# Patient Record
Sex: FEMALE | Race: WHITE | NOT HISPANIC OR LATINO | Employment: OTHER | URBAN - METROPOLITAN AREA
[De-identification: names, ages, dates, MRNs, and addresses within clinical notes are randomized per-mention and may not be internally consistent; named-entity substitution may affect disease eponyms.]

---

## 2017-04-19 ENCOUNTER — OFFICE VISIT (OUTPATIENT)
Dept: PODIATRY | Facility: CLINIC | Age: 77
End: 2017-04-19
Payer: MEDICARE

## 2017-04-19 ENCOUNTER — HOSPITAL ENCOUNTER (OUTPATIENT)
Dept: RADIOLOGY | Facility: HOSPITAL | Age: 77
Discharge: HOME | End: 2017-04-19
Attending: PODIATRIST
Payer: MEDICARE

## 2017-04-19 VITALS
HEIGHT: 62 IN | DIASTOLIC BLOOD PRESSURE: 64 MMHG | SYSTOLIC BLOOD PRESSURE: 139 MMHG | HEART RATE: 66 BPM | BODY MASS INDEX: 32.94 KG/M2 | WEIGHT: 179 LBS

## 2017-04-19 DIAGNOSIS — L97.512 ULCER OF TOE, RIGHT, WITH FAT LAYER EXPOSED: ICD-10-CM

## 2017-04-19 DIAGNOSIS — E11.42 TYPE 2 DIABETES MELLITUS WITH PERIPHERAL NEUROPATHY: Primary | ICD-10-CM

## 2017-04-19 DIAGNOSIS — M20.41 HAMMER TOE OF RIGHT FOOT: ICD-10-CM

## 2017-04-19 PROCEDURE — 99213 OFFICE O/P EST LOW 20 MIN: CPT | Mod: 25,S$GLB,, | Performed by: PODIATRIST

## 2017-04-19 PROCEDURE — 1160F RVW MEDS BY RX/DR IN RCRD: CPT | Mod: S$GLB,,, | Performed by: PODIATRIST

## 2017-04-19 PROCEDURE — 73630 X-RAY EXAM OF FOOT: CPT | Mod: TC,PO,RT

## 2017-04-19 PROCEDURE — 99999 PR PBB SHADOW E&M-EST. PATIENT-LVL III: CPT | Mod: PBBFAC,,, | Performed by: PODIATRIST

## 2017-04-19 PROCEDURE — 1159F MED LIST DOCD IN RCRD: CPT | Mod: S$GLB,,, | Performed by: PODIATRIST

## 2017-04-19 PROCEDURE — 1126F AMNT PAIN NOTED NONE PRSNT: CPT | Mod: S$GLB,,, | Performed by: PODIATRIST

## 2017-04-19 PROCEDURE — 73630 X-RAY EXAM OF FOOT: CPT | Mod: 26,RT,, | Performed by: RADIOLOGY

## 2017-04-19 PROCEDURE — 97597 DBRDMT OPN WND 1ST 20 CM/<: CPT | Mod: S$GLB,,, | Performed by: PODIATRIST

## 2017-04-19 RX ORDER — OMEPRAZOLE 20 MG/1
CAPSULE, DELAYED RELEASE ORAL
Status: ON HOLD | COMMUNITY
Start: 2017-03-06 | End: 2017-05-28

## 2017-04-19 RX ORDER — MELOXICAM 15 MG/1
15 TABLET ORAL DAILY
Refills: 2 | Status: ON HOLD | COMMUNITY
Start: 2017-01-12 | End: 2017-05-28 | Stop reason: HOSPADM

## 2017-04-19 NOTE — PROGRESS NOTES
"Subjective:      Patient ID: Caro Powell is a 76 y.o. female.    Chief Complaint: Toe Pain (Infected right 5th toe)    Caro LUA is a 76 y.o. female who returns to the clinic for routine Diabetic foot exam. Documented high risk due to peripheral neuropathy and history of toe ulceration. Notes new ulcer at right 5th toe that opened last week. Denies drainage, pain.    PCP: TORIE Salcedo Jr, MD    Date Last Seen by PCP:     Current shoe gear:      No results found for: HGBA1C  Vitals:    04/19/17 1004   BP: 139/64   Pulse: 66   Weight: 81.2 kg (179 lb)   Height: 5' 2" (1.575 m)   PainSc: 0-No pain      Past Medical History:   Diagnosis Date    Diabetes mellitus, type 2     Diabetic peripheral neuropathy associated with type 2 diabetes mellitus     Hypertension        History reviewed. No pertinent surgical history.    Family History   Problem Relation Age of Onset    Diabetes Mother        Social History     Social History    Marital status:      Spouse name: N/A    Number of children: N/A    Years of education: N/A     Social History Main Topics    Smoking status: Never Smoker    Smokeless tobacco: None    Alcohol use None    Drug use: None    Sexual activity: Not Asked     Other Topics Concern    None     Social History Narrative       Current Outpatient Prescriptions   Medication Sig Dispense Refill    ACCU-CHEK SAMI CONTROL SOLN Soln       ACCU-CHEK SAMI PLUS METER Misc       ACCU-CHEK SAMI PLUS TEST STRP Strp       ACCU-CHEK SOFT DEV LANCETS Kit       ACCU-CHEK SOFTCLIX LANCETS Misc       ammonium lactate 12 % Crea Apply to feet twice daily. 140 g 5    gabapentin (NEURONTIN) 400 MG capsule       glimepiride (AMARYL) 1 MG tablet       lisinopril (PRINIVIL,ZESTRIL) 5 MG tablet       omeprazole (PRILOSEC) 20 MG capsule       pramipexole (MIRAPEX) 0.125 MG tablet       diazepam (VALIUM) 5 MG tablet Take 1 tablet (5 mg total) by mouth every 6 (six) hours as needed for " Anxiety. 10 tablet 0    hydrocodone-acetaminophen 5-325mg (NORCO) 5-325 mg per tablet Take 1 tablet by mouth every 12 (twelve) hours as needed.  0    meloxicam (MOBIC) 15 MG tablet Take 15 mg by mouth once daily.  2    triamcinolone acetonide 0.1% (KENALOG) 0.1 % cream Apply topically 2 (two) times daily. Apply to feet twice a day. 80 g 2     No current facility-administered medications for this visit.        Review of patient's allergies indicates:   Allergen Reactions    Codeine Nausea Only         Review of Systems   Constitution: Negative for chills, fever, weakness and malaise/fatigue.   Cardiovascular: Negative for chest pain, claudication and leg swelling.   Respiratory: Negative for cough and shortness of breath.    Skin: Positive for dry skin, nail changes and poor wound healing. Negative for color change.   Musculoskeletal: Negative for back pain, joint pain, muscle cramps and muscle weakness.   Gastrointestinal: Negative for nausea and vomiting.   Neurological: Positive for paresthesias. Negative for numbness.   Psychiatric/Behavioral: Negative for altered mental status.           Objective:      Physical Exam   Constitutional: She is oriented to person, place, and time. She appears well-nourished. No distress.   Cardiovascular: Intact distal pulses.    Pulses:       Dorsalis pedis pulses are 2+ on the right side, and 2+ on the left side.        Posterior tibial pulses are 1+ on the right side, and 1+ on the left side.   CFT< 3 secs all toes bilateral foot, skin temp warm bilateral foot, diminished digital hair growth bilateral foot, no lower extremity edema bilateral.       Musculoskeletal:        Feet:    Hallux limitus with semi-reducible hammertoe deformities toes 2-5 bilateral. Elongated second toe bilateral. Rectus alignment of right second toe. No pain with on palpation or ROM right second toe. Adductovarus rotation fifth toe bilateral. Gastrocnemius equinus bilateral. No pain with ROM or MMT  bilateral foot.   Neurological: She is alert and oriented to person, place, and time. She has normal strength. A sensory deficit is present.   Protective threshold and vibratory sensation decreased bilateral foot.   Skin: Skin is warm, dry and intact. No ecchymosis noted. She is not diaphoretic. No cyanosis or erythema. No pallor. Nails show no clubbing.   Ulcer at right 5th IP joint, 4 x 4 x 2 mm.  Fibro granular base. No probe to bone or tendon.  Mild local edema and erythema. No drainage.                           Assessment:       Encounter Diagnoses   Name Primary?    Type 2 diabetes mellitus with peripheral neuropathy Yes    Ulcer of toe, right, with fat layer exposed     Hammer toe of right foot          Plan:       Caro LUA was seen today for toe pain.    Diagnoses and all orders for this visit:    Type 2 diabetes mellitus with peripheral neuropathy    Ulcer of toe, right, with fat layer exposed  -     X-Ray Foot Complete Right; Future  -     Aerobic culture    Hammer toe of right foot      I counseled the patient on her conditions, their implications and medical management.    Shoe inspection. Diabetic Foot Education. Patient reminded of the importance of good nutrition and blood sugar control to help prevent podiatric complications of diabetes. Patient instructed on proper foot hygeine. We discussed wearing proper shoe gear, daily foot inspections, never walking without protective shoe gear, never putting sharp instruments to feet    Ulcer at right 5th toe debrided, cultured, dressed with nathan, foam, cast padding, coban.  Offload with surgical shoe.  Will call in abx based on C&S.    F/u next week    Obtained verbal consent from the patient for excisional wound debridement, right 5th toe. No anesthesia was required, Utilizing a sterile curet, all non-viable soft tissue was excised to level of health subcutaneous tissue. A healthy appearing wound base was achieved with minimal blood loss. Hemostasis  achieved with compression. Wound site was irrigated with normal saline and dressed. Wound care instructions were reviewed with the patient. Patient tolerated the procedure well.

## 2017-04-19 NOTE — MR AVS SNAPSHOT
[facility]   [place]  [place] [address]  Phone: [phone]                  Caro Powell   2017 10:00 AM   Office Visit    Description:  Female : 1940   Provider:  Roman Augustin DPM   Department:  [facility]           Reason for Visit     Toe Pain           Diagnoses this Visit        Comments    Type 2 diabetes mellitus with peripheral neuropathy    -  Primary     Ulcer of toe, right, with fat layer exposed         Hammer toe of right foot                To Do List           Future Appointments        Provider Department Dept Phone    2017 8:00 AM Derek Jose DPM [facility] [phone]      Goals (5 Years of Data)     None      Follow-Up and Disposition     Return in about 1 week (around 2017).      [facility] On Call     [facility] On Call Nurse Care Line -  Assistance  Unless otherwise directed by your provider, please contact [facility], our nurse care line that is available for  assistance.     Registered nurses in the [facility] provide: appointment scheduling, clinical advisement, health education, and other advisory services.  Call: [phone] (toll free)               Medications           Message regarding Medications     Verify the changes and/or additions to your medication regime listed below are the same as discussed with your clinician today.  If any of these changes or additions are incorrect, please notify your healthcare provider.        STOP taking these medications     famotidine (PEPCID) 20 MG tablet Take 1 tablet (20 mg total) by mouth once daily.           Verify that the below list of medications is an accurate representation of the medications you are currently taking.  If none reported, the list may be blank. If incorrect, please contact your healthcare provider. Carry this list with you in case of emergency.           Current Medications     ACCU-CHEK SAMI CONTROL SOLN Soln      "ACCU-CHEK SAMI PLUS METER Misc     ACCU-CHEK SAMI PLUS TEST STRP Strp     ACCU-CHEK SOFT DEV LANCETS Kit     ACCU-CHEK SOFTCLIX LANCETS Misc     ammonium lactate 12 % Crea Apply to feet twice daily.    gabapentin (NEURONTIN) 400 MG capsule     glimepiride (AMARYL) 1 MG tablet     lisinopril (PRINIVIL,ZESTRIL) 5 MG tablet     omeprazole (PRILOSEC) 20 MG capsule     pramipexole (MIRAPEX) 0.125 MG tablet     diazepam (VALIUM) 5 MG tablet Take 1 tablet (5 mg total) by mouth every 6 (six) hours as needed for Anxiety.    hydrocodone-acetaminophen 5-325mg (NORCO) 5-325 mg per tablet Take 1 tablet by mouth every 12 (twelve) hours as needed.    meloxicam (MOBIC) 15 MG tablet Take 15 mg by mouth once daily.    triamcinolone acetonide 0.1% (KENALOG) 0.1 % cream Apply topically 2 (two) times daily. Apply to feet twice a day.           Clinical Reference Information           Your Vitals Were     BP Pulse Height Weight BMI    139/64 66 5' 2" (1.575 m) 81.2 kg (179 lb) 32.74 kg/m2      Blood Pressure          Most Recent Value    BP  139/64      Allergies as of 4/19/2017     Codeine      Immunizations Administered on Date of Encounter - 4/19/2017     None      Orders Placed During Today's Visit      Normal Orders This Visit    Aerobic culture     Future Labs/Procedures Expected by Expires    X-Ray Foot Complete Right  4/19/2017 4/19/2018      Language Assistance Services     ATTENTION: Language assistance services are available, free of charge. Please call [phone].      ATENCIÓN: Si habla susan, tiene a lee disposición servicios gratuitos de asistencia lingüística. Llame al [phone].     CHÚ Ý: N?u b?n nói Ti?ng Vi?t, có các d?ch v? h? tr? ngôn ng? mi?n phí dành cho b?n. G?i s? [phone].         [facility] complies with applicable Federal civil rights laws and does not discriminate on the basis of race, color, national origin, age, disability, or sex.        "

## 2017-04-22 LAB — BACTERIA SPEC AEROBE CULT: NORMAL

## 2017-04-28 ENCOUNTER — OFFICE VISIT (OUTPATIENT)
Dept: PODIATRY | Facility: CLINIC | Age: 77
End: 2017-04-28
Payer: MEDICARE

## 2017-04-28 VITALS
HEART RATE: 65 BPM | WEIGHT: 179 LBS | DIASTOLIC BLOOD PRESSURE: 61 MMHG | SYSTOLIC BLOOD PRESSURE: 138 MMHG | HEIGHT: 62 IN | BODY MASS INDEX: 32.94 KG/M2

## 2017-04-28 DIAGNOSIS — E11.42 TYPE 2 DIABETES MELLITUS WITH PERIPHERAL NEUROPATHY: ICD-10-CM

## 2017-04-28 DIAGNOSIS — M86.9 OSTEOMYELITIS OF TOE OF RIGHT FOOT: ICD-10-CM

## 2017-04-28 DIAGNOSIS — L97.512 ULCER OF TOE, RIGHT, WITH FAT LAYER EXPOSED: Primary | ICD-10-CM

## 2017-04-28 PROCEDURE — 11042 DBRDMT SUBQ TIS 1ST 20SQCM/<: CPT | Mod: S$GLB,,, | Performed by: PODIATRIST

## 2017-04-28 PROCEDURE — 1159F MED LIST DOCD IN RCRD: CPT | Mod: S$GLB,,, | Performed by: PODIATRIST

## 2017-04-28 PROCEDURE — 1126F AMNT PAIN NOTED NONE PRSNT: CPT | Mod: S$GLB,,, | Performed by: PODIATRIST

## 2017-04-28 PROCEDURE — 1160F RVW MEDS BY RX/DR IN RCRD: CPT | Mod: S$GLB,,, | Performed by: PODIATRIST

## 2017-04-28 PROCEDURE — 99213 OFFICE O/P EST LOW 20 MIN: CPT | Mod: 25,S$GLB,, | Performed by: PODIATRIST

## 2017-04-28 PROCEDURE — 99999 PR PBB SHADOW E&M-EST. PATIENT-LVL III: CPT | Mod: PBBFAC,,, | Performed by: PODIATRIST

## 2017-04-28 RX ORDER — MUPIROCIN 20 MG/G
OINTMENT TOPICAL DAILY
Qty: 15 G | Refills: 1 | Status: ON HOLD | OUTPATIENT
Start: 2017-04-28 | End: 2017-05-24 | Stop reason: HOSPADM

## 2017-04-28 NOTE — MR AVS SNAPSHOT
[facility]   [place] [address]  Phone: [phone]                  Caro Powell   2017 8:00 AM   Office Visit    Description:  Female : 1940   Provider:  Derek Jose DPM   Department:  [facility]           Reason for Visit     Follow-up           Diagnoses this Visit        Comments    Ulcer of toe, right, with fat layer exposed    -  Primary     Osteomyelitis of toe of right foot         Type 2 diabetes mellitus with peripheral neuropathy                To Do List           Future Appointments        Provider Department Dept Phone    2017 10:00 AM [facility] [phone]      Goals (5 Years of Data)     None      Follow-Up and Disposition     Return in about 3 weeks (around 2017).       These Medications        Disp Refills Start End    mupirocin (BACTROBAN) 2 % ointment 15 g 1 2017     Apply topically once daily. - Topical (Top)    Pharmacy: [facility]/pharmacy #[id number] - [place] - [address] AT [facility] Ph #: [phone]         [facility] On Call     [facility] On Call Nurse Care Line -  Assistance  Unless otherwise directed by your provider, please contact [facility], our nurse care line that is available for  assistance.     Registered nurses in the [facility] provide: appointment scheduling, clinical advisement, health education, and other advisory services.  Call: [phone] (toll free)               Medications           Message regarding Medications     Verify the changes and/or additions to your medication regime listed below are the same as discussed with your clinician today.  If any of these changes or additions are incorrect, please notify your healthcare provider.        START taking these NEW medications        Refills    mupirocin (BACTROBAN) 2 % ointment 1    Sig: Apply topically once daily.    Class: Normal    Route: Topical  "(Top)           Verify that the below list of medications is an accurate representation of the medications you are currently taking.  If none reported, the list may be blank. If incorrect, please contact your healthcare provider. Carry this list with you in case of emergency.           Current Medications     ACCU-CHEK SAMI CONTROL SOLN Soln     ACCU-CHEK SAMI PLUS METER Misc     ACCU-CHEK SAMI PLUS TEST STRP Strp     ACCU-CHEK SOFT DEV LANCETS Kit     ACCU-CHEK SOFTCLIX LANCETS Misc     ammonium lactate 12 % Crea Apply to feet twice daily.    gabapentin (NEURONTIN) 400 MG capsule     glimepiride (AMARYL) 1 MG tablet     hydrocodone-acetaminophen 5-325mg (NORCO) 5-325 mg per tablet Take 1 tablet by mouth every 12 (twelve) hours as needed.    lisinopril (PRINIVIL,ZESTRIL) 5 MG tablet     meloxicam (MOBIC) 15 MG tablet Take 15 mg by mouth once daily.    omeprazole (PRILOSEC) 20 MG capsule     pramipexole (MIRAPEX) 0.125 MG tablet     diazepam (VALIUM) 5 MG tablet Take 1 tablet (5 mg total) by mouth every 6 (six) hours as needed for Anxiety.    mupirocin (BACTROBAN) 2 % ointment Apply topically once daily.    triamcinolone acetonide 0.1% (KENALOG) 0.1 % cream Apply topically 2 (two) times daily. Apply to feet twice a day.           Clinical Reference Information           Your Vitals Were     BP Pulse Height Weight BMI    138/61 65 5' 2" (1.575 m) 81.2 kg (179 lb) 32.74 kg/m2      Blood Pressure          Most Recent Value    BP  138/61      Allergies as of 4/28/2017     Codeine      Immunizations Administered on Date of Encounter - 4/28/2017     None      Orders Placed During Today's Visit      Normal Orders This Visit    Debridement       Instructions    Remove dressing in 2 days. Wash wound site with saline and pat dry. Apply thin layer of mupirocin ointment and cover with telf dressing. Apply silicone toe sleeve over top and change once a day.       Language Assistance Services     ATTENTION: Language assistance " services are available, free of charge. Please call [phone].      ATENCIÓN: Si habla susan, tiene a lee disposición servicios gratuitos de asistencia lingüística. Llame al [phone].     CHÚ Ý: N?u b?n nói Ti?ng Vi?t, có các d?ch v? h? tr? ngôn ng? mi?n phí dành cho b?n. G?i s? [phone].         [facility] complies with applicable Federal civil rights laws and does not discriminate on the basis of race, color, national origin, age, disability, or sex.

## 2017-04-28 NOTE — PATIENT INSTRUCTIONS
Remove dressing in 2 days. Wash wound site with saline and pat dry. Apply thin layer of mupirocin ointment and cover with telf dressing. Apply silicone toe sleeve over top and change once a day.

## 2017-04-28 NOTE — PROCEDURES
"Wound Debridement  Date/Time: 4/28/2017 8:29 AM  Performed by: GERTRUDE HILLIARD  Authorized by: GERTRUDE HILLIARD     Time out: Immediately prior to procedure a "time out" was called to verify the correct patient, procedure, equipment, support staff and site/side marked as required.    Consent Done?:  Yes (Verbal)    Preparation: Patient was prepped and draped in usual sterile fashion    Local anesthesia used?: No      Wound Details:    Location:  Right foot    Location:  Right 5th Toe    Type of Debridement:  Excisional       Length (cm):  0.7       Area (sq cm):  0.21       Width (cm):  0.3       Percent Debrided (%):  100       Depth (cm):  0.1       Total Area Debrided (sq cm):  0.21    Depth of debridement:  Subcutaneous tissue    Tissue debrided:  Subcutaneous    Devitalized tissue debrided:  Biofilm, Fibrin, Sough and Callus    Instruments:  Blade    Bleeding:  Minimal  Hemostasis Achieved: Yes    Method Used:  Pressure and Silver Nitrate  Patient tolerance:  Patient tolerated the procedure well with no immediate complications     Applied nathan, mepilex foam with football dressing.        "

## 2017-04-28 NOTE — PROGRESS NOTES
"Subjective:      Patient ID: Caro Powell is a 76 y.o. female.    Chief Complaint: Follow-up (1 wk dressing change)    Caro LUA is a 76 y.o. female who returns to the clinic for routine Diabetic foot exam. Documented high risk due to peripheral neuropathy and history of toe ulceration. Notes new ulcer at right 5th toe that opened last week. Denies drainage, pain.    4/28/17: Last seen by Dr. Augustin on 4/19/17. He debrided and placed patient in football dressing with Darco shoe. Relates no pain today. Accompanied by her . Leaving on 5/1/17 for [place] x 2 weeks.    PCP: Manuel Laird MD    Date Last Seen by PCP:     Current shoe gear:      No results found for: HGBA1C  Vitals:    04/28/17 0806   BP: 138/61   Pulse: 65   Weight: 81.2 kg (179 lb)   Height: 5' 2" (1.575 m)   PainSc: 0-No pain      Past Medical History:   Diagnosis Date    Diabetes mellitus, type 2     Diabetic peripheral neuropathy associated with type 2 diabetes mellitus     Hypertension        No past surgical history on file.    Family History   Problem Relation Age of Onset    Diabetes Mother        Social History     Social History    Marital status:      Spouse name: N/A    Number of children: N/A    Years of education: N/A     Social History Main Topics    Smoking status: Never Smoker    Smokeless tobacco: None    Alcohol use None    Drug use: None    Sexual activity: Not Asked     Other Topics Concern    None     Social History Narrative       Current Outpatient Prescriptions   Medication Sig Dispense Refill    ACCU-CHEK SAMI CONTROL SOLN Soln       ACCU-CHEK SAMI PLUS METER Misc       ACCU-CHEK SAMI PLUS TEST STRP Strp       ACCU-CHEK SOFT DEV LANCETS Kit       ACCU-CHEK SOFTCLIX LANCETS Misc       ammonium lactate 12 % Crea Apply to feet twice daily. 140 g 5    gabapentin (NEURONTIN) 400 MG capsule       glimepiride (AMARYL) 1 MG tablet       hydrocodone-acetaminophen 5-325mg (NORCO) " 5-325 mg per tablet Take 1 tablet by mouth every 12 (twelve) hours as needed.  0    lisinopril (PRINIVIL,ZESTRIL) 5 MG tablet       meloxicam (MOBIC) 15 MG tablet Take 15 mg by mouth once daily.  2    omeprazole (PRILOSEC) 20 MG capsule       pramipexole (MIRAPEX) 0.125 MG tablet       diazepam (VALIUM) 5 MG tablet Take 1 tablet (5 mg total) by mouth every 6 (six) hours as needed for Anxiety. 10 tablet 0    mupirocin (BACTROBAN) 2 % ointment Apply topically once daily. 15 g 1    triamcinolone acetonide 0.1% (KENALOG) 0.1 % cream Apply topically 2 (two) times daily. Apply to feet twice a day. 80 g 2     No current facility-administered medications for this visit.        Review of patient's allergies indicates:   Allergen Reactions    Codeine Nausea Only         Review of Systems   Constitution: Negative for chills, fever, weakness and malaise/fatigue.   Cardiovascular: Negative for chest pain, claudication and leg swelling.   Respiratory: Negative for cough and shortness of breath.    Skin: Positive for dry skin, nail changes and poor wound healing. Negative for color change.   Musculoskeletal: Negative for back pain, joint pain, muscle cramps and muscle weakness.   Gastrointestinal: Negative for nausea and vomiting.   Neurological: Positive for paresthesias. Negative for numbness.   Psychiatric/Behavioral: Negative for altered mental status.           Objective:      Physical Exam   Constitutional: She is oriented to person, place, and time. She appears well-nourished. No distress.   Cardiovascular: Intact distal pulses.    Pulses:       Dorsalis pedis pulses are 2+ on the right side, and 2+ on the left side.        Posterior tibial pulses are 1+ on the right side, and 1+ on the left side.   CFT< 3 secs all toes bilateral foot, skin temp warm bilateral foot, diminished digital hair growth bilateral foot, no lower extremity edema bilateral.       Musculoskeletal:        Feet:    Hallux limitus with  semi-reducible hammertoe deformities toes 2-5 bilateral. Elongated second toe bilateral. Rectus alignment of right second toe. No pain with on palpation or ROM right second toe. Adductovarus rotation fifth toe bilateral. Gastrocnemius equinus bilateral. No pain with ROM or MMT bilateral foot.   Neurological: She is alert and oriented to person, place, and time. She has normal strength. A sensory deficit is present.   Protective threshold and vibratory sensation decreased bilateral foot.   Skin: Skin is warm, dry and intact. No ecchymosis noted. She is not diaphoretic. No cyanosis or erythema. No pallor. Nails show no clubbing.   Ulcer Location: right fifth toe dorsal lateral PIPJ  Measurements: 0.5x0.3x0.1cm  Periwound: Intact  Drainage: serosanguinous.  Pus: None.  Malodor: None.  Base:  80% granular. 20% fibrin with moderate overlying biofilm  Signs of infection: None.   Does not probe to bone                           Assessment:       Encounter Diagnoses   Name Primary?    Ulcer of toe, right, with fat layer exposed Yes    Osteomyelitis of toe of right foot     Type 2 diabetes mellitus with peripheral neuropathy          Plan:       Caro LUA was seen today for follow-up.    Diagnoses and all orders for this visit:    Ulcer of toe, right, with fat layer exposed  -     Debridement    Osteomyelitis of toe of right foot    Type 2 diabetes mellitus with peripheral neuropathy    Other orders  -     mupirocin (BACTROBAN) 2 % ointment; Apply topically once daily.      I counseled the patient on her conditions, their implications and medical management.    Shoe inspection. Diabetic Foot Education. Patient reminded of the importance of good nutrition and blood sugar control to help prevent podiatric complications of diabetes. Patient instructed on proper foot hygeine. We discussed wearing proper shoe gear, daily foot inspections, never walking without protective shoe gear, never putting sharp instruments to  feet    Debridement and dressing per attached note. Home care instructions reviewed in detail.    Reviewed xray findings noting radiolucency at fifth toe proximal phalanx lateral epicondyle consistent with possible osteomyelitis. She does not demonstrate signs of active infection. Wound c&s normal skin joaquin therefore will complete bone biopsy at next visit.    Discussed arthroplasty of the affected toe to help correct the deformity and resect the infected bone if present.    RTC 2-3 weeks or prn.

## 2017-05-18 ENCOUNTER — OFFICE VISIT (OUTPATIENT)
Dept: PODIATRY | Facility: CLINIC | Age: 77
End: 2017-05-18
Payer: MEDICARE

## 2017-05-18 VITALS
SYSTOLIC BLOOD PRESSURE: 157 MMHG | HEIGHT: 62 IN | WEIGHT: 179 LBS | BODY MASS INDEX: 32.94 KG/M2 | HEART RATE: 61 BPM | DIASTOLIC BLOOD PRESSURE: 68 MMHG

## 2017-05-18 DIAGNOSIS — S91.109A WOUND, OPEN, TOE, INITIAL ENCOUNTER: ICD-10-CM

## 2017-05-18 DIAGNOSIS — M86.9 OSTEOMYELITIS OF TOE OF RIGHT FOOT: Primary | ICD-10-CM

## 2017-05-18 DIAGNOSIS — E11.42 TYPE 2 DIABETES MELLITUS WITH PERIPHERAL NEUROPATHY: ICD-10-CM

## 2017-05-18 DIAGNOSIS — L97.514 ULCER OF TOE, RIGHT, WITH NECROSIS OF BONE: ICD-10-CM

## 2017-05-18 PROCEDURE — 1159F MED LIST DOCD IN RCRD: CPT | Mod: S$GLB,,, | Performed by: PODIATRIST

## 2017-05-18 PROCEDURE — 99999 PR PBB SHADOW E&M-EST. PATIENT-LVL III: CPT | Mod: PBBFAC,,, | Performed by: PODIATRIST

## 2017-05-18 PROCEDURE — 1126F AMNT PAIN NOTED NONE PRSNT: CPT | Mod: S$GLB,,, | Performed by: PODIATRIST

## 2017-05-18 PROCEDURE — 1160F RVW MEDS BY RX/DR IN RCRD: CPT | Mod: S$GLB,,, | Performed by: PODIATRIST

## 2017-05-18 PROCEDURE — 11042 DBRDMT SUBQ TIS 1ST 20SQCM/<: CPT | Mod: S$GLB,,, | Performed by: PODIATRIST

## 2017-05-18 PROCEDURE — 99213 OFFICE O/P EST LOW 20 MIN: CPT | Mod: 25,S$GLB,, | Performed by: PODIATRIST

## 2017-05-18 NOTE — PROGRESS NOTES
"Subjective:      Patient ID: Caro Powell is a 76 y.o. female.    Chief Complaint: right infected toe    Caro LUA is a 76 y.o. female who returns to the clinic for routine Diabetic foot exam. Documented high risk due to peripheral neuropathy and history of toe ulceration. Notes new ulcer at right 5th toe that opened last week. Denies drainage, pain.    4/28/17: Last seen by Dr. Augustin on 4/19/17. He debrided and placed patient in football dressing with Darco shoe. Relates no pain today. Accompanied by her . Leaving on 5/1/17 for [place] x 2 weeks.    5/18/17: Follow up for wound check right fifth toe. Complains she has developed new wounds to both big toes. Says she only did a little shopping. Accompanied by her . Denies trauma.     PCP: Manuel Laird MD    Date Last Seen by PCP:     Current shoe gear:      No results found for: HGBA1C  Vitals:    05/18/17 1035   BP: (!) 157/68   Pulse: 61   Weight: 81.2 kg (179 lb 0.2 oz)   Height: 5' 2" (1.575 m)   PainSc: 0-No pain      Past Medical History:   Diagnosis Date    Diabetes mellitus, type 2     Diabetic peripheral neuropathy associated with type 2 diabetes mellitus     Hypertension        No past surgical history on file.    Family History   Problem Relation Age of Onset    Diabetes Mother        Social History     Social History    Marital status:      Spouse name: N/A    Number of children: N/A    Years of education: N/A     Social History Main Topics    Smoking status: Never Smoker    Smokeless tobacco: None    Alcohol use None    Drug use: None    Sexual activity: Not Asked     Other Topics Concern    None     Social History Narrative       Current Outpatient Prescriptions   Medication Sig Dispense Refill    ACCU-CHEK SAMI CONTROL SOLN Soln       ACCU-CHEK SAMI PLUS METER Misc       ACCU-CHEK SAMI PLUS TEST STRP Strp       ACCU-CHEK SOFT DEV LANCETS Kit       ACCU-CHEK SOFTCLIX LANCETS Misc       " ammonium lactate 12 % Crea Apply to feet twice daily. 140 g 5    gabapentin (NEURONTIN) 400 MG capsule       glimepiride (AMARYL) 1 MG tablet       hydrocodone-acetaminophen 5-325mg (NORCO) 5-325 mg per tablet Take 1 tablet by mouth every 12 (twelve) hours as needed.  0    lisinopril (PRINIVIL,ZESTRIL) 5 MG tablet       meloxicam (MOBIC) 15 MG tablet Take 15 mg by mouth once daily.  2    mupirocin (BACTROBAN) 2 % ointment Apply topically once daily. 15 g 1    omeprazole (PRILOSEC) 20 MG capsule       pramipexole (MIRAPEX) 0.125 MG tablet       diazepam (VALIUM) 5 MG tablet Take 1 tablet (5 mg total) by mouth every 6 (six) hours as needed for Anxiety. 10 tablet 0    triamcinolone acetonide 0.1% (KENALOG) 0.1 % cream Apply topically 2 (two) times daily. Apply to feet twice a day. 80 g 2     No current facility-administered medications for this visit.        Review of patient's allergies indicates:   Allergen Reactions    Codeine Nausea Only         Review of Systems   Constitution: Negative for chills, fever, weakness and malaise/fatigue.   Cardiovascular: Negative for chest pain, claudication and leg swelling.   Respiratory: Negative for cough and shortness of breath.    Skin: Positive for dry skin, nail changes and poor wound healing. Negative for color change.   Musculoskeletal: Negative for back pain, joint pain, muscle cramps and muscle weakness.   Gastrointestinal: Negative for nausea and vomiting.   Neurological: Positive for paresthesias. Negative for numbness.   Psychiatric/Behavioral: Negative for altered mental status.           Objective:      Physical Exam   Constitutional: She is oriented to person, place, and time. No distress.   Cardiovascular: Intact distal pulses.    Pulses:       Dorsalis pedis pulses are 2+ on the right side, and 2+ on the left side.        Posterior tibial pulses are 1+ on the right side, and 1+ on the left side.   CFT< 3 secs all toes bilateral foot, skin temp warm  bilateral foot, diminished digital hair growth bilateral foot, no lower extremity edema bilateral.       Musculoskeletal:   Hallux limitus with semi-reducible hammertoe deformities toes 2-5 bilateral. Elongated second toe bilateral. Rectus alignment of right second toe. No pain with on palpation or ROM right second toe. Adductovarus rotation fifth toe bilateral. Gastrocnemius equinus bilateral. No pain with ROM or MMT bilateral foot.   Neurological: She is alert and oriented to person, place, and time. She has normal strength. A sensory deficit is present.   Protective threshold and vibratory sensation decreased bilateral foot.   Skin: Skin is warm, dry and intact. No ecchymosis noted. She is not diaphoretic. No cyanosis or erythema. No pallor. Nails show no clubbing.   Ulcer Location: right fifth toe dorsal lateral PIPJ  Measurements: 0.2x0.3x0.1cm  Periwound: Intact  Drainage: serosanguinous.  Pus: None.  Malodor: None.  Base:  50% granular. 50% fibrin with moderate overlying biofilm  Signs of infection: None.   Probes to bone    Ulcer Location: right hallux distal plantar medial aspect  Measurements:0.3x0.4x0.1cm  Periwound: sloughed  Drainage: none  Pus: None.  Malodor: None.  Base:  80% granular. 20% fibrin with mild overlying eschar and hyperkeratosis  Signs of infection: None.     Ulcer Location: left hallux distal plantar medial aspect  Measurements:0.2x 1.5x0.1cm  Periwound: Intact  Drainage: none  Pus: None.  Malodor: None.  Base:  90% granular. 10% fibrin with overlying slough, eschar and hyperkeratotic tissue  Signs of infection: None.                     5/18/17            Assessment:       Encounter Diagnoses   Name Primary?    Osteomyelitis of toe of right foot Yes    Ulcer of toe, right, with necrosis of bone     Wound, open, toe, initial encounter     Type 2 diabetes mellitus with peripheral neuropathy          Plan:       Caro LUA was seen today for right infected toe.    Diagnoses and all  orders for this visit:    Osteomyelitis of toe of right foot    Ulcer of toe, right, with necrosis of bone  -     Debridement    Wound, open, toe, initial encounter  -     Debridement    Type 2 diabetes mellitus with peripheral neuropathy  -     Debridement      I counseled the patient on her conditions, their implications and medical management.    Shoe inspection. Diabetic Foot Education. Patient reminded of the importance of good nutrition and blood sugar control to help prevent podiatric complications of diabetes. Patient instructed on proper foot hygeine. We discussed wearing proper shoe gear, daily foot inspections, never walking without protective shoe gear, never putting sharp instruments to feet    Debridement and dressing per attached note. Home care instructions reviewed in detail.    Reviewed xray findings noting radiolucency at fifth toe proximal phalanx lateral epicondyle consistent with possible osteomyelitis. She does not demonstrate signs of active infection. Wound c&s normal skin joaquin.    Discussed continue conservative care such as local wound care and long term antibiotics vs resection arthroplasty of the right fifth toe PIPJ with bone biopsy and culture. She will need admission to [facility] on 5/22/17 with MRI and will consult hospital medical for medical management and ID for antibiotic recommendations.    RTC prn.

## 2017-05-18 NOTE — MR AVS SNAPSHOT
[facility]   [facility]  [place] [address]  Phone: [phone]                  Caro Powell   2017 10:30 AM   Office Visit    Description:  Female : 1940   Provider:  Derek Jose DPM   Department:  [facility]           Reason for Visit     right infected toe           Diagnoses this Visit        Comments    Osteomyelitis of toe of right foot    -  Primary     Ulcer of toe, right, with necrosis of bone         Wound, open, toe, initial encounter                To Do List           Goals (5 Years of Data)     None      Follow-Up and Disposition     Return if symptoms worsen or fail to improve.      [facility] On Call     [facility] On Call Nurse Care Line -  Assistance  Unless otherwise directed by your provider, please contact [facility], our nurse care line that is available for  assistance.     Registered nurses in the [facility] provide: appointment scheduling, clinical advisement, health education, and other advisory services.  Call: [phone] (toll free)               Medications           Message regarding Medications     Verify the changes and/or additions to your medication regime listed below are the same as discussed with your clinician today.  If any of these changes or additions are incorrect, please notify your healthcare provider.             Verify that the below list of medications is an accurate representation of the medications you are currently taking.  If none reported, the list may be blank. If incorrect, please contact your healthcare provider. Carry this list with you in case of emergency.           Current Medications     ACCU-CHEK SAMI CONTROL SOLN Soln     ACCU-CHEK SAMI PLUS METER Misc     ACCU-CHEK SAMI PLUS TEST STRP Strp     ACCU-CHEK SOFT DEV LANCETS Kit     ACCU-CHEK SOFTCLIX LANCETS Misc     ammonium lactate 12 % Crea Apply to feet twice daily.    gabapentin (NEURONTIN) 400 MG capsule      "glimepiride (AMARYL) 1 MG tablet     hydrocodone-acetaminophen 5-325mg (NORCO) 5-325 mg per tablet Take 1 tablet by mouth every 12 (twelve) hours as needed.    lisinopril (PRINIVIL,ZESTRIL) 5 MG tablet     meloxicam (MOBIC) 15 MG tablet Take 15 mg by mouth once daily.    mupirocin (BACTROBAN) 2 % ointment Apply topically once daily.    omeprazole (PRILOSEC) 20 MG capsule     pramipexole (MIRAPEX) 0.125 MG tablet     diazepam (VALIUM) 5 MG tablet Take 1 tablet (5 mg total) by mouth every 6 (six) hours as needed for Anxiety.    triamcinolone acetonide 0.1% (KENALOG) 0.1 % cream Apply topically 2 (two) times daily. Apply to feet twice a day.           Clinical Reference Information           Your Vitals Were     BP Pulse Height Weight BMI    157/68 61 5' 2" (1.575 m) 81.2 kg (179 lb 0.2 oz) 32.74 kg/m2      Blood Pressure          Most Recent Value    BP  (!)  157/68      Allergies as of 5/18/2017     Codeine      Immunizations Administered on Date of Encounter - 5/18/2017     None      Instructions    Will contact you on Tuesday with reference to admission status       Language Assistance Services     ATTENTION: Language assistance services are available, free of charge. Please call [phone].      ATENCIÓN: Si habla susan, tiene a lee disposición servicios gratuitos de asistencia lingüística. Llame al [phone].     [facility] Ý: N?u b?n nói Ti?ng Vi?t, có các d?ch v? h? tr? ngôn ng? mi?n phí dành cho b?n. G?i s? [phone].         [facility] complies with applicable Federal civil rights laws and does not discriminate on the basis of race, color, national origin, age, disability, or sex.        "

## 2017-05-18 NOTE — PROCEDURES
"Wound Debridement  Date/Time: 5/18/2017 10:00 AM  Performed by: GERTRUDE HILLIARD  Authorized by: GERTRUDE HILLIARD     Time out: Immediately prior to procedure a "time out" was called to verify the correct patient, procedure, equipment, support staff and site/side marked as required.    Consent Done?:  Yes (Verbal)    Preparation: Patient was prepped and draped in usual sterile fashion    Local anesthesia used?: No      Wound Details:    Location:  Right foot    Location:  Right 5th Toe    Type of Debridement:  Excisional       Length (cm):  0.3       Area (sq cm):  0.09       Width (cm):  0.3       Percent Debrided (%):  100       Depth (cm):  0.3       Total Area Debrided (sq cm):  0.09    Depth of debridement:  Subcutaneous tissue    Tissue debrided:  Dermis, Epidermis and Subcutaneous    Devitalized tissue debrided:  Biofilm, Callus, Fibrin and Sough    Instruments:  Blade    2nd Wound Details:     Location:  Right foot    Location:  Right 1st Toe    Location:  Right 1st Toe    Type of Debridement:  Excisional       Length (cm):  0.5       Area (sq cm):  0.2       Width (cm):  0.4       Percent Debrided (%):  100       Depth (cm):  0.1       Total Area Debrided (sq cm):  0.2    Depth of debridement:  Subcutaneous tissue    Tissue debrided:  Subcutaneous    Devitalized tissue debrided:  Callus and Necrotic/Eschar    Instruments:  Blade    3rd Wound Details:     Location:  Left foot    Location:  Left 1st Toe    Location:  Left 1st Toe    Type of Debridement:  Excisional       Length (cm):  0.5       Area (sq cm):  1.05       Width (cm):  2.1       Percent Debrided (%):  100       Depth (cm):  0.1       Total Area Debrided (sq cm):  1.05    Depth of debridement:  Subcutaneous tissue    Tissue debrided:  Dermis, Epidermis and Subcutaneous    Devitalized tissue debrided:  Callus, Sough and Necrotic/Eschar    Instruments:  Blade    Bleeding:  Minimal  Hemostasis Achieved: Yes    Method Used:  Pressure  Patient " tolerance:  Patient tolerated the procedure well with no immediate complications     Applied moist nathan and mepilex border to all wound sites above. Right fifth toe probes to bone.

## 2017-05-22 ENCOUNTER — HOSPITAL ENCOUNTER (INPATIENT)
Facility: HOSPITAL | Age: 77
LOS: 2 days | Discharge: HOME | DRG: 617 | End: 2017-05-24
Attending: PODIATRIST | Admitting: PODIATRIST
Payer: MEDICARE

## 2017-05-22 ENCOUNTER — TELEPHONE (OUTPATIENT)
Dept: PODIATRY | Facility: CLINIC | Age: 77
End: 2017-05-22

## 2017-05-22 DIAGNOSIS — N18.30 CKD STAGE 3 DUE TO TYPE 2 DIABETES MELLITUS: Chronic | ICD-10-CM

## 2017-05-22 DIAGNOSIS — Z89.421 STATUS POST AMPUTATION OF LESSER TOE OF RIGHT FOOT: ICD-10-CM

## 2017-05-22 DIAGNOSIS — E11.42 TYPE 2 DIABETES MELLITUS WITH PERIPHERAL NEUROPATHY: Primary | ICD-10-CM

## 2017-05-22 DIAGNOSIS — E11.40 TYPE 2 DIABETES MELLITUS WITH DIABETIC NEUROPATHY, WITHOUT LONG-TERM CURRENT USE OF INSULIN: Chronic | ICD-10-CM

## 2017-05-22 DIAGNOSIS — M86.9 OSTEOMYELITIS OF TOE OF RIGHT FOOT: ICD-10-CM

## 2017-05-22 DIAGNOSIS — E11.22 CKD STAGE 3 DUE TO TYPE 2 DIABETES MELLITUS: Chronic | ICD-10-CM

## 2017-05-22 DIAGNOSIS — Z98.890 POSTOPERATIVE STATE: ICD-10-CM

## 2017-05-22 PROBLEM — D72.829 LEUKOCYTOSIS: Status: ACTIVE | Noted: 2017-05-22

## 2017-05-22 PROBLEM — I10 ESSENTIAL HYPERTENSION: Chronic | Status: ACTIVE | Noted: 2017-05-22

## 2017-05-22 PROBLEM — Z01.818 PRE-OPERATIVE CLEARANCE: Status: ACTIVE | Noted: 2017-05-22

## 2017-05-22 LAB
ALBUMIN SERPL BCP-MCNC: 3.2 G/DL
ALP SERPL-CCNC: 61 U/L
ALT SERPL W/O P-5'-P-CCNC: 23 U/L
ANION GAP SERPL CALC-SCNC: 11 MMOL/L
AST SERPL-CCNC: 16 U/L
BASOPHILS # BLD AUTO: 0.03 K/UL
BASOPHILS NFR BLD: 0.2 %
BILIRUB SERPL-MCNC: 0.5 MG/DL
BILIRUB UR QL STRIP: NEGATIVE
BUN SERPL-MCNC: 37 MG/DL
CALCIUM SERPL-MCNC: 9.7 MG/DL
CHLORIDE SERPL-SCNC: 104 MMOL/L
CLARITY UR: CLEAR
CO2 SERPL-SCNC: 23 MMOL/L
COLOR UR: YELLOW
CREAT SERPL-MCNC: 1.7 MG/DL
DIFFERENTIAL METHOD: ABNORMAL
EOSINOPHIL # BLD AUTO: 0.2 K/UL
EOSINOPHIL NFR BLD: 1 %
ERYTHROCYTE [DISTWIDTH] IN BLOOD BY AUTOMATED COUNT: 15 %
EST. GFR  (AFRICAN AMERICAN): 33 ML/MIN/1.73 M^2
EST. GFR  (NON AFRICAN AMERICAN): 29 ML/MIN/1.73 M^2
GLUCOSE SERPL-MCNC: 176 MG/DL
GLUCOSE UR QL STRIP: NEGATIVE
HCT VFR BLD AUTO: 35.3 %
HGB BLD-MCNC: 11.7 G/DL
HGB UR QL STRIP: ABNORMAL
KETONES UR QL STRIP: NEGATIVE
LEUKOCYTE ESTERASE UR QL STRIP: NEGATIVE
LYMPHOCYTES # BLD AUTO: 1.5 K/UL
LYMPHOCYTES NFR BLD: 8.1 %
MAGNESIUM SERPL-MCNC: 1.9 MG/DL
MCH RBC QN AUTO: 29.5 PG
MCHC RBC AUTO-ENTMCNC: 33.1 %
MCV RBC AUTO: 89 FL
MONOCYTES # BLD AUTO: 0.7 K/UL
MONOCYTES NFR BLD: 3.5 %
NEUTROPHILS # BLD AUTO: 16.4 K/UL
NEUTROPHILS NFR BLD: 86.8 %
NITRITE UR QL STRIP: NEGATIVE
PH UR STRIP: 6 [PH] (ref 5–8)
PLATELET # BLD AUTO: 214 K/UL
PMV BLD AUTO: 10 FL
POCT GLUCOSE: 157 MG/DL (ref 70–110)
POCT GLUCOSE: 300 MG/DL (ref 70–110)
POTASSIUM SERPL-SCNC: 4.6 MMOL/L
PROT SERPL-MCNC: 6.9 G/DL
PROT UR QL STRIP: NEGATIVE
RBC # BLD AUTO: 3.97 M/UL
SODIUM SERPL-SCNC: 138 MMOL/L
SP GR UR STRIP: <=1.005 (ref 1–1.03)
URN SPEC COLLECT METH UR: ABNORMAL
UROBILINOGEN UR STRIP-ACNC: NEGATIVE EU/DL
WBC # BLD AUTO: 18.92 K/UL

## 2017-05-22 PROCEDURE — 85025 COMPLETE CBC W/AUTO DIFF WBC: CPT

## 2017-05-22 PROCEDURE — 25000003 PHARM REV CODE 250: Performed by: PODIATRIST

## 2017-05-22 PROCEDURE — 93005 ELECTROCARDIOGRAM TRACING: CPT

## 2017-05-22 PROCEDURE — 81003 URINALYSIS AUTO W/O SCOPE: CPT

## 2017-05-22 PROCEDURE — 83735 ASSAY OF MAGNESIUM: CPT

## 2017-05-22 PROCEDURE — 11000001 HC ACUTE MED/SURG PRIVATE ROOM

## 2017-05-22 PROCEDURE — 83036 HEMOGLOBIN GLYCOSYLATED A1C: CPT

## 2017-05-22 PROCEDURE — 25000003 PHARM REV CODE 250: Performed by: PHYSICIAN ASSISTANT

## 2017-05-22 PROCEDURE — 99222 1ST HOSP IP/OBS MODERATE 55: CPT | Mod: ,,, | Performed by: PODIATRIST

## 2017-05-22 PROCEDURE — 36415 COLL VENOUS BLD VENIPUNCTURE: CPT

## 2017-05-22 PROCEDURE — 63600175 PHARM REV CODE 636 W HCPCS: Performed by: PHYSICIAN ASSISTANT

## 2017-05-22 PROCEDURE — 63600175 PHARM REV CODE 636 W HCPCS: Performed by: PODIATRIST

## 2017-05-22 PROCEDURE — 80053 COMPREHEN METABOLIC PANEL: CPT

## 2017-05-22 RX ORDER — PRAMIPEXOLE DIHYDROCHLORIDE 0.12 MG/1
0.12 TABLET ORAL 3 TIMES DAILY
Status: DISCONTINUED | OUTPATIENT
Start: 2017-05-22 | End: 2017-05-24 | Stop reason: HOSPADM

## 2017-05-22 RX ORDER — HYDROCODONE BITARTRATE AND ACETAMINOPHEN 5; 325 MG/1; MG/1
1 TABLET ORAL EVERY 12 HOURS PRN
Status: DISCONTINUED | OUTPATIENT
Start: 2017-05-22 | End: 2017-05-24 | Stop reason: HOSPADM

## 2017-05-22 RX ORDER — GLUCAGON 1 MG
1 KIT INJECTION
Status: DISCONTINUED | OUTPATIENT
Start: 2017-05-22 | End: 2017-05-24 | Stop reason: HOSPADM

## 2017-05-22 RX ORDER — ACETAMINOPHEN 325 MG/1
650 TABLET ORAL EVERY 4 HOURS PRN
Status: DISCONTINUED | OUTPATIENT
Start: 2017-05-22 | End: 2017-05-24 | Stop reason: HOSPADM

## 2017-05-22 RX ORDER — GABAPENTIN 400 MG/1
400 CAPSULE ORAL 3 TIMES DAILY
Status: DISCONTINUED | OUTPATIENT
Start: 2017-05-22 | End: 2017-05-24 | Stop reason: HOSPADM

## 2017-05-22 RX ORDER — INSULIN ASPART 100 [IU]/ML
0-5 INJECTION, SOLUTION INTRAVENOUS; SUBCUTANEOUS
Status: DISCONTINUED | OUTPATIENT
Start: 2017-05-22 | End: 2017-05-24 | Stop reason: HOSPADM

## 2017-05-22 RX ORDER — SODIUM CHLORIDE 0.9 % (FLUSH) 0.9 %
3 SYRINGE (ML) INJECTION EVERY 8 HOURS
Status: DISCONTINUED | OUTPATIENT
Start: 2017-05-22 | End: 2017-05-24 | Stop reason: HOSPADM

## 2017-05-22 RX ORDER — IBUPROFEN 200 MG
16 TABLET ORAL
Status: DISCONTINUED | OUTPATIENT
Start: 2017-05-22 | End: 2017-05-24 | Stop reason: HOSPADM

## 2017-05-22 RX ORDER — IBUPROFEN 200 MG
24 TABLET ORAL
Status: DISCONTINUED | OUTPATIENT
Start: 2017-05-22 | End: 2017-05-24 | Stop reason: HOSPADM

## 2017-05-22 RX ORDER — HYDRALAZINE HYDROCHLORIDE 20 MG/ML
10 INJECTION INTRAMUSCULAR; INTRAVENOUS EVERY 4 HOURS PRN
Status: DISCONTINUED | OUTPATIENT
Start: 2017-05-22 | End: 2017-05-24 | Stop reason: HOSPADM

## 2017-05-22 RX ORDER — SODIUM CHLORIDE 9 MG/ML
INJECTION, SOLUTION INTRAVENOUS CONTINUOUS
Status: DISCONTINUED | OUTPATIENT
Start: 2017-05-22 | End: 2017-05-24 | Stop reason: HOSPADM

## 2017-05-22 RX ORDER — LISINOPRIL 5 MG/1
5 TABLET ORAL DAILY
Status: DISCONTINUED | OUTPATIENT
Start: 2017-05-23 | End: 2017-05-22

## 2017-05-22 RX ADMIN — HYDRALAZINE HYDROCHLORIDE 10 MG: 20 INJECTION INTRAMUSCULAR; INTRAVENOUS at 08:05

## 2017-05-22 RX ADMIN — PRAMIPEXOLE DIHYDROCHLORIDE 0.12 MG: 0.12 TABLET ORAL at 09:05

## 2017-05-22 RX ADMIN — INSULIN ASPART 2 UNITS: 100 INJECTION, SOLUTION INTRAVENOUS; SUBCUTANEOUS at 10:05

## 2017-05-22 RX ADMIN — SODIUM CHLORIDE: 0.9 INJECTION, SOLUTION INTRAVENOUS at 07:05

## 2017-05-22 RX ADMIN — GABAPENTIN 400 MG: 400 CAPSULE ORAL at 09:05

## 2017-05-22 RX ADMIN — SODIUM CHLORIDE, PRESERVATIVE FREE 3 ML: 5 INJECTION INTRAVENOUS at 09:05

## 2017-05-22 RX ADMIN — ACETAMINOPHEN 650 MG: 325 TABLET ORAL at 08:05

## 2017-05-22 NOTE — ASSESSMENT & PLAN NOTE
Initial BP is 169/57.  Pt on lisinopril 5 mg at home which is on hold here 2/2 elevated creatinine with unknown baseline.  Monitor. PRN hydralazine.

## 2017-05-22 NOTE — ASSESSMENT & PLAN NOTE
Unknown baseline creatinine.  Pt has h/o nephrolithiasis.  Denies  complaints.  BUN 37 with Cr 1.7 today. Giving NS IVF in anticipation of surgery tomorrow.  Hold Lisinopril for now.  Monitor.

## 2017-05-22 NOTE — ASSESSMENT & PLAN NOTE
Leukocytosis  As per Primary Team - Podiatry. Elevated WBC (18K) likely 2/2 osteomyelitis. Defer decision on antibiotics to Primary Team.  Plan to remove source of infection tomorrow.

## 2017-05-22 NOTE — TELEPHONE ENCOUNTER
----- Message from Ariadne Murphy sent at 5/22/2017  9:40 AM CDT -----  Contact: self/[phone]  Patient would like to speak with you about a personal matter.  Please advise

## 2017-05-22 NOTE — TELEPHONE ENCOUNTER
Spoke with patient and informed her that I had contacted House Supervisor early this morning and started the admission process. She will receive a call from the Hospital staff when a bed is available.

## 2017-05-22 NOTE — SUBJECTIVE & OBJECTIVE
Past Medical History:   Diagnosis Date    Calcium nephrolithiasis 2007    Diabetes mellitus, type 2     Diabetic peripheral neuropathy associated with type 2 diabetes mellitus     Hypertension        Past Surgical History:   Procedure Laterality Date    COLONOSCOPY  11/28/2011    sigmoid diverticulosis, external hemorrhoids    HYSTERECTOMY      SHOULDER SURGERY Left        Review of patient's allergies indicates:   Allergen Reactions    Codeine Nausea Only       No current facility-administered medications on file prior to encounter.      Current Outpatient Prescriptions on File Prior to Encounter   Medication Sig    ammonium lactate 12 % Crea Apply to feet twice daily.    gabapentin (NEURONTIN) 400 MG capsule     glimepiride (AMARYL) 1 MG tablet     hydrocodone-acetaminophen 5-325mg (NORCO) 5-325 mg per tablet Take 1 tablet by mouth every 12 (twelve) hours as needed.    lisinopril (PRINIVIL,ZESTRIL) 5 MG tablet     meloxicam (MOBIC) 15 MG tablet Take 15 mg by mouth once daily.    mupirocin (BACTROBAN) 2 % ointment Apply topically once daily.    omeprazole (PRILOSEC) 20 MG capsule     pramipexole (MIRAPEX) 0.125 MG tablet     ACCU-CHEK SAMI CONTROL SOLN Soln     ACCU-CHEK SAMI PLUS METER Misc     ACCU-CHEK SAMI PLUS TEST STRP Strp     ACCU-CHEK SOFT DEV LANCETS Kit     ACCU-CHEK SOFTCLIX LANCETS Misc     diazepam (VALIUM) 5 MG tablet Take 1 tablet (5 mg total) by mouth every 6 (six) hours as needed for Anxiety.    triamcinolone acetonide 0.1% (KENALOG) 0.1 % cream Apply topically 2 (two) times daily. Apply to feet twice a day.     Family History     Problem Relation (Age of Onset)    Diabetes Mother    Heart failure Father    Kidney failure Brother        Social History Main Topics    Smoking status: Never Smoker    Smokeless tobacco: Not on file    Alcohol use No    Drug use: No    Sexual activity: Not on file     Review of Systems   Constitutional: Positive for chills and fever.    HENT: Negative for congestion, sore throat and trouble swallowing.    Eyes: Negative for photophobia and visual disturbance.   Respiratory: Negative for cough and shortness of breath.    Cardiovascular: Negative for chest pain, palpitations and leg swelling.   Gastrointestinal: Negative for abdominal pain, nausea and vomiting.   Endocrine: Negative for cold intolerance and heat intolerance.   Genitourinary: Negative for difficulty urinating, dysuria, frequency and urgency.   Musculoskeletal: Negative for back pain and myalgias.   Skin: Positive for wound. Negative for color change.   Allergic/Immunologic: Negative for immunocompromised state.   Neurological: Negative for dizziness, light-headedness and headaches.   Hematological: Does not bruise/bleed easily.   Psychiatric/Behavioral: Negative for agitation and confusion. The patient is not nervous/anxious.      Objective:     Vital Signs (Most Recent):  Temp: 98.3 °F (36.8 °C) (05/22/17 1617)  Pulse: 71 (05/22/17 1617)  Resp: 18 (05/22/17 1617)  BP: (!) 169/57 (05/22/17 1617)  SpO2: 98 % (05/22/17 1617) Vital Signs (24h Range):  Temp:  [98.3 °F (36.8 °C)] 98.3 °F (36.8 °C)  Pulse:  [71] 71  Resp:  [18] 18  SpO2:  [98 %] 98 %  BP: (169)/(57) 169/57        There is no height or weight on file to calculate BMI.    Physical Exam   Constitutional: She is oriented to person, place, and time. She appears well-developed and well-nourished. No distress.   HENT:   Head: Normocephalic and atraumatic.   Mouth/Throat: Oropharynx is clear and moist.   Eyes: EOM are normal. Pupils are equal, round, and reactive to light. No scleral icterus.   Neck: Normal range of motion. Neck supple.   Cardiovascular: Normal rate, regular rhythm and normal heart sounds.    Pulmonary/Chest: Effort normal and breath sounds normal. No respiratory distress. She has no wheezes.   Abdominal: Soft. Bowel sounds are normal. She exhibits no distension. There is no tenderness.   Musculoskeletal: She  exhibits no edema or tenderness.   Neurological: She is alert and oriented to person, place, and time. GCS eye subscore is 4. GCS verbal subscore is 5. GCS motor subscore is 6.   Skin: Skin is warm and dry. There is erythema.        Psychiatric: She has a normal mood and affect. Her speech is normal and behavior is normal.   Nursing note and vitals reviewed.              Significant Labs:   CBC:   Recent Labs  Lab 05/22/17  1703   WBC 18.92*   HGB 11.7*   HCT 35.3*        CMP:   Recent Labs  Lab 05/22/17  1703      K 4.6      CO2 23   *   BUN 37*   CREATININE 1.7*   CALCIUM 9.7   PROT 6.9   ALBUMIN 3.2*   BILITOT 0.5   ALKPHOS 61   AST 16   ALT 23   ANIONGAP 11   EGFRNONAA 29*       Significant Imaging:   MRI Lower Extremity Without Contrast Right:   Findings: There is abnormal signal intensity involving the distal aspect of the proximal phalanx of the fifth toe as well as the entire distal phalanx of the fifth toe.  Additionally, there is evidence for open wound.  Therefore, these findings are concerning for osteomyelitis.  Remaining osseous structures demonstrate normal marrow signal.  There is joint space narrowing which is likely related to osteoarthritis.  There is no evidence for fluid collection to suggest abscess.  No radiopaque retained foreign body.  Tendons demonstrate normal signal intensity.  No significant edema of the subcutaneous soft tissues.     Impression:      Findings concerning for osteomyelitis involving the proximal and distal phalanges of the fifth toe as described above.  No evidence for abscess.

## 2017-05-22 NOTE — HPI
75 yo female with PMHx of diabetes type 2, diabetic peripheral neuropathy, and HTN is admitted to Podiatry at  [facility] on 5/22/2017 for arthroplasty with resection of infected bone versus toe amputation of right fifth toe.  Hospital Medicine is consulted for medical management for patient's diabetes and HTN and for risk stratification for surgery.

## 2017-05-22 NOTE — ASSESSMENT & PLAN NOTE
Check blood glucose AC and HS and use SSI.  Check A1c.  Diabetic diet. Pt on glimepiride 1 mg daily and gabapentin 400 mg daily for neuropathy.

## 2017-05-22 NOTE — PROGRESS NOTES
Pt arrived to room 532.  Oriented to environment. Peripheral IV started. Dr. Jose notified of patient arrival. Will continue to monitor.

## 2017-05-22 NOTE — PLAN OF CARE
Problem: Patient Care Overview  Goal: Plan of Care Review  Outcome: Ongoing (interventions implemented as appropriate)  Plan of care discussed with patient and spouse. Verbalized understanding.  Pt is AAOx4.  Up ad julia.  Blood pressure elevated on admit.  Hospital medicine consulted as well as ID.  Scheduled for surgery on right 5th toe tomorrow.  Ulcers noted on bilateral great toes.  Diet 2000 calorie diabetic, NPO at midnight.  Pt taken down for MRI of feet.  EKG and chest x ray ordered.  Pt down in MRI.  Will continue to monitor.

## 2017-05-22 NOTE — H&P
H&P  Podiatry      SUBJECTIVE     History of Present Illness:  Caro Powell is a 76 y.o. female who  has a past medical history of Diabetes mellitus, type 2; Diabetic peripheral neuropathy associated with type 2 diabetes mellitus; and Hypertension. Patient was admitted with right fifth toe ulcer with osteomyelitis. She relates the ulcer has been present for a little over a month. She has been ambulating in a darco shoe with a football dressing. At last visit with podiatry 5/18/17 she had noted probe to bone with positive signs of bone infection to the fifth PIPJ area. She was set for admit for arthroplasty with resection of infected bone versus toe amputation. She denies f/n/v, does relate having some chills Saturday night. No other complaints at this time.     Scheduled Meds:   gabapentin  400 mg Oral TID    [START ON 5/23/2017] lisinopril  5 mg Oral Daily    pramipexole  0.125 mg Oral TID    sodium chloride 0.9%  3 mL Intravenous Q8H     Continuous Infusions:   PRN Meds:.acetaminophen, dextrose 50%, dextrose 50%, glucagon (human recombinant), glucose, glucose, hydrocodone-acetaminophen 5-325mg, insulin aspart, pneumoc 13-rabia conj-dip cr(PF)    Review of patient's allergies indicates:   Allergen Reactions    Codeine Nausea Only        Past Medical History:   Diagnosis Date    Diabetes mellitus, type 2     Diabetic peripheral neuropathy associated with type 2 diabetes mellitus     Hypertension      History reviewed. No pertinent surgical history.  Family History   Problem Relation Age of Onset    Diabetes Mother      Social History   Substance Use Topics    Smoking status: Never Smoker    Smokeless tobacco: Not on file    Alcohol use Not on file       Review of Systems:  Constitutional: pain well controlled, negative for fevers  Respiratory: no cough or shortness of breath  Cardiovascular: no chest pain or palpitations  Gastrointestinal: no nausea or vomiting, tolerating diet  Genitourinary: no  hematuria or dysuria  Musculoskeletal: no arthralgias or myalgias  Neurological: history of numbness or paresthesias in the feet    OBJECTIVE     Vital Signs (Most Recent):  Temp: 98.3 °F (36.8 °C) (05/22/17 1617)  Pulse: 71 (05/22/17 1617)  Resp: 18 (05/22/17 1617)  BP: (!) 169/57 (05/22/17 1617)  SpO2: 98 % (05/22/17 1617)    Vital Signs Range (Last 24H):  Temp:  [98.3 °F (36.8 °C)]   Pulse:  [71]   Resp:  [18]   BP: (169)/(57)   SpO2:  [98 %]     Physical Exam:  General: Oriented to person, place, time, and situation. No acute distress.  Vascular: DP and PT pulses are 2+ bilaterally. CRT<3 seconds to digits 1-5 bilaterally. No edema or varicosities noted bilaterally.  Musculoskeletal: Equinus noted b/l ankles with < 10 deg DF noted. Strength 5/5 in DF/PF/Inv/Ev resistance with no reproduction of pain in any direction. Passive range of motion of ankle and pedal joints is painless b/l.  Neuro: Protective sensation absent bilateral   Derm:     Ulcer Location: right fifth toe dorsal lateral PIPJ  Measurements: 0.2x0.2x0.1cm  Periwound: hyperkeratotic  Drainage: serosanguinous.  Pus: None.  Malodor: None.  Base:  50% granular. 50% fibrin  Signs of infection: Probes to bone, slight erythema        Ulcer Location: right hallux distal plantar medial aspect  Measurements:0.3x0.4x0.1cm  Periwound: sloughed, macerated  Drainage: serous  Pus: None.  Malodor: None.  Base:  80% granular. 20% fibrin with mild overlying slough  Signs of infection: None.             Ulcer Location: left hallux distal plantar medial aspect  Measurements:0.3x 1.2x0.1cm  Periwound: macerated with slight undermining  Drainage: none  Pus: None.  Malodor: None.  Base:  90% granular. 10% fibrin with overlying slough and eschar  Signs of infection: None.            Laboratory:  CBC: No results for input(s): WBC, RBC, HGB, HCT, PLT, MCV, MCH, MCHC in the last 168 hours.  CMP: No results for input(s): GLU, CALCIUM, ALBUMIN, PROT, NA, K, CO2, CL, BUN,  CREATININE, ALKPHOS, ALT, AST, BILITOT in the last 168 hours.  ESR: No results for input(s): SEDRATE in the last 168 hours.  CRP: No results for input(s): CRP in the last 168 hours.  Microbiology Results (last 7 days)     ** No results found for the last 168 hours. **          Diagnostic Results:  X-Ray: reviewed      ASSESSMENT/PLAN     Assessment/Plan:  DM II with neuropathy:  -Consult to hospital medicine, appreciate their help in managing her diabetes and for medical optimization and risk stratification for surgery  -Gabapentin 400 mg TID for neuropathy pain    Diabetic Ulcers Bilateral, Right fifth toe osteomyelitis:  -Bilateral hallux ulcers are stable, daily application of iodosorb, cover with mepilex foam.  -Right fifth toe ulcer probes to bone with signs of osteomyelitis on x-ray. Infectious Disease consulted for recommendations. Plan is for surgery tomorrow, gave her the option of arthroplasty with resection of infected bone versus amputation. She oted for amputation fifth toe to be sure the infection is clear. Will get cultures and clean margins.  -NPO midnight for surgery tomorrow. Will obtain written consent in morning.    HTN  -Defer to medicine consult for management  -Continued her home lisinopril 5 mg daily    Dispostion: Surgery tomorrow, likely to discharge Wednesday or Thursday to home.    Eliud Bernabe DPM PGY-3

## 2017-05-23 ENCOUNTER — ANESTHESIA EVENT (OUTPATIENT)
Dept: SURGERY | Facility: HOSPITAL | Age: 77
DRG: 617 | End: 2017-05-23
Payer: MEDICARE

## 2017-05-23 ENCOUNTER — ANESTHESIA (OUTPATIENT)
Dept: SURGERY | Facility: HOSPITAL | Age: 77
DRG: 617 | End: 2017-05-23
Payer: MEDICARE

## 2017-05-23 LAB
ALBUMIN SERPL BCP-MCNC: 2.8 G/DL
ALP SERPL-CCNC: 60 U/L
ALT SERPL W/O P-5'-P-CCNC: 20 U/L
ANION GAP SERPL CALC-SCNC: 8 MMOL/L
AST SERPL-CCNC: 13 U/L
BASOPHILS # BLD AUTO: 0.03 K/UL
BASOPHILS NFR BLD: 0.2 %
BILIRUB SERPL-MCNC: 0.5 MG/DL
BUN SERPL-MCNC: 31 MG/DL
CALCIUM SERPL-MCNC: 9.3 MG/DL
CHLORIDE SERPL-SCNC: 106 MMOL/L
CO2 SERPL-SCNC: 23 MMOL/L
CREAT SERPL-MCNC: 1.2 MG/DL
DIFFERENTIAL METHOD: ABNORMAL
EOSINOPHIL # BLD AUTO: 0.2 K/UL
EOSINOPHIL NFR BLD: 1.3 %
ERYTHROCYTE [DISTWIDTH] IN BLOOD BY AUTOMATED COUNT: 14.8 %
EST. GFR  (AFRICAN AMERICAN): 50 ML/MIN/1.73 M^2
EST. GFR  (NON AFRICAN AMERICAN): 44 ML/MIN/1.73 M^2
ESTIMATED AVG GLUCOSE: 169 MG/DL
GLUCOSE SERPL-MCNC: 162 MG/DL
GRAM STN SPEC: NORMAL
GRAM STN SPEC: NORMAL
HBA1C MFR BLD HPLC: 7.5 %
HCT VFR BLD AUTO: 33.1 %
HGB BLD-MCNC: 11 G/DL
LYMPHOCYTES # BLD AUTO: 1.5 K/UL
LYMPHOCYTES NFR BLD: 9.5 %
MCH RBC QN AUTO: 29 PG
MCHC RBC AUTO-ENTMCNC: 33.2 %
MCV RBC AUTO: 87 FL
MONOCYTES # BLD AUTO: 0.6 K/UL
MONOCYTES NFR BLD: 3.9 %
NEUTROPHILS # BLD AUTO: 13.7 K/UL
NEUTROPHILS NFR BLD: 84.9 %
PLATELET # BLD AUTO: 222 K/UL
PMV BLD AUTO: 10.2 FL
POCT GLUCOSE: 136 MG/DL (ref 70–110)
POCT GLUCOSE: 143 MG/DL (ref 70–110)
POCT GLUCOSE: 190 MG/DL (ref 70–110)
POCT GLUCOSE: 197 MG/DL (ref 70–110)
POTASSIUM SERPL-SCNC: 4.2 MMOL/L
PROT SERPL-MCNC: 6.4 G/DL
RBC # BLD AUTO: 3.79 M/UL
SODIUM SERPL-SCNC: 137 MMOL/L
WBC # BLD AUTO: 16.08 K/UL

## 2017-05-23 PROCEDURE — 36000707: Performed by: PODIATRIST

## 2017-05-23 PROCEDURE — 25000003 PHARM REV CODE 250: Performed by: ANESTHESIOLOGY

## 2017-05-23 PROCEDURE — 85025 COMPLETE CBC W/AUTO DIFF WBC: CPT

## 2017-05-23 PROCEDURE — 63600175 PHARM REV CODE 636 W HCPCS: Performed by: NURSE ANESTHETIST, CERTIFIED REGISTERED

## 2017-05-23 PROCEDURE — 25000003 PHARM REV CODE 250: Performed by: NURSE ANESTHETIST, CERTIFIED REGISTERED

## 2017-05-23 PROCEDURE — 63600175 PHARM REV CODE 636 W HCPCS: Performed by: PODIATRIST

## 2017-05-23 PROCEDURE — 0Y6X0Z0 DETACHMENT AT RIGHT 5TH TOE, COMPLETE, OPEN APPROACH: ICD-10-PCS | Performed by: PODIATRIST

## 2017-05-23 PROCEDURE — 87205 SMEAR GRAM STAIN: CPT

## 2017-05-23 PROCEDURE — 71000033 HC RECOVERY, INTIAL HOUR: Performed by: PODIATRIST

## 2017-05-23 PROCEDURE — 71000039 HC RECOVERY, EACH ADD'L HOUR: Performed by: PODIATRIST

## 2017-05-23 PROCEDURE — 37000008 HC ANESTHESIA 1ST 15 MINUTES: Performed by: PODIATRIST

## 2017-05-23 PROCEDURE — 87116 MYCOBACTERIA CULTURE: CPT | Mod: 59

## 2017-05-23 PROCEDURE — 87176 TISSUE HOMOGENIZATION CULTR: CPT

## 2017-05-23 PROCEDURE — 63600175 PHARM REV CODE 636 W HCPCS: Performed by: PHYSICIAN ASSISTANT

## 2017-05-23 PROCEDURE — 63600175 PHARM REV CODE 636 W HCPCS

## 2017-05-23 PROCEDURE — 87075 CULTR BACTERIA EXCEPT BLOOD: CPT

## 2017-05-23 PROCEDURE — 87070 CULTURE OTHR SPECIMN AEROBIC: CPT | Mod: 59

## 2017-05-23 PROCEDURE — 80053 COMPREHEN METABOLIC PANEL: CPT

## 2017-05-23 PROCEDURE — 37000009 HC ANESTHESIA EA ADD 15 MINS: Performed by: PODIATRIST

## 2017-05-23 PROCEDURE — 87102 FUNGUS ISOLATION CULTURE: CPT

## 2017-05-23 PROCEDURE — 36415 COLL VENOUS BLD VENIPUNCTURE: CPT

## 2017-05-23 PROCEDURE — 88305 TISSUE EXAM BY PATHOLOGIST: CPT | Performed by: PATHOLOGY

## 2017-05-23 PROCEDURE — 88311 DECALCIFY TISSUE: CPT | Mod: 26,,, | Performed by: PATHOLOGY

## 2017-05-23 PROCEDURE — 28820 AMPUTATION OF TOE: CPT | Mod: T9,,, | Performed by: PODIATRIST

## 2017-05-23 PROCEDURE — 36000706: Performed by: PODIATRIST

## 2017-05-23 PROCEDURE — 63600175 PHARM REV CODE 636 W HCPCS: Performed by: NURSE PRACTITIONER

## 2017-05-23 PROCEDURE — 63600175 PHARM REV CODE 636 W HCPCS: Performed by: ANESTHESIOLOGY

## 2017-05-23 PROCEDURE — 88305 TISSUE EXAM BY PATHOLOGIST: CPT | Mod: 26,,, | Performed by: PATHOLOGY

## 2017-05-23 PROCEDURE — 25000003 PHARM REV CODE 250: Performed by: PODIATRIST

## 2017-05-23 PROCEDURE — 11000001 HC ACUTE MED/SURG PRIVATE ROOM

## 2017-05-23 RX ORDER — SODIUM CHLORIDE 0.9 % (FLUSH) 0.9 %
3 SYRINGE (ML) INJECTION
Status: DISCONTINUED | OUTPATIENT
Start: 2017-05-23 | End: 2017-05-24 | Stop reason: HOSPADM

## 2017-05-23 RX ORDER — PROPOFOL 10 MG/ML
VIAL (ML) INTRAVENOUS
Status: DISCONTINUED | OUTPATIENT
Start: 2017-05-23 | End: 2017-05-23

## 2017-05-23 RX ORDER — HYDROMORPHONE HYDROCHLORIDE 2 MG/ML
INJECTION, SOLUTION INTRAMUSCULAR; INTRAVENOUS; SUBCUTANEOUS
Status: COMPLETED
Start: 2017-05-23 | End: 2017-05-23

## 2017-05-23 RX ORDER — BUPIVACAINE HYDROCHLORIDE 2.5 MG/ML
INJECTION, SOLUTION EPIDURAL; INFILTRATION; INTRACAUDAL
Status: DISCONTINUED | OUTPATIENT
Start: 2017-05-23 | End: 2017-05-23 | Stop reason: HOSPADM

## 2017-05-23 RX ORDER — CEFAZOLIN SODIUM 1 G/3ML
INJECTION, POWDER, FOR SOLUTION INTRAMUSCULAR; INTRAVENOUS
Status: DISCONTINUED | OUTPATIENT
Start: 2017-05-23 | End: 2017-05-23

## 2017-05-23 RX ORDER — LISINOPRIL 5 MG/1
5 TABLET ORAL DAILY
Status: DISCONTINUED | OUTPATIENT
Start: 2017-05-24 | End: 2017-05-24 | Stop reason: HOSPADM

## 2017-05-23 RX ORDER — SODIUM CHLORIDE 9 MG/ML
INJECTION, SOLUTION INTRAVENOUS CONTINUOUS PRN
Status: DISCONTINUED | OUTPATIENT
Start: 2017-05-23 | End: 2017-05-23

## 2017-05-23 RX ORDER — LIDOCAINE HCL/PF 100 MG/5ML
SYRINGE (ML) INTRAVENOUS
Status: DISCONTINUED | OUTPATIENT
Start: 2017-05-23 | End: 2017-05-23

## 2017-05-23 RX ORDER — ONDANSETRON 2 MG/ML
4 INJECTION INTRAMUSCULAR; INTRAVENOUS ONCE AS NEEDED
Status: COMPLETED | OUTPATIENT
Start: 2017-05-23 | End: 2017-05-23

## 2017-05-23 RX ORDER — ONDANSETRON 2 MG/ML
4 INJECTION INTRAMUSCULAR; INTRAVENOUS EVERY 6 HOURS PRN
Status: DISCONTINUED | OUTPATIENT
Start: 2017-05-23 | End: 2017-05-24 | Stop reason: HOSPADM

## 2017-05-23 RX ORDER — PROPOFOL 10 MG/ML
VIAL (ML) INTRAVENOUS CONTINUOUS PRN
Status: DISCONTINUED | OUTPATIENT
Start: 2017-05-23 | End: 2017-05-23

## 2017-05-23 RX ORDER — HYDROCODONE BITARTRATE AND ACETAMINOPHEN 10; 325 MG/1; MG/1
1 TABLET ORAL EVERY 6 HOURS PRN
Status: DISCONTINUED | OUTPATIENT
Start: 2017-05-23 | End: 2017-05-24 | Stop reason: HOSPADM

## 2017-05-23 RX ORDER — CEFAZOLIN SODIUM 1 G/50ML
1 SOLUTION INTRAVENOUS
Status: DISCONTINUED | OUTPATIENT
Start: 2017-05-23 | End: 2017-05-24 | Stop reason: HOSPADM

## 2017-05-23 RX ORDER — HYDROMORPHONE HYDROCHLORIDE 2 MG/ML
0.5 INJECTION, SOLUTION INTRAMUSCULAR; INTRAVENOUS; SUBCUTANEOUS EVERY 5 MIN PRN
Status: ACTIVE | OUTPATIENT
Start: 2017-05-23 | End: 2017-05-23

## 2017-05-23 RX ADMIN — CEFAZOLIN 2 G: 330 INJECTION, POWDER, FOR SOLUTION INTRAMUSCULAR; INTRAVENOUS at 01:05

## 2017-05-23 RX ADMIN — PROPOFOL 100 MCG/KG/MIN: 10 INJECTION, EMULSION INTRAVENOUS at 12:05

## 2017-05-23 RX ADMIN — ACETAMINOPHEN 650 MG: 325 TABLET ORAL at 11:05

## 2017-05-23 RX ADMIN — GABAPENTIN 400 MG: 400 CAPSULE ORAL at 10:05

## 2017-05-23 RX ADMIN — HYDROMORPHONE HYDROCHLORIDE 0.5 MG: 2 INJECTION INTRAMUSCULAR; INTRAVENOUS; SUBCUTANEOUS at 02:05

## 2017-05-23 RX ADMIN — ONDANSETRON 4 MG: 2 INJECTION, SOLUTION INTRAMUSCULAR; INTRAVENOUS at 02:05

## 2017-05-23 RX ADMIN — SODIUM CHLORIDE: 0.9 INJECTION, SOLUTION INTRAVENOUS at 12:05

## 2017-05-23 RX ADMIN — LIDOCAINE HYDROCHLORIDE 100 MG: 20 INJECTION, SOLUTION INTRAVENOUS at 12:05

## 2017-05-23 RX ADMIN — PROMETHAZINE HYDROCHLORIDE 6.25 MG: 25 INJECTION INTRAMUSCULAR; INTRAVENOUS at 02:05

## 2017-05-23 RX ADMIN — ONDANSETRON 4 MG: 2 INJECTION INTRAMUSCULAR; INTRAVENOUS at 08:05

## 2017-05-23 RX ADMIN — PROPOFOL 20 MG: 10 INJECTION, EMULSION INTRAVENOUS at 12:05

## 2017-05-23 RX ADMIN — SODIUM CHLORIDE, PRESERVATIVE FREE 3 ML: 5 INJECTION INTRAVENOUS at 10:05

## 2017-05-23 RX ADMIN — HYDROMORPHONE HYDROCHLORIDE 0.5 MG: 2 INJECTION, SOLUTION INTRAMUSCULAR; INTRAVENOUS; SUBCUTANEOUS at 02:05

## 2017-05-23 RX ADMIN — PRAMIPEXOLE DIHYDROCHLORIDE 0.12 MG: 0.12 TABLET ORAL at 10:05

## 2017-05-23 RX ADMIN — CEFAZOLIN SODIUM 1 G: 1 SOLUTION INTRAVENOUS at 08:05

## 2017-05-23 RX ADMIN — HYDRALAZINE HYDROCHLORIDE 10 MG: 20 INJECTION INTRAMUSCULAR; INTRAVENOUS at 10:05

## 2017-05-23 NOTE — PLAN OF CARE
ID full consult to follow     Patient with DM and foot ulcer - MRI c/w osteo of 5th digit     Recommend - blood cultures x 2 and bone bx for culture     After these are obtained, we can recommend empiric abx such as vanc, cefipime IV and flagyl PO     Will see patient in AM

## 2017-05-23 NOTE — CONSULTS
Full note to follow     Patient is in the OR presently     Agree with toe amputation - will consider the state of the margins to determine if further abx are needed     Full note will have to wait until patient is seen in the AM

## 2017-05-23 NOTE — CONSULTS
[facility] Hospital Medicine  Consult Note    Patient Name: Caro Powell  MRN: [id number]  Admission Date: 5/22/2017  Hospital Length of Stay: 1 days  Attending Physician: Derek Jose DPM   Primary Care Provider: Manuel Laird MD           Patient information was obtained from patient, spouse/SO, past medical records and ER records.     Consults  Subjective:     Principal Problem: Osteomyelitis of toe of right foot    Chief Complaint: No chief complaint on file.       HPI: 77 yo female with PMHx of diabetes type 2, diabetic peripheral neuropathy, and HTN is admitted to Podiatry at  [facility] on 5/22/2017 for arthroplasty with resection of infected bone versus toe amputation of right fifth toe.  Hospital Medicine is consulted for medical management for patient's diabetes and HTN and for risk stratification for surgery.      Past Medical History:   Diagnosis Date    Calcium nephrolithiasis 2007    Diabetes mellitus, type 2     Diabetic peripheral neuropathy associated with type 2 diabetes mellitus     Hypertension        Past Surgical History:   Procedure Laterality Date    COLONOSCOPY  11/28/2011    sigmoid diverticulosis, external hemorrhoids    HYSTERECTOMY      SHOULDER SURGERY Left        Review of patient's allergies indicates:   Allergen Reactions    Codeine Nausea Only       No current facility-administered medications on file prior to encounter.      Current Outpatient Prescriptions on File Prior to Encounter   Medication Sig    ammonium lactate 12 % Crea Apply to feet twice daily.    gabapentin (NEURONTIN) 400 MG capsule     glimepiride (AMARYL) 1 MG tablet     hydrocodone-acetaminophen 5-325mg (NORCO) 5-325 mg per tablet Take 1 tablet by mouth every 12 (twelve) hours as needed.    lisinopril (PRINIVIL,ZESTRIL) 5 MG tablet     meloxicam (MOBIC) 15 MG tablet Take 15 mg by mouth once daily.    mupirocin (BACTROBAN) 2 % ointment Apply topically once  daily.    omeprazole (PRILOSEC) 20 MG capsule     pramipexole (MIRAPEX) 0.125 MG tablet     ACCU-CHEK SAMI CONTROL SOLN Soln     ACCU-CHEK SAMI PLUS METER Misc     ACCU-CHEK SAMI PLUS TEST STRP Strp     ACCU-CHEK SOFT DEV LANCETS Kit     ACCU-CHEK SOFTCLIX LANCETS Misc     diazepam (VALIUM) 5 MG tablet Take 1 tablet (5 mg total) by mouth every 6 (six) hours as needed for Anxiety.    triamcinolone acetonide 0.1% (KENALOG) 0.1 % cream Apply topically 2 (two) times daily. Apply to feet twice a day.     Family History     Problem Relation (Age of Onset)    Diabetes Mother    Heart failure Father    Kidney failure Brother        Social History Main Topics    Smoking status: Never Smoker    Smokeless tobacco: Not on file    Alcohol use No    Drug use: No    Sexual activity: Not on file     Review of Systems   Constitutional: Positive for chills and fever.   HENT: Negative for congestion, sore throat and trouble swallowing.    Eyes: Negative for photophobia and visual disturbance.   Respiratory: Negative for cough and shortness of breath.    Cardiovascular: Negative for chest pain, palpitations and leg swelling.   Gastrointestinal: Negative for abdominal pain, nausea and vomiting.   Endocrine: Negative for cold intolerance and heat intolerance.   Genitourinary: Negative for difficulty urinating, dysuria, frequency and urgency.   Musculoskeletal: Negative for back pain and myalgias.   Skin: Positive for wound. Negative for color change.   Allergic/Immunologic: Negative for immunocompromised state.   Neurological: Negative for dizziness, light-headedness and headaches.   Hematological: Does not bruise/bleed easily.   Psychiatric/Behavioral: Negative for agitation and confusion. The patient is not nervous/anxious.      Objective:     Vital Signs (Most Recent):  Temp: 98.3 °F (36.8 °C) (05/22/17 1617)  Pulse: 71 (05/22/17 1617)  Resp: 18 (05/22/17 1617)  BP: (!) 169/57 (05/22/17 1617)  SpO2: 98 % (05/22/17  1617) Vital Signs (24h Range):  Temp:  [98.3 °F (36.8 °C)] 98.3 °F (36.8 °C)  Pulse:  [71] 71  Resp:  [18] 18  SpO2:  [98 %] 98 %  BP: (169)/(57) 169/57        There is no height or weight on file to calculate BMI.    Physical Exam   Constitutional: She is oriented to person, place, and time. She appears well-developed and well-nourished. No distress.   HENT:   Head: Normocephalic and atraumatic.   Mouth/Throat: Oropharynx is clear and moist.   Eyes: EOM are normal. Pupils are equal, round, and reactive to light. No scleral icterus.   Neck: Normal range of motion. Neck supple.   Cardiovascular: Normal rate, regular rhythm and normal heart sounds.    Pulmonary/Chest: Effort normal and breath sounds normal. No respiratory distress. She has no wheezes.   Abdominal: Soft. Bowel sounds are normal. She exhibits no distension. There is no tenderness.   Musculoskeletal: She exhibits no edema or tenderness.   Neurological: She is alert and oriented to person, place, and time. GCS eye subscore is 4. GCS verbal subscore is 5. GCS motor subscore is 6.   Skin: Skin is warm and dry. There is erythema.        Psychiatric: She has a normal mood and affect. Her speech is normal and behavior is normal.   Nursing note and vitals reviewed.              Significant Labs:   CBC:   Recent Labs  Lab 05/22/17  1703   WBC 18.92*   HGB 11.7*   HCT 35.3*        CMP:   Recent Labs  Lab 05/22/17  1703      K 4.6      CO2 23   *   BUN 37*   CREATININE 1.7*   CALCIUM 9.7   PROT 6.9   ALBUMIN 3.2*   BILITOT 0.5   ALKPHOS 61   AST 16   ALT 23   ANIONGAP 11   EGFRNONAA 29*       Significant Imaging:   MRI Lower Extremity Without Contrast Right:   Findings: There is abnormal signal intensity involving the distal aspect of the proximal phalanx of the fifth toe as well as the entire distal phalanx of the fifth toe.  Additionally, there is evidence for open wound.  Therefore, these findings are concerning for osteomyelitis.   Remaining osseous structures demonstrate normal marrow signal.  There is joint space narrowing which is likely related to osteoarthritis.  There is no evidence for fluid collection to suggest abscess.  No radiopaque retained foreign body.  Tendons demonstrate normal signal intensity.  No significant edema of the subcutaneous soft tissues.     Impression:      Findings concerning for osteomyelitis involving the proximal and distal phalanges of the fifth toe as described above.  No evidence for abscess.         Assessment/Plan:     CKD stage 3 due to type 2 diabetes mellitus    Unknown baseline creatinine.  Pt has h/o nephrolithiasis.  Denies  complaints.  BUN 37 with Cr 1.7 today. Giving NS IVF in anticipation of surgery tomorrow.  Hold Lisinopril for now.  Monitor.           Pre-operative clearance    Ms. Caro Powell is a 76 y.o. female who will be undergoing a right 5th toe arthroplasty with resection of infected toe vs amputation which is a(n) Intermediate Risk cardiac procedure, and has the following:  · An exercise tolerance which is greater than four METs.  · Clinical factors that are Low Risk.  · No current signs/symptoms of unstable coronary syndrome, decompensated heart failure, significant arrhythmias, nor severe valvular disease.     The patient is currenlty medically optimized.  Labs and EKG reviewed.     As the patient has a functional capacity greater than or equal to 4 METs (walk more than 1 block and does all household chores) without cardiac symptoms, it is recommended to PROCEED WITH INTERMEDIATE RISK SURGERY.             Type 2 diabetes mellitus with diabetic neuropathy, without long-term current use of insulin    Check blood glucose AC and HS and use SSI.  Check A1c.  Diabetic diet. Pt on glimepiride 1 mg daily and gabapentin 400 mg daily for neuropathy.           Essential hypertension    Initial BP is 169/57.  Pt on lisinopril 5 mg at home which is on hold here 2/2 elevated  creatinine with unknown baseline.  Monitor. PRN hydralazine.           * Osteomyelitis of toe of right foot    Leukocytosis  As per Primary Team - Podiatry. Elevated WBC (18K) likely 2/2 osteomyelitis. Defer decision on antibiotics to Primary Team.  Plan to remove source of infection tomorrow.              VTE Risk Mitigation         Ordered     Place AZUL hose  Until discontinued      05/22/17 1643     Medium Risk of VTE  Once      05/22/17 1643        Thank you for your consult. I will follow-up with patient. Please contact us if you have any additional questions.    Ben Witt NP  Department of Hospital Medicine   [facility]

## 2017-05-23 NOTE — ANESTHESIA PREPROCEDURE EVALUATION
05/23/2017  Caro Powell is a 76 y.o., female with DM 2 and osteomyelitis here for toe amputation under local/MAC.      Past Medical History:   Diagnosis Date    Calcium nephrolithiasis 2007    Diabetes mellitus, type 2     Diabetic peripheral neuropathy associated with type 2 diabetes mellitus     Hypertension      > 4 reported METS      Past Surgical History:   Procedure Laterality Date    COLONOSCOPY  11/28/2011    sigmoid diverticulosis, external hemorrhoids    HYSTERECTOMY      SHOULDER SURGERY Left        Lab Results   Component Value Date    WBC 18.92 (H) 05/22/2017    HGB 11.7 (L) 05/22/2017    HCT 35.3 (L) 05/22/2017     05/22/2017    ALT 23 05/22/2017    AST 16 05/22/2017     05/22/2017    K 4.6 05/22/2017     05/22/2017    CREATININE 1.7 (H) 05/22/2017    BUN 37 (H) 05/22/2017    CO2 23 05/22/2017         Anesthesia Evaluation    I have reviewed the Patient Summary Reports.     I have reviewed the Medications.     Review of Systems  Anesthesia Hx:  No problems with previous Anesthesia Denies Hx of Anesthetic complications  History of prior surgery of interest to airway management or planning:  Denies Personal Hx of Anesthesia complications.   Hematology/Oncology:     Oncology Normal     EENT/Dental:EENT/Dental Normal   Cardiovascular:   Exercise tolerance: good Hypertension  Denies Angina. Reports > 4 METS   Pulmonary:  Pulmonary Normal    Renal/:   Chronic Renal Disease, CRI renal calculi    Hepatic/GI:  Hepatic/GI Normal    Musculoskeletal:  Musculoskeletal Normal    Neurological:   Peripheral Neuropathy    Endocrine:   Diabetes, type 2    Dermatological:  Skin Normal    Psych:  Psychiatric Normal           Physical Exam  General:  Well nourished    Airway/Jaw/Neck:  Airway Findings: Mouth Opening: Small, but > 3cm General Airway Assessment: Possible difficult  intubation  Mallampati: IV  Improves to III with phonation.        Eyes/Ears/Nose:  EYES/EARS/NOSE FINDINGS: Normal   Dental:  Dental Findings:    Chest/Lungs:  Chest/Lungs Findings: Clear to auscultation, Normal Respiratory Rate     Heart/Vascular:  Heart Findings: Rate: Normal  Rhythm: Regular Rhythm  Sounds: Normal  Heart murmur: negative    Abdomen:  Abdomen Findings:  Normal, Soft       Mental Status:  Mental Status Findings:  Cooperative, Alert and Oriented         Anesthesia Plan  Type of Anesthesia, risks & benefits discussed:  Anesthesia Type:  MAC, general, regional  Patient's Preference:   Intra-op Monitoring Plan:   Intra-op Monitoring Plan Comments:   Post Op Pain Control Plan: multimodal analgesia  Post Op Pain Control Plan Comments:   Induction:   IV  Beta Blocker:  Patient is not currently on a Beta-Blocker (No further documentation required).       Informed Consent: Patient understands risks and agrees with Anesthesia plan.  Questions answered. Anesthesia consent signed with patient.  ASA Score: 3  emergent   Day of Surgery Review of History & Physical:            Ready For Surgery From Anesthesia Perspective.      gabapentin  400 mg Oral TID    pramipexole  0.125 mg Oral TID    sodium chloride 0.9%  3 mL Intravenous Q8H

## 2017-05-23 NOTE — ANESTHESIA POSTPROCEDURE EVALUATION
"Anesthesia Post Evaluation    Patient: Caro Powell    Procedure(s) Performed: Procedure(s) (LRB):  AMPUTATION-TOE (Right)    Final Anesthesia Type: MAC  Patient location during evaluation: PACU  Patient participation: Yes- Able to Participate  Level of consciousness: awake and alert  Post-procedure vital signs: reviewed and stable  Pain management: adequate  Airway patency: patent  PONV status at discharge: No PONV  Anesthetic complications: no      Cardiovascular status: hemodynamically stable  Respiratory status: unassisted  Hydration status: euvolemic  Follow-up not needed.        Visit Vitals  BP (!) 155/69   Pulse 66   Temp 36.8 °C (98.2 °F) (Skin)   Resp 16   Ht 5' 2" (1.575 m)   Wt 85.6 kg (188 lb 12.8 oz)   LMP  (LMP Unknown)   SpO2 96%   Breastfeeding? No   BMI 34.53 kg/m²       Pain/Flex Score: Pain Assessment Performed: Yes (5/23/2017  1:45 PM)  Presence of Pain: denies (5/23/2017  1:45 PM)  Pain Rating Prior to Med Admin: 7 (5/23/2017  2:10 PM)  Flex Score: 9 (5/23/2017  2:00 PM)      "

## 2017-05-23 NOTE — ASSESSMENT & PLAN NOTE
BP elevated likely 2/2 anxiety about surgery.  Pt on lisinopril 5 mg at home which was held today 2/2 elevated creatinine. Resume tomorrow.   Monitor. PRN hydralazine.

## 2017-05-23 NOTE — ASSESSMENT & PLAN NOTE
Ms. Caro Powell is a 76 y.o. female who will be undergoing a right 5th toe arthroplasty with resection of infected toe vs amputation which is a(n) Intermediate Risk cardiac procedure, and has the following:  · An exercise tolerance which is greater than four METs.  · Clinical factors that are Low Risk.  · No current signs/symptoms of unstable coronary syndrome, decompensated heart failure, significant arrhythmias, nor severe valvular disease.     The patient is currenlty medically optimized.  Labs and EKG reviewed.

## 2017-05-23 NOTE — ASSESSMENT & PLAN NOTE
Pt denies being told she has CKD. Unknown baseline creatinine. 1.2 today after IVF administration.   Pt has h/o nephrolithiasis.  Denies  complaints. Monitor.

## 2017-05-23 NOTE — SUBJECTIVE & OBJECTIVE
Interval History: Pt anxious about surgery today. Friend and  at bedside. Pt confirms she has not been told she has CKD and that her last A1c was 6.something.  Denies CP, SOB.     Review of Systems   Constitutional: Negative for chills and fever.   Respiratory: Negative for shortness of breath.    Cardiovascular: Negative for chest pain.   Gastrointestinal: Negative for abdominal pain.   Psychiatric/Behavioral: The patient is nervous/anxious.      Objective:     Vital Signs (Most Recent):  Temp: 98.2 °F (36.8 °C) (05/23/17 1200)  Pulse: 76 (05/23/17 1200)  Resp: 18 (05/23/17 1200)  BP: (!) 190/80 (05/23/17 1143)  SpO2: 99 % (05/23/17 1219) Vital Signs (24h Range):  Temp:  [96.8 °F (36 °C)-98.8 °F (37.1 °C)] 98.2 °F (36.8 °C)  Pulse:  [64-87] 76  Resp:  [16-18] 18  SpO2:  [92 %-99 %] 99 %  BP: (126-190)/(57-80) 190/80     Weight: 85.6 kg (188 lb 12.8 oz)  Body mass index is 34.53 kg/m².    Intake/Output Summary (Last 24 hours) at 05/23/17 1230  Last data filed at 05/23/17 1130   Gross per 24 hour   Intake              250 ml   Output             1350 ml   Net            -1100 ml      Physical Exam   Constitutional: She is oriented to person, place, and time.   Cardiovascular: Normal rate and regular rhythm.    Pulmonary/Chest: Effort normal and breath sounds normal. No respiratory distress.   Neurological: She is alert and oriented to person, place, and time.       Significant Labs:   CBC:   Recent Labs  Lab 05/22/17  1703 05/23/17  0548   WBC 18.92* 16.08*   HGB 11.7* 11.0*   HCT 35.3* 33.1*    222     CMP:   Recent Labs  Lab 05/22/17  1703 05/23/17  0547    137   K 4.6 4.2    106   CO2 23 23   * 162*   BUN 37* 31*   CREATININE 1.7* 1.2   CALCIUM 9.7 9.3   PROT 6.9 6.4   ALBUMIN 3.2* 2.8*   BILITOT 0.5 0.5   ALKPHOS 61 60   AST 16 13   ALT 23 20   ANIONGAP 11 8   EGFRNONAA 29* 44*     POCT Glucose:   Recent Labs  Lab 05/22/17  2130 05/23/17  0437 05/23/17  1056   POCTGLUCOSE 300*  190* 136*       Significant Imaging:     Imaging Results          X-Ray Chest AP Portable (Final result)  Result time 05/23/17 08:27:14    Final result by Felicia Cheema MD (05/23/17 08:27:14)                 Impression:     Diffuse bilateral middle and lower lung zone predominant interstitial and ill-defined air space opacities, possibly edema or infectious/inflammatory disease.      Electronically signed by: FELICIA CHEEMA MD  Date:     05/23/17  Time:    08:27              Narrative:    Chest AP    Comparison: None    Findings: Diffuse bilateral middle and lower lung zone predominant interstitial and ill-defined airspace opacities. No effusion or pneumothorax. No displaced fracture.    Interpretation is limited by technique and patient body habitus.                             MRI Lower Extremity Without Contrast Right (Final result)  Result time 05/22/17 19:08:48   Procedure changed from MRI Lower Extremity With Contrast Right     Final result by John Nick MD (05/22/17 19:08:48)                 Impression:     Findings concerning for osteomyelitis involving the proximal and distal phalanges of the fifth toe as described above.  No evidence for abscess.      Electronically signed by: JOHN NICK MD  Date:     05/22/17  Time:    19:08              Narrative:    Osteomyelitis.    Comparison: 4/19/17.    Technique: What L. multisequence noncontrast MRI of the right forefoot was obtained.    Findings: There is abnormal signal intensity involving the distal aspect of the proximal phalanx of the fifth toe as well as the entire distal phalanx of the fifth toe.  Additionally, there is evidence for open wound.  Therefore, these findings are concerning for osteomyelitis.  Remaining osseous structures demonstrate normal marrow signal.  There is joint space narrowing which is likely related to osteoarthritis.  There is no evidence for fluid collection to suggest abscess.  No radiopaque retained foreign body.   Tendons demonstrate normal signal intensity.  No significant edema of the subcutaneous soft tissues.

## 2017-05-23 NOTE — ASSESSMENT & PLAN NOTE
Leukocytosis  As per Primary Team - Podiatry. Elevated WBC (16K today) likely 2/2 osteomyelitis. Defer decision on antibiotics to Primary Team and ID.  Plan to remove source of infection today.

## 2017-05-23 NOTE — PLAN OF CARE
TN met with pt and friend   lives with spouse Jin   [phone]   independent prior to admit, no hh, has a sc (wants a tub bench - advised this item is no longer covered by [facility] purchase at local store).     pharmacy:  [facility] [address] - able to afford meds     TN spoke with Dr. Almanzar -- no iv abx anticipated, no needs for dme anticipated     TN will monitor pt status.        05/23/17 2092   Discharge Assessment   Assessment Type Discharge Planning Assessment   Confirmed/corrected address and phone number on facesheet? Yes   Assessment information obtained from? Patient;Medical Record   Expected Length of Stay (days) 2   Communicated expected length of stay with patient/caregiver yes   Type of Healthcare Directive Received (no lw )   Prior to hospitilization cognitive status: Alert/Oriented   Prior to hospitalization functional status: Independent   Current cognitive status: Alert/Oriented   Current Functional Status: Independent   Arrived From home or self-care   Lives With spouse   Able to Return to Prior Arrangements yes   Is patient able to care for self after discharge? Yes   How many people do you have in your home that can help with your care after discharge? 1   Who are your caregiver(s) and their phone number(s)? ( Roland   [phone] )   Patient's perception of discharge disposition home or selfcare   Readmission Within The Last 30 Days no previous admission in last 30 days   Patient currently being followed by outpatient case management? No   Patient currently receives home health services? No   Does the patient currently use HME? No   Patient currently receives private duty nursing? No   Patient currently receives any other outside agency services? No   Equipment Currently Used at Home shower chair   Do you have any problems affording any of your prescribed medications? No   Is the patient taking medications as prescribed? yes   Do you have any financial concerns preventing you  from receiving the healthcare you need? No   Does the patient have transportation to healthcare appointments? Yes   Transportation Available family or friend will provide   On Dialysis? No   Does the patient receive services at the [facility]? No   Are there any open cases? No   Discharge Plan A Home;Home with family   Discharge Plan B Home;Home with family;Home Health   Patient/Family In Agreement With Plan yes

## 2017-05-23 NOTE — ASSESSMENT & PLAN NOTE
this morning. Check blood glucose AC and HS and use SSI.  Check A1c.  Diabetic diet. Pt on glimepiride 1 mg daily at home - holding here - and gabapentin 400 mg daily for neuropathy.

## 2017-05-23 NOTE — OP NOTE
Date: 5/23/17    Surgeon: Dr. Damon DPM Primary; Dr. Florecita DPM PGY-3 First assist    Preoperative Diagnosis:    Osteomyelitis of toe of right foot [M86.9]  Type 2 diabetes mellitus with peripheral neuropathy [E11.42]    Postoperative Diagnosis: Same    Procedure Performed: AMPUTATION-TOE (Right)fifth via disarticulation at metatarsal phalangeal joint      Anaesthesia: Monitored anesthesia care with local     Hemostasis:  Ankle tourniquet set at 250 mmHg for a total of 27 minutes    Estimated Blood Loss: 10 mL    Specimen: Right fifth toe permanent    Culture: Aerobic, Anaerobic, Acid Fast and Fungal.    Complications: None    Condition:  Stable    PRE-PROCEDURE:   The patient was brought into the operating room and placed on the operating table in the supine position. Following IV sedation, local anesthesia was obtained utilizing 10 cc's of 0.5% Marcaine plain. The foot and leg was then scrubbed, prepped, and draped in the usual aseptic manner.     PROCEDURE:   Attention was then directed to the right fifth toe where a fishmouth incision was made going from dorsal to the plantar aspect of the toe.    Using sharp and blunt dissection, the incision was deepened severing all neuro-vascular structures. The dissection was taken proximally along the proximal phalanx until the MTP joint was visualized.  The entire capsule was then released and all soft tissue was released, and the entire toe at the level of the MTPJ was then removed and prepped to be sent to pathology and swabs were taken for culture and sensitivity. All bleeding vessels were clamped and bovied and soft tissue edges were trimmed so as to give good closure of the wound. The flexor tendon was severed and removed as proximally as possible.    The wound was then flushed with sterile saline solution and the skin edges were then closed using 4-0 prolene suture material. The incision was dressed with xeroform and covered with a sterile compressive dressing  consisting of 4 X 4s and cast padding. A Coban wrap and post-op shoe was then applied. The patient tolerated the procedure and anesthesia well. The patient was transported to recovery with vital signs stable and vascular status intact.     Eliud Bernabe DPM PGY-3  Pager [phone]    I have personally taken the history and examined this patient and agree with the resident's note as stated as above.   Derek Jose DPM, FACFAS

## 2017-05-23 NOTE — PLAN OF CARE
Signed out from pacu per Dr Gregorio. Report called to Mikey Cavazos/5A. Transported to room 532 via stretcher,sr upx2.

## 2017-05-23 NOTE — PLAN OF CARE
Problem: Patient Care Overview  Goal: Plan of Care Review  Saturations were as noted in charting.  Will continue to monitor.

## 2017-05-23 NOTE — PROGRESS NOTES
Progress Note  Podiatry      SUBJECTIVE     History of Present Illness:  Caro Powell is a 76 y.o. female who  has a past medical history of Calcium nephrolithiasis; Diabetes mellitus, type 2; Diabetic peripheral neuropathy associated with type 2 diabetes mellitus; and Hypertension. Patient was admitted with right fifth toe ulcer with osteomyelitis. She relates the ulcer has been present for a little over a month. She has been ambulating in a darco shoe with a football dressing. At last visit with podiatry 5/18/17 she had noted probe to bone with positive signs of bone infection to the fifth PIPJ area. She was set for admit for arthroplasty with resection of infected bone versus toe amputation. She denies f/n/v, does relate having some chills Saturday night. No other complaints at this time.     Interval History:  5/23/17: Patient seen bedside this morning prior to surgery in NAD. NPO since midnight for right fifth toe amputation this afternoon. No acute pedal changes overnight.    Scheduled Meds:   gabapentin  400 mg Oral TID    [START ON 5/24/2017] lisinopril  5 mg Oral Daily    pramipexole  0.125 mg Oral TID    sodium chloride 0.9%  3 mL Intravenous Q8H     Continuous Infusions:   sodium chloride 0.9% 75 mL/hr at 05/22/17 1959     PRN Meds:.acetaminophen, dextrose 50%, dextrose 50%, glucagon (human recombinant), glucose, glucose, hydrALAZINE, hydrocodone-acetaminophen 10-325mg, hydrocodone-acetaminophen 5-325mg, insulin aspart, pneumoc 13-rabia conj-dip cr(PF), sodium chloride 0.9%    Review of patient's allergies indicates:   Allergen Reactions    Codeine Nausea Only        Past Medical History:   Diagnosis Date    Calcium nephrolithiasis 2007    Diabetes mellitus, type 2     Diabetic peripheral neuropathy associated with type 2 diabetes mellitus     Hypertension      Past Surgical History:   Procedure Laterality Date    COLONOSCOPY  11/28/2011    sigmoid diverticulosis, external hemorrhoids     HYSTERECTOMY      SHOULDER SURGERY Left      Family History   Problem Relation Age of Onset    Diabetes Mother     Heart failure Father     Kidney failure Brother      Social History   Substance Use Topics    Smoking status: Never Smoker    Smokeless tobacco: Not on file    Alcohol use No       Review of Systems:  Constitutional: pain well controlled, negative for fevers  Respiratory: no cough or shortness of breath  Cardiovascular: no chest pain or palpitations  Gastrointestinal: no nausea or vomiting, tolerating diet  Genitourinary: no hematuria or dysuria  Musculoskeletal: no arthralgias or myalgias  Neurological: history of numbness or paresthesias in the feet    OBJECTIVE     Vital Signs (Most Recent):  Temp: 97.6 °F (36.4 °C) (05/23/17 1616)  Pulse: 62 (05/23/17 1616)  Resp: 18 (05/23/17 1616)  BP: (!) 174/72 (05/23/17 1616)  SpO2: (!) 94 % (05/23/17 1616)    Vital Signs Range (Last 24H):  Temp:  [96.8 °F (36 °C)-98.8 °F (37.1 °C)]   Pulse:  [60-87]   Resp:  [16-20]   BP: (126-190)/(61-80)   SpO2:  [92 %-99 %]     Physical Exam:  General: Oriented to person, place, time, and situation. No acute distress.  Vascular: DP and PT pulses are 2+ bilaterally. CRT<3 seconds to digits 1-5 bilaterally. No edema or varicosities noted bilaterally.  Musculoskeletal: Equinus noted b/l ankles with < 10 deg DF noted. Strength 5/5 in DF/PF/Inv/Ev resistance with no reproduction of pain in any direction. Passive range of motion of ankle and pedal joints is painless b/l.  Neuro: Protective sensation absent bilateral   Derm:     Ulcer Location: right fifth toe dorsal lateral PIPJ  Measurements: 0.2x0.2x0.1cm  Periwound: hyperkeratotic  Drainage: serosanguinous.  Pus: None.  Malodor: None.  Base:  50% granular. 50% fibrin  Signs of infection: Probes to bone, slight erythema        Ulcer Location: right hallux distal plantar medial aspect  Measurements:0.3x0.4x0.1cm  Periwound: sloughed, macerated  Drainage: serous  Pus:  None.  Malodor: None.  Base:  80% granular. 20% fibrin with mild overlying slough  Signs of infection: None.             Ulcer Location: left hallux distal plantar medial aspect  Measurements:0.3x 1.2x0.1cm  Periwound: macerated with slight undermining  Drainage: none  Pus: None.  Malodor: None.  Base:  90% granular. 10% fibrin with overlying slough and eschar  Signs of infection: None.            Laboratory:  CBC:     Recent Labs  Lab 05/23/17  0548   WBC 16.08*   RBC 3.79*   HGB 11.0*   HCT 33.1*      MCV 87   MCH 29.0   MCHC 33.2     CMP:     Recent Labs  Lab 05/23/17  0547   *   CALCIUM 9.3   ALBUMIN 2.8*   PROT 6.4      K 4.2   CO2 23      BUN 31*   CREATININE 1.2   ALKPHOS 60   ALT 20   AST 13   BILITOT 0.5     ESR: No results for input(s): SEDRATE in the last 168 hours.  CRP: No results for input(s): CRP in the last 168 hours.  Microbiology Results (last 7 days)     Procedure Component Value Units Date/Time    Aerobic culture [[id number]] Collected:  05/23/17 1320    Order Status:  Sent Specimen:  Bone from Toe, Right Foot Updated:  05/23/17 1552    Aerobic culture [[id number]] Collected:  05/23/17 1322    Order Status:  Sent Specimen:  Incision site from Toe, Right Foot Updated:  05/23/17 1552    AFB Culture & Smear [[id number]] Collected:  05/23/17 1320    Order Status:  Sent Specimen:  Bone from Toe, Right Foot Updated:  05/23/17 1552    Culture, Anaerobe [[id number]] Collected:  05/23/17 1320    Order Status:  Sent Specimen:  Bone from Toe, Right Foot Updated:  05/23/17 1552    Fungus culture [[id number]] Collected:  05/23/17 1320    Order Status:  Sent Specimen:  Bone from Toe, Right Foot Updated:  05/23/17 1552    Gram stain [[id number]] Collected:  05/23/17 1320    Order Status:  Sent Specimen:  Bone from Toe, Right Foot Updated:  05/23/17 1552    AFB Culture & Smear [[id number]] Collected:  05/23/17 1322    Order Status:  Sent Specimen:  Incision site from Toe, Right Foot Updated:   05/23/17 1552    Fungus culture [[id number]] Collected:  05/23/17 1322    Order Status:  Sent Specimen:  Incision site from Toe, Right Foot Updated:  05/23/17 1552    Culture, Anaerobe [[id number]] Collected:  05/23/17 1322    Order Status:  Sent Specimen:  Incision site from Toe, Right Foot Updated:  05/23/17 1552          Diagnostic Results:  X-Ray: reviewed      ASSESSMENT/PLAN     Assessment/Plan:  DM II with neuropathy:  -Consult to hospital medicine, appreciate their help in managing her diabetes and for medical optimization and risk stratification for surgery  -Gabapentin 400 mg TID for neuropathy pain    Diabetic Ulcers Bilateral, Right fifth toe osteomyelitis:  -Bilateral hallux ulcers are stable.  -Right fifth toe ulcer probes to bone with signs of osteomyelitis on x-ray, dressings left intact this morning. Infectious Disease consulted for recommendations. Upon reviewing the risks/benefits,  the planned procedure, the surgical goal, and the postoperative course for the right fifth toe amputation, the written consent was signed, timed, and dated by the patient with a witness present.    -NPO since midnight for surgery this afternoon.     HTN  -Defer to medicine consult for management  -Continued her home lisinopril 5 mg daily    Dispostion: Surgery tomorrow, likely to discharge tomorrow to home.    Eliud Bernabe DPM PGY-3

## 2017-05-23 NOTE — BRIEF OP NOTE
[facility]  Brief Operative Note    SUMMARY     Surgery Date: 5/23/2017     Surgeon(s) and Role:     * Derek Jose DPM - Primary    Assisting Surgeon: None    Pre-op Diagnosis:  Osteomyelitis of toe of right foot [M86.9]  Type 2 diabetes mellitus with peripheral neuropathy [E11.42]    Post-op Diagnosis:  Post-Op Diagnosis Codes:     * Osteomyelitis of toe of right foot [M86.9]     * Type 2 diabetes mellitus with peripheral neuropathy [E11.42]    Procedure(s) (LRB):  AMPUTATION-TOE (Right)fifth via disarticulation at metatarsal phalangeal joint    Anesthesia: Local MAC    Description of Procedure: amputation right fifth toe at metatarsal phalangeal joint    Description of the findings of the procedure: per above    Estimated Blood Loss: * No values recorded between 5/23/2017 12:00 AM and 5/23/2017  1:21 PM *         Specimens:   Specimen (12h ago through future)    None

## 2017-05-24 VITALS
DIASTOLIC BLOOD PRESSURE: 72 MMHG | RESPIRATION RATE: 18 BRPM | TEMPERATURE: 98 F | BODY MASS INDEX: 34.74 KG/M2 | OXYGEN SATURATION: 95 % | HEIGHT: 62 IN | WEIGHT: 188.81 LBS | SYSTOLIC BLOOD PRESSURE: 167 MMHG | HEART RATE: 63 BPM

## 2017-05-24 PROBLEM — N18.30 CKD STAGE 3 DUE TO TYPE 2 DIABETES MELLITUS: Chronic | Status: RESOLVED | Noted: 2017-05-22 | Resolved: 2017-05-24

## 2017-05-24 PROBLEM — E11.22 CKD STAGE 3 DUE TO TYPE 2 DIABETES MELLITUS: Chronic | Status: RESOLVED | Noted: 2017-05-22 | Resolved: 2017-05-24

## 2017-05-24 LAB
ALBUMIN SERPL BCP-MCNC: 2.9 G/DL
ALP SERPL-CCNC: 66 U/L
ALT SERPL W/O P-5'-P-CCNC: 21 U/L
ANION GAP SERPL CALC-SCNC: 10 MMOL/L
AST SERPL-CCNC: 17 U/L
BASOPHILS # BLD AUTO: 0.03 K/UL
BASOPHILS NFR BLD: 0.2 %
BILIRUB SERPL-MCNC: 0.5 MG/DL
BUN SERPL-MCNC: 21 MG/DL
CALCIUM SERPL-MCNC: 9.3 MG/DL
CHLORIDE SERPL-SCNC: 106 MMOL/L
CO2 SERPL-SCNC: 21 MMOL/L
CREAT SERPL-MCNC: 1 MG/DL
DIFFERENTIAL METHOD: ABNORMAL
EOSINOPHIL # BLD AUTO: 0.2 K/UL
EOSINOPHIL NFR BLD: 1.5 %
ERYTHROCYTE [DISTWIDTH] IN BLOOD BY AUTOMATED COUNT: 14.6 %
EST. GFR  (AFRICAN AMERICAN): >60 ML/MIN/1.73 M^2
EST. GFR  (NON AFRICAN AMERICAN): 54 ML/MIN/1.73 M^2
GLUCOSE SERPL-MCNC: 118 MG/DL
HCT VFR BLD AUTO: 34.7 %
HGB BLD-MCNC: 11.4 G/DL
LYMPHOCYTES # BLD AUTO: 1.4 K/UL
LYMPHOCYTES NFR BLD: 11 %
MCH RBC QN AUTO: 29 PG
MCHC RBC AUTO-ENTMCNC: 32.9 %
MCV RBC AUTO: 88 FL
MONOCYTES # BLD AUTO: 0.8 K/UL
MONOCYTES NFR BLD: 5.9 %
NEUTROPHILS # BLD AUTO: 10.6 K/UL
NEUTROPHILS NFR BLD: 81.2 %
PLATELET # BLD AUTO: 215 K/UL
PMV BLD AUTO: 9.7 FL
POCT GLUCOSE: 136 MG/DL (ref 70–110)
POCT GLUCOSE: 204 MG/DL (ref 70–110)
POCT GLUCOSE: 226 MG/DL (ref 70–110)
POTASSIUM SERPL-SCNC: 4.3 MMOL/L
PROT SERPL-MCNC: 6.9 G/DL
RBC # BLD AUTO: 3.93 M/UL
SODIUM SERPL-SCNC: 137 MMOL/L
WBC # BLD AUTO: 13.04 K/UL

## 2017-05-24 PROCEDURE — 63600175 PHARM REV CODE 636 W HCPCS: Performed by: PODIATRIST

## 2017-05-24 PROCEDURE — 94761 N-INVAS EAR/PLS OXIMETRY MLT: CPT

## 2017-05-24 PROCEDURE — 80053 COMPREHEN METABOLIC PANEL: CPT

## 2017-05-24 PROCEDURE — 25000003 PHARM REV CODE 250: Performed by: PODIATRIST

## 2017-05-24 PROCEDURE — 85025 COMPLETE CBC W/AUTO DIFF WBC: CPT

## 2017-05-24 PROCEDURE — 25000003 PHARM REV CODE 250: Performed by: PHYSICIAN ASSISTANT

## 2017-05-24 PROCEDURE — 36415 COLL VENOUS BLD VENIPUNCTURE: CPT

## 2017-05-24 RX ORDER — AMOXICILLIN AND CLAVULANATE POTASSIUM 875; 125 MG/1; MG/1
1 TABLET, FILM COATED ORAL 2 TIMES DAILY
Qty: 28 TABLET | Refills: 0 | Status: ON HOLD | OUTPATIENT
Start: 2017-05-24 | End: 2017-05-28 | Stop reason: HOSPADM

## 2017-05-24 RX ADMIN — SODIUM CHLORIDE, PRESERVATIVE FREE 3 ML: 5 INJECTION INTRAVENOUS at 02:05

## 2017-05-24 RX ADMIN — GABAPENTIN 400 MG: 400 CAPSULE ORAL at 01:05

## 2017-05-24 RX ADMIN — SODIUM CHLORIDE: 0.9 INJECTION, SOLUTION INTRAVENOUS at 01:05

## 2017-05-24 RX ADMIN — GABAPENTIN 400 MG: 400 CAPSULE ORAL at 05:05

## 2017-05-24 RX ADMIN — INSULIN ASPART 2 UNITS: 100 INJECTION, SOLUTION INTRAVENOUS; SUBCUTANEOUS at 04:05

## 2017-05-24 RX ADMIN — CEFAZOLIN SODIUM 1 G: 1 SOLUTION INTRAVENOUS at 01:05

## 2017-05-24 RX ADMIN — CEFAZOLIN SODIUM 1 G: 1 SOLUTION INTRAVENOUS at 05:05

## 2017-05-24 RX ADMIN — INSULIN ASPART 2 UNITS: 100 INJECTION, SOLUTION INTRAVENOUS; SUBCUTANEOUS at 01:05

## 2017-05-24 RX ADMIN — LISINOPRIL 5 MG: 5 TABLET ORAL at 09:05

## 2017-05-24 RX ADMIN — SODIUM CHLORIDE, PRESERVATIVE FREE 3 ML: 5 INJECTION INTRAVENOUS at 06:05

## 2017-05-24 RX ADMIN — PRAMIPEXOLE DIHYDROCHLORIDE 0.12 MG: 0.12 TABLET ORAL at 01:05

## 2017-05-24 RX ADMIN — PRAMIPEXOLE DIHYDROCHLORIDE 0.12 MG: 0.12 TABLET ORAL at 05:05

## 2017-05-24 RX ADMIN — HYDROCODONE BITARTRATE AND ACETAMINOPHEN 1 TABLET: 10; 325 TABLET ORAL at 05:05

## 2017-05-24 NOTE — PLAN OF CARE
Full note to follow     Patient s/p toe amputation doing well     Cultures pending at present     OK to be DC on augmentin for 2 weeks

## 2017-05-24 NOTE — SUBJECTIVE & OBJECTIVE
Interval History: Pt without complaints. Sitting on side of bed.  present. She states she is ready for discharge.     Review of Systems   Constitutional: Negative for chills and fever.   Respiratory: Negative for shortness of breath.    Cardiovascular: Negative for chest pain.   Gastrointestinal: Negative for abdominal pain.   Psychiatric/Behavioral: Negative for confusion. The patient is not nervous/anxious.      Objective:     Vital Signs (Most Recent):  Temp: 98.2 °F (36.8 °C) (05/24/17 0800)  Pulse: (!) 58 (05/24/17 0800)  Resp: 17 (05/24/17 0800)  BP: (!) 156/67 (05/24/17 0800)  SpO2: (!) 94 % (05/24/17 1149) Vital Signs (24h Range):  Temp:  [97.5 °F (36.4 °C)-98.3 °F (36.8 °C)] 98.2 °F (36.8 °C)  Pulse:  [58-74] 58  Resp:  [16-20] 17  SpO2:  [90 %-99 %] 94 %  BP: (139-184)/(62-79) 156/67     Weight: 85.6 kg (188 lb 12.8 oz)  Body mass index is 34.53 kg/m².    Intake/Output Summary (Last 24 hours) at 05/24/17 1211  Last data filed at 05/24/17 0600   Gross per 24 hour   Intake           3437.5 ml   Output             1100 ml   Net           2337.5 ml      Physical Exam   Constitutional: She is oriented to person, place, and time.   Cardiovascular: Normal rate and regular rhythm.    Pulmonary/Chest: Effort normal and breath sounds normal. No respiratory distress.   Musculoskeletal:   Right foot dressing clean and dry at present.    Neurological: She is alert and oriented to person, place, and time.       Significant Labs:   CBC:   Recent Labs  Lab 05/22/17  1703 05/23/17  0548 05/24/17  0438   WBC 18.92* 16.08* 13.04*   HGB 11.7* 11.0* 11.4*   HCT 35.3* 33.1* 34.7*    222 215     CMP:   Recent Labs  Lab 05/22/17  1703 05/23/17  0547 05/24/17  0438    137 137   K 4.6 4.2 4.3    106 106   CO2 23 23 21*   * 162* 118*   BUN 37* 31* 21   CREATININE 1.7* 1.2 1.0   CALCIUM 9.7 9.3 9.3   PROT 6.9 6.4 6.9   ALBUMIN 3.2* 2.8* 2.9*   BILITOT 0.5 0.5 0.5   ALKPHOS 61 60 66   AST 16 13 17    ALT 23 20 21   ANIONGAP 11 8 10   EGFRNONAA 29* 44* 54*     POCT Glucose:   Recent Labs  Lab 05/23/17  1505 05/23/17 2016 05/24/17  0443   POCTGLUCOSE 143* 197* 136*       Significant Imaging: no new

## 2017-05-24 NOTE — ASSESSMENT & PLAN NOTE
Pt denies being told she has CKD. Unknown baseline creatinine. 1.0 today.  Pt has h/o nephrolithiasis.  Denies  complaints. Monitor.

## 2017-05-24 NOTE — PROGRESS NOTES
.Pharmacy New Medication Education    Patient accepted medication education.    Pharmacy educated patient on the following medications, using the teach-back method.   Apap  Cefazolin  Glucagon  Glucose  Hydralazine  Norco  Novolog  Lisinopril  Zofran  Mirapex    Learners of pharmacy medication education included:  patient    Patient +/- learner response:  verbalize understanding

## 2017-05-24 NOTE — DISCHARGE SUMMARY
DISCHARGE SUMMARY  Hospital Medicine    Team: Networked reference to record PCT     Patient Name: Caro Powell  YOB: 1940    Admit Date: 5/22/2017    Discharge Date: 05/24/2017    LOS: 2    Discharge Attending Physician: Derek Jose DPM       Diagnoses:  Active Hospital Problems    Diagnosis  POA    *Osteomyelitis of toe of right foot [M86.9]  Yes    Essential hypertension [I10]  Yes     Chronic    Type 2 diabetes mellitus with diabetic neuropathy, without long-term current use of insulin [E11.40]  Yes     Chronic    Pre-operative clearance [Z01.818]  Not Applicable    Leukocytosis [D72.829]  Yes      Resolved Hospital Problems    Diagnosis Date Resolved POA    CKD stage 3 due to type 2 diabetes mellitus [E11.22, N18.3] 05/24/2017 Yes     Chronic       Discharged Condition:     Hospital Course:    Initial Presentation:  Presented 5/22/17 with a right fifth toe infection with osteomyelitis for a direct admit for a toe amputation. She related the ulcer has been present for a little over a month. She had been ambulating in a darco shoe with a football dressing. At last visit with podiatry 5/18/17 she had noted probe to bone with positive signs of bone infection to the fifth PIPJ area. She had a WBC 18 at time of admit    Course of Principle Problem for Admission:  Surgery on 5/23/17 for right fifth toe amputation.     Other Medical Problems Addressed in the Hospital:  DM II and hypertension managed by hospital medicine.    Consults:  Hospital Medicine  Infectious Disease    Last CBC/BMP/HgbA1c (if applicable):  Recent Results (from the past 336 hour(s))   CBC auto differential    Collection Time: 05/24/17  4:38 AM   Result Value Ref Range    WBC 13.04 (H) 3.90 - 12.70 K/uL    Hemoglobin 11.4 (L) 12.0 - 16.0 g/dL    Hematocrit 34.7 (L) 37.0 - 48.5 %    Platelets 215 150 - 350 K/uL   CBC auto differential    Collection Time: 05/23/17  5:48 AM   Result Value Ref Range    WBC 16.08 (H)  3.90 - 12.70 K/uL    Hemoglobin 11.0 (L) 12.0 - 16.0 g/dL    Hematocrit 33.1 (L) 37.0 - 48.5 %    Platelets 222 150 - 350 K/uL   CBC with Auto Differential    Collection Time: 05/22/17  5:03 PM   Result Value Ref Range    WBC 18.92 (H) 3.90 - 12.70 K/uL    Hemoglobin 11.7 (L) 12.0 - 16.0 g/dL    Hematocrit 35.3 (L) 37.0 - 48.5 %    Platelets 214 150 - 350 K/uL     No results found for this or any previous visit (from the past 336 hour(s)).  Lab Results   Component Value Date    HGBA1C 7.5 (H) 05/22/2017       Other Pertinent Lab Findings:  Laboratory:  CBC:      Recent Labs  Lab 05/24/17 0438   WBC 13.04*   RBC 3.93*   HGB 11.4*   HCT 34.7*      MCV 88   MCH 29.0   MCHC 32.9      CMP:      Recent Labs  Lab 05/24/17 0438   *   CALCIUM 9.3   ALBUMIN 2.9*   PROT 6.9      K 4.3   CO2 21*      BUN 21   CREATININE 1.0   ALKPHOS 66   ALT 21   AST 17   BILITOT 0.5      ESR: No results for input(s): SEDRATE in the last 168 hours.  CRP: No results for input(s): CRP in the last 168 hours.          Microbiology Results (last 7 days)      Procedure Component Value Units Date/Time     Culture, Anaerobe [[id number]] Collected:  05/23/17 1320     Order Status:  Completed Specimen:  Bone from Toe, Right Foot Updated:  05/24/17 0751       Anaerobic Culture Culture in progress     Culture, Anaerobe [[id number]] Collected:  05/23/17 1322     Order Status:  Completed Specimen:  Incision site from Toe, Right Foot Updated:  05/24/17 0751       Anaerobic Culture Culture in progress     Aerobic culture [[id number]] Collected:  05/23/17 1322     Order Status:  Completed Specimen:  Incision site from Toe, Right Foot Updated:  05/24/17 0720       Aerobic Bacterial Culture No growth     Aerobic culture [[id number]] Collected:  05/23/17 1320     Order Status:  Completed Specimen:  Bone from Toe, Right Foot Updated:  05/24/17 0719       Aerobic Bacterial Culture No growth     Gram stain [[id number]] Collected:  05/23/17  1320     Order Status:  Completed Specimen:  Bone from Toe, Right Foot Updated:  05/23/17 2254       Gram Stain Result No WBC's         No organisms seen     AFB Culture & Smear [[id number]] Collected:  05/23/17 1320     Order Status:  Sent Specimen:  Bone from Toe, Right Foot Updated:  05/23/17 1552     Fungus culture [[id number]] Collected:  05/23/17 1320     Order Status:  Sent Specimen:  Bone from Toe, Right Foot Updated:  05/23/17 1552     AFB Culture & Smear [[id number]] Collected:  05/23/17 1322     Order Status:  Sent Specimen:  Incision site from Toe, Right Foot Updated:  05/23/17 1552     Fungus culture [[id number]] Collected:  05/23/17 1322     Order Status:  Sent Specimen:  Incision site from Toe, Right Foot Updated:  05/23/17 1552       Pertinent/Significant Diagnostic Studies:   Chest X-ray  Chest AP    Comparison: None    Findings: Diffuse bilateral middle and lower lung zone predominant interstitial and ill-defined airspace opacities. No effusion or pneumothorax. No displaced fracture.    Interpretation is limited by technique and patient body habitus.   Impression      Diffuse bilateral middle and lower lung zone predominant interstitial and ill-defined air space opacities, possibly edema or infectious/inflammatory disease.     MRI Right Foot  Findings: There is abnormal signal intensity involving the distal aspect of the proximal phalanx of the fifth toe as well as the entire distal phalanx of the fifth toe.  Additionally, there is evidence for open wound.  Therefore, these findings are concerning for osteomyelitis.  Remaining osseous structures demonstrate normal marrow signal.  There is joint space narrowing which is likely related to osteoarthritis.  There is no evidence for fluid collection to suggest abscess.  No radiopaque retained foreign body.  Tendons demonstrate normal signal intensity.  No significant edema of the subcutaneous soft tissues.   Impression      Findings concerning for  osteomyelitis involving the proximal and distal phalanges of the fifth toe as described above.  No evidence for abscess.       Special Treatments/Procedures:   Right fifth toe amputation    Disposition:  [facility]    Future Scheduled Appointments:  Future Appointments  Date Time Provider Department Center   6/2/2017 10:00 AM MARJAN [facility]     Follow-up Plans from This Hospitalization:    Discharge Medication List:  Reconciled Home Medications:   Discharge Medication List as of 5/24/2017  4:45 PM      START taking these medications    Details   amoxicillin-clavulanate 875-125mg (AUGMENTIN) 875-125 mg per tablet Take 1 tablet by mouth 2 (two) times daily., Starting Wed 5/24/2017, Until Wed 6/7/2017, Normal         CONTINUE these medications which have NOT CHANGED    Details   ACCU-CHEK SAMI CONTROL SOLN Soln Starting 10/2/2014, Until Discontinued, Historical Med      ACCU-CHEK SAMI PLUS METER Misc Starting 10/10/2014, Until Discontinued, Historical Med      ACCU-CHEK SAMI PLUS TEST STRP Strp Starting 12/16/2014, Until Discontinued, Historical Med      ACCU-CHEK SOFT DEV LANCETS Kit Starting 10/2/2014, Until Discontinued, Historical Med      ACCU-CHEK SOFTCLIX LANCETS Misc Starting 10/10/2014, Until Discontinued, Historical Med      ammonium lactate 12 % Crea Apply to feet twice daily., Normal      diazepam (VALIUM) 5 MG tablet Take 1 tablet (5 mg total) by mouth every 6 (six) hours as needed for Anxiety., Starting 6/23/2014, Until Wed 7/23/14, Print      gabapentin (NEURONTIN) 400 MG capsule Starting 3/12/2014, Until Discontinued, Historical Med      glimepiride (AMARYL) 1 MG tablet Starting 6/16/2014, Until Discontinued, Historical Med      hydrocodone-acetaminophen 5-325mg (NORCO) 5-325 mg per tablet Take 1 tablet by mouth every 12 (twelve) hours as needed., Starting 7/1/2016, Until Discontinued, Historical Med      lisinopril (PRINIVIL,ZESTRIL) 5 MG tablet Starting 3/12/2014, Until  Discontinued, Historical Med      meloxicam (MOBIC) 15 MG tablet Take 15 mg by mouth once daily., Starting 1/12/2017, Until Discontinued, Historical Med      omeprazole (PRILOSEC) 20 MG capsule Starting 3/6/2017, Until Discontinued, Historical Med      pramipexole (MIRAPEX) 0.125 MG tablet Starting 7/22/2016, Until Discontinued, Historical Med         STOP taking these medications       mupirocin (BACTROBAN) 2 % ointment Comments:   Reason for Stopping:         triamcinolone acetonide 0.1% (KENALOG) 0.1 % cream Comments:   Reason for Stopping:               Patient Instructions:    Discharge Procedure Orders  Diet general     Call MD for:  temperature >100.4     Call MD for:  persistent nausea and vomiting or diarrhea     Call MD for:  severe uncontrolled pain     Call MD for:  redness, tenderness, or signs of infection (pain, swelling, redness, odor or green/yellow discharge around incision site)     Call MD for:  difficulty breathing or increased cough     Call MD for:  severe persistent headache     Call MD for:  worsening rash     Call MD for:  persistent dizziness, light-headedness, or visual disturbances     Call MD for:  increased confusion or weakness     Leave dressing on - Keep it clean, dry, and intact until clinic visit       At the time of discharge, patient was advised to take all medications as directed, and to follow up with all future appointments.  Patient was informed to return to the ER if symptoms worsen or fail to resolve.    Signing Physician:  Eliud Bernabe DPM PGY-3  Pager [phone]

## 2017-05-24 NOTE — PLAN OF CARE
Problem: Patient Care Overview  Goal: Plan of Care Review  Pt on RA SPO2 92%.  No apparent distress.  Will continue to monitor.

## 2017-05-24 NOTE — PROGRESS NOTES
[facility] Hospital Medicine  Progress Note    Patient Name: Caro Powell  MRN: [id number]  Patient Class: IP- Inpatient   Admission Date: 5/22/2017  Length of Stay: 2 days  Attending Physician: Derek Jose DPM  Primary Care Provider: Manuel Laird MD        Subjective:     Principal Problem:Osteomyelitis of toe of right foot    HPI:  77 yo female with PMHx of diabetes type 2, diabetic peripheral neuropathy, and HTN is admitted to Podiatry at  [facility] on 5/22/2017 for arthroplasty with resection of infected bone versus toe amputation of right fifth toe.  Hospital Medicine is consulted for medical management for patient's diabetes and HTN and for risk stratification for surgery.      Hospital Course:  Had right fifth toe ambulation on 5/23/2017.  Pt feeling well and ready for discharge.     Interval History: Pt without complaints. Sitting on side of bed.  present. She states she is ready for discharge.     Review of Systems   Constitutional: Negative for chills and fever.   Respiratory: Negative for shortness of breath.    Cardiovascular: Negative for chest pain.   Gastrointestinal: Negative for abdominal pain.   Psychiatric/Behavioral: Negative for confusion. The patient is not nervous/anxious.      Objective:     Vital Signs (Most Recent):  Temp: 98.2 °F (36.8 °C) (05/24/17 0800)  Pulse: (!) 58 (05/24/17 0800)  Resp: 17 (05/24/17 0800)  BP: (!) 156/67 (05/24/17 0800)  SpO2: (!) 94 % (05/24/17 1149) Vital Signs (24h Range):  Temp:  [97.5 °F (36.4 °C)-98.3 °F (36.8 °C)] 98.2 °F (36.8 °C)  Pulse:  [58-74] 58  Resp:  [16-20] 17  SpO2:  [90 %-99 %] 94 %  BP: (139-184)/(62-79) 156/67     Weight: 85.6 kg (188 lb 12.8 oz)  Body mass index is 34.53 kg/m².    Intake/Output Summary (Last 24 hours) at 05/24/17 1211  Last data filed at 05/24/17 0600   Gross per 24 hour   Intake           3437.5 ml   Output             1100 ml   Net           2337.5 ml      Physical Exam    Constitutional: She is oriented to person, place, and time.   Cardiovascular: Normal rate and regular rhythm.    Pulmonary/Chest: Effort normal and breath sounds normal. No respiratory distress.   Musculoskeletal:   Right foot dressing clean and dry at present.    Neurological: She is alert and oriented to person, place, and time.       Significant Labs:   CBC:   Recent Labs  Lab 05/22/17  1703 05/23/17  0548 05/24/17  0438   WBC 18.92* 16.08* 13.04*   HGB 11.7* 11.0* 11.4*   HCT 35.3* 33.1* 34.7*    222 215     CMP:   Recent Labs  Lab 05/22/17  1703 05/23/17  0547 05/24/17  0438    137 137   K 4.6 4.2 4.3    106 106   CO2 23 23 21*   * 162* 118*   BUN 37* 31* 21   CREATININE 1.7* 1.2 1.0   CALCIUM 9.7 9.3 9.3   PROT 6.9 6.4 6.9   ALBUMIN 3.2* 2.8* 2.9*   BILITOT 0.5 0.5 0.5   ALKPHOS 61 60 66   AST 16 13 17   ALT 23 20 21   ANIONGAP 11 8 10   EGFRNONAA 29* 44* 54*     POCT Glucose:   Recent Labs  Lab 05/23/17  1505 05/23/17  2016 05/24/17  0443   POCTGLUCOSE 143* 197* 136*       Significant Imaging: no new    Assessment/Plan:      * Osteomyelitis of toe of right foot    Leukocytosis  As per Primary Team - Podiatry. WBC (13K today) trending down. Defer decision on antibiotics to Primary Team and ID.  POD #1.            Type 2 diabetes mellitus with diabetic neuropathy, without long-term current use of insulin     this morning. Check blood glucose AC and HS and use SSI.  A1c 7.5% today.  Diabetic diet. Pt on glimepiride 1 mg daily at home - holding here - and gabapentin 400 mg TID for neuropathy.  Resume glimepiride at discharge.           Essential hypertension    -184.  Continue home lisinopril 5 mg. Monitor. PRN hydralazine.           CKD stage 3 due to type 2 diabetes mellitus    Pt denies being told she has CKD. Unknown baseline creatinine. 1.0 today.  Pt has h/o nephrolithiasis.  Denies  complaints. Monitor.           Pre-operative clearance    Ms. Caro LUA  Sherry is a 76 y.o. female who will be undergoing a right 5th toe arthroplasty with resection of infected toe vs amputation which is a(n) Intermediate Risk cardiac procedure, and has the following:  · An exercise tolerance which is greater than four METs.  · Clinical factors that are Low Risk.  · No current signs/symptoms of unstable coronary syndrome, decompensated heart failure, significant arrhythmias, nor severe valvular disease.     The patient is currenlty medically optimized.  Labs and EKG reviewed.           VTE Risk Mitigation         Ordered     Place AZUL hose  Until discontinued      05/22/17 1643     Medium Risk of VTE  Once      05/22/17 1643          Liza Junior PA-C  Department of Hospital Medicine   [facility]  Pager: [phone]    Attending:  Palmer Irving MD

## 2017-05-24 NOTE — ASSESSMENT & PLAN NOTE
Leukocytosis  As per Primary Team - Podiatry. WBC (13K today) trending down. Defer decision on antibiotics to Primary Team and ID.  POD #1.

## 2017-05-24 NOTE — PROGRESS NOTES
Progress Note  Podiatry      SUBJECTIVE     History of Present Illness:  Caro Powell is a 76 y.o. female who  has a past medical history of Calcium nephrolithiasis; Diabetes mellitus, type 2; Diabetic peripheral neuropathy associated with type 2 diabetes mellitus; and Hypertension. Patient was admitted with right fifth toe ulcer with osteomyelitis. She relates the ulcer has been present for a little over a month. She has been ambulating in a darco shoe with a football dressing. At last visit with podiatry 5/18/17 she had noted probe to bone with positive signs of bone infection to the fifth PIPJ area. She was set for admit for arthroplasty with resection of infected bone versus toe amputation. She denies f/n/v, does relate having some chills Saturday night. No other complaints at this time.     Interval History:  5/23/17: Patient seen bedside this morning prior to surgery in NAD. NPO since midnight for right fifth toe amputation this afternoon. No acute pedal changes overnight.    5/24/17: Patient was seen bedside in NAD POD 1 right fifth toe amputation. Patient relates minimal pain overnight. She had some nausea overnight after taking her pain medication. No other acute changes.     Scheduled Meds:   ceFAZolin (ANCEF) IVPB  1 g Intravenous Q8H    gabapentin  400 mg Oral TID    lisinopril  5 mg Oral Daily    pramipexole  0.125 mg Oral TID    sodium chloride 0.9%  3 mL Intravenous Q8H     Continuous Infusions:   sodium chloride 0.9% 75 mL/hr at 05/24/17 0156     PRN Meds:.acetaminophen, dextrose 50%, dextrose 50%, glucagon (human recombinant), glucose, glucose, hydrALAZINE, hydrocodone-acetaminophen 10-325mg, hydrocodone-acetaminophen 5-325mg, insulin aspart, ondansetron, pneumoc 13-rabia conj-dip cr(PF), sodium chloride 0.9%    Review of patient's allergies indicates:   Allergen Reactions    Codeine Nausea Only        Past Medical History:   Diagnosis Date    Calcium nephrolithiasis 2007    Diabetes  mellitus, type 2     Diabetic peripheral neuropathy associated with type 2 diabetes mellitus     Hypertension      Past Surgical History:   Procedure Laterality Date    COLONOSCOPY  11/28/2011    sigmoid diverticulosis, external hemorrhoids    HYSTERECTOMY      SHOULDER SURGERY Left      Family History   Problem Relation Age of Onset    Diabetes Mother     Heart failure Father     Kidney failure Brother      Social History   Substance Use Topics    Smoking status: Never Smoker    Smokeless tobacco: Not on file    Alcohol use No       Review of Systems:  Constitutional: pain well controlled, negative for fevers  Respiratory: no cough or shortness of breath  Cardiovascular: no chest pain or palpitations  Gastrointestinal: no nausea or vomiting, tolerating diet  Genitourinary: no hematuria or dysuria  Musculoskeletal: no arthralgias or myalgias  Neurological: history of numbness or paresthesias in the feet    OBJECTIVE     Vital Signs (Most Recent):  Temp: 98.5 °F (36.9 °C) (05/24/17 1200)  Pulse: 64 (05/24/17 1259)  Resp: 16 (05/24/17 1200)  BP: (!) 145/66 (05/24/17 1259)  SpO2: (!) 94 % (05/24/17 1149)    Vital Signs Range (Last 24H):  Temp:  [97.5 °F (36.4 °C)-98.5 °F (36.9 °C)]   Pulse:  [58-87]   Resp:  [16-20]   BP: (139-191)/(62-79)   SpO2:  [90 %-97 %]     Physical Exam:  General: Oriented to person, place, time, and situation. No acute distress.  Vascular: DP and PT pulses are 2+ bilaterally. CRT<3 seconds to digits 1-5 bilaterally. No edema or varicosities noted bilaterally.  Musculoskeletal: Equinus noted b/l ankles with < 10 deg DF noted. Strength 5/5 in DF/PF/Inv/Ev resistance with no reproduction of pain in any direction. Passive range of motion of ankle and pedal joints is painless b/l.  Neuro: Protective sensation absent bilateral   Derm:     Surgical wound: right fifth toe amputation site with skin edges well coapted and sutures in place. No drainage noted, skin edges healthy without  maceration or cyanosis.               Ulcer Location: right hallux distal plantar medial aspect  Measurements:0.3x0.4x0.1cm  Periwound: sloughed, macerated  Drainage: serous  Pus: None.  Malodor: None.  Base:  80% granular. 20% fibrin with mild overlying slough  Signs of infection: None.             Ulcer Location: left hallux distal plantar medial aspect  Measurements:0.3x 1.2x0.1cm  Periwound: macerated with slight undermining  Drainage: none  Pus: None.  Malodor: None.  Base:  90% granular. 10% fibrin with overlying slough and eschar  Signs of infection: None.            Laboratory:  CBC:     Recent Labs  Lab 05/24/17 0438   WBC 13.04*   RBC 3.93*   HGB 11.4*   HCT 34.7*      MCV 88   MCH 29.0   MCHC 32.9     CMP:     Recent Labs  Lab 05/24/17 0438   *   CALCIUM 9.3   ALBUMIN 2.9*   PROT 6.9      K 4.3   CO2 21*      BUN 21   CREATININE 1.0   ALKPHOS 66   ALT 21   AST 17   BILITOT 0.5     ESR: No results for input(s): SEDRATE in the last 168 hours.  CRP: No results for input(s): CRP in the last 168 hours.  Microbiology Results (last 7 days)     Procedure Component Value Units Date/Time    Culture, Anaerobe [[id number]] Collected:  05/23/17 1320    Order Status:  Completed Specimen:  Bone from Toe, Right Foot Updated:  05/24/17 0751     Anaerobic Culture Culture in progress    Culture, Anaerobe [[id number]] Collected:  05/23/17 1322    Order Status:  Completed Specimen:  Incision site from Toe, Right Foot Updated:  05/24/17 0751     Anaerobic Culture Culture in progress    Aerobic culture [[id number]] Collected:  05/23/17 1322    Order Status:  Completed Specimen:  Incision site from Toe, Right Foot Updated:  05/24/17 0720     Aerobic Bacterial Culture No growth    Aerobic culture [[id number]] Collected:  05/23/17 1320    Order Status:  Completed Specimen:  Bone from Toe, Right Foot Updated:  05/24/17 0719     Aerobic Bacterial Culture No growth    Gram stain [[id number]] Collected:   05/23/17 1320    Order Status:  Completed Specimen:  Bone from Toe, Right Foot Updated:  05/23/17 2254     Gram Stain Result No WBC's      No organisms seen    AFB Culture & Smear [[id number]] Collected:  05/23/17 1320    Order Status:  Sent Specimen:  Bone from Toe, Right Foot Updated:  05/23/17 1552    Fungus culture [[id number]] Collected:  05/23/17 1320    Order Status:  Sent Specimen:  Bone from Toe, Right Foot Updated:  05/23/17 1552    AFB Culture & Smear [[id number]] Collected:  05/23/17 1322    Order Status:  Sent Specimen:  Incision site from Toe, Right Foot Updated:  05/23/17 1552    Fungus culture [[id number]] Collected:  05/23/17 1322    Order Status:  Sent Specimen:  Incision site from Toe, Right Foot Updated:  05/23/17 1552          Diagnostic Results:  X-Ray: reviewed      ASSESSMENT/PLAN     Assessment/Plan:  DM II with neuropathy:  -Consult to hospital medicine, appreciate their help in managing her diabetes   -Gabapentin 400 mg TID for neuropathy pain    Diabetic Ulcers Bilateral, Right fifth toe osteomyelitis:  -Bilateral hallux ulcers are stable.  -Right fifth toe amputation site stable without soi. WBC trending down to 13 today. Plan is for 2 weeks PO antibiotic course pending final recommendations from ID, appreciate their help.     HTN  -Defer to medicine consult for management  -Continued her home lisinopril 5 mg daily    Dispostion: Pending final ID recommendations plan is to discharge to home today if possible, possibly tomorrow.    Eliud Bernabe DPM PGY-3

## 2017-05-24 NOTE — PLAN OF CARE
Problem: Patient Care Overview  Goal: Plan of Care Review  Patient remains free from falls. Medicated for pain and nausea with medication ordered and pain and nausea relief verbalized. IV antibiotics given as ordered and IV fluids in progress with out and difficulty. Blood glucose for bedtime 197.

## 2017-05-24 NOTE — PROGRESS NOTES
Patient discharged home per MD. Discharge instructions given to patient about medications, prescriptions, signs and symptoms when to call doctor and when to follow up. Patient verbalized complete understanding of discharge instructions. Patient prescriptions fulfilled by out patient pharmacy.  Patients family will accompany her home per wheelchair per transport.

## 2017-05-24 NOTE — ASSESSMENT & PLAN NOTE
this morning. Check blood glucose AC and HS and use SSI.  A1c 7.5% today.  Diabetic diet. Pt on glimepiride 1 mg daily at home - holding here - and gabapentin 400 mg TID for neuropathy.  Resume glimepiride at discharge.

## 2017-05-25 ENCOUNTER — PATIENT OUTREACH (OUTPATIENT)
Dept: ADMINISTRATIVE | Facility: CLINIC | Age: 77
End: 2017-05-25
Payer: MEDICARE

## 2017-05-25 NOTE — PATIENT INSTRUCTIONS
Amputation is a surgery to remove a limb or part of a limb. It is done because tissue in the limb is diseased or damaged and cant be healed. The surgeon saves as much of your limb as possible. This may include joints, such as the knee. But you may not know before the surgery how much of the limb will remain. Amputation is intended to restore your ability to function. This is because removing your diseased or damaged limb can improve your health. Common causes of amputation include peripheral artery disease that causes ischemia, trauma, diabetes, infection, and cancer.  During the surgery  You' will receive either general anesthesia or a local/regional anesthesia. The surgeon divides damaged tissue from healthy tissue. This includes skin, muscle, bone, blood vessels, and nerves. Then the surgeon removes the damaged part of the limb. The remaining nerves are cut short and allowed to pull back into the healthy tissue. This protects and cushions them. The end of the cut bone is trimmed and the edges are smoothed for comfort. A flap of healthy muscle and skin is left behind when the damaged tissue is removed. The flap is brought snugly over the bone to cover the end of the amputated limb. If there isnt enough tissue in the flap, the surgeon takes skin or tissue from another part of the body. The flap is then sutured or stapled in place. The wound may be left open at first if debris is present or infection is possible. This allows fluid to drain and helps the wound heal cleanly. The wound will later be closed by the surgeon.   Risks and complications of surgery  · Further surgery needed to remove more of the limb  · Infection  · Hemorrhage (severe bleeding)  · Death  · Pain, including nerve-related symptoms, chronic pain, and phantom limb pain  · Blood clots  · Heart problems (heart attack, arrhythmias, heart failure0  · Flexion contractions  · Depression  Following up with the surgeon  Youjosh need to follow up with  the surgeon about 5 to 7 days after you go home. The surgeon will check how your wound is healing. The sutures or staples will likely come out about 2 to 3 weeks after surgery. This could be longer if you heal more slowly due to other health problems. After healing is complete, you may be able to be fitted for a prosthesis.  When to call the doctor  Check your wound at home as directed by the surgeon. Call your surgeon right away if you have any of the following problems:  · Fever of 100.4ºF (38.0°C) or higher  · Chills  · Red streaks on the skin around the wound  · Thick, cloudy, or yellow-brown drainage or odor from the wound  · Wound separation (skin pulls apart)  · Severe increase in pain     Understanding Sepsis  Sepsis is a severe response the body has to an infection. It is most often caused by bacteria. It is also known as septicemia, or systemic inflammatory response syndrome (SIRS). Sepsis is a medical emergency. It needs to be treated right away.  What is sepsis?  Sepsis is when the body reacts to an infection with a severe inflammatory response. It can be caused by bacteria, fungus, or a virus. Sepsis can cause many kinds of problems around the body. It can lead severe low blood pressure (shock) and organ failure. This can lead to death if not treated.  Sepsis is most common in:  · Infants and older adults  · People with an infection such as pneumonia, meningitis, or a urinary tract infection  · People who have an illness such as cancer, AIDS, or diabetes  · People being treated with chemotherapy medications or radiation  · People who have had a transplant  Symptoms of sepsis  Symptoms of sepsis can include:  · Chills and shaking  · Rapid heartbeat  · Rapid breathing  · Shortness of breath  · Severe nausea or uncontrolled vomiting  · Confusion  · Dizziness  · Decreased urination  · Severe pain, including in the back or joints   Diagnosing sepsis  If your health care provider thinks you may have sepsis,  you will be given tests. You may have blood and urine tests. These are done to look for bacteria, viruses, or fungus. You may also have X-rays or other imaging tests. These may be done to look at your organs to locate the source of infection.  Treating sepsis  If you have sepsis, your health care provider will give you antibiotics through a thin, flexible tube put into a vein in your arm (IV). You will also be given fluids through the IV. You may also be given nutrition or other medications through your IV. Your health care provider will talk with you about other treatments you may need. These may include using an oxygen mask or a ventilator to help with breathing. Treatment may last at least 7 to 10 days. Sepsis must be treated in the hospital.  Date Last Reviewed: 7/15/2015  © 6891-2584 [employer]. [address] [place] [address], [place] [address]. All rights reserved. This information is not intended as a substitute for professional medical care. Always follow your healthcare professional's instructions.

## 2017-05-25 NOTE — SUBJECTIVE & OBJECTIVE
Past Medical History:   Diagnosis Date    Calcium nephrolithiasis 2007    Diabetes mellitus, type 2     Diabetic peripheral neuropathy associated with type 2 diabetes mellitus     Hypertension        Past Surgical History:   Procedure Laterality Date    COLONOSCOPY  11/28/2011    sigmoid diverticulosis, external hemorrhoids    HYSTERECTOMY      SHOULDER SURGERY Left        Review of patient's allergies indicates:   Allergen Reactions    Codeine Nausea Only       Medications:  No prescriptions prior to admission.     Antibiotics     None        Antifungals     None        Antivirals     None           There is no immunization history for the selected administration types on file for this patient.    Family History     Problem Relation (Age of Onset)    Diabetes Mother    Heart failure Father    Kidney failure Brother        Social History     Social History    Marital status:      Spouse name: N/A    Number of children: N/A    Years of education: N/A     Social History Main Topics    Smoking status: Never Smoker    Smokeless tobacco: None    Alcohol use No    Drug use: No    Sexual activity: Not Asked     Other Topics Concern    None     Social History Narrative    None     Review of Systems   Constitutional: Negative.    HENT: Negative.    Eyes: Negative.    Respiratory: Negative.    Cardiovascular: Negative.    Endocrine: Negative.    Genitourinary: Negative.    Musculoskeletal: Negative.    Skin: Positive for color change.   Allergic/Immunologic: Negative.    Neurological: Negative.    Hematological: Negative.    Psychiatric/Behavioral: Negative.      Objective:     Vital Signs (Most Recent):  Temp: 98.3 °F (36.8 °C) (05/24/17 1600)  Pulse: 63 (05/24/17 1639)  Resp: 18 (05/24/17 1600)  BP: (!) 167/72 (05/24/17 1639)  SpO2: 95 % (05/24/17 1622) Vital Signs (24h Range):  Temp:  [97.5 °F (36.4 °C)-98.5 °F (36.9 °C)] 98.3 °F (36.8 °C)  Pulse:  [58-87] 63  Resp:  [16-18] 18  SpO2:  [90 %-96 %]  95 %  BP: (145-191)/(66-79) 167/72     Weight: 85.6 kg (188 lb 12.8 oz)  Body mass index is 34.53 kg/m².    Estimated Creatinine Clearance: 47.8 mL/min (based on Cr of 1).    Physical Exam   Constitutional: She appears well-developed and well-nourished.   Neck: Neck supple.   Cardiovascular: Normal rate and regular rhythm.    Pulmonary/Chest: Effort normal and breath sounds normal.   Abdominal: Soft. Bowel sounds are normal.   Musculoskeletal: Normal range of motion.   Neurological: A sensory deficit is present.   Bilateral numbness in the feet    Skin:   5th toe - see wound care picture - now whole ankle area wrapped - did not take down the dressing        Significant Labs: All pertinent labs within the past 24 hours have been reviewed.    Significant Imaging: I have reviewed all pertinent imaging results/findings within the past 24 hours.

## 2017-05-25 NOTE — CONSULTS
[facility]  Infectious Disease  Consult Note    Patient Name: Caro Powell  MRN: [id number]  Admission Date: 5/22/2017  Hospital Length of Stay: 2 days  Attending Physician: No att. providers found  Primary Care Provider: Manuel Laird MD     Isolation Status: No active isolations    Patient information was obtained from patient and ER records.      Consults  Assessment/Plan:     * Osteomyelitis of toe of right foot    Discussion with podiatry team - they feel that the infected area was totally removed by surgery and culture taken of the removed bone as well as the proximal site that remains.      Agree with plan for 2 weeks of augmentin at this time  ID will follow culture and advise if necessary   Thanks for the consult             Thank you for your consult. I will sign off. Please contact us if you have any additional questions.    Keegan Soto MD  Infectious Disease  [facility]    Subjective:     Principal Problem: Osteomyelitis of toe of right foot    HPI: No notes on file    Past Medical History:   Diagnosis Date    Calcium nephrolithiasis 2007    Diabetes mellitus, type 2     Diabetic peripheral neuropathy associated with type 2 diabetes mellitus     Hypertension        Past Surgical History:   Procedure Laterality Date    COLONOSCOPY  11/28/2011    sigmoid diverticulosis, external hemorrhoids    HYSTERECTOMY      SHOULDER SURGERY Left        Review of patient's allergies indicates:   Allergen Reactions    Codeine Nausea Only       Medications:  No prescriptions prior to admission.     Antibiotics     None        Antifungals     None        Antivirals     None           There is no immunization history for the selected administration types on file for this patient.    Family History     Problem Relation (Age of Onset)    Diabetes Mother    Heart failure Father    Kidney failure Brother        Social History     Social History    Marital  status:      Spouse name: N/A    Number of children: N/A    Years of education: N/A     Social History Main Topics    Smoking status: Never Smoker    Smokeless tobacco: None    Alcohol use No    Drug use: No    Sexual activity: Not Asked     Other Topics Concern    None     Social History Narrative    None     Review of Systems   Constitutional: Negative.    HENT: Negative.    Eyes: Negative.    Respiratory: Negative.    Cardiovascular: Negative.    Endocrine: Negative.    Genitourinary: Negative.    Musculoskeletal: Negative.    Skin: Positive for color change.   Allergic/Immunologic: Negative.    Neurological: Negative.    Hematological: Negative.    Psychiatric/Behavioral: Negative.      Objective:     Vital Signs (Most Recent):  Temp: 98.3 °F (36.8 °C) (05/24/17 1600)  Pulse: 63 (05/24/17 1639)  Resp: 18 (05/24/17 1600)  BP: (!) 167/72 (05/24/17 1639)  SpO2: 95 % (05/24/17 1622) Vital Signs (24h Range):  Temp:  [97.5 °F (36.4 °C)-98.5 °F (36.9 °C)] 98.3 °F (36.8 °C)  Pulse:  [58-87] 63  Resp:  [16-18] 18  SpO2:  [90 %-96 %] 95 %  BP: (145-191)/(66-79) 167/72     Weight: 85.6 kg (188 lb 12.8 oz)  Body mass index is 34.53 kg/m².    Estimated Creatinine Clearance: 47.8 mL/min (based on Cr of 1).    Physical Exam   Constitutional: She appears well-developed and well-nourished.   Neck: Neck supple.   Cardiovascular: Normal rate and regular rhythm.    Pulmonary/Chest: Effort normal and breath sounds normal.   Abdominal: Soft. Bowel sounds are normal.   Musculoskeletal: Normal range of motion.   Neurological: A sensory deficit is present.   Bilateral numbness in the feet    Skin:   5th toe - see wound care picture - now whole ankle area wrapped - did not take down the dressing        Significant Labs: All pertinent labs within the past 24 hours have been reviewed.    Significant Imaging: I have reviewed all pertinent imaging results/findings within the past 24 hours.

## 2017-05-26 LAB — BACTERIA SPEC AEROBE CULT: NO GROWTH

## 2017-05-27 ENCOUNTER — HOSPITAL ENCOUNTER (OUTPATIENT)
Facility: HOSPITAL | Age: 77
Discharge: HOME | End: 2017-05-28
Attending: EMERGENCY MEDICINE | Admitting: INTERNAL MEDICINE
Payer: MEDICARE

## 2017-05-27 DIAGNOSIS — I10 ESSENTIAL HYPERTENSION: ICD-10-CM

## 2017-05-27 DIAGNOSIS — R07.9 CHEST PAIN: ICD-10-CM

## 2017-05-27 DIAGNOSIS — R07.9 CHEST PAIN, UNSPECIFIED TYPE: Primary | ICD-10-CM

## 2017-05-27 PROBLEM — D72.829 LEUKOCYTOSIS: Status: RESOLVED | Noted: 2017-05-22 | Resolved: 2017-05-27

## 2017-05-27 PROBLEM — M86.9 OSTEOMYELITIS OF TOE OF RIGHT FOOT: Status: RESOLVED | Noted: 2017-05-22 | Resolved: 2017-05-27

## 2017-05-27 LAB
ALBUMIN SERPL BCP-MCNC: 3 G/DL
ALP SERPL-CCNC: 60 U/L
ALT SERPL W/O P-5'-P-CCNC: 21 U/L
ANION GAP SERPL CALC-SCNC: 12 MMOL/L
AST SERPL-CCNC: 19 U/L
BACTERIA SPEC AEROBE CULT: NO GROWTH
BASOPHILS # BLD AUTO: 0.03 K/UL
BASOPHILS NFR BLD: 0.2 %
BILIRUB SERPL-MCNC: 0.5 MG/DL
BNP SERPL-MCNC: 91 PG/ML
BUN SERPL-MCNC: 13 MG/DL
CALCIUM SERPL-MCNC: 8.8 MG/DL
CHLORIDE SERPL-SCNC: 101 MMOL/L
CO2 SERPL-SCNC: 24 MMOL/L
CREAT SERPL-MCNC: 1 MG/DL
DIFFERENTIAL METHOD: ABNORMAL
EOSINOPHIL # BLD AUTO: 0.1 K/UL
EOSINOPHIL NFR BLD: 1.1 %
ERYTHROCYTE [DISTWIDTH] IN BLOOD BY AUTOMATED COUNT: 14.1 %
EST. GFR  (AFRICAN AMERICAN): >60 ML/MIN/1.73 M^2
EST. GFR  (NON AFRICAN AMERICAN): 54 ML/MIN/1.73 M^2
GLUCOSE SERPL-MCNC: 159 MG/DL
HCT VFR BLD AUTO: 36.1 %
HGB BLD-MCNC: 12.3 G/DL
INR PPP: 1.1
LYMPHOCYTES # BLD AUTO: 1.3 K/UL
LYMPHOCYTES NFR BLD: 10.5 %
MAGNESIUM SERPL-MCNC: 1.6 MG/DL
MCH RBC QN AUTO: 29.3 PG
MCHC RBC AUTO-ENTMCNC: 34.1 %
MCV RBC AUTO: 86 FL
MONOCYTES # BLD AUTO: 0.7 K/UL
MONOCYTES NFR BLD: 5.9 %
NEUTROPHILS # BLD AUTO: 10 K/UL
NEUTROPHILS NFR BLD: 81.8 %
PHOSPHATE SERPL-MCNC: 2.9 MG/DL
PLATELET # BLD AUTO: 308 K/UL
PMV BLD AUTO: 9.3 FL
POCT GLUCOSE: 169 MG/DL (ref 70–110)
POCT GLUCOSE: 175 MG/DL (ref 70–110)
POTASSIUM SERPL-SCNC: 4.4 MMOL/L
PROT SERPL-MCNC: 6.5 G/DL
PROTHROMBIN TIME: 11.8 SEC
RBC # BLD AUTO: 4.2 M/UL
SODIUM SERPL-SCNC: 137 MMOL/L
TROPONIN I SERPL DL<=0.01 NG/ML-MCNC: 0.01 NG/ML
TROPONIN I SERPL DL<=0.01 NG/ML-MCNC: 0.01 NG/ML
TSH SERPL DL<=0.005 MIU/L-ACNC: 0.41 UIU/ML
WBC # BLD AUTO: 12.22 K/UL

## 2017-05-27 PROCEDURE — G0378 HOSPITAL OBSERVATION PER HR: HCPCS

## 2017-05-27 PROCEDURE — 84484 ASSAY OF TROPONIN QUANT: CPT | Mod: 91

## 2017-05-27 PROCEDURE — 83735 ASSAY OF MAGNESIUM: CPT

## 2017-05-27 PROCEDURE — 84443 ASSAY THYROID STIM HORMONE: CPT

## 2017-05-27 PROCEDURE — 63600175 PHARM REV CODE 636 W HCPCS: Performed by: EMERGENCY MEDICINE

## 2017-05-27 PROCEDURE — 93005 ELECTROCARDIOGRAM TRACING: CPT

## 2017-05-27 PROCEDURE — 82962 GLUCOSE BLOOD TEST: CPT

## 2017-05-27 PROCEDURE — 84100 ASSAY OF PHOSPHORUS: CPT

## 2017-05-27 PROCEDURE — 80053 COMPREHEN METABOLIC PANEL: CPT

## 2017-05-27 PROCEDURE — 96374 THER/PROPH/DIAG INJ IV PUSH: CPT

## 2017-05-27 PROCEDURE — 85610 PROTHROMBIN TIME: CPT

## 2017-05-27 PROCEDURE — 83880 ASSAY OF NATRIURETIC PEPTIDE: CPT

## 2017-05-27 PROCEDURE — 25000003 PHARM REV CODE 250: Performed by: EMERGENCY MEDICINE

## 2017-05-27 PROCEDURE — 85025 COMPLETE CBC W/AUTO DIFF WBC: CPT

## 2017-05-27 PROCEDURE — 99285 EMERGENCY DEPT VISIT HI MDM: CPT | Mod: 25

## 2017-05-27 PROCEDURE — 25000003 PHARM REV CODE 250: Performed by: STUDENT IN AN ORGANIZED HEALTH CARE EDUCATION/TRAINING PROGRAM

## 2017-05-27 PROCEDURE — 84484 ASSAY OF TROPONIN QUANT: CPT

## 2017-05-27 RX ORDER — IBUPROFEN 200 MG
24 TABLET ORAL
Status: DISCONTINUED | OUTPATIENT
Start: 2017-05-27 | End: 2017-05-28 | Stop reason: HOSPADM

## 2017-05-27 RX ORDER — HEPARIN SODIUM 5000 [USP'U]/ML
5000 INJECTION, SOLUTION INTRAVENOUS; SUBCUTANEOUS EVERY 12 HOURS
Status: DISCONTINUED | OUTPATIENT
Start: 2017-05-27 | End: 2017-05-28 | Stop reason: HOSPADM

## 2017-05-27 RX ORDER — AMOXICILLIN AND CLAVULANATE POTASSIUM 875; 125 MG/1; MG/1
1 TABLET, FILM COATED ORAL 2 TIMES DAILY
Status: DISCONTINUED | OUTPATIENT
Start: 2017-05-27 | End: 2017-05-28 | Stop reason: HOSPADM

## 2017-05-27 RX ORDER — LISINOPRIL 5 MG/1
5 TABLET ORAL DAILY
Status: DISCONTINUED | OUTPATIENT
Start: 2017-05-28 | End: 2017-05-28 | Stop reason: HOSPADM

## 2017-05-27 RX ORDER — MORPHINE SULFATE 2 MG/ML
6 INJECTION, SOLUTION INTRAMUSCULAR; INTRAVENOUS
Status: COMPLETED | OUTPATIENT
Start: 2017-05-27 | End: 2017-05-27

## 2017-05-27 RX ORDER — ONDANSETRON 8 MG/1
8 TABLET, ORALLY DISINTEGRATING ORAL EVERY 8 HOURS PRN
Status: DISCONTINUED | OUTPATIENT
Start: 2017-05-27 | End: 2017-05-28 | Stop reason: HOSPADM

## 2017-05-27 RX ORDER — GABAPENTIN 400 MG/1
400 CAPSULE ORAL 3 TIMES DAILY
Status: DISCONTINUED | OUTPATIENT
Start: 2017-05-27 | End: 2017-05-28 | Stop reason: HOSPADM

## 2017-05-27 RX ORDER — INSULIN ASPART 100 [IU]/ML
0-5 INJECTION, SOLUTION INTRAVENOUS; SUBCUTANEOUS
Status: DISCONTINUED | OUTPATIENT
Start: 2017-05-27 | End: 2017-05-28 | Stop reason: HOSPADM

## 2017-05-27 RX ORDER — ASPIRIN 325 MG
325 TABLET ORAL
Status: COMPLETED | OUTPATIENT
Start: 2017-05-27 | End: 2017-05-27

## 2017-05-27 RX ORDER — GLIMEPIRIDE 1 MG/1
1 TABLET ORAL
Status: DISCONTINUED | OUTPATIENT
Start: 2017-05-27 | End: 2017-05-27

## 2017-05-27 RX ORDER — IBUPROFEN 200 MG
16 TABLET ORAL
Status: DISCONTINUED | OUTPATIENT
Start: 2017-05-27 | End: 2017-05-28 | Stop reason: HOSPADM

## 2017-05-27 RX ORDER — GLUCAGON 1 MG
1 KIT INJECTION
Status: DISCONTINUED | OUTPATIENT
Start: 2017-05-27 | End: 2017-05-28 | Stop reason: HOSPADM

## 2017-05-27 RX ORDER — PANTOPRAZOLE SODIUM 40 MG/1
40 TABLET, DELAYED RELEASE ORAL DAILY
Status: DISCONTINUED | OUTPATIENT
Start: 2017-05-28 | End: 2017-05-28 | Stop reason: HOSPADM

## 2017-05-27 RX ORDER — LISINOPRIL 5 MG/1
5 TABLET ORAL
Status: COMPLETED | OUTPATIENT
Start: 2017-05-27 | End: 2017-05-27

## 2017-05-27 RX ADMIN — ONDANSETRON 8 MG: 8 TABLET, ORALLY DISINTEGRATING ORAL at 09:05

## 2017-05-27 RX ADMIN — ASPIRIN 325 MG ORAL TABLET 325 MG: 325 PILL ORAL at 11:05

## 2017-05-27 RX ADMIN — MORPHINE SULFATE 6 MG: 2 INJECTION, SOLUTION INTRAMUSCULAR; INTRAVENOUS at 12:05

## 2017-05-27 RX ADMIN — GABAPENTIN 400 MG: 400 CAPSULE ORAL at 09:05

## 2017-05-27 RX ADMIN — HEPARIN SODIUM 5000 UNITS: 5000 INJECTION, SOLUTION INTRAVENOUS; SUBCUTANEOUS at 09:05

## 2017-05-27 RX ADMIN — LISINOPRIL 5 MG: 5 TABLET ORAL at 03:05

## 2017-05-27 RX ADMIN — AMOXICILLIN AND CLAVULANATE POTASSIUM 1 TABLET: 875; 125 TABLET, FILM COATED ORAL at 09:05

## 2017-05-27 NOTE — ED PROVIDER NOTES
Encounter Date: 5/27/2017       History     Chief Complaint   Patient presents with    Chest Pain     midsternal since 0800 accompanied with nausea, dizziness, and headache. radiates to back. recent surgery on Tuesday to amputated R 5th toe.     Review of patient's allergies indicates:   Allergen Reactions    Codeine Nausea Only     76-year-old female with hypertension and diabetes presents with chest pain.  Pain woke her from sleep at 8:00.  Is an intermittent sharp pain in the center of her chest that radiates to her back with associated shortness of breath.  Shortness of breath improved with supplemental oxygen.  Pain is rated 10 out of 10 at this time.  She has never had this pain before.  She reports no modifying factors.          Past Medical History:   Diagnosis Date    Calcium nephrolithiasis 2007    Diabetes mellitus, type 2     Diabetic peripheral neuropathy associated with type 2 diabetes mellitus     Hypertension      Past Surgical History:   Procedure Laterality Date    COLONOSCOPY  11/28/2011    sigmoid diverticulosis, external hemorrhoids    HYSTERECTOMY      SHOULDER SURGERY Left      Family History   Problem Relation Age of Onset    Diabetes Mother     Heart failure Father     Kidney failure Brother      Social History   Substance Use Topics    Smoking status: Never Smoker    Smokeless tobacco: Not on file    Alcohol use No     Review of Systems   Respiratory: Positive for shortness of breath.    Cardiovascular: Positive for chest pain.   All other systems reviewed and are negative.      Physical Exam     Initial Vitals [05/27/17 1122]   BP Pulse Resp Temp SpO2   (!) 197/85 80 (!) 23 98.7 °F (37.1 °C) (!) 92 %     Physical Exam    Nursing note and vitals reviewed.  Constitutional: She appears well-developed and well-nourished. She appears distressed.   HENT:   Head: Normocephalic and atraumatic.   Eyes: Conjunctivae are normal.   Neck: Normal range of motion.   Cardiovascular: Normal  rate, regular rhythm and normal heart sounds.   No murmur heard.  Pulmonary/Chest: Breath sounds normal. She has no wheezes. She has no rhonchi. She has no rales.   Abdominal: Bowel sounds are normal. She exhibits no distension.   Musculoskeletal: Normal range of motion.   Neurological: She is alert and oriented to person, place, and time.   Skin: Skin is warm and dry.   Psychiatric: She has a normal mood and affect. Her behavior is normal.         ED Course   Procedures  Labs Reviewed   CBC W/ AUTO DIFFERENTIAL - Abnormal; Notable for the following:        Result Value    Hematocrit 36.1 (*)     Gran # 10.0 (*)     Gran% 81.8 (*)     Lymph% 10.5 (*)     All other components within normal limits   COMPREHENSIVE METABOLIC PANEL - Abnormal; Notable for the following:     Glucose 159 (*)     Albumin 3.0 (*)     eGFR if non  54 (*)     All other components within normal limits   POCT GLUCOSE - Abnormal; Notable for the following:     POCT Glucose 175 (*)     All other components within normal limits   TROPONIN I   B-TYPE NATRIURETIC PEPTIDE   PROTIME-INR     EKG Readings: (Independently Interpreted)   Initial Reading: No STEMI. Rhythm: Sinus Arrhythmia. Heart Rate: 80. Ectopy: No Ectopy. Conduction: Normal. ST Segments: Normal ST Segments. Q Waves: V1, V2 and V3. Clinical Impression: Sinus Arrhythmia          Medical Decision Making:   Independently Interpreted Test(s):   I have ordered and independently interpreted EKG Reading(s) - see prior notes  Clinical Tests:   Lab Tests: Ordered and Reviewed  Radiological Study: Ordered and Reviewed  Medical Tests: Ordered and Reviewed  ED Management:  Intermittent CP since 0800 - 10/10 CP on arrival.  No STEMI on EKG.  CP relieved with morphine.  Normal troponin and no acute changes on EKG.  No signs of AMI at this time; no other etiology identified.  I will admit for chest pain rule out.  Other:   I have discussed this case with another health care provider.                    ED Course     Clinical Impression:   The primary encounter diagnosis was Chest pain, unspecified type. A diagnosis of Essential hypertension was also pertinent to this visit.          Alberta Goldberg MD  05/27/17 4630

## 2017-05-27 NOTE — H&P
[facility] Internal Medicine History and Physical - Resident Note    Admitting Team: IM Team B  Attending Physician: Shay  Resident: Dr. Edwards  Interns: Dr. Mukherjee    Date of Admit: 5/27/2017    Chief Complaint     Chest Pain x9 hours    Subjective:      History of Present Illness:  Caro Powell is a 76 y.o. female who  has a past medical history of Calcium nephrolithiasis (2007); Diabetes mellitus, type 2; Diabetic peripheral neuropathy associated with type 2 diabetes mellitus; and Hypertension.. The patient presented to [facility] on 5/27/2017 with a primary complaint of Chest Pain today.      The patient was in their usual state of health until around 7am on the morning of admit when she had sudden onset substernal/epigastric squeezing pain that radiated to her back. Patient states she was sitting when this happened and laying on her left side and belching make it better. Pain was constant and 5 hours later she decided to present to the ED. Pain only improved with morphine but never completely went away. She had never had this pain before but stated it felt a little like her GERD discomfort. Some associated SOB and nausea but no vomiting or diaphoresis.    Past Medical History:  Past Medical History:   Diagnosis Date    Calcium nephrolithiasis 2007    Diabetes mellitus, type 2     Diabetic peripheral neuropathy associated with type 2 diabetes mellitus     Hypertension        Past Surgical History:  Past Surgical History:   Procedure Laterality Date    COLONOSCOPY  11/28/2011    sigmoid diverticulosis, external hemorrhoids    HYSTERECTOMY      SHOULDER SURGERY Left        Allergies:  Review of patient's allergies indicates:   Allergen Reactions    Codeine Nausea Only       Home Medications:  Prior to Admission medications    Medication Sig Start Date End Date Taking? Authorizing Provider   ARIA Tierney  10/2/14   Historical Provider, MD KNAPP  SAMI PLUS METER Misc  10/10/14   Historical Provider, MD   ACCU-CHEK SAMI PLUS TEST STRP Strp  14   Historical Provider, MD KNAPP SOFT DEV LANCETS Kit  10/2/14   Historical Provider, MD   ACCURAMONA SOFTCLIX LANCETS Misc  10/10/14   Historical Provider, MD   ammonium lactate 12 % Crea Apply to feet twice daily. 1/28/15   Derek Jose, DPM   amoxicillin-clavulanate 875-125mg (AUGMENTIN) 875-125 mg per tablet Take 1 tablet by mouth 2 (two) times daily. 17  Eliud Bernabe, DPMEGAN   diazepam (VALIUM) 5 MG tablet Take 1 tablet (5 mg total) by mouth every 6 (six) hours as needed for Anxiety. 14  Destiny Villareal,    gabapentin (NEURONTIN) 400 MG capsule  3/12/14   Historical Provider, MD   glimepiride (AMARYL) 1 MG tablet  14   Historical Provider, MD   hydrocodone-acetaminophen 5-325mg (NORCO) 5-325 mg per tablet Take 1 tablet by mouth every 12 (twelve) hours as needed. 16   Historical Provider, MD   lisinopril (PRINIVIL,ZESTRIL) 5 MG tablet  3/12/14   Historical Provider, MD   meloxicam (MOBIC) 15 MG tablet Take 15 mg by mouth once daily. 17   Historical Provider, MD   omeprazole (PRILOSEC) 20 MG capsule  3/6/17   Historical Provider, MD   pramipexole (MIRAPEX) 0.125 MG tablet  16   Historical Provider, MD       Family History:  Family History   Problem Relation Age of Onset    Diabetes Mother     Heart failure Father     Kidney failure Brother        Social History:  Social History   Substance Use Topics    Smoking status: Never Smoker    Smokeless tobacco: Not on file    Alcohol use No       Review of Systems:  Pertinent items are noted in HPI. All other systems are reviewed and are negative.    Health Maintaince :   Primary Care Physician: Dr. Laird     Objective:   Last 24 Hour Vital Signs:  BP  Min: 144/68  Max: 197/85  Temp  Av.7 °F (37.1 °C)  Min: 98.7 °F (37.1 °C)  Max: 98.7 °F (37.1 °C)  Pulse  Av  Min: 74  Max: 82  Resp   "Av.9  Min: 16  Max: 23  SpO2  Av.7 %  Min: 92 %  Max: 96 %  Height  Av' 2" (157.5 cm)  Min: 5' 2" (157.5 cm)  Max: 5' 2" (157.5 cm)  Weight  Av.6 kg (180 lb)  Min: 81.6 kg (180 lb)  Max: 81.6 kg (180 lb)  Body mass index is 32.92 kg/m².  No intake/output data recorded.    Physical Examination:  General Appearance:    Alert, in bed in no apparent distress   Eyes:    PERRL, conjunctiva/corneas clear, EOM's intact   Neck:   No cervical lymphadenopathy   Lungs:     Mild expiratory wheezes R>L    Heart:    Regular rate and rhythm, no murmurs appreciated   Abdomen:     Soft, non-tender, bowel sounds normal   Extremities:   No edema present, moves all; right foot with football dressing c/d/i, left 1st toe with skin breakdown on medial surface   Pulses:   2+ and symmetric all extremities including pedal pulses   Skin:   No rashes or lesions appreciated   Neurologic:   AAOx4           Laboratory:  Most Recent Data:  CBC: Lab Results   Component Value Date    WBC 12.22 2017    HGB 12.3 2017    HCT 36.1 (L) 2017     2017    MCV 86 2017    RDW 14.1 2017     BMP: Lab Results   Component Value Date     2017    K 4.4 2017     2017    CO2 24 2017    BUN 13 2017    CREATININE 1.0 2017     (H) 2017    CALCIUM 8.8 2017    MG 1.9 2017     LFTs: Lab Results   Component Value Date    PROT 6.5 2017    ALBUMIN 3.0 (L) 2017    BILITOT 0.5 2017    AST 19 2017    ALKPHOS 60 2017    ALT 21 2017     Coags:   Lab Results   Component Value Date    INR 1.1 2017       DM: Lab Results   Component Value Date    HGBA1C 7.5 (H) 2017    CREATININE 1.0 2017     Cardiac: Lab Results   Component Value Date    TROPONINI 0.010 2017    BNP 91 2017     Urinalysis: Lab Results   Component Value Date    COLORU Yellow 2017    SPECGRAV <=1.005 (A) 2017    " NITRITE Negative 05/22/2017    KETONESU Negative 05/22/2017    UROBILINOGEN Negative 05/22/2017       Other Results:  EKG (my interpretation): NSR no t-wave inversions or depressions    Radiology:  Imaging Results          X-Ray Chest 1 View (Final result)  Result time 05/27/17 12:21:39    Final result by Shayy Lozada MD (05/27/17 12:21:39)                 Impression:     No significant interval change. Continued mixed interstitial/alveolar pattern.      Electronically signed by: Dr. SHAYY LOZADA MD  Date:     05/27/17  Time:    12:21              Narrative:    Comparison: 05/22/2017    Results: Single view. Somewhat lordotic positioning. Cardiomediastinal silhouette is unchanged and pulmonary vascular pattern is stable. Allowing for difference in degree of inspiration, lungs appear stable. Continued mixed interstitial/alveolar pattern. No pneumothorax or other detrimental change.                                 Assessment:     Caro Powell is a 76 y.o. female with:  Patient Active Problem List    Diagnosis Date Noted    Osteomyelitis of toe of right foot 05/22/2017    Essential hypertension 05/22/2017    Type 2 diabetes mellitus with diabetic neuropathy, without long-term current use of insulin 05/22/2017    Pre-operative clearance 05/22/2017    Leukocytosis 05/22/2017        Plan:     Chest pain, ACS rule out  - Chest pain x 7 hours  - Troponin negative in ED  - Initial EKG without evidence of ischemia  - S/p ASA in ED  - Continue home ASA  - Trend troponin/EKGs q6h  - Will consider stress IP vs. Outpatient    Type 2 Diabetes Mellitus  - Previous A1c from 7.5 in 5/2017  - Glimepiride at home  - Repeat A1c pending  - SSI w/ Accuchecks    Essential Hypertension  - /77 in ED  - On Lisinopril at home; will Continue  - Monitor    Right 5th toe ulceration/osteo s/p amputation   - Admitted 5/22 for right 5th toe amputation  - Currently dressed in football dressing, c/d/i  - Has follow up 6/2 with  Podiatry      Diet: NPO  PPX: Heparin    Dispo: Pending cardiac enzyme trend      Code Status:     Full    Adan Mukherjee  [facility] Internal Medicine HO-1  U IM Service    [facility] Medicine Hospitalist Pager numbers:   [facility] Hospitalist Medicine Team A (Gerard/Pancho): [phone]  [facility] Hospitalist Medicine Team B (Kylee/Nima):  [phone]

## 2017-05-28 VITALS
BODY MASS INDEX: 33.18 KG/M2 | TEMPERATURE: 98 F | HEIGHT: 62 IN | WEIGHT: 180.31 LBS | OXYGEN SATURATION: 94 % | HEART RATE: 69 BPM | SYSTOLIC BLOOD PRESSURE: 105 MMHG | RESPIRATION RATE: 20 BRPM | DIASTOLIC BLOOD PRESSURE: 55 MMHG

## 2017-05-28 LAB
ALBUMIN SERPL BCP-MCNC: 2.6 G/DL
ALP SERPL-CCNC: 55 U/L
ALT SERPL W/O P-5'-P-CCNC: 16 U/L
ANION GAP SERPL CALC-SCNC: 13 MMOL/L
AST SERPL-CCNC: 12 U/L
BASOPHILS # BLD AUTO: 0.03 K/UL
BASOPHILS NFR BLD: 0.2 %
BILIRUB SERPL-MCNC: 0.5 MG/DL
BUN SERPL-MCNC: 16 MG/DL
CALCIUM SERPL-MCNC: 9.3 MG/DL
CHLORIDE SERPL-SCNC: 98 MMOL/L
CHOLEST/HDLC SERPL: 3.1 {RATIO}
CO2 SERPL-SCNC: 23 MMOL/L
CREAT SERPL-MCNC: 1.1 MG/DL
DIFFERENTIAL METHOD: ABNORMAL
EOSINOPHIL # BLD AUTO: 0.1 K/UL
EOSINOPHIL NFR BLD: 0.7 %
ERYTHROCYTE [DISTWIDTH] IN BLOOD BY AUTOMATED COUNT: 14.1 %
EST. GFR  (AFRICAN AMERICAN): 56 ML/MIN/1.73 M^2
EST. GFR  (NON AFRICAN AMERICAN): 49 ML/MIN/1.73 M^2
GLUCOSE SERPL-MCNC: 139 MG/DL
HCT VFR BLD AUTO: 37.2 %
HDL/CHOLESTEROL RATIO: 31.8 %
HDLC SERPL-MCNC: 151 MG/DL
HDLC SERPL-MCNC: 48 MG/DL
HGB BLD-MCNC: 12.5 G/DL
LDLC SERPL CALC-MCNC: 80.2 MG/DL
LYMPHOCYTES # BLD AUTO: 2.4 K/UL
LYMPHOCYTES NFR BLD: 19.7 %
MCH RBC QN AUTO: 28.8 PG
MCHC RBC AUTO-ENTMCNC: 33.6 %
MCV RBC AUTO: 86 FL
MONOCYTES # BLD AUTO: 0.9 K/UL
MONOCYTES NFR BLD: 6.9 %
NEUTROPHILS # BLD AUTO: 8.9 K/UL
NEUTROPHILS NFR BLD: 72 %
NONHDLC SERPL-MCNC: 103 MG/DL
PLATELET # BLD AUTO: 310 K/UL
PMV BLD AUTO: 9 FL
POCT GLUCOSE: 159 MG/DL (ref 70–110)
POCT GLUCOSE: 253 MG/DL (ref 70–110)
POTASSIUM SERPL-SCNC: 4.1 MMOL/L
PROT SERPL-MCNC: 6.8 G/DL
RBC # BLD AUTO: 4.34 M/UL
SODIUM SERPL-SCNC: 134 MMOL/L
TRIGL SERPL-MCNC: 114 MG/DL
TROPONIN I SERPL DL<=0.01 NG/ML-MCNC: 0.01 NG/ML
WBC # BLD AUTO: 12.3 K/UL

## 2017-05-28 PROCEDURE — G8978 MOBILITY CURRENT STATUS: HCPCS | Mod: CJ

## 2017-05-28 PROCEDURE — 94761 N-INVAS EAR/PLS OXIMETRY MLT: CPT

## 2017-05-28 PROCEDURE — 80053 COMPREHEN METABOLIC PANEL: CPT

## 2017-05-28 PROCEDURE — 63600175 PHARM REV CODE 636 W HCPCS: Performed by: STUDENT IN AN ORGANIZED HEALTH CARE EDUCATION/TRAINING PROGRAM

## 2017-05-28 PROCEDURE — G8980 MOBILITY D/C STATUS: HCPCS | Mod: CJ

## 2017-05-28 PROCEDURE — G0378 HOSPITAL OBSERVATION PER HR: HCPCS

## 2017-05-28 PROCEDURE — G8979 MOBILITY GOAL STATUS: HCPCS | Mod: CJ

## 2017-05-28 PROCEDURE — 93005 ELECTROCARDIOGRAM TRACING: CPT

## 2017-05-28 PROCEDURE — 84484 ASSAY OF TROPONIN QUANT: CPT

## 2017-05-28 PROCEDURE — 25000003 PHARM REV CODE 250: Performed by: STUDENT IN AN ORGANIZED HEALTH CARE EDUCATION/TRAINING PROGRAM

## 2017-05-28 PROCEDURE — 36415 COLL VENOUS BLD VENIPUNCTURE: CPT

## 2017-05-28 PROCEDURE — 97161 PT EVAL LOW COMPLEX 20 MIN: CPT

## 2017-05-28 PROCEDURE — 80061 LIPID PANEL: CPT

## 2017-05-28 PROCEDURE — 85025 COMPLETE CBC W/AUTO DIFF WBC: CPT

## 2017-05-28 RX ORDER — OMEPRAZOLE 20 MG/1
20 CAPSULE, DELAYED RELEASE ORAL DAILY
Qty: 30 CAPSULE | Refills: 3 | Status: SHIPPED | OUTPATIENT
Start: 2017-05-28 | End: 2019-04-24

## 2017-05-28 RX ADMIN — INSULIN ASPART 3 UNITS: 100 INJECTION, SOLUTION INTRAVENOUS; SUBCUTANEOUS at 12:05

## 2017-05-28 RX ADMIN — HEPARIN SODIUM 5000 UNITS: 5000 INJECTION, SOLUTION INTRAVENOUS; SUBCUTANEOUS at 09:05

## 2017-05-28 RX ADMIN — GABAPENTIN 400 MG: 400 CAPSULE ORAL at 05:05

## 2017-05-28 RX ADMIN — LISINOPRIL 5 MG: 5 TABLET ORAL at 09:05

## 2017-05-28 RX ADMIN — AMOXICILLIN AND CLAVULANATE POTASSIUM 1 TABLET: 875; 125 TABLET, FILM COATED ORAL at 09:05

## 2017-05-28 RX ADMIN — PANTOPRAZOLE SODIUM 40 MG: 40 TABLET, DELAYED RELEASE ORAL at 09:05

## 2017-05-28 NOTE — PT/OT/SLP PROGRESS
Occupational Therapy      Caromacey Powell  MRN: [id number]    Patient not seen today secondary to refusal as of 1231.  Pt. Expressed going home soon & wanting to eat food.  Will follow-up later.    Lorena Elias, OT  5/28/2017

## 2017-05-28 NOTE — NURSING
IV site removed. No active bleeding noted. Tele monitor removed for discharge home. Discharge instructions, rxs and educational printouts given to pt and discussed thoroughly with patient and . Both verbalized clear understanding of all discussed. At present no distress noted. Patient d/c'd via w/c with escort service and  with all of patient's belongings.

## 2017-05-28 NOTE — PROGRESS NOTES
"[facility] IM Resident HO-1 Progress Note    Subjective:      Patient states that chest pain resolved right after she was seen in the ED by primary team and has not returned. She feels very well and has no complaints this morning. No events on tele.     Objective:   Last 24 Hour Vital Signs:  BP  Min: 127/59  Max: 197/85  Temp  Av.7 °F (37.1 °C)  Min: 98 °F (36.7 °C)  Max: 99.3 °F (37.4 °C)  Pulse  Av.6  Min: 60  Max: 87  Resp  Av.8  Min: 16  Max: 24  SpO2  Av.8 %  Min: 90 %  Max: 97 %  Height  Av' 2" (157.5 cm)  Min: 5' 2" (157.5 cm)  Max: 5' 2" (157.5 cm)  Weight  Av.5 kg (179 lb 10.2 oz)  Min: 81 kg (178 lb 9.2 oz)  Max: 81.8 kg (180 lb 5.4 oz)  I/O last 3 completed shifts:  In: 120 [P.O.:120]  Out: 200 [Urine:200]    Physical Examination:  General Appearance:    Alert, in bed in no apparent distress   Eyes:    PERRL, conjunctiva/corneas clear, EOM's intact   Neck:   No cervical lymphadenopathy   Lungs:     Mild expiratory wheezes R>L    Heart:    Regular rate and rhythm, no murmurs appreciated   Abdomen:     Soft, non-tender, bowel sounds normal   Extremities:   No edema present, moves all; right foot with football dressing c/d/i, left 1st toe with skin breakdown on medial surface   Pulses:   2+ and symmetric all extremities including pedal pulses   Skin:   No rashes or lesions appreciated   Neurologic:   AAOx4           Other Results:  EKG (my interpretation): Unchanged from prior      Current Medications:     Infusions:        Scheduled:   amoxicillin-clavulanate 875-125mg  1 tablet Oral BID    gabapentin  400 mg Oral TID    heparin (porcine)  5,000 Units Subcutaneous Q12H    lisinopril  5 mg Oral Daily    pantoprazole  40 mg Oral Daily        PRN:  dextrose 50%, dextrose 50%, glucagon (human recombinant), glucose, glucose, insulin aspart, ondansetron      Assessment:     Caro Powell is a 76 y.o.female with  Patient Active Problem List    Diagnosis Date Noted    Chest pain " 05/27/2017    Essential hypertension 05/22/2017    Type 2 diabetes mellitus with diabetic neuropathy, without long-term current use of insulin 05/22/2017    Pre-operative clearance 05/22/2017        Plan:     Chest pain, ACS rule out  - Chest pain x 7 hours  - Troponin negative in ED  - Initial EKG without evidence of ischemia  - S/p ASA in ED  - Continue home ASA  - Trend troponin/EKGs q6h; trending negative, 1 more ordered  - Likely stress inpatient tomorrow     Type 2 Diabetes Mellitus  - Previous A1c from 7.5 in 5/2017  - Glimepiride at home  - No need to repeat  - SSI w/ Accuchecks     Essential Hypertension  - /77 in ED  - On Lisinopril at home; will Continue  - Monitor     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

## 2017-05-28 NOTE — PLAN OF CARE
Problem: Physical Therapy Goal  Goal: Physical Therapy Goal  Outcome: Outcome(s) achieved Date Met: 05/28/17  PT evaluation was completed. Pt has restrictions to limit WB of R LE and WB time to up to 10 minutes since recent 5th digit amputation of R foot.  Pt is MOD (I) with supine<>sit/rolling,  performs sit<>stand and commode transfers with SBA  and gait without AD up to 10 and 20 ft with SBA.  Pt is scheduled follow up on 6/2/17 and will be more appropriate for PT services if appropraite once cleared to increase activity level.

## 2017-05-28 NOTE — PT/OT/SLP EVAL
Physical Therapy  Evaluation and Discharge Summary    Caro Powell   MRN: [id number]   Admitting Diagnosis: Chest pain    PT Received On: 05/28/17  PT Start Time: 1020     PT Stop Time: 1049    PT Total Time (min): 29 min       Billable Minutes:  Evaluation 29    Diagnosis: Chest pain      Past Medical History:   Diagnosis Date    Calcium nephrolithiasis 2007    Diabetes mellitus, type 2     Diabetic peripheral neuropathy associated with type 2 diabetes mellitus     Hypertension       Past Surgical History:   Procedure Laterality Date    COLONOSCOPY  11/28/2011    sigmoid diverticulosis, external hemorrhoids    HYSTERECTOMY      SHOULDER SURGERY Left     TOE AMPUTATION Right 05/22/2017    5th toe       Referring physician: Dr. Pichardo  Date referred to PT: 5/27/17    General Precautions: Standard, fall  Orthopedic Precautions:  (Pt reports only allowed to WB minimally with R LE up to 10 minutes and to elevate R LE at all times per Dr. Grant until follow-up on 6/2/17)   Braces:         Do you have any cultural, spiritual, Confucianist conflicts, given your current situation?: none    Patient History:  Lives With: spouse  Living Arrangements: apartment  Home Accessibility:  (No concerns)  Transportation Available: family or friend will provide  Living Environment Comment: Pt lives with  in 1st floor apartment without steps to enter. Pt reports being (I) with ADLS/mobility skills without DME prior to 5th digit amputation of R foot. Pt has a tub/shower comnbo and uses a shower chair. Pt reports only able to WB through R LE minimally for a short period (10 minutes) until folow up on 6/2/17 per Dr. Grant.  Equipment Currently Used at Home: none  DME owned (not currently used): none    Previous Level of Function:  Ambulation Skills: independent (prior amputation of 5th digit of R foot on 5/23/17.)  Transfer Skills: independent  ADL Skills: independent  Work/Leisure Activity:  independent    Subjective:  Communicated with RN prior to session.    Chief Complaint: no particular c/o  Patient goals: to return home    Pain/Comfort  Pain Rating 1: 0/10  Pain Rating Post-Intervention 1: 0/10      Objective:   Patient found with: telemetry, oxygen     Cognitive Exam:  Oriented to: Person, Place, Time and Situation    Follows Commands/attention: Follows multistep  commands  Communication: clear/fluent  Safety awareness/insight to disability: intact    Physical Exam:  Postural examination/scapula alignment: No postural abnormalities identified    Skin integrity: Visible skin intact except football dressing of R foot  Edema: Moderate R foot    Sensation:   Intact except distal R LE not tested    Lower Extremity Range of Motion:  Right Lower Extremity: WFL   Left Lower Extremity: WFL    Lower Extremity Strength:  Right Lower Extremity: 4/5  Hip/knee mm  Left Lower Extremity: 5/5     Fine motor coordination:  Intact    Gross motor coordination: WFL    Functional Mobility:  Bed Mobility:  Rolling/Turning to Left: Modified independent  Rolling/Turning Right: Modified independent  Scooting/Bridging: Modified Independent  Supine to Sit: Modified Independent  Sit to Supine: Modified Independent    Transfers:  Sit <> Stand Assistance: Stand By Assistance  Sit <> Stand Assistive Device: No Assistive Device  Bed <> Chair Technique: Stand Pivot  Bed <> Chair Assistance: Stand By Assistance  Bed <> Chair Assistive Device: No Assistive Device  Toilet Transfer Technique: Stand Pivot  Toilet Transfer Assistance: Stand By Assistance  Toilet Transfer Assistive Device: No Assistive Device    Gait:   Gait Distance: 10 and 20 ft  Assistance 1: Stand by Assistance  Gait Assistive Device: No device  Gait Pattern: reciprocal  Gait Deviation(s): decreased court, decreased step length, decreased weight-shifting ability, lateral lean    Stairs:  Not tested    Balance:   Static Sit: GOOD+: Takes MAXIMAL challenges from all  directions.    Dynamic Sit: GOOD+: Maintains balance through MAXIMAL excursions of active trunk motion  Static Stand: GOOD: Takes MODERATE challenges from all directions  Dynamic stand: GOOD: Needs SUPERVISION only during gait and able to self right with moderate     Therapeutic Activities and Exercises:  Supine<>sit and rolling with MOD I,  Sit<>stand and commode transfers with SBA  Ambulated 10 ft and then 20 ft without AD with SBA    AM-PAC 6 CLICK MOBILITY  How much help from another person does this patient currently need?   1 = Unable, Total/Dependent Assistance  2 = A lot, Maximum/Moderate Assistance  3 = A little, Minimum/Contact Guard/Supervision  4 = None, Modified [place]/Independent    Turning over in bed (including adjusting bedclothes, sheets and blankets)?: 4  Sitting down on and standing up from a chair with arms (e.g., wheelchair, bedside commode, etc.): 3  Moving from lying on back to sitting on the side of the bed?: 4  Moving to and from a bed to a chair (including a wheelchair)?: 3  Need to walk in hospital room?: 3  Climbing 3-5 steps with a railing?: 3  Total Score: 20     AM-PAC Raw Score CMS G-Code Modifier Level of Impairment Assistance   6 % Total / Unable   7 - 9 CM 80 - 100% Maximal Assist   10 - 14 CL 60 - 80% Moderate Assist   15 - 19 CK 40 - 60% Moderate Assist   20 - 22 CJ 20 - 40% Minimal Assist   23 CI 1-20% SBA / CGA   24 CH 0% Independent/ Mod I     Patient left up in chair with call button in reach and RN notified.    Assessment:   Caro Powell is a 76 y.o. female with a medical diagnosis of Chest pain and presents with recent R 5th digit amputation with limited WB and activity until follow up with Dr. Grant on 6/2/17.  Pt will benefit from PT  If appropriate once cleared from restrictions    Rehab identified problem list/impairments: Rehab identified problem list/impairments: weakness, decreased ROM, gait instability, impaired self care skills, orthopedic  precautions, impaired balance (Pt will more appropriate for PT services once cleared by Dr. Grant to increase WB time of R LE.)    Rehab potential is good.    Activity tolerance: Good    Discharge recommendations: Discharge Facility/Level Of Care Needs: home     Barriers to discharge: Barriers to Discharge: None    Equipment recommendations: Equipment Needed After Discharge:  (No DME at this time)     GOALS:    Physical Therapy Goals     Not on file          Multidisciplinary Problems (Resolved)        Problem: Physical Therapy Goal    Goal Priority Disciplines Outcome Goal Variances Interventions   Physical Therapy Goal   (Resolved)     PT/OT, PT Outcome(s) achieved                     PLAN:    Patient to be seen  (Pt will be more appropriate for PT training once cleared by Dr. Grant to increase WB of R LE and increase WB time. ) to address the above listed problems via  (No PT warranted until follow up on 6/2/17 with Dr. Grant.)  Plan of Care expires: 05/28/17  Plan of Care reviewed with: patient    Functional Assessment Tool Used: Am Pac  Score: 20  Functional Limitation: Mobility: Walking and moving around  Mobility: Walking and Moving Around Current Status (): MARINO  Mobility: Walking and Moving Around Goal Status (): MARINO  Mobility: Walking and Moving Around Discharge Status (): MARINO Us, PT  05/28/2017

## 2017-05-28 NOTE — DISCHARGE INSTRUCTIONS
ANGINA, DISCHARGE INSTRUCTIONS FOR (ENGLISH) View Edit Remove   HEART ATTACK OR ANGINA, RECOGNIZING A (ENGLISH) View Edit Remove   HEART DISEASE EDUCATION (ENGLISH) View Edit Remove   NITROGLYCERIN, FAST-ACTING (ENGLISH) View Edit Remove   HIGH BLOOD PRESSURE (HYPERTENSION), DISCHARGE INSTRUCTIONS (ENGLISH) View Edit Remove   HEART-HEALTHY FOOD, EATING: USING THE DASH PLAN (ENGLISH) View Edit Remove   HEART DISEASE, WOMEN AND: UNDERSTANDING THE RISKS (ENGLISH) View Edit Remove   EATING HEART-HEALTHY FOODS (ENGLISH) View Edit Remove   DIET, DISCHARGE INSTRUCTIONS FOR EATING A LOW-SALT (ENGLISH) View Edit Remove   HERBS AND SPICES, USING (ENGLISH) View Edit Remove   LOW-SALT CHOICES (ENGLISH) View Edit Remove   SALT, TIPS FOR USING LESS (ENGLISH) View Edit Remove   LOW-FAT MEALS, ADDING FLAVOR (ENGLISH) View Edit Remove   COOKING, LOW-FAT TIPS (ENGLISH) View Edit Remove   COOKING, QUICK AND HEALTHY (ENGLISH) View Edit Remove   EATING ON THE GO, HEALTHY (ENGLISH) View Edit Remove   EATING HEALTHY ON THE RUN (ENGLISH) View Edit Remove   DIABETES, EATING OUT WHEN YOU HAVE (ENGLISH) View Edit Remove   EATING OUT, HEALTHY TIPS FOR (ENGLISH) View Edit Remove   EATING OUT, MORE HEALTHY TIPS (ENGLISH) View Edit Remove   EATING OUT: TIPS FOR MAKING HEALTHY CHOICES (ENGLISH) View Edit Remove   FOOD CHOICES: HEALTHIER FAST FOOD (ENGLISH) View Edit Remove   EATING OUT, SAMPLE MENUS (ENGLISH) View Edit Remove   ECHO: DOBUTAMINE STRESS ECHOCARDIOGRAPHY (ENGLISH) View Edit Remove

## 2017-05-28 NOTE — PLAN OF CARE
Discharge orders noted, no HH or HME ordered.    Future Appointments  Date Time Provider Department Center   6/2/2017 10:00 AM MARJAN [facility] [place]          05/28/17 1321   Final Note   Assessment Type Final Discharge Note   Discharge Disposition Home   What phone number can be called within the next 1-3 days to see how you are doing after discharge? [phone]   Hospital Follow Up  Appt(s) scheduled? No  (offices closed, [place] to follow up)   [facility]? n/a   Discharge/Hospital Encounter Summary to (non-[facility]) PCP Yes   Referral to Outpatient Case Management complete? n/a   Referral to / orders for Home Health Complete? n/a   30 day supply of medicines given at discharge, if documented non-compliance / non-adherence? n/a   Any social issues identified prior to discharge? n/a   Did you assess the readiness or willingness of the family or caregiver to support self management of care? n/a   Right Care Referral Info   Post Acute Recommendation No Care     Colleen Neal RN Transitional Navigator  [phone]

## 2017-05-28 NOTE — ED NOTES
Patient has verified the spelling of their name and  on armband  LOC: The patient is awake, alert, and aware of environment with an appropriate affect, the patient is oriented x 4 and speaking appropriately.   APPEARANCE: Patient resting comfortably and in no acute distress, patient is clean and well groomed, patient's clothing is properly fastened.   SKIN: The skin is warm and dry, color consistent with ethnicity, patient has normal skin turgor and moist mucus membranes, foot wrapped in ace wrap drsg clean dry and intact, s/p toe amputation 1 week ago.   : Voids without difficulty  MUSCULOSKELETAL: Patient moving all extremities spontaneously, no obvious swelling or deformities noted.   RESPIRATORY: Airway is open and patent, respirations are spontaneous, no accessory muscle use noted, bilateral breath sounds clear, complaints of SOB   ABDOMEN: Soft and non tender to palpation, no distention noted, normoactive bowel sounds present in all four quadrants.   CARDIAC: Normal rate and rhythm, no peripheral edema noted, less then 3 second capillary refill, complaints of  chest pain

## 2017-05-29 NOTE — DISCHARGE SUMMARY
[facility] IM Discharge Summary    Primary Team: [facility] IM Team B  Attending Physician: Dr. Pichardo  Resident: Dr. Edwards  Intern: Dr. Mukhereje    Date of Admit: 5/27/2017  Date of Discharge: 5/28/2017    Discharge to: Home  Condition: Good    Discharge Diagnoses     Patient Active Problem List   Diagnosis    Essential hypertension    Type 2 diabetes mellitus with diabetic neuropathy, without long-term current use of insulin    Pre-operative clearance    Chest pain       Consultants and Procedures     Consultants:  None    Procedures:   None    Brief History of Present Illness      Caro Powell is a 76 y.o. female who  has a past medical history of Calcium nephrolithiasis (2007); Diabetes mellitus, type 2; Diabetic peripheral neuropathy associated with type 2 diabetes mellitus; and Hypertension.       The patient was in their usual state of health until around 7am on the morning of admit when she had sudden onset substernal/epigastric squeezing pain that radiated to her back. Patient states she was sitting when this happened and laying on her left side and belching make it better. Pain was constant and 5 hours later she decided to present to the ED. Pain only improved with morphine but never completely went away. She had never had this pain before but stated it felt a little like her GERD discomfort. Some associated SOB and nausea but no vomiting or diaphoresis.    For the full HPI please refer to the History & Physical from this admission.    Hospital Course By Problem with Pertinent Findings     Chest pain, ACS rule out  - Chest pain x 7 hours  - Troponin negative in ED  - Initial EKG without evidence of ischemia  - S/p ASA in ED  - Continue home ASA  - Trend troponin/EKGs q6h; trending negative x3  - Low concern for cardiac chest pain  - Patient would like to elect for stress test outpatient with PCP  - Patient counseled to f/u w/ PCP and request stress test     Type 2 Diabetes Mellitus  - Previous A1c from  7.5 in 5/2017  - Glimepiride at home  - No need to repeat  - SSI w/ Accuchecks     Essential Hypertension  - /77 in ED  - On Lisinopril at home; Continue     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  What changed:   how much to take   how to take this   when to take this        CONTINUE taking these medications    ACCU-CHEK SAMI CONTROL SOLN Soln  Generic drug:  blood glucose control high,low     ACCU-CHEK SAMI PLUS METER Misc  Generic drug:  blood-glucose meter     ACCU-CHEK SAMI PLUS TEST STRP Strp  Generic drug:  blood sugar diagnostic     ACCU-CHEK SOFT DEV LANCETS Kit  Generic drug:  lancing device with lancets     ACCU-CHEK SOFTCLIX LANCETS Misc  Generic drug:  lancets     ammonium lactate 12 % Crea  Apply to feet twice daily.     gabapentin 400 MG capsule  Commonly known as:  NEURONTIN     glimepiride 1 MG tablet  Commonly known as:  AMARYL     hydrocodone-acetaminophen 5-325mg 5-325 mg per tablet  Commonly known as:  NORCO     lisinopril 5 MG tablet  Commonly known as:  PRINIVIL,ZESTRIL     pramipexole 0.125 MG tablet  Commonly known as:  MIRAPEX        STOP taking these medications    amoxicillin-clavulanate 875-125mg 875-125 mg per tablet  Commonly known as:  AUGMENTIN     diazePAM 5 MG tablet  Commonly known as:  VALIUM     meloxicam 15 MG tablet  Commonly known as:  MOBIC           Where to Get Your Medications      You can get these medications from any pharmacy    Bring a paper prescription for each of these medications   omeprazole 20 MG capsule         Discharge Information:   Diet:  Cardiac, diabetic    Physical  Activity:  As tolerated and recommended by podiatry    Instructions:  1. Take all medications as prescribed  2. Keep all follow-up appointments  3. Return to the hospital or call your primary care physicians if any worsening symptoms such as chest pain, SOB occur.      Follow-Up Appointments:  Follow-up Information     Manuel Laird MD.    Specialty:  Internal Medicine  Why:  For cardiac stress test  Contact information:  [address] [place] [address]  [phone]             Derek Jose DPM.    Specialty:  Podiatry  Why:  As previously scheduled  Contact information:  [address]  [place] [address]  [phone]                     Adan Mukherjee  [facility] Internal Medicine, HO-1

## 2017-05-30 LAB
BACTERIA SPEC ANAEROBE CULT: NORMAL
BACTERIA SPEC ANAEROBE CULT: NORMAL

## 2017-06-02 ENCOUNTER — OFFICE VISIT (OUTPATIENT)
Dept: PODIATRY | Facility: CLINIC | Age: 77
End: 2017-06-02
Payer: MEDICARE

## 2017-06-02 VITALS
HEIGHT: 62 IN | DIASTOLIC BLOOD PRESSURE: 63 MMHG | BODY MASS INDEX: 33.13 KG/M2 | WEIGHT: 180 LBS | HEART RATE: 65 BPM | SYSTOLIC BLOOD PRESSURE: 159 MMHG

## 2017-06-02 DIAGNOSIS — Z98.890 POSTOPERATIVE STATE: Primary | ICD-10-CM

## 2017-06-02 DIAGNOSIS — E11.42 DIABETIC POLYNEUROPATHY ASSOCIATED WITH TYPE 2 DIABETES MELLITUS: ICD-10-CM

## 2017-06-02 PROCEDURE — 99024 POSTOP FOLLOW-UP VISIT: CPT | Mod: S$GLB,,, | Performed by: PODIATRIST

## 2017-06-02 PROCEDURE — 99999 PR PBB SHADOW E&M-EST. PATIENT-LVL III: CPT | Mod: PBBFAC,,, | Performed by: PODIATRIST

## 2017-06-04 NOTE — PROGRESS NOTES
"Subjective:      Patient ID: Caro Powell is a 76 y.o. female.    Chief Complaint: Post-op Evaluation (Left Foot)    Caro LUA is a 76 y.o. female who returns to the clinic for routine Diabetic foot exam. Documented high risk due to peripheral neuropathy and history of toe ulceration. 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 Re-admitted 5/27/17 for observation due to chest pain. No new complaints. Tolerating Augmentin well.    PCP: Manuel Laird MD    Date Last Seen by PCP:     Current shoe gear:      Hemoglobin A1C   Date Value Ref Range Status   05/22/2017 7.5 (H) 4.5 - 6.2 % Final     Comment:     According to ADA guidelines, hemoglobin A1C <7.0% represents  optimal control in non-pregnant diabetic patients.  Different  metrics may apply to specific populations.   Standards of Medical Care in Diabetes - 2016.  For the purpose of screening for the presence of diabetes:  <5.7%     Consistent with the absence of diabetes  5.7-6.4%  Consistent with increasing risk for diabetes   (prediabetes)  >or=6.5%  Consistent with diabetes  Currently no consensus exists for use of hemoglobin A1C  for diagnosis of diabetes for children.       Vitals:    06/02/17 1011   BP: (!) 159/63   Pulse: 65   Weight: 81.6 kg (180 lb)   Height: 5' 2" (1.575 m)   PainSc: 0-No pain      Past Medical History:   Diagnosis Date    Calcium nephrolithiasis 2007    Diabetes mellitus, type 2     Diabetic peripheral neuropathy associated with type 2 diabetes mellitus     Hypertension        Past Surgical History:   Procedure Laterality Date    COLONOSCOPY  11/28/2011    sigmoid diverticulosis, external hemorrhoids    HYSTERECTOMY      SHOULDER SURGERY Left     TOE AMPUTATION Right 05/22/2017    5th toe       Family History   Problem Relation Age of Onset    Diabetes Mother     Heart failure Father     Kidney failure Brother        Social History     Social History    Marital status:      Spouse name: N/A    " Number of children: N/A    Years of education: N/A     Social History Main Topics    Smoking status: Never Smoker    Smokeless tobacco: None    Alcohol use No    Drug use: No    Sexual activity: Not Asked     Other Topics Concern    None     Social History Narrative    None       Current Outpatient Prescriptions   Medication Sig Dispense Refill    ACCU-CHEK SAMI CONTROL SOLN Soln       ACCU-CHEK SAMI PLUS METER Misc       ACCU-CHEK SAMI PLUS TEST STRP Strp       ACCU-CHEK SOFT DEV LANCETS Kit       ACCU-CHEK SOFTCLIX LANCETS Misc       ammonium lactate 12 % Crea Apply to feet twice daily. 140 g 5    gabapentin (NEURONTIN) 400 MG capsule       glimepiride (AMARYL) 1 MG tablet       hydrocodone-acetaminophen 5-325mg (NORCO) 5-325 mg per tablet Take 1 tablet by mouth every 12 (twelve) hours as needed.  0    lisinopril (PRINIVIL,ZESTRIL) 5 MG tablet       omeprazole (PRILOSEC) 20 MG capsule Take 1 capsule (20 mg total) by mouth once daily. 30 capsule 3    pramipexole (MIRAPEX) 0.125 MG tablet        No current facility-administered medications for this visit.        Review of patient's allergies indicates:   Allergen Reactions    Codeine Nausea Only         Review of Systems   Constitution: Negative for chills, fever, weakness and malaise/fatigue.   Cardiovascular: Negative for chest pain, claudication and leg swelling.   Respiratory: Negative for cough and shortness of breath.    Skin: Positive for dry skin, nail changes and poor wound healing. Negative for color change.   Musculoskeletal: Negative for back pain, joint pain, muscle cramps and muscle weakness.   Gastrointestinal: Negative for nausea and vomiting.   Neurological: Positive for paresthesias. Negative for numbness.   Psychiatric/Behavioral: Negative for altered mental status.           Objective:      Physical Exam   Constitutional: She is oriented to person, place, and time. No distress.   Cardiovascular: Intact distal pulses.     Pulses:       Dorsalis pedis pulses are 2+ on the right side, and 2+ on the left side.        Posterior tibial pulses are 1+ on the right side, and 1+ on the left side.   CFT< 3 secs all toes bilateral foot, skin temp warm bilateral foot, diminished digital hair growth bilateral foot, no lower extremity edema bilateral.       Musculoskeletal:   Amputated right fifth toe. No pain on palpation of amputation site right foot.     Hallux limitus with semi-reducible hammertoe deformities toes 2-5 bilateral. Elongated second toe bilateral. Rectus alignment of right second toe. No pain with on palpation or ROM right second toe. Adductovarus rotation fifth toe left. Gastrocnemius equinus bilateral. No pain with ROM or MMT bilateral foot.   Neurological: She is alert and oriented to person, place, and time. She has normal strength. A sensory deficit is present.   Protective threshold and vibratory sensation decreased bilateral foot.   Skin: Skin is warm, dry and intact. Capillary refill takes less than 2 seconds. No ecchymosis noted. She is not diaphoretic. No cyanosis or erythema. No pallor. Nails show no clubbing.   Incision site right fifth toe amp site well approximated with sutures, no soi, no dehiscence.                                 Assessment:       Encounter Diagnoses   Name Primary?    Postoperative state Yes    Diabetic polyneuropathy associated with type 2 diabetes mellitus          Plan:       Caro LUA was seen today for post-op evaluation.    Diagnoses and all orders for this visit:    Postoperative state    Diabetic polyneuropathy associated with type 2 diabetes mellitus      I counseled the patient on her conditions, their implications and medical management.    Shoe inspection. Diabetic Foot Education. Patient reminded of the importance of good nutrition and blood sugar control to help prevent podiatric complications of diabetes. Patient instructed on proper foot hygeine. We discussed wearing proper  shoe gear, daily foot inspections, never walking without protective shoe gear, never putting sharp instruments to feet    Dressing removed right foot and surgical site inspected Cleansed with hibiclens. Applied betadine to incision followed by mepilex Ag foam, 2 rolls cast padding secured with coban. Keep C/D/I.    Continue limited weightbearing with surgical shoe.    RTC 1 week for suture removal.

## 2017-06-05 ENCOUNTER — HOSPITAL ENCOUNTER (INPATIENT)
Facility: HOSPITAL | Age: 77
LOS: 3 days | Discharge: HOME | DRG: 309 | End: 2017-06-08
Attending: EMERGENCY MEDICINE | Admitting: INTERNAL MEDICINE
Payer: MEDICARE

## 2017-06-05 DIAGNOSIS — I48.91 NEW ONSET ATRIAL FIBRILLATION: ICD-10-CM

## 2017-06-05 DIAGNOSIS — I10 ESSENTIAL HYPERTENSION: Chronic | ICD-10-CM

## 2017-06-05 DIAGNOSIS — E11.40 TYPE 2 DIABETES MELLITUS WITH DIABETIC NEUROPATHY, WITHOUT LONG-TERM CURRENT USE OF INSULIN: Chronic | ICD-10-CM

## 2017-06-05 DIAGNOSIS — R07.9 CHEST PAIN, UNSPECIFIED: ICD-10-CM

## 2017-06-05 DIAGNOSIS — I48.91 ATRIAL FIBRILLATION WITH RAPID VENTRICULAR RESPONSE: Primary | ICD-10-CM

## 2017-06-05 DIAGNOSIS — R07.9 CHEST PAIN, UNSPECIFIED TYPE: ICD-10-CM

## 2017-06-05 LAB
ALBUMIN SERPL BCP-MCNC: 3 G/DL
ALP SERPL-CCNC: 50 U/L
ALT SERPL W/O P-5'-P-CCNC: 19 U/L
ANION GAP SERPL CALC-SCNC: 14 MMOL/L
APTT BLDCRRT: 31.8 SEC
AST SERPL-CCNC: 14 U/L
BACTERIA #/AREA URNS HPF: NORMAL /HPF
BASOPHILS # BLD AUTO: 0.02 K/UL
BASOPHILS # BLD AUTO: 0.03 K/UL
BASOPHILS NFR BLD: 0.1 %
BASOPHILS NFR BLD: 0.2 %
BILIRUB SERPL-MCNC: 0.6 MG/DL
BILIRUB UR QL STRIP: NEGATIVE
BNP SERPL-MCNC: 192 PG/ML
BUN SERPL-MCNC: 14 MG/DL
CALCIUM SERPL-MCNC: 9.7 MG/DL
CHLORIDE SERPL-SCNC: 98 MMOL/L
CLARITY UR: CLEAR
CO2 SERPL-SCNC: 23 MMOL/L
COLOR UR: YELLOW
CREAT SERPL-MCNC: 1.2 MG/DL
DIFFERENTIAL METHOD: ABNORMAL
DIFFERENTIAL METHOD: ABNORMAL
EOSINOPHIL # BLD AUTO: 0 K/UL
EOSINOPHIL # BLD AUTO: 0 K/UL
EOSINOPHIL NFR BLD: 0.1 %
EOSINOPHIL NFR BLD: 0.1 %
ERYTHROCYTE [DISTWIDTH] IN BLOOD BY AUTOMATED COUNT: 14.2 %
ERYTHROCYTE [DISTWIDTH] IN BLOOD BY AUTOMATED COUNT: 14.3 %
EST. GFR  (AFRICAN AMERICAN): 50 ML/MIN/1.73 M^2
EST. GFR  (NON AFRICAN AMERICAN): 44 ML/MIN/1.73 M^2
FERRITIN SERPL-MCNC: 540 NG/ML
FOLATE SERPL-MCNC: 15.8 NG/ML
GLUCOSE SERPL-MCNC: 202 MG/DL
GLUCOSE UR QL STRIP: NEGATIVE
HCT VFR BLD AUTO: 33.9 %
HCT VFR BLD AUTO: 35 %
HGB BLD-MCNC: 11.3 G/DL
HGB BLD-MCNC: 11.9 G/DL
HGB UR QL STRIP: NEGATIVE
HYALINE CASTS #/AREA URNS LPF: 0 /LPF
INR PPP: 1.1
INR PPP: 1.1
IRON SERPL-MCNC: 17 UG/DL
KETONES UR QL STRIP: NEGATIVE
LEUKOCYTE ESTERASE UR QL STRIP: NEGATIVE
LYMPHOCYTES # BLD AUTO: 1.6 K/UL
LYMPHOCYTES # BLD AUTO: 1.7 K/UL
LYMPHOCYTES NFR BLD: 12 %
LYMPHOCYTES NFR BLD: 9.6 %
MCH RBC QN AUTO: 28.4 PG
MCH RBC QN AUTO: 29.1 PG
MCHC RBC AUTO-ENTMCNC: 33.3 %
MCHC RBC AUTO-ENTMCNC: 34 %
MCV RBC AUTO: 85 FL
MCV RBC AUTO: 86 FL
MICROSCOPIC COMMENT: NORMAL
MONOCYTES # BLD AUTO: 1.6 K/UL
MONOCYTES # BLD AUTO: 1.7 K/UL
MONOCYTES NFR BLD: 10.1 %
MONOCYTES NFR BLD: 12.4 %
NEUTROPHILS # BLD AUTO: 10.4 K/UL
NEUTROPHILS # BLD AUTO: 12.9 K/UL
NEUTROPHILS NFR BLD: 74.9 %
NEUTROPHILS NFR BLD: 79.6 %
NITRITE UR QL STRIP: NEGATIVE
PH UR STRIP: 6 [PH] (ref 5–8)
PLATELET # BLD AUTO: 579 K/UL
PLATELET # BLD AUTO: 579 K/UL
PLATELET # BLD AUTO: 603 K/UL
PMV BLD AUTO: 9.1 FL
PMV BLD AUTO: 9.1 FL
PMV BLD AUTO: 9.3 FL
POCT GLUCOSE: 194 MG/DL (ref 70–110)
POTASSIUM SERPL-SCNC: 4.6 MMOL/L
PROT SERPL-MCNC: 7.7 G/DL
PROT UR QL STRIP: ABNORMAL
PROTHROMBIN TIME: 11.4 SEC
PROTHROMBIN TIME: 11.4 SEC
RBC # BLD AUTO: 3.98 M/UL
RBC # BLD AUTO: 4.09 M/UL
RBC #/AREA URNS HPF: 1 /HPF (ref 0–4)
SATURATED IRON: 6 %
SODIUM SERPL-SCNC: 135 MMOL/L
SP GR UR STRIP: 1.02 (ref 1–1.03)
TOTAL IRON BINDING CAPACITY: 272 UG/DL
TRANSFERRIN SERPL-MCNC: 184 MG/DL
TROPONIN I SERPL DL<=0.01 NG/ML-MCNC: 0.01 NG/ML
TROPONIN I SERPL DL<=0.01 NG/ML-MCNC: 0.02 NG/ML
TSH SERPL DL<=0.005 MIU/L-ACNC: 0.42 UIU/ML
URN SPEC COLLECT METH UR: ABNORMAL
UROBILINOGEN UR STRIP-ACNC: NEGATIVE EU/DL
VIT B12 SERPL-MCNC: 686 PG/ML
WBC # BLD AUTO: 13.86 K/UL
WBC # BLD AUTO: 16.21 K/UL
WBC #/AREA URNS HPF: 3 /HPF (ref 0–5)

## 2017-06-05 PROCEDURE — 93010 ELECTROCARDIOGRAM REPORT: CPT | Mod: 76,S$GLB,, | Performed by: INTERNAL MEDICINE

## 2017-06-05 PROCEDURE — 81000 URINALYSIS NONAUTO W/SCOPE: CPT

## 2017-06-05 PROCEDURE — 84484 ASSAY OF TROPONIN QUANT: CPT

## 2017-06-05 PROCEDURE — 96366 THER/PROPH/DIAG IV INF ADDON: CPT

## 2017-06-05 PROCEDURE — 83540 ASSAY OF IRON: CPT

## 2017-06-05 PROCEDURE — 96365 THER/PROPH/DIAG IV INF INIT: CPT

## 2017-06-05 PROCEDURE — 85610 PROTHROMBIN TIME: CPT

## 2017-06-05 PROCEDURE — 20000000 HC ICU ROOM

## 2017-06-05 PROCEDURE — 83880 ASSAY OF NATRIURETIC PEPTIDE: CPT

## 2017-06-05 PROCEDURE — 85610 PROTHROMBIN TIME: CPT | Mod: 91

## 2017-06-05 PROCEDURE — 82607 VITAMIN B-12: CPT

## 2017-06-05 PROCEDURE — 84443 ASSAY THYROID STIM HORMONE: CPT

## 2017-06-05 PROCEDURE — 85730 THROMBOPLASTIN TIME PARTIAL: CPT

## 2017-06-05 PROCEDURE — 82962 GLUCOSE BLOOD TEST: CPT

## 2017-06-05 PROCEDURE — 99291 CRITICAL CARE FIRST HOUR: CPT | Mod: 25

## 2017-06-05 PROCEDURE — 25000003 PHARM REV CODE 250: Performed by: EMERGENCY MEDICINE

## 2017-06-05 PROCEDURE — 80053 COMPREHEN METABOLIC PANEL: CPT

## 2017-06-05 PROCEDURE — 25000003 PHARM REV CODE 250: Performed by: HOSPITALIST

## 2017-06-05 PROCEDURE — 84484 ASSAY OF TROPONIN QUANT: CPT | Mod: 91

## 2017-06-05 PROCEDURE — 85025 COMPLETE CBC W/AUTO DIFF WBC: CPT

## 2017-06-05 PROCEDURE — 93005 ELECTROCARDIOGRAM TRACING: CPT

## 2017-06-05 PROCEDURE — 82728 ASSAY OF FERRITIN: CPT

## 2017-06-05 PROCEDURE — 36415 COLL VENOUS BLD VENIPUNCTURE: CPT

## 2017-06-05 PROCEDURE — 93010 ELECTROCARDIOGRAM REPORT: CPT | Mod: S$GLB,,, | Performed by: INTERNAL MEDICINE

## 2017-06-05 PROCEDURE — 82746 ASSAY OF FOLIC ACID SERUM: CPT

## 2017-06-05 PROCEDURE — 96376 TX/PRO/DX INJ SAME DRUG ADON: CPT

## 2017-06-05 RX ORDER — LISINOPRIL 5 MG/1
5 TABLET ORAL DAILY
Status: DISCONTINUED | OUTPATIENT
Start: 2017-06-06 | End: 2017-06-06

## 2017-06-05 RX ORDER — IBUPROFEN 200 MG
24 TABLET ORAL
Status: DISCONTINUED | OUTPATIENT
Start: 2017-06-05 | End: 2017-06-08 | Stop reason: HOSPADM

## 2017-06-05 RX ORDER — AMOXICILLIN AND CLAVULANATE POTASSIUM 875; 125 MG/1; MG/1
1 TABLET, FILM COATED ORAL 2 TIMES DAILY
Status: DISCONTINUED | OUTPATIENT
Start: 2017-06-05 | End: 2017-06-08 | Stop reason: HOSPADM

## 2017-06-05 RX ORDER — DILTIAZEM HYDROCHLORIDE 5 MG/ML
20 INJECTION INTRAVENOUS
Status: DISCONTINUED | OUTPATIENT
Start: 2017-06-05 | End: 2017-06-05

## 2017-06-05 RX ORDER — PRAMIPEXOLE DIHYDROCHLORIDE 0.12 MG/1
0.12 TABLET ORAL 2 TIMES DAILY
Status: DISCONTINUED | OUTPATIENT
Start: 2017-06-05 | End: 2017-06-08 | Stop reason: HOSPADM

## 2017-06-05 RX ORDER — GABAPENTIN 400 MG/1
400 CAPSULE ORAL 3 TIMES DAILY
Status: DISCONTINUED | OUTPATIENT
Start: 2017-06-05 | End: 2017-06-08 | Stop reason: HOSPADM

## 2017-06-05 RX ORDER — IBUPROFEN 200 MG
16 TABLET ORAL
Status: DISCONTINUED | OUTPATIENT
Start: 2017-06-05 | End: 2017-06-08 | Stop reason: HOSPADM

## 2017-06-05 RX ORDER — HEPARIN SODIUM,PORCINE/D5W 25000/250
16 INTRAVENOUS SOLUTION INTRAVENOUS CONTINUOUS
Status: DISCONTINUED | OUTPATIENT
Start: 2017-06-05 | End: 2017-06-06

## 2017-06-05 RX ORDER — DILTIAZEM HCL 1 MG/ML
10 INJECTION, SOLUTION INTRAVENOUS CONTINUOUS
Status: DISCONTINUED | OUTPATIENT
Start: 2017-06-05 | End: 2017-06-05

## 2017-06-05 RX ORDER — INSULIN ASPART 100 [IU]/ML
1-10 INJECTION, SOLUTION INTRAVENOUS; SUBCUTANEOUS
Status: DISCONTINUED | OUTPATIENT
Start: 2017-06-05 | End: 2017-06-08 | Stop reason: HOSPADM

## 2017-06-05 RX ORDER — PANTOPRAZOLE SODIUM 40 MG/1
40 TABLET, DELAYED RELEASE ORAL DAILY
Status: DISCONTINUED | OUTPATIENT
Start: 2017-06-06 | End: 2017-06-08 | Stop reason: HOSPADM

## 2017-06-05 RX ORDER — BENZONATATE 100 MG/1
100 CAPSULE ORAL 3 TIMES DAILY PRN
Status: DISCONTINUED | OUTPATIENT
Start: 2017-06-05 | End: 2017-06-08 | Stop reason: HOSPADM

## 2017-06-05 RX ORDER — DILTIAZEM HCL 1 MG/ML
2.5 INJECTION, SOLUTION INTRAVENOUS CONTINUOUS
Status: DISCONTINUED | OUTPATIENT
Start: 2017-06-05 | End: 2017-06-06

## 2017-06-05 RX ORDER — GLUCAGON 1 MG
1 KIT INJECTION
Status: DISCONTINUED | OUTPATIENT
Start: 2017-06-05 | End: 2017-06-08 | Stop reason: HOSPADM

## 2017-06-05 RX ORDER — DILTIAZEM HYDROCHLORIDE 5 MG/ML
20 INJECTION INTRAVENOUS
Status: COMPLETED | OUTPATIENT
Start: 2017-06-05 | End: 2017-06-05

## 2017-06-05 RX ADMIN — PRAMIPEXOLE DIHYDROCHLORIDE 0.12 MG: 0.12 TABLET ORAL at 10:06

## 2017-06-05 RX ADMIN — HEPARIN SODIUM AND DEXTROSE 16 UNITS/KG/HR: 10000; 5 INJECTION INTRAVENOUS at 10:06

## 2017-06-05 RX ADMIN — DILTIAZEM HYDROCHLORIDE 12.5 MG/HR: 5 INJECTION INTRAVENOUS at 06:06

## 2017-06-05 RX ADMIN — AMOXICILLIN AND CLAVULANATE POTASSIUM 1 TABLET: 875; 125 TABLET, FILM COATED ORAL at 10:06

## 2017-06-05 RX ADMIN — DILTIAZEM HYDROCHLORIDE 10 MG/HR: 5 INJECTION INTRAVENOUS at 04:06

## 2017-06-05 RX ADMIN — GABAPENTIN 400 MG: 400 CAPSULE ORAL at 10:06

## 2017-06-05 RX ADMIN — DILTIAZEM HYDROCHLORIDE 20 MG: 5 INJECTION INTRAVENOUS at 03:06

## 2017-06-05 NOTE — ED PROVIDER NOTES
Encounter Date: 6/5/2017       History     Chief Complaint   Patient presents with    Chest Pain     began around 0400, SOB with exertion, nausea, right foot toe ampuation surgery 5/23/17     Review of patient's allergies indicates:   Allergen Reactions    Codeine Nausea Only     Patient is a 76-year-old female who presents to the emergency room for the second time in the past week for evaluation.  Original presentation on May 27 results and the admission for chest discomfort and evaluation of the right lower extremity diabetic foot lesion.  She returns to the emergency room today for return of chest discomfort, however this time it is associated with profound tachycardia and palpitations.  She reports mild shortness of breath.  There is no fever.  She denies similar symptoms of palpitations in the past.          Past Medical History:   Diagnosis Date    Calcium nephrolithiasis 2007    Diabetes mellitus, type 2     Diabetic peripheral neuropathy associated with type 2 diabetes mellitus     Hypertension      Past Surgical History:   Procedure Laterality Date    COLONOSCOPY  11/28/2011    sigmoid diverticulosis, external hemorrhoids    HYSTERECTOMY      SHOULDER SURGERY Left     TOE AMPUTATION Right 05/22/2017    5th toe     Family History   Problem Relation Age of Onset    Diabetes Mother     Heart failure Father     Kidney failure Brother      Social History   Substance Use Topics    Smoking status: Never Smoker    Smokeless tobacco: Not on file    Alcohol use No     Review of Systems   Constitutional: Positive for fatigue. Negative for fever.   HENT: Negative for sore throat.    Respiratory: Positive for chest tightness and shortness of breath.    Cardiovascular: Positive for palpitations. Negative for chest pain.   Gastrointestinal: Negative for nausea.   Genitourinary: Negative for dysuria.   Musculoskeletal: Negative for back pain.   Skin: Negative for rash.   Neurological: Positive for dizziness  and weakness.   Hematological: Does not bruise/bleed easily.   All other systems reviewed and are negative.      Physical Exam     Initial Vitals [06/05/17 1508]   BP Pulse Resp Temp SpO2   (!) 154/89 (!) 130 18 98.4 °F (36.9 °C) 96 %     Physical Exam    Constitutional: Vital signs are normal. She appears well-developed and well-nourished. She is not diaphoretic. She is cooperative.   HENT:   Head: Normocephalic and atraumatic.   Eyes: Conjunctivae, EOM and lids are normal.   Neck: Trachea normal and normal range of motion. Neck supple. No stridor present. No tracheal deviation present. No no neck rigidity. No Brudzinski's sign and no Kernig's sign noted.   Cardiovascular: Normal pulses. An irregularly irregular rhythm present. Tachycardia present.    Pulses:       Radial pulses are 2+ on the right side, and 2+ on the left side.        Femoral pulses are 2+ on the right side, and 2+ on the left side.  Abdominal: Soft. Normal appearance and bowel sounds are normal. She exhibits no abdominal bruit. There is no tenderness. There is no rebound.   Neurological: She is alert and oriented to person, place, and time. She has normal strength. GCS eye subscore is 4. GCS verbal subscore is 5. GCS motor subscore is 6.   Skin: Skin is warm, dry and intact. Capillary refill takes less than 2 seconds.   Psychiatric: She has a normal mood and affect. Her behavior is normal. Judgment normal. Thought content is not delusional. She expresses no homicidal and no suicidal ideation.         ED Course   Critical Care  Date/Time: 6/5/2017 5:48 PM  Performed by: DORETHA ALFONSO  Authorized by: DORETHA ALFONSO   Direct patient critical care time: 12 minutes  Additional history critical care time: 8 minutes  Ordering / reviewing critical care time: 6 minutes  Documentation critical care time: 11 minutes  Consulting other physicians critical care time: 5 minutes  Total critical care time (exclusive of procedural time) : 42 minutes  Critical  care was time spent personally by me on the following activities: discussions with primary provider, evaluation of patient's response to treatment, examination of patient, obtaining history from patient or surrogate, ordering and performing treatments and interventions, ordering and review of laboratory studies, re-evaluation of patient's condition and review of old charts.        Labs Reviewed   CBC W/ AUTO DIFFERENTIAL - Abnormal; Notable for the following:        Result Value    WBC 16.21 (*)     Hemoglobin 11.9 (*)     Hematocrit 35.0 (*)     Platelets 603 (*)     Gran # 12.9 (*)     Mono # 1.6 (*)     Gran% 79.6 (*)     Lymph% 9.6 (*)     All other components within normal limits   COMPREHENSIVE METABOLIC PANEL - Abnormal; Notable for the following:     Sodium 135 (*)     Glucose 202 (*)     Albumin 3.0 (*)     Alkaline Phosphatase 50 (*)     eGFR if  50 (*)     eGFR if non  44 (*)     All other components within normal limits   B-TYPE NATRIURETIC PEPTIDE - Abnormal; Notable for the following:      (*)     All other components within normal limits   URINALYSIS - Abnormal; Notable for the following:     Protein, UA 1+ (*)     All other components within normal limits   POCT GLUCOSE - Abnormal; Notable for the following:     POCT Glucose 194 (*)     All other components within normal limits   TROPONIN I   PROTIME-INR   URINALYSIS MICROSCOPIC     EKG Readings: (Independently Interpreted)   Clinical Impression: Atrial Fibrillation with RVR   Atrial fibrillation with rapid ventricular response, rate 131 bpm, QTC is 395 ms, there are Q waves to  V1 through V4     - none    Vitals:    06/05/17 1620 06/05/17 1633 06/05/17 1643 06/05/17 1647   BP: 138/76      Pulse: (!) 140 (!) 154 (!) 124 86   Resp: (!) 29 (!) 27 (!) 29 (!) 27   Temp:       SpO2: (!) 93% (!) 93% (!) 90% (!) 93%   Weight:       Height:        06/05/17 1650 06/05/17 1657 06/05/17 1700 06/05/17 1701   BP: 124/64       Pulse: 105 98 75 68   Resp: (!) 27 20  (!) 23   Temp:       SpO2: (!) 92% (!) 93%  96%   Weight:       Height:        06/05/17 1706 06/05/17 1707 06/05/17 1711 06/05/17 1715   BP:       Pulse: 110 73 80 80   Resp: (!) 33 (!) 28 20 (!) 27   Temp:       SpO2: (!) 94% (!) 94% (!) 94% (!) 94%   Weight:       Height:        06/05/17 1717 06/05/17 1720 06/05/17 1730   BP:  121/74    Pulse: (!) 154 (!) 143 (!) 145   Resp: (!) 29 (!) 28 18   Temp:      SpO2: (!) 94% (!) 94% (!) 94%   Weight:      Height:          Results for orders placed or performed during the hospital encounter of 06/05/17   CBC auto differential   Result Value Ref Range    WBC 16.21 (H) 3.90 - 12.70 K/uL    RBC 4.09 4.00 - 5.40 M/uL    Hemoglobin 11.9 (L) 12.0 - 16.0 g/dL    Hematocrit 35.0 (L) 37.0 - 48.5 %    MCV 86 82 - 98 fL    MCH 29.1 27.0 - 31.0 pg    MCHC 34.0 32.0 - 36.0 %    RDW 14.3 11.5 - 14.5 %    Platelets 603 (H) 150 - 350 K/uL    MPV 9.3 9.2 - 12.9 fL    Gran # 12.9 (H) 1.8 - 7.7 K/uL    Lymph # 1.6 1.0 - 4.8 K/uL    Mono # 1.6 (H) 0.3 - 1.0 K/uL    Eos # 0.0 0.0 - 0.5 K/uL    Baso # 0.03 0.00 - 0.20 K/uL    Gran% 79.6 (H) 38.0 - 73.0 %    Lymph% 9.6 (L) 18.0 - 48.0 %    Mono% 10.1 4.0 - 15.0 %    Eosinophil% 0.1 0.0 - 8.0 %    Basophil% 0.2 0.0 - 1.9 %    Differential Method Automated    Comprehensive metabolic panel   Result Value Ref Range    Sodium 135 (L) 136 - 145 mmol/L    Potassium 4.6 3.5 - 5.1 mmol/L    Chloride 98 95 - 110 mmol/L    CO2 23 23 - 29 mmol/L    Glucose 202 (H) 70 - 110 mg/dL    BUN, Bld 14 8 - 23 mg/dL    Creatinine 1.2 0.5 - 1.4 mg/dL    Calcium 9.7 8.7 - 10.5 mg/dL    Total Protein 7.7 6.0 - 8.4 g/dL    Albumin 3.0 (L) 3.5 - 5.2 g/dL    Total Bilirubin 0.6 0.1 - 1.0 mg/dL    Alkaline Phosphatase 50 (L) 55 - 135 U/L    AST 14 10 - 40 U/L    ALT 19 10 - 44 U/L    Anion Gap 14 8 - 16 mmol/L    eGFR if African American 50 (A) >60 mL/min/1.73 m^2    eGFR if non African American 44 (A) >60 mL/min/1.73 m^2   Troponin  I   Result Value Ref Range    Troponin I 0.006 0.000 - 0.026 ng/mL   Brain natriuretic peptide   Result Value Ref Range     (H) 0 - 99 pg/mL   Protime-INR   Result Value Ref Range    Prothrombin Time 11.4 9.0 - 12.5 sec    INR 1.1 0.8 - 1.2   Urinalysis Clean Catch   Result Value Ref Range    Specimen UA Urine, Catheterized     Color, UA Yellow Yellow, Straw, Hollie    Appearance, UA Clear Clear    pH, UA 6.0 5.0 - 8.0    Specific Gravity, UA 1.025 1.005 - 1.030    Protein, UA 1+ (A) Negative    Glucose, UA Negative Negative    Ketones, UA Negative Negative    Bilirubin (UA) Negative Negative    Occult Blood UA Negative Negative    Nitrite, UA Negative Negative    Urobilinogen, UA Negative <2.0 EU/dL    Leukocytes, UA Negative Negative   Urinalysis Microscopic   Result Value Ref Range    RBC, UA 1 0 - 4 /hpf    WBC, UA 3 0 - 5 /hpf    Bacteria, UA None None-Occ /hpf    Hyaline Casts, UA 0 0-1/lpf /lpf    Microscopic Comment SEE COMMENT    POCT glucose   Result Value Ref Range    POCT Glucose 194 (H) 70 - 110 mg/dL        Imaging Results          X-Ray Chest AP Portable (Final result)  Result time 06/05/17 15:58:04    Final result by Roman Duncan DO (06/05/17 15:58:04)                 Impression:        See Above       Electronically signed by: ROMAN DUNCAN DO  Date:     06/05/17  Time:    15:58              Narrative:    Single portable frontal view of the chest    Comparison: 05/27/2017    Results: Continued ill-defined perihilar and bilateral lung opacities which may represent evolving vascular congestion.  Persistent poor definition left lung base concerning for superimposed effusion with atelectasis.  No large pneumothorax.  Right humeral anchors again identified.  Clinical correlation and followup advised                                  The above test results and vital signs have been reviewed by the physician.          Additional MDM:   Chest Pain: Oxygen: NC 3 L/min. Pain response to treatment:  decreased. Initial EKG: no acute changes. Final Disposition: Admitted. Consultants: Internal Medicine. The patient's condition was felt to be guarded.                 ED Course     Clinical Impression:   The primary encounter diagnosis was Atrial fibrillation with rapid ventricular response. Diagnoses of New onset atrial fibrillation, Type 2 diabetes mellitus with diabetic neuropathy, without long-term current use of insulin, Chest pain, unspecified type, and Essential hypertension were also pertinent to this visit.          Perfecto Epstein MD  06/05/17 8408

## 2017-06-05 NOTE — H&P
[facility] History and Physical - Resident Note    Admitting Team: [facility] Team A  Attending Physician: Dr. Gee  Resident: Demario   Interns: Dhaval     Date of Admit: 6/5/2017    Chief Complaint     CP since 4 AM    Subjective:      History of Present Illness:    Pt is a 77 yo F with PMHx of HTN and T2Dm who was in her USOH (ambulates without assistance, lives at home with , independent with ADLs) until 4AM this morning when she was awakened from sleep by chest pain. It was 10/10 stabbing non-radiating midsternal CP a/w SOB, nausea, lightheadedness and diaphoresis. The pain persisted and progressively got worse so she took a little bit of a Norco 5-325 pill she had and it got slightly better. She then fell asleep and woke up around 10AM still with the CP. Her sister came to pick her up and then both the pt and her sister decided she should come to the ED because the pain wasn't resolving. She's had one similar episode of chest pain before last month and had troponins/ECGs trended, no e/o MI. She also reports one week of dry cough and 2-3 episodes of non-bloody diarrhea today. She states she's been taking Augmentin 875mg BID since her R 5th toe amputation on 5/23, which she thinks is the cause of her diarrhea. Also reports she's noticed she's been getting more SOB this past week. She can usually walk all around [facility] without getting SOB but now is getting SOB walking from room to room in her house. She denies fevers/chills, LE edema, orthopnea, PND, dysuria, abdominal pain.     Past Medical History:  Past Medical History:   Diagnosis Date    Calcium nephrolithiasis 2007    Diabetes mellitus, type 2     Diabetic peripheral neuropathy associated with type 2 diabetes mellitus     Hypertension        Past Surgical History:  Past Surgical History:   Procedure Laterality Date    COLONOSCOPY  11/28/2011    sigmoid diverticulosis, external hemorrhoids    HYSTERECTOMY      SHOULDER  SURGERY Left     TOE AMPUTATION Right 05/22/2017    5th toe       Allergies:  Codeine (dyspepsia)    Home Medications:  Prior to Admission medications    Medication Sig Start Date End Date Taking? Authorizing Provider   ARIA SAMI CONTROL SOLN Soln  10/2/14   Historical Provider, MD KNAPP SAMI PLUS METER Misc  10/10/14   Historical Provider, MD KNAPP SAMI PLUS TEST STRP Strp  12/16/14   Historical Provider, MD KNAPP SOFT DEV LANCETS Kit  10/2/14   Historical Provider, MD KNAPP SOFTCLIX LANCETS Misc  10/10/14   Historical Provider, MD   ammonium lactate 12 % Crea Apply to feet twice daily. 1/28/15   Derek Jose DPM   gabapentin (NEURONTIN) 400 MG capsule  3/12/14   Historical Provider, MD   glimepiride (AMARYL) 1 MG tablet  6/16/14   Historical Provider, MD   hydrocodone-acetaminophen 5-325mg (NORCO) 5-325 mg per tablet Take 1 tablet by mouth every 12 (twelve) hours as needed. 7/1/16   Historical Provider, MD   lisinopril (PRINIVIL,ZESTRIL) 5 MG tablet  3/12/14   Historical Provider, MD   omeprazole (PRILOSEC) 20 MG capsule Take 1 capsule (20 mg total) by mouth once daily. 5/28/17   Adan Mukherjee MD   pramipexole (MIRAPEX) 0.125 MG tablet  7/22/16   Historical Provider, MD   Augmentin 875mg BID since 5/23    Family History:  Family History   Problem Relation Age of Onset    Diabetes Mother     Heart failure Father     Kidney failure Brother        Social History:  Social History   Substance Use Topics    Smoking status: Never Smoker    Smokeless tobacco: Not on file    Alcohol use No   Retired, used to work in a [employer] as a . Lives at home with .     Review of Systems:  Pertinent items are noted in HPI. All other systems are reviewed and are negative.    Health Maintaince :   Primary Care Physician: Dr. Laird  Immunizations:   TDap unsure if up to date.  Influenza is up to date.  Pneumovax is not up to date.  Cancer Screening:  PAP: is not up  "to date.   Mammogram: is not up to date.   Colonoscopy: is up to date. 2011, sigmoid diverticulitis and external hemorrhoids.     Objective:   Last 24 Hour Vital Signs:  BP  Min: 111/77  Max: 161/89  Temp  Av.4 °F (36.9 °C)  Min: 98.4 °F (36.9 °C)  Max: 98.4 °F (36.9 °C)  Pulse  Av.2  Min: 68  Max: 167  Resp  Av.6  Min: 18  Max: 33  SpO2  Av.8 %  Min: 90 %  Max: 97 %  Height  Av' 2" (157.5 cm)  Min: 5' 2" (157.5 cm)  Max: 5' 2" (157.5 cm)  Weight  Av.3 kg (177 lb)  Min: 80.3 kg (177 lb)  Max: 80.3 kg (177 lb)  Body mass index is 32.37 kg/m².  No intake/output data recorded.    Physical Examination:  Gen: Awake and alert, lying flat, NC in place, able to speak 4-5 words between breaths  HEENT: NC/AT, EOMI, PERRLA, OP clear, MMM  CV: Tachycardic, irregularly rhythm, no murmurs  Resp: Bibasilar crackles, no wheezes  Abd: NABS, soft, NT/ND, no masses  L paraumbilical surgical scar  Ext: 2+ distal pulses, no cyanosis or edema  R foot bandage CDI  Skin: No rashes or lesions on exposed skin  Neuro: AAOx3, no focal deficits, MAEW  Psych: Normal mood and affect    Laboratory:  Most Recent Data:  CBC: Lab Results   Component Value Date    WBC 16.21 (H) 2017    HGB 11.9 (L) 2017    HCT 35.0 (L) 2017     (H) 2017    MCV 86 2017    RDW 14.3 2017     WBC Differential: 80 % N, 0 % Bands, 10 % L, 10 % M  BMP: Lab Results   Component Value Date     (L) 2017    K 4.6 2017    CL 98 2017    CO2 23 2017    BUN 14 2017    CREATININE 1.2 2017     (H) 2017    CALCIUM 9.7 2017    MG 1.6 2017    PHOS 2.9 2017     LFTs: Lab Results   Component Value Date    PROT 7.7 2017    ALBUMIN 3.0 (L) 2017    BILITOT 0.6 2017    AST 14 2017    ALKPHOS 50 (L) 2017    ALT 19 2017     Coags:   Lab Results   Component Value Date    INR 1.1 2017     FLP: Lab Results "   Component Value Date    CHOL 151 05/28/2017    HDL 48 05/28/2017    LDLCALC 80.2 05/28/2017    TRIG 114 05/28/2017    CHOLHDL 31.8 05/28/2017     DM: Lab Results   Component Value Date    HGBA1C 7.5 (H) 05/22/2017    LDLCALC 80.2 05/28/2017    CREATININE 1.2 06/05/2017     Thyroid: Lab Results   Component Value Date    TSH 0.411 05/27/2017     Anemia: No results found for: IRON, TIBC, FERRITIN, [id number], FOLATE  Cardiac: Lab Results   Component Value Date    TROPONINI 0.006 06/05/2017     (H) 06/05/2017     Urinalysis: Lab Results   Component Value Date    COLORU Yellow 06/05/2017    SPECGRAV 1.025 06/05/2017    NITRITE Negative 06/05/2017    KETONESU Negative 06/05/2017    UROBILINOGEN Negative 06/05/2017    WBCUA 3 06/05/2017     Other Results:  EKG (my interpretation): afib with RVR 130s    Radiology:  Imaging Results          X-Ray Chest AP Portable (Final result)  Result time 06/05/17 15:58:04    Final result by Roman Duncan DO (06/05/17 15:58:04)                 Impression:        See Above       Electronically signed by: ROMAN DUNCAN DO  Date:     06/05/17  Time:    15:58              Narrative:    Single portable frontal view of the chest    Comparison: 05/27/2017    Results: Continued ill-defined perihilar and bilateral lung opacities which may represent evolving vascular congestion.  Persistent poor definition left lung base concerning for superimposed effusion with atelectasis.  No large pneumothorax.  Right humeral anchors again identified.  Clinical correlation and followup advised                               Assessment:     Caro Powell is a 76 y.o. female with:  Patient Active Problem List    Diagnosis Date Noted    New onset atrial fibrillation 06/05/2017    Chest pain 05/27/2017    Essential hypertension 05/22/2017    Type 2 diabetes mellitus with diabetic neuropathy, without long-term current use of insulin 05/22/2017    Pre-operative clearance 05/22/2017        Plan:      New onset afib with RVR  - Pt p/w chest pain since 4AM  - ECG in ED showed afib with RVR, pt without a prior hx  - Given 20mg of IV diltiazem then started on diltiazem gtt in ED  - Pt still in afib with RVR on my exam, will continue titrating diltiazem gtt  - Trop 0.006,   CXR with e/o pulmonary vascular congestion  crackles on exam  - Lytes wnl, will check TSH, trend troponin/ECG  - [id number] 5, will start heparin gtt, pt denies falls or h/o GIB or ICH  - TTE in AM, consult cardiology to eval for possible DCCV    Leukocytosis  - WBC 16.2, no bands on admit  afebrile  - UA clean, CXR without obvious infiltrates, no s/s infection   - Has been elevated recently, likely 2/2 toe amputation, will monitor    Normocytic anemia  - H/H 11.9/35 MCV 86 on admit  - Will obtain iron studies  last C-scope 2011 no polyps    Mild hypoalbuminemia  - Albumin 3 on admit, likely d/t poor nutrition, monitor    HTN  - Normotensive, continue lisinopril 5mg daily     T2DM with neuropathy  - Last A1c 5/22 was 7.5%  - Takes glimeperide 1mg BID  - H/O diabetic foot ulcer/osteo s/p R 5th toe amputation, currently on Augmentin 875, sees Dr. Jose, last dressing change 6/2, has f/u on 6/9 for suture removal  - Hold home glimeperide, continue neurontin/pramipexole, SSI and accuchecks while inpatient    HCM  - Unsure of Tdap status, flu and pna not UTD  - C-scope UTD 2011  - Needs mammo  - A1c 7.5, LDL 80, HDL 48 in 5/2017      PPx: Heparin gtt  Diet: Cardiac/diabetic, NPO at MN  Dispo: Pending further workup, cards eval    Code Status:     FULL    Clemencia Puentes  [facility] HO-1  [facility]    [facility] Hospitalist Pager numbers:   [facility] Hospital[facility] Medicine Team A (Gerard/Pancho): [phone]  [facility]ist Medicine Team B (Kylee/Nima):  [phone]

## 2017-06-05 NOTE — ED NOTES
Heart rate decreased to 60's-70's notified ER MD and verbal order to decreased diltiazem to 5ml/ hr

## 2017-06-05 NOTE — ED NOTES
Pt c/o midsternal chest pain and SOB since 0400 this morning. Pt appears uncomfortable and is tachypneic. Atrial fib with RVR on monitor. Breath sounds are clear throughout chest. Skin is warm, dry. Pt denies any history of afib. States she was admitted here recently for chest pain, but is unsure of the diagnosed reason for her pain. Pt had right toe amputation performed on 5/23 with Dr. Jose.

## 2017-06-06 DIAGNOSIS — I48.91 ATRIAL FIBRILLATION WITH RAPID VENTRICULAR RESPONSE: Primary | ICD-10-CM

## 2017-06-06 LAB
ALBUMIN SERPL BCP-MCNC: 2.6 G/DL
ALP SERPL-CCNC: 46 U/L
ALT SERPL W/O P-5'-P-CCNC: 17 U/L
ANION GAP SERPL CALC-SCNC: 11 MMOL/L
APTT BLDCRRT: 45.3 SEC
APTT BLDCRRT: 47.6 SEC
AST SERPL-CCNC: 13 U/L
BASOPHILS # BLD AUTO: 0.04 K/UL
BASOPHILS NFR BLD: 0.3 %
BILIRUB SERPL-MCNC: 0.5 MG/DL
BUN SERPL-MCNC: 12 MG/DL
CALCIUM SERPL-MCNC: 9.4 MG/DL
CHLORIDE SERPL-SCNC: 99 MMOL/L
CO2 SERPL-SCNC: 25 MMOL/L
CREAT SERPL-MCNC: 1 MG/DL
DIASTOLIC DYSFUNCTION: YES
DIFFERENTIAL METHOD: ABNORMAL
EOSINOPHIL # BLD AUTO: 0 K/UL
EOSINOPHIL NFR BLD: 0.2 %
ERYTHROCYTE [DISTWIDTH] IN BLOOD BY AUTOMATED COUNT: 14.2 %
EST. GFR  (AFRICAN AMERICAN): >60 ML/MIN/1.73 M^2
EST. GFR  (NON AFRICAN AMERICAN): 54 ML/MIN/1.73 M^2
GLOBAL PERICARDIAL EFFUSION: ABNORMAL
GLUCOSE SERPL-MCNC: 165 MG/DL
HCT VFR BLD AUTO: 33.6 %
HGB BLD-MCNC: 11.1 G/DL
LYMPHOCYTES # BLD AUTO: 2.4 K/UL
LYMPHOCYTES NFR BLD: 17.1 %
MCH RBC QN AUTO: 28.2 PG
MCHC RBC AUTO-ENTMCNC: 33 %
MCV RBC AUTO: 85 FL
MONOCYTES # BLD AUTO: 1.7 K/UL
MONOCYTES NFR BLD: 12.3 %
NEUTROPHILS # BLD AUTO: 9.9 K/UL
NEUTROPHILS NFR BLD: 69.7 %
PLATELET # BLD AUTO: 558 K/UL
PMV BLD AUTO: 9 FL
POCT GLUCOSE: 168 MG/DL (ref 70–110)
POCT GLUCOSE: 172 MG/DL (ref 70–110)
POTASSIUM SERPL-SCNC: 4.4 MMOL/L
PROT SERPL-MCNC: 7 G/DL
RBC # BLD AUTO: 3.94 M/UL
RETIRED EF AND QEF - SEE NOTES: 70 (ref 55–65)
SODIUM SERPL-SCNC: 135 MMOL/L
TROPONIN I SERPL DL<=0.01 NG/ML-MCNC: 0.04 NG/ML
WBC # BLD AUTO: 14.12 K/UL

## 2017-06-06 PROCEDURE — 84484 ASSAY OF TROPONIN QUANT: CPT

## 2017-06-06 PROCEDURE — 80053 COMPREHEN METABOLIC PANEL: CPT

## 2017-06-06 PROCEDURE — 93306 TTE W/DOPPLER COMPLETE: CPT | Mod: 26,,, | Performed by: INTERNAL MEDICINE

## 2017-06-06 PROCEDURE — 93306 TTE W/DOPPLER COMPLETE: CPT

## 2017-06-06 PROCEDURE — 85730 THROMBOPLASTIN TIME PARTIAL: CPT | Mod: 91

## 2017-06-06 PROCEDURE — 85730 THROMBOPLASTIN TIME PARTIAL: CPT

## 2017-06-06 PROCEDURE — 85025 COMPLETE CBC W/AUTO DIFF WBC: CPT

## 2017-06-06 PROCEDURE — 94761 N-INVAS EAR/PLS OXIMETRY MLT: CPT

## 2017-06-06 PROCEDURE — 93010 ELECTROCARDIOGRAM REPORT: CPT | Mod: S$GLB,,, | Performed by: INTERNAL MEDICINE

## 2017-06-06 PROCEDURE — 25000003 PHARM REV CODE 250: Performed by: HOSPITALIST

## 2017-06-06 PROCEDURE — 93005 ELECTROCARDIOGRAM TRACING: CPT

## 2017-06-06 PROCEDURE — 63600175 PHARM REV CODE 636 W HCPCS: Performed by: STUDENT IN AN ORGANIZED HEALTH CARE EDUCATION/TRAINING PROGRAM

## 2017-06-06 PROCEDURE — 36415 COLL VENOUS BLD VENIPUNCTURE: CPT

## 2017-06-06 PROCEDURE — 25000003 PHARM REV CODE 250: Performed by: STUDENT IN AN ORGANIZED HEALTH CARE EDUCATION/TRAINING PROGRAM

## 2017-06-06 PROCEDURE — 99223 1ST HOSP IP/OBS HIGH 75: CPT | Mod: ,,, | Performed by: INTERNAL MEDICINE

## 2017-06-06 PROCEDURE — 27000221 HC OXYGEN, UP TO 24 HOURS

## 2017-06-06 PROCEDURE — 21400001 HC TELEMETRY ROOM

## 2017-06-06 PROCEDURE — 63600175 PHARM REV CODE 636 W HCPCS: Performed by: HOSPITALIST

## 2017-06-06 RX ORDER — FUROSEMIDE 10 MG/ML
40 INJECTION INTRAMUSCULAR; INTRAVENOUS ONCE
Status: COMPLETED | OUTPATIENT
Start: 2017-06-06 | End: 2017-06-06

## 2017-06-06 RX ORDER — ALBUMIN HUMAN 250 G/1000ML
SOLUTION INTRAVENOUS
Status: DISCONTINUED
Start: 2017-06-06 | End: 2017-06-06 | Stop reason: WASHOUT

## 2017-06-06 RX ORDER — LISINOPRIL 10 MG/1
10 TABLET ORAL DAILY
Status: DISCONTINUED | OUTPATIENT
Start: 2017-06-06 | End: 2017-06-08 | Stop reason: HOSPADM

## 2017-06-06 RX ORDER — METOPROLOL TARTRATE 50 MG/1
50 TABLET ORAL 2 TIMES DAILY
Status: DISCONTINUED | OUTPATIENT
Start: 2017-06-06 | End: 2017-06-07

## 2017-06-06 RX ADMIN — GABAPENTIN 400 MG: 400 CAPSULE ORAL at 09:06

## 2017-06-06 RX ADMIN — PANTOPRAZOLE SODIUM 40 MG: 40 TABLET, DELAYED RELEASE ORAL at 08:06

## 2017-06-06 RX ADMIN — AMOXICILLIN AND CLAVULANATE POTASSIUM 1 TABLET: 875; 125 TABLET, FILM COATED ORAL at 09:06

## 2017-06-06 RX ADMIN — BENZONATATE 100 MG: 100 CAPSULE ORAL at 08:06

## 2017-06-06 RX ADMIN — LISINOPRIL 10 MG: 10 TABLET ORAL at 08:06

## 2017-06-06 RX ADMIN — BENZONATATE 100 MG: 100 CAPSULE ORAL at 12:06

## 2017-06-06 RX ADMIN — GABAPENTIN 400 MG: 400 CAPSULE ORAL at 02:06

## 2017-06-06 RX ADMIN — METOPROLOL TARTRATE 50 MG: 50 TABLET ORAL at 12:06

## 2017-06-06 RX ADMIN — METOPROLOL TARTRATE 50 MG: 50 TABLET ORAL at 09:06

## 2017-06-06 RX ADMIN — AMOXICILLIN AND CLAVULANATE POTASSIUM 1 TABLET: 875; 125 TABLET, FILM COATED ORAL at 08:06

## 2017-06-06 RX ADMIN — APIXABAN 5 MG: 5 TABLET, FILM COATED ORAL at 09:06

## 2017-06-06 RX ADMIN — PRAMIPEXOLE DIHYDROCHLORIDE 0.12 MG: 0.12 TABLET ORAL at 08:06

## 2017-06-06 RX ADMIN — PRAMIPEXOLE DIHYDROCHLORIDE 0.12 MG: 0.12 TABLET ORAL at 09:06

## 2017-06-06 RX ADMIN — INSULIN ASPART 2 UNITS: 100 INJECTION, SOLUTION INTRAVENOUS; SUBCUTANEOUS at 11:06

## 2017-06-06 RX ADMIN — FUROSEMIDE 40 MG: 10 INJECTION, SOLUTION INTRAMUSCULAR; INTRAVENOUS at 06:06

## 2017-06-06 RX ADMIN — GABAPENTIN 400 MG: 400 CAPSULE ORAL at 07:06

## 2017-06-06 NOTE — SUBJECTIVE & OBJECTIVE
Past Medical History:   Diagnosis Date    Calcium nephrolithiasis 2007    Diabetes mellitus, type 2     Diabetic peripheral neuropathy associated with type 2 diabetes mellitus     Hypertension        Past Surgical History:   Procedure Laterality Date    COLONOSCOPY  11/28/2011    sigmoid diverticulosis, external hemorrhoids    HYSTERECTOMY      SHOULDER SURGERY Left     TOE AMPUTATION Right 05/22/2017    5th toe       Review of patient's allergies indicates:   Allergen Reactions    Codeine Nausea Only       No current facility-administered medications on file prior to encounter.      Current Outpatient Prescriptions on File Prior to Encounter   Medication Sig    ACCU-CHEK SAMI CONTROL SOLN Soln     ACCU-CHEK SAMI PLUS METER Misc     ACCU-CHEK SAMI PLUS TEST STRP Strp     ACCU-CHEK SOFT DEV LANCETS Kit     ACCU-CHEK SOFTCLIX LANCETS Misc     ammonium lactate 12 % Crea Apply to feet twice daily.    gabapentin (NEURONTIN) 400 MG capsule     glimepiride (AMARYL) 1 MG tablet     hydrocodone-acetaminophen 5-325mg (NORCO) 5-325 mg per tablet Take 1 tablet by mouth every 12 (twelve) hours as needed.    lisinopril (PRINIVIL,ZESTRIL) 5 MG tablet     omeprazole (PRILOSEC) 20 MG capsule Take 1 capsule (20 mg total) by mouth once daily.    pramipexole (MIRAPEX) 0.125 MG tablet      Family History     Problem Relation (Age of Onset)    Diabetes Mother    Heart failure Father    Kidney failure Brother        Social History Main Topics    Smoking status: Never Smoker    Smokeless tobacco: Not on file    Alcohol use No    Drug use: No    Sexual activity: Not on file     Review of Systems   Constitution: Positive for malaise/fatigue. Negative for chills, decreased appetite, diaphoresis, fever and weakness.   Eyes: Blurred vision: dry nonproductive.   Cardiovascular: Positive for chest pain and dyspnea on exertion. Negative for claudication, cyanosis, irregular heartbeat, leg swelling, near-syncope,  orthopnea, palpitations, paroxysmal nocturnal dyspnea and syncope.   Respiratory: Positive for cough. Negative for shortness of breath and wheezing.    Gastrointestinal: Negative for bloating, abdominal pain, constipation, diarrhea, nausea and vomiting.   Neurological: Positive for dizziness.     Objective:     Vital Signs (Most Recent):  Temp: 98.3 °F (36.8 °C) (06/06/17 0704)  Pulse: 61 (06/06/17 0719)  Resp: (!) 28 (06/06/17 0719)  BP: (!) 158/67 (06/06/17 0700)  SpO2: 96 % (06/06/17 0719) Vital Signs (24h Range):  Temp:  [98 °F (36.7 °C)-98.5 °F (36.9 °C)] 98.3 °F (36.8 °C)  Pulse:  [] 61  Resp:  [18-55] 28  SpO2:  [90 %-97 %] 96 %  BP: (111-183)/(56-92) 158/67     Weight: 83.6 kg (184 lb 4.9 oz)  Body mass index is 33.71 kg/m².    SpO2: 96 %  O2 Device (Oxygen Therapy): nasal cannula    No intake or output data in the 24 hours ending 06/06/17 1015    Lines/Drains/Airways     Peripheral Intravenous Line                 Peripheral IV - Single Lumen 06/05/17 1521 Left Antecubital less than 1 day                Physical Exam   Constitutional: She is oriented to person, place, and time. She appears well-developed and well-nourished. No distress.   Cardiovascular: Normal rate and regular rhythm.  Exam reveals no gallop.    No murmur heard.  Pulmonary/Chest: Effort normal. She has decreased breath sounds.   Abdominal: Soft. Bowel sounds are normal. She exhibits no distension. There is no tenderness.   Musculoskeletal: She exhibits edema.   Neurological: She is alert and oriented to person, place, and time.   Skin: Skin is warm and dry.       Significant Labs:       Recent Labs  Lab 06/06/17  0351   CALCIUM 9.4   PROT 7.0   *   K 4.4   CO2 25   CL 99   BUN 12   CREATININE 1.0   ALKPHOS 46*   ALT 17   AST 13   BILITOT 0.5       Recent Labs  Lab 06/06/17  0351   WBC 14.12*   RBC 3.94*   HGB 11.1*   HCT 33.6*   *   MCV 85   MCH 28.2   MCHC 33.0       Recent Labs  Lab 06/06/17  0351   TROPONINI 0.039*        Significant Imaging: Echocardiogram: 2D echo with color flow doppler: results pending

## 2017-06-06 NOTE — HPI
77yo female with history of HTN, DMII, CKD stage III and diabetic peripheral neuropathy who presented to the ER with complaints of chest pain. Ms. Powell complains of chest pain yesterday morning upon awakening at 4 am. The pains was described as a midsternal chest pressure that was associated with SOB and nausea but was not associated with radiation, diaphoresis or vomiting. The pain persisted and was not relieving therefore she took 1/2 of a Hydrocodone pill with slight relief. She presented to the ER since the pain was persisting and was not relieving and reports slight increase with exertion. She reports her pain was still present upon arrival to the ER and eventually relieved with IV medication (Cardizem) with no recurrence. She was found to be in atrial fibrillation on EKG upon admission with normal troponin and no acute EKG changes. She denies any history of atrial fibrillation and denies any complaints of palpitations. She does complain of SOB with exertion and fatigue for the past week with normal activities (ie walking around the house and laundry). She denies orthopnea, PND or syncope.

## 2017-06-06 NOTE — PLAN OF CARE
Results of TTE noted, pt has normal LVEF, grade I diastolic dysfunction, and moderate pericardial effusion. Spoke with Linda Peres with [employer], not concerned about pericardial effusion at this time, no tamponade physiology. Plan is to watch it for now, trying to obtain outpatient cards f/u with Dr. Rooney, also planning for 30-day event monitor in future.     Pt also c/o cough, did have mild pulm vasc congestion on CXR and scant crackles on exam, will give one-time dose lasix 40mg IV and assess UOP.    Clemencia Puentes MD [facility]  6/6/2017 4:18 PM

## 2017-06-06 NOTE — PLAN OF CARE
"   06/06/17 1215   Readmission Questionnaire   At the time of your discharge, did someone talk to you about what your health problems were? Yes   At the time of discharge, did someone talk to you about what to watch out for regarding worsening of your health problem? Yes   At the time of discharge, did someone talk to you about what to do if you experienced worsening of your health problem? Yes   At the time of discharge, did someone talk to you about which medication to take when you left the hospital and which ones to stop taking? Yes   At the time of discharge, did someone talk to you about when and where to follow up with a doctor after you left the hospital? Yes   What do you believe caused you to be sick enough to be re-admitted? "I was having Chest Pain again."   How often do you need to have someone help you when you read instructions, pamphlets, or other written material from your doctor or pharmacy? Sometimes   Do you have problems taking your medications as prescribed? No   Do you have any problems affording any of  your prescribed medications? No   Do you have problems obtaining/receiving your medications? No   Does the patient have transportation to healthcare appointments? Yes   Lives With spouse   Living Arrangements apartment   Does the patient have family/friends to help with healtcare needs after discharge? yes   Who are your caregiver(s) and their phone number(s)? Jin Powell-Spouse:[phone]   Does your caregiver provide all the help you need? Yes   If no, what kind of help do you need at home? Has all the help at home.   Are you currently feeling confused? No   Are you currently having problems thinking? No   Are you currently having memory problems? No   In the last 7 days, my sleep quality was: annalee Snow RN  Transition Navigator  [phone]  "

## 2017-06-06 NOTE — ASSESSMENT & PLAN NOTE
SBP 140s-170; on Lisinopril 5mg daily at home with up titration to 10mg this admission; agree with this and BP improved; complains of dry cough ?ACEI related; would not be opposed to transition to Losartan 50mg po daily

## 2017-06-06 NOTE — ED NOTES
Admitting team notified of pt conversion to NSR, states to titrate diltiazem down just like we would titrate up

## 2017-06-06 NOTE — ASSESSMENT & PLAN NOTE
Presented with new onset afib with RVR HR 130s with no acute STTWC; given IV Cardizem bolus and drip with spontaneous conversion to NSR; currently NSR with HR in the 60s; recommend initiation of Cardizem CD 180mg po daily as long as LVEF on echo normal; if EF depressed then would recommend Toprol XL; CHADSVAS 5 (HTN, age, female, DMII) with 7.2% stroke risk annually therefore chronic anticoagulation recommended; appears to be good candidate for NOACS and would recommend Xarelto; recommend cardiology clinic follow up in 2-3 weeks with either Dr. Rooney or in NP clinic

## 2017-06-06 NOTE — CONSULTS
[facility]  Cardiology  Consult Note    Patient Name: Caro Powell  MRN: [id number]  Admission Date: 6/5/2017  Hospital Length of Stay: 1 days  Code Status: Full Code   Attending Provider: Vishal Gee MD   Consulting Provider: PAYTON Watson ANP  Primary Care Physician: Manuel Laird MD  Principal Problem:New onset atrial fibrillation    Patient information was obtained from patient and past medical records.     Inpatient consult to [facility]  Consult performed by: KASHIF REDMOND  Consult ordered by: MAHENDRA PIÑA  Reason for consult: chest pain; atrial fibrillation wtih RVR         Subjective:     Chief Complaint:  Chest pain      HPI:   77yo female with history of HTN, DMII, CKD stage III and diabetic peripheral neuropathy who presented to the ER with complaints of chest pain. Ms. Powell complains of chest pain yesterday morning upon awakening at 4 am. The pains was described as a midsternal chest pressure that was associated with SOB and nausea but was not associated with radiation, diaphoresis or vomiting. The pain persisted and was not relieving therefore she took 1/2 of a Hydrocodone pill with slight relief. She presented to the ER since the pain was persisting and was not relieving and reports slight increase with exertion. She reports her pain was still present upon arrival to the ER and eventually relieved with IV medication (Cardizem) with no recurrence. She was found to be in atrial fibrillation on EKG upon admission with normal troponin and no acute EKG changes. She denies any history of atrial fibrillation and denies any complaints of palpitations. She does complain of SOB with exertion and fatigue for the past week with normal activities (ie walking around the house and laundry). She denies orthopnea, PND or syncope.     Past Medical History:   Diagnosis Date    Calcium nephrolithiasis 2007    Diabetes mellitus, type 2     Diabetic  peripheral neuropathy associated with type 2 diabetes mellitus     Hypertension        Past Surgical History:   Procedure Laterality Date    COLONOSCOPY  11/28/2011    sigmoid diverticulosis, external hemorrhoids    HYSTERECTOMY      SHOULDER SURGERY Left     TOE AMPUTATION Right 05/22/2017    5th toe       Review of patient's allergies indicates:   Allergen Reactions    Codeine Nausea Only       No current facility-administered medications on file prior to encounter.      Current Outpatient Prescriptions on File Prior to Encounter   Medication Sig    ACCU-CHEK SAMI CONTROL SOLN Soln     ACCU-CHEK SAMI PLUS METER Misc     ACCU-CHEK SAMI PLUS TEST STRP Strp     ACCU-CHEK SOFT DEV LANCETS Kit     ACCU-CHEK SOFTCLIX LANCETS Misc     ammonium lactate 12 % Crea Apply to feet twice daily.    gabapentin (NEURONTIN) 400 MG capsule     glimepiride (AMARYL) 1 MG tablet     hydrocodone-acetaminophen 5-325mg (NORCO) 5-325 mg per tablet Take 1 tablet by mouth every 12 (twelve) hours as needed.    lisinopril (PRINIVIL,ZESTRIL) 5 MG tablet     omeprazole (PRILOSEC) 20 MG capsule Take 1 capsule (20 mg total) by mouth once daily.    pramipexole (MIRAPEX) 0.125 MG tablet      Family History     Problem Relation (Age of Onset)    Diabetes Mother    Heart failure Father    Kidney failure Brother        Social History Main Topics    Smoking status: Never Smoker    Smokeless tobacco: Not on file    Alcohol use No    Drug use: No    Sexual activity: Not on file     Review of Systems   Constitution: Positive for malaise/fatigue. Negative for chills, decreased appetite, diaphoresis, fever and weakness.   Eyes: Blurred vision: dry nonproductive.   Cardiovascular: Positive for chest pain and dyspnea on exertion. Negative for claudication, cyanosis, irregular heartbeat, leg swelling, near-syncope, orthopnea, palpitations, paroxysmal nocturnal dyspnea and syncope.   Respiratory: Positive for cough. Negative for  shortness of breath and wheezing.    Gastrointestinal: Negative for bloating, abdominal pain, constipation, diarrhea, nausea and vomiting.   Neurological: Positive for dizziness.     Objective:     Vital Signs (Most Recent):  Temp: 98.3 °F (36.8 °C) (06/06/17 0704)  Pulse: 61 (06/06/17 0719)  Resp: (!) 28 (06/06/17 0719)  BP: (!) 158/67 (06/06/17 0700)  SpO2: 96 % (06/06/17 0719) Vital Signs (24h Range):  Temp:  [98 °F (36.7 °C)-98.5 °F (36.9 °C)] 98.3 °F (36.8 °C)  Pulse:  [] 61  Resp:  [18-55] 28  SpO2:  [90 %-97 %] 96 %  BP: (111-183)/(56-92) 158/67     Weight: 83.6 kg (184 lb 4.9 oz)  Body mass index is 33.71 kg/m².    SpO2: 96 %  O2 Device (Oxygen Therapy): nasal cannula    No intake or output data in the 24 hours ending 06/06/17 1015    Lines/Drains/Airways     Peripheral Intravenous Line                 Peripheral IV - Single Lumen 06/05/17 1521 Left Antecubital less than 1 day                Physical Exam   Constitutional: She is oriented to person, place, and time. She appears well-developed and well-nourished. No distress.   Cardiovascular: Normal rate and regular rhythm.  Exam reveals no gallop.    No murmur heard.  Pulmonary/Chest: Effort normal. She has decreased breath sounds.   Abdominal: Soft. Bowel sounds are normal. She exhibits no distension. There is no tenderness.   Musculoskeletal: She exhibits edema.   Neurological: She is alert and oriented to person, place, and time.   Skin: Skin is warm and dry.       Significant Labs:       Recent Labs  Lab 06/06/17  0351   CALCIUM 9.4   PROT 7.0   *   K 4.4   CO2 25   CL 99   BUN 12   CREATININE 1.0   ALKPHOS 46*   ALT 17   AST 13   BILITOT 0.5       Recent Labs  Lab 06/06/17  0351   WBC 14.12*   RBC 3.94*   HGB 11.1*   HCT 33.6*   *   MCV 85   MCH 28.2   MCHC 33.0       Recent Labs  Lab 06/06/17  0351   TROPONINI 0.039*       Significant Imaging: Echocardiogram: 2D echo with color flow doppler: results pending     Assessment and Plan:      Chest pain, unspecified    Complaints of chest pain at rest; pressure sensation and constant for 4 hours; relieved with IV Cardizem; EKG with no acute changes and CE negative x 3; feel chest pain more related to atrial fibrillation vs noncardiac etiology; risk factors for CAD with age, HTN and DMII with slight concern for ischemic etiology; recommend treatment for afib with outpatient follow up in cardiology clinic to determine further ischemic evaluation ie stress test        Essential hypertension    SBP 140s-170; on Lisinopril 5mg daily at home with up titration to 10mg this admission; agree with this and BP improved; complains of dry cough ?ACEI related; would not be opposed to transition to Losartan 50mg po daily         * New onset atrial fibrillation    Presented with new onset afib with RVR HR 130s with no acute STTWC; given IV Cardizem bolus and drip with spontaneous conversion to NSR; currently NSR with HR in the 60s; recommend initiation of Cardizem CD 180mg po daily as long as LVEF on echo normal; if EF depressed then would recommend Toprol XL; CHADSVAS 5 (HTN, age, female, DMII) with 7.2% stroke risk annually therefore chronic anticoagulation recommended; appears to be good candidate for NOACS and would recommend Xarelto; recommend cardiology clinic follow up in 2-3 weeks with either Dr. Rooney or in NP clinic             VTE Risk Mitigation         Ordered     Medium Risk of VTE  Once      06/05/17 2049     Place sequential compression device  Until discontinued      06/05/17 2049     Place AZUL hose  Until discontinued      06/05/17 2049        Plan as detailed above; recommend follow up in cardiology clinic in 2-3 weeks; can follow up with Dr. Rooney or suitable for NP afternoon clinic if Dr. Rooney unavailable    Thank you for your consult. I will follow-up with patient. Please contact us if you have any additional questions.      Addendum: patient seen on rounds with Dr. Rooney reviewe of  echo with normal LVEF, diastolic dysfunction and moderate pericardial effusion with no tamponade features. Recommend initiation of Cardizem CD along with Xarelto and will arrange for 30 day event monitor as an outpatient along with cardiology clinic follow up in 2-3 weeks    PAYTON Watson, ANP  Cardiology   [facility]

## 2017-06-06 NOTE — PLAN OF CARE
Problem: Fall Risk (Adult)  Goal: Absence of Falls  Patient will demonstrate the desired outcomes by discharge/transition of care.   Outcome: Ongoing (interventions implemented as appropriate)  No falls this shift.  Patient compliant with calling for help to get up.    Problem: Patient Care Overview  Goal: Individualization & Mutuality  Outcome: Ongoing (interventions implemented as appropriate)  Patient transfer to telemetry expected.

## 2017-06-06 NOTE — PLAN OF CARE
TN met with patient. Patient independent, lives with  at home. Patient does not have HH or HME at home. Patient still drives, but would like a PCP in [place]. Patient did not have a preference, TN set up patient with new PCP.     Follow-up With  Details  Why  Contact Info   Jatinder Bailey NP  On 6/20/2017  at 1:40 pm, This is Dr. Rooney's Nurse Practitioner.   [address]  [place] [address]  [phone]   Claudy Escobedo MD  On 6/14/2017  at 11:00 am, This is your new Primary Care Physician  [address]  [facility] [address]  [phone]        06/06/17 1201   Discharge Assessment   Assessment Type Discharge Planning Assessment   Confirmed/corrected address and phone number on facesheet? Yes   Assessment information obtained from? Patient   Expected Length of Stay (days) [id number]   Communicated expected length of stay with patient/caregiver yes   Type of Healthcare Directive Received Other (Comment)  (None)   If Healthcare Directive is received, is it scanned into Epic? no (comment)  (N/A)   Prior to hospitilization cognitive status: Alert/Oriented   Prior to hospitalization functional status: Independent   Current cognitive status: Alert/Oriented   Current Functional Status: Independent   Arrived From home or self-care   Lives With spouse   Able to Return to Prior Arrangements yes   Is patient able to care for self after discharge? Yes   How many people do you have in your home that can help with your care after discharge? 1   Who are your caregiver(s) and their phone number(s)? Jin Powell-Spouse [phone]   Patient's perception of discharge disposition home or selfcare   Readmission Within The Last 30 Days previous discharge plan unsuccessful   Patient currently being followed by outpatient case management? No   Patient currently receives home health services? No   Does the patient currently use HME? No   Patient currently receives private duty nursing? No    Patient currently receives any other outside agency services? No   Equipment Currently Used at Home none   Do you have any problems affording any of your prescribed medications? No   Is the patient taking medications as prescribed? yes   Do you have any financial concerns preventing you from receiving the healthcare you need? No   Does the patient have transportation to healthcare appointments? Yes   Transportation Available car;family or friend will provide   On Dialysis? No   Does the patient receive services at the Coumadin Clinic? No   Are there any open cases? No   Discharge Plan A Home with family   Discharge Plan B Home with family;Home Health   Patient/Family In Agreement With Plan yes     Lori Snow RN  Transition Navigator  [phone]

## 2017-06-06 NOTE — PLAN OF CARE
Problem: Patient Care Overview  Goal: Plan of Care Review  Outcome: Ongoing (interventions implemented as appropriate)  Received pt on 3L NC; SAT 97%.

## 2017-06-06 NOTE — PLAN OF CARE
Problem: Patient Care Overview  Goal: Plan of Care Review  Outcome: Ongoing (interventions implemented as appropriate)  Pt received on nasal cannula at 3 lpm.  SPO2  96%.  Pt in no apparent respiratory distress. Will continue to monitor.

## 2017-06-06 NOTE — PROGRESS NOTES
"U Internal Medicine Resident HO-1 Progress Note    Subjective:      Complaining of a HA this AM, states it's from not having eaten. Denies any further CP since admission. Still a little SOB, but better than at presentation. No palpitations, fevers/chills.      Objective:   Last 24 Hour Vital Signs:  BP  Min: 111/77  Max: 174/73  Temp  Av.4 °F (36.9 °C)  Min: 98.4 °F (36.9 °C)  Max: 98.4 °F (36.9 °C)  Pulse  Av.7  Min: 68  Max: 167  Resp  Av.4  Min: 18  Max: 35  SpO2  Av.1 %  Min: 90 %  Max: 97 %  Height  Av' 2" (157.5 cm)  Min: 5' 2" (157.5 cm)  Max: 5' 2" (157.5 cm)  Weight  Av.9 kg (180 lb 10.4 oz)  Min: 80.3 kg (177 lb)  Max: 83.6 kg (184 lb 4.9 oz)  No intake/output data recorded.    Physical Examination:  Gen: Awake and alert, lying flat, NC in place  HEENT: NC/AT, EOMI, PERRLA, OP clear, MMM  CV: RRR, normal S1/S2, no murmurs  Resp: Bibasilar crackles, no wheezes  Abd: NABS, soft, NT/ND, no masses  L paraumbilical surgical scar  Ext: 2+ distal pulses, no cyanosis or edema  R foot bandage CDI  Skin: No rashes or lesions on exposed skin  Neuro: AAOx3, no focal deficits, MAEW  Psych: Normal mood and affect    Laboratory:  Laboratory Data Reviewed: yes    Recent Labs  Lab 17  1535 17  2058 17  0351   WBC 16.21* 13.86* 14.12*   HGB 11.9* 11.3* 11.1*   HCT 35.0* 33.9* 33.6*   * 579*  579* 558*   *  --  135*   K 4.6  --  4.4   CL 98  --  99   CO2 23  --  25   BUN 14  --  12   *  --  165*   CALCIUM 9.7  --  9.4   PROT 7.7  --  7.0   ALBUMIN 3.0*  --  2.6*   BILITOT 0.6  --  0.5   AST 14  --  13   ALKPHOS 50*  --  46*   ALT 19  --  17   INR 1.1 1.1  --        Microbiology Data Reviewed: yes  None    Other Results:  EKG (my interpretation): No dynamic ST or TW changes    Radiology Data Reviewed: yes  None    Current Medications:     Infusions:   diltiazem Stopped (17)    heparin (porcine) in D5W 16 Units/kg/hr (17 9172)        " Scheduled:   amoxicillin-clavulanate 875-125mg  1 tablet Oral BID    gabapentin  400 mg Oral TID    heparin (PORCINE)  70 Units/kg Intravenous Once    lisinopril  5 mg Oral Daily    pantoprazole  40 mg Oral Daily    pramipexole  0.125 mg Oral BID        PRN:  benzonatate, dextrose 50%, dextrose 50%, glucagon (human recombinant), glucose, glucose, heparin (PORCINE), heparin (PORCINE), insulin aspart, pneumoc 13-rabia conj-dip cr(PF)    Antibiotics and Day Number of Therapy:  Antibiotics     Start     Stop Route Frequency Ordered    06/05/17 2100  amoxicillin-clavulanate 875-125mg per tablet 1 tablet      -- Oral 2 times daily 06/05/17 2049          Assessment:     Caro Powell is a 76 y.o.female with  Patient Active Problem List    Diagnosis Date Noted    New onset atrial fibrillation 06/05/2017    Chest pain 05/27/2017    Essential hypertension 05/22/2017    Type 2 diabetes mellitus with diabetic neuropathy, without long-term current use of insulin 05/22/2017    Pre-operative clearance 05/22/2017        Plan:     New onset afib with RVR, now converted to NSR  - Pt p/w chest pain since 4AM day of presentation  - ECG in ED showed afib with RVR, pt without a prior hx  - Given 20mg of IV diltiazem then started on diltiazem gtt in ED  - Pt still in afib with RVR on initial exam, continued diltiazem gtt, converted to NSR at 7:30pm day of admission  -   CXR with e/o pulmonary vascular congestion  crackles on exam  - Lytes wnl, TSH 0.419  troponin trending up 0.006 -> 0.021 -> 0.039  ECGs without dynamic changes  - [id number] 5, continue heparin gtt, TTE today, pending cardiology eval      Leukocytosis  - WBC 16.2, no bands on admit  afebrile  - UA clean, CXR without obvious infiltrates, no s/s infection   - Has been elevated recently, likely 2/2 toe amputation, will monitor     Normocytic anemia  - H/H 11.9/35 MCV 86 on admit  - Iron studies with ferritin 540, iron 17, TIBC 272, 6% sat, nml  B12/folate, c/w AOCD  - Last C-scope 2011 no polyps     Mild hypoalbuminemia  - Albumin 3 on admit, likely d/t poor nutrition, monitor     HTN  - BPs starting to increase, increase lisinopril to 10mg daily     T2DM with neuropathy  - Last A1c 5/22 was 7.5%  - Takes glimeperide 1mg BID  - Hold home glimeperide, continue neurontin/pramipexole, SSI and accuchecks while inpatient  - H/O diabetic foot ulcer/osteo s/p 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 Jose, last dressing change 6/2, has f/u on 6/9 for suture removal     HCM  - Unsure of Tdap status, flu and pna not UTD  - C-scope UTD 2011  - Needs mammo  - A1c 7.5, LDL 80, HDL 48 in 5/2017        PPx: Heparin gtt  Diet: Cardiac/diabetic, NPO at MN  Dispo: Pending TTE, cards frederic Puentes  [facility] Internal Medicine HO-1  [facility] Internal Medicine Service Team A    [facility] Medicine Hospitalist Pager numbers:   [facility] Hospitalist Medicine Team A (Gerard/Pancho): [phone]  [facility] Hospitalist Medicine Team B (Kylee/Nima):  [phone]

## 2017-06-06 NOTE — HOSPITAL COURSE
6/5/2017 Presented with complaints of chest pain at rest for 4 hours with only minor relief with pain medications (Hydrocodone). Upon arrival to the ER, EKG demonstrated afib with RVR with  with no STTWC. Was given IV Cardizem and placed on drip with spontaneous conversion to NSR. Repeat EKG with NSR, normal axis and no STTWC. Serial CE with troponin .[phone]. Admitted to ICU under care of [facility] 6/6/2017 Cardiology consulted for evaluation and treatment of atrial fibrillation. Stable overnight with no acute EKG changes or recurrent chest pain. Remains in NSR with stable BP and continued on Heparin drip 06/07/2017 patient noted with AF/FLT RVR at 1000 hour, given Cardizem by primary team with conversion to SB/SR and has maintained without recurrent AF/FLT since that time. Tolerating NOAC well without GI complaint. 06/08/2017 telemetry reviewed without notation of any recurrence of AF/FLT since yesterday. SB/SR noted.

## 2017-06-07 LAB
BASOPHILS # BLD AUTO: 0.02 K/UL
BASOPHILS NFR BLD: 0.2 %
DIFFERENTIAL METHOD: ABNORMAL
EOSINOPHIL # BLD AUTO: 0.1 K/UL
EOSINOPHIL NFR BLD: 1.1 %
ERYTHROCYTE [DISTWIDTH] IN BLOOD BY AUTOMATED COUNT: 14 %
HCT VFR BLD AUTO: 32.3 %
HGB BLD-MCNC: 10.8 G/DL
LYMPHOCYTES # BLD AUTO: 1.8 K/UL
LYMPHOCYTES NFR BLD: 17 %
MCH RBC QN AUTO: 28.1 PG
MCHC RBC AUTO-ENTMCNC: 33.4 %
MCV RBC AUTO: 84 FL
MONOCYTES # BLD AUTO: 1.3 K/UL
MONOCYTES NFR BLD: 12.4 %
NEUTROPHILS # BLD AUTO: 7.3 K/UL
NEUTROPHILS NFR BLD: 68.8 %
PLATELET # BLD AUTO: 553 K/UL
PMV BLD AUTO: 9.1 FL
POCT GLUCOSE: 169 MG/DL (ref 70–110)
POCT GLUCOSE: 213 MG/DL (ref 70–110)
POCT GLUCOSE: 237 MG/DL (ref 70–110)
POCT GLUCOSE: 85 MG/DL (ref 70–110)
RBC # BLD AUTO: 3.84 M/UL
WBC # BLD AUTO: 10.63 K/UL

## 2017-06-07 PROCEDURE — 25000003 PHARM REV CODE 250: Performed by: STUDENT IN AN ORGANIZED HEALTH CARE EDUCATION/TRAINING PROGRAM

## 2017-06-07 PROCEDURE — 85025 COMPLETE CBC W/AUTO DIFF WBC: CPT

## 2017-06-07 PROCEDURE — 25000003 PHARM REV CODE 250: Performed by: HOSPITALIST

## 2017-06-07 PROCEDURE — 93005 ELECTROCARDIOGRAM TRACING: CPT

## 2017-06-07 PROCEDURE — 36415 COLL VENOUS BLD VENIPUNCTURE: CPT

## 2017-06-07 PROCEDURE — 99232 SBSQ HOSP IP/OBS MODERATE 35: CPT | Mod: ,,, | Performed by: NURSE PRACTITIONER

## 2017-06-07 PROCEDURE — 63600175 PHARM REV CODE 636 W HCPCS: Performed by: HOSPITALIST

## 2017-06-07 PROCEDURE — 27000221 HC OXYGEN, UP TO 24 HOURS

## 2017-06-07 PROCEDURE — 93010 ELECTROCARDIOGRAM REPORT: CPT | Mod: S$GLB,,, | Performed by: INTERNAL MEDICINE

## 2017-06-07 PROCEDURE — 21400001 HC TELEMETRY ROOM

## 2017-06-07 PROCEDURE — 94761 N-INVAS EAR/PLS OXIMETRY MLT: CPT

## 2017-06-07 RX ORDER — DILTIAZEM HYDROCHLORIDE 30 MG/1
120 TABLET, FILM COATED ORAL EVERY 12 HOURS
Status: DISCONTINUED | OUTPATIENT
Start: 2017-06-07 | End: 2017-06-08

## 2017-06-07 RX ORDER — DILTIAZEM HYDROCHLORIDE 5 MG/ML
0.25 INJECTION INTRAVENOUS ONCE
Status: COMPLETED | OUTPATIENT
Start: 2017-06-07 | End: 2017-06-07

## 2017-06-07 RX ORDER — METOPROLOL SUCCINATE 50 MG/1
50 TABLET, EXTENDED RELEASE ORAL DAILY
Status: DISCONTINUED | OUTPATIENT
Start: 2017-06-07 | End: 2017-06-07

## 2017-06-07 RX ADMIN — GABAPENTIN 400 MG: 400 CAPSULE ORAL at 05:06

## 2017-06-07 RX ADMIN — GABAPENTIN 400 MG: 400 CAPSULE ORAL at 04:06

## 2017-06-07 RX ADMIN — APIXABAN 5 MG: 5 TABLET, FILM COATED ORAL at 10:06

## 2017-06-07 RX ADMIN — INSULIN ASPART 2 UNITS: 100 INJECTION, SOLUTION INTRAVENOUS; SUBCUTANEOUS at 10:06

## 2017-06-07 RX ADMIN — DILTIAZEM HYDROCHLORIDE 120 MG: 30 TABLET, FILM COATED ORAL at 01:06

## 2017-06-07 RX ADMIN — PANTOPRAZOLE SODIUM 40 MG: 40 TABLET, DELAYED RELEASE ORAL at 08:06

## 2017-06-07 RX ADMIN — GABAPENTIN 400 MG: 400 CAPSULE ORAL at 10:06

## 2017-06-07 RX ADMIN — LISINOPRIL 10 MG: 10 TABLET ORAL at 08:06

## 2017-06-07 RX ADMIN — AMOXICILLIN AND CLAVULANATE POTASSIUM 1 TABLET: 875; 125 TABLET, FILM COATED ORAL at 10:06

## 2017-06-07 RX ADMIN — APIXABAN 5 MG: 5 TABLET, FILM COATED ORAL at 08:06

## 2017-06-07 RX ADMIN — DILTIAZEM HYDROCHLORIDE 21 MG: 5 INJECTION INTRAVENOUS at 11:06

## 2017-06-07 RX ADMIN — PRAMIPEXOLE DIHYDROCHLORIDE 0.12 MG: 0.12 TABLET ORAL at 10:06

## 2017-06-07 RX ADMIN — METOPROLOL SUCCINATE 50 MG: 50 TABLET, EXTENDED RELEASE ORAL at 08:06

## 2017-06-07 RX ADMIN — AMOXICILLIN AND CLAVULANATE POTASSIUM 1 TABLET: 875; 125 TABLET, FILM COATED ORAL at 08:06

## 2017-06-07 NOTE — PHYSICIAN QUERY
PT Name: Caro Powell  MR #: [id number]    Physician Query Form - Atrial Fibrillation Specificity     CDS/: Hollie Peralta               Contact information:[email or web address]     This form is a permanent document in the medical record.     Query Date: June 7, 2017    By submitting this query, we are merely seeking further clarification of documentation. Please utilize your independent clinical judgment when addressing the question(s) below.    The medical record contains the following:   Indicators     Supporting Clinical Findings Location in Medical Record   x Atrial Fibrillation New onset afib with RVR    H&P 6/5   x EKG results afib with RVR 130s    H&P 6/5   x Medication Given 20mg of IV diltiazem then started on diltiazem gtt in ED    H&P 6/5   x Treatment TTE in AM, consult cardiology to eval for possible DCCV  H&P 6/5    Other         Provider, please further specify the Atrial Fibrillation diagnosis.    [  ] Chronic  [X] Paroxysmal  [  ] Permanent  [  ] Persistent  [  ] Other (please specify): ____________________________  [  ] Clinically Undetermined    Please document in your progress notes daily for the duration of treatment until resolved, and include in your discharge summary.

## 2017-06-07 NOTE — PLAN OF CARE
Problem: Patient Care Overview  Goal: Plan of Care Review  Outcome: Ongoing (interventions implemented as appropriate)  Pt on documented O2, no respiratory distress noted. Will continue to monitor.

## 2017-06-07 NOTE — PROGRESS NOTES
.Pharmacy New Medication Education    Patient accepted medication education.    Pharmacy educated patient on the following medications, using the teach-back method.   Augmentin  Eliquis  Tessalon  D50%  Glucagon  Gabapentin  Glucose  Novolog  Lisinopril  Toprol  Pantoprazole  mirapex  Learners of pharmacy medication education included:  patient    Patient +/- learner response:  verbalize understanding

## 2017-06-07 NOTE — SUBJECTIVE & OBJECTIVE
Review of Systems   Constitution: Positive for malaise/fatigue.   Cardiovascular: Positive for dyspnea on exertion and irregular heartbeat. Negative for chest pain, leg swelling, near-syncope, orthopnea, palpitations, paroxysmal nocturnal dyspnea and syncope.   Respiratory: Positive for cough. Negative for shortness of breath and sputum production.      Objective:     Vital Signs (Most Recent):  Temp: 98.5 °F (36.9 °C) (06/07/17 0800)  Pulse: 62 (06/07/17 1304)  Resp: 18 (06/07/17 1304)  BP: (!) 118/56 (06/07/17 1304)  SpO2: (!) 93 % (06/07/17 1137) Vital Signs (24h Range):  Temp:  [97.8 °F (36.6 °C)-99 °F (37.2 °C)] 98.5 °F (36.9 °C)  Pulse:  [] 62  Resp:  [16-57] 18  SpO2:  [91 %-98 %] 93 %  BP: ()/(53-80) 118/56     Weight: 83.7 kg (184 lb 8.4 oz)  Body mass index is 33.75 kg/m².     SpO2: (!) 93 %  O2 Device (Oxygen Therapy): nasal cannula      Intake/Output Summary (Last 24 hours) at 06/07/17 1334  Last data filed at 06/07/17 0800   Gross per 24 hour   Intake                0 ml   Output             1000 ml   Net            -1000 ml       Lines/Drains/Airways     Peripheral Intravenous Line                 Peripheral IV - Single Lumen 06/05/17 1521 Left Antecubital 1 day                Physical Exam   Constitutional: She is oriented to person, place, and time. She appears well-developed and well-nourished. No distress.   HENT:   Head: Normocephalic and atraumatic.   Eyes: Right eye exhibits no discharge. Left eye exhibits no discharge.   Cardiovascular: Normal rate and regular rhythm.  Exam reveals no gallop and no friction rub.    No murmur heard.  Pulmonary/Chest: Effort normal and breath sounds normal.   Abdominal: Soft. Bowel sounds are normal.   Musculoskeletal: She exhibits no edema.   Neurological: She is alert and oriented to person, place, and time.   Skin: Skin is warm and dry. She is not diaphoretic.   Psychiatric: She has a normal mood and affect. Her behavior is normal.        Significant Labs:   BMP:   Recent Labs  Lab 06/05/17 1535 06/06/17  0351   * 165*   * 135*   K 4.6 4.4   CL 98 99   CO2 23 25   BUN 14 12   CREATININE 1.2 1.0   CALCIUM 9.7 9.4   , CMP   Recent Labs  Lab 06/05/17 1535 06/06/17  0351   * 135*   K 4.6 4.4   CL 98 99   CO2 23 25   * 165*   BUN 14 12   CREATININE 1.2 1.0   CALCIUM 9.7 9.4   PROT 7.7 7.0   ALBUMIN 3.0* 2.6*   BILITOT 0.6 0.5   ALKPHOS 50* 46*   AST 14 13   ALT 19 17   ANIONGAP 14 11   ESTGFRAFRICA 50* >60   EGFRNONAA 44* 54*   , CBC   Recent Labs  Lab 06/05/17 2058 06/06/17  0351 06/07/17  0554   WBC 13.86* 14.12* 10.63   HGB 11.3* 11.1* 10.8*   HCT 33.9* 33.6* 32.3*   *  579* 558* 553*   , INR   Recent Labs  Lab 06/05/17 1535 06/05/17 2058   INR 1.1 1.1   , Lipid Panel No results for input(s): CHOL, HDL, LDLCALC, TRIG, CHOLHDL in the last 48 hours.,   Pathology Results  (Last 10 years)    None      , Troponin   Recent Labs  Lab 06/05/17 1535 06/05/17 2058 06/06/17  0351   TROPONINI 0.006 0.021 0.039*    and All pertinent lab results from the last 24 hours have been reviewed.    Significant Imaging: Echocardiogram:   2D echo with color flow doppler:   Results for orders placed or performed during the hospital encounter of 06/05/17   2D echo with color flow doppler   Result Value Ref Range    EF 70 55 - 65    Diastolic Dysfunction Yes (A)     Pericardial Effusion MODERATE (A)

## 2017-06-07 NOTE — ASSESSMENT & PLAN NOTE
Presented with new onset afib with RVR HR 130s with no acute STTWC; given IV Cardizem bolus and drip with spontaneous conversion to NSR; currently NSR with HR in the 60s; EF normal per ECHO Cardizem discontinued yesterday and Toprol initiated with recurrence of AF/FLT with RVR this morning; given IV cardizem with return to SB/SR; Agree with oral Cardizem - convert to CD dosing prior to DC. CHADSVAS 5 (HTN, age, female, DMII) with 7.2% stroke risk annually placed on Eliquis; recommend cardiology clinic follow up in 2-3 weeks with either Dr. Rooney or in NP clinic

## 2017-06-07 NOTE — ASSESSMENT & PLAN NOTE
SBP 110s-140s; on Lisinopril 10mg daily with improved BP;  complains of dry cough ?ACEI related; would not be opposed to transition to Losartan 50mg po daily if cough continues

## 2017-06-07 NOTE — CONSULTS
Consulted for ADA diet education. Pt not appropriate for diet education today. Will follow up tomorrow.

## 2017-06-07 NOTE — PROGRESS NOTES
U Internal Medicine Resident HO-II Progress Note    Subjective:      Feeling well this AM. Denies chest pain or palpitations. Wants to go home.     Objective:   Last 24 Hour Vital Signs:  BP  Min: 109/55  Max: 177/74  Temp  Av.3 °F (36.8 °C)  Min: 97.8 °F (36.6 °C)  Max: 99 °F (37.2 °C)  Pulse  Av.5  Min: 51  Max: 66  Resp  Av.7  Min: 16  Max: 57  SpO2  Av.8 %  Min: 91 %  Max: 98 %  Weight  Av.7 kg (184 lb 8.4 oz)  Min: 83.7 kg (184 lb 8.4 oz)  Max: 83.7 kg (184 lb 8.4 oz)  I/O last 3 completed shifts:  In: -   Out: 1100 [Urine:1100]    Physical Examination:  Gen: Awake and alert, lying flat, NC in place  HEENT: NC/AT, EOMI, PERRLA, OP clear, MMM  CV: RRR, normal S1/S2, no murmurs  Resp: Bibasilar crackles, no wheezes  Abd: NABS, soft, NT/ND, no masses  L paraumbilical surgical scar  Ext: 2+ distal pulses, no cyanosis or edema  R foot bandage CDI  Skin: No rashes or lesions on exposed skin  Neuro: AAOx3, no focal deficits, MAEW  Psych: Normal mood and affect    Laboratory:  Laboratory Data Reviewed: yes    Recent Labs  Lab 17  1535 17  2058 17  0351 17  0554   WBC 16.21* 13.86* 14.12* 10.63   HGB 11.9* 11.3* 11.1* 10.8*   HCT 35.0* 33.9* 33.6* 32.3*   * 579*  579* 558* 553*   *  --  135*  --    K 4.6  --  4.4  --    CL 98  --  99  --    CO2 23  --  25  --    BUN 14  --  12  --    *  --  165*  --    CALCIUM 9.7  --  9.4  --    PROT 7.7  --  7.0  --    ALBUMIN 3.0*  --  2.6*  --    BILITOT 0.6  --  0.5  --    AST 14  --  13  --    ALKPHOS 50*  --  46*  --    ALT 19  --  17  --    INR 1.1 1.1  --   --        Microbiology Data Reviewed: yes  None    Other Results:  EKG (my interpretation): No dynamic ST or TW changes    Radiology Data Reviewed: yes  None    Current Medications:     Infusions:        Scheduled:   amoxicillin-clavulanate 875-125mg  1 tablet Oral BID    apixaban  5 mg Oral BID    gabapentin  400 mg Oral TID    lisinopril  10 mg  Oral Daily    metoprolol succinate  50 mg Oral Daily    pantoprazole  40 mg Oral Daily    pramipexole  0.125 mg Oral BID        PRN:  benzonatate, dextrose 50%, dextrose 50%, glucagon (human recombinant), glucose, glucose, insulin aspart, pneumoc 13-rabia conj-dip cr(PF)    Antibiotics and Day Number of Therapy:  Antibiotics     Start     Stop Route Frequency Ordered    06/05/17 2100  amoxicillin-clavulanate 875-125mg per tablet 1 tablet      -- Oral 2 times daily 06/05/17 2049          Assessment:     Caro Powell is a 76 y.o.female with  Patient Active Problem List    Diagnosis Date Noted    Atrial fibrillation with rapid ventricular response 06/06/2017    New onset atrial fibrillation 06/05/2017    Chest pain, unspecified 05/27/2017    Essential hypertension 05/22/2017    Type 2 diabetes mellitus with diabetic neuropathy, without long-term current use of insulin 05/22/2017    Pre-operative clearance 05/22/2017        Plan:     New onset afib with RVR, now converted to NSR  - Pt p/w chest pain since 4AM day of presentation  - ECG in ED showed afib with RVR, pt without a prior hx  - Given 20mg of IV diltiazem then started on diltiazem gtt in ED  - Converted to NSR, off dilt gtt. Started on oral beta blocker  -ECHO without structural abnormalities  - [id number] 5, elliquis initiated  -Evaluated by [facility], outpatient follow-up arranged     Leukocytosis, resolved  - WBC 16.2, no bands on admit  afebrile  - UA clean, CXR without obvious infiltrates, no s/s infection   - Has been elevated recently, likely 2/2 toe amputation, will monitor  -WBC 10.6 today     Normocytic anemia  - H/H 11.9/35 MCV 86 on admit  - Iron studies with ferritin 540, iron 17, TIBC 272, 6% sat, nml B12/folate, c/w AOCD  - Last C-scope 2011 no polyps     Mild hypoalbuminemia  - Albumin 3 on admit, likely d/t poor nutrition, monitor     HTN  - BPs starting to increase, increase lisinopril to 10mg daily     T2DM with  neuropathy  - Last A1c 5/22 was 7.5%  - Takes glimeperide 1mg BID  - Hold home glimeperide, continue neurontin/pramipexole, SSI and accuchecks while inpatient  - H/O diabetic foot ulcer/osteo s/p 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 Jose, last dressing change 6/2, has f/u on 6/9 for suture removal     HCM  - Unsure of Tdap status, flu and pna not UTD  - C-scope UTD 2011  - Needs mammo  - A1c 7.5, LDL 80, HDL 48 in 5/2017        PPx: Eliquis  Diet: Cardiac/diabetic  Dispo: likely home today    Betty Hanks  U Internal Medicine HO-II  U Internal Medicine Service Team A    [facility] Medicine Hospitalist Pager numbers:   U Hospitalist Medicine Team A (Gerard/Pancho): [phone]  U Hospitalist Medicine Team B (Kylee/Nima):  [phone]

## 2017-06-07 NOTE — PLAN OF CARE
11:23 B/P 127/65   Diltizem 21mg (4.2 ml)      1ml pushed   11:26 B/P 91/64 HR 93   11:34 B/P 95/53 HR 57  I did not push the rest of the diltizem at this time.  Dr Hernandez advised.  I also questioned the 12:00 po Diltizem.  Per Dr Hernandez with Dr Gee's group hold the 12:00 dose until they come to the bedside to evaluate

## 2017-06-07 NOTE — PROGRESS NOTES
[facility]  Cardiology  Progress Note    Patient Name: Caro Powell  MRN: [id number]  Admission Date: 6/5/2017  Hospital Length of Stay: 2 days  Code Status: Full Code   Attending Physician: Vishal Gee MD   Primary Care Physician: Claudy Escobedo MD  Expected Discharge Date:   Principal Problem:New onset atrial fibrillation    Subjective:     Hospital Course:   6/5/2017 Presented with complaints of chest pain at rest for 4 hours with only minor relief with pain medications (Hydrocodone). Upon arrival to the ER, EKG demonstrated afib with RVR with  with no STTWC. Was given IV Cardizem and placed on drip with spontaneous conversion to NSR. Repeat EKG with NSR, normal axis and no STTWC. Serial CE with troponin .[phone]. Admitted to ICU under care of [facility] Medicine 6/6/2017 Cardiology consulted for evaluation and treatment of atrial fibrillation. Stable overnight with no acute EKG changes or recurrent chest pain. Remains in NSR with stable BP and continued on Heparin drip 06/07/2017 patient noted with AF/FLT RVR at 1000 hour, given Cardizem by primary team with conversion to SB/SR and has maintained without recurrent AF/FLT since that time. Tolerating NOAC well without GI complaint.         Review of Systems   Constitution: Positive for malaise/fatigue.   Cardiovascular: Positive for dyspnea on exertion and irregular heartbeat. Negative for chest pain, leg swelling, near-syncope, orthopnea, palpitations, paroxysmal nocturnal dyspnea and syncope.   Respiratory: Positive for cough. Negative for shortness of breath and sputum production.      Objective:     Vital Signs (Most Recent):  Temp: 98.5 °F (36.9 °C) (06/07/17 0800)  Pulse: 62 (06/07/17 1304)  Resp: 18 (06/07/17 1304)  BP: (!) 118/56 (06/07/17 1304)  SpO2: (!) 93 % (06/07/17 1137) Vital Signs (24h Range):  Temp:  [97.8 °F (36.6 °C)-99 °F (37.2 °C)] 98.5 °F (36.9 °C)  Pulse:  [] 62  Resp:  [16-57] 18  SpO2:   [91 %-98 %] 93 %  BP: ()/(53-80) 118/56     Weight: 83.7 kg (184 lb 8.4 oz)  Body mass index is 33.75 kg/m².     SpO2: (!) 93 %  O2 Device (Oxygen Therapy): nasal cannula      Intake/Output Summary (Last 24 hours) at 06/07/17 1334  Last data filed at 06/07/17 0800   Gross per 24 hour   Intake                0 ml   Output             1000 ml   Net            -1000 ml       Lines/Drains/Airways     Peripheral Intravenous Line                 Peripheral IV - Single Lumen 06/05/17 1521 Left Antecubital 1 day                Physical Exam   Constitutional: She is oriented to person, place, and time. She appears well-developed and well-nourished. No distress.   HENT:   Head: Normocephalic and atraumatic.   Eyes: Right eye exhibits no discharge. Left eye exhibits no discharge.   Cardiovascular: Normal rate and regular rhythm.  Exam reveals no gallop and no friction rub.    No murmur heard.  Pulmonary/Chest: Effort normal and breath sounds normal.   Abdominal: Soft. Bowel sounds are normal.   Musculoskeletal: She exhibits no edema.   Neurological: She is alert and oriented to person, place, and time.   Skin: Skin is warm and dry. She is not diaphoretic.   Psychiatric: She has a normal mood and affect. Her behavior is normal.       Significant Labs:   BMP:   Recent Labs  Lab 06/05/17  1535 06/06/17  0351   * 165*   * 135*   K 4.6 4.4   CL 98 99   CO2 23 25   BUN 14 12   CREATININE 1.2 1.0   CALCIUM 9.7 9.4   , CMP   Recent Labs  Lab 06/05/17  1535 06/06/17  0351   * 135*   K 4.6 4.4   CL 98 99   CO2 23 25   * 165*   BUN 14 12   CREATININE 1.2 1.0   CALCIUM 9.7 9.4   PROT 7.7 7.0   ALBUMIN 3.0* 2.6*   BILITOT 0.6 0.5   ALKPHOS 50* 46*   AST 14 13   ALT 19 17   ANIONGAP 14 11   ESTGFRAFRICA 50* >60   EGFRNONAA 44* 54*   , CBC   Recent Labs  Lab 06/05/17 2058 06/06/17  0351 06/07/17  0554   WBC 13.86* 14.12* 10.63   HGB 11.3* 11.1* 10.8*   HCT 33.9* 33.6* 32.3*   *  579* 558* 553*   , INR    Recent Labs  Lab 06/05/17  1535 06/05/17 2058   INR 1.1 1.1   , Lipid Panel No results for input(s): CHOL, HDL, LDLCALC, TRIG, CHOLHDL in the last 48 hours.,   Pathology Results  (Last 10 years)    None      , Troponin   Recent Labs  Lab 06/05/17  1535 06/05/17 2058 06/06/17  0351   TROPONINI 0.006 0.021 0.039*    and All pertinent lab results from the last 24 hours have been reviewed.    Significant Imaging: Echocardiogram:   2D echo with color flow doppler:   Results for orders placed or performed during the hospital encounter of 06/05/17   2D echo with color flow doppler   Result Value Ref Range    EF 70 55 - 65    Diastolic Dysfunction Yes (A)     Pericardial Effusion MODERATE (A)      Assessment and Plan:     Brief HPI: Patient seen; resting quietly without complaint in SR this afternoon.     Chest pain, unspecified    Complaints of chest pain at rest; pressure sensation and constant for 4 hours; relieved with IV Cardizem; EKG with no acute changes and CE negative x 3; feel chest pain more related to atrial fibrillation vs noncardiac etiology; risk factors for CAD with age, HTN and DMII; recommend continued treatment for afib with outpatient follow up in cardiology clinic to determine further ischemic evaluation ie stress test        Essential hypertension    SBP 110s-140s; on Lisinopril 10mg daily with improved BP;  complains of dry cough ?ACEI related; would not be opposed to transition to Losartan 50mg po daily if cough continues         * New onset atrial fibrillation    Presented with new onset afib with RVR HR 130s with no acute STTWC; given IV Cardizem bolus and drip with spontaneous conversion to NSR; currently NSR with HR in the 60s; EF normal per ECHO Cardizem discontinued yesterday and Toprol initiated with recurrence of AF/FLT with RVR this morning; given IV cardizem with return to SB/SR; Agree with oral Cardizem - convert to CD dosing prior to DC. CHADSVAS 5 (HTN, age, female, DMII) with 7.2%  stroke risk annually placed on Eliquis; recommend cardiology clinic follow up in 2-3 weeks with either Dr. Rooney or in NP clinic             VTE Risk Mitigation         Ordered     apixaban tablet 5 mg  2 times daily     Route:  Oral        06/06/17 1232     Medium Risk of VTE  Once      06/05/17 2049     Place sequential compression device  Until discontinued      06/05/17 2049     Place AZUL hose  Until discontinued      06/05/17 2049          Jatinder Bailey NP  Cardiology  [facility]

## 2017-06-07 NOTE — PLAN OF CARE
Problem: Patient Care Overview  Goal: Plan of Care Review  Outcome: Ongoing (interventions implemented as appropriate)  Pt. On oxygen in no apparent distress..

## 2017-06-07 NOTE — ASSESSMENT & PLAN NOTE
Complaints of chest pain at rest; pressure sensation and constant for 4 hours; relieved with IV Cardizem; EKG with no acute changes and CE negative x 3; feel chest pain more related to atrial fibrillation vs noncardiac etiology; risk factors for CAD with age, HTN and DMII; recommend continued treatment for afib with outpatient follow up in cardiology clinic to determine further ischemic evaluation ie stress test

## 2017-06-07 NOTE — PLAN OF CARE
Problem: Fall Risk (Adult)  Goal: Identify Related Risk Factors and Signs and Symptoms  Related risk factors and signs and symptoms are identified upon initiation of Human Response Clinical Practice Guideline (CPG)   Outcome: Ongoing (interventions implemented as appropriate)  URGENCY AND FREQUENCY. PT VERBALIZES IMPORTANCE OF USING CALL BROOKS FOR ASSISTANCE BEFORE AMBULATING. BED ALARM IS ON. CALL BELL WITHIN REACH. PT IS ABLE TO MAKE NEEDS KNOWN.

## 2017-06-07 NOTE — PLAN OF CARE
Patient's heart rate for the past hour has  Been ranging from 127-156 Dr Hernandez with Dr Gee's team advised.  No new orders at this time.  I will continue to monitor

## 2017-06-08 VITALS
SYSTOLIC BLOOD PRESSURE: 122 MMHG | TEMPERATURE: 98 F | HEIGHT: 62 IN | WEIGHT: 188.25 LBS | RESPIRATION RATE: 20 BRPM | OXYGEN SATURATION: 94 % | HEART RATE: 49 BPM | BODY MASS INDEX: 34.64 KG/M2 | DIASTOLIC BLOOD PRESSURE: 61 MMHG

## 2017-06-08 PROBLEM — I31.39 PERICARDIAL EFFUSION: Status: ACTIVE | Noted: 2017-06-08

## 2017-06-08 PROBLEM — D63.8 ANEMIA OF CHRONIC DISEASE: Status: ACTIVE | Noted: 2017-06-08

## 2017-06-08 LAB
BASOPHILS # BLD AUTO: 0.03 K/UL
BASOPHILS NFR BLD: 0.3 %
DIFFERENTIAL METHOD: ABNORMAL
EOSINOPHIL # BLD AUTO: 0.2 K/UL
EOSINOPHIL NFR BLD: 1.7 %
ERYTHROCYTE [DISTWIDTH] IN BLOOD BY AUTOMATED COUNT: 14.1 %
HCT VFR BLD AUTO: 31.4 %
HGB BLD-MCNC: 10.6 G/DL
LYMPHOCYTES # BLD AUTO: 1.4 K/UL
LYMPHOCYTES NFR BLD: 15.1 %
MCH RBC QN AUTO: 28.2 PG
MCHC RBC AUTO-ENTMCNC: 33.8 %
MCV RBC AUTO: 84 FL
MONOCYTES # BLD AUTO: 1.3 K/UL
MONOCYTES NFR BLD: 13.4 %
NEUTROPHILS # BLD AUTO: 6.6 K/UL
NEUTROPHILS NFR BLD: 69.1 %
PLATELET # BLD AUTO: 580 K/UL
PMV BLD AUTO: 9.1 FL
POCT GLUCOSE: 150 MG/DL (ref 70–110)
POCT GLUCOSE: 180 MG/DL (ref 70–110)
RBC # BLD AUTO: 3.76 M/UL
WBC # BLD AUTO: 9.51 K/UL

## 2017-06-08 PROCEDURE — 27000221 HC OXYGEN, UP TO 24 HOURS

## 2017-06-08 PROCEDURE — 25000003 PHARM REV CODE 250: Performed by: STUDENT IN AN ORGANIZED HEALTH CARE EDUCATION/TRAINING PROGRAM

## 2017-06-08 PROCEDURE — 36415 COLL VENOUS BLD VENIPUNCTURE: CPT

## 2017-06-08 PROCEDURE — 99232 SBSQ HOSP IP/OBS MODERATE 35: CPT | Mod: ,,, | Performed by: NURSE PRACTITIONER

## 2017-06-08 PROCEDURE — 93005 ELECTROCARDIOGRAM TRACING: CPT

## 2017-06-08 PROCEDURE — 93010 ELECTROCARDIOGRAM REPORT: CPT | Mod: S$GLB,,, | Performed by: INTERNAL MEDICINE

## 2017-06-08 PROCEDURE — 25000003 PHARM REV CODE 250: Performed by: HOSPITALIST

## 2017-06-08 PROCEDURE — 94761 N-INVAS EAR/PLS OXIMETRY MLT: CPT

## 2017-06-08 PROCEDURE — 25500020 PHARM REV CODE 255: Performed by: INTERNAL MEDICINE

## 2017-06-08 PROCEDURE — 85025 COMPLETE CBC W/AUTO DIFF WBC: CPT

## 2017-06-08 RX ORDER — DILTIAZEM HYDROCHLORIDE 120 MG/1
120 TABLET, FILM COATED ORAL DAILY
Qty: 30 TABLET | Refills: 5 | Status: SHIPPED | OUTPATIENT
Start: 2017-06-09 | End: 2017-06-15

## 2017-06-08 RX ORDER — DILTIAZEM HYDROCHLORIDE 30 MG/1
120 TABLET, FILM COATED ORAL DAILY
Status: DISCONTINUED | OUTPATIENT
Start: 2017-06-09 | End: 2017-06-08 | Stop reason: HOSPADM

## 2017-06-08 RX ADMIN — LISINOPRIL 10 MG: 10 TABLET ORAL at 08:06

## 2017-06-08 RX ADMIN — IOHEXOL 100 ML: 350 INJECTION, SOLUTION INTRAVENOUS at 09:06

## 2017-06-08 RX ADMIN — DILTIAZEM HYDROCHLORIDE 120 MG: 30 TABLET, FILM COATED ORAL at 08:06

## 2017-06-08 RX ADMIN — GABAPENTIN 400 MG: 400 CAPSULE ORAL at 06:06

## 2017-06-08 RX ADMIN — PANTOPRAZOLE SODIUM 40 MG: 40 TABLET, DELAYED RELEASE ORAL at 08:06

## 2017-06-08 RX ADMIN — AMOXICILLIN AND CLAVULANATE POTASSIUM 1 TABLET: 875; 125 TABLET, FILM COATED ORAL at 08:06

## 2017-06-08 RX ADMIN — APIXABAN 5 MG: 5 TABLET, FILM COATED ORAL at 08:06

## 2017-06-08 NOTE — PLAN OF CARE
Problem: Patient Care Overview  Goal: Plan of Care Review  Outcome: Ongoing (interventions implemented as appropriate)  Patient on oxygen with documented flow.  Will attempt to wean per O2 order protocol.

## 2017-06-08 NOTE — SUBJECTIVE & OBJECTIVE
Review of Systems   Constitution: Negative for weakness and malaise/fatigue.   Cardiovascular: Negative for chest pain, dyspnea on exertion, irregular heartbeat, leg swelling, near-syncope, orthopnea, palpitations, paroxysmal nocturnal dyspnea and syncope.   Respiratory: Negative for cough, shortness of breath and wheezing.      Objective:     Vital Signs (Most Recent):  Temp: 97.8 °F (36.6 °C) (06/08/17 0800)  Pulse: (!) 49 (nurse raine was notify) (06/08/17 0800)  Resp: 20 (06/08/17 0800)  BP: 122/61 (06/08/17 0800)  SpO2: (!) 91 % (06/08/17 0834) Vital Signs (24h Range):  Temp:  [97.8 °F (36.6 °C)-98.1 °F (36.7 °C)] 97.8 °F (36.6 °C)  Pulse:  [] 49  Resp:  [18-20] 20  SpO2:  [91 %-95 %] 91 %  BP: ()/(53-80) 122/61     Weight: 85.4 kg (188 lb 4.4 oz)  Body mass index is 34.44 kg/m².     SpO2: (!) 91 %  O2 Device (Oxygen Therapy): room air      Intake/Output Summary (Last 24 hours) at 06/08/17 1041  Last data filed at 06/08/17 0600   Gross per 24 hour   Intake              125 ml   Output              550 ml   Net             -425 ml       Lines/Drains/Airways     Peripheral Intravenous Line                 Peripheral IV - Single Lumen 06/05/17 1521 Left Antecubital 2 days                Physical Exam   Constitutional: She is oriented to person, place, and time. She appears well-developed and well-nourished. No distress.   HENT:   Head: Normocephalic and atraumatic.   Eyes: Right eye exhibits no discharge. Left eye exhibits no discharge.   Neck: No JVD present.   Cardiovascular: Normal rate and regular rhythm.    Pulmonary/Chest: Effort normal and breath sounds normal.   Abdominal: Soft. Bowel sounds are normal.   Musculoskeletal: She exhibits no edema or tenderness.   Neurological: She is alert and oriented to person, place, and time.   Skin: Skin is warm and dry. She is not diaphoretic.   Psychiatric: She has a normal mood and affect. Her behavior is normal. Judgment and thought content normal.        Significant Labs:   BMP: No results for input(s): GLU, NA, K, CL, CO2, BUN, CREATININE, CALCIUM, MG in the last 48 hours., CMP No results for input(s): NA, K, CL, CO2, GLU, BUN, CREATININE, CALCIUM, PROT, ALBUMIN, BILITOT, ALKPHOS, AST, ALT, ANIONGAP, ESTGFRAFRICA, EGFRNONAA in the last 48 hours., CBC   Recent Labs  Lab 06/07/17  0554 06/08/17  0453   WBC 10.63 9.51   HGB 10.8* 10.6*   HCT 32.3* 31.4*   * 580*   , INR No results for input(s): INR, PROTIME in the last 48 hours., Troponin No results for input(s): TROPONINI in the last 48 hours. and All pertinent lab results from the last 24 hours have been reviewed.    Significant Imaging: Echocardiogram:   2D echo with color flow doppler:   Results for orders placed or performed during the hospital encounter of 06/05/17   2D echo with color flow doppler   Result Value Ref Range    EF 70 55 - 65    Diastolic Dysfunction Yes (A)     Pericardial Effusion MODERATE (A)

## 2017-06-08 NOTE — PLAN OF CARE
Patient discharged to home. No needs noted upon discharge. Follow-up appointments scheduled.     Future Appointments  Date Time Provider Department Center   6/9/2017 3:45 PM Derek Jose DPM [facility] POD [place]   6/14/2017 11:00 AM Claudy Escobedo MD [facility] [place] [facility]   6/20/2017 1:40 PM Jatinder Bailey NP [facility] CARDIO [place] [facility]   6/21/2017 3:00 PM MONITOR, ARRHYTHMIA EVENT [facility] ARRHYTH [address]     Follow-up With  Details  Why  Contact Info   Jatinder Bailey NP  On 6/20/2017  at 1:40 pm, This is Dr. Rooney's Nurse Practitioner.   [address]  [place] [address]  [phone]   Claudy Escobedo MD  On 6/14/2017  at 11:00 am, This is your new Primary Care Physician  [address]  [place] [address]  [phone]   Derek Jose DPM  On 6/9/2017  For suture removal, as previously scheduled  [address]  [place] [address]  [phone]           06/08/17 1137   Final Note   Assessment Type Final Discharge Note   Discharge Disposition Home   Discharge planning education complete? Yes   What phone number can be called within the next 1-3 days to see how you are doing after discharge? [phone]   Hospital Follow Up  Appt(s) scheduled? Yes   Discharge plans and expectations educations in teach back method with documentation complete? Yes   [facility]? Yes   Discharge/Hospital Encounter Summary to (non-[facility]) PCP Yes   Referral to Outpatient Case Management complete? n/a   Referral to / orders for Home Health Complete? n/a   30 day supply of medicines given at discharge, if documented non-compliance / non-adherence? No   Any social issues identified prior to discharge? No   Did you assess the readiness or willingness of the family or caregiver to support self management of care? Yes   Right Care Referral Info   Post Acute Recommendation No Care     Lori Snow RN  Transition Navigator  [phone]

## 2017-06-08 NOTE — PLAN OF CARE
Problem: Diabetes, Type 2 (Adult)  Goal: Signs and Symptoms of Listed Potential Problems Will be Absent, Minimized or Managed (Diabetes, Type 2)  Signs and symptoms of listed potential problems will be absent, minimized or managed by discharge/transition of care (reference Diabetes, Type 2 (Adult) CPG).   DIET CONTROL AND TO EAT WHAT IS BEST FOR HER DIABETES. PT VOICES UNDERSTANDING

## 2017-06-08 NOTE — PROGRESS NOTES
U Internal Medicine Resident HO-1 Progress Note    Subjective:      Doing well this AM, just feeling a little lightheaded. Denies chest pain, palpitations, SOB. Cough improved. Appetite good. Able to get up and use BSC.      Objective:   Last 24 Hour Vital Signs:  BP  Min: 91/64  Max: 164/70  Temp  Av.1 °F (36.7 °C)  Min: 98 °F (36.7 °C)  Max: 98.5 °F (36.9 °C)  Pulse  Av.6  Min: 47  Max: 152  Resp  Av  Min: 18  Max: 18  SpO2  Av.7 %  Min: 92 %  Max: 95 %  Weight  Av.4 kg (188 lb 4.4 oz)  Min: 85.4 kg (188 lb 4.4 oz)  Max: 85.4 kg (188 lb 4.4 oz)  I/O last 3 completed shifts:  In: 250 [P.O.:250]  Out: 1350 [Urine:1350]    Physical Examination:  Gen: Awake and alert, lying flat, NC in place  HEENT: NC/AT, EOMI, PERRLA, OP clear, MMM  CV: RRR, normal S1/S2, no murmurs  Resp: Bibasilar crackles, no wheezes  Abd: NABS, soft, NT/ND, no masses  L paraumbilical surgical scar  Ext: 2+ distal pulses, no cyanosis or edema  R foot bandage CDI  Skin: No rashes or lesions on exposed skin  Neuro: AAOx3, no focal deficits, MAEW  Psych: Normal mood and affect    Laboratory:  Laboratory Data Reviewed: yes    Recent Labs  Lab 17  1535 17  2058 17  0351 17  0554 17  0453   WBC 16.21* 13.86* 14.12* 10.63 9.51   HGB 11.9* 11.3* 11.1* 10.8* 10.6*   HCT 35.0* 33.9* 33.6* 32.3* 31.4*   * 579*  579* 558* 553* 580*   *  --  135*  --   --    K 4.6  --  4.4  --   --    CL 98  --  99  --   --    CO2 23  --  25  --   --    BUN 14  --  12  --   --    *  --  165*  --   --    CALCIUM 9.7  --  9.4  --   --    PROT 7.7  --  7.0  --   --    ALBUMIN 3.0*  --  2.6*  --   --    BILITOT 0.6  --  0.5  --   --    AST 14  --  13  --   --    ALKPHOS 50*  --  46*  --   --    ALT 19  --  17  --   --    INR 1.1 1.1  --   --   --      Other Results:  EKG (my interpretation): afib with RVR    Current Medications:     Infusions:        Scheduled:   amoxicillin-clavulanate 875-125mg  1  tablet Oral BID    apixaban  5 mg Oral BID    diltiaZEM  120 mg Oral Q12H    gabapentin  400 mg Oral TID    lisinopril  10 mg Oral Daily    pantoprazole  40 mg Oral Daily    pramipexole  0.125 mg Oral BID        PRN:  benzonatate, dextrose 50%, dextrose 50%, glucagon (human recombinant), glucose, glucose, insulin aspart, pneumoc 13-rabia conj-dip cr(PF)    Antibiotics and Day Number of Therapy:  Antibiotics     Start     Stop Route Frequency Ordered    06/05/17 2100  amoxicillin-clavulanate 875-125mg per tablet 1 tablet      -- Oral 2 times daily 06/05/17 2049        Assessment:     Caro Powell is a 76 y.o.female with  Patient Active Problem List    Diagnosis Date Noted    Atrial fibrillation with rapid ventricular response 06/06/2017    New onset atrial fibrillation 06/05/2017    Chest pain, unspecified 05/27/2017    Essential hypertension 05/22/2017    Type 2 diabetes mellitus with diabetic neuropathy, without long-term current use of insulin 05/22/2017    Pre-operative clearance 05/22/2017        Plan:     New onset afib with RVR, now in NSR  - Pt p/w chest pain since 4AM day of presentation  - ECG in ED showed afib with RVR, pt without a prior hx  - Given 20mg of IV diltiazem then started on diltiazem gtt in ED  - Converted to NSR, off dilt gtt. Started on po Lopressor.  - Went back into afib with RVR 6/7AM, restarted dilt gtt, then weaned and switched to po diltiazem  - TTE without structural abnormalities  BLE US w/o DVT  chest CTA negative for PE  - [id number] 5, Eliquis initiated  - Evaluated by [facility] cardiology, outpatient follow-up arranged, pt to do event monitor     Leukocytosis, resolved  - WBC 16.2, no bands on admit  afebrile  - UA clean, CXR without obvious infiltrates, no s/s infection   - Has been elevated recently, likely 2/2 toe amputation, will monitor  - WBC 9.5 today    Moderate pericardial effusion  - Seen on TTE, cardiology not concerned, no tamponade physiology,  will f/u with [facility] cards outpatient  - Will need repeat echo prior to outpatient appt for surveillance     AOCD  - H/H 11.9/35 MCV 86 on admit  - Iron studies with ferritin 540, iron 17, TIBC 272, 6% sat, nml B12/folate, c/w AOCD  - Last C-scope 2011 no polyps     Mild hypoalbuminemia  - Albumin 3 on admit, likely d/t poor nutrition, monitor     HTN  - BPs elevated initially, increased home lisinopril to 10mg daily, now better controlled     T2DM with neuropathy  - Last A1c 5/22 was 7.5%  - Takes glimeperide 1mg BID  - Hold home glimeperide, continue neurontin/pramipexole, SSI and accuchecks while inpatient  - H/O diabetic foot ulcer/osteo s/p 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 Jose, last dressing change 6/2, has f/u on 6/9 for suture removal     HCM  - Unsure of Tdap status, flu and pna not UTD  - C-scope UTD 2011  - Needs mammo  - A1c 7.5, LDL 80, HDL 48 in 5/2017        PPx: Eliquis  Diet: Cardiac/diabetic  Dispo: Discharge home today with outpatient PCP and cards f/u    Clemencia Puentes  U Internal Medicine HO-1  U Internal Medicine Service Team A    [facility] Medicine Hospitalist Pager numbers:   U Hospitalist Medicine Team A (Gerard/Pancho): [phone]  U Hospitalist Medicine Team B (Kylee/Nima):  [phone]

## 2017-06-08 NOTE — PROGRESS NOTES
[facility]  Cardiology  Progress Note    Patient Name: Caro Powell  MRN: [id number]  Admission Date: 6/5/2017  Hospital Length of Stay: 3 days  Code Status: Full Code   Attending Physician: Vishal Gee MD   Primary Care Physician: Claudy Escobedo MD  Expected Discharge Date:   Principal Problem:New onset atrial fibrillation    Subjective:     Hospital Course:   6/5/2017 Presented with complaints of chest pain at rest for 4 hours with only minor relief with pain medications (Hydrocodone). Upon arrival to the ER, EKG demonstrated afib with RVR with  with no STTWC. Was given IV Cardizem and placed on drip with spontaneous conversion to NSR. Repeat EKG with NSR, normal axis and no STTWC. Serial CE with troponin .[phone]. Admitted to ICU under care of [facility] 6/6/2017 Cardiology consulted for evaluation and treatment of atrial fibrillation. Stable overnight with no acute EKG changes or recurrent chest pain. Remains in NSR with stable BP and continued on Heparin drip 06/07/2017 patient noted with AF/FLT RVR at 1000 hour, given Cardizem by primary team with conversion to SB/SR and has maintained without recurrent AF/FLT since that time. Tolerating NOAC well without GI complaint. 06/08/2017 telemetry reviewed without notation of any recurrence of AF/FLT since yesterday. SB/SR noted.         Review of Systems   Constitution: Negative for weakness and malaise/fatigue.   Cardiovascular: Negative for chest pain, dyspnea on exertion, irregular heartbeat, leg swelling, near-syncope, orthopnea, palpitations, paroxysmal nocturnal dyspnea and syncope.   Respiratory: Negative for cough, shortness of breath and wheezing.      Objective:     Vital Signs (Most Recent):  Temp: 97.8 °F (36.6 °C) (06/08/17 0800)  Pulse: (!) 49 (nurse raine was notify) (06/08/17 0800)  Resp: 20 (06/08/17 0800)  BP: 122/61 (06/08/17 0800)  SpO2: (!) 91 % (06/08/17 0834) Vital Signs (24h  Range):  Temp:  [97.8 °F (36.6 °C)-98.1 °F (36.7 °C)] 97.8 °F (36.6 °C)  Pulse:  [] 49  Resp:  [18-20] 20  SpO2:  [91 %-95 %] 91 %  BP: ()/(53-80) 122/61     Weight: 85.4 kg (188 lb 4.4 oz)  Body mass index is 34.44 kg/m².     SpO2: (!) 91 %  O2 Device (Oxygen Therapy): room air      Intake/Output Summary (Last 24 hours) at 06/08/17 1041  Last data filed at 06/08/17 0600   Gross per 24 hour   Intake              125 ml   Output              550 ml   Net             -425 ml       Lines/Drains/Airways     Peripheral Intravenous Line                 Peripheral IV - Single Lumen 06/05/17 1521 Left Antecubital 2 days                Physical Exam   Constitutional: She is oriented to person, place, and time. She appears well-developed and well-nourished. No distress.   HENT:   Head: Normocephalic and atraumatic.   Eyes: Right eye exhibits no discharge. Left eye exhibits no discharge.   Neck: No JVD present.   Cardiovascular: Normal rate and regular rhythm.    Pulmonary/Chest: Effort normal and breath sounds normal.   Abdominal: Soft. Bowel sounds are normal.   Musculoskeletal: She exhibits no edema or tenderness.   Neurological: She is alert and oriented to person, place, and time.   Skin: Skin is warm and dry. She is not diaphoretic.   Psychiatric: She has a normal mood and affect. Her behavior is normal. Judgment and thought content normal.       Significant Labs:   BMP: No results for input(s): GLU, NA, K, CL, CO2, BUN, CREATININE, CALCIUM, MG in the last 48 hours., CMP No results for input(s): NA, K, CL, CO2, GLU, BUN, CREATININE, CALCIUM, PROT, ALBUMIN, BILITOT, ALKPHOS, AST, ALT, ANIONGAP, ESTGFRAFRICA, EGFRNONAA in the last 48 hours., CBC   Recent Labs  Lab 06/07/17  0554 06/08/17  0453   WBC 10.63 9.51   HGB 10.8* 10.6*   HCT 32.3* 31.4*   * 580*   , INR No results for input(s): INR, PROTIME in the last 48 hours., Troponin No results for input(s): TROPONINI in the last 48 hours. and All pertinent  lab results from the last 24 hours have been reviewed.    Significant Imaging: Echocardiogram:   2D echo with color flow doppler:   Results for orders placed or performed during the hospital encounter of 06/05/17   2D echo with color flow doppler   Result Value Ref Range    EF 70 55 - 65    Diastolic Dysfunction Yes (A)     Pericardial Effusion MODERATE (A)      Assessment and Plan:     Brief HPI: Patient seen this morning on rounds with notation of continued SB/SR. No recurrence of AF/FLT overnight. Tolerating Cardizem and NOAC. Ok to DC from cards standpoint. Follow up scheduled in clinic on 06/20/2017     Chest pain, unspecified    Complaints of chest pain at rest; pressure sensation and constant for 4 hours; relieved with IV Cardizem; EKG with no acute changes and CE negative x 3; feel chest pain more related to atrial fibrillation vs noncardiac etiology; risk factors for CAD with age, HTN and DMII; recommend continued treatment for afib with outpatient follow up in cardiology clinic to determine further ischemic evaluation ie stress test        Essential hypertension    SBP 120s-160s; on Lisinopril 10mg daily with improved BP- continue         * New onset atrial fibrillation    Presented with new onset afib with RVR HR 130s with no acute STTWC; given IV Cardizem bolus and drip with spontaneous conversion to NSR; EF normal per ECHO Cardizem discontinued 06/06/2017 and Toprol initiated with recurrence of AF/FLT with RVR yesterday morning; given IV cardizem with return to SB/SR; Patient has maintained SB/SR for 24 hours now; convert to CD dosing prior to DC. CHADSVAS 5 (HTN, age, female, DMII) with 7.2% stroke risk annually placed on Eliquis; recommend cardiology clinic follow up in 2-3 weeks with either Dr. Rooney or in NP clinic             VTE Risk Mitigation         Ordered     apixaban tablet 5 mg  2 times daily     Route:  Oral        06/06/17 1232     Medium Risk of VTE  Once      06/05/17 2049     Place  sequential compression device  Until discontinued      06/05/17 2049     Place AZUL hose  Until discontinued      06/05/17 2049          Jatinder Bailey NP  Cardiology  [facility]

## 2017-06-08 NOTE — ASSESSMENT & PLAN NOTE
Presented with new onset afib with RVR HR 130s with no acute STTWC; given IV Cardizem bolus and drip with spontaneous conversion to NSR; EF normal per ECHO Cardizem discontinued 06/06/2017 and Toprol initiated with recurrence of AF/FLT with RVR yesterday morning; given IV cardizem with return to SB/SR; Patient has maintained SB/SR for 24 hours now; convert to CD dosing prior to DC. CHADSVAS 5 (HTN, age, female, DMII) with 7.2% stroke risk annually placed on Eliquis; recommend cardiology clinic follow up in 2-3 weeks with either Dr. Rooney or in NP clinic

## 2017-06-08 NOTE — PROGRESS NOTES
.Pharmacy New Medication Education     Patient accepted medication education.     Pharmacy educated patient on the following medications, using the teach-back method.   Cardizem  Learners of pharmacy medication education included:  patient     Patient +/- learner response:  verbalize understanding

## 2017-06-08 NOTE — PROGRESS NOTES
Met with patient to review DM/Nutrition education material per consult request. Was able to review some of the material, but patient had to stop due to EEG. Returned to speak with patient and finish review but patient denied desire to continue as she is being d/c. Provided outpatient f/u resources and encouraged compliance with ADA diet. Patient stated she would review the information at home. Consult as needed.

## 2017-06-08 NOTE — DISCHARGE SUMMARY
[facility] Discharge Summary    Primary Team: [facility] IM Team A  Attending Physician: Vishal Gee MD  Resident: Demario  Intern: Dhaval    Date of Admit: 6/5/2017  Date of Discharge: 6/8/2017    Discharge to: Home  Condition: Stable    Discharge Diagnoses     Patient Active Problem List   Diagnosis    Essential hypertension    Type 2 diabetes mellitus with diabetic neuropathy, without long-term current use of insulin    Pre-operative clearance    Chest pain, unspecified    New onset atrial fibrillation    Atrial fibrillation with rapid ventricular response    Anemia of chronic disease    Pericardial effusion       Consultants and Procedures     Consultants:  [facility]    Procedures:   None    Brief History of Present Illness      Pt is a 75 yo F with PMHx of HTN and T2Dm who was in her USOH (ambulates without assistance, lives at home with , independent with ADLs) until 4AM this morning when she was awakened from sleep by chest pain. It was 10/10 stabbing non-radiating midsternal CP a/w SOB, nausea, lightheadedness and diaphoresis. The pain persisted and progressively got worse so she took a little bit of a Norco 5-325 pill she had and it got slightly better. She then fell asleep and woke up around 10AM still with the CP. Her sister came to pick her up and then both the pt and her sister decided she should come to the ED because the pain wasn't resolving. She's had one similar episode of chest pain before last month and had troponins/ECGs trended, no e/o MI. She also reports one week of dry cough and 2-3 episodes of non-bloody diarrhea today. She states she's been taking Augmentin 875mg BID since her R 5th toe amputation on 5/23, which she thinks is the cause of her diarrhea. Also reports she's noticed she's been getting more SOB this past week. She can usually walk all around [facility] without getting SOB but now is getting SOB walking from room to room in her house. She denies  fevers/chills, LE edema, orthopnea, PND, dysuria, abdominal pain.     For the full HPI please refer to the History & Physical from this admission.    Hospital Course By Problem with Pertinent Findings     New onset afib with RVR, with conversion to NSR  - Pt p/w chest pain since 4AM day of presentation  - ECG in ED showed afib with RVR, pt without a prior hx  - Given 20mg of IV diltiazem then started on diltiazem gtt in ED  - Converted to NSR, off dilt gtt. Started on po Lopressor.  - No further CP, likely 2/2 afib with RVR  - Went back into afib with RVR 6/7AM, restarted dilt gtt, then weaned and switched to po diltiazem  - TTE without structural abnormalities  BLE US w/o DVT  chest CTA negative for PE  - [id number] 5, Eliquis initiated  - Evaluated by [facility] cardiology, outpatient follow-up arranged, pt to have event monitor done  - Discharged on po diltiazem CD 120mg daily and Eliquis 5mg BID     Leukocytosis, resolved  - WBC 16.2, no bands on admit  afebrile  - UA clean, CXR without obvious infiltrates, no s/s infection   - Has been elevated recently, likely 2/2 toe amputation  - WBC 9.5 at discharge     Moderate pericardial effusion  - Seen on TTE, cardiology not concerned, no tamponade physiology, will f/u with [facility] outpatient  - Will need repeat echo prior to outpatient appt for surveillance     AOCD  - H/H 11.9/35 MCV 86 on admit  - Iron studies with ferritin 540, iron 17, TIBC 272, 6% sat, nml B12/folate, c/w AOCD  - Last C-scope 2011 no polyps     Mild hypoalbuminemia  - Albumin 3 on admit, likely d/t poor nutrition     HTN  - BPs elevated initially, increased home lisinopril to 10mg daily, BPs better controlled  - Continued 10mg lisinopril at discharge     T2DM with neuropathy  - Last A1c 5/22 was 7.5%  - Takes glimeperide 1mg BID  - Held home glimeperide while inpt, continued neurontin/pramipexole, SSI and accuchecks while inpatient  - H/O diabetic foot ulcer/osteo s/p R 5th toe amputation,  taking Augmentin 875 BID, sees Dr. Jose, last dressing change 6/2, has f/u on 6/9 for suture removal     HCM  - PCP Dr. Escobedo  - Unsure of Tdap status, flu and pna not UTD  - C-scope UTD 2011  - Needs mammo  - A1c 7.5, LDL 80, HDL 48 in 5/2017    Discharge Medications        Medication List      START taking these medications    apixaban 5 mg Tab  Take 1 tablet (5 mg total) by mouth 2 (two) times daily.     diltiaZEM 120 MG tablet  Commonly known as:  CARDIZEM  Take 1 tablet (120 mg total) by mouth once daily.  Start taking on:  6/9/2017        CHANGE how you take these medications    INV lisinopril 10 MG Tab  Take 1 tablet (10 mg total) by mouth once daily.  What changed:   medication strength   how much to take   how to take this   when to take this        CONTINUE taking these medications    ACCU-CHEK SAMI CONTROL SOLN Soln  Generic drug:  blood glucose control high,low     ACCU-CHEK SAMI PLUS METER Misc  Generic drug:  blood-glucose meter     ACCU-CHEK SAMI PLUS TEST STRP Strp  Generic drug:  blood sugar diagnostic     ACCU-CHEK SOFT DEV LANCETS Kit  Generic drug:  lancing device with lancets     ACCU-CHEK SOFTCLIX LANCETS Misc  Generic drug:  lancets     ammonium lactate 12 % Crea  Apply to feet twice daily.     gabapentin 400 MG capsule  Commonly known as:  NEURONTIN     glimepiride 1 MG tablet  Commonly known as:  AMARYL     hydrocodone-acetaminophen 5-325mg 5-325 mg per tablet  Commonly known as:  NORCO     omeprazole 20 MG capsule  Commonly known as:  PRILOSEC  Take 1 capsule (20 mg total) by mouth once daily.     pramipexole 0.125 MG tablet  Commonly known as:  MIRAPEX           Where to Get Your Medications      You can get these medications from any pharmacy    Bring a paper prescription for each of these medications   apixaban 5 mg Tab   diltiaZEM 120 MG tablet   INV lisinopril 10 MG Tab         Discharge Information:   Diet:  Cardiac/diabetic    Physical Activity:  As  tolerated    Instructions:  1. Take all medications as prescribed  2. Keep all follow-up appointments  3. Return to the hospital or call your primary care physicians if any worsening symptoms such as recurrent chest pain, worsening shortness of breath, palpitations, presyncope occur.    Follow-Up Appointments:  Future Appointments  Date Time Provider Department Center   6/9/2017 3:45 PM Derek Jose DPM [facility] POD [place]   6/14/2017 11:00 AM Claudy Escobedo MD [facility]   6/20/2017 1:40 PM Jatinder Bailey NP [facility] CARDIO [facility]   6/21/2017 3:00 PM MONITOR, ARRHYTHMIA EVENT [facility] ARRHYTH Luis Tony Puentes  [facility], HO-1

## 2017-06-08 NOTE — PLAN OF CARE
Took pt off supplemental O2 this AM (was on 1L NC). Re-assessed 5-10 min later, pt satting low 90s on RA, comfortable, no respiratory distress.     Clemencia Puentes MD HO-1  [facility] Medicine  6/8/2017 10:27 AM

## 2017-06-09 ENCOUNTER — OFFICE VISIT (OUTPATIENT)
Dept: PODIATRY | Facility: CLINIC | Age: 77
End: 2017-06-09
Payer: MEDICARE

## 2017-06-09 DIAGNOSIS — E11.42 DIABETIC POLYNEUROPATHY ASSOCIATED WITH TYPE 2 DIABETES MELLITUS: Primary | ICD-10-CM

## 2017-06-09 DIAGNOSIS — Z98.890 POSTOPERATIVE STATE: ICD-10-CM

## 2017-06-09 PROCEDURE — 99999 PR PBB SHADOW E&M-EST. PATIENT-LVL III: CPT | Mod: PBBFAC,,, | Performed by: PODIATRIST

## 2017-06-09 PROCEDURE — 99024 POSTOP FOLLOW-UP VISIT: CPT | Mod: S$GLB,,, | Performed by: PODIATRIST

## 2017-06-09 RX ORDER — DILTIAZEM HYDROCHLORIDE 120 MG/1
CAPSULE, COATED, EXTENDED RELEASE ORAL
COMMUNITY
Start: 2017-06-08 | End: 2019-04-24

## 2017-06-09 NOTE — PATIENT INSTRUCTIONS
You may wash the right foot in the shower, however do not submerse in the tub. Dry right surgical site well and apply betadine. Allow it to dry then cover with band aid once a day.

## 2017-06-13 NOTE — PROGRESS NOTES
Subjective:      Patient ID: Caro Powell is a 76 y.o. female.    Chief Complaint: Post-op Evaluation (Suturer removal)    Caro LUA is a 76 y.o. female who returns to the clinic for routine Diabetic foot exam. Documented high risk due to peripheral neuropathy and history of toe ulceration. 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 Re-admitted 5/27/17 for observation due to chest pain. No new complaints.     6/9/17: Relates Augmentin is giving her diarrhea. Admitted for Afib and discharged on apixaban.     PCP: Claudy Escobedo MD    Date Last Seen by PCP:     Current shoe gear:      Hemoglobin A1C   Date Value Ref Range Status   05/22/2017 7.5 (H) 4.5 - 6.2 % Final     Comment:     According to ADA guidelines, hemoglobin A1C <7.0% represents  optimal control in non-pregnant diabetic patients.  Different  metrics may apply to specific populations.   Standards of Medical Care in Diabetes - 2016.  For the purpose of screening for the presence of diabetes:  <5.7%     Consistent with the absence of diabetes  5.7-6.4%  Consistent with increasing risk for diabetes   (prediabetes)  >or=6.5%  Consistent with diabetes  Currently no consensus exists for use of hemoglobin A1C  for diagnosis of diabetes for children.       There were no vitals filed for this visit.   Past Medical History:   Diagnosis Date    Calcium nephrolithiasis 2007    Diabetes mellitus, type 2     Diabetic peripheral neuropathy associated with type 2 diabetes mellitus     Hypertension        Past Surgical History:   Procedure Laterality Date    COLONOSCOPY  11/28/2011    sigmoid diverticulosis, external hemorrhoids    HYSTERECTOMY      SHOULDER SURGERY Left     TOE AMPUTATION Right 05/22/2017    5th toe       Family History   Problem Relation Age of Onset    Diabetes Mother     Heart failure Father     Kidney failure Brother        Social History     Social History    Marital status:      Spouse name: N/A    Number of  children: N/A    Years of education: N/A     Social History Main Topics    Smoking status: Never Smoker    Smokeless tobacco: Not on file    Alcohol use No    Drug use: No    Sexual activity: Not on file     Other Topics Concern    Not on file     Social History Narrative    No narrative on file       Current Outpatient Prescriptions   Medication Sig Dispense Refill    ACCU-CHEK SAMI CONTROL SOLN Soln       ACCU-CHEK SAMI PLUS METER Misc       ACCU-CHEK SAMI PLUS TEST STRP Strp       ACCU-CHEK SOFT DEV LANCETS Kit       ACCU-CHEK SOFTCLIX LANCETS Misc       ammonium lactate 12 % Crea Apply to feet twice daily. 140 g 5    apixaban 5 mg Tab Take 1 tablet (5 mg total) by mouth 2 (two) times daily. 60 tablet 5    diltiaZEM (CARDIZEM CD) 120 MG Cp24       diltiaZEM (CARDIZEM) 120 MG tablet Take 1 tablet (120 mg total) by mouth once daily. 30 tablet 5    gabapentin (NEURONTIN) 400 MG capsule       glimepiride (AMARYL) 1 MG tablet       hydrocodone-acetaminophen 5-325mg (NORCO) 5-325 mg per tablet Take 1 tablet by mouth every 12 (twelve) hours as needed.  0    lisinopril 10 MG Tab Take 1 tablet (10 mg total) by mouth once daily. 30 tablet 5    omeprazole (PRILOSEC) 20 MG capsule Take 1 capsule (20 mg total) by mouth once daily. 30 capsule 3    pramipexole (MIRAPEX) 0.125 MG tablet        No current facility-administered medications for this visit.        Review of patient's allergies indicates:   Allergen Reactions    Codeine Nausea Only         Review of Systems   Constitution: Negative for chills, fever, weakness and malaise/fatigue.   Cardiovascular: Negative for chest pain, claudication and leg swelling.   Respiratory: Negative for cough and shortness of breath.    Skin: Positive for dry skin, nail changes and poor wound healing. Negative for color change.   Musculoskeletal: Negative for back pain, joint pain, muscle cramps and muscle weakness.   Gastrointestinal: Negative for nausea and  vomiting.   Neurological: Positive for paresthesias. Negative for numbness.   Psychiatric/Behavioral: Negative for altered mental status.           Objective:      Physical Exam   Constitutional: She is oriented to person, place, and time. No distress.   Cardiovascular: Intact distal pulses.    Pulses:       Dorsalis pedis pulses are 2+ on the right side, and 2+ on the left side.        Posterior tibial pulses are 1+ on the right side, and 1+ on the left side.   CFT< 3 secs all toes bilateral foot, skin temp warm bilateral foot, diminished digital hair growth bilateral foot, no lower extremity edema bilateral.       Musculoskeletal:   Amputated right fifth toe. No pain on palpation of amputation site right foot.     Hallux limitus with semi-reducible hammertoe deformities toes 2-5 bilateral. Elongated second toe bilateral. Rectus alignment of right second toe. No pain with on palpation or ROM right second toe. Adductovarus rotation fifth toe left. Gastrocnemius equinus bilateral. No pain with ROM or MMT bilateral foot.   Neurological: She is alert and oriented to person, place, and time. She has normal strength. A sensory deficit is present.   Protective threshold and vibratory sensation decreased bilateral foot.   Skin: Skin is warm, dry and intact. Capillary refill takes less than 2 seconds. No ecchymosis noted. She is not diaphoretic. No cyanosis or erythema. No pallor. Nails show no clubbing.   Incision site right fifth toe amp site well approximated with sutures, no soi, no dehiscence.                                 Assessment:       Encounter Diagnoses   Name Primary?    Diabetic polyneuropathy associated with type 2 diabetes mellitus Yes    Postoperative state          Plan:       Caro LUA was seen today for post-op evaluation.    Diagnoses and all orders for this visit:    Diabetic polyneuropathy associated with type 2 diabetes mellitus    Postoperative state      I counseled the patient on her  conditions, their implications and medical management.    Shoe inspection. Diabetic Foot Education. Patient reminded of the importance of good nutrition and blood sugar control to help prevent podiatric complications of diabetes. Patient instructed on proper foot hygeine. We discussed wearing proper shoe gear, daily foot inspections, never walking without protective shoe gear, never putting sharp instruments to feet    Dressing removed right foot and surgical site inspected Cleansed with hibiclens. Applied betadine to incision followed by mepilex border. Home care instructions reviewed in detail. May shower as discussed but not submerse foot.     Continue limited weightbearing with surgical shoe.    RTC 1 week for suture removal.

## 2017-06-15 RX ORDER — DILTIAZEM HYDROCHLORIDE 60 MG/1
60 TABLET, FILM COATED ORAL DAILY
Qty: 30 TABLET | Refills: 11 | Status: SHIPPED | OUTPATIENT
Start: 2017-06-15 | End: 2018-06-15

## 2017-06-16 ENCOUNTER — TELEPHONE (OUTPATIENT)
Dept: CARDIOLOGY | Facility: CLINIC | Age: 77
End: 2017-06-16

## 2017-06-23 ENCOUNTER — OFFICE VISIT (OUTPATIENT)
Dept: PODIATRY | Facility: CLINIC | Age: 77
End: 2017-06-23
Payer: MEDICARE

## 2017-06-23 VITALS
HEIGHT: 62 IN | BODY MASS INDEX: 34.6 KG/M2 | DIASTOLIC BLOOD PRESSURE: 51 MMHG | SYSTOLIC BLOOD PRESSURE: 133 MMHG | HEART RATE: 57 BPM | WEIGHT: 188 LBS

## 2017-06-23 DIAGNOSIS — E11.42 DIABETIC POLYNEUROPATHY ASSOCIATED WITH TYPE 2 DIABETES MELLITUS: ICD-10-CM

## 2017-06-23 DIAGNOSIS — Z98.890 POSTOPERATIVE STATE: Primary | ICD-10-CM

## 2017-06-23 PROCEDURE — 99024 POSTOP FOLLOW-UP VISIT: CPT | Mod: S$GLB,,, | Performed by: PODIATRIST

## 2017-06-23 PROCEDURE — 99999 PR PBB SHADOW E&M-EST. PATIENT-LVL III: CPT | Mod: PBBFAC,,, | Performed by: PODIATRIST

## 2017-06-23 RX ORDER — METOPROLOL SUCCINATE 25 MG/1
TABLET, EXTENDED RELEASE ORAL DAILY
COMMUNITY
Start: 2017-06-22 | End: 2021-08-04 | Stop reason: SDUPTHER

## 2017-06-23 NOTE — PROGRESS NOTES
"Subjective:      Patient ID: Caro Powell is a 76 y.o. female.    Chief Complaint: Post-op Evaluation (suture removal )    Caro LUA is a 76 y.o. female who returns to the clinic for routine Diabetic foot exam. Documented high risk due to peripheral neuropathy and history of toe ulceration. 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 Re-admitted 5/27/17 for observation due to chest pain. No new complaints.     6/9/17: Relates Augmentin is giving her diarrhea. Admitted for Afib and discharged on apixaban.     6/23/17: Post right fifth toe amp x 3 weeks. No new complaints.    PCP: Claudy Escobedo MD    Date Last Seen by PCP:     Current shoe gear:      Hemoglobin A1C   Date Value Ref Range Status   05/22/2017 7.5 (H) 4.5 - 6.2 % Final     Comment:     According to ADA guidelines, hemoglobin A1C <7.0% represents  optimal control in non-pregnant diabetic patients.  Different  metrics may apply to specific populations.   Standards of Medical Care in Diabetes - 2016.  For the purpose of screening for the presence of diabetes:  <5.7%     Consistent with the absence of diabetes  5.7-6.4%  Consistent with increasing risk for diabetes   (prediabetes)  >or=6.5%  Consistent with diabetes  Currently no consensus exists for use of hemoglobin A1C  for diagnosis of diabetes for children.       Vitals:    06/23/17 1130   BP: (!) 133/51   Pulse: (!) 57   Weight: 85.3 kg (188 lb)   Height: 5' 2" (1.575 m)   PainSc: 0-No pain      Past Medical History:   Diagnosis Date    Calcium nephrolithiasis 2007    Diabetes mellitus, type 2     Diabetic peripheral neuropathy associated with type 2 diabetes mellitus     Hypertension        Past Surgical History:   Procedure Laterality Date    COLONOSCOPY  11/28/2011    sigmoid diverticulosis, external hemorrhoids    HYSTERECTOMY      SHOULDER SURGERY Left     TOE AMPUTATION Right 05/22/2017    5th toe       Family History   Problem Relation Age of Onset    Diabetes Mother  "    Heart failure Father     Kidney failure Brother        Social History     Social History    Marital status:      Spouse name: N/A    Number of children: N/A    Years of education: N/A     Social History Main Topics    Smoking status: Never Smoker    Smokeless tobacco: None    Alcohol use No    Drug use: No    Sexual activity: Not Asked     Other Topics Concern    None     Social History Narrative    None       Current Outpatient Prescriptions   Medication Sig Dispense Refill    ACCU-CHEK SAMI CONTROL SOLN Soln       ACCU-CHEK SAMI PLUS METER Misc       ACCU-CHEK SAMI PLUS TEST STRP Strp       ACCU-CHEK SOFT DEV LANCETS Kit       ACCU-CHEK SOFTCLIX LANCETS Misc       ammonium lactate 12 % Crea Apply to feet twice daily. 140 g 5    apixaban 5 mg Tab Take 1 tablet (5 mg total) by mouth 2 (two) times daily. 60 tablet 5    diltiaZEM (CARDIZEM CD) 120 MG Cp24       diltiaZEM (CARDIZEM) 60 MG tablet Take 1 tablet (60 mg total) by mouth once daily at 6am. 30 tablet 11    gabapentin (NEURONTIN) 400 MG capsule       glimepiride (AMARYL) 1 MG tablet       hydrocodone-acetaminophen 5-325mg (NORCO) 5-325 mg per tablet Take 1 tablet by mouth every 12 (twelve) hours as needed.  0    lisinopril 10 MG Tab Take 1 tablet (10 mg total) by mouth once daily. 30 tablet 5    metoprolol succinate (TOPROL-XL) 25 MG 24 hr tablet       omeprazole (PRILOSEC) 20 MG capsule Take 1 capsule (20 mg total) by mouth once daily. 30 capsule 3    pramipexole (MIRAPEX) 0.125 MG tablet        No current facility-administered medications for this visit.        Review of patient's allergies indicates:   Allergen Reactions    Codeine Nausea Only         Review of Systems   Constitution: Negative for chills, fever, weakness and malaise/fatigue.   Cardiovascular: Negative for chest pain, claudication and leg swelling.   Respiratory: Negative for cough and shortness of breath.    Skin: Positive for dry skin, nail changes  and poor wound healing. Negative for color change.   Musculoskeletal: Negative for back pain, joint pain, muscle cramps and muscle weakness.   Gastrointestinal: Negative for nausea and vomiting.   Neurological: Positive for paresthesias. Negative for numbness.   Psychiatric/Behavioral: Negative for altered mental status.           Objective:      Physical Exam   Constitutional: She is oriented to person, place, and time. No distress.   Cardiovascular: Intact distal pulses.    Pulses:       Dorsalis pedis pulses are 2+ on the right side, and 2+ on the left side.        Posterior tibial pulses are 1+ on the right side, and 1+ on the left side.   CFT< 3 secs all toes bilateral foot, skin temp warm bilateral foot, diminished digital hair growth bilateral foot, no lower extremity edema bilateral.       Musculoskeletal:   Amputated right fifth toe. No pain on palpation of amputation site right foot.     Hallux limitus with semi-reducible hammertoe deformities toes 2-5 bilateral. Elongated second toe bilateral. Rectus alignment of right second toe. No pain with on palpation or ROM right second toe. Adductovarus rotation fifth toe left. Gastrocnemius equinus bilateral. No pain with ROM or MMT bilateral foot.   Neurological: She is alert and oriented to person, place, and time. She has normal strength. A sensory deficit is present.   Protective threshold and vibratory sensation decreased bilateral foot.   Skin: Skin is warm, dry and intact. Capillary refill takes less than 2 seconds. No ecchymosis noted. She is not diaphoretic. No cyanosis or erythema. No pallor. Nails show no clubbing.   Incision site right fifth toe amp site well approximated with sutures, no soi, no dehiscence.                                 Assessment:       Encounter Diagnoses   Name Primary?    Postoperative state Yes    Diabetic polyneuropathy associated with type 2 diabetes mellitus          Plan:       Caro LUA was seen today for post-op  evaluation.    Diagnoses and all orders for this visit:    Postoperative state    Diabetic polyneuropathy associated with type 2 diabetes mellitus      I counseled the patient on her conditions, their implications and medical management.    Shoe inspection. Diabetic Foot Education. Patient reminded of the importance of good nutrition and blood sugar control to help prevent podiatric complications of diabetes. Patient instructed on proper foot hygeine. We discussed wearing proper shoe gear, daily foot inspections, never walking without protective shoe gear, never putting sharp instruments to feet    Dressing removed right foot and surgical site inspected Cleansed with hibiclens.  Sutures removed. No dehiscence. May return into Diabetic shoes.    RTC 2 months or prn.

## 2017-06-28 LAB
FUNGUS SPEC CULT: NORMAL
FUNGUS SPEC CULT: NORMAL

## 2017-07-25 LAB
ACID FAST MOD KINY STN SPEC: NORMAL
ACID FAST MOD KINY STN SPEC: NORMAL
MYCOBACTERIUM SPEC QL CULT: NORMAL
MYCOBACTERIUM SPEC QL CULT: NORMAL

## 2017-08-29 ENCOUNTER — OFFICE VISIT (OUTPATIENT)
Dept: PODIATRY | Facility: CLINIC | Age: 77
End: 2017-08-29
Payer: MEDICARE

## 2017-08-29 VITALS
HEART RATE: 54 BPM | WEIGHT: 188 LBS | SYSTOLIC BLOOD PRESSURE: 155 MMHG | DIASTOLIC BLOOD PRESSURE: 62 MMHG | HEIGHT: 62 IN | BODY MASS INDEX: 34.6 KG/M2

## 2017-08-29 DIAGNOSIS — L84 CORN OR CALLUS: ICD-10-CM

## 2017-08-29 DIAGNOSIS — L03.031 CELLULITIS OF SECOND TOE, RIGHT: ICD-10-CM

## 2017-08-29 DIAGNOSIS — E11.621 DIABETIC ULCER OF TOE OF RIGHT FOOT ASSOCIATED WITH TYPE 2 DIABETES MELLITUS, LIMITED TO BREAKDOWN OF SKIN: Primary | ICD-10-CM

## 2017-08-29 DIAGNOSIS — L97.511 DIABETIC ULCER OF TOE OF RIGHT FOOT ASSOCIATED WITH TYPE 2 DIABETES MELLITUS, LIMITED TO BREAKDOWN OF SKIN: Primary | ICD-10-CM

## 2017-08-29 PROCEDURE — 1126F AMNT PAIN NOTED NONE PRSNT: CPT | Mod: S$GLB,,, | Performed by: PODIATRIST

## 2017-08-29 PROCEDURE — 99999 PR PBB SHADOW E&M-EST. PATIENT-LVL III: CPT | Mod: PBBFAC,,, | Performed by: PODIATRIST

## 2017-08-29 PROCEDURE — 1159F MED LIST DOCD IN RCRD: CPT | Mod: S$GLB,,, | Performed by: PODIATRIST

## 2017-08-29 PROCEDURE — 3008F BODY MASS INDEX DOCD: CPT | Mod: S$GLB,,, | Performed by: PODIATRIST

## 2017-08-29 PROCEDURE — 11721 DEBRIDE NAIL 6 OR MORE: CPT | Mod: 59,Q9,S$GLB, | Performed by: PODIATRIST

## 2017-08-29 PROCEDURE — 3078F DIAST BP <80 MM HG: CPT | Mod: S$GLB,,, | Performed by: PODIATRIST

## 2017-08-29 PROCEDURE — 99213 OFFICE O/P EST LOW 20 MIN: CPT | Mod: 25,S$GLB,, | Performed by: PODIATRIST

## 2017-08-29 PROCEDURE — 11056 PARNG/CUTG B9 HYPRKR LES 2-4: CPT | Mod: Q9,S$GLB,, | Performed by: PODIATRIST

## 2017-08-29 PROCEDURE — 97597 DBRDMT OPN WND 1ST 20 CM/<: CPT | Mod: 59,S$GLB,, | Performed by: PODIATRIST

## 2017-08-29 PROCEDURE — 3077F SYST BP >= 140 MM HG: CPT | Mod: S$GLB,,, | Performed by: PODIATRIST

## 2017-08-29 RX ORDER — LISINOPRIL 5 MG/1
TABLET ORAL DAILY
Status: ON HOLD | COMMUNITY
Start: 2017-06-27 | End: 2019-06-11 | Stop reason: HOSPADM

## 2017-08-29 RX ORDER — CLINDAMYCIN HYDROCHLORIDE 300 MG/1
300 CAPSULE ORAL 3 TIMES DAILY
Qty: 30 CAPSULE | Refills: 0 | Status: SHIPPED | OUTPATIENT
Start: 2017-08-29 | End: 2018-04-30

## 2017-08-29 NOTE — PROGRESS NOTES
Subjective:      Patient ID: Caro Powell is a 76 y.o. female.    Chief Complaint: Diabetic Foot Exam    Caro LUA is a 76 y.o. female who presents to the clinic for evaluation and treatment of diabetic feet. Caro LUA has a past medical history of Calcium nephrolithiasis (2007); Diabetes mellitus, type 2; Diabetic peripheral neuropathy associated with type 2 diabetes mellitus; and Hypertension. Patient relates no major problem with feet. History of right fifth toe amputation on 5/23/17.     PCP: Manuel Laird MD    Date Last Seen by PCP: 7/19/17    Current shoe gear: Tennis shoes    Hemoglobin A1C   Date Value Ref Range Status   05/22/2017 7.5 (H) 4.5 - 6.2 % Final     Comment:     According to ADA guidelines, hemoglobin A1C <7.0% represents  optimal control in non-pregnant diabetic patients.  Different  metrics may apply to specific populations.   Standards of Medical Care in Diabetes - 2016.  For the purpose of screening for the presence of diabetes:  <5.7%     Consistent with the absence of diabetes  5.7-6.4%  Consistent with increasing risk for diabetes   (prediabetes)  >or=6.5%  Consistent with diabetes  Currently no consensus exists for use of hemoglobin A1C  for diagnosis of diabetes for children.             Review of Systems   Constitution: Negative for chills, fever, weakness and malaise/fatigue.   Cardiovascular: Negative for chest pain, claudication and leg swelling.   Respiratory: Negative for cough and shortness of breath.    Skin: Positive for dry skin, nail changes and poor wound healing. Negative for color change.   Musculoskeletal: Negative for back pain, joint pain, muscle cramps and muscle weakness.   Gastrointestinal: Negative for nausea and vomiting.   Neurological: Positive for paresthesias. Negative for numbness.   Psychiatric/Behavioral: Negative for altered mental status.           Objective:      Physical Exam   Constitutional: She is oriented to person, place, and  time. No distress.   Cardiovascular: Intact distal pulses.    Pulses:       Dorsalis pedis pulses are 2+ on the right side, and 2+ on the left side.        Posterior tibial pulses are 1+ on the right side, and 1+ on the left side.   CFT< 3 secs all toes bilateral foot, skin temp warm bilateral foot, diminished digital hair growth bilateral foot, no lower extremity edema bilateral.       Musculoskeletal:        Right foot: There is decreased range of motion and deformity.        Left foot: There is decreased range of motion and deformity.   Amputated right fifth toe. No pain on palpation of amputation site right foot.     Hallux limitus with semi-reducible hammertoe deformities toes 2-5 bilateral. Elongated second toe bilateral. Rectus alignment of right second toe. No pain with on palpation or ROM right second toe. Adductovarus rotation fifth toe left. Gastrocnemius equinus bilateral. No pain with ROM or MMT bilateral foot.   Feet:   Right Foot:   Protective Sensation: 9 sites tested. 5 sites sensed.   Skin Integrity: Positive for skin breakdown, erythema, callus and dry skin. Negative for ulcer, blister or warmth.   Left Foot:   Protective Sensation: 10 sites tested. 5 sites sensed.   Skin Integrity: Positive for callus and dry skin. Negative for ulcer, blister, skin breakdown, erythema or warmth.   Neurological: She is alert and oriented to person, place, and time. She has normal strength. A sensory deficit is present.   Protective threshold and vibratory sensation decreased bilateral foot.   Skin: Skin is warm and dry. Capillary refill takes less than 2 seconds. No ecchymosis noted. She is not diaphoretic. There is erythema. No cyanosis. No pallor. Nails show no clubbing.   Distal right second toe with raised and loosened hyperkeratotic tissue with underlying wound that is not measurable. Post debridement extends to dermis measuring 0.2x0.2x0.1cm. Does not probe beyond skin. Mild surrounding erythema extending to  level of DIPJ proximally.     Nails 1-4 right and 1-5 left are dry, dystrophic, thickened 1-2 mm and elongated 1 mm.     Hyperkeratotic lesion with underlying hemosiderin staining medial hallux IPJ bilateral.                             Assessment:       Encounter Diagnoses   Name Primary?    Diabetic ulcer of toe of right foot associated with type 2 diabetes mellitus, limited to breakdown of skin Yes    Cellulitis of second toe, right     Corn or callus          Plan:       Caro LUA was seen today for diabetic foot exam.    Diagnoses and all orders for this visit:    Diabetic ulcer of toe of right foot associated with type 2 diabetes mellitus, limited to breakdown of skin  -     Debridement    Cellulitis of second toe, right    Corn or callus    Other orders  -     clindamycin (CLEOCIN) 300 MG capsule; Take 1 capsule (300 mg total) by mouth 3 (three) times daily.      I counseled the patient on her conditions, their implications and medical management.    Shoe inspection. Diabetic Foot Education. Patient reminded of the importance of good nutrition and blood sugar control to help prevent podiatric complications of diabetes. Patient instructed on proper foot hygeine. We discussed wearing proper shoe gear, daily foot inspections, never walking without protective shoe gear, never putting sharp instruments to feet, routine podiatric nail visits every 2-3 months.      With patient's permission, nails were aggressively reduced and debrided x 9 to their soft tissue attachment mechanically and with electric , removing all offending nail and debris. Patient relates relief following the procedure. She will continue to monitor the areas daily, inspect her feet, wear protective shoe gear when ambulatory, moisturizer to maintain skin integrity and follow in this office in approximately 2-3 months, sooner p.r.n.    With patient's verbal consent the hyperkeratotic lesions on hallux bilateral were trimmed to healthy  appearing skin using a sterile #15 scalpel. No anesthesia required. No blood loss. She tolerated the procedure well without complications.    Debridement and dressing per attached note. Home care instructions reviewed in detail with daily neosporin and band aid.    Inspected her Nike tennis shoes noting there is some room in toe box but she pronates excessively on the right foot which forces end of second toe into end of toe box. Recommended Epstein Adrenaline tennis shoes.    RTC 3 months or prn unless redness and wound fail to resolve within 2 weeks.

## 2017-08-29 NOTE — PROCEDURES
"Wound Debridement  Date/Time: 8/29/2017 10:08 AM  Performed by: GERTRUDE HILLIARD  Authorized by: GERTRUDE HILLIARD     Time out: Immediately prior to procedure a "time out" was called to verify the correct patient, procedure, equipment, support staff and site/side marked as required.    Consent Done?:  Yes (Verbal)    Preparation: Patient was prepped and draped in usual sterile fashion    Local anesthesia used?: No      Wound Details:    Location:  Right foot    Location:  Right 2nd Toe (distal tip)    Type of Debridement:  Excisional       Length (cm):  0.2       Area (sq cm):  0.04       Width (cm):  0.2       Percent Debrided (%):  100       Depth (cm):  0.1       Total Area Debrided (sq cm):  0.04    Depth of debridement:  Epidermis/Dermis    Tissue debrided:  Epidermis and Dermis    Devitalized tissue debrided:  Callus    Instruments:  Blade    Bleeding:  Minimal  Hemostasis Achieved: Yes    Method Used:  Pressure and Silver Nitrate  Patient tolerance:  Patient tolerated the procedure well with no immediate complications     Applied bacitracin and band aid.      "

## 2017-08-29 NOTE — PATIENT INSTRUCTIONS
Epstein Adrenaline    Observe toe and notify office if redness does not resolve within 10-14 days.

## 2018-01-12 LAB
CHOL/HDLC RATIO: 3.2
CHOLESTEROL, TOTAL: 251
HDLC SERPL-MCNC: 79 MG/DL (ref 35–70)
LDLC SERPL CALC-MCNC: 146 MG/DL
NON HDL CHOL. (LDL+VLDL): 172
TRIGL SERPL-MCNC: 131 MG/DL (ref 40–160)

## 2018-04-30 ENCOUNTER — OFFICE VISIT (OUTPATIENT)
Dept: PODIATRY | Facility: CLINIC | Age: 78
End: 2018-04-30
Payer: MEDICARE

## 2018-04-30 VITALS
BODY MASS INDEX: 34.6 KG/M2 | WEIGHT: 188 LBS | HEIGHT: 62 IN | DIASTOLIC BLOOD PRESSURE: 63 MMHG | HEART RATE: 55 BPM | SYSTOLIC BLOOD PRESSURE: 173 MMHG

## 2018-04-30 DIAGNOSIS — E08.621 DIABETIC ULCER OF OTHER PART OF RIGHT FOOT ASSOCIATED WITH DIABETES MELLITUS DUE TO UNDERLYING CONDITION, WITH FAT LAYER EXPOSED: ICD-10-CM

## 2018-04-30 DIAGNOSIS — L03.115 CELLULITIS OF FOOT, RIGHT: ICD-10-CM

## 2018-04-30 DIAGNOSIS — E11.621 DIABETIC ULCER OF TOE OF RIGHT FOOT ASSOCIATED WITH TYPE 2 DIABETES MELLITUS, WITH FAT LAYER EXPOSED: Primary | ICD-10-CM

## 2018-04-30 DIAGNOSIS — L97.512 DIABETIC ULCER OF OTHER PART OF RIGHT FOOT ASSOCIATED WITH DIABETES MELLITUS DUE TO UNDERLYING CONDITION, WITH FAT LAYER EXPOSED: ICD-10-CM

## 2018-04-30 DIAGNOSIS — L97.512 DIABETIC ULCER OF TOE OF RIGHT FOOT ASSOCIATED WITH TYPE 2 DIABETES MELLITUS, WITH FAT LAYER EXPOSED: Primary | ICD-10-CM

## 2018-04-30 PROCEDURE — 11042 DBRDMT SUBQ TIS 1ST 20SQCM/<: CPT | Mod: S$GLB,,, | Performed by: PODIATRIST

## 2018-04-30 PROCEDURE — 3077F SYST BP >= 140 MM HG: CPT | Mod: CPTII,S$GLB,, | Performed by: PODIATRIST

## 2018-04-30 PROCEDURE — 87147 CULTURE TYPE IMMUNOLOGIC: CPT

## 2018-04-30 PROCEDURE — 87076 CULTURE ANAEROBE IDENT EACH: CPT

## 2018-04-30 PROCEDURE — 99999 PR PBB SHADOW E&M-EST. PATIENT-LVL III: CPT | Mod: PBBFAC,,, | Performed by: PODIATRIST

## 2018-04-30 PROCEDURE — 87075 CULTR BACTERIA EXCEPT BLOOD: CPT

## 2018-04-30 PROCEDURE — 99213 OFFICE O/P EST LOW 20 MIN: CPT | Mod: 25,S$GLB,, | Performed by: PODIATRIST

## 2018-04-30 PROCEDURE — 3078F DIAST BP <80 MM HG: CPT | Mod: CPTII,S$GLB,, | Performed by: PODIATRIST

## 2018-04-30 PROCEDURE — 87070 CULTURE OTHR SPECIMN AEROBIC: CPT

## 2018-04-30 RX ORDER — CLINDAMYCIN HYDROCHLORIDE 300 MG/1
300 CAPSULE ORAL 3 TIMES DAILY
Qty: 30 CAPSULE | Refills: 0 | Status: SHIPPED | OUTPATIENT
Start: 2018-04-30 | End: 2018-05-07 | Stop reason: SDUPTHER

## 2018-04-30 RX ORDER — CIPROFLOXACIN 500 MG/1
500 TABLET ORAL 2 TIMES DAILY
Qty: 20 TABLET | Refills: 0 | Status: SHIPPED | OUTPATIENT
Start: 2018-04-30 | End: 2019-02-04

## 2018-04-30 NOTE — PROCEDURES
"Wound Debridement  Date/Time: 4/30/2018 10:31 AM  Performed by: GERTRUDE HILLIARD  Authorized by: GERTRUDE HILLIARD     Time out: Immediately prior to procedure a "time out" was called to verify the correct patient, procedure, equipment, support staff and site/side marked as required.    Consent Done?:  Yes (Verbal)    Preparation: Patient was prepped and draped in usual sterile fashion    Local anesthesia used?: No      Wound Details:    Location:  Right foot    Location:  Right 2nd Toe    Type of Debridement:  Excisional       Length (cm):  2.5       Area (sq cm):  3.75       Width (cm):  1.5       Percent Debrided (%):  100       Depth (cm):  0.2       Total Area Debrided (sq cm):  3.75    Depth of debridement:  Subcutaneous tissue    Tissue debrided:  Subcutaneous and Other (toenail)    Devitalized tissue debrided:  Necrotic/Eschar, Sough, Biofilm, Exudate and Fibrin    Instruments:  Blade and Nippers    2nd Wound Details:     Location:  Right foot    Location:  Right 1st Metatarsal Head    Location:  Right 1st Metatarsal Head    Type of Debridement:  Excisional       Length (cm):  0.7       Area (sq cm):  0.42       Width (cm):  0.6       Percent Debrided (%):  100       Depth (cm):  0.2       Total Area Debrided (sq cm):  0.42    Depth of debridement:  Subcutaneous tissue    Tissue debrided:  Subcutaneous, Dermis and Epidermis    Devitalized tissue debrided:  Callus, Sough and Exudate    Instruments:  Blade    Bleeding:  Minimal  Hemostasis Achieved: Yes    Method Used:  Pressure and Silver Nitrate  Patient tolerance:  Patient tolerated the procedure well with no immediate complications     Saline moistened hydrothera blue, nicolette foam, cast padding secured with coban.      "

## 2018-05-01 NOTE — PROGRESS NOTES
"Subjective:      Patient ID: Caro Powell is a 77 y.o. female.    Chief Complaint: Foot Problem (righ foot second toe swollen)    Caro LUA is a 77 y.o. female who presents to the clinic for evaluation and treatment of high risk feet. Caro LUA has a past medical history of Calcium nephrolithiasis (2007); Diabetes mellitus, type 2; Diabetic peripheral neuropathy associated with type 2 diabetes mellitus; and Hypertension. The patient's chief complaint is diabetic foot ulcer, right second toe. This patient has documented high risk feet requiring routine maintenance secondary to peripheral neuropathy. Noted drainage and discoloration from the toe a few days ago. Denies trauma. Accompanied by her .    PCP: Manuel Laird MD    Date Last Seen by PCP:     Current shoe gear:  Affected Foot: Extra depth shoes with custome accommodative insoles     Unaffected Foot: Extra depth shoes with custome accommodative insoles    History of Trauma: negative  Sign of Infection: none    Hemoglobin A1C   Date Value Ref Range Status   05/22/2017 7.5 (H) 4.5 - 6.2 % Final     Comment:     According to ADA guidelines, hemoglobin A1C <7.0% represents  optimal control in non-pregnant diabetic patients.  Different  metrics may apply to specific populations.   Standards of Medical Care in Diabetes - 2016.  For the purpose of screening for the presence of diabetes:  <5.7%     Consistent with the absence of diabetes  5.7-6.4%  Consistent with increasing risk for diabetes   (prediabetes)  >or=6.5%  Consistent with diabetes  Currently no consensus exists for use of hemoglobin A1C  for diagnosis of diabetes for children.       Vitals:    04/30/18 0957   BP: (!) 173/63   Pulse: (!) 55   Weight: 85.3 kg (188 lb)   Height: 5' 2" (1.575 m)   PainSc:   7      Past Medical History:   Diagnosis Date    Calcium nephrolithiasis 2007    Diabetes mellitus, type 2     Diabetic peripheral neuropathy associated with type 2 diabetes " mellitus     Hypertension        Past Surgical History:   Procedure Laterality Date    COLONOSCOPY  11/28/2011    sigmoid diverticulosis, external hemorrhoids    HYSTERECTOMY      SHOULDER SURGERY Left     TOE AMPUTATION Right 05/22/2017    5th toe       Family History   Problem Relation Age of Onset    Diabetes Mother     Heart failure Father     Kidney failure Brother        Social History     Social History    Marital status:      Spouse name: N/A    Number of children: N/A    Years of education: N/A     Social History Main Topics    Smoking status: Never Smoker    Smokeless tobacco: Not on file    Alcohol use No    Drug use: No    Sexual activity: Not on file     Other Topics Concern    Not on file     Social History Narrative    No narrative on file       Current Outpatient Prescriptions   Medication Sig Dispense Refill    ACCU-CHEK SAMI CONTROL SOLN Soln       ACCU-CHEK SAMI PLUS METER Misc       ACCU-CHEK SAMI PLUS TEST STRP Strp       ACCU-CHEK SOFT DEV LANCETS Kit       ACCU-CHEK SOFTCLIX LANCETS Misc       ammonium lactate 12 % Crea Apply to feet twice daily. 140 g 5    apixaban 5 mg Tab Take 1 tablet (5 mg total) by mouth 2 (two) times daily. 60 tablet 5    diltiaZEM (CARDIZEM CD) 120 MG Cp24       diltiaZEM (CARDIZEM) 60 MG tablet Take 1 tablet (60 mg total) by mouth once daily at 6am. 30 tablet 11    gabapentin (NEURONTIN) 400 MG capsule       glimepiride (AMARYL) 1 MG tablet       hydrocodone-acetaminophen 5-325mg (NORCO) 5-325 mg per tablet Take 1 tablet by mouth every 12 (twelve) hours as needed.  0    lisinopril (PRINIVIL,ZESTRIL) 5 MG tablet       lisinopril 10 MG Tab Take 1 tablet (10 mg total) by mouth once daily. 30 tablet 5    metoprolol succinate (TOPROL-XL) 25 MG 24 hr tablet       omeprazole (PRILOSEC) 20 MG capsule Take 1 capsule (20 mg total) by mouth once daily. 30 capsule 3    pramipexole (MIRAPEX) 0.125 MG tablet       ciprofloxacin HCl  (CIPRO) 500 MG tablet Take 1 tablet (500 mg total) by mouth 2 (two) times daily. 20 tablet 0    clindamycin (CLEOCIN) 300 MG capsule Take 1 capsule (300 mg total) by mouth 3 (three) times daily. 30 capsule 0     No current facility-administered medications for this visit.        Review of patient's allergies indicates:   Allergen Reactions    Codeine Nausea Only         Review of Systems   Constitution: Negative for chills, fever, weakness and malaise/fatigue.   Cardiovascular: Negative for chest pain, claudication and leg swelling.   Respiratory: Negative for cough and shortness of breath.    Skin: Positive for color change, dry skin, nail changes and poor wound healing.   Musculoskeletal: Negative for back pain, joint pain, muscle cramps and muscle weakness.   Gastrointestinal: Negative for nausea and vomiting.   Neurological: Positive for numbness and paresthesias.   Psychiatric/Behavioral: Negative for altered mental status.           Objective:      Physical Exam   Constitutional: She is oriented to person, place, and time. She appears well-developed and well-nourished. No distress.   Cardiovascular:   Pulses:       Dorsalis pedis pulses are 2+ on the right side, and 2+ on the left side.        Posterior tibial pulses are 2+ on the right side, and 2+ on the left side.   CFT< 3 secs all toes bilateral foot, skin temp warm bilateral foot, no digital hair growth bilateral foot, mild lower extremity edema bilateral.     Musculoskeletal:   + equinus that reduces with knee bent bilateral.    No pain with ROM or MMT bilateral foot.   Feet:   Right Foot:   Protective Sensation: 10 sites tested. 5 sites sensed.   Skin Integrity: Positive for ulcer, erythema, callus and dry skin.   Left Foot:   Protective Sensation: 10 sites tested. 5 sites sensed.   Skin Integrity: Positive for callus and dry skin. Negative for ulcer, blister, skin breakdown, erythema or warmth.   Neurological: She is alert and oriented to person,  place, and time. She has normal strength. A sensory deficit is present.   Skin: Capillary refill takes less than 2 seconds. No ecchymosis and no rash noted. She is not diaphoretic. There is erythema. No cyanosis. Nails show no clubbing.   Ulcer Location: distal right second toe  Measurements: 0.3x0.4x0.3 pre-debridement  Periwound: blistered with surrounding hyperkeratosis and eschar  Drainage: purlent  Pus: yes.  Malodor: None.  Base:  10% granular. 10% fibrin.80% eschar  Signs of infection: moderate surrounding erythema    Note dry hyperkeratotic lesion post debridement has underlying ulceration with small cutaneous abscess noting 1 mL brown purulent drainage                   Assessment:       Encounter Diagnoses   Name Primary?    Diabetic ulcer of toe of right foot associated with type 2 diabetes mellitus, with fat layer exposed Yes    Diabetic ulcer of other part of right foot associated with diabetes mellitus due to underlying condition, with fat layer exposed     Cellulitis of foot, right          Plan:       Caro LUA was seen today for foot problem.    Diagnoses and all orders for this visit:    Diabetic ulcer of toe of right foot associated with type 2 diabetes mellitus, with fat layer exposed  -     Aerobic culture (Specify Source)  -     CULTURE, ANAEROBE  -     Debridement    Diabetic ulcer of other part of right foot associated with diabetes mellitus due to underlying condition, with fat layer exposed  -     Debridement    Cellulitis of foot, right    Other orders  -     ciprofloxacin HCl (CIPRO) 500 MG tablet; Take 1 tablet (500 mg total) by mouth 2 (two) times daily.  -     clindamycin (CLEOCIN) 300 MG capsule; Take 1 capsule (300 mg total) by mouth 3 (three) times daily.      I counseled the patient on her conditions, their implications and medical management.    Shoe inspection. Diabetic Foot Education. Patient reminded of the importance of good nutrition and blood sugar control to help  prevent podiatric complications of diabetes. Patient instructed on proper foot hygeine. We discussed wearing proper shoe gear, daily foot inspections, never walking without protective shoe gear, never putting sharp instruments to feet, routine podiatric nail visits every 2-3 months.      .She will continue to monitor the areas daily, inspect her feet, wear protective shoe gear when ambulatory, moisturizer to maintain skin integrity and follow in this office in approximately 2-3 months, sooner p.r.n.    Wound debridement and dressing per attached note. Patient brought her own Darco shoe.    RTC 1 week for wound check.

## 2018-05-02 LAB — BACTERIA SPEC AEROBE CULT: NORMAL

## 2018-05-04 LAB — BACTERIA SPEC ANAEROBE CULT: NORMAL

## 2018-05-07 ENCOUNTER — OFFICE VISIT (OUTPATIENT)
Dept: PODIATRY | Facility: CLINIC | Age: 78
End: 2018-05-07
Payer: MEDICARE

## 2018-05-07 ENCOUNTER — TELEPHONE (OUTPATIENT)
Dept: ADMINISTRATIVE | Facility: HOSPITAL | Age: 78
End: 2018-05-07

## 2018-05-07 VITALS — BODY MASS INDEX: 34.6 KG/M2 | WEIGHT: 188 LBS | HEIGHT: 62 IN

## 2018-05-07 DIAGNOSIS — E11.621 DIABETIC ULCER OF TOE OF RIGHT FOOT ASSOCIATED WITH TYPE 2 DIABETES MELLITUS, WITH FAT LAYER EXPOSED: Primary | ICD-10-CM

## 2018-05-07 DIAGNOSIS — L97.512 DIABETIC ULCER OF TOE OF RIGHT FOOT ASSOCIATED WITH TYPE 2 DIABETES MELLITUS, WITH FAT LAYER EXPOSED: Primary | ICD-10-CM

## 2018-05-07 LAB — HBA1C MFR BLD: 8.3 %

## 2018-05-07 PROCEDURE — 99999 PR PBB SHADOW E&M-EST. PATIENT-LVL III: CPT | Mod: PBBFAC,,, | Performed by: PODIATRIST

## 2018-05-07 PROCEDURE — 99213 OFFICE O/P EST LOW 20 MIN: CPT | Mod: 25,S$GLB,, | Performed by: PODIATRIST

## 2018-05-07 PROCEDURE — 11042 DBRDMT SUBQ TIS 1ST 20SQCM/<: CPT | Mod: S$GLB,,, | Performed by: PODIATRIST

## 2018-05-07 RX ORDER — CLINDAMYCIN HYDROCHLORIDE 300 MG/1
300 CAPSULE ORAL 3 TIMES DAILY
Qty: 30 CAPSULE | Refills: 0 | Status: SHIPPED | OUTPATIENT
Start: 2018-05-10 | End: 2019-02-18

## 2018-05-07 NOTE — PROCEDURES
"Wound Debridement  Date/Time: 5/7/2018 11:25 AM  Performed by: GERTRUDE HILLIARD  Authorized by: GERTRUDE HILLIARD     Time out: Immediately prior to procedure a "time out" was called to verify the correct patient, procedure, equipment, support staff and site/side marked as required.    Consent Done?:  Yes (Verbal)    Preparation: Patient was prepped and draped in usual sterile fashion    Local anesthesia used?: No      Wound Details:    Location:  Right foot    Location:  Right 2nd Toe    Type of Debridement:  Excisional (dorsal distal second toe)       Length (cm):  1       Area (sq cm):  0.8       Width (cm):  0.8       Percent Debrided (%):  100       Depth (cm):  0.1       Total Area Debrided (sq cm):  0.8    Depth of debridement:  Subcutaneous tissue    Tissue debrided:  Subcutaneous    Devitalized tissue debrided:  Callus, Sough, Fibrin and Biofilm    Instruments:  Curette    2nd Wound Details:     Location:  Right foot    Location:  Right 2nd Toe (distal plantar)    Location:  Right 2nd Toe (distal plantar)    Type of Debridement:  Excisional       Length (cm):  0.6       Area (sq cm):  0.36       Width (cm):  0.6       Percent Debrided (%):  100       Depth (cm):  0.2       Total Area Debrided (sq cm):  0.36    Depth of debridement:  Subcutaneous tissue    Tissue debrided:  Subcutaneous    Devitalized tissue debrided:  Sough, Biofilm, Callus and Fibrin    Instruments:  Curette    Bleeding:  Minimal  Hemostasis Achieved: Yes    Method Used:  Pressure  Patient tolerance:  Patient tolerated the procedure well with no immediate complications     Fidelina, nicolette foam, cast padding x 2 secured with coban.        "

## 2018-05-08 NOTE — PROGRESS NOTES
"Subjective:      Patient ID: Caro Powell is a 77 y.o. female.    Chief Complaint: Follow-up (right foot)    Caro LUA is a 77 y.o. female who presents to the clinic for evaluation and treatment of high risk feet. Caro LUA has a past medical history of Calcium nephrolithiasis (2007); Diabetes mellitus, type 2; Diabetic peripheral neuropathy associated with type 2 diabetes mellitus; and Hypertension. The patient's chief complaint is diabetic foot ulcer, right second toe. This patient has documented high risk feet requiring routine maintenance secondary to peripheral neuropathy. Noted drainage and discoloration from the toe a few days ago. Denies trauma. Accompanied by her .    5/7/18: Follow up for wound check right foot. Taking po clindamycin and ciprofloxacin as scheduled. Accompanied by her . Dressing remained c/d/i.    PCP: Manuel Laird MD    Date Last Seen by PCP:     Current shoe gear:  Affected Foot: Extra depth shoes with custome accommodative insoles     Unaffected Foot: Extra depth shoes with custome accommodative insoles    History of Trauma: negative  Sign of Infection: none    Hemoglobin A1C   Date Value Ref Range Status   01/12/2018 8.3 % Final   05/22/2017 7.5 (H) 4.5 - 6.2 % Final     Comment:     According to ADA guidelines, hemoglobin A1C <7.0% represents  optimal control in non-pregnant diabetic patients.  Different  metrics may apply to specific populations.   Standards of Medical Care in Diabetes - 2016.  For the purpose of screening for the presence of diabetes:  <5.7%     Consistent with the absence of diabetes  5.7-6.4%  Consistent with increasing risk for diabetes   (prediabetes)  >or=6.5%  Consistent with diabetes  Currently no consensus exists for use of hemoglobin A1C  for diagnosis of diabetes for children.       Vitals:    05/07/18 1052   Weight: 85.3 kg (188 lb)   Height: 5' 2" (1.575 m)      Past Medical History:   Diagnosis Date    Calcium " nephrolithiasis 2007    Diabetes mellitus, type 2     Diabetic peripheral neuropathy associated with type 2 diabetes mellitus     Hypertension        Past Surgical History:   Procedure Laterality Date    COLONOSCOPY  11/28/2011    sigmoid diverticulosis, external hemorrhoids    HYSTERECTOMY      SHOULDER SURGERY Left     TOE AMPUTATION Right 05/22/2017    5th toe       Family History   Problem Relation Age of Onset    Diabetes Mother     Heart failure Father     Kidney failure Brother        Social History     Social History    Marital status:      Spouse name: N/A    Number of children: N/A    Years of education: N/A     Social History Main Topics    Smoking status: Never Smoker    Smokeless tobacco: Not on file    Alcohol use No    Drug use: No    Sexual activity: Not on file     Other Topics Concern    Not on file     Social History Narrative    No narrative on file       Current Outpatient Prescriptions   Medication Sig Dispense Refill    ACCU-CHEK SAMI CONTROL SOLN Soln       ACCU-CHEK SAMI PLUS METER Misc       ACCU-CHEK SAMI PLUS TEST STRP Strp       ACCU-CHEK SOFT DEV LANCETS Kit       ACCU-CHEK SOFTCLIX LANCETS Misc       ammonium lactate 12 % Crea Apply to feet twice daily. 140 g 5    apixaban 5 mg Tab Take 1 tablet (5 mg total) by mouth 2 (two) times daily. 60 tablet 5    ciprofloxacin HCl (CIPRO) 500 MG tablet Take 1 tablet (500 mg total) by mouth 2 (two) times daily. 20 tablet 0    [START ON 5/10/2018] clindamycin (CLEOCIN) 300 MG capsule Take 1 capsule (300 mg total) by mouth 3 (three) times daily. 30 capsule 0    diltiaZEM (CARDIZEM CD) 120 MG Cp24       diltiaZEM (CARDIZEM) 60 MG tablet Take 1 tablet (60 mg total) by mouth once daily at 6am. 30 tablet 11    gabapentin (NEURONTIN) 400 MG capsule       glimepiride (AMARYL) 1 MG tablet       hydrocodone-acetaminophen 5-325mg (NORCO) 5-325 mg per tablet Take 1 tablet by mouth every 12 (twelve) hours as needed.   0    lisinopril (PRINIVIL,ZESTRIL) 5 MG tablet       lisinopril 10 MG Tab Take 1 tablet (10 mg total) by mouth once daily. 30 tablet 5    metoprolol succinate (TOPROL-XL) 25 MG 24 hr tablet       omeprazole (PRILOSEC) 20 MG capsule Take 1 capsule (20 mg total) by mouth once daily. 30 capsule 3    pramipexole (MIRAPEX) 0.125 MG tablet        No current facility-administered medications for this visit.        Review of patient's allergies indicates:   Allergen Reactions    Codeine Nausea Only         Review of Systems   Constitution: Negative for chills, fever, weakness and malaise/fatigue.   Cardiovascular: Negative for chest pain, claudication and leg swelling.   Respiratory: Negative for cough and shortness of breath.    Skin: Positive for color change, dry skin, nail changes and poor wound healing.   Musculoskeletal: Negative for back pain, joint pain, muscle cramps and muscle weakness.   Gastrointestinal: Negative for nausea and vomiting.   Neurological: Positive for numbness and paresthesias.   Psychiatric/Behavioral: Negative for altered mental status.           Objective:      Physical Exam   Constitutional: She is oriented to person, place, and time. She appears well-developed and well-nourished. No distress.   Cardiovascular:   Pulses:       Dorsalis pedis pulses are 2+ on the right side, and 2+ on the left side.        Posterior tibial pulses are 2+ on the right side, and 2+ on the left side.   CFT< 3 secs all toes bilateral foot, skin temp warm bilateral foot, no digital hair growth bilateral foot, mild lower extremity edema bilateral.     Musculoskeletal:   + equinus that reduces with knee bent bilateral.    No pain with ROM or MMT bilateral foot.   Feet:   Right Foot:   Protective Sensation: 10 sites tested. 5 sites sensed.   Skin Integrity: Positive for ulcer, erythema, callus and dry skin.   Left Foot:   Protective Sensation: 10 sites tested. 5 sites sensed.   Skin Integrity: Positive for callus  and dry skin. Negative for ulcer, blister, skin breakdown, erythema or warmth.   Neurological: She is alert and oriented to person, place, and time. She has normal strength. A sensory deficit is present.   Skin: Skin is dry. Capillary refill takes less than 2 seconds. No ecchymosis and no rash noted. She is not diaphoretic. There is erythema. No cyanosis. No pallor. Nails show no clubbing.   Ulcer Location: distal dorsal right second toe  Measurements: 1.0x0.7x0.1cm pre-debridement  Periwound:sloughed skin  Drainage: serous  Pus: no  Malodor: None.  Base:  50% granular. 50% fibrin.  Signs of infection: mild resolving erythema    Ulcer Location: distal plantar right second toe digital tuft  Measurements: 0.5x0.5x0.1cm pre-debridement  Periwound: hyperkeratotic  Drainage: serous  Pus: None.  Malodor: None.  Base:  50% granular. 50% fibrin.  Signs of infection: None.      Wound plantar first met head healed right foot.                   Assessment:       Encounter Diagnosis   Name Primary?    Diabetic ulcer of toe of right foot associated with type 2 diabetes mellitus, with fat layer exposed Yes         Plan:       Caro LUA was seen today for follow-up.    Diagnoses and all orders for this visit:    Diabetic ulcer of toe of right foot associated with type 2 diabetes mellitus, with fat layer exposed  -     Debridement    Other orders  -     clindamycin (CLEOCIN) 300 MG capsule; Take 1 capsule (300 mg total) by mouth 3 (three) times daily.      I counseled the patient on her conditions, their implications and medical management.    Shoe inspection. Diabetic Foot Education. Patient reminded of the importance of good nutrition and blood sugar control to help prevent podiatric complications of diabetes. Patient instructed on proper foot hygeine. We discussed wearing proper shoe gear, daily foot inspections, never walking without protective shoe gear, never putting sharp instruments to feet, routine podiatric nail visits  every 2-3 months.      .She will continue to monitor the areas daily, inspect her feet, wear protective shoe gear when ambulatory, moisturizer to maintain skin integrity and follow in this office in approximately 2-3 months, sooner p.r.n.    Wound debridement and dressing per attached note. Continue offloading with Darco shoe.    Continue po clindamycin for 10 more days. Previous c&S grew Streptococcus.    RTC 1 week for wound check.

## 2018-05-15 ENCOUNTER — OFFICE VISIT (OUTPATIENT)
Dept: PODIATRY | Facility: CLINIC | Age: 78
End: 2018-05-15
Payer: MEDICARE

## 2018-05-15 VITALS
WEIGHT: 188 LBS | BODY MASS INDEX: 34.6 KG/M2 | HEIGHT: 62 IN | HEART RATE: 54 BPM | SYSTOLIC BLOOD PRESSURE: 137 MMHG | DIASTOLIC BLOOD PRESSURE: 54 MMHG

## 2018-05-15 DIAGNOSIS — E11.621 DIABETIC ULCER OF TOE OF RIGHT FOOT ASSOCIATED WITH TYPE 2 DIABETES MELLITUS, WITH FAT LAYER EXPOSED: Primary | ICD-10-CM

## 2018-05-15 DIAGNOSIS — L97.512 DIABETIC ULCER OF TOE OF RIGHT FOOT ASSOCIATED WITH TYPE 2 DIABETES MELLITUS, WITH FAT LAYER EXPOSED: Primary | ICD-10-CM

## 2018-05-15 DIAGNOSIS — E11.42 DIABETIC POLYNEUROPATHY ASSOCIATED WITH TYPE 2 DIABETES MELLITUS: ICD-10-CM

## 2018-05-15 PROCEDURE — 3078F DIAST BP <80 MM HG: CPT | Mod: CPTII,S$GLB,, | Performed by: PODIATRIST

## 2018-05-15 PROCEDURE — 99212 OFFICE O/P EST SF 10 MIN: CPT | Mod: 25,S$GLB,, | Performed by: PODIATRIST

## 2018-05-15 PROCEDURE — 3075F SYST BP GE 130 - 139MM HG: CPT | Mod: CPTII,S$GLB,, | Performed by: PODIATRIST

## 2018-05-15 PROCEDURE — 11042 DBRDMT SUBQ TIS 1ST 20SQCM/<: CPT | Mod: S$GLB,,, | Performed by: PODIATRIST

## 2018-05-15 PROCEDURE — 99999 PR PBB SHADOW E&M-EST. PATIENT-LVL III: CPT | Mod: PBBFAC,,, | Performed by: PODIATRIST

## 2018-05-15 RX ORDER — AMOXICILLIN 500 MG/1
CAPSULE ORAL
COMMUNITY
Start: 2018-02-16 | End: 2019-02-04

## 2018-05-15 NOTE — PROCEDURES
"Wound Debridement  Date/Time: 5/15/2018 10:42 AM  Performed by: GERTRUDE HILLIARD  Authorized by: GERTRUDE HILLIARD     Time out: Immediately prior to procedure a "time out" was called to verify the correct patient, procedure, equipment, support staff and site/side marked as required.    Consent Done?:  Yes (Verbal)  Local anesthesia used?: No      Wound Details:    Location:  Right foot    Location:  Right 2nd Toe    Type of Debridement:  Excisional (dorsal distal toe)       Length (cm):  0.8       Area (sq cm):  0.8       Width (cm):  1       Percent Debrided (%):  100       Depth (cm):  0.2       Total Area Debrided (sq cm):  0.8    Depth of debridement:  Subcutaneous tissue    Tissue debrided:  Subcutaneous    Devitalized tissue debrided:  Biofilm, Callus, Sough and Fibrin    Instruments:  Curette    2nd Wound Details:     Location:  Right foot    Location:  Right 2nd Toe (distal toe)    Location:  Right 2nd Toe (distal toe)    Type of Debridement:  Excisional       Length (cm):  0.4       Area (sq cm):  0.12       Width (cm):  0.3       Percent Debrided (%):  100       Depth (cm):  0.1       Total Area Debrided (sq cm):  0.12    Depth of debridement:  Subcutaneous tissue    Tissue debrided:  Subcutaneous    Devitalized tissue debrided:  Fibrin, Sough and Callus    Instruments:  Curette    Bleeding:  Minimal  Hemostasis Achieved: Yes    Method Used:  Pressure  Patient tolerance:  Patient tolerated the procedure well with no immediate complications     Saline moistened nathan, nicolette foam, 2 cast padding secured with coban,.        "

## 2018-05-16 NOTE — PROGRESS NOTES
Subjective:      Patient ID: Caro Powell is a 77 y.o. female.    Chief Complaint: Follow-up    Caro LUA is a 77 y.o. female who presents to the clinic for evaluation and treatment of high risk feet. Caro LUA has a past medical history of Calcium nephrolithiasis (2007); Diabetes mellitus, type 2; Diabetic peripheral neuropathy associated with type 2 diabetes mellitus; and Hypertension. The patient's chief complaint is diabetic foot ulcer, right second toe. This patient has documented high risk feet requiring routine maintenance secondary to peripheral neuropathy. Noted drainage and discoloration from the toe a few days ago. Denies trauma. Accompanied by her .    5/7/18: Follow up for wound check right foot. Taking po clindamycin and ciprofloxacin as scheduled. Accompanied by her . Dressing remained c/d/i.    5/14/18: Presents for wound check. No new complaints. Accompanied by her .    PCP: Manuel Laird MD    Date Last Seen by PCP:     Current shoe gear:  Affected Foot: Extra depth shoes with custome accommodative insoles     Unaffected Foot: Extra depth shoes with custome accommodative insoles    History of Trauma: negative  Sign of Infection: none    Hemoglobin A1C   Date Value Ref Range Status   01/12/2018 8.3 % Final   05/22/2017 7.5 (H) 4.5 - 6.2 % Final     Comment:     According to ADA guidelines, hemoglobin A1C <7.0% represents  optimal control in non-pregnant diabetic patients.  Different  metrics may apply to specific populations.   Standards of Medical Care in Diabetes - 2016.  For the purpose of screening for the presence of diabetes:  <5.7%     Consistent with the absence of diabetes  5.7-6.4%  Consistent with increasing risk for diabetes   (prediabetes)  >or=6.5%  Consistent with diabetes  Currently no consensus exists for use of hemoglobin A1C  for diagnosis of diabetes for children.       Vitals:    05/15/18 1022   BP: (!) 137/54   Pulse: (!) 54   Weight:  "85.3 kg (188 lb)   Height: 5' 2" (1.575 m)   PainSc: 0-No pain      Past Medical History:   Diagnosis Date    Calcium nephrolithiasis 2007    Diabetes mellitus, type 2     Diabetic peripheral neuropathy associated with type 2 diabetes mellitus     Hypertension        Past Surgical History:   Procedure Laterality Date    COLONOSCOPY  11/28/2011    sigmoid diverticulosis, external hemorrhoids    HYSTERECTOMY      SHOULDER SURGERY Left     TOE AMPUTATION Right 05/22/2017    5th toe       Family History   Problem Relation Age of Onset    Diabetes Mother     Heart failure Father     Kidney failure Brother        Social History     Social History    Marital status:      Spouse name: N/A    Number of children: N/A    Years of education: N/A     Social History Main Topics    Smoking status: Never Smoker    Smokeless tobacco: None    Alcohol use No    Drug use: No    Sexual activity: Not Asked     Other Topics Concern    None     Social History Narrative    None       Current Outpatient Prescriptions   Medication Sig Dispense Refill    ACCU-CHEK SAMI CONTROL SOLN Soln       ACCU-CHEK SAMI PLUS METER Misc       ACCU-CHEK SAMI PLUS TEST STRP Strp       ACCU-CHEK SOFT DEV LANCETS Kit       ACCU-CHEK SOFTCLIX LANCETS Misc       ammonium lactate 12 % Crea Apply to feet twice daily. 140 g 5    amoxicillin (AMOXIL) 500 MG capsule       apixaban 5 mg Tab Take 1 tablet (5 mg total) by mouth 2 (two) times daily. 60 tablet 5    ciprofloxacin HCl (CIPRO) 500 MG tablet Take 1 tablet (500 mg total) by mouth 2 (two) times daily. 20 tablet 0    clindamycin (CLEOCIN) 300 MG capsule Take 1 capsule (300 mg total) by mouth 3 (three) times daily. 30 capsule 0    diltiaZEM (CARDIZEM CD) 120 MG Cp24       diltiaZEM (CARDIZEM) 60 MG tablet Take 1 tablet (60 mg total) by mouth once daily at 6am. 30 tablet 11    gabapentin (NEURONTIN) 400 MG capsule       glimepiride (AMARYL) 1 MG tablet       " hydrocodone-acetaminophen 5-325mg (NORCO) 5-325 mg per tablet Take 1 tablet by mouth every 12 (twelve) hours as needed.  0    lisinopril (PRINIVIL,ZESTRIL) 5 MG tablet       lisinopril 10 MG Tab Take 1 tablet (10 mg total) by mouth once daily. 30 tablet 5    metoprolol succinate (TOPROL-XL) 25 MG 24 hr tablet       omeprazole (PRILOSEC) 20 MG capsule Take 1 capsule (20 mg total) by mouth once daily. 30 capsule 3    pramipexole (MIRAPEX) 0.125 MG tablet        No current facility-administered medications for this visit.        Review of patient's allergies indicates:   Allergen Reactions    Codeine Nausea Only         Review of Systems   Constitution: Negative for chills, fever, weakness and malaise/fatigue.   Cardiovascular: Negative for chest pain, claudication and leg swelling.   Respiratory: Negative for cough and shortness of breath.    Skin: Positive for color change, dry skin, nail changes and poor wound healing.   Musculoskeletal: Negative for back pain, joint pain, muscle cramps and muscle weakness.   Gastrointestinal: Negative for nausea and vomiting.   Neurological: Positive for numbness and paresthesias.   Psychiatric/Behavioral: Negative for altered mental status.           Objective:      Physical Exam   Constitutional: She is oriented to person, place, and time. She appears well-developed and well-nourished. No distress.   Cardiovascular:   Pulses:       Dorsalis pedis pulses are 2+ on the right side, and 2+ on the left side.        Posterior tibial pulses are 2+ on the right side, and 2+ on the left side.   CFT< 3 secs all toes bilateral foot, skin temp warm bilateral foot, no digital hair growth bilateral foot, mild lower extremity edema bilateral.     Musculoskeletal:   + equinus that reduces with knee bent bilateral.    No pain with ROM or MMT bilateral foot.   Feet:   Right Foot:   Protective Sensation: 10 sites tested. 5 sites sensed.   Skin Integrity: Positive for ulcer, erythema, callus  and dry skin.   Left Foot:   Protective Sensation: 10 sites tested. 5 sites sensed.   Skin Integrity: Positive for callus and dry skin. Negative for ulcer, blister, skin breakdown, erythema or warmth.   Neurological: She is alert and oriented to person, place, and time. She has normal strength. A sensory deficit is present.   Skin: Skin is dry. Capillary refill takes less than 2 seconds. No ecchymosis and no rash noted. She is not diaphoretic. There is erythema. No cyanosis. No pallor. Nails show no clubbing.   Ulcer Location: distal dorsal right second toe  Measurements: 0.7x0.9x0.1cm pre-debridement  Periwound:sloughed skin  Drainage: serous  Pus: no  Malodor: None.  Base:  50% granular. 50% fibrin.with overlying slough and biofilm  Signs of infection: mild resolving erythema    Ulcer Location: distal plantar right second toe digital tuft  Measurements: 0.3x0.3x0.1cm pre-debridement  Periwound: hyperkeratotic  Drainage: serous  Pus: None.  Malodor: None.  Base:  50% granular. 50% fibrin with overlying biofilm and slough  Signs of infection: None.      Wound plantar first met head healed right foot.                   Assessment:       Encounter Diagnoses   Name Primary?    Diabetic ulcer of toe of right foot associated with type 2 diabetes mellitus, with fat layer exposed Yes    Diabetic polyneuropathy associated with type 2 diabetes mellitus          Plan:       Caro LUA was seen today for follow-up.    Diagnoses and all orders for this visit:    Diabetic ulcer of toe of right foot associated with type 2 diabetes mellitus, with fat layer exposed  -     Debridement    Diabetic polyneuropathy associated with type 2 diabetes mellitus  -     Debridement    Other orders  -     Cancel: Foot Care      I counseled the patient on her conditions, their implications and medical management.    Shoe inspection. Diabetic Foot Education. Patient reminded of the importance of good nutrition and blood sugar control to help  prevent podiatric complications of diabetes. Patient instructed on proper foot hygeine. We discussed wearing proper shoe gear, daily foot inspections, never walking without protective shoe gear, never putting sharp instruments to feet, routine podiatric nail visits every 2-3 months.      .She will continue to monitor the areas daily, inspect her feet, wear protective shoe gear when ambulatory, moisturizer to maintain skin integrity and follow in this office in approximately 2-3 months, sooner p.r.n.    Wound debridement and dressing per attached note. Continue offloading with Darco shoe.    Continue po clindamycin. Previous c&S grew Streptococcus.    RTC 10 days for wound check.

## 2018-05-31 ENCOUNTER — OFFICE VISIT (OUTPATIENT)
Dept: PODIATRY | Facility: CLINIC | Age: 78
End: 2018-05-31
Payer: MEDICARE

## 2018-05-31 VITALS — HEIGHT: 62 IN | WEIGHT: 188 LBS | BODY MASS INDEX: 34.6 KG/M2

## 2018-05-31 DIAGNOSIS — L97.511 DIABETIC ULCER OF TOE OF RIGHT FOOT ASSOCIATED WITH TYPE 2 DIABETES MELLITUS, LIMITED TO BREAKDOWN OF SKIN: Primary | ICD-10-CM

## 2018-05-31 DIAGNOSIS — L84 CORN OR CALLUS: ICD-10-CM

## 2018-05-31 DIAGNOSIS — E11.621 DIABETIC ULCER OF TOE OF RIGHT FOOT ASSOCIATED WITH TYPE 2 DIABETES MELLITUS, LIMITED TO BREAKDOWN OF SKIN: Primary | ICD-10-CM

## 2018-05-31 DIAGNOSIS — E11.42 DIABETIC POLYNEUROPATHY ASSOCIATED WITH TYPE 2 DIABETES MELLITUS: ICD-10-CM

## 2018-05-31 PROCEDURE — 99999 PR PBB SHADOW E&M-EST. PATIENT-LVL III: CPT | Mod: PBBFAC,,, | Performed by: PODIATRIST

## 2018-05-31 PROCEDURE — 11056 PARNG/CUTG B9 HYPRKR LES 2-4: CPT | Mod: Q9,S$GLB,, | Performed by: PODIATRIST

## 2018-05-31 PROCEDURE — 99213 OFFICE O/P EST LOW 20 MIN: CPT | Mod: 25,S$GLB,, | Performed by: PODIATRIST

## 2018-06-01 NOTE — PROCEDURES
"Routine Foot Care  Date/Time: 5/31/2018 2:00 PM  Performed by: GERTRUDE HILLIARD  Authorized by: GERTRUDE HILLIARD     Time out: Immediately prior to procedure a "time out" was called to verify the correct patient, procedure, equipment, support staff and site/side marked as required.    Consent Done?:  Yes (Verbal)  Hyperkeratotic Skin Lesions?: Yes    Number of trimmed lesions:  3  Location(s):  Left 1st Toe, Right 1st Toe and Right 2nd Toe    Patient tolerance:  Patient tolerated the procedure well with no immediate complications      "

## 2018-06-01 NOTE — PROGRESS NOTES
Subjective:      Patient ID: Caro Powell is a 77 y.o. female.    Chief Complaint: Follow-up (right foot wound)    Caro LUA is a 77 y.o. female who presents to the clinic for evaluation and treatment of high risk feet. Caro LUA has a past medical history of Calcium nephrolithiasis (2007); Diabetes mellitus, type 2; Diabetic peripheral neuropathy associated with type 2 diabetes mellitus; and Hypertension. The patient's chief complaint is diabetic foot ulcer, right second toe. This patient has documented high risk feet requiring routine maintenance secondary to peripheral neuropathy. Noted drainage and discoloration from the toe a few days ago. Denies trauma. Accompanied by her .    5/7/18: Follow up for wound check right foot. Taking po clindamycin and ciprofloxacin as scheduled. Accompanied by her . Dressing remained c/d/i.    5/14/18: Presents for wound check. No new complaints. Accompanied by her .    5/31/18: Presents for wound check. Reports she has not received HH. PO abx completed.     PCP: Manuel Laird MD    Date Last Seen by PCP:     Current shoe gear:  Affected Foot: Extra depth shoes with custome accommodative insoles     Unaffected Foot: Extra depth shoes with custome accommodative insoles    History of Trauma: negative  Sign of Infection: none    Hemoglobin A1C   Date Value Ref Range Status   01/12/2018 8.3 % Final   05/22/2017 7.5 (H) 4.5 - 6.2 % Final     Comment:     According to ADA guidelines, hemoglobin A1C <7.0% represents  optimal control in non-pregnant diabetic patients.  Different  metrics may apply to specific populations.   Standards of Medical Care in Diabetes - 2016.  For the purpose of screening for the presence of diabetes:  <5.7%     Consistent with the absence of diabetes  5.7-6.4%  Consistent with increasing risk for diabetes   (prediabetes)  >or=6.5%  Consistent with diabetes  Currently no consensus exists for use of hemoglobin A1C  for  "diagnosis of diabetes for children.       Vitals:    05/31/18 1324   Weight: 85.3 kg (188 lb)   Height: 5' 2" (1.575 m)   PainSc: 0-No pain      Past Medical History:   Diagnosis Date    Calcium nephrolithiasis 2007    Diabetes mellitus, type 2     Diabetic peripheral neuropathy associated with type 2 diabetes mellitus     Hypertension        Past Surgical History:   Procedure Laterality Date    COLONOSCOPY  11/28/2011    sigmoid diverticulosis, external hemorrhoids    HYSTERECTOMY      SHOULDER SURGERY Left     TOE AMPUTATION Right 05/22/2017    5th toe       Family History   Problem Relation Age of Onset    Diabetes Mother     Heart failure Father     Kidney failure Brother        Social History     Social History    Marital status:      Spouse name: N/A    Number of children: N/A    Years of education: N/A     Social History Main Topics    Smoking status: Never Smoker    Smokeless tobacco: Not on file    Alcohol use No    Drug use: No    Sexual activity: Not on file     Other Topics Concern    Not on file     Social History Narrative    No narrative on file       Current Outpatient Prescriptions   Medication Sig Dispense Refill    ACCU-CHEK SAMI CONTROL SOLN Soln       ACCU-CHEK SAMI PLUS METER Misc       ACCU-CHEK SAMI PLUS TEST STRP Strp       ACCU-CHEK SOFT DEV LANCETS Kit       ACCU-CHEK SOFTCLIX LANCETS Misc       ammonium lactate 12 % Crea Apply to feet twice daily. 140 g 5    amoxicillin (AMOXIL) 500 MG capsule       apixaban 5 mg Tab Take 1 tablet (5 mg total) by mouth 2 (two) times daily. 60 tablet 5    ciprofloxacin HCl (CIPRO) 500 MG tablet Take 1 tablet (500 mg total) by mouth 2 (two) times daily. 20 tablet 0    clindamycin (CLEOCIN) 300 MG capsule Take 1 capsule (300 mg total) by mouth 3 (three) times daily. 30 capsule 0    diltiaZEM (CARDIZEM CD) 120 MG Cp24       diltiaZEM (CARDIZEM) 60 MG tablet Take 1 tablet (60 mg total) by mouth once daily at 6am. 30 " tablet 11    gabapentin (NEURONTIN) 400 MG capsule       glimepiride (AMARYL) 1 MG tablet       hydrocodone-acetaminophen 5-325mg (NORCO) 5-325 mg per tablet Take 1 tablet by mouth every 12 (twelve) hours as needed.  0    lisinopril (PRINIVIL,ZESTRIL) 5 MG tablet       lisinopril 10 MG Tab Take 1 tablet (10 mg total) by mouth once daily. 30 tablet 5    metoprolol succinate (TOPROL-XL) 25 MG 24 hr tablet       omeprazole (PRILOSEC) 20 MG capsule Take 1 capsule (20 mg total) by mouth once daily. 30 capsule 3    pramipexole (MIRAPEX) 0.125 MG tablet        No current facility-administered medications for this visit.        Review of patient's allergies indicates:   Allergen Reactions    Codeine Nausea Only         Review of Systems   Constitution: Negative for chills, fever, weakness and malaise/fatigue.   Cardiovascular: Negative for chest pain, claudication and leg swelling.   Respiratory: Negative for cough and shortness of breath.    Skin: Positive for color change, dry skin, nail changes and poor wound healing.   Musculoskeletal: Negative for back pain, joint pain, muscle cramps and muscle weakness.   Gastrointestinal: Negative for nausea and vomiting.   Neurological: Positive for numbness and paresthesias.   Psychiatric/Behavioral: Negative for altered mental status.           Objective:      Physical Exam   Constitutional: She is oriented to person, place, and time. She appears well-developed and well-nourished. No distress.   Cardiovascular:   Pulses:       Dorsalis pedis pulses are 2+ on the right side, and 2+ on the left side.        Posterior tibial pulses are 2+ on the right side, and 2+ on the left side.   CFT< 3 secs all toes bilateral foot, skin temp warm bilateral foot, no digital hair growth bilateral foot, mild lower extremity edema bilateral.     Musculoskeletal:   + equinus that reduces with knee bent bilateral.    No pain with ROM or MMT bilateral foot.   Feet:   Right Foot:   Protective  Sensation: 10 sites tested. 5 sites sensed.   Skin Integrity: Positive for ulcer, erythema, callus and dry skin.   Left Foot:   Protective Sensation: 10 sites tested. 5 sites sensed.   Skin Integrity: Positive for callus and dry skin. Negative for ulcer, blister, skin breakdown, erythema or warmth.   Neurological: She is alert and oriented to person, place, and time. She has normal strength. A sensory deficit is present.   Skin: Skin is dry and intact. Capillary refill takes less than 2 seconds. No ecchymosis and no rash noted. She is not diaphoretic. No cyanosis or erythema. No pallor. Nails show no clubbing.   Ulcer Location: distal dorsal right second toe  Measurements: healed  Periwound:callus.       Ulcer Location: distal plantar right second toe digital tuft  Measurements: healed  Periwound:   Drainage:   Pus: None.  Malodor: None.        Wound plantar first met head healed right foot.    Hyperkeratotic lesion distal right second toe and medial hallux IPJ bilateral.                   Assessment:       Encounter Diagnoses   Name Primary?    Diabetic ulcer of toe of right foot associated with type 2 diabetes mellitus, limited to breakdown of skin Yes    Diabetic polyneuropathy associated with type 2 diabetes mellitus     Corn or callus          Plan:       Caro LUA was seen today for follow-up.    Diagnoses and all orders for this visit:    Diabetic ulcer of toe of right foot associated with type 2 diabetes mellitus, limited to breakdown of skin  -     DIABETIC SHOES FOR HOME USE    Diabetic polyneuropathy associated with type 2 diabetes mellitus  -     DIABETIC SHOES FOR HOME USE    Corn or callus  -     DIABETIC SHOES FOR HOME USE      I counseled the patient on her conditions, their implications and medical management.    Shoe inspection. Diabetic Foot Education. Patient reminded of the importance of good nutrition and blood sugar control to help prevent podiatric complications of diabetes. Patient  instructed on proper foot hygeine. We discussed wearing proper shoe gear, daily foot inspections, never walking without protective shoe gear, never putting sharp instruments to feet, routine podiatric nail visits every 2-3 months.      .She will continue to monitor the areas daily, inspect her feet, wear protective shoe gear when ambulatory, moisturizer to maintain skin integrity and follow in this office in approximately 2-3 months, sooner p.r.n.    Routine foot care per attached note.        Alba Messer DPM PGY-3    I have personally taken the history and examined this patient and agree with the resident's note as stated as above.   Derek Jose DPM, FACFAS    RTC 2 months or prn.

## 2018-11-30 ENCOUNTER — TELEPHONE (OUTPATIENT)
Dept: PODIATRY | Facility: CLINIC | Age: 78
End: 2018-11-30

## 2018-11-30 NOTE — TELEPHONE ENCOUNTER
Spoke with pt and advised her that Dr. Jose is in surgery right now. And offered pt another provider for Tuesday , and Wednesday pt refused and I advised her she can go to the ED or urgent care.

## 2018-11-30 NOTE — TELEPHONE ENCOUNTER
----- Message from Katelyn Caceres sent at 11/30/2018  1:29 PM CST -----  Contact: self / [phone]  Patient is requesting a call back regarding, she will take the appointment you offered her. Please advise

## 2018-11-30 NOTE — TELEPHONE ENCOUNTER
----- Message from Zoila Vanegas sent at 11/30/2018 12:43 PM CST -----  Contact: self, [phone] (W)  Patient requests  call her today. States she has a blister in the bottom of her foot.   Please advise.

## 2018-11-30 NOTE — TELEPHONE ENCOUNTER
----- Message from Hossein Mathews sent at 11/30/2018 12:40 PM CST -----  Contact: self/[phone]  Patient called to speak with your office about getting an appointment for a blister under her foot.    Please call and advise.

## 2018-12-05 ENCOUNTER — TELEPHONE (OUTPATIENT)
Dept: PODIATRY | Facility: CLINIC | Age: 78
End: 2018-12-05

## 2018-12-05 ENCOUNTER — HOSPITAL ENCOUNTER (OUTPATIENT)
Dept: RADIOLOGY | Facility: HOSPITAL | Age: 78
Discharge: HOME | End: 2018-12-05
Attending: PODIATRIST
Payer: MEDICARE

## 2018-12-05 ENCOUNTER — OFFICE VISIT (OUTPATIENT)
Dept: PODIATRY | Facility: CLINIC | Age: 78
End: 2018-12-05
Payer: MEDICARE

## 2018-12-05 VITALS
DIASTOLIC BLOOD PRESSURE: 54 MMHG | HEART RATE: 48 BPM | BODY MASS INDEX: 34.6 KG/M2 | WEIGHT: 188 LBS | HEIGHT: 62 IN | SYSTOLIC BLOOD PRESSURE: 126 MMHG

## 2018-12-05 DIAGNOSIS — L97.511 SKIN ULCER OF RIGHT FOOT, LIMITED TO BREAKDOWN OF SKIN: Primary | ICD-10-CM

## 2018-12-05 DIAGNOSIS — M20.21 HALLUX RIGIDUS OF RIGHT FOOT: ICD-10-CM

## 2018-12-05 DIAGNOSIS — E11.49 TYPE II DIABETES MELLITUS WITH NEUROLOGICAL MANIFESTATIONS: ICD-10-CM

## 2018-12-05 DIAGNOSIS — N18.30 CKD (CHRONIC KIDNEY DISEASE) STAGE 3, GFR 30-59 ML/MIN: ICD-10-CM

## 2018-12-05 DIAGNOSIS — L97.511 SKIN ULCER OF RIGHT FOOT, LIMITED TO BREAKDOWN OF SKIN: ICD-10-CM

## 2018-12-05 PROCEDURE — 99213 OFFICE O/P EST LOW 20 MIN: CPT | Mod: 25,S$GLB,, | Performed by: PODIATRIST

## 2018-12-05 PROCEDURE — 11042 DBRDMT SUBQ TIS 1ST 20SQCM/<: CPT | Mod: S$GLB,,, | Performed by: PODIATRIST

## 2018-12-05 PROCEDURE — 3074F SYST BP LT 130 MM HG: CPT | Mod: CPTII,S$GLB,, | Performed by: PODIATRIST

## 2018-12-05 PROCEDURE — 73630 X-RAY EXAM OF FOOT: CPT | Mod: TC,PN,RT

## 2018-12-05 PROCEDURE — 1101F PT FALLS ASSESS-DOCD LE1/YR: CPT | Mod: CPTII,S$GLB,, | Performed by: PODIATRIST

## 2018-12-05 PROCEDURE — 3078F DIAST BP <80 MM HG: CPT | Mod: CPTII,S$GLB,, | Performed by: PODIATRIST

## 2018-12-05 PROCEDURE — 73630 X-RAY EXAM OF FOOT: CPT | Mod: 26,RT,, | Performed by: RADIOLOGY

## 2018-12-05 PROCEDURE — 99999 PR PBB SHADOW E&M-EST. PATIENT-LVL IV: CPT | Mod: PBBFAC,,, | Performed by: PODIATRIST

## 2018-12-05 NOTE — TELEPHONE ENCOUNTER
----- Message from Conor Richardson sent at 12/5/2018  3:32 PM CST -----  Contact: [phone]/self  Patient requesting to speak with you concerning her medication  Please call and advise

## 2018-12-05 NOTE — PROCEDURES
"Wound Debridement  Date/Time: 12/5/2018 12:36 PM  Performed by: Meredith Mulligan DPM  Authorized by: Meredith Mulligan DPM     Time out: Immediately prior to procedure a "time out" was called to verify the correct patient, procedure, equipment, support staff and site/side marked as required.    Consent Done?:  Yes (Verbal)    Preparation: Patient was prepped and draped in usual sterile fashion    Local anesthesia used?: No      Wound Details:    Location:  Right foot    Location:  Right 1st Metatarsal Head    Type of Debridement:  Excisional       Length (cm):  0.5       Area (sq cm):  0.25       Width (cm):  0.5       Percent Debrided (%):  100       Depth (cm):  0.3       Total Area Debrided (sq cm):  0.25    Depth of debridement:  Subcutaneous tissue    Tissue debrided:  Subcutaneous    Devitalized tissue debrided:  Biofilm    Instruments:  Blade    Bleeding:  Minimal  Hemostasis Achieved: Yes    Method Used:  Pressure  Patient tolerance:  Patient tolerated the procedure well with no immediate complications      "

## 2018-12-05 NOTE — PROGRESS NOTES
Subjective:      Patient ID: Caro Powell is a 78 y.o. female.    Chief Complaint: Foot Problem (blister on bottom of foot)    Caro LUA is a 78 y.o. female who presents to the clinic for evaluation and treatment of high risk feet. Caro LUA has a past medical history of Calcium nephrolithiasis (2007), Diabetes mellitus, type 2, Diabetic peripheral neuropathy associated with type 2 diabetes mellitus, and Hypertension. The patient's chief complaint is diabetic foot ulcer, right second toe. This patient has documented high risk feet requiring routine maintenance secondary to peripheral neuropathy. Noted drainage and discoloration from the toe a few days ago. Denies trauma. Accompanied by her .    5/7/18: Follow up for wound check right foot. Taking po clindamycin and ciprofloxacin as scheduled. Accompanied by her . Dressing remained c/d/i.    5/14/18: Presents for wound check. No new complaints. Accompanied by her .    5/31/18: Presents for wound check. Reports she has not received HH. PO abx completed.     12/5:  She is a pt of Dr. 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 She is presenting with new complaint of blister at submet 1 and distal tip of 4th toe. She is DM2 and on insulin. It was 126 today. She recently bought a new shoes. She tells me she did not have any problems when she was wearing diabetic shoes but blisters formed after wearing a normal tennis shoes. It happened a week ago. She is traveling to [place], [place] in 2 weeks and wants to know if she can weight bear on her right foot. Denies N/V/F/C/SOB/CP      PCP: Manuel Laird MD    Date Last Seen by PCP:     Current shoe gear:  Affected Foot: Extra depth shoes with custome accommodative insoles     Unaffected Foot: Extra depth shoes with custome accommodative insoles    History of Trauma: negative  Sign of Infection: none    Hemoglobin A1C   Date Value Ref Range Status   01/12/2018  "8.3 % Final   05/22/2017 7.5 (H) 4.5 - 6.2 % Final     Comment:     According to ADA guidelines, hemoglobin A1C <7.0% represents  optimal control in non-pregnant diabetic patients.  Different  metrics may apply to specific populations.   Standards of Medical Care in Diabetes - 2016.  For the purpose of screening for the presence of diabetes:  <5.7%     Consistent with the absence of diabetes  5.7-6.4%  Consistent with increasing risk for diabetes   (prediabetes)  >or=6.5%  Consistent with diabetes  Currently no consensus exists for use of hemoglobin A1C  for diagnosis of diabetes for children.       Vitals:    12/05/18 0815   BP: (!) 126/54   Pulse: (!) 48   Weight: 85.3 kg (188 lb)   Height: 5' 2" (1.575 m)   PainSc: 0-No pain      Past Medical History:   Diagnosis Date    Calcium nephrolithiasis 2007    Diabetes mellitus, type 2     Diabetic peripheral neuropathy associated with type 2 diabetes mellitus     Hypertension        Past Surgical History:   Procedure Laterality Date    AMPUTATION-TOE Right 5/23/2017    [facility]    COLONOSCOPY  11/28/2011    sigmoid diverticulosis, external hemorrhoids    HYSTERECTOMY      SHOULDER SURGERY Left     TOE AMPUTATION Right 05/22/2017    5th toe       Family History   Problem Relation Age of Onset    Diabetes Mother     Heart failure Father     Kidney failure Brother        Social History     Socioeconomic History    Marital status:      Spouse name: Not on file    Number of children: Not on file    Years of education: Not on file    Highest education level: Not on file   Social Needs    Financial resource strain: Not on file    Food insecurity - worry: Not on file    Food insecurity - inability: Not on file    Transportation needs - medical: Not on file    Transportation needs - non-medical: Not on file   Occupational History    Not on file   Tobacco Use    Smoking status: Never Smoker   Substance and " Sexual Activity    Alcohol use: No    Drug use: No    Sexual activity: Not on file   Other Topics Concern    Not on file   Social History Narrative    Not on file       Current Outpatient Medications   Medication Sig Dispense Refill    ACCU-CHEK SAMI CONTROL SOLN Soln       ACCU-CHEK SAMI PLUS METER Misc       ACCU-CHEK SAMI PLUS TEST STRP Strp       ACCU-CHEK SOFT DEV LANCETS Kit       ACCU-CHEK SOFTCLIX LANCETS Misc       ammonium lactate 12 % Crea Apply to feet twice daily. 140 g 5    amoxicillin (AMOXIL) 500 MG capsule       apixaban 5 mg Tab Take 1 tablet (5 mg total) by mouth 2 (two) times daily. 60 tablet 5    diltiaZEM (CARDIZEM CD) 120 MG Cp24       gabapentin (NEURONTIN) 400 MG capsule       glimepiride (AMARYL) 1 MG tablet       hydrocodone-acetaminophen 5-325mg (NORCO) 5-325 mg per tablet Take 1 tablet by mouth every 12 (twelve) hours as needed.  0    lisinopril (PRINIVIL,ZESTRIL) 5 MG tablet       lisinopril 10 MG Tab Take 1 tablet (10 mg total) by mouth once daily. 30 tablet 5    metoprolol succinate (TOPROL-XL) 25 MG 24 hr tablet       omeprazole (PRILOSEC) 20 MG capsule Take 1 capsule (20 mg total) by mouth once daily. 30 capsule 3    pramipexole (MIRAPEX) 0.125 MG tablet       ciprofloxacin HCl (CIPRO) 500 MG tablet Take 1 tablet (500 mg total) by mouth 2 (two) times daily. 20 tablet 0    clindamycin (CLEOCIN) 300 MG capsule Take 1 capsule (300 mg total) by mouth 3 (three) times daily. 30 capsule 0     No current facility-administered medications for this visit.        Review of patient's allergies indicates:   Allergen Reactions    Codeine Nausea Only         Review of Systems   Constitution: Negative for chills, fever, weakness and malaise/fatigue.   Cardiovascular: Negative for chest pain, claudication and leg swelling.   Respiratory: Negative for cough and shortness of breath.    Skin: Positive for color change, dry skin, nail changes and poor wound healing.    Musculoskeletal: Negative for back pain, joint pain, muscle cramps and muscle weakness.   Gastrointestinal: Negative for nausea and vomiting.   Neurological: Positive for numbness and paresthesias.   Psychiatric/Behavioral: Negative for altered mental status.           Objective:      Physical Exam   Constitutional: She is oriented to person, place, and time. She appears well-developed and well-nourished. No distress.   Cardiovascular:   Pulses:       Dorsalis pedis pulses are 2+ on the right side, and 2+ on the left side.        Posterior tibial pulses are 2+ on the right side, and 2+ on the left side.   CFT< 3 secs all toes bilateral foot, skin temp warm bilateral foot, no digital hair growth bilateral foot, mild lower extremity edema bilateral.     Musculoskeletal:   + equinus that reduces with knee bent bilateral.    No pain with ROM or MMT bilateral foot.    R foot:  Limited range of motion at the 1st MPJ     Feet:   Right Foot:   Protective Sensation: 10 sites tested. 5 sites sensed.   Skin Integrity: Positive for ulcer, callus and dry skin. Negative for erythema.   Left Foot:   Protective Sensation: 10 sites tested. 5 sites sensed.   Skin Integrity: Positive for callus and dry skin. Negative for ulcer, blister, skin breakdown, erythema or warmth.   Neurological: She is alert and oriented to person, place, and time. She has normal strength. A sensory deficit is present.   Skin: Skin is dry and intact. Capillary refill takes less than 2 seconds. No ecchymosis and no rash noted. She is not diaphoretic. No cyanosis or erythema. No pallor. Nails show no clubbing.   Ulcer Location: right distal 4th toe, superficial blister, partial thickness less than 0.5mm. No signs of infection. Mild rubor       Ulcer Location: Right submet 1. +hyperkeratotic lesion s/p debridement. Open wound. 0.5cm x 0.5cm x 0.2mm. No cellulitis, no abscess, no crepitus, no signs of infection, no undermining, no sinus tract, no probing to  bone.          12/5          Assessment:       Encounter Diagnoses   Name Primary?    Skin ulcer of right foot, limited to breakdown of skin Yes    Type II diabetes mellitus with neurological manifestations     CKD (chronic kidney disease) stage 3, GFR 30-59 ml/min     Hallux rigidus of right foot          Plan:       Caro LUA was seen today for foot problem.    Diagnoses and all orders for this visit:    Skin ulcer of right foot, limited to breakdown of skin  -     X-Ray Foot Complete Right; Future    Type II diabetes mellitus with neurological manifestations    CKD (chronic kidney disease) stage 3, GFR 30-59 ml/min    Hallux rigidus of right foot      I counseled the patient on her conditions, their implications and medical management.    78 years old female type 2 diabetes on insulin with right foot ulcer at sub met 1 and distal 4th toe without signs of infection    -callus at the right sub met 1 debrided with 15 blade. Patient tolerated well. See procedure note  -patient has limited range of motion at right 1st MPJ most likely putting pressure at sub met 1.  We will treat with local wound care and offloading with football dressing  -rx xray right foot: no OM noted   -f/u 1 week  -The nature of the condition, options for management, as well as potential risks and complications were discussed in detail with patient. Patient was amenable to my recommendations and left my office fully informed and will follow up as instructed or sooner if necessary.    -Patient was advised of signs and symptoms of infection including redness, drainage, purulence, odor, streaking, fever, chills and I advised patient to seek medical attention (ER or urgent care) if these symptoms arise.

## 2018-12-12 ENCOUNTER — OFFICE VISIT (OUTPATIENT)
Dept: PODIATRY | Facility: CLINIC | Age: 78
End: 2018-12-12
Payer: MEDICARE

## 2018-12-12 VITALS
DIASTOLIC BLOOD PRESSURE: 51 MMHG | BODY MASS INDEX: 34.6 KG/M2 | SYSTOLIC BLOOD PRESSURE: 119 MMHG | HEART RATE: 55 BPM | HEIGHT: 62 IN | WEIGHT: 188 LBS

## 2018-12-12 DIAGNOSIS — Z89.431 HISTORY OF AMPUTATION OF FOOT, RIGHT: ICD-10-CM

## 2018-12-12 DIAGNOSIS — L97.511 SKIN ULCER OF RIGHT FOOT, LIMITED TO BREAKDOWN OF SKIN: ICD-10-CM

## 2018-12-12 DIAGNOSIS — E11.49 TYPE II DIABETES MELLITUS WITH NEUROLOGICAL MANIFESTATIONS: Primary | ICD-10-CM

## 2018-12-12 PROCEDURE — 99499 UNLISTED E&M SERVICE: CPT | Mod: S$GLB,,, | Performed by: PODIATRIST

## 2018-12-12 PROCEDURE — 97597 DBRDMT OPN WND 1ST 20 CM/<: CPT | Mod: S$GLB,,, | Performed by: PODIATRIST

## 2018-12-12 PROCEDURE — 99999 PR PBB SHADOW E&M-EST. PATIENT-LVL IV: CPT | Mod: PBBFAC,,, | Performed by: PODIATRIST

## 2018-12-12 RX ORDER — FAMOTIDINE 20 MG/1
TABLET, FILM COATED ORAL
COMMUNITY
Start: 2018-10-26 | End: 2019-04-24

## 2018-12-12 NOTE — PROGRESS NOTES
"Subjective:      Patient ID: Caro Powell is a 78 y.o. female.    Chief Complaint: Follow-up (Dressing change)    Caro LUA is a 78 y.o. female who presents to the clinic for evaluation and treatment of high risk feet. Caro LUA has a past medical history of Calcium nephrolithiasis (2007), Diabetes mellitus, type 2, Diabetic peripheral neuropathy associated with type 2 diabetes mellitus, and Hypertension. The patient's chief complaint is diabetic foot ulcer, right second toe. This patient has documented high risk feet requiring routine maintenance secondary to peripheral neuropathy. Noted drainage and discoloration from the toe a few days ago. Denies trauma. Accompanied by her .    5/7/18: Follow up for wound check right foot. Taking po clindamycin and ciprofloxacin as scheduled. Accompanied by her . Dressing remained c/d/i.    5/14/18: Presents for wound check. No new complaints. Accompanied by her .    5/31/18: Presents for wound check. Reports she has not received HH. PO abx completed.     12/5:  She is a pt of Dr. 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 She is presenting with new complaint of blister at right submet 1 and distal tip of 4th toe. She is DM2 and on insulin. It was 126 today. She recently bought a new shoes. She tells me she did not have any problems when she was wearing diabetic shoes but blisters formed after wearing a normal tennis shoes. It happened a week ago. She is traveling to [place], [place] in 2 weeks and wants to know if she can weight bear on her right foot. Denies N/V/F/C/SOB/CP    12/12: f/u right foot ulcer. Has been weight bearing in surgical shoe with football dressing. Denies pain. Tells me she has been walking in diabetic shoes on her left foot. She tells me there is a "callus" at lateral left 5th toe that she did not have before. She will travel to [place] soon. Also tells me she noticed that her right foot is turning " "sideways and feels like it is affecting the way she walks.       PCP: Manuel Laird MD    Date Last Seen by PCP:     Current shoe gear:  Affected Foot: football dressing      Unaffected Foot: Extra depth shoes with custome accommodative insoles    History of Trauma: negative  Sign of Infection: none    Hemoglobin A1C   Date Value Ref Range Status   01/12/2018 8.3 % Final   05/22/2017 7.5 (H) 4.5 - 6.2 % Final     Comment:     According to ADA guidelines, hemoglobin A1C <7.0% represents  optimal control in non-pregnant diabetic patients.  Different  metrics may apply to specific populations.   Standards of Medical Care in Diabetes - 2016.  For the purpose of screening for the presence of diabetes:  <5.7%     Consistent with the absence of diabetes  5.7-6.4%  Consistent with increasing risk for diabetes   (prediabetes)  >or=6.5%  Consistent with diabetes  Currently no consensus exists for use of hemoglobin A1C  for diagnosis of diabetes for children.       Vitals:    12/12/18 1117   BP: (!) 119/51   Pulse: (!) 55   Weight: 85.3 kg (188 lb)   Height: 5' 2" (1.575 m)   PainSc: 0-No pain      Past Medical History:   Diagnosis Date    Calcium nephrolithiasis 2007    Diabetes mellitus, type 2     Diabetic peripheral neuropathy associated with type 2 diabetes mellitus     Hypertension        Past Surgical History:   Procedure Laterality Date    AMPUTATION-TOE Right 5/23/2017    [facility]    COLONOSCOPY  11/28/2011    sigmoid diverticulosis, external hemorrhoids    HYSTERECTOMY      SHOULDER SURGERY Left     TOE AMPUTATION Right 05/22/2017    5th toe       Family History   Problem Relation Age of Onset    Diabetes Mother     Heart failure Father     Kidney failure Brother        Social History     Socioeconomic History    Marital status:      Spouse name: Not on file    Number of children: Not on file    Years of education: Not on file    Highest education " level: Not on file   Social Needs    Financial resource strain: Not on file    Food insecurity - worry: Not on file    Food insecurity - inability: Not on file    Transportation needs - medical: Not on file    Transportation needs - non-medical: Not on file   Occupational History    Not on file   Tobacco Use    Smoking status: Never Smoker   Substance and Sexual Activity    Alcohol use: No    Drug use: No    Sexual activity: Not on file   Other Topics Concern    Not on file   Social History Narrative    Not on file       Current Outpatient Medications   Medication Sig Dispense Refill    ACCU-CHEK SAMI CONTROL SOLN Soln       ACCU-CHEK SAMI PLUS METER Misc       ACCU-CHEK SAMI PLUS TEST STRP Strp       ACCU-CHEK SOFT DEV LANCETS Kit       ACCU-CHEK SOFTCLIX LANCETS Misc       ammonium lactate 12 % Crea Apply to feet twice daily. 140 g 5    amoxicillin (AMOXIL) 500 MG capsule       apixaban 5 mg Tab Take 1 tablet (5 mg total) by mouth 2 (two) times daily. 60 tablet 5    diltiaZEM (CARDIZEM CD) 120 MG Cp24       famotidine (PEPCID) 20 MG tablet       FLUZONE HIGH-DOSE 2018-19, PF, 180 mcg/0.5 mL vaccine       gabapentin (NEURONTIN) 400 MG capsule       glimepiride (AMARYL) 1 MG tablet       hydrocodone-acetaminophen 5-325mg (NORCO) 5-325 mg per tablet Take 1 tablet by mouth every 12 (twelve) hours as needed.  0    lisinopril (PRINIVIL,ZESTRIL) 5 MG tablet       lisinopril 10 MG Tab Take 1 tablet (10 mg total) by mouth once daily. 30 tablet 5    metoprolol succinate (TOPROL-XL) 25 MG 24 hr tablet       omeprazole (PRILOSEC) 20 MG capsule Take 1 capsule (20 mg total) by mouth once daily. 30 capsule 3    pramipexole (MIRAPEX) 0.125 MG tablet       ciprofloxacin HCl (CIPRO) 500 MG tablet Take 1 tablet (500 mg total) by mouth 2 (two) times daily. 20 tablet 0    clindamycin (CLEOCIN) 300 MG capsule Take 1 capsule (300 mg total) by mouth 3 (three) times daily. 30 capsule 0     No current  facility-administered medications for this visit.        Review of patient's allergies indicates:   Allergen Reactions    Codeine Nausea Only         Review of Systems   Constitution: Negative for chills, fever, weakness and malaise/fatigue.   Cardiovascular: Negative for chest pain, claudication and leg swelling.   Respiratory: Negative for cough and shortness of breath.    Skin: Positive for color change, dry skin, nail changes and poor wound healing.   Musculoskeletal: Negative for back pain, joint pain, muscle cramps and muscle weakness.   Gastrointestinal: Negative for nausea and vomiting.   Neurological: Positive for numbness and paresthesias.   Psychiatric/Behavioral: Negative for altered mental status.           Objective:      Physical Exam   Constitutional: She is oriented to person, place, and time. She appears well-developed and well-nourished. No distress.   Cardiovascular:   Pulses:       Dorsalis pedis pulses are 2+ on the right side, and 2+ on the left side.        Posterior tibial pulses are 2+ on the right side, and 2+ on the left side.   CFT< 3 secs all toes bilateral foot, skin temp warm bilateral foot, no digital hair growth bilateral foot, mild lower extremity edema bilateral.     Musculoskeletal:   + equinus that reduces with knee bent bilateral.    No pain with ROM or MMT bilateral foot.    R foot:  Limited range of motion at the 1st MPJ     Feet:   Right Foot:   Protective Sensation: 10 sites tested. 5 sites sensed.   Skin Integrity: Positive for ulcer, callus and dry skin. Negative for erythema.   Left Foot:   Protective Sensation: 10 sites tested. 5 sites sensed.   Skin Integrity: Positive for callus and dry skin. Negative for ulcer, blister, skin breakdown, erythema or warmth.   Neurological: She is alert and oriented to person, place, and time. She has normal strength. A sensory deficit is present.   Skin: Skin is dry and intact. Capillary refill takes less than 2 seconds. No ecchymosis  and no rash noted. She is not diaphoretic. No cyanosis or erythema. No pallor. Nails show no clubbing.   Ulcer Location: right distal 4th toe, superficial blister completely healed.        Ulcer Location: Right submet 1. +hyperkeratotic lesion s/p debridement. Open wound. 0.2cm x 0.2cm x 0.1mm. No cellulitis, no abscess, no crepitus, no signs of infection, no undermining, no sinus tract, no probing to bone.          12/512/12          Assessment:       Encounter Diagnoses   Name Primary?    Type II diabetes mellitus with neurological manifestations Yes    Skin ulcer of right foot, limited to breakdown of skin     History of amputation of foot, right          Plan:       Caro LUA was seen today for follow-up.    Diagnoses and all orders for this visit:    Type II diabetes mellitus with neurological manifestations    Skin ulcer of right foot, limited to breakdown of skin    History of amputation of foot, right      I counseled the patient on her conditions, their implications and medical management.    78 years old female type 2 diabetes on insulin with right foot ulcer at sub met 1 healing uneventfully.    -s/p right foot wound debridement. See procedure note. Pt tolerated well.  -donut pad over right foot submet1 to offload. LWC with triple abx. WBAT in surgical shoes  -recommended to obtain shoes with wider toe box. Mild rubor on left 5th toe and dorsum of toes b/l 2/2 rubbing   -The nature of the condition, options for management, as well as potential risks and complications were discussed in detail with patient. Patient was amenable to my recommendations and left my office fully informed and will follow up as instructed or sooner if necessary.    -Patient was advised of signs and symptoms of infection including redness, drainage, purulence, odor, streaking, fever, chills and I advised patient to seek medical attention (ER or urgent care) if these symptoms arise.

## 2018-12-12 NOTE — PROCEDURES
"Wound Debridement  Date/Time: 12/12/2018 12:00 PM  Performed by: Meredith Mulligan DPM  Authorized by: Meredith Mulligan DPM     Time out: Immediately prior to procedure a "time out" was called to verify the correct patient, procedure, equipment, support staff and site/side marked as required.    Consent Done?:  Yes (Verbal)    Preparation: Patient was prepped and draped in usual sterile fashion    Local anesthesia used?: No      Wound Details:    Location:  Right foot    Location:  Right 1st Metatarsal Head    Type of Debridement:  Excisional       Length (cm):  0.2       Area (sq cm):  0.04       Width (cm):  0.2       Percent Debrided (%):  100       Depth (cm):  0.1       Total Area Debrided (sq cm):  0.04    Depth of debridement:  Epidermis/Dermis    Tissue debrided:  Dermis and Epidermis    Devitalized tissue debrided:  Biofilm and Callus    Instruments:  Blade and Curette    Bleeding:  Minimal  Hemostasis Achieved: Yes    Method Used:  Pressure  Patient tolerance:  Patient tolerated the procedure well with no immediate complications     Timeout was performed with pt and pt's  in the room      "

## 2019-01-21 ENCOUNTER — OFFICE VISIT (OUTPATIENT)
Dept: PODIATRY | Facility: CLINIC | Age: 79
End: 2019-01-21
Payer: MEDICARE

## 2019-01-21 VITALS
HEIGHT: 62 IN | WEIGHT: 188 LBS | SYSTOLIC BLOOD PRESSURE: 124 MMHG | BODY MASS INDEX: 34.6 KG/M2 | DIASTOLIC BLOOD PRESSURE: 55 MMHG | HEART RATE: 54 BPM

## 2019-01-21 DIAGNOSIS — L97.512 DIABETIC ULCER OF OTHER PART OF RIGHT FOOT ASSOCIATED WITH DIABETES MELLITUS DUE TO UNDERLYING CONDITION, WITH FAT LAYER EXPOSED: Primary | ICD-10-CM

## 2019-01-21 DIAGNOSIS — E08.621 DIABETIC ULCER OF OTHER PART OF RIGHT FOOT ASSOCIATED WITH DIABETES MELLITUS DUE TO UNDERLYING CONDITION, WITH FAT LAYER EXPOSED: Primary | ICD-10-CM

## 2019-01-21 DIAGNOSIS — E11.42 DIABETIC POLYNEUROPATHY ASSOCIATED WITH TYPE 2 DIABETES MELLITUS: ICD-10-CM

## 2019-01-21 PROCEDURE — 11042 DBRDMT SUBQ TIS 1ST 20SQCM/<: CPT | Mod: S$GLB,,, | Performed by: PODIATRIST

## 2019-01-21 PROCEDURE — 1101F PR PT FALLS ASSESS DOC 0-1 FALLS W/OUT INJ PAST YR: ICD-10-PCS | Mod: CPTII,S$GLB,, | Performed by: PODIATRIST

## 2019-01-21 PROCEDURE — 99213 PR OFFICE/OUTPT VISIT, EST, LEVL III, 20-29 MIN: ICD-10-PCS | Mod: 25,S$GLB,, | Performed by: PODIATRIST

## 2019-01-21 PROCEDURE — 3078F PR MOST RECENT DIASTOLIC BLOOD PRESSURE < 80 MM HG: ICD-10-PCS | Mod: CPTII,S$GLB,, | Performed by: PODIATRIST

## 2019-01-21 PROCEDURE — 11042 WOUND DEBRIDEMENT: ICD-10-PCS | Mod: S$GLB,,, | Performed by: PODIATRIST

## 2019-01-21 PROCEDURE — 99999 PR PBB SHADOW E&M-EST. PATIENT-LVL IV: CPT | Mod: PBBFAC,,, | Performed by: PODIATRIST

## 2019-01-21 PROCEDURE — 3074F PR MOST RECENT SYSTOLIC BLOOD PRESSURE < 130 MM HG: ICD-10-PCS | Mod: CPTII,S$GLB,, | Performed by: PODIATRIST

## 2019-01-21 PROCEDURE — 99999 PR PBB SHADOW E&M-EST. PATIENT-LVL IV: ICD-10-PCS | Mod: PBBFAC,,, | Performed by: PODIATRIST

## 2019-01-21 PROCEDURE — 3078F DIAST BP <80 MM HG: CPT | Mod: CPTII,S$GLB,, | Performed by: PODIATRIST

## 2019-01-21 PROCEDURE — 1101F PT FALLS ASSESS-DOCD LE1/YR: CPT | Mod: CPTII,S$GLB,, | Performed by: PODIATRIST

## 2019-01-21 PROCEDURE — 3074F SYST BP LT 130 MM HG: CPT | Mod: CPTII,S$GLB,, | Performed by: PODIATRIST

## 2019-01-21 PROCEDURE — 99213 OFFICE O/P EST LOW 20 MIN: CPT | Mod: 25,S$GLB,, | Performed by: PODIATRIST

## 2019-01-21 NOTE — PROCEDURES
"Wound Debridement  Date/Time: 1/21/2019 4:14 PM  Performed by: Derek Jose DPM  Authorized by: Derek Jose DPM     Time out: Immediately prior to procedure a "time out" was called to verify the correct patient, procedure, equipment, support staff and site/side marked as required.    Consent Done?:  Yes (Verbal)    Preparation: Patient was prepped and draped in usual sterile fashion    Local anesthesia used?: No      Wound Details:    Location:  Right foot    Location:  Right 1st Metatarsal Head    Type of Debridement:  Excisional       Length (cm):  1.4       Area (sq cm):  1.82       Width (cm):  1.3       Percent Debrided (%):  100       Depth (cm):  0.3       Total Area Debrided (sq cm):  1.82    Depth of debridement:  Subcutaneous tissue    Tissue debrided:  Subcutaneous    Devitalized tissue debrided:  Biofilm, Fibrin, Callus and Slough    Instruments:  Curette    Bleeding:  Minimal  Hemostasis Achieved: Yes    Method Used:  Pressure  Patient tolerance:  Patient tolerated the procedure well with no immediate complications     Saline moistened hydrothera blue, nicolette foam, cast padding x 2 secured with coban.      "

## 2019-01-22 NOTE — PROGRESS NOTES
"Subjective:      Patient ID: Caro Powell is a 78 y.o. female.    Chief Complaint: Foot Problem (right foot )    Caro LUA is a 78 y.o. female who presents to the clinic for evaluation and treatment of high risk feet. Caro LUA has a past medical history of Calcium nephrolithiasis (2007), Diabetes mellitus, type 2, Diabetic peripheral neuropathy associated with type 2 diabetes mellitus, and Hypertension. The patient's chief complaint is diabetic foot ulcer, right second toe. This patient has documented high risk feet requiring routine maintenance secondary to peripheral neuropathy. Noted drainage and discoloration from the toe a few days ago. Denies trauma. Accompanied by her .    5/7/18: Follow up for wound check right foot. Taking po clindamycin and ciprofloxacin as scheduled. Accompanied by her . Dressing remained c/d/i.    5/14/18: Presents for wound check. No new complaints. Accompanied by her .    5/31/18: Presents for wound check. Reports she has not received HH. PO abx completed.     12/5:  She is a pt of Dr. 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 She is presenting with new complaint of blister at right submet 1 and distal tip of 4th toe. She is DM2 and on insulin. It was 126 today. She recently bought a new shoes. She tells me she did not have any problems when she was wearing diabetic shoes but blisters formed after wearing a normal tennis shoes. It happened a week ago. She is traveling to [place], [place] in 2 weeks and wants to know if she can weight bear on her right foot. Denies N/V/F/C/SOB/CP    12/12: f/u right foot ulcer. Has been weight bearing in surgical shoe with football dressing. Denies pain. Tells me she has been walking in diabetic shoes on her left foot. She tells me there is a "callus" at lateral left 5th toe that she did not have before. She will travel to [place] soon. Also tells me she noticed that her right foot is turning " "sideways and feels like it is affecting the way she walks.     1/21/19: Complains of worsening wound to right foot. She went to [place] for holidays and saws her wound to right foot worsened. Denies trauma.      PCP: Manuel Laird MD    Date Last Seen by PCP:     Current shoe gear:  Affected Foot: football dressing      Unaffected Foot: Extra depth shoes with custome accommodative insoles    History of Trauma: negative  Sign of Infection: none    Hemoglobin A1C   Date Value Ref Range Status   01/12/2018 8.3 % Final   05/22/2017 7.5 (H) 4.5 - 6.2 % Final     Comment:     According to ADA guidelines, hemoglobin A1C <7.0% represents  optimal control in non-pregnant diabetic patients.  Different  metrics may apply to specific populations.   Standards of Medical Care in Diabetes - 2016.  For the purpose of screening for the presence of diabetes:  <5.7%     Consistent with the absence of diabetes  5.7-6.4%  Consistent with increasing risk for diabetes   (prediabetes)  >or=6.5%  Consistent with diabetes  Currently no consensus exists for use of hemoglobin A1C  for diagnosis of diabetes for children.       Vitals:    01/21/19 1527   BP: (!) 124/55   Pulse: (!) 54   Weight: 85.3 kg (188 lb)   Height: 5' 2" (1.575 m)   PainSc: 0-No pain      Past Medical History:   Diagnosis Date    Calcium nephrolithiasis 2007    Diabetes mellitus, type 2     Diabetic peripheral neuropathy associated with type 2 diabetes mellitus     Hypertension        Past Surgical History:   Procedure Laterality Date    AMPUTATION-TOE Right 5/23/2017    [facility]    COLONOSCOPY  11/28/2011    sigmoid diverticulosis, external hemorrhoids    HYSTERECTOMY      SHOULDER SURGERY Left     TOE AMPUTATION Right 05/22/2017    5th toe       Family History   Problem Relation Age of Onset    Diabetes Mother     Heart failure Father     Kidney failure Brother        Social History     Socioeconomic History    " Marital status:      Spouse name: Not on file    Number of children: Not on file    Years of education: Not on file    Highest education level: Not on file   Social Needs    Financial resource strain: Not on file    Food insecurity - worry: Not on file    Food insecurity - inability: Not on file    Transportation needs - medical: Not on file    Transportation needs - non-medical: Not on file   Occupational History    Not on file   Tobacco Use    Smoking status: Never Smoker   Substance and Sexual Activity    Alcohol use: No    Drug use: No    Sexual activity: Not on file   Other Topics Concern    Not on file   Social History Narrative    Not on file       Current Outpatient Medications   Medication Sig Dispense Refill    ACCU-CHEK SAMI CONTROL SOLN Soln       ACCU-CHEK SAMI PLUS METER Misc       ACCU-CHEK SAMI PLUS TEST STRP Strp       ACCU-CHEK SOFT DEV LANCETS Kit       ACCU-CHEK SOFTCLIX LANCETS Misc       ammonium lactate 12 % Crea Apply to feet twice daily. 140 g 5    apixaban 5 mg Tab Take 1 tablet (5 mg total) by mouth 2 (two) times daily. 60 tablet 5    diltiaZEM (CARDIZEM CD) 120 MG Cp24       famotidine (PEPCID) 20 MG tablet       FLUZONE HIGH-DOSE 2018-19, PF, 180 mcg/0.5 mL vaccine       gabapentin (NEURONTIN) 400 MG capsule       glimepiride (AMARYL) 1 MG tablet       lisinopril (PRINIVIL,ZESTRIL) 5 MG tablet       lisinopril 10 MG Tab Take 1 tablet (10 mg total) by mouth once daily. 30 tablet 5    metoprolol succinate (TOPROL-XL) 25 MG 24 hr tablet       omeprazole (PRILOSEC) 20 MG capsule Take 1 capsule (20 mg total) by mouth once daily. 30 capsule 3    pramipexole (MIRAPEX) 0.125 MG tablet       amoxicillin (AMOXIL) 500 MG capsule       ciprofloxacin HCl (CIPRO) 500 MG tablet Take 1 tablet (500 mg total) by mouth 2 (two) times daily. 20 tablet 0    clindamycin (CLEOCIN) 300 MG capsule Take 1 capsule (300 mg total) by mouth 3 (three) times daily. 30 capsule  0    hydrocodone-acetaminophen 5-325mg (NORCO) 5-325 mg per tablet Take 1 tablet by mouth every 12 (twelve) hours as needed.  0     No current facility-administered medications for this visit.        Review of patient's allergies indicates:   Allergen Reactions    Codeine Nausea Only         Review of Systems   Constitution: Negative for chills, fever, weakness and malaise/fatigue.   Cardiovascular: Negative for chest pain, claudication and leg swelling.   Respiratory: Negative for cough and shortness of breath.    Skin: Positive for color change, dry skin, nail changes and poor wound healing.   Musculoskeletal: Negative for back pain, joint pain, muscle cramps and muscle weakness.   Gastrointestinal: Negative for nausea and vomiting.   Neurological: Positive for numbness and paresthesias.   Psychiatric/Behavioral: Negative for altered mental status.           Objective:      Physical Exam   Constitutional: She is oriented to person, place, and time. She appears well-developed and well-nourished. No distress.   Cardiovascular:   Pulses:       Dorsalis pedis pulses are 2+ on the right side, and 2+ on the left side.        Posterior tibial pulses are 2+ on the right side, and 2+ on the left side.   CFT< 3 secs all toes bilateral foot, skin temp warm bilateral foot, no digital hair growth bilateral foot, mild lower extremity edema bilateral.     Musculoskeletal:   + equinus that reduces with knee bent bilateral.    No pain with ROM or MMT bilateral foot.    R foot:  Limited range of motion at the 1st MPJ     Feet:   Right Foot:   Protective Sensation: 10 sites tested. 5 sites sensed.   Skin Integrity: Positive for ulcer, callus and dry skin. Negative for erythema.   Left Foot:   Protective Sensation: 10 sites tested. 5 sites sensed.   Skin Integrity: Positive for callus and dry skin. Negative for ulcer, blister, skin breakdown, erythema or warmth.   Neurological: She is alert and oriented to person, place, and time.  She has normal strength. A sensory deficit is present.   Skin: Skin is dry and intact. Capillary refill takes less than 2 seconds. No ecchymosis and no rash noted. She is not diaphoretic. No cyanosis or erythema. No pallor. Nails show no clubbing.   Ulcer Location: Right sub 1st met head  Measurements: 0.5x0.5x0.2cm pre-debridement and 1.4 x1.3 x 0.3 cm post debridement  Periwound: hyperkeratotic and undermined  Drainage: serosanguinous.  Pus: None.  Malodor: None.  Base:  70% granular. 30% fibrin with overlying biofilm  Signs of infection: None.      Abrasion to right distal 4th toe, superficial blister completely healed.                           Assessment:       Encounter Diagnoses   Name Primary?    Diabetic ulcer of other part of right foot associated with diabetes mellitus due to underlying condition, with fat layer exposed Yes    Diabetic polyneuropathy associated with type 2 diabetes mellitus          Plan:       Caro LUA was seen today for foot problem.    Diagnoses and all orders for this visit:    Diabetic ulcer of other part of right foot associated with diabetes mellitus due to underlying condition, with fat layer exposed  -     Wound Debridement    Diabetic polyneuropathy associated with type 2 diabetes mellitus  -     Wound Debridement      I counseled the patient on her conditions, their implications and medical management.    Patient evaluated on Diabetic foot risk factors.  Patient counseled to do daily foot checks.  Counseled to wear accommodative shoe gear.  Educated on importance of glycemic control.    Debridement per attached note    Dressed in hydrofera football dressing    -rtc as specified    Rest and elevate.    Assisted by Wilmer Fontenot, PGY2    I have personally taken the history and examined this patient and agree with the resident's note as stated as above.   Derek Jose DPM, FACFAS

## 2019-02-04 ENCOUNTER — OFFICE VISIT (OUTPATIENT)
Dept: PODIATRY | Facility: CLINIC | Age: 79
End: 2019-02-04
Payer: MEDICARE

## 2019-02-04 VITALS
DIASTOLIC BLOOD PRESSURE: 53 MMHG | WEIGHT: 188 LBS | HEIGHT: 62 IN | BODY MASS INDEX: 34.6 KG/M2 | HEART RATE: 57 BPM | SYSTOLIC BLOOD PRESSURE: 112 MMHG

## 2019-02-04 DIAGNOSIS — E08.621 DIABETIC ULCER OF OTHER PART OF RIGHT FOOT ASSOCIATED WITH DIABETES MELLITUS DUE TO UNDERLYING CONDITION, WITH FAT LAYER EXPOSED: Primary | ICD-10-CM

## 2019-02-04 DIAGNOSIS — L97.512 DIABETIC ULCER OF OTHER PART OF RIGHT FOOT ASSOCIATED WITH DIABETES MELLITUS DUE TO UNDERLYING CONDITION, WITH FAT LAYER EXPOSED: Primary | ICD-10-CM

## 2019-02-04 PROCEDURE — 99999 PR PBB SHADOW E&M-EST. PATIENT-LVL IV: ICD-10-PCS | Mod: PBBFAC,,, | Performed by: PODIATRIST

## 2019-02-04 PROCEDURE — 11042 WOUND DEBRIDEMENT: ICD-10-PCS | Mod: S$GLB,,, | Performed by: PODIATRIST

## 2019-02-04 PROCEDURE — 99212 PR OFFICE/OUTPT VISIT, EST, LEVL II, 10-19 MIN: ICD-10-PCS | Mod: 25,S$GLB,, | Performed by: PODIATRIST

## 2019-02-04 PROCEDURE — 3078F DIAST BP <80 MM HG: CPT | Mod: CPTII,S$GLB,, | Performed by: PODIATRIST

## 2019-02-04 PROCEDURE — 99212 OFFICE O/P EST SF 10 MIN: CPT | Mod: 25,S$GLB,, | Performed by: PODIATRIST

## 2019-02-04 PROCEDURE — 3074F PR MOST RECENT SYSTOLIC BLOOD PRESSURE < 130 MM HG: ICD-10-PCS | Mod: CPTII,S$GLB,, | Performed by: PODIATRIST

## 2019-02-04 PROCEDURE — 3078F PR MOST RECENT DIASTOLIC BLOOD PRESSURE < 80 MM HG: ICD-10-PCS | Mod: CPTII,S$GLB,, | Performed by: PODIATRIST

## 2019-02-04 PROCEDURE — 1101F PR PT FALLS ASSESS DOC 0-1 FALLS W/OUT INJ PAST YR: ICD-10-PCS | Mod: CPTII,S$GLB,, | Performed by: PODIATRIST

## 2019-02-04 PROCEDURE — 3074F SYST BP LT 130 MM HG: CPT | Mod: CPTII,S$GLB,, | Performed by: PODIATRIST

## 2019-02-04 PROCEDURE — 1101F PT FALLS ASSESS-DOCD LE1/YR: CPT | Mod: CPTII,S$GLB,, | Performed by: PODIATRIST

## 2019-02-04 PROCEDURE — 11042 DBRDMT SUBQ TIS 1ST 20SQCM/<: CPT | Mod: S$GLB,,, | Performed by: PODIATRIST

## 2019-02-04 PROCEDURE — 99999 PR PBB SHADOW E&M-EST. PATIENT-LVL IV: CPT | Mod: PBBFAC,,, | Performed by: PODIATRIST

## 2019-02-04 RX ORDER — NITROFURANTOIN 25; 75 MG/1; MG/1
CAPSULE ORAL
COMMUNITY
Start: 2019-02-03 | End: 2019-04-24

## 2019-02-04 NOTE — PROCEDURES
"Wound Debridement  Date/Time: 2/4/2019 2:44 PM  Performed by: Derek Jose DPM  Authorized by: Derek Jose DPM     Time out: Immediately prior to procedure a "time out" was called to verify the correct patient, procedure, equipment, support staff and site/side marked as required.    Consent Done?:  Yes (Verbal)    Preparation: Patient was prepped and draped in usual sterile fashion    Local anesthesia used?: No      Wound Details:    Location:  Right foot    Location:  Right 1st Metatarsal Head    Type of Debridement:  Excisional       Length (cm):  0.5       Area (sq cm):  0.2       Width (cm):  0.4       Percent Debrided (%):  100       Depth (cm):  0.2       Total Area Debrided (sq cm):  0.2    Depth of debridement:  Subcutaneous tissue    Tissue debrided:  Subcutaneous    Devitalized tissue debrided:  Callus, Fibrin and Slough    Instruments:  Curette    Bleeding:  Minimal  Hemostasis Achieved: Yes    Method Used:  Pressure  Patient tolerance:  Patient tolerated the procedure well with no immediate complications     Saline moistened nathan, nicolette foam, cast padding x 2 secured with coban.      "

## 2019-02-04 NOTE — PROGRESS NOTES
"Subjective:      Patient ID: Caro Powell is a 78 y.o. female.    Chief Complaint: Follow-up (Wound check)    Caro LUA is a 78 y.o. female who presents to the clinic for evaluation and treatment of high risk feet. Caro LUA has a past medical history of Calcium nephrolithiasis (2007), Diabetes mellitus, type 2, Diabetic peripheral neuropathy associated with type 2 diabetes mellitus, and Hypertension. The patient's chief complaint is diabetic foot ulcer, right second toe. This patient has documented high risk feet requiring routine maintenance secondary to peripheral neuropathy. Noted drainage and discoloration from the toe a few days ago. Denies trauma. Accompanied by her .    5/7/18: Follow up for wound check right foot. Taking po clindamycin and ciprofloxacin as scheduled. Accompanied by her . Dressing remained c/d/i.    5/14/18: Presents for wound check. No new complaints. Accompanied by her .    5/31/18: Presents for wound check. Reports she has not received HH. PO abx completed.     12/5:  She is a pt of Dr. 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 She is presenting with new complaint of blister at right submet 1 and distal tip of 4th toe. She is DM2 and on insulin. It was 126 today. She recently bought a new shoes. She tells me she did not have any problems when she was wearing diabetic shoes but blisters formed after wearing a normal tennis shoes. It happened a week ago. She is traveling to [place], [place] in 2 weeks and wants to know if she can weight bear on her right foot. Denies N/V/F/C/SOB/CP    12/12: f/u right foot ulcer. Has been weight bearing in surgical shoe with football dressing. Denies pain. Tells me she has been walking in diabetic shoes on her left foot. She tells me there is a "callus" at lateral left 5th toe that she did not have before. She will travel to [place] soon. Also tells me she noticed that her right foot is turning " "sideways and feels like it is affecting the way she walks.     1/21/19: Complains of worsening wound to right foot. She went to [place] for holidays and saws her wound to right foot worsened. Denies trauma.    2/4/19  Here for wound check.  Denies F/c/N/V      PCP: Manuel Laird MD    Date Last Seen by PCP:     Current shoe gear:  Affected Foot: football dressing      Unaffected Foot: Extra depth shoes with custome accommodative insoles    History of Trauma: negative  Sign of Infection: none    Hemoglobin A1C   Date Value Ref Range Status   01/12/2018 8.3 % Final   05/22/2017 7.5 (H) 4.5 - 6.2 % Final     Comment:     According to ADA guidelines, hemoglobin A1C <7.0% represents  optimal control in non-pregnant diabetic patients.  Different  metrics may apply to specific populations.   Standards of Medical Care in Diabetes - 2016.  For the purpose of screening for the presence of diabetes:  <5.7%     Consistent with the absence of diabetes  5.7-6.4%  Consistent with increasing risk for diabetes   (prediabetes)  >or=6.5%  Consistent with diabetes  Currently no consensus exists for use of hemoglobin A1C  for diagnosis of diabetes for children.       Vitals:    02/04/19 1356   BP: (!) 112/53   Pulse: (!) 57   Weight: 85.3 kg (188 lb)   Height: 5' 2" (1.575 m)   PainSc: 0-No pain      Past Medical History:   Diagnosis Date    Calcium nephrolithiasis 2007    Diabetes mellitus, type 2     Diabetic peripheral neuropathy associated with type 2 diabetes mellitus     Hypertension        Past Surgical History:   Procedure Laterality Date    AMPUTATION-TOE Right 5/23/2017    [facility]    COLONOSCOPY  11/28/2011    sigmoid diverticulosis, external hemorrhoids    HYSTERECTOMY      SHOULDER SURGERY Left     TOE AMPUTATION Right 05/22/2017    5th toe       Family History   Problem Relation Age of Onset    Diabetes Mother     Heart failure Father     Kidney failure Brother  "       Social History     Socioeconomic History    Marital status:      Spouse name: None    Number of children: None    Years of education: None    Highest education level: None   Social Needs    Financial resource strain: None    Food insecurity - worry: None    Food insecurity - inability: None    Transportation needs - medical: None    Transportation needs - non-medical: None   Occupational History    None   Tobacco Use    Smoking status: Never Smoker   Substance and Sexual Activity    Alcohol use: No    Drug use: No    Sexual activity: None   Other Topics Concern    None   Social History Narrative    None       Current Outpatient Medications   Medication Sig Dispense Refill    ACCU-CHEK SAMI CONTROL SOLN Soln       ACCU-CHEK SAMI PLUS METER Misc       ACCU-CHEK SAMI PLUS TEST STRP Strp       ACCU-CHEK SOFT DEV LANCETS Kit       ACCU-CHEK SOFTCLIX LANCETS Misc       ammonium lactate 12 % Crea Apply to feet twice daily. 140 g 5    apixaban 5 mg Tab Take 1 tablet (5 mg total) by mouth 2 (two) times daily. 60 tablet 5    clindamycin (CLEOCIN) 300 MG capsule Take 1 capsule (300 mg total) by mouth 3 (three) times daily. 30 capsule 0    diltiaZEM (CARDIZEM CD) 120 MG Cp24       famotidine (PEPCID) 20 MG tablet       FLUZONE HIGH-DOSE 2018-19, PF, 180 mcg/0.5 mL vaccine       gabapentin (NEURONTIN) 400 MG capsule       glimepiride (AMARYL) 1 MG tablet       lisinopril (PRINIVIL,ZESTRIL) 5 MG tablet       metoprolol succinate (TOPROL-XL) 25 MG 24 hr tablet       nitrofurantoin, macrocrystal-monohydrate, (MACROBID) 100 MG capsule       omeprazole (PRILOSEC) 20 MG capsule Take 1 capsule (20 mg total) by mouth once daily. 30 capsule 3    pramipexole (MIRAPEX) 0.125 MG tablet        No current facility-administered medications for this visit.        Review of patient's allergies indicates:   Allergen Reactions    Codeine Nausea Only         Review of Systems   Constitution:  Negative for chills, fever, weakness and malaise/fatigue.   Cardiovascular: Negative for chest pain, claudication and leg swelling.   Respiratory: Negative for cough and shortness of breath.    Skin: Positive for color change, dry skin, nail changes and poor wound healing.   Musculoskeletal: Negative for back pain, joint pain, muscle cramps and muscle weakness.   Gastrointestinal: Negative for nausea and vomiting.   Neurological: Positive for numbness and paresthesias.   Psychiatric/Behavioral: Negative for altered mental status.           Objective:      Physical Exam   Constitutional: She is oriented to person, place, and time. She appears well-developed and well-nourished. No distress.   Cardiovascular:   Pulses:       Dorsalis pedis pulses are 2+ on the right side, and 2+ on the left side.        Posterior tibial pulses are 2+ on the right side, and 2+ on the left side.   CFT< 3 secs all toes bilateral foot, skin temp warm bilateral foot, no digital hair growth bilateral foot, mild lower extremity edema bilateral.     Musculoskeletal:   + equinus that reduces with knee bent bilateral.    No pain with ROM or MMT bilateral foot.    R foot:  Limited range of motion at the 1st MPJ     Feet:   Right Foot:   Protective Sensation: 10 sites tested. 5 sites sensed.   Skin Integrity: Positive for ulcer, callus and dry skin. Negative for erythema.   Left Foot:   Protective Sensation: 10 sites tested. 5 sites sensed.   Skin Integrity: Positive for callus and dry skin. Negative for ulcer, blister, skin breakdown, erythema or warmth.   Neurological: She is alert and oriented to person, place, and time. She has normal strength. A sensory deficit is present.   Skin: Skin is dry and intact. Capillary refill takes less than 2 seconds. No ecchymosis and no rash noted. She is not diaphoretic. No cyanosis or erythema. No pallor. Nails show no clubbing.   Ulcer Location: Right sub 1st met head  Measurements: 0.4x0.4x0.2cm   Pre-debridement  Periwound: hyperkeratotic and undermined  Drainage: serosanguinous.  Pus: None.  Malodor: None.  Base:  70% granular. 30% fibrin with overlying biofilm  Signs of infection: None.      Abrasion to right distal 4th toe, superficial blister completely healed.                       Assessment:       Encounter Diagnosis   Name Primary?    Diabetic ulcer of other part of right foot associated with diabetes mellitus due to underlying condition, with fat layer exposed Yes         Plan:       Caro LUA was seen today for follow-up.    Diagnoses and all orders for this visit:    Diabetic ulcer of other part of right foot associated with diabetes mellitus due to underlying condition, with fat layer exposed  -     Wound Debridement      I counseled the patient on her conditions, their implications and medical management.    Patient evaluated on Diabetic foot risk factors.  Patient counseled to do daily foot checks.  Counseled to wear accommodative shoe gear.  Educated on importance of glycemic control.    Debridement and dressing per attached note    -rtc as specified    Rest and elevate.    Assisted by Wilmer Fontenot, PGY2    I have personally taken the history and examined this patient and agree with the resident's note as stated as above.   Derek MAYNARDM, FACFAS

## 2019-02-18 ENCOUNTER — OFFICE VISIT (OUTPATIENT)
Dept: PODIATRY | Facility: CLINIC | Age: 79
End: 2019-02-18
Payer: MEDICARE

## 2019-02-18 VITALS
HEIGHT: 62 IN | WEIGHT: 188 LBS | HEART RATE: 50 BPM | SYSTOLIC BLOOD PRESSURE: 108 MMHG | BODY MASS INDEX: 34.6 KG/M2 | DIASTOLIC BLOOD PRESSURE: 50 MMHG

## 2019-02-18 DIAGNOSIS — E11.42 DIABETIC POLYNEUROPATHY ASSOCIATED WITH TYPE 2 DIABETES MELLITUS: ICD-10-CM

## 2019-02-18 DIAGNOSIS — L97.512 DIABETIC ULCER OF OTHER PART OF RIGHT FOOT ASSOCIATED WITH DIABETES MELLITUS DUE TO UNDERLYING CONDITION, WITH FAT LAYER EXPOSED: Primary | ICD-10-CM

## 2019-02-18 DIAGNOSIS — E08.621 DIABETIC ULCER OF OTHER PART OF RIGHT FOOT ASSOCIATED WITH DIABETES MELLITUS DUE TO UNDERLYING CONDITION, WITH FAT LAYER EXPOSED: Primary | ICD-10-CM

## 2019-02-18 PROCEDURE — 1101F PT FALLS ASSESS-DOCD LE1/YR: CPT | Mod: CPTII,S$GLB,, | Performed by: PODIATRIST

## 2019-02-18 PROCEDURE — 99999 PR PBB SHADOW E&M-EST. PATIENT-LVL IV: CPT | Mod: PBBFAC,,, | Performed by: PODIATRIST

## 2019-02-18 PROCEDURE — 11042 DBRDMT SUBQ TIS 1ST 20SQCM/<: CPT | Mod: S$GLB,,, | Performed by: PODIATRIST

## 2019-02-18 PROCEDURE — 1101F PR PT FALLS ASSESS DOC 0-1 FALLS W/OUT INJ PAST YR: ICD-10-PCS | Mod: CPTII,S$GLB,, | Performed by: PODIATRIST

## 2019-02-18 PROCEDURE — 99212 PR OFFICE/OUTPT VISIT, EST, LEVL II, 10-19 MIN: ICD-10-PCS | Mod: 25,S$GLB,, | Performed by: PODIATRIST

## 2019-02-18 PROCEDURE — 3078F PR MOST RECENT DIASTOLIC BLOOD PRESSURE < 80 MM HG: ICD-10-PCS | Mod: CPTII,S$GLB,, | Performed by: PODIATRIST

## 2019-02-18 PROCEDURE — 11042 WOUND DEBRIDEMENT: ICD-10-PCS | Mod: S$GLB,,, | Performed by: PODIATRIST

## 2019-02-18 PROCEDURE — 99999 PR PBB SHADOW E&M-EST. PATIENT-LVL IV: ICD-10-PCS | Mod: PBBFAC,,, | Performed by: PODIATRIST

## 2019-02-18 PROCEDURE — 3078F DIAST BP <80 MM HG: CPT | Mod: CPTII,S$GLB,, | Performed by: PODIATRIST

## 2019-02-18 PROCEDURE — 99212 OFFICE O/P EST SF 10 MIN: CPT | Mod: 25,S$GLB,, | Performed by: PODIATRIST

## 2019-02-18 PROCEDURE — 3074F SYST BP LT 130 MM HG: CPT | Mod: CPTII,S$GLB,, | Performed by: PODIATRIST

## 2019-02-18 PROCEDURE — 3074F PR MOST RECENT SYSTOLIC BLOOD PRESSURE < 130 MM HG: ICD-10-PCS | Mod: CPTII,S$GLB,, | Performed by: PODIATRIST

## 2019-02-18 NOTE — PROCEDURES
"Wound Debridement  Date/Time: 2/18/2019 2:01 PM  Performed by: Derek Jose DPM  Authorized by: Derek Jose DPM     Time out: Immediately prior to procedure a "time out" was called to verify the correct patient, procedure, equipment, support staff and site/side marked as required.    Consent Done?:  Yes (Verbal)  Local anesthesia used?: No      Wound Details:    Location:  Right foot    Location:  Right 1st Metatarsal Head    Type of Debridement:  Excisional       Length (cm):  0.2       Area (sq cm):  0.22       Width (cm):  1.1       Percent Debrided (%):  100       Depth (cm):  0.1       Total Area Debrided (sq cm):  0.22    Depth of debridement:  Subcutaneous tissue    Tissue debrided:  Epidermis, Dermis and Subcutaneous    Devitalized tissue debrided:  Biofilm, Callus, Fibrin and Slough    Instruments:  Blade    Bleeding:  Minimal  Hemostasis Achieved: Yes    Method Used:  Pressure  Patient tolerance:  Patient tolerated the procedure well with no immediate complications     Saline moistened hydrothera blue, nicolette foam x 2 secured with cast padding x 2 secured with coban.      "

## 2019-02-19 NOTE — PROGRESS NOTES
"Subjective:      Patient ID: Caro Powell is a 78 y.o. female.    Chief Complaint: No chief complaint on file.    Caro LUA is a 78 y.o. female who presents to the clinic for evaluation and treatment of high risk feet. Caro LUA has a past medical history of Calcium nephrolithiasis (2007), Diabetes mellitus, type 2, Diabetic peripheral neuropathy associated with type 2 diabetes mellitus, and Hypertension. The patient's chief complaint is diabetic foot ulcer, right second toe. This patient has documented high risk feet requiring routine maintenance secondary to peripheral neuropathy. Noted drainage and discoloration from the toe a few days ago. Denies trauma. Accompanied by her .    5/7/18: Follow up for wound check right foot. Taking po clindamycin and ciprofloxacin as scheduled. Accompanied by her . Dressing remained c/d/i.    5/14/18: Presents for wound check. No new complaints. Accompanied by her .    5/31/18: Presents for wound check. Reports she has not received HH. PO abx completed.     12/5:  She is a pt of Dr. 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 She is presenting with new complaint of blister at right submet 1 and distal tip of 4th toe. She is DM2 and on insulin. It was 126 today. She recently bought a new shoes. She tells me she did not have any problems when she was wearing diabetic shoes but blisters formed after wearing a normal tennis shoes. It happened a week ago. She is traveling to [place], [place] in 2 weeks and wants to know if she can weight bear on her right foot. Denies N/V/F/C/SOB/CP    12/12: f/u right foot ulcer. Has been weight bearing in surgical shoe with football dressing. Denies pain. Tells me she has been walking in diabetic shoes on her left foot. She tells me there is a "callus" at lateral left 5th toe that she did not have before. She will travel to [place] soon. Also tells me she noticed that her right foot is turning " "sideways and feels like it is affecting the way she walks.     1/21/19: Complains of worsening wound to right foot. She went to [place] for holidays and saws her wound to right foot worsened. Denies trauma.    2/4/19  Here for wound check.  Denies F/c/N/V    2/18/19: Wound check. Says dressing remained intact. Upon observing note she is wearing a different darco shoe then previously dispensed and foot appears not to be fully seated in shoe. Accompanied by her . Denies trauma.       PCP: Manuel Laird MD    Date Last Seen by PCP:     Current shoe gear:  Affected Foot: football dressing      Unaffected Foot: Extra depth shoes with custome accommodative insoles    History of Trauma: negative  Sign of Infection: none    Hemoglobin A1C   Date Value Ref Range Status   01/12/2018 8.3 % Final   05/22/2017 7.5 (H) 4.5 - 6.2 % Final     Comment:     According to ADA guidelines, hemoglobin A1C <7.0% represents  optimal control in non-pregnant diabetic patients.  Different  metrics may apply to specific populations.   Standards of Medical Care in Diabetes - 2016.  For the purpose of screening for the presence of diabetes:  <5.7%     Consistent with the absence of diabetes  5.7-6.4%  Consistent with increasing risk for diabetes   (prediabetes)  >or=6.5%  Consistent with diabetes  Currently no consensus exists for use of hemoglobin A1C  for diagnosis of diabetes for children.       Vitals:    02/18/19 1330   BP: (!) 108/50   Pulse: (!) 50   Weight: 85.3 kg (188 lb)   Height: 5' 2" (1.575 m)   PainSc: 0-No pain      Past Medical History:   Diagnosis Date    Calcium nephrolithiasis 2007    Diabetes mellitus, type 2     Diabetic peripheral neuropathy associated with type 2 diabetes mellitus     Hypertension        Past Surgical History:   Procedure Laterality Date    AMPUTATION-TOE Right 5/23/2017    [facility] by [facility]    COLONOSCOPY  11/28/2011    sigmoid diverticulosis, external " hemorrhoids    HYSTERECTOMY      SHOULDER SURGERY Left     TOE AMPUTATION Right 05/22/2017    5th toe       Family History   Problem Relation Age of Onset    Diabetes Mother     Heart failure Father     Kidney failure Brother        Social History     Socioeconomic History    Marital status:      Spouse name: None    Number of children: None    Years of education: None    Highest education level: None   Social Needs    Financial resource strain: None    Food insecurity - worry: None    Food insecurity - inability: None    Transportation needs - medical: None    Transportation needs - non-medical: None   Occupational History    None   Tobacco Use    Smoking status: Never Smoker   Substance and Sexual Activity    Alcohol use: No    Drug use: No    Sexual activity: None   Other Topics Concern    None   Social History Narrative    None       Current Outpatient Medications   Medication Sig Dispense Refill    ACCU-CHEK SAMI CONTROL SOLN Soln       ACCU-CHEK SAMI PLUS METER Misc       ACCU-CHEK SAMI PLUS TEST STRP Strp       ACCU-CHEK SOFT DEV LANCETS Kit       ACCU-CHEK SOFTCLIX LANCETS Misc       apixaban 5 mg Tab Take 1 tablet (5 mg total) by mouth 2 (two) times daily. 60 tablet 5    diltiaZEM (CARDIZEM CD) 120 MG Cp24       famotidine (PEPCID) 20 MG tablet       FLUZONE HIGH-DOSE 2018-19, PF, 180 mcg/0.5 mL vaccine       gabapentin (NEURONTIN) 400 MG capsule       glimepiride (AMARYL) 1 MG tablet       lisinopril (PRINIVIL,ZESTRIL) 5 MG tablet       metoprolol succinate (TOPROL-XL) 25 MG 24 hr tablet       nitrofurantoin, macrocrystal-monohydrate, (MACROBID) 100 MG capsule       omeprazole (PRILOSEC) 20 MG capsule Take 1 capsule (20 mg total) by mouth once daily. 30 capsule 3    pramipexole (MIRAPEX) 0.125 MG tablet        No current facility-administered medications for this visit.        Review of patient's allergies indicates:   Allergen Reactions    Codeine Nausea  Only         Review of Systems   Constitution: Negative for chills, fever, weakness and malaise/fatigue.   Cardiovascular: Negative for chest pain, claudication and leg swelling.   Respiratory: Negative for cough and shortness of breath.    Skin: Positive for color change, dry skin, nail changes and poor wound healing.   Musculoskeletal: Negative for back pain, joint pain, muscle cramps and muscle weakness.   Gastrointestinal: Negative for nausea and vomiting.   Neurological: Positive for numbness and paresthesias.   Psychiatric/Behavioral: Negative for altered mental status.           Objective:      Physical Exam   Constitutional: She is oriented to person, place, and time. She appears well-developed and well-nourished. No distress.   Cardiovascular:   Pulses:       Dorsalis pedis pulses are 2+ on the right side, and 2+ on the left side.        Posterior tibial pulses are 2+ on the right side, and 2+ on the left side.   CFT< 3 secs all toes bilateral foot, skin temp warm bilateral foot, no digital hair growth bilateral foot, mild lower extremity edema bilateral.     Musculoskeletal:   + equinus that reduces with knee bent bilateral.    No pain with ROM or MMT bilateral foot.    R foot:  Limited range of motion at the 1st MPJ     Feet:   Right Foot:   Protective Sensation: 10 sites tested. 5 sites sensed.   Skin Integrity: Positive for ulcer, callus and dry skin. Negative for erythema.   Left Foot:   Protective Sensation: 10 sites tested. 5 sites sensed.   Skin Integrity: Positive for callus and dry skin. Negative for ulcer, blister, skin breakdown, erythema or warmth.   Neurological: She is alert and oriented to person, place, and time. She has normal strength. A sensory deficit is present.   Skin: Skin is dry and intact. Capillary refill takes less than 2 seconds. No ecchymosis and no rash noted. She is not diaphoretic. No cyanosis or erythema. No pallor. Nails show no clubbing.   Ulcer Location: Right sub 1st  met head  Measurements: 0.3x0.3x0.1cm  Pre-debridement and 0.2x1.1x0.1cm post debridement.  Periwound: hyperkeratotic and undermined with large hemorrhagic bullous noted along medial first met head that directly communicates with plantar wound.  Drainage: serosanguinous.  Pus: None.  Malodor: None.  Base:  50% granular. 50% fibrin with overlying biofilm and non-viable skin medial first met head.  Signs of infection: None.      Abrasion to right distal 4th toe, superficial blister completely healed.                       Assessment:       Encounter Diagnoses   Name Primary?    Diabetic ulcer of other part of right foot associated with diabetes mellitus due to underlying condition, with fat layer exposed Yes    Diabetic polyneuropathy associated with type 2 diabetes mellitus          Plan:       Diagnoses and all orders for this visit:    Diabetic ulcer of other part of right foot associated with diabetes mellitus due to underlying condition, with fat layer exposed  -     Wound Debridement    Diabetic polyneuropathy associated with type 2 diabetes mellitus      I counseled the patient on her conditions, their implications and medical management.    Patient evaluated on Diabetic foot risk factors.  Patient counseled to do daily foot checks.  Counseled to wear accommodative shoe gear.  Educated on importance of glycemic control.    Debridement and dressing per attached note. New Darco shoe dispensed since previous shoe did not appear to fit properly and may have caused pressure along wound site right foot.    Rest and elevate foot.    RTC 1 week or prn.

## 2019-02-25 ENCOUNTER — OFFICE VISIT (OUTPATIENT)
Dept: PODIATRY | Facility: CLINIC | Age: 79
End: 2019-02-25
Payer: MEDICARE

## 2019-02-25 VITALS
BODY MASS INDEX: 34.6 KG/M2 | DIASTOLIC BLOOD PRESSURE: 67 MMHG | WEIGHT: 188 LBS | HEIGHT: 62 IN | HEART RATE: 60 BPM | SYSTOLIC BLOOD PRESSURE: 138 MMHG

## 2019-02-25 DIAGNOSIS — M21.6X9 PLANTAR FLEXED METATARSAL, UNSPECIFIED LATERALITY: ICD-10-CM

## 2019-02-25 DIAGNOSIS — E11.42 DIABETIC POLYNEUROPATHY ASSOCIATED WITH TYPE 2 DIABETES MELLITUS: ICD-10-CM

## 2019-02-25 DIAGNOSIS — Z89.421 HISTORY OF AMPUTATION OF LESSER TOE, RIGHT: ICD-10-CM

## 2019-02-25 DIAGNOSIS — Z87.2 HEALED ULCER OF RIGHT FOOT ON EXAMINATION: Primary | ICD-10-CM

## 2019-02-25 PROCEDURE — 3075F PR MOST RECENT SYSTOLIC BLOOD PRESS GE 130-139MM HG: ICD-10-PCS | Mod: CPTII,S$GLB,, | Performed by: PODIATRIST

## 2019-02-25 PROCEDURE — 3075F SYST BP GE 130 - 139MM HG: CPT | Mod: CPTII,S$GLB,, | Performed by: PODIATRIST

## 2019-02-25 PROCEDURE — 1101F PT FALLS ASSESS-DOCD LE1/YR: CPT | Mod: CPTII,S$GLB,, | Performed by: PODIATRIST

## 2019-02-25 PROCEDURE — 3078F DIAST BP <80 MM HG: CPT | Mod: CPTII,S$GLB,, | Performed by: PODIATRIST

## 2019-02-25 PROCEDURE — 99213 PR OFFICE/OUTPT VISIT, EST, LEVL III, 20-29 MIN: ICD-10-PCS | Mod: S$GLB,,, | Performed by: PODIATRIST

## 2019-02-25 PROCEDURE — 99999 PR PBB SHADOW E&M-EST. PATIENT-LVL III: CPT | Mod: PBBFAC,,, | Performed by: PODIATRIST

## 2019-02-25 PROCEDURE — 3078F PR MOST RECENT DIASTOLIC BLOOD PRESSURE < 80 MM HG: ICD-10-PCS | Mod: CPTII,S$GLB,, | Performed by: PODIATRIST

## 2019-02-25 PROCEDURE — 99213 OFFICE O/P EST LOW 20 MIN: CPT | Mod: S$GLB,,, | Performed by: PODIATRIST

## 2019-02-25 PROCEDURE — 99999 PR PBB SHADOW E&M-EST. PATIENT-LVL III: ICD-10-PCS | Mod: PBBFAC,,, | Performed by: PODIATRIST

## 2019-02-25 PROCEDURE — 1101F PR PT FALLS ASSESS DOC 0-1 FALLS W/OUT INJ PAST YR: ICD-10-PCS | Mod: CPTII,S$GLB,, | Performed by: PODIATRIST

## 2019-02-26 NOTE — PROGRESS NOTES
"Subjective:      Patient ID: Caro Powell is a 78 y.o. female.    Chief Complaint: Follow-up (right foot ulcer )    Caro LUA is a 78 y.o. female who presents to the clinic for evaluation and treatment of high risk feet. Caro LUA has a past medical history of Calcium nephrolithiasis (2007), Diabetes mellitus, type 2, Diabetic peripheral neuropathy associated with type 2 diabetes mellitus, and Hypertension. The patient's chief complaint is diabetic foot ulcer, right second toe. This patient has documented high risk feet requiring routine maintenance secondary to peripheral neuropathy. Noted drainage and discoloration from the toe a few days ago. Denies trauma. Accompanied by her .    5/7/18: Follow up for wound check right foot. Taking po clindamycin and ciprofloxacin as scheduled. Accompanied by her . Dressing remained c/d/i.    5/14/18: Presents for wound check. No new complaints. Accompanied by her .    5/31/18: Presents for wound check. Reports she has not received HH. PO abx completed.     12/5:  She is a pt of Dr. 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 She is presenting with new complaint of blister at right submet 1 and distal tip of 4th toe. She is DM2 and on insulin. It was 126 today. She recently bought a new shoes. She tells me she did not have any problems when she was wearing diabetic shoes but blisters formed after wearing a normal tennis shoes. It happened a week ago. She is traveling to [place], [place] in 2 weeks and wants to know if she can weight bear on her right foot. Denies N/V/F/C/SOB/CP    12/12: f/u right foot ulcer. Has been weight bearing in surgical shoe with football dressing. Denies pain. Tells me she has been walking in diabetic shoes on her left foot. She tells me there is a "callus" at lateral left 5th toe that she did not have before. She will travel to [place] soon. Also tells me she noticed that her right foot is turning " "sideways and feels like it is affecting the way she walks.     1/21/19: Complains of worsening wound to right foot. She went to [place] for holidays and saws her wound to right foot worsened. Denies trauma.    2/4/19  Here for wound check.  Denies F/c/N/V    2/18/19: Wound check. Says dressing remained intact. Upon observing note she is wearing a different darco shoe then previously dispensed and foot appears not to be fully seated in shoe. Accompanied by her . Denies trauma.     2/25/19 wound check. Denies F/C/N/V      PCP: Manuel Laird MD    Date Last Seen by PCP:     Current shoe gear:  Affected Foot: football dressing      Unaffected Foot: Extra depth shoes with custome accommodative insoles    History of Trauma: negative  Sign of Infection: none    Hemoglobin A1C   Date Value Ref Range Status   01/12/2018 8.3 % Final   05/22/2017 7.5 (H) 4.5 - 6.2 % Final     Comment:     According to ADA guidelines, hemoglobin A1C <7.0% represents  optimal control in non-pregnant diabetic patients.  Different  metrics may apply to specific populations.   Standards of Medical Care in Diabetes - 2016.  For the purpose of screening for the presence of diabetes:  <5.7%     Consistent with the absence of diabetes  5.7-6.4%  Consistent with increasing risk for diabetes   (prediabetes)  >or=6.5%  Consistent with diabetes  Currently no consensus exists for use of hemoglobin A1C  for diagnosis of diabetes for children.       Vitals:    02/25/19 1513   BP: 138/67   Pulse: 60   Weight: 85.3 kg (188 lb)   Height: 5' 2" (1.575 m)      Past Medical History:   Diagnosis Date    Calcium nephrolithiasis 2007    Diabetes mellitus, type 2     Diabetic peripheral neuropathy associated with type 2 diabetes mellitus     Hypertension        Past Surgical History:   Procedure Laterality Date    AMPUTATION-TOE Right 5/23/2017    [facility]    COLONOSCOPY  11/28/2011    sigmoid diverticulosis, " external hemorrhoids    HYSTERECTOMY      SHOULDER SURGERY Left     TOE AMPUTATION Right 05/22/2017    5th toe       Family History   Problem Relation Age of Onset    Diabetes Mother     Heart failure Father     Kidney failure Brother        Social History     Socioeconomic History    Marital status:      Spouse name: Not on file    Number of children: Not on file    Years of education: Not on file    Highest education level: Not on file   Social Needs    Financial resource strain: Not on file    Food insecurity - worry: Not on file    Food insecurity - inability: Not on file    Transportation needs - medical: Not on file    Transportation needs - non-medical: Not on file   Occupational History    Not on file   Tobacco Use    Smoking status: Never Smoker   Substance and Sexual Activity    Alcohol use: No    Drug use: No    Sexual activity: Not on file   Other Topics Concern    Not on file   Social History Narrative    Not on file       Current Outpatient Medications   Medication Sig Dispense Refill    ACCU-CHEK SAMI CONTROL SOLN Soln       ACCU-CHEK SAMI PLUS METER Misc       ACCU-CHEK SAMI PLUS TEST STRP Strp       ACCU-CHEK SOFT DEV LANCETS Kit       ACCU-CHEK SOFTCLIX LANCETS Misc       apixaban 5 mg Tab Take 1 tablet (5 mg total) by mouth 2 (two) times daily. 60 tablet 5    diltiaZEM (CARDIZEM CD) 120 MG Cp24       famotidine (PEPCID) 20 MG tablet       FLUZONE HIGH-DOSE 2018-19, PF, 180 mcg/0.5 mL vaccine       gabapentin (NEURONTIN) 400 MG capsule       glimepiride (AMARYL) 1 MG tablet       lisinopril (PRINIVIL,ZESTRIL) 5 MG tablet       metoprolol succinate (TOPROL-XL) 25 MG 24 hr tablet       nitrofurantoin, macrocrystal-monohydrate, (MACROBID) 100 MG capsule       omeprazole (PRILOSEC) 20 MG capsule Take 1 capsule (20 mg total) by mouth once daily. 30 capsule 3    pramipexole (MIRAPEX) 0.125 MG tablet        No current facility-administered medications for  this visit.        Review of patient's allergies indicates:   Allergen Reactions    Codeine Nausea Only         Review of Systems   Constitution: Negative for chills, fever, weakness and malaise/fatigue.   Cardiovascular: Negative for chest pain, claudication and leg swelling.   Respiratory: Negative for cough and shortness of breath.    Skin: Positive for color change, dry skin, nail changes and poor wound healing.   Musculoskeletal: Negative for back pain, joint pain, muscle cramps and muscle weakness.   Gastrointestinal: Negative for nausea and vomiting.   Neurological: Positive for numbness and paresthesias.   Psychiatric/Behavioral: Negative for altered mental status.           Objective:      Physical Exam   Constitutional: She is oriented to person, place, and time. She appears well-developed and well-nourished. No distress.   Cardiovascular:   Pulses:       Dorsalis pedis pulses are 2+ on the right side, and 2+ on the left side.        Posterior tibial pulses are 2+ on the right side, and 2+ on the left side.   CFT< 3 secs all toes bilateral foot, skin temp warm bilateral foot, no digital hair growth bilateral foot, mild lower extremity edema bilateral.     Musculoskeletal:   + equinus that reduces with knee bent bilateral.    No pain with ROM or MMT bilateral foot.    R foot:  Limited range of motion at the 1st MPJ    Plantar flexed first met head right foot.     Feet:   Right Foot:   Protective Sensation: 10 sites tested. 5 sites sensed.   Skin Integrity: Positive for ulcer, callus and dry skin. Negative for erythema.   Left Foot:   Protective Sensation: 10 sites tested. 5 sites sensed.   Skin Integrity: Positive for callus and dry skin. Negative for ulcer, blister, skin breakdown, erythema or warmth.   Neurological: She is alert and oriented to person, place, and time. She has normal strength. A sensory deficit is present.   Skin: Skin is dry and intact. Capillary refill takes less than 2 seconds. No  ecchymosis and no rash noted. She is not diaphoretic. No cyanosis or erythema. No pallor. Nails show no clubbing.   Ulcer Location: Right sub 1st met head  Measurements: healed  No purulence, erythema, edema, or malodor                         Assessment:       Encounter Diagnoses   Name Primary?    Healed ulcer of right foot on examination Yes    Plantar flexed metatarsal, unspecified laterality     Diabetic polyneuropathy associated with type 2 diabetes mellitus     History of amputation of lesser toe, right          Plan:       Caro LUA was seen today for follow-up.    Diagnoses and all orders for this visit:    Healed ulcer of right foot on examination  -     DIABETIC SHOES FOR HOME USE    Plantar flexed metatarsal, unspecified laterality  -     DIABETIC SHOES FOR HOME USE    Diabetic polyneuropathy associated with type 2 diabetes mellitus  -     DIABETIC SHOES FOR HOME USE    History of amputation of lesser toe, right  -     DIABETIC SHOES FOR HOME USE      I counseled the patient on her conditions, their implications and medical management.    Patient evaluated on Diabetic foot risk factors.  Patient counseled to do daily foot checks.  Counseled to wear accommodative shoe gear.  Educated on importance of glycemic control.    Rx extra depth diabetic shoes with custom insoles and first met head cut out right.    RTC as specified    Assisted by Wilmer Fontenot, PGY2    I have personally taken the history and examined this patient and agree with the resident's note as stated as above.   Derek Jose DPM, TARA

## 2019-03-28 ENCOUNTER — TELEPHONE (OUTPATIENT)
Dept: PODIATRY | Facility: CLINIC | Age: 79
End: 2019-03-28

## 2019-03-28 NOTE — TELEPHONE ENCOUNTER
----- Message from Jay Cisneros sent at 3/28/2019  8:04 AM CDT -----  Contact: [phone]  Type:  Same Day Appointment Request    Caller is requesting a same day appointment.  Caller declined first available appointment listed below.    Name of Caller:Caro  When is the first available appointment? 05/09/19  Symptoms:infected foot  Best Call Back Number:[phone]  Additional Information: N/A

## 2019-03-28 NOTE — TELEPHONE ENCOUNTER
Spoke with pt and she stated that her right foot third toe was swollen, so I booked pt with Dr. Jose for 3/29/19 at 8am,pt was ok with this.

## 2019-03-29 ENCOUNTER — OFFICE VISIT (OUTPATIENT)
Dept: PODIATRY | Facility: CLINIC | Age: 79
End: 2019-03-29
Payer: MEDICARE

## 2019-03-29 VITALS — WEIGHT: 188 LBS | HEIGHT: 62 IN | BODY MASS INDEX: 34.6 KG/M2

## 2019-03-29 DIAGNOSIS — L97.511 DIABETIC ULCER OF TOE OF RIGHT FOOT ASSOCIATED WITH DIABETES MELLITUS DUE TO UNDERLYING CONDITION, LIMITED TO BREAKDOWN OF SKIN: Primary | ICD-10-CM

## 2019-03-29 DIAGNOSIS — L03.031 CELLULITIS OF TOE, RIGHT: ICD-10-CM

## 2019-03-29 DIAGNOSIS — E08.621 DIABETIC ULCER OF TOE OF RIGHT FOOT ASSOCIATED WITH DIABETES MELLITUS DUE TO UNDERLYING CONDITION, LIMITED TO BREAKDOWN OF SKIN: Primary | ICD-10-CM

## 2019-03-29 DIAGNOSIS — L84 PRE-ULCERATIVE CALLUSES: ICD-10-CM

## 2019-03-29 PROCEDURE — 99999 PR PBB SHADOW E&M-EST. PATIENT-LVL III: ICD-10-PCS | Mod: PBBFAC,,, | Performed by: PODIATRIST

## 2019-03-29 PROCEDURE — 87147 CULTURE TYPE IMMUNOLOGIC: CPT

## 2019-03-29 PROCEDURE — 99999 PR PBB SHADOW E&M-EST. PATIENT-LVL III: CPT | Mod: PBBFAC,,, | Performed by: PODIATRIST

## 2019-03-29 PROCEDURE — 1101F PR PT FALLS ASSESS DOC 0-1 FALLS W/OUT INJ PAST YR: ICD-10-PCS | Mod: CPTII,S$GLB,, | Performed by: PODIATRIST

## 2019-03-29 PROCEDURE — 1101F PT FALLS ASSESS-DOCD LE1/YR: CPT | Mod: CPTII,S$GLB,, | Performed by: PODIATRIST

## 2019-03-29 PROCEDURE — 99213 PR OFFICE/OUTPT VISIT, EST, LEVL III, 20-29 MIN: ICD-10-PCS | Mod: 25,S$GLB,, | Performed by: PODIATRIST

## 2019-03-29 PROCEDURE — 97597 WOUND DEBRIDEMENT: ICD-10-PCS | Mod: S$GLB,,, | Performed by: PODIATRIST

## 2019-03-29 PROCEDURE — 99213 OFFICE O/P EST LOW 20 MIN: CPT | Mod: 25,S$GLB,, | Performed by: PODIATRIST

## 2019-03-29 PROCEDURE — 97597 DBRDMT OPN WND 1ST 20 CM/<: CPT | Mod: S$GLB,,, | Performed by: PODIATRIST

## 2019-03-29 PROCEDURE — 87070 CULTURE OTHR SPECIMN AEROBIC: CPT

## 2019-03-29 RX ORDER — DIAZEPAM 2 MG/1
TABLET ORAL
COMMUNITY
Start: 2019-03-15 | End: 2019-04-24

## 2019-03-29 RX ORDER — AMOXICILLIN AND CLAVULANATE POTASSIUM 875; 125 MG/1; MG/1
1 TABLET, FILM COATED ORAL 2 TIMES DAILY
Qty: 20 TABLET | Refills: 0 | Status: SHIPPED | OUTPATIENT
Start: 2019-03-29 | End: 2019-04-15

## 2019-03-29 NOTE — PROGRESS NOTES
"Subjective:      Patient ID: Caro Powell is a 78 y.o. female.    Chief Complaint: Blister (Right 3rd metatarsal redness)    Caro LUA is a 78 y.o. female who presents to the clinic for evaluation and treatment of high risk feet. Caro LUA has a past medical history of Calcium nephrolithiasis (2007), Diabetes mellitus, type 2, Diabetic peripheral neuropathy associated with type 2 diabetes mellitus, and Hypertension. The patient's chief complaint is diabetic foot ulcer, right second toe. This patient has documented high risk feet requiring routine maintenance secondary to peripheral neuropathy. Noted drainage and discoloration from the toe a few days ago. Denies trauma. Accompanied by her .    5/7/18: Follow up for wound check right foot. Taking po clindamycin and ciprofloxacin as scheduled. Accompanied by her . Dressing remained c/d/i.    5/14/18: Presents for wound check. No new complaints. Accompanied by her .    5/31/18: Presents for wound check. Reports she has not received HH. PO abx completed.     12/5:  She is a pt of Dr. 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 She is presenting with new complaint of blister at right submet 1 and distal tip of 4th toe. She is DM2 and on insulin. It was 126 today. She recently bought a new shoes. She tells me she did not have any problems when she was wearing diabetic shoes but blisters formed after wearing a normal tennis shoes. It happened a week ago. She is traveling to [place], [place] in 2 weeks and wants to know if she can weight bear on her right foot. Denies N/V/F/C/SOB/CP    12/12: f/u right foot ulcer. Has been weight bearing in surgical shoe with football dressing. Denies pain. Tells me she has been walking in diabetic shoes on her left foot. She tells me there is a "callus" at lateral left 5th toe that she did not have before. She will travel to [place] soon. Also tells me she noticed that her right foot is " "turning sideways and feels like it is affecting the way she walks.     1/21/19: Complains of worsening wound to right foot. She went to [place] for holidays and saws her wound to right foot worsened. Denies trauma.    2/4/19  Here for wound check.  Denies F/c/N/V    2/18/19: Wound check. Says dressing remained intact. Upon observing note she is wearing a different darco shoe then previously dispensed and foot appears not to be fully seated in shoe. Accompanied by her . Denies trauma.     2/25/19 wound check. Denies F/C/N/V    3/29/19 patient complains of new ulcer on 3rd toe that she first noticed on Wednesday.  She has been treating it with antibiotic ointment and bandaids.  Denies F/C/N/V.  Accompanied by her .      PCP: Manuel Laird MD    Date Last Seen by PCP:     Current shoe gear:  Affected Foot: football dressing      Unaffected Foot: Extra depth shoes with custome accommodative insoles    History of Trauma: negative  Sign of Infection: none    Hemoglobin A1C   Date Value Ref Range Status   01/12/2018 8.3 % Final   05/22/2017 7.5 (H) 4.5 - 6.2 % Final     Comment:     According to ADA guidelines, hemoglobin A1C <7.0% represents  optimal control in non-pregnant diabetic patients.  Different  metrics may apply to specific populations.   Standards of Medical Care in Diabetes - 2016.  For the purpose of screening for the presence of diabetes:  <5.7%     Consistent with the absence of diabetes  5.7-6.4%  Consistent with increasing risk for diabetes   (prediabetes)  >or=6.5%  Consistent with diabetes  Currently no consensus exists for use of hemoglobin A1C  for diagnosis of diabetes for children.       Vitals:    03/29/19 0805   Weight: 85.3 kg (188 lb)   Height: 5' 2" (1.575 m)   PainSc: 0-No pain      Past Medical History:   Diagnosis Date    Calcium nephrolithiasis 2007    Diabetes mellitus, type 2     Diabetic peripheral neuropathy associated with type 2 diabetes mellitus     " Hypertension        Past Surgical History:   Procedure Laterality Date    AMPUTATION-TOE Right 5/23/2017    [facility]    COLONOSCOPY  11/28/2011    sigmoid diverticulosis, external hemorrhoids    HYSTERECTOMY      SHOULDER SURGERY Left     TOE AMPUTATION Right 05/22/2017    5th toe       Family History   Problem Relation Age of Onset    Diabetes Mother     Heart failure Father     Kidney failure Brother        Social History     Socioeconomic History    Marital status:      Spouse name: None    Number of children: None    Years of education: None    Highest education level: None   Occupational History    None   Social Needs    Financial resource strain: None    Food insecurity:     Worry: None     Inability: None    Transportation needs:     Medical: None     Non-medical: None   Tobacco Use    Smoking status: Never Smoker   Substance and Sexual Activity    Alcohol use: No    Drug use: No    Sexual activity: None   Lifestyle    Physical activity:     Days per week: None     Minutes per session: None    Stress: None   Relationships    Social connections:     Talks on phone: None     Gets together: None     Attends Jewish service: None     Active member of club or organization: None     Attends meetings of clubs or organizations: None     Relationship status: None    Intimate partner violence:     Fear of current or ex partner: None     Emotionally abused: None     Physically abused: None     Forced sexual activity: None   Other Topics Concern    None   Social History Narrative    None       Current Outpatient Medications   Medication Sig Dispense Refill    ACCU-CHEK SAMI CONTROL SOLN Soln       ACCU-CHEK SAMI PLUS METER Misc       ACCU-CHEK SAMI PLUS TEST STRP Strp       ACCU-CHEK SOFT DEV LANCETS Kit       ACCU-CHEK SOFTCLIX LANCETS Misc       apixaban 5 mg Tab Take 1 tablet (5 mg total) by mouth 2 (two) times daily. 60 tablet 5     diazePAM (VALIUM) 2 MG tablet       diltiaZEM (CARDIZEM CD) 120 MG Cp24       famotidine (PEPCID) 20 MG tablet       FLUZONE HIGH-DOSE 2018-19, PF, 180 mcg/0.5 mL vaccine       gabapentin (NEURONTIN) 400 MG capsule       glimepiride (AMARYL) 1 MG tablet       lisinopril (PRINIVIL,ZESTRIL) 5 MG tablet       metoprolol succinate (TOPROL-XL) 25 MG 24 hr tablet       nitrofurantoin, macrocrystal-monohydrate, (MACROBID) 100 MG capsule       omeprazole (PRILOSEC) 20 MG capsule Take 1 capsule (20 mg total) by mouth once daily. 30 capsule 3    pramipexole (MIRAPEX) 0.125 MG tablet       amoxicillin-clavulanate 875-125mg (AUGMENTIN) 875-125 mg per tablet Take 1 tablet by mouth 2 (two) times daily. 20 tablet 0     No current facility-administered medications for this visit.        Review of patient's allergies indicates:   Allergen Reactions    Codeine Nausea Only         Review of Systems   Constitution: Negative for chills, fever and malaise/fatigue.   Cardiovascular: Negative for chest pain, claudication and leg swelling.   Respiratory: Negative for cough and shortness of breath.    Skin: Positive for color change, dry skin, nail changes and poor wound healing.   Musculoskeletal: Negative for back pain, joint pain, muscle cramps and muscle weakness.   Gastrointestinal: Negative for nausea and vomiting.   Neurological: Positive for numbness and paresthesias. Negative for weakness.   Psychiatric/Behavioral: Negative for altered mental status.           Objective:      Physical Exam   Constitutional: She is oriented to person, place, and time. She appears well-developed and well-nourished. No distress.   Cardiovascular:   Pulses:       Dorsalis pedis pulses are 2+ on the right side, and 2+ on the left side.        Posterior tibial pulses are 2+ on the right side, and 2+ on the left side.   CFT< 3 secs all toes bilateral foot, skin temp warm bilateral foot, no digital hair growth bilateral foot, mild lower  extremity edema bilateral.     Musculoskeletal:   + equinus that reduces with knee bent bilateral.    No pain with ROM or MMT bilateral foot.    R foot:  Limited range of motion at the 1st MPJ    Plantar flexed first met head right foot.     Feet:   Right Foot:   Protective Sensation: 10 sites tested. 5 sites sensed.   Skin Integrity: Positive for ulcer, callus and dry skin. Negative for erythema.   Left Foot:   Protective Sensation: 10 sites tested. 5 sites sensed.   Skin Integrity: Positive for callus and dry skin. Negative for ulcer, blister, skin breakdown, erythema or warmth.   Neurological: She is alert and oriented to person, place, and time. She has normal strength. A sensory deficit is present.   Skin: Skin is dry and intact. Capillary refill takes less than 2 seconds. No ecchymosis and no rash noted. She is not diaphoretic. No cyanosis or erythema. No pallor. Nails show no clubbing.   Ulcer Location: distal right 3rd toe  Measurements: 0.3x0.2x0.1cm pre debridement and 0.4 x 0.2 x 0.1 cm post debridement  Base:  Mixed granular and fibrotic with overlying slough and mild biofilm  Margins: hyperkeratotic  Mild erythema and edema with maceration around wound does not probe or track or undermine.  .  Hyperkeratotic lesion sub 1st met head with limited skin breakdown.  No purulence, erythema, edema, or malodor                           Assessment:       Encounter Diagnoses   Name Primary?    Diabetic ulcer of toe of right foot associated with diabetes mellitus due to underlying condition, limited to breakdown of skin Yes    Cellulitis of toe, right     Pre-ulcerative calluses          Plan:       Caro LUA was seen today for blister.    Diagnoses and all orders for this visit:    Diabetic ulcer of toe of right foot associated with diabetes mellitus due to underlying condition, limited to breakdown of skin  -     Aerobic culture (Specify Source)  -     Wound Debridement    Cellulitis of toe, right  -      Aerobic culture (Specify Source)    Pre-ulcerative calluses    Other orders  -     amoxicillin-clavulanate 875-125mg (AUGMENTIN) 875-125 mg per tablet; Take 1 tablet by mouth 2 (two) times daily.      I counseled the patient on her conditions, their implications and medical management.    Patient evaluated on Diabetic foot risk factors.  Patient counseled to do daily foot checks.  Counseled to wear accommodative shoe gear.  Educated on importance of glycemic control.    Debridement per attached note.  He dressing clean dry intact. Rest and elevate.  Patient to wear Darco shoe.    Rx augmentin    Obtained cultures    RTC as specified    I have personally taken the history and examined this patient and agree with the resident's note as stated as above.   Derek MAYNARDM, FACFAS      Assisted by Wilmer Fontenot, PGY2

## 2019-03-29 NOTE — PROCEDURES
"Wound Debridement  Date/Time: 3/29/2019 8:31 AM  Performed by: Derek Jose DPM  Authorized by: Derek Jose DPM     Time out: Immediately prior to procedure a "time out" was called to verify the correct patient, procedure, equipment, support staff and site/side marked as required.    Consent Done?:  Yes (Verbal)  Local anesthesia used?: Yes    Local anesthetic:  Topical anesthetic    Wound Details:    Location:  Right foot    Location:  Right 3rd Toe    Type of Debridement:  Excisional       Length (cm):  0.4       Area (sq cm):  0.08       Width (cm):  0.2       Percent Debrided (%):  100       Depth (cm):  0.1       Total Area Debrided (sq cm):  0.08    Depth of debridement:  Epidermis/Dermis    Tissue debrided:  Epidermis and Dermis    Devitalized tissue debrided:  Callus, Fibrin and Slough    Instruments:  Blade    Bleeding:  Minimal  Hemostasis Achieved: Yes    Method Used:  Pressure  Patient tolerance:  Patient tolerated the procedure well with no immediate complications     Saline moistened hydrothera blue, nicolette foam, cast padding secured with coban.         "

## 2019-04-01 LAB — BACTERIA SPEC AEROBE CULT: NORMAL

## 2019-04-08 ENCOUNTER — OFFICE VISIT (OUTPATIENT)
Dept: PODIATRY | Facility: CLINIC | Age: 79
End: 2019-04-08
Payer: MEDICARE

## 2019-04-08 VITALS — WEIGHT: 188 LBS | HEIGHT: 62 IN | BODY MASS INDEX: 34.6 KG/M2

## 2019-04-08 DIAGNOSIS — E11.42 DIABETIC POLYNEUROPATHY ASSOCIATED WITH TYPE 2 DIABETES MELLITUS: ICD-10-CM

## 2019-04-08 DIAGNOSIS — E08.621 DIABETIC ULCER OF TOE OF RIGHT FOOT ASSOCIATED WITH DIABETES MELLITUS DUE TO UNDERLYING CONDITION, LIMITED TO BREAKDOWN OF SKIN: Primary | ICD-10-CM

## 2019-04-08 DIAGNOSIS — L97.511 DIABETIC ULCER OF TOE OF RIGHT FOOT ASSOCIATED WITH DIABETES MELLITUS DUE TO UNDERLYING CONDITION, LIMITED TO BREAKDOWN OF SKIN: Primary | ICD-10-CM

## 2019-04-08 DIAGNOSIS — Z89.421 HISTORY OF AMPUTATION OF LESSER TOE, RIGHT: ICD-10-CM

## 2019-04-08 PROCEDURE — 99999 PR PBB SHADOW E&M-EST. PATIENT-LVL II: CPT | Mod: PBBFAC,,, | Performed by: PODIATRIST

## 2019-04-08 PROCEDURE — 97597 DBRDMT OPN WND 1ST 20 CM/<: CPT | Mod: S$GLB,,, | Performed by: PODIATRIST

## 2019-04-08 PROCEDURE — 99499 NO LOS: ICD-10-PCS | Mod: S$GLB,,, | Performed by: PODIATRIST

## 2019-04-08 PROCEDURE — 97597 WOUND DEBRIDEMENT: ICD-10-PCS | Mod: S$GLB,,, | Performed by: PODIATRIST

## 2019-04-08 PROCEDURE — 99999 PR PBB SHADOW E&M-EST. PATIENT-LVL II: ICD-10-PCS | Mod: PBBFAC,,, | Performed by: PODIATRIST

## 2019-04-08 PROCEDURE — 99499 UNLISTED E&M SERVICE: CPT | Mod: S$GLB,,, | Performed by: PODIATRIST

## 2019-04-08 NOTE — PROCEDURES
"Wound Debridement  Date/Time: 4/8/2019 12:15 PM  Performed by: Derek Jose DPM  Authorized by: Derek Jose DPM     Time out: Immediately prior to procedure a "time out" was called to verify the correct patient, procedure, equipment, support staff and site/side marked as required.    Consent Done?:  Yes (Verbal)    Preparation: Patient was prepped and draped in usual sterile fashion    Local anesthesia used?: No      Wound Details:    Location:  Right foot    Location:  Right 3rd Toe    Type of Debridement:  Excisional       Length (cm):  0.3       Area (sq cm):  0.15       Width (cm):  0.5       Percent Debrided (%):  100       Depth (cm):  0       Total Area Debrided (sq cm):  0.15    Depth of debridement:  Epidermis/Dermis    Tissue debrided:  Dermis    Devitalized tissue debrided:  Biofilm and Fibrin    Instruments:  Blade    Bleeding:  Minimal  Hemostasis Achieved: Yes    Method Used:  Pressure and Silver Nitrate  Patient tolerance:  Patient tolerated the procedure well with no immediate complications     Applied saline moistened nathan, nicolette foam, cast padding x 2 secured with coban.        "

## 2019-04-09 NOTE — PROGRESS NOTES
"Subjective:      Patient ID: Caro Powell is a 78 y.o. female.    Chief Complaint: Follow-up (right foot ulcer on toe )    Caro LUA is a 78 y.o. female who presents to the clinic for evaluation and treatment of high risk feet. Caro LUA has a past medical history of Calcium nephrolithiasis (2007), Diabetes mellitus, type 2, Diabetic peripheral neuropathy associated with type 2 diabetes mellitus, and Hypertension. The patient's chief complaint is diabetic foot ulcer, right second toe. This patient has documented high risk feet requiring routine maintenance secondary to peripheral neuropathy. Noted drainage and discoloration from the toe a few days ago. Denies trauma. Accompanied by her .    5/7/18: Follow up for wound check right foot. Taking po clindamycin and ciprofloxacin as scheduled. Accompanied by her . Dressing remained c/d/i.    5/14/18: Presents for wound check. No new complaints. Accompanied by her .    5/31/18: Presents for wound check. Reports she has not received HH. PO abx completed.     12/5:  She is a pt of Dr. 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 She is presenting with new complaint of blister at right submet 1 and distal tip of 4th toe. She is DM2 and on insulin. It was 126 today. She recently bought a new shoes. She tells me she did not have any problems when she was wearing diabetic shoes but blisters formed after wearing a normal tennis shoes. It happened a week ago. She is traveling to [place], [place] in 2 weeks and wants to know if she can weight bear on her right foot. Denies N/V/F/C/SOB/CP    12/12: f/u right foot ulcer. Has been weight bearing in surgical shoe with football dressing. Denies pain. Tells me she has been walking in diabetic shoes on her left foot. She tells me there is a "callus" at lateral left 5th toe that she did not have before. She will travel to [place] soon. Also tells me she noticed that her right foot is " "turning sideways and feels like it is affecting the way she walks.     1/21/19: Complains of worsening wound to right foot. She went to [place] for holidays and saws her wound to right foot worsened. Denies trauma.    2/4/19  Here for wound check.  Denies F/c/N/V    2/18/19: Wound check. Says dressing remained intact. Upon observing note she is wearing a different darco shoe then previously dispensed and foot appears not to be fully seated in shoe. Accompanied by her . Denies trauma.     2/25/19 wound check. Denies F/C/N/V    3/29/19 patient complains of new ulcer on 3rd toe that she first noticed on Wednesday.  She has been treating it with antibiotic ointment and bandaids.  Denies F/C/N/V.  Accompanied by her .    04/08/2019:  Presents for wound check right foot.  Relates the dressing remained intact.      PCP: Manuel Laird MD    Date Last Seen by PCP:     Current shoe gear:  Affected Foot: football dressing      Unaffected Foot: Extra depth shoes with custome accommodative insoles    History of Trauma: negative  Sign of Infection: none    Hemoglobin A1C   Date Value Ref Range Status   01/12/2018 8.3 % Final   05/22/2017 7.5 (H) 4.5 - 6.2 % Final     Comment:     According to ADA guidelines, hemoglobin A1C <7.0% represents  optimal control in non-pregnant diabetic patients.  Different  metrics may apply to specific populations.   Standards of Medical Care in Diabetes - 2016.  For the purpose of screening for the presence of diabetes:  <5.7%     Consistent with the absence of diabetes  5.7-6.4%  Consistent with increasing risk for diabetes   (prediabetes)  >or=6.5%  Consistent with diabetes  Currently no consensus exists for use of hemoglobin A1C  for diagnosis of diabetes for children.       Vitals:    04/08/19 1131   Weight: 85.3 kg (188 lb)   Height: 5' 2" (1.575 m)   PainSc: 0-No pain      Past Medical History:   Diagnosis Date    Calcium nephrolithiasis 2007    Diabetes mellitus, type 2 "     Diabetic peripheral neuropathy associated with type 2 diabetes mellitus     Hypertension        Past Surgical History:   Procedure Laterality Date    AMPUTATION-TOE Right 5/23/2017    [facility]formed by [facility] at [facility]    COLONOSCOPY  11/28/2011    sigmoid diverticulosis, external hemorrhoids    HYSTERECTOMY      SHOULDER SURGERY Left     TOE AMPUTATION Right 05/22/2017    5th toe       Family History   Problem Relation Age of Onset    Diabetes Mother     Heart failure Father     Kidney failure Brother        Social History     Socioeconomic History    Marital status:      Spouse name: Not on file    Number of children: Not on file    Years of education: Not on file    Highest education level: Not on file   Occupational History    Not on file   Social Needs    Financial resource strain: Not on file    Food insecurity:     Worry: Not on file     Inability: Not on file    Transportation needs:     Medical: Not on file     Non-medical: Not on file   Tobacco Use    Smoking status: Never Smoker   Substance and Sexual Activity    Alcohol use: No    Drug use: No    Sexual activity: Not on file   Lifestyle    Physical activity:     Days per week: Not on file     Minutes per session: Not on file    Stress: Not on file   Relationships    Social connections:     Talks on phone: Not on file     Gets together: Not on file     Attends [employer] service: Not on file     Active member of club or organization: Not on file     Attends meetings of clubs or organizations: Not on file     Relationship status: Not on file   Other Topics Concern    Not on file   Social History Narrative    Not on file       Current Outpatient Medications   Medication Sig Dispense Refill    ACCU-CHEK SAMI CONTROL SOLN Soln       ACCU-CHEK SMAI PLUS METER Misc       ACCU-CHEK SAMI PLUS TEST STRP Strp       ACCU-CHEK SOFT DEV LANCETS Kit       ACCU-CHEK SOFTCLIX LANCETS Misc       apixaban 5 mg Tab  Take 1 tablet (5 mg total) by mouth 2 (two) times daily. 60 tablet 5    diazePAM (VALIUM) 2 MG tablet       diltiaZEM (CARDIZEM CD) 120 MG Cp24       famotidine (PEPCID) 20 MG tablet       FLUZONE HIGH-DOSE 2018-19, PF, 180 mcg/0.5 mL vaccine       gabapentin (NEURONTIN) 400 MG capsule       glimepiride (AMARYL) 1 MG tablet       lisinopril (PRINIVIL,ZESTRIL) 5 MG tablet       metoprolol succinate (TOPROL-XL) 25 MG 24 hr tablet       nitrofurantoin, macrocrystal-monohydrate, (MACROBID) 100 MG capsule       omeprazole (PRILOSEC) 20 MG capsule Take 1 capsule (20 mg total) by mouth once daily. 30 capsule 3    pramipexole (MIRAPEX) 0.125 MG tablet       amoxicillin-clavulanate 875-125mg (AUGMENTIN) 875-125 mg per tablet Take 1 tablet by mouth 2 (two) times daily. 20 tablet 0     No current facility-administered medications for this visit.        Review of patient's allergies indicates:   Allergen Reactions    Codeine Nausea Only         Review of Systems   Constitution: Negative for chills, fever and malaise/fatigue.   Cardiovascular: Negative for chest pain, claudication and leg swelling.   Respiratory: Negative for cough and shortness of breath.    Skin: Positive for color change, dry skin, nail changes and poor wound healing.   Musculoskeletal: Negative for back pain, joint pain, muscle cramps and muscle weakness.   Gastrointestinal: Negative for nausea and vomiting.   Neurological: Positive for numbness and paresthesias. Negative for weakness.   Psychiatric/Behavioral: Negative for altered mental status.           Objective:      Physical Exam   Constitutional: She is oriented to person, place, and time. She appears well-developed and well-nourished. No distress.   Cardiovascular:   Pulses:       Dorsalis pedis pulses are 2+ on the right side, and 2+ on the left side.        Posterior tibial pulses are 2+ on the right side, and 2+ on the left side.   CFT< 3 secs all toes bilateral foot, skin temp warm  bilateral foot, no digital hair growth bilateral foot, mild lower extremity edema bilateral.     Musculoskeletal:   + equinus that reduces with knee bent bilateral.    No pain with ROM or MMT bilateral foot.    R foot:  Limited range of motion at the 1st MPJ    Plantar flexed first met head right foot.     Feet:   Right Foot:   Protective Sensation: 10 sites tested. 5 sites sensed.   Skin Integrity: Positive for ulcer, callus and dry skin. Negative for erythema.   Left Foot:   Protective Sensation: 10 sites tested. 5 sites sensed.   Skin Integrity: Positive for callus and dry skin. Negative for ulcer, blister, skin breakdown, erythema or warmth.   Neurological: She is alert and oriented to person, place, and time. She has normal strength. A sensory deficit is present.   Skin: Skin is dry and intact. Capillary refill takes less than 2 seconds. No ecchymosis and no rash noted. She is not diaphoretic. No cyanosis or erythema. No pallor. Nails show no clubbing.   Ulcer Location: distal right 3rd toe  Measurements: 0.3x0.4x0.1cm pre debridement and 0.3 x 0.5 x 0.0 cm post debridement  Base:  Mixed granular and fibrotic with overlying slough and mild biofilm  Margins: hyperkeratotic  Erythema resolved right 3rd toe.  Does not probe or track or undermine.  .  Hyperkeratotic lesion sub 1st met head with limited skin breakdown.  No purulence, erythema, edema, or malodor                       Assessment:       Encounter Diagnoses   Name Primary?    Diabetic ulcer of toe of right foot associated with diabetes mellitus due to underlying condition, limited to breakdown of skin Yes    Diabetic polyneuropathy associated with type 2 diabetes mellitus     History of amputation of lesser toe, right          Plan:       Caro LUA was seen today for follow-up.    Diagnoses and all orders for this visit:    Diabetic ulcer of toe of right foot associated with diabetes mellitus due to underlying condition, limited to breakdown of  skin  -     Wound Debridement  -     DIABETIC SHOES FOR HOME USE    Diabetic polyneuropathy associated with type 2 diabetes mellitus  -     DIABETIC SHOES FOR HOME USE    History of amputation of lesser toe, right  -     DIABETIC SHOES FOR HOME USE      I counseled the patient on her conditions, their implications and medical management.    Patient evaluated on Diabetic foot risk factors.  Patient counseled to do daily foot checks.  Counseled to wear accommodative shoe gear.  Educated on importance of glycemic control.    Debridement and dressing per attached note. Offload with darco shoe.    Rest and elevate.    Recommend crest pad for future.    RTC 7-10 days or prn.

## 2019-04-15 ENCOUNTER — OFFICE VISIT (OUTPATIENT)
Dept: PODIATRY | Facility: CLINIC | Age: 79
End: 2019-04-15
Payer: MEDICARE

## 2019-04-15 VITALS — HEIGHT: 62 IN | BODY MASS INDEX: 34.6 KG/M2 | WEIGHT: 188 LBS

## 2019-04-15 DIAGNOSIS — E11.42 DIABETIC POLYNEUROPATHY ASSOCIATED WITH TYPE 2 DIABETES MELLITUS: ICD-10-CM

## 2019-04-15 DIAGNOSIS — L97.511 DIABETIC ULCER OF TOE OF RIGHT FOOT ASSOCIATED WITH DIABETES MELLITUS DUE TO UNDERLYING CONDITION, LIMITED TO BREAKDOWN OF SKIN: Primary | ICD-10-CM

## 2019-04-15 DIAGNOSIS — E08.621 DIABETIC ULCER OF TOE OF RIGHT FOOT ASSOCIATED WITH DIABETES MELLITUS DUE TO UNDERLYING CONDITION, LIMITED TO BREAKDOWN OF SKIN: Primary | ICD-10-CM

## 2019-04-15 PROCEDURE — 99999 PR PBB SHADOW E&M-EST. PATIENT-LVL IV: CPT | Mod: PBBFAC,,, | Performed by: PODIATRIST

## 2019-04-15 PROCEDURE — 97597 WOUND DEBRIDEMENT: ICD-10-PCS | Mod: S$GLB,,, | Performed by: PODIATRIST

## 2019-04-15 PROCEDURE — 99999 PR PBB SHADOW E&M-EST. PATIENT-LVL IV: ICD-10-PCS | Mod: PBBFAC,,, | Performed by: PODIATRIST

## 2019-04-15 PROCEDURE — 99499 NO LOS: ICD-10-PCS | Mod: S$GLB,,, | Performed by: PODIATRIST

## 2019-04-15 PROCEDURE — 97597 DBRDMT OPN WND 1ST 20 CM/<: CPT | Mod: S$GLB,,, | Performed by: PODIATRIST

## 2019-04-15 PROCEDURE — 99499 UNLISTED E&M SERVICE: CPT | Mod: S$GLB,,, | Performed by: PODIATRIST

## 2019-04-15 NOTE — PROCEDURES
"Wound Debridement  Date/Time: 4/15/2019 3:39 PM  Performed by: Derek Jose DPM  Authorized by: Derek Jose DPM     Time out: Immediately prior to procedure a "time out" was called to verify the correct patient, procedure, equipment, support staff and site/side marked as required.    Consent Done?:  Yes (Verbal)    Preparation: Patient was prepped and draped in usual sterile fashion    Local anesthesia used?: No      Wound Details:    Location:  Right foot    Location:  Right 3rd Toe    Type of Debridement:  Excisional       Length (cm):  0.2       Area (sq cm):  0.04       Width (cm):  0.2       Percent Debrided (%):  100       Depth (cm):  0       Total Area Debrided (sq cm):  0.04    Depth of debridement:  Epidermis/Dermis    Tissue debrided:  Epidermis    Devitalized tissue debrided:  Callus    Instruments:  Blade    Bleeding:  Minimal  Hemostasis Achieved: Yes    Method Used:  Pressure and Silver Nitrate  Patient tolerance:  Patient tolerated the procedure well with no immediate complications     Applied hydrothera blue, nicolette foam secured with coban.      "

## 2019-04-16 NOTE — PROGRESS NOTES
"Subjective:      Patient ID: Caro Powell is a 78 y.o. female.    Chief Complaint: Follow-up (wound check)    Caro LUA is a 78 y.o. female who presents to the clinic for evaluation and treatment of high risk feet. Caro LUA has a past medical history of Calcium nephrolithiasis (2007), Diabetes mellitus, type 2, Diabetic peripheral neuropathy associated with type 2 diabetes mellitus, and Hypertension. The patient's chief complaint is diabetic foot ulcer, right second toe. This patient has documented high risk feet requiring routine maintenance secondary to peripheral neuropathy. Noted drainage and discoloration from the toe a few days ago. Denies trauma. Accompanied by her .    5/7/18: Follow up for wound check right foot. Taking po clindamycin and ciprofloxacin as scheduled. Accompanied by her . Dressing remained c/d/i.    5/14/18: Presents for wound check. No new complaints. Accompanied by her .    5/31/18: Presents for wound check. Reports she has not received HH. PO abx completed.     12/5:  She is a pt of Dr. 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 She is presenting with new complaint of blister at right submet 1 and distal tip of 4th toe. She is DM2 and on insulin. It was 126 today. She recently bought a new shoes. She tells me she did not have any problems when she was wearing diabetic shoes but blisters formed after wearing a normal tennis shoes. It happened a week ago. She is traveling to [place], [place] in 2 weeks and wants to know if she can weight bear on her right foot. Denies N/V/F/C/SOB/CP    12/12: f/u right foot ulcer. Has been weight bearing in surgical shoe with football dressing. Denies pain. Tells me she has been walking in diabetic shoes on her left foot. She tells me there is a "callus" at lateral left 5th toe that she did not have before. She will travel to [place] soon. Also tells me she noticed that her right foot is turning " "sideways and feels like it is affecting the way she walks.     1/21/19: Complains of worsening wound to right foot. She went to [place] for holidays and saws her wound to right foot worsened. Denies trauma.    2/4/19  Here for wound check.  Denies F/c/N/V    2/18/19: Wound check. Says dressing remained intact. Upon observing note she is wearing a different darco shoe then previously dispensed and foot appears not to be fully seated in shoe. Accompanied by her . Denies trauma.     2/25/19 wound check. Denies F/C/N/V    3/29/19 patient complains of new ulcer on 3rd toe that she first noticed on Wednesday.  She has been treating it with antibiotic ointment and bandaids.  Denies F/C/N/V.  Accompanied by her .    04/08/2019:  Presents for wound check right foot.  Relates the dressing remained intact.    4/15/19: Presents for wound check. No new complaints.      PCP: Manuel Laird MD    Date Last Seen by PCP:     Current shoe gear:  Affected Foot: football dressing      Unaffected Foot: Extra depth shoes with custome accommodative insoles    History of Trauma: negative  Sign of Infection: none    Hemoglobin A1C   Date Value Ref Range Status   01/12/2018 8.3 % Final   05/22/2017 7.5 (H) 4.5 - 6.2 % Final     Comment:     According to ADA guidelines, hemoglobin A1C <7.0% represents  optimal control in non-pregnant diabetic patients.  Different  metrics may apply to specific populations.   Standards of Medical Care in Diabetes - 2016.  For the purpose of screening for the presence of diabetes:  <5.7%     Consistent with the absence of diabetes  5.7-6.4%  Consistent with increasing risk for diabetes   (prediabetes)  >or=6.5%  Consistent with diabetes  Currently no consensus exists for use of hemoglobin A1C  for diagnosis of diabetes for children.       Vitals:    04/15/19 1504   Weight: 85.3 kg (188 lb)   Height: 5' 2" (1.575 m)   PainSc: 0-No pain      Past Medical History:   Diagnosis Date    Calcium " nephrolithiasis 2007    Diabetes mellitus, type 2     Diabetic peripheral neuropathy associated with type 2 diabetes mellitus     Hypertension        Past Surgical History:   Procedure Laterality Date    AMPUTATION-TOE Right 5/23/2017    [facility]    COLONOSCOPY  11/28/2011    sigmoid diverticulosis, external hemorrhoids    HYSTERECTOMY      SHOULDER SURGERY Left     TOE AMPUTATION Right 05/22/2017    5th toe       Family History   Problem Relation Age of Onset    Diabetes Mother     Heart failure Father     Kidney failure Brother        Social History     Socioeconomic History    Marital status:      Spouse name: Not on file    Number of children: Not on file    Years of education: Not on file    Highest education level: Not on file   Occupational History    Not on file   Social Needs    Financial resource strain: Not on file    Food insecurity:     Worry: Not on file     Inability: Not on file    Transportation needs:     Medical: Not on file     Non-medical: Not on file   Tobacco Use    Smoking status: Never Smoker   Substance and Sexual Activity    Alcohol use: No    Drug use: No    Sexual activity: Not on file   Lifestyle    Physical activity:     Days per week: Not on file     Minutes per session: Not on file    Stress: Not on file   Relationships    Social connections:     Talks on phone: Not on file     Gets together: Not on file     Attends Faith service: Not on file     Active member of club or organization: Not on file     Attends meetings of clubs or organizations: Not on file     Relationship status: Not on file   Other Topics Concern    Not on file   Social History Narrative    Not on file       Current Outpatient Medications   Medication Sig Dispense Refill    ACCU-CHEK SAMI CONTROL SOLN Soln       ACCU-CHEK SAMI PLUS METER Misc       ACCU-CHEK SAMI PLUS TEST STRP Strp       ACCU-CHEK SOFT DEV LANCETS Kit        ACCU-CHEK SOFTCLIX LANCETS Misc       apixaban 5 mg Tab Take 1 tablet (5 mg total) by mouth 2 (two) times daily. 60 tablet 5    diazePAM (VALIUM) 2 MG tablet       diltiaZEM (CARDIZEM CD) 120 MG Cp24       famotidine (PEPCID) 20 MG tablet       FLUZONE HIGH-DOSE 2018-19, PF, 180 mcg/0.5 mL vaccine       gabapentin (NEURONTIN) 400 MG capsule       glimepiride (AMARYL) 1 MG tablet       lisinopril (PRINIVIL,ZESTRIL) 5 MG tablet       metoprolol succinate (TOPROL-XL) 25 MG 24 hr tablet       nitrofurantoin, macrocrystal-monohydrate, (MACROBID) 100 MG capsule       omeprazole (PRILOSEC) 20 MG capsule Take 1 capsule (20 mg total) by mouth once daily. 30 capsule 3    pramipexole (MIRAPEX) 0.125 MG tablet        No current facility-administered medications for this visit.        Review of patient's allergies indicates:   Allergen Reactions    Codeine Nausea Only         Review of Systems   Constitution: Negative for chills, fever and malaise/fatigue.   Cardiovascular: Negative for chest pain, claudication and leg swelling.   Respiratory: Negative for cough and shortness of breath.    Skin: Positive for color change, dry skin, nail changes and poor wound healing.   Musculoskeletal: Negative for back pain, joint pain, muscle cramps and muscle weakness.   Gastrointestinal: Negative for nausea and vomiting.   Neurological: Positive for numbness and paresthesias. Negative for weakness.   Psychiatric/Behavioral: Negative for altered mental status.           Objective:      Physical Exam   Constitutional: She is oriented to person, place, and time. She appears well-developed and well-nourished. No distress.   Cardiovascular:   Pulses:       Dorsalis pedis pulses are 2+ on the right side, and 2+ on the left side.        Posterior tibial pulses are 2+ on the right side, and 2+ on the left side.   CFT< 3 secs all toes bilateral foot, skin temp warm bilateral foot, no digital hair growth bilateral foot, mild lower  extremity edema bilateral.     Musculoskeletal:   + equinus that reduces with knee bent bilateral.    No pain with ROM or MMT bilateral foot.    R foot:  Limited range of motion at the 1st MPJ    Plantar flexed first met head right foot.     Feet:   Right Foot:   Protective Sensation: 10 sites tested. 5 sites sensed.   Skin Integrity: Positive for ulcer, callus and dry skin. Negative for erythema.   Left Foot:   Protective Sensation: 10 sites tested. 5 sites sensed.   Skin Integrity: Positive for callus and dry skin. Negative for ulcer, blister, skin breakdown, erythema or warmth.   Neurological: She is alert and oriented to person, place, and time. She has normal strength. A sensory deficit is present.   Skin: Skin is dry and intact. Capillary refill takes less than 2 seconds. No ecchymosis and no rash noted. She is not diaphoretic. No cyanosis or erythema. No pallor. Nails show no clubbing.   Ulcer Location: distal right 3rd toe  Measurements: unable to measure pre-debridement due to overlying callus. Post debridement measures 0.2x0.2x0.0 cm  Base:  Mixed granular and epithelial with surrounding callus  Margins: hyperkeratotic  Erythema resolved right 3rd toe.  Does not probe or track or undermine.  .  Hyperkeratotic lesion sub 1st met head with limited skin breakdown.  No purulence, erythema, edema, or malodor                       Assessment:       Encounter Diagnoses   Name Primary?    Diabetic ulcer of toe of right foot associated with diabetes mellitus due to underlying condition, limited to breakdown of skin Yes    Diabetic polyneuropathy associated with type 2 diabetes mellitus          Plan:       Caro LUA was seen today for follow-up.    Diagnoses and all orders for this visit:    Diabetic ulcer of toe of right foot associated with diabetes mellitus due to underlying condition, limited to breakdown of skin  -     Wound Debridement    Diabetic polyneuropathy associated with type 2 diabetes  mellitus      I counseled the patient on her conditions, their implications and medical management.    Patient evaluated on Diabetic foot risk factors.  Patient counseled to do daily foot checks.  Counseled to wear accommodative shoe gear.  Educated on importance of glycemic control.    Debridement and dressing per attached note. Offload with darco shoe.    Rest and elevate.    Recommend crest pad for future.    RTC 2 weeks or prn.    Seen with Ava Colbert DPM PGY 3

## 2019-04-24 ENCOUNTER — HOSPITAL ENCOUNTER (EMERGENCY)
Facility: HOSPITAL | Age: 79
Discharge: HOME | End: 2019-04-24
Attending: EMERGENCY MEDICINE
Payer: MEDICARE

## 2019-04-24 ENCOUNTER — TELEPHONE (OUTPATIENT)
Dept: PODIATRY | Facility: CLINIC | Age: 79
End: 2019-04-24

## 2019-04-24 VITALS
OXYGEN SATURATION: 97 % | HEART RATE: 50 BPM | RESPIRATION RATE: 16 BRPM | SYSTOLIC BLOOD PRESSURE: 160 MMHG | DIASTOLIC BLOOD PRESSURE: 80 MMHG | HEIGHT: 62 IN | WEIGHT: 180 LBS | BODY MASS INDEX: 33.13 KG/M2 | TEMPERATURE: 99 F

## 2019-04-24 DIAGNOSIS — L03.90 CELLULITIS: Primary | ICD-10-CM

## 2019-04-24 LAB — POCT GLUCOSE: 85 MG/DL (ref 70–110)

## 2019-04-24 PROCEDURE — 99283 EMERGENCY DEPT VISIT LOW MDM: CPT | Mod: 25

## 2019-04-24 PROCEDURE — 82962 GLUCOSE BLOOD TEST: CPT

## 2019-04-24 PROCEDURE — 25000003 PHARM REV CODE 250: Performed by: EMERGENCY MEDICINE

## 2019-04-24 PROCEDURE — 90471 IMMUNIZATION ADMIN: CPT | Performed by: EMERGENCY MEDICINE

## 2019-04-24 PROCEDURE — 63600175 PHARM REV CODE 636 W HCPCS: Performed by: EMERGENCY MEDICINE

## 2019-04-24 PROCEDURE — 90715 TDAP VACCINE 7 YRS/> IM: CPT | Performed by: EMERGENCY MEDICINE

## 2019-04-24 RX ORDER — CLINDAMYCIN HYDROCHLORIDE 300 MG/1
300 CAPSULE ORAL 4 TIMES DAILY
Qty: 40 CAPSULE | Refills: 0 | Status: SHIPPED | OUTPATIENT
Start: 2019-04-24 | End: 2019-05-04

## 2019-04-24 RX ORDER — CLINDAMYCIN HYDROCHLORIDE 150 MG/1
300 CAPSULE ORAL
Status: COMPLETED | OUTPATIENT
Start: 2019-04-24 | End: 2019-04-24

## 2019-04-24 RX ORDER — CEPHALEXIN 500 MG/1
500 CAPSULE ORAL 4 TIMES DAILY
Status: ON HOLD | COMMUNITY
Start: 2019-04-21 | End: 2019-06-11 | Stop reason: HOSPADM

## 2019-04-24 RX ADMIN — CLOSTRIDIUM TETANI TOXOID ANTIGEN (FORMALDEHYDE INACTIVATED), CORYNEBACTERIUM DIPHTHERIAE TOXOID ANTIGEN (FORMALDEHYDE INACTIVATED), BORDETELLA PERTUSSIS TOXOID ANTIGEN (GLUTARALDEHYDE INACTIVATED), BORDETELLA PERTUSSIS FILAMENTOUS HEMAGGLUTININ ANTIGEN (FORMALDEHYDE INACTIVATED), BORDETELLA PERTUSSIS PERTACTIN ANTIGEN, AND BORDETELLA PERTUSSIS FIMBRIAE 2/3 ANTIGEN 0.5 ML: 5; 2; 2.5; 5; 3; 5 INJECTION, SUSPENSION INTRAMUSCULAR at 04:04

## 2019-04-24 RX ADMIN — CLINDAMYCIN HYDROCHLORIDE 300 MG: 150 CAPSULE ORAL at 05:04

## 2019-04-24 NOTE — TELEPHONE ENCOUNTER
----- Message from Conor Richardson sent at 4/24/2019  1:34 PM CDT -----  Contact: [phone]/self  Patient would like to speak with you about an infected toe.   Please call back to assist at [phone]

## 2019-04-24 NOTE — ED PROVIDER NOTES
Encounter Date: 4/24/2019    SCRIBE #1 NOTE: I, Mahnaz London, am scribing for, and in the presence of,  Dr. Cunningham. I have scribed the entire note.       History     Chief Complaint   Patient presents with    Wound Infection     c/o infection to right foot x4-5 days. Sees Dr. Jose     This is a 78 y.o. female with PMHx of Non-insulin depended diabetes mellitus and HTN who presents to the Emergency Department with chief complaint of infection to right foot for the last 4-5 days. She reports of having an open wound to her right middle toe since 3 days ago. She states she began taking Cephalexin 3 days ago with no relief of symptoms. She denies fever, nausea, vomiting, numbness or pain to BLE. She reports she has an appointment with her Podiatrist Dr. Jose on 4/29/19. The patient is unsure of last Tetanus vaccine    The history is provided by the patient.     Review of patient's allergies indicates:   Allergen Reactions    Codeine Nausea Only     Past Medical History:   Diagnosis Date    Calcium nephrolithiasis 2007    Diabetes mellitus, type 2     Diabetic peripheral neuropathy associated with type 2 diabetes mellitus     Hypertension      Past Surgical History:   Procedure Laterality Date    AMPUTATION-TOE Right 5/23/2017    [facility]    COLONOSCOPY  11/28/2011    sigmoid diverticulosis, external hemorrhoids    HYSTERECTOMY      SHOULDER SURGERY Left     TOE AMPUTATION Right 05/22/2017    5th toe     Family History   Problem Relation Age of Onset    Diabetes Mother     Heart failure Father     Kidney failure Brother      Social History     Tobacco Use    Smoking status: Never Smoker   Substance Use Topics    Alcohol use: No    Drug use: No     Review of Systems   Constitutional: Negative.    HENT: Negative.    Eyes: Negative.    Respiratory: Negative.    Cardiovascular: Negative.    Gastrointestinal: Negative.    Genitourinary: Negative.    Musculoskeletal:  Negative.    Skin: Positive for wound (right foot).   Neurological: Negative.    All other systems reviewed and are negative.      Physical Exam     Initial Vitals [04/24/19 1452]   BP Pulse Resp Temp SpO2   132/83 (!) 52 20 98.6 °F (37 °C) 96 %      MAP       --         Physical Exam    Nursing note and vitals reviewed.  Constitutional: She appears well-developed and well-nourished.   HENT:   Head: Normocephalic and atraumatic.   Eyes: EOM are normal. Pupils are equal, round, and reactive to light.   Neck: Neck supple.   Cardiovascular: Normal rate, regular rhythm and normal heart sounds.   No murmur heard.  Pulmonary/Chest: Breath sounds normal. No respiratory distress.   Abdominal: Soft. There is no tenderness.   Musculoskeletal: Normal range of motion. She exhibits no edema or tenderness.   Neurological: She is alert and oriented to person, place, and time. She has normal strength.   Skin: Skin is warm and dry. Capillary refill takes less than 2 seconds. There is erythema.   Right middle toes there is a partial thickness ulceration at the tip, pink skin underneath  Erythema that is circumferential to distal aspect of right toe to middle aspect of toe  No red streaks   Psychiatric: She has a normal mood and affect. Her behavior is normal. Thought content normal.         ED Course   Procedures  Labs Reviewed   POCT GLUCOSE   POCT GLUCOSE MONITORING CONTINUOUS          Imaging Results          X-Ray Foot Complete Right (Final result)  Result time 04/24/19 17:14:33    Final result by Leland Hampton MD (04/24/19 17:14:33)                 Impression:      As above.      Electronically signed by: Leland Hampton MD  Date:    04/24/2019  Time:    17:14             Narrative:    EXAMINATION:  XR FOOT COMPLETE 3 VIEW RIGHT    CLINICAL HISTORY:  . Cellulitis, unspecified    TECHNIQUE:  AP, lateral, and oblique views of the right foot were performed.    FINDINGS:  The 5th toe is absent.  There is osseous spur at the Achilles  and plantar fascia attachment to the calcaneus.  There is no acute fracture or dislocation.  There is degenerative change throughout the midfoot.  Infection is not excluded.                                 Medical Decision Making:   Clinical Tests:   Lab Tests: Ordered and Reviewed  Radiological Study: Ordered and Reviewed  ED Management:  5:31 PM No signs of bony infection      medical decision making patient with recurrent cellulitis to the right middle toe discharged home on clindamycin will continue the Keflex that she is taking and will follow up with Podiatry as scheduled.  No evidence of osteomyelitis or gas forming cutaneous infection on plain film.                 Clinical Impression:       ICD-10-CM ICD-9-CM   1. Cellulitis L03.90 682.9        I, Dr. Bhaskar Cunningham, personally performed the services described in this documentation.   All medical record entries made by the scribe were at my direction and in my presence.   I have reviewed the chart and agree that the record is accurate and complete.   Bhaskar Cunningham MD.  5:33 PM 04/24/2019                    Bhaskar Cunningham MD  04/24/19 173

## 2019-04-24 NOTE — PROGRESS NOTES
"Vancomycin Dosing and Monitoring Pharmacy Protocol    Caro Powell is a 78 y.o. female    Height: 5' 2" (1.575 m)   Wt Readings from Last 3 Encounters:   19 81.6 kg (180 lb)   04/15/19 85.3 kg (188 lb)   19 85.3 kg (188 lb)       Temp Readings from Last 3 Encounters:   19 98.6 °F (37 °C) (Oral)   17 97.8 °F (36.6 °C) (Oral)   17 98.1 °F (36.7 °C)      Lab Results   Component Value Date/Time    WBC 9.51 2017 04:53 AM    WBC 10.63 2017 05:54 AM    WBC 14.12 (H) 2017 03:51 AM      Lab Results   Component Value Date/Time    CREATININE 1.0 2017 03:51 AM    CREATININE 1.2 2017 03:35 PM    CREATININE 1.1 2017 04:58 AM    No results found for: LACTATE    Creatinine clearance cannot be calculated (Patient's most recent lab result is older than the maximum 7 days allowed.)    Antibiotics (From admission, onward)      Start     Stop Route Frequency Ordered    19 1615  vancomycin (VANCOCIN) 2,500 mg in dextrose 5 % 500 mL IVPB       0429 IV ED 1 Time 19 1615          Antifungals (From admission, onward)      None            Microbiology Results (last 7 days)       ** No results found for the last 168 hours. **            Indication/Target trough:   Bacteremia (Target trough: 15-20mcg/ml) and Skin and Soft Tissue (Target trough: 10-15 mcg/ml)     Hemodialysis:   N/A    Dosing Weight:   Wt Readings from Last 1 Encounters:   19 81.6 kg (180 lb)       Last Vancomycin dose: N/A   Date/Time given: N/A         Vancomycin level:  No results for input(s): VANCOMYCIN-TROUGH in the last 72 hours.  No results for input(s): VANCOMYCIN, RANDOM in the last 72 hours.    Per Protocol Initial/Adjustments Dosin. Initial/Adjustment Dose: LOADING DOSE Vancomycin will be adjusted from 2000mg IVPB x 1 to Vancomycin 2500mg IVPB x 1.  2. Vancomycin N/A     Pharmacy will continue to follow.    Please contact if you have any further questions. Thank " you.    Fabienne Benitez, PharmD  [phone]    CT Angio head 8/6-CT brain: No acute intracranial hemorrhage, mass effect, vasogenic edema,   or evidence of acute territorial infarct. 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 Right frontotemporal encephalomalacia and gliosis. 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 The dome measures 7 to 8 mm in size. The neck measures 2 mm in size. 2 mm focus of flow related enhancement projecting inferiorly from the right perisylvian aneurysm clip. Streak artifact from the aneurysm clip limits evaluation of this structure. Differential diagnosis includes a vascular loop and saccular aneurysm. Correlation with prior studies, if available, is recommended. No flow-limiting stenosis.

## 2019-04-24 NOTE — DISCHARGE INSTRUCTIONS
Take the clindamycin as prescribed follow up with her podiatrist as scheduled.  I will send him a note as well to let him know that your here.

## 2019-04-24 NOTE — TELEPHONE ENCOUNTER
Spoke with pt and she stated that her toe is red and infected and she cant wait till Monday to see Dr. Jose. So I advised pt she can go to the ED or urgent care.the patient stated she did not want to go to either one. She thought a doctor was here, I did advised her that one is here but he has emergency surgery

## 2019-04-24 NOTE — ED NOTES
"Patient offered a gown at this time and asked to changed patient states " I do not need a gown I am just here for my foot."   "

## 2019-04-24 NOTE — ED NOTES
"Patient states she is a patient of Dr. Jose. States she has been battling with this blister for 4-5 days. Patient states she called office they state dr. Jose is out of town and coty was in sx come to ED to get checked out. Patient noted to have an open wound to Third toe tip on R foot and a blister noted to top of toe. Patient states " I started taking antibiotics on easter Sunday but it is not getting any better. I wear the diabetic shoes like I am suppose to but I don't understand why this keeps happening. It is always my right foot though.My  has been changing the dressings like they told us but the last time it was changed was when the skin came off." Patient noted to have amputation of pinky toe to R foot. States she is a controlled diabetic non insulin dependent. She is taking Cephalexin 500mg I tablet four times a day.   "

## 2019-05-08 ENCOUNTER — TELEPHONE (OUTPATIENT)
Dept: PODIATRY | Facility: CLINIC | Age: 79
End: 2019-05-08

## 2019-05-08 NOTE — TELEPHONE ENCOUNTER
----- Message from Yessica Morris sent at 5/8/2019  1:37 PM CDT -----  Contact: [phone]/self  Patient is requesting a call back regarding completing the form sent to office for her diabetic shoes. Thanks

## 2019-06-07 ENCOUNTER — TELEPHONE (OUTPATIENT)
Dept: PODIATRY | Facility: CLINIC | Age: 79
End: 2019-06-07

## 2019-06-07 NOTE — TELEPHONE ENCOUNTER
Spoke with pt she stated that right foot is red and swollen and she having pain. Advised pt to go to ED or urgent care    Pt would like to know can you send in antibiotics to [facility] [address]

## 2019-06-07 NOTE — TELEPHONE ENCOUNTER
----- Message from Sherri Sosa sent at 6/7/2019 12:04 PM CDT -----  Contact: [phone]/self  Patient requesting to speak with you concerning an infected toe on her right foot. She is a diabetic. Please advise.

## 2019-06-08 ENCOUNTER — HOSPITAL ENCOUNTER (INPATIENT)
Facility: HOSPITAL | Age: 79
LOS: 4 days | Discharge: HOME | DRG: 854 | End: 2019-06-12
Attending: EMERGENCY MEDICINE | Admitting: INTERNAL MEDICINE
Payer: MEDICARE

## 2019-06-08 DIAGNOSIS — A41.9 SEPSIS, DUE TO UNSPECIFIED ORGANISM: Primary | ICD-10-CM

## 2019-06-08 DIAGNOSIS — E08.621 DIABETIC ULCER OF OTHER PART OF RIGHT FOOT ASSOCIATED WITH DIABETES MELLITUS DUE TO UNDERLYING CONDITION, WITH FAT LAYER EXPOSED: ICD-10-CM

## 2019-06-08 DIAGNOSIS — E11.621 DIABETIC ULCER OF TOE OF RIGHT FOOT ASSOCIATED WITH TYPE 2 DIABETES MELLITUS, WITH FAT LAYER EXPOSED: ICD-10-CM

## 2019-06-08 DIAGNOSIS — L97.509 DIABETIC FOOT ULCER: ICD-10-CM

## 2019-06-08 DIAGNOSIS — L97.512 DIABETIC ULCER OF TOE OF RIGHT FOOT ASSOCIATED WITH TYPE 2 DIABETES MELLITUS, WITH FAT LAYER EXPOSED: ICD-10-CM

## 2019-06-08 DIAGNOSIS — E11.621 DIABETIC FOOT ULCER: ICD-10-CM

## 2019-06-08 DIAGNOSIS — I10 ESSENTIAL HYPERTENSION: Chronic | ICD-10-CM

## 2019-06-08 DIAGNOSIS — E11.40 TYPE 2 DIABETES MELLITUS WITH DIABETIC NEUROPATHY, WITHOUT LONG-TERM CURRENT USE OF INSULIN: Chronic | ICD-10-CM

## 2019-06-08 DIAGNOSIS — N30.00 ACUTE CYSTITIS WITHOUT HEMATURIA: ICD-10-CM

## 2019-06-08 DIAGNOSIS — L97.512 DIABETIC ULCER OF OTHER PART OF RIGHT FOOT ASSOCIATED WITH DIABETES MELLITUS DUE TO UNDERLYING CONDITION, WITH FAT LAYER EXPOSED: ICD-10-CM

## 2019-06-08 LAB
ALBUMIN SERPL BCP-MCNC: 3.6 G/DL (ref 3.5–5.2)
ALP SERPL-CCNC: 86 U/L (ref 55–135)
ALT SERPL W/O P-5'-P-CCNC: 37 U/L (ref 10–44)
ANION GAP SERPL CALC-SCNC: 10 MMOL/L (ref 8–16)
AST SERPL-CCNC: 36 U/L (ref 10–40)
BASOPHILS # BLD AUTO: 0.01 K/UL (ref 0–0.2)
BASOPHILS NFR BLD: 0.1 % (ref 0–1.9)
BILIRUB SERPL-MCNC: 0.4 MG/DL (ref 0.1–1)
BUN SERPL-MCNC: 37 MG/DL (ref 8–23)
CALCIUM SERPL-MCNC: 10 MG/DL (ref 8.7–10.5)
CHLORIDE SERPL-SCNC: 103 MMOL/L (ref 95–110)
CO2 SERPL-SCNC: 22 MMOL/L (ref 23–29)
CREAT SERPL-MCNC: 1.5 MG/DL (ref 0.5–1.4)
CRP SERPL-MCNC: 115.5 MG/L (ref 0–8.2)
DIFFERENTIAL METHOD: ABNORMAL
EOSINOPHIL # BLD AUTO: 0.1 K/UL (ref 0–0.5)
EOSINOPHIL NFR BLD: 0.7 % (ref 0–8)
ERYTHROCYTE [DISTWIDTH] IN BLOOD BY AUTOMATED COUNT: 13.6 % (ref 11.5–14.5)
EST. GFR  (AFRICAN AMERICAN): 38 ML/MIN/1.73 M^2
EST. GFR  (NON AFRICAN AMERICAN): 33 ML/MIN/1.73 M^2
GLUCOSE SERPL-MCNC: 165 MG/DL (ref 70–110)
HCT VFR BLD AUTO: 36.8 % (ref 37–48.5)
HGB BLD-MCNC: 12.2 G/DL (ref 12–16)
LYMPHOCYTES # BLD AUTO: 1.7 K/UL (ref 1–4.8)
LYMPHOCYTES NFR BLD: 10.3 % (ref 18–48)
MCH RBC QN AUTO: 29.5 PG (ref 27–31)
MCHC RBC AUTO-ENTMCNC: 33.2 G/DL (ref 32–36)
MCV RBC AUTO: 89 FL (ref 82–98)
MONOCYTES # BLD AUTO: 1 K/UL (ref 0.3–1)
MONOCYTES NFR BLD: 6.2 % (ref 4–15)
NEUTROPHILS # BLD AUTO: 13.8 K/UL (ref 1.8–7.7)
NEUTROPHILS NFR BLD: 82.3 % (ref 38–73)
PLATELET # BLD AUTO: 255 K/UL (ref 150–350)
PMV BLD AUTO: 9.7 FL (ref 9.2–12.9)
POCT GLUCOSE: 183 MG/DL (ref 70–110)
POTASSIUM SERPL-SCNC: 4.9 MMOL/L (ref 3.5–5.1)
PROT SERPL-MCNC: 7.4 G/DL (ref 6–8.4)
RBC # BLD AUTO: 4.14 M/UL (ref 4–5.4)
SODIUM SERPL-SCNC: 135 MMOL/L (ref 136–145)
WBC # BLD AUTO: 16.76 K/UL (ref 3.9–12.7)

## 2019-06-08 PROCEDURE — 96366 THER/PROPH/DIAG IV INF ADDON: CPT

## 2019-06-08 PROCEDURE — 81000 URINALYSIS NONAUTO W/SCOPE: CPT

## 2019-06-08 PROCEDURE — 93010 ELECTROCARDIOGRAM REPORT: CPT | Mod: ,,, | Performed by: INTERNAL MEDICINE

## 2019-06-08 PROCEDURE — 87186 SC STD MICRODIL/AGAR DIL: CPT

## 2019-06-08 PROCEDURE — 87088 URINE BACTERIA CULTURE: CPT

## 2019-06-08 PROCEDURE — 96361 HYDRATE IV INFUSION ADD-ON: CPT

## 2019-06-08 PROCEDURE — 85652 RBC SED RATE AUTOMATED: CPT

## 2019-06-08 PROCEDURE — 87086 URINE CULTURE/COLONY COUNT: CPT

## 2019-06-08 PROCEDURE — 25000003 PHARM REV CODE 250: Performed by: EMERGENCY MEDICINE

## 2019-06-08 PROCEDURE — 12000002 HC ACUTE/MED SURGE SEMI-PRIVATE ROOM

## 2019-06-08 PROCEDURE — 93005 ELECTROCARDIOGRAM TRACING: CPT

## 2019-06-08 PROCEDURE — 96365 THER/PROPH/DIAG IV INF INIT: CPT

## 2019-06-08 PROCEDURE — 87040 BLOOD CULTURE FOR BACTERIA: CPT

## 2019-06-08 PROCEDURE — 93010 EKG 12-LEAD: ICD-10-PCS | Mod: ,,, | Performed by: INTERNAL MEDICINE

## 2019-06-08 PROCEDURE — 96375 TX/PRO/DX INJ NEW DRUG ADDON: CPT

## 2019-06-08 PROCEDURE — 86140 C-REACTIVE PROTEIN: CPT

## 2019-06-08 PROCEDURE — 63600175 PHARM REV CODE 636 W HCPCS: Performed by: EMERGENCY MEDICINE

## 2019-06-08 PROCEDURE — 83605 ASSAY OF LACTIC ACID: CPT

## 2019-06-08 PROCEDURE — 99284 EMERGENCY DEPT VISIT MOD MDM: CPT | Mod: 25

## 2019-06-08 PROCEDURE — 84145 PROCALCITONIN (PCT): CPT

## 2019-06-08 PROCEDURE — 96367 TX/PROPH/DG ADDL SEQ IV INF: CPT

## 2019-06-08 PROCEDURE — 87077 CULTURE AEROBIC IDENTIFY: CPT

## 2019-06-08 PROCEDURE — 85025 COMPLETE CBC W/AUTO DIFF WBC: CPT

## 2019-06-08 PROCEDURE — 80053 COMPREHEN METABOLIC PANEL: CPT

## 2019-06-08 RX ORDER — HALOPERIDOL 5 MG/ML
5 INJECTION INTRAMUSCULAR
Status: COMPLETED | OUTPATIENT
Start: 2019-06-08 | End: 2019-06-08

## 2019-06-08 RX ORDER — ACETAMINOPHEN 325 MG/1
650 TABLET ORAL
Status: COMPLETED | OUTPATIENT
Start: 2019-06-08 | End: 2019-06-08

## 2019-06-08 RX ORDER — MORPHINE SULFATE 4 MG/ML
4 INJECTION, SOLUTION INTRAMUSCULAR; INTRAVENOUS
Status: COMPLETED | OUTPATIENT
Start: 2019-06-08 | End: 2019-06-08

## 2019-06-08 RX ADMIN — HALOPERIDOL LACTATE 5 MG: 5 INJECTION, SOLUTION INTRAMUSCULAR at 10:06

## 2019-06-08 RX ADMIN — VANCOMYCIN HYDROCHLORIDE 2250 MG: 100 INJECTION, POWDER, LYOPHILIZED, FOR SOLUTION INTRAVENOUS at 11:06

## 2019-06-08 RX ADMIN — ACETAMINOPHEN 650 MG: 325 TABLET ORAL at 10:06

## 2019-06-08 RX ADMIN — SODIUM CHLORIDE 1000 ML: 0.9 INJECTION, SOLUTION INTRAVENOUS at 10:06

## 2019-06-08 RX ADMIN — CEFTRIAXONE 2 G: 2 INJECTION, SOLUTION INTRAVENOUS at 10:06

## 2019-06-08 RX ADMIN — MORPHINE SULFATE 4 MG: 4 INJECTION INTRAVENOUS at 10:06

## 2019-06-09 PROBLEM — L97.509 DIABETIC FOOT ULCER: Status: ACTIVE | Noted: 2019-06-09

## 2019-06-09 PROBLEM — E11.621 DIABETIC FOOT ULCER: Status: ACTIVE | Noted: 2019-06-09

## 2019-06-09 LAB
ALBUMIN SERPL BCP-MCNC: 2.9 G/DL (ref 3.5–5.2)
ALP SERPL-CCNC: 70 U/L (ref 55–135)
ALT SERPL W/O P-5'-P-CCNC: 36 U/L (ref 10–44)
ANION GAP SERPL CALC-SCNC: 8 MMOL/L (ref 8–16)
AST SERPL-CCNC: 33 U/L (ref 10–40)
BACTERIA #/AREA URNS HPF: ABNORMAL /HPF
BASOPHILS # BLD AUTO: 0.02 K/UL (ref 0–0.2)
BASOPHILS NFR BLD: 0.1 % (ref 0–1.9)
BILIRUB SERPL-MCNC: 0.2 MG/DL (ref 0.1–1)
BILIRUB UR QL STRIP: NEGATIVE
BILIRUB UR QL STRIP: NEGATIVE
BUN SERPL-MCNC: 33 MG/DL (ref 8–23)
CALCIUM SERPL-MCNC: 8.5 MG/DL (ref 8.7–10.5)
CHLORIDE SERPL-SCNC: 108 MMOL/L (ref 95–110)
CLARITY UR: CLEAR
CLARITY UR: CLEAR
CO2 SERPL-SCNC: 19 MMOL/L (ref 23–29)
COLOR UR: YELLOW
COLOR UR: YELLOW
CREAT SERPL-MCNC: 1.3 MG/DL (ref 0.5–1.4)
CREAT UR-MCNC: 30.9 MG/DL (ref 15–325)
DIFFERENTIAL METHOD: ABNORMAL
EOSINOPHIL # BLD AUTO: 0.1 K/UL (ref 0–0.5)
EOSINOPHIL NFR BLD: 0.6 % (ref 0–8)
ERYTHROCYTE [DISTWIDTH] IN BLOOD BY AUTOMATED COUNT: 13.9 % (ref 11.5–14.5)
ERYTHROCYTE [SEDIMENTATION RATE] IN BLOOD BY WESTERGREN METHOD: 87 MM/HR (ref 0–20)
EST. GFR  (AFRICAN AMERICAN): 45 ML/MIN/1.73 M^2
EST. GFR  (NON AFRICAN AMERICAN): 39 ML/MIN/1.73 M^2
ESTIMATED AVG GLUCOSE: 143 MG/DL (ref 68–131)
ESTIMATED AVG GLUCOSE: 143 MG/DL (ref 68–131)
FERRITIN SERPL-MCNC: 286 NG/ML (ref 20–300)
FOLATE SERPL-MCNC: 15.7 NG/ML (ref 4–24)
GLUCOSE SERPL-MCNC: 164 MG/DL (ref 70–110)
GLUCOSE UR QL STRIP: NEGATIVE
GLUCOSE UR QL STRIP: NEGATIVE
HBA1C MFR BLD HPLC: 6.6 % (ref 4–5.6)
HBA1C MFR BLD HPLC: 6.6 % (ref 4–5.6)
HCT VFR BLD AUTO: 31.1 % (ref 37–48.5)
HGB BLD-MCNC: 10.4 G/DL (ref 12–16)
HGB UR QL STRIP: ABNORMAL
HGB UR QL STRIP: NEGATIVE
IRON SERPL-MCNC: 18 UG/DL (ref 30–160)
KETONES UR QL STRIP: NEGATIVE
KETONES UR QL STRIP: NEGATIVE
LACTATE SERPL-SCNC: 0.6 MMOL/L (ref 0.5–2.2)
LACTATE SERPL-SCNC: 0.8 MMOL/L (ref 0.5–2.2)
LEUKOCYTE ESTERASE UR QL STRIP: ABNORMAL
LEUKOCYTE ESTERASE UR QL STRIP: ABNORMAL
LYMPHOCYTES # BLD AUTO: 1.8 K/UL (ref 1–4.8)
LYMPHOCYTES NFR BLD: 12 % (ref 18–48)
MCH RBC QN AUTO: 29.6 PG (ref 27–31)
MCHC RBC AUTO-ENTMCNC: 33.4 G/DL (ref 32–36)
MCV RBC AUTO: 89 FL (ref 82–98)
MICROSCOPIC COMMENT: ABNORMAL
MICROSCOPIC COMMENT: ABNORMAL
MONOCYTES # BLD AUTO: 1.2 K/UL (ref 0.3–1)
MONOCYTES NFR BLD: 7.7 % (ref 4–15)
NEUTROPHILS # BLD AUTO: 12 K/UL (ref 1.8–7.7)
NEUTROPHILS NFR BLD: 79.6 % (ref 38–73)
NITRITE UR QL STRIP: NEGATIVE
NITRITE UR QL STRIP: POSITIVE
PH UR STRIP: 6 [PH] (ref 5–8)
PH UR STRIP: 6 [PH] (ref 5–8)
PLATELET # BLD AUTO: 226 K/UL (ref 150–350)
PMV BLD AUTO: 10 FL (ref 9.2–12.9)
POCT GLUCOSE: 171 MG/DL (ref 70–110)
POCT GLUCOSE: 195 MG/DL (ref 70–110)
POCT GLUCOSE: 198 MG/DL (ref 70–110)
POTASSIUM SERPL-SCNC: 4.3 MMOL/L (ref 3.5–5.1)
PROCALCITONIN SERPL IA-MCNC: 0.18 NG/ML
PROT SERPL-MCNC: 6.2 G/DL (ref 6–8.4)
PROT UR QL STRIP: ABNORMAL
PROT UR QL STRIP: NEGATIVE
RBC # BLD AUTO: 3.51 M/UL (ref 4–5.4)
RBC #/AREA URNS HPF: 5 /HPF (ref 0–4)
SATURATED IRON: 5 % (ref 20–50)
SODIUM SERPL-SCNC: 135 MMOL/L (ref 136–145)
SODIUM UR-SCNC: 93 MMOL/L (ref 20–250)
SP GR UR STRIP: 1.01 (ref 1–1.03)
SP GR UR STRIP: 1.02 (ref 1–1.03)
TOTAL IRON BINDING CAPACITY: 349 UG/DL (ref 250–450)
TRANSFERRIN SERPL-MCNC: 236 MG/DL (ref 200–375)
URN SPEC COLLECT METH UR: ABNORMAL
URN SPEC COLLECT METH UR: ABNORMAL
UROBILINOGEN UR STRIP-ACNC: NEGATIVE EU/DL
UROBILINOGEN UR STRIP-ACNC: NEGATIVE EU/DL
UUN UR-MCNC: 418 MG/DL (ref 140–1050)
VIT B12 SERPL-MCNC: 578 PG/ML (ref 210–950)
WBC # BLD AUTO: 15.06 K/UL (ref 3.9–12.7)
WBC #/AREA URNS HPF: 15 /HPF (ref 0–5)
WBC #/AREA URNS HPF: 50 /HPF (ref 0–5)

## 2019-06-09 PROCEDURE — 88311 DECALCIFY TISSUE: CPT | Mod: 26,,, | Performed by: PATHOLOGY

## 2019-06-09 PROCEDURE — 82746 ASSAY OF FOLIC ACID SERUM: CPT

## 2019-06-09 PROCEDURE — 88311 TISSUE SPECIMEN TO PATHOLOGY - SURGERY: ICD-10-PCS | Mod: 26,,, | Performed by: PATHOLOGY

## 2019-06-09 PROCEDURE — 83605 ASSAY OF LACTIC ACID: CPT

## 2019-06-09 PROCEDURE — 25000003 PHARM REV CODE 250: Performed by: STUDENT IN AN ORGANIZED HEALTH CARE EDUCATION/TRAINING PROGRAM

## 2019-06-09 PROCEDURE — 83036 HEMOGLOBIN GLYCOSYLATED A1C: CPT

## 2019-06-09 PROCEDURE — 99222 1ST HOSP IP/OBS MODERATE 55: CPT | Mod: 25,,, | Performed by: PODIATRIST

## 2019-06-09 PROCEDURE — 63600175 PHARM REV CODE 636 W HCPCS: Performed by: STUDENT IN AN ORGANIZED HEALTH CARE EDUCATION/TRAINING PROGRAM

## 2019-06-09 PROCEDURE — 36415 COLL VENOUS BLD VENIPUNCTURE: CPT

## 2019-06-09 PROCEDURE — 87086 URINE CULTURE/COLONY COUNT: CPT

## 2019-06-09 PROCEDURE — 87075 CULTR BACTERIA EXCEPT BLOOD: CPT

## 2019-06-09 PROCEDURE — 82728 ASSAY OF FERRITIN: CPT

## 2019-06-09 PROCEDURE — 88305 TISSUE EXAM BY PATHOLOGIST: CPT | Mod: 26,,, | Performed by: PATHOLOGY

## 2019-06-09 PROCEDURE — 87070 CULTURE OTHR SPECIMN AEROBIC: CPT

## 2019-06-09 PROCEDURE — 82607 VITAMIN B-12: CPT

## 2019-06-09 PROCEDURE — 11000001 HC ACUTE MED/SURG PRIVATE ROOM

## 2019-06-09 PROCEDURE — 83540 ASSAY OF IRON: CPT

## 2019-06-09 PROCEDURE — 85025 COMPLETE CBC W/AUTO DIFF WBC: CPT

## 2019-06-09 PROCEDURE — 20220 PR BONE BIOPSY,TROCAR/NEEDLE SUPERF: ICD-10-PCS | Mod: ,,, | Performed by: PODIATRIST

## 2019-06-09 PROCEDURE — S0030 INJECTION, METRONIDAZOLE: HCPCS | Performed by: STUDENT IN AN ORGANIZED HEALTH CARE EDUCATION/TRAINING PROGRAM

## 2019-06-09 PROCEDURE — 94761 N-INVAS EAR/PLS OXIMETRY MLT: CPT

## 2019-06-09 PROCEDURE — 99222 PR INITIAL HOSPITAL CARE,LEVL II: ICD-10-PCS | Mod: 25,,, | Performed by: PODIATRIST

## 2019-06-09 PROCEDURE — 84540 ASSAY OF URINE/UREA-N: CPT

## 2019-06-09 PROCEDURE — 88305 TISSUE EXAM BY PATHOLOGIST: CPT | Performed by: PATHOLOGY

## 2019-06-09 PROCEDURE — 20220 BONE BIOPSY TROCAR/NDL SUPFC: CPT | Mod: ,,, | Performed by: PODIATRIST

## 2019-06-09 PROCEDURE — 81000 URINALYSIS NONAUTO W/SCOPE: CPT

## 2019-06-09 PROCEDURE — 84300 ASSAY OF URINE SODIUM: CPT

## 2019-06-09 PROCEDURE — 87077 CULTURE AEROBIC IDENTIFY: CPT

## 2019-06-09 PROCEDURE — 82570 ASSAY OF URINE CREATININE: CPT

## 2019-06-09 PROCEDURE — 87186 SC STD MICRODIL/AGAR DIL: CPT

## 2019-06-09 PROCEDURE — 88305 TISSUE SPECIMEN TO PATHOLOGY - SURGERY: ICD-10-PCS | Mod: 26,,, | Performed by: PATHOLOGY

## 2019-06-09 PROCEDURE — 25000003 PHARM REV CODE 250: Performed by: EMERGENCY MEDICINE

## 2019-06-09 PROCEDURE — 80053 COMPREHEN METABOLIC PANEL: CPT

## 2019-06-09 RX ORDER — METRONIDAZOLE 500 MG/100ML
500 INJECTION, SOLUTION INTRAVENOUS
Status: DISCONTINUED | OUTPATIENT
Start: 2019-06-09 | End: 2019-06-11

## 2019-06-09 RX ORDER — INSULIN ASPART 100 [IU]/ML
0-5 INJECTION, SOLUTION INTRAVENOUS; SUBCUTANEOUS
Status: DISCONTINUED | OUTPATIENT
Start: 2019-06-09 | End: 2019-06-12 | Stop reason: HOSPADM

## 2019-06-09 RX ORDER — VANCOMYCIN HCL IN 5 % DEXTROSE 1G/250ML
1000 PLASTIC BAG, INJECTION (ML) INTRAVENOUS
Status: DISCONTINUED | OUTPATIENT
Start: 2019-06-09 | End: 2019-06-10

## 2019-06-09 RX ORDER — GABAPENTIN 100 MG/1
400 CAPSULE ORAL 2 TIMES DAILY
Status: DISCONTINUED | OUTPATIENT
Start: 2019-06-09 | End: 2019-06-12 | Stop reason: HOSPADM

## 2019-06-09 RX ORDER — LISINOPRIL 5 MG/1
5 TABLET ORAL DAILY
Status: DISCONTINUED | OUTPATIENT
Start: 2019-06-09 | End: 2019-06-10

## 2019-06-09 RX ORDER — IBUPROFEN 200 MG
24 TABLET ORAL
Status: DISCONTINUED | OUTPATIENT
Start: 2019-06-09 | End: 2019-06-12 | Stop reason: HOSPADM

## 2019-06-09 RX ORDER — SODIUM CHLORIDE 0.9 % (FLUSH) 0.9 %
10 SYRINGE (ML) INJECTION
Status: DISCONTINUED | OUTPATIENT
Start: 2019-06-09 | End: 2019-06-12 | Stop reason: HOSPADM

## 2019-06-09 RX ORDER — METOPROLOL SUCCINATE 25 MG/1
25 TABLET, EXTENDED RELEASE ORAL DAILY
Status: DISCONTINUED | OUTPATIENT
Start: 2019-06-09 | End: 2019-06-09

## 2019-06-09 RX ORDER — GLUCAGON 1 MG
1 KIT INJECTION
Status: DISCONTINUED | OUTPATIENT
Start: 2019-06-09 | End: 2019-06-12 | Stop reason: HOSPADM

## 2019-06-09 RX ORDER — CEFEPIME HYDROCHLORIDE 2 G/50ML
2 INJECTION, SOLUTION INTRAVENOUS EVERY 24 HOURS
Status: DISCONTINUED | OUTPATIENT
Start: 2019-06-09 | End: 2019-06-10

## 2019-06-09 RX ORDER — LIDOCAINE HYDROCHLORIDE 10 MG/ML
10 INJECTION INFILTRATION; PERINEURAL ONCE
Status: COMPLETED | OUTPATIENT
Start: 2019-06-09 | End: 2019-06-09

## 2019-06-09 RX ORDER — METOPROLOL SUCCINATE 25 MG/1
25 TABLET, EXTENDED RELEASE ORAL NIGHTLY
Status: DISCONTINUED | OUTPATIENT
Start: 2019-06-09 | End: 2019-06-10

## 2019-06-09 RX ORDER — IBUPROFEN 200 MG
16 TABLET ORAL
Status: DISCONTINUED | OUTPATIENT
Start: 2019-06-09 | End: 2019-06-12 | Stop reason: HOSPADM

## 2019-06-09 RX ORDER — PRAMIPEXOLE DIHYDROCHLORIDE 0.12 MG/1
0.25 TABLET ORAL NIGHTLY
Status: DISCONTINUED | OUTPATIENT
Start: 2019-06-09 | End: 2019-06-12 | Stop reason: HOSPADM

## 2019-06-09 RX ADMIN — PRAMIPEXOLE DIHYDROCHLORIDE 0.25 MG: 0.12 TABLET ORAL at 09:06

## 2019-06-09 RX ADMIN — LIDOCAINE HYDROCHLORIDE 10 ML: 10 INJECTION, SOLUTION INFILTRATION; PERINEURAL at 09:06

## 2019-06-09 RX ADMIN — LISINOPRIL 5 MG: 5 TABLET ORAL at 09:06

## 2019-06-09 RX ADMIN — METOPROLOL SUCCINATE 25 MG: 25 TABLET, EXTENDED RELEASE ORAL at 09:06

## 2019-06-09 RX ADMIN — CEFEPIME HYDROCHLORIDE 2 G: 2 INJECTION, SOLUTION INTRAVENOUS at 03:06

## 2019-06-09 RX ADMIN — GABAPENTIN 400 MG: 100 CAPSULE ORAL at 09:06

## 2019-06-09 RX ADMIN — SODIUM CHLORIDE 1000 ML: 0.9 INJECTION, SOLUTION INTRAVENOUS at 12:06

## 2019-06-09 RX ADMIN — APIXABAN 5 MG: 5 TABLET, FILM COATED ORAL at 09:06

## 2019-06-09 RX ADMIN — METRONIDAZOLE 500 MG: 500 INJECTION, SOLUTION INTRAVENOUS at 09:06

## 2019-06-09 RX ADMIN — METRONIDAZOLE 500 MG: 500 INJECTION, SOLUTION INTRAVENOUS at 02:06

## 2019-06-09 NOTE — H&P
"[facility] Medicine H&P Note     Admitting Team: [facility] Hospitalist Team B  Attending Physician: Dr. Herring  Resident: Jaquelin Nava  Intern: Topher Sanders    Date of Admit: 6/8/2019    Chief Complaint     Foot Pain (right foot pain X"a couple of days", reports hx of DM, ems reports sore to right great toe and "popped blister" to right 4th toe. reports sees a podiatrist. )   for 3 days    Subjective:      History of Present Illness:  Caro Powell is a 78 y.o.  female who  has a past medical history of Calcium nephrolithiasis (2007), Diabetes mellitus, type 2, Diabetic peripheral neuropathy associated with type 2 diabetes mellitus, and Hypertension.. The patient presented to [facility] on 6/8/2019 with a primary complaint of Foot Pain (right foot pain X"a couple of days", reports hx of DM, ems reports sore to right great toe and "popped blister" to right 4th toe. reports sees a podiatrist. )    History was limited as patient was sleepy during the exam 2/2 receiving pain meds. The patient was in their usual state of health until about 3 days ago when patient noted that her R third toe began to swell, become red, hot and painful.  Patient states that she had an appointment with the podiatrist on Monday however the pain was so unbearable that she wanted to have it evaluated.  Patient also endorses 1 day of subjective fever and chills.      Patient denies nausea,vomiting, diarrhea, constipation.     Past Medical History:  Past Medical History:   Diagnosis Date    Calcium nephrolithiasis 2007    Diabetes mellitus, type 2     Diabetic peripheral neuropathy associated with type 2 diabetes mellitus     Hypertension        Past Surgical History:  Past Surgical History:   Procedure Laterality Date    AMPUTATION-TOE Right 5/23/2017    [facility]    COLONOSCOPY  11/28/2011    sigmoid diverticulosis, external hemorrhoids    HYSTERECTOMY      SHOULDER " SURGERY Left     TOE AMPUTATION Right 05/22/2017    5th toe       Allergies:  Review of patient's allergies indicates:   Allergen Reactions    Codeine Nausea Only       Home Medications:  Prior to Admission medications    Medication Sig Start Date End Date Taking? Authorizing Provider   ARIA SAMI CONTROL SOLN Soln  10/2/14   Historical Provider, MD KNAPP SAMI PLUS METER Misc  10/10/14   Historical Provider, MD KNAPP SAMI PLUS TEST STRP Strp  12/16/14   Historical Provider, MD KNAPP SOFT DEV LANCETS Kit  10/2/14   Historical Provider, MD KNAPP SOFTCLIX LANCETS Misc  10/10/14   Historical Provider, MD   apixaban (ELIQUIS) 5 mg Tab TAKE 1 TABLET BY MOUTH  TWICE DAILY 4/18/19      apixaban (ELIQUIS) 5 mg Tab TAKE 1 TABLET BY MOUTH TWICE DAILY 4/20/19      apixaban 5 mg Tab Take 1 tablet (5 mg total) by mouth 2 (two) times daily. 6/8/17   Clemencia Puentes MD   cephALEXin (KEFLEX) 500 MG capsule Take 500 mg by mouth 4 (four) times daily. 4/21/19   Historical Provider, MD   FLUZONE HIGH-DOSE 2018-19, PF, 180 mcg/0.5 mL vaccine  10/22/18   Historical Provider, MD   gabapentin (NEURONTIN) 400 MG capsule  3/12/14   Historical Provider, MD   glimepiride (AMARYL) 1 MG tablet  6/16/14   Historical Provider, MD   lisinopril (PRINIVIL,ZESTRIL) 5 MG tablet  6/27/17   Historical Provider, MD   metoprolol succinate (TOPROL-XL) 25 MG 24 hr tablet  6/22/17   Historical Provider, MD   pramipexole (MIRAPEX) 0.125 MG tablet  7/22/16   Historical Provider, MD       Family History:  Family History   Problem Relation Age of Onset    Diabetes Mother     Heart failure Father     Kidney failure Brother        Social History:  Social History     Tobacco Use    Smoking status: Never Smoker   Substance Use Topics    Alcohol use: No    Drug use: No       Review of Systems:  Pertinent items are noted in HPI. All other systems are reviewed and are negative.    Health Maintaince :   [facility]    Immunizations:   TDap UTD    Flu UTD  Pna UTD    Cancer Screening:  PAP: hysterectomy  MMG: not UTD  Colonoscopy: 5 years     Objective:   Last 24 Hour Vital Signs:  BP  Min: 133/58  Max: 142/62  Temp  Av.6 °F (38.1 °C)  Min: 98.3 °F (36.8 °C)  Max: 102.7 °F (39.3 °C)  Pulse  Av  Min: 74  Max: 80  Resp  Av  Min: 16  Max: 18  SpO2  Av %  Min: 97 %  Max: 97 %  Height  Av' 2" (157.5 cm)  Min: 5' 2" (157.5 cm)  Max: 5' 2" (157.5 cm)  Weight  Av.6 kg (180 lb)  Min: 81.6 kg (180 lb)  Max: 81.6 kg (180 lb)  Body mass index is 32.92 kg/m².  No intake/output data recorded.    Physical Examination:    BP (!) 125/51 (BP Location: Left arm, Patient Position: Lying)   Pulse 68   Temp 97.8 °F (36.6 °C) (Oral)   Resp 17   Ht 5' 2" (1.575 m)   Wt 81.6 kg (180 lb)   LMP  (LMP Unknown)   SpO2 97%   BMI 32.92 kg/m²     General Appearance:    Sleepy, cooperative, no distress, appears stated age   Head:    Normocephalic, without obvious abnormality, atraumatic   Eyes:    PERRL, conjunctiva/corneas clear, EOM's intact, fundi     benign, both eyes   Throat:   Lips, mucosa, and tongue normal; teeth and gums normal   Neck:   Supple, symmetrical, trachea midline, no adenopathy;   no carotid bruit or JVD   Lungs:     Clear to auscultation bilaterally, respirations unlabored    Heart:    Regular rate and rhythm, S1 and S2 normal, no murmur, rub   or gallop   Abdomen:     Soft, non-tender, bowel sounds active all four quadrants,     no masses, no organomegaly   Extremities:   S/p amputation of 5th R toe, with erythema and swelling of 3rd toe, TTP. Also with ulcer on heel of foot ~.5cm in diameter   Pulses:   2+ and symmetric all extremities   Skin:   Skin color, texture, turgor normal, no rashes or lesions   Neurologic:   Grossly normal strength, sensation and reflexes     throughout         Laboratory:  Most Recent Data:  CBC:   Lab Results   Component Value Date    WBC 16.76 (H) 2019    " HGB 12.2 06/08/2019    HCT 36.8 (L) 06/08/2019     06/08/2019    MCV 89 06/08/2019    RDW 13.6 06/08/2019     WBC Differential: 82.3 % N, 0 % Bands, 10.3 % L, 6.2 % M, 0.7 % Eo, 0.1 % Baso, no additional cells seen  BMP:   Lab Results   Component Value Date     (L) 06/08/2019    K 4.9 06/08/2019     06/08/2019    CO2 22 (L) 06/08/2019    BUN 37 (H) 06/08/2019    CREATININE 1.5 (H) 06/08/2019     (H) 06/08/2019    CALCIUM 10.0 06/08/2019    MG 1.6 05/27/2017    PHOS 2.9 05/27/2017     LFTs:   Lab Results   Component Value Date    PROT 7.4 06/08/2019    ALBUMIN 3.6 06/08/2019    BILITOT 0.4 06/08/2019    AST 36 06/08/2019    ALKPHOS 86 06/08/2019    ALT 37 06/08/2019     Coags:   Lab Results   Component Value Date    INR 1.1 06/05/2017     FLP:   Lab Results   Component Value Date    CHOL 151 05/28/2017    HDL 79 (A) 01/12/2018    LDLCALC 146 01/12/2018    TRIG 131 01/12/2018    CHOLHDL 31.8 05/28/2017     DM:   Lab Results   Component Value Date    HGBA1C 8.3 01/12/2018    HGBA1C 7.5 (H) 05/22/2017    LDLCALC 146 01/12/2018    CREATININE 1.5 (H) 06/08/2019     Thyroid:   Lab Results   Component Value Date    TSH 0.419 06/05/2017     Anemia:   Lab Results   Component Value Date    IRON 17 (L) 06/05/2017    TIBC 272 06/05/2017    FERRITIN 540 (H) 06/05/2017    [id number] 686 06/05/2017    FOLATE 15.8 06/05/2017     Cardiac:   Lab Results   Component Value Date    TROPONINI 0.039 (H) 06/06/2017     (H) 06/05/2017     Urinalysis:   Lab Results   Component Value Date    COLORU Yellow 06/08/2019    SPECGRAV 1.020 06/08/2019    NITRITE Positive (A) 06/08/2019    KETONESU Negative 06/08/2019    UROBILINOGEN Negative 06/08/2019    WBCUA 50 (H) 06/08/2019       Trended Lab Data:  Recent Labs   Lab 06/08/19  2211   WBC 16.76*   HGB 12.2   HCT 36.8*      MCV 89   RDW 13.6   *   K 4.9      CO2 22*   BUN 37*   CREATININE 1.5*   *   PROT 7.4   ALBUMIN 3.6   BILITOT 0.4    AST 36   ALKPHOS 86   ALT 37       Trended Cardiac Data:  No results for input(s): TROPONINI, CKTOTAL, CKMB, BNP in the last 168 hours.    Microbiology Data:  Blood Cx 6/8 pending  Urine Cx 6/9 pending    Other Results:  EKG (my interpretation): NSR rate 80 normal axis, q wave V1  Radiology:  Imaging Results          X-Ray Chest AP Portable (Final result)  Result time 06/08/19 23:06:15    Final result by Nino Ambrosio MD (06/08/19 23:06:15)                 Impression:      Improving aeration with mild presumed atelectasis.  No edema or pneumothorax.      Electronically signed by: Nino Ambrosio  Date:    06/08/2019  Time:    23:06             Narrative:    EXAMINATION:  XR CHEST AP PORTABLE    CLINICAL HISTORY:  Sepsis;    TECHNIQUE:  Single frontal view of the chest was performed.    COMPARISON:  06/05/2017    FINDINGS:  Single AP portable view at 22:56.    Heart is mildly enlarged unchanged.  Is mild perihilar streaky opacities likely subsegmental atelectasis.  There is improved aeration at the left base.  No interstitial edema.  No pneumothorax or discrete pleural effusion or nodule.  The trachea appears normal.  No abdominal free air.  There are overlying leads.  There are suture anchors in the right humeral head                                X-Ray Foot Complete Right (Final result)  Result time 06/08/19 21:57:44    Final result by Randolph Saini MD (06/08/19 21:57:44)                 Impression:      Fourth toe soft tissue swelling concerning for cellulitis with associated interval erosion of the distal 3rd distal phalanx concerning for sequela of underlying osteomyelitis.    This report was flagged in Epic as abnormal.      Electronically signed by: Randolph Saini MD  Date:    06/08/2019  Time:    21:57             Narrative:    EXAMINATION:  XR FOOT COMPLETE 3 VIEW RIGHT    CLINICAL HISTORY:  . Type 2 diabetes mellitus with foot ulcer    TECHNIQUE:  AP, lateral, and oblique views of the right foot were  performed.    COMPARISON:  Right foot series 04/24/2019    FINDINGS:  Fifth toe is absent.  No displaced fracture or dislocation seen.  There is erosion of the distal most portion of the 3rd distal phalanx with associated overlying soft tissue swelling concerning for cellulitis with underlying osteomyelitis.    Lisfranc articulation is congruent.  Mild degenerative change at the 1st MTP joint and 2nd 3rd and 4th DIP joints.  Moderate to advanced degenerative changes at the dorsal midfoot.  Plantar calcaneal spur and enthesophyte at the Achilles insertion site.  No subcutaneous emphysema or radiodense retained foreign body.                                 Assessment:     Caro Powell is a 78 y.o. female with:  Patient Active Problem List    Diagnosis Date Noted    Diabetic foot ulcer 06/09/2019    Diabetic ulcer of right foot associated with diabetes mellitus due to underlying condition, with fat layer exposed 01/21/2019    Anemia of chronic disease 06/08/2017    Pericardial effusion 06/08/2017    Atrial fibrillation with rapid ventricular response 06/06/2017    New onset atrial fibrillation 06/05/2017    Chest pain, unspecified 05/27/2017    Essential hypertension 05/22/2017    Type 2 diabetes mellitus with diabetic neuropathy, without long-term current use of insulin 05/22/2017    Pre-operative clearance 05/22/2017        Plan:     Diabetic Foot Ulcer with Cellulitis concern for Osteomyelitis  -patient with 3 days R third toe swelling, erythema and pain associated with fever and chills x1 days  -leukocytosis of 16.7  fever 102  ESR 87  .5  -lactate and procal negative   -Xray Foot with 4th toe soft tissue swelling concern for cellulitis and concern for osteo on 3rd distal phalanx  -MRI ordered to evaluate  -will consult general surgery for bone bx to guide antibiotic regimen  -given patient was febrile in ED, patient was given dose of rocephin and vanc    Asymptomatic Bacteriuria    -patient with + leukocytes and nitrates in urine  -fever, leukocytosis and inflammatory markers as above  -no urinary complaints  -treated with rocephin as above     DM with diabetic neuropathy   -last A1C 5/18 8.3  will reorder  -takes glimepiride at home   -SSI and accuchecks while inpatient   -will give home gabapentin    Afib  -rate controlled with metoprolol and anticoagulated with eliquis  will continue    HTN  -takes metoprolol at home  will continue while inpatient    Diet: Diabetic  PPx: Eliquis  Dispo: pending MRI R foot, will likely need bone biopsy     Code Status:     Full    Charisse Galeas DO  [facility] Internal Medicine HO-1    [facility] Medicine Hospitalist Pager numbers:   [facility] Hospitalist Medicine Team A (Gerard/Pancho): [phone]  [facility] Hospitalist Medicine Team B (Kylee/Nima):  [phone]

## 2019-06-09 NOTE — PROGRESS NOTES
Pharmacokinetic Initial Assessment: IV Vancomycin    Assessment/Plan:    Initiate intravenous vancomycin with loading dose of 2250 mg once followed by a maintenance dose of vancomycin 1000 mg IV every 12 hours  Desired empiric serum trough concentration is 10 to 20 mcg/mL.  Draw vancomycin trough level 30 min prior to fourth dose on 6-11-19 at approximately 2200   Pharmacy will continue to follow and monitor vancomycin.      Please contact pharmacy at extension [phone] with any questions regarding this assessment.     Thank you for the consult,   Kimo Naranjo     Patient brief summary:  Caro Powell is a 78 y.o. female initiated on antimicrobial therapy with IV Vancomycin for treatment of suspected bone/joint    Drug Allergies:   Review of patient's allergies indicates:   Allergen Reactions    Codeine Nausea Only       Actual Body Weight:   81.6 kg    Renal Function:   Estimated Creatinine Clearance: 34.7 mL/min (based on SCr of 1.3 mg/dL).,     Dialysis Method (if applicable):  N/A     CBC (last 72 hours):  Recent Labs   Lab Result Units 06/08/19 2211 06/09/19  0454   WBC K/uL 16.76* 15.06*   Hemoglobin g/dL 12.2 10.4*   Hemoglobin A1C %  --  6.6*  6.6*   Hematocrit % 36.8* 31.1*   Platelets K/uL 255 226   Gran% % 82.3* 79.6*   Lymph% % 10.3* 12.0*   Mono% % 6.2 7.7   Eosinophil% % 0.7 0.6   Basophil% % 0.1 0.1   Differential Method  Automated Automated       Metabolic Panel (last 72 hours):  Recent Labs   Lab Result Units 06/08/19 2211 06/08/19  2342 06/09/19  0454   Sodium mmol/L 135*  --  135*   Potassium mmol/L 4.9  --  4.3   Chloride mmol/L 103  --  108   CO2 mmol/L 22*  --  19*   Glucose mg/dL 165*  --  164*   Glucose, UA   --  Negative  --    BUN, Bld mg/dL 37*  --  33*   Creatinine mg/dL 1.5*  --  1.3   Albumin g/dL 3.6  --  2.9*   Total Bilirubin mg/dL 0.4  --  0.2   Alkaline Phosphatase U/L 86  --  70   AST U/L 36  --  33   ALT U/L 37  --  36       Drug levels (last 3 results):  No results for  input(s): VANCOMYCINRA, VANCOMYCINPE, VANCOMYCINTR in the last 72 hours.    Microbiologic Results:  Microbiology Results (last 7 days)       Procedure Component Value Units Date/Time    Blood culture #1 [[id number]] Collected:  06/08/19 2205    Order Status:  Completed Specimen:  Blood from Peripheral, Forearm, Left Updated:  06/09/19 1115     Blood Culture, Routine No Growth to date    Narrative:       Blood Culture #1    Blood culture #2 [[id number]] Collected:  06/08/19 2212    Order Status:  Completed Specimen:  Blood from Peripheral, Hand, Right Updated:  06/09/19 1115     Blood Culture, Routine No Growth to date    Narrative:       Blood Culture #2    Culture, Anaerobe [[id number]] Collected:  06/09/19 1111    Order Status:  Sent Specimen:  Bone from Toe, Right Foot Updated:  06/09/19 1112    Aerobic culture [[id number]] Collected:  06/09/19 1111    Order Status:  Sent Specimen:  Bone from Toe, Right Foot Updated:  06/09/19 1112    Urine culture [[id number]] Collected:  06/08/19 2342    Order Status:  No result Specimen:  Urine Updated:  06/09/19 0020    Urine culture [[id number]]     Order Status:  Completed Specimen:  Urine, Clean Catch     Blood culture x two cultures. Draw prior to antibiotics. [[id number]]     Order Status:  Canceled Specimen:  Blood     Blood culture x two cultures. Draw prior to antibiotics. [[id number]]     Order Status:  Canceled Specimen:  Blood

## 2019-06-09 NOTE — ASSESSMENT & PLAN NOTE
Pt 77 y/o female with PMHx significant for DM2 with right 3rd digit infection  Imaging reviewed, OM noted on xray, MRI ordered r/o deep abscess, pending  Discussed conservative vs surgical options for OM with amputation vs at least 6 weeks of IV ABX with no guarantee of resolution. Pt opts for tx with ABX for now  Bone biopsy performed, see procedure note below  Wounds dressed with betadine, 4x4, abd pad, kerlix and loose ACE  Nursing orders in for daily dressing changes, orders placed  Pt PWB to heel only  Podiatry will follow

## 2019-06-09 NOTE — HPI
Pt is a 77 y/o female  has a past medical history of Anticoagulant long-term use, Arthritis, Calcium nephrolithiasis, Diabetes mellitus, type 2, Diabetic peripheral neuropathy associated with type 2 diabetes mellitus, and Hypertension. Pt admitted and consulted for toe infection. Follows as outpt with Dr. Jose. Pt states she has had worsening redness/swelling over the past few days and states it has been throbbing. States she had a fever yesterday. Denies N/V. No other pedal complaints.

## 2019-06-09 NOTE — SUBJECTIVE & OBJECTIVE
Scheduled Meds:   apixaban  5 mg Oral BID    gabapentin  400 mg Oral BID    lidocaine HCL 10 mg/ml (1%)  10 mL Other Once    lisinopril  5 mg Oral Daily    metoprolol succinate  25 mg Oral Daily    pramipexole  0.25 mg Oral QHS     Continuous Infusions:  PRN Meds:Dextrose 10% Bolus, Dextrose 10% Bolus, glucagon (human recombinant), glucose, glucose, insulin aspart U-100, sodium chloride 0.9%    Review of patient's allergies indicates:   Allergen Reactions    Codeine Nausea Only        Past Medical History:   Diagnosis Date    Anticoagulant long-term use     Arthritis     Calcium nephrolithiasis 2007    Diabetes mellitus, type 2     Diabetic peripheral neuropathy associated with type 2 diabetes mellitus     Hypertension      Past Surgical History:   Procedure Laterality Date    AMPUTATION-TOE Right 5/23/2017    Performed by Derek [facility] at [facility] OR    COLONOSCOPY  11/28/2011    sigmoid diverticulosis, external hemorrhoids    HYSTERECTOMY      SHOULDER SURGERY Left     TOE AMPUTATION Right 05/22/2017    5th toe       Family History     Problem Relation (Age of Onset)    Diabetes Mother    Heart failure Father    Kidney failure Brother        Tobacco Use    Smoking status: Never Smoker    Smokeless tobacco: Never Used   Substance and Sexual Activity    Alcohol use: No    Drug use: No    Sexual activity: Yes     Review of Systems   Constitutional: Positive for chills and fever.   Respiratory: Negative for chest tightness and shortness of breath.    Cardiovascular: Negative for chest pain, palpitations and leg swelling.   Gastrointestinal: Negative for nausea and vomiting.   Skin: Positive for color change and wound.   Neurological: Positive for numbness.   Psychiatric/Behavioral: Negative for agitation.     Objective:     Vital Signs (Most Recent):  Temp: 97 °F (36.1 °C) (06/09/19 0807)  Pulse: (!) 55 (06/09/19 0807)  Resp: 20 (06/09/19 0807)  BP: (!) 149/63 (06/09/19 0807)  SpO2: 97 %  (06/09/19 0807) Vital Signs (24h Range):  Temp:  [96.9 °F (36.1 °C)-102.7 °F (39.3 °C)] 97 °F (36.1 °C)  Pulse:  [43-80] 55  Resp:  [14-20] 20  SpO2:  [95 %-97 %] 97 %  BP: (119-149)/(51-63) 149/63     Weight: 81.6 kg (180 lb)  Body mass index is 32.92 kg/m².    Foot Exam    General  General Appearance: appears stated age and healthy   Orientation: alert and oriented to person, place, and time   Affect: appropriate       Right Foot/Ankle     Inspection and Palpation  Tenderness: none   Skin Exam: cellulitis, ulcer and erythema; no drainage     Neurovascular  Dorsalis pedis: 1+  Posterior tibial: 1+  Saphenous nerve sensation: diminished  Tibial nerve sensation: diminished  Superficial peroneal nerve sensation: diminished  Deep peroneal nerve sensation: diminished  Sural nerve sensation: diminished            Laboratory:  A1C:   Recent Labs   Lab 06/09/19 0454   HGBA1C 6.6*  6.6*     Blood Cultures: No results for input(s): LABBLOO in the last 48 hours.  BMP:   Recent Labs   Lab 06/09/19 0454   *   *   K 4.3      CO2 19*   BUN 33*   CREATININE 1.3   CALCIUM 8.5*     CBC:   Recent Labs   Lab 06/09/19 0454   WBC 15.06*   RBC 3.51*   HGB 10.4*   HCT 31.1*      MCV 89   MCH 29.6   MCHC 33.4     CMP:   Recent Labs   Lab 06/09/19 0454   *   CALCIUM 8.5*   ALBUMIN 2.9*   PROT 6.2   *   K 4.3   CO2 19*      BUN 33*   CREATININE 1.3   ALKPHOS 70   ALT 36   AST 33   BILITOT 0.2     Coagulation: No results for input(s): PT, INR, APTT in the last 168 hours.  CRP:   Recent Labs   Lab 06/08/19 2211   .5*     ESR:   Recent Labs   Lab 06/08/19 2211   SEDRATE 87*     Prealbumin: No results for input(s): PREALBUMIN in the last 48 hours.  Wound Cultures:   Recent Labs   Lab 03/29/19  0838   LABAERO STREPTOCOCCUS AGALACTIAE (GROUP B)  Many  Beta-hemolytic streptococci are routinely susceptible to   penicillins,cephalosporins and carbapenems.  Susceptibility testing not routinely  performed       Microbiology Results (last 7 days)     Procedure Component Value Units Date/Time    Blood culture #1 [[id number]] Collected:  06/08/19 2205    Order Status:  Sent Specimen:  Blood from Peripheral, Forearm, Left Updated:  06/09/19 0315    Blood culture #2 [[id number]] Collected:  06/08/19 2212    Order Status:  Sent Specimen:  Blood from Peripheral, Hand, Right Updated:  06/09/19 0314    Urine culture [[id number]] Collected:  06/08/19 2342    Order Status:  No result Specimen:  Urine Updated:  06/09/19 0020    Urine culture [[id number]]     Order Status:  Completed Specimen:  Urine, Clean Catch     Blood culture x two cultures. Draw prior to antibiotics. [[id number]]     Order Status:  Canceled Specimen:  Blood     Blood culture x two cultures. Draw prior to antibiotics. [[id number]]     Order Status:  Canceled Specimen:  Blood         Specimen (12h ago, onward)    None          Diagnostic Results:  I have reviewed all pertinent imaging results/findings within the past 24 hours.    Clinical Findings:    Edematous, erythematous 3rd toe of R foot with HPK noted to plantar distal tip and HPK sub 1st met head with ulceration measuring, 0.2x0.2x0.1cm wound, no periwound erythema noted. No purulence noted.

## 2019-06-09 NOTE — PLAN OF CARE
"  Chief Complaint   Patient presents with    Foot Pain       right foot pain X"a couple of days", reports hx of DM, ems reports sore to right great toe and "popped blister" to right 4th toe. reports sees a podiatrist.      Pt independent with ADLs, states she is current with HH, did not recall name of agency ([place] unable to locate agency name in medical record, not set up by [facility]). Pt [facility] , [facility] in [place].  provides transportation to appointments and woll provide transportation home on discharge.    Pt sees Dr Jose for Podiatry. Pt has glucometer.    Pt stated that she has trouble affording some of her medications, [place] provided her with information on the Pharmacy Patient Assistance Program.    Pt denies any needs or concerns at this time. Discharge planning brochure and business card provided. CM numbers written on white board. Pt encouraged to call with any questions or needs. CM will continue to follow patient throughout the transitions of care, and assist with any discharge needs.       06/09/19 5430   Discharge Assessment   Assessment Type Discharge Planning Assessment   Confirmed/corrected address and phone number on facesheet? Yes   Assessment information obtained from? Patient;Medical Record   Expected Length of Stay (days) 3   Communicated expected length of stay with patient/caregiver yes   Prior to hospitilization cognitive status: Alert/Oriented   Prior to hospitalization functional status: Independent   Current cognitive status: Alert/Oriented   Current Functional Status: Needs Assistance   Facility Arrived From: (home)   Lives With spouse  (: Jin Powell [phone])   Able to Return to Prior Arrangements yes   Is patient able to care for self after discharge? Yes   Who are your caregiver(s) and their phone number(s)? : Jin Powell [phone]   Patient's perception of discharge disposition home health "   Readmission Within the Last 30 Days no previous admission in last 30 days   Patient currently being followed by outpatient case management? No   Patient currently receives any other outside agency services? No   Equipment Currently Used at Home glucometer   Do you have any problems affording any of your prescribed medications? TBD   Is the patient taking medications as prescribed? yes   Does the patient have transportation home? Yes   Transportation Anticipated family or friend will provide   Dialysis Name and Scheduled days N/A   Does the patient receive services at the [facility]? No   Discharge Plan A Home Health   Discharge Plan B Home with family   DME Needed Upon Discharge    (TBD)   Patient/Family in Agreement with Plan yes     Colleen Neal RN Transitional Navigator  [phone]

## 2019-06-09 NOTE — PLAN OF CARE
Problem: Adult Inpatient Plan of Care  Goal: Plan of Care Review  Outcome: Ongoing (interventions implemented as appropriate)  Plan of care and education initiated.  Chart reviewed.

## 2019-06-09 NOTE — ED PROVIDER NOTES
"Encounter Date: 6/8/2019    SCRIBE #1 NOTE: I, Lindsay Rosenthal, am scribing for, and in the presence of,  Dr. Vargas. I have scribed the entire note.       History     Chief Complaint   Patient presents with    Foot Pain     right foot pain X"a couple of days", reports hx of DM, ems reports sore to right great toe and "popped blister" to right 4th toe. reports sees a podiatrist.      77 y/o F presents to the ED c/o right foot and right low back pain. States her right foot began hurting a few days ago, and she noticed a "sore" to the bottom of her foot 2 or 3 days ago. States she has had gradually worsening pain to the dorsum and now entire foot, described as throbbing, worse with palpation and without alleviating factors. Reports a "few weeks" of low back pain, similar to her chronic low back pain. It is a constant aching right low back pain that radiates down her right leg, worse with certain movements and positions, gradually worsening over the past several days, without alleviating factors. Denies any urinary symptoms.     The history is provided by the patient.     Review of patient's allergies indicates:   Allergen Reactions    Codeine Nausea Only     Past Medical History:   Diagnosis Date    Calcium nephrolithiasis 2007    Diabetes mellitus, type 2     Diabetic peripheral neuropathy associated with type 2 diabetes mellitus     Hypertension      Past Surgical History:   Procedure Laterality Date    AMPUTATION-TOE Right 5/23/2017    [facility]    COLONOSCOPY  11/28/2011    sigmoid diverticulosis, external hemorrhoids    HYSTERECTOMY      SHOULDER SURGERY Left     TOE AMPUTATION Right 05/22/2017    5th toe     Family History   Problem Relation Age of Onset    Diabetes Mother     Heart failure Father     Kidney failure Brother      Social History     Tobacco Use    Smoking status: Never Smoker   Substance Use Topics    Alcohol use: No    Drug use: No "     Review of Systems   Constitutional: Negative for chills, fatigue and fever.   HENT: Negative for congestion, sore throat and voice change.    Eyes: Negative for photophobia, pain and redness.   Respiratory: Negative for cough, choking and shortness of breath.    Cardiovascular: Negative for chest pain, palpitations and leg swelling.   Gastrointestinal: Negative for abdominal pain, diarrhea, nausea and vomiting.   Genitourinary: Negative for dysuria, hematuria and urgency.   Musculoskeletal: Positive for back pain. Negative for neck pain and neck stiffness.        +right foot pain   Neurological: Negative for light-headedness, numbness and headaches.   All other systems reviewed and are negative.      Physical Exam     Initial Vitals [06/08/19 2129]   BP Pulse Resp Temp SpO2   (!) 142/62 80 18 98.3 °F (36.8 °C) 97 %      MAP       --         Physical Exam    Nursing note and vitals reviewed.  Constitutional: She appears well-developed and well-nourished. No distress.   HENT:   Head: Normocephalic and atraumatic.   Oropharynx clear; Dry MM   Eyes: Conjunctivae and EOM are normal. Pupils are equal, round, and reactive to light.   Neck: Normal range of motion. Neck supple. No tracheal deviation present.   Cardiovascular: Normal rate, regular rhythm, normal heart sounds and intact distal pulses.   2+ DP pulses B/L    Pulmonary/Chest: Breath sounds normal. No respiratory distress. She has no wheezes. She has no rhonchi. She has no rales.   Abdominal: Soft. Bowel sounds are normal. She exhibits no distension. There is no tenderness.   Musculoskeletal: Normal range of motion. She exhibits tenderness (Diffuse foot TTP; indurated without fluctuance). She exhibits no edema.        Feet:    Neurological: She is alert and oriented to person, place, and time. She has normal strength. No cranial nerve deficit.   Skin: Skin is warm and dry. Capillary refill takes less than 2 seconds.         ED Course   Critical  Care  Date/Time: 6/8/2019 10:05 PM  Performed by: George Vargas MD  Authorized by: George Vargas MD   Direct patient critical care time: 15 minutes  Additional history critical care time: 15 minutes  Ordering / reviewing critical care time: 15 minutes  Documentation critical care time: 15 minutes  Consulting other physicians critical care time: 15 minutes  Consult with family critical care time: 10 minutes  Total critical care time (exclusive of procedural time) : 85 minutes  Critical care time was exclusive of separately billable procedures and treating other patients.  Critical care was necessary to treat or prevent imminent or life-threatening deterioration of the following conditions: sepsis and dehydration.  Critical care was time spent personally by me on the following activities: development of treatment plan with patient or surrogate, interpretation of cardiac output measurements, evaluation of patient's response to treatment, examination of patient, obtaining history from patient or surrogate, ordering and performing treatments and interventions, ordering and review of laboratory studies, ordering and review of radiographic studies, pulse oximetry, re-evaluation of patient's condition and review of old charts.        Labs Reviewed   POCT GLUCOSE - Abnormal; Notable for the following components:       Result Value    POCT Glucose 183 (*)     All other components within normal limits   CULTURE, BLOOD   CULTURE, BLOOD   CBC W/ AUTO DIFFERENTIAL   COMPREHENSIVE METABOLIC PANEL   URINALYSIS   SEDIMENTATION RATE   C-REACTIVE PROTEIN   POCT GLUCOSE MONITORING CONTINUOUS     EKG Readings: (Independently Interpreted)   Initial Reading: No STEMI. Previous EKG: Compared with most recent EKG Previous EKG Date: 6/8/17 (Nonspecific changes). Rhythm: Normal Sinus Rhythm. Heart Rate: 87. Ectopy: No Ectopy. Conduction: Normal. ST Segments: Normal ST Segments. T Waves Flipped: V1. Axis: Normal.         X-Rays:    Independently Interpreted Readings:   Other Readings:  Imaging interpreted by radiologist and visualized by me    Imaging Results          X-Ray Chest AP Portable (Final result)  Result time 06/08/19 23:06:15    Final result by Nino Ambrosio MD (06/08/19 23:06:15)                 Impression:      Improving aeration with mild presumed atelectasis.  No edema or pneumothorax.      Electronically signed by: Nino Ambrosio  Date:    06/08/2019  Time:    23:06             Narrative:    EXAMINATION:  XR CHEST AP PORTABLE    CLINICAL HISTORY:  Sepsis;    TECHNIQUE:  Single frontal view of the chest was performed.    COMPARISON:  06/05/2017    FINDINGS:  Single AP portable view at 22:56.    Heart is mildly enlarged unchanged.  Is mild perihilar streaky opacities likely subsegmental atelectasis.  There is improved aeration at the left base.  No interstitial edema.  No pneumothorax or discrete pleural effusion or nodule.  The trachea appears normal.  No abdominal free air.  There are overlying leads.  There are suture anchors in the right humeral head                                X-Ray Foot Complete Right (Final result)  Result time 06/08/19 21:57:44    Final result by Randolph Saini MD (06/08/19 21:57:44)                 Impression:      Fourth toe soft tissue swelling concerning for cellulitis with associated interval erosion of the distal 3rd distal phalanx concerning for sequela of underlying osteomyelitis.    This report was flagged in Epic as abnormal.      Electronically signed by: Randolph Saini MD  Date:    06/08/2019  Time:    21:57             Narrative:    EXAMINATION:  XR FOOT COMPLETE 3 VIEW RIGHT    CLINICAL HISTORY:  . Type 2 diabetes mellitus with foot ulcer    TECHNIQUE:  AP, lateral, and oblique views of the right foot were performed.    COMPARISON:  Right foot series 04/24/2019    FINDINGS:  Fifth toe is absent.  No displaced fracture or dislocation seen.  There is erosion of the distal most portion  of the 3rd distal phalanx with associated overlying soft tissue swelling concerning for cellulitis with underlying osteomyelitis.    Lisfranc articulation is congruent.  Mild degenerative change at the 1st MTP joint and 2nd 3rd and 4th DIP joints.  Moderate to advanced degenerative changes at the dorsal midfoot.  Plantar calcaneal spur and enthesophyte at the Achilles insertion site.  No subcutaneous emphysema or radiodense retained foreign body.                                Medical Decision Making:   Initial Assessment:   78-year-old female presents emergency department complaining of right foot pain as well as right lower back pain  Differential Diagnosis:   Diabetic foot ulcer, cellulitis, abscess, UTI, pyelonephritis, kidney stone, sciatica, sepsis  Independently Interpreted Test(s):   I have ordered and independently interpreted X-rays - see prior notes.  I have ordered and independently interpreted EKG Reading(s) - see prior notes  Clinical Tests:   Lab Tests: Reviewed       <> Summary of Lab: Leukocytosis, elevated ESR and CRP  ED Management:  Patient did not meet SIRS criteria until the CBC came back and she developed a fever.  This was around 10:30 p.m..  Blood cultures already been ordered, I did have lab, redraw for a lactic acid because the patient was a hard stick.  Broad-spectrum antibiotics had already been ordered as well.  Given 2 L of IV fluid.  Her vital signs are stable and she was feeling somewhat better after morphine and Haldol.  Discussed with [facility] internal medicine will see and admit the patient.                      Clinical Impression:     1. Sepsis, due to unspecified organism    2. Diabetic foot ulcer    3. Acute cystitis without hematuria        Disposition:   Disposition: Admitted  Condition: Fair    Philomena attestation I, Dr. George Vargas, personally performed the services described in this documentation. All medical record entries made by the scribyuri were at my direction and in my  presence.  I have reviewed the chart and agree that the record reflects my personal performance and is accurate and complete. George Vargas MD.  12:31 AM 06/09/2019                        George Vargas MD  06/09/19 0031

## 2019-06-09 NOTE — CONSULTS
[facility]  Podiatry  Consult Note    Patient Name: Caro Powell  MRN: [id number]  Admission Date: 6/8/2019  Hospital Length of Stay: 0 days  Attending Physician: Gabriel Herring MD  Primary Care Provider: Manuel Laird MD     Inpatient consult to Podiatry  Consult performed by: Ava Colbert MD  Consult ordered by: Lexis Nava MD        Subjective:     History of Present Illness:  Pt is a 79 y/o female  has a past medical history of Anticoagulant long-term use, Arthritis, Calcium nephrolithiasis, Diabetes mellitus, type 2, Diabetic peripheral neuropathy associated with type 2 diabetes mellitus, and Hypertension. Pt admitted and consulted for toe infection. Follows as outpt with Dr. Jose. Pt states she has had worsening redness/swelling over the past few days and states it has been throbbing. States she had a fever yesterday. Denies N/V. No other pedal complaints.     Scheduled Meds:   apixaban  5 mg Oral BID    gabapentin  400 mg Oral BID    lidocaine HCL 10 mg/ml (1%)  10 mL Other Once    lisinopril  5 mg Oral Daily    metoprolol succinate  25 mg Oral Daily    pramipexole  0.25 mg Oral QHS     Continuous Infusions:  PRN Meds:Dextrose 10% Bolus, Dextrose 10% Bolus, glucagon (human recombinant), glucose, glucose, insulin aspart U-100, sodium chloride 0.9%    Review of patient's allergies indicates:   Allergen Reactions    Codeine Nausea Only        Past Medical History:   Diagnosis Date    Anticoagulant long-term use     Arthritis     Calcium nephrolithiasis 2007    Diabetes mellitus, type 2     Diabetic peripheral neuropathy associated with type 2 diabetes mellitus     Hypertension      Past Surgical History:   Procedure Laterality Date    AMPUTATION-TOE Right 5/23/2017    Performed by [facility] at [facility] OR    COLONOSCOPY  11/28/2011    sigmoid diverticulosis, external hemorrhoids    HYSTERECTOMY      SHOULDER SURGERY Left      TOE AMPUTATION Right 05/22/2017    5th toe       Family History     Problem Relation (Age of Onset)    Diabetes Mother    Heart failure Father    Kidney failure Brother        Tobacco Use    Smoking status: Never Smoker    Smokeless tobacco: Never Used   Substance and Sexual Activity    Alcohol use: No    Drug use: No    Sexual activity: Yes     Review of Systems   Constitutional: Positive for chills and fever.   Respiratory: Negative for chest tightness and shortness of breath.    Cardiovascular: Negative for chest pain, palpitations and leg swelling.   Gastrointestinal: Negative for nausea and vomiting.   Skin: Positive for color change and wound.   Neurological: Positive for numbness.   Psychiatric/Behavioral: Negative for agitation.     Objective:     Vital Signs (Most Recent):  Temp: 97 °F (36.1 °C) (06/09/19 0807)  Pulse: (!) 55 (06/09/19 0807)  Resp: 20 (06/09/19 0807)  BP: (!) 149/63 (06/09/19 0807)  SpO2: 97 % (06/09/19 0807) Vital Signs (24h Range):  Temp:  [96.9 °F (36.1 °C)-102.7 °F (39.3 °C)] 97 °F (36.1 °C)  Pulse:  [43-80] 55  Resp:  [14-20] 20  SpO2:  [95 %-97 %] 97 %  BP: (119-149)/(51-63) 149/63     Weight: 81.6 kg (180 lb)  Body mass index is 32.92 kg/m².    Foot Exam    General  General Appearance: appears stated age and healthy   Orientation: alert and oriented to person, place, and time   Affect: appropriate       Right Foot/Ankle     Inspection and Palpation  Tenderness: none   Skin Exam: cellulitis, ulcer and erythema; no drainage     Neurovascular  Dorsalis pedis: 1+  Posterior tibial: 1+  Saphenous nerve sensation: diminished  Tibial nerve sensation: diminished  Superficial peroneal nerve sensation: diminished  Deep peroneal nerve sensation: diminished  Sural nerve sensation: diminished            Laboratory:  A1C:   Recent Labs   Lab 06/09/19 0454   HGBA1C 6.6*  6.6*     Blood Cultures: No results for input(s): LABBLOO in the last 48 hours.  BMP:   Recent Labs   Lab 06/09/19 0452    *   *   K 4.3      CO2 19*   BUN 33*   CREATININE 1.3   CALCIUM 8.5*     CBC:   Recent Labs   Lab 06/09/19 0454   WBC 15.06*   RBC 3.51*   HGB 10.4*   HCT 31.1*      MCV 89   MCH 29.6   MCHC 33.4     CMP:   Recent Labs   Lab 06/09/19 0454   *   CALCIUM 8.5*   ALBUMIN 2.9*   PROT 6.2   *   K 4.3   CO2 19*      BUN 33*   CREATININE 1.3   ALKPHOS 70   ALT 36   AST 33   BILITOT 0.2     Coagulation: No results for input(s): PT, INR, APTT in the last 168 hours.  CRP:   Recent Labs   Lab 06/08/19 2211   .5*     ESR:   Recent Labs   Lab 06/08/19 2211   SEDRATE 87*     Prealbumin: No results for input(s): PREALBUMIN in the last 48 hours.  Wound Cultures:   Recent Labs   Lab 03/29/19  0838   LABAERO STREPTOCOCCUS AGALACTIAE (GROUP B)  Many  Beta-hemolytic streptococci are routinely susceptible to   penicillins,cephalosporins and carbapenems.  Susceptibility testing not routinely performed       Microbiology Results (last 7 days)     Procedure Component Value Units Date/Time    Blood culture #1 [[id number]] Collected:  06/08/19 2205    Order Status:  Sent Specimen:  Blood from Peripheral, Forearm, Left Updated:  06/09/19 0315    Blood culture #2 [[id number]] Collected:  06/08/19 2212    Order Status:  Sent Specimen:  Blood from Peripheral, Hand, Right Updated:  06/09/19 0314    Urine culture [[id number]] Collected:  06/08/19 2342    Order Status:  No result Specimen:  Urine Updated:  06/09/19 0020    Urine culture [[id number]]     Order Status:  Completed Specimen:  Urine, Clean Catch     Blood culture x two cultures. Draw prior to antibiotics. [[id number]]     Order Status:  Canceled Specimen:  Blood     Blood culture x two cultures. Draw prior to antibiotics. [[id number]]     Order Status:  Canceled Specimen:  Blood         Specimen (12h ago, onward)    None          Diagnostic Results:  I have reviewed all pertinent imaging results/findings within the past 24  hours.    Clinical Findings:    Edematous, erythematous 3rd toe of R foot with HPK noted to plantar distal tip and HPK sub 1st met head with ulceration measuring, 0.2x0.2x0.1cm wound, no periwound erythema noted. No purulence noted.               Assessment/Plan:     * Diabetic foot ulcer  Pt 77 y/o female with PMHx significant for DM2 with right 3rd digit infection  Imaging reviewed, OM noted on xray, MRI ordered r/o deep abscess, pending  Discussed conservative vs surgical options for OM with amputation vs at least 6 weeks of IV ABX with no guarantee of resolution. Pt opts for tx with ABX for now  Bone biopsy performed, see procedure note below  Wounds dressed with betadine, 4x4, abd pad, kerlix and loose ACE  Nursing orders in for daily dressing changes, orders placed  Pt PWB to heel only  Podiatry will follow       Sepsis  ABX per primary  Recommend ID consult for OM managment    Acute cystitis without hematuria  Per primary    Type 2 diabetes mellitus with diabetic neuropathy, without long-term current use of insulin  Per primary      Thank you for your consult. I will follow-up with patient. Please contact us if you have any additional questions.     Bone Biopsy Procedure Note:     Surgeon: Dr. Davis    Assisting Surgeon: Dr. Colbert    Pre-op Diagnosis:  Osteomyelitis    Post-op Diagnosis:  Same    Anesthesia: Local anesthesia of 10 cc 1% lidocaine plain as a 3rd toe digital block of the right foot    Description of Procedure:     After written and verbal consent was obtained and skin was prepped with betadine, local anesthetic was achieved per above. Attention was directed to the right 3rd toe. A jam shidi needle was used to enter the toe adjacent to the wound and a bone specimen was obtained, this was sent for path and culture. All bleeding was controlled with pressure. Next, the hyperkeratotic lesion was debrided using a sterile 15 blade to reveal a fibrogranular wound plantar distal right 3rd toe, mild  purulence was noted. Next, a sterile 10 blade was used to debride the hyperkeratotic lesion plantar 1st met head revealing a granular base, superficial wound with no acute SOI noted. The wounds were painted with betadine and dressed as above. Pt tolerated procedure well.     Description of the findings of the procedure: As above    Estimated Blood Loss: < 3 cc          Specimens:   Specimen (12h ago, onward)    Start     Ordered    06/09/19 1020  Specimen to Pathology - Surgery  Once     Comments:  Pre-op Diagnosis: osteomyelitis  Number of specimens: 1Name of specimens: right 3rd toe bone     Start Status     06/09/19 1020  Order ID: [id number]       06/09/19 1020          Ava Colbert MD  Podiatry  [facility]

## 2019-06-09 NOTE — PLAN OF CARE
Problem: Adult Inpatient Plan of Care  Goal: Plan of Care Review  Outcome: Ongoing (interventions implemented as appropriate)  No distress noted. Will continue to monitor

## 2019-06-09 NOTE — PLAN OF CARE
Patient's HR on telemetry in mid 40s while sleeping. HR returns to 50s and 60s when awake. She is asymptomatic. Dr Plasencia notified. Will continue to monitor.

## 2019-06-09 NOTE — NURSING
Bone specimens collected by MDs and sent to lab. No acute distress noted. Will continue to monitor.

## 2019-06-09 NOTE — PLAN OF CARE
Problem: Adult Inpatient Plan of Care  Goal: Plan of Care Review  Outcome: Ongoing (interventions implemented as appropriate)  Plan of care reviewed with patient, understanding verbalized.  SB/SR on tele, mid 40s (while asleep) to 60s. Bed alarm on, call light in reach, fall precautions in place.  Will continue to monitor.

## 2019-06-10 ENCOUNTER — TELEPHONE (OUTPATIENT)
Dept: PODIATRY | Facility: CLINIC | Age: 79
End: 2019-06-10

## 2019-06-10 LAB
ALBUMIN SERPL BCP-MCNC: 3.3 G/DL (ref 3.5–5.2)
ALP SERPL-CCNC: 85 U/L (ref 55–135)
ALT SERPL W/O P-5'-P-CCNC: 38 U/L (ref 10–44)
ANION GAP SERPL CALC-SCNC: 7 MMOL/L (ref 8–16)
AST SERPL-CCNC: 27 U/L (ref 10–40)
BACTERIA UR CULT: NO GROWTH
BASOPHILS # BLD AUTO: 0.02 K/UL (ref 0–0.2)
BASOPHILS NFR BLD: 0.1 % (ref 0–1.9)
BILIRUB SERPL-MCNC: 0.5 MG/DL (ref 0.1–1)
BUN SERPL-MCNC: 19 MG/DL (ref 8–23)
CALCIUM SERPL-MCNC: 9.8 MG/DL (ref 8.7–10.5)
CHLORIDE SERPL-SCNC: 103 MMOL/L (ref 95–110)
CO2 SERPL-SCNC: 25 MMOL/L (ref 23–29)
CREAT SERPL-MCNC: 1.1 MG/DL (ref 0.5–1.4)
DIFFERENTIAL METHOD: ABNORMAL
EOSINOPHIL # BLD AUTO: 0.1 K/UL (ref 0–0.5)
EOSINOPHIL NFR BLD: 0.9 % (ref 0–8)
ERYTHROCYTE [DISTWIDTH] IN BLOOD BY AUTOMATED COUNT: 13.6 % (ref 11.5–14.5)
EST. GFR  (AFRICAN AMERICAN): 55 ML/MIN/1.73 M^2
EST. GFR  (NON AFRICAN AMERICAN): 48 ML/MIN/1.73 M^2
GLUCOSE SERPL-MCNC: 157 MG/DL (ref 70–110)
HCT VFR BLD AUTO: 34.7 % (ref 37–48.5)
HGB BLD-MCNC: 11.5 G/DL (ref 12–16)
LYMPHOCYTES # BLD AUTO: 1.9 K/UL (ref 1–4.8)
LYMPHOCYTES NFR BLD: 11.4 % (ref 18–48)
MCH RBC QN AUTO: 28.9 PG (ref 27–31)
MCHC RBC AUTO-ENTMCNC: 33.1 G/DL (ref 32–36)
MCV RBC AUTO: 87 FL (ref 82–98)
MONOCYTES # BLD AUTO: 1.1 K/UL (ref 0.3–1)
MONOCYTES NFR BLD: 6.5 % (ref 4–15)
NEUTROPHILS # BLD AUTO: 13.3 K/UL (ref 1.8–7.7)
NEUTROPHILS NFR BLD: 80.9 % (ref 38–73)
PLATELET # BLD AUTO: 257 K/UL (ref 150–350)
PMV BLD AUTO: 9.5 FL (ref 9.2–12.9)
POCT GLUCOSE: 176 MG/DL (ref 70–110)
POCT GLUCOSE: 185 MG/DL (ref 70–110)
POCT GLUCOSE: 210 MG/DL (ref 70–110)
POTASSIUM SERPL-SCNC: 4.3 MMOL/L (ref 3.5–5.1)
PROT SERPL-MCNC: 7.1 G/DL (ref 6–8.4)
RBC # BLD AUTO: 3.98 M/UL (ref 4–5.4)
SODIUM SERPL-SCNC: 135 MMOL/L (ref 136–145)
VANCOMYCIN TROUGH SERPL-MCNC: 13 UG/ML (ref 10–22)
WBC # BLD AUTO: 16.42 K/UL (ref 3.9–12.7)

## 2019-06-10 PROCEDURE — A9585 GADOBUTROL INJECTION: HCPCS | Performed by: INTERNAL MEDICINE

## 2019-06-10 PROCEDURE — 80202 ASSAY OF VANCOMYCIN: CPT

## 2019-06-10 PROCEDURE — 63600175 PHARM REV CODE 636 W HCPCS: Performed by: HOSPITALIST

## 2019-06-10 PROCEDURE — 11000001 HC ACUTE MED/SURG PRIVATE ROOM

## 2019-06-10 PROCEDURE — 80053 COMPREHEN METABOLIC PANEL: CPT

## 2019-06-10 PROCEDURE — 63600175 PHARM REV CODE 636 W HCPCS: Performed by: STUDENT IN AN ORGANIZED HEALTH CARE EDUCATION/TRAINING PROGRAM

## 2019-06-10 PROCEDURE — 36415 COLL VENOUS BLD VENIPUNCTURE: CPT

## 2019-06-10 PROCEDURE — 85025 COMPLETE CBC W/AUTO DIFF WBC: CPT

## 2019-06-10 PROCEDURE — 63600175 PHARM REV CODE 636 W HCPCS: Performed by: INTERNAL MEDICINE

## 2019-06-10 PROCEDURE — 25000003 PHARM REV CODE 250: Performed by: STUDENT IN AN ORGANIZED HEALTH CARE EDUCATION/TRAINING PROGRAM

## 2019-06-10 PROCEDURE — 94761 N-INVAS EAR/PLS OXIMETRY MLT: CPT

## 2019-06-10 PROCEDURE — 25500020 PHARM REV CODE 255: Performed by: INTERNAL MEDICINE

## 2019-06-10 PROCEDURE — S0030 INJECTION, METRONIDAZOLE: HCPCS | Performed by: STUDENT IN AN ORGANIZED HEALTH CARE EDUCATION/TRAINING PROGRAM

## 2019-06-10 PROCEDURE — 25000003 PHARM REV CODE 250: Performed by: INTERNAL MEDICINE

## 2019-06-10 RX ORDER — GADOBUTROL 604.72 MG/ML
9 INJECTION INTRAVENOUS
Status: COMPLETED | OUTPATIENT
Start: 2019-06-10 | End: 2019-06-10

## 2019-06-10 RX ORDER — METOPROLOL TARTRATE 1 MG/ML
5 INJECTION, SOLUTION INTRAVENOUS ONCE
Status: COMPLETED | OUTPATIENT
Start: 2019-06-10 | End: 2019-06-10

## 2019-06-10 RX ORDER — LORAZEPAM 2 MG/ML
1 CONCENTRATE ORAL ONCE
Status: DISCONTINUED | OUTPATIENT
Start: 2019-06-10 | End: 2019-06-10

## 2019-06-10 RX ORDER — METOPROLOL SUCCINATE 25 MG/1
25 TABLET, EXTENDED RELEASE ORAL NIGHTLY
Status: DISCONTINUED | OUTPATIENT
Start: 2019-06-10 | End: 2019-06-12 | Stop reason: HOSPADM

## 2019-06-10 RX ORDER — LISINOPRIL 20 MG/1
20 TABLET ORAL DAILY
Status: DISCONTINUED | OUTPATIENT
Start: 2019-06-10 | End: 2019-06-10

## 2019-06-10 RX ORDER — LISINOPRIL 20 MG/1
40 TABLET ORAL DAILY
Status: DISCONTINUED | OUTPATIENT
Start: 2019-06-11 | End: 2019-06-12 | Stop reason: HOSPADM

## 2019-06-10 RX ORDER — HYDROCODONE BITARTRATE AND ACETAMINOPHEN 5; 325 MG/1; MG/1
1 TABLET ORAL ONCE
Status: COMPLETED | OUTPATIENT
Start: 2019-06-10 | End: 2019-06-10

## 2019-06-10 RX ORDER — LORAZEPAM 0.5 MG/1
0.5 TABLET ORAL ONCE AS NEEDED
Status: COMPLETED | OUTPATIENT
Start: 2019-06-10 | End: 2019-06-10

## 2019-06-10 RX ORDER — ONDANSETRON 2 MG/ML
4 INJECTION INTRAMUSCULAR; INTRAVENOUS ONCE AS NEEDED
Status: DISCONTINUED | OUTPATIENT
Start: 2019-06-10 | End: 2019-06-12 | Stop reason: HOSPADM

## 2019-06-10 RX ORDER — CALCIUM CARBONATE 200(500)MG
500 TABLET,CHEWABLE ORAL 2 TIMES DAILY PRN
Status: DISCONTINUED | OUTPATIENT
Start: 2019-06-10 | End: 2019-06-12 | Stop reason: HOSPADM

## 2019-06-10 RX ORDER — LISINOPRIL 20 MG/1
20 TABLET ORAL ONCE
Status: COMPLETED | OUTPATIENT
Start: 2019-06-10 | End: 2019-06-10

## 2019-06-10 RX ORDER — CEFEPIME HYDROCHLORIDE 2 G/50ML
2 INJECTION, SOLUTION INTRAVENOUS
Status: DISCONTINUED | OUTPATIENT
Start: 2019-06-10 | End: 2019-06-11

## 2019-06-10 RX ADMIN — GADOBUTROL 9 ML: 604.72 INJECTION INTRAVENOUS at 09:06

## 2019-06-10 RX ADMIN — METRONIDAZOLE 500 MG: 500 INJECTION, SOLUTION INTRAVENOUS at 10:06

## 2019-06-10 RX ADMIN — CEFEPIME HYDROCHLORIDE 2 G: 2 INJECTION, SOLUTION INTRAVENOUS at 10:06

## 2019-06-10 RX ADMIN — LORAZEPAM 0.5 MG: 0.5 TABLET ORAL at 07:06

## 2019-06-10 RX ADMIN — METOPROLOL SUCCINATE 25 MG: 25 TABLET, FILM COATED, EXTENDED RELEASE ORAL at 05:06

## 2019-06-10 RX ADMIN — METRONIDAZOLE 500 MG: 500 INJECTION, SOLUTION INTRAVENOUS at 05:06

## 2019-06-10 RX ADMIN — HYDROCODONE BITARTRATE AND ACETAMINOPHEN 1 TABLET: 5; 325 TABLET ORAL at 05:06

## 2019-06-10 RX ADMIN — GABAPENTIN 400 MG: 100 CAPSULE ORAL at 08:06

## 2019-06-10 RX ADMIN — GABAPENTIN 400 MG: 100 CAPSULE ORAL at 10:06

## 2019-06-10 RX ADMIN — PRAMIPEXOLE DIHYDROCHLORIDE 0.25 MG: 0.12 TABLET ORAL at 10:06

## 2019-06-10 RX ADMIN — APIXABAN 5 MG: 5 TABLET, FILM COATED ORAL at 08:06

## 2019-06-10 RX ADMIN — APIXABAN 5 MG: 5 TABLET, FILM COATED ORAL at 10:06

## 2019-06-10 RX ADMIN — CALCIUM CARBONATE (ANTACID) CHEW TAB 500 MG 500 MG: 500 CHEW TAB at 07:06

## 2019-06-10 RX ADMIN — LISINOPRIL 20 MG: 20 TABLET ORAL at 09:06

## 2019-06-10 RX ADMIN — LISINOPRIL 20 MG: 20 TABLET ORAL at 06:06

## 2019-06-10 RX ADMIN — CEFEPIME HYDROCHLORIDE 2 G: 2 INJECTION, SOLUTION INTRAVENOUS at 09:06

## 2019-06-10 RX ADMIN — METRONIDAZOLE 500 MG: 500 INJECTION, SOLUTION INTRAVENOUS at 02:06

## 2019-06-10 RX ADMIN — METOPROLOL TARTRATE 5 MG: 1 INJECTION, SOLUTION INTRAVENOUS at 04:06

## 2019-06-10 RX ADMIN — VANCOMYCIN HYDROCHLORIDE 1000 MG: 1 INJECTION, POWDER, LYOPHILIZED, FOR SOLUTION INTRAVENOUS at 12:06

## 2019-06-10 RX ADMIN — INSULIN ASPART 2 UNITS: 100 INJECTION, SOLUTION INTRAVENOUS; SUBCUTANEOUS at 05:06

## 2019-06-10 NOTE — SUBJECTIVE & OBJECTIVE
Past Medical History:   Diagnosis Date    Anticoagulant long-term use     Arthritis     Calcium nephrolithiasis 2007    Diabetes mellitus, type 2     Diabetic peripheral neuropathy associated with type 2 diabetes mellitus     Hypertension        Past Surgical History:   Procedure Laterality Date    AMPUTATION-TOE Right 5/23/2017    Performed by Derek Jose DPM at [facility] OR    COLONOSCOPY  11/28/2011    sigmoid diverticulosis, external hemorrhoids    HYSTERECTOMY      SHOULDER SURGERY Left     TOE AMPUTATION Right 05/22/2017    5th toe       Review of patient's allergies indicates:   Allergen Reactions    Codeine Nausea Only       Medications:  Medications Prior to Admission   Medication Sig    cephALEXin (KEFLEX) 500 MG capsule Take 500 mg by mouth 4 (four) times daily.    gabapentin (NEURONTIN) 400 MG capsule     glimepiride (AMARYL) 1 MG tablet 3 (three) times daily.     lisinopril (PRINIVIL,ZESTRIL) 5 MG tablet Take by mouth once daily.     metoprolol succinate (TOPROL-XL) 25 MG 24 hr tablet Take by mouth once daily.     pramipexole (MIRAPEX) 0.125 MG tablet Take by mouth every evening.     ACCU-CHEK SAMI CONTROL SOLN Soln     ACCU-CHEK SAMI PLUS METER Misc     ACCU-CHEK SAMI PLUS TEST STRP Strp     ACCU-CHEK SOFT DEV LANCETS Kit     ACCU-CHEK SOFTCLIX LANCETS Misc     apixaban (ELIQUIS) 5 mg Tab TAKE 1 TABLET BY MOUTH  TWICE DAILY    apixaban (ELIQUIS) 5 mg Tab TAKE 1 TABLET BY MOUTH TWICE DAILY    FLUZONE HIGH-DOSE 2018-19, PF, 180 mcg/0.5 mL vaccine      Antibiotics (From admission, onward)    Start     Stop Route Frequency Ordered    06/09/19 2300  vancomycin in dextrose 5 % 1 gram/250 mL IVPB 1,000 mg      -- IV Every 24 hours (non-standard times) 06/09/19 1234    06/09/19 1330  metronidazole IVPB 500 mg      -- IV Every 8 hours (non-standard times) 06/09/19 1221    06/09/19 1315  ceFEPIme in dextrose 5% 2 gram/50 mL IVPB 2 g      -- IV Daily 06/09/19 1221         Antifungals (From admission, onward)    None        Antivirals (From admission, onward)    None           Immunization History   Administered Date(s) Administered    Tdap 04/24/2019       Family History     Problem Relation (Age of Onset)    Diabetes Mother    Heart failure Father    Kidney failure Brother        Social History     Socioeconomic History    Marital status:      Spouse name: Not on file    Number of children: Not on file    Years of education: Not on file    Highest education level: Not on file   Occupational History    Not on file   Social Needs    Financial resource strain: Not on file    Food insecurity:     Worry: Not on file     Inability: Not on file    Transportation needs:     Medical: Not on file     Non-medical: Not on file   Tobacco Use    Smoking status: Never Smoker    Smokeless tobacco: Never Used   Substance and Sexual Activity    Alcohol use: No    Drug use: No    Sexual activity: Yes   Lifestyle    Physical activity:     Days per week: Not on file     Minutes per session: Not on file    Stress: Not on file   Relationships    Social connections:     Talks on phone: Not on file     Gets together: Not on file     Attends Baptism service: Not on file     Active member of club or organization: Not on file     Attends meetings of clubs or organizations: Not on file     Relationship status: Not on file   Other Topics Concern    Not on file   Social History Narrative    Not on file     Review of Systems   Constitutional: Positive for activity change, fatigue and fever. Negative for appetite change, chills and diaphoresis.   HENT: Negative.    Eyes: Negative.    Respiratory: Negative.    Cardiovascular: Negative.    Gastrointestinal: Negative.    Endocrine: Negative.    Genitourinary: Negative.    Musculoskeletal: Negative.    Skin: Positive for rash.   Allergic/Immunologic: Positive for immunocompromised state.   Neurological: Positive for numbness.    Hematological: Negative.    Psychiatric/Behavioral: Negative.      Objective:     Vital Signs (Most Recent):  Temp: 97.6 °F (36.4 °C) (06/09/19 1935)  Pulse: 79 (06/09/19 2028)  Resp: 16 (06/09/19 2028)  BP: (!) 168/72 (06/09/19 1935)  SpO2: 97 % (06/09/19 2028) Vital Signs (24h Range):  Temp:  [96.9 °F (36.1 °C)-102.7 °F (39.3 °C)] 97.6 °F (36.4 °C)  Pulse:  [43-84] 79  Resp:  [14-20] 16  SpO2:  [94 %-97 %] 97 %  BP: (119-177)/(51-73) 168/72     Weight: 83.2 kg (183 lb 6.8 oz)  Body mass index is 33.55 kg/m².    Estimated Creatinine Clearance: 35.1 mL/min (based on SCr of 1.3 mg/dL).    Physical Exam   Constitutional: She is oriented to person, place, and time. She appears well-developed and well-nourished.   HENT:   Head: Atraumatic.   Eyes: EOM are normal.   Neck: Normal range of motion. Neck supple.   Cardiovascular: Normal rate and regular rhythm.   Pulmonary/Chest: Effort normal and breath sounds normal.   Abdominal: She exhibits distension.   obese   Musculoskeletal: Normal range of motion.   Neurological: She is alert and oriented to person, place, and time.   Skin: There is erythema.   3rd toe of the right foot - erythematous with small ulcer or plantar surface - viewed by pictures from podiatry        Significant Labs: All pertinent labs within the past 24 hours have been reviewed.    Significant Imaging: I have reviewed all pertinent imaging results/findings within the past 24 hours.

## 2019-06-10 NOTE — PLAN OF CARE
Problem: Adult Inpatient Plan of Care  Goal: Plan of Care Review  Outcome: Ongoing (interventions implemented as appropriate)  Plan of care reviewed with patient, understanding verbalized.  Pt remains SR on tele. No complaints overnight. Bed alarm on, call light in reach, fall precautions in place.

## 2019-06-10 NOTE — PLAN OF CARE
Problem: Adult Inpatient Plan of Care  Goal: Plan of Care Review  Outcome: Ongoing (interventions implemented as appropriate)     06/09/19 1934   Plan of Care Review   Plan of Care Reviewed With patient   Plan of care reviewed with patient. Verbal reported of understanding. AAOx4. SB to NSR on monitor with no red alarms noted. Free of falls. Blood glucose and blood pressure closely monitored. MD aware of patient's heart rates. Antibiotics given as ordered. Denies of pain at this time. No acute distress noted. Bed in lowest  position. Wheels locked. Bed alarm set. Head of bed elevated. Call bell within reach. Side rails x 2 are up. Patient will be monitoreed overnight.

## 2019-06-10 NOTE — PROGRESS NOTES
"[facility]  Infectious Disease  Progress Note    Patient Name: Caro Powell  MRN: [id number]  Admission Date: 6/8/2019  Length of Stay: 1 days  Attending Physician: Gabriel Herring MD  Primary Care Provider: Manuel Laird MD    Isolation Status: No active isolations  Assessment/Plan:      * Diabetic foot ulcer  79 YO F with DM who came with fever, right 3rd toe pain, redness and swelling. With likely osteomyelitis of the 3rd toe of right foot, as seen by plain film on 6/8.  S/P Bone biopsy and cultures by podiatry 6/9.    Recommendations:  ---Continue Vancomycin as per CrCl as per pharmacy dosing  --- Continue Metronidazole for anaerobic coverage and Cefepime but will adjust dose to 2gm IV Q12hrs for gram negative coverage as per today's CrC of 41.  --- Will follow 6/9 bone cx with you. If possible, MRI will be a better choice to confirm osteomyelitis and to evaluate for any abscess as also recommended by podiatry    ID will follow        Anticipated Disposition: ID will follow    Thank you for your consult. I will follow-up with patient. Please contact us if you have any additional questions.    Sierra Quintero MD  Infectious Disease-Fellow  [facility]    Subjective:     Principal Problem:Diabetic foot ulcer    HPI: Caro Powell is a 78 y.o.  female who  has a past medical history of Calcium nephrolithiasis (2007), Diabetes mellitus, type 2, Diabetic peripheral neuropathy associated with type 2 diabetes mellitus, and Hypertension.. The patient presented to [facility] on 6/8/2019 with a primary complaint of Foot Pain (right foot pain 2-3 days (as per ED documentation), reports hx of DM, ems reports sore to right great toe and "popped blister" to right 4th toe. reports sees a podiatrist. )     History was limited as patient was sleepy during the exam 2/2 receiving pain meds. The patient was in their usual state of health until about 3 " days ago when patient noted that her R third toe began to swell, become red, hot and painful.  Patient states that she had an appointment with the podiatrist on Monday however the pain was so unbearable that she wanted to have it evaluated.  Patient also endorses 1 day of subjective fever and chills.      Had Xray of Right foot on 6/8: Fourth toe soft tissue swelling concerning for cellulitis with associated interval erosion of the distal 3rd distal phalanx concerning for sequela of underlying osteomyelitis.  6/9 Seen by ID for abx assistance - seen already by podiatry who performed a 3rd toe bone bx. Podiatry mentioned MRI, not ordered yet. Bone biopsy 6/9  6/10 on Cefepime, Vanc and Metro. Adjusted dose Cefepime, WBC high. MRI reccomended  Interval History: No N/V/D    Review of Systems   Constitutional: Positive for activity change and fatigue. Negative for appetite change, chills, diaphoresis and fever.   HENT: Negative.    Eyes: Negative.    Respiratory: Negative.    Cardiovascular: Negative.    Gastrointestinal: Negative.    Endocrine: Negative.    Genitourinary: Negative.    Musculoskeletal: Negative.    Skin: Positive for redness.   Allergic/Immunologic: Positive for immunocompromised state.   Neurological: Positive for numbness.   Hematological: Negative.    Psychiatric/Behavioral: Negative.      Objective:     Vital Signs (Most Recent):  Temp: 97.5 °F (36.4 °C) (06/10/19 0343)  Pulse: 80 (06/10/19 0453)  Resp: 16 (06/10/19 0438)  BP: (!) 182/82 (06/10/19 0453)  SpO2: 99 % (06/10/19 0438) Vital Signs (24h Range):  Temp:  [97 °F (36.1 °C)-98.2 °F (36.8 °C)] 97.5 °F (36.4 °C)  Pulse:  [43-84] 80  Resp:  [16-20] 16  SpO2:  [94 %-99 %] 99 %  BP: (149-214)/(63-82) 182/82     Weight: 81.9 kg (180 lb 8.9 oz)  Body mass index is 33.02 kg/m².    Estimated Creatinine Clearance: 34.8 mL/min (based on SCr of 1.3 mg/dL).    Physical Exam   Constitutional: She is oriented to person, place, and time. She appears  well-developed and well-nourished.   HENT:   Head: Normocephalic and atraumatic.   Eyes: EOM are normal. Right eye exhibits no discharge. Left eye exhibits no discharge.   Neck: Normal range of motion. Neck supple.   Cardiovascular: Normal rate and irregular rhythm. +Murmur  Pulmonary/Chest: Effort normal and breath sounds normal. She has no wheezes.   Abdominal: Soft. Bowel sounds are normal. She exhibits no distension. There is no tenderness.   obese   Musculoskeletal: Normal range of motion.   Lymphadenopathy:     She has no cervical adenopathy.   Neurological: She is alert and oriented to person, place, and time.   Skin: There is erythema.   3rd toe of the right foot - erythematous, + swelling with small ulcer on plantar surface. Scab, skin defect seen on plantar surface of 1st MT area of R foot. S/P 5th toe amputated. Healed, no wound       Significant Labs:   Microbiology Results (last 7 days)     Procedure Component Value Units Date/Time    Urine culture [[id number]] Collected:  06/08/19 2342    Order Status:  Completed Specimen:  Urine Updated:  06/10/19 0238     Urine Culture, Routine --     GRAM NEGATIVE JENNIE  10,000 - 49,999 cfu/ml  Identification and susceptibility pending  No other significant isolate      Aerobic culture [[id number]] Collected:  06/09/19 1111    Order Status:  Sent Specimen:  Bone from Toe, Right Foot Updated:  06/09/19 1650    Culture, Anaerobe [[id number]] Collected:  06/09/19 1111    Order Status:  Sent Specimen:  Bone from Toe, Right Foot Updated:  06/09/19 1650    Urine culture [[id number]] Collected:  06/09/19 1520    Order Status:  No result Specimen:  Urine Updated:  06/09/19 1649    Blood culture #1 [[id number]] Collected:  06/08/19 2205    Order Status:  Completed Specimen:  Blood from Peripheral, Forearm, Left Updated:  06/09/19 1115     Blood Culture, Routine No Growth to date    Narrative:       Blood Culture #1    Blood culture #2 [[id number]] Collected:  06/08/19 2212    Order  Status:  Completed Specimen:  Blood from Peripheral, Hand, Right Updated:  06/09/19 1115     Blood Culture, Routine No Growth to date    Narrative:       Blood Culture #2    Urine culture [[id number]]     Order Status:  Completed Specimen:  Urine, Clean Catch     Blood culture x two cultures. Draw prior to antibiotics. [[id number]]     Order Status:  Canceled Specimen:  Blood     Blood culture x two cultures. Draw prior to antibiotics. [[id number]]     Order Status:  Canceled Specimen:  Blood         All pertinent labs within the past 24 hours have been reviewed.    Significant Imaging: I have reviewed all pertinent imaging results/findings within the past 24 hours.

## 2019-06-10 NOTE — ASSESSMENT & PLAN NOTE
77 YO F with DM who came with fever, right 3rd toe pain, redness and swelling. With likely osteomyelitis of the 3rd toe of right foot, as seen by plain film on 6/8.  S/P Bone biopsy and cultures by podiatry 6/9.    Recommendations:  ---Continue Vancomycin as per CrCl as per pharmacy dosing  --- Continue Metronidazole for anaerobic coverage and Cefepime but will adjust dose to 2gm IV Q12hrs for gram negative coverage as per today's CrC of 41.  --- Will follow 6/9 bone cx with you. If possible, MRI will be a better choice to confirm osteomyelitis and to evaluate for any abscess as also recommended by podiatry    ID will follow

## 2019-06-10 NOTE — ASSESSMENT & PLAN NOTE
Patient with likely osteo of the 3rd toe as seen by plain film.  MRI could help better make this diagnosis but it is unclear if the patient with consent to this.  Either way, many thanks to podiatry for bone biopsy.  Agree for the choice of abx - vanc/cefepime and flagyl for now - will await culture results

## 2019-06-10 NOTE — PLAN OF CARE
Notified [facility] med team of pt's /80 after dose of lisinopril given early and IV metoprolol given earlier as well.  Pt is extremely nervous about possibly having MRI.  No intervention at this time.

## 2019-06-10 NOTE — HPI
"Caro Powell is a 78 y.o.  female who  has a past medical history of Calcium nephrolithiasis (2007), Diabetes mellitus, type 2, Diabetic peripheral neuropathy associated with type 2 diabetes mellitus, and Hypertension.. The patient presented to [facility] on 6/8/2019 with a primary complaint of Foot Pain (right foot pain 2-3 days (as per ED documentation), reports hx of DM, ems reports sore to right great toe and "popped blister" to right 4th toe. reports sees a podiatrist. )     History was limited as patient was sleepy during the exam 2/2 receiving pain meds. The patient was in their usual state of health until about 3 days ago when patient noted that her R third toe began to swell, become red, hot and painful.  Patient states that she had an appointment with the podiatrist on Monday however the pain was so unbearable that she wanted to have it evaluated.  Patient also endorses 1 day of subjective fever and chills.      Had Xray of Right foot on 6/8: Fourth toe soft tissue swelling concerning for cellulitis with associated interval erosion of the distal 3rd distal phalanx concerning for sequela of underlying osteomyelitis.  6/9 Seen by ID for abx assistance - seen already by podiatry who performed a 3rd toe bone bx. Podiatry mentioned MRI, not ordered yet. Bone biopsy 6/9  6/10 on Cefepime, Vanc and Metro. Adjusted dose Cefepime, WBC high. MRI recommended  Later bone cx with [facility] and Urine cx low CFU of GNR  "

## 2019-06-10 NOTE — PLAN OF CARE
VN note: VN cued into pt's room for introduction. VN informed pt that VN would be working closely along side bedside nurse, PCT, and the rest of care team and making rounds throughout the shift. Pt verbalized understanding. Allowed time for questions. Patient denies any pain or discomfort at this time.   Patient educated on safety to call before getting up, because we don't want the patient to fall and harm themselves in any way.  VN will continue to be available to patient and intervene prn.        06/10/19 0979   Type of Frequent Check   Type Patient Rounds  (VN rounding)   Safety/Activity   Patient Rounds visualized patient;call light in patient/parent reach   Safety Promotion/Fall Prevention side rails raised x 3   Safety Precautions emergency equipment at bedside   Positioning   Body Position supine   Head of Bed (HOB) HOB at 30-45 degrees   Pain/Comfort/Sleep   Preferred Pain Scale number (Numeric Rating Pain Scale)   Comfort/Acceptable Pain Level 0   Pain Rating (0-10): Rest 0   Pain Rating (0-10): Activity 0        Cardiac/Telemetry Details / Alarms   Cardiac/Telemetry Monitor On Yes   Cardiac/Telemetry Audible Yes   Cardiac/Telemetry Alarms Set Yes   Assessments (Pre/Post)   Level of Consciousness (AVPU) alert

## 2019-06-10 NOTE — PROGRESS NOTES
[facility] Medicine Progress Note     Admitting Team: [facility] Hospitalist Team B   Attending Physician: Gabriel Herring MD  Resident: Elijah   Intern: Tommy    Date of Admit: 2019      Subjective:      Patient states that she has been nervous all night about receiving an MRI. States that she did not sleep well overnight due to this. Denies pain at site of osteo. Denies myaglias, N/V/D, CP or SOB.      Objective:   Last 24 Hour Vital Signs:  BP  Min: 162/68  Max: 214/80  Temp  Av.5 °F (36.4 °C)  Min: 97 °F (36.1 °C)  Max: 98.2 °F (36.8 °C)  Pulse  Av.2  Min: 61  Max: 84  Resp  Av.5  Min: 16  Max: 20  SpO2  Av.8 %  Min: 94 %  Max: 99 %  Weight  Av.5 kg (181 lb 15.8 oz)  Min: 81.9 kg (180 lb 8.9 oz)  Max: 83.2 kg (183 lb 6.8 oz)  I/O last 3 completed shifts:  In: 3060 [P.O.:1510; IV Piggyback:1550]  Out: 3620 [Urine:3620]    Physical Examination:   General: Patient sitting up in bed awake. In NAD.  Head: Size and shape wnl. Atraumatic.  Eyes: No scleral icterus. No conjunctival injection.  Mouth/Oropharynx: MMM. No tonsillar exudates; no erythema.   CV: RRR with no rubs murmurs s3 or s4.   Resp: CTAB with no crackles, rhonchi or wheezing.    Abdomen: BSx4. Soft non-tender without guarding.   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 Also with ulcer on heel of foot ~.5cm in diameter  Neuro: AAOx3. Moving all limbs appropriately.   Psych: Affect: Normal. Thought process appropriate.    Laboratory:    Trended Lab Data:  Recent Labs   Lab 19  2211 19  0454 06/10/19  0634   WBC 16.76* 15.06* 16.42*   HGB 12.2 10.4* 11.5*   HCT 36.8* 31.1* 34.7*    226 257   MCV 89 89 87   RDW 13.6 13.9 13.6   * 135* 135*   K 4.9 4.3 4.3    108 103   CO2 22* 19* 25   BUN 37* 33* 19   CREATININE 1.5* 1.3 1.1   * 164* 157*   PROT 7.4 6.2 7.1   ALBUMIN 3.6 2.9* 3.3*   BILITOT 0.4 0.2 0.5   AST 36 33 27   ALKPHOS 86 70 85   ALT  37 36 38           Microbiology Data:  6/9  Aerobic cx: IP  Anaerobe cx: NGTDx1  Urine culture IP    6/8  Blood cx1 NGTDx2  Blood cx2: NGTD x2  Urine culture: GNR, susceptibilities pending      Other Results:  EKG (my interpretation): None new    Radiology:  Imaging Results          X-Ray Chest AP Portable (Final result)  Result time 06/08/19 23:06:15    Final result by Nino Ambrosio MD (06/08/19 23:06:15)                 Impression:      Improving aeration with mild presumed atelectasis.  No edema or pneumothorax.      Electronically signed by: Nino Ambrosio  Date:    06/08/2019  Time:    23:06             Narrative:    EXAMINATION:  XR CHEST AP PORTABLE    CLINICAL HISTORY:  Sepsis;    TECHNIQUE:  Single frontal view of the chest was performed.    COMPARISON:  06/05/2017    FINDINGS:  Single AP portable view at 22:56.    Heart is mildly enlarged unchanged.  Is mild perihilar streaky opacities likely subsegmental atelectasis.  There is improved aeration at the left base.  No interstitial edema.  No pneumothorax or discrete pleural effusion or nodule.  The trachea appears normal.  No abdominal free air.  There are overlying leads.  There are suture anchors in the right humeral head                                X-Ray Foot Complete Right (Final result)  Result time 06/08/19 21:57:44    Final result by Randolph Saini MD (06/08/19 21:57:44)                 Impression:      Fourth toe soft tissue swelling concerning for cellulitis with associated interval erosion of the distal 3rd distal phalanx concerning for sequela of underlying osteomyelitis.    This report was flagged in Epic as abnormal.      Electronically signed by: Randolph Saini MD  Date:    06/08/2019  Time:    21:57             Narrative:    EXAMINATION:  XR FOOT COMPLETE 3 VIEW RIGHT    CLINICAL HISTORY:  . Type 2 diabetes mellitus with foot ulcer    TECHNIQUE:  AP, lateral, and oblique views of the right foot were performed.    COMPARISON:  Right foot  series 04/24/2019    FINDINGS:  Fifth toe is absent.  No displaced fracture or dislocation seen.  There is erosion of the distal most portion of the 3rd distal phalanx with associated overlying soft tissue swelling concerning for cellulitis with underlying osteomyelitis.    Lisfranc articulation is congruent.  Mild degenerative change at the 1st MTP joint and 2nd 3rd and 4th DIP joints.  Moderate to advanced degenerative changes at the dorsal midfoot.  Plantar calcaneal spur and enthesophyte at the Achilles insertion site.  No subcutaneous emphysema or radiodense retained foreign body.                                Current Medications:     Infusions:       Scheduled:   apixaban  5 mg Oral BID    ceFEPime (MAXIPIME) IVPB  2 g Intravenous Q12H    gabapentin  400 mg Oral BID    lisinopril  20 mg Oral Daily    metoprolol succinate  25 mg Oral QHS    metronidazole  500 mg Intravenous Q8H    pramipexole  0.25 mg Oral QHS    vancomycin (VANCOCIN) IVPB  1,000 mg Intravenous Q24H        PRN:  cadexomer iodine, Dextrose 10% Bolus, Dextrose 10% Bolus, glucagon (human recombinant), glucose, glucose, insulin aspart U-100, ondansetron, sodium chloride 0.9%    Antibiotics and Day Number of Therapy:  Vanc 6/9 - current  Flagyl 6/9 - current  Cefepime 6/9 - current    Lines and Day Number of Therapy:  piv         Assessment:     Caro Powell is a 78 y.o. female with   Plan:         Diabetic Foot Ulcer with Cellulitis concern for Osteomyelitis  -patient with 3 days R third toe swelling, erythema and pain associated with fever and chills x1 days  -leukocytosis of 16.7  fever 102  ESR 87  .5  -lactate and procal negative   -Xray Foot with 4th toe soft tissue swelling concern for cellulitis and concern for osteo on 3rd distal phalanx  -MRI ordered to evaluate  -will consult general surgery for bone bx to guide antibiotic regimen  -given patient was febrile in ED, patient was given dose of rocephin and  vanc  6/10  Started patient on cefepime, flagyl and vanc on 6/9. Received bone bx on 69. ID recommending MRI. Awaiting cx results     Asymptomatic Bacteriuria   -patient with + leukocytes and nitrates in urine  -fever, leukocytosis and inflammatory markers as above  -no urinary complaints  -treated with rocephin as above      DM with diabetic neuropathy   -last A1C 5/18 8.3  will reorder  -takes glimepiride at home   -SSI and accuchecks while inpatient   -will give home gabapentin     Afib  -rate controlled with metoprolol and anticoagulated with eliquis  will continue     HTN  -takes metoprolol at home  will continue while inpatient  6/10  BP overnight and in AM was in 180-190s, changed lisinopril dose to 40 mg.       Cheng Sanders,   [facility], HO-I  [facility] Hospitalist Team B     [facility] Medicine Hospitalist Pager numbers:   [facility] Hospitalist Medicine Team A (Gerard/Pancho): [phone]  [facility] Hospitalist Medicine Team B (Kylee/Nima):  [phone]

## 2019-06-10 NOTE — SUBJECTIVE & OBJECTIVE
Interval History: No N/V/D    Review of Systems   Constitutional: Positive for activity change and fatigue. Negative for appetite change, chills, diaphoresis and fever.   HENT: Negative.    Eyes: Negative.    Respiratory: Negative.    Cardiovascular: Negative.    Gastrointestinal: Negative.    Endocrine: Negative.    Genitourinary: Negative.    Musculoskeletal: Negative.    Skin: Positive for rash.   Allergic/Immunologic: Positive for immunocompromised state.   Neurological: Positive for numbness.   Hematological: Negative.    Psychiatric/Behavioral: Negative.      Objective:     Vital Signs (Most Recent):  Temp: 97.5 °F (36.4 °C) (06/10/19 0343)  Pulse: 80 (06/10/19 0453)  Resp: 16 (06/10/19 0438)  BP: (!) 182/82 (06/10/19 0453)  SpO2: 99 % (06/10/19 0438) Vital Signs (24h Range):  Temp:  [97 °F (36.1 °C)-98.2 °F (36.8 °C)] 97.5 °F (36.4 °C)  Pulse:  [43-84] 80  Resp:  [16-20] 16  SpO2:  [94 %-99 %] 99 %  BP: (149-214)/(63-82) 182/82     Weight: 81.9 kg (180 lb 8.9 oz)  Body mass index is 33.02 kg/m².    Estimated Creatinine Clearance: 34.8 mL/min (based on SCr of 1.3 mg/dL).    Physical Exam   Constitutional: She is oriented to person, place, and time. She appears well-developed and well-nourished.   HENT:   Head: Normocephalic and atraumatic.   Eyes: EOM are normal. Right eye exhibits no discharge. Left eye exhibits no discharge.   Neck: Normal range of motion. Neck supple.   Cardiovascular: Normal rate and regular rhythm.   Pulmonary/Chest: Effort normal and breath sounds normal. She has no wheezes.   Abdominal: Soft. Bowel sounds are normal. She exhibits no distension. There is no tenderness.   obese   Musculoskeletal: Normal range of motion.   Lymphadenopathy:     She has no cervical adenopathy.   Neurological: She is alert and oriented to person, place, and time.   Skin: There is erythema.   3rd toe of the right foot - erythematous, + swelling with small ulcer on plantar surface. Scab, skin defect seen on  plantar surface of 1st MT area of R foot. S/P 5th toe amputated. Healed, no wound       Significant Labs:   Microbiology Results (last 7 days)     Procedure Component Value Units Date/Time    Urine culture [[id number]] Collected:  06/08/19 2342    Order Status:  Completed Specimen:  Urine Updated:  06/10/19 0238     Urine Culture, Routine --     GRAM NEGATIVE JENNIE  10,000 - 49,999 cfu/ml  Identification and susceptibility pending  No other significant isolate      Aerobic culture [[id number]] Collected:  06/09/19 1111    Order Status:  Sent Specimen:  Bone from Toe, Right Foot Updated:  06/09/19 1650    Culture, Anaerobe [[id number]] Collected:  06/09/19 1111    Order Status:  Sent Specimen:  Bone from Toe, Right Foot Updated:  06/09/19 1650    Urine culture [[id number]] Collected:  06/09/19 1520    Order Status:  No result Specimen:  Urine Updated:  06/09/19 1649    Blood culture #1 [[id number]] Collected:  06/08/19 2205    Order Status:  Completed Specimen:  Blood from Peripheral, Forearm, Left Updated:  06/09/19 1115     Blood Culture, Routine No Growth to date    Narrative:       Blood Culture #1    Blood culture #2 [[id number]] Collected:  06/08/19 2212    Order Status:  Completed Specimen:  Blood from Peripheral, Hand, Right Updated:  06/09/19 1115     Blood Culture, Routine No Growth to date    Narrative:       Blood Culture #2    Urine culture [[id number]]     Order Status:  Completed Specimen:  Urine, Clean Catch     Blood culture x two cultures. Draw prior to antibiotics. [[id number]]     Order Status:  Canceled Specimen:  Blood     Blood culture x two cultures. Draw prior to antibiotics. [[id number]]     Order Status:  Canceled Specimen:  Blood         All pertinent labs within the past 24 hours have been reviewed.    Significant Imaging: I have reviewed all pertinent imaging results/findings within the past 24 hours.

## 2019-06-10 NOTE — CONSULTS
"[facility]  Infectious Disease  Consult Note    Patient Name: Caro Powell  MRN: [id number]  Admission Date: 6/8/2019  Hospital Length of Stay: 0 days  Attending Physician: Gabriel Herring MD  Primary Care Provider: Manuel Laird MD     Isolation Status: No active isolations    Patient information was obtained from patient and ER records.      Consults  Assessment/Plan:     * Diabetic foot ulcer  Patient with likely osteo of the 3rd toe as seen by plain film.  MRI could help better make this diagnosis but it is unclear if the patient with consent to this.  Either way, many thanks to podiatry for bone biopsy.  Agree for the choice of abx - vanc/cefepime and flagyl for now - will await culture results         Thank you for your consult. I will follow-up with patient. Please contact us if you have any additional questions.    Keegan Soto MD  Infectious Disease  [facility]    Subjective:     Principal Problem: Diabetic foot ulcer    HPI: Caro Powell is a 78 y.o.  female who  has a past medical history of Calcium nephrolithiasis (2007), Diabetes mellitus, type 2, Diabetic peripheral neuropathy associated with type 2 diabetes mellitus, and Hypertension.. The patient presented to [facility] on 6/8/2019 with a primary complaint of Foot Pain (right foot pain X"a couple of days", reports hx of DM, ems reports sore to right great toe and "popped blister" to right 4th toe. reports sees a podiatrist. )     History was limited as patient was sleepy during the exam 2/2 receiving pain meds. The patient was in their usual state of health until about 3 days ago when patient noted that her R third toe began to swell, become red, hot and painful.  Patient states that she had an appointment with the podiatrist on Monday however the pain was so unbearable that she wanted to have it evaluated.  Patient also endorses 1 day of subjective fever and " chills.      Seen by ID for abx assistance - seen already by podiatry who performed a 3rd toe bone bx     Past Medical History:   Diagnosis Date    Anticoagulant long-term use     Arthritis     Calcium nephrolithiasis 2007    Diabetes mellitus, type 2     Diabetic peripheral neuropathy associated with type 2 diabetes mellitus     Hypertension        Past Surgical History:   Procedure Laterality Date    AMPUTATION-TOE Right 5/23/2017    [facility]    COLONOSCOPY  11/28/2011    sigmoid diverticulosis, external hemorrhoids    HYSTERECTOMY      SHOULDER SURGERY Left     TOE AMPUTATION Right 05/22/2017    5th toe       Review of patient's allergies indicates:   Allergen Reactions    Codeine Nausea Only       Medications:  Medications Prior to Admission   Medication Sig    cephALEXin (KEFLEX) 500 MG capsule Take 500 mg by mouth 4 (four) times daily.    gabapentin (NEURONTIN) 400 MG capsule     glimepiride (AMARYL) 1 MG tablet 3 (three) times daily.     lisinopril (PRINIVIL,ZESTRIL) 5 MG tablet Take by mouth once daily.     metoprolol succinate (TOPROL-XL) 25 MG 24 hr tablet Take by mouth once daily.     pramipexole (MIRAPEX) 0.125 MG tablet Take by mouth every evening.     ACCU-CHEK SAMI CONTROL SOLN Soln     ACCU-CHEK SAMI PLUS METER Misc     ACCU-CHEK SAMI PLUS TEST STRP Strp     ACCU-CHEK SOFT DEV LANCETS Kit     ACCU-CHEK SOFTCLIX LANCETS Misc     apixaban (ELIQUIS) 5 mg Tab TAKE 1 TABLET BY MOUTH  TWICE DAILY    apixaban (ELIQUIS) 5 mg Tab TAKE 1 TABLET BY MOUTH TWICE DAILY    FLUZONE HIGH-DOSE 2018-19, PF, 180 mcg/0.5 mL vaccine      Antibiotics (From admission, onward)    Start     Stop Route Frequency Ordered    06/09/19 2300  vancomycin in dextrose 5 % 1 gram/250 mL IVPB 1,000 mg      -- IV Every 24 hours (non-standard times) 06/09/19 1234    06/09/19 1330  metronidazole IVPB 500 mg      -- IV Every 8 hours (non-standard times) 06/09/19 1221     06/09/19 1315  ceFEPIme in dextrose 5% 2 gram/50 mL IVPB 2 g      -- IV Daily 06/09/19 1221        Antifungals (From admission, onward)    None        Antivirals (From admission, onward)    None           Immunization History   Administered Date(s) Administered    Tdap 04/24/2019       Family History     Problem Relation (Age of Onset)    Diabetes Mother    Heart failure Father    Kidney failure Brother        Social History     Socioeconomic History    Marital status:      Spouse name: Not on file    Number of children: Not on file    Years of education: Not on file    Highest education level: Not on file   Occupational History    Not on file   Social Needs    Financial resource strain: Not on file    Food insecurity:     Worry: Not on file     Inability: Not on file    Transportation needs:     Medical: Not on file     Non-medical: Not on file   Tobacco Use    Smoking status: Never Smoker    Smokeless tobacco: Never Used   Substance and Sexual Activity    Alcohol use: No    Drug use: No    Sexual activity: Yes   Lifestyle    Physical activity:     Days per week: Not on file     Minutes per session: Not on file    Stress: Not on file   Relationships    Social connections:     Talks on phone: Not on file     Gets together: Not on file     Attends Rastafari service: Not on file     Active member of club or organization: Not on file     Attends meetings of clubs or organizations: Not on file     Relationship status: Not on file   Other Topics Concern    Not on file   Social History Narrative    Not on file     Review of Systems   Constitutional: Positive for activity change, fatigue and fever. Negative for appetite change, chills and diaphoresis.   HENT: Negative.    Eyes: Negative.    Respiratory: Negative.    Cardiovascular: Negative.    Gastrointestinal: Negative.    Endocrine: Negative.    Genitourinary: Negative.    Musculoskeletal: Negative.    Skin: Positive for rash.    Allergic/Immunologic: Positive for immunocompromised state.   Neurological: Positive for numbness.   Hematological: Negative.    Psychiatric/Behavioral: Negative.      Objective:     Vital Signs (Most Recent):  Temp: 97.6 °F (36.4 °C) (06/09/19 1935)  Pulse: 79 (06/09/19 2028)  Resp: 16 (06/09/19 2028)  BP: (!) 168/72 (06/09/19 1935)  SpO2: 97 % (06/09/19 2028) Vital Signs (24h Range):  Temp:  [96.9 °F (36.1 °C)-102.7 °F (39.3 °C)] 97.6 °F (36.4 °C)  Pulse:  [43-84] 79  Resp:  [14-20] 16  SpO2:  [94 %-97 %] 97 %  BP: (119-177)/(51-73) 168/72     Weight: 83.2 kg (183 lb 6.8 oz)  Body mass index is 33.55 kg/m².    Estimated Creatinine Clearance: 35.1 mL/min (based on SCr of 1.3 mg/dL).    Physical Exam   Constitutional: She is oriented to person, place, and time. She appears well-developed and well-nourished.   HENT:   Head: Atraumatic.   Eyes: EOM are normal.   Neck: Normal range of motion. Neck supple.   Cardiovascular: Normal rate and regular rhythm.   Pulmonary/Chest: Effort normal and breath sounds normal.   Abdominal: She exhibits distension.   obese   Musculoskeletal: Normal range of motion.   Neurological: She is alert and oriented to person, place, and time.   Skin: There is erythema.   3rd toe of the right foot - erythematous with small ulcer or plantar surface - viewed by pictures from podiatry        Significant Labs: All pertinent labs within the past 24 hours have been reviewed.    Significant Imaging: I have reviewed all pertinent imaging results/findings within the past 24 hours.

## 2019-06-10 NOTE — TELEPHONE ENCOUNTER
----- Message from Conor Richardson sent at 6/10/2019  9:02 AM CDT -----  Contact: [phone]/self  Patient calling to let you know she's in the hospital

## 2019-06-10 NOTE — PLAN OF CARE
Pt's /87 despite medication given previously.  Dr. Obando notified.  MD to put in order for nightly dose of metoprolol to be given now. Will follow orders and recheck BP.

## 2019-06-10 NOTE — PLAN OF CARE
"Patients BP initially came down to 160s after IV metoprolol. Rechecked 30 minutes later and systolic pressure is 182.  Patient had been denying pain most of night but now states that she is having 6/10 pain.  She expressed concern that taking pain medication would "knock me out like before." Dr Galeas notified, hydrocodone ordered. Will recheck BP after medication has some time to work.   "

## 2019-06-11 ENCOUNTER — TELEPHONE (OUTPATIENT)
Dept: PODIATRY | Facility: CLINIC | Age: 79
End: 2019-06-11

## 2019-06-11 LAB
ALBUMIN SERPL BCP-MCNC: 3.1 G/DL (ref 3.5–5.2)
ALP SERPL-CCNC: 77 U/L (ref 55–135)
ALT SERPL W/O P-5'-P-CCNC: 32 U/L (ref 10–44)
ANION GAP SERPL CALC-SCNC: 12 MMOL/L (ref 8–16)
AST SERPL-CCNC: 27 U/L (ref 10–40)
BACTERIA SPEC AEROBE CULT: NORMAL
BACTERIA UR CULT: NORMAL
BASOPHILS # BLD AUTO: 0.03 K/UL (ref 0–0.2)
BASOPHILS NFR BLD: 0.3 % (ref 0–1.9)
BILIRUB SERPL-MCNC: 0.3 MG/DL (ref 0.1–1)
BUN SERPL-MCNC: 19 MG/DL (ref 8–23)
CALCIUM SERPL-MCNC: 9.8 MG/DL (ref 8.7–10.5)
CHLORIDE SERPL-SCNC: 104 MMOL/L (ref 95–110)
CO2 SERPL-SCNC: 18 MMOL/L (ref 23–29)
CREAT SERPL-MCNC: 1 MG/DL (ref 0.5–1.4)
DIFFERENTIAL METHOD: ABNORMAL
EOSINOPHIL # BLD AUTO: 0.2 K/UL (ref 0–0.5)
EOSINOPHIL NFR BLD: 1.9 % (ref 0–8)
ERYTHROCYTE [DISTWIDTH] IN BLOOD BY AUTOMATED COUNT: 13.5 % (ref 11.5–14.5)
EST. GFR  (AFRICAN AMERICAN): >60 ML/MIN/1.73 M^2
EST. GFR  (NON AFRICAN AMERICAN): 54 ML/MIN/1.73 M^2
GLUCOSE SERPL-MCNC: 144 MG/DL (ref 70–110)
HCT VFR BLD AUTO: 34.9 % (ref 37–48.5)
HGB BLD-MCNC: 11.6 G/DL (ref 12–16)
LYMPHOCYTES # BLD AUTO: 1.8 K/UL (ref 1–4.8)
LYMPHOCYTES NFR BLD: 16.1 % (ref 18–48)
MCH RBC QN AUTO: 29.2 PG (ref 27–31)
MCHC RBC AUTO-ENTMCNC: 33.2 G/DL (ref 32–36)
MCV RBC AUTO: 88 FL (ref 82–98)
MONOCYTES # BLD AUTO: 1 K/UL (ref 0.3–1)
MONOCYTES NFR BLD: 8.5 % (ref 4–15)
NEUTROPHILS # BLD AUTO: 8.3 K/UL (ref 1.8–7.7)
NEUTROPHILS NFR BLD: 72.9 % (ref 38–73)
PLATELET # BLD AUTO: 208 K/UL (ref 150–350)
PMV BLD AUTO: 9.4 FL (ref 9.2–12.9)
POCT GLUCOSE: 132 MG/DL (ref 70–110)
POCT GLUCOSE: 156 MG/DL (ref 70–110)
POCT GLUCOSE: 195 MG/DL (ref 70–110)
POCT GLUCOSE: 252 MG/DL (ref 70–110)
POCT GLUCOSE: 331 MG/DL (ref 70–110)
POTASSIUM SERPL-SCNC: 4.6 MMOL/L (ref 3.5–5.1)
PROT SERPL-MCNC: 6.9 G/DL (ref 6–8.4)
RBC # BLD AUTO: 3.97 M/UL (ref 4–5.4)
SODIUM SERPL-SCNC: 134 MMOL/L (ref 136–145)
WBC # BLD AUTO: 11.32 K/UL (ref 3.9–12.7)

## 2019-06-11 PROCEDURE — 36415 COLL VENOUS BLD VENIPUNCTURE: CPT

## 2019-06-11 PROCEDURE — 94761 N-INVAS EAR/PLS OXIMETRY MLT: CPT

## 2019-06-11 PROCEDURE — 63600175 PHARM REV CODE 636 W HCPCS: Performed by: STUDENT IN AN ORGANIZED HEALTH CARE EDUCATION/TRAINING PROGRAM

## 2019-06-11 PROCEDURE — 63600175 PHARM REV CODE 636 W HCPCS: Performed by: INTERNAL MEDICINE

## 2019-06-11 PROCEDURE — 80053 COMPREHEN METABOLIC PANEL: CPT

## 2019-06-11 PROCEDURE — 85025 COMPLETE CBC W/AUTO DIFF WBC: CPT

## 2019-06-11 PROCEDURE — 25000003 PHARM REV CODE 250: Performed by: INTERNAL MEDICINE

## 2019-06-11 PROCEDURE — 25000003 PHARM REV CODE 250: Performed by: STUDENT IN AN ORGANIZED HEALTH CARE EDUCATION/TRAINING PROGRAM

## 2019-06-11 PROCEDURE — S0030 INJECTION, METRONIDAZOLE: HCPCS | Performed by: STUDENT IN AN ORGANIZED HEALTH CARE EDUCATION/TRAINING PROGRAM

## 2019-06-11 PROCEDURE — 11000001 HC ACUTE MED/SURG PRIVATE ROOM

## 2019-06-11 RX ORDER — METRONIDAZOLE 500 MG/1
500 TABLET ORAL EVERY 8 HOURS
Status: DISCONTINUED | OUTPATIENT
Start: 2019-06-11 | End: 2019-06-11

## 2019-06-11 RX ORDER — LISINOPRIL 40 MG/1
40 TABLET ORAL DAILY
Qty: 90 TABLET | Refills: 3 | Status: SHIPPED | OUTPATIENT
Start: 2019-06-12 | End: 2020-06-11

## 2019-06-11 RX ORDER — CEFAZOLIN SODIUM 1 G/50ML
1 SOLUTION INTRAVENOUS EVERY 8 HOURS
Start: 2019-06-11 | End: 2019-06-12 | Stop reason: HOSPADM

## 2019-06-11 RX ORDER — METRONIDAZOLE 500 MG/1
500 TABLET ORAL EVERY 8 HOURS
Start: 2019-06-11 | End: 2019-06-12 | Stop reason: HOSPADM

## 2019-06-11 RX ORDER — AMLODIPINE BESYLATE 10 MG/1
10 TABLET ORAL DAILY
Qty: 30 TABLET | Refills: 11 | Status: SHIPPED | OUTPATIENT
Start: 2019-06-12 | End: 2019-09-03 | Stop reason: CLARIF

## 2019-06-11 RX ORDER — HYDRALAZINE HYDROCHLORIDE 10 MG/1
10 TABLET, FILM COATED ORAL EVERY 8 HOURS
Status: DISCONTINUED | OUTPATIENT
Start: 2019-06-11 | End: 2019-06-11

## 2019-06-11 RX ORDER — METRONIDAZOLE 500 MG/1
500 TABLET ORAL EVERY 8 HOURS
Status: DISCONTINUED | OUTPATIENT
Start: 2019-06-11 | End: 2019-06-12

## 2019-06-11 RX ORDER — CEFAZOLIN SODIUM 1 G/50ML
1 SOLUTION INTRAVENOUS
Status: DISCONTINUED | OUTPATIENT
Start: 2019-06-11 | End: 2019-06-12

## 2019-06-11 RX ORDER — AMLODIPINE BESYLATE 5 MG/1
10 TABLET ORAL DAILY
Status: DISCONTINUED | OUTPATIENT
Start: 2019-06-11 | End: 2019-06-12 | Stop reason: HOSPADM

## 2019-06-11 RX ADMIN — METRONIDAZOLE 500 MG: 500 TABLET ORAL at 09:06

## 2019-06-11 RX ADMIN — CEFAZOLIN SODIUM 1 G: 1 SOLUTION INTRAVENOUS at 10:06

## 2019-06-11 RX ADMIN — PRAMIPEXOLE DIHYDROCHLORIDE 0.25 MG: 0.12 TABLET ORAL at 08:06

## 2019-06-11 RX ADMIN — Medication: at 04:06

## 2019-06-11 RX ADMIN — METOPROLOL SUCCINATE 25 MG: 25 TABLET, FILM COATED, EXTENDED RELEASE ORAL at 08:06

## 2019-06-11 RX ADMIN — INSULIN ASPART 4 UNITS: 100 INJECTION, SOLUTION INTRAVENOUS; SUBCUTANEOUS at 12:06

## 2019-06-11 RX ADMIN — METRONIDAZOLE 500 MG: 500 TABLET ORAL at 03:06

## 2019-06-11 RX ADMIN — AMLODIPINE BESYLATE 10 MG: 5 TABLET ORAL at 12:06

## 2019-06-11 RX ADMIN — VANCOMYCIN HYDROCHLORIDE 1250 MG: 1.25 INJECTION, POWDER, LYOPHILIZED, FOR SOLUTION INTRAVENOUS at 12:06

## 2019-06-11 RX ADMIN — METRONIDAZOLE 500 MG: 500 INJECTION, SOLUTION INTRAVENOUS at 06:06

## 2019-06-11 RX ADMIN — LISINOPRIL 40 MG: 20 TABLET ORAL at 09:06

## 2019-06-11 RX ADMIN — APIXABAN 5 MG: 5 TABLET, FILM COATED ORAL at 08:06

## 2019-06-11 RX ADMIN — INSULIN ASPART 1 UNITS: 100 INJECTION, SOLUTION INTRAVENOUS; SUBCUTANEOUS at 08:06

## 2019-06-11 RX ADMIN — GABAPENTIN 400 MG: 100 CAPSULE ORAL at 08:06

## 2019-06-11 RX ADMIN — GABAPENTIN 400 MG: 100 CAPSULE ORAL at 09:06

## 2019-06-11 RX ADMIN — APIXABAN 5 MG: 5 TABLET, FILM COATED ORAL at 09:06

## 2019-06-11 RX ADMIN — CEFAZOLIN SODIUM 1 G: 1 SOLUTION INTRAVENOUS at 03:06

## 2019-06-11 RX ADMIN — CEFEPIME HYDROCHLORIDE 2 G: 2 INJECTION, SOLUTION INTRAVENOUS at 09:06

## 2019-06-11 NOTE — PLAN OF CARE
Problem: Adult Inpatient Plan of Care  Goal: Plan of Care Review  Outcome: Ongoing (interventions implemented as appropriate)  1905H Patient is awake and orientedx4. Care plan explained and verbalized understanding. VIP care model explained. On room air, O2 saturation 94%. On heart monitor running Sinus Rhythm at 91bpm. IV site to the left forearm 20G; flushed and saline locked. Right foot with dressing clean, dry and intact. Maintained on diabetic diet. Maintained on fall precaution. Bed in lowest position, bed alarms on, call light within reach and instructed to call for help when needed. Will continue  to monitor.    1938H Received call from [facility], asking if the patient can be brought down to MRI.   Patient complaining of heart burn/ indigestion, notified Dr. Obando with order to give calcium carbonate. Lorazepam pill given to patient prior to MRI.   2030H Patient wheeled down to MRI department by [facility].   2200H Patient back to floor. Due medications given. Blood sugar monitored and covered per insulin sliding scale.    Patient's HR goes down to 40s while sleeping.    0405H Right foot dressing changed. Cadexomer iodine gel applied, 4x4 gauze, kerlix and ACE wrap applied. Right foot, third toe swollen with ulceration.

## 2019-06-11 NOTE — PROGRESS NOTES
[facility] Infectious Disease Progress Note     Primary Team: Nima   Consultant Attending: Brittany   Date of Admit: 2019    Summary of history     Diabetic foot infection, presented with fever and leukocytosis     Interval events     Bone culture grew staph aureus, MRI shows osteomyelitis 3rd phalanx       Subjective     No complaints     Current Medications:     Infusions:       Scheduled:   amLODIPine  10 mg Oral Daily    apixaban  5 mg Oral BID    ceFEPime (MAXIPIME) IVPB  2 g Intravenous Q12H    gabapentin  400 mg Oral BID    lisinopril  40 mg Oral Daily    metoprolol succinate  25 mg Oral QHS    metroNIDAZOLE  500 mg Oral Q8H    pramipexole  0.25 mg Oral QHS    vancomycin (VANCOCIN) IVPB  1,250 mg Intravenous Q24H        PRN:  cadexomer iodine, calcium carbonate, Dextrose 10% Bolus, Dextrose 10% Bolus, glucagon (human recombinant), glucose, glucose, insulin aspart U-100, ondansetron, sodium chloride 0.9%    Antibiotics and Day Number of Therapy:  Cefepime, vanc, flagyl     Objective   Last 24 Hour Vital Signs:  BP  Min: 137/63  Max: 218/92  Temp  Av.7 °F (35.9 °C)  Min: 96.1 °F (35.6 °C)  Max: 98 °F (36.7 °C)  Pulse  Av  Min: 53  Max: 86  Resp  Av.5  Min: 17  Max: 20  SpO2  Av %  Min: 93 %  Max: 97 %    Lines, drains, airway:  None     Physical Examination:  Examination  General: well appearing, comfortable   HEENT: normal oral mucosa, normal dentition, conjunctiva normal, pupils normal, extraocular motion normal  Neck: no thyromegaly, no JVD   Cardiac: no murmurs, pulse regular    Pulmonary/Chest: chest clear, no respiratory distress   GI/Rectal: no organomegaly, no masses, non tender, normal bowel sounds, rectal exam deferred   : deferred   Msk: normal motor screening exam  Vascular: normal peripheral perfusion   Lymph nodes: none palpated  Skin/ Extremities: no rash, no pedal edema, no ulceration    Neurology/ Mental status: alert oriented     Lab data:  CBC:   Lab Results    Component Value Date    WBC 11.32 06/11/2019    HGB 11.6 (L) 06/11/2019     06/11/2019    MCV 88 06/11/2019    RDW 13.5 06/11/2019     Estimated Creatinine Clearance: 45.2 mL/min (based on SCr of 1 mg/dL).    Microbiology Data  MSSA on bone biopsy     Radiology  MRI shows osteo third digit     2017 arterial doppler   No evidence of hemodynamically significant arterial stenosis within the lower extremities.  Biphasic waveforms visualized throughout both legs.    Assessment     79 YO F with DM who came with fever, right 3rd toe pain, redness and swelling. With osteomyelitis of the 3rd toe of right foot, as seen on Mri and staph growing from bone biopsy.     Defervesced and WBC downtrended.      Recommendations     Diabetic foot infection with osteo, right foot    - stop vancomycin, flagyl and cefepime  - start cefazolin 1g IV q8 with an end date of 7/21/19  - will need follow up in ID clinic at [facility] in 3 weeks   - pt will need weekly CMP, CBC, ESR, CRP. Please have the labs faxed to Dr. Naranjo at [phone] until seen in [facility] ID clinic     UTI with EColi   - already received 2 days of cefepime and about to receive cefazolin, no further treatment needed    Thank you for this consult. We will sign off.    Dewey Barkley  [facility] ID Fellow

## 2019-06-11 NOTE — PLAN OF CARE
HH orders sent to [facility] .       06/11/19 0835   Post-Acute Status   Post-Acute Authorization Home Health/Hospice   Home Health/Hospice Status Referrals Sent

## 2019-06-11 NOTE — PLAN OF CARE
Home IV abx referral sent to [email or web address] for home IV abx infusion, [place] contacted [place] with [email or web address] regarding pt will need Home IV abx.       06/11/19 1112   Post-Acute Status   Post-Acute Authorization Medications   Medication Status Rx fund Referral

## 2019-06-11 NOTE — PROGRESS NOTES
[facility] Medicine Progress Note     Admitting Team: [facility] Hospitalist Team B   Attending Physician: Gabriel Herring MD  Resident: Elijah   Intern: Tommy    Date of Admit: 2019      Subjective:      Patient with no new complaints. Denies fevers, chills, cp or sob. Patient states that her only complaint is that her room is too cold.      Objective:   Last 24 Hour Vital Signs:  BP  Min: 140/77  Max: 218/92  Temp  Av.8 °F (36 °C)  Min: 96.1 °F (35.6 °C)  Max: 98 °F (36.7 °C)  Pulse  Av.8  Min: 53  Max: 86  Resp  Av.5  Min: 17  Max: 20  SpO2  Av.5 %  Min: 93 %  Max: 98 %  I/O last 3 completed shifts:  In: 3860 [P.O.:2760; IV Piggyback:1100]  Out: 4650 [Urine:4650]    Physical Examination:   General: Patient sitting up in bed awake. In NAD.  Head: Size and shape wnl. Atraumatic.  Eyes: No scleral icterus. No conjunctival injection.  Mouth/Oropharynx: MMM. No tonsillar exudates; no erythema.   CV: RRR with no rubs murmurs s3 or s4.   Resp: CTAB with no crackles, rhonchi or wheezing.    Abdomen: BSx4. Soft non-tender without guarding.   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 Also with ulcer on heel of foot ~.5cm in diameter  Neuro: AAOx3. Moving all limbs appropriately.   Psych: Affect: Normal. Thought process appropriate.    Laboratory:    Trended Lab Data:  Recent Labs   Lab 19  0454 06/10/19  0634 19  0449   WBC 15.06* 16.42* 11.32   HGB 10.4* 11.5* 11.6*   HCT 31.1* 34.7* 34.9*    257 208   MCV 89 87 88   RDW 13.9 13.6 13.5   * 135* 134*   K 4.3 4.3 4.6    103 104   CO2 19* 25 18*   BUN 33* 19 19   CREATININE 1.3 1.1 1.0   * 157* 144*   PROT 6.2 7.1 6.9   ALBUMIN 2.9* 3.3* 3.1*   BILITOT 0.2 0.5 0.3   AST 33 27 27   ALKPHOS 70 85 77   ALT 36 38 32           Microbiology Data:    Aerobic cx: : Staph aureus; nghia pending  Anaerobe cx: NGTDx2  Urine culture NGTDx2      Blood cx1 NGTDx3  Blood cx2:  NGTD x3  Aerobic culture:   Urine culture: GNR, susceptibilities pending      Other Results:  EKG (my interpretation): None new    Radiology:  Imaging Results          X-Ray Chest AP Portable (Final result)  Result time 06/08/19 23:06:15    Final result by Nino Ambrosio MD (06/08/19 23:06:15)                 Impression:      Improving aeration with mild presumed atelectasis.  No edema or pneumothorax.      Electronically signed by: Nino Ambrosio  Date:    06/08/2019  Time:    23:06             Narrative:    EXAMINATION:  XR CHEST AP PORTABLE    CLINICAL HISTORY:  Sepsis;    TECHNIQUE:  Single frontal view of the chest was performed.    COMPARISON:  06/05/2017    FINDINGS:  Single AP portable view at 22:56.    Heart is mildly enlarged unchanged.  Is mild perihilar streaky opacities likely subsegmental atelectasis.  There is improved aeration at the left base.  No interstitial edema.  No pneumothorax or discrete pleural effusion or nodule.  The trachea appears normal.  No abdominal free air.  There are overlying leads.  There are suture anchors in the right humeral head                                X-Ray Foot Complete Right (Final result)  Result time 06/08/19 21:57:44    Final result by Randolph Saini MD (06/08/19 21:57:44)                 Impression:      Fourth toe soft tissue swelling concerning for cellulitis with associated interval erosion of the distal 3rd distal phalanx concerning for sequela of underlying osteomyelitis.    This report was flagged in Epic as abnormal.      Electronically signed by: Randolph Saini MD  Date:    06/08/2019  Time:    21:57             Narrative:    EXAMINATION:  XR FOOT COMPLETE 3 VIEW RIGHT    CLINICAL HISTORY:  . Type 2 diabetes mellitus with foot ulcer    TECHNIQUE:  AP, lateral, and oblique views of the right foot were performed.    COMPARISON:  Right foot series 04/24/2019    FINDINGS:  Fifth toe is absent.  No displaced fracture or dislocation seen.  There is erosion  of the distal most portion of the 3rd distal phalanx with associated overlying soft tissue swelling concerning for cellulitis with underlying osteomyelitis.    Lisfranc articulation is congruent.  Mild degenerative change at the 1st MTP joint and 2nd 3rd and 4th DIP joints.  Moderate to advanced degenerative changes at the dorsal midfoot.  Plantar calcaneal spur and enthesophyte at the Achilles insertion site.  No subcutaneous emphysema or radiodense retained foreign body.                                Current Medications:     Infusions:       Scheduled:   apixaban  5 mg Oral BID    ceFEPime (MAXIPIME) IVPB  2 g Intravenous Q12H    gabapentin  400 mg Oral BID    lisinopril  40 mg Oral Daily    metoprolol succinate  25 mg Oral QHS    metronidazole  500 mg Intravenous Q8H    pramipexole  0.25 mg Oral QHS    vancomycin (VANCOCIN) IVPB  1,250 mg Intravenous Q24H        PRN:  cadexomer iodine, calcium carbonate, Dextrose 10% Bolus, Dextrose 10% Bolus, glucagon (human recombinant), glucose, glucose, insulin aspart U-100, ondansetron, sodium chloride 0.9%    Antibiotics and Day Number of Therapy:  Vanc 6/9 - current  Flagyl 6/9 - current  Cefepime 6/9 - current    Lines and Day Number of Therapy:  piv         Assessment:     Caro Powell is a 78 y.o. female with   Plan:         Diabetic Foot Ulcer with Cellulitis concern for Osteomyelitis  -patient with 3 days R third toe swelling, erythema and pain associated with fever and chills x1 days  -leukocytosis of 16.7  fever 102  ESR 87  .5  -lactate and procal negative   -Xray Foot with 4th toe soft tissue swelling concern for cellulitis and concern for osteo on 3rd distal phalanx  -MRI ordered to evaluate  -will consult general surgery for bone bx to guide antibiotic regimen  -given patient was febrile in ED, patient was given dose of rocephin and vanc  6/10  Started patient on cefepime, flagyl and vanc on 6/9. Received bone bx on 69. ID  recommending MRI. Awaiting cx results  6/11  Awaiting MRI results. Aerobic cx growing s. Aureus. Pending susceptibilities,      Asymptomatic Bacteriuria   -patient with + leukocytes and nitrates in urine  -fever, leukocytosis and inflammatory markers as above  -no urinary complaints  -treated with rocephin as above      DM with diabetic neuropathy   -last A1C 5/18 8.3  will reorder  -takes glimepiride at home   -SSI and accuchecks while inpatient   -will give home gabapentin     Afib  -rate controlled with metoprolol and anticoagulated with eliquis  will continue     HTN  -takes metoprolol at home  will continue while inpatient  6/10  BP overnight and in AM was in 180-190s, changed lisinopril dose to 40 mg.   6/11  Patient BP remains difficult to control ON. BP currently 140/77. Will consider adding another agent on board.     Dispo: Awaiting MRI results and bone bx susceptibilities. Patient will need d/c to home w/ home abx once susceptibilities are acquired.     Cheng Sanedrs,   [facility] Internal Medicine, HO-I  [facility] Hospitalist Team B     [facility] Medicine Hospitalist Pager numbers:   [facility] Hospitalist Medicine Team A (Gerard/Pancho): [phone]  [facility] Hospitalist Medicine Team B (Kylee/Nima):  [phone]

## 2019-06-11 NOTE — PLAN OF CARE
Per Edgar, call reference # [id number], pt does not currently have  services.    TN informed pt of this, pt states she would prefer if TN chooses  company for her.

## 2019-06-11 NOTE — TELEPHONE ENCOUNTER
----- Message from Nino Hendrix sent at 6/11/2019  4:30 PM CDT -----  Contact: [phone] or [phone]  Patient needs a two week hospital fu.

## 2019-06-11 NOTE — PROGRESS NOTES
Pharmacist Intervention IV to PO Note    Caro Powell is a 78 y.o. female being treated with IV medication metronidazole    Patient Data:    Vital Signs (Most Recent):  Temp: 96.2 °F (35.7 °C) (06/11/19 0718)  Pulse: (!) 54 (06/11/19 0759)  Resp: 18 (06/11/19 0718)  BP: (!) 140/77 (06/11/19 0718)  SpO2: 95 % (06/11/19 0901)   Vital Signs (72h Range):  Temp:  [96.1 °F (35.6 °C)-102.7 °F (39.3 °C)]   Pulse:  [43-86]   Resp:  [14-20]   BP: (119-218)/(51-92)   SpO2:  [93 %-99 %]      CBC:  Recent Labs   Lab 06/09/19 0454 06/10/19  0634 06/11/19 0449   WBC 15.06* 16.42* 11.32   RBC 3.51* 3.98* 3.97*   HGB 10.4* 11.5* 11.6*   HCT 31.1* 34.7* 34.9*    257 208   MCV 89 87 88   MCH 29.6 28.9 29.2   MCHC 33.4 33.1 33.2     CMP:     Recent Labs   Lab 06/09/19  0454 06/10/19  0634 06/11/19 0449   * 157* 144*   CALCIUM 8.5* 9.8 9.8   ALBUMIN 2.9* 3.3* 3.1*   PROT 6.2 7.1 6.9   * 135* 134*   K 4.3 4.3 4.6   CO2 19* 25 18*    103 104   BUN 33* 19 19   CREATININE 1.3 1.1 1.0   ALKPHOS 70 85 77   ALT 36 38 32   AST 33 27 27   BILITOT 0.2 0.5 0.3       Dietary Orders:  Diet Orders            Diet diabetic [facility]; 2000 Calorie: Diabetic starting at 06/10 0732            Based on the following criteria, this patient qualifies for intravenous to oral conversion:  [x] The patients gastrointestinal tract is functioning (tolerating medications via oral or enteral route for 24 hours and tolerating food or enteral feeds for 24 hours).  [x] The patient is hemodynamically stable for 24 hours (heart rate <100 beats per minute, systolic blood pressure >99 mm Hg, and respiratory rate <20 breaths per minute).  [x] The patient shows clinical improvement (afebrile for at least 24 hours and white blood cell count downtrending or normalized). Additionally, the patient must be non-neutropenic (absolute neutrophil count >500 cells/mm3).  [x] For antimicrobials, the patient has received IV therapy for at  least 24 hours.    IV medication flagyl will be changed to oral medication flagyl    Pharmacist's Name: Todd Tuttle  Pharmacist's Extension: [phone]

## 2019-06-11 NOTE — PLAN OF CARE
IV abx order sent to [facility].       06/11/19 1430   Post-Acute Status   Post-Acute Authorization Medications   Medication Status Rx fund Referral

## 2019-06-11 NOTE — PLAN OF CARE
Pt accepted by [facility] .       06/11/19 1531   Post-Acute Status   Post-Acute Authorization Home Health/Hospice   Home Health/Hospice Status Authorization Obtained

## 2019-06-11 NOTE — PLAN OF CARE
TN rounded on patient, TN discussed anticipated discharge plan with pt, pt will most likely need IV abx on discharge awaiting bone biopsy results for final antibiotic recommendations. Patient agreeable to home IV abx; patient reports her younger sister is willing to help administer IV abx at home. TN to call [employer] to find out what company patient has home health with to coordinate with home infusion company, Pt does not have a home infusion company preference. Pt may need payment plan for IV abx. TN also discuss anticipated discharge plan with pt's niece in room who is a nurse.       06/10/19 5043   Discharge Reassessment   Assessment Type Discharge Planning Reassessment   Provided patient/caregiver education on the expected discharge date and the discharge plan Yes   Do you have any problems affording any of your prescribed medications? TBD   Discharge Plan A Home Health   Patient choice form signed by patient/caregiver N/A   Anticipated Discharge Disposition Home-Health

## 2019-06-12 VITALS
SYSTOLIC BLOOD PRESSURE: 140 MMHG | RESPIRATION RATE: 20 BRPM | HEIGHT: 62 IN | HEART RATE: 69 BPM | WEIGHT: 180.56 LBS | TEMPERATURE: 96 F | OXYGEN SATURATION: 95 % | DIASTOLIC BLOOD PRESSURE: 63 MMHG | BODY MASS INDEX: 33.23 KG/M2

## 2019-06-12 LAB
ALBUMIN SERPL BCP-MCNC: 3.1 G/DL (ref 3.5–5.2)
ALP SERPL-CCNC: 74 U/L (ref 55–135)
ALT SERPL W/O P-5'-P-CCNC: 28 U/L (ref 10–44)
ANION GAP SERPL CALC-SCNC: 9 MMOL/L (ref 8–16)
AST SERPL-CCNC: 19 U/L (ref 10–40)
BASOPHILS # BLD AUTO: 0.04 K/UL (ref 0–0.2)
BASOPHILS NFR BLD: 0.4 % (ref 0–1.9)
BILIRUB SERPL-MCNC: 0.3 MG/DL (ref 0.1–1)
BUN SERPL-MCNC: 24 MG/DL (ref 8–23)
CALCIUM SERPL-MCNC: 9.7 MG/DL (ref 8.7–10.5)
CHLORIDE SERPL-SCNC: 102 MMOL/L (ref 95–110)
CO2 SERPL-SCNC: 25 MMOL/L (ref 23–29)
CREAT SERPL-MCNC: 1.2 MG/DL (ref 0.5–1.4)
DIFFERENTIAL METHOD: ABNORMAL
EOSINOPHIL # BLD AUTO: 0.3 K/UL (ref 0–0.5)
EOSINOPHIL NFR BLD: 2.5 % (ref 0–8)
ERYTHROCYTE [DISTWIDTH] IN BLOOD BY AUTOMATED COUNT: 13.7 % (ref 11.5–14.5)
EST. GFR  (AFRICAN AMERICAN): 50 ML/MIN/1.73 M^2
EST. GFR  (NON AFRICAN AMERICAN): 43 ML/MIN/1.73 M^2
GLUCOSE SERPL-MCNC: 146 MG/DL (ref 70–110)
HCT VFR BLD AUTO: 32.8 % (ref 37–48.5)
HGB BLD-MCNC: 10.8 G/DL (ref 12–16)
LYMPHOCYTES # BLD AUTO: 2 K/UL (ref 1–4.8)
LYMPHOCYTES NFR BLD: 18.9 % (ref 18–48)
MCH RBC QN AUTO: 28.6 PG (ref 27–31)
MCHC RBC AUTO-ENTMCNC: 32.9 G/DL (ref 32–36)
MCV RBC AUTO: 87 FL (ref 82–98)
MONOCYTES # BLD AUTO: 1 K/UL (ref 0.3–1)
MONOCYTES NFR BLD: 9.5 % (ref 4–15)
NEUTROPHILS # BLD AUTO: 7.3 K/UL (ref 1.8–7.7)
NEUTROPHILS NFR BLD: 68.1 % (ref 38–73)
PLATELET # BLD AUTO: 252 K/UL (ref 150–350)
PMV BLD AUTO: 9.2 FL (ref 9.2–12.9)
POCT GLUCOSE: 163 MG/DL (ref 70–110)
POCT GLUCOSE: 174 MG/DL (ref 70–110)
POTASSIUM SERPL-SCNC: 3.9 MMOL/L (ref 3.5–5.1)
PROT SERPL-MCNC: 6.5 G/DL (ref 6–8.4)
RBC # BLD AUTO: 3.78 M/UL (ref 4–5.4)
SODIUM SERPL-SCNC: 136 MMOL/L (ref 136–145)
WBC # BLD AUTO: 10.76 K/UL (ref 3.9–12.7)

## 2019-06-12 PROCEDURE — 94761 N-INVAS EAR/PLS OXIMETRY MLT: CPT

## 2019-06-12 PROCEDURE — 99231 PR SUBSEQUENT HOSPITAL CARE,LEVL I: ICD-10-PCS | Mod: ,,, | Performed by: PODIATRIST

## 2019-06-12 PROCEDURE — 80053 COMPREHEN METABOLIC PANEL: CPT

## 2019-06-12 PROCEDURE — 63600175 PHARM REV CODE 636 W HCPCS: Performed by: STUDENT IN AN ORGANIZED HEALTH CARE EDUCATION/TRAINING PROGRAM

## 2019-06-12 PROCEDURE — 25000003 PHARM REV CODE 250: Performed by: STUDENT IN AN ORGANIZED HEALTH CARE EDUCATION/TRAINING PROGRAM

## 2019-06-12 PROCEDURE — 99231 SBSQ HOSP IP/OBS SF/LOW 25: CPT | Mod: ,,, | Performed by: PODIATRIST

## 2019-06-12 PROCEDURE — 63600175 PHARM REV CODE 636 W HCPCS: Performed by: INTERNAL MEDICINE

## 2019-06-12 PROCEDURE — 85025 COMPLETE CBC W/AUTO DIFF WBC: CPT

## 2019-06-12 RX ORDER — GLIMEPIRIDE 1 MG/1
1 TABLET ORAL 2 TIMES DAILY
Start: 2019-06-12 | End: 2021-01-15

## 2019-06-12 RX ORDER — CEFAZOLIN SODIUM 2 G/50ML
2 SOLUTION INTRAVENOUS
Status: DISCONTINUED | OUTPATIENT
Start: 2019-06-12 | End: 2019-06-12 | Stop reason: HOSPADM

## 2019-06-12 RX ORDER — CEFAZOLIN SODIUM 2 G/50ML
2000 SOLUTION INTRAVENOUS EVERY 8 HOURS
Start: 2019-06-12 | End: 2019-07-21

## 2019-06-12 RX ADMIN — AMLODIPINE BESYLATE 10 MG: 5 TABLET ORAL at 09:06

## 2019-06-12 RX ADMIN — METRONIDAZOLE 500 MG: 500 TABLET ORAL at 05:06

## 2019-06-12 RX ADMIN — CEFAZOLIN SODIUM 2 G: 2 SOLUTION INTRAVENOUS at 01:06

## 2019-06-12 RX ADMIN — APIXABAN 5 MG: 5 TABLET, FILM COATED ORAL at 09:06

## 2019-06-12 RX ADMIN — Medication 10 G: at 03:06

## 2019-06-12 RX ADMIN — GABAPENTIN 400 MG: 100 CAPSULE ORAL at 09:06

## 2019-06-12 RX ADMIN — CEFAZOLIN SODIUM 1 G: 1 SOLUTION INTRAVENOUS at 05:06

## 2019-06-12 RX ADMIN — LISINOPRIL 40 MG: 20 TABLET ORAL at 09:06

## 2019-06-12 NOTE — PLAN OF CARE
VN requested into room by PICC line nurse.  Time out done see flow sheet. Patient tolerated the procedure well. STAT cxr ordered to verify placement.  Radiology called.

## 2019-06-12 NOTE — PROGRESS NOTES
[facility]  Podiatry  Progress Note    Patient Name: Caro Powell  MRN: [id number]  Admission Date: 6/8/2019  Hospital Length of Stay: 3 days  Attending Physician: Gabriel Herring MD  Primary Care Provider: Manuel Laird MD   Interval Hx: Pt seen today, no pedal complaints at this time.     Scheduled Meds:   amLODIPine  10 mg Oral Daily    apixaban  5 mg Oral BID    ceFAZolin (ANCEF) IVPB  2 g Intravenous Q8H    gabapentin  400 mg Oral BID    lisinopril  40 mg Oral Daily    metoprolol succinate  25 mg Oral QHS    pramipexole  0.25 mg Oral QHS     Continuous Infusions:  PRN Meds:cadexomer iodine, calcium carbonate, Dextrose 10% Bolus, Dextrose 10% Bolus, glucagon (human recombinant), glucose, glucose, insulin aspart U-100, ondansetron, sodium chloride 0.9%    Review of patient's allergies indicates:   Allergen Reactions    Codeine Nausea Only        Past Medical History:   Diagnosis Date    Anticoagulant long-term use     Arthritis     Calcium nephrolithiasis 2007    Diabetes mellitus, type 2     Diabetic peripheral neuropathy associated with type 2 diabetes mellitus     Hypertension      Past Surgical History:   Procedure Laterality Date    AMPUTATION-TOE Right 5/23/2017    [facility]    COLONOSCOPY  11/28/2011    sigmoid diverticulosis, external hemorrhoids    HYSTERECTOMY      SHOULDER SURGERY Left     TOE AMPUTATION Right 05/22/2017    5th toe       Family History     Problem Relation (Age of Onset)    Diabetes Mother    Heart failure Father    Kidney failure Brother        Tobacco Use    Smoking status: Never Smoker    Smokeless tobacco: Never Used   Substance and Sexual Activity    Alcohol use: No    Drug use: No    Sexual activity: Yes     Review of Systems   Constitutional: Positive for chills and fever.   Respiratory: Negative for chest tightness and shortness of breath.    Cardiovascular: Negative for chest pain,  palpitations and leg swelling.   Gastrointestinal: Negative for nausea and vomiting.   Skin: Positive for color change and wound.   Neurological: Positive for numbness.   Psychiatric/Behavioral: Negative for agitation.     Objective:     Vital Signs (Most Recent):  Temp: 96.4 °F (35.8 °C) (06/12/19 0741)  Pulse: 69 (06/12/19 1156)  Resp: 20 (06/12/19 0741)  BP: (!) 140/63 (06/12/19 0820)  SpO2: 95 % (06/12/19 0922) Vital Signs (24h Range):  Temp:  [96.4 °F (35.8 °C)-97.5 °F (36.4 °C)] 96.4 °F (35.8 °C)  Pulse:  [51-86] 69  Resp:  [16-20] 20  SpO2:  [95 %-97 %] 95 %  BP: (134-179)/(56-82) 140/63     Weight: 81.9 kg (180 lb 8.9 oz)  Body mass index is 33.02 kg/m².    Foot Exam    General  General Appearance: appears stated age and healthy   Orientation: alert and oriented to person, place, and time   Affect: appropriate       Right Foot/Ankle     Inspection and Palpation  Tenderness: none   Skin Exam: cellulitis, ulcer and erythema; no drainage     Neurovascular  Dorsalis pedis: 1+  Posterior tibial: 1+  Saphenous nerve sensation: diminished  Tibial nerve sensation: diminished  Superficial peroneal nerve sensation: diminished  Deep peroneal nerve sensation: diminished  Sural nerve sensation: diminished            Laboratory:  A1C:   Recent Labs   Lab 06/09/19  0454   HGBA1C 6.6*  6.6*     Blood Cultures: No results for input(s): LABBLOO in the last 48 hours.  BMP:   Recent Labs   Lab 06/12/19  0403   *      K 3.9      CO2 25   BUN 24*   CREATININE 1.2   CALCIUM 9.7     CBC:   Recent Labs   Lab 06/12/19  0403   WBC 10.76   RBC 3.78*   HGB 10.8*   HCT 32.8*      MCV 87   MCH 28.6   MCHC 32.9     CMP:   Recent Labs   Lab 06/12/19  0403   *   CALCIUM 9.7   ALBUMIN 3.1*   PROT 6.5      K 3.9   CO2 25      BUN 24*   CREATININE 1.2   ALKPHOS 74   ALT 28   AST 19   BILITOT 0.3     Coagulation: No results for input(s): PT, INR, APTT in the last 168 hours.  CRP:   Recent Labs   Lab  06/08/19  2211   .5*     ESR:   Recent Labs   Lab 06/08/19  2211   SEDRATE 87*     Prealbumin: No results for input(s): PREALBUMIN in the last 48 hours.  Wound Cultures:   Recent Labs   Lab 03/29/19  0838 06/09/19  1111   LABAERO STREPTOCOCCUS AGALACTIAE (GROUP B)  Many  Beta-hemolytic streptococci are routinely susceptible to   penicillins,cephalosporins and carbapenems.  Susceptibility testing not routinely performed   STAPHYLOCOCCUS AUREUS  Moderate       Microbiology Results (last 7 days)     Procedure Component Value Units Date/Time    Culture, Anaerobe [[id number]] Collected:  06/09/19 1111    Order Status:  Completed Specimen:  Bone from Toe, Right Foot Updated:  06/12/19 1024     Anaerobic Culture Culture in progress    Blood culture #1 [[id number]] Collected:  06/08/19 2205    Order Status:  Completed Specimen:  Blood from Peripheral, Forearm, Left Updated:  06/12/19 0612     Blood Culture, Routine No Growth to date     Blood Culture, Routine No Growth to date     Blood Culture, Routine No Growth to date     Blood Culture, Routine No Growth to date    Narrative:       Blood Culture #1    Blood culture #2 [[id number]] Collected:  06/08/19 2212    Order Status:  Completed Specimen:  Blood from Peripheral, Hand, Right Updated:  06/12/19 0612     Blood Culture, Routine No Growth to date     Blood Culture, Routine No Growth to date     Blood Culture, Routine No Growth to date     Blood Culture, Routine No Growth to date    Narrative:       Blood Culture #2    Aerobic culture [[id number]]  (Susceptibility) Collected:  06/09/19 1111    Order Status:  Completed Specimen:  Bone from Toe, Right Foot Updated:  06/11/19 1129     Aerobic Bacterial Culture --     STAPHYLOCOCCUS AUREUS  Moderate      Urine culture [[id number]]  (Susceptibility) Collected:  06/08/19 2342    Order Status:  Completed Specimen:  Urine Updated:  06/11/19 0646     Urine Culture, Routine --     ESCHERICHIA COLI  10,000 - 49,999 cfu/ml  No  other significant isolate      Urine culture [[id number]] Collected:  06/09/19 1520    Order Status:  Completed Specimen:  Urine Updated:  06/10/19 2149     Urine Culture, Routine No growth    Narrative:       Preferred Collection Type->Urine, Clean Catch    Urine culture [[id number]]     Order Status:  Completed Specimen:  Urine, Clean Catch     Blood culture x two cultures. Draw prior to antibiotics. [[id number]]     Order Status:  Canceled Specimen:  Blood     Blood culture x two cultures. Draw prior to antibiotics. [[id number]]     Order Status:  Canceled Specimen:  Blood         Specimen (12h ago, onward)    None          Diagnostic Results:  I have reviewed all pertinent imaging results/findings within the past 24 hours.    Clinical Findings:    Edematous, erythematous 3rd toe of R foot with HPK noted to plantar distal tip and HPK sub 1st met head with ulceration measuring, 0.2x0.2x0.1cm wound, no periwound erythema noted. No purulence noted.               Assessment/Plan:     * Diabetic foot ulcer  Pt 79 y/o female with PMHx significant for DM2 with right 3rd digit infection  Imaging reviewed, OM noted on MRI  Pt s/p bone biopsy, cultures growing staph aureus, ID on board, pt will need 6 weeks of IV ABX   Wounds dressed with iodosorb, 4x4, abd pad, kerlix and loose ACE  Pt will require home health to change dressings 3x per week with iodosorb, pete foam, cast padding and coban  Pt PWB to heel only in [employer]AR[employer] shoe  Pt to f/u with Dr. Jose in 1 week upon D/C  Podiatry will follow           Ava Colbert MD  Podiatry  [facility]

## 2019-06-12 NOTE — SUBJECTIVE & OBJECTIVE
Interval Hx: Pt seen today, no pedal complaints at this time.     Scheduled Meds:   amLODIPine  10 mg Oral Daily    apixaban  5 mg Oral BID    ceFAZolin (ANCEF) IVPB  2 g Intravenous Q8H    gabapentin  400 mg Oral BID    lisinopril  40 mg Oral Daily    metoprolol succinate  25 mg Oral QHS    pramipexole  0.25 mg Oral QHS     Continuous Infusions:  PRN Meds:cadexomer iodine, calcium carbonate, Dextrose 10% Bolus, Dextrose 10% Bolus, glucagon (human recombinant), glucose, glucose, insulin aspart U-100, ondansetron, sodium chloride 0.9%    Review of patient's allergies indicates:   Allergen Reactions    Codeine Nausea Only        Past Medical History:   Diagnosis Date    Anticoagulant long-term use     Arthritis     Calcium nephrolithiasis 2007    Diabetes mellitus, type 2     Diabetic peripheral neuropathy associated with type 2 diabetes mellitus     Hypertension      Past Surgical History:   Procedure Laterality Date    AMPUTATION-TOE Right 5/23/2017    Performed by Derek Jose D[facility] at [facility] OR    COLONOSCOPY  11/28/2011    sigmoid diverticulosis, external hemorrhoids    HYSTERECTOMY      SHOULDER SURGERY Left     TOE AMPUTATION Right 05/22/2017    5th toe       Family History     Problem Relation (Age of Onset)    Diabetes Mother    Heart failure Father    Kidney failure Brother        Tobacco Use    Smoking status: Never Smoker    Smokeless tobacco: Never Used   Substance and Sexual Activity    Alcohol use: No    Drug use: No    Sexual activity: Yes     Review of Systems   Constitutional: Positive for chills and fever.   Respiratory: Negative for chest tightness and shortness of breath.    Cardiovascular: Negative for chest pain, palpitations and leg swelling.   Gastrointestinal: Negative for nausea and vomiting.   Skin: Positive for color change and wound.   Neurological: Positive for numbness.   Psychiatric/Behavioral: Negative for agitation.     Objective:     Vital Signs (Most  Recent):  Temp: 96.4 °F (35.8 °C) (06/12/19 0741)  Pulse: 69 (06/12/19 1156)  Resp: 20 (06/12/19 0741)  BP: (!) 140/63 (06/12/19 0820)  SpO2: 95 % (06/12/19 0922) Vital Signs (24h Range):  Temp:  [96.4 °F (35.8 °C)-97.5 °F (36.4 °C)] 96.4 °F (35.8 °C)  Pulse:  [51-86] 69  Resp:  [16-20] 20  SpO2:  [95 %-97 %] 95 %  BP: (134-179)/(56-82) 140/63     Weight: 81.9 kg (180 lb 8.9 oz)  Body mass index is 33.02 kg/m².    Foot Exam    General  General Appearance: appears stated age and healthy   Orientation: alert and oriented to person, place, and time   Affect: appropriate       Right Foot/Ankle     Inspection and Palpation  Tenderness: none   Skin Exam: cellulitis, ulcer and erythema; no drainage     Neurovascular  Dorsalis pedis: 1+  Posterior tibial: 1+  Saphenous nerve sensation: diminished  Tibial nerve sensation: diminished  Superficial peroneal nerve sensation: diminished  Deep peroneal nerve sensation: diminished  Sural nerve sensation: diminished            Laboratory:  A1C:   Recent Labs   Lab 06/09/19  0454   HGBA1C 6.6*  6.6*     Blood Cultures: No results for input(s): LABBLOO in the last 48 hours.  BMP:   Recent Labs   Lab 06/12/19  0403   *      K 3.9      CO2 25   BUN 24*   CREATININE 1.2   CALCIUM 9.7     CBC:   Recent Labs   Lab 06/12/19  0403   WBC 10.76   RBC 3.78*   HGB 10.8*   HCT 32.8*      MCV 87   MCH 28.6   MCHC 32.9     CMP:   Recent Labs   Lab 06/12/19  0403   *   CALCIUM 9.7   ALBUMIN 3.1*   PROT 6.5      K 3.9   CO2 25      BUN 24*   CREATININE 1.2   ALKPHOS 74   ALT 28   AST 19   BILITOT 0.3     Coagulation: No results for input(s): PT, INR, APTT in the last 168 hours.  CRP:   Recent Labs   Lab 06/08/19  2211   .5*     ESR:   Recent Labs   Lab 06/08/19  2211   SEDRATE 87*     Prealbumin: No results for input(s): PREALBUMIN in the last 48 hours.  Wound Cultures:   Recent Labs   Lab 03/29/19  0838 06/09/19  1111   LABAERO STREPTOCOCCUS  AGALACTIAE (GROUP B)  Many  Beta-hemolytic streptococci are routinely susceptible to   penicillins,cephalosporins and carbapenems.  Susceptibility testing not routinely performed   STAPHYLOCOCCUS AUREUS  Moderate       Microbiology Results (last 7 days)     Procedure Component Value Units Date/Time    Culture, Anaerobe [[id number]] Collected:  06/09/19 1111    Order Status:  Completed Specimen:  Bone from Toe, Right Foot Updated:  06/12/19 1024     Anaerobic Culture Culture in progress    Blood culture #1 [[id number]] Collected:  06/08/19 2205    Order Status:  Completed Specimen:  Blood from Peripheral, Forearm, Left Updated:  06/12/19 0612     Blood Culture, Routine No Growth to date     Blood Culture, Routine No Growth to date     Blood Culture, Routine No Growth to date     Blood Culture, Routine No Growth to date    Narrative:       Blood Culture #1    Blood culture #2 [[id number]] Collected:  06/08/19 2212    Order Status:  Completed Specimen:  Blood from Peripheral, Hand, Right Updated:  06/12/19 0612     Blood Culture, Routine No Growth to date     Blood Culture, Routine No Growth to date     Blood Culture, Routine No Growth to date     Blood Culture, Routine No Growth to date    Narrative:       Blood Culture #2    Aerobic culture [[id number]]  (Susceptibility) Collected:  06/09/19 1111    Order Status:  Completed Specimen:  Bone from Toe, Right Foot Updated:  06/11/19 1129     Aerobic Bacterial Culture --     STAPHYLOCOCCUS AUREUS  Moderate      Urine culture [[id number]]  (Susceptibility) Collected:  06/08/19 2342    Order Status:  Completed Specimen:  Urine Updated:  06/11/19 0646     Urine Culture, Routine --     ESCHERICHIA COLI  10,000 - 49,999 cfu/ml  No other significant isolate      Urine culture [[id number]] Collected:  06/09/19 1520    Order Status:  Completed Specimen:  Urine Updated:  06/10/19 2149     Urine Culture, Routine No growth    Narrative:       Preferred Collection Type->Urine, Clean Catch     Urine culture [[id number]]     Order Status:  Completed Specimen:  Urine, Clean Catch     Blood culture x two cultures. Draw prior to antibiotics. [[id number]]     Order Status:  Canceled Specimen:  Blood     Blood culture x two cultures. Draw prior to antibiotics. [[id number]]     Order Status:  Canceled Specimen:  Blood         Specimen (12h ago, onward)    None          Diagnostic Results:  I have reviewed all pertinent imaging results/findings within the past 24 hours.    Clinical Findings:    Edematous, erythematous 3rd toe of R foot with HPK noted to plantar distal tip and HPK sub 1st met head with ulceration measuring, 0.2x0.2x0.1cm wound, no periwound erythema noted. No purulence noted.

## 2019-06-12 NOTE — PLAN OF CARE
Discharge instruction and education packet provided. VN notified. PICC CDI. Telemetry discontinued without adverse reaction. Patient shows no acute distress.

## 2019-06-12 NOTE — PLAN OF CARE
Discharge order noted, pt set up with [facility] and [facility] for home IV abx. Case Management discharge information handout given.     Discharge rounds on patient. Discussed followup appointments, blue discharge folder, discharge nurse will go over home medications and reasons for medications and final discharge instructions. All patient/caregiver questions answered. Patient verbalized understanding.    Future Appointments   Date Time Provider Department Center   6/24/2019  2:30 PM Derek Jose DPM [facility] PODIAT [facility]   6/26/2019  3:00 PM INFECTIOUS DISEASE, [facility] INFECT [facility]     Follow-up With  Details  Why  Contact Info   [facility] Infectious Disease  On 6/26/2019  hospitalization follow up-Time 3:00pm  [address], [address]  [facility] [address] [phone]   Brayan Penny MD  On 6/18/2019  time: 10:30am Hospital follow up  [address]  [address]  [facility] [address]  [phone]   [facility] [place]    Home IV infusion company  [address]  [place] [address]  [phone]   [facility] [place]      [facility]  [address]  [place] [address]  [phone]          06/12/19 1456   Final Note   Assessment Type Final Discharge Note   Anticipated Discharge Disposition Home-Health  ([facility]/ [facility])   What phone number can be called within the next 1-3 days to see how you are doing after discharge? [phone]   Hospital Follow Up  Appt(s) scheduled? Yes   Discharge plans and expectations educations in teach back method with documentation complete? Yes   Right Care Referral Info   Post Acute Recommendation Home-care   Crystal Chen RN-BC  Transitional Navigator  [phone]

## 2019-06-12 NOTE — PLAN OF CARE
IV abx dose changed by MD, new IV abx sent to [facility] infusion company       06/12/19 1053   Post-Acute Status   Post-Acute Authorization Medications   Medication Status Rx fund Referral

## 2019-06-12 NOTE — PLAN OF CARE
Updated HH orders sent to [facility].       06/12/19 1142   Post-Acute Status   Post-Acute Authorization Home Health/Hospice   Home Health/Hospice Status Additional Clinical Requested

## 2019-06-12 NOTE — PROCEDURES
"Caro Powell is a 78 y.o. female patient.    Temp: 96.7 °F (35.9 °C) (06/11/19 1651)  Pulse: 72 (06/11/19 1651)  Resp: 20 (06/11/19 1651)  BP: 139/82 (06/11/19 1651)  SpO2: 95 % (06/11/19 0901)  Weight: 81.9 kg (180 lb 8.9 oz) (06/10/19 0343)  Height: 5' 2" (157.5 cm) (06/08/19 2129)    PICC  Date/Time: 6/11/2019 7:12 PM  Performed by: Eulogio Fang RN  Supervising provider: Olya Zeng RN  Consent Done: Yes  Time out: Immediately prior to procedure a time out was called to verify the correct patient, procedure, equipment, support staff and site/side marked as required  Indications: med administration and vascular access  Anesthesia: local infiltration  Local anesthetic: lidocaine 1% without epinephrine  Anesthetic Total (mL): 3  Preparation: skin prepped with ChloraPrep  Skin prep agent dried: skin prep agent completely dried prior to procedure  Sterile barriers: all five maximum sterile barriers used - cap, mask, sterile gown, sterile gloves, and large sterile sheet  Hand hygiene: hand hygiene performed prior to central venous catheter insertion  Location details: right basilic  Catheter type: double lumen  Catheter size: 5 Fr  Catheter Length: 41cm    Ultrasound guidance: yes  Vessel Caliber: medium and patent, compressibility normal  Vascular Doppler: not done  Needle advanced into vessel with real time Ultrasound guidance.  Guidewire confirmed in vessel.  Sterile sheath used.  no esophageal manometryNumber of attempts: 1  Post-procedure: blood return through all ports, chlorhexidine patch and sterile dressing applied  Estimated blood loss (mL): 0  Specimens: No  Implants: No  Assessment: placement verified by x-ray  Tip Termination Explanation: see rad. report  Complications: none  Comments: Do not use until placement verified by MD Eulogio Fang  6/11/2019    "

## 2019-06-12 NOTE — PROGRESS NOTES
[facility] Hospital Medicine Progress Note     Admitting Team: [facility] Hospitalist Team B   Attending Physician: Gabriel Herring MD  Resident: Elijah   Intern: Tommy    Date of Admit: 2019      Subjective:      Patient doing well with no new complaints. Denies NVD cp or sob     Objective:   Last 24 Hour Vital Signs:  BP  Min: 134/56  Max: 173/74  Temp  Av.3 °F (36.3 °C)  Min: 96.7 °F (35.9 °C)  Max: 97.6 °F (36.4 °C)  Pulse  Av.9  Min: 51  Max: 86  Resp  Av  Min: 16  Max: 20  SpO2  Av.8 %  Min: 95 %  Max: 97 %  I/O last 3 completed shifts:  In: 1740 [P.O.:840; IV Piggyback:900]  Out: 3050 [Urine:3050]    Physical Examination:   General: Patient sitting up in bed awake. In NAD.  Head: Size and shape wnl. Atraumatic.  Eyes: No scleral icterus. No conjunctival injection.  Mouth/Oropharynx: MMM. No tonsillar exudates; no erythema.   CV: RRR with no rubs murmurs s3 or s4.   Resp: CTAB with no crackles, rhonchi or wheezing.    Abdomen: BSx4. Soft non-tender without guarding.   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 Also with ulcer on heel of foot ~.5cm in diameter  Neuro: AAOx3. Moving all limbs appropriately.   Psych: Affect: Normal. Thought process appropriate.    Laboratory:    Trended Lab Data:  Recent Labs   Lab 06/10/19  0634 19  0449 19  0403   WBC 16.42* 11.32 10.76   HGB 11.5* 11.6* 10.8*   HCT 34.7* 34.9* 32.8*    208 252   MCV 87 88 87   RDW 13.6 13.5 13.7   * 134* 136   K 4.3 4.6 3.9    104 102   CO2 25 18* 25   BUN 19 19 24*   CREATININE 1.1 1.0 1.2   * 144* 146*   PROT 7.1 6.9 6.5   ALBUMIN 3.3* 3.1* 3.1*   BILITOT 0.5 0.3 0.3   AST 27 27 19   ALKPHOS 85 77 74   ALT 38 32 28           Microbiology Data:    Aerobic cx: : Staph aureus:    Anaerobe cx: NGTDx2  Urine culture NGTDx2      Blood cx1 NGTDx4  Blood cx2: NGTD x4  Urine culture: GNR:        Other Results:  EKG (my interpretation): None  new    Radiology:  Imaging Results          X-Ray Chest AP Portable (Final result)  Result time 06/08/19 23:06:15    Final result by Nino Ambrosio MD (06/08/19 23:06:15)                 Impression:      Improving aeration with mild presumed atelectasis.  No edema or pneumothorax.      Electronically signed by: Nino Ambrosio  Date:    06/08/2019  Time:    23:06             Narrative:    EXAMINATION:  XR CHEST AP PORTABLE    CLINICAL HISTORY:  Sepsis;    TECHNIQUE:  Single frontal view of the chest was performed.    COMPARISON:  06/05/2017    FINDINGS:  Single AP portable view at 22:56.    Heart is mildly enlarged unchanged.  Is mild perihilar streaky opacities likely subsegmental atelectasis.  There is improved aeration at the left base.  No interstitial edema.  No pneumothorax or discrete pleural effusion or nodule.  The trachea appears normal.  No abdominal free air.  There are overlying leads.  There are suture anchors in the right humeral head                                X-Ray Foot Complete Right (Final result)  Result time 06/08/19 21:57:44    Final result by Randolph Saini MD (06/08/19 21:57:44)                 Impression:      Fourth toe soft tissue swelling concerning for cellulitis with associated interval erosion of the distal 3rd distal phalanx concerning for sequela of underlying osteomyelitis.    This report was flagged in Epic as abnormal.      Electronically signed by: Randolph Saini MD  Date:    06/08/2019  Time:    21:57             Narrative:    EXAMINATION:  XR FOOT COMPLETE 3 VIEW RIGHT    CLINICAL HISTORY:  . Type 2 diabetes mellitus with foot ulcer    TECHNIQUE:  AP, lateral, and oblique views of the right foot were performed.    COMPARISON:  Right foot series 04/24/2019    FINDINGS:  Fifth toe is absent.  No displaced fracture or dislocation seen.  There is erosion of the distal most portion of the 3rd distal phalanx with associated overlying soft tissue swelling concerning for cellulitis  with underlying osteomyelitis.    Lisfranc articulation is congruent.  Mild degenerative change at the 1st MTP joint and 2nd 3rd and 4th DIP joints.  Moderate to advanced degenerative changes at the dorsal midfoot.  Plantar calcaneal spur and enthesophyte at the Achilles insertion site.  No subcutaneous emphysema or radiodense retained foreign body.                                  Current Medications:     Infusions:       Scheduled:   amLODIPine  10 mg Oral Daily    apixaban  5 mg Oral BID    ceFAZolin (ANCEF) IVPB  1 g Intravenous Q8H    gabapentin  400 mg Oral BID    lisinopril  40 mg Oral Daily    metoprolol succinate  25 mg Oral QHS    metroNIDAZOLE  500 mg Oral Q8H    pramipexole  0.25 mg Oral QHS        PRN:  cadexomer iodine, calcium carbonate, Dextrose 10% Bolus, Dextrose 10% Bolus, glucagon (human recombinant), glucose, glucose, insulin aspart U-100, ondansetron, sodium chloride 0.9%    Antibiotics and Day Number of Therapy:  Vanc 6/9 - current  Flagyl 6/9 - current  Cefepime 6/9 - current    Lines and Day Number of Therapy:  piv         Assessment:     Caro Powell is a 78 y.o. female with   Plan:         Diabetic Foot Ulcer with Cellulitis concern for Osteomyelitis  -patient with 3 days R third toe swelling, erythema and pain associated with fever and chills x1 days  -leukocytosis of 16.7  fever 102  ESR 87  .5  -lactate and procal negative   -Xray Foot with 4th toe soft tissue swelling concern for cellulitis and concern for osteo on 3rd distal phalanx  -MRI ordered to evaluate  -will consult general surgery for bone bx to guide antibiotic regimen  -given patient was febrile in ED, patient was given dose of rocephin and vanc  6/10  Started patient on cefepime, flagyl and vanc on 6/9. Received bone bx on 69. ID recommending MRI. Awaiting cx results  6/11  Awaiting MRI results. Aerobic cx growing s. Aureus. Pending susceptibilities,   6/12  MRI results consistent with osteo and  cellulitis.  Remains afebrile without elevation in wbc.      Asymptomatic Bacteriuria   -patient with + leukocytes and nitrates in urine  -fever, leukocytosis and inflammatory markers as above  -no urinary complaints  -treated with rocephin as above      DM with diabetic neuropathy   -last A1C 5/18 8.3  will reorder  -takes glimepiride at home   -SSI and accuchecks while inpatient   -will give home gabapentin     Afib  -rate controlled with metoprolol and anticoagulated with eliquis  will continue     HTN  -takes metoprolol at home  will continue while inpatient  6/10  BP overnight and in AM was in 180-190s, changed lisinopril dose to 40 mg.   6/11  Patient BP remains difficult to control ON. BP currently 140/77. Will consider adding another agent on board.  6/12  Amlodipine started on 6/11. Suspect bp will become more appropriate as amlodipine takes full effect.     Dispo:  Patient likely to d/c  home w/ home abx.     Cheng Sanders,   [facility] Internal Medicine, HO-I  [facility] Hospitalist Team B     [facility] Medicine Hospitalist Pager numbers:   [facility] Hospitalist Medicine Team A (Gerard/Pancho): [phone]  [facility] Hospitalist Medicine Team B (Kylee/Nima):  [phone]

## 2019-06-12 NOTE — NURSING
Cued into patient's room for evening rounds. Up in chair and calling for assist to bathroom. Nurse Farhat notified. Instructed patient not to get out of chair without assistance. Fall precautions education done. Reviewed plan of care with patient.Verbalized understanding. Will continue to be available as needed

## 2019-06-12 NOTE — PLAN OF CARE
TN spoke with Randolph from [facility], he will be meeting with pt, pt's , and her sister at 11am for teaching/education regarding IV abx.

## 2019-06-12 NOTE — PLAN OF CARE
VN note: VN cued into pt's room for introduction. The patient has two family members at the bedside.  VN informed pt that VN would be working closely along side bedside nurse, PCT, and the rest of care team and making rounds throughout the shift. Pt verbalized understanding. Allowed time for questions. Patient denies any pain or discomfort at this time.   Patient educated on safety to call before getting up, because we don't want the patient to fall and harm themselves in any way. Patient also educated on the maintaince of her picc line.  VN will continue to be available to patient and intervene prn.        06/12/19 1048   Type of Frequent Check   Type Patient Rounds  (VN rounding)   Safety/Activity   Patient Rounds visualized patient;call light in patient/parent reach   Safety Promotion/Fall Prevention family to remain at bedside;side rails raised x 3   Safety Precautions emergency equipment at bedside   Positioning   Body Position supine   Head of Bed (HOB) HOB at 20-30 degrees   Pain/Comfort/Sleep   Preferred Pain Scale number (Numeric Rating Pain Scale)   Comfort/Acceptable Pain Level 0   Pain Rating (0-10): Rest 0   Pain Rating (0-10): Activity 0        Cardiac/Telemetry Details / Alarms   Cardiac/Telemetry Monitor On Yes   Cardiac/Telemetry Audible Yes   Cardiac/Telemetry Alarms Set Yes   Assessments (Pre/Post)   Level of Consciousness (AVPU) alert

## 2019-06-12 NOTE — PLAN OF CARE
Problem: Adult Inpatient Plan of Care  Goal: Plan of Care Review  Outcome: Ongoing (interventions implemented as appropriate)  1910H Patient is awake and orientedx4. Care plan explained and verbalized understanding. VIP care model explained. On room air, O2 saturation 95%. On heart monitor running Sinus Rhythm at 81bpm. IV site to the left forearm 20G; flushed and saline locked. Right foot with dressing clean, dry and intact. Maintained on diabetic diet. Maintained on fall precaution. Bed in lowest position, bed alarms on, call light within reach and instructed to call for help when needed. Will continue  to monitor.    PICC Nurse placed double lumen PICC to the left arm, limb alert band placed to the left arm.   Due medications given, blood sugar monitored and covered per insulin sliding scale.  2150H Dr. PIOTR Delacruz with order to use PICC line. Cefazolin administered intravenously, red port used.     0325H Right foot dressing changed. Cadexomer iodine gel applied, 4x4 gauze, kerlix and ACE wrap applied. Right foot, third toe swollen with ulceration.

## 2019-06-12 NOTE — PLAN OF CARE
Per Randolph from [facility], he educated pt, pt's sister and  on Home IV abx admistration, patient's abx to be delivered to pt's house. Randolph also gave Katelyn with [facility] report on patient.    Pt to get 1400 dose of IV abx in hospital and then will be discharged per . TN updated patient on anticipated discharge plan.

## 2019-06-12 NOTE — ASSESSMENT & PLAN NOTE
Pt 77 y/o female with PMHx significant for DM2 with right 3rd digit infection  Imaging reviewed, OM noted on MRI  Pt s/p bone biopsy, cultures growing staph aureus, ID on board, pt will need 6 weeks of IV ABX   Wounds dressed with iodosorb, 4x4, abd pad, kerlix and loose ACE  Pt will require home health to change dressings 3x per week with iodosorb, pete foam, cast padding and coban  Pt PWB to heel only in [employer] shoe  Pt to f/u with Dr. Jose in 1 week upon D/C  Podiatry will follow

## 2019-06-12 NOTE — PLAN OF CARE
VN went in and went over  the d/c instructions with the patient.   Patient advised to start taking the newly prescribed medication.  Which were delivered to the bedside by [facility]. Patient will keep all follow up appointments and schedule any needed appointments.    Patient verbalized understanding of the D/C instructions. Education given, refer to clinical reference for education.   Waiting for her  to arrive to pick her up.

## 2019-06-12 NOTE — PLAN OF CARE
06/11/19 1119   Post-Acute Status   Post-Acute Authorization Medications   Medication Status   (Pt accepted by [facility])

## 2019-06-13 ENCOUNTER — PATIENT OUTREACH (OUTPATIENT)
Dept: ADMINISTRATIVE | Facility: CLINIC | Age: 79
End: 2019-06-13

## 2019-06-13 LAB — BACTERIA SPEC ANAEROBE CULT: NORMAL

## 2019-06-13 NOTE — PATIENT INSTRUCTIONS
Wound Care    You have a break in the skin. This wound may be because of an injury. Or it may be the result of surgery. Closing the wound helps stop bleeding, protects the wound from infection, and speeds healing. The type of closure that is used depends on the size and location of the wound. Choices include stitches (sutures), strips of surgical tape, skin glue, or staples.  Home care  Your healthcare provider may prescribe medicines for pain. Or he or she may suggest an over-the-counter (OTC) pain reliever, such as ibuprofen. If you have chronic kidney disease, talk with your provider before taking any OTC medicines. Also talk with your provider if you've had a stomach ulcer or gastrointestinal bleeding. In certain cases, antibiotics may be prescribed to help prevent infection. If antibiotics are prescribed, take them exactly as directed for as long as directed. Do not stop taking your antibiotics until they are all gone, even if you feel better.  General care  · Follow the healthcare providers instructions on how to care for the wound.  · Wash your hands with soap and warm water before and after caring for the wound. This helps prevent infection.  · If a bandage was applied, change it once a day or as directed. If at any time the bandage becomes wet or dirty, replace it with a new one.  · Unless told otherwise, avoid soaking the wound in water. Take showers or sponge baths instead of tub baths. Don't scrub or pick at the wound.  · Don't go swimming.  · If you have a bandage and it gets wet, use a clean cloth to gently pat the wound dry. Then replace the bandage with a dry one.  · Don't scratch, rub, or pick at the area.  · Watch for the signs of infection listed below. Any wound can get infected, even if you are taking antibiotics. Seek care right away if you see any possible signs of infection.  Care for specific closures  · Sutures. You may want to clean the wound daily after the first 2 to 3 days. To do  this, remove the bandage and gently wash the area with soap and warm water. After cleaning, apply a thin layer of antibiotic ointment if recommended. Then apply a new bandage. Sutures on the outside of the skin usually need to be removed by your healthcare provider.  · Surgical tape. Keep the area dry. If it gets wet, blot it dry with a towel. Surgical tape closures usually fall off within 7 to 10 days. If they have not fallen off after 10 days, you can remove them yourself. To remove the tape, use mineral oil or petroleum jelly on a cotton ball to gently rub the adhesive.  · Skin glue. You may shower or bathe as usual, but do not use soaps, lotions, or ointments on the wound area. Do not scrub the wound. After bathing, pat the wound dry with a soft towel. Do not apply liquids like peroxide, ointments, or creams to the wound while the strips or film is in place. Don't scratch, rub, or pick at the strips or film. Don't put tape directly over the strips or film. Skin adhesive film will fall off naturally in 5 to 10 days. If it does not peel off in 10 days, gently rub petroleum jelly or an ointment onto the film.  · Staples. Take showers or sponge baths. Don't take tub baths. Don't use lotions on the wound area. The area may be cleaned with soap and water 2 to 3 days after the wound was stapled. Don't scrub the wound. Pat it dry with a clean soft cloth or towel. You can use antibiotic ointment if your provider tells you to. Staples will need to be removed by your healthcare provider in 10 to 14 days.  Follow-up care  Follow up with your healthcare provider, or as directed. If you have sutures or staples, return for their removal as directed.  When to seek medical advice  Call your healthcare provider right away if you have signs of infection:  · Fever of 100.4°F (38°C) or higher, or as directed by your healthcare provider  · Increasing pain in the wound  · Increasing redness or swelling  · Pus or bad-smelling drainage  from the wound  Also call your provider right away if any of these occur:  · Wound bleeds more than a small amount or wont stop bleeding  · Wound edges come apart  · Numbness or weakness in the wound area that doesnt go away  Date Last Reviewed: 1/7/2016 © 2000-2017 [employer]. [address] [place] [address], [place] [address]. All rights reserved. This information is not intended as a substitute for professional medical care. Always follow your healthcare professional's instructions.

## 2019-06-14 LAB
BACTERIA BLD CULT: NORMAL
BACTERIA BLD CULT: NORMAL

## 2019-06-14 NOTE — DISCHARGE SUMMARY
"[facility] Discharge Summary    Primary Team: [facility] Team B  Attending Physician: Nima  Resident: Elijah  Intern: Tommy    Date of Admit: 6/8/2019  Date of Discharge: 6/14/2019    Discharge to: Home w/ HH  Condition: stable    Discharge Diagnoses     Patient Active Problem List   Diagnosis    Essential hypertension    Type 2 diabetes mellitus with diabetic neuropathy, without long-term current use of insulin    Pre-operative clearance    Chest pain, unspecified    New onset atrial fibrillation    Atrial fibrillation with rapid ventricular response    Anemia of chronic disease    Pericardial effusion    Diabetic ulcer of right foot associated with diabetes mellitus due to underlying condition, with fat layer exposed    Diabetic foot ulcer    Acute cystitis without hematuria    Sepsis       Consultants and Procedures     Consultants:  ID, podiatry    Procedures:   Bone biopsy    Brief History of Present Illness    Caro Powell is a 78 y.o.  female who  has a past medical history of Calcium nephrolithiasis (2007), Diabetes mellitus, type 2, Diabetic peripheral neuropathy associated with type 2 diabetes mellitus, and Hypertension.. The patient presented to [facility] on 6/8/2019 with a primary complaint of Foot Pain (right foot pain X"a couple of days", reports hx of DM, ems reports sore to right great toe and "popped blister" to right 4th toe. reports sees a podiatrist. )     History was limited as patient was sleepy during the exam 2/2 receiving pain meds. The patient was in their usual state of health until about 3 days ago when patient noted that her R third toe began to swell, become red, hot and painful.  Patient states that she had an appointment with the podiatrist on Monday however the pain was so unbearable that she wanted to have it evaluated.  Patient also endorses 1 day of subjective fever and chills.       Patient denies nausea,vomiting, diarrhea, constipation. "       For the full HPI please refer to the History & Physical from this admission.    Hospital Course By Problem with Pertinent Findings   Diabetic Foot Ulcer with Cellulitis concern for Osteomyelitis  -patient with 3 days R third toe swelling, erythema and pain associated with fever and chills x1 days  -leukocytosis of 16.7  fever 102  ESR 87  .5  -lactate and procal negative   -Xray Foot with 4th toe soft tissue swelling concern for cellulitis and concern for osteo on 3rd distal phalanx  -MRI consistent with osteo and cellulitis  -podiatry performed bone bx  -Started patient on cefepime, flagyl and vanc on 6/9 empirically  - Aerobic cx bone bx growing MSSA-->Cefazolin 1g IV q8h and end date 7/21/19 per ID recs with follow up with ID 6/26/19     Asymptomatic Bacteriuria   -patient with + leukocytes and nitrates in urine  -fever, leukocytosis and inflammatory markers as above  -no urinary complaints  -treated with rocephin as above      DM with diabetic neuropathy   -last A1C 5/18 8.3  6.6 HA1c  -takes glimepiride at home   -SSI and accuchecks while inpatient   -home gabapentin     Afib  -rate controlled with metoprolol and anticoagulated with eliquis  continue     HTN  -takes metoprolol at home BPs remained elevated and Amlodipine started    Discharge Medications        Medication List      START taking these medications    amLODIPine 10 MG tablet  Commonly known as:  NORVASC  Take 1 tablet (10 mg total) by mouth once daily.     ceFAZolin 2 g/50mL Dextrose IVPB 2 gram/50 mL Pgbk  Commonly known as:  ANCEF  Inject 50 mLs (2,000 mg total) into the vein every 8 (eight) hours.        CHANGE how you take these medications    apixaban 5 mg Tab  Commonly known as:  ELIQUIS  TAKE 1 TABLET BY MOUTH TWICE DAILY  What changed:  Another medication with the same name was removed. Continue taking this medication, and follow the directions you see here.     glimepiride 1 MG tablet  Commonly known as:  AMARYL  Take 1  tablet (1 mg total) by mouth 2 (two) times daily.  What changed:    · how much to take  · how to take this  · when to take this     lisinopril 40 MG tablet  Commonly known as:  PRINIVIL,ZESTRIL  Take 1 tablet (40 mg total) by mouth once daily.  What changed:    · medication strength  · how much to take        CONTINUE taking these medications    ACCU-CHEK SAMI CONTROL SOLN Soln  Generic drug:  blood glucose control high,low     ACCU-CHEK SAMI PLUS METER Misc  Generic drug:  blood-glucose meter     ACCU-CHEK SAMI PLUS TEST STRP Strp  Generic drug:  blood sugar diagnostic     ACCU-CHEK SOFT DEV LANCETS Kit  Generic drug:  lancing device with lancets     ACCU-CHEK SOFTCLIX LANCETS Misc  Generic drug:  lancets     FLUZONE HIGH-DOSE 2018-19 (PF) 180 mcg/0.5 mL vaccine  Generic drug:  influenza     gabapentin 400 MG capsule  Commonly known as:  NEURONTIN     metoprolol succinate 25 MG 24 hr tablet  Commonly known as:  TOPROL-XL     pramipexole 0.125 MG tablet  Commonly known as:  MIRAPEX        STOP taking these medications    cephALEXin 500 MG capsule  Commonly known as:  KEFLEX           Where to Get Your Medications      These medications were sent to [facility]  [address], [place] [address]    Hours:  Mon-Fri, 8a-5:30p Phone:  [phone]   · amLODIPine 10 MG tablet  · lisinopril 40 MG tablet     Information about where to get these medications is not yet available    Ask your nurse or doctor about these medications  · ceFAZolin 2 g/50mL Dextrose IVPB 2 gram/50 mL Pgbk  · glimepiride 1 MG tablet         Discharge Information:   Diet:  diabetic    Physical Activity:  As tolerated    Instructions:  1. Take all medications as prescribed  2. Keep all follow-up appointments  3. Return to the hospital or call your primary care physicians if any worsening symptoms such as chest pain, SOB, persistent fevers occur    Follow-Up Appointments:  With ID 6/26/19    Lexis Nava  [facility] Internal  Medicine, -2         Statement Selected

## 2019-06-15 ENCOUNTER — HOSPITAL ENCOUNTER (EMERGENCY)
Facility: HOSPITAL | Age: 79
Discharge: HOME | End: 2019-06-15
Attending: EMERGENCY MEDICINE
Payer: MEDICARE

## 2019-06-15 VITALS
TEMPERATURE: 98 F | BODY MASS INDEX: 33.13 KG/M2 | OXYGEN SATURATION: 97 % | HEART RATE: 60 BPM | RESPIRATION RATE: 18 BRPM | HEIGHT: 62 IN | SYSTOLIC BLOOD PRESSURE: 133 MMHG | WEIGHT: 180 LBS | DIASTOLIC BLOOD PRESSURE: 62 MMHG

## 2019-06-15 DIAGNOSIS — T82.898A OCCLUSION OF PERIPHERALLY INSERTED CENTRAL CATHETER (PICC) LINE, INITIAL ENCOUNTER: Primary | ICD-10-CM

## 2019-06-15 PROCEDURE — 99282 EMERGENCY DEPT VISIT SF MDM: CPT

## 2019-06-15 PROCEDURE — 63600175 PHARM REV CODE 636 W HCPCS: Mod: JG | Performed by: EMERGENCY MEDICINE

## 2019-06-16 NOTE — ED NOTES
Pt presents to the ED with c/o bleeding from her PICC line. Pt states one of the ports began to bleed. No bleeding noted at this time. Pt denies pain or any other complaints at this time.

## 2019-06-16 NOTE — ED PROVIDER NOTES
Encounter Date: 6/15/2019    SCRIBE #1 NOTE: I, Prashant Miller, am scribing for, and in the presence of,  . I have scribed the entire note.       History     Chief Complaint   Patient presents with    Vascular Access Problem     Patient presents to the ED with reports of having had bleeding from port on picc line while closing port line. No bleeding from port at this time. Patient states having just been discharged from the hospital for toe infection and needed PICC line for IV antibiotics.      Caro Powell is a 78 y.o. female who  has a past medical history of Anticoagulant long-term use, Arthritis, Calcium nephrolithiasis (2007), Diabetes mellitus, type 2, Diabetic peripheral neuropathy associated with type 2 diabetes mellitus, and Hypertension.    The patient presents to the ED due to vascular access problem. Patient states she had bleeding from her PICC line during medication administration. She believes she may have cracked a port. There is no bleeding at the time of exam.   Patient denies specific complaints at this time.     The history is provided by the patient.     Review of patient's allergies indicates:   Allergen Reactions    Codeine Nausea Only     Past Medical History:   Diagnosis Date    Anticoagulant long-term use     Arthritis     Calcium nephrolithiasis 2007    Diabetes mellitus, type 2     Diabetic peripheral neuropathy associated with type 2 diabetes mellitus     Hypertension      Past Surgical History:   Procedure Laterality Date    AMPUTATION-TOE Right 5/23/2017    Performed by Derek Jose DPM at [facility] OR    COLONOSCOPY  11/28/2011    sigmoid diverticulosis, external hemorrhoids    HYSTERECTOMY      SHOULDER SURGERY Left     TOE AMPUTATION Right 05/22/2017    5th toe     Family History   Problem Relation Age of Onset    Diabetes Mother     Heart failure Father     Kidney failure Brother      Social History     Tobacco Use    Smoking status: Never Smoker     Smokeless tobacco: Never Used   Substance Use Topics    Alcohol use: No    Drug use: No     Review of Systems   Constitutional: Negative for chills and fever.   HENT: Negative for congestion, rhinorrhea and sore throat.    Eyes: Negative for redness and visual disturbance.   Respiratory: Negative for cough, shortness of breath and wheezing.    Cardiovascular: Negative for chest pain and palpitations.   Gastrointestinal: Negative for abdominal pain, diarrhea, nausea and vomiting.   Genitourinary: Negative for dysuria and hematuria.   Musculoskeletal: Negative for back pain, myalgias and neck pain.   Skin: Negative for rash.   Neurological: Negative for dizziness, weakness and light-headedness.   Psychiatric/Behavioral: Negative for confusion.       Physical Exam     Initial Vitals [06/15/19 2144]   BP Pulse Resp Temp SpO2   (!) 175/74 68 20 97.8 °F (36.6 °C) 95 %      MAP       --         Physical Exam    Nursing note and vitals reviewed.  Constitutional: She appears well-developed and well-nourished. She is not diaphoretic. No distress.   HENT:   Head: Normocephalic and atraumatic.   Mouth/Throat: Oropharynx is clear and moist.   Eyes: Conjunctivae are normal.   Cardiovascular: Normal rate, regular rhythm and intact distal pulses.   Pulmonary/Chest: No respiratory distress.   Musculoskeletal: Normal range of motion.   Neurological: She is alert and oriented to person, place, and time.   Skin: Skin is warm and dry. Capillary refill takes less than 2 seconds. No rash noted. No erythema.   Psychiatric: She has a normal mood and affect.         ED Course   Procedures  Labs Reviewed - No data to display       Imaging Results    None          Medical Decision Making:   ED Management:  Nurses attempted to clear clotted port with cathflo with no success. Pt has one working port left so she will continue to administer antibiotics through that port and will f/u next week with her prescribing physician regarding need for  possible picc replacement.              Attending Attestation:           Physician Attestation for Scribe:  Physician Attestation Statement for Scribe #1: I, vilma swanson, reviewed documentation, as scribed by jennifer chavarria in my presence, and it is both accurate and complete.                    Clinical Impression:       ICD-10-CM ICD-9-CM   1. Occlusion of peripherally inserted central catheter (PICC) line, initial encounter T82.898A 996.74           Disposition:   Disposition: Discharged  Condition: Stable                        Vilma Swanson MD  06/15/19 5018

## 2019-06-16 NOTE — ED NOTES
Attempted to flush purple lumen of picc line per MD verbal order, unable to flush line, MD notified.

## 2019-06-17 ENCOUNTER — NURSE TRIAGE (OUTPATIENT)
Dept: ADMINISTRATIVE | Facility: CLINIC | Age: 79
End: 2019-06-17

## 2019-06-18 NOTE — TELEPHONE ENCOUNTER
Patient called to report the following:     -unable to flush 1 of the picc ports  -has already taken IV abx   -denies other symptoms   -advised on home care and f/u with provider     Reason for Disposition   [1] Central line running slowly AND [2] NO improvement after using Care Advice AND [3] NO skin swelling or redness    Protocols used: IV NOT RUNNING OR RUNNING SLOWLY-A-AH

## 2019-06-24 ENCOUNTER — OFFICE VISIT (OUTPATIENT)
Dept: PODIATRY | Facility: CLINIC | Age: 79
End: 2019-06-24
Payer: MEDICARE

## 2019-06-24 VITALS — HEIGHT: 62 IN | WEIGHT: 180 LBS | BODY MASS INDEX: 33.13 KG/M2

## 2019-06-24 DIAGNOSIS — E08.621 DIABETIC ULCER OF OTHER PART OF RIGHT FOOT ASSOCIATED WITH DIABETES MELLITUS DUE TO UNDERLYING CONDITION, WITH FAT LAYER EXPOSED: ICD-10-CM

## 2019-06-24 DIAGNOSIS — E11.621 DIABETIC ULCER OF TOE OF RIGHT FOOT ASSOCIATED WITH TYPE 2 DIABETES MELLITUS, WITH BONE INVOLVEMENT WITHOUT EVIDENCE OF NECROSIS: Primary | ICD-10-CM

## 2019-06-24 DIAGNOSIS — M86.9 TOE OSTEOMYELITIS, RIGHT: ICD-10-CM

## 2019-06-24 DIAGNOSIS — L97.512 DIABETIC ULCER OF OTHER PART OF RIGHT FOOT ASSOCIATED WITH DIABETES MELLITUS DUE TO UNDERLYING CONDITION, WITH FAT LAYER EXPOSED: ICD-10-CM

## 2019-06-24 DIAGNOSIS — L97.516 DIABETIC ULCER OF TOE OF RIGHT FOOT ASSOCIATED WITH TYPE 2 DIABETES MELLITUS, WITH BONE INVOLVEMENT WITHOUT EVIDENCE OF NECROSIS: Primary | ICD-10-CM

## 2019-06-24 PROCEDURE — 11042 DBRDMT SUBQ TIS 1ST 20SQCM/<: CPT | Mod: S$GLB,,, | Performed by: PODIATRIST

## 2019-06-24 PROCEDURE — 1101F PT FALLS ASSESS-DOCD LE1/YR: CPT | Mod: CPTII,S$GLB,, | Performed by: PODIATRIST

## 2019-06-24 PROCEDURE — 99999 PR PBB SHADOW E&M-EST. PATIENT-LVL III: CPT | Mod: PBBFAC,,, | Performed by: PODIATRIST

## 2019-06-24 PROCEDURE — 1101F PR PT FALLS ASSESS DOC 0-1 FALLS W/OUT INJ PAST YR: ICD-10-PCS | Mod: CPTII,S$GLB,, | Performed by: PODIATRIST

## 2019-06-24 PROCEDURE — 99999 PR PBB SHADOW E&M-EST. PATIENT-LVL III: ICD-10-PCS | Mod: PBBFAC,,, | Performed by: PODIATRIST

## 2019-06-24 PROCEDURE — 99213 OFFICE O/P EST LOW 20 MIN: CPT | Mod: 25,S$GLB,, | Performed by: PODIATRIST

## 2019-06-24 PROCEDURE — 11042 WOUND DEBRIDEMENT: ICD-10-PCS | Mod: S$GLB,,, | Performed by: PODIATRIST

## 2019-06-24 PROCEDURE — 99213 PR OFFICE/OUTPT VISIT, EST, LEVL III, 20-29 MIN: ICD-10-PCS | Mod: 25,S$GLB,, | Performed by: PODIATRIST

## 2019-06-24 NOTE — PROGRESS NOTES
Subjective:      Patient ID: Caro Powell is a 78 y.o. female.    Chief Complaint:No chief complaint on file.      History of Present Illness    77 YO F with DM admitted on 6/8/19 with fever, right 3rd toe pain, redness and swelling. Found to have osteomyelitis of the 3rd toe of right foot by MRI; MSSA growing from bone biopsy.    Had urine culture with 10,000 - 49,999 cfu/ml of MDR E.coli. Treated with Vancomycin and Cefepime. Placed on Ancef 2 gram IV Q8 at home - EOC 7/21/19. Patient had occlusion of port on 6/15 - seen in ER.      She was told by the home health nurse that she may be developing a new ulcer on her 3rd toe.  She is very concerned that the treatment is not helping.  She states that her right foot has altered gait mechanics, and typically her shoe wears out on 1 side.    Review of Systems   All other systems reviewed and are negative.    Objective:   Physical Exam   Constitutional: She is oriented to person, place, and time. She appears well-developed and well-nourished.   HENT:   Head: Normocephalic and atraumatic.   Cardiovascular: Normal rate, regular rhythm and normal heart sounds.   Pulmonary/Chest: Effort normal and breath sounds normal.   Abdominal: Soft. Bowel sounds are normal.   Musculoskeletal:   PICC line in left arm without erythema, edema, or tenderness.   Neurological: She is alert and oriented to person, place, and time.   Skin: Skin is warm and dry.     Assessment:       1. Type 2 diabetes mellitus with diabetic neuropathy, without long-term current use of insulin    2. Diabetic ulcer of other part of right foot associated with diabetes mellitus due to underlying condition, with necrosis of bone    3. Acute hematogenous osteomyelitis of right foot          Plan:       Long discussion with patient regarding the origin of her foot ulcer.  Discussed the treatment of her MSSA osteomyelitis as well.  Continue cefazolin for now.  Follow inflammatory parameters.  Continue  antibiotics until July 21.  Discussed with Podiatry about Achilles tendon lengthening.

## 2019-06-24 NOTE — PROCEDURES
"Wound Debridement  Date/Time: 6/24/2019 2:52 PM  Performed by: Derek Jose DPM  Authorized by: Derek Jose DPM     Time out: Immediately prior to procedure a "time out" was called to verify the correct patient, procedure, equipment, support staff and site/side marked as required.    Consent Done?:  Yes (Verbal)    Preparation: Patient was prepped and draped in usual sterile fashion    Local anesthesia used?: No      Wound Details:    Location:  Right foot    Location:  Right 1st Metatarsal Head    Type of Debridement:  Excisional       Length (cm):  0.3       Area (sq cm):  0.12       Width (cm):  0.4       Percent Debrided (%):  100       Depth (cm):  0.1       Total Area Debrided (sq cm):  0.12    Depth of debridement:  Subcutaneous tissue    Tissue debrided:  Subcutaneous    Devitalized tissue debrided:  Biofilm, Callus, Slough and Fibrin    Instruments:  Blade    Bleeding:  Minimal  Hemostasis Achieved: Yes    Method Used:  Pressure  Patient tolerance:  Patient tolerated the procedure well with no immediate complications     Applied saline moistened nathan, nicolette foam, cast padding x 2 secured with coban.        "

## 2019-06-24 NOTE — PROGRESS NOTES
"Subjective:      Patient ID: Caro Powell is a 78 y.o. female.    Chief Complaint: Follow-up (right foot ulcer )    Caro LUA is a 78 y.o. female who presents to the clinic for evaluation and treatment of high risk feet. Caro LUA has a past medical history of Anticoagulant long-term use, Arthritis, Calcium nephrolithiasis (2007), Diabetes mellitus, type 2, Diabetic peripheral neuropathy associated with type 2 diabetes mellitus, and Hypertension. The patient's chief complaint is diabetic foot ulcer, right second toe. This patient has documented high risk feet requiring routine maintenance secondary to peripheral neuropathy. Noted drainage and discoloration from the toe a few days ago. Denies trauma. Accompanied by her .    5/7/18: Follow up for wound check right foot. Taking po clindamycin and ciprofloxacin as scheduled. Accompanied by her . Dressing remained c/d/i.    5/14/18: Presents for wound check. No new complaints. Accompanied by her .    5/31/18: Presents for wound check. Reports she has not received HH. PO abx completed.     12/5:  She is a pt of Dr. 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 She is presenting with new complaint of blister at right submet 1 and distal tip of 4th toe. She is DM2 and on insulin. It was 126 today. She recently bought a new shoes. She tells me she did not have any problems when she was wearing diabetic shoes but blisters formed after wearing a normal tennis shoes. It happened a week ago. She is traveling to [place], [place] in 2 weeks and wants to know if she can weight bear on her right foot. Denies N/V/F/C/SOB/CP    12/12: f/u right foot ulcer. Has been weight bearing in surgical shoe with football dressing. Denies pain. Tells me she has been walking in diabetic shoes on her left foot. She tells me there is a "callus" at lateral left 5th toe that she did not have before. She will travel to [place] soon. Also tells me she " noticed that her right foot is turning sideways and feels like it is affecting the way she walks.     1/21/19: Complains of worsening wound to right foot. She went to [place] for holidays and saws her wound to right foot worsened. Denies trauma.    2/4/19  Here for wound check.  Denies F/c/N/V    2/18/19: Wound check. Says dressing remained intact. Upon observing note she is wearing a different darco shoe then previously dispensed and foot appears not to be fully seated in shoe. Accompanied by her . Denies trauma.     2/25/19 wound check. Denies F/C/N/V    3/29/19 patient complains of new ulcer on 3rd toe that she first noticed on Wednesday.  She has been treating it with antibiotic ointment and bandaids.  Denies F/C/N/V.  Accompanied by her .    04/08/2019:  Presents for wound check right foot.  Relates the dressing remained intact.    4/15/19: Presents for wound check. No new complaints.    06/24/2019:  Follow-up for acute onset osteomyelitis to the right 3rd toe receiving IV Ancef per Infectious Disease recommendation.  [facility] following [facility] and changing dressings 3 times weekly.      PCP: Manuel Laird MD    Date Last Seen by PCP:     Current shoe gear:  Affected Foot: football dressing      Unaffected Foot: Extra depth shoes with custome accommodative insoles    History of Trauma: negative  Sign of Infection: none    Hemoglobin A1C   Date Value Ref Range Status   06/09/2019 6.6 (H) 4.0 - 5.6 % Final     Comment:     ADA Screening Guidelines:  5.7-6.4%  Consistent with prediabetes  >or=6.5%  Consistent with diabetes  High levels of fetal hemoglobin interfere with the HbA1C  assay. Heterozygous hemoglobin variants (HbS, HgC, etc)do  not significantly interfere with this assay.   However, presence of multiple variants may affect accuracy.     06/09/2019 6.6 (H) 4.0 - 5.6 % Final     Comment:     ADA Screening Guidelines:  5.7-6.4%  Consistent with prediabetes  >or=6.5%  Consistent  "with diabetes  High levels of fetal hemoglobin interfere with the HbA1C  assay. Heterozygous hemoglobin variants (HbS, HgC, etc)do  not significantly interfere with this assay.   However, presence of multiple variants may affect accuracy.     01/12/2018 8.3 % Final   05/22/2017 7.5 (H) 4.5 - 6.2 % Final     Comment:     According to ADA guidelines, hemoglobin A1C <7.0% represents  optimal control in non-pregnant diabetic patients.  Different  metrics may apply to specific populations.   Standards of Medical Care in Diabetes - 2016.  For the purpose of screening for the presence of diabetes:  <5.7%     Consistent with the absence of diabetes  5.7-6.4%  Consistent with increasing risk for diabetes   (prediabetes)  >or=6.5%  Consistent with diabetes  Currently no consensus exists for use of hemoglobin A1C  for diagnosis of diabetes for children.       Vitals:    06/24/19 1437   Weight: 81.6 kg (180 lb)   Height: 5' 2" (1.575 m)   PainSc: 0-No pain      Past Medical History:   Diagnosis Date    Anticoagulant long-term use     Arthritis     Calcium nephrolithiasis 2007    Diabetes mellitus, type 2     Diabetic peripheral neuropathy associated with type 2 diabetes mellitus     Hypertension        Past Surgical History:   Procedure Laterality Date    AMPUTATION-TOE Right 5/23/2017    [facility]    COLONOSCOPY  11/28/2011    sigmoid diverticulosis, external hemorrhoids    HYSTERECTOMY      SHOULDER SURGERY Left     TOE AMPUTATION Right 05/22/2017    5th toe       Family History   Problem Relation Age of Onset    Diabetes Mother     Heart failure Father     Kidney failure Brother        Social History     Socioeconomic History    Marital status:      Spouse name: Not on file    Number of children: Not on file    Years of education: Not on file    Highest education level: Not on file   Occupational History    Not on file   Social Needs    Financial resource " strain: Not on file    Food insecurity:     Worry: Not on file     Inability: Not on file    Transportation needs:     Medical: Not on file     Non-medical: Not on file   Tobacco Use    Smoking status: Never Smoker    Smokeless tobacco: Never Used   Substance and Sexual Activity    Alcohol use: No    Drug use: No    Sexual activity: Yes   Lifestyle    Physical activity:     Days per week: Not on file     Minutes per session: Not on file    Stress: Not on file   Relationships    Social connections:     Talks on phone: Not on file     Gets together: Not on file     Attends [employer] service: Not on file     Active member of club or organization: Not on file     Attends meetings of clubs or organizations: Not on file     Relationship status: Not on file   Other Topics Concern    Not on file   Social History Narrative    Not on file       Current Outpatient Medications   Medication Sig Dispense Refill    ACCU-CHEK SAMI CONTROL SOLN Soln       ACCU-CHEK SAMI PLUS METER Misc       ACCU-CHEK SAMI PLUS TEST STRP Strp       ACCU-CHEK SOFT DEV LANCETS Kit       ACCU-CHEK SOFTCLIX LANCETS Misc       amLODIPine (NORVASC) 10 MG tablet Take 1 tablet (10 mg total) by mouth once daily. 30 tablet 11    apixaban (ELIQUIS) 5 mg Tab TAKE 1 TABLET BY MOUTH TWICE DAILY 60 tablet 5    ceFAZolin 2 g/50mL Dextrose IVPB (ANCEF) 2 gram/50 mL PgBk Inject 50 mLs (2,000 mg total) into the vein every 8 (eight) hours.      FLUZONE HIGH-DOSE 2018-19, PF, 180 mcg/0.5 mL vaccine       gabapentin (NEURONTIN) 400 MG capsule       glimepiride (AMARYL) 1 MG tablet Take 1 tablet (1 mg total) by mouth 2 (two) times daily.      lisinopril (PRINIVIL,ZESTRIL) 40 MG tablet Take 1 tablet (40 mg total) by mouth once daily. 90 tablet 3    metoprolol succinate (TOPROL-XL) 25 MG 24 hr tablet Take by mouth once daily.       pramipexole (MIRAPEX) 0.125 MG tablet Take by mouth every evening.        No current facility-administered  medications for this visit.        Review of patient's allergies indicates:   Allergen Reactions    Codeine Nausea Only         Review of Systems   Constitution: Negative for chills, fever and malaise/fatigue.   Cardiovascular: Negative for chest pain, claudication and leg swelling.   Respiratory: Negative for cough and shortness of breath.    Skin: Positive for color change, dry skin, nail changes and poor wound healing.   Musculoskeletal: Negative for back pain, joint pain, muscle cramps and muscle weakness.   Gastrointestinal: Negative for nausea and vomiting.   Neurological: Positive for numbness and paresthesias. Negative for weakness.   Psychiatric/Behavioral: Negative for altered mental status.           Objective:      Physical Exam   Constitutional: She is oriented to person, place, and time. She appears well-developed and well-nourished. No distress.   Cardiovascular:   Pulses:       Dorsalis pedis pulses are 2+ on the right side, and 2+ on the left side.        Posterior tibial pulses are 2+ on the right side, and 2+ on the left side.   CFT< 3 secs all toes bilateral foot, skin temp warm bilateral foot, no digital hair growth bilateral foot, mild lower extremity edema bilateral.     Musculoskeletal:   + equinus that reduces with knee bent bilateral.    No pain with ROM or MMT bilateral foot.    R foot:  Limited range of motion at the 1st MPJ    Plantar flexed first met head right foot.     Feet:   Right Foot:   Protective Sensation: 10 sites tested. 5 sites sensed.   Skin Integrity: Positive for ulcer, callus and dry skin. Negative for erythema.   Left Foot:   Protective Sensation: 10 sites tested. 5 sites sensed.   Skin Integrity: Positive for callus and dry skin. Negative for ulcer, blister, skin breakdown, erythema or warmth.   Neurological: She is alert and oriented to person, place, and time. She has normal strength. A sensory deficit is present.   Skin: Skin is dry. Capillary refill takes less than  2 seconds. No ecchymosis and no rash noted. She is not diaphoretic. No cyanosis or erythema. No pallor. Nails show no clubbing.   Ulcer Location: distal right 3rd toe  Measurements:  0.1 x 0.2 x 0.1 cm  Base:  Mixed granular and epithelial with surrounding callus and loosened nail  Margins: hyperkeratotic  Reduced erythema and edema to the right 3rd toe.    Ulcer Location:  Plantar 1st met head right foot  Measurements:  0.2 x 0.2 x 0.1 cm pre debridement and 0.3 x 0.4 x 0.1 cm post debridement  Periwound:  Raised hyperkeratotic  Drainage:  None.  Pus: None.  Malodor: None.  Base:  50% granular. 50% fibrin.  Signs of infection: None.                           Assessment:       Encounter Diagnoses   Name Primary?    Diabetic ulcer of toe of right foot associated with type 2 diabetes mellitus, with bone involvement without evidence of necrosis Yes    Toe osteomyelitis, right     Diabetic ulcer of other part of right foot associated with diabetes mellitus due to underlying condition, with fat layer exposed          Plan:       Caro LUA was seen today for follow-up.    Diagnoses and all orders for this visit:    Diabetic ulcer of toe of right foot associated with type 2 diabetes mellitus, with bone involvement without evidence of necrosis  -     SUBSEQUENT HOME HEALTH ORDERS    Toe osteomyelitis, right  -     SUBSEQUENT HOME HEALTH ORDERS    Diabetic ulcer of other part of right foot associated with diabetes mellitus due to underlying condition, with fat layer exposed  -     Wound Debridement  -     SUBSEQUENT HOME HEALTH ORDERS      I counseled the patient on her conditions, their implications and medical management.    Patient evaluated on Diabetic foot risk factors.  Patient counseled to do daily foot checks.  Counseled to wear accommodative shoe gear.  Educated on importance of glycemic control.    Debridement and dressing per attached note. Offload with darco shoe.    Rest and elevate.    Home health orders  updated    Continue IV antibiotics per ID recommendations    Return to clinic to 3 weeks or as needed per discussion.

## 2019-06-26 ENCOUNTER — TELEPHONE (OUTPATIENT)
Dept: PODIATRY | Facility: CLINIC | Age: 79
End: 2019-06-26

## 2019-06-26 ENCOUNTER — OFFICE VISIT (OUTPATIENT)
Dept: INFECTIOUS DISEASES | Facility: CLINIC | Age: 79
End: 2019-06-26
Payer: MEDICARE

## 2019-06-26 DIAGNOSIS — L97.514 DIABETIC ULCER OF OTHER PART OF RIGHT FOOT ASSOCIATED WITH DIABETES MELLITUS DUE TO UNDERLYING CONDITION, WITH NECROSIS OF BONE: ICD-10-CM

## 2019-06-26 DIAGNOSIS — M86.071 ACUTE HEMATOGENOUS OSTEOMYELITIS OF RIGHT FOOT: ICD-10-CM

## 2019-06-26 DIAGNOSIS — E08.621 DIABETIC ULCER OF OTHER PART OF RIGHT FOOT ASSOCIATED WITH DIABETES MELLITUS DUE TO UNDERLYING CONDITION, WITH NECROSIS OF BONE: ICD-10-CM

## 2019-06-26 DIAGNOSIS — E11.40 TYPE 2 DIABETES MELLITUS WITH DIABETIC NEUROPATHY, WITHOUT LONG-TERM CURRENT USE OF INSULIN: Primary | Chronic | ICD-10-CM

## 2019-06-26 PROCEDURE — 99204 OFFICE O/P NEW MOD 45 MIN: CPT | Mod: S$GLB,,, | Performed by: INTERNAL MEDICINE

## 2019-06-26 PROCEDURE — 99999 PR PBB SHADOW E&M-EST. PATIENT-LVL II: ICD-10-PCS | Mod: PBBFAC,,,

## 2019-06-26 PROCEDURE — 99204 PR OFFICE/OUTPT VISIT, NEW, LEVL IV, 45-59 MIN: ICD-10-PCS | Mod: S$GLB,,, | Performed by: INTERNAL MEDICINE

## 2019-06-26 PROCEDURE — 1101F PT FALLS ASSESS-DOCD LE1/YR: CPT | Mod: CPTII,S$GLB,, | Performed by: INTERNAL MEDICINE

## 2019-06-26 PROCEDURE — 1101F PR PT FALLS ASSESS DOC 0-1 FALLS W/OUT INJ PAST YR: ICD-10-PCS | Mod: CPTII,S$GLB,, | Performed by: INTERNAL MEDICINE

## 2019-06-26 PROCEDURE — 99999 PR PBB SHADOW E&M-EST. PATIENT-LVL II: CPT | Mod: PBBFAC,,,

## 2019-06-26 NOTE — TELEPHONE ENCOUNTER
----- Message from Ashely Genao sent at 6/26/2019  1:28 PM CDT -----  Contact: Elizabeth [phone]  Elizabeth would like to speak with you about the patient having a new wound on the bottom of her right toe. Please advise

## 2019-06-26 NOTE — TELEPHONE ENCOUNTER
Spoke with beti and she stated pt has new wound on same foot underneath toe and it has drainage.       Would you like the pt to come in and have the second wound evaluated?

## 2019-06-27 ENCOUNTER — TELEPHONE (OUTPATIENT)
Dept: PODIATRY | Facility: CLINIC | Age: 79
End: 2019-06-27

## 2019-06-27 PROBLEM — M86.071 ACUTE HEMATOGENOUS OSTEOMYELITIS OF RIGHT FOOT: Status: ACTIVE | Noted: 2019-06-27

## 2019-06-27 NOTE — TELEPHONE ENCOUNTER
Left message for pt letting her know she can come in for 3:45pm so Dr. Jose can look at the wound.

## 2019-06-27 NOTE — TELEPHONE ENCOUNTER
Advised beti that I left a message on pt voicemaill to see if she can come in this afternoon. She stated she will try and call pt also.

## 2019-06-27 NOTE — TELEPHONE ENCOUNTER
----- Message from Conor Richardson sent at 6/27/2019 10:45 AM CDT -----  Contact: Elizabeth w/[facility] [phone]  Elizabeth is returning your call.   Please call back to assist at [phone]

## 2019-06-28 ENCOUNTER — OFFICE VISIT (OUTPATIENT)
Dept: PODIATRY | Facility: CLINIC | Age: 79
End: 2019-06-28
Payer: MEDICARE

## 2019-06-28 VITALS
HEART RATE: 53 BPM | HEIGHT: 62 IN | WEIGHT: 180 LBS | SYSTOLIC BLOOD PRESSURE: 113 MMHG | BODY MASS INDEX: 33.13 KG/M2 | DIASTOLIC BLOOD PRESSURE: 56 MMHG

## 2019-06-28 DIAGNOSIS — E08.621 DIABETIC ULCER OF OTHER PART OF RIGHT FOOT ASSOCIATED WITH DIABETES MELLITUS DUE TO UNDERLYING CONDITION, LIMITED TO BREAKDOWN OF SKIN: Primary | ICD-10-CM

## 2019-06-28 DIAGNOSIS — M62.461 GASTROCNEMIUS EQUINUS, RIGHT: ICD-10-CM

## 2019-06-28 DIAGNOSIS — L97.511 DIABETIC ULCER OF OTHER PART OF RIGHT FOOT ASSOCIATED WITH DIABETES MELLITUS DUE TO UNDERLYING CONDITION, LIMITED TO BREAKDOWN OF SKIN: Primary | ICD-10-CM

## 2019-06-28 PROCEDURE — 3074F SYST BP LT 130 MM HG: CPT | Mod: CPTII,S$GLB,, | Performed by: PODIATRIST

## 2019-06-28 PROCEDURE — 99999 PR PBB SHADOW E&M-EST. PATIENT-LVL IV: CPT | Mod: PBBFAC,,, | Performed by: PODIATRIST

## 2019-06-28 PROCEDURE — 99213 OFFICE O/P EST LOW 20 MIN: CPT | Mod: S$GLB,,, | Performed by: PODIATRIST

## 2019-06-28 PROCEDURE — 1101F PR PT FALLS ASSESS DOC 0-1 FALLS W/OUT INJ PAST YR: ICD-10-PCS | Mod: CPTII,S$GLB,, | Performed by: PODIATRIST

## 2019-06-28 PROCEDURE — 99213 PR OFFICE/OUTPT VISIT, EST, LEVL III, 20-29 MIN: ICD-10-PCS | Mod: S$GLB,,, | Performed by: PODIATRIST

## 2019-06-28 PROCEDURE — 3078F DIAST BP <80 MM HG: CPT | Mod: CPTII,S$GLB,, | Performed by: PODIATRIST

## 2019-06-28 PROCEDURE — 3078F PR MOST RECENT DIASTOLIC BLOOD PRESSURE < 80 MM HG: ICD-10-PCS | Mod: CPTII,S$GLB,, | Performed by: PODIATRIST

## 2019-06-28 PROCEDURE — 99999 PR PBB SHADOW E&M-EST. PATIENT-LVL IV: ICD-10-PCS | Mod: PBBFAC,,, | Performed by: PODIATRIST

## 2019-06-28 PROCEDURE — 3074F PR MOST RECENT SYSTOLIC BLOOD PRESSURE < 130 MM HG: ICD-10-PCS | Mod: CPTII,S$GLB,, | Performed by: PODIATRIST

## 2019-06-28 PROCEDURE — 1101F PT FALLS ASSESS-DOCD LE1/YR: CPT | Mod: CPTII,S$GLB,, | Performed by: PODIATRIST

## 2019-06-28 RX ORDER — LIDOCAINE HYDROCHLORIDE 10 MG/ML
1 INJECTION, SOLUTION EPIDURAL; INFILTRATION; INTRACAUDAL; PERINEURAL ONCE
Status: CANCELLED | OUTPATIENT
Start: 2019-06-28 | End: 2019-06-28

## 2019-06-28 RX ORDER — SODIUM CHLORIDE 0.9 % (FLUSH) 0.9 %
10 SYRINGE (ML) INJECTION
Status: DISCONTINUED | OUTPATIENT
Start: 2019-06-28 | End: 2021-02-01

## 2019-06-28 NOTE — PROGRESS NOTES
"Subjective:      Patient ID: Caro Powell is a 78 y.o. female.    Chief Complaint: Follow-up (right foot new wound )    Caro LUA is a 78 y.o. female who presents to the clinic for evaluation and treatment of high risk feet. Caro LUA has a past medical history of Anticoagulant long-term use, Arthritis, Calcium nephrolithiasis (2007), Diabetes mellitus, type 2, Diabetic peripheral neuropathy associated with type 2 diabetes mellitus, and Hypertension. The patient's chief complaint is diabetic foot ulcer, right second toe. This patient has documented high risk feet requiring routine maintenance secondary to peripheral neuropathy. Noted drainage and discoloration from the toe a few days ago. Denies trauma. Accompanied by her .    5/7/18: Follow up for wound check right foot. Taking po clindamycin and ciprofloxacin as scheduled. Accompanied by her . Dressing remained c/d/i.    5/14/18: Presents for wound check. No new complaints. Accompanied by her .    5/31/18: Presents for wound check. Reports she has not received HH. PO abx completed.     12/5:  She is a pt of Dr. 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 She is presenting with new complaint of blister at right submet 1 and distal tip of 4th toe. She is DM2 and on insulin. It was 126 today. She recently bought a new shoes. She tells me she did not have any problems when she was wearing diabetic shoes but blisters formed after wearing a normal tennis shoes. It happened a week ago. She is traveling to [place], [place] in 2 weeks and wants to know if she can weight bear on her right foot. Denies N/V/F/C/SOB/CP    12/12: f/u right foot ulcer. Has been weight bearing in surgical shoe with football dressing. Denies pain. Tells me she has been walking in diabetic shoes on her left foot. She tells me there is a "callus" at lateral left 5th toe that she did not have before. She will travel to [place] soon. Also tells me " she noticed that her right foot is turning sideways and feels like it is affecting the way she walks.     1/21/19: Complains of worsening wound to right foot. She went to [place] for holidays and saws her wound to right foot worsened. Denies trauma.    2/4/19  Here for wound check.  Denies F/c/N/V    2/18/19: Wound check. Says dressing remained intact. Upon observing note she is wearing a different darco shoe then previously dispensed and foot appears not to be fully seated in shoe. Accompanied by her . Denies trauma.     2/25/19 wound check. Denies F/C/N/V    3/29/19 patient complains of new ulcer on 3rd toe that she first noticed on Wednesday.  She has been treating it with antibiotic ointment and bandaids.  Denies F/C/N/V.  Accompanied by her .    04/08/2019:  Presents for wound check right foot.  Relates the dressing remained intact.    4/15/19: Presents for wound check. No new complaints.    06/24/2019:  Follow-up for acute onset osteomyelitis to the right 3rd toe receiving IV Ancef per Infectious Disease recommendation.  Family home care is following her and changing dressings 3 times weekly.    06/20/2019:  Referred by her home health nurse to concern of a new wound developing to the right 3rd toe.  She is also concerned the recurrent wound under the plantar 1st met head right foot.  Says that she does note that she walks differently on this foot and and that it tends to roll more.    PCP: Brayan Penny MD    Date Last Seen by PCP:     Current shoe gear:  Affected Foot: football dressing      Unaffected Foot: Extra depth shoes with custome accommodative insoles    History of Trauma: negative  Sign of Infection: none    Hemoglobin A1C   Date Value Ref Range Status   06/09/2019 6.6 (H) 4.0 - 5.6 % Final     Comment:     ADA Screening Guidelines:  5.7-6.4%  Consistent with prediabetes  >or=6.5%  Consistent with diabetes  High levels of fetal hemoglobin interfere with the HbA1C  assay. Heterozygous  "hemoglobin variants (HbS, HgC, etc)do  not significantly interfere with this assay.   However, presence of multiple variants may affect accuracy.     06/09/2019 6.6 (H) 4.0 - 5.6 % Final     Comment:     ADA Screening Guidelines:  5.7-6.4%  Consistent with prediabetes  >or=6.5%  Consistent with diabetes  High levels of fetal hemoglobin interfere with the HbA1C  assay. Heterozygous hemoglobin variants (HbS, HgC, etc)do  not significantly interfere with this assay.   However, presence of multiple variants may affect accuracy.     01/12/2018 8.3 % Final   05/22/2017 7.5 (H) 4.5 - 6.2 % Final     Comment:     According to ADA guidelines, hemoglobin A1C <7.0% represents  optimal control in non-pregnant diabetic patients.  Different  metrics may apply to specific populations.   Standards of Medical Care in Diabetes - 2016.  For the purpose of screening for the presence of diabetes:  <5.7%     Consistent with the absence of diabetes  5.7-6.4%  Consistent with increasing risk for diabetes   (prediabetes)  >or=6.5%  Consistent with diabetes  Currently no consensus exists for use of hemoglobin A1C  for diagnosis of diabetes for children.       Vitals:    06/28/19 0916   BP: (!) 113/56   Pulse: (!) 53   Weight: 81.6 kg (180 lb)   Height: 5' 2" (1.575 m)   PainSc: 0-No pain      Past Medical History:   Diagnosis Date    Anticoagulant long-term use     Arthritis     Calcium nephrolithiasis 2007    Diabetes mellitus, type 2     Diabetic peripheral neuropathy associated with type 2 diabetes mellitus     Hypertension        Past Surgical History:   Procedure Laterality Date    AMPUTATION-TOE Right 5/23/2017    [facility]    COLONOSCOPY  11/28/2011    sigmoid diverticulosis, external hemorrhoids    HYSTERECTOMY      SHOULDER SURGERY Left     TOE AMPUTATION Right 05/22/2017    5th toe       Family History   Problem Relation Age of Onset    Diabetes Mother     Heart failure Father     " Kidney failure Brother        Social History     Socioeconomic History    Marital status:      Spouse name: Not on file    Number of children: Not on file    Years of education: Not on file    Highest education level: Not on file   Occupational History    Not on file   Social Needs    Financial resource strain: Not on file    Food insecurity:     Worry: Not on file     Inability: Not on file    Transportation needs:     Medical: Not on file     Non-medical: Not on file   Tobacco Use    Smoking status: Never Smoker    Smokeless tobacco: Never Used   Substance and Sexual Activity    Alcohol use: No    Drug use: No    Sexual activity: Yes   Lifestyle    Physical activity:     Days per week: Not on file     Minutes per session: Not on file    Stress: Not on file   Relationships    Social connections:     Talks on phone: Not on file     Gets together: Not on file     Attends Congregation service: Not on file     Active member of club or organization: Not on file     Attends meetings of clubs or organizations: Not on file     Relationship status: Not on file   Other Topics Concern    Not on file   Social History Narrative    Not on file       Current Outpatient Medications   Medication Sig Dispense Refill    ACCU-CHEK SAMI CONTROL SOLN Soln       ACCU-CHEK SAMI PLUS METER Misc       ACCU-CHEK SAMI PLUS TEST STRP Strp       ACCU-CHEK SOFT DEV LANCETS Kit       ACCU-CHEK SOFTCLIX LANCETS Misc       amLODIPine (NORVASC) 10 MG tablet Take 1 tablet (10 mg total) by mouth once daily. 30 tablet 11    apixaban (ELIQUIS) 5 mg Tab TAKE 1 TABLET BY MOUTH TWICE DAILY 60 tablet 5    ceFAZolin 2 g/50mL Dextrose IVPB (ANCEF) 2 gram/50 mL PgBk Inject 50 mLs (2,000 mg total) into the vein every 8 (eight) hours.      FLUZONE HIGH-DOSE 2018-19, PF, 180 mcg/0.5 mL vaccine       gabapentin (NEURONTIN) 400 MG capsule       glimepiride (AMARYL) 1 MG tablet Take 1 tablet (1 mg total) by mouth 2 (two) times  daily.      lisinopril (PRINIVIL,ZESTRIL) 40 MG tablet Take 1 tablet (40 mg total) by mouth once daily. 90 tablet 3    metoprolol succinate (TOPROL-XL) 25 MG 24 hr tablet Take by mouth once daily.       pramipexole (MIRAPEX) 0.125 MG tablet Take by mouth every evening.        Current Facility-Administered Medications   Medication Dose Route Frequency Provider Last Rate Last Dose    sodium chloride 0.9% flush 10 mL  10 mL Intravenous PRN Derek Jose DPM           Review of patient's allergies indicates:   Allergen Reactions    Codeine Nausea Only         Review of Systems   Constitution: Negative for chills, fever and malaise/fatigue.   Cardiovascular: Negative for chest pain, claudication and leg swelling.   Respiratory: Negative for cough and shortness of breath.    Skin: Positive for color change, dry skin, nail changes and poor wound healing.   Musculoskeletal: Negative for back pain, joint pain, muscle cramps and muscle weakness.   Gastrointestinal: Negative for nausea and vomiting.   Neurological: Positive for numbness and paresthesias. Negative for weakness.   Psychiatric/Behavioral: Negative for altered mental status.           Objective:      Physical Exam   Constitutional: She is oriented to person, place, and time. She appears well-developed and well-nourished. No distress.   Cardiovascular:   Pulses:       Dorsalis pedis pulses are 2+ on the right side, and 2+ on the left side.        Posterior tibial pulses are 2+ on the right side, and 2+ on the left side.   CFT< 3 secs all toes bilateral foot, skin temp warm bilateral foot, no digital hair growth bilateral foot, mild lower extremity edema bilateral.     Musculoskeletal:   + equinus that reduces with knee bent bilateral.    No pain with ROM or MMT bilateral foot.    R foot:  Limited range of motion at the 1st MPJ    Plantar flexed first met head right foot.     Feet:   Right Foot:   Protective Sensation: 10 sites tested. 5 sites sensed.    Skin Integrity: Positive for ulcer, callus and dry skin. Negative for erythema.   Left Foot:   Protective Sensation: 10 sites tested. 5 sites sensed.   Skin Integrity: Positive for callus and dry skin. Negative for ulcer, blister, skin breakdown, erythema or warmth.   Neurological: She is alert and oriented to person, place, and time. She has normal strength. A sensory deficit is present.   Skin: Skin is dry. Capillary refill takes less than 2 seconds. No ecchymosis and no rash noted. She is not diaphoretic. No cyanosis or erythema. No pallor. Nails show no clubbing.   Ulceration distal right 3rd toe appears healed there is still some residual erythema and edema to the toe.    Ulcer Location:  Plantar 1st met head right foot  Measurements:  0.2 x 0.2 x 0.1 cm   Periwound:  hyperkeratotic  Drainage:  None.  Pus: None.  Malodor: None.  Base:  100% granular dry base  Signs of infection: None.             Assessment:       Encounter Diagnoses   Name Primary?    Diabetic ulcer of other part of right foot associated with diabetes mellitus due to underlying condition, limited to breakdown of skin Yes    Gastrocnemius equinus, right          Plan:       Caro LUA was seen today for follow-up.    Diagnoses and all orders for this visit:    Diabetic ulcer of other part of right foot associated with diabetes mellitus due to underlying condition, limited to breakdown of skin  -     SUBSEQUENT HOME HEALTH ORDERS    Gastrocnemius equinus, right  -     Case Request Operating Room: RECESSION, GASTROCNEMIUS, ENDOSCOPIC  -     Full code; Standing  -     Place in Outpatient; Standing  -     Insert peripheral IV; Standing  -     POCT glucose; Standing  -     Verify surgical site; Standing  -     Verify informed consent; Standing  -     Full code  -     Insert peripheral IV  -     SUBSEQUENT HOME HEALTH ORDERS    Other orders  -     sodium chloride 0.9% flush 10 mL  -     lidocaine (PF) 10 mg/ml (1%) injection 10 mg  -     Weight  bearing As Tolerated; Lower Extremity; Right; Standing  -     ceFAZolin (ANCEF) 2 g in dextrose 5 % 50 mL IVPB  -     Weight bearing As Tolerated; Lower Extremity; Right      I counseled the patient on her conditions, their implications and medical management.    Patient evaluated on Diabetic foot risk factors.  Patient counseled to do daily foot checks.  Counseled to wear accommodative shoe gear.  Educated on importance of glycemic control.    Applied Betadine to the wound site plantar 1st met head followed by Zechariah foam and 2 layer cast padding football dressing secured with Coban.  Home health orders were updated.    Discussed continued conservative efforts for the recurring ulceration plantar right forefoot versus surgical infection consistent gastrocnemius recession right leg.Associated risks, benefits and postop course discussed. No guarantees given or implied.    This procedure has been fully reviewed with the patient and written informed consent has been obtained.    Patient was recently discharged from hospital needs should serve as an axis of H&P for medical clearance.    Scheduled at [facility] as MAC with regional anesthesia on 07/09/2019

## 2019-07-10 ENCOUNTER — TELEPHONE (OUTPATIENT)
Dept: INFECTIOUS DISEASES | Facility: CLINIC | Age: 79
End: 2019-07-10

## 2019-07-10 NOTE — TELEPHONE ENCOUNTER
Pasha pt.  Pt needs a follow up with you this week. She states the end of care of IV ABX will be on 7/23/19. The first available at the [place] location will be on 8/14. Also pt wants to know her weekly lab results.

## 2019-07-10 NOTE — TELEPHONE ENCOUNTER
----- Message from Conor Richardson sent at 7/10/2019 11:35 AM CDT -----  Contact: [phone]/self  Patient calling to speak with you concerning being seen before August for a follow-up appointment.  Please call and advise

## 2019-07-11 ENCOUNTER — TELEPHONE (OUTPATIENT)
Dept: INFECTIOUS DISEASES | Facility: CLINIC | Age: 79
End: 2019-07-11

## 2019-07-11 NOTE — TELEPHONE ENCOUNTER
----- Message from Lexis Mathews sent at 7/11/2019 11:23 AM CDT -----  Contact: [phone]/ self  Type:  Sooner Apoointment Request    Caller is requesting a sooner appointment.  Caller declined first available appointment listed below.  Caller will not accept being placed on the waitlist and is requesting a message be sent to doctor.  Name of Caller: Caro Sherry  When is the first available appointment? 8/14  Would the patient rather a call back or a response via [facility]? Call Back  Best Call Back Number: [phone]  Additional Information: Pt is also requesting results of lab work.

## 2019-07-11 NOTE — TELEPHONE ENCOUNTER
Pt wants to be seen this upcoming week at the [place] location. There are no available appointments, please advice

## 2019-07-15 ENCOUNTER — OFFICE VISIT (OUTPATIENT)
Dept: PODIATRY | Facility: CLINIC | Age: 79
End: 2019-07-15
Payer: MEDICARE

## 2019-07-15 ENCOUNTER — OFFICE VISIT (OUTPATIENT)
Dept: INFECTIOUS DISEASES | Facility: CLINIC | Age: 79
End: 2019-07-15
Payer: MEDICARE

## 2019-07-15 VITALS — WEIGHT: 180 LBS | HEIGHT: 62 IN | BODY MASS INDEX: 33.13 KG/M2

## 2019-07-15 DIAGNOSIS — N30.00 ACUTE CYSTITIS WITHOUT HEMATURIA: ICD-10-CM

## 2019-07-15 DIAGNOSIS — A41.9 SEPSIS, DUE TO UNSPECIFIED ORGANISM: Primary | ICD-10-CM

## 2019-07-15 DIAGNOSIS — L97.511 DIABETIC ULCER OF OTHER PART OF RIGHT FOOT ASSOCIATED WITH DIABETES MELLITUS DUE TO UNDERLYING CONDITION, LIMITED TO BREAKDOWN OF SKIN: Primary | ICD-10-CM

## 2019-07-15 DIAGNOSIS — M86.071 ACUTE HEMATOGENOUS OSTEOMYELITIS OF RIGHT FOOT: ICD-10-CM

## 2019-07-15 DIAGNOSIS — M86.9 TOE OSTEOMYELITIS, RIGHT: ICD-10-CM

## 2019-07-15 DIAGNOSIS — E08.621 DIABETIC ULCER OF OTHER PART OF RIGHT FOOT ASSOCIATED WITH DIABETES MELLITUS DUE TO UNDERLYING CONDITION, LIMITED TO BREAKDOWN OF SKIN: Primary | ICD-10-CM

## 2019-07-15 PROCEDURE — 99213 OFFICE O/P EST LOW 20 MIN: CPT | Mod: 25,S$GLB,, | Performed by: PODIATRIST

## 2019-07-15 PROCEDURE — 99213 PR OFFICE/OUTPT VISIT, EST, LEVL III, 20-29 MIN: ICD-10-PCS | Mod: S$GLB,,, | Performed by: INTERNAL MEDICINE

## 2019-07-15 PROCEDURE — 99999 PR PBB SHADOW E&M-EST. PATIENT-LVL III: CPT | Mod: PBBFAC,,, | Performed by: PODIATRIST

## 2019-07-15 PROCEDURE — 1101F PR PT FALLS ASSESS DOC 0-1 FALLS W/OUT INJ PAST YR: ICD-10-PCS | Mod: CPTII,S$GLB,, | Performed by: INTERNAL MEDICINE

## 2019-07-15 PROCEDURE — 1101F PR PT FALLS ASSESS DOC 0-1 FALLS W/OUT INJ PAST YR: ICD-10-PCS | Mod: CPTII,S$GLB,, | Performed by: PODIATRIST

## 2019-07-15 PROCEDURE — 1101F PT FALLS ASSESS-DOCD LE1/YR: CPT | Mod: CPTII,S$GLB,, | Performed by: INTERNAL MEDICINE

## 2019-07-15 PROCEDURE — 97597 DBRDMT OPN WND 1ST 20 CM/<: CPT | Mod: S$GLB,,, | Performed by: PODIATRIST

## 2019-07-15 PROCEDURE — 97597 WOUND DEBRIDEMENT: ICD-10-PCS | Mod: S$GLB,,, | Performed by: PODIATRIST

## 2019-07-15 PROCEDURE — 99213 OFFICE O/P EST LOW 20 MIN: CPT | Mod: S$GLB,,, | Performed by: INTERNAL MEDICINE

## 2019-07-15 PROCEDURE — 99999 PR PBB SHADOW E&M-EST. PATIENT-LVL III: ICD-10-PCS | Mod: PBBFAC,,, | Performed by: PODIATRIST

## 2019-07-15 PROCEDURE — 99213 PR OFFICE/OUTPT VISIT, EST, LEVL III, 20-29 MIN: ICD-10-PCS | Mod: 25,S$GLB,, | Performed by: PODIATRIST

## 2019-07-15 PROCEDURE — 1101F PT FALLS ASSESS-DOCD LE1/YR: CPT | Mod: CPTII,S$GLB,, | Performed by: PODIATRIST

## 2019-07-15 NOTE — PROCEDURES
"Wound Debridement  Date/Time: 7/15/2019 3:13 PM  Performed by: Derek Jose DPM  Authorized by: Derek Jose DPM     Time out: Immediately prior to procedure a "time out" was called to verify the correct patient, procedure, equipment, support staff and site/side marked as required.    Consent Done?:  Yes (Verbal)    Preparation: Patient was prepped and draped in usual sterile fashion    Local anesthesia used?: No      Wound Details:    Location:  Right foot    Location:  Right 1st Metatarsal Head    Type of Debridement:  Excisional       Length (cm):  0.3       Area (sq cm):  0.12       Width (cm):  0.4       Percent Debrided (%):  100       Depth (cm):  0.1       Total Area Debrided (sq cm):  0.12    Depth of debridement:  Epidermis/Dermis    Tissue debrided:  Epidermis and Dermis    Devitalized tissue debrided:  Callus and Slough    Instruments:  Blade    Bleeding:  None  Patient tolerance:  Patient tolerated the procedure well with no immediate complications     Applied betadine, nicolette foam, cast padding x 2 secured with coban.      "

## 2019-07-16 NOTE — PROGRESS NOTES
"Subjective:      Patient ID: Caro Powell is a 78 y.o. female.    Chief Complaint: Follow-up (right foot wound )    Caro LUA is a 78 y.o. female who presents to the clinic for evaluation and treatment of high risk feet. Caro LUA has a past medical history of Anticoagulant long-term use, Arthritis, Calcium nephrolithiasis (2007), Diabetes mellitus, type 2, Diabetic peripheral neuropathy associated with type 2 diabetes mellitus, and Hypertension. The patient's chief complaint is diabetic foot ulcer, right second toe. This patient has documented high risk feet requiring routine maintenance secondary to peripheral neuropathy. Noted drainage and discoloration from the toe a few days ago. Denies trauma. Accompanied by her .    5/7/18: Follow up for wound check right foot. Taking po clindamycin and ciprofloxacin as scheduled. Accompanied by her . Dressing remained c/d/i.    5/14/18: Presents for wound check. No new complaints. Accompanied by her .    5/31/18: Presents for wound check. Reports she has not received HH. PO abx completed.     12/5:  She is a pt of Dr. 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 She is presenting with new complaint of blister at right submet 1 and distal tip of 4th toe. She is DM2 and on insulin. It was 126 today. She recently bought a new shoes. She tells me she did not have any problems when she was wearing diabetic shoes but blisters formed after wearing a normal tennis shoes. It happened a week ago. She is traveling to [place], [place] in 2 weeks and wants to know if she can weight bear on her right foot. Denies N/V/F/C/SOB/CP    12/12: f/u right foot ulcer. Has been weight bearing in surgical shoe with football dressing. Denies pain. Tells me she has been walking in diabetic shoes on her left foot. She tells me there is a "callus" at lateral left 5th toe that she did not have before. She will travel to [place] soon. Also tells me she " noticed that her right foot is turning sideways and feels like it is affecting the way she walks.     1/21/19: Complains of worsening wound to right foot. She went to [place] for holidays and saws her wound to right foot worsened. Denies trauma.    2/4/19  Here for wound check.  Denies F/c/N/V    2/18/19: Wound check. Says dressing remained intact. Upon observing note she is wearing a different darco shoe then previously dispensed and foot appears not to be fully seated in shoe. Accompanied by her . Denies trauma.     2/25/19 wound check. Denies F/C/N/V    3/29/19 patient complains of new ulcer on 3rd toe that she first noticed on Wednesday.  She has been treating it with antibiotic ointment and bandaids.  Denies F/C/N/V.  Accompanied by her .    04/08/2019:  Presents for wound check right foot.  Relates the dressing remained intact.    4/15/19: Presents for wound check. No new complaints.    06/24/2019:  Follow-up for acute onset osteomyelitis to the right 3rd toe receiving IV Ancef per Infectious Disease recommendation.  [facility] her and changing dressings 3 times weekly.    06/20/2019:  Referred by her home health nurse to concern of a new wound developing to the right 3rd toe.  She is also concerned the recurrent wound under the plantar 1st met head right foot.  Says that she does note that she walks differently on this foot and and that it tends to roll more.    07/15/2019:  Follow-up for wound check right foot. Says that she canceled the scheduled gastrocnemius recession due to anxiety or having to manage a PICC line and being restricted due to surgery.  Currently receiving home health.    PCP: Brayan Penny MD    Date Last Seen by PCP:     Current shoe gear:  Affected Foot: football dressing      Unaffected Foot: Extra depth shoes with custome accommodative insoles    History of Trauma: negative  Sign of Infection: none    Hemoglobin A1C   Date Value Ref Range Status  "  06/09/2019 6.6 (H) 4.0 - 5.6 % Final     Comment:     ADA Screening Guidelines:  5.7-6.4%  Consistent with prediabetes  >or=6.5%  Consistent with diabetes  High levels of fetal hemoglobin interfere with the HbA1C  assay. Heterozygous hemoglobin variants (HbS, HgC, etc)do  not significantly interfere with this assay.   However, presence of multiple variants may affect accuracy.     06/09/2019 6.6 (H) 4.0 - 5.6 % Final     Comment:     ADA Screening Guidelines:  5.7-6.4%  Consistent with prediabetes  >or=6.5%  Consistent with diabetes  High levels of fetal hemoglobin interfere with the HbA1C  assay. Heterozygous hemoglobin variants (HbS, HgC, etc)do  not significantly interfere with this assay.   However, presence of multiple variants may affect accuracy.     01/12/2018 8.3 % Final   05/22/2017 7.5 (H) 4.5 - 6.2 % Final     Comment:     According to ADA guidelines, hemoglobin A1C <7.0% represents  optimal control in non-pregnant diabetic patients.  Different  metrics may apply to specific populations.   Standards of Medical Care in Diabetes - 2016.  For the purpose of screening for the presence of diabetes:  <5.7%     Consistent with the absence of diabetes  5.7-6.4%  Consistent with increasing risk for diabetes   (prediabetes)  >or=6.5%  Consistent with diabetes  Currently no consensus exists for use of hemoglobin A1C  for diagnosis of diabetes for children.       Vitals:    07/15/19 1442   Weight: 81.6 kg (180 lb)   Height: 5' 2" (1.575 m)   PainSc: 0-No pain      Past Medical History:   Diagnosis Date    Anticoagulant long-term use     Arthritis     Calcium nephrolithiasis 2007    Diabetes mellitus, type 2     Diabetic peripheral neuropathy associated with type 2 diabetes mellitus     Hypertension        Past Surgical History:   Procedure Laterality Date    AMPUTATION-TOE Right 5/23/2017    Performed by [facility] at [facility]    COLONOSCOPY  11/28/2011    sigmoid diverticulosis, external " hemorrhoids    HYSTERECTOMY      SHOULDER SURGERY Left     TOE AMPUTATION Right 05/22/2017    5th toe       Family History   Problem Relation Age of Onset    Diabetes Mother     Heart failure Father     Kidney failure Brother        Social History     Socioeconomic History    Marital status:      Spouse name: Not on file    Number of children: Not on file    Years of education: Not on file    Highest education level: Not on file   Occupational History    Not on file   Social Needs    Financial resource strain: Not on file    Food insecurity:     Worry: Not on file     Inability: Not on file    Transportation needs:     Medical: Not on file     Non-medical: Not on file   Tobacco Use    Smoking status: Never Smoker    Smokeless tobacco: Never Used   Substance and Sexual Activity    Alcohol use: No    Drug use: No    Sexual activity: Yes   Lifestyle    Physical activity:     Days per week: Not on file     Minutes per session: Not on file    Stress: Not on file   Relationships    Social connections:     Talks on phone: Not on file     Gets together: Not on file     Attends Denominational service: Not on file     Active member of club or organization: Not on file     Attends meetings of clubs or organizations: Not on file     Relationship status: Not on file   Other Topics Concern    Not on file   Social History Narrative    Not on file       Current Outpatient Medications   Medication Sig Dispense Refill    ACCU-CHEK SAMI CONTROL SOLN Soln       ACCU-CHEK SAMI PLUS METER Misc       ACCU-CHEK SAMI PLUS TEST STRP Strp       ACCU-CHEK SOFT DEV LANCETS Kit       ACCU-CHEK SOFTCLIX LANCETS Misc       amLODIPine (NORVASC) 10 MG tablet Take 1 tablet (10 mg total) by mouth once daily. 30 tablet 11    apixaban (ELIQUIS) 5 mg Tab TAKE 1 TABLET BY MOUTH TWICE DAILY 60 tablet 5    ceFAZolin 2 g/50mL Dextrose IVPB (ANCEF) 2 gram/50 mL PgBk Inject 50 mLs (2,000 mg total) into the vein every 8  (eight) hours.      FLUZONE HIGH-DOSE 2018-19, PF, 180 mcg/0.5 mL vaccine       gabapentin (NEURONTIN) 400 MG capsule       glimepiride (AMARYL) 1 MG tablet Take 1 tablet (1 mg total) by mouth 2 (two) times daily.      lisinopril (PRINIVIL,ZESTRIL) 40 MG tablet Take 1 tablet (40 mg total) by mouth once daily. 90 tablet 3    metoprolol succinate (TOPROL-XL) 25 MG 24 hr tablet Take by mouth once daily.       pramipexole (MIRAPEX) 0.125 MG tablet Take by mouth every evening.        Current Facility-Administered Medications   Medication Dose Route Frequency Provider Last Rate Last Dose    sodium chloride 0.9% flush 10 mL  10 mL Intravenous PRN Derek Jose DPM           Review of patient's allergies indicates:   Allergen Reactions    Codeine Nausea Only         Review of Systems   Constitution: Negative for chills, fever and malaise/fatigue.   Cardiovascular: Negative for chest pain, claudication and leg swelling.   Respiratory: Negative for cough and shortness of breath.    Skin: Positive for color change, dry skin, nail changes and poor wound healing.   Musculoskeletal: Negative for back pain, joint pain, muscle cramps and muscle weakness.   Gastrointestinal: Negative for nausea and vomiting.   Neurological: Positive for numbness and paresthesias. Negative for weakness.   Psychiatric/Behavioral: Negative for altered mental status.           Objective:      Physical Exam   Constitutional: She is oriented to person, place, and time. She appears well-developed and well-nourished. No distress.   Cardiovascular:   Pulses:       Dorsalis pedis pulses are 2+ on the right side, and 2+ on the left side.        Posterior tibial pulses are 2+ on the right side, and 2+ on the left side.   CFT< 3 secs all toes bilateral foot, skin temp warm bilateral foot, no digital hair growth bilateral foot, mild lower extremity edema bilateral.     Musculoskeletal:   + equinus that reduces with knee bent bilateral.    No pain  with ROM or MMT bilateral foot.    R foot:  Limited range of motion at the 1st MPJ    Plantar flexed first met head right foot.     Feet:   Right Foot:   Protective Sensation: 10 sites tested. 5 sites sensed.   Skin Integrity: Positive for ulcer, callus and dry skin. Negative for erythema.   Left Foot:   Protective Sensation: 10 sites tested. 5 sites sensed.   Skin Integrity: Positive for callus and dry skin. Negative for ulcer, blister, skin breakdown, erythema or warmth.   Neurological: She is alert and oriented to person, place, and time. She has normal strength. A sensory deficit is present.   Skin: Skin is dry. Capillary refill takes less than 2 seconds. No ecchymosis and no rash noted. She is not diaphoretic. No cyanosis or erythema. No pallor. Nails show no clubbing.   Ulceration distal right 3rd toe healed.  Mild residual edema no discoloration to the toe.    Ulcer Location:  Plantar 1st met head right foot  Measurements:  Unable to be measured pre debridement due to overlying callus.  0.2 x 0.3 x 0.0 cm post debridement   Periwound:  hyperkeratotic  Drainage:  None.  Pus: None.  Malodor: None.  Base:  100% granular dry base  Signs of infection: None.             Assessment:       Encounter Diagnoses   Name Primary?    Diabetic ulcer of other part of right foot associated with diabetes mellitus due to underlying condition, limited to breakdown of skin Yes    Toe osteomyelitis, right          Plan:       Caro LUA was seen today for follow-up.    Diagnoses and all orders for this visit:    Diabetic ulcer of other part of right foot associated with diabetes mellitus due to underlying condition, limited to breakdown of skin  -     Wound Debridement  -     SUBSEQUENT HOME HEALTH ORDERS    Toe osteomyelitis, right  -     SUBSEQUENT HOME HEALTH ORDERS      I counseled the patient on her conditions, their implications and medical management.    Patient evaluated on Diabetic foot risk factors.  Patient counseled  to do daily foot checks.  Counseled to wear accommodative shoe gear.  Educated on importance of glycemic control.    Applied Betadine to the wound site plantar 1st met head followed by Zechariah foam and 2 layer cast padding football dressing secured with Coban.  Home health orders were updated.    Post bone surgical intervention at this time due to patient request.  Will reschedule at a later date.    Return to clinic within 3-4 weeks or as needed per discussion.

## 2019-07-16 NOTE — PROGRESS NOTES
Tolerating IV antibiotics. No complaints. States that wound is healed. EOC on July 21.     PICC site without erythema, without tenderness or cord.     1. Sepsis, due to unspecified organism    2. Acute cystitis without hematuria    3. Acute hematogenous osteomyelitis of right foot      Continue IV antibiotics. Continue wound care. Agree with achilles tendon lengthening.

## 2019-07-17 ENCOUNTER — TELEPHONE (OUTPATIENT)
Dept: INFECTIOUS DISEASES | Facility: CLINIC | Age: 79
End: 2019-07-17

## 2019-07-17 NOTE — TELEPHONE ENCOUNTER
----- Message from Myrna Kirk sent at 7/17/2019  2:31 PM CDT -----  Contact: Self/ [phone]  Patient is requesting a callback.  Please call.

## 2019-07-17 NOTE — TELEPHONE ENCOUNTER
Pt is requesting a call back with her lab results. Also wants to see the doctor before her end of care on 7/21. Please advice

## 2019-07-18 ENCOUNTER — TELEPHONE (OUTPATIENT)
Dept: INFECTIOUS DISEASES | Facility: CLINIC | Age: 79
End: 2019-07-18

## 2019-07-18 NOTE — TELEPHONE ENCOUNTER
----- Message from Madisyn Latif sent at 7/18/2019  3:40 PM CDT -----  Contact: self [phone]  TEST RESULTS:     Patient would like to get test results.

## 2019-07-19 NOTE — TELEPHONE ENCOUNTER
Pt's end of care is 7/21. Pt has an appointment for a picc line removal on Monday at [facility], and states that if the line is taken out  she will not get it replace. Pt needs an answer by today.   Wants to know if you want her to stay on IV ABX or Oral ABX.   The labs from 7/15 are in the media tab tab  Please call pt at [phone]. Pt wants to speak to you directly today.

## 2019-07-19 NOTE — TELEPHONE ENCOUNTER
----- Message from Radha De Leon sent at 7/19/2019 10:17 AM CDT -----  Contact: [phone] self   Pt is requesting to speak with you re: a urgent matter. Please advise

## 2019-07-20 RX ORDER — DOXYCYCLINE 100 MG/1
100 CAPSULE ORAL 2 TIMES DAILY
Qty: 60 CAPSULE | Refills: 1 | Status: SHIPPED | OUTPATIENT
Start: 2019-07-20 | End: 2019-08-19

## 2019-07-22 ENCOUNTER — TELEPHONE (OUTPATIENT)
Dept: INFECTIOUS DISEASES | Facility: CLINIC | Age: 79
End: 2019-07-22

## 2019-07-22 NOTE — TELEPHONE ENCOUNTER
MD Keisha Cruz MA   Caller: Unspecified (4 days ago,  4:18 PM)             I explained this to her in clinic in detail. She will stop IV antibiotics on Monday and start doxycycline.      Called pt with info above

## 2019-07-31 ENCOUNTER — TELEPHONE (OUTPATIENT)
Dept: FAMILY MEDICINE | Facility: CLINIC | Age: 79
End: 2019-07-31

## 2019-07-31 NOTE — TELEPHONE ENCOUNTER
[facility] faxed over paperwork to be signed by Dr RODRIGUEZ. Spoke with pt about it. She has changed pcp and will let [facility] know.

## 2019-08-06 ENCOUNTER — HOSPITAL ENCOUNTER (OUTPATIENT)
Dept: RADIOLOGY | Facility: HOSPITAL | Age: 79
Discharge: HOME | End: 2019-08-06
Attending: INTERNAL MEDICINE
Payer: MEDICARE

## 2019-08-06 DIAGNOSIS — M54.42 ACUTE LOW BACK PAIN WITH LEFT-SIDED SCIATICA: ICD-10-CM

## 2019-08-06 DIAGNOSIS — M54.42 ACUTE LOW BACK PAIN WITH LEFT-SIDED SCIATICA: Primary | ICD-10-CM

## 2019-08-06 PROCEDURE — 72100 X-RAY EXAM L-S SPINE 2/3 VWS: CPT | Mod: 26,,, | Performed by: RADIOLOGY

## 2019-08-06 PROCEDURE — 72100 X-RAY EXAM L-S SPINE 2/3 VWS: CPT | Mod: TC,FY

## 2019-08-06 PROCEDURE — 72100 XR LUMBAR SPINE AP AND LATERAL: ICD-10-PCS | Mod: 26,,, | Performed by: RADIOLOGY

## 2019-08-08 ENCOUNTER — OFFICE VISIT (OUTPATIENT)
Dept: PODIATRY | Facility: CLINIC | Age: 79
End: 2019-08-08
Payer: MEDICARE

## 2019-08-08 VITALS
WEIGHT: 180 LBS | HEIGHT: 62 IN | SYSTOLIC BLOOD PRESSURE: 118 MMHG | BODY MASS INDEX: 33.13 KG/M2 | HEART RATE: 52 BPM | DIASTOLIC BLOOD PRESSURE: 60 MMHG

## 2019-08-08 DIAGNOSIS — B35.1 ONYCHOMYCOSIS: ICD-10-CM

## 2019-08-08 DIAGNOSIS — M62.461 GASTROCNEMIUS EQUINUS, RIGHT: ICD-10-CM

## 2019-08-08 DIAGNOSIS — L97.512 DIABETIC ULCER OF TOE OF RIGHT FOOT ASSOCIATED WITH TYPE 2 DIABETES MELLITUS, WITH FAT LAYER EXPOSED: Primary | ICD-10-CM

## 2019-08-08 DIAGNOSIS — E11.621 DIABETIC ULCER OF TOE OF RIGHT FOOT ASSOCIATED WITH TYPE 2 DIABETES MELLITUS, WITH FAT LAYER EXPOSED: Primary | ICD-10-CM

## 2019-08-08 DIAGNOSIS — L84 CORN OR CALLUS: ICD-10-CM

## 2019-08-08 DIAGNOSIS — E11.42 TYPE 2 DIABETES MELLITUS WITH PERIPHERAL NEUROPATHY: ICD-10-CM

## 2019-08-08 PROCEDURE — 99999 PR PBB SHADOW E&M-EST. PATIENT-LVL V: ICD-10-PCS | Mod: PBBFAC,,, | Performed by: PODIATRIST

## 2019-08-08 PROCEDURE — 11056 ROUTINE FOOT CARE: ICD-10-PCS | Mod: Q9,S$GLB,, | Performed by: PODIATRIST

## 2019-08-08 PROCEDURE — 11042 DBRDMT SUBQ TIS 1ST 20SQCM/<: CPT | Mod: 59,S$GLB,, | Performed by: PODIATRIST

## 2019-08-08 PROCEDURE — 99214 PR OFFICE/OUTPT VISIT, EST, LEVL IV, 30-39 MIN: ICD-10-PCS | Mod: 25,S$GLB,, | Performed by: PODIATRIST

## 2019-08-08 PROCEDURE — 11042 WOUND DEBRIDEMENT: ICD-10-PCS | Mod: 59,S$GLB,, | Performed by: PODIATRIST

## 2019-08-08 PROCEDURE — 99214 OFFICE O/P EST MOD 30 MIN: CPT | Mod: 25,S$GLB,, | Performed by: PODIATRIST

## 2019-08-08 PROCEDURE — 1101F PR PT FALLS ASSESS DOC 0-1 FALLS W/OUT INJ PAST YR: ICD-10-PCS | Mod: CPTII,S$GLB,, | Performed by: PODIATRIST

## 2019-08-08 PROCEDURE — 11056 PARNG/CUTG B9 HYPRKR LES 2-4: CPT | Mod: Q9,S$GLB,, | Performed by: PODIATRIST

## 2019-08-08 PROCEDURE — 3078F DIAST BP <80 MM HG: CPT | Mod: CPTII,S$GLB,, | Performed by: PODIATRIST

## 2019-08-08 PROCEDURE — 11721 PR DEBRIDEMENT OF NAILS, 6 OR MORE: ICD-10-PCS | Mod: 59,Q9,S$GLB, | Performed by: PODIATRIST

## 2019-08-08 PROCEDURE — 3074F PR MOST RECENT SYSTOLIC BLOOD PRESSURE < 130 MM HG: ICD-10-PCS | Mod: CPTII,S$GLB,, | Performed by: PODIATRIST

## 2019-08-08 PROCEDURE — 3074F SYST BP LT 130 MM HG: CPT | Mod: CPTII,S$GLB,, | Performed by: PODIATRIST

## 2019-08-08 PROCEDURE — 11721 DEBRIDE NAIL 6 OR MORE: CPT | Mod: 59,Q9,S$GLB, | Performed by: PODIATRIST

## 2019-08-08 PROCEDURE — 99999 PR PBB SHADOW E&M-EST. PATIENT-LVL V: CPT | Mod: PBBFAC,,, | Performed by: PODIATRIST

## 2019-08-08 PROCEDURE — 1101F PT FALLS ASSESS-DOCD LE1/YR: CPT | Mod: CPTII,S$GLB,, | Performed by: PODIATRIST

## 2019-08-08 PROCEDURE — 3078F PR MOST RECENT DIASTOLIC BLOOD PRESSURE < 80 MM HG: ICD-10-PCS | Mod: CPTII,S$GLB,, | Performed by: PODIATRIST

## 2019-08-08 RX ORDER — PRAVASTATIN SODIUM 40 MG/1
TABLET ORAL
COMMUNITY
Start: 2014-05-28 | End: 2019-09-03 | Stop reason: CLARIF

## 2019-08-08 RX ORDER — CIPROFLOXACIN 500 MG/1
TABLET ORAL
COMMUNITY
Start: 2019-05-18 | End: 2019-09-03 | Stop reason: CLARIF

## 2019-08-08 NOTE — PROCEDURES
"Wound Debridement  Date/Time: 8/8/2019 2:33 PM  Performed by: Derek Jose DPM  Authorized by: Derek Jose DPM     Time out: Immediately prior to procedure a "time out" was called to verify the correct patient, procedure, equipment, support staff and site/side marked as required.    Consent Done?:  Yes (Verbal)    Preparation: Patient was prepped and draped in usual sterile fashion    Local anesthesia used?: No      Wound Details:    Location:  Right foot    Location:  Right 2nd Toe    Type of Debridement:  Excisional       Length (cm):  0.4       Area (sq cm):  0.16       Width (cm):  0.4       Percent Debrided (%):  100       Depth (cm):  0.1       Total Area Debrided (sq cm):  0.16    Depth of debridement:  Subcutaneous tissue    Tissue debrided:  Dermis, Epidermis and Subcutaneous    Devitalized tissue debrided:  Callus, Biofilm, Fibrin and Slough    Instruments:  Blade    Bleeding:  Minimal  Hemostasis Achieved: Yes    Method Used:  Pressure  Patient tolerance:  Patient tolerated the procedure well with no immediate complications      "

## 2019-08-08 NOTE — PROCEDURES
"Routine Foot Care  Date/Time: 8/8/2019 2:34 PM  Performed by: Derek Jose DPM  Authorized by: Derek Jose DPM     Time out: Immediately prior to procedure a "time out" was called to verify the correct patient, procedure, equipment, support staff and site/side marked as required.    Consent Done?:  Yes (Verbal)  Hyperkeratotic Skin Lesions?: Yes    Number of trimmed lesions:  2  Location(s):  Left 1st Metatarsal Head, Right 1st Metatarsal Head and Left 1st Toe    Nail Care Type:  Debride(Left 1st Toe, Left 2nd Toe, Left 3rd Toe, Left 4th Toe, Left 5th Toe, Right 1st Toe, Right 2nd Toe, Right 3rd Toe and Right 4th Toe)  Patient tolerance:  Patient tolerated the procedure well with no immediate complications      "

## 2019-08-09 RX ORDER — LIDOCAINE HYDROCHLORIDE 10 MG/ML
1 INJECTION, SOLUTION EPIDURAL; INFILTRATION; INTRACAUDAL; PERINEURAL ONCE
Status: CANCELLED | OUTPATIENT
Start: 2019-08-09 | End: 2019-08-09

## 2019-08-09 RX ORDER — SODIUM CHLORIDE 0.9 % (FLUSH) 0.9 %
10 SYRINGE (ML) INJECTION
Status: DISCONTINUED | OUTPATIENT
Start: 2019-08-09 | End: 2021-01-22

## 2019-08-09 NOTE — PROGRESS NOTES
"Subjective:      Patient ID: Caro Powell is a 78 y.o. female.    Chief Complaint: No chief complaint on file.    Caro LUA is a 78 y.o. female who presents to the clinic for evaluation and treatment of high risk feet. Caro LUA has a past medical history of Anticoagulant long-term use, Arthritis, Calcium nephrolithiasis (2007), Diabetes mellitus, type 2, Diabetic peripheral neuropathy associated with type 2 diabetes mellitus, and Hypertension. The patient's chief complaint is diabetic foot ulcer, right second toe. This patient has documented high risk feet requiring routine maintenance secondary to peripheral neuropathy. Noted drainage and discoloration from the toe a few days ago. Denies trauma. Accompanied by her .    5/7/18: Follow up for wound check right foot. Taking po clindamycin and ciprofloxacin as scheduled. Accompanied by her . Dressing remained c/d/i.    5/14/18: Presents for wound check. No new complaints. Accompanied by her .    5/31/18: Presents for wound check. Reports she has not received HH. PO abx completed.     12/5:  She is a pt of Dr. 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 She is presenting with new complaint of blister at right submet 1 and distal tip of 4th toe. She is DM2 and on insulin. It was 126 today. She recently bought a new shoes. She tells me she did not have any problems when she was wearing diabetic shoes but blisters formed after wearing a normal tennis shoes. It happened a week ago. She is traveling to [place], [place] in 2 weeks and wants to know if she can weight bear on her right foot. Denies N/V/F/C/SOB/CP    12/12: f/u right foot ulcer. Has been weight bearing in surgical shoe with football dressing. Denies pain. Tells me she has been walking in diabetic shoes on her left foot. She tells me there is a "callus" at lateral left 5th toe that she did not have before. She will travel to [place] soon. Also tells me she " noticed that her right foot is turning sideways and feels like it is affecting the way she walks.     1/21/19: Complains of worsening wound to right foot. She went to [place] for holidays and saws her wound to right foot worsened. Denies trauma.    2/4/19  Here for wound check.  Denies F/c/N/V    2/18/19: Wound check. Says dressing remained intact. Upon observing note she is wearing a different darco shoe then previously dispensed and foot appears not to be fully seated in shoe. Accompanied by her . Denies trauma.     2/25/19 wound check. Denies F/C/N/V    3/29/19 patient complains of new ulcer on 3rd toe that she first noticed on Wednesday.  She has been treating it with antibiotic ointment and bandaids.  Denies F/C/N/V.  Accompanied by her .    04/08/2019:  Presents for wound check right foot.  Relates the dressing remained intact.    4/15/19: Presents for wound check. No new complaints.    06/24/2019:  Follow-up for acute onset osteomyelitis to the right 3rd toe receiving IV Ancef per Infectious Disease recommendation.  Family home care is following her and changing dressings 3 times weekly.    06/20/2019:  Referred by her home health nurse to concern of a new wound developing to the right 3rd toe.  She is also concerned the recurrent wound under the plantar 1st met head right foot.  Says that she does note that she walks differently on this foot and and that it tends to roll more.    07/15/2019:  Follow-up for wound check right foot. Says that she canceled the scheduled gastrocnemius recession due to anxiety or having to manage a PICC line and being restricted due to surgery.  Currently receiving home health.    08/08/2019:  Presents for wound check right foot.  Says that her home health company no longer of the her house.  Receiving long-term IV antibiotics for the osteomyelitis right 3rd toe.  Requesting that her surgery be rescheduled.    PCP: Brayan Penny MD    Date Last Seen by PCP:  "    Current shoe gear:  Affected Foot: football dressing      Unaffected Foot: Extra depth shoes with custome accommodative insoles    History of Trauma: negative  Sign of Infection: none    Hemoglobin A1C   Date Value Ref Range Status   06/09/2019 6.6 (H) 4.0 - 5.6 % Final     Comment:     ADA Screening Guidelines:  5.7-6.4%  Consistent with prediabetes  >or=6.5%  Consistent with diabetes  High levels of fetal hemoglobin interfere with the HbA1C  assay. Heterozygous hemoglobin variants (HbS, HgC, etc)do  not significantly interfere with this assay.   However, presence of multiple variants may affect accuracy.     06/09/2019 6.6 (H) 4.0 - 5.6 % Final     Comment:     ADA Screening Guidelines:  5.7-6.4%  Consistent with prediabetes  >or=6.5%  Consistent with diabetes  High levels of fetal hemoglobin interfere with the HbA1C  assay. Heterozygous hemoglobin variants (HbS, HgC, etc)do  not significantly interfere with this assay.   However, presence of multiple variants may affect accuracy.     01/12/2018 8.3 % Final   05/22/2017 7.5 (H) 4.5 - 6.2 % Final     Comment:     According to ADA guidelines, hemoglobin A1C <7.0% represents  optimal control in non-pregnant diabetic patients.  Different  metrics may apply to specific populations.   Standards of Medical Care in Diabetes - 2016.  For the purpose of screening for the presence of diabetes:  <5.7%     Consistent with the absence of diabetes  5.7-6.4%  Consistent with increasing risk for diabetes   (prediabetes)  >or=6.5%  Consistent with diabetes  Currently no consensus exists for use of hemoglobin A1C  for diagnosis of diabetes for children.       Vitals:    08/08/19 1323   BP: 118/60   Pulse: (!) 52   Weight: 81.6 kg (180 lb)   Height: 5' 2" (1.575 m)   PainSc: 0-No pain      Past Medical History:   Diagnosis Date    Anticoagulant long-term use     Arthritis     Calcium nephrolithiasis 2007    Diabetes mellitus, type 2     Diabetic peripheral neuropathy " associated with type 2 diabetes mellitus     Hypertension        Past Surgical History:   Procedure Laterality Date    AMPUTATION-TOE Right 5/23/2017    [facility]    COLONOSCOPY  11/28/2011    sigmoid diverticulosis, external hemorrhoids    HYSTERECTOMY      SHOULDER SURGERY Left     TOE AMPUTATION Right 05/22/2017    5th toe       Family History   Problem Relation Age of Onset    Diabetes Mother     Heart failure Father     Kidney failure Brother        Social History     Socioeconomic History    Marital status:      Spouse name: Not on file    Number of children: Not on file    Years of education: Not on file    Highest education level: Not on file   Occupational History    Not on file   Social Needs    Financial resource strain: Not on file    Food insecurity:     Worry: Not on file     Inability: Not on file    Transportation needs:     Medical: Not on file     Non-medical: Not on file   Tobacco Use    Smoking status: Never Smoker    Smokeless tobacco: Never Used   Substance and Sexual Activity    Alcohol use: No    Drug use: No    Sexual activity: Yes   Lifestyle    Physical activity:     Days per week: Not on file     Minutes per session: Not on file    Stress: Not on file   Relationships    Social connections:     Talks on phone: Not on file     Gets together: Not on file     Attends Cheondoism service: Not on file     Active member of club or organization: Not on file     Attends meetings of clubs or organizations: Not on file     Relationship status: Not on file   Other Topics Concern    Not on file   Social History Narrative    Not on file       Current Outpatient Medications   Medication Sig Dispense Refill    ACCU-CHEK SAMI CONTROL SOLN Soln       ACCU-CHEK SAMI PLUS METER Misc       ACCU-CHEK SAMI PLUS TEST STRP Strp       ACCU-CHEK SOFT DEV LANCETS Kit       ACCU-CHEK SOFTCLIX LANCETS Misc       amLODIPine (NORVASC) 10 MG  tablet Take 1 tablet (10 mg total) by mouth once daily. 30 tablet 11    apixaban (ELIQUIS) 5 mg Tab TAKE 1 TABLET BY MOUTH TWICE DAILY 60 tablet 5    ciprofloxacin HCl (CIPRO) 500 MG tablet       doxycycline (MONODOX) 100 MG capsule Take 1 capsule (100 mg total) by mouth 2 (two) times daily. 60 capsule 1    FLUZONE HIGH-DOSE 2018-19, PF, 180 mcg/0.5 mL vaccine       gabapentin (NEURONTIN) 400 MG capsule       glimepiride (AMARYL) 1 MG tablet Take 1 tablet (1 mg total) by mouth 2 (two) times daily.      lisinopril (PRINIVIL,ZESTRIL) 40 MG tablet Take 1 tablet (40 mg total) by mouth once daily. 90 tablet 3    metoprolol succinate (TOPROL-XL) 25 MG 24 hr tablet Take by mouth once daily.       pramipexole (MIRAPEX) 0.125 MG tablet Take by mouth every evening.       pravastatin (PRAVACHOL) 40 MG tablet Take by mouth.       Current Facility-Administered Medications   Medication Dose Route Frequency Provider Last Rate Last Dose    sodium chloride 0.9% flush 10 mL  10 mL Intravenous PRN Derek Jose DPM        sodium chloride 0.9% flush 10 mL  10 mL Intravenous PRN Derek Jose DPM           Review of patient's allergies indicates:   Allergen Reactions    Codeine Nausea Only         Review of Systems   Constitution: Negative for chills, fever and malaise/fatigue.   HENT: Negative for congestion.    Cardiovascular: Negative for chest pain, claudication and leg swelling.   Respiratory: Negative for cough and shortness of breath.    Skin: Positive for color change, dry skin, nail changes and poor wound healing.   Musculoskeletal: Negative for back pain, joint pain, muscle cramps and muscle weakness.   Gastrointestinal: Negative for nausea and vomiting.   Neurological: Positive for numbness and paresthesias. Negative for weakness.   Psychiatric/Behavioral: Negative for altered mental status.           Objective:      Physical Exam   Constitutional: She is oriented to person, place, and time. She  appears well-developed and well-nourished. No distress.   Cardiovascular:   Pulses:       Dorsalis pedis pulses are 2+ on the right side, and 2+ on the left side.        Posterior tibial pulses are 2+ on the right side, and 2+ on the left side.   CFT< 3 secs all toes bilateral foot, skin temp warm bilateral foot, no digital hair growth bilateral foot, mild lower extremity edema bilateral.     Musculoskeletal:   + equinus that reduces with knee bent bilateral.    No pain with ROM or MMT bilateral foot.    R foot:  Limited range of motion at the 1st MPJ    Plantar flexed first met head right foot.     Feet:   Right Foot:   Protective Sensation: 10 sites tested. 5 sites sensed.   Skin Integrity: Positive for ulcer, callus and dry skin. Negative for erythema.   Left Foot:   Protective Sensation: 10 sites tested. 5 sites sensed.   Skin Integrity: Positive for callus and dry skin. Negative for ulcer, blister, skin breakdown, erythema or warmth.   Neurological: She is alert and oriented to person, place, and time. She has normal strength. A sensory deficit is present.   Skin: Skin is dry. Capillary refill takes less than 2 seconds. No ecchymosis and no rash noted. She is not diaphoretic. No cyanosis or erythema. No pallor. Nails show no clubbing.   No recurrence of the ulceration distal right 3rd toe.    Ulcer Location:  Plantar 1st met head right foot  Measurements:  Healed   Periwound:  hyperkeratotic  Drainage:  None.  Pus: None.  Malodor: None.  Base:  Skin  Signs of infection: None.    Ulcer Location:  Medial right 2nd PIPJ  Measurements:  0.3 x 0.3 x 0.1 cm pre debridement, 0.4 x 0.4 x 0.1 cm post debridement  Periwound: Intact  Drainage:  None.  Pus: None.  Malodor: None.  Base:  80% granular. 20% fibrin.  Signs of infection: None.  Does not probe the bone.               Assessment:       Encounter Diagnoses   Name Primary?    Diabetic ulcer of toe of right foot associated with type 2 diabetes mellitus, with fat  layer exposed Yes    Onychomycosis     Corn or callus     Type 2 diabetes mellitus with peripheral neuropathy     Gastrocnemius equinus, right          Plan:       Diagnoses and all orders for this visit:    Diabetic ulcer of toe of right foot associated with type 2 diabetes mellitus, with fat layer exposed  -     Wound Debridement  -     Ambulatory referral to Home Health  -     Ambulatory referral to Family Practice    Onychomycosis  -     Routine Foot Care    [place] or callus  -     Routine Foot Care    Type 2 diabetes mellitus with peripheral neuropathy  -     Routine Foot Care  -     Ambulatory referral to Home Health    Gastrocnemius equinus, right  -     Case Request Operating Room: RECESSION, GASTROCNEMIUS, ENDOSCOPIC  -     Full code; Standing  -     Place in Outpatient; Standing  -     Insert peripheral IV; Standing  -     POCT glucose; Standing  -     Verify surgical site; Standing  -     Verify informed consent; Standing  -     Full code  -     Insert peripheral IV  -     Ambulatory referral to Family Practice    Other orders  -     Cancel: Wound Debridement  -     sodium chloride 0.9% flush 10 mL  -     lidocaine (PF) 10 mg/ml (1%) injection 10 mg  -     Weight bearing Partial Weight Bearing; Lower Extremity; Right; Standing  -     ceFAZolin (ANCEF) 2 g in dextrose 5 % 50 mL IVPB  -     Weight bearing Partial Weight Bearing; Lower Extremity; Right      I counseled the patient on her conditions, their implications and medical management.    Patient evaluated on Diabetic foot risk factors.  Patient counseled to do daily foot checks.  Counseled to wear accommodative shoe gear.  Educated on importance of glycemic control.    Wound debridement and dressing pe attached note.    Referred to home health for local wound care to be performed twice weekly.    Discussed continued conservative care versus surgical treatment to offload the forefoot prevent recurrent ulceration plantar 1st metatarsal head consisting  of gastrocnemius recession right leg.Associated risks, benefits and postop course discussed. No guarantees given or implied.    Consent will at be performed at the follow-up visit.    Referred to PCP for medical optimization and risk stratification    RTC 3-4 weeks or p.r.n..

## 2019-08-13 PROCEDURE — G0180 MD CERTIFICATION HHA PATIENT: HCPCS | Mod: ,,, | Performed by: PODIATRIST

## 2019-08-13 PROCEDURE — G0180 PR HOME HEALTH MD CERTIFICATION: ICD-10-PCS | Mod: ,,, | Performed by: PODIATRIST

## 2019-08-16 ENCOUNTER — TELEPHONE (OUTPATIENT)
Dept: PODIATRY | Facility: CLINIC | Age: 79
End: 2019-08-16

## 2019-08-16 NOTE — TELEPHONE ENCOUNTER
----- Message from Ashely eGnao sent at 8/16/2019  1:11 PM CDT -----  Contact: Tammy (nurse) [phone]  Tammy would like to speak with you about the patient having another wound. Please advise

## 2019-08-19 ENCOUNTER — TELEPHONE (OUTPATIENT)
Dept: INFECTIOUS DISEASES | Facility: CLINIC | Age: 79
End: 2019-08-19

## 2019-08-19 NOTE — TELEPHONE ENCOUNTER
In left voicemail for patient.     ----- Message from Bhupinder Richter MD sent at 8/18/2019 11:32 PM CDT -----  Contact: [phone] self  She needs to see her podiatrist about the new ulcer. She should not stop the antibiotic until she comes back to ID clinic.  ----- Message -----  From: Ashley Ferris MA  Sent: 8/16/2019   2:31 PM  To: Bhupinder Richter MD    Patient stated she is taking Doxycycline  orally. She has developed an ulcer in between big toe and two toe on right foot. She has been on Doxycyclinefor 3 weeks. She wants to know if she needs to stop the ABX?       ----- Message -----  From: Radha RAMIREZAkiko De Leon  Sent: 8/16/2019   2:10 PM  To: Neelam Duncan    Pt is requesting to speak with you re: antibiotic. Please advise

## 2019-08-22 ENCOUNTER — TELEPHONE (OUTPATIENT)
Dept: PODIATRY | Facility: CLINIC | Age: 79
End: 2019-08-22

## 2019-08-22 NOTE — TELEPHONE ENCOUNTER
----- Message from Marian Godfrey sent at 8/22/2019 11:19 AM CDT -----  Contact: Pt   Pt is requesting a call back in regards to her foot.       Pt can be contacted at [phone]

## 2019-08-29 ENCOUNTER — OFFICE VISIT (OUTPATIENT)
Dept: PODIATRY | Facility: CLINIC | Age: 79
End: 2019-08-29
Payer: MEDICARE

## 2019-08-29 VITALS
HEART RATE: 52 BPM | WEIGHT: 180 LBS | BODY MASS INDEX: 33.13 KG/M2 | SYSTOLIC BLOOD PRESSURE: 110 MMHG | DIASTOLIC BLOOD PRESSURE: 50 MMHG | HEIGHT: 62 IN

## 2019-08-29 DIAGNOSIS — L97.512 DIABETIC ULCER OF OTHER PART OF RIGHT FOOT ASSOCIATED WITH DIABETES MELLITUS DUE TO UNDERLYING CONDITION, WITH FAT LAYER EXPOSED: ICD-10-CM

## 2019-08-29 DIAGNOSIS — Z01.818 PREOP TESTING: ICD-10-CM

## 2019-08-29 DIAGNOSIS — M62.461 GASTROCNEMIUS EQUINUS, RIGHT: ICD-10-CM

## 2019-08-29 DIAGNOSIS — E08.621 DIABETIC ULCER OF OTHER PART OF RIGHT FOOT ASSOCIATED WITH DIABETES MELLITUS DUE TO UNDERLYING CONDITION, WITH FAT LAYER EXPOSED: ICD-10-CM

## 2019-08-29 DIAGNOSIS — L97.512 DIABETIC ULCER OF TOE OF RIGHT FOOT ASSOCIATED WITH TYPE 2 DIABETES MELLITUS, WITH FAT LAYER EXPOSED: Primary | ICD-10-CM

## 2019-08-29 DIAGNOSIS — E11.621 DIABETIC ULCER OF TOE OF RIGHT FOOT ASSOCIATED WITH TYPE 2 DIABETES MELLITUS, WITH FAT LAYER EXPOSED: Primary | ICD-10-CM

## 2019-08-29 DIAGNOSIS — E11.42 TYPE 2 DIABETES MELLITUS WITH PERIPHERAL NEUROPATHY: ICD-10-CM

## 2019-08-29 PROCEDURE — 99999 PR PBB SHADOW E&M-EST. PATIENT-LVL III: CPT | Mod: PBBFAC,,, | Performed by: PODIATRIST

## 2019-08-29 PROCEDURE — 3078F DIAST BP <80 MM HG: CPT | Mod: CPTII,S$GLB,, | Performed by: PODIATRIST

## 2019-08-29 PROCEDURE — 87070 CULTURE OTHR SPECIMN AEROBIC: CPT

## 2019-08-29 PROCEDURE — 99213 OFFICE O/P EST LOW 20 MIN: CPT | Mod: 25,S$GLB,, | Performed by: PODIATRIST

## 2019-08-29 PROCEDURE — 3074F PR MOST RECENT SYSTOLIC BLOOD PRESSURE < 130 MM HG: ICD-10-PCS | Mod: CPTII,S$GLB,, | Performed by: PODIATRIST

## 2019-08-29 PROCEDURE — 3074F SYST BP LT 130 MM HG: CPT | Mod: CPTII,S$GLB,, | Performed by: PODIATRIST

## 2019-08-29 PROCEDURE — 87076 CULTURE ANAEROBE IDENT EACH: CPT

## 2019-08-29 PROCEDURE — 1101F PR PT FALLS ASSESS DOC 0-1 FALLS W/OUT INJ PAST YR: ICD-10-PCS | Mod: CPTII,S$GLB,, | Performed by: PODIATRIST

## 2019-08-29 PROCEDURE — 99213 PR OFFICE/OUTPT VISIT, EST, LEVL III, 20-29 MIN: ICD-10-PCS | Mod: 25,S$GLB,, | Performed by: PODIATRIST

## 2019-08-29 PROCEDURE — 11042 DBRDMT SUBQ TIS 1ST 20SQCM/<: CPT | Mod: S$GLB,,, | Performed by: PODIATRIST

## 2019-08-29 PROCEDURE — 1101F PT FALLS ASSESS-DOCD LE1/YR: CPT | Mod: CPTII,S$GLB,, | Performed by: PODIATRIST

## 2019-08-29 PROCEDURE — 11042 WOUND DEBRIDEMENT: ICD-10-PCS | Mod: S$GLB,,, | Performed by: PODIATRIST

## 2019-08-29 PROCEDURE — 3078F PR MOST RECENT DIASTOLIC BLOOD PRESSURE < 80 MM HG: ICD-10-PCS | Mod: CPTII,S$GLB,, | Performed by: PODIATRIST

## 2019-08-29 PROCEDURE — 99999 PR PBB SHADOW E&M-EST. PATIENT-LVL III: ICD-10-PCS | Mod: PBBFAC,,, | Performed by: PODIATRIST

## 2019-08-29 PROCEDURE — 87075 CULTR BACTERIA EXCEPT BLOOD: CPT

## 2019-08-29 RX ORDER — DOXYCYCLINE 100 MG/1
CAPSULE ORAL
COMMUNITY
Start: 2019-08-20 | End: 2019-11-11 | Stop reason: SDUPTHER

## 2019-08-29 RX ORDER — FAMOTIDINE 20 MG/1
TABLET, FILM COATED ORAL
COMMUNITY
Start: 2019-08-13 | End: 2021-03-29

## 2019-08-29 NOTE — PROCEDURES
"Wound Debridement  Date/Time: 8/29/2019 3:32 PM  Performed by: Dreek Jose DPM  Authorized by: Derek Jose DPM     Time out: Immediately prior to procedure a "time out" was called to verify the correct patient, procedure, equipment, support staff and site/side marked as required.    Consent Done?:  Yes (Verbal)    Preparation: Patient was prepped and draped in usual sterile fashion    Local anesthesia used?: No      Wound Details:    Location:  Right foot    Location:  Right 2nd Toe    Type of Debridement:  Excisional       Length (cm):  0.6       Area (sq cm):  0.42       Width (cm):  0.7       Percent Debrided (%):  100       Depth (cm):  0.1       Total Area Debrided (sq cm):  0.42    Depth of debridement:  Subcutaneous tissue    Tissue debrided:  Subcutaneous    Devitalized tissue debrided:  Biofilm, Fibrin and Slough    Instruments:  Curette    2nd Wound Details:     Location:  Right foot    Location:  Right 1st Metatarsal Head    Location:  Right 1st Metatarsal Head    Type of Debridement:  Excisional       Length (cm):  0.6       Area (sq cm):  0.3       Width (cm):  0.5       Percent Debrided (%):  100       Depth (cm):  0.2       Total Area Debrided (sq cm):  0.3    Depth of debridement:  Subcutaneous tissue    Tissue debrided:  Subcutaneous    Devitalized tissue debrided:  Slough, Fibrin, Callus and Biofilm    Instruments:  Curette and Blade    Bleeding:  Minimal  Hemostasis Achieved: Yes    Method Used:  Pressure  Patient tolerance:  Patient tolerated the procedure well with no immediate complications     Saline moistened hydrothera blue, nicolette foam, cast padding secured with coban.         "

## 2019-08-30 NOTE — PROGRESS NOTES
"Subjective:      Patient ID: Caro Powell is a 78 y.o. female.    Chief Complaint: Follow-up (wound check)    Caro LUA is a 78 y.o. female who presents to the clinic for evaluation and treatment of high risk feet. Caro LUA has a past medical history of Anticoagulant long-term use, Arthritis, Calcium nephrolithiasis (2007), Diabetes mellitus, type 2, Diabetic peripheral neuropathy associated with type 2 diabetes mellitus, and Hypertension. The patient's chief complaint is diabetic foot ulcer, right second toe. This patient has documented high risk feet requiring routine maintenance secondary to peripheral neuropathy. Noted drainage and discoloration from the toe a few days ago. Denies trauma. Accompanied by her .    5/7/18: Follow up for wound check right foot. Taking po clindamycin and ciprofloxacin as scheduled. Accompanied by her . Dressing remained c/d/i.    5/14/18: Presents for wound check. No new complaints. Accompanied by her .    5/31/18: Presents for wound check. Reports she has not received HH. PO abx completed.     12/5:  She is a pt of Dr. 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 She is presenting with new complaint of blister at right submet 1 and distal tip of 4th toe. She is DM2 and on insulin. It was 126 today. She recently bought a new shoes. She tells me she did not have any problems when she was wearing diabetic shoes but blisters formed after wearing a normal tennis shoes. It happened a week ago. She is traveling to [place], [place] in 2 weeks and wants to know if she can weight bear on her right foot. Denies N/V/F/C/SOB/CP    12/12: f/u right foot ulcer. Has been weight bearing in surgical shoe with football dressing. Denies pain. Tells me she has been walking in diabetic shoes on her left foot. She tells me there is a "callus" at lateral left 5th toe that she did not have before. She will travel to [place] soon. Also tells me she " noticed that her right foot is turning sideways and feels like it is affecting the way she walks.     1/21/19: Complains of worsening wound to right foot. She went to [place] for holidays and saws her wound to right foot worsened. Denies trauma.    2/4/19  Here for wound check.  Denies F/c/N/V    2/18/19: Wound check. Says dressing remained intact. Upon observing note she is wearing a different darco shoe then previously dispensed and foot appears not to be fully seated in shoe. Accompanied by her . Denies trauma.     2/25/19 wound check. Denies F/C/N/V    3/29/19 patient complains of new ulcer on 3rd toe that she first noticed on Wednesday.  She has been treating it with antibiotic ointment and bandaids.  Denies F/C/N/V.  Accompanied by her .    04/08/2019:  Presents for wound check right foot.  Relates the dressing remained intact.    4/15/19: Presents for wound check. No new complaints.    06/24/2019:  Follow-up for acute onset osteomyelitis to the right 3rd toe receiving IV Ancef per Infectious Disease recommendation.  [facility] is following her and changing dressings 3 times weekly.    06/20/2019:  Referred by her home health nurse to concern of a new wound developing to the right 3rd toe.  She is also concerned the recurrent wound under the plantar 1st met head right foot.  Says that she does note that she walks differently on this foot and and that it tends to roll more.    07/15/2019:  Follow-up for wound check right foot. Says that she canceled the scheduled gastrocnemius recession due to anxiety or having to manage a PICC line and being restricted due to surgery.  Currently receiving home health.    08/08/2019:  Presents for wound check right foot.  Says that her home health company no longer of the her house.  Receiving long-term IV antibiotics for the osteomyelitis right 3rd toe.  Requesting that her surgery be rescheduled.    08/29/2019:  Presents for wound check right foot. Complains  that she has a wound to her right 2nd toe as well as a persistent 1 underneath the ball of foot. She is scheduled for outpatient gastrocnemius recession right leg on 09/10/2019.  She has not seen her PCP for medical clearance yet.  Accompanied by her .  Home Health per [facility].    PCP: Brayan Penny MD    Date Last Seen by PCP:     Current shoe gear:  Affected Foot: football dressing      Unaffected Foot: Extra depth shoes with custome accommodative insoles    History of Trauma: negative  Sign of Infection: none    Hemoglobin A1C   Date Value Ref Range Status   06/09/2019 6.6 (H) 4.0 - 5.6 % Final     Comment:     ADA Screening Guidelines:  5.7-6.4%  Consistent with prediabetes  >or=6.5%  Consistent with diabetes  High levels of fetal hemoglobin interfere with the HbA1C  assay. Heterozygous hemoglobin variants (HbS, HgC, etc)do  not significantly interfere with this assay.   However, presence of multiple variants may affect accuracy.     06/09/2019 6.6 (H) 4.0 - 5.6 % Final     Comment:     ADA Screening Guidelines:  5.7-6.4%  Consistent with prediabetes  >or=6.5%  Consistent with diabetes  High levels of fetal hemoglobin interfere with the HbA1C  assay. Heterozygous hemoglobin variants (HbS, HgC, etc)do  not significantly interfere with this assay.   However, presence of multiple variants may affect accuracy.     01/12/2018 8.3 % Final   05/22/2017 7.5 (H) 4.5 - 6.2 % Final     Comment:     According to ADA guidelines, hemoglobin A1C <7.0% represents  optimal control in non-pregnant diabetic patients.  Different  metrics may apply to specific populations.   Standards of Medical Care in Diabetes - 2016.  For the purpose of screening for the presence of diabetes:  <5.7%     Consistent with the absence of diabetes  5.7-6.4%  Consistent with increasing risk for diabetes   (prediabetes)  >or=6.5%  Consistent with diabetes  Currently no consensus exists for use of hemoglobin A1C  for diagnosis of  "diabetes for children.       Vitals:    08/29/19 1502   BP: (!) 110/50   Pulse: (!) 52   Weight: 81.6 kg (180 lb)   Height: 5' 2" (1.575 m)   PainSc: 0-No pain      Past Medical History:   Diagnosis Date    Anticoagulant long-term use     Arthritis     Calcium nephrolithiasis 2007    Diabetes mellitus, type 2     Diabetic peripheral neuropathy associated with type 2 diabetes mellitus     Hypertension        Past Surgical History:   Procedure Laterality Date    AMPUTATION-TOE Right 5/23/2017    Performed by Derek Jose [facility] at [facility]    COLONOSCOPY  11/28/2011    sigmoid diverticulosis, external hemorrhoids    HYSTERECTOMY      SHOULDER SURGERY Left     TOE AMPUTATION Right 05/22/2017    5th toe       Family History   Problem Relation Age of Onset    Diabetes Mother     Heart failure Father     Kidney failure Brother        Social History     Socioeconomic History    Marital status:      Spouse name: Not on file    Number of children: Not on file    Years of education: Not on file    Highest education level: Not on file   Occupational History    Not on file   Social Needs    Financial resource strain: Not on file    Food insecurity:     Worry: Not on file     Inability: Not on file    Transportation needs:     Medical: Not on file     Non-medical: Not on file   Tobacco Use    Smoking status: Never Smoker    Smokeless tobacco: Never Used   Substance and Sexual Activity    Alcohol use: No    Drug use: No    Sexual activity: Yes   Lifestyle    Physical activity:     Days per week: Not on file     Minutes per session: Not on file    Stress: Not on file   Relationships    Social connections:     Talks on phone: Not on file     Gets together: Not on file     Attends [employer] service: Not on file     Active member of club or organization: Not on file     Attends meetings of clubs or organizations: Not on file     Relationship status: Not on file   Other Topics Concern    Not on " file   Social History Narrative    Not on file       Current Outpatient Medications   Medication Sig Dispense Refill    ACCU-CHEK SAMI CONTROL SOLN Soln       ACCU-CHEK SAMI PLUS METER Misc       ACCU-CHEK SAMI PLUS TEST STRP Strp       ACCU-CHEK SOFT DEV LANCETS Kit       ACCU-CHEK SOFTCLIX LANCETS Misc       amLODIPine (NORVASC) 10 MG tablet Take 1 tablet (10 mg total) by mouth once daily. 30 tablet 11    apixaban (ELIQUIS) 5 mg Tab TAKE 1 TABLET BY MOUTH TWICE DAILY 60 tablet 5    ciprofloxacin HCl (CIPRO) 500 MG tablet       doxycycline (MONODOX) 100 MG capsule       famotidine (PEPCID) 20 MG tablet       FLUZONE HIGH-DOSE 2018-19, PF, 180 mcg/0.5 mL vaccine       gabapentin (NEURONTIN) 400 MG capsule       glimepiride (AMARYL) 1 MG tablet Take 1 tablet (1 mg total) by mouth 2 (two) times daily.      lisinopril (PRINIVIL,ZESTRIL) 40 MG tablet Take 1 tablet (40 mg total) by mouth once daily. 90 tablet 3    metoprolol succinate (TOPROL-XL) 25 MG 24 hr tablet Take by mouth once daily.       pramipexole (MIRAPEX) 0.125 MG tablet Take by mouth every evening.       pravastatin (PRAVACHOL) 40 MG tablet Take by mouth.       Current Facility-Administered Medications   Medication Dose Route Frequency Provider Last Rate Last Dose    sodium chloride 0.9% flush 10 mL  10 mL Intravenous PRN Derek Jose DPM        sodium chloride 0.9% flush 10 mL  10 mL Intravenous PRN Derek Jose DPM           Review of patient's allergies indicates:   Allergen Reactions    Codeine Nausea Only         Review of Systems   Constitution: Negative for chills, fever and malaise/fatigue.   HENT: Negative for congestion.    Cardiovascular: Negative for chest pain, claudication and leg swelling.   Respiratory: Negative for cough and shortness of breath.    Skin: Positive for color change, dry skin, nail changes and poor wound healing.   Musculoskeletal: Negative for back pain, joint pain, muscle cramps and  muscle weakness.   Gastrointestinal: Negative for nausea and vomiting.   Neurological: Positive for numbness and paresthesias. Negative for weakness.   Psychiatric/Behavioral: Negative for altered mental status.           Objective:      Physical Exam   Constitutional: She is oriented to person, place, and time. She appears well-developed and well-nourished. No distress.   Cardiovascular:   Pulses:       Dorsalis pedis pulses are 2+ on the right side, and 2+ on the left side.        Posterior tibial pulses are 2+ on the right side, and 2+ on the left side.   CFT< 3 secs all toes bilateral foot, skin temp warm bilateral foot, no digital hair growth bilateral foot, mild lower extremity edema bilateral.     Musculoskeletal:   + equinus that reduces with knee bent bilateral.    No pain with ROM or MMT bilateral foot.    R foot:  Limited range of motion at the 1st MPJ    Plantar flexed first met head right foot.     Feet:   Right Foot:   Protective Sensation: 10 sites tested. 5 sites sensed.   Skin Integrity: Positive for ulcer, callus and dry skin. Negative for erythema.   Left Foot:   Protective Sensation: 10 sites tested. 5 sites sensed.   Skin Integrity: Positive for callus and dry skin. Negative for ulcer, blister, skin breakdown, erythema or warmth.   Neurological: She is alert and oriented to person, place, and time. She has normal strength. A sensory deficit is present.   Skin: Skin is dry. Capillary refill takes less than 2 seconds. No ecchymosis and no rash noted. She is not diaphoretic. No cyanosis or erythema. No pallor. Nails show no clubbing.   No recurrence of the ulceration distal right 3rd toe.    Ulcer Location:  Plantar 1st met head right foot  Measurements:  0.4 x 0.4 x 0.1 cm pre debridement 0.6 x 0.5 by 0.1 cm post debridement.  Periwound:  hyperkeratotic raised undermined  Drainage:  None.  Pus: None.  Malodor: None.  Base:  Granular  Signs of infection: None.    Ulcer Location:  Medial right 2nd  PIPJ  Measurements:  0.5 x 0.7 x 0.2 cm post debridement  Periwound: Intact  Drainage:  Scant serous.  Pus: None.  Malodor: None.  Base:  50% granular. 50% fibrin.  Signs of infection:  Slight surrounding erythema and mild edema  Does not probe the bone.               Assessment:       Encounter Diagnoses   Name Primary?    Diabetic ulcer of toe of right foot associated with type 2 diabetes mellitus, with fat layer exposed Yes    Type 2 diabetes mellitus with peripheral neuropathy     Diabetic ulcer of other part of right foot associated with diabetes mellitus due to underlying condition, with fat layer exposed     Gastrocnemius equinus, right     Preop testing          Plan:       Caro LUA was seen today for follow-up.    Diagnoses and all orders for this visit:    Diabetic ulcer of toe of right foot associated with type 2 diabetes mellitus, with fat layer exposed  -     Wound Debridement  -     Ambulatory referral to Family Practice  -     Aerobic culture  -     CULTURE, ANAEROBE  -     SUBSEQUENT HOME HEALTH ORDERS    Type 2 diabetes mellitus with peripheral neuropathy  -     Wound Debridement  -     Ambulatory referral to Family Practice  -     SUBSEQUENT HOME HEALTH ORDERS    Diabetic ulcer of other part of right foot associated with diabetes mellitus due to underlying condition, with fat layer exposed  -     Wound Debridement  -     Ambulatory referral to Family Practice  -     SUBSEQUENT HOME HEALTH ORDERS    Gastrocnemius equinus, right  -     Ambulatory referral to Family Practice    Preop testing  -     Ambulatory referral to Family Practice      I counseled the patient on her conditions, their implications and medical management.    Patient evaluated on Diabetic foot risk factors.  Patient counseled to do daily foot checks.  Counseled to wear accommodative shoe gear.  Educated on importance of glycemic control.    Wound debridement and dressing pe attached note.    Home health orders  updated    Referred to PCP for medical optimization and risk stratification    Pending endoscopic gastrocnemius recession right leg on 09/10/2019 as outpatient surgery with monitored anesthesia care and regional anesthesia.

## 2019-08-30 NOTE — H&P (VIEW-ONLY)
"Subjective:      Patient ID: Caro Powell is a 78 y.o. female.    Chief Complaint: Follow-up (wound check)    Caro LUA is a 78 y.o. female who presents to the clinic for evaluation and treatment of high risk feet. Caro LUA has a past medical history of Anticoagulant long-term use, Arthritis, Calcium nephrolithiasis (2007), Diabetes mellitus, type 2, Diabetic peripheral neuropathy associated with type 2 diabetes mellitus, and Hypertension. The patient's chief complaint is diabetic foot ulcer, right second toe. This patient has documented high risk feet requiring routine maintenance secondary to peripheral neuropathy. Noted drainage and discoloration from the toe a few days ago. Denies trauma. Accompanied by her .    5/7/18: Follow up for wound check right foot. Taking po clindamycin and ciprofloxacin as scheduled. Accompanied by her . Dressing remained c/d/i.    5/14/18: Presents for wound check. No new complaints. Accompanied by her .    5/31/18: Presents for wound check. Reports she has not received HH. PO abx completed.     12/5:  She is a pt of Dr. 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 She is presenting with new complaint of blister at right submet 1 and distal tip of 4th toe. She is DM2 and on insulin. It was 126 today. She recently bought a new shoes. She tells me she did not have any problems when she was wearing diabetic shoes but blisters formed after wearing a normal tennis shoes. It happened a week ago. She is traveling to [place], [place] in 2 weeks and wants to know if she can weight bear on her right foot. Denies N/V/F/C/SOB/CP    12/12: f/u right foot ulcer. Has been weight bearing in surgical shoe with football dressing. Denies pain. Tells me she has been walking in diabetic shoes on her left foot. She tells me there is a "callus" at lateral left 5th toe that she did not have before. She will travel to [place] soon. Also tells me she " noticed that her right foot is turning sideways and feels like it is affecting the way she walks.     1/21/19: Complains of worsening wound to right foot. She went to [place] for holidays and saws her wound to right foot worsened. Denies trauma.    2/4/19  Here for wound check.  Denies F/c/N/V    2/18/19: Wound check. Says dressing remained intact. Upon observing note she is wearing a different darco shoe then previously dispensed and foot appears not to be fully seated in shoe. Accompanied by her . Denies trauma.     2/25/19 wound check. Denies F/C/N/V    3/29/19 patient complains of new ulcer on 3rd toe that she first noticed on Wednesday.  She has been treating it with antibiotic ointment and bandaids.  Denies F/C/N/V.  Accompanied by her .    04/08/2019:  Presents for wound check right foot.  Relates the dressing remained intact.    4/15/19: Presents for wound check. No new complaints.    06/24/2019:  Follow-up for acute onset osteomyelitis to the right 3rd toe receiving IV Ancef per Infectious Disease recommendation.  [facility] is following her and changing dressings 3 times weekly.    06/20/2019:  Referred by her home health nurse to concern of a new wound developing to the right 3rd toe.  She is also concerned the recurrent wound under the plantar 1st met head right foot.  Says that she does note that she walks differently on this foot and and that it tends to roll more.    07/15/2019:  Follow-up for wound check right foot. Says that she canceled the scheduled gastrocnemius recession due to anxiety or having to manage a PICC line and being restricted due to surgery.  Currently receiving home health.    08/08/2019:  Presents for wound check right foot.  Says that her home health company no longer of the her house.  Receiving long-term IV antibiotics for the osteomyelitis right 3rd toe.  Requesting that her surgery be rescheduled.    08/29/2019:  Presents for wound check right foot. Complains  that she has a wound to her right 2nd toe as well as a persistent 1 underneath the ball of foot. She is scheduled for outpatient gastrocnemius recession right leg on 09/10/2019.  She has not seen her PCP for medical clearance yet.  Accompanied by her .  Home Health per [facility].    PCP: Brayan Penny MD    Date Last Seen by PCP:     Current shoe gear:  Affected Foot: football dressing      Unaffected Foot: Extra depth shoes with custome accommodative insoles    History of Trauma: negative  Sign of Infection: none    Hemoglobin A1C   Date Value Ref Range Status   06/09/2019 6.6 (H) 4.0 - 5.6 % Final     Comment:     ADA Screening Guidelines:  5.7-6.4%  Consistent with prediabetes  >or=6.5%  Consistent with diabetes  High levels of fetal hemoglobin interfere with the HbA1C  assay. Heterozygous hemoglobin variants (HbS, HgC, etc)do  not significantly interfere with this assay.   However, presence of multiple variants may affect accuracy.     06/09/2019 6.6 (H) 4.0 - 5.6 % Final     Comment:     ADA Screening Guidelines:  5.7-6.4%  Consistent with prediabetes  >or=6.5%  Consistent with diabetes  High levels of fetal hemoglobin interfere with the HbA1C  assay. Heterozygous hemoglobin variants (HbS, HgC, etc)do  not significantly interfere with this assay.   However, presence of multiple variants may affect accuracy.     01/12/2018 8.3 % Final   05/22/2017 7.5 (H) 4.5 - 6.2 % Final     Comment:     According to ADA guidelines, hemoglobin A1C <7.0% represents  optimal control in non-pregnant diabetic patients.  Different  metrics may apply to specific populations.   Standards of Medical Care in Diabetes - 2016.  For the purpose of screening for the presence of diabetes:  <5.7%     Consistent with the absence of diabetes  5.7-6.4%  Consistent with increasing risk for diabetes   (prediabetes)  >or=6.5%  Consistent with diabetes  Currently no consensus exists for use of hemoglobin A1C  for diagnosis of  "diabetes for children.       Vitals:    08/29/19 1502   BP: (!) 110/50   Pulse: (!) 52   Weight: 81.6 kg (180 lb)   Height: 5' 2" (1.575 m)   PainSc: 0-No pain      Past Medical History:   Diagnosis Date    Anticoagulant long-term use     Arthritis     Calcium nephrolithiasis 2007    Diabetes mellitus, type 2     Diabetic peripheral neuropathy associated with type 2 diabetes mellitus     Hypertension        Past Surgical History:   Procedure Laterality Date    AMPUTATION-TOE Right 5/23/2017    [facility]    COLONOSCOPY  11/28/2011    sigmoid diverticulosis, external hemorrhoids    HYSTERECTOMY      SHOULDER SURGERY Left     TOE AMPUTATION Right 05/22/2017    5th toe       Family History   Problem Relation Age of Onset    Diabetes Mother     Heart failure Father     Kidney failure Brother        Social History     Socioeconomic History    Marital status:      Spouse name: Not on file    Number of children: Not on file    Years of education: Not on file    Highest education level: Not on file   Occupational History    Not on file   Social Needs    Financial resource strain: Not on file    Food insecurity:     Worry: Not on file     Inability: Not on file    Transportation needs:     Medical: Not on file     Non-medical: Not on file   Tobacco Use    Smoking status: Never Smoker    Smokeless tobacco: Never Used   Substance and Sexual Activity    Alcohol use: No    Drug use: No    Sexual activity: Yes   Lifestyle    Physical activity:     Days per week: Not on file     Minutes per session: Not on file    Stress: Not on file   Relationships    Social connections:     Talks on phone: Not on file     Gets together: Not on file     Attends Rastafarian service: Not on file     Active member of club or organization: Not on file     Attends meetings of clubs or organizations: Not on file     Relationship status: Not on file   Other Topics Concern    Not on " file   Social History Narrative    Not on file       Current Outpatient Medications   Medication Sig Dispense Refill    ACCU-CHEK SAMI CONTROL SOLN Soln       ACCU-CHEK SAMI PLUS METER Misc       ACCU-CHEK SAMI PLUS TEST STRP Strp       ACCU-CHEK SOFT DEV LANCETS Kit       ACCU-CHEK SOFTCLIX LANCETS Misc       amLODIPine (NORVASC) 10 MG tablet Take 1 tablet (10 mg total) by mouth once daily. 30 tablet 11    apixaban (ELIQUIS) 5 mg Tab TAKE 1 TABLET BY MOUTH TWICE DAILY 60 tablet 5    ciprofloxacin HCl (CIPRO) 500 MG tablet       doxycycline (MONODOX) 100 MG capsule       famotidine (PEPCID) 20 MG tablet       FLUZONE HIGH-DOSE 2018-19, PF, 180 mcg/0.5 mL vaccine       gabapentin (NEURONTIN) 400 MG capsule       glimepiride (AMARYL) 1 MG tablet Take 1 tablet (1 mg total) by mouth 2 (two) times daily.      lisinopril (PRINIVIL,ZESTRIL) 40 MG tablet Take 1 tablet (40 mg total) by mouth once daily. 90 tablet 3    metoprolol succinate (TOPROL-XL) 25 MG 24 hr tablet Take by mouth once daily.       pramipexole (MIRAPEX) 0.125 MG tablet Take by mouth every evening.       pravastatin (PRAVACHOL) 40 MG tablet Take by mouth.       Current Facility-Administered Medications   Medication Dose Route Frequency Provider Last Rate Last Dose    sodium chloride 0.9% flush 10 mL  10 mL Intravenous PRN Derek Jose DPM        sodium chloride 0.9% flush 10 mL  10 mL Intravenous PRN Derek Jose DPM           Review of patient's allergies indicates:   Allergen Reactions    Codeine Nausea Only         Review of Systems   Constitution: Negative for chills, fever and malaise/fatigue.   HENT: Negative for congestion.    Cardiovascular: Negative for chest pain, claudication and leg swelling.   Respiratory: Negative for cough and shortness of breath.    Skin: Positive for color change, dry skin, nail changes and poor wound healing.   Musculoskeletal: Negative for back pain, joint pain, muscle cramps and  muscle weakness.   Gastrointestinal: Negative for nausea and vomiting.   Neurological: Positive for numbness and paresthesias. Negative for weakness.   Psychiatric/Behavioral: Negative for altered mental status.           Objective:      Physical Exam   Constitutional: She is oriented to person, place, and time. She appears well-developed and well-nourished. No distress.   Cardiovascular:   Pulses:       Dorsalis pedis pulses are 2+ on the right side, and 2+ on the left side.        Posterior tibial pulses are 2+ on the right side, and 2+ on the left side.   CFT< 3 secs all toes bilateral foot, skin temp warm bilateral foot, no digital hair growth bilateral foot, mild lower extremity edema bilateral.     Musculoskeletal:   + equinus that reduces with knee bent bilateral.    No pain with ROM or MMT bilateral foot.    R foot:  Limited range of motion at the 1st MPJ    Plantar flexed first met head right foot.     Feet:   Right Foot:   Protective Sensation: 10 sites tested. 5 sites sensed.   Skin Integrity: Positive for ulcer, callus and dry skin. Negative for erythema.   Left Foot:   Protective Sensation: 10 sites tested. 5 sites sensed.   Skin Integrity: Positive for callus and dry skin. Negative for ulcer, blister, skin breakdown, erythema or warmth.   Neurological: She is alert and oriented to person, place, and time. She has normal strength. A sensory deficit is present.   Skin: Skin is dry. Capillary refill takes less than 2 seconds. No ecchymosis and no rash noted. She is not diaphoretic. No cyanosis or erythema. No pallor. Nails show no clubbing.   No recurrence of the ulceration distal right 3rd toe.    Ulcer Location:  Plantar 1st met head right foot  Measurements:  0.4 x 0.4 x 0.1 cm pre debridement 0.6 x 0.5 by 0.1 cm post debridement.  Periwound:  hyperkeratotic raised undermined  Drainage:  None.  Pus: None.  Malodor: None.  Base:  Granular  Signs of infection: None.    Ulcer Location:  Medial right 2nd  PIPJ  Measurements:  0.5 x 0.7 x 0.2 cm post debridement  Periwound: Intact  Drainage:  Scant serous.  Pus: None.  Malodor: None.  Base:  50% granular. 50% fibrin.  Signs of infection:  Slight surrounding erythema and mild edema  Does not probe the bone.               Assessment:       Encounter Diagnoses   Name Primary?    Diabetic ulcer of toe of right foot associated with type 2 diabetes mellitus, with fat layer exposed Yes    Type 2 diabetes mellitus with peripheral neuropathy     Diabetic ulcer of other part of right foot associated with diabetes mellitus due to underlying condition, with fat layer exposed     Gastrocnemius equinus, right     Preop testing          Plan:       Caro LUA was seen today for follow-up.    Diagnoses and all orders for this visit:    Diabetic ulcer of toe of right foot associated with type 2 diabetes mellitus, with fat layer exposed  -     Wound Debridement  -     Ambulatory referral to Family Practice  -     Aerobic culture  -     CULTURE, ANAEROBE  -     SUBSEQUENT HOME HEALTH ORDERS    Type 2 diabetes mellitus with peripheral neuropathy  -     Wound Debridement  -     Ambulatory referral to Family Practice  -     SUBSEQUENT HOME HEALTH ORDERS    Diabetic ulcer of other part of right foot associated with diabetes mellitus due to underlying condition, with fat layer exposed  -     Wound Debridement  -     Ambulatory referral to Family Practice  -     SUBSEQUENT HOME HEALTH ORDERS    Gastrocnemius equinus, right  -     Ambulatory referral to Family Practice    Preop testing  -     Ambulatory referral to Family Practice      I counseled the patient on her conditions, their implications and medical management.    Patient evaluated on Diabetic foot risk factors.  Patient counseled to do daily foot checks.  Counseled to wear accommodative shoe gear.  Educated on importance of glycemic control.    Wound debridement and dressing pe attached note.    Home health orders  updated    Referred to PCP for medical optimization and risk stratification    Pending endoscopic gastrocnemius recession right leg on 09/10/2019 as outpatient surgery with monitored anesthesia care and regional anesthesia.

## 2019-09-02 LAB — BACTERIA SPEC AEROBE CULT: NORMAL

## 2019-09-03 ENCOUNTER — ANESTHESIA EVENT (OUTPATIENT)
Dept: SURGERY | Facility: HOSPITAL | Age: 79
End: 2019-09-03
Payer: MEDICARE

## 2019-09-03 ENCOUNTER — HOSPITAL ENCOUNTER (OUTPATIENT)
Dept: PREADMISSION TESTING | Facility: HOSPITAL | Age: 79
Discharge: HOME | End: 2019-09-03
Attending: PODIATRIST
Payer: MEDICARE

## 2019-09-03 DIAGNOSIS — Z01.818 PREOP EXAMINATION: Primary | ICD-10-CM

## 2019-09-03 RX ORDER — SODIUM CHLORIDE, SODIUM LACTATE, POTASSIUM CHLORIDE, CALCIUM CHLORIDE 600; 310; 30; 20 MG/100ML; MG/100ML; MG/100ML; MG/100ML
INJECTION, SOLUTION INTRAVENOUS CONTINUOUS
Status: CANCELLED | OUTPATIENT
Start: 2019-09-03

## 2019-09-03 RX ORDER — LIDOCAINE HYDROCHLORIDE 10 MG/ML
1 INJECTION, SOLUTION EPIDURAL; INFILTRATION; INTRACAUDAL; PERINEURAL ONCE
Status: CANCELLED | OUTPATIENT
Start: 2019-09-03 | End: 2019-09-03

## 2019-09-03 NOTE — DISCHARGE INSTRUCTIONS
Your surgery is scheduled for 9/10/19.    Please report to Outpatient Surgery Intake Office on the 2nd FLOOR at 5:30 a.m.          INSTRUCTIONS IMPORTANT!!!  ¨ Do not eat or drink after 12 midnight-including water. OK to brush teeth, no   gum, candy or mints!    ¨ Take only these medicines with a small swallow of water-morning of surgery: Lisinopril            ____  Do not wear makeup, including mascara.  ____  No powder, lotions or creams to surgical area.  ____  Please remove all jewelry, including piercings and leave at home.  ____  No money or valuables needed. Please leave at home.  ____  Please bring any documents given by your doctor.  ____  If going home the same day, arrange for a ride home. You will not be able to             drive if Anesthesia was used.  ____  Wear loose fitting clothing. Allow for dressings, bandages.  ____  Stop Aspirin, Ibuprofen, Motrin and Aleve at least 3-5 days before surgery, unless otherwise instructed by your doctor, or the nurse.   You MAY use Tylenol/acetaminophen until day of surgery.  ____  Wash the surgical area with Hibiclens the night before surgery, and again the             morning of surgery.  Be sure to rinse hibiclens off completely (if instructed by   nurse).  ____  If you take diabetic medication, do not take am of surgery unless instructed by Doctor.  ____  Call MD for temperature above 101 degrees or any other signs of infection such as Urinary (bladder) infection, Upper respiratory infection, skin boils, etc.  ____ Stop taking any Fish Oil supplement or any Vitamins that contain Vitamin E at least 5 days prior to surgery.  ____ Do Not wear your contact lenses the day of your procedure.  You may wear your glasses.      ____Do not shave surgical site for 3 days prior to surgery.  ____ Practice Good hand washing before, during, and after procedure.      I have read or had read and explained to me, and understand the above information.  Additional comments or  instructions:  For additional questions call [phone]      ANESTHESIA SIDE EFFECTS  -For the first 24 hours after surgery:  Do not drive, use heavy equipment, make important decisions, or drink alcohol  -It is normal to feel sleepy for several hours.  Rest until you are more awake.  -Have someone stay with you, if needed.  They can watch for problems and help keep you safe.  -Some possible post anesthesia side effects include: nausea and vomiting, sore throat and hoarseness, sleepiness, and dizziness.        Pre-Op Bathing Instructions    Before surgery, you can play an important role in your own health.    Because skin is not sterile, we need to be sure that your skin is as free of germs as possible. By following the instructions below, you can reduce the number of germs on your skin before surgery.    IMPORTANT: You will need to shower with a special soap called Hibiclens*, available at any pharmacy.  If you are allergic to Chlorhexidine (the antiseptic in Hibiclens), use an antibacterial soap such as Dial Soap for your preoperative shower.  You will shower with Hibiclens both the night before your surgery and the morning of your surgery.  Do not use Hibiclens on the head, face or genitals to avoid injury to those areas.    STEP #1: THE NIGHT BEFORE YOUR SURGERY     1. Do not shave the area of your body where your surgery will be performed.  2. Shower and wash your hair and body as usual with your normal soap and shampoo.  3. Rinse your hair and body thoroughly after you shower to remove all soap residue.  4. With your hand, apply one packet of Hibiclens soap to the surgical site.   5. Wash the site gently for five (5) minutes. Do not scrub your skin too hard.   6. Do not wash with your regular soap after Hibiclens is used.  7. Rinse your body thoroughly.  8. Pat yourself dry with a clean, soft towel.  9. Do not use lotion, cream, or powder.  10. Wear clean clothes.    STEP #2: THE MORNING OF YOUR SURGERY      1. Repeat Step #1.    * Not to be used by people allergic to Chlorhexidine.

## 2019-09-03 NOTE — ANESTHESIA PREPROCEDURE EVALUATION
09/03/2019  Caro Powell is a 78 y.o., female scheduled for right endoscopic gastrocnemius recession on 9/10/19.    Past Medical History:   Diagnosis Date    Anticoagulant long-term use     Arthritis     Atrial flutter     Calcium nephrolithiasis 2007    Diabetes mellitus, type 2     Diabetic peripheral neuropathy associated with type 2 diabetes mellitus     Hypertension      Past Surgical History:   Procedure Laterality Date    AMPUTATION-TOE Right 5/23/2017    [facility]    COLONOSCOPY  11/28/2011    sigmoid diverticulosis, external hemorrhoids    HYSTERECTOMY      SHOULDER SURGERY Left     TOE AMPUTATION Right 05/22/2017    5th toe       Anesthesia Evaluation    I have reviewed the Patient Summary Reports.     I have reviewed the Medications.     Review of Systems  Anesthesia Hx:  Hx of Anesthetic complications  Personal Hx of Anesthesia complications  Difficult Intubation, glidescope used successfully   Social:  Non-Smoker, No Alcohol Use    Hematology/Oncology:     Oncology Normal   Hematology Comments: On Eliquis, per cardiology will stop 3 days prior to procedure    Cardiovascular:   Exercise tolerance: good Hypertension Dysrhythmias atrial fibrillation Denies Angina.    Pulmonary:  Pulmonary Normal    Renal/:   Chronic Renal Disease, CRI renal calculi    Hepatic/GI:  Hepatic/GI Normal    Neurological:   Peripheral Neuropathy    Endocrine:   Diabetes, well controlled, type 2        Physical Exam  General:  Well nourished    Airway/Jaw/Neck:  Airway Findings: Mouth Opening: Small, but > 3cm General Airway Assessment: Possible difficult intubation  Mallampati: IV  Improves to III with phonation.        Eyes/Ears/Nose:  EYES/EARS/NOSE FINDINGS: Normal   Dental:  Dental Findings:    Chest/Lungs:  Chest/Lungs Findings: Clear to auscultation, Normal  Respiratory Rate     Heart/Vascular:  Heart Findings: Rate: Normal  Heart murmur: negative       Mental Status:  Mental Status Findings:  Cooperative, Alert and Oriented         Anesthesia Plan  Type of Anesthesia, risks & benefits discussed:  Anesthesia Type:  MAC, general, regional  Patient's Preference:   Intra-op Monitoring Plan: standard ASA monitors  Intra-op Monitoring Plan Comments:   Post Op Pain Control Plan: multimodal analgesia and per primary service following discharge from PACU  Post Op Pain Control Plan Comments:   Induction:   IV  Beta Blocker:         Informed Consent: Patient understands risks and agrees with Anesthesia plan.  Questions answered. Anesthesia consent signed with patient.  ASA Score: 3     Day of Surgery Review of History & Physical:  There are no significant changes.      Anesthesia Plan Notes: Anesthesia consent to be signed prior to procedure on 9/10/19  PCP optimization scanned into Epic.           Ready For Surgery From Anesthesia Perspective.

## 2019-09-04 LAB — BACTERIA SPEC ANAEROBE CULT: ABNORMAL

## 2019-09-10 ENCOUNTER — HOSPITAL ENCOUNTER (OUTPATIENT)
Facility: HOSPITAL | Age: 79
Discharge: HOME | End: 2019-09-10
Attending: PODIATRIST | Admitting: PODIATRIST
Payer: MEDICARE

## 2019-09-10 ENCOUNTER — ANESTHESIA (OUTPATIENT)
Dept: SURGERY | Facility: HOSPITAL | Age: 79
End: 2019-09-10
Payer: MEDICARE

## 2019-09-10 VITALS
HEIGHT: 62 IN | DIASTOLIC BLOOD PRESSURE: 68 MMHG | SYSTOLIC BLOOD PRESSURE: 141 MMHG | OXYGEN SATURATION: 100 % | BODY MASS INDEX: 32.02 KG/M2 | TEMPERATURE: 97 F | WEIGHT: 174 LBS | RESPIRATION RATE: 16 BRPM | HEART RATE: 64 BPM

## 2019-09-10 DIAGNOSIS — Z98.890 POSTOPERATIVE STATE: Primary | ICD-10-CM

## 2019-09-10 DIAGNOSIS — M62.461 GASTROCNEMIUS EQUINUS, RIGHT: ICD-10-CM

## 2019-09-10 DIAGNOSIS — E11.40 TYPE 2 DIABETES MELLITUS WITH DIABETIC NEUROPATHY, WITHOUT LONG-TERM CURRENT USE OF INSULIN: Chronic | ICD-10-CM

## 2019-09-10 DIAGNOSIS — E11.621 DIABETIC ULCER OF TOE OF RIGHT FOOT ASSOCIATED WITH TYPE 2 DIABETES MELLITUS, WITH FAT LAYER EXPOSED: ICD-10-CM

## 2019-09-10 DIAGNOSIS — L97.512 DIABETIC ULCER OF TOE OF RIGHT FOOT ASSOCIATED WITH TYPE 2 DIABETES MELLITUS, WITH FAT LAYER EXPOSED: ICD-10-CM

## 2019-09-10 LAB — POCT GLUCOSE: 110 MG/DL (ref 70–110)

## 2019-09-10 PROCEDURE — 36000709 HC OR TIME LEV III EA ADD 15 MIN: Performed by: PODIATRIST

## 2019-09-10 PROCEDURE — 63600175 PHARM REV CODE 636 W HCPCS: Performed by: NURSE PRACTITIONER

## 2019-09-10 PROCEDURE — 71000033 HC RECOVERY, INTIAL HOUR: Performed by: PODIATRIST

## 2019-09-10 PROCEDURE — 25000003 PHARM REV CODE 250: Performed by: STUDENT IN AN ORGANIZED HEALTH CARE EDUCATION/TRAINING PROGRAM

## 2019-09-10 PROCEDURE — 76942 ECHO GUIDE FOR BIOPSY: CPT | Performed by: STUDENT IN AN ORGANIZED HEALTH CARE EDUCATION/TRAINING PROGRAM

## 2019-09-10 PROCEDURE — 71000015 HC POSTOP RECOV 1ST HR: Performed by: PODIATRIST

## 2019-09-10 PROCEDURE — 37000008 HC ANESTHESIA 1ST 15 MINUTES: Performed by: PODIATRIST

## 2019-09-10 PROCEDURE — 25000003 PHARM REV CODE 250

## 2019-09-10 PROCEDURE — 63600175 PHARM REV CODE 636 W HCPCS: Performed by: STUDENT IN AN ORGANIZED HEALTH CARE EDUCATION/TRAINING PROGRAM

## 2019-09-10 PROCEDURE — 63600175 PHARM REV CODE 636 W HCPCS: Performed by: ANESTHESIOLOGY

## 2019-09-10 PROCEDURE — 36000707: Performed by: PODIATRIST

## 2019-09-10 PROCEDURE — 71000016 HC POSTOP RECOV ADDL HR: Performed by: PODIATRIST

## 2019-09-10 PROCEDURE — 36000706: Performed by: PODIATRIST

## 2019-09-10 PROCEDURE — 29999 PR ARTHOSCOPIC GASTROCNEMIUS RECESSION: ICD-10-PCS | Mod: RT,,, | Performed by: PODIATRIST

## 2019-09-10 PROCEDURE — 37000009 HC ANESTHESIA EA ADD 15 MINS: Performed by: PODIATRIST

## 2019-09-10 PROCEDURE — 29999 UNLISTED PX ARTHROSCOPY: CPT | Mod: RT,,, | Performed by: PODIATRIST

## 2019-09-10 PROCEDURE — 36000708 HC OR TIME LEV III 1ST 15 MIN: Performed by: PODIATRIST

## 2019-09-10 PROCEDURE — 25000003 PHARM REV CODE 250: Performed by: ANESTHESIOLOGY

## 2019-09-10 PROCEDURE — C9290 INJ, BUPIVACAINE LIPOSOME: HCPCS | Performed by: STUDENT IN AN ORGANIZED HEALTH CARE EDUCATION/TRAINING PROGRAM

## 2019-09-10 PROCEDURE — S0020 INJECTION, BUPIVICAINE HYDRO: HCPCS | Performed by: ANESTHESIOLOGY

## 2019-09-10 RX ORDER — BUPIVACAINE HYDROCHLORIDE 5 MG/ML
INJECTION, SOLUTION EPIDURAL; INTRACAUDAL
Status: COMPLETED | OUTPATIENT
Start: 2019-09-10 | End: 2019-09-10

## 2019-09-10 RX ORDER — CEFAZOLIN SODIUM 2 G/50ML
2 SOLUTION INTRAVENOUS
Status: DISCONTINUED | OUTPATIENT
Start: 2019-09-10 | End: 2019-09-10 | Stop reason: HOSPADM

## 2019-09-10 RX ORDER — LIDOCAINE HYDROCHLORIDE 10 MG/ML
1 INJECTION, SOLUTION EPIDURAL; INFILTRATION; INTRACAUDAL; PERINEURAL ONCE
Status: DISCONTINUED | OUTPATIENT
Start: 2019-09-10 | End: 2019-09-10

## 2019-09-10 RX ORDER — PROPOFOL 10 MG/ML
VIAL (ML) INTRAVENOUS CONTINUOUS PRN
Status: DISCONTINUED | OUTPATIENT
Start: 2019-09-10 | End: 2019-09-10

## 2019-09-10 RX ORDER — TRAMADOL HYDROCHLORIDE 50 MG/1
50 TABLET ORAL EVERY 6 HOURS PRN
Qty: 30 TABLET | Refills: 0 | Status: SHIPPED | OUTPATIENT
Start: 2019-09-10 | End: 2019-09-20

## 2019-09-10 RX ORDER — GLYCOPYRROLATE 0.2 MG/ML
INJECTION INTRAMUSCULAR; INTRAVENOUS
Status: COMPLETED
Start: 2019-09-10 | End: 2019-09-10

## 2019-09-10 RX ORDER — GLYCOPYRROLATE 0.2 MG/ML
0.2 INJECTION INTRAMUSCULAR; INTRAVENOUS ONCE
Status: DISCONTINUED | OUTPATIENT
Start: 2019-09-10 | End: 2019-09-10 | Stop reason: HOSPADM

## 2019-09-10 RX ORDER — LIDOCAINE HYDROCHLORIDE 10 MG/ML
1 INJECTION, SOLUTION EPIDURAL; INFILTRATION; INTRACAUDAL; PERINEURAL ONCE
Status: DISCONTINUED | OUTPATIENT
Start: 2019-09-10 | End: 2019-09-10 | Stop reason: HOSPADM

## 2019-09-10 RX ORDER — DEXAMETHASONE SODIUM PHOSPHATE 4 MG/ML
INJECTION, SOLUTION INTRA-ARTICULAR; INTRALESIONAL; INTRAMUSCULAR; INTRAVENOUS; SOFT TISSUE
Status: DISCONTINUED | OUTPATIENT
Start: 2019-09-10 | End: 2019-09-10

## 2019-09-10 RX ORDER — ROPIVACAINE HYDROCHLORIDE 5 MG/ML
INJECTION, SOLUTION EPIDURAL; INFILTRATION; PERINEURAL
Status: COMPLETED | OUTPATIENT
Start: 2019-09-10 | End: 2019-09-10

## 2019-09-10 RX ORDER — CEFAZOLIN SODIUM 1 G/3ML
INJECTION, POWDER, FOR SOLUTION INTRAMUSCULAR; INTRAVENOUS
Status: DISCONTINUED | OUTPATIENT
Start: 2019-09-10 | End: 2019-09-10

## 2019-09-10 RX ORDER — SODIUM CHLORIDE 0.9 % (FLUSH) 0.9 %
10 SYRINGE (ML) INJECTION
Status: DISCONTINUED | OUTPATIENT
Start: 2019-09-10 | End: 2019-09-10 | Stop reason: HOSPADM

## 2019-09-10 RX ORDER — MIDAZOLAM HYDROCHLORIDE 1 MG/ML
INJECTION, SOLUTION INTRAMUSCULAR; INTRAVENOUS
Status: DISCONTINUED | OUTPATIENT
Start: 2019-09-10 | End: 2019-09-10

## 2019-09-10 RX ORDER — PHENYLEPHRINE HYDROCHLORIDE 10 MG/ML
INJECTION INTRAVENOUS
Status: DISCONTINUED | OUTPATIENT
Start: 2019-09-10 | End: 2019-09-10

## 2019-09-10 RX ORDER — SODIUM CHLORIDE, SODIUM LACTATE, POTASSIUM CHLORIDE, CALCIUM CHLORIDE 600; 310; 30; 20 MG/100ML; MG/100ML; MG/100ML; MG/100ML
INJECTION, SOLUTION INTRAVENOUS CONTINUOUS
Status: DISCONTINUED | OUTPATIENT
Start: 2019-09-10 | End: 2019-09-10 | Stop reason: HOSPADM

## 2019-09-10 RX ORDER — DEXMEDETOMIDINE HYDROCHLORIDE 100 UG/ML
INJECTION, SOLUTION INTRAVENOUS
Status: DISCONTINUED | OUTPATIENT
Start: 2019-09-10 | End: 2019-09-10

## 2019-09-10 RX ADMIN — BUPIVACAINE HYDROCHLORIDE 20 ML: 5 INJECTION, SOLUTION EPIDURAL; INTRACAUDAL; PERINEURAL at 06:09

## 2019-09-10 RX ADMIN — GLYCOPYRROLATE: 0.2 INJECTION, SOLUTION INTRAMUSCULAR; INTRAVENOUS at 08:09

## 2019-09-10 RX ADMIN — BUPIVACAINE 10 ML: 13.3 INJECTION, SUSPENSION, LIPOSOMAL INFILTRATION at 06:09

## 2019-09-10 RX ADMIN — MIDAZOLAM 5 MG: 1 INJECTION INTRAMUSCULAR; INTRAVENOUS at 06:09

## 2019-09-10 RX ADMIN — CEFAZOLIN 2 G: 330 INJECTION, POWDER, FOR SOLUTION INTRAMUSCULAR; INTRAVENOUS at 07:09

## 2019-09-10 RX ADMIN — ROPIVACAINE HYDROCHLORIDE 20 ML: 5 INJECTION, SOLUTION EPIDURAL; INFILTRATION; PERINEURAL at 06:09

## 2019-09-10 RX ADMIN — SODIUM CHLORIDE, SODIUM LACTATE, POTASSIUM CHLORIDE, AND CALCIUM CHLORIDE: .6; .31; .03; .02 INJECTION, SOLUTION INTRAVENOUS at 06:09

## 2019-09-10 RX ADMIN — DEXMEDETOMIDINE HYDROCHLORIDE 50 MCG: 100 INJECTION, SOLUTION, CONCENTRATE INTRAVENOUS at 06:09

## 2019-09-10 RX ADMIN — DEXAMETHASONE SODIUM PHOSPHATE 4 MG: 4 INJECTION, SOLUTION INTRAMUSCULAR; INTRAVENOUS at 06:09

## 2019-09-10 RX ADMIN — PROPOFOL 75 MCG/KG/MIN: 10 INJECTION, EMULSION INTRAVENOUS at 07:09

## 2019-09-10 RX ADMIN — PHENYLEPHRINE HYDROCHLORIDE 50 MCG: 10 INJECTION INTRAVENOUS at 07:09

## 2019-09-10 NOTE — ANESTHESIA POSTPROCEDURE EVALUATION
Anesthesia Post Evaluation    Patient: Caro R Perniciaro    Procedure(s) Performed: Procedure(s) (LRB):  RECESSION, GASTROCNEMIUS, ENDOSCOPIC (Right)    Final Anesthesia Type: general  Patient location during evaluation: PACU  Patient participation: Yes- Able to Participate  Level of consciousness: awake and alert, oriented and awake  Post-procedure vital signs: reviewed and stable  Pain management: adequate  Airway patency: patent  PONV status at discharge: No PONV  Anesthetic complications: no      Cardiovascular status: blood pressure returned to baseline  Respiratory status: unassisted and room air  Hydration status: euvolemic  Follow-up not needed.          Vitals Value Taken Time   /68 9/10/2019  9:00 AM   Temp 36.3 °C (97.3 °F) 9/10/2019  9:00 AM   Pulse 60 9/10/2019  9:00 AM   Resp 14 9/10/2019  9:00 AM   SpO2 99 % 9/10/2019  9:00 AM   Vitals shown include unvalidated device data.      Event Time     Out of Recovery 08:59:14          Pain/Flex Score: Flex Score: 10 (9/10/2019  9:00 AM)

## 2019-09-10 NOTE — DISCHARGE INSTRUCTIONS
Discharge Instructions for Foot Surgery  Arrange to have an adult drive you home after surgery. If you had general anesthesia, it may take a day or more to fully recover. So, for at least the next 24 hours: Do not drive or use machinery or power tools; do not drink alcohol; and do not make any major decisions.  Diet  Here are some dietary suggestions following surgery:   · Start with liquids and light foods (like dry toast, bananas, and applesauce). As you feel up to it, slowly return to your normal diet.  · Drink at least 6 to 8 glasses of water or other nonalcoholic fluids a day.  · To avoid nausea, eat before taking narcotic pain medicines.  Medicines  It is important to follow these directions:   · Take all medicines as instructed.  · Take pain medicines on time. Do not wait until the pain is bad before taking your medicines.  · Avoid alcohol while on pain medicines.  Activity  These instructions are to help with your recovery:   · Sit or lie down when possible. Put a pillow under your heel to raise your foot above the level of your heart.  · Wrap an ice pack or bag of frozen peas in a thin cloth. Place it over your bandaged foot for no longer than 20 minutes. Do this three times a day.  · You can drive again in seven days or as instructed by your healthcare provider.  · Wear your surgical shoe at all times unless told otherwise by your healthcare provider.  · Use crutches or a cane as directed.  · Follow your healthcare providers instructions about putting weight on your foot.  Bandage and cast care  Here are tips to follow:   · Do not shower for 48 hours.  · When you can shower again, cover the bandage or cast with a plastic bag to keep it dry.  · Dont remove your bandage until your healthcare provider tells you to. If your bandage gets wet or dirty, check with your healthcare provider. You can likely replace it with a clean, dry one.  What to expect  It is normal to have the following:  · Bruising and  slight swelling of the foot and toes  · A small amount of blood on the dressing  Call your healthcare provider   Contact your healthcare provider right away if you have any of the following:   · Continuous bleeding through the bandage  · Excessive swelling, increased bleeding, or redness  · Fever over 100.4°F (38°C) or chills  · Pain unrelieved by pain medicines  · Foot feels cold to the touch or numb  · Increased ache in your leg or foot  · Chest pain or shortness of breath  · Anything unusual that concerns you   Date Last Reviewed: 9/26/2015  © 5692-6634 [facility]. [address] [place] [address]. All rights reserved. This information is not intended as a substitute for professional medical care. Always follow your healthcare professional's instructions.

## 2019-09-10 NOTE — OP NOTE
Operative Note       Surgery Date: 9/10/2019     Surgeon(s) and Role:     * Derek Jose, MARJAN - Primary  Stephen Barakat DPM, PGY-2    Pre-op Diagnosis:  Gastrocnemius equinus, right [M21.6X1]    Post-op Diagnosis: Post-Op Diagnosis Codes:     * Gastrocnemius equinus, right [M21.6X1]    Procedure(s) (LRB):  RECESSION, GASTROCNEMIUS, ENDOSCOPIC (Right)    Anesthesia: Regional    Procedure in Detail/Findings:  The patient was brought to the operating room on a stretcher and placed on the operating table in a supine position. Following the successful induction of MAC: tourniquet was applied to the right thigh. The right leg was prepped and draped in a sterile manner.     A timeout was performed confirming the patient's identification, procedure, surgical site, medications and allergies. All were in agreement.    The tourniquet was inflated to 350mmHg.    Attention was directed to the right posterior leg, where an endoscopic gastrocnemius recession was performed. A small incision was created medially between the distal end of the gastroc muscle belly and achilles tendon, as a medial endoscopic portal. Using a #15 blade, the incision was deepened to the superficial subcutaneous layer. The incision was then deepened using blunt dissection with a hemostat to the level of the gastrocnemius aponeurosis. Using [employer] centerline system, the scope was then inserted into the medial portal and the gastrocnemius fascia was identified. This was further verified by dorsiflexing the foot up and down. The scope was used to visualize the lateral border of the aponeurosis and the hook blade was engaged and advanced as far medial as possible. The remaining anteromedial bands were resected using a #15 blade. The fibers of the muscle belly were then visualized beneath the gastrocnemius.  No neurovascular structures were encountered or damaged. All the fibers of the aponeurosis were noted to be completely transected through the scope and  adequate dorsiflexion was noted. The surgical site was then irrigated with copious amounts of sterile normal saline and closed with 3-0 prolene.     The tourniquet was deflated. The incision site was dressed with 4x4s, kerlix, cast padding, ACE and xeroform.    The patient was transferred to the recovery room with vital signs stable. Following a period of post op monitoring, the patient will be discharged home with the following post op instructions:   1. Keep dressing dry and intact until Podiatry follow up.   2.Weight bearing status: non-weightbearing  3. All necessary prescriptions ordered and medical management will continue.   4. Contact Podiatry for all post op follow up care and if any problems arise.      Estimated Blood Loss: 2mL           Specimens (From admission, onward)    None        Implants: * No implants in log *           Disposition: PACU - hemodynamically stable.           Condition: Good    Attestation:  I was present and scrubbed for the entire procedure.           Discharge Note    Admit Date: 9/10/2019    Attending Physician: Derek Jose DPM     Discharge Physician: Derek Jose DPM    Final Diagnosis: Post-Op Diagnosis Codes:     * Gastrocnemius equinus, right [M21.6X1]    Disposition: Home or Self Care    Patient Instructions:   Current Discharge Medication List      START taking these medications    Details   traMADol (ULTRAM) 50 mg tablet Take 1 tablet (50 mg total) by mouth every 6 (six) hours as needed for Pain.  Qty: 30 tablet, Refills: 0    Comments: Quantity prescribed more than 7 day supply? No         CONTINUE these medications which have NOT CHANGED    Details   apixaban (ELIQUIS) 5 mg Tab TAKE 1 TABLET BY MOUTH TWICE DAILY  Qty: 60 tablet, Refills: 5    Associated Diagnoses: Atrial fibrillation      doxycycline (MONODOX) 100 MG capsule       famotidine (PEPCID) 20 MG tablet       gabapentin (NEURONTIN) 400 MG capsule       glimepiride (AMARYL) 1 MG tablet Take 1  tablet (1 mg total) by mouth 2 (two) times daily.      lisinopril (PRINIVIL,ZESTRIL) 40 MG tablet Take 1 tablet (40 mg total) by mouth once daily.  Qty: 90 tablet, Refills: 3      metoprolol succinate (TOPROL-XL) 25 MG 24 hr tablet Take by mouth once daily.       ACCU-CHEK SAMI CONTROL SOLN Soln       ACCU-CHEK SAMI PLUS METER Misc       ACCU-CHEK SAMI PLUS TEST STRP Strp       ACCU-CHEK SOFT DEV LANCETS Kit       ACCU-CHEK SOFTCLIX LANCETS Misc       FLUZONE HIGH-DOSE 2018-19, PF, 180 mcg/0.5 mL vaccine       pramipexole (MIRAPEX) 0.125 MG tablet Take by mouth every evening.              Discharge Procedure Orders (must include Diet, Follow-up, Activity)   Discharge Procedure Orders (must include Diet, Follow-up, Activity)   Diet general     Keep surgical extremity elevated     Call MD for:  temperature >100.4     Call MD for:  persistent nausea and vomiting     Call MD for:  severe uncontrolled pain     Call MD for:  difficulty breathing, headache or visual disturbances     Leave dressing on - Keep it clean, dry, and intact until clinic visit     Weight bearing restrictions (specify)   Order Comments: Non-weightbearing right foot x 3 days then to tolerance with orthopedic boot.        Discharge Date: No discharge date for patient encounter.     I have personally taken the history and examined this patient and agree with the resident's note as stated as above.   Derek Jose DPM, FACFAS

## 2019-09-10 NOTE — INTERVAL H&P NOTE
The patient has been examined and the H&P has been reviewed:    I concur with the findings and no changes have occurred since H&P was written.    Anesthesia/Surgery risks, benefits and alternative options discussed and understood by patient/family.          Active Hospital Problems    Diagnosis  POA    Diabetic ulcer of toe of right foot associated with type 2 diabetes mellitus, with fat layer exposed [E11.621, L97.512]  Yes      Resolved Hospital Problems   No resolved problems to display.

## 2019-09-10 NOTE — TRANSFER OF CARE
"Anesthesia Transfer of Care Note    Patient: Caro R Perniciaro    Procedure(s) Performed: Procedure(s) (LRB):  RECESSION, GASTROCNEMIUS, ENDOSCOPIC (Right)    Patient location: PACU    Anesthesia Type: MAC    Transport from OR: Transported from OR on 2-3 L/min O2 by NC with adequate spontaneous ventilation    Post pain: adequate analgesia    Post assessment: no apparent anesthetic complications and tolerated procedure well    Post vital signs: stable    Level of consciousness: responds to stimulation and sedated    Nausea/Vomiting: no nausea/vomiting    Complications: none    Transfer of care protocol was followed      Last vitals:   Visit Vitals  /60   Pulse (!) 52   Temp 36.7 °C (98.1 °F) (Skin)   Resp 18   Ht 5' 2" (1.575 m)   Wt 78.9 kg (174 lb)   LMP  (LMP Unknown)   SpO2 96%   Breastfeeding? No   BMI 31.83 kg/m²     "

## 2019-09-10 NOTE — PLAN OF CARE
Dr. Marie and Dr. Calles at bedside for bedside procedure Politeal Saphenous regional block to right lower extremity. Time out performed.   5 mg Versed given per Dr. Marie.  Procedure started 6:46 a.m. And ended at 6:53 a.m.  Patient tolerated well.  Patient taken to surgery at 7:02

## 2019-09-10 NOTE — ANESTHESIA PROCEDURE NOTES
Peripheral Block    Patient location during procedure: pre-op    Reason for block: primary anesthetic   Diagnosis: R foot pain   Start time: 9/10/2019 6:46 AM  Timeout: 9/10/2019 6:46 AM   End time: 9/10/2019 6:53 AM    Staffing  Authorizing Provider: Claudy Calles MD  Performing Provider: Eloisa Marie MD    Preanesthetic Checklist  Completed: patient identified, site marked, surgical consent, pre-op evaluation, timeout performed, IV checked, risks and benefits discussed and monitors and equipment checked  Peripheral Block  Patient position: supine  Prep: ChloraPrep  Patient monitoring: heart rate, cardiac monitor, continuous pulse ox, continuous capnometry and frequent blood pressure checks  Block type: adductor canal and popliteal  Laterality: right  Injection technique: single shot  Needle  Needle type: Stimuplex   Needle gauge: 21 G  Needle length: 4 in  Needle localization: anatomical landmarks and ultrasound guidance   -ultrasound image captured on disc.  Assessment  Injection assessment: negative aspiration, negative parasthesia and local visualized surrounding nerve  Paresthesia pain: none  Heart rate change: no  Slow fractionated injection: yes  Additional Notes  VSS.  [facility] RN monitoring vitals throughout procedure.  Patient tolerated procedure well.     Popliteal - 10cc exparel and 20cc 0.5% bupivacaine  Adductor canal - 4mg decadron, 50mcg Precedex, and 20cc 0.5% ropivacaine

## 2019-09-10 NOTE — BRIEF OP NOTE
[facility]  Brief Operative Note     SUMMARY     Surgery Date: 9/10/2019     Surgeon(s) and Role:     * Derek Jose DPM - Primary    Assisting Surgeon: None    Pre-op Diagnosis:  Gastrocnemius equinus, right [M21.6X1]    Post-op Diagnosis:  Post-Op Diagnosis Codes:     * Gastrocnemius equinus, right [M21.6X1]    Procedure(s) (LRB):  RECESSION, GASTROCNEMIUS, ENDOSCOPIC (Right)    Anesthesia: Regional    Description of the findings of the procedure:  Endoscopic gastrocnemius recession right leg    Findings/Key Components:  Per above noting 1.1 cm to 2 cm of when the.    Estimated Blood Loss:  1 mL       Specimens:   Specimen (12h ago, onward)    None          Discharge Note    SUMMARY     Admit Date: 9/10/2019    Discharge Date and Time:  09/10/2019 7:44 AM    Hospital Course (synopsis of major diagnoses, care, treatment, and services provided during the course of the hospital stay): admitted for outpatient procedure with uneventful stay        Final Diagnosis: Post-Op Diagnosis Codes:     * Gastrocnemius equinus, right [M21.6X1]    Disposition: Home or Self Care    Follow Up/Patient Instructions:     Medications:  Reconciled Home Medications:      Medication List      START taking these medications    traMADol 50 mg tablet  Commonly known as:  ULTRAM  Take 1 tablet (50 mg total) by mouth every 6 (six) hours as needed for Pain.        CONTINUE taking these medications    ACCU-CHEK SAMI CONTROL SOLN Soln  Generic drug:  blood glucose control high,low     ACCU-CHEK SAMI PLUS METER Misc  Generic drug:  blood-glucose meter     ACCU-CHEK SAMI PLUS TEST STRP Strp  Generic drug:  blood sugar diagnostic     ACCU-CHEK SOFT DEV LANCETS Kit  Generic drug:  lancing device with lancets     ACCU-CHEK SOFTCLIX LANCETS Misc  Generic drug:  lancets     apixaban 5 mg Tab  Commonly known as:  ELIQUIS  TAKE 1 TABLET BY MOUTH TWICE DAILY     doxycycline 100 MG capsule  Commonly known as:  MONODOX      famotidine 20 MG tablet  Commonly known as:  PEPCID     FLUZONE HIGH-DOSE 2018-19 (PF) 180 mcg/0.5 mL vaccine  Generic drug:  influenza     gabapentin 400 MG capsule  Commonly known as:  NEURONTIN     glimepiride 1 MG tablet  Commonly known as:  AMARYL  Take 1 tablet (1 mg total) by mouth 2 (two) times daily.     lisinopril 40 MG tablet  Commonly known as:  PRINIVIL,ZESTRIL  Take 1 tablet (40 mg total) by mouth once daily.     metoprolol succinate 25 MG 24 hr tablet  Commonly known as:  TOPROL-XL  Take by mouth once daily.     pramipexole 0.125 MG tablet  Commonly known as:  MIRAPEX  Take by mouth every evening.          Discharge Procedure Orders   Diet general     Keep surgical extremity elevated     Call MD for:  temperature >100.4     Call MD for:  persistent nausea and vomiting     Call MD for:  severe uncontrolled pain     Call MD for:  difficulty breathing, headache or visual disturbances     Leave dressing on - Keep it clean, dry, and intact until clinic visit     Weight bearing restrictions (specify)   Order Comments: Non-weightbearing right foot x 3 days then to tolerance with orthopedic boot.

## 2019-09-10 NOTE — PLAN OF CARE
Meets criteria for discharge. VSS, AAOx3. Tolerating po fluids without nausea. IV discontinued with tip intact.Discharge instructions given. Time allowed for questions. Verbalizes understanding. Discharged to home.

## 2019-09-11 ENCOUNTER — TELEPHONE (OUTPATIENT)
Dept: PODIATRY | Facility: CLINIC | Age: 79
End: 2019-09-11

## 2019-09-11 NOTE — TELEPHONE ENCOUNTER
----- Message from Kati Hargrove sent at 9/11/2019  2:23 PM CDT -----  Tammy with [facility] called.   No. [phone]     Tammy needs updated wound care orders.     The dressing can remain on until her follow up.

## 2019-09-13 ENCOUNTER — TELEPHONE (OUTPATIENT)
Dept: PODIATRY | Facility: CLINIC | Age: 79
End: 2019-09-13

## 2019-09-13 NOTE — TELEPHONE ENCOUNTER
----- Message from Sherri Sosa sent at 9/13/2019 10:25 AM CDT -----  Contact: Tammy / [facility] [phone]  Nurse requesting to speak with you to get updated wound care orders. Please advise.

## 2019-09-18 ENCOUNTER — TELEPHONE (OUTPATIENT)
Dept: HOME HEALTH SERVICES | Facility: HOSPITAL | Age: 79
End: 2019-09-18

## 2019-09-19 ENCOUNTER — OFFICE VISIT (OUTPATIENT)
Dept: PODIATRY | Facility: CLINIC | Age: 79
End: 2019-09-19
Payer: MEDICARE

## 2019-09-19 VITALS
DIASTOLIC BLOOD PRESSURE: 66 MMHG | HEART RATE: 71 BPM | SYSTOLIC BLOOD PRESSURE: 116 MMHG | WEIGHT: 174 LBS | HEIGHT: 62 IN | BODY MASS INDEX: 32.02 KG/M2

## 2019-09-19 DIAGNOSIS — Z98.890 POSTOPERATIVE STATE: ICD-10-CM

## 2019-09-19 DIAGNOSIS — Z87.2 HEALED ULCER OF RIGHT FOOT ON EXAMINATION: Primary | ICD-10-CM

## 2019-09-19 DIAGNOSIS — E11.42 TYPE 2 DIABETES MELLITUS WITH PERIPHERAL NEUROPATHY: ICD-10-CM

## 2019-09-19 PROCEDURE — 99999 PR PBB SHADOW E&M-EST. PATIENT-LVL IV: ICD-10-PCS | Mod: PBBFAC,,, | Performed by: PODIATRIST

## 2019-09-19 PROCEDURE — 99024 PR POST-OP FOLLOW-UP VISIT: ICD-10-PCS | Mod: S$GLB,,, | Performed by: PODIATRIST

## 2019-09-19 PROCEDURE — 99999 PR PBB SHADOW E&M-EST. PATIENT-LVL IV: CPT | Mod: PBBFAC,,, | Performed by: PODIATRIST

## 2019-09-19 PROCEDURE — 99024 POSTOP FOLLOW-UP VISIT: CPT | Mod: S$GLB,,, | Performed by: PODIATRIST

## 2019-09-20 ENCOUNTER — TELEPHONE (OUTPATIENT)
Dept: PODIATRY | Facility: CLINIC | Age: 79
End: 2019-09-20

## 2019-09-20 NOTE — PROGRESS NOTES
"Subjective:      Patient ID: Caro Powell is a 79 y.o. female.    Chief Complaint: Follow-up (from surgery right foot )    Caro LUA is a 79 y.o. female who presents to the clinic for evaluation and treatment of high risk feet. Caro LUA has a past medical history of Anticoagulant long-term use, Arthritis, Atrial flutter, Calcium nephrolithiasis (2007), Diabetes mellitus, type 2, Diabetic peripheral neuropathy associated with type 2 diabetes mellitus, and Hypertension. The patient's chief complaint is diabetic foot ulcer, right second toe. This patient has documented high risk feet requiring routine maintenance secondary to peripheral neuropathy. Noted drainage and discoloration from the toe a few days ago. Denies trauma. Accompanied by her .    5/7/18: Follow up for wound check right foot. Taking po clindamycin and ciprofloxacin as scheduled. Accompanied by her . Dressing remained c/d/i.    5/14/18: Presents for wound check. No new complaints. Accompanied by her .    5/31/18: Presents for wound check. Reports she has not received HH. PO abx completed.     12/5:  She is a pt of Dr. 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 She is presenting with new complaint of blister at right submet 1 and distal tip of 4th toe. She is DM2 and on insulin. It was 126 today. She recently bought a new shoes. She tells me she did not have any problems when she was wearing diabetic shoes but blisters formed after wearing a normal tennis shoes. It happened a week ago. She is traveling to [place], [place] in 2 weeks and wants to know if she can weight bear on her right foot. Denies N/V/F/C/SOB/CP    12/12: f/u right foot ulcer. Has been weight bearing in surgical shoe with football dressing. Denies pain. Tells me she has been walking in diabetic shoes on her left foot. She tells me there is a "callus" at lateral left 5th toe that she did not have before. She will travel to [place] " soon. Also tells me she noticed that her right foot is turning sideways and feels like it is affecting the way she walks.     1/21/19: Complains of worsening wound to right foot. She went to [place] for holidays and saws her wound to right foot worsened. Denies trauma.    2/4/19  Here for wound check.  Denies F/c/N/V    2/18/19: Wound check. Says dressing remained intact. Upon observing note she is wearing a different darco shoe then previously dispensed and foot appears not to be fully seated in shoe. Accompanied by her . Denies trauma.     2/25/19 wound check. Denies F/C/N/V    3/29/19 patient complains of new ulcer on 3rd toe that she first noticed on Wednesday.  She has been treating it with antibiotic ointment and bandaids.  Denies F/C/N/V.  Accompanied by her .    04/08/2019:  Presents for wound check right foot.  Relates the dressing remained intact.    4/15/19: Presents for wound check. No new complaints.    06/24/2019:  Follow-up for acute onset osteomyelitis to the right 3rd toe receiving IV Ancef per Infectious Disease recommendation.  [facility] following her and changing dressings 3 times weekly.    06/20/2019:  Referred by her home health nurse to concern of a new wound developing to the right 3rd toe.  She is also concerned the recurrent wound under the plantar 1st met head right foot.  Says that she does note that she walks differently on this foot and and that it tends to roll more.    07/15/2019:  Follow-up for wound check right foot. Says that she canceled the scheduled gastrocnemius recession due to anxiety or having to manage a PICC line and being restricted due to surgery.  Currently receiving home health.    08/08/2019:  Presents for wound check right foot.  Says that her home health company no longer of the her house.  Receiving long-term IV antibiotics for the osteomyelitis right 3rd toe.  Requesting that her surgery be rescheduled.    08/29/2019:  Presents for wound  check right foot. Complains that she has a wound to her right 2nd toe as well as a persistent 1 underneath the ball of foot. She is scheduled for outpatient gastrocnemius recession right leg on 09/10/2019.  She has not seen her PCP for medical clearance yet.  Accompanied by her .  Home [facility].    09/19/2019:  Post endoscopic gastrocnemius recession right leg on 09/10/2019.  She has been nonweightbearing for the past week.  Dressing has remained clean dry intact. Wearing tall orthopedic boot.  Accompanied by her .  Relates she did not take any pain medicine.  No new complaints.    PCP: Brayan Penny MD    Date Last Seen by PCP:     Current shoe gear:  Affected Foot: football dressing      Unaffected Foot: Extra depth shoes with custome accommodative insoles    History of Trauma: negative  Sign of Infection: none    Hemoglobin A1C   Date Value Ref Range Status   06/09/2019 6.6 (H) 4.0 - 5.6 % Final     Comment:     ADA Screening Guidelines:  5.7-6.4%  Consistent with prediabetes  >or=6.5%  Consistent with diabetes  High levels of fetal hemoglobin interfere with the HbA1C  assay. Heterozygous hemoglobin variants (HbS, HgC, etc)do  not significantly interfere with this assay.   However, presence of multiple variants may affect accuracy.     06/09/2019 6.6 (H) 4.0 - 5.6 % Final     Comment:     ADA Screening Guidelines:  5.7-6.4%  Consistent with prediabetes  >or=6.5%  Consistent with diabetes  High levels of fetal hemoglobin interfere with the HbA1C  assay. Heterozygous hemoglobin variants (HbS, HgC, etc)do  not significantly interfere with this assay.   However, presence of multiple variants may affect accuracy.     01/12/2018 8.3 % Final   05/22/2017 7.5 (H) 4.5 - 6.2 % Final     Comment:     According to ADA guidelines, hemoglobin A1C <7.0% represents  optimal control in non-pregnant diabetic patients.  Different  metrics may apply to specific populations.   Standards of Medical  "Care in Diabetes - 2016.  For the purpose of screening for the presence of diabetes:  <5.7%     Consistent with the absence of diabetes  5.7-6.4%  Consistent with increasing risk for diabetes   (prediabetes)  >or=6.5%  Consistent with diabetes  Currently no consensus exists for use of hemoglobin A1C  for diagnosis of diabetes for children.       Vitals:    09/19/19 1551   BP: 116/66   Pulse: 71   Weight: 78.9 kg (174 lb)   Height: 5' 2" (1.575 m)      Past Medical History:   Diagnosis Date    Anticoagulant long-term use     Arthritis     Atrial flutter     Calcium nephrolithiasis 2007    Diabetes mellitus, type 2     Diabetic peripheral neuropathy associated with type 2 diabetes mellitus     Hypertension        Past Surgical History:   Procedure Laterality Date    AMPUTATION-TOE Right 5/23/2017    Performed by Derek Jose DPM at [facility] OR    COLONOSCOPY  11/28/2011    sigmoid diverticulosis, external hemorrhoids    HYSTERECTOMY      RECESSION, GASTROCNEMIUS, ENDOSCOPIC Right 9/10/2019    Performed by Derek Jose DPM at [facility] OR    SHOULDER SURGERY Left     TOE AMPUTATION Right 05/22/2017    5th toe       Family History   Problem Relation Age of Onset    Diabetes Mother     Heart failure Father     Kidney failure Brother        Social History     Socioeconomic History    Marital status:      Spouse name: Not on file    Number of children: Not on file    Years of education: Not on file    Highest education level: Not on file   Occupational History    Not on file   Social Needs    Financial resource strain: Not on file    Food insecurity:     Worry: Not on file     Inability: Not on file    Transportation needs:     Medical: Not on file     Non-medical: Not on file   Tobacco Use    Smoking status: Never Smoker    Smokeless tobacco: Never Used   Substance and Sexual Activity    Alcohol use: No    Drug use: No    Sexual activity: Yes   Lifestyle    Physical activity:     " Days per week: Not on file     Minutes per session: Not on file    Stress: Not on file   Relationships    Social connections:     Talks on phone: Not on file     Gets together: Not on file     Attends Restoration service: Not on file     Active member of club or organization: Not on file     Attends meetings of clubs or organizations: Not on file     Relationship status: Not on file   Other Topics Concern    Not on file   Social History Narrative    Not on file       Current Outpatient Medications   Medication Sig Dispense Refill    ACCU-CHEK SAMI CONTROL SOLN Soln       ACCU-CHEK SAMI PLUS METER Misc       ACCU-CHEK SAMI PLUS TEST STRP Strp       ACCU-CHEK SOFT DEV LANCETS Kit       ACCU-CHEK SOFTCLIX LANCETS Misc       apixaban (ELIQUIS) 5 mg Tab TAKE 1 TABLET BY MOUTH TWICE DAILY 60 tablet 5    doxycycline (MONODOX) 100 MG capsule       famotidine (PEPCID) 20 MG tablet       FLUZONE HIGH-DOSE 2018-19, PF, 180 mcg/0.5 mL vaccine       gabapentin (NEURONTIN) 400 MG capsule       glimepiride (AMARYL) 1 MG tablet Take 1 tablet (1 mg total) by mouth 2 (two) times daily.      lisinopril (PRINIVIL,ZESTRIL) 40 MG tablet Take 1 tablet (40 mg total) by mouth once daily. 90 tablet 3    metoprolol succinate (TOPROL-XL) 25 MG 24 hr tablet Take by mouth once daily.       pramipexole (MIRAPEX) 0.125 MG tablet Take by mouth every evening.       traMADol (ULTRAM) 50 mg tablet Take 1 tablet (50 mg total) by mouth every 6 (six) hours as needed for Pain. 30 tablet 0     Current Facility-Administered Medications   Medication Dose Route Frequency Provider Last Rate Last Dose    sodium chloride 0.9% flush 10 mL  10 mL Intravenous PRN Derek Jose DPM        sodium chloride 0.9% flush 10 mL  10 mL Intravenous PRN Derek Jose DPM           Review of patient's allergies indicates:   Allergen Reactions    Codeine Nausea Only         Review of Systems   Constitution: Negative for chills, fever and  malaise/fatigue.   HENT: Negative for congestion.    Cardiovascular: Negative for chest pain, claudication and leg swelling.   Respiratory: Negative for cough and shortness of breath.    Skin: Positive for color change, dry skin, nail changes and poor wound healing.   Musculoskeletal: Negative for back pain, joint pain, muscle cramps and muscle weakness.   Gastrointestinal: Negative for nausea and vomiting.   Neurological: Positive for numbness and paresthesias. Negative for weakness.   Psychiatric/Behavioral: Negative for altered mental status.           Objective:      Physical Exam   Constitutional: She is oriented to person, place, and time. She appears well-developed and well-nourished. No distress.   Cardiovascular:   Pulses:       Dorsalis pedis pulses are 2+ on the right side, and 2+ on the left side.        Posterior tibial pulses are 2+ on the right side, and 2+ on the left side.   CFT< 3 secs all toes bilateral foot, skin temp warm bilateral foot, no digital hair growth bilateral foot, mild lower extremity edema bilateral.     Musculoskeletal:   Range of motion right ankle reveals +5° of dorsiflexion during knee extension.    No pain with ROM or MMT bilateral foot.    R foot:  Limited range of motion at the 1st MPJ    Plantar flexed first met head right foot.     Feet:   Right Foot:   Protective Sensation: 10 sites tested. 5 sites sensed.   Skin Integrity: Positive for ulcer, callus and dry skin. Negative for erythema.   Left Foot:   Protective Sensation: 10 sites tested. 5 sites sensed.   Skin Integrity: Positive for callus and dry skin. Negative for ulcer, blister, skin breakdown, erythema or warmth.   Neurological: She is alert and oriented to person, place, and time. She has normal strength. A sensory deficit is present.   Skin: Skin is dry. Capillary refill takes less than 2 seconds. No ecchymosis and no rash noted. She is not diaphoretic. No cyanosis or erythema. No pallor. Nails show no clubbing.    Ulceration medial right 2nd toe healed.    No wound plantar 1st met head right foot.    Incision mid medial right leg is well-approximated sutures.  No coli signs of infection.  No pain on palpation.               Assessment:       Encounter Diagnoses   Name Primary?    Healed ulcer of right foot on examination Yes    Type 2 diabetes mellitus with peripheral neuropathy     Postoperative state          Plan:       Caro LUA was seen today for follow-up.    Diagnoses and all orders for this visit:    Healed ulcer of right foot on examination    Type 2 diabetes mellitus with peripheral neuropathy    Postoperative state      I counseled the patient on her conditions, their implications and medical management.    Patient evaluated on Diabetic foot risk factors.  Patient counseled to do daily foot checks.  Counseled to wear accommodative shoe gear.  Educated on importance of glycemic control.  Dressing right lower extremity removed.  Right lower extremity cleansed with Hibiclens.  Applied Mepilex border to incision right leg.  Apply Tubigrip to right lower extremity.    Reapplied tall orthopedic Cam boot to right lower extremity.  Patient to treat as a cast and not removed.    Weight-bearing right foot as tolerated.    RTC 1-2 weeks for suture removal.

## 2019-09-20 NOTE — TELEPHONE ENCOUNTER
----- Message from Kati Hargrove sent at 9/20/2019  9:42 AM CDT -----  Tammy with [facility] called.   No. [phone]   Did patient keep her post op appointment on 9/19/19.  Do you want Tammy to do wound care today.

## 2019-09-20 NOTE — TELEPHONE ENCOUNTER
Left ismael a message letting her know that the pt wound is heal and she does not have to come out and do wound care for the pt

## 2019-09-24 ENCOUNTER — TELEPHONE (OUTPATIENT)
Dept: HOME HEALTH SERVICES | Facility: HOSPITAL | Age: 79
End: 2019-09-24

## 2019-09-26 ENCOUNTER — TELEPHONE (OUTPATIENT)
Dept: HOME HEALTH SERVICES | Facility: HOSPITAL | Age: 79
End: 2019-09-26

## 2019-09-30 ENCOUNTER — EXTERNAL HOME HEALTH (OUTPATIENT)
Dept: HOME HEALTH SERVICES | Facility: HOSPITAL | Age: 79
End: 2019-09-30
Payer: MEDICARE

## 2019-10-02 ENCOUNTER — TELEPHONE (OUTPATIENT)
Dept: FAMILY MEDICINE | Facility: CLINIC | Age: 79
End: 2019-10-02

## 2019-10-02 NOTE — TELEPHONE ENCOUNTER
Patient is not longer our patient /patient see  /left message for home health to rt call to clinic           ----- Message from Vitor Houston sent at 10/2/2019  1:17 PM CDT -----  Contact: Ava ([facility]) [phone] ext [phone]  Ava ([facility]) [phone] requesting call back in reference to PT evaluation from June 21, 2019.

## 2019-10-07 ENCOUNTER — OFFICE VISIT (OUTPATIENT)
Dept: PODIATRY | Facility: CLINIC | Age: 79
End: 2019-10-07
Payer: MEDICARE

## 2019-10-07 VITALS
SYSTOLIC BLOOD PRESSURE: 116 MMHG | WEIGHT: 174 LBS | HEIGHT: 62 IN | DIASTOLIC BLOOD PRESSURE: 54 MMHG | BODY MASS INDEX: 32.02 KG/M2 | HEART RATE: 56 BPM

## 2019-10-07 DIAGNOSIS — E11.42 TYPE 2 DIABETES MELLITUS WITH PERIPHERAL NEUROPATHY: ICD-10-CM

## 2019-10-07 DIAGNOSIS — M62.81 MUSCLE WEAKNESS OF LOWER EXTREMITY: Primary | ICD-10-CM

## 2019-10-07 DIAGNOSIS — Z98.890 POSTOPERATIVE STATE: ICD-10-CM

## 2019-10-07 PROCEDURE — 99024 PR POST-OP FOLLOW-UP VISIT: ICD-10-PCS | Mod: S$GLB,,, | Performed by: PODIATRIST

## 2019-10-07 PROCEDURE — 99999 PR PBB SHADOW E&M-EST. PATIENT-LVL V: CPT | Mod: PBBFAC,,, | Performed by: PODIATRIST

## 2019-10-07 PROCEDURE — 99999 PR PBB SHADOW E&M-EST. PATIENT-LVL V: ICD-10-PCS | Mod: PBBFAC,,, | Performed by: PODIATRIST

## 2019-10-07 PROCEDURE — 99024 POSTOP FOLLOW-UP VISIT: CPT | Mod: S$GLB,,, | Performed by: PODIATRIST

## 2019-10-07 NOTE — PATIENT INSTRUCTIONS
Transition from orthopedic boot to diabetic shoe with gradual 1 hour daily increments as discussed. Patient may gradually increase activity as discussed

## 2019-10-08 NOTE — PROGRESS NOTES
"Subjective:      Patient ID: Caro Powell is a 79 y.o. female.    Chief Complaint: Follow-up (right foot )    Caro LUA is a 79 y.o. female who presents to the clinic for evaluation and treatment of high risk feet. Caro LUA has a past medical history of Anticoagulant long-term use, Arthritis, Atrial flutter, Calcium nephrolithiasis (2007), Diabetes mellitus, type 2, Diabetic peripheral neuropathy associated with type 2 diabetes mellitus, and Hypertension. The patient's chief complaint is diabetic foot ulcer, right second toe. This patient has documented high risk feet requiring routine maintenance secondary to peripheral neuropathy. Noted drainage and discoloration from the toe a few days ago. Denies trauma. Accompanied by her .    5/7/18: Follow up for wound check right foot. Taking po clindamycin and ciprofloxacin as scheduled. Accompanied by her . Dressing remained c/d/i.    5/14/18: Presents for wound check. No new complaints. Accompanied by her .    5/31/18: Presents for wound check. Reports she has not received HH. PO abx completed.     12/5:  She is a pt of Dr. 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 She is presenting with new complaint of blister at right submet 1 and distal tip of 4th toe. She is DM2 and on insulin. It was 126 today. She recently bought a new shoes. She tells me she did not have any problems when she was wearing diabetic shoes but blisters formed after wearing a normal tennis shoes. It happened a week ago. She is traveling to [place], [place] in 2 weeks and wants to know if she can weight bear on her right foot. Denies N/V/F/C/SOB/CP    12/12: f/u right foot ulcer. Has been weight bearing in surgical shoe with football dressing. Denies pain. Tells me she has been walking in diabetic shoes on her left foot. She tells me there is a "callus" at lateral left 5th toe that she did not have before. She will travel to [place] soon. Also " tells me she noticed that her right foot is turning sideways and feels like it is affecting the way she walks.     1/21/19: Complains of worsening wound to right foot. She went to [place] for holidays and saws her wound to right foot worsened. Denies trauma.    2/4/19  Here for wound check.  Denies F/c/N/V    2/18/19: Wound check. Says dressing remained intact. Upon observing note she is wearing a different darco shoe then previously dispensed and foot appears not to be fully seated in shoe. Accompanied by her . Denies trauma.     2/25/19 wound check. Denies F/C/N/V    3/29/19 patient complains of new ulcer on 3rd toe that she first noticed on Wednesday.  She has been treating it with antibiotic ointment and bandaids.  Denies F/C/N/V.  Accompanied by her .    04/08/2019:  Presents for wound check right foot.  Relates the dressing remained intact.    4/15/19: Presents for wound check. No new complaints.    06/24/2019:  Follow-up for acute onset osteomyelitis to the right 3rd toe receiving IV Ancef per Infectious Disease recommendation.  [facility] is following her and changing dressings 3 times weekly.    06/20/2019:  Referred by her home health nurse to concern of a new wound developing to the right 3rd toe.  She is also concerned the recurrent wound under the plantar 1st met head right foot.  Says that she does note that she walks differently on this foot and and that it tends to roll more.    07/15/2019:  Follow-up for wound check right foot. Says that she canceled the scheduled gastrocnemius recession due to anxiety or having to manage a PICC line and being restricted due to surgery.  Currently receiving home health.    08/08/2019:  Presents for wound check right foot.  Says that her home health company no longer of the her house.  Receiving long-term IV antibiotics for the osteomyelitis right 3rd toe.  Requesting that her surgery be rescheduled.    08/29/2019:  Presents for wound check right  foot. Complains that she has a wound to her right 2nd toe as well as a persistent 1 underneath the ball of foot. She is scheduled for outpatient gastrocnemius recession right leg on 09/10/2019.  She has not seen her PCP for medical clearance yet.  Accompanied by her .  Home Health per [facility].    09/19/2019:  Post endoscopic gastrocnemius recession right leg on 09/10/2019.  She has been nonweightbearing for the past week.  Dressing has remained clean dry intact. Wearing tall orthopedic boot.  Accompanied by her .  Relates she did not take any pain medicine.  No new complaints.    10/10/2019:  4 weeks post gastrocnemius recession right leg.  Relates no pain.  She has been sleeping and walking with the orthopedic boot right lower extremity.  No new complaints.  Says that she got a new pair diabetic shoes earlier this year has brought them to be examined.    PCP: Brayan Penny MD    Date Last Seen by PCP:     Current shoe gear:  Affected Foot: football dressing      Unaffected Foot: Extra depth shoes with custome accommodative insoles    History of Trauma: negative  Sign of Infection: none    Hemoglobin A1C   Date Value Ref Range Status   06/09/2019 6.6 (H) 4.0 - 5.6 % Final     Comment:     ADA Screening Guidelines:  5.7-6.4%  Consistent with prediabetes  >or=6.5%  Consistent with diabetes  High levels of fetal hemoglobin interfere with the HbA1C  assay. Heterozygous hemoglobin variants (HbS, HgC, etc)do  not significantly interfere with this assay.   However, presence of multiple variants may affect accuracy.     06/09/2019 6.6 (H) 4.0 - 5.6 % Final     Comment:     ADA Screening Guidelines:  5.7-6.4%  Consistent with prediabetes  >or=6.5%  Consistent with diabetes  High levels of fetal hemoglobin interfere with the HbA1C  assay. Heterozygous hemoglobin variants (HbS, HgC, etc)do  not significantly interfere with this assay.   However, presence of multiple variants may affect accuracy.    "  01/12/2018 8.3 % Final   05/22/2017 7.5 (H) 4.5 - 6.2 % Final     Comment:     According to ADA guidelines, hemoglobin A1C <7.0% represents  optimal control in non-pregnant diabetic patients.  Different  metrics may apply to specific populations.   Standards of Medical Care in Diabetes - 2016.  For the purpose of screening for the presence of diabetes:  <5.7%     Consistent with the absence of diabetes  5.7-6.4%  Consistent with increasing risk for diabetes   (prediabetes)  >or=6.5%  Consistent with diabetes  Currently no consensus exists for use of hemoglobin A1C  for diagnosis of diabetes for children.       Vitals:    10/07/19 1600   BP: (!) 116/54   Pulse: (!) 56   Weight: 78.9 kg (174 lb)   Height: 5' 2" (1.575 m)   PainSc: 0-No pain      Past Medical History:   Diagnosis Date    Anticoagulant long-term use     Arthritis     Atrial flutter     Calcium nephrolithiasis 2007    Diabetes mellitus, type 2     Diabetic peripheral neuropathy associated with type 2 diabetes mellitus     Hypertension        Past Surgical History:   Procedure Laterality Date    COLONOSCOPY  11/28/2011    sigmoid diverticulosis, external hemorrhoids    ENDOSCOPIC GASTROCNEMIUS RECESSION Right 9/10/2019    Procedure: RECESSION, GASTROCNEMIUS, ENDOSCOPIC;  Surgeon: Derek Jose DPM;  Location: [facility];  Service: Podiatry;  Laterality: Right;  Arthrex center line (ron notified)  Video    HYSTERECTOMY      SHOULDER SURGERY Left     TOE AMPUTATION Right 05/22/2017    5th toe       Family History   Problem Relation Age of Onset    Diabetes Mother     Heart failure Father     Kidney failure Brother        Social History     Socioeconomic History    Marital status:      Spouse name: Not on file    Number of children: Not on file    Years of education: Not on file    Highest education level: Not on file   Occupational History    Not on file   Social Needs    Financial resource strain: Not on file    Food " insecurity:     Worry: Not on file     Inability: Not on file    Transportation needs:     Medical: Not on file     Non-medical: Not on file   Tobacco Use    Smoking status: Never Smoker    Smokeless tobacco: Never Used   Substance and Sexual Activity    Alcohol use: No    Drug use: No    Sexual activity: Yes   Lifestyle    Physical activity:     Days per week: Not on file     Minutes per session: Not on file    Stress: Not on file   Relationships    Social connections:     Talks on phone: Not on file     Gets together: Not on file     Attends [employer] service: Not on file     Active member of club or organization: Not on file     Attends meetings of clubs or organizations: Not on file     Relationship status: Not on file   Other Topics Concern    Not on file   Social History Narrative    Not on file       Current Outpatient Medications   Medication Sig Dispense Refill    ACCU-CHEK SAMI CONTROL SOLN Soln       ACCU-CHEK SAMI PLUS METER Misc       ACCU-CHEK SAMI PLUS TEST STRP Strp       ACCU-CHEK SOFT DEV LANCETS Kit       ACCU-CHEK SOFTCLIX LANCETS Misc       apixaban (ELIQUIS) 5 mg Tab TAKE 1 TABLET BY MOUTH TWICE DAILY 60 tablet 5    doxycycline (MONODOX) 100 MG capsule       famotidine (PEPCID) 20 MG tablet       FLUZONE HIGH-DOSE 2018-19, PF, 180 mcg/0.5 mL vaccine       gabapentin (NEURONTIN) 400 MG capsule       glimepiride (AMARYL) 1 MG tablet Take 1 tablet (1 mg total) by mouth 2 (two) times daily.      lisinopril (PRINIVIL,ZESTRIL) 40 MG tablet Take 1 tablet (40 mg total) by mouth once daily. 90 tablet 3    metoprolol succinate (TOPROL-XL) 25 MG 24 hr tablet Take by mouth once daily.       pramipexole (MIRAPEX) 0.125 MG tablet Take by mouth every evening.        Current Facility-Administered Medications   Medication Dose Route Frequency Provider Last Rate Last Dose    sodium chloride 0.9% flush 10 mL  10 mL Intravenous PRN Derek Jose DPM        sodium chloride 0.9%  flush 10 mL  10 mL Intravenous PRN Derek Jose DPM           Review of patient's allergies indicates:   Allergen Reactions    Codeine Nausea Only         Review of Systems   Constitution: Negative for chills, fever and malaise/fatigue.   HENT: Negative for congestion.    Cardiovascular: Negative for chest pain, claudication and leg swelling.   Respiratory: Negative for cough and shortness of breath.    Skin: Positive for color change, dry skin, nail changes and poor wound healing.   Musculoskeletal: Negative for back pain, joint pain, muscle cramps and muscle weakness.   Gastrointestinal: Negative for nausea and vomiting.   Neurological: Positive for numbness and paresthesias. Negative for weakness.   Psychiatric/Behavioral: Negative for altered mental status.           Objective:      Physical Exam   Constitutional: She is oriented to person, place, and time. She appears well-developed and well-nourished. No distress.   Cardiovascular:   Pulses:       Dorsalis pedis pulses are 2+ on the right side, and 2+ on the left side.        Posterior tibial pulses are 2+ on the right side, and 2+ on the left side.   CFT< 3 secs all toes bilateral foot, skin temp warm bilateral foot, no digital hair growth bilateral foot, mild lower extremity edema bilateral.     Musculoskeletal:   Range of motion right ankle reveals +5° of dorsiflexion during knee extension.    No localized pain on palpation of the right leg.  No defect appreciated.  Active plantar flexion right ft 4/5.    No pain with ROM or MMT bilateral foot.    R foot:  Limited range of motion at the 1st MPJ    Plantar flexed first met head right foot.     Feet:   Right Foot:   Protective Sensation: 10 sites tested. 5 sites sensed.   Skin Integrity: Positive for ulcer, callus and dry skin. Negative for erythema.   Left Foot:   Protective Sensation: 10 sites tested. 5 sites sensed.   Skin Integrity: Positive for callus and dry skin. Negative for ulcer, blister,  skin breakdown, erythema or warmth.   Neurological: She is alert and oriented to person, place, and time. She has normal strength. A sensory deficit is present.   Skin: Skin is dry and intact. Capillary refill takes less than 2 seconds. No ecchymosis and no rash noted. She is not diaphoretic. No cyanosis or erythema. No pallor. Nails show no clubbing.   Normal appearing scar medial mid right leg.    Mild hyperkeratotic skin plantar 1st met head and medial 2nd toe right foot.    No open lesions or macerations bilateral foot.             Assessment:       Encounter Diagnoses   Name Primary?    Muscle weakness of lower extremity Yes    Postoperative state     Type 2 diabetes mellitus with peripheral neuropathy          Plan:       Caro LUA was seen today for follow-up.    Diagnoses and all orders for this visit:    Muscle weakness of lower extremity  -     Ambulatory Referral to Physical/Occupational Therapy    Postoperative state  -     Ambulatory Referral to Physical/Occupational Therapy    Type 2 diabetes mellitus with peripheral neuropathy  -     Ambulatory Referral to Physical/Occupational Therapy      I counseled the patient on her conditions, their implications and medical management.    Patient evaluated on Diabetic foot risk factors.  Patient counseled to do daily foot checks.  Counseled to wear accommodative shoe gear.  Educated on importance of glycemic control.      Transition from orthopedic boot to diabetic shoe with gradual 1 hour daily increments as discussed. Patient may gradually increase activity as discussed    Referred to physical therapy for right lower extremity strength training and gait training.    Avoid barefoot walking in flat shoes as discussed.    Resume using the urea cream to the right foot.    RTC 2 months or p.r.n. as discussed.

## 2019-10-25 ENCOUNTER — TELEPHONE (OUTPATIENT)
Dept: PODIATRY | Facility: CLINIC | Age: 79
End: 2019-10-25

## 2019-10-25 NOTE — TELEPHONE ENCOUNTER
----- Message from Jaida Conway sent at 10/25/2019  2:18 PM CDT -----  Luana from [facility] can be reached at [phone]      Luana called because pt is requesting to use [facility] their fax number is [phone]. Luana is requesting orders for physical therapy.      Thank you!

## 2019-11-11 ENCOUNTER — TELEPHONE (OUTPATIENT)
Dept: PODIATRY | Facility: CLINIC | Age: 79
End: 2019-11-11

## 2019-11-11 ENCOUNTER — OFFICE VISIT (OUTPATIENT)
Dept: PODIATRY | Facility: CLINIC | Age: 79
End: 2019-11-11
Payer: MEDICARE

## 2019-11-11 ENCOUNTER — HOSPITAL ENCOUNTER (OUTPATIENT)
Dept: RADIOLOGY | Facility: HOSPITAL | Age: 79
Discharge: HOME | End: 2019-11-11
Attending: PODIATRIST
Payer: MEDICARE

## 2019-11-11 VITALS
TEMPERATURE: 98 F | SYSTOLIC BLOOD PRESSURE: 133 MMHG | WEIGHT: 174 LBS | BODY MASS INDEX: 32.02 KG/M2 | DIASTOLIC BLOOD PRESSURE: 67 MMHG | HEIGHT: 62 IN | HEART RATE: 48 BPM

## 2019-11-11 DIAGNOSIS — S99.921A INJURY OF RIGHT FOOT, INITIAL ENCOUNTER: Primary | ICD-10-CM

## 2019-11-11 DIAGNOSIS — S91.109A WOUND, OPEN, TOE, INITIAL ENCOUNTER: ICD-10-CM

## 2019-11-11 DIAGNOSIS — E11.42 TYPE 2 DIABETES MELLITUS WITH PERIPHERAL NEUROPATHY: ICD-10-CM

## 2019-11-11 DIAGNOSIS — S99.921A INJURY OF RIGHT FOOT, INITIAL ENCOUNTER: ICD-10-CM

## 2019-11-11 PROCEDURE — 3078F DIAST BP <80 MM HG: CPT | Mod: CPTII,S$GLB,, | Performed by: PODIATRIST

## 2019-11-11 PROCEDURE — 1101F PR PT FALLS ASSESS DOC 0-1 FALLS W/OUT INJ PAST YR: ICD-10-PCS | Mod: CPTII,S$GLB,, | Performed by: PODIATRIST

## 2019-11-11 PROCEDURE — 1101F PT FALLS ASSESS-DOCD LE1/YR: CPT | Mod: CPTII,S$GLB,, | Performed by: PODIATRIST

## 2019-11-11 PROCEDURE — 73630 XR FOOT COMPLETE 3 VIEW RIGHT: ICD-10-PCS | Mod: 26,RT,, | Performed by: RADIOLOGY

## 2019-11-11 PROCEDURE — 3075F SYST BP GE 130 - 139MM HG: CPT | Mod: CPTII,S$GLB,, | Performed by: PODIATRIST

## 2019-11-11 PROCEDURE — 99999 PR PBB SHADOW E&M-EST. PATIENT-LVL V: CPT | Mod: PBBFAC,,, | Performed by: PODIATRIST

## 2019-11-11 PROCEDURE — 3075F PR MOST RECENT SYSTOLIC BLOOD PRESS GE 130-139MM HG: ICD-10-PCS | Mod: CPTII,S$GLB,, | Performed by: PODIATRIST

## 2019-11-11 PROCEDURE — 99213 PR OFFICE/OUTPT VISIT, EST, LEVL III, 20-29 MIN: ICD-10-PCS | Mod: S$GLB,,, | Performed by: PODIATRIST

## 2019-11-11 PROCEDURE — 3078F PR MOST RECENT DIASTOLIC BLOOD PRESSURE < 80 MM HG: ICD-10-PCS | Mod: CPTII,S$GLB,, | Performed by: PODIATRIST

## 2019-11-11 PROCEDURE — 99999 PR PBB SHADOW E&M-EST. PATIENT-LVL V: ICD-10-PCS | Mod: PBBFAC,,, | Performed by: PODIATRIST

## 2019-11-11 PROCEDURE — 73630 X-RAY EXAM OF FOOT: CPT | Mod: 26,RT,, | Performed by: RADIOLOGY

## 2019-11-11 PROCEDURE — 73630 X-RAY EXAM OF FOOT: CPT | Mod: TC,PN,RT

## 2019-11-11 PROCEDURE — 99213 OFFICE O/P EST LOW 20 MIN: CPT | Mod: S$GLB,,, | Performed by: PODIATRIST

## 2019-11-11 RX ORDER — DOXYCYCLINE HYCLATE 100 MG
TABLET ORAL
COMMUNITY
End: 2019-11-11

## 2019-11-11 RX ORDER — MUPIROCIN 20 MG/G
OINTMENT TOPICAL DAILY
Qty: 22 G | Refills: 1 | Status: SHIPPED | OUTPATIENT
Start: 2019-11-11 | End: 2021-01-20

## 2019-11-11 RX ORDER — DOXYCYCLINE 100 MG/1
100 CAPSULE ORAL EVERY 12 HOURS
Qty: 20 CAPSULE | Refills: 0 | Status: SHIPPED | OUTPATIENT
Start: 2019-11-11 | End: 2019-12-09 | Stop reason: SDUPTHER

## 2019-11-11 RX ORDER — DIAZEPAM 5 MG/1
TABLET ORAL
COMMUNITY
Start: 2019-10-09 | End: 2020-06-11 | Stop reason: SDUPTHER

## 2019-11-11 NOTE — TELEPHONE ENCOUNTER
----- Message from Emmanuelle Ding sent at 11/11/2019 10:29 AM CST -----  Contact: CARLOS GENTILE [[id number]]  [phone]    Patient needs to be seen today. Her foot is infected again and it started on Friday.

## 2019-11-11 NOTE — TELEPHONE ENCOUNTER
----- Message from Raegan Stevens sent at 11/11/2019  1:01 PM CST -----  Contact: [phone]/self  Patient would like to be seen today for toe infection. Please advise.

## 2019-11-12 NOTE — PROGRESS NOTES
"Subjective:      Patient ID: Caro Powell is a 79 y.o. female.    Chief Complaint: Toe Pain (right foot )    Caro LUA is a 79 y.o. female who presents to the clinic for evaluation and treatment of high risk feet. Caro LUA has a past medical history of Anticoagulant long-term use, Arthritis, Atrial flutter, Calcium nephrolithiasis (2007), Diabetes mellitus, type 2, Diabetic peripheral neuropathy associated with type 2 diabetes mellitus, and Hypertension. The patient's chief complaint is diabetic foot ulcer, right second toe. This patient has documented high risk feet requiring routine maintenance secondary to peripheral neuropathy. Noted drainage and discoloration from the toe a few days ago. Denies trauma. Accompanied by her .    5/7/18: Follow up for wound check right foot. Taking po clindamycin and ciprofloxacin as scheduled. Accompanied by her . Dressing remained c/d/i.    5/14/18: Presents for wound check. No new complaints. Accompanied by her .    5/31/18: Presents for wound check. Reports she has not received HH. PO abx completed.     12/5:  She is a pt of Dr. 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 She is presenting with new complaint of blister at right submet 1 and distal tip of 4th toe. She is DM2 and on insulin. It was 126 today. She recently bought a new shoes. She tells me she did not have any problems when she was wearing diabetic shoes but blisters formed after wearing a normal tennis shoes. It happened a week ago. She is traveling to [place], [place] in 2 weeks and wants to know if she can weight bear on her right foot. Denies N/V/F/C/SOB/CP    12/12: f/u right foot ulcer. Has been weight bearing in surgical shoe with football dressing. Denies pain. Tells me she has been walking in diabetic shoes on her left foot. She tells me there is a "callus" at lateral left 5th toe that she did not have before. She will travel to [place] soon. Also " tells me she noticed that her right foot is turning sideways and feels like it is affecting the way she walks.     1/21/19: Complains of worsening wound to right foot. She went to [place] for holidays and saws her wound to right foot worsened. Denies trauma.    2/4/19  Here for wound check.  Denies F/c/N/V    2/18/19: Wound check. Says dressing remained intact. Upon observing note she is wearing a different darco shoe then previously dispensed and foot appears not to be fully seated in shoe. Accompanied by her . Denies trauma.     2/25/19 wound check. Denies F/C/N/V    3/29/19 patient complains of new ulcer on 3rd toe that she first noticed on Wednesday.  She has been treating it with antibiotic ointment and bandaids.  Denies F/C/N/V.  Accompanied by her .    04/08/2019:  Presents for wound check right foot.  Relates the dressing remained intact.    4/15/19: Presents for wound check. No new complaints.    06/24/2019:  Follow-up for acute onset osteomyelitis to the right 3rd toe receiving IV Ancef per Infectious Disease recommendation.  [facility] is following her and changing dressings 3 times weekly.    06/20/2019:  Referred by her home health nurse to concern of a new wound developing to the right 3rd toe.  She is also concerned the recurrent wound under the plantar 1st met head right foot.  Says that she does note that she walks differently on this foot and and that it tends to roll more.    07/15/2019:  Follow-up for wound check right foot. Says that she canceled the scheduled gastrocnemius recession due to anxiety or having to manage a PICC line and being restricted due to surgery.  Currently receiving home health.    08/08/2019:  Presents for wound check right foot.  Says that her home health company no longer of the her house.  Receiving long-term IV antibiotics for the osteomyelitis right 3rd toe.  Requesting that her surgery be rescheduled.    08/29/2019:  Presents for wound check right  foot. Complains that she has a wound to her right 2nd toe as well as a persistent 1 underneath the ball of foot. She is scheduled for outpatient gastrocnemius recession right leg on 09/10/2019.  She has not seen her PCP for medical clearance yet.  Accompanied by her .  Home Health per [facility].    09/19/2019:  Post endoscopic gastrocnemius recession right leg on 09/10/2019.  She has been nonweightbearing for the past week.  Dressing has remained clean dry intact. Wearing tall orthopedic boot.  Accompanied by her .  Relates she did not take any pain medicine.  No new complaints.    10/10/2019:  4 weeks post gastrocnemius recession right leg.  Relates no pain.  She has been sleeping and walking with the orthopedic boot right lower extremity.  No new complaints.  Says that she got a new pair diabetic shoes earlier this year has brought them to be examined.    11/01/2019:  Complains of a new wound to the right fourth toe that occurred  4 days ago after she kicked the side of her bed in the middle of the night.  Denies any pain.  Says that the foot was bruised and has improved since then.  She has been applying Betadine to the wound.    PCP: Brayan Penny MD    Date Last Seen by PCP:     Current shoe gear:  Affected Foot: football dressing      Unaffected Foot: Extra depth shoes with custome accommodative insoles    History of Trauma: negative  Sign of Infection: none    Hemoglobin A1C   Date Value Ref Range Status   06/09/2019 6.6 (H) 4.0 - 5.6 % Final     Comment:     ADA Screening Guidelines:  5.7-6.4%  Consistent with prediabetes  >or=6.5%  Consistent with diabetes  High levels of fetal hemoglobin interfere with the HbA1C  assay. Heterozygous hemoglobin variants (HbS, HgC, etc)do  not significantly interfere with this assay.   However, presence of multiple variants may affect accuracy.     06/09/2019 6.6 (H) 4.0 - 5.6 % Final     Comment:     ADA Screening Guidelines:  5.7-6.4%  Consistent  "with prediabetes  >or=6.5%  Consistent with diabetes  High levels of fetal hemoglobin interfere with the HbA1C  assay. Heterozygous hemoglobin variants (HbS, HgC, etc)do  not significantly interfere with this assay.   However, presence of multiple variants may affect accuracy.     01/12/2018 8.3 % Final   05/22/2017 7.5 (H) 4.5 - 6.2 % Final     Comment:     According to ADA guidelines, hemoglobin A1C <7.0% represents  optimal control in non-pregnant diabetic patients.  Different  metrics may apply to specific populations.   Standards of Medical Care in Diabetes - 2016.  For the purpose of screening for the presence of diabetes:  <5.7%     Consistent with the absence of diabetes  5.7-6.4%  Consistent with increasing risk for diabetes   (prediabetes)  >or=6.5%  Consistent with diabetes  Currently no consensus exists for use of hemoglobin A1C  for diagnosis of diabetes for children.       Vitals:    11/11/19 1518 11/11/19 1531   BP: 133/67    Pulse: (!) 47 (!) 48   Temp: 98.4 °F (36.9 °C)    TempSrc: Oral    Weight: 78.9 kg (174 lb)    Height: 5' 2" (1.575 m)    PainSc:   5    PainLoc: Toe       Past Medical History:   Diagnosis Date    Anticoagulant long-term use     Arthritis     Atrial flutter     Calcium nephrolithiasis 2007    Diabetes mellitus, type 2     Diabetic peripheral neuropathy associated with type 2 diabetes mellitus     Hypertension        Past Surgical History:   Procedure Laterality Date    COLONOSCOPY  11/28/2011    sigmoid diverticulosis, external hemorrhoids    ENDOSCOPIC GASTROCNEMIUS RECESSION Right 9/10/2019    Procedure: RECESSION, GASTROCNEMIUS, ENDOSCOPIC;  Surgeon: Derek Jose DPM;  Location: [facility];  Service: Podiatry;  Laterality: Right;  Arthrex center line (ron notified)  Video    HYSTERECTOMY      SHOULDER SURGERY Left     TOE AMPUTATION Right 05/22/2017    5th toe       Family History   Problem Relation Age of Onset    Diabetes Mother     Heart failure " Father     Kidney failure Brother        Social History     Socioeconomic History    Marital status:      Spouse name: Not on file    Number of children: Not on file    Years of education: Not on file    Highest education level: Not on file   Occupational History    Not on file   Social Needs    Financial resource strain: Not on file    Food insecurity:     Worry: Not on file     Inability: Not on file    Transportation needs:     Medical: Not on file     Non-medical: Not on file   Tobacco Use    Smoking status: Never Smoker    Smokeless tobacco: Never Used   Substance and Sexual Activity    Alcohol use: No    Drug use: No    Sexual activity: Yes   Lifestyle    Physical activity:     Days per week: Not on file     Minutes per session: Not on file    Stress: Not on file   Relationships    Social connections:     Talks on phone: Not on file     Gets together: Not on file     Attends Scientologist service: Not on file     Active member of club or organization: Not on file     Attends meetings of clubs or organizations: Not on file     Relationship status: Not on file   Other Topics Concern    Not on file   Social History Narrative    Not on file       Current Outpatient Medications   Medication Sig Dispense Refill    ACCU-CHEK SAMI CONTROL SOLN Soln       ACCU-CHEK SAMI PLUS METER Misc       ACCU-CHEK SAMI PLUS TEST STRP Strp       ACCU-CHEK SOFT DEV LANCETS Kit       ACCU-CHEK SOFTCLIX LANCETS Misc       apixaban (ELIQUIS) 5 mg Tab TAKE 1 TABLET BY MOUTH TWICE DAILY 60 tablet 5    diazePAM (VALIUM) 5 MG tablet 1 tablet      famotidine (PEPCID) 20 MG tablet       gabapentin (NEURONTIN) 400 MG capsule       glimepiride (AMARYL) 1 MG tablet Take 1 tablet (1 mg total) by mouth 2 (two) times daily.      lisinopril (PRINIVIL,ZESTRIL) 40 MG tablet Take 1 tablet (40 mg total) by mouth once daily. 90 tablet 3    metoprolol succinate (TOPROL-XL) 25 MG 24 hr tablet Take by mouth once daily.        pramipexole (MIRAPEX) 0.125 MG tablet Take by mouth every evening.       doxycycline (VIBRAMYCIN) 100 MG Cap Take 1 capsule (100 mg total) by mouth every 12 (twelve) hours. 20 capsule 0    FLUZONE HIGH-DOSE 2018-19, PF, 180 mcg/0.5 mL vaccine       loratadine-pseudoephedrine  mg (ALLERGY RELIEF D-24HR)  mg per 24 hr tablet 1 tablet as needed      mupirocin (BACTROBAN) 2 % ointment Apply topically once daily. 22 g 1     Current Facility-Administered Medications   Medication Dose Route Frequency Provider Last Rate Last Dose    sodium chloride 0.9% flush 10 mL  10 mL Intravenous PRN Derek Jose DPM        sodium chloride 0.9% flush 10 mL  10 mL Intravenous PRN Derek Jose DPM           Review of patient's allergies indicates:   Allergen Reactions    Codeine Nausea Only         Review of Systems   Constitution: Negative for chills, fever and malaise/fatigue.   HENT: Negative for congestion.    Cardiovascular: Negative for chest pain, claudication and leg swelling.   Respiratory: Negative for cough and shortness of breath.    Skin: Positive for color change, dry skin, nail changes and poor wound healing.   Musculoskeletal: Negative for back pain, joint pain, muscle cramps and muscle weakness.   Gastrointestinal: Negative for nausea and vomiting.   Neurological: Positive for numbness and paresthesias. Negative for weakness.   Psychiatric/Behavioral: Negative for altered mental status.           Objective:      Physical Exam   Constitutional: She is oriented to person, place, and time. She appears well-developed and well-nourished. No distress.   Cardiovascular:   Pulses:       Dorsalis pedis pulses are 2+ on the right side, and 2+ on the left side.        Posterior tibial pulses are 2+ on the right side, and 2+ on the left side.   CFT< 3 secs all toes bilateral foot, skin temp warm bilateral foot, no digital hair growth bilateral foot, mild lower extremity edema bilateral.      Musculoskeletal:   Range of motion right ankle reveals +5° of dorsiflexion during knee extension.    No localized pain on palpation of the right leg.  No defect appreciated.  Active plantar flexion right ft 4/5.    No pain with ROM or MMT bilateral foot.    R foot:  Limited range of motion at the 1st MPJ    Plantar flexed first met head right foot.     Feet:   Right Foot:   Protective Sensation: 10 sites tested. 5 sites sensed.   Skin Integrity: Positive for ulcer, callus and dry skin. Negative for erythema.   Left Foot:   Protective Sensation: 10 sites tested. 5 sites sensed.   Skin Integrity: Positive for callus and dry skin. Negative for ulcer, blister, skin breakdown, erythema or warmth.   Neurological: She is alert and oriented to person, place, and time. She has normal strength. A sensory deficit is present.   Skin: Skin is dry and intact. Capillary refill takes less than 2 seconds. No ecchymosis and no rash noted. She is not diaphoretic. No cyanosis or erythema. No pallor. Nails show no clubbing.   Normal appearing scar medial mid right leg.    Small granular wound measuring 0.2 x 0.2 x 0.0 cm dorsal right 4th toe extends through the dermis.  Does in a row track or undermine.  There is mild surrounding erythema and localized edema to the toe.  No palpable fluctuance or crepitance right foot.             Assessment:       Encounter Diagnoses   Name Primary?    Injury of right foot, initial encounter Yes    Type 2 diabetes mellitus with peripheral neuropathy     Wound, open, toe, initial encounter          Plan:       Caro LUA was seen today for toe pain.    Diagnoses and all orders for this visit:    Injury of right foot, initial encounter  -     X-Ray Foot Complete Right; Future    Type 2 diabetes mellitus with peripheral neuropathy    Wound, open, toe, initial encounter    Other orders  -     doxycycline (VIBRAMYCIN) 100 MG Cap; Take 1 capsule (100 mg total) by mouth every 12 (twelve) hours.  -      mupirocin (BACTROBAN) 2 % ointment; Apply topically once daily.      I counseled the patient on her conditions, their implications and medical management.    Patient evaluated on Diabetic foot risk factors.  Patient counseled to do daily foot checks.  Counseled to wear accommodative shoe gear.  Educated on importance of glycemic control.      Right foot x-ray ordered to assess for any underlying fracture.    Home care instructions reviewed with mupirocin ointment to the wound and a Band-Aid daily for approximately 1-2 weeks or until wound is fully closed.  Due to the patient's extensive history of infection I am giving her doxycycline prophylactically.    Resume using the urea cream to the right foot.    RTC 1 months or p.r.n. as discussed.

## 2019-12-09 ENCOUNTER — TELEPHONE (OUTPATIENT)
Dept: PODIATRY | Facility: CLINIC | Age: 79
End: 2019-12-09

## 2019-12-09 ENCOUNTER — HOSPITAL ENCOUNTER (OUTPATIENT)
Dept: RADIOLOGY | Facility: HOSPITAL | Age: 79
Discharge: HOME | End: 2019-12-09
Attending: PODIATRIST
Payer: MEDICARE

## 2019-12-09 ENCOUNTER — OFFICE VISIT (OUTPATIENT)
Dept: PODIATRY | Facility: CLINIC | Age: 79
End: 2019-12-09
Payer: MEDICARE

## 2019-12-09 VITALS
SYSTOLIC BLOOD PRESSURE: 114 MMHG | HEART RATE: 50 BPM | BODY MASS INDEX: 32.02 KG/M2 | DIASTOLIC BLOOD PRESSURE: 54 MMHG | HEIGHT: 62 IN | WEIGHT: 174 LBS

## 2019-12-09 DIAGNOSIS — S92.511D CLOSED DISPLACED FRACTURE OF PROXIMAL PHALANX OF LESSER TOE OF RIGHT FOOT WITH ROUTINE HEALING, SUBSEQUENT ENCOUNTER: ICD-10-CM

## 2019-12-09 DIAGNOSIS — B35.1 ONYCHOMYCOSIS: ICD-10-CM

## 2019-12-09 DIAGNOSIS — L03.031 CELLULITIS OF FOURTH TOE, RIGHT: ICD-10-CM

## 2019-12-09 DIAGNOSIS — E11.42 TYPE 2 DIABETES MELLITUS WITH PERIPHERAL NEUROPATHY: ICD-10-CM

## 2019-12-09 DIAGNOSIS — L84 CORN OR CALLUS: ICD-10-CM

## 2019-12-09 DIAGNOSIS — S91.109A WOUND, OPEN, TOE, INITIAL ENCOUNTER: Primary | ICD-10-CM

## 2019-12-09 PROCEDURE — 73630 XR FOOT COMPLETE 3 VIEW RIGHT: ICD-10-PCS | Mod: 26,RT,, | Performed by: RADIOLOGY

## 2019-12-09 PROCEDURE — 99213 OFFICE O/P EST LOW 20 MIN: CPT | Mod: 25,S$GLB,, | Performed by: PODIATRIST

## 2019-12-09 PROCEDURE — 3078F PR MOST RECENT DIASTOLIC BLOOD PRESSURE < 80 MM HG: ICD-10-PCS | Mod: CPTII,S$GLB,, | Performed by: PODIATRIST

## 2019-12-09 PROCEDURE — 11721 DEBRIDE NAIL 6 OR MORE: CPT | Mod: Q9,S$GLB,, | Performed by: PODIATRIST

## 2019-12-09 PROCEDURE — 73630 X-RAY EXAM OF FOOT: CPT | Mod: 26,RT,, | Performed by: RADIOLOGY

## 2019-12-09 PROCEDURE — 99213 PR OFFICE/OUTPT VISIT, EST, LEVL III, 20-29 MIN: ICD-10-PCS | Mod: 25,S$GLB,, | Performed by: PODIATRIST

## 2019-12-09 PROCEDURE — 1101F PT FALLS ASSESS-DOCD LE1/YR: CPT | Mod: CPTII,S$GLB,, | Performed by: PODIATRIST

## 2019-12-09 PROCEDURE — 3078F DIAST BP <80 MM HG: CPT | Mod: CPTII,S$GLB,, | Performed by: PODIATRIST

## 2019-12-09 PROCEDURE — 1159F MED LIST DOCD IN RCRD: CPT | Mod: S$GLB,,, | Performed by: PODIATRIST

## 2019-12-09 PROCEDURE — 1159F PR MEDICATION LIST DOCUMENTED IN MEDICAL RECORD: ICD-10-PCS | Mod: S$GLB,,, | Performed by: PODIATRIST

## 2019-12-09 PROCEDURE — 3074F PR MOST RECENT SYSTOLIC BLOOD PRESSURE < 130 MM HG: ICD-10-PCS | Mod: CPTII,S$GLB,, | Performed by: PODIATRIST

## 2019-12-09 PROCEDURE — 1101F PR PT FALLS ASSESS DOC 0-1 FALLS W/OUT INJ PAST YR: ICD-10-PCS | Mod: CPTII,S$GLB,, | Performed by: PODIATRIST

## 2019-12-09 PROCEDURE — 1126F PR PAIN SEVERITY QUANTIFIED, NO PAIN PRESENT: ICD-10-PCS | Mod: S$GLB,,, | Performed by: PODIATRIST

## 2019-12-09 PROCEDURE — 11721 ROUTINE FOOT CARE: ICD-10-PCS | Mod: Q9,S$GLB,, | Performed by: PODIATRIST

## 2019-12-09 PROCEDURE — 1126F AMNT PAIN NOTED NONE PRSNT: CPT | Mod: S$GLB,,, | Performed by: PODIATRIST

## 2019-12-09 PROCEDURE — 99999 PR PBB SHADOW E&M-EST. PATIENT-LVL III: CPT | Mod: PBBFAC,,, | Performed by: PODIATRIST

## 2019-12-09 PROCEDURE — 3074F SYST BP LT 130 MM HG: CPT | Mod: CPTII,S$GLB,, | Performed by: PODIATRIST

## 2019-12-09 PROCEDURE — 73630 X-RAY EXAM OF FOOT: CPT | Mod: TC,PN,RT

## 2019-12-09 PROCEDURE — 99999 PR PBB SHADOW E&M-EST. PATIENT-LVL III: ICD-10-PCS | Mod: PBBFAC,,, | Performed by: PODIATRIST

## 2019-12-09 RX ORDER — AMMONIUM LACTATE 12 G/100G
CREAM TOPICAL
Qty: 140 G | Refills: 5 | Status: SHIPPED | OUTPATIENT
Start: 2019-12-09 | End: 2020-12-23 | Stop reason: SDUPTHER

## 2019-12-09 RX ORDER — DOXYCYCLINE 100 MG/1
100 CAPSULE ORAL EVERY 12 HOURS
Qty: 20 CAPSULE | Refills: 0 | Status: SHIPPED | OUTPATIENT
Start: 2019-12-09 | End: 2020-06-11

## 2019-12-09 NOTE — TELEPHONE ENCOUNTER
----- Message from Zoila Vanegas sent at 12/9/2019 10:14 AM CST -----  Contact: spouse, [phone]  Requests for patient to be seen today. States her foot is swollen and infected.

## 2019-12-10 ENCOUNTER — PATIENT MESSAGE (OUTPATIENT)
Dept: PODIATRY | Facility: CLINIC | Age: 79
End: 2019-12-10

## 2019-12-10 NOTE — PROGRESS NOTES
"Subjective:      Patient ID: Caro Powell is a 79 y.o. female.    Chief Complaint: Foot Problem (right foot 4th toe swollen )    Caro LUA is a 79 y.o. female who presents to the clinic for evaluation and treatment of high risk feet. Caro LUA has a past medical history of Anticoagulant long-term use, Arthritis, Atrial flutter, Calcium nephrolithiasis (2007), Diabetes mellitus, type 2, Diabetic peripheral neuropathy associated with type 2 diabetes mellitus, and Hypertension. The patient's chief complaint is diabetic foot ulcer, right second toe. This patient has documented high risk feet requiring routine maintenance secondary to peripheral neuropathy. Noted drainage and discoloration from the toe a few days ago. Denies trauma. Accompanied by her .    5/7/18: Follow up for wound check right foot. Taking po clindamycin and ciprofloxacin as scheduled. Accompanied by her . Dressing remained c/d/i.    5/14/18: Presents for wound check. No new complaints. Accompanied by her .    5/31/18: Presents for wound check. Reports she has not received HH. PO abx completed.     12/5:  She is a pt of Dr. 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 She is presenting with new complaint of blister at right submet 1 and distal tip of 4th toe. She is DM2 and on insulin. It was 126 today. She recently bought a new shoes. She tells me she did not have any problems when she was wearing diabetic shoes but blisters formed after wearing a normal tennis shoes. It happened a week ago. She is traveling to [place], [place] in 2 weeks and wants to know if she can weight bear on her right foot. Denies N/V/F/C/SOB/CP    12/12: f/u right foot ulcer. Has been weight bearing in surgical shoe with football dressing. Denies pain. Tells me she has been walking in diabetic shoes on her left foot. She tells me there is a "callus" at lateral left 5th toe that she did not have before. She will travel to " [place] soon. Also tells me she noticed that her right foot is turning sideways and feels like it is affecting the way she walks.     1/21/19: Complains of worsening wound to right foot. She went to [place] for holidays and saws her wound to right foot worsened. Denies trauma.    2/4/19  Here for wound check.  Denies F/c/N/V    2/18/19: Wound check. Says dressing remained intact. Upon observing note she is wearing a different darco shoe then previously dispensed and foot appears not to be fully seated in shoe. Accompanied by her . Denies trauma.     2/25/19 wound check. Denies F/C/N/V    3/29/19 patient complains of new ulcer on 3rd toe that she first noticed on Wednesday.  She has been treating it with antibiotic ointment and bandaids.  Denies F/C/N/V.  Accompanied by her .    04/08/2019:  Presents for wound check right foot.  Relates the dressing remained intact.    4/15/19: Presents for wound check. No new complaints.    06/24/2019:  Follow-up for acute onset osteomyelitis to the right 3rd toe receiving IV Ancef per Infectious Disease recommendation.  [facility] is following her and changing dressings 3 times weekly.    06/20/2019:  Referred by her home health nurse to concern of a new wound developing to the right 3rd toe.  She is also concerned the recurrent wound under the plantar 1st met head right foot.  Says that she does note that she walks differently on this foot and and that it tends to roll more.    07/15/2019:  Follow-up for wound check right foot. Says that she canceled the scheduled gastrocnemius recession due to anxiety or having to manage a PICC line and being restricted due to surgery.  Currently receiving home health.    08/08/2019:  Presents for wound check right foot.  Says that her home health company no longer of the her house.  Receiving long-term IV antibiotics for the osteomyelitis right 3rd toe.  Requesting that her surgery be rescheduled.    08/29/2019:  Presents for  wound check right foot. Complains that she has a wound to her right 2nd toe as well as a persistent 1 underneath the ball of foot. She is scheduled for outpatient gastrocnemius recession right leg on 09/10/2019.  She has not seen her PCP for medical clearance yet.  Accompanied by her .  Home Health per [facility].    09/19/2019:  Post endoscopic gastrocnemius recession right leg on 09/10/2019.  She has been nonweightbearing for the past week.  Dressing has remained clean dry intact. Wearing tall orthopedic boot.  Accompanied by her .  Relates she did not take any pain medicine.  No new complaints.    10/10/2019:  4 weeks post gastrocnemius recession right leg.  Relates no pain.  She has been sleeping and walking with the orthopedic boot right lower extremity.  No new complaints.  Says that she got a new pair diabetic shoes earlier this year has brought them to be examined.    11/01/2019:  Complains of a new wound to the right fourth toe that occurred  4 days ago after she kicked the side of her bed in the middle of the night.  Denies any pain.  Says that the foot was bruised and has improved since then.  She has been applying Betadine to the wound.    12/09/2019:  Follow-up for right 4th toe fracture occurred approximately 5 weeks ago. Relates that she still getting some swelling to the area and does not understand why.  She has been applying mupirocin ointment around the toe.    PCP: Brayan Penny MD    Date Last Seen by PCP:     Current shoe gear:  Affected Foot: football dressing      Unaffected Foot: Extra depth shoes with custome accommodative insoles    History of Trauma: negative  Sign of Infection: none    Hemoglobin A1C   Date Value Ref Range Status   06/09/2019 6.6 (H) 4.0 - 5.6 % Final     Comment:     ADA Screening Guidelines:  5.7-6.4%  Consistent with prediabetes  >or=6.5%  Consistent with diabetes  High levels of fetal hemoglobin interfere with the HbA1C  assay. Heterozygous  "hemoglobin variants (HbS, HgC, etc)do  not significantly interfere with this assay.   However, presence of multiple variants may affect accuracy.     06/09/2019 6.6 (H) 4.0 - 5.6 % Final     Comment:     ADA Screening Guidelines:  5.7-6.4%  Consistent with prediabetes  >or=6.5%  Consistent with diabetes  High levels of fetal hemoglobin interfere with the HbA1C  assay. Heterozygous hemoglobin variants (HbS, HgC, etc)do  not significantly interfere with this assay.   However, presence of multiple variants may affect accuracy.     01/12/2018 8.3 % Final   05/22/2017 7.5 (H) 4.5 - 6.2 % Final     Comment:     According to ADA guidelines, hemoglobin A1C <7.0% represents  optimal control in non-pregnant diabetic patients.  Different  metrics may apply to specific populations.   Standards of Medical Care in Diabetes - 2016.  For the purpose of screening for the presence of diabetes:  <5.7%     Consistent with the absence of diabetes  5.7-6.4%  Consistent with increasing risk for diabetes   (prediabetes)  >or=6.5%  Consistent with diabetes  Currently no consensus exists for use of hemoglobin A1C  for diagnosis of diabetes for children.       Vitals:    12/09/19 1552   BP: (!) 114/54   Pulse: (!) 50   Weight: 78.9 kg (174 lb)   Height: 5' 2" (1.575 m)   PainSc: 0-No pain      Past Medical History:   Diagnosis Date    Anticoagulant long-term use     Arthritis     Atrial flutter     Calcium nephrolithiasis 2007    Diabetes mellitus, type 2     Diabetic peripheral neuropathy associated with type 2 diabetes mellitus     Hypertension        Past Surgical History:   Procedure Laterality Date    COLONOSCOPY  11/28/2011    sigmoid diverticulosis, external hemorrhoids    ENDOSCOPIC GASTROCNEMIUS RECESSION Right 9/10/2019    Procedure: RECESSION, GASTROCNEMIUS, ENDOSCOPIC;  Surgeon: Derek Jose DPM;  Location: [facility] OR;  Service: Podiatry;  Laterality: Right;  Arthrex center line (ron notified)  Video    " HYSTERECTOMY      SHOULDER SURGERY Left     TOE AMPUTATION Right 05/22/2017    5th toe       Family History   Problem Relation Age of Onset    Diabetes Mother     Heart failure Father     Kidney failure Brother        Social History     Socioeconomic History    Marital status:      Spouse name: Not on file    Number of children: Not on file    Years of education: Not on file    Highest education level: Not on file   Occupational History    Not on file   Social Needs    Financial resource strain: Not on file    Food insecurity:     Worry: Not on file     Inability: Not on file    Transportation needs:     Medical: Not on file     Non-medical: Not on file   Tobacco Use    Smoking status: Never Smoker    Smokeless tobacco: Never Used   Substance and Sexual Activity    Alcohol use: No    Drug use: No    Sexual activity: Yes   Lifestyle    Physical activity:     Days per week: Not on file     Minutes per session: Not on file    Stress: Not on file   Relationships    Social connections:     Talks on phone: Not on file     Gets together: Not on file     Attends Holiness service: Not on file     Active member of club or organization: Not on file     Attends meetings of clubs or organizations: Not on file     Relationship status: Not on file   Other Topics Concern    Not on file   Social History Narrative    Not on file       Current Outpatient Medications   Medication Sig Dispense Refill    ACCU-CHEK SAMI CONTROL SOLN Soln       ACCU-CHEK SAMI PLUS METER Misc       ACCU-CHEK SAMI PLUS TEST STRP Strp       ACCU-CHEK SOFT DEV LANCETS Kit       ACCU-CHEK SOFTCLIX LANCETS Misc       apixaban (ELIQUIS) 5 mg Tab TAKE 1 TABLET BY MOUTH TWICE DAILY 60 tablet 5    diazePAM (VALIUM) 5 MG tablet 1 tablet      famotidine (PEPCID) 20 MG tablet       gabapentin (NEURONTIN) 400 MG capsule       glimepiride (AMARYL) 1 MG tablet Take 1 tablet (1 mg total) by mouth 2 (two) times daily.       lisinopril (PRINIVIL,ZESTRIL) 40 MG tablet Take 1 tablet (40 mg total) by mouth once daily. 90 tablet 3    loratadine-pseudoephedrine  mg (ALLERGY RELIEF D-24HR)  mg per 24 hr tablet 1 tablet as needed      metoprolol succinate (TOPROL-XL) 25 MG 24 hr tablet Take by mouth once daily.       mupirocin (BACTROBAN) 2 % ointment Apply topically once daily. 22 g 1    pramipexole (MIRAPEX) 0.125 MG tablet Take by mouth every evening.       ammonium lactate 12 % Crea Apply to feet twice daily. Avoid use between toes. 140 g 5    doxycycline (VIBRAMYCIN) 100 MG Cap Take 1 capsule (100 mg total) by mouth every 12 (twelve) hours. 20 capsule 0    doxycycline (VIBRAMYCIN) 100 MG Cap Take 1 capsule (100 mg total) by mouth every 12 (twelve) hours. 20 capsule 0    FLUZONE HIGH-DOSE 2018-19, PF, 180 mcg/0.5 mL vaccine        Current Facility-Administered Medications   Medication Dose Route Frequency Provider Last Rate Last Dose    sodium chloride 0.9% flush 10 mL  10 mL Intravenous PRN Derek Jose DPM        sodium chloride 0.9% flush 10 mL  10 mL Intravenous PRN Derek Jose DPM           Review of patient's allergies indicates:   Allergen Reactions    Codeine Nausea Only         Review of Systems   Constitution: Negative for chills, fever and malaise/fatigue.   HENT: Negative for congestion.    Cardiovascular: Negative for chest pain, claudication and leg swelling.   Respiratory: Negative for cough and shortness of breath.    Skin: Positive for color change, dry skin, nail changes and poor wound healing.   Musculoskeletal: Negative for back pain, joint pain, muscle cramps and muscle weakness.   Gastrointestinal: Negative for nausea and vomiting.   Neurological: Positive for numbness and paresthesias. Negative for weakness.   Psychiatric/Behavioral: Negative for altered mental status.           Objective:      Physical Exam   Constitutional: She is oriented to person, place, and time. She  appears well-developed and well-nourished. No distress.   Cardiovascular:   Pulses:       Dorsalis pedis pulses are 2+ on the right side, and 2+ on the left side.        Posterior tibial pulses are 2+ on the right side, and 2+ on the left side.   CFT< 3 secs all toes bilateral foot, skin temp warm bilateral foot, no digital hair growth bilateral foot, mild lower extremity edema bilateral.     Musculoskeletal:   Range of motion right ankle reveals +5° of dorsiflexion during knee extension.    No localized pain on palpation of the right leg.  No defect appreciated.  Active plantar flexion right ft 4/5.    No pain with ROM or MMT bilateral foot.    R foot:  Limited range of motion at the 1st MPJ    Plantar flexed first met head right foot.     Feet:   Right Foot:   Protective Sensation: 10 sites tested. 5 sites sensed.   Skin Integrity: Positive for ulcer, callus and dry skin. Negative for erythema.   Left Foot:   Protective Sensation: 10 sites tested. 5 sites sensed.   Skin Integrity: Positive for callus and dry skin. Negative for ulcer, blister, skin breakdown, erythema or warmth.   Neurological: She is alert and oriented to person, place, and time. She has normal strength. A sensory deficit is present.   Skin: Skin is dry and intact. Capillary refill takes less than 2 seconds. No ecchymosis and no rash noted. She is not diaphoretic. No cyanosis or erythema. No pallor. Nails show no clubbing.   Normal appearing scar medial mid right leg.    Small superficial wound measuring 0.2 x 0.2 x 0.0 cm dorsal right 4th toe with overlying crusting.  Does in a row track or undermine.  There is mild surrounding erythema and localized edema to the toe.  No palpable fluctuance or crepitance right foot.             Assessment:       Encounter Diagnoses   Name Primary?    Wound, open, toe, initial encounter Yes    Cellulitis of fourth toe, right     Type 2 diabetes mellitus with peripheral neuropathy     Onychomycosis     Corn  or callus     Closed displaced fracture of proximal phalanx of lesser toe of right foot with routine healing, subsequent encounter          Plan:       Caro LUA was seen today for foot problem.    Diagnoses and all orders for this visit:    Wound, open, toe, initial encounter    Cellulitis of fourth toe, right    Type 2 diabetes mellitus with peripheral neuropathy  -     X-Ray Foot Complete Right; Future  -     Routine Foot Care    Onychomycosis  -     Routine Foot Care    Ono or callus    Closed displaced fracture of proximal phalanx of lesser toe of right foot with routine healing, subsequent encounter  -     X-Ray Foot Complete Right; Future    Other orders  -     doxycycline (VIBRAMYCIN) 100 MG Cap; Take 1 capsule (100 mg total) by mouth every 12 (twelve) hours.  -     doxycycline (VIBRAMYCIN) 100 MG Cap; Take 1 capsule (100 mg total) by mouth every 12 (twelve) hours.  -     ammonium lactate 12 % Crea; Apply to feet twice daily. Avoid use between toes.      I counseled the patient on her conditions, their implications and medical management.    Patient evaluated on Diabetic foot risk factors.  Patient counseled to do daily foot checks.  Counseled to wear accommodative shoe gear.  Educated on importance of glycemic control.    Routine foot care per attached note.      Previous right foot x-ray had shown impacted fracture of the proximal phalanx with mild lateral rotation distal fragment however overall well aligned.    Due to the mild persistent edema and erythema will maintain the patient on p.o. doxycycline for another 10 days.  Follow-up x-rays ordered to to assess for bony healing and rule out any bony destructive changes.    Continue previous home care instructions.      RTC 1 months or p.r.n. as discussed.

## 2019-12-10 NOTE — PROCEDURES
Routine Foot Care  Date/Time: 12/9/2019 3:00 PM  Performed by: Derek Jose DPM  Authorized by: Derek Jose DPM     Consent Done?:  Yes (Verbal)  Hyperkeratotic Skin Lesions?: No      Nail Care Type:  Debride(Left 1st Toe, Left 2nd Toe, Left 3rd Toe, Left 4th Toe, Left 5th Toe, Right 2nd Toe, Right 3rd Toe, Right 1st Toe and Right 4th Toe)  Patient tolerance:  Patient tolerated the procedure well with no immediate complications     Used sterile nail nipper.

## 2019-12-16 RX ORDER — LANCETS
EACH MISCELLANEOUS
Qty: 100 EACH | Refills: 3 | Status: SHIPPED | OUTPATIENT
Start: 2019-12-16

## 2019-12-16 RX ORDER — BLOOD SUGAR DIAGNOSTIC
STRIP MISCELLANEOUS
Qty: 100 STRIP | Refills: 3 | Status: SHIPPED | OUTPATIENT
Start: 2019-12-16

## 2019-12-30 ENCOUNTER — OFFICE VISIT (OUTPATIENT)
Dept: PODIATRY | Facility: CLINIC | Age: 79
End: 2019-12-30
Payer: MEDICARE

## 2019-12-30 VITALS
WEIGHT: 174 LBS | HEART RATE: 51 BPM | BODY MASS INDEX: 32.02 KG/M2 | DIASTOLIC BLOOD PRESSURE: 53 MMHG | SYSTOLIC BLOOD PRESSURE: 110 MMHG | HEIGHT: 62 IN

## 2019-12-30 DIAGNOSIS — L03.031 CELLULITIS OF FOURTH TOE, RIGHT: ICD-10-CM

## 2019-12-30 DIAGNOSIS — S91.106D: Primary | ICD-10-CM

## 2019-12-30 DIAGNOSIS — E11.42 TYPE 2 DIABETES MELLITUS WITH PERIPHERAL NEUROPATHY: ICD-10-CM

## 2019-12-30 PROCEDURE — 99999 PR PBB SHADOW E&M-EST. PATIENT-LVL III: CPT | Mod: PBBFAC,,, | Performed by: PODIATRIST

## 2019-12-30 PROCEDURE — 1101F PT FALLS ASSESS-DOCD LE1/YR: CPT | Mod: CPTII,S$GLB,, | Performed by: PODIATRIST

## 2019-12-30 PROCEDURE — 1159F MED LIST DOCD IN RCRD: CPT | Mod: S$GLB,,, | Performed by: PODIATRIST

## 2019-12-30 PROCEDURE — 99212 OFFICE O/P EST SF 10 MIN: CPT | Mod: S$GLB,,, | Performed by: PODIATRIST

## 2019-12-30 PROCEDURE — 3074F SYST BP LT 130 MM HG: CPT | Mod: CPTII,S$GLB,, | Performed by: PODIATRIST

## 2019-12-30 PROCEDURE — 99212 PR OFFICE/OUTPT VISIT, EST, LEVL II, 10-19 MIN: ICD-10-PCS | Mod: S$GLB,,, | Performed by: PODIATRIST

## 2019-12-30 PROCEDURE — 1159F PR MEDICATION LIST DOCUMENTED IN MEDICAL RECORD: ICD-10-PCS | Mod: S$GLB,,, | Performed by: PODIATRIST

## 2019-12-30 PROCEDURE — 1126F PR PAIN SEVERITY QUANTIFIED, NO PAIN PRESENT: ICD-10-PCS | Mod: S$GLB,,, | Performed by: PODIATRIST

## 2019-12-30 PROCEDURE — 99999 PR PBB SHADOW E&M-EST. PATIENT-LVL III: ICD-10-PCS | Mod: PBBFAC,,, | Performed by: PODIATRIST

## 2019-12-30 PROCEDURE — 3074F PR MOST RECENT SYSTOLIC BLOOD PRESSURE < 130 MM HG: ICD-10-PCS | Mod: CPTII,S$GLB,, | Performed by: PODIATRIST

## 2019-12-30 PROCEDURE — 3078F PR MOST RECENT DIASTOLIC BLOOD PRESSURE < 80 MM HG: ICD-10-PCS | Mod: CPTII,S$GLB,, | Performed by: PODIATRIST

## 2019-12-30 PROCEDURE — 1101F PR PT FALLS ASSESS DOC 0-1 FALLS W/OUT INJ PAST YR: ICD-10-PCS | Mod: CPTII,S$GLB,, | Performed by: PODIATRIST

## 2019-12-30 PROCEDURE — 3078F DIAST BP <80 MM HG: CPT | Mod: CPTII,S$GLB,, | Performed by: PODIATRIST

## 2019-12-30 PROCEDURE — 1126F AMNT PAIN NOTED NONE PRSNT: CPT | Mod: S$GLB,,, | Performed by: PODIATRIST

## 2019-12-31 NOTE — PROGRESS NOTES
"Subjective:      Patient ID: Caro Powell is a 79 y.o. female.    Chief Complaint: Follow-up (right foot )    Caro LUA is a 79 y.o. female who presents to the clinic for evaluation and treatment of high risk feet. Caro LUA has a past medical history of Anticoagulant long-term use, Arthritis, Atrial flutter, Calcium nephrolithiasis (2007), Diabetes mellitus, type 2, Diabetic peripheral neuropathy associated with type 2 diabetes mellitus, and Hypertension. The patient's chief complaint is diabetic foot ulcer, right second toe. This patient has documented high risk feet requiring routine maintenance secondary to peripheral neuropathy. Noted drainage and discoloration from the toe a few days ago. Denies trauma. Accompanied by her .    5/7/18: Follow up for wound check right foot. Taking po clindamycin and ciprofloxacin as scheduled. Accompanied by her . Dressing remained c/d/i.    5/14/18: Presents for wound check. No new complaints. Accompanied by her .    5/31/18: Presents for wound check. Reports she has not received HH. PO abx completed.     12/5:  She is a pt of Dr. 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 She is presenting with new complaint of blister at right submet 1 and distal tip of 4th toe. She is DM2 and on insulin. It was 126 today. She recently bought a new shoes. She tells me she did not have any problems when she was wearing diabetic shoes but blisters formed after wearing a normal tennis shoes. It happened a week ago. She is traveling to [place], [place] in 2 weeks and wants to know if she can weight bear on her right foot. Denies N/V/F/C/SOB/CP    12/12: f/u right foot ulcer. Has been weight bearing in surgical shoe with football dressing. Denies pain. Tells me she has been walking in diabetic shoes on her left foot. She tells me there is a "callus" at lateral left 5th toe that she did not have before. She will travel to [place] soon. Also " tells me she noticed that her right foot is turning sideways and feels like it is affecting the way she walks.     1/21/19: Complains of worsening wound to right foot. She went to [place] for holidays and saws her wound to right foot worsened. Denies trauma.    2/4/19  Here for wound check.  Denies F/c/N/V    2/18/19: Wound check. Says dressing remained intact. Upon observing note she is wearing a different darco shoe then previously dispensed and foot appears not to be fully seated in shoe. Accompanied by her . Denies trauma.     2/25/19 wound check. Denies F/C/N/V    3/29/19 patient complains of new ulcer on 3rd toe that she first noticed on Wednesday.  She has been treating it with antibiotic ointment and bandaids.  Denies F/C/N/V.  Accompanied by her .    04/08/2019:  Presents for wound check right foot.  Relates the dressing remained intact.    4/15/19: Presents for wound check. No new complaints.    06/24/2019:  Follow-up for acute onset osteomyelitis to the right 3rd toe receiving IV Ancef per Infectious Disease recommendation.  [facility] is following her and changing dressings 3 times weekly.    06/20/2019:  Referred by her home health nurse to concern of a new wound developing to the right 3rd toe.  She is also concerned the recurrent wound under the plantar 1st met head right foot.  Says that she does note that she walks differently on this foot and and that it tends to roll more.    07/15/2019:  Follow-up for wound check right foot. Says that she canceled the scheduled gastrocnemius recession due to anxiety or having to manage a PICC line and being restricted due to surgery.  Currently receiving home health.    08/08/2019:  Presents for wound check right foot.  Says that her home health company no longer of the her house.  Receiving long-term IV antibiotics for the osteomyelitis right 3rd toe.  Requesting that her surgery be rescheduled.    08/29/2019:  Presents for wound check right  foot. Complains that she has a wound to her right 2nd toe as well as a persistent 1 underneath the ball of foot. She is scheduled for outpatient gastrocnemius recession right leg on 09/10/2019.  She has not seen her PCP for medical clearance yet.  Accompanied by her .  Home Health per [facility].    09/19/2019:  Post endoscopic gastrocnemius recession right leg on 09/10/2019.  She has been nonweightbearing for the past week.  Dressing has remained clean dry intact. Wearing tall orthopedic boot.  Accompanied by her .  Relates she did not take any pain medicine.  No new complaints.    10/10/2019:  4 weeks post gastrocnemius recession right leg.  Relates no pain.  She has been sleeping and walking with the orthopedic boot right lower extremity.  No new complaints.  Says that she got a new pair diabetic shoes earlier this year has brought them to be examined.    11/01/2019:  Complains of a new wound to the right fourth toe that occurred  4 days ago after she kicked the side of her bed in the middle of the night.  Denies any pain.  Says that the foot was bruised and has improved since then.  She has been applying Betadine to the wound.    12/09/2019:  Follow-up for right 4th toe fracture occurred approximately 5 weeks ago. Relates that she still getting some swelling to the area and does not understand why.  She has been applying mupirocin ointment around the toe.    12/30/19: Presents for wound right fourth toe. Says her wound was healed then she wore her extra depth diabetic shoes and noted the toe wound recurred so she stopped wearing it and is in a tennis shoe which she says has not caused further irritation and toe appears to be healing. Denies further trauma. Completed previous po antibiotics.    PCP: Brayan Penny MD    Date Last Seen by PCP:     Current shoe gear:  Affected Foot: football dressing      Unaffected Foot: Extra depth shoes with custome accommodative insoles    History of  "Trauma: negative  Sign of Infection: none    Hemoglobin A1C   Date Value Ref Range Status   06/09/2019 6.6 (H) 4.0 - 5.6 % Final     Comment:     ADA Screening Guidelines:  5.7-6.4%  Consistent with prediabetes  >or=6.5%  Consistent with diabetes  High levels of fetal hemoglobin interfere with the HbA1C  assay. Heterozygous hemoglobin variants (HbS, HgC, etc)do  not significantly interfere with this assay.   However, presence of multiple variants may affect accuracy.     06/09/2019 6.6 (H) 4.0 - 5.6 % Final     Comment:     ADA Screening Guidelines:  5.7-6.4%  Consistent with prediabetes  >or=6.5%  Consistent with diabetes  High levels of fetal hemoglobin interfere with the HbA1C  assay. Heterozygous hemoglobin variants (HbS, HgC, etc)do  not significantly interfere with this assay.   However, presence of multiple variants may affect accuracy.     01/12/2018 8.3 % Final   05/22/2017 7.5 (H) 4.5 - 6.2 % Final     Comment:     According to ADA guidelines, hemoglobin A1C <7.0% represents  optimal control in non-pregnant diabetic patients.  Different  metrics may apply to specific populations.   Standards of Medical Care in Diabetes - 2016.  For the purpose of screening for the presence of diabetes:  <5.7%     Consistent with the absence of diabetes  5.7-6.4%  Consistent with increasing risk for diabetes   (prediabetes)  >or=6.5%  Consistent with diabetes  Currently no consensus exists for use of hemoglobin A1C  for diagnosis of diabetes for children.       Vitals:    12/30/19 1622   BP: (!) 110/53   Pulse: (!) 51   Weight: 78.9 kg (174 lb)   Height: 5' 2" (1.575 m)   PainSc: 0-No pain      Past Medical History:   Diagnosis Date    Anticoagulant long-term use     Arthritis     Atrial flutter     Calcium nephrolithiasis 2007    Diabetes mellitus, type 2     Diabetic peripheral neuropathy associated with type 2 diabetes mellitus     Hypertension        Past Surgical History:   Procedure Laterality Date    " COLONOSCOPY  11/28/2011    sigmoid diverticulosis, external hemorrhoids    ENDOSCOPIC GASTROCNEMIUS RECESSION Right 9/10/2019    Procedure: RECESSION, GASTROCNEMIUS, ENDOSCOPIC;  Surgeon: Derek Jose DPM;  Location: [facility] OR;  Service: Podiatry;  Laterality: Right;  Arthrex center line (ron notified)  Video    HYSTERECTOMY      SHOULDER SURGERY Left     TOE AMPUTATION Right 05/22/2017    5th toe       Family History   Problem Relation Age of Onset    Diabetes Mother     Heart failure Father     Kidney failure Brother        Social History     Socioeconomic History    Marital status:      Spouse name: Not on file    Number of children: Not on file    Years of education: Not on file    Highest education level: Not on file   Occupational History    Not on file   Social Needs    Financial resource strain: Not on file    Food insecurity:     Worry: Not on file     Inability: Not on file    Transportation needs:     Medical: Not on file     Non-medical: Not on file   Tobacco Use    Smoking status: Never Smoker    Smokeless tobacco: Never Used   Substance and Sexual Activity    Alcohol use: No    Drug use: No    Sexual activity: Yes   Lifestyle    Physical activity:     Days per week: Not on file     Minutes per session: Not on file    Stress: Not on file   Relationships    Social connections:     Talks on phone: Not on file     Gets together: Not on file     Attends Shinto service: Not on file     Active member of club or organization: Not on file     Attends meetings of clubs or organizations: Not on file     Relationship status: Not on file   Other Topics Concern    Not on file   Social History Narrative    Not on file       Current Outpatient Medications   Medication Sig Dispense Refill    ACCU-CHEK SAMI CONTROL SOLN Soln       ACCU-CHEK SAMI PLUS METER Misc       ACCU-CHEK SAMI PLUS TEST STRP Strp USE TO TEST BLOOD SUGAR ONCE DAILY 100 strip 3    ACCU-CHEK SOFT DEV  LANCETS Kit       ACCU-CHEK SOFTCLIX LANCETS Misc TEST ONCE DAILY 100 each 3    ammonium lactate 12 % Crea Apply to feet twice daily. Avoid use between toes. 140 g 5    apixaban (ELIQUIS) 5 mg Tab TAKE 1 TABLET BY MOUTH TWICE DAILY 60 tablet 5    diazePAM (VALIUM) 5 MG tablet 1 tablet      famotidine (PEPCID) 20 MG tablet       FLUZONE HIGH-DOSE 2018-19, PF, 180 mcg/0.5 mL vaccine       gabapentin (NEURONTIN) 400 MG capsule       glimepiride (AMARYL) 1 MG tablet Take 1 tablet (1 mg total) by mouth 2 (two) times daily.      lisinopril (PRINIVIL,ZESTRIL) 40 MG tablet Take 1 tablet (40 mg total) by mouth once daily. 90 tablet 3    loratadine-pseudoephedrine  mg (ALLERGY RELIEF D-24HR)  mg per 24 hr tablet 1 tablet as needed      metoprolol succinate (TOPROL-XL) 25 MG 24 hr tablet Take by mouth once daily.       pramipexole (MIRAPEX) 0.125 MG tablet Take by mouth every evening.       doxycycline (VIBRAMYCIN) 100 MG Cap Take 1 capsule (100 mg total) by mouth every 12 (twelve) hours. (Patient not taking: Reported on 12/30/2019) 20 capsule 0    doxycycline (VIBRAMYCIN) 100 MG Cap Take 1 capsule (100 mg total) by mouth every 12 (twelve) hours. (Patient not taking: Reported on 12/30/2019) 20 capsule 0    mupirocin (BACTROBAN) 2 % ointment Apply topically once daily. (Patient not taking: Reported on 12/30/2019) 22 g 1     Current Facility-Administered Medications   Medication Dose Route Frequency Provider Last Rate Last Dose    sodium chloride 0.9% flush 10 mL  10 mL Intravenous PRN Derek Jose DPM        sodium chloride 0.9% flush 10 mL  10 mL Intravenous PRN Derek Jose DPM           Review of patient's allergies indicates:   Allergen Reactions    Codeine Nausea Only         Review of Systems   Constitution: Negative for chills, fever and malaise/fatigue.   HENT: Negative for congestion.    Cardiovascular: Negative for chest pain, claudication and leg swelling.   Respiratory:  Negative for cough and shortness of breath.    Skin: Positive for color change, dry skin, nail changes and poor wound healing.   Musculoskeletal: Negative for back pain, joint pain, muscle cramps and muscle weakness.   Gastrointestinal: Negative for nausea and vomiting.   Neurological: Positive for numbness and paresthesias. Negative for weakness.   Psychiatric/Behavioral: Negative for altered mental status.           Objective:      Physical Exam   Constitutional: She is oriented to person, place, and time. She appears well-developed and well-nourished. No distress.   Cardiovascular:   Pulses:       Dorsalis pedis pulses are 2+ on the right side, and 2+ on the left side.        Posterior tibial pulses are 2+ on the right side, and 2+ on the left side.   CFT< 3 secs all toes bilateral foot, skin temp warm bilateral foot, no digital hair growth bilateral foot, mild lower extremity edema bilateral.     Musculoskeletal:   Range of motion right ankle reveals +5° of dorsiflexion during knee extension.    No localized pain on palpation of the right leg.  No defect appreciated.  Active plantar flexion right ft 5/5.    No pain with ROM or MMT bilateral foot.    R foot:  Limited range of motion at the 1st MPJ    Plantar flexed first met head right foot.    Amputated right hallux and fifth toes.     No pain with right fourth toe palpation or ROM. Mild localized edema with slight erythema.     Feet:   Right Foot:   Protective Sensation: 10 sites tested. 5 sites sensed.   Skin Integrity: Positive for ulcer, callus and dry skin. Negative for erythema.   Left Foot:   Protective Sensation: 10 sites tested. 5 sites sensed.   Skin Integrity: Positive for callus and dry skin. Negative for ulcer, blister, skin breakdown, erythema or warmth.   Neurological: She is alert and oriented to person, place, and time. She has normal strength. A sensory deficit is present.   Skin: Skin is dry and intact. Capillary refill takes less than 2  seconds. No ecchymosis and no rash noted. She is not diaphoretic. No cyanosis or erythema. No pallor. Nails show no clubbing.   Normal appearing scar medial mid right leg.    Small superficial wound measuring 0.1 x 0.1 x 0.0 cm dorsal right 4th toe with overlying crusting.  Does not track or undermine.  There is slight surrounding erythema and localized edema to the toe.  No palpable fluctuance or crepitance right foot.             Assessment:       Encounter Diagnoses   Name Primary?    Open wound of fourth toe, subsequent encounter Yes    Type 2 diabetes mellitus with peripheral neuropathy     Cellulitis of fourth toe, right          Plan:       Caro LUA was seen today for follow-up.    Diagnoses and all orders for this visit:    Open wound of fourth toe, subsequent encounter    Type 2 diabetes mellitus with peripheral neuropathy    Cellulitis of fourth toe, right      I counseled the patient on her conditions, their implications and medical management.    Patient evaluated on Diabetic foot risk factors.  Patient counseled to do daily foot checks.  Counseled to wear accommodative shoe gear.  Educated on importance of glycemic control.    Instructed to monitor closely for signs of further break down, infection and lack of healing and continue using mupirocin daily.     Inspected her tennis shoes which appear to have sufficient room with soft upper. Continue using.    RTC 2 months or prn as discussed.

## 2020-01-02 ENCOUNTER — TELEPHONE (OUTPATIENT)
Dept: PODIATRY | Facility: CLINIC | Age: 80
End: 2020-01-02

## 2020-03-31 ENCOUNTER — TELEPHONE (OUTPATIENT)
Dept: PODIATRY | Facility: CLINIC | Age: 80
End: 2020-03-31

## 2020-03-31 NOTE — TELEPHONE ENCOUNTER
----- Message from Radha De Leon sent at 3/31/2020 10:19 AM CDT -----  Contact: [phone] self   Pt is requesting to speak with you re: upcoming appt. Please advise

## 2020-06-18 PROBLEM — E11.52 TYPE 2 DIABETES MELLITUS WITH DIABETIC PERIPHERAL ANGIOPATHY AND GANGRENE, WITHOUT LONG-TERM CURRENT USE OF INSULIN: Status: ACTIVE | Noted: 2020-06-18

## 2020-06-18 PROBLEM — M54.30 SCIATICA: Status: ACTIVE | Noted: 2020-06-18

## 2020-06-18 PROBLEM — I48.92 ATRIAL FLUTTER: Status: ACTIVE | Noted: 2017-06-05

## 2020-06-18 PROBLEM — G62.9 NEUROPATHY: Status: ACTIVE | Noted: 2020-06-18

## 2020-06-18 PROBLEM — M21.6X1 OTHER ACQUIRED DEFORMITIES OF RIGHT FOOT: Status: ACTIVE | Noted: 2020-06-18

## 2020-06-18 PROBLEM — E11.42 POLYNEUROPATHY DUE TO TYPE 2 DIABETES MELLITUS: Status: ACTIVE | Noted: 2020-06-18

## 2020-07-29 ENCOUNTER — OFFICE VISIT (OUTPATIENT)
Dept: PODIATRY | Facility: CLINIC | Age: 80
End: 2020-07-29
Payer: MEDICARE

## 2020-07-29 VITALS
HEIGHT: 62 IN | WEIGHT: 169.06 LBS | DIASTOLIC BLOOD PRESSURE: 63 MMHG | BODY MASS INDEX: 31.11 KG/M2 | SYSTOLIC BLOOD PRESSURE: 151 MMHG | HEART RATE: 61 BPM

## 2020-07-29 DIAGNOSIS — M19.072 OSTEOARTHRITIS OF FIRST METATARSOPHALANGEAL (MTP) JOINT OF LEFT FOOT: ICD-10-CM

## 2020-07-29 DIAGNOSIS — E11.621 DIABETIC ULCER OF OTHER PART OF RIGHT FOOT ASSOCIATED WITH TYPE 2 DIABETES MELLITUS, WITH FAT LAYER EXPOSED: Primary | ICD-10-CM

## 2020-07-29 DIAGNOSIS — E11.42 TYPE 2 DIABETES MELLITUS WITH PERIPHERAL NEUROPATHY: ICD-10-CM

## 2020-07-29 DIAGNOSIS — L97.512 DIABETIC ULCER OF OTHER PART OF RIGHT FOOT ASSOCIATED WITH TYPE 2 DIABETES MELLITUS, WITH FAT LAYER EXPOSED: Primary | ICD-10-CM

## 2020-07-29 DIAGNOSIS — Z89.421 HISTORY OF AMPUTATION OF LESSER TOE, RIGHT: ICD-10-CM

## 2020-07-29 PROCEDURE — 1126F PR PAIN SEVERITY QUANTIFIED, NO PAIN PRESENT: ICD-10-PCS | Mod: S$GLB,,, | Performed by: PODIATRIST

## 2020-07-29 PROCEDURE — 99213 OFFICE O/P EST LOW 20 MIN: CPT | Mod: 25,S$GLB,, | Performed by: PODIATRIST

## 2020-07-29 PROCEDURE — 11042 WOUND DEBRIDEMENT: ICD-10-PCS | Mod: S$GLB,,, | Performed by: PODIATRIST

## 2020-07-29 PROCEDURE — 3077F SYST BP >= 140 MM HG: CPT | Mod: CPTII,S$GLB,, | Performed by: PODIATRIST

## 2020-07-29 PROCEDURE — 1126F AMNT PAIN NOTED NONE PRSNT: CPT | Mod: S$GLB,,, | Performed by: PODIATRIST

## 2020-07-29 PROCEDURE — 99999 PR PBB SHADOW E&M-EST. PATIENT-LVL IV: ICD-10-PCS | Mod: PBBFAC,,, | Performed by: PODIATRIST

## 2020-07-29 PROCEDURE — 99213 PR OFFICE/OUTPT VISIT, EST, LEVL III, 20-29 MIN: ICD-10-PCS | Mod: 25,S$GLB,, | Performed by: PODIATRIST

## 2020-07-29 PROCEDURE — 3078F DIAST BP <80 MM HG: CPT | Mod: CPTII,S$GLB,, | Performed by: PODIATRIST

## 2020-07-29 PROCEDURE — 11042 DBRDMT SUBQ TIS 1ST 20SQCM/<: CPT | Mod: S$GLB,,, | Performed by: PODIATRIST

## 2020-07-29 PROCEDURE — 3288F PR FALLS RISK ASSESSMENT DOCUMENTED: ICD-10-PCS | Mod: CPTII,S$GLB,, | Performed by: PODIATRIST

## 2020-07-29 PROCEDURE — 3078F PR MOST RECENT DIASTOLIC BLOOD PRESSURE < 80 MM HG: ICD-10-PCS | Mod: CPTII,S$GLB,, | Performed by: PODIATRIST

## 2020-07-29 PROCEDURE — 1100F PR PT FALLS ASSESS DOC 2+ FALLS/FALL W/INJURY/YR: ICD-10-PCS | Mod: CPTII,S$GLB,, | Performed by: PODIATRIST

## 2020-07-29 PROCEDURE — 99999 PR PBB SHADOW E&M-EST. PATIENT-LVL IV: CPT | Mod: PBBFAC,,, | Performed by: PODIATRIST

## 2020-07-29 PROCEDURE — 3077F PR MOST RECENT SYSTOLIC BLOOD PRESSURE >= 140 MM HG: ICD-10-PCS | Mod: CPTII,S$GLB,, | Performed by: PODIATRIST

## 2020-07-29 PROCEDURE — 1159F MED LIST DOCD IN RCRD: CPT | Mod: S$GLB,,, | Performed by: PODIATRIST

## 2020-07-29 PROCEDURE — 1100F PTFALLS ASSESS-DOCD GE2>/YR: CPT | Mod: CPTII,S$GLB,, | Performed by: PODIATRIST

## 2020-07-29 PROCEDURE — 3288F FALL RISK ASSESSMENT DOCD: CPT | Mod: CPTII,S$GLB,, | Performed by: PODIATRIST

## 2020-07-29 PROCEDURE — 1159F PR MEDICATION LIST DOCUMENTED IN MEDICAL RECORD: ICD-10-PCS | Mod: S$GLB,,, | Performed by: PODIATRIST

## 2020-07-29 NOTE — PROGRESS NOTES
"Subjective:      Patient ID: Caro Powell is a 79 y.o. female.    Chief Complaint: Foot Ulcer (right foot)    Caro LUA is a 79 y.o. female who presents to the clinic for evaluation and treatment of high risk feet. Caro LUA has a past medical history of Anticoagulant long-term use, Arthritis, Atrial flutter, Calcium nephrolithiasis (2007), Diabetes mellitus, type 2, Diabetic peripheral neuropathy associated with type 2 diabetes mellitus, and Hypertension. The patient's chief complaint is diabetic foot ulcer, right second toe. This patient has documented high risk feet requiring routine maintenance secondary to peripheral neuropathy. Noted drainage and discoloration from the toe a few days ago. Denies trauma. Accompanied by her .    5/7/18: Follow up for wound check right foot. Taking po clindamycin and ciprofloxacin as scheduled. Accompanied by her . Dressing remained c/d/i.    5/14/18: Presents for wound check. No new complaints. Accompanied by her .    5/31/18: Presents for wound check. Reports she has not received HH. PO abx completed.     12/5:  She is a pt of Dr. 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 She is presenting with new complaint of blister at right submet 1 and distal tip of 4th toe. She is DM2 and on insulin. It was 126 today. She recently bought a new shoes. She tells me she did not have any problems when she was wearing diabetic shoes but blisters formed after wearing a normal tennis shoes. It happened a week ago. She is traveling to [place], [place] in 2 weeks and wants to know if she can weight bear on her right foot. Denies N/V/F/C/SOB/CP    12/12: f/u right foot ulcer. Has been weight bearing in surgical shoe with football dressing. Denies pain. Tells me she has been walking in diabetic shoes on her left foot. She tells me there is a "callus" at lateral left 5th toe that she did not have before. She will travel to [place] soon. Also " tells me she noticed that her right foot is turning sideways and feels like it is affecting the way she walks.     1/21/19: Complains of worsening wound to right foot. She went to [place] for holidays and saws her wound to right foot worsened. Denies trauma.    2/4/19  Here for wound check.  Denies F/c/N/V    2/18/19: Wound check. Says dressing remained intact. Upon observing note she is wearing a different darco shoe then previously dispensed and foot appears not to be fully seated in shoe. Accompanied by her . Denies trauma.     2/25/19 wound check. Denies F/C/N/V    3/29/19 patient complains of new ulcer on 3rd toe that she first noticed on Wednesday.  She has been treating it with antibiotic ointment and bandaids.  Denies F/C/N/V.  Accompanied by her .    04/08/2019:  Presents for wound check right foot.  Relates the dressing remained intact.    4/15/19: Presents for wound check. No new complaints.    06/24/2019:  Follow-up for acute onset osteomyelitis to the right 3rd toe receiving IV Ancef per Infectious Disease recommendation.  [facility] is following her and changing dressings 3 times weekly.    06/20/2019:  Referred by her home health nurse to concern of a new wound developing to the right 3rd toe.  She is also concerned the recurrent wound under the plantar 1st met head right foot.  Says that she does note that she walks differently on this foot and and that it tends to roll more.    07/15/2019:  Follow-up for wound check right foot. Says that she canceled the scheduled gastrocnemius recession due to anxiety or having to manage a PICC line and being restricted due to surgery.  Currently receiving home health.    08/08/2019:  Presents for wound check right foot.  Says that her home health company no longer of the her house.  Receiving long-term IV antibiotics for the osteomyelitis right 3rd toe.  Requesting that her surgery be rescheduled.    08/29/2019:  Presents for wound check right  foot. Complains that she has a wound to her right 2nd toe as well as a persistent 1 underneath the ball of foot. She is scheduled for outpatient gastrocnemius recession right leg on 09/10/2019.  She has not seen her PCP for medical clearance yet.  Accompanied by her .  Home Health per [facility].    09/19/2019:  Post endoscopic gastrocnemius recession right leg on 09/10/2019.  She has been nonweightbearing for the past week.  Dressing has remained clean dry intact. Wearing tall orthopedic boot.  Accompanied by her .  Relates she did not take any pain medicine.  No new complaints.    10/10/2019:  4 weeks post gastrocnemius recession right leg.  Relates no pain.  She has been sleeping and walking with the orthopedic boot right lower extremity.  No new complaints.  Says that she got a new pair diabetic shoes earlier this year has brought them to be examined.    11/01/2019:  Complains of a new wound to the right fourth toe that occurred  4 days ago after she kicked the side of her bed in the middle of the night.  Denies any pain.  Says that the foot was bruised and has improved since then.  She has been applying Betadine to the wound.    12/09/2019:  Follow-up for right 4th toe fracture occurred approximately 5 weeks ago. Relates that she still getting some swelling to the area and does not understand why.  She has been applying mupirocin ointment around the toe.    12/30/19: Presents for wound right fourth toe. Says her wound was healed then she wore her extra depth diabetic shoes and noted the toe wound recurred so she stopped wearing it and is in a tennis shoe which she says has not caused further irritation and toe appears to be healing. Denies further trauma. Completed previous po antibiotics.    07/29/2020:  Presents today complaining of a new onset wound that she discover 2 days ago to the bottom of the right foot.  Denies any trauma however relates that approximately 2 weeks ago she did have  a few days that she would walk around without any protective shoe gear around her home secondary to shingles outbreak.  Denies any trauma.  She has been wearing her Epstein tennis shoes which she purchased nearly a year ago.  Relates that her previous insoles are more than a year old and had broken down so she stopped wearing them.    PCP: Brayan Penny MD    Date Last Seen by PCP:     Current shoe gear:  Affected Foot:  Tennis shoes      Unaffected Foot: Tennis shoes    History of Trauma: negative  Sign of Infection: none    Hemoglobin A1C   Date Value Ref Range Status   06/25/2020 6.2 (H) <5.7 % of total Hgb Final     Comment:     For someone without known diabetes, a hemoglobin   A1c value between 5.7% and 6.4% is consistent with  prediabetes and should be confirmed with a   follow-up test.     For someone with known diabetes, a value <7%  indicates that their diabetes is well controlled. A1c  targets should be individualized based on duration of  diabetes, age, comorbid conditions, and other  considerations.     This assay result is consistent with an increased risk  of diabetes.     Currently, no consensus exists regarding use of  hemoglobin A1c for diagnosis of diabetes for children.        06/09/2019 6.6 (H) 4.0 - 5.6 % Final     Comment:     ADA Screening Guidelines:  5.7-6.4%  Consistent with prediabetes  >or=6.5%  Consistent with diabetes  High levels of fetal hemoglobin interfere with the HbA1C  assay. Heterozygous hemoglobin variants (HbS, HgC, etc)do  not significantly interfere with this assay.   However, presence of multiple variants may affect accuracy.     06/09/2019 6.6 (H) 4.0 - 5.6 % Final     Comment:     ADA Screening Guidelines:  5.7-6.4%  Consistent with prediabetes  >or=6.5%  Consistent with diabetes  High levels of fetal hemoglobin interfere with the HbA1C  assay. Heterozygous hemoglobin variants (HbS, HgC, etc)do  not significantly interfere with this assay.   However, presence of  "multiple variants may affect accuracy.     01/12/2018 8.3 % Final     Vitals:    07/29/20 1106   BP: (!) 151/63   Pulse: 61   Weight: 76.7 kg (169 lb 1.5 oz)   Height: 5' 2" (1.575 m)   PainSc: 0-No pain      Past Medical History:   Diagnosis Date    Anticoagulant long-term use     Arthritis     Atrial flutter     Calcium nephrolithiasis 2007    Diabetes mellitus, type 2     Diabetic peripheral neuropathy associated with type 2 diabetes mellitus     Hypertension        Past Surgical History:   Procedure Laterality Date    COLONOSCOPY  11/28/2011    sigmoid diverticulosis, external hemorrhoids    ENDOSCOPIC GASTROCNEMIUS RECESSION Right 9/10/2019    Procedure: RECESSION, GASTROCNEMIUS, ENDOSCOPIC;  Surgeon: Derek Jose DPM;  Location: [facility];  Service: Podiatry;  Laterality: Right;  Arthrex center line (ron notified)  Video    HYSTERECTOMY      SHOULDER SURGERY Left     TOE AMPUTATION Right 05/22/2017    5th toe       Family History   Problem Relation Age of Onset    Diabetes Mother     Heart failure Father     Kidney failure Brother        Social History     Socioeconomic History    Marital status:      Spouse name: Not on file    Number of children: Not on file    Years of education: Not on file    Highest education level: Not on file   Occupational History    Not on file   Social Needs    Financial resource strain: Not on file    Food insecurity     Worry: Not on file     Inability: Not on file    Transportation needs     Medical: Not on file     Non-medical: Not on file   Tobacco Use    Smoking status: Never Smoker    Smokeless tobacco: Never Used   Substance and Sexual Activity    Alcohol use: No    Drug use: No    Sexual activity: Yes   Lifestyle    Physical activity     Days per week: Not on file     Minutes per session: Not on file    Stress: Not on file   Relationships    Social connections     Talks on phone: Not on file     Gets together: Not on file     " Attends [employer] service: Not on file     Active member of club or organization: Not on file     Attends meetings of clubs or organizations: Not on file     Relationship status: Not on file   Other Topics Concern    Not on file   Social History Narrative    Not on file       Current Outpatient Medications   Medication Sig Dispense Refill    ACCU-CHEK SAMI CONTROL SOLN Soln       ACCU-CHEK SAMI PLUS METER Misc       ACCU-CHEK SAMI PLUS TEST STRP Strp USE TO TEST BLOOD SUGAR ONCE DAILY 100 strip 3    ACCU-CHEK SOFT DEV LANCETS Kit       ACCU-CHEK SOFTCLIX LANCETS Misc TEST ONCE DAILY 100 each 3    ammonium lactate 12 % Crea Apply to feet twice daily. Avoid use between toes. 140 g 5    apixaban (ELIQUIS) 5 mg Tab TAKE 1 TABLET BY MOUTH TWICE DAILY 60 tablet 5    famotidine (PEPCID) 20 MG tablet       gabapentin (NEURONTIN) 400 MG capsule Take 1 capsule (400 mg total) by mouth 3 (three) times daily. 270 capsule 3    glimepiride (AMARYL) 1 MG tablet Take 1 tablet (1 mg total) by mouth 2 (two) times daily.      lisinopriL (PRINIVIL,ZESTRIL) 20 MG tablet       loratadine-pseudoephedrine  mg (ALLERGY RELIEF D-24HR)  mg per 24 hr tablet 1 tablet as needed      metoprolol succinate (TOPROL-XL) 25 MG 24 hr tablet Take by mouth once daily.       pregabalin (LYRICA) 75 MG capsule Take 1 capsule (75 mg total) by mouth 2 (two) times daily as needed (shingles pain). 60 capsule 2    diazePAM (VALIUM) 5 MG tablet Take 1 tablet (5 mg total) by mouth daily as needed for Anxiety. (Patient not taking: Reported on 7/29/2020) 90 tablet 3    mupirocin (BACTROBAN) 2 % ointment Apply topically once daily. (Patient not taking: Reported on 12/30/2019) 22 g 1    pramipexole (MIRAPEX) 0.125 MG tablet Take by mouth every evening.       valACYclovir (VALTREX) 1000 MG tablet Take 1 tablet (1,000 mg total) by mouth 3 (three) times daily. for 7 days 21 tablet 0     Current Facility-Administered Medications    Medication Dose Route Frequency Provider Last Rate Last Dose    sodium chloride 0.9% flush 10 mL  10 mL Intravenous PRN Derek Jose DPM        sodium chloride 0.9% flush 10 mL  10 mL Intravenous PRN Derek Jose DPM           Review of patient's allergies indicates:   Allergen Reactions    Codeine Nausea Only         Review of Systems   Constitution: Negative for chills, fever and malaise/fatigue.   HENT: Negative for congestion.    Cardiovascular: Negative for chest pain, claudication and leg swelling.   Respiratory: Negative for cough and shortness of breath.    Skin: Positive for dry skin, nail changes and poor wound healing. Negative for color change.   Musculoskeletal: Negative for back pain, joint pain, muscle cramps and muscle weakness.   Gastrointestinal: Negative for nausea and vomiting.   Neurological: Positive for numbness and paresthesias. Negative for weakness.   Psychiatric/Behavioral: Negative for altered mental status.           Objective:      Physical Exam  Constitutional:       General: She is not in acute distress.     Appearance: She is well-developed. She is not diaphoretic.   Cardiovascular:      Pulses:           Dorsalis pedis pulses are 2+ on the right side and 2+ on the left side.        Posterior tibial pulses are 2+ on the right side and 2+ on the left side.      Comments: CFT< 3 secs all toes bilateral foot, skin temp warm bilateral foot, no digital hair growth bilateral foot, mild lower extremity edema bilateral.    Musculoskeletal:      Comments: Range of motion right ankle reveals +5° of dorsiflexion during knee extension.    No localized pain on palpation of the right leg.  No defect appreciated.  Active plantar flexion right ft 5/5.    No pain with ROM or MMT bilateral foot.    R foot:  Limited range of motion at the 1st MPJ bilateral.  Palpable bony enlargement dorsally along the 1 MTP and hallux IPJ left foot.  Semi rigid mild hallux malleus contracture left  hallux.    Plantar flexed first met head right foot.    Amputated right hallux and fifth toes.          Feet:      Right foot:      Protective Sensation: 10 sites tested. 5 sites sensed.      Skin integrity: Ulcer, callus and dry skin present. No erythema.      Left foot:      Protective Sensation: 10 sites tested. 5 sites sensed.      Skin integrity: Callus and dry skin present. No ulcer, blister, skin breakdown, erythema or warmth.   Skin:     General: Skin is dry.      Capillary Refill: Capillary refill takes less than 2 seconds.      Coloration: Skin is not pale.      Findings: No ecchymosis, erythema or rash.      Nails: There is no clubbing.        Comments: Normal appearing scar medial mid right leg.    Ulcer Location: plantar first met head right  Measurements:  Unable to measure pre debridement due to overlying hyperkeratosis and blistering.  Post debridement measures 0.5 x 0.4 x 0.1 cm  Periwound: Intact with raised hyperkeratotic skin  Drainage:  Scant serosanguinous.  Pus: None.  Malodor: None.  Base:  50% granular. 50% fibrin.  Signs of infection: None.  Does not probe, track or undermine post debridement.     Neurological:      Mental Status: She is alert and oriented to person, place, and time.      Sensory: Sensory deficit present.               Assessment:       Encounter Diagnoses   Name Primary?   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    Patient evaluated on Diabetic foot risk factors.  Patient counseled to do daily foot checks.  Counseled to wear accommodative shoe gear.  Educated on importance of glycemic control.    Wound debridement and dressing per attached note.  Darco shoe dispensed.  Dressing to remain clean, dry and intact.    Elevation at rest.    Will prescribe new extra-depth shoes with custom insoles once wound is healed.    Also evaluated the pain to left great toe that she characterized as aching and intermittent mostly while she is walking.  Appears to be related secondary to osteoarthritis with mild hallux malleus formation beginning.    RTC 2 weeks or p.r.n. as discussed.

## 2020-07-29 NOTE — PROCEDURES
"Wound Debridement    Date/Time: 7/29/2020 11:30 AM  Performed by: Derek Jose DPM  Authorized by: Derek Jose DPM     Time out: Immediately prior to procedure a "time out" was called to verify the correct patient, procedure, equipment, support staff and site/side marked as required.    Consent Done?:  Yes (Verbal)  Local anesthesia used?: No      Wound Details:    Location:  Right foot    Location:  Right 1st Metatarsal Head    Type of Debridement:  Excisional       Length (cm):  0.5       Area (sq cm):  0.2       Width (cm):  0.4       Percent Debrided (%):  100       Depth (cm):  0.1       Total Area Debrided (sq cm):  0.2    Depth of debridement:  Subcutaneous tissue    Tissue debrided:  Epidermis, Dermis and Subcutaneous    Devitalized tissue debrided:  Slough, Fibrin and Callus    Instruments:  Blade    Bleeding:  Minimal  Hemostasis Achieved: Yes    Method Used:  Pressure  Patient tolerance:  Patient tolerated the procedure well with no immediate complications     Saline moistened hydrothera blue, felt aperture, nicolette foam, cast padding secured with coban.         "

## 2020-08-14 ENCOUNTER — OFFICE VISIT (OUTPATIENT)
Dept: PODIATRY | Facility: CLINIC | Age: 80
End: 2020-08-14
Payer: MEDICARE

## 2020-08-14 VITALS
SYSTOLIC BLOOD PRESSURE: 124 MMHG | DIASTOLIC BLOOD PRESSURE: 57 MMHG | HEIGHT: 62 IN | BODY MASS INDEX: 31.1 KG/M2 | WEIGHT: 169 LBS | HEART RATE: 66 BPM

## 2020-08-14 DIAGNOSIS — L97.511 DIABETIC ULCER OF OTHER PART OF RIGHT FOOT ASSOCIATED WITH DIABETES MELLITUS DUE TO UNDERLYING CONDITION, LIMITED TO BREAKDOWN OF SKIN: Primary | ICD-10-CM

## 2020-08-14 DIAGNOSIS — E11.42 TYPE 2 DIABETES MELLITUS WITH PERIPHERAL NEUROPATHY: ICD-10-CM

## 2020-08-14 DIAGNOSIS — B35.1 ONYCHOMYCOSIS: ICD-10-CM

## 2020-08-14 DIAGNOSIS — Z89.421 HISTORY OF AMPUTATION OF LESSER TOE, RIGHT: ICD-10-CM

## 2020-08-14 DIAGNOSIS — E08.621 DIABETIC ULCER OF OTHER PART OF RIGHT FOOT ASSOCIATED WITH DIABETES MELLITUS DUE TO UNDERLYING CONDITION, LIMITED TO BREAKDOWN OF SKIN: Primary | ICD-10-CM

## 2020-08-14 PROCEDURE — 99999 PR PBB SHADOW E&M-EST. PATIENT-LVL V: ICD-10-PCS | Mod: PBBFAC,,, | Performed by: PODIATRIST

## 2020-08-14 PROCEDURE — 99999 PR PBB SHADOW E&M-EST. PATIENT-LVL V: CPT | Mod: PBBFAC,,, | Performed by: PODIATRIST

## 2020-08-14 NOTE — PROCEDURES
"Routine Foot Care    Date/Time: 8/14/2020 1:45 PM  Performed by: Derek Jose DPM  Authorized by: Derek Jose DPM     Time out: Immediately prior to procedure a "time out" was called to verify the correct patient, procedure, equipment, support staff and site/side marked as required.    Consent Done?:  Yes (Verbal)  Hyperkeratotic Skin Lesions?: No      Nail Care Type:  Debride(Left 1st Toe, Left 2nd Toe, Left 3rd Toe, Left 4th Toe, Left 5th Toe, Right 2nd Toe, Right 3rd Toe and Right 4th Toe)  Patient tolerance:  Patient tolerated the procedure well with no immediate complications     Used sterile nail nipper.        "

## 2020-08-14 NOTE — PROGRESS NOTES
"Subjective:      Patient ID: Caro Powell is a 79 y.o. female.    Chief Complaint: Follow-up    Caro LUA is a 79 y.o. female who presents to the clinic for evaluation and treatment of high risk feet. Caro LUA has a past medical history of Anticoagulant long-term use, Arthritis, Atrial flutter, Calcium nephrolithiasis (2007), Diabetes mellitus, type 2, Diabetic peripheral neuropathy associated with type 2 diabetes mellitus, and Hypertension. The patient's chief complaint is diabetic foot ulcer, right second toe. This patient has documented high risk feet requiring routine maintenance secondary to peripheral neuropathy. Noted drainage and discoloration from the toe a few days ago. Denies trauma. Accompanied by her .    5/7/18: Follow up for wound check right foot. Taking po clindamycin and ciprofloxacin as scheduled. Accompanied by her . Dressing remained c/d/i.    5/14/18: Presents for wound check. No new complaints. Accompanied by her .    5/31/18: Presents for wound check. Reports she has not received HH. PO abx completed.     12/5:  She is a pt of Dr. 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 She is presenting with new complaint of blister at right submet 1 and distal tip of 4th toe. She is DM2 and on insulin. It was 126 today. She recently bought a new shoes. She tells me she did not have any problems when she was wearing diabetic shoes but blisters formed after wearing a normal tennis shoes. It happened a week ago. She is traveling to [place], [place] in 2 weeks and wants to know if she can weight bear on her right foot. Denies N/V/F/C/SOB/CP    12/12: f/u right foot ulcer. Has been weight bearing in surgical shoe with football dressing. Denies pain. Tells me she has been walking in diabetic shoes on her left foot. She tells me there is a "callus" at lateral left 5th toe that she did not have before. She will travel to [place] soon. Also tells me she " noticed that her right foot is turning sideways and feels like it is affecting the way she walks.     1/21/19: Complains of worsening wound to right foot. She went to [place] for holidays and saws her wound to right foot worsened. Denies trauma.    2/4/19  Here for wound check.  Denies F/c/N/V    2/18/19: Wound check. Says dressing remained intact. Upon observing note she is wearing a different darco shoe then previously dispensed and foot appears not to be fully seated in shoe. Accompanied by her . Denies trauma.     2/25/19 wound check. Denies F/C/N/V    3/29/19 patient complains of new ulcer on 3rd toe that she first noticed on Wednesday.  She has been treating it with antibiotic ointment and bandaids.  Denies F/C/N/V.  Accompanied by her .    04/08/2019:  Presents for wound check right foot.  Relates the dressing remained intact.    4/15/19: Presents for wound check. No new complaints.    06/24/2019:  Follow-up for acute onset osteomyelitis to the right 3rd toe receiving IV Ancef per Infectious Disease recommendation.  [facility] is following her and changing dressings 3 times weekly.    06/20/2019:  Referred by her home health nurse to concern of a new wound developing to the right 3rd toe.  She is also concerned the recurrent wound under the plantar 1st met head right foot.  Says that she does note that she walks differently on this foot and and that it tends to roll more.    07/15/2019:  Follow-up for wound check right foot. Says that she canceled the scheduled gastrocnemius recession due to anxiety or having to manage a PICC line and being restricted due to surgery.  Currently receiving home health.    08/08/2019:  Presents for wound check right foot.  Says that her home health company no longer of the her house.  Receiving long-term IV antibiotics for the osteomyelitis right 3rd toe.  Requesting that her surgery be rescheduled.    08/29/2019:  Presents for wound check right foot. Complains  that she has a wound to her right 2nd toe as well as a persistent 1 underneath the ball of foot. She is scheduled for outpatient gastrocnemius recession right leg on 09/10/2019.  She has not seen her PCP for medical clearance yet.  Accompanied by her .  Home Health per [facility].    09/19/2019:  Post endoscopic gastrocnemius recession right leg on 09/10/2019.  She has been nonweightbearing for the past week.  Dressing has remained clean dry intact. Wearing tall orthopedic boot.  Accompanied by her .  Relates she did not take any pain medicine.  No new complaints.    10/10/2019:  4 weeks post gastrocnemius recession right leg.  Relates no pain.  She has been sleeping and walking with the orthopedic boot right lower extremity.  No new complaints.  Says that she got a new pair diabetic shoes earlier this year has brought them to be examined.    11/01/2019:  Complains of a new wound to the right fourth toe that occurred  4 days ago after she kicked the side of her bed in the middle of the night.  Denies any pain.  Says that the foot was bruised and has improved since then.  She has been applying Betadine to the wound.    12/09/2019:  Follow-up for right 4th toe fracture occurred approximately 5 weeks ago. Relates that she still getting some swelling to the area and does not understand why.  She has been applying mupirocin ointment around the toe.    12/30/19: Presents for wound right fourth toe. Says her wound was healed then she wore her extra depth diabetic shoes and noted the toe wound recurred so she stopped wearing it and is in a tennis shoe which she says has not caused further irritation and toe appears to be healing. Denies further trauma. Completed previous po antibiotics.    07/29/2020:  Presents today complaining of a new onset wound that she discover 2 days ago to the bottom of the right foot.  Denies any trauma however relates that approximately 2 weeks ago she did have a few days that  she would walk around without any protective shoe gear around her home secondary to shingles outbreak.  Denies any trauma.  She has been wearing her Epstein tennis shoes which she purchased nearly a year ago.  Relates that her previous insoles are more than a year old and had broken down so she stopped wearing them.    08/14/2020:  Presents for wound check right foot.  Dressing remained clean dry intact.  She not recall that she stepped on a nail in her living room that the not penetrate very deep and had no rust.    PCP: Brayan Penny MD    Date Last Seen by PCP:     Current shoe gear:  Affected Foot:  Tennis shoes      Unaffected Foot: Tennis shoes    History of Trauma: negative  Sign of Infection: none    Hemoglobin A1C   Date Value Ref Range Status   06/25/2020 6.2 (H) <5.7 % of total Hgb Final     Comment:     For someone without known diabetes, a hemoglobin   A1c value between 5.7% and 6.4% is consistent with  prediabetes and should be confirmed with a   follow-up test.     For someone with known diabetes, a value <7%  indicates that their diabetes is well controlled. A1c  targets should be individualized based on duration of  diabetes, age, comorbid conditions, and other  considerations.     This assay result is consistent with an increased risk  of diabetes.     Currently, no consensus exists regarding use of  hemoglobin A1c for diagnosis of diabetes for children.        06/09/2019 6.6 (H) 4.0 - 5.6 % Final     Comment:     ADA Screening Guidelines:  5.7-6.4%  Consistent with prediabetes  >or=6.5%  Consistent with diabetes  High levels of fetal hemoglobin interfere with the HbA1C  assay. Heterozygous hemoglobin variants (HbS, HgC, etc)do  not significantly interfere with this assay.   However, presence of multiple variants may affect accuracy.     06/09/2019 6.6 (H) 4.0 - 5.6 % Final     Comment:     ADA Screening Guidelines:  5.7-6.4%  Consistent with prediabetes  >or=6.5%  Consistent with diabetes  High  "levels of fetal hemoglobin interfere with the HbA1C  assay. Heterozygous hemoglobin variants (HbS, HgC, etc)do  not significantly interfere with this assay.   However, presence of multiple variants may affect accuracy.     01/12/2018 8.3 % Final     Vitals:    08/14/20 1534   BP: (!) 124/57   Pulse: 66   Weight: 76.7 kg (169 lb)   Height: 5' 2" (1.575 m)   PainSc: 0-No pain      Past Medical History:   Diagnosis Date    Anticoagulant long-term use     Arthritis     Atrial flutter     Calcium nephrolithiasis 2007    Diabetes mellitus, type 2     Diabetic peripheral neuropathy associated with type 2 diabetes mellitus     Hypertension        Past Surgical History:   Procedure Laterality Date    COLONOSCOPY  11/28/2011    sigmoid diverticulosis, external hemorrhoids    ENDOSCOPIC GASTROCNEMIUS RECESSION Right 9/10/2019    Procedure: RECESSION, GASTROCNEMIUS, ENDOSCOPIC;  Surgeon: Derek Jose DPM;  Location: [facility];  Service: Podiatry;  Laterality: Right;  Arthrex center line (ron notified)  Video    HYSTERECTOMY      SHOULDER SURGERY Left     TOE AMPUTATION Right 05/22/2017    5th toe       Family History   Problem Relation Age of Onset    Diabetes Mother     Heart failure Father     Kidney failure Brother        Social History     Socioeconomic History    Marital status:      Spouse name: Not on file    Number of children: Not on file    Years of education: Not on file    Highest education level: Not on file   Occupational History    Not on file   Social Needs    Financial resource strain: Not on file    Food insecurity     Worry: Not on file     Inability: Not on file    Transportation needs     Medical: Not on file     Non-medical: Not on file   Tobacco Use    Smoking status: Never Smoker    Smokeless tobacco: Never Used   Substance and Sexual Activity    Alcohol use: No    Drug use: No    Sexual activity: Yes   Lifestyle    Physical activity     Days per week: Not on " file     Minutes per session: Not on file    Stress: Not on file   Relationships    Social connections     Talks on phone: Not on file     Gets together: Not on file     Attends Faith service: Not on file     Active member of club or organization: Not on file     Attends meetings of clubs or organizations: Not on file     Relationship status: Not on file   Other Topics Concern    Not on file   Social History Narrative    Not on file       Current Outpatient Medications   Medication Sig Dispense Refill    ACCU-CHEK SAMI CONTROL SOLN Soln       ACCU-CHEK SAMI PLUS METER Misc       ACCU-CHEK SAMI PLUS TEST STRP Strp USE TO TEST BLOOD SUGAR ONCE DAILY 100 strip 3    ACCU-CHEK SOFT DEV LANCETS Kit       ACCU-CHEK SOFTCLIX LANCETS Misc TEST ONCE DAILY 100 each 3    ammonium lactate 12 % Crea Apply to feet twice daily. Avoid use between toes. 140 g 5    apixaban (ELIQUIS) 5 mg Tab TAKE 1 TABLET BY MOUTH TWICE DAILY 60 tablet 5    diazePAM (VALIUM) 5 MG tablet Take 1 tablet (5 mg total) by mouth daily as needed for Anxiety. (Patient not taking: Reported on 7/29/2020) 90 tablet 3    famotidine (PEPCID) 20 MG tablet       gabapentin (NEURONTIN) 400 MG capsule Take 1 capsule (400 mg total) by mouth 3 (three) times daily. 270 capsule 3    glimepiride (AMARYL) 1 MG tablet Take 1 tablet (1 mg total) by mouth 2 (two) times daily.      lisinopriL (PRINIVIL,ZESTRIL) 20 MG tablet       loratadine-pseudoephedrine  mg (ALLERGY RELIEF D-24HR)  mg per 24 hr tablet 1 tablet as needed      metoprolol succinate (TOPROL-XL) 25 MG 24 hr tablet Take by mouth once daily.       mupirocin (BACTROBAN) 2 % ointment Apply topically once daily. (Patient not taking: Reported on 12/30/2019) 22 g 1    pramipexole (MIRAPEX) 0.125 MG tablet Take by mouth every evening.       pregabalin (LYRICA) 75 MG capsule Take 1 capsule (75 mg total) by mouth 2 (two) times daily as needed (shingles pain). 60 capsule 2     valACYclovir (VALTREX) 1000 MG tablet Take 1 tablet (1,000 mg total) by mouth 3 (three) times daily. for 7 days 21 tablet 0     Current Facility-Administered Medications   Medication Dose Route Frequency Provider Last Rate Last Dose    sodium chloride 0.9% flush 10 mL  10 mL Intravenous PRN Derek Jose DPM        sodium chloride 0.9% flush 10 mL  10 mL Intravenous PRN Derek Jose DPM           Review of patient's allergies indicates:   Allergen Reactions    Codeine Nausea Only         Review of Systems   Constitution: Negative for chills, fever and malaise/fatigue.   HENT: Negative for congestion.    Cardiovascular: Negative for chest pain, claudication and leg swelling.   Respiratory: Negative for cough and shortness of breath.    Skin: Positive for dry skin, nail changes and poor wound healing. Negative for color change.   Musculoskeletal: Negative for back pain, joint pain, muscle cramps and muscle weakness.   Gastrointestinal: Negative for nausea and vomiting.   Neurological: Positive for numbness and paresthesias. Negative for weakness.   Psychiatric/Behavioral: Negative for altered mental status.           Objective:      Physical Exam  Constitutional:       General: She is not in acute distress.     Appearance: She is well-developed. She is not diaphoretic.   Cardiovascular:      Pulses:           Dorsalis pedis pulses are 2+ on the right side and 2+ on the left side.        Posterior tibial pulses are 2+ on the right side and 2+ on the left side.      Comments: CFT< 3 secs all toes bilateral foot, skin temp warm bilateral foot, no digital hair growth bilateral foot, mild lower extremity edema bilateral.    Musculoskeletal:      Comments: Range of motion right ankle reveals +5° of dorsiflexion during knee extension.    No localized pain on palpation of the right leg.  No defect appreciated.  Active plantar flexion right ft 5/5.    No pain with ROM or MMT bilateral foot.    R foot:  Limited  range of motion at the 1st MPJ bilateral.  Palpable bony enlargement dorsally along the 1 MTP and hallux IPJ left foot.  Semi rigid mild hallux malleus contracture left hallux.    Plantar flexed first met head right foot.    Amputated right hallux and fifth toes.          Feet:      Right foot:      Protective Sensation: 10 sites tested. 5 sites sensed.      Skin integrity: Ulcer, callus and dry skin present. No erythema.      Left foot:      Protective Sensation: 10 sites tested. 5 sites sensed.      Skin integrity: Callus and dry skin present. No ulcer, blister, skin breakdown, erythema or warmth.   Skin:     General: Skin is dry.      Capillary Refill: Capillary refill takes less than 2 seconds.      Coloration: Skin is not pale.      Findings: No ecchymosis, erythema or rash.      Nails: There is no clubbing.        Comments: Normal appearing scar medial mid right leg.    Ulcer Location: plantar first met head right  Measurements:  Unable to measure pre debridement due to overlying hyperkeratosis and blistering.  Post debridement measures 0.1 x 0.1 x 0.1 cm  Periwound: Intact with raised hyperkeratotic skin  Drainage:  Scant serosanguinous.  Pus: None.  Malodor: None.  Base:  100% granular. 0% fibrin.  Signs of infection: None.  Does not probe, track or undermine post debridement.     Neurological:      Mental Status: She is alert and oriented to person, place, and time.      Sensory: Sensory deficit present.               Assessment:       Encounter Diagnoses   Name Primary?    Diabetic ulcer of other part of right foot associated with diabetes mellitus due to underlying condition, limited to breakdown of skin Yes    Type 2 diabetes mellitus with peripheral neuropathy     History of amputation of lesser toe, right     Onychomycosis          Plan:       Caro LUA was seen today for follow-up.    Diagnoses and all orders for this visit:    Diabetic ulcer of other part of right foot associated with diabetes  mellitus due to underlying condition, limited to breakdown of skin  -     DIABETIC SHOES FOR HOME USE  -     Wound Debridement    Type 2 diabetes mellitus with peripheral neuropathy  -     DIABETIC SHOES FOR HOME USE  -     Routine Foot Care    History of amputation of lesser toe, right  -     DIABETIC SHOES FOR HOME USE    Onychomycosis  -     DIABETIC SHOES FOR HOME USE  -     Routine Foot Care      I counseled the patient on her conditions, their implications and medical management.    Patient evaluated on Diabetic foot risk factors.  Patient counseled to do daily foot checks.  Counseled to wear accommodative shoe gear.  Educated on importance of glycemic control.    Wound debridement and dressing per attached note.  Transition to tennis shoes as discussed.    Elevation at rest.    New prescription for extra-depth shoes with custom insoles dispensed today.    Routine foot care per attached note.    RTC 2-3 months or p.r.n. as discussed.

## 2020-08-14 NOTE — PROCEDURES
"Wound Debridement    Date/Time: 8/14/2020 1:45 PM  Performed by: Derek Jose DPM  Authorized by: Derek Jose DPM     Time out: Immediately prior to procedure a "time out" was called to verify the correct patient, procedure, equipment, support staff and site/side marked as required.    Consent Done?:  Yes (Verbal)  Local anesthesia used?: No      Wound Details:    Location:  Right foot    Location:  Right 1st Metatarsal Head    Type of Debridement:  Excisional       Length (cm):  0.1       Area (sq cm):  0.01       Width (cm):  0.1       Percent Debrided (%):  100       Depth (cm):  0.1       Total Area Debrided (sq cm):  0.01    Depth of debridement:  Epidermis/Dermis    Tissue debrided:  Epidermis    Devitalized tissue debrided:  Callus and Slough    Instruments:  Blade    Bleeding:  None  Patient tolerance:  Patient tolerated the procedure well with no immediate complications     Aquacel border      "

## 2020-09-03 ENCOUNTER — TELEPHONE (OUTPATIENT)
Dept: FAMILY MEDICINE | Facility: CLINIC | Age: 80
End: 2020-09-03

## 2020-09-03 NOTE — TELEPHONE ENCOUNTER
Received fax from [facility]. Advised Marcio that the patient has not seen Dr. Laird since he came to [facility] a year ago and that the patient might have a new pcp. Marcio advised that the patient states her and her  have the same pcp and it is Dr. Laird. Advised office that we do not have any records on the patient and can not sign the paper work or send any office notes since we have not seen the patient.

## 2020-09-08 ENCOUNTER — OFFICE VISIT (OUTPATIENT)
Dept: PODIATRY | Facility: CLINIC | Age: 80
End: 2020-09-08
Payer: MEDICARE

## 2020-09-08 ENCOUNTER — TELEPHONE (OUTPATIENT)
Dept: PODIATRY | Facility: CLINIC | Age: 80
End: 2020-09-08

## 2020-09-08 VITALS
BODY MASS INDEX: 31.11 KG/M2 | DIASTOLIC BLOOD PRESSURE: 58 MMHG | WEIGHT: 169.06 LBS | HEIGHT: 62 IN | SYSTOLIC BLOOD PRESSURE: 128 MMHG | HEART RATE: 74 BPM

## 2020-09-08 DIAGNOSIS — L97.511 DIABETIC ULCER OF OTHER PART OF RIGHT FOOT ASSOCIATED WITH DIABETES MELLITUS DUE TO UNDERLYING CONDITION, LIMITED TO BREAKDOWN OF SKIN: Primary | ICD-10-CM

## 2020-09-08 DIAGNOSIS — Z89.421 HISTORY OF AMPUTATION OF LESSER TOE, RIGHT: ICD-10-CM

## 2020-09-08 DIAGNOSIS — E08.621 DIABETIC ULCER OF OTHER PART OF RIGHT FOOT ASSOCIATED WITH DIABETES MELLITUS DUE TO UNDERLYING CONDITION, LIMITED TO BREAKDOWN OF SKIN: Primary | ICD-10-CM

## 2020-09-08 DIAGNOSIS — L60.0 INGROWN NAIL: ICD-10-CM

## 2020-09-08 DIAGNOSIS — E11.42 TYPE 2 DIABETES MELLITUS WITH PERIPHERAL NEUROPATHY: ICD-10-CM

## 2020-09-08 PROCEDURE — 3074F SYST BP LT 130 MM HG: CPT | Mod: CPTII,S$GLB,, | Performed by: STUDENT IN AN ORGANIZED HEALTH CARE EDUCATION/TRAINING PROGRAM

## 2020-09-08 PROCEDURE — 1126F AMNT PAIN NOTED NONE PRSNT: CPT | Mod: S$GLB,,, | Performed by: STUDENT IN AN ORGANIZED HEALTH CARE EDUCATION/TRAINING PROGRAM

## 2020-09-08 PROCEDURE — 99999 PR PBB SHADOW E&M-EST. PATIENT-LVL IV: ICD-10-PCS | Mod: PBBFAC,,, | Performed by: STUDENT IN AN ORGANIZED HEALTH CARE EDUCATION/TRAINING PROGRAM

## 2020-09-08 PROCEDURE — 11720 ROUTINE FOOT CARE: ICD-10-PCS | Mod: 59,Q7,S$GLB, | Performed by: STUDENT IN AN ORGANIZED HEALTH CARE EDUCATION/TRAINING PROGRAM

## 2020-09-08 PROCEDURE — 11042 WOUND DEBRIDEMENT: ICD-10-PCS | Mod: S$GLB,,, | Performed by: STUDENT IN AN ORGANIZED HEALTH CARE EDUCATION/TRAINING PROGRAM

## 2020-09-08 PROCEDURE — 99999 PR PBB SHADOW E&M-EST. PATIENT-LVL IV: CPT | Mod: PBBFAC,,, | Performed by: STUDENT IN AN ORGANIZED HEALTH CARE EDUCATION/TRAINING PROGRAM

## 2020-09-08 PROCEDURE — 1159F MED LIST DOCD IN RCRD: CPT | Mod: S$GLB,,, | Performed by: STUDENT IN AN ORGANIZED HEALTH CARE EDUCATION/TRAINING PROGRAM

## 2020-09-08 PROCEDURE — 3074F PR MOST RECENT SYSTOLIC BLOOD PRESSURE < 130 MM HG: ICD-10-PCS | Mod: CPTII,S$GLB,, | Performed by: STUDENT IN AN ORGANIZED HEALTH CARE EDUCATION/TRAINING PROGRAM

## 2020-09-08 PROCEDURE — 1126F PR PAIN SEVERITY QUANTIFIED, NO PAIN PRESENT: ICD-10-PCS | Mod: S$GLB,,, | Performed by: STUDENT IN AN ORGANIZED HEALTH CARE EDUCATION/TRAINING PROGRAM

## 2020-09-08 PROCEDURE — 1101F PR PT FALLS ASSESS DOC 0-1 FALLS W/OUT INJ PAST YR: ICD-10-PCS | Mod: CPTII,S$GLB,, | Performed by: STUDENT IN AN ORGANIZED HEALTH CARE EDUCATION/TRAINING PROGRAM

## 2020-09-08 PROCEDURE — 1101F PT FALLS ASSESS-DOCD LE1/YR: CPT | Mod: CPTII,S$GLB,, | Performed by: STUDENT IN AN ORGANIZED HEALTH CARE EDUCATION/TRAINING PROGRAM

## 2020-09-08 PROCEDURE — 1159F PR MEDICATION LIST DOCUMENTED IN MEDICAL RECORD: ICD-10-PCS | Mod: S$GLB,,, | Performed by: STUDENT IN AN ORGANIZED HEALTH CARE EDUCATION/TRAINING PROGRAM

## 2020-09-08 PROCEDURE — 11042 DBRDMT SUBQ TIS 1ST 20SQCM/<: CPT | Mod: S$GLB,,, | Performed by: STUDENT IN AN ORGANIZED HEALTH CARE EDUCATION/TRAINING PROGRAM

## 2020-09-08 PROCEDURE — 11720 DEBRIDE NAIL 1-5: CPT | Mod: 59,Q7,S$GLB, | Performed by: STUDENT IN AN ORGANIZED HEALTH CARE EDUCATION/TRAINING PROGRAM

## 2020-09-08 PROCEDURE — 3078F PR MOST RECENT DIASTOLIC BLOOD PRESSURE < 80 MM HG: ICD-10-PCS | Mod: CPTII,S$GLB,, | Performed by: STUDENT IN AN ORGANIZED HEALTH CARE EDUCATION/TRAINING PROGRAM

## 2020-09-08 PROCEDURE — 99214 PR OFFICE/OUTPT VISIT, EST, LEVL IV, 30-39 MIN: ICD-10-PCS | Mod: 25,S$GLB,, | Performed by: STUDENT IN AN ORGANIZED HEALTH CARE EDUCATION/TRAINING PROGRAM

## 2020-09-08 PROCEDURE — 99214 OFFICE O/P EST MOD 30 MIN: CPT | Mod: 25,S$GLB,, | Performed by: STUDENT IN AN ORGANIZED HEALTH CARE EDUCATION/TRAINING PROGRAM

## 2020-09-08 PROCEDURE — 3078F DIAST BP <80 MM HG: CPT | Mod: CPTII,S$GLB,, | Performed by: STUDENT IN AN ORGANIZED HEALTH CARE EDUCATION/TRAINING PROGRAM

## 2020-09-08 RX ORDER — INFLUENZA A VIRUS A/MICHIGAN/45/2015 X-275 (H1N1) ANTIGEN (FORMALDEHYDE INACTIVATED), INFLUENZA A VIRUS A/SINGAPORE/INFIMH-16-0019/2016 IVR-186 (H3N2) ANTIGEN (FORMALDEHYDE INACTIVATED), INFLUENZA B VIRUS B/PHUKET/3073/2013 ANTIGEN (FORMALDEHYDE INACTIVATED), AND INFLUENZA B VIRUS B/MARYLAND/15/2016 BX-69A ANTIGEN (FORMALDEHYDE INACTIVATED) 60; 60; 60; 60 UG/.7ML; UG/.7ML; UG/.7ML; UG/.7ML
INJECTION, SUSPENSION INTRAMUSCULAR
COMMUNITY
Start: 2020-09-03 | End: 2021-04-08

## 2020-09-08 NOTE — TELEPHONE ENCOUNTER
Spoke to patient & informed her that Dr. Jose is out and offered her an appointment with Dr. Atkins. Patient accepted appointment at 10:45am

## 2020-09-08 NOTE — PROCEDURES
Wound Debridement    Date/Time: 9/8/2020 3:15 PM  Performed by: Ava Atkins DPM  Authorized by: Ava Atkins DPM     Consent Done?:  Yes (Verbal)  Local anesthesia used?: No      Wound Details:    Location:  Right foot    Location:  Right 1st Metatarsal Head    Type of Debridement:  Excisional       Length (cm):  0.5       Area (sq cm):  0.2       Width (cm):  0.4       Percent Debrided (%):  100       Depth (cm):  0.1       Total Area Debrided (sq cm):  0.2    Depth of debridement:  Subcutaneous tissue    Tissue debrided:  Subcutaneous and Other    Devitalized tissue debrided:  Biofilm, Callus and Fibrin    Instruments:  Blade and Curette    Bleeding:  Minimal  Patient tolerance:  Patient tolerated the procedure well with no immediate complications

## 2020-09-08 NOTE — TELEPHONE ENCOUNTER
----- Message from Guadalupe Zayas sent at 9/8/2020  7:36 AM CDT -----  Regarding: Same Day Appointmet  Type:  Same Day Appointment Request      Name of Caller:patient states have a cut that open up on right foot need appointment for today  When is the first available appointment?  Symptoms:  Best Call Back Number:[phone]  Additional Information:

## 2020-09-08 NOTE — PROGRESS NOTES
"Subjective:      Patient ID: Caro Powell is a 80 y.o. female.    Chief Complaint: Foot Injury (cut to right foot)    Caro LUA is a 80 y.o. female who presents to the clinic for evaluation and treatment of high risk feet. Caro LUA has a past medical history of Anticoagulant long-term use, Arthritis, Atrial flutter, Calcium nephrolithiasis (2007), Diabetes mellitus, type 2, Diabetic peripheral neuropathy associated with type 2 diabetes mellitus, and Hypertension. The patient's chief complaint is diabetic foot ulcer, right second toe. This patient has documented high risk feet requiring routine maintenance secondary to peripheral neuropathy. Noted drainage and discoloration from the toe a few days ago. Denies trauma. Accompanied by her .    5/7/18: Follow up for wound check right foot. Taking po clindamycin and ciprofloxacin as scheduled. Accompanied by her . Dressing remained c/d/i.    5/14/18: Presents for wound check. No new complaints. Accompanied by her .    5/31/18: Presents for wound check. Reports she has not received HH. PO abx completed.     12/5:  She is a pt of Dr. 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 She is presenting with new complaint of blister at right submet 1 and distal tip of 4th toe. She is DM2 and on insulin. It was 126 today. She recently bought a new shoes. She tells me she did not have any problems when she was wearing diabetic shoes but blisters formed after wearing a normal tennis shoes. It happened a week ago. She is traveling to [place], [place] in 2 weeks and wants to know if she can weight bear on her right foot. Denies N/V/F/C/SOB/CP    12/12: f/u right foot ulcer. Has been weight bearing in surgical shoe with football dressing. Denies pain. Tells me she has been walking in diabetic shoes on her left foot. She tells me there is a "callus" at lateral left 5th toe that she did not have before. She will travel to [place] soon. " Also tells me she noticed that her right foot is turning sideways and feels like it is affecting the way she walks.     1/21/19: Complains of worsening wound to right foot. She went to [place] for holidays and saws her wound to right foot worsened. Denies trauma.    2/4/19  Here for wound check.  Denies F/c/N/V    2/18/19: Wound check. Says dressing remained intact. Upon observing note she is wearing a different darco shoe then previously dispensed and foot appears not to be fully seated in shoe. Accompanied by her . Denies trauma.     2/25/19 wound check. Denies F/C/N/V    3/29/19 patient complains of new ulcer on 3rd toe that she first noticed on Wednesday.  She has been treating it with antibiotic ointment and bandaids.  Denies F/C/N/V.  Accompanied by her .    04/08/2019:  Presents for wound check right foot.  Relates the dressing remained intact.    4/15/19: Presents for wound check. No new complaints.    06/24/2019:  Follow-up for acute onset osteomyelitis to the right 3rd toe receiving IV Ancef per Infectious Disease recommendation.  [facility] is following her and changing dressings 3 times weekly.    06/20/2019:  Referred by her home health nurse to concern of a new wound developing to the right 3rd toe.  She is also concerned the recurrent wound under the plantar 1st met head right foot.  Says that she does note that she walks differently on this foot and and that it tends to roll more.    07/15/2019:  Follow-up for wound check right foot. Says that she canceled the scheduled gastrocnemius recession due to anxiety or having to manage a PICC line and being restricted due to surgery.  Currently receiving home health.    08/08/2019:  Presents for wound check right foot.  Says that her home health company no longer of the her house.  Receiving long-term IV antibiotics for the osteomyelitis right 3rd toe.  Requesting that her surgery be rescheduled.    08/29/2019:  Presents for wound check  right foot. Complains that she has a wound to her right 2nd toe as well as a persistent 1 underneath the ball of foot. She is scheduled for outpatient gastrocnemius recession right leg on 09/10/2019.  She has not seen her PCP for medical clearance yet.  Accompanied by her .  Home Health per [facility].    09/19/2019:  Post endoscopic gastrocnemius recession right leg on 09/10/2019.  She has been nonweightbearing for the past week.  Dressing has remained clean dry intact. Wearing tall orthopedic boot.  Accompanied by her .  Relates she did not take any pain medicine.  No new complaints.    10/10/2019:  4 weeks post gastrocnemius recession right leg.  Relates no pain.  She has been sleeping and walking with the orthopedic boot right lower extremity.  No new complaints.  Says that she got a new pair diabetic shoes earlier this year has brought them to be examined.    11/01/2019:  Complains of a new wound to the right fourth toe that occurred  4 days ago after she kicked the side of her bed in the middle of the night.  Denies any pain.  Says that the foot was bruised and has improved since then.  She has been applying Betadine to the wound.    12/09/2019:  Follow-up for right 4th toe fracture occurred approximately 5 weeks ago. Relates that she still getting some swelling to the area and does not understand why.  She has been applying mupirocin ointment around the toe.    12/30/19: Presents for wound right fourth toe. Says her wound was healed then she wore her extra depth diabetic shoes and noted the toe wound recurred so she stopped wearing it and is in a tennis shoe which she says has not caused further irritation and toe appears to be healing. Denies further trauma. Completed previous po antibiotics.    07/29/2020:  Presents today complaining of a new onset wound that she discover 2 days ago to the bottom of the right foot.  Denies any trauma however relates that approximately 2 weeks ago she did  have a few days that she would walk around without any protective shoe gear around her home secondary to shingles outbreak.  Denies any trauma.  She has been wearing her Epstein tennis shoes which she purchased nearly a year ago.  Relates that her previous insoles are more than a year old and had broken down so she stopped wearing them.    08/14/2020:  Presents for wound check right foot.  Dressing remained clean dry intact.  She not recall that she stepped on a nail in her living room that the not penetrate very deep and had no rust.    9/8/20: Pt presents for wound check to right foot. Pt states her wound healed and has reopened again today, states she saw blood on her sock. She thinks it started after walking at the mall.  Also states she has painful ingrown toenail to 3rd toe. Per chart review, toenails debrided during previous visit 8/14/20. States she is using lotion to soften her calluses daily. States she needs new diabetic shoes. No other pedal complaints at this time.     PCP: Brayan Penny MD    Date Last Seen by PCP:     Current shoe gear:  Affected Foot:  Tennis shoes      Unaffected Foot: Tennis shoes    History of Trauma: negative  Sign of Infection: none    Hemoglobin A1C   Date Value Ref Range Status   06/25/2020 6.2 (H) <5.7 % of total Hgb Final     Comment:     For someone without known diabetes, a hemoglobin   A1c value between 5.7% and 6.4% is consistent with  prediabetes and should be confirmed with a   follow-up test.     For someone with known diabetes, a value <7%  indicates that their diabetes is well controlled. A1c  targets should be individualized based on duration of  diabetes, age, comorbid conditions, and other  considerations.     This assay result is consistent with an increased risk  of diabetes.     Currently, no consensus exists regarding use of  hemoglobin A1c for diagnosis of diabetes for children.        06/09/2019 6.6 (H) 4.0 - 5.6 % Final     Comment:     ADA Screening  "Guidelines:  5.7-6.4%  Consistent with prediabetes  >or=6.5%  Consistent with diabetes  High levels of fetal hemoglobin interfere with the HbA1C  assay. Heterozygous hemoglobin variants (HbS, HgC, etc)do  not significantly interfere with this assay.   However, presence of multiple variants may affect accuracy.     06/09/2019 6.6 (H) 4.0 - 5.6 % Final     Comment:     ADA Screening Guidelines:  5.7-6.4%  Consistent with prediabetes  >or=6.5%  Consistent with diabetes  High levels of fetal hemoglobin interfere with the HbA1C  assay. Heterozygous hemoglobin variants (HbS, HgC, etc)do  not significantly interfere with this assay.   However, presence of multiple variants may affect accuracy.     01/12/2018 8.3 % Final     Vitals:    09/08/20 1050   BP: (!) 128/58   Pulse: 74   Weight: 76.7 kg (169 lb 1.5 oz)   Height: 5' 2" (1.575 m)   PainSc: 0-No pain      Past Medical History:   Diagnosis Date    Anticoagulant long-term use     Arthritis     Atrial flutter     Calcium nephrolithiasis 2007    Diabetes mellitus, type 2     Diabetic peripheral neuropathy associated with type 2 diabetes mellitus     Hypertension        Past Surgical History:   Procedure Laterality Date    COLONOSCOPY  11/28/2011    sigmoid diverticulosis, external hemorrhoids    ENDOSCOPIC GASTROCNEMIUS RECESSION Right 9/10/2019    Procedure: RECESSION, GASTROCNEMIUS, ENDOSCOPIC;  Surgeon: Derek Jose DPM;  Location: [facility];  Service: Podiatry;  Laterality: Right;  Arthrex center line (ron notified)  Video    HYSTERECTOMY      SHOULDER SURGERY Left     TOE AMPUTATION Right 05/22/2017    5th toe       Family History   Problem Relation Age of Onset    Diabetes Mother     Heart failure Father     Kidney failure Brother        Social History     Socioeconomic History    Marital status:      Spouse name: Not on file    Number of children: Not on file    Years of education: Not on file    Highest education level: Not on " file   Occupational History    Not on file   Social Needs    Financial resource strain: Not on file    Food insecurity     Worry: Not on file     Inability: Not on file    Transportation needs     Medical: Not on file     Non-medical: Not on file   Tobacco Use    Smoking status: Never Smoker    Smokeless tobacco: Never Used   Substance and Sexual Activity    Alcohol use: No    Drug use: No    Sexual activity: Yes   Lifestyle    Physical activity     Days per week: Not on file     Minutes per session: Not on file    Stress: Not on file   Relationships    Social connections     Talks on phone: Not on file     Gets together: Not on file     Attends Hinduism service: Not on file     Active member of club or organization: Not on file     Attends meetings of clubs or organizations: Not on file     Relationship status: Not on file   Other Topics Concern    Not on file   Social History Narrative    Not on file       Current Outpatient Medications   Medication Sig Dispense Refill    ACCU-CHEK SAMI CONTROL SOLN Soln       ACCU-CHEK SAMI PLUS METER Misc       ACCU-CHEK SAMI PLUS TEST STRP Strp USE TO TEST BLOOD SUGAR ONCE DAILY 100 strip 3    ACCU-CHEK SOFT DEV LANCETS Kit       ACCU-CHEK SOFTCLIX LANCETS Misc TEST ONCE DAILY 100 each 3    ammonium lactate 12 % Crea Apply to feet twice daily. Avoid use between toes. 140 g 5    apixaban (ELIQUIS) 5 mg Tab TAKE 1 TABLET BY MOUTH TWICE DAILY 60 tablet 5    diazePAM (VALIUM) 5 MG tablet Take 1 tablet (5 mg total) by mouth daily as needed for Anxiety. 90 tablet 3    famotidine (PEPCID) 20 MG tablet       FLUZONE HIGHDOSE QUAD 20-21  mcg/0.7 mL Syrg       gabapentin (NEURONTIN) 400 MG capsule Take 1 capsule (400 mg total) by mouth 3 (three) times daily. 270 capsule 3    glimepiride (AMARYL) 1 MG tablet Take 1 tablet (1 mg total) by mouth 2 (two) times daily.      lisinopriL (PRINIVIL,ZESTRIL) 20 MG tablet       loratadine-pseudoephedrine   mg (ALLERGY RELIEF D-24HR)  mg per 24 hr tablet 1 tablet as needed      metoprolol succinate (TOPROL-XL) 25 MG 24 hr tablet Take by mouth once daily.       mupirocin (BACTROBAN) 2 % ointment Apply topically once daily. 22 g 1    pramipexole (MIRAPEX) 0.125 MG tablet Take by mouth every evening.       pregabalin (LYRICA) 75 MG capsule Take 1 capsule (75 mg total) by mouth 2 (two) times daily as needed (shingles pain). 60 capsule 2    valACYclovir (VALTREX) 1000 MG tablet Take 1 tablet (1,000 mg total) by mouth 3 (three) times daily. for 7 days 21 tablet 0     Current Facility-Administered Medications   Medication Dose Route Frequency Provider Last Rate Last Dose    sodium chloride 0.9% flush 10 mL  10 mL Intravenous PRN Derek Jose DPM        sodium chloride 0.9% flush 10 mL  10 mL Intravenous PRN Derek Jose DPM           Review of patient's allergies indicates:   Allergen Reactions    Codeine Nausea Only         Review of Systems   Constitution: Negative for chills, fever and malaise/fatigue.   HENT: Negative for congestion.    Cardiovascular: Negative for chest pain, claudication and leg swelling.   Respiratory: Negative for cough and shortness of breath.    Skin: Positive for dry skin, nail changes and poor wound healing. Negative for color change.   Musculoskeletal: Negative for back pain, joint pain, muscle cramps and muscle weakness.   Gastrointestinal: Negative for nausea and vomiting.   Neurological: Positive for numbness and paresthesias. Negative for weakness.   Psychiatric/Behavioral: Negative for altered mental status.           Objective:      Physical Exam  Constitutional:       General: She is not in acute distress.     Appearance: She is well-developed. She is not diaphoretic.   Cardiovascular:      Pulses:           Dorsalis pedis pulses are 2+ on the right side and 2+ on the left side.        Posterior tibial pulses are 2+ on the right side and 2+ on the left side.       Comments: CFT< 3 secs all toes bilateral foot, skin temp warm bilateral foot, no digital hair growth bilateral foot, mild lower extremity edema bilateral.    Musculoskeletal:         General: No tenderness.      Right foot: Decreased range of motion. Deformity present.      Left foot: Decreased range of motion. Deformity present.      Comments: Range of motion right ankle reveals +5° of dorsiflexion during knee extension.    No pain with ROM or MMT bilateral foot. Muscle strength 4/5 to bilateral lower extremity groups.     Plantar flexed first metatarsal head right foot.    Amputated right fifth toe          Feet:      Right foot:      Protective Sensation: 10 sites tested. 5 sites sensed.      Skin integrity: Ulcer, callus and dry skin present. No blister, erythema or warmth.      Left foot:      Protective Sensation: 10 sites tested. 5 sites sensed.      Skin integrity: Callus and dry skin present. No ulcer, blister, skin breakdown, erythema or warmth.   Lymphadenopathy:      Comments: Negative lymphadenopathy bilateral popliteal fossa and tarsal tunnel.    Skin:     General: Skin is warm and dry.      Capillary Refill: Capillary refill takes less than 2 seconds.      Coloration: Skin is not pale.      Findings: No abrasion, bruising, burn, ecchymosis, erythema, laceration, petechiae or rash.      Nails: There is no clubbing.        Comments: Normal appearing scar medial mid right leg.    Ulcer Location: plantar first met head right  Measurements:  Unable to measure pre debridement due to overlying hyperkeratosis.  Post debridement measures 0.5 x 0.4 x 0.1 cm  Periwound: Intact with raised hyperkeratotic skin  Drainage:  Scant serosanguinous.  Pus: None.  Malodor: None.  Base:  100% granular. 0% fibrin.  Signs of infection: None.  Does not probe, track or undermine post debridement.     Neurological:      Mental Status: She is alert and oriented to person, place, and time.      Sensory: Sensory deficit present.       Motor: No abnormal muscle tone.      Comments: [place] 5.07 monofilamant testing diminished. Light touch sensation is diminished bilaterally. Vibratory sensation is diminished ashleigh.    Psychiatric:         Behavior: Behavior normal.         Thought Content: Thought content normal.         Judgment: Judgment normal.               Assessment:       Encounter Diagnoses   Name Primary?    Diabetic ulcer of other part of right foot associated with diabetes mellitus due to underlying condition, limited to breakdown of skin Yes    Type 2 diabetes mellitus with peripheral neuropathy     History of amputation of lesser toe, right          Plan:       Caro LUA was seen today for foot injury.    Diagnoses and all orders for this visit:    Diabetic ulcer of other part of right foot associated with diabetes mellitus due to underlying condition, limited to breakdown of skin    Type 2 diabetes mellitus with peripheral neuropathy  -     DIABETIC SHOES FOR HOME USE    History of amputation of lesser toe, right  -     DIABETIC SHOES FOR HOME USE      I counseled the patient on her conditions, their implications and medical management.    Wound debridement and dressing per attached note.  Wound dressed with hydrafera blue, pete foam, cast padding and light ACE.     Slant back debridement performed to right 3rd toenail, pt tolerated procedure well.     Dressing application as per above. Darco shoe applied to offload the area. Advised patient that this should be worn on the affected foot at all times when ambulating. Short-term goals include maintaining good offloading and minimizing bioburden, promoting granulation and epithelialization to healing.  Long-term goals include keeping the wound healed by good offloading and medical management under the direction of internist.      Shoe inspection. Diabetic Foot Education. Patient reminded of the importance of good nutrition and blood sugar control to help prevent  podiatric complications of diabetes. Patient instructed on proper foot hygeine. We discussed wearing proper shoe gear, daily foot inspections, never walking without protective shoe gear, never putting sharp instruments to feet, routine podiatric nail visits. I discussed with the  patient  signs and symptoms of infection including redness, drainage, purulence, odor, pain, elevated BS, streaking, fever, chills, etc . Patient is to seek medical attention (ER or urgent care) if these symptoms occur      Elevation at rest.    Pt states she needs a new prescription for diabetic shoes, script dispensed including custom orthotics     RTC in 1 week, sooner PRN

## 2020-09-08 NOTE — PROCEDURES
"Routine Foot Care    Date/Time: 9/8/2020 3:15 PM  Performed by: vAa Atkins DPM  Authorized by: Ava Atkins DPM     Time out: Immediately prior to procedure a "time out" was called to verify the correct patient, procedure, equipment, support staff and site/side marked as required.    Consent Done?:  Yes (Verbal)  Hyperkeratotic Skin Lesions?: No      Nail Care Type:  Debride(Right 3rd Toe)  Patient tolerance:  Patient tolerated the procedure well with no immediate complications      "

## 2020-09-16 ENCOUNTER — OFFICE VISIT (OUTPATIENT)
Dept: PODIATRY | Facility: CLINIC | Age: 80
End: 2020-09-16
Payer: MEDICARE

## 2020-09-16 VITALS
WEIGHT: 169.06 LBS | HEIGHT: 62 IN | HEART RATE: 65 BPM | BODY MASS INDEX: 31.11 KG/M2 | SYSTOLIC BLOOD PRESSURE: 144 MMHG | DIASTOLIC BLOOD PRESSURE: 53 MMHG

## 2020-09-16 DIAGNOSIS — E11.42 TYPE 2 DIABETES MELLITUS WITH PERIPHERAL NEUROPATHY: Primary | ICD-10-CM

## 2020-09-16 DIAGNOSIS — L84 PRE-ULCERATIVE CALLUSES: ICD-10-CM

## 2020-09-16 PROCEDURE — 1126F AMNT PAIN NOTED NONE PRSNT: CPT | Mod: S$GLB,,, | Performed by: STUDENT IN AN ORGANIZED HEALTH CARE EDUCATION/TRAINING PROGRAM

## 2020-09-16 PROCEDURE — 3288F PR FALLS RISK ASSESSMENT DOCUMENTED: ICD-10-PCS | Mod: CPTII,S$GLB,, | Performed by: STUDENT IN AN ORGANIZED HEALTH CARE EDUCATION/TRAINING PROGRAM

## 2020-09-16 PROCEDURE — 1159F MED LIST DOCD IN RCRD: CPT | Mod: S$GLB,,, | Performed by: STUDENT IN AN ORGANIZED HEALTH CARE EDUCATION/TRAINING PROGRAM

## 2020-09-16 PROCEDURE — 3078F PR MOST RECENT DIASTOLIC BLOOD PRESSURE < 80 MM HG: ICD-10-PCS | Mod: CPTII,S$GLB,, | Performed by: STUDENT IN AN ORGANIZED HEALTH CARE EDUCATION/TRAINING PROGRAM

## 2020-09-16 PROCEDURE — 99214 PR OFFICE/OUTPT VISIT, EST, LEVL IV, 30-39 MIN: ICD-10-PCS | Mod: 25,S$GLB,, | Performed by: STUDENT IN AN ORGANIZED HEALTH CARE EDUCATION/TRAINING PROGRAM

## 2020-09-16 PROCEDURE — 1101F PT FALLS ASSESS-DOCD LE1/YR: CPT | Mod: CPTII,S$GLB,, | Performed by: STUDENT IN AN ORGANIZED HEALTH CARE EDUCATION/TRAINING PROGRAM

## 2020-09-16 PROCEDURE — 1126F PR PAIN SEVERITY QUANTIFIED, NO PAIN PRESENT: ICD-10-PCS | Mod: S$GLB,,, | Performed by: STUDENT IN AN ORGANIZED HEALTH CARE EDUCATION/TRAINING PROGRAM

## 2020-09-16 PROCEDURE — 99214 OFFICE O/P EST MOD 30 MIN: CPT | Mod: 25,S$GLB,, | Performed by: STUDENT IN AN ORGANIZED HEALTH CARE EDUCATION/TRAINING PROGRAM

## 2020-09-16 PROCEDURE — 3077F PR MOST RECENT SYSTOLIC BLOOD PRESSURE >= 140 MM HG: ICD-10-PCS | Mod: CPTII,S$GLB,, | Performed by: STUDENT IN AN ORGANIZED HEALTH CARE EDUCATION/TRAINING PROGRAM

## 2020-09-16 PROCEDURE — 1159F PR MEDICATION LIST DOCUMENTED IN MEDICAL RECORD: ICD-10-PCS | Mod: S$GLB,,, | Performed by: STUDENT IN AN ORGANIZED HEALTH CARE EDUCATION/TRAINING PROGRAM

## 2020-09-16 PROCEDURE — 99999 PR PBB SHADOW E&M-EST. PATIENT-LVL IV: ICD-10-PCS | Mod: PBBFAC,,, | Performed by: STUDENT IN AN ORGANIZED HEALTH CARE EDUCATION/TRAINING PROGRAM

## 2020-09-16 PROCEDURE — 3077F SYST BP >= 140 MM HG: CPT | Mod: CPTII,S$GLB,, | Performed by: STUDENT IN AN ORGANIZED HEALTH CARE EDUCATION/TRAINING PROGRAM

## 2020-09-16 PROCEDURE — 1101F PR PT FALLS ASSESS DOC 0-1 FALLS W/OUT INJ PAST YR: ICD-10-PCS | Mod: CPTII,S$GLB,, | Performed by: STUDENT IN AN ORGANIZED HEALTH CARE EDUCATION/TRAINING PROGRAM

## 2020-09-16 PROCEDURE — 3078F DIAST BP <80 MM HG: CPT | Mod: CPTII,S$GLB,, | Performed by: STUDENT IN AN ORGANIZED HEALTH CARE EDUCATION/TRAINING PROGRAM

## 2020-09-16 PROCEDURE — 3288F FALL RISK ASSESSMENT DOCD: CPT | Mod: CPTII,S$GLB,, | Performed by: STUDENT IN AN ORGANIZED HEALTH CARE EDUCATION/TRAINING PROGRAM

## 2020-09-16 PROCEDURE — 99999 PR PBB SHADOW E&M-EST. PATIENT-LVL IV: CPT | Mod: PBBFAC,,, | Performed by: STUDENT IN AN ORGANIZED HEALTH CARE EDUCATION/TRAINING PROGRAM

## 2020-09-16 NOTE — PROCEDURES
Wound Debridement    Date/Time: 9/16/2020 10:00 AM  Performed by: Ava Atkins DPM  Authorized by: Ava Atkins DPM     Consent Done?:  Yes (Verbal)  Local anesthesia used?: No      Wound Details:    Location:  Right foot    Location:  Right 1st Metatarsal Head    Type of Debridement:  Non-excisional       Length (cm):  0       Area (sq cm):  0       Width (cm):  0       Percent Debrided (%):  100       Depth (cm):  0       Total Area Debrided (sq cm):  0    Depth of debridement:  Epidermis/Dermis    Tissue debrided:  Other and Epidermis    Devitalized tissue debrided:  Callus    Instruments:  Blade and Curette    Bleeding:  None  Patient tolerance:  Patient tolerated the procedure well with no immediate complications

## 2020-09-16 NOTE — PROGRESS NOTES
"Subjective:      Patient ID: Caro Powell is a 80 y.o. female.    Chief Complaint: Foot Ulcer (right foot)    Caro LUA is a 80 y.o. female who presents to the clinic for evaluation and treatment of high risk feet. Caro LUA has a past medical history of Anticoagulant long-term use, Arthritis, Atrial flutter, Calcium nephrolithiasis (2007), Diabetes mellitus, type 2, Diabetic peripheral neuropathy associated with type 2 diabetes mellitus, and Hypertension. The patient's chief complaint is diabetic foot ulcer, right second toe. This patient has documented high risk feet requiring routine maintenance secondary to peripheral neuropathy. Noted drainage and discoloration from the toe a few days ago. Denies trauma. Accompanied by her .    5/7/18: Follow up for wound check right foot. Taking po clindamycin and ciprofloxacin as scheduled. Accompanied by her . Dressing remained c/d/i.    5/14/18: Presents for wound check. No new complaints. Accompanied by her .    5/31/18: Presents for wound check. Reports she has not received HH. PO abx completed.     12/5:  She is a pt of Dr. 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 She is presenting with new complaint of blister at right submet 1 and distal tip of 4th toe. She is DM2 and on insulin. It was 126 today. She recently bought a new shoes. She tells me she did not have any problems when she was wearing diabetic shoes but blisters formed after wearing a normal tennis shoes. It happened a week ago. She is traveling to [place], [place] in 2 weeks and wants to know if she can weight bear on her right foot. Denies N/V/F/C/SOB/CP    12/12: f/u right foot ulcer. Has been weight bearing in surgical shoe with football dressing. Denies pain. Tells me she has been walking in diabetic shoes on her left foot. She tells me there is a "callus" at lateral left 5th toe that she did not have before. She will travel to [place] soon. Also " tells me she noticed that her right foot is turning sideways and feels like it is affecting the way she walks.     1/21/19: Complains of worsening wound to right foot. She went to [place] for holidays and saws her wound to right foot worsened. Denies trauma.    2/4/19  Here for wound check.  Denies F/c/N/V    2/18/19: Wound check. Says dressing remained intact. Upon observing note she is wearing a different darco shoe then previously dispensed and foot appears not to be fully seated in shoe. Accompanied by her . Denies trauma.     2/25/19 wound check. Denies F/C/N/V    3/29/19 patient complains of new ulcer on 3rd toe that she first noticed on Wednesday.  She has been treating it with antibiotic ointment and bandaids.  Denies F/C/N/V.  Accompanied by her .    04/08/2019:  Presents for wound check right foot.  Relates the dressing remained intact.    4/15/19: Presents for wound check. No new complaints.    06/24/2019:  Follow-up for acute onset osteomyelitis to the right 3rd toe receiving IV Ancef per Infectious Disease recommendation.  [facility] is following her and changing dressings 3 times weekly.    06/20/2019:  Referred by her home health nurse to concern of a new wound developing to the right 3rd toe.  She is also concerned the recurrent wound under the plantar 1st met head right foot.  Says that she does note that she walks differently on this foot and and that it tends to roll more.    07/15/2019:  Follow-up for wound check right foot. Says that she canceled the scheduled gastrocnemius recession due to anxiety or having to manage a PICC line and being restricted due to surgery.  Currently receiving home health.    08/08/2019:  Presents for wound check right foot.  Says that her home health company no longer of the her house.  Receiving long-term IV antibiotics for the osteomyelitis right 3rd toe.  Requesting that her surgery be rescheduled.    08/29/2019:  Presents for wound check right  foot. Complains that she has a wound to her right 2nd toe as well as a persistent 1 underneath the ball of foot. She is scheduled for outpatient gastrocnemius recession right leg on 09/10/2019.  She has not seen her PCP for medical clearance yet.  Accompanied by her .  Home Health per [facility].    09/19/2019:  Post endoscopic gastrocnemius recession right leg on 09/10/2019.  She has been nonweightbearing for the past week.  Dressing has remained clean dry intact. Wearing tall orthopedic boot.  Accompanied by her .  Relates she did not take any pain medicine.  No new complaints.    10/10/2019:  4 weeks post gastrocnemius recession right leg.  Relates no pain.  She has been sleeping and walking with the orthopedic boot right lower extremity.  No new complaints.  Says that she got a new pair diabetic shoes earlier this year has brought them to be examined.    11/01/2019:  Complains of a new wound to the right fourth toe that occurred  4 days ago after she kicked the side of her bed in the middle of the night.  Denies any pain.  Says that the foot was bruised and has improved since then.  She has been applying Betadine to the wound.    12/09/2019:  Follow-up for right 4th toe fracture occurred approximately 5 weeks ago. Relates that she still getting some swelling to the area and does not understand why.  She has been applying mupirocin ointment around the toe.    12/30/19: Presents for wound right fourth toe. Says her wound was healed then she wore her extra depth diabetic shoes and noted the toe wound recurred so she stopped wearing it and is in a tennis shoe which she says has not caused further irritation and toe appears to be healing. Denies further trauma. Completed previous po antibiotics.    07/29/2020:  Presents today complaining of a new onset wound that she discover 2 days ago to the bottom of the right foot.  Denies any trauma however relates that approximately 2 weeks ago she did have  a few days that she would walk around without any protective shoe gear around her home secondary to shingles outbreak.  Denies any trauma.  She has been wearing her Epstein tennis shoes which she purchased nearly a year ago.  Relates that her previous insoles are more than a year old and had broken down so she stopped wearing them.    08/14/2020:  Presents for wound check right foot.  Dressing remained clean dry intact.  She not recall that she stepped on a nail in her living room that the not penetrate very deep and had no rust.    9/8/20: Pt presents for wound check to right foot. Pt states her wound healed and has reopened again today, states she saw blood on her sock. She thinks it started after walking at the mall.  Also states she has painful ingrown toenail to 3rd toe. Per chart review, toenails debrided during previous visit 8/14/20. States she is using lotion to soften her calluses daily. States she needs new diabetic shoes. No other pedal complaints at this time.     9/16/20: Pt seen today for wound. No new pedal complaints.    PCP: Brayan Penny MD    Date Last Seen by PCP:     Current shoe gear:  Affected Foot:  Tennis shoes      Unaffected Foot: Tennis shoes    History of Trauma: negative  Sign of Infection: none    Hemoglobin A1C   Date Value Ref Range Status   06/25/2020 6.2 (H) <5.7 % of total Hgb Final     Comment:     For someone without known diabetes, a hemoglobin   A1c value between 5.7% and 6.4% is consistent with  prediabetes and should be confirmed with a   follow-up test.     For someone with known diabetes, a value <7%  indicates that their diabetes is well controlled. A1c  targets should be individualized based on duration of  diabetes, age, comorbid conditions, and other  considerations.     This assay result is consistent with an increased risk  of diabetes.     Currently, no consensus exists regarding use of  hemoglobin A1c for diagnosis of diabetes for children.        06/09/2019 6.6  "(H) 4.0 - 5.6 % Final     Comment:     ADA Screening Guidelines:  5.7-6.4%  Consistent with prediabetes  >or=6.5%  Consistent with diabetes  High levels of fetal hemoglobin interfere with the HbA1C  assay. Heterozygous hemoglobin variants (HbS, HgC, etc)do  not significantly interfere with this assay.   However, presence of multiple variants may affect accuracy.     06/09/2019 6.6 (H) 4.0 - 5.6 % Final     Comment:     ADA Screening Guidelines:  5.7-6.4%  Consistent with prediabetes  >or=6.5%  Consistent with diabetes  High levels of fetal hemoglobin interfere with the HbA1C  assay. Heterozygous hemoglobin variants (HbS, HgC, etc)do  not significantly interfere with this assay.   However, presence of multiple variants may affect accuracy.     01/12/2018 8.3 % Final     Vitals:    09/16/20 1000   BP: (!) 144/53   Pulse: 65   Weight: 76.7 kg (169 lb 1.5 oz)   Height: 5' 2" (1.575 m)   PainSc: 0-No pain      Past Medical History:   Diagnosis Date    Anticoagulant long-term use     Arthritis     Atrial flutter     Calcium nephrolithiasis 2007    Diabetes mellitus, type 2     Diabetic peripheral neuropathy associated with type 2 diabetes mellitus     Hypertension        Past Surgical History:   Procedure Laterality Date    COLONOSCOPY  11/28/2011    sigmoid diverticulosis, external hemorrhoids    ENDOSCOPIC GASTROCNEMIUS RECESSION Right 9/10/2019    Procedure: RECESSION, GASTROCNEMIUS, ENDOSCOPIC;  Surgeon: Derek Jose DPM;  Location[facility];  Service: Podiatry;  Laterality: Right;  Arthrex center line (ron notified)  Video    HYSTERECTOMY      SHOULDER SURGERY Left     TOE AMPUTATION Right 05/22/2017    5th toe       Family History   Problem Relation Age of Onset    Diabetes Mother     Heart failure Father     Kidney failure Brother        Social History     Socioeconomic History    Marital status:      Spouse name: Not on file    Number of children: Not on file    Years of " education: Not on file    Highest education level: Not on file   Occupational History    Not on file   Social Needs    Financial resource strain: Not on file    Food insecurity     Worry: Not on file     Inability: Not on file    Transportation needs     Medical: Not on file     Non-medical: Not on file   Tobacco Use    Smoking status: Never Smoker    Smokeless tobacco: Never Used   Substance and Sexual Activity    Alcohol use: No    Drug use: No    Sexual activity: Yes   Lifestyle    Physical activity     Days per week: Not on file     Minutes per session: Not on file    Stress: Not on file   Relationships    Social connections     Talks on phone: Not on file     Gets together: Not on file     Attends [employer] service: Not on file     Active member of club or organization: Not on file     Attends meetings of clubs or organizations: Not on file     Relationship status: Not on file   Other Topics Concern    Not on file   Social History Narrative    Not on file       Current Outpatient Medications   Medication Sig Dispense Refill    ACCU-CHEK SAMI CONTROL SOLN Soln       ACCU-CHEK SAMI PLUS METER Misc       ACCU-CHEK SAMI PLUS TEST STRP Strp USE TO TEST BLOOD SUGAR ONCE DAILY 100 strip 3    ACCU-CHEK SOFT DEV LANCETS Kit       ACCU-CHEK SOFTCLIX LANCETS Misc TEST ONCE DAILY 100 each 3    ammonium lactate 12 % Crea Apply to feet twice daily. Avoid use between toes. 140 g 5    apixaban (ELIQUIS) 5 mg Tab TAKE 1 TABLET BY MOUTH TWICE DAILY 60 tablet 5    diazePAM (VALIUM) 5 MG tablet Take 1 tablet (5 mg total) by mouth daily as needed for Anxiety. 90 tablet 3    famotidine (PEPCID) 20 MG tablet       FLUZONE HIGHDOSE QUAD 20-21  mcg/0.7 mL Syrg       gabapentin (NEURONTIN) 400 MG capsule Take 1 capsule (400 mg total) by mouth 3 (three) times daily. 270 capsule 3    glimepiride (AMARYL) 1 MG tablet Take 1 tablet (1 mg total) by mouth 2 (two) times daily.      lisinopriL  (PRINIVIL,ZESTRIL) 20 MG tablet       loratadine-pseudoephedrine  mg (ALLERGY RELIEF D-24HR)  mg per 24 hr tablet 1 tablet as needed      metoprolol succinate (TOPROL-XL) 25 MG 24 hr tablet Take by mouth once daily.       mupirocin (BACTROBAN) 2 % ointment Apply topically once daily. 22 g 1    pramipexole (MIRAPEX) 0.125 MG tablet Take by mouth every evening.       pregabalin (LYRICA) 75 MG capsule Take 1 capsule (75 mg total) by mouth 2 (two) times daily as needed (shingles pain). 60 capsule 2    valACYclovir (VALTREX) 1000 MG tablet Take 1 tablet (1,000 mg total) by mouth 3 (three) times daily. for 7 days 21 tablet 0     Current Facility-Administered Medications   Medication Dose Route Frequency Provider Last Rate Last Dose    sodium chloride 0.9% flush 10 mL  10 mL Intravenous PRN Derek Jose DPM        sodium chloride 0.9% flush 10 mL  10 mL Intravenous PRN Derek Jose DPM           Review of patient's allergies indicates:   Allergen Reactions    Codeine Nausea Only         Review of Systems   Constitution: Negative for chills, fever and malaise/fatigue.   HENT: Negative for congestion.    Cardiovascular: Negative for chest pain, claudication and leg swelling.   Respiratory: Negative for cough and shortness of breath.    Skin: Positive for dry skin, nail changes and poor wound healing. Negative for color change.   Musculoskeletal: Negative for back pain, joint pain, muscle cramps and muscle weakness.   Gastrointestinal: Negative for nausea and vomiting.   Neurological: Positive for numbness and paresthesias. Negative for weakness.   Psychiatric/Behavioral: Negative for altered mental status.           Objective:      Physical Exam  Constitutional:       General: She is not in acute distress.     Appearance: She is well-developed. She is not diaphoretic.   Cardiovascular:      Pulses:           Dorsalis pedis pulses are 2+ on the right side and 2+ on the left side.         Posterior tibial pulses are 2+ on the right side and 2+ on the left side.      Comments: CFT< 3 secs all toes bilateral foot, skin temp warm bilateral foot, no digital hair growth bilateral foot, mild lower extremity edema bilateral.    Musculoskeletal:         General: No tenderness.      Right foot: Decreased range of motion. Deformity present.      Left foot: Decreased range of motion. Deformity present.      Comments: Range of motion right ankle reveals +5° of dorsiflexion during knee extension.    No pain with ROM or MMT bilateral foot. Muscle strength 4/5 to bilateral lower extremity groups.     Plantar flexed first metatarsal head right foot.    Amputated right fifth toe          Feet:      Right foot:      Protective Sensation: 10 sites tested. 5 sites sensed.      Skin integrity: Ulcer, callus and dry skin present. No blister, erythema or warmth.      Left foot:      Protective Sensation: 10 sites tested. 5 sites sensed.      Skin integrity: Callus and dry skin present. No ulcer, blister, skin breakdown, erythema or warmth.   Lymphadenopathy:      Comments: Negative lymphadenopathy bilateral popliteal fossa and tarsal tunnel.    Skin:     General: Skin is warm and dry.      Capillary Refill: Capillary refill takes less than 2 seconds.      Coloration: Skin is not pale.      Findings: No abrasion, bruising, burn, ecchymosis, erythema, laceration, petechiae or rash.      Nails: There is no clubbing.        Comments: Normal appearing scar medial mid right leg.    Ulcer Location: plantar first met head right  Measurements:  Unable to measure pre debridement due to overlying hyperkeratosis.  Post debridement wound measures 1h2p0di and is healed.   Signs of infection: None.       Neurological:      Mental Status: She is alert and oriented to person, place, and time.      Sensory: Sensory deficit present.      Motor: No abnormal muscle tone.      Comments: [place]-Chucho 5.07 monofilamant testing diminished.  Light touch sensation is diminished bilaterally. Vibratory sensation is diminished ashleigh.    Psychiatric:         Behavior: Behavior normal.         Thought Content: Thought content normal.         Judgment: Judgment normal.               Assessment:       Encounter Diagnoses   Name Primary?    Type 2 diabetes mellitus with peripheral neuropathy Yes    Pre-ulcerative calluses          Plan:       Caro LUA was seen today for foot ulcer.    Diagnoses and all orders for this visit:    Type 2 diabetes mellitus with peripheral neuropathy    Pre-ulcerative calluses      I counseled the patient on her conditions, their implications and medical management.    Callus debrided, underlying wound noted to be healed, applied protective mepilex border    Pt advised to obtain diabetic shoes    Shoe inspection. Diabetic Foot Education. Patient reminded of the importance of good nutrition and blood sugar control to help prevent podiatric complications of diabetes. Patient instructed on proper foot hygeine. We discussed wearing proper shoe gear, daily foot inspections, never walking without protective shoe gear, never putting sharp instruments to feet, routine podiatric nail visits. I discussed with the  patient  signs and symptoms of infection including redness, drainage, purulence, odor, pain, elevated BS, streaking, fever, chills, etc . Patient is to seek medical attention (ER or urgent care) if these symptoms occur      RTC in 3 mo, sooner PRN

## 2020-09-24 ENCOUNTER — TELEPHONE (OUTPATIENT)
Dept: PODIATRY | Facility: CLINIC | Age: 80
End: 2020-09-24

## 2020-09-24 NOTE — TELEPHONE ENCOUNTER
----- Message from Preston Carlos sent at 9/24/2020  4:05 PM CDT -----  Type:  Needs Medical Advice    Who Called: self  Reason:requesting call back  Would the patient rather a call back or a response via [employer]? call  Best Call Back Number: [phone]  Additional Information: none

## 2020-10-07 PROBLEM — M86.071 ACUTE HEMATOGENOUS OSTEOMYELITIS OF RIGHT FOOT: Status: RESOLVED | Noted: 2019-06-27 | Resolved: 2020-10-07

## 2020-11-04 ENCOUNTER — OFFICE VISIT (OUTPATIENT)
Dept: PODIATRY | Facility: CLINIC | Age: 80
End: 2020-11-04
Payer: MEDICARE

## 2020-11-04 VITALS
SYSTOLIC BLOOD PRESSURE: 124 MMHG | DIASTOLIC BLOOD PRESSURE: 52 MMHG | WEIGHT: 171.06 LBS | HEART RATE: 59 BPM | BODY MASS INDEX: 31.48 KG/M2 | HEIGHT: 62 IN

## 2020-11-04 DIAGNOSIS — E08.621 DIABETIC ULCER OF OTHER PART OF RIGHT FOOT ASSOCIATED WITH DIABETES MELLITUS DUE TO UNDERLYING CONDITION, LIMITED TO BREAKDOWN OF SKIN: ICD-10-CM

## 2020-11-04 DIAGNOSIS — L97.511 DIABETIC ULCER OF OTHER PART OF RIGHT FOOT ASSOCIATED WITH DIABETES MELLITUS DUE TO UNDERLYING CONDITION, LIMITED TO BREAKDOWN OF SKIN: ICD-10-CM

## 2020-11-04 DIAGNOSIS — E11.42 TYPE 2 DIABETES MELLITUS WITH PERIPHERAL NEUROPATHY: Primary | ICD-10-CM

## 2020-11-04 PROCEDURE — 99999 PR PBB SHADOW E&M-EST. PATIENT-LVL IV: ICD-10-PCS | Mod: PBBFAC,,, | Performed by: STUDENT IN AN ORGANIZED HEALTH CARE EDUCATION/TRAINING PROGRAM

## 2020-11-04 PROCEDURE — 1126F PR PAIN SEVERITY QUANTIFIED, NO PAIN PRESENT: ICD-10-PCS | Mod: S$GLB,,, | Performed by: STUDENT IN AN ORGANIZED HEALTH CARE EDUCATION/TRAINING PROGRAM

## 2020-11-04 PROCEDURE — 3078F DIAST BP <80 MM HG: CPT | Mod: CPTII,S$GLB,, | Performed by: STUDENT IN AN ORGANIZED HEALTH CARE EDUCATION/TRAINING PROGRAM

## 2020-11-04 PROCEDURE — 1159F MED LIST DOCD IN RCRD: CPT | Mod: S$GLB,,, | Performed by: STUDENT IN AN ORGANIZED HEALTH CARE EDUCATION/TRAINING PROGRAM

## 2020-11-04 PROCEDURE — 99214 OFFICE O/P EST MOD 30 MIN: CPT | Mod: 25,S$GLB,, | Performed by: STUDENT IN AN ORGANIZED HEALTH CARE EDUCATION/TRAINING PROGRAM

## 2020-11-04 PROCEDURE — 1101F PT FALLS ASSESS-DOCD LE1/YR: CPT | Mod: CPTII,S$GLB,, | Performed by: STUDENT IN AN ORGANIZED HEALTH CARE EDUCATION/TRAINING PROGRAM

## 2020-11-04 PROCEDURE — 99999 PR PBB SHADOW E&M-EST. PATIENT-LVL IV: CPT | Mod: PBBFAC,,, | Performed by: STUDENT IN AN ORGANIZED HEALTH CARE EDUCATION/TRAINING PROGRAM

## 2020-11-04 PROCEDURE — 3074F PR MOST RECENT SYSTOLIC BLOOD PRESSURE < 130 MM HG: ICD-10-PCS | Mod: CPTII,S$GLB,, | Performed by: STUDENT IN AN ORGANIZED HEALTH CARE EDUCATION/TRAINING PROGRAM

## 2020-11-04 PROCEDURE — 11042 DBRDMT SUBQ TIS 1ST 20SQCM/<: CPT | Mod: S$GLB,,, | Performed by: STUDENT IN AN ORGANIZED HEALTH CARE EDUCATION/TRAINING PROGRAM

## 2020-11-04 PROCEDURE — 1101F PR PT FALLS ASSESS DOC 0-1 FALLS W/OUT INJ PAST YR: ICD-10-PCS | Mod: CPTII,S$GLB,, | Performed by: STUDENT IN AN ORGANIZED HEALTH CARE EDUCATION/TRAINING PROGRAM

## 2020-11-04 PROCEDURE — 1159F PR MEDICATION LIST DOCUMENTED IN MEDICAL RECORD: ICD-10-PCS | Mod: S$GLB,,, | Performed by: STUDENT IN AN ORGANIZED HEALTH CARE EDUCATION/TRAINING PROGRAM

## 2020-11-04 PROCEDURE — 1126F AMNT PAIN NOTED NONE PRSNT: CPT | Mod: S$GLB,,, | Performed by: STUDENT IN AN ORGANIZED HEALTH CARE EDUCATION/TRAINING PROGRAM

## 2020-11-04 PROCEDURE — 99214 PR OFFICE/OUTPT VISIT, EST, LEVL IV, 30-39 MIN: ICD-10-PCS | Mod: 25,S$GLB,, | Performed by: STUDENT IN AN ORGANIZED HEALTH CARE EDUCATION/TRAINING PROGRAM

## 2020-11-04 PROCEDURE — 3078F PR MOST RECENT DIASTOLIC BLOOD PRESSURE < 80 MM HG: ICD-10-PCS | Mod: CPTII,S$GLB,, | Performed by: STUDENT IN AN ORGANIZED HEALTH CARE EDUCATION/TRAINING PROGRAM

## 2020-11-04 PROCEDURE — 11042 WOUND DEBRIDEMENT: ICD-10-PCS | Mod: S$GLB,,, | Performed by: STUDENT IN AN ORGANIZED HEALTH CARE EDUCATION/TRAINING PROGRAM

## 2020-11-04 PROCEDURE — 3074F SYST BP LT 130 MM HG: CPT | Mod: CPTII,S$GLB,, | Performed by: STUDENT IN AN ORGANIZED HEALTH CARE EDUCATION/TRAINING PROGRAM

## 2020-11-04 NOTE — PROGRESS NOTES
"Subjective:      Patient ID: Caro Powell is a 80 y.o. female.    Chief Complaint: Foot Ulcer (right)    Caro LUA is a 80 y.o. female who presents to the clinic for evaluation and treatment of high risk feet. Caro LUA has a past medical history of Anticoagulant long-term use, Arthritis, Atrial flutter, Calcium nephrolithiasis (2007), Diabetes mellitus, type 2, Diabetic peripheral neuropathy associated with type 2 diabetes mellitus, and Hypertension. The patient's chief complaint is diabetic foot ulcer, right second toe. This patient has documented high risk feet requiring routine maintenance secondary to peripheral neuropathy. Noted drainage and discoloration from the toe a few days ago. Denies trauma. Accompanied by her .    5/7/18: Follow up for wound check right foot. Taking po clindamycin and ciprofloxacin as scheduled. Accompanied by her . Dressing remained c/d/i.    5/14/18: Presents for wound check. No new complaints. Accompanied by her .    5/31/18: Presents for wound check. Reports she has not received HH. PO abx completed.     12/5:  She is a pt of Dr. 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 She is presenting with new complaint of blister at right submet 1 and distal tip of 4th toe. She is DM2 and on insulin. It was 126 today. She recently bought a new shoes. She tells me she did not have any problems when she was wearing diabetic shoes but blisters formed after wearing a normal tennis shoes. It happened a week ago. She is traveling to [place], [place] in 2 weeks and wants to know if she can weight bear on her right foot. Denies N/V/F/C/SOB/CP    12/12: f/u right foot ulcer. Has been weight bearing in surgical shoe with football dressing. Denies pain. Tells me she has been walking in diabetic shoes on her left foot. She tells me there is a "callus" at lateral left 5th toe that she did not have before. She will travel to [place] soon. Also tells me " she noticed that her right foot is turning sideways and feels like it is affecting the way she walks.     1/21/19: Complains of worsening wound to right foot. She went to [place] for holidays and saws her wound to right foot worsened. Denies trauma.    2/4/19  Here for wound check.  Denies F/c/N/V    2/18/19: Wound check. Says dressing remained intact. Upon observing note she is wearing a different darco shoe then previously dispensed and foot appears not to be fully seated in shoe. Accompanied by her . Denies trauma.     2/25/19 wound check. Denies F/C/N/V    3/29/19 patient complains of new ulcer on 3rd toe that she first noticed on Wednesday.  She has been treating it with antibiotic ointment and bandaids.  Denies F/C/N/V.  Accompanied by her .    04/08/2019:  Presents for wound check right foot.  Relates the dressing remained intact.    4/15/19: Presents for wound check. No new complaints.    06/24/2019:  Follow-up for acute onset osteomyelitis to the right 3rd toe receiving IV Ancef per Infectious Disease recommendation.  [facility] is following her and changing dressings 3 times weekly.    06/20/2019:  Referred by her home health nurse to concern of a new wound developing to the right 3rd toe.  She is also concerned the recurrent wound under the plantar 1st met head right foot.  Says that she does note that she walks differently on this foot and and that it tends to roll more.    07/15/2019:  Follow-up for wound check right foot. Says that she canceled the scheduled gastrocnemius recession due to anxiety or having to manage a PICC line and being restricted due to surgery.  Currently receiving home health.    08/08/2019:  Presents for wound check right foot.  Says that her home health company no longer of the her house.  Receiving long-term IV antibiotics for the osteomyelitis right 3rd toe.  Requesting that her surgery be rescheduled.    08/29/2019:  Presents for wound check right foot.  Complains that she has a wound to her right 2nd toe as well as a persistent 1 underneath the ball of foot. She is scheduled for outpatient gastrocnemius recession right leg on 09/10/2019.  She has not seen her PCP for medical clearance yet.  Accompanied by her .  Home Health per [facility].    09/19/2019:  Post endoscopic gastrocnemius recession right leg on 09/10/2019.  She has been nonweightbearing for the past week.  Dressing has remained clean dry intact. Wearing tall orthopedic boot.  Accompanied by her .  Relates she did not take any pain medicine.  No new complaints.    10/10/2019:  4 weeks post gastrocnemius recession right leg.  Relates no pain.  She has been sleeping and walking with the orthopedic boot right lower extremity.  No new complaints.  Says that she got a new pair diabetic shoes earlier this year has brought them to be examined.    11/01/2019:  Complains of a new wound to the right fourth toe that occurred  4 days ago after she kicked the side of her bed in the middle of the night.  Denies any pain.  Says that the foot was bruised and has improved since then.  She has been applying Betadine to the wound.    12/09/2019:  Follow-up for right 4th toe fracture occurred approximately 5 weeks ago. Relates that she still getting some swelling to the area and does not understand why.  She has been applying mupirocin ointment around the toe.    12/30/19: Presents for wound right fourth toe. Says her wound was healed then she wore her extra depth diabetic shoes and noted the toe wound recurred so she stopped wearing it and is in a tennis shoe which she says has not caused further irritation and toe appears to be healing. Denies further trauma. Completed previous po antibiotics.    07/29/2020:  Presents today complaining of a new onset wound that she discover 2 days ago to the bottom of the right foot.  Denies any trauma however relates that approximately 2 weeks ago she did have a few  days that she would walk around without any protective shoe gear around her home secondary to shingles outbreak.  Denies any trauma.  She has been wearing her [employer] tennis shoes which she purchased nearly a year ago.  Relates that her previous insoles are more than a year old and had broken down so she stopped wearing them.    08/14/2020:  Presents for wound check right foot.  Dressing remained clean dry intact.  She not recall that she stepped on a nail in her living room that the not penetrate very deep and had no rust.    9/8/20: Pt presents for wound check to right foot. Pt states her wound healed and has reopened again today, states she saw blood on her sock. She thinks it started after walking at the mall.  Also states she has painful ingrown toenail to 3rd toe. Per chart review, toenails debrided during previous visit 8/14/20. States she is using lotion to soften her calluses daily. States she needs new diabetic shoes. No other pedal complaints at this time    11/4/20: Pt seen today, states that she received extra depth shoes with custom insoles but her ulcer has re-opened again. She is concerned that her wound is not getting enough offloading. Denies signs of infection. No other pedal complaints at this time.      PCP: Brayan Penny MD    Date Last Seen by PCP:     Current shoe gear:  Affected Foot:  Tennis shoes      Unaffected Foot: Tennis shoes    History of Trauma: negative  Sign of Infection: none    Hemoglobin A1C   Date Value Ref Range Status   06/25/2020 6.2 (H) <5.7 % of total Hgb Final     Comment:     For someone without known diabetes, a hemoglobin   A1c value between 5.7% and 6.4% is consistent with  prediabetes and should be confirmed with a   follow-up test.     For someone with known diabetes, a value <7%  indicates that their diabetes is well controlled. A1c  targets should be individualized based on duration of  diabetes, age, comorbid conditions, and other  considerations.     This  "assay result is consistent with an increased risk  of diabetes.     Currently, no consensus exists regarding use of  hemoglobin A1c for diagnosis of diabetes for children.        06/09/2019 6.6 (H) 4.0 - 5.6 % Final     Comment:     ADA Screening Guidelines:  5.7-6.4%  Consistent with prediabetes  >or=6.5%  Consistent with diabetes  High levels of fetal hemoglobin interfere with the HbA1C  assay. Heterozygous hemoglobin variants (HbS, HgC, etc)do  not significantly interfere with this assay.   However, presence of multiple variants may affect accuracy.     06/09/2019 6.6 (H) 4.0 - 5.6 % Final     Comment:     ADA Screening Guidelines:  5.7-6.4%  Consistent with prediabetes  >or=6.5%  Consistent with diabetes  High levels of fetal hemoglobin interfere with the HbA1C  assay. Heterozygous hemoglobin variants (HbS, HgC, etc)do  not significantly interfere with this assay.   However, presence of multiple variants may affect accuracy.     01/12/2018 8.3 % Final     Vitals:    11/04/20 0932   BP: (!) 124/52   Pulse: (!) 59   Weight: 77.6 kg (171 lb 1.2 oz)   Height: 5' 2" (1.575 m)   PainSc: 0-No pain      Past Medical History:   Diagnosis Date    Anticoagulant long-term use     Arthritis     Atrial flutter     Calcium nephrolithiasis 2007    Diabetes mellitus, type 2     Diabetic peripheral neuropathy associated with type 2 diabetes mellitus     Hypertension        Past Surgical History:   Procedure Laterality Date    COLONOSCOPY  11/28/2011    sigmoid diverticulosis, external hemorrhoids    ENDOSCOPIC GASTROCNEMIUS RECESSION Right 9/10/2019    Procedure: RECESSION, GASTROCNEMIUS, ENDOSCOPIC;  Surgeon: Derek Jose DPM;  Location: [facility];  Service: Podiatry;  Laterality: Right;  Arthrex center line (ron notified)  Video    HYSTERECTOMY      SHOULDER SURGERY Left     TOE AMPUTATION Right 05/22/2017    5th toe       Family History   Problem Relation Age of Onset    Diabetes Mother     Heart failure " Father     Kidney failure Brother        Social History     Socioeconomic History    Marital status:      Spouse name: Not on file    Number of children: Not on file    Years of education: Not on file    Highest education level: Not on file   Occupational History    Not on file   Social Needs    Financial resource strain: Not on file    Food insecurity     Worry: Not on file     Inability: Not on file    Transportation needs     Medical: Not on file     Non-medical: Not on file   Tobacco Use    Smoking status: Never Smoker    Smokeless tobacco: Never Used   Substance and Sexual Activity    Alcohol use: No    Drug use: No    Sexual activity: Yes   Lifestyle    Physical activity     Days per week: Not on file     Minutes per session: Not on file    Stress: Not on file   Relationships    Social connections     Talks on phone: Not on file     Gets together: Not on file     Attends Mandaen service: Not on file     Active member of club or organization: Not on file     Attends meetings of clubs or organizations: Not on file     Relationship status: Not on file   Other Topics Concern    Not on file   Social History Narrative    Not on file       Current Outpatient Medications   Medication Sig Dispense Refill    ACCU-CHEK SAMI CONTROL SOLN Soln       ACCU-CHEK SAMI PLUS METER Misc       ACCU-CHEK SAMI PLUS TEST STRP Strp USE TO TEST BLOOD SUGAR ONCE DAILY 100 strip 3    ACCU-CHEK SOFT DEV LANCETS Kit       ACCU-CHEK SOFTCLIX LANCETS Misc TEST ONCE DAILY 100 each 3    ammonium lactate 12 % Crea Apply to feet twice daily. Avoid use between toes. 140 g 5    apixaban (ELIQUIS) 5 mg Tab TAKE 1 TABLET BY MOUTH TWICE DAILY 60 tablet 5    diazePAM (VALIUM) 5 MG tablet Take 1 tablet (5 mg total) by mouth daily as needed for Anxiety. 90 tablet 3    famotidine (PEPCID) 20 MG tablet       FLUZONE HIGHDOSE QUAD 20-21  mcg/0.7 mL Syrg       gabapentin (NEURONTIN) 400 MG capsule 2 pills in  the AM, 1 pill at noon, and 2 pills in the evening. 450 capsule 3    glimepiride (AMARYL) 1 MG tablet Take 1 tablet (1 mg total) by mouth 2 (two) times daily.      lisinopriL (PRINIVIL,ZESTRIL) 20 MG tablet       loratadine-pseudoephedrine  mg (ALLERGY RELIEF D-24HR)  mg per 24 hr tablet 1 tablet as needed      metoprolol succinate (TOPROL-XL) 25 MG 24 hr tablet Take by mouth once daily.       mupirocin (BACTROBAN) 2 % ointment Apply topically once daily. 22 g 1    pramipexole (MIRAPEX) 0.125 MG tablet Take by mouth every evening.       valACYclovir (VALTREX) 1000 MG tablet Take 1 tablet (1,000 mg total) by mouth 3 (three) times daily. for 7 days 21 tablet 0     Current Facility-Administered Medications   Medication Dose Route Frequency Provider Last Rate Last Dose    sodium chloride 0.9% flush 10 mL  10 mL Intravenous PRN Derek Jose DPM        sodium chloride 0.9% flush 10 mL  10 mL Intravenous PRN Derek Jose DPM           Review of patient's allergies indicates:   Allergen Reactions    Codeine Nausea Only         Review of Systems   Constitution: Negative for chills, fever and malaise/fatigue.   HENT: Negative for congestion.    Cardiovascular: Negative for chest pain, claudication and leg swelling.   Respiratory: Negative for cough and shortness of breath.    Skin: Positive for dry skin, nail changes and poor wound healing. Negative for color change.   Musculoskeletal: Negative for back pain, joint pain, muscle cramps and muscle weakness.   Gastrointestinal: Negative for nausea and vomiting.   Neurological: Positive for numbness and paresthesias. Negative for weakness.   Psychiatric/Behavioral: Negative for altered mental status.           Objective:      Physical Exam  Constitutional:       General: She is not in acute distress.     Appearance: She is well-developed. She is not diaphoretic.   Cardiovascular:      Pulses:           Dorsalis pedis pulses are 2+ on the right  side and 2+ on the left side.        Posterior tibial pulses are 2+ on the right side and 2+ on the left side.      Comments: CFT< 3 secs all toes bilateral foot, skin temp warm bilateral foot, no digital hair growth bilateral foot, mild lower extremity edema bilateral.    Musculoskeletal:         General: No tenderness.      Right foot: Decreased range of motion. Deformity present.      Left foot: Decreased range of motion. Deformity present.      Comments: Range of motion right ankle reveals +5° of dorsiflexion during knee extension.    No pain with ROM or MMT bilateral foot. Muscle strength 4/5 to bilateral lower extremity groups.     Plantar flexed first metatarsal head right foot.    Amputated right fifth toe          Feet:      Right foot:      Protective Sensation: 10 sites tested. 5 sites sensed.      Skin integrity: Ulcer, callus and dry skin present. No blister, erythema or warmth.      Left foot:      Protective Sensation: 10 sites tested. 5 sites sensed.      Skin integrity: Callus and dry skin present. No ulcer, blister, skin breakdown, erythema or warmth.   Lymphadenopathy:      Comments: Negative lymphadenopathy bilateral popliteal fossa and tarsal tunnel.    Skin:     General: Skin is warm and dry.      Capillary Refill: Capillary refill takes less than 2 seconds.      Coloration: Skin is not pale.      Findings: No abrasion, bruising, burn, ecchymosis, erythema, laceration, petechiae or rash.      Nails: There is no clubbing.        Comments: Normal appearing scar medial mid right leg.    Ulcer Location: plantar first metatarsal head right  Measurements:  Unable to measure pre debridement due to overlying hyperkeratosis.  Post debridement measures 0.5 x 0.7 x 0.2 cm  Periwound: Intact with raised hyperkeratotic skin  Drainage:  Scant serosanguinous.  Pus: None.  Malodor: None.  Base:  100% granular. 0% fibrin.  Signs of infection: None.  Does not probe, track or undermine post debridement.      Neurological:      Mental Status: She is alert and oriented to person, place, and time.      Sensory: Sensory deficit present.      Motor: No abnormal muscle tone.      Comments: [place] 5.07 monofilamant testing diminished. Light touch sensation is diminished bilaterally. Vibratory sensation is diminished ashleigh.    Psychiatric:         Behavior: Behavior normal.         Thought Content: Thought content normal.         Judgment: Judgment normal.               Assessment:       Encounter Diagnoses   Name Primary?    Type 2 diabetes mellitus with peripheral neuropathy Yes    Diabetic ulcer of other part of right foot associated with diabetes mellitus due to underlying condition, limited to breakdown of skin          Plan:       Caro LUA was seen today for foot ulcer.    Diagnoses and all orders for this visit:    Type 2 diabetes mellitus with peripheral neuropathy  -     Wound Debridement    Diabetic ulcer of other part of right foot associated with diabetes mellitus due to underlying condition, limited to breakdown of skin  -     Wound Debridement      I counseled the patient on her conditions, their implications and medical management.    Wound debridement and dressing per attached note.  Wound dressed with offloading felt aperture hydrafera blue, pete foam, cast padding and flexinet.     Will attempt to contact [employer] to see if any further offloading can be done.     Dressing application as per above. Darco shoe applied to offload the area. Advised patient that this should be worn on the affected foot at all times when ambulating. Short-term goals include maintaining good offloading and minimizing bioburden, promoting granulation and epithelialization to healing.  Long-term goals include keeping the wound healed by good offloading and medical management under the direction of internist.      Shoe inspection. Diabetic Foot Education. Patient reminded of the importance of good nutrition and blood  sugar control to help prevent podiatric complications of diabetes. Patient instructed on proper foot hygeine. We discussed wearing proper shoe gear, daily foot inspections, never walking without protective shoe gear, never putting sharp instruments to feet, routine podiatric nail visits. I discussed with the  patient  signs and symptoms of infection including redness, drainage, purulence, odor, pain, elevated BS, streaking, fever, chills, etc . Patient is to seek medical attention (ER or urgent care) if these symptoms occur      Elevation and rest.    RTC in 1 week, sooner PRN

## 2020-11-04 NOTE — PROCEDURES
Wound Debridement    Date/Time: 11/4/2020 9:15 AM  Performed by: Ava Atkins DPM  Authorized by: Ava Atkins DPM     Consent Done?:  Yes (Verbal)  Local anesthesia used?: No      Wound Details:    Location:  Right foot    Location:  Right 1st Metatarsal Head    Type of Debridement:  Excisional       Length (cm):  0.7       Area (sq cm):  0.35       Width (cm):  0.5       Percent Debrided (%):  100       Depth (cm):  0.2       Total Area Debrided (sq cm):  0.35    Depth of debridement:  Subcutaneous tissue    Tissue debrided:  Subcutaneous and Other    Devitalized tissue debrided:  Biofilm, Callus and Fibrin    Instruments:  Blade and Curette    Bleeding:  Minimal  Patient tolerance:  Patient tolerated the procedure well with no immediate complications

## 2020-11-11 ENCOUNTER — OFFICE VISIT (OUTPATIENT)
Dept: PODIATRY | Facility: CLINIC | Age: 80
End: 2020-11-11
Payer: MEDICARE

## 2020-11-11 VITALS
HEIGHT: 62 IN | WEIGHT: 171.06 LBS | DIASTOLIC BLOOD PRESSURE: 55 MMHG | SYSTOLIC BLOOD PRESSURE: 136 MMHG | HEART RATE: 62 BPM | BODY MASS INDEX: 31.48 KG/M2

## 2020-11-11 DIAGNOSIS — E11.42 TYPE 2 DIABETES MELLITUS WITH PERIPHERAL NEUROPATHY: Primary | ICD-10-CM

## 2020-11-11 DIAGNOSIS — E08.621 DIABETIC ULCER OF OTHER PART OF RIGHT FOOT ASSOCIATED WITH DIABETES MELLITUS DUE TO UNDERLYING CONDITION, LIMITED TO BREAKDOWN OF SKIN: ICD-10-CM

## 2020-11-11 DIAGNOSIS — L97.511 DIABETIC ULCER OF OTHER PART OF RIGHT FOOT ASSOCIATED WITH DIABETES MELLITUS DUE TO UNDERLYING CONDITION, LIMITED TO BREAKDOWN OF SKIN: ICD-10-CM

## 2020-11-11 PROCEDURE — 11042 DBRDMT SUBQ TIS 1ST 20SQCM/<: CPT | Mod: S$GLB,,, | Performed by: STUDENT IN AN ORGANIZED HEALTH CARE EDUCATION/TRAINING PROGRAM

## 2020-11-11 PROCEDURE — 11042 WOUND DEBRIDEMENT: ICD-10-PCS | Mod: S$GLB,,, | Performed by: STUDENT IN AN ORGANIZED HEALTH CARE EDUCATION/TRAINING PROGRAM

## 2020-11-11 PROCEDURE — 3078F PR MOST RECENT DIASTOLIC BLOOD PRESSURE < 80 MM HG: ICD-10-PCS | Mod: CPTII,S$GLB,, | Performed by: STUDENT IN AN ORGANIZED HEALTH CARE EDUCATION/TRAINING PROGRAM

## 2020-11-11 PROCEDURE — 3075F PR MOST RECENT SYSTOLIC BLOOD PRESS GE 130-139MM HG: ICD-10-PCS | Mod: CPTII,S$GLB,, | Performed by: STUDENT IN AN ORGANIZED HEALTH CARE EDUCATION/TRAINING PROGRAM

## 2020-11-11 PROCEDURE — 3078F DIAST BP <80 MM HG: CPT | Mod: CPTII,S$GLB,, | Performed by: STUDENT IN AN ORGANIZED HEALTH CARE EDUCATION/TRAINING PROGRAM

## 2020-11-11 PROCEDURE — 3288F PR FALLS RISK ASSESSMENT DOCUMENTED: ICD-10-PCS | Mod: CPTII,S$GLB,, | Performed by: STUDENT IN AN ORGANIZED HEALTH CARE EDUCATION/TRAINING PROGRAM

## 2020-11-11 PROCEDURE — 99499 UNLISTED E&M SERVICE: CPT | Mod: S$GLB,,, | Performed by: STUDENT IN AN ORGANIZED HEALTH CARE EDUCATION/TRAINING PROGRAM

## 2020-11-11 PROCEDURE — 1126F AMNT PAIN NOTED NONE PRSNT: CPT | Mod: S$GLB,,, | Performed by: STUDENT IN AN ORGANIZED HEALTH CARE EDUCATION/TRAINING PROGRAM

## 2020-11-11 PROCEDURE — 1101F PR PT FALLS ASSESS DOC 0-1 FALLS W/OUT INJ PAST YR: ICD-10-PCS | Mod: CPTII,S$GLB,, | Performed by: STUDENT IN AN ORGANIZED HEALTH CARE EDUCATION/TRAINING PROGRAM

## 2020-11-11 PROCEDURE — 1126F PR PAIN SEVERITY QUANTIFIED, NO PAIN PRESENT: ICD-10-PCS | Mod: S$GLB,,, | Performed by: STUDENT IN AN ORGANIZED HEALTH CARE EDUCATION/TRAINING PROGRAM

## 2020-11-11 PROCEDURE — 99999 PR PBB SHADOW E&M-EST. PATIENT-LVL IV: CPT | Mod: PBBFAC,,, | Performed by: STUDENT IN AN ORGANIZED HEALTH CARE EDUCATION/TRAINING PROGRAM

## 2020-11-11 PROCEDURE — 1101F PT FALLS ASSESS-DOCD LE1/YR: CPT | Mod: CPTII,S$GLB,, | Performed by: STUDENT IN AN ORGANIZED HEALTH CARE EDUCATION/TRAINING PROGRAM

## 2020-11-11 PROCEDURE — 99999 PR PBB SHADOW E&M-EST. PATIENT-LVL IV: ICD-10-PCS | Mod: PBBFAC,,, | Performed by: STUDENT IN AN ORGANIZED HEALTH CARE EDUCATION/TRAINING PROGRAM

## 2020-11-11 PROCEDURE — 1159F PR MEDICATION LIST DOCUMENTED IN MEDICAL RECORD: ICD-10-PCS | Mod: S$GLB,,, | Performed by: STUDENT IN AN ORGANIZED HEALTH CARE EDUCATION/TRAINING PROGRAM

## 2020-11-11 PROCEDURE — 3288F FALL RISK ASSESSMENT DOCD: CPT | Mod: CPTII,S$GLB,, | Performed by: STUDENT IN AN ORGANIZED HEALTH CARE EDUCATION/TRAINING PROGRAM

## 2020-11-11 PROCEDURE — 3075F SYST BP GE 130 - 139MM HG: CPT | Mod: CPTII,S$GLB,, | Performed by: STUDENT IN AN ORGANIZED HEALTH CARE EDUCATION/TRAINING PROGRAM

## 2020-11-11 PROCEDURE — 1159F MED LIST DOCD IN RCRD: CPT | Mod: S$GLB,,, | Performed by: STUDENT IN AN ORGANIZED HEALTH CARE EDUCATION/TRAINING PROGRAM

## 2020-11-11 PROCEDURE — 99499 NO LOS: ICD-10-PCS | Mod: S$GLB,,, | Performed by: STUDENT IN AN ORGANIZED HEALTH CARE EDUCATION/TRAINING PROGRAM

## 2020-11-11 NOTE — PROGRESS NOTES
"Subjective:      Patient ID: Caro Powell is a 80 y.o. female.    Chief Complaint: Diabetic Foot Ulcer (right)    Caro LUA is a 80 y.o. female who presents to the clinic for evaluation and treatment of high risk feet. Caro LUA has a past medical history of Anticoagulant long-term use, Arthritis, Atrial flutter, Calcium nephrolithiasis (2007), Diabetes mellitus, type 2, Diabetic peripheral neuropathy associated with type 2 diabetes mellitus, and Hypertension. The patient's chief complaint is diabetic foot ulcer, right second toe. This patient has documented high risk feet requiring routine maintenance secondary to peripheral neuropathy. Noted drainage and discoloration from the toe a few days ago. Denies trauma. Accompanied by her .    5/7/18: Follow up for wound check right foot. Taking po clindamycin and ciprofloxacin as scheduled. Accompanied by her . Dressing remained c/d/i.    5/14/18: Presents for wound check. No new complaints. Accompanied by her .    5/31/18: Presents for wound check. Reports she has not received HH. PO abx completed.     12/5:  She is a pt of Dr. 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 She is presenting with new complaint of blister at right submet 1 and distal tip of 4th toe. She is DM2 and on insulin. It was 126 today. She recently bought a new shoes. She tells me she did not have any problems when she was wearing diabetic shoes but blisters formed after wearing a normal tennis shoes. It happened a week ago. She is traveling to [place], [place] in 2 weeks and wants to know if she can weight bear on her right foot. Denies N/V/F/C/SOB/CP    12/12: f/u right foot ulcer. Has been weight bearing in surgical shoe with football dressing. Denies pain. Tells me she has been walking in diabetic shoes on her left foot. She tells me there is a "callus" at lateral left 5th toe that she did not have before. She will travel to [place] soon. Also " tells me she noticed that her right foot is turning sideways and feels like it is affecting the way she walks.     1/21/19: Complains of worsening wound to right foot. She went to [place] for holidays and saws her wound to right foot worsened. Denies trauma.    2/4/19  Here for wound check.  Denies F/c/N/V    2/18/19: Wound check. Says dressing remained intact. Upon observing note she is wearing a different darco shoe then previously dispensed and foot appears not to be fully seated in shoe. Accompanied by her . Denies trauma.     2/25/19 wound check. Denies F/C/N/V    3/29/19 patient complains of new ulcer on 3rd toe that she first noticed on Wednesday.  She has been treating it with antibiotic ointment and bandaids.  Denies F/C/N/V.  Accompanied by her .    04/08/2019:  Presents for wound check right foot.  Relates the dressing remained intact.    4/15/19: Presents for wound check. No new complaints.    06/24/2019:  Follow-up for acute onset osteomyelitis to the right 3rd toe receiving IV Ancef per Infectious Disease recommendation.  [facility] is following her and changing dressings 3 times weekly.    06/20/2019:  Referred by her home health nurse to concern of a new wound developing to the right 3rd toe.  She is also concerned the recurrent wound under the plantar 1st met head right foot.  Says that she does note that she walks differently on this foot and and that it tends to roll more.    07/15/2019:  Follow-up for wound check right foot. Says that she canceled the scheduled gastrocnemius recession due to anxiety or having to manage a PICC line and being restricted due to surgery.  Currently receiving home health.    08/08/2019:  Presents for wound check right foot.  Says that her home health company no longer of the her house.  Receiving long-term IV antibiotics for the osteomyelitis right 3rd toe.  Requesting that her surgery be rescheduled.    08/29/2019:  Presents for wound check right  foot. Complains that she has a wound to her right 2nd toe as well as a persistent 1 underneath the ball of foot. She is scheduled for outpatient gastrocnemius recession right leg on 09/10/2019.  She has not seen her PCP for medical clearance yet.  Accompanied by her .  Home Health per [facility].    09/19/2019:  Post endoscopic gastrocnemius recession right leg on 09/10/2019.  She has been nonweightbearing for the past week.  Dressing has remained clean dry intact. Wearing tall orthopedic boot.  Accompanied by her .  Relates she did not take any pain medicine.  No new complaints.    10/10/2019:  4 weeks post gastrocnemius recession right leg.  Relates no pain.  She has been sleeping and walking with the orthopedic boot right lower extremity.  No new complaints.  Says that she got a new pair diabetic shoes earlier this year has brought them to be examined.    11/01/2019:  Complains of a new wound to the right fourth toe that occurred  4 days ago after she kicked the side of her bed in the middle of the night.  Denies any pain.  Says that the foot was bruised and has improved since then.  She has been applying Betadine to the wound.    12/09/2019:  Follow-up for right 4th toe fracture occurred approximately 5 weeks ago. Relates that she still getting some swelling to the area and does not understand why.  She has been applying mupirocin ointment around the toe.    12/30/19: Presents for wound right fourth toe. Says her wound was healed then she wore her extra depth diabetic shoes and noted the toe wound recurred so she stopped wearing it and is in a tennis shoe which she says has not caused further irritation and toe appears to be healing. Denies further trauma. Completed previous po antibiotics.    07/29/2020:  Presents today complaining of a new onset wound that she discover 2 days ago to the bottom of the right foot.  Denies any trauma however relates that approximately 2 weeks ago she did have  a few days that she would walk around without any protective shoe gear around her home secondary to shingles outbreak.  Denies any trauma.  She has been wearing her Epstein tennis shoes which she purchased nearly a year ago.  Relates that her previous insoles are more than a year old and had broken down so she stopped wearing them.    08/14/2020:  Presents for wound check right foot.  Dressing remained clean dry intact.  She not recall that she stepped on a nail in her living room that the not penetrate very deep and had no rust.    9/8/20: Pt presents for wound check to right foot. Pt states her wound healed and has reopened again today, states she saw blood on her sock. She thinks it started after walking at the mall.  Also states she has painful ingrown toenail to 3rd toe. Per chart review, toenails debrided during previous visit 8/14/20. States she is using lotion to soften her calluses daily. States she needs new diabetic shoes. No other pedal complaints at this time    11/4/20: Pt seen today, states that she received extra depth shoes with custom insoles but her ulcer has re-opened again. She is concerned that her wound is not getting enough offloading. Denies signs of infection. No other pedal complaints at this time.      11/11/20: Pt seen today for wound to right foot, states that carlos made her diabetic inserts differently than before and she believes this is what is causing her wound. States her epstein have always allowed for better offloading and would prefer to wear her diabetic inserts with her epstein in the future. Spoke to carlos who states they have started using a scanner to fabricate orthotics. Will go back to original method. No other pedal complaints at this time.     PCP: Brayan Penny MD    Date Last Seen by PCP:     Current shoe gear:  Affected Foot:  Tennis shoes      Unaffected Foot: Tennis shoes    History of Trauma: negative  Sign of Infection: none    Hemoglobin A1C   Date  "Value Ref Range Status   06/25/2020 6.2 (H) <5.7 % of total Hgb Final     Comment:     For someone without known diabetes, a hemoglobin   A1c value between 5.7% and 6.4% is consistent with  prediabetes and should be confirmed with a   follow-up test.     For someone with known diabetes, a value <7%  indicates that their diabetes is well controlled. A1c  targets should be individualized based on duration of  diabetes, age, comorbid conditions, and other  considerations.     This assay result is consistent with an increased risk  of diabetes.     Currently, no consensus exists regarding use of  hemoglobin A1c for diagnosis of diabetes for children.        06/09/2019 6.6 (H) 4.0 - 5.6 % Final     Comment:     ADA Screening Guidelines:  5.7-6.4%  Consistent with prediabetes  >or=6.5%  Consistent with diabetes  High levels of fetal hemoglobin interfere with the HbA1C  assay. Heterozygous hemoglobin variants (HbS, HgC, etc)do  not significantly interfere with this assay.   However, presence of multiple variants may affect accuracy.     06/09/2019 6.6 (H) 4.0 - 5.6 % Final     Comment:     ADA Screening Guidelines:  5.7-6.4%  Consistent with prediabetes  >or=6.5%  Consistent with diabetes  High levels of fetal hemoglobin interfere with the HbA1C  assay. Heterozygous hemoglobin variants (HbS, HgC, etc)do  not significantly interfere with this assay.   However, presence of multiple variants may affect accuracy.     01/12/2018 8.3 % Final     Vitals:    11/11/20 1344   BP: (!) 136/55   Pulse: 62   Weight: 77.6 kg (171 lb 1.2 oz)   Height: 5' 2" (1.575 m)   PainSc: 0-No pain      Past Medical History:   Diagnosis Date    Anticoagulant long-term use     Arthritis     Atrial flutter     Calcium nephrolithiasis 2007    Diabetes mellitus, type 2     Diabetic peripheral neuropathy associated with type 2 diabetes mellitus     Hypertension        Past Surgical History:   Procedure Laterality Date    COLONOSCOPY  11/28/2011    " sigmoid diverticulosis, external hemorrhoids    ENDOSCOPIC GASTROCNEMIUS RECESSION Right 9/10/2019    Procedure: RECESSION, GASTROCNEMIUS, ENDOSCOPIC;  Surgeon: Derek Jose DPM;  Location: [facility] OR;  Service: Podiatry;  Laterality: Right;  Arthrex center line (ron notified)  Video    HYSTERECTOMY      SHOULDER SURGERY Left     TOE AMPUTATION Right 05/22/2017    5th toe       Family History   Problem Relation Age of Onset    Diabetes Mother     Heart failure Father     Kidney failure Brother        Social History     Socioeconomic History    Marital status:      Spouse name: Not on file    Number of children: Not on file    Years of education: Not on file    Highest education level: Not on file   Occupational History    Not on file   Social Needs    Financial resource strain: Not on file    Food insecurity     Worry: Not on file     Inability: Not on file    Transportation needs     Medical: Not on file     Non-medical: Not on file   Tobacco Use    Smoking status: Never Smoker    Smokeless tobacco: Never Used   Substance and Sexual Activity    Alcohol use: No    Drug use: No    Sexual activity: Yes   Lifestyle    Physical activity     Days per week: Not on file     Minutes per session: Not on file    Stress: Not on file   Relationships    Social connections     Talks on phone: Not on file     Gets together: Not on file     Attends Restoration service: Not on file     Active member of club or organization: Not on file     Attends meetings of clubs or organizations: Not on file     Relationship status: Not on file   Other Topics Concern    Not on file   Social History Narrative    Not on file       Current Outpatient Medications   Medication Sig Dispense Refill    ACCU-CHEK SAMI CONTROL SOLN Soln       ACCU-CHEK SAMI PLUS METER Misc       ACCU-CHEK SAMI PLUS TEST STRP Strp USE TO TEST BLOOD SUGAR ONCE DAILY 100 strip 3    ACCU-CHEK SOFT DEV LANCETS Kit       ACCU-CHEK  SOFTCLIX LANCETS Misc TEST ONCE DAILY 100 each 3    ammonium lactate 12 % Crea Apply to feet twice daily. Avoid use between toes. 140 g 5    apixaban (ELIQUIS) 5 mg Tab TAKE 1 TABLET BY MOUTH TWICE DAILY 60 tablet 5    diazePAM (VALIUM) 5 MG tablet Take 1 tablet (5 mg total) by mouth daily as needed for Anxiety. 90 tablet 3    famotidine (PEPCID) 20 MG tablet       FLUZONE HIGHDOSE QUAD 20-21  mcg/0.7 mL Syrg       gabapentin (NEURONTIN) 400 MG capsule 2 pills in the AM, 1 pill at noon, and 2 pills in the evening. 450 capsule 3    glimepiride (AMARYL) 1 MG tablet Take 1 tablet (1 mg total) by mouth 2 (two) times daily.      lisinopriL (PRINIVIL,ZESTRIL) 20 MG tablet       loratadine-pseudoephedrine  mg (ALLERGY RELIEF D-24HR)  mg per 24 hr tablet 1 tablet as needed      metoprolol succinate (TOPROL-XL) 25 MG 24 hr tablet Take by mouth once daily.       mupirocin (BACTROBAN) 2 % ointment Apply topically once daily. 22 g 1    pramipexole (MIRAPEX) 0.125 MG tablet Take by mouth every evening.       valACYclovir (VALTREX) 1000 MG tablet Take 1 tablet (1,000 mg total) by mouth 3 (three) times daily. for 7 days 21 tablet 0     Current Facility-Administered Medications   Medication Dose Route Frequency Provider Last Rate Last Dose    sodium chloride 0.9% flush 10 mL  10 mL Intravenous PRN Derek Jose DPM        sodium chloride 0.9% flush 10 mL  10 mL Intravenous PRN Derek Jose DPM           Review of patient's allergies indicates:   Allergen Reactions    Codeine Nausea Only         Review of Systems   Constitution: Negative for chills, fever and malaise/fatigue.   HENT: Negative for congestion.    Cardiovascular: Negative for chest pain, claudication and leg swelling.   Respiratory: Negative for cough and shortness of breath.    Skin: Positive for dry skin, nail changes and poor wound healing. Negative for color change.   Musculoskeletal: Negative for back pain, joint  pain, muscle cramps and muscle weakness.   Gastrointestinal: Negative for nausea and vomiting.   Neurological: Positive for numbness and paresthesias. Negative for weakness.   Psychiatric/Behavioral: Negative for altered mental status.           Objective:      Physical Exam  Constitutional:       General: She is not in acute distress.     Appearance: She is well-developed. She is not diaphoretic.   Cardiovascular:      Pulses:           Dorsalis pedis pulses are 2+ on the right side and 2+ on the left side.        Posterior tibial pulses are 2+ on the right side and 2+ on the left side.      Comments: CFT< 3 secs all toes bilateral foot, skin temp warm bilateral foot, no digital hair growth bilateral foot, mild lower extremity edema bilateral.    Musculoskeletal:         General: No tenderness.      Right foot: Decreased range of motion. Deformity present.      Left foot: Decreased range of motion. Deformity present.      Comments: Range of motion right ankle reveals +5° of dorsiflexion during knee extension.    No pain with ROM or MMT bilateral foot. Muscle strength 4/5 to bilateral lower extremity groups.     Plantar flexed first metatarsal head right foot.    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 5 sites sensed.      Skin integrity: Ulcer, callus and dry skin present. No blister, erythema or warmth.      Left foot:      Protective Sensation: 10 sites tested. 5 sites sensed.      Skin integrity: Callus and dry skin present. No ulcer, blister, skin breakdown, erythema or warmth.   Lymphadenopathy:      Comments: Negative lymphadenopathy bilateral popliteal fossa and tarsal tunnel.    Skin:     General: Skin is warm and dry.      Capillary Refill: Capillary refill takes less than 2 seconds.      Coloration: Skin is not pale.       Findings: No abrasion, bruising, burn, ecchymosis, erythema, laceration, petechiae or rash.      Nails: There is no clubbing.        Comments: Normal appearing scar medial mid right leg.    Ulcer Location: plantar first metatarsal head right  Measurements:  Unable to measure pre debridement due to overlying hyperkeratosis.  Post debridement measures 0.8 x 0.8 x 0.2 cm  Periwound: Intact with raised hyperkeratotic skin  Drainage:  Scant serosanguinous.  Pus: None.  Malodor: None.  Base:  100% granular. 0% fibrin.  Signs of infection: None.  Does not probe, track or undermine post debridement.     Neurological:      Mental Status: She is alert and oriented to person, place, and time.      Sensory: Sensory deficit present.      Motor: No abnormal muscle tone.      Comments: Westlake-Chucho 5.07 monofilamant testing diminished. Light touch sensation is diminished bilaterally. Vibratory sensation is diminished ashleigh.    Psychiatric:         Behavior: Behavior normal.         Thought Content: Thought content normal.         Judgment: Judgment normal.               Assessment:       Encounter Diagnoses   Name Primary?    Type 2 diabetes mellitus with peripheral neuropathy Yes    Diabetic ulcer of other part of right foot associated with diabetes mellitus due to underlying condition, limited to breakdown of skin          Plan:       Caro LUA was seen today for diabetic foot ulcer.    Diagnoses and all orders for this visit:    Type 2 diabetes mellitus with peripheral neuropathy    Diabetic ulcer of other part of right foot associated with diabetes mellitus due to underlying condition, limited to breakdown of skin      I counseled the patient on her conditions, their implications and medical management.    Wound debridement and dressing per attached note.  Wound dressed with offloading felt aperture hydrafera blue, pete foam, cast padding and flexinet.     Spoke to [employer] over the phone, recommend custom orthotics  and extra depth shoes with wider toe box to accommodate bunion and hammertoe deformities with 1st ray cut out to right foot, states they will go back original method instead of using scanner.     Dressing application as per above. Darco shoe applied to offload the area. Advised patient that this should be worn on the affected foot at all times when ambulating. Short-term goals include maintaining good offloading and minimizing bioburden, promoting granulation and epithelialization to healing.  Long-term goals include keeping the wound healed by good offloading and medical management under the direction of internist.      Shoe inspection. Diabetic Foot Education. Patient reminded of the importance of good nutrition and blood sugar control to help prevent podiatric complications of diabetes. Patient instructed on proper foot hygeine. We discussed wearing proper shoe gear, daily foot inspections, never walking without protective shoe gear, never putting sharp instruments to feet, routine podiatric nail visits. I discussed with the  patient  signs and symptoms of infection including redness, drainage, purulence, odor, pain, elevated BS, streaking, fever, chills, etc . Patient is to seek medical attention (ER or urgent care) if these symptoms occur      Elevation and rest.    RTC in 1 week, sooner PRN

## 2020-11-12 NOTE — PROCEDURES
Wound Debridement    Date/Time: 11/11/2020 1:45 PM  Performed by: Ava Atkins DPM  Authorized by: Ava Atkins DPM     Consent Done?:  Yes (Verbal)  Local anesthesia used?: No      Wound Details:    Location:  Right foot    Location:  Right 1st Metatarsal Head    Type of Debridement:  Excisional       Length (cm):  0.8       Area (sq cm):  0.64       Width (cm):  0.8       Percent Debrided (%):  100       Depth (cm):  0.2       Total Area Debrided (sq cm):  0.64    Depth of debridement:  Subcutaneous tissue    Tissue debrided:  Subcutaneous and Other    Devitalized tissue debrided:  Biofilm, Callus and Fibrin    Instruments:  Blade and Curette    Bleeding:  Minimal  Patient tolerance:  Patient tolerated the procedure well with no immediate complications

## 2020-11-18 ENCOUNTER — OFFICE VISIT (OUTPATIENT)
Dept: PODIATRY | Facility: CLINIC | Age: 80
End: 2020-11-18
Payer: MEDICARE

## 2020-11-18 VITALS
HEIGHT: 62 IN | HEART RATE: 61 BPM | DIASTOLIC BLOOD PRESSURE: 51 MMHG | SYSTOLIC BLOOD PRESSURE: 111 MMHG | BODY MASS INDEX: 31.47 KG/M2 | WEIGHT: 171 LBS

## 2020-11-18 DIAGNOSIS — E11.42 TYPE 2 DIABETES MELLITUS WITH PERIPHERAL NEUROPATHY: Primary | ICD-10-CM

## 2020-11-18 DIAGNOSIS — E08.621 DIABETIC ULCER OF OTHER PART OF RIGHT FOOT ASSOCIATED WITH DIABETES MELLITUS DUE TO UNDERLYING CONDITION, LIMITED TO BREAKDOWN OF SKIN: ICD-10-CM

## 2020-11-18 DIAGNOSIS — L97.511 DIABETIC ULCER OF OTHER PART OF RIGHT FOOT ASSOCIATED WITH DIABETES MELLITUS DUE TO UNDERLYING CONDITION, LIMITED TO BREAKDOWN OF SKIN: ICD-10-CM

## 2020-11-18 PROCEDURE — 99499 NO LOS: ICD-10-PCS | Mod: S$GLB,,, | Performed by: STUDENT IN AN ORGANIZED HEALTH CARE EDUCATION/TRAINING PROGRAM

## 2020-11-18 PROCEDURE — 11042 WOUND DEBRIDEMENT: ICD-10-PCS | Mod: S$GLB,,, | Performed by: STUDENT IN AN ORGANIZED HEALTH CARE EDUCATION/TRAINING PROGRAM

## 2020-11-18 PROCEDURE — 99999 PR PBB SHADOW E&M-EST. PATIENT-LVL IV: ICD-10-PCS | Mod: PBBFAC,,, | Performed by: STUDENT IN AN ORGANIZED HEALTH CARE EDUCATION/TRAINING PROGRAM

## 2020-11-18 PROCEDURE — 3288F PR FALLS RISK ASSESSMENT DOCUMENTED: ICD-10-PCS | Mod: CPTII,S$GLB,, | Performed by: STUDENT IN AN ORGANIZED HEALTH CARE EDUCATION/TRAINING PROGRAM

## 2020-11-18 PROCEDURE — 1126F AMNT PAIN NOTED NONE PRSNT: CPT | Mod: S$GLB,,, | Performed by: STUDENT IN AN ORGANIZED HEALTH CARE EDUCATION/TRAINING PROGRAM

## 2020-11-18 PROCEDURE — 1126F PR PAIN SEVERITY QUANTIFIED, NO PAIN PRESENT: ICD-10-PCS | Mod: S$GLB,,, | Performed by: STUDENT IN AN ORGANIZED HEALTH CARE EDUCATION/TRAINING PROGRAM

## 2020-11-18 PROCEDURE — 99499 UNLISTED E&M SERVICE: CPT | Mod: S$GLB,,, | Performed by: STUDENT IN AN ORGANIZED HEALTH CARE EDUCATION/TRAINING PROGRAM

## 2020-11-18 PROCEDURE — 11042 DBRDMT SUBQ TIS 1ST 20SQCM/<: CPT | Mod: S$GLB,,, | Performed by: STUDENT IN AN ORGANIZED HEALTH CARE EDUCATION/TRAINING PROGRAM

## 2020-11-18 PROCEDURE — 1101F PT FALLS ASSESS-DOCD LE1/YR: CPT | Mod: CPTII,S$GLB,, | Performed by: STUDENT IN AN ORGANIZED HEALTH CARE EDUCATION/TRAINING PROGRAM

## 2020-11-18 PROCEDURE — 1101F PR PT FALLS ASSESS DOC 0-1 FALLS W/OUT INJ PAST YR: ICD-10-PCS | Mod: CPTII,S$GLB,, | Performed by: STUDENT IN AN ORGANIZED HEALTH CARE EDUCATION/TRAINING PROGRAM

## 2020-11-18 PROCEDURE — 99999 PR PBB SHADOW E&M-EST. PATIENT-LVL IV: CPT | Mod: PBBFAC,,, | Performed by: STUDENT IN AN ORGANIZED HEALTH CARE EDUCATION/TRAINING PROGRAM

## 2020-11-18 PROCEDURE — 3288F FALL RISK ASSESSMENT DOCD: CPT | Mod: CPTII,S$GLB,, | Performed by: STUDENT IN AN ORGANIZED HEALTH CARE EDUCATION/TRAINING PROGRAM

## 2020-11-18 NOTE — PROCEDURES
Wound Debridement    Date/Time: 11/18/2020 1:45 PM  Performed by: Ava Atkins DPM  Authorized by: Ava Atkins DPM     Consent Done?:  Yes (Verbal)  Local anesthesia used?: No      Wound Details:    Location:  Right foot    Location:  Right 1st Metatarsal Head    Type of Debridement:  Excisional       Length (cm):  0.3       Area (sq cm):  0.09       Width (cm):  0.3       Percent Debrided (%):  100       Depth (cm):  0.2       Total Area Debrided (sq cm):  0.09    Depth of debridement:  Subcutaneous tissue    Tissue debrided:  Subcutaneous and Other    Devitalized tissue debrided:  Biofilm, Callus and Fibrin    Instruments:  Blade and Curette    Bleeding:  Minimal  Patient tolerance:  Patient tolerated the procedure well with no immediate complications

## 2020-11-18 NOTE — PROGRESS NOTES
"Subjective:      Patient ID: Caro Powell is a 80 y.o. female.    Chief Complaint: Follow-up    Caro LUA is a 80 y.o. female who presents to the clinic for evaluation and treatment of high risk feet. Caro LUA has a past medical history of Anticoagulant long-term use, Arthritis, Atrial flutter, Calcium nephrolithiasis (2007), Diabetes mellitus, type 2, Diabetic peripheral neuropathy associated with type 2 diabetes mellitus, and Hypertension. The patient's chief complaint is diabetic foot ulcer, right second toe. This patient has documented high risk feet requiring routine maintenance secondary to peripheral neuropathy. Noted drainage and discoloration from the toe a few days ago. Denies trauma. Accompanied by her .    5/7/18: Follow up for wound check right foot. Taking po clindamycin and ciprofloxacin as scheduled. Accompanied by her . Dressing remained c/d/i.    5/14/18: Presents for wound check. No new complaints. Accompanied by her .    5/31/18: Presents for wound check. Reports she has not received HH. PO abx completed.     12/5:  She is a pt of Dr. 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 She is presenting with new complaint of blister at right submet 1 and distal tip of 4th toe. She is DM2 and on insulin. It was 126 today. She recently bought a new shoes. She tells me she did not have any problems when she was wearing diabetic shoes but blisters formed after wearing a normal tennis shoes. It happened a week ago. She is traveling to [place], [place] in 2 weeks and wants to know if she can weight bear on her right foot. Denies N/V/F/C/SOB/CP    12/12: f/u right foot ulcer. Has been weight bearing in surgical shoe with football dressing. Denies pain. Tells me she has been walking in diabetic shoes on her left foot. She tells me there is a "callus" at lateral left 5th toe that she did not have before. She will travel to [place] soon. Also tells me she " noticed that her right foot is turning sideways and feels like it is affecting the way she walks.     1/21/19: Complains of worsening wound to right foot. She went to [place] for holidays and saws her wound to right foot worsened. Denies trauma.    2/4/19  Here for wound check.  Denies F/c/N/V    2/18/19: Wound check. Says dressing remained intact. Upon observing note she is wearing a different darco shoe then previously dispensed and foot appears not to be fully seated in shoe. Accompanied by her . Denies trauma.     2/25/19 wound check. Denies F/C/N/V    3/29/19 patient complains of new ulcer on 3rd toe that she first noticed on Wednesday.  She has been treating it with antibiotic ointment and bandaids.  Denies F/C/N/V.  Accompanied by her .    04/08/2019:  Presents for wound check right foot.  Relates the dressing remained intact.    4/15/19: Presents for wound check. No new complaints.    06/24/2019:  Follow-up for acute onset osteomyelitis to the right 3rd toe receiving IV Ancef per Infectious Disease recommendation.  [facility] is following her and changing dressings 3 times weekly.    06/20/2019:  Referred by her home health nurse to concern of a new wound developing to the right 3rd toe.  She is also concerned the recurrent wound under the plantar 1st met head right foot.  Says that she does note that she walks differently on this foot and and that it tends to roll more.    07/15/2019:  Follow-up for wound check right foot. Says that she canceled the scheduled gastrocnemius recession due to anxiety or having to manage a PICC line and being restricted due to surgery.  Currently receiving home health.    08/08/2019:  Presents for wound check right foot.  Says that her home health company no longer of the her house.  Receiving long-term IV antibiotics for the osteomyelitis right 3rd toe.  Requesting that her surgery be rescheduled.    08/29/2019:  Presents for wound check right foot. Complains  that she has a wound to her right 2nd toe as well as a persistent 1 underneath the ball of foot. She is scheduled for outpatient gastrocnemius recession right leg on 09/10/2019.  She has not seen her PCP for medical clearance yet.  Accompanied by her .  Home Health per [facility].    09/19/2019:  Post endoscopic gastrocnemius recession right leg on 09/10/2019.  She has been nonweightbearing for the past week.  Dressing has remained clean dry intact. Wearing tall orthopedic boot.  Accompanied by her .  Relates she did not take any pain medicine.  No new complaints.    10/10/2019:  4 weeks post gastrocnemius recession right leg.  Relates no pain.  She has been sleeping and walking with the orthopedic boot right lower extremity.  No new complaints.  Says that she got a new pair diabetic shoes earlier this year has brought them to be examined.    11/01/2019:  Complains of a new wound to the right fourth toe that occurred  4 days ago after she kicked the side of her bed in the middle of the night.  Denies any pain.  Says that the foot was bruised and has improved since then.  She has been applying Betadine to the wound.    12/09/2019:  Follow-up for right 4th toe fracture occurred approximately 5 weeks ago. Relates that she still getting some swelling to the area and does not understand why.  She has been applying mupirocin ointment around the toe.    12/30/19: Presents for wound right fourth toe. Says her wound was healed then she wore her extra depth diabetic shoes and noted the toe wound recurred so she stopped wearing it and is in a tennis shoe which she says has not caused further irritation and toe appears to be healing. Denies further trauma. Completed previous po antibiotics.    07/29/2020:  Presents today complaining of a new onset wound that she discover 2 days ago to the bottom of the right foot.  Denies any trauma however relates that approximately 2 weeks ago she did have a few days that  she would walk around without any protective shoe gear around her home secondary to shingles outbreak.  Denies any trauma.  She has been wearing her Epstein tennis shoes which she purchased nearly a year ago.  Relates that her previous insoles are more than a year old and had broken down so she stopped wearing them.    08/14/2020:  Presents for wound check right foot.  Dressing remained clean dry intact.  She not recall that she stepped on a nail in her living room that the not penetrate very deep and had no rust.    9/8/20: Pt presents for wound check to right foot. Pt states her wound healed and has reopened again today, states she saw blood on her sock. She thinks it started after walking at the mall.  Also states she has painful ingrown toenail to 3rd toe. Per chart review, toenails debrided during previous visit 8/14/20. States she is using lotion to soften her calluses daily. States she needs new diabetic shoes. No other pedal complaints at this time    11/4/20: Pt seen today, states that she received extra depth shoes with custom insoles but her ulcer has re-opened again. She is concerned that her wound is not getting enough offloading. Denies signs of infection. No other pedal complaints at this time.      11/11/20: Pt seen today for wound to right foot, states that carlos made her diabetic inserts differently than before and she believes this is what is causing her wound. States her epstein have always allowed for better offloading and would prefer to wear her diabetic inserts with her epstein in the future. Spoke to carlos who states they have started using a scanner to fabricate orthotics. Will go back to original method. No other pedal complaints at this time.     11/18/20: Pt seen today for wound to right foot, no new pedal complaints.     PCP: Brayan Penny MD    Date Last Seen by PCP:     Current shoe gear:  Affected Foot:  Tennis shoes      Unaffected Foot: Tennis shoes    History of Trauma:  "negative  Sign of Infection: none    Hemoglobin A1C   Date Value Ref Range Status   06/25/2020 6.2 (H) <5.7 % of total Hgb Final     Comment:     For someone without known diabetes, a hemoglobin   A1c value between 5.7% and 6.4% is consistent with  prediabetes and should be confirmed with a   follow-up test.     For someone with known diabetes, a value <7%  indicates that their diabetes is well controlled. A1c  targets should be individualized based on duration of  diabetes, age, comorbid conditions, and other  considerations.     This assay result is consistent with an increased risk  of diabetes.     Currently, no consensus exists regarding use of  hemoglobin A1c for diagnosis of diabetes for children.        06/09/2019 6.6 (H) 4.0 - 5.6 % Final     Comment:     ADA Screening Guidelines:  5.7-6.4%  Consistent with prediabetes  >or=6.5%  Consistent with diabetes  High levels of fetal hemoglobin interfere with the HbA1C  assay. Heterozygous hemoglobin variants (HbS, HgC, etc)do  not significantly interfere with this assay.   However, presence of multiple variants may affect accuracy.     06/09/2019 6.6 (H) 4.0 - 5.6 % Final     Comment:     ADA Screening Guidelines:  5.7-6.4%  Consistent with prediabetes  >or=6.5%  Consistent with diabetes  High levels of fetal hemoglobin interfere with the HbA1C  assay. Heterozygous hemoglobin variants (HbS, HgC, etc)do  not significantly interfere with this assay.   However, presence of multiple variants may affect accuracy.     01/12/2018 8.3 % Final     Vitals:    11/18/20 1352   BP: (!) 111/51   Pulse: 61   Weight: 77.6 kg (171 lb)   Height: 5' 2" (1.575 m)   PainSc: 0-No pain      Past Medical History:   Diagnosis Date    Anticoagulant long-term use     Arthritis     Atrial flutter     Calcium nephrolithiasis 2007    Diabetes mellitus, type 2     Diabetic peripheral neuropathy associated with type 2 diabetes mellitus     Hypertension        Past Surgical History: "   Procedure Laterality Date    COLONOSCOPY  11/28/2011    sigmoid diverticulosis, external hemorrhoids    ENDOSCOPIC GASTROCNEMIUS RECESSION Right 9/10/2019    Procedure: RECESSION, GASTROCNEMIUS, ENDOSCOPIC;  Surgeon: Derek Jose DPM;  Location: [facility] OR;  Service: Podiatry;  Laterality: Right;  Arthrex center line (ron notified)  Video    HYSTERECTOMY      SHOULDER SURGERY Left     TOE AMPUTATION Right 05/22/2017    5th toe       Family History   Problem Relation Age of Onset    Diabetes Mother     Heart failure Father     Kidney failure Brother        Social History     Socioeconomic History    Marital status:      Spouse name: Not on file    Number of children: Not on file    Years of education: Not on file    Highest education level: Not on file   Occupational History    Not on file   Social Needs    Financial resource strain: Not on file    Food insecurity     Worry: Not on file     Inability: Not on file    Transportation needs     Medical: Not on file     Non-medical: Not on file   Tobacco Use    Smoking status: Never Smoker    Smokeless tobacco: Never Used   Substance and Sexual Activity    Alcohol use: No    Drug use: No    Sexual activity: Yes   Lifestyle    Physical activity     Days per week: Not on file     Minutes per session: Not on file    Stress: Not on file   Relationships    Social connections     Talks on phone: Not on file     Gets together: Not on file     Attends Yazidi service: Not on file     Active member of club or organization: Not on file     Attends meetings of clubs or organizations: Not on file     Relationship status: Not on file   Other Topics Concern    Not on file   Social History Narrative    Not on file       Current Outpatient Medications   Medication Sig Dispense Refill    ACCU-CHEK SAMI CONTROL SOLN Soln       ACCU-CHEK SAMI PLUS METER Misc       ACCU-CHEK SAMI PLUS TEST STRP Strp USE TO TEST BLOOD SUGAR ONCE DAILY 100  strip 3    ACCU-CHEK SOFT DEV LANCETS Kit       ACCU-CHEK SOFTCLIX LANCETS Misc TEST ONCE DAILY 100 each 3    ammonium lactate 12 % Crea Apply to feet twice daily. Avoid use between toes. 140 g 5    apixaban (ELIQUIS) 5 mg Tab TAKE 1 TABLET BY MOUTH TWICE DAILY 60 tablet 5    diazePAM (VALIUM) 5 MG tablet Take 1 tablet (5 mg total) by mouth daily as needed for Anxiety. 90 tablet 3    famotidine (PEPCID) 20 MG tablet       FLUZONE HIGHDOSE QUAD 20-21  mcg/0.7 mL Syrg       gabapentin (NEURONTIN) 400 MG capsule 2 pills in the AM, 1 pill at noon, and 2 pills in the evening. 450 capsule 3    glimepiride (AMARYL) 1 MG tablet Take 1 tablet (1 mg total) by mouth 2 (two) times daily.      lisinopriL (PRINIVIL,ZESTRIL) 20 MG tablet       loratadine-pseudoephedrine  mg (ALLERGY RELIEF D-24HR)  mg per 24 hr tablet 1 tablet as needed      metoprolol succinate (TOPROL-XL) 25 MG 24 hr tablet Take by mouth once daily.       mupirocin (BACTROBAN) 2 % ointment Apply topically once daily. 22 g 1    pramipexole (MIRAPEX) 0.125 MG tablet Take by mouth every evening.       valACYclovir (VALTREX) 1000 MG tablet Take 1 tablet (1,000 mg total) by mouth 3 (three) times daily. for 7 days 21 tablet 0     Current Facility-Administered Medications   Medication Dose Route Frequency Provider Last Rate Last Dose    sodium chloride 0.9% flush 10 mL  10 mL Intravenous PRN Derek Jose DPM        sodium chloride 0.9% flush 10 mL  10 mL Intravenous PRN Derek Jose DPM           Review of patient's allergies indicates:   Allergen Reactions    Codeine Nausea Only         Review of Systems   Constitution: Negative for chills, fever and malaise/fatigue.   HENT: Negative for congestion.    Cardiovascular: Negative for chest pain, claudication and leg swelling.   Respiratory: Negative for cough and shortness of breath.    Skin: Positive for dry skin, nail changes and poor wound healing. Negative for color  change.   Musculoskeletal: Negative for back pain, joint pain, muscle cramps and muscle weakness.   Gastrointestinal: Negative for nausea and vomiting.   Neurological: Positive for numbness and paresthesias. Negative for weakness.   Psychiatric/Behavioral: Negative for altered mental status.           Objective:      Physical Exam  Constitutional:       General: She is not in acute distress.     Appearance: She is well-developed. She is not diaphoretic.   Cardiovascular:      Pulses:           Dorsalis pedis pulses are 2+ on the right side and 2+ on the left side.        Posterior tibial pulses are 2+ on the right side and 2+ on the left side.      Comments: CFT< 3 secs all toes bilateral foot, skin temp warm bilateral foot, no digital hair growth bilateral foot, mild lower extremity edema bilateral.    Musculoskeletal:         General: No tenderness.      Right foot: Decreased range of motion. Deformity present.      Left foot: Decreased range of motion. Deformity present.      Comments: Range of motion right ankle reveals +5° of dorsiflexion during knee extension.    No pain with ROM or MMT bilateral foot. Muscle strength 4/5 to bilateral lower extremity groups.     Plantar flexed first metatarsal head right foot.    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 5 sites sensed.      Skin integrity: Ulcer, callus and dry skin present. No blister, erythema or warmth.      Left foot:      Protective Sensation: 10 sites tested. 5 sites sensed.      Skin integrity: Callus and dry skin present. No ulcer, blister, skin breakdown, erythema or warmth.   Lymphadenopathy:      Comments: Negative lymphadenopathy bilateral popliteal fossa and tarsal tunnel.    Skin:     General: Skin is warm and dry.      Capillary Refill: Capillary refill takes less  than 2 seconds.      Coloration: Skin is not pale.      Findings: No abrasion, bruising, burn, ecchymosis, erythema, laceration, petechiae or rash.      Nails: There is no clubbing.        Comments: Normal appearing scar medial mid right leg.    Ulcer Location: plantar first metatarsal head right  Measurements:  Unable to measure pre debridement due to overlying hyperkeratosis.  Post debridement measures 0.3 x 0.3 x 0.2 cm  Periwound: Intact with raised hyperkeratotic skin  Drainage:  Scant serosanguinous.  Pus: None.  Malodor: None.  Base:  100% granular. 0% fibrin.  Signs of infection: None.  Does not probe, track or undermine post debridement.     Neurological:      Mental Status: She is alert and oriented to person, place, and time.      Sensory: Sensory deficit present.      Motor: No abnormal muscle tone.      Comments: [place] 5.07 monofilamant testing diminished. Light touch sensation is diminished bilaterally. Vibratory sensation is diminished ashleigh.    Psychiatric:         Behavior: Behavior normal.         Thought Content: Thought content normal.         Judgment: Judgment normal.               Assessment:       Encounter Diagnoses   Name Primary?    Type 2 diabetes mellitus with peripheral neuropathy Yes    Diabetic ulcer of other part of right foot associated with diabetes mellitus due to underlying condition, limited to breakdown of skin          Plan:       Caro LUA was seen today for follow-up.    Diagnoses and all orders for this visit:    Type 2 diabetes mellitus with peripheral neuropathy  -     Wound Debridement    Diabetic ulcer of other part of right foot associated with diabetes mellitus due to underlying condition, limited to breakdown of skin  -     Wound Debridement      I counseled the patient on her conditions, their implications and medical management.    Wound debridement and dressing per attached note.  Wound dressed with nathan, pete foam, cast padding and flexinet.      Spoke to [employer] over the phone, recommend custom orthotics and extra depth shoes with wider toe box to accommodate bunion and hammertoe deformities with 1st ray cut out to right foot, states they will go back original method instead of using scanner.     Dressing application as per above. Darco shoe applied to offload the area. Advised patient that this should be worn on the affected foot at all times when ambulating. Short-term goals include maintaining good offloading and minimizing bioburden, promoting granulation and epithelialization to healing.  Long-term goals include keeping the wound healed by good offloading and medical management under the direction of internist.      Shoe inspection. Diabetic Foot Education. Patient reminded of the importance of good nutrition and blood sugar control to help prevent podiatric complications of diabetes. Patient instructed on proper foot hygeine. We discussed wearing proper shoe gear, daily foot inspections, never walking without protective shoe gear, never putting sharp instruments to feet, routine podiatric nail visits. I discussed with the  patient  signs and symptoms of infection including redness, drainage, purulence, odor, pain, elevated BS, streaking, fever, chills, etc . Patient is to seek medical attention (ER or urgent care) if these symptoms occur      Elevation and rest.    RTC in 1 week, sooner PRN

## 2020-11-25 ENCOUNTER — OFFICE VISIT (OUTPATIENT)
Dept: PODIATRY | Facility: CLINIC | Age: 80
End: 2020-11-25
Payer: MEDICARE

## 2020-11-25 VITALS — BODY MASS INDEX: 31.48 KG/M2 | WEIGHT: 171.06 LBS | HEIGHT: 62 IN

## 2020-11-25 DIAGNOSIS — E11.42 TYPE 2 DIABETES MELLITUS WITH PERIPHERAL NEUROPATHY: Primary | ICD-10-CM

## 2020-11-25 DIAGNOSIS — E08.621 DIABETIC ULCER OF OTHER PART OF RIGHT FOOT ASSOCIATED WITH DIABETES MELLITUS DUE TO UNDERLYING CONDITION, LIMITED TO BREAKDOWN OF SKIN: ICD-10-CM

## 2020-11-25 DIAGNOSIS — L97.511 DIABETIC ULCER OF OTHER PART OF RIGHT FOOT ASSOCIATED WITH DIABETES MELLITUS DUE TO UNDERLYING CONDITION, LIMITED TO BREAKDOWN OF SKIN: ICD-10-CM

## 2020-11-25 PROCEDURE — 3288F FALL RISK ASSESSMENT DOCD: CPT | Mod: CPTII,S$GLB,, | Performed by: STUDENT IN AN ORGANIZED HEALTH CARE EDUCATION/TRAINING PROGRAM

## 2020-11-25 PROCEDURE — 99999 PR PBB SHADOW E&M-EST. PATIENT-LVL III: CPT | Mod: PBBFAC,,, | Performed by: STUDENT IN AN ORGANIZED HEALTH CARE EDUCATION/TRAINING PROGRAM

## 2020-11-25 PROCEDURE — 1101F PR PT FALLS ASSESS DOC 0-1 FALLS W/OUT INJ PAST YR: ICD-10-PCS | Mod: CPTII,S$GLB,, | Performed by: STUDENT IN AN ORGANIZED HEALTH CARE EDUCATION/TRAINING PROGRAM

## 2020-11-25 PROCEDURE — 3288F PR FALLS RISK ASSESSMENT DOCUMENTED: ICD-10-PCS | Mod: CPTII,S$GLB,, | Performed by: STUDENT IN AN ORGANIZED HEALTH CARE EDUCATION/TRAINING PROGRAM

## 2020-11-25 PROCEDURE — 1126F AMNT PAIN NOTED NONE PRSNT: CPT | Mod: S$GLB,,, | Performed by: STUDENT IN AN ORGANIZED HEALTH CARE EDUCATION/TRAINING PROGRAM

## 2020-11-25 PROCEDURE — 99499 NO LOS: ICD-10-PCS | Mod: S$GLB,,, | Performed by: STUDENT IN AN ORGANIZED HEALTH CARE EDUCATION/TRAINING PROGRAM

## 2020-11-25 PROCEDURE — 1101F PT FALLS ASSESS-DOCD LE1/YR: CPT | Mod: CPTII,S$GLB,, | Performed by: STUDENT IN AN ORGANIZED HEALTH CARE EDUCATION/TRAINING PROGRAM

## 2020-11-25 PROCEDURE — 99999 PR PBB SHADOW E&M-EST. PATIENT-LVL III: ICD-10-PCS | Mod: PBBFAC,,, | Performed by: STUDENT IN AN ORGANIZED HEALTH CARE EDUCATION/TRAINING PROGRAM

## 2020-11-25 PROCEDURE — 1126F PR PAIN SEVERITY QUANTIFIED, NO PAIN PRESENT: ICD-10-PCS | Mod: S$GLB,,, | Performed by: STUDENT IN AN ORGANIZED HEALTH CARE EDUCATION/TRAINING PROGRAM

## 2020-11-25 PROCEDURE — 99499 UNLISTED E&M SERVICE: CPT | Mod: S$GLB,,, | Performed by: STUDENT IN AN ORGANIZED HEALTH CARE EDUCATION/TRAINING PROGRAM

## 2020-11-25 NOTE — PROGRESS NOTES
"Subjective:      Patient ID: Caro Powell is a 80 y.o. female.    Chief Complaint: Diabetic Foot Ulcer    Caro LUA is a 80 y.o. female who presents to the clinic for evaluation and treatment of high risk feet. Caro LUA has a past medical history of Anticoagulant long-term use, Arthritis, Atrial flutter, Calcium nephrolithiasis (2007), Diabetes mellitus, type 2, Diabetic peripheral neuropathy associated with type 2 diabetes mellitus, and Hypertension. The patient's chief complaint is diabetic foot ulcer, right second toe. This patient has documented high risk feet requiring routine maintenance secondary to peripheral neuropathy. Noted drainage and discoloration from the toe a few days ago. Denies trauma. Accompanied by her .    5/7/18: Follow up for wound check right foot. Taking po clindamycin and ciprofloxacin as scheduled. Accompanied by her . Dressing remained c/d/i.    5/14/18: Presents for wound check. No new complaints. Accompanied by her .    5/31/18: Presents for wound check. Reports she has not received HH. PO abx completed.     12/5:  She is a pt of Dr. 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 She is presenting with new complaint of blister at right submet 1 and distal tip of 4th toe. She is DM2 and on insulin. It was 126 today. She recently bought a new shoes. She tells me she did not have any problems when she was wearing diabetic shoes but blisters formed after wearing a normal tennis shoes. It happened a week ago. She is traveling to [place], [place] in 2 weeks and wants to know if she can weight bear on her right foot. Denies N/V/F/C/SOB/CP    12/12: f/u right foot ulcer. Has been weight bearing in surgical shoe with football dressing. Denies pain. Tells me she has been walking in diabetic shoes on her left foot. She tells me there is a "callus" at lateral left 5th toe that she did not have before. She will travel to [place] soon. Also tells " me she noticed that her right foot is turning sideways and feels like it is affecting the way she walks.     1/21/19: Complains of worsening wound to right foot. She went to [place] for holidays and saws her wound to right foot worsened. Denies trauma.    2/4/19  Here for wound check.  Denies F/c/N/V    2/18/19: Wound check. Says dressing remained intact. Upon observing note she is wearing a different darco shoe then previously dispensed and foot appears not to be fully seated in shoe. Accompanied by her . Denies trauma.     2/25/19 wound check. Denies F/C/N/V    3/29/19 patient complains of new ulcer on 3rd toe that she first noticed on Wednesday.  She has been treating it with antibiotic ointment and bandaids.  Denies F/C/N/V.  Accompanied by her .    04/08/2019:  Presents for wound check right foot.  Relates the dressing remained intact.    4/15/19: Presents for wound check. No new complaints.    06/24/2019:  Follow-up for acute onset osteomyelitis to the right 3rd toe receiving IV Ancef per Infectious Disease recommendation.  [facility] is following her and changing dressings 3 times weekly.    06/20/2019:  Referred by her home health nurse to concern of a new wound developing to the right 3rd toe.  She is also concerned the recurrent wound under the plantar 1st met head right foot.  Says that she does note that she walks differently on this foot and and that it tends to roll more.    07/15/2019:  Follow-up for wound check right foot. Says that she canceled the scheduled gastrocnemius recession due to anxiety or having to manage a PICC line and being restricted due to surgery.  Currently receiving home health.    08/08/2019:  Presents for wound check right foot.  Says that her home health company no longer of the her house.  Receiving long-term IV antibiotics for the osteomyelitis right 3rd toe.  Requesting that her surgery be rescheduled.    08/29/2019:  Presents for wound check right foot.  Complains that she has a wound to her right 2nd toe as well as a persistent 1 underneath the ball of foot. She is scheduled for outpatient gastrocnemius recession right leg on 09/10/2019.  She has not seen her PCP for medical clearance yet.  Accompanied by her .  Home Health per [facility].    09/19/2019:  Post endoscopic gastrocnemius recession right leg on 09/10/2019.  She has been nonweightbearing for the past week.  Dressing has remained clean dry intact. Wearing tall orthopedic boot.  Accompanied by her .  Relates she did not take any pain medicine.  No new complaints.    10/10/2019:  4 weeks post gastrocnemius recession right leg.  Relates no pain.  She has been sleeping and walking with the orthopedic boot right lower extremity.  No new complaints.  Says that she got a new pair diabetic shoes earlier this year has brought them to be examined.    11/01/2019:  Complains of a new wound to the right fourth toe that occurred  4 days ago after she kicked the side of her bed in the middle of the night.  Denies any pain.  Says that the foot was bruised and has improved since then.  She has been applying Betadine to the wound.    12/09/2019:  Follow-up for right 4th toe fracture occurred approximately 5 weeks ago. Relates that she still getting some swelling to the area and does not understand why.  She has been applying mupirocin ointment around the toe.    12/30/19: Presents for wound right fourth toe. Says her wound was healed then she wore her extra depth diabetic shoes and noted the toe wound recurred so she stopped wearing it and is in a tennis shoe which she says has not caused further irritation and toe appears to be healing. Denies further trauma. Completed previous po antibiotics.    07/29/2020:  Presents today complaining of a new onset wound that she discover 2 days ago to the bottom of the right foot.  Denies any trauma however relates that approximately 2 weeks ago she did have a few  days that she would walk around without any protective shoe gear around her home secondary to shingles outbreak.  Denies any trauma.  She has been wearing her Epstein tennis shoes which she purchased nearly a year ago.  Relates that her previous insoles are more than a year old and had broken down so she stopped wearing them.    08/14/2020:  Presents for wound check right foot.  Dressing remained clean dry intact.  She not recall that she stepped on a nail in her living room that the not penetrate very deep and had no rust.    9/8/20: Pt presents for wound check to right foot. Pt states her wound healed and has reopened again today, states she saw blood on her sock. She thinks it started after walking at the mall.  Also states she has painful ingrown toenail to 3rd toe. Per chart review, toenails debrided during previous visit 8/14/20. States she is using lotion to soften her calluses daily. States she needs new diabetic shoes. No other pedal complaints at this time    11/4/20: Pt seen today, states that she received extra depth shoes with custom insoles but her ulcer has re-opened again. She is concerned that her wound is not getting enough offloading. Denies signs of infection. No other pedal complaints at this time.      11/11/20: Pt seen today for wound to right foot, states that carlos made her diabetic inserts differently than before and she believes this is what is causing her wound. States her epstein have always allowed for better offloading and would prefer to wear her diabetic inserts with her epstein in the future. Spoke to carlos who states they have started using a scanner to fabricate orthotics. Will go back to original method. No other pedal complaints at this time.     11/18/20: Pt seen today for wound to right foot, no new pedal complaints.     11/25/20: Pt seen today for wound to right foot, no new pedal complaints.     PCP: Brayan Penny MD    Date Last Seen by PCP:     Current shoe gear:  " Affected Foot:  Tennis shoes      Unaffected Foot: Tennis shoes    History of Trauma: negative  Sign of Infection: none    Hemoglobin A1C   Date Value Ref Range Status   06/25/2020 6.2 (H) <5.7 % of total Hgb Final     Comment:     For someone without known diabetes, a hemoglobin   A1c value between 5.7% and 6.4% is consistent with  prediabetes and should be confirmed with a   follow-up test.     For someone with known diabetes, a value <7%  indicates that their diabetes is well controlled. A1c  targets should be individualized based on duration of  diabetes, age, comorbid conditions, and other  considerations.     This assay result is consistent with an increased risk  of diabetes.     Currently, no consensus exists regarding use of  hemoglobin A1c for diagnosis of diabetes for children.        06/09/2019 6.6 (H) 4.0 - 5.6 % Final     Comment:     ADA Screening Guidelines:  5.7-6.4%  Consistent with prediabetes  >or=6.5%  Consistent with diabetes  High levels of fetal hemoglobin interfere with the HbA1C  assay. Heterozygous hemoglobin variants (HbS, HgC, etc)do  not significantly interfere with this assay.   However, presence of multiple variants may affect accuracy.     06/09/2019 6.6 (H) 4.0 - 5.6 % Final     Comment:     ADA Screening Guidelines:  5.7-6.4%  Consistent with prediabetes  >or=6.5%  Consistent with diabetes  High levels of fetal hemoglobin interfere with the HbA1C  assay. Heterozygous hemoglobin variants (HbS, HgC, etc)do  not significantly interfere with this assay.   However, presence of multiple variants may affect accuracy.     01/12/2018 8.3 % Final     Vitals:    11/25/20 1056   Weight: 77.6 kg (171 lb 1.2 oz)   Height: 5' 2" (1.575 m)   PainSc: 0-No pain      Past Medical History:   Diagnosis Date    Anticoagulant long-term use     Arthritis     Atrial flutter     Calcium nephrolithiasis 2007    Diabetes mellitus, type 2     Diabetic peripheral neuropathy associated with type 2 " diabetes mellitus     Hypertension        Past Surgical History:   Procedure Laterality Date    COLONOSCOPY  11/28/2011    sigmoid diverticulosis, external hemorrhoids    ENDOSCOPIC GASTROCNEMIUS RECESSION Right 9/10/2019    Procedure: RECESSION, GASTROCNEMIUS, ENDOSCOPIC;  Surgeon: Derek Jose DPM;  Location: [facility];  Service: Podiatry;  Laterality: Right;  Arthrex center line (ron notified)  Video    HYSTERECTOMY      SHOULDER SURGERY Left     TOE AMPUTATION Right 05/22/2017    5th toe       Family History   Problem Relation Age of Onset    Diabetes Mother     Heart failure Father     Kidney failure Brother        Social History     Socioeconomic History    Marital status:      Spouse name: Not on file    Number of children: Not on file    Years of education: Not on file    Highest education level: Not on file   Occupational History    Not on file   Social Needs    Financial resource strain: Not on file    Food insecurity     Worry: Not on file     Inability: Not on file    Transportation needs     Medical: Not on file     Non-medical: Not on file   Tobacco Use    Smoking status: Never Smoker    Smokeless tobacco: Never Used   Substance and Sexual Activity    Alcohol use: No    Drug use: No    Sexual activity: Yes   Lifestyle    Physical activity     Days per week: Not on file     Minutes per session: Not on file    Stress: Not on file   Relationships    Social connections     Talks on phone: Not on file     Gets together: Not on file     Attends Christian service: Not on file     Active member of club or organization: Not on file     Attends meetings of clubs or organizations: Not on file     Relationship status: Not on file   Other Topics Concern    Not on file   Social History Narrative    Not on file       Current Outpatient Medications   Medication Sig Dispense Refill    ACCU-CHEK SAMI CONTROL SOLN Soln       ACCU-CHEK SAMI PLUS METER Misc       ACCU-CHEK  SAMI PLUS TEST STRP Strp USE TO TEST BLOOD SUGAR ONCE DAILY 100 strip 3    ACCU-CHEK SOFT DEV LANCETS Kit       ACCU-CHEK SOFTCLIX LANCETS Misc TEST ONCE DAILY 100 each 3    ammonium lactate 12 % Crea Apply to feet twice daily. Avoid use between toes. 140 g 5    apixaban (ELIQUIS) 5 mg Tab TAKE 1 TABLET BY MOUTH TWICE DAILY 60 tablet 5    diazePAM (VALIUM) 5 MG tablet Take 1 tablet (5 mg total) by mouth daily as needed for Anxiety. 90 tablet 3    famotidine (PEPCID) 20 MG tablet       FLUZONE HIGHDOSE QUAD 20-21  mcg/0.7 mL Syrg       gabapentin (NEURONTIN) 400 MG capsule 2 pills in the AM, 1 pill at noon, and 2 pills in the evening. 450 capsule 3    glimepiride (AMARYL) 1 MG tablet Take 1 tablet (1 mg total) by mouth 2 (two) times daily.      lisinopriL (PRINIVIL,ZESTRIL) 20 MG tablet       loratadine-pseudoephedrine  mg (ALLERGY RELIEF D-24HR)  mg per 24 hr tablet 1 tablet as needed      metoprolol succinate (TOPROL-XL) 25 MG 24 hr tablet Take by mouth once daily.       mupirocin (BACTROBAN) 2 % ointment Apply topically once daily. 22 g 1    pramipexole (MIRAPEX) 0.125 MG tablet Take by mouth every evening.       valACYclovir (VALTREX) 1000 MG tablet Take 1 tablet (1,000 mg total) by mouth 3 (three) times daily. for 7 days 21 tablet 0     Current Facility-Administered Medications   Medication Dose Route Frequency Provider Last Rate Last Dose    sodium chloride 0.9% flush 10 mL  10 mL Intravenous PRN Derek Jose DPM        sodium chloride 0.9% flush 10 mL  10 mL Intravenous PRN Derek Jose DPM           Review of patient's allergies indicates:   Allergen Reactions    Codeine Nausea Only         Review of Systems   Constitution: Negative for chills, fever and malaise/fatigue.   HENT: Negative for congestion.    Cardiovascular: Negative for chest pain, claudication and leg swelling.   Respiratory: Negative for cough and shortness of breath.    Skin: Positive for  dry skin, nail changes and poor wound healing. Negative for color change.   Musculoskeletal: Negative for back pain, joint pain, muscle cramps and muscle weakness.   Gastrointestinal: Negative for nausea and vomiting.   Neurological: Positive for numbness and paresthesias. Negative for weakness.   Psychiatric/Behavioral: Negative for altered mental status.           Objective:      Physical Exam  Constitutional:       General: She is not in acute distress.     Appearance: She is well-developed. She is not diaphoretic.   Cardiovascular:      Pulses:           Dorsalis pedis pulses are 2+ on the right side and 2+ on the left side.        Posterior tibial pulses are 2+ on the right side and 2+ on the left side.      Comments: CFT< 3 secs all toes bilateral foot, skin temp warm bilateral foot, no digital hair growth bilateral foot, mild lower extremity edema bilateral.    Musculoskeletal:         General: No tenderness.      Right foot: Decreased range of motion. Deformity present.      Left foot: Decreased range of motion. Deformity present.      Comments: Range of motion right ankle reveals +5° of dorsiflexion during knee extension.    No pain with ROM or MMT bilateral foot. Muscle strength 4/5 to bilateral lower extremity groups.     Plantar flexed first metatarsal head right foot.    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 5 sites sensed.      Skin integrity: Ulcer, callus and dry skin present. No blister, erythema or warmth.      Left foot:      Protective Sensation: 10 sites tested. 5 sites sensed.      Skin integrity: Callus and dry skin present. No ulcer, blister, skin breakdown, erythema or warmth.   Lymphadenopathy:      Comments: Negative lymphadenopathy bilateral popliteal fossa and tarsal tunnel.    Skin:     General: Skin is  warm and dry.      Capillary Refill: Capillary refill takes less than 2 seconds.      Coloration: Skin is not pale.      Findings: No abrasion, bruising, burn, ecchymosis, erythema, laceration, petechiae or rash.      Nails: There is no clubbing.        Comments: Ulcer Location: plantar first metatarsal head right  Measurements:  Unable to measure pre debridement due to overlying hyperkeratosis.  Post debridement measures 0 x 0 x 0 cm  Periwound: healed  Drainage:  none  Pus: None.  Malodor: None.  Base:  Healed  Signs of infection: None.       Neurological:      Mental Status: She is alert and oriented to person, place, and time.      Sensory: Sensory deficit present.      Motor: No abnormal muscle tone.      Comments: [place]-Chucho 5.07 monofilamant testing diminished. Light touch sensation is diminished bilaterally. Vibratory sensation is diminished ashleigh.    Psychiatric:         Behavior: Behavior normal.         Thought Content: Thought content normal.         Judgment: Judgment normal.               Assessment:       Encounter Diagnoses   Name Primary?    Type 2 diabetes mellitus with peripheral neuropathy Yes    Diabetic ulcer of other part of right foot associated with diabetes mellitus due to underlying condition, limited to breakdown of skin          Plan:       Caro LUA was seen today for diabetic foot ulcer.    Diagnoses and all orders for this visit:    Type 2 diabetes mellitus with peripheral neuropathy    Diabetic ulcer of other part of right foot associated with diabetes mellitus due to underlying condition, limited to breakdown of skin      I counseled the patient on her conditions, their implications and medical management.    Wound debridement and dressing per attached note.  Wound is healed, dressed with protective mepilex border.     Spoke to [employer] over the phone, recommend custom orthotics and extra depth shoes with wider toe box to accommodate bunion and hammertoe deformities with  1st ray cut out to right foot, states they will go back original method instead of using scanner.     Dressing application as per above. Darco shoe applied to offload the area. Advised patient that this should be worn on the affected foot at all times when ambulating. Short-term goals include maintaining good offloading and minimizing bioburden, promoting granulation and epithelialization to healing.  Long-term goals include keeping the wound healed by good offloading and medical management under the direction of internist.      Shoe inspection. Diabetic Foot Education. Patient reminded of the importance of good nutrition and blood sugar control to help prevent podiatric complications of diabetes. Patient instructed on proper foot hygeine. We discussed wearing proper shoe gear, daily foot inspections, never walking without protective shoe gear, never putting sharp instruments to feet, routine podiatric nail visits. I discussed with the  patient  signs and symptoms of infection including redness, drainage, purulence, odor, pain, elevated BS, streaking, fever, chills, etc . Patient is to seek medical attention (ER or urgent care) if these symptoms occur      Elevation and rest.    RTC in 1 month, sooner PRN

## 2020-12-23 ENCOUNTER — OFFICE VISIT (OUTPATIENT)
Dept: PODIATRY | Facility: CLINIC | Age: 80
End: 2020-12-23
Payer: MEDICARE

## 2020-12-23 ENCOUNTER — TELEPHONE (OUTPATIENT)
Dept: PODIATRY | Facility: CLINIC | Age: 80
End: 2020-12-23

## 2020-12-23 VITALS
HEIGHT: 62 IN | HEART RATE: 63 BPM | WEIGHT: 171.06 LBS | BODY MASS INDEX: 31.48 KG/M2 | DIASTOLIC BLOOD PRESSURE: 65 MMHG | SYSTOLIC BLOOD PRESSURE: 123 MMHG

## 2020-12-23 DIAGNOSIS — L84 CORN OR CALLUS: ICD-10-CM

## 2020-12-23 DIAGNOSIS — L84 PRE-ULCERATIVE CALLUSES: Primary | ICD-10-CM

## 2020-12-23 DIAGNOSIS — E11.42 TYPE 2 DIABETES MELLITUS WITH PERIPHERAL NEUROPATHY: ICD-10-CM

## 2020-12-23 PROCEDURE — 99499 UNLISTED E&M SERVICE: CPT | Mod: S$GLB,,, | Performed by: STUDENT IN AN ORGANIZED HEALTH CARE EDUCATION/TRAINING PROGRAM

## 2020-12-23 PROCEDURE — 3074F SYST BP LT 130 MM HG: CPT | Mod: CPTII,S$GLB,, | Performed by: STUDENT IN AN ORGANIZED HEALTH CARE EDUCATION/TRAINING PROGRAM

## 2020-12-23 PROCEDURE — 3078F DIAST BP <80 MM HG: CPT | Mod: CPTII,S$GLB,, | Performed by: STUDENT IN AN ORGANIZED HEALTH CARE EDUCATION/TRAINING PROGRAM

## 2020-12-23 PROCEDURE — 99999 PR PBB SHADOW E&M-EST. PATIENT-LVL IV: CPT | Mod: PBBFAC,,, | Performed by: STUDENT IN AN ORGANIZED HEALTH CARE EDUCATION/TRAINING PROGRAM

## 2020-12-23 PROCEDURE — 1101F PR PT FALLS ASSESS DOC 0-1 FALLS W/OUT INJ PAST YR: ICD-10-PCS | Mod: CPTII,S$GLB,, | Performed by: STUDENT IN AN ORGANIZED HEALTH CARE EDUCATION/TRAINING PROGRAM

## 2020-12-23 PROCEDURE — 3288F FALL RISK ASSESSMENT DOCD: CPT | Mod: CPTII,S$GLB,, | Performed by: STUDENT IN AN ORGANIZED HEALTH CARE EDUCATION/TRAINING PROGRAM

## 2020-12-23 PROCEDURE — 3288F PR FALLS RISK ASSESSMENT DOCUMENTED: ICD-10-PCS | Mod: CPTII,S$GLB,, | Performed by: STUDENT IN AN ORGANIZED HEALTH CARE EDUCATION/TRAINING PROGRAM

## 2020-12-23 PROCEDURE — 99499 NO LOS: ICD-10-PCS | Mod: S$GLB,,, | Performed by: STUDENT IN AN ORGANIZED HEALTH CARE EDUCATION/TRAINING PROGRAM

## 2020-12-23 PROCEDURE — 1159F PR MEDICATION LIST DOCUMENTED IN MEDICAL RECORD: ICD-10-PCS | Mod: S$GLB,,, | Performed by: STUDENT IN AN ORGANIZED HEALTH CARE EDUCATION/TRAINING PROGRAM

## 2020-12-23 PROCEDURE — 3078F PR MOST RECENT DIASTOLIC BLOOD PRESSURE < 80 MM HG: ICD-10-PCS | Mod: CPTII,S$GLB,, | Performed by: STUDENT IN AN ORGANIZED HEALTH CARE EDUCATION/TRAINING PROGRAM

## 2020-12-23 PROCEDURE — 99999 PR PBB SHADOW E&M-EST. PATIENT-LVL IV: ICD-10-PCS | Mod: PBBFAC,,, | Performed by: STUDENT IN AN ORGANIZED HEALTH CARE EDUCATION/TRAINING PROGRAM

## 2020-12-23 PROCEDURE — 1126F AMNT PAIN NOTED NONE PRSNT: CPT | Mod: S$GLB,,, | Performed by: STUDENT IN AN ORGANIZED HEALTH CARE EDUCATION/TRAINING PROGRAM

## 2020-12-23 PROCEDURE — 1126F PR PAIN SEVERITY QUANTIFIED, NO PAIN PRESENT: ICD-10-PCS | Mod: S$GLB,,, | Performed by: STUDENT IN AN ORGANIZED HEALTH CARE EDUCATION/TRAINING PROGRAM

## 2020-12-23 PROCEDURE — 11056 ROUTINE FOOT CARE: ICD-10-PCS | Mod: Q9,S$GLB,, | Performed by: STUDENT IN AN ORGANIZED HEALTH CARE EDUCATION/TRAINING PROGRAM

## 2020-12-23 PROCEDURE — 1101F PT FALLS ASSESS-DOCD LE1/YR: CPT | Mod: CPTII,S$GLB,, | Performed by: STUDENT IN AN ORGANIZED HEALTH CARE EDUCATION/TRAINING PROGRAM

## 2020-12-23 PROCEDURE — 1159F MED LIST DOCD IN RCRD: CPT | Mod: S$GLB,,, | Performed by: STUDENT IN AN ORGANIZED HEALTH CARE EDUCATION/TRAINING PROGRAM

## 2020-12-23 PROCEDURE — 11056 PARNG/CUTG B9 HYPRKR LES 2-4: CPT | Mod: Q9,S$GLB,, | Performed by: STUDENT IN AN ORGANIZED HEALTH CARE EDUCATION/TRAINING PROGRAM

## 2020-12-23 PROCEDURE — 3074F PR MOST RECENT SYSTOLIC BLOOD PRESSURE < 130 MM HG: ICD-10-PCS | Mod: CPTII,S$GLB,, | Performed by: STUDENT IN AN ORGANIZED HEALTH CARE EDUCATION/TRAINING PROGRAM

## 2020-12-23 RX ORDER — AMMONIUM LACTATE 12 G/100G
CREAM TOPICAL
Qty: 140 G | Refills: 5 | Status: SHIPPED | OUTPATIENT
Start: 2020-12-23 | End: 2021-02-09 | Stop reason: SDUPTHER

## 2020-12-23 RX ORDER — AMMONIUM LACTATE 12 G/100G
1 CREAM TOPICAL 2 TIMES DAILY
Qty: 1 TUBE | Refills: 5 | Status: ON HOLD | OUTPATIENT
Start: 2020-12-23 | End: 2021-01-22 | Stop reason: SDUPTHER

## 2020-12-23 NOTE — PROGRESS NOTES
"Subjective:      Patient ID: Caro Powell is a 80 y.o. female.    Chief Complaint: Diabetic Foot Ulcer    Caro LUA is a 80 y.o. female who presents to the clinic for evaluation and treatment of high risk feet. Caro LUA has a past medical history of Anticoagulant long-term use, Arthritis, Atrial flutter, Calcium nephrolithiasis (2007), Diabetes mellitus, type 2, Diabetic peripheral neuropathy associated with type 2 diabetes mellitus, and Hypertension. The patient's chief complaint is diabetic foot ulcer, right second toe. This patient has documented high risk feet requiring routine maintenance secondary to peripheral neuropathy. Noted drainage and discoloration from the toe a few days ago. Denies trauma. Accompanied by her .    5/7/18: Follow up for wound check right foot. Taking po clindamycin and ciprofloxacin as scheduled. Accompanied by her . Dressing remained c/d/i.    5/14/18: Presents for wound check. No new complaints. Accompanied by her .    5/31/18: Presents for wound check. Reports she has not received HH. PO abx completed.     12/5:  She is a pt of Dr. 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 She is presenting with new complaint of blister at right submet 1 and distal tip of 4th toe. She is DM2 and on insulin. It was 126 today. She recently bought a new shoes. She tells me she did not have any problems when she was wearing diabetic shoes but blisters formed after wearing a normal tennis shoes. It happened a week ago. She is traveling to [place], [place] in 2 weeks and wants to know if she can weight bear on her right foot. Denies N/V/F/C/SOB/CP    12/12: f/u right foot ulcer. Has been weight bearing in surgical shoe with football dressing. Denies pain. Tells me she has been walking in diabetic shoes on her left foot. She tells me there is a "callus" at lateral left 5th toe that she did not have before. She will travel to [place] soon. Also tells " me she noticed that her right foot is turning sideways and feels like it is affecting the way she walks.     1/21/19: Complains of worsening wound to right foot. She went to [place] for holidays and saws her wound to right foot worsened. Denies trauma.    2/4/19  Here for wound check.  Denies F/c/N/V    2/18/19: Wound check. Says dressing remained intact. Upon observing note she is wearing a different darco shoe then previously dispensed and foot appears not to be fully seated in shoe. Accompanied by her . Denies trauma.     2/25/19 wound check. Denies F/C/N/V    3/29/19 patient complains of new ulcer on 3rd toe that she first noticed on Wednesday.  She has been treating it with antibiotic ointment and bandaids.  Denies F/C/N/V.  Accompanied by her .    04/08/2019:  Presents for wound check right foot.  Relates the dressing remained intact.    4/15/19: Presents for wound check. No new complaints.    06/24/2019:  Follow-up for acute onset osteomyelitis to the right 3rd toe receiving IV Ancef per Infectious Disease recommendation.  [facility] is following her and changing dressings 3 times weekly.    06/20/2019:  Referred by her home health nurse to concern of a new wound developing to the right 3rd toe.  She is also concerned the recurrent wound under the plantar 1st met head right foot.  Says that she does note that she walks differently on this foot and and that it tends to roll more.    07/15/2019:  Follow-up for wound check right foot. Says that she canceled the scheduled gastrocnemius recession due to anxiety or having to manage a PICC line and being restricted due to surgery.  Currently receiving home health.    08/08/2019:  Presents for wound check right foot.  Says that her home health company no longer of the her house.  Receiving long-term IV antibiotics for the osteomyelitis right 3rd toe.  Requesting that her surgery be rescheduled.    08/29/2019:  Presents for wound check right foot.  Complains that she has a wound to her right 2nd toe as well as a persistent 1 underneath the ball of foot. She is scheduled for outpatient gastrocnemius recession right leg on 09/10/2019.  She has not seen her PCP for medical clearance yet.  Accompanied by her .  Home Health per [facility].    09/19/2019:  Post endoscopic gastrocnemius recession right leg on 09/10/2019.  She has been nonweightbearing for the past week.  Dressing has remained clean dry intact. Wearing tall orthopedic boot.  Accompanied by her .  Relates she did not take any pain medicine.  No new complaints.    10/10/2019:  4 weeks post gastrocnemius recession right leg.  Relates no pain.  She has been sleeping and walking with the orthopedic boot right lower extremity.  No new complaints.  Says that she got a new pair diabetic shoes earlier this year has brought them to be examined.    11/01/2019:  Complains of a new wound to the right fourth toe that occurred  4 days ago after she kicked the side of her bed in the middle of the night.  Denies any pain.  Says that the foot was bruised and has improved since then.  She has been applying Betadine to the wound.    12/09/2019:  Follow-up for right 4th toe fracture occurred approximately 5 weeks ago. Relates that she still getting some swelling to the area and does not understand why.  She has been applying mupirocin ointment around the toe.    12/30/19: Presents for wound right fourth toe. Says her wound was healed then she wore her extra depth diabetic shoes and noted the toe wound recurred so she stopped wearing it and is in a tennis shoe which she says has not caused further irritation and toe appears to be healing. Denies further trauma. Completed previous po antibiotics.    07/29/2020:  Presents today complaining of a new onset wound that she discover 2 days ago to the bottom of the right foot.  Denies any trauma however relates that approximately 2 weeks ago she did have a few  days that she would walk around without any protective shoe gear around her home secondary to shingles outbreak.  Denies any trauma.  She has been wearing her Epstein tennis shoes which she purchased nearly a year ago.  Relates that her previous insoles are more than a year old and had broken down so she stopped wearing them.    08/14/2020:  Presents for wound check right foot.  Dressing remained clean dry intact.  She not recall that she stepped on a nail in her living room that the not penetrate very deep and had no rust.    9/8/20: Pt presents for wound check to right foot. Pt states her wound healed and has reopened again today, states she saw blood on her sock. She thinks it started after walking at the mall.  Also states she has painful ingrown toenail to 3rd toe. Per chart review, toenails debrided during previous visit 8/14/20. States she is using lotion to soften her calluses daily. States she needs new diabetic shoes. No other pedal complaints at this time    11/4/20: Pt seen today, states that she received extra depth shoes with custom insoles but her ulcer has re-opened again. She is concerned that her wound is not getting enough offloading. Denies signs of infection. No other pedal complaints at this time.      11/11/20: Pt seen today for wound to right foot, states that carlos made her diabetic inserts differently than before and she believes this is what is causing her wound. States her epstein have always allowed for better offloading and would prefer to wear her diabetic inserts with her epstein in the future. Spoke to carlos who states they have started using a scanner to fabricate orthotics. Will go back to original method. No other pedal complaints at this time.     11/18/20: Pt seen today for wound to right foot, no new pedal complaints.     11/25/20: Pt seen today for wound to right foot, no new pedal complaints.     12/23/20: Pt seen today for pre-ulcerative callus, sub 1st met head of  "right foot. No other pedal complaints. Relates she is wearing diabetic inserts with Epstein tennis shoes.     PCP: Brayan Penny MD    Date Last Seen by PCP: 10/07/20    Current shoe gear:  Affected Foot:  Tennis shoes      Unaffected Foot: Tennis shoes    History of Trauma: negative  Sign of Infection: none    Hemoglobin A1C   Date Value Ref Range Status   06/25/2020 6.2 (H) <5.7 % of total Hgb Final     Comment:     For someone without known diabetes, a hemoglobin   A1c value between 5.7% and 6.4% is consistent with  prediabetes and should be confirmed with a   follow-up test.     For someone with known diabetes, a value <7%  indicates that their diabetes is well controlled. A1c  targets should be individualized based on duration of  diabetes, age, comorbid conditions, and other  considerations.     This assay result is consistent with an increased risk  of diabetes.     Currently, no consensus exists regarding use of  hemoglobin A1c for diagnosis of diabetes for children.        06/09/2019 6.6 (H) 4.0 - 5.6 % Final     Comment:     ADA Screening Guidelines:  5.7-6.4%  Consistent with prediabetes  >or=6.5%  Consistent with diabetes  High levels of fetal hemoglobin interfere with the HbA1C  assay. Heterozygous hemoglobin variants (HbS, HgC, etc)do  not significantly interfere with this assay.   However, presence of multiple variants may affect accuracy.     06/09/2019 6.6 (H) 4.0 - 5.6 % Final     Comment:     ADA Screening Guidelines:  5.7-6.4%  Consistent with prediabetes  >or=6.5%  Consistent with diabetes  High levels of fetal hemoglobin interfere with the HbA1C  assay. Heterozygous hemoglobin variants (HbS, HgC, etc)do  not significantly interfere with this assay.   However, presence of multiple variants may affect accuracy.     01/12/2018 8.3 % Final     Vitals:    12/23/20 1109   BP: 123/65   Pulse: 63   Weight: 77.6 kg (171 lb 1.2 oz)   Height: 5' 2" (1.575 m)   PainSc: 0-No pain      Past Medical " History:   Diagnosis Date    Anticoagulant long-term use     Arthritis     Atrial flutter     Calcium nephrolithiasis 2007    Diabetes mellitus, type 2     Diabetic peripheral neuropathy associated with type 2 diabetes mellitus     Hypertension        Past Surgical History:   Procedure Laterality Date    COLONOSCOPY  11/28/2011    sigmoid diverticulosis, external hemorrhoids    ENDOSCOPIC GASTROCNEMIUS RECESSION Right 9/10/2019    Procedure: RECESSION, GASTROCNEMIUS, ENDOSCOPIC;  Surgeon: Derek Jose DPM;  Location: [facility];  Service: Podiatry;  Laterality: Right;  Arthrex center line (ron notified)  Video    HYSTERECTOMY      SHOULDER SURGERY Left     TOE AMPUTATION Right 05/22/2017    5th toe       Family History   Problem Relation Age of Onset    Diabetes Mother     Heart failure Father     Kidney failure Brother        Social History     Socioeconomic History    Marital status:      Spouse name: Not on file    Number of children: Not on file    Years of education: Not on file    Highest education level: Not on file   Occupational History    Not on file   Social Needs    Financial resource strain: Not on file    Food insecurity     Worry: Not on file     Inability: Not on file    Transportation needs     Medical: Not on file     Non-medical: Not on file   Tobacco Use    Smoking status: Never Smoker    Smokeless tobacco: Never Used   Substance and Sexual Activity    Alcohol use: No    Drug use: No    Sexual activity: Yes   Lifestyle    Physical activity     Days per week: Not on file     Minutes per session: Not on file    Stress: Not on file   Relationships    Social connections     Talks on phone: Not on file     Gets together: Not on file     Attends [employer] service: Not on file     Active member of club or organization: Not on file     Attends meetings of clubs or organizations: Not on file     Relationship status: Not on file   Other Topics Concern    Not  on file   Social History Narrative    Not on file       Current Outpatient Medications   Medication Sig Dispense Refill    ACCU-CHEK SAMI CONTROL SOLN Soln       ACCU-CHEK SAMI PLUS METER Misc       ACCU-CHEK SAMI PLUS TEST STRP Strp USE TO TEST BLOOD SUGAR ONCE DAILY 100 strip 3    ACCU-CHEK SOFT DEV LANCETS Kit       ACCU-CHEK SOFTCLIX LANCETS Misc TEST ONCE DAILY 100 each 3    ammonium lactate 12 % Crea Apply to feet twice daily. Avoid use between toes. 140 g 5    apixaban (ELIQUIS) 5 mg Tab TAKE 1 TABLET BY MOUTH TWICE DAILY 60 tablet 5    diazePAM (VALIUM) 5 MG tablet Take 1 tablet (5 mg total) by mouth daily as needed for Anxiety. 90 tablet 3    famotidine (PEPCID) 20 MG tablet       FLUZONE HIGHDOSE QUAD 20-21  mcg/0.7 mL Syrg       gabapentin (NEURONTIN) 400 MG capsule 2 pills in the AM, 1 pill at noon, and 2 pills in the evening. 450 capsule 3    glimepiride (AMARYL) 1 MG tablet Take 1 tablet (1 mg total) by mouth 2 (two) times daily.      lisinopriL (PRINIVIL,ZESTRIL) 20 MG tablet       loratadine-pseudoephedrine  mg (ALLERGY RELIEF D-24HR)  mg per 24 hr tablet 1 tablet as needed      metoprolol succinate (TOPROL-XL) 25 MG 24 hr tablet Take by mouth once daily.       mupirocin (BACTROBAN) 2 % ointment Apply topically once daily. 22 g 1    pramipexole (MIRAPEX) 0.125 MG tablet Take by mouth every evening.       ammonium lactate 12 % Crea Apply 1 g topically 2 (two) times a day. 1 Tube 5    valACYclovir (VALTREX) 1000 MG tablet Take 1 tablet (1,000 mg total) by mouth 3 (three) times daily. for 7 days 21 tablet 0     Current Facility-Administered Medications   Medication Dose Route Frequency Provider Last Rate Last Dose    sodium chloride 0.9% flush 10 mL  10 mL Intravenous PRN Derek Jose DPM        sodium chloride 0.9% flush 10 mL  10 mL Intravenous PRN Derek Jose DPM           Review of patient's allergies indicates:   Allergen Reactions     Codeine Nausea Only         Review of Systems   Constitution: Negative for chills, fever and malaise/fatigue.   HENT: Negative for congestion.    Cardiovascular: Negative for chest pain, claudication and leg swelling.   Respiratory: Negative for cough and shortness of breath.    Skin: Positive for dry skin, nail changes and poor wound healing. Negative for color change.   Musculoskeletal: Negative for back pain, joint pain, muscle cramps and muscle weakness.   Gastrointestinal: Negative for nausea and vomiting.   Neurological: Positive for numbness and paresthesias. Negative for weakness.   Psychiatric/Behavioral: Negative for altered mental status.           Objective:      Physical Exam  Constitutional:       General: She is not in acute distress.     Appearance: She is well-developed. She is not diaphoretic.   Cardiovascular:      Pulses:           Dorsalis pedis pulses are 2+ on the right side and 2+ on the left side.        Posterior tibial pulses are 2+ on the right side and 2+ on the left side.      Comments: CFT< 3 secs all toes bilateral foot, skin temp warm bilateral foot, no digital hair growth bilateral foot, mild lower extremity edema bilateral.    Musculoskeletal:         General: No tenderness.      Right foot: Decreased range of motion. Deformity present.      Left foot: Decreased range of motion. Deformity present.      Comments: Range of motion right ankle reveals +5° of dorsiflexion during knee extension.    No pain with ROM or MMT bilateral foot. Muscle strength 4/5 to bilateral lower extremity groups.     Plantar flexed first metatarsal head right foot.    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 5 sites sensed.      Skin integrity: Ulcer, callus and dry skin present. No blister, erythema or warmth.      Left foot:       Protective Sensation: 10 sites tested. 5 sites sensed.      Skin integrity: Callus and dry skin present. No ulcer, blister, skin breakdown, erythema or warmth.   Lymphadenopathy:      Comments: Negative lymphadenopathy bilateral popliteal fossa and tarsal tunnel.    Skin:     General: Skin is warm and dry.      Capillary Refill: Capillary refill takes less than 2 seconds.      Coloration: Skin is not pale.      Findings: No abrasion, bruising, burn, ecchymosis, erythema, laceration, petechiae or rash.      Nails: There is no clubbing.        Comments: Ulcer Location: plantar first metatarsal head right  Measurements:  Unable to measure pre debridement due to overlying hyperkeratosis.  Post debridement measures 0 x 0 x 0 cm  Periwound: healed  Drainage:  none  Pus: None.  Malodor: None.  Base:  Healed  Signs of infection: None.      Toenails are thickened, dystrphic and discolored and of normal length.     Calluses noted bilateral medial 1st IPJ of hallux and right 1st metatarsal head     Neurological:      Mental Status: She is alert and oriented to person, place, and time.      Sensory: Sensory deficit present.      Motor: No abnormal muscle tone.      Comments: [place] 5.07 monofilamant testing diminished. Light touch sensation is diminished bilaterally. Vibratory sensation is diminished ashleigh.    Psychiatric:         Behavior: Behavior normal.         Thought Content: Thought content normal.         Judgment: Judgment normal.               Assessment:       Encounter Diagnoses   Name Primary?    Pre-ulcerative calluses Yes    Corn or callus     Type 2 diabetes mellitus with peripheral neuropathy          Plan:       Caro LUA was seen today for diabetic foot ulcer.    Diagnoses and all orders for this visit:    Pre-ulcerative calluses    Corn or callus    Type 2 diabetes mellitus with peripheral neuropathy    Other orders  -     ammonium lactate 12 % Crea; Apply 1 g topically 2 (two) times a  day.  -     ammonium lactate 12 % Crea; Apply to feet twice daily. Avoid use between toes.      I counseled the patient on her conditions, their implications and medical management.    Routine foot care per attached note. Wound is healed, dressed with protective mepilex border.     Dressing application as per above. Darco shoe applied to offload the area. Advised patient that this should be worn on the affected foot at all times when ambulating. Short-term goals include maintaining good offloading and minimizing bioburden, promoting granulation and epithelialization to healing.  Long-term goals include keeping the wound healed by good offloading and medical management under the direction of internist.      Shoe inspection. Diabetic Foot Education. Patient reminded of the importance of good nutrition and blood sugar control to help prevent podiatric complications of diabetes. Patient instructed on proper foot hygeine. We discussed wearing proper shoe gear, daily foot inspections, never walking without protective shoe gear, never putting sharp instruments to feet, routine podiatric nail visits. I discussed with the  patient  signs and symptoms of infection including redness, drainage, purulence, odor, pain, elevated BS, streaking, fever, chills, etc . Patient is to seek medical attention (ER or urgent care) if these symptoms occur      Elevation and rest.    RTC in 1 month, sooner PRN

## 2020-12-23 NOTE — PROCEDURES
"Routine Foot Care    Date/Time: 12/23/2020 10:45 AM  Performed by: Ava Atknis DPM  Authorized by: Ava Atkins DPM     Time out: Immediately prior to procedure a "time out" was called to verify the correct patient, procedure, equipment, support staff and site/side marked as required.    Consent Done?:  Yes (Verbal)  Hyperkeratotic Skin Lesions?: Yes    Number of trimmed lesions:  3  Location(s):  Left 1st Toe, Right 1st Toe and Right 1st Metatarsal Head    Nail Care Type:  Debride  Patient tolerance:  Patient tolerated the procedure well with no immediate complications      "

## 2020-12-23 NOTE — TELEPHONE ENCOUNTER
Called [facility] mail order pharmacy & canceled the prescription. Phoned in ammonium lactate 12% cream to [facility]).

## 2020-12-23 NOTE — TELEPHONE ENCOUNTER
----- Message from Elisabet Epstein sent at 12/23/2020  1:43 PM CST -----  Contact: self [phone]  Patient need to speak with you about her medication. Please call

## 2020-12-31 ENCOUNTER — CLINICAL SUPPORT (OUTPATIENT)
Dept: URGENT CARE | Facility: CLINIC | Age: 80
End: 2020-12-31
Payer: MEDICARE

## 2020-12-31 DIAGNOSIS — Z20.822 ENCOUNTER FOR LABORATORY TESTING FOR COVID-19 VIRUS: Primary | ICD-10-CM

## 2020-12-31 LAB
CTP QC/QA: YES
SARS-COV-2 RDRP RESP QL NAA+PROBE: NEGATIVE

## 2020-12-31 PROCEDURE — U0002 COVID-19 LAB TEST NON-CDC: HCPCS | Mod: QW,S$GLB,, | Performed by: FAMILY MEDICINE

## 2020-12-31 PROCEDURE — U0002: ICD-10-PCS | Mod: QW,S$GLB,, | Performed by: FAMILY MEDICINE

## 2021-01-05 ENCOUNTER — TELEPHONE (OUTPATIENT)
Dept: PODIATRY | Facility: CLINIC | Age: 81
End: 2021-01-05

## 2021-01-09 ENCOUNTER — IMMUNIZATION (OUTPATIENT)
Dept: INTERNAL MEDICINE | Facility: CLINIC | Age: 81
End: 2021-01-09
Payer: MEDICARE

## 2021-01-09 DIAGNOSIS — Z23 NEED FOR VACCINATION: ICD-10-CM

## 2021-01-09 PROCEDURE — 91300 COVID-19, MRNA, LNP-S, PF, 30 MCG/0.3 ML DOSE VACCINE: CPT | Mod: PBBFAC | Performed by: FAMILY MEDICINE

## 2021-01-11 ENCOUNTER — TELEPHONE (OUTPATIENT)
Dept: PODIATRY | Facility: CLINIC | Age: 81
End: 2021-01-11

## 2021-01-12 ENCOUNTER — OFFICE VISIT (OUTPATIENT)
Dept: PODIATRY | Facility: CLINIC | Age: 81
End: 2021-01-12
Payer: MEDICARE

## 2021-01-12 ENCOUNTER — HOSPITAL ENCOUNTER (OUTPATIENT)
Dept: RADIOLOGY | Facility: HOSPITAL | Age: 81
Discharge: HOME | End: 2021-01-12
Attending: STUDENT IN AN ORGANIZED HEALTH CARE EDUCATION/TRAINING PROGRAM
Payer: MEDICARE

## 2021-01-12 VITALS — HEIGHT: 62 IN | BODY MASS INDEX: 31.48 KG/M2 | WEIGHT: 171.06 LBS

## 2021-01-12 DIAGNOSIS — E11.42 TYPE 2 DIABETES MELLITUS WITH PERIPHERAL NEUROPATHY: ICD-10-CM

## 2021-01-12 DIAGNOSIS — L97.511 DIABETIC ULCER OF OTHER PART OF RIGHT FOOT ASSOCIATED WITH DIABETES MELLITUS DUE TO UNDERLYING CONDITION, LIMITED TO BREAKDOWN OF SKIN: ICD-10-CM

## 2021-01-12 DIAGNOSIS — E08.621 DIABETIC ULCER OF OTHER PART OF RIGHT FOOT ASSOCIATED WITH DIABETES MELLITUS DUE TO UNDERLYING CONDITION, LIMITED TO BREAKDOWN OF SKIN: ICD-10-CM

## 2021-01-12 DIAGNOSIS — E08.621 DIABETIC ULCER OF OTHER PART OF RIGHT FOOT ASSOCIATED WITH DIABETES MELLITUS DUE TO UNDERLYING CONDITION, LIMITED TO BREAKDOWN OF SKIN: Primary | ICD-10-CM

## 2021-01-12 DIAGNOSIS — L97.511 DIABETIC ULCER OF OTHER PART OF RIGHT FOOT ASSOCIATED WITH DIABETES MELLITUS DUE TO UNDERLYING CONDITION, LIMITED TO BREAKDOWN OF SKIN: Primary | ICD-10-CM

## 2021-01-12 PROCEDURE — 11042 DBRDMT SUBQ TIS 1ST 20SQCM/<: CPT | Mod: S$GLB,,, | Performed by: STUDENT IN AN ORGANIZED HEALTH CARE EDUCATION/TRAINING PROGRAM

## 2021-01-12 PROCEDURE — 3288F PR FALLS RISK ASSESSMENT DOCUMENTED: ICD-10-PCS | Mod: CPTII,S$GLB,, | Performed by: STUDENT IN AN ORGANIZED HEALTH CARE EDUCATION/TRAINING PROGRAM

## 2021-01-12 PROCEDURE — 1159F PR MEDICATION LIST DOCUMENTED IN MEDICAL RECORD: ICD-10-PCS | Mod: S$GLB,,, | Performed by: STUDENT IN AN ORGANIZED HEALTH CARE EDUCATION/TRAINING PROGRAM

## 2021-01-12 PROCEDURE — 1126F AMNT PAIN NOTED NONE PRSNT: CPT | Mod: S$GLB,,, | Performed by: STUDENT IN AN ORGANIZED HEALTH CARE EDUCATION/TRAINING PROGRAM

## 2021-01-12 PROCEDURE — 1101F PR PT FALLS ASSESS DOC 0-1 FALLS W/OUT INJ PAST YR: ICD-10-PCS | Mod: CPTII,S$GLB,, | Performed by: STUDENT IN AN ORGANIZED HEALTH CARE EDUCATION/TRAINING PROGRAM

## 2021-01-12 PROCEDURE — 1126F PR PAIN SEVERITY QUANTIFIED, NO PAIN PRESENT: ICD-10-PCS | Mod: S$GLB,,, | Performed by: STUDENT IN AN ORGANIZED HEALTH CARE EDUCATION/TRAINING PROGRAM

## 2021-01-12 PROCEDURE — 99999 PR PBB SHADOW E&M-EST. PATIENT-LVL III: CPT | Mod: PBBFAC,,, | Performed by: STUDENT IN AN ORGANIZED HEALTH CARE EDUCATION/TRAINING PROGRAM

## 2021-01-12 PROCEDURE — 11042 WOUND DEBRIDEMENT: ICD-10-PCS | Mod: S$GLB,,, | Performed by: STUDENT IN AN ORGANIZED HEALTH CARE EDUCATION/TRAINING PROGRAM

## 2021-01-12 PROCEDURE — 3288F FALL RISK ASSESSMENT DOCD: CPT | Mod: CPTII,S$GLB,, | Performed by: STUDENT IN AN ORGANIZED HEALTH CARE EDUCATION/TRAINING PROGRAM

## 2021-01-12 PROCEDURE — 73630 XR FOOT COMPLETE 3 VIEW RIGHT: ICD-10-PCS | Mod: 26,RT,, | Performed by: RADIOLOGY

## 2021-01-12 PROCEDURE — 73630 X-RAY EXAM OF FOOT: CPT | Mod: 26,RT,, | Performed by: RADIOLOGY

## 2021-01-12 PROCEDURE — 73630 X-RAY EXAM OF FOOT: CPT | Mod: TC,PN,RT

## 2021-01-12 PROCEDURE — 99213 PR OFFICE/OUTPT VISIT, EST, LEVL III, 20-29 MIN: ICD-10-PCS | Mod: 25,S$GLB,, | Performed by: STUDENT IN AN ORGANIZED HEALTH CARE EDUCATION/TRAINING PROGRAM

## 2021-01-12 PROCEDURE — 1101F PT FALLS ASSESS-DOCD LE1/YR: CPT | Mod: CPTII,S$GLB,, | Performed by: STUDENT IN AN ORGANIZED HEALTH CARE EDUCATION/TRAINING PROGRAM

## 2021-01-12 PROCEDURE — 1159F MED LIST DOCD IN RCRD: CPT | Mod: S$GLB,,, | Performed by: STUDENT IN AN ORGANIZED HEALTH CARE EDUCATION/TRAINING PROGRAM

## 2021-01-12 PROCEDURE — 99213 OFFICE O/P EST LOW 20 MIN: CPT | Mod: 25,S$GLB,, | Performed by: STUDENT IN AN ORGANIZED HEALTH CARE EDUCATION/TRAINING PROGRAM

## 2021-01-12 PROCEDURE — 99999 PR PBB SHADOW E&M-EST. PATIENT-LVL III: ICD-10-PCS | Mod: PBBFAC,,, | Performed by: STUDENT IN AN ORGANIZED HEALTH CARE EDUCATION/TRAINING PROGRAM

## 2021-01-19 ENCOUNTER — OFFICE VISIT (OUTPATIENT)
Dept: URGENT CARE | Facility: CLINIC | Age: 81
End: 2021-01-19
Payer: MEDICARE

## 2021-01-19 VITALS
RESPIRATION RATE: 18 BRPM | HEART RATE: 76 BPM | HEIGHT: 62 IN | WEIGHT: 171 LBS | SYSTOLIC BLOOD PRESSURE: 120 MMHG | OXYGEN SATURATION: 95 % | BODY MASS INDEX: 31.47 KG/M2 | DIASTOLIC BLOOD PRESSURE: 55 MMHG | TEMPERATURE: 98 F

## 2021-01-19 DIAGNOSIS — J06.9 UPPER RESPIRATORY TRACT INFECTION, UNSPECIFIED TYPE: Primary | ICD-10-CM

## 2021-01-19 DIAGNOSIS — E11.65 UNCONTROLLED TYPE 2 DIABETES MELLITUS WITH HYPERGLYCEMIA: ICD-10-CM

## 2021-01-19 DIAGNOSIS — R05.9 COUGH: ICD-10-CM

## 2021-01-19 DIAGNOSIS — R42 DIZZINESS: ICD-10-CM

## 2021-01-19 DIAGNOSIS — E11.42 POLYNEUROPATHY DUE TO TYPE 2 DIABETES MELLITUS: ICD-10-CM

## 2021-01-19 DIAGNOSIS — N28.9 RENAL INSUFFICIENCY: ICD-10-CM

## 2021-01-19 DIAGNOSIS — R73.9 HYPERGLYCEMIA: ICD-10-CM

## 2021-01-19 DIAGNOSIS — R53.1 WEAKNESS: ICD-10-CM

## 2021-01-19 DIAGNOSIS — R53.83 FATIGUE, UNSPECIFIED TYPE: ICD-10-CM

## 2021-01-19 DIAGNOSIS — E87.1 HYPONATREMIA: ICD-10-CM

## 2021-01-19 LAB
CTP QC/QA: YES
CTP QC/QA: YES
FLUAV AG NPH QL: NEGATIVE
FLUBV AG NPH QL: NEGATIVE
GLUCOSE SERPL-MCNC: 310 MG/DL (ref 70–110)
POC ANION GAP: 15 MMOL/L (ref 10–20)
POC BUN: 20 MMOL/L (ref 8–26)
POC CHLORIDE: 97 MMOL/L (ref 98–109)
POC CREATININE: 1.5 MG/DL (ref 0.6–1.3)
POC HEMATOCRIT: 31 %PCV (ref 37–47)
POC HEMOGLOBIN: 10.5 G/DL (ref 12.5–16)
POC ICA: 1.18 MMOL/L (ref 1.12–1.32)
POC POTASSIUM: 4.6 MMOL/L (ref 3.5–4.9)
POC SODIUM: 131 MMOL/L (ref 138–146)
POC TCO2: 25 MMOL/L (ref 24–29)
SARS-COV-2 RDRP RESP QL NAA+PROBE: NEGATIVE

## 2021-01-19 PROCEDURE — 71046 XR CHEST PA AND LATERAL: ICD-10-PCS | Mod: FY,S$GLB,, | Performed by: RADIOLOGY

## 2021-01-19 PROCEDURE — 80047 POCT CHEMISTRY PANEL: ICD-10-PCS | Mod: QW,S$GLB,, | Performed by: NURSE PRACTITIONER

## 2021-01-19 PROCEDURE — 99214 PR OFFICE/OUTPT VISIT, EST, LEVL IV, 30-39 MIN: ICD-10-PCS | Mod: 25,S$GLB,, | Performed by: NURSE PRACTITIONER

## 2021-01-19 PROCEDURE — 87804 POCT INFLUENZA A/B: ICD-10-PCS | Mod: QW,S$GLB,, | Performed by: NURSE PRACTITIONER

## 2021-01-19 PROCEDURE — 80047 BASIC METABLC PNL IONIZED CA: CPT | Mod: QW,S$GLB,, | Performed by: NURSE PRACTITIONER

## 2021-01-19 PROCEDURE — 93010 EKG 12-LEAD: ICD-10-PCS | Mod: S$GLB,,, | Performed by: INTERNAL MEDICINE

## 2021-01-19 PROCEDURE — U0002: ICD-10-PCS | Mod: QW,S$GLB,, | Performed by: NURSE PRACTITIONER

## 2021-01-19 PROCEDURE — 93010 ELECTROCARDIOGRAM REPORT: CPT | Mod: S$GLB,,, | Performed by: INTERNAL MEDICINE

## 2021-01-19 PROCEDURE — 93005 ELECTROCARDIOGRAM TRACING: CPT | Mod: S$GLB,,, | Performed by: NURSE PRACTITIONER

## 2021-01-19 PROCEDURE — 93005 EKG 12-LEAD: ICD-10-PCS | Mod: S$GLB,,, | Performed by: NURSE PRACTITIONER

## 2021-01-19 PROCEDURE — 71046 X-RAY EXAM CHEST 2 VIEWS: CPT | Mod: FY,S$GLB,, | Performed by: RADIOLOGY

## 2021-01-19 PROCEDURE — 87804 INFLUENZA ASSAY W/OPTIC: CPT | Mod: QW,S$GLB,, | Performed by: NURSE PRACTITIONER

## 2021-01-19 PROCEDURE — U0002 COVID-19 LAB TEST NON-CDC: HCPCS | Mod: QW,S$GLB,, | Performed by: NURSE PRACTITIONER

## 2021-01-19 PROCEDURE — 99214 OFFICE O/P EST MOD 30 MIN: CPT | Mod: 25,S$GLB,, | Performed by: NURSE PRACTITIONER

## 2021-01-20 ENCOUNTER — HOSPITAL ENCOUNTER (OUTPATIENT)
Dept: RADIOLOGY | Facility: HOSPITAL | Age: 81
Discharge: HOME | End: 2021-01-20
Attending: STUDENT IN AN ORGANIZED HEALTH CARE EDUCATION/TRAINING PROGRAM
Payer: MEDICARE

## 2021-01-20 ENCOUNTER — OFFICE VISIT (OUTPATIENT)
Dept: PODIATRY | Facility: CLINIC | Age: 81
End: 2021-01-20
Payer: MEDICARE

## 2021-01-20 VITALS
BODY MASS INDEX: 31.47 KG/M2 | WEIGHT: 171 LBS | DIASTOLIC BLOOD PRESSURE: 57 MMHG | HEART RATE: 69 BPM | SYSTOLIC BLOOD PRESSURE: 130 MMHG | HEIGHT: 62 IN

## 2021-01-20 DIAGNOSIS — L08.9 DIABETIC FOOT INFECTION: Primary | ICD-10-CM

## 2021-01-20 DIAGNOSIS — E08.621 DIABETIC ULCER OF FOOT ASSOCIATED WITH DIABETES MELLITUS DUE TO UNDERLYING CONDITION, WITH MUSCLE INVOLVEMENT WITHOUT EVIDENCE OF NECROSIS, UNSPECIFIED LATERALITY, UNSPECIFIED PART OF FOOT: ICD-10-CM

## 2021-01-20 DIAGNOSIS — L97.505 DIABETIC ULCER OF FOOT ASSOCIATED WITH DIABETES MELLITUS DUE TO UNDERLYING CONDITION, WITH MUSCLE INVOLVEMENT WITHOUT EVIDENCE OF NECROSIS, UNSPECIFIED LATERALITY, UNSPECIFIED PART OF FOOT: ICD-10-CM

## 2021-01-20 DIAGNOSIS — E11.628 DIABETIC FOOT INFECTION: ICD-10-CM

## 2021-01-20 DIAGNOSIS — L08.9 DIABETIC FOOT INFECTION: ICD-10-CM

## 2021-01-20 DIAGNOSIS — E11.628 DIABETIC FOOT INFECTION: Primary | ICD-10-CM

## 2021-01-20 PROCEDURE — 87075 CULTR BACTERIA EXCEPT BLOOD: CPT

## 2021-01-20 PROCEDURE — 1101F PR PT FALLS ASSESS DOC 0-1 FALLS W/OUT INJ PAST YR: ICD-10-PCS | Mod: CPTII,S$GLB,, | Performed by: STUDENT IN AN ORGANIZED HEALTH CARE EDUCATION/TRAINING PROGRAM

## 2021-01-20 PROCEDURE — 11043 DBRDMT MUSC&/FSCA 1ST 20/<: CPT | Mod: S$GLB,,, | Performed by: STUDENT IN AN ORGANIZED HEALTH CARE EDUCATION/TRAINING PROGRAM

## 2021-01-20 PROCEDURE — 87147 CULTURE TYPE IMMUNOLOGIC: CPT

## 2021-01-20 PROCEDURE — 99999 PR PBB SHADOW E&M-EST. PATIENT-LVL III: ICD-10-PCS | Mod: PBBFAC,,, | Performed by: STUDENT IN AN ORGANIZED HEALTH CARE EDUCATION/TRAINING PROGRAM

## 2021-01-20 PROCEDURE — 3078F DIAST BP <80 MM HG: CPT | Mod: CPTII,S$GLB,, | Performed by: STUDENT IN AN ORGANIZED HEALTH CARE EDUCATION/TRAINING PROGRAM

## 2021-01-20 PROCEDURE — 87076 CULTURE ANAEROBE IDENT EACH: CPT

## 2021-01-20 PROCEDURE — 99214 OFFICE O/P EST MOD 30 MIN: CPT | Mod: 25,S$GLB,, | Performed by: STUDENT IN AN ORGANIZED HEALTH CARE EDUCATION/TRAINING PROGRAM

## 2021-01-20 PROCEDURE — 3078F PR MOST RECENT DIASTOLIC BLOOD PRESSURE < 80 MM HG: ICD-10-PCS | Mod: CPTII,S$GLB,, | Performed by: STUDENT IN AN ORGANIZED HEALTH CARE EDUCATION/TRAINING PROGRAM

## 2021-01-20 PROCEDURE — 87186 SC STD MICRODIL/AGAR DIL: CPT

## 2021-01-20 PROCEDURE — 3075F PR MOST RECENT SYSTOLIC BLOOD PRESS GE 130-139MM HG: ICD-10-PCS | Mod: CPTII,S$GLB,, | Performed by: STUDENT IN AN ORGANIZED HEALTH CARE EDUCATION/TRAINING PROGRAM

## 2021-01-20 PROCEDURE — 99999 PR PBB SHADOW E&M-EST. PATIENT-LVL III: CPT | Mod: PBBFAC,,, | Performed by: STUDENT IN AN ORGANIZED HEALTH CARE EDUCATION/TRAINING PROGRAM

## 2021-01-20 PROCEDURE — 3288F PR FALLS RISK ASSESSMENT DOCUMENTED: ICD-10-PCS | Mod: CPTII,S$GLB,, | Performed by: STUDENT IN AN ORGANIZED HEALTH CARE EDUCATION/TRAINING PROGRAM

## 2021-01-20 PROCEDURE — 1101F PT FALLS ASSESS-DOCD LE1/YR: CPT | Mod: CPTII,S$GLB,, | Performed by: STUDENT IN AN ORGANIZED HEALTH CARE EDUCATION/TRAINING PROGRAM

## 2021-01-20 PROCEDURE — 73630 X-RAY EXAM OF FOOT: CPT | Mod: TC,PN,RT

## 2021-01-20 PROCEDURE — 99214 PR OFFICE/OUTPT VISIT, EST, LEVL IV, 30-39 MIN: ICD-10-PCS | Mod: 25,S$GLB,, | Performed by: STUDENT IN AN ORGANIZED HEALTH CARE EDUCATION/TRAINING PROGRAM

## 2021-01-20 PROCEDURE — 11043 WOUND DEBRIDEMENT: ICD-10-PCS | Mod: S$GLB,,, | Performed by: STUDENT IN AN ORGANIZED HEALTH CARE EDUCATION/TRAINING PROGRAM

## 2021-01-20 PROCEDURE — 73630 X-RAY EXAM OF FOOT: CPT | Mod: 26,RT,, | Performed by: RADIOLOGY

## 2021-01-20 PROCEDURE — 87077 CULTURE AEROBIC IDENTIFY: CPT

## 2021-01-20 PROCEDURE — 73718 MRI LOWER EXTREMITY W/O DYE: CPT | Mod: TC,RT

## 2021-01-20 PROCEDURE — 1159F MED LIST DOCD IN RCRD: CPT | Mod: S$GLB,,, | Performed by: STUDENT IN AN ORGANIZED HEALTH CARE EDUCATION/TRAINING PROGRAM

## 2021-01-20 PROCEDURE — 3288F FALL RISK ASSESSMENT DOCD: CPT | Mod: CPTII,S$GLB,, | Performed by: STUDENT IN AN ORGANIZED HEALTH CARE EDUCATION/TRAINING PROGRAM

## 2021-01-20 PROCEDURE — 87070 CULTURE OTHR SPECIMN AEROBIC: CPT

## 2021-01-20 PROCEDURE — 73630 XR FOOT COMPLETE 3 VIEW RIGHT: ICD-10-PCS | Mod: 26,RT,, | Performed by: RADIOLOGY

## 2021-01-20 PROCEDURE — 73718 MRI FOOT (FOREFOOT) RIGHT WITHOUT CONTRAST: ICD-10-PCS | Mod: 26,59,RT, | Performed by: RADIOLOGY

## 2021-01-20 PROCEDURE — 1159F PR MEDICATION LIST DOCUMENTED IN MEDICAL RECORD: ICD-10-PCS | Mod: S$GLB,,, | Performed by: STUDENT IN AN ORGANIZED HEALTH CARE EDUCATION/TRAINING PROGRAM

## 2021-01-20 PROCEDURE — 3075F SYST BP GE 130 - 139MM HG: CPT | Mod: CPTII,S$GLB,, | Performed by: STUDENT IN AN ORGANIZED HEALTH CARE EDUCATION/TRAINING PROGRAM

## 2021-01-20 PROCEDURE — 73718 MRI LOWER EXTREMITY W/O DYE: CPT | Mod: 26,59,RT, | Performed by: RADIOLOGY

## 2021-01-20 RX ORDER — CLINDAMYCIN HYDROCHLORIDE 300 MG/1
300 CAPSULE ORAL 3 TIMES DAILY
Qty: 42 CAPSULE | Refills: 0 | Status: ON HOLD | OUTPATIENT
Start: 2021-01-20 | End: 2021-01-25 | Stop reason: HOSPADM

## 2021-01-21 RX ORDER — CIPROFLOXACIN 500 MG/1
500 TABLET ORAL 2 TIMES DAILY
Qty: 20 TABLET | Refills: 0 | Status: ON HOLD | OUTPATIENT
Start: 2021-01-21 | End: 2021-01-25 | Stop reason: HOSPADM

## 2021-01-22 ENCOUNTER — OFFICE VISIT (OUTPATIENT)
Dept: PODIATRY | Facility: CLINIC | Age: 81
End: 2021-01-22
Payer: MEDICARE

## 2021-01-22 ENCOUNTER — HOSPITAL ENCOUNTER (OUTPATIENT)
Facility: HOSPITAL | Age: 81
LOS: 3 days | Discharge: HOME | End: 2021-01-25
Attending: EMERGENCY MEDICINE | Admitting: INTERNAL MEDICINE
Payer: MEDICARE

## 2021-01-22 VITALS — WEIGHT: 171.06 LBS | BODY MASS INDEX: 31.48 KG/M2 | HEIGHT: 62 IN

## 2021-01-22 DIAGNOSIS — E11.40 TYPE 2 DIABETES MELLITUS WITH DIABETIC NEUROPATHY, WITHOUT LONG-TERM CURRENT USE OF INSULIN: Chronic | ICD-10-CM

## 2021-01-22 DIAGNOSIS — E11.628 DIABETIC FOOT INFECTION: Primary | ICD-10-CM

## 2021-01-22 DIAGNOSIS — L03.90 CELLULITIS, UNSPECIFIED CELLULITIS SITE: ICD-10-CM

## 2021-01-22 DIAGNOSIS — B99.9 INFECTION: ICD-10-CM

## 2021-01-22 DIAGNOSIS — D64.9 NORMOCYTIC ANEMIA: ICD-10-CM

## 2021-01-22 DIAGNOSIS — L03.90 CELLULITIS, UNSPECIFIED CELLULITIS SITE: Primary | ICD-10-CM

## 2021-01-22 DIAGNOSIS — L08.9 DIABETIC FOOT INFECTION: Primary | ICD-10-CM

## 2021-01-22 DIAGNOSIS — S91.301A WOUND OF RIGHT FOOT: ICD-10-CM

## 2021-01-22 LAB
ALBUMIN SERPL BCP-MCNC: 3.2 G/DL (ref 3.5–5.2)
ALP SERPL-CCNC: 54 U/L (ref 55–135)
ALT SERPL W/O P-5'-P-CCNC: 19 U/L (ref 10–44)
ANION GAP SERPL CALC-SCNC: 9 MMOL/L (ref 8–16)
AST SERPL-CCNC: 18 U/L (ref 10–40)
BASOPHILS # BLD AUTO: 0.03 K/UL (ref 0–0.2)
BASOPHILS NFR BLD: 0.3 % (ref 0–1.9)
BILIRUB SERPL-MCNC: 0.2 MG/DL (ref 0.1–1)
BUN SERPL-MCNC: 21 MG/DL (ref 8–23)
CALCIUM SERPL-MCNC: 9.2 MG/DL (ref 8.7–10.5)
CHLORIDE SERPL-SCNC: 108 MMOL/L (ref 95–110)
CO2 SERPL-SCNC: 24 MMOL/L (ref 23–29)
CREAT SERPL-MCNC: 1.3 MG/DL (ref 0.5–1.4)
CRP SERPL-MCNC: 120.4 MG/L (ref 0–8.2)
DIFFERENTIAL METHOD: ABNORMAL
EOSINOPHIL # BLD AUTO: 0.3 K/UL (ref 0–0.5)
EOSINOPHIL NFR BLD: 3.1 % (ref 0–8)
ERYTHROCYTE [DISTWIDTH] IN BLOOD BY AUTOMATED COUNT: 13.2 % (ref 11.5–14.5)
ERYTHROCYTE [SEDIMENTATION RATE] IN BLOOD BY WESTERGREN METHOD: >120 MM/HR (ref 0–20)
EST. GFR  (AFRICAN AMERICAN): 44 ML/MIN/1.73 M^2
EST. GFR  (NON AFRICAN AMERICAN): 39 ML/MIN/1.73 M^2
ESTIMATED AVG GLUCOSE: 143 MG/DL (ref 68–131)
FERRITIN SERPL-MCNC: 329 NG/ML (ref 20–300)
GLUCOSE SERPL-MCNC: 156 MG/DL (ref 70–110)
HBA1C MFR BLD HPLC: 6.6 % (ref 4–5.6)
HCT VFR BLD AUTO: 30.8 % (ref 37–48.5)
HGB BLD-MCNC: 10.3 G/DL (ref 12–16)
IMM GRANULOCYTES # BLD AUTO: 0.05 K/UL (ref 0–0.04)
IMM GRANULOCYTES NFR BLD AUTO: 0.5 % (ref 0–0.5)
LYMPHOCYTES # BLD AUTO: 1.5 K/UL (ref 1–4.8)
LYMPHOCYTES NFR BLD: 15.4 % (ref 18–48)
MCH RBC QN AUTO: 29.4 PG (ref 27–31)
MCHC RBC AUTO-ENTMCNC: 33.4 G/DL (ref 32–36)
MCV RBC AUTO: 88 FL (ref 82–98)
MONOCYTES # BLD AUTO: 0.9 K/UL (ref 0.3–1)
MONOCYTES NFR BLD: 9 % (ref 4–15)
NEUTROPHILS # BLD AUTO: 7.2 K/UL (ref 1.8–7.7)
NEUTROPHILS NFR BLD: 71.7 % (ref 38–73)
NRBC BLD-RTO: 0 /100 WBC
PLATELET # BLD AUTO: 344 K/UL (ref 150–350)
PMV BLD AUTO: 9.7 FL (ref 9.2–12.9)
POCT GLUCOSE: 198 MG/DL (ref 70–110)
POTASSIUM SERPL-SCNC: 4.5 MMOL/L (ref 3.5–5.1)
PROT SERPL-MCNC: 7 G/DL (ref 6–8.4)
RBC # BLD AUTO: 3.5 M/UL (ref 4–5.4)
SARS-COV-2 RDRP RESP QL NAA+PROBE: NEGATIVE
SODIUM SERPL-SCNC: 141 MMOL/L (ref 136–145)
WBC # BLD AUTO: 10.02 K/UL (ref 3.9–12.7)

## 2021-01-22 PROCEDURE — U0002 COVID-19 LAB TEST NON-CDC: HCPCS

## 2021-01-22 PROCEDURE — 99999 PR PBB SHADOW E&M-EST. PATIENT-LVL III: CPT | Mod: PBBFAC,,, | Performed by: STUDENT IN AN ORGANIZED HEALTH CARE EDUCATION/TRAINING PROGRAM

## 2021-01-22 PROCEDURE — 85652 RBC SED RATE AUTOMATED: CPT

## 2021-01-22 PROCEDURE — 1126F PR PAIN SEVERITY QUANTIFIED, NO PAIN PRESENT: ICD-10-PCS | Mod: S$GLB,,, | Performed by: STUDENT IN AN ORGANIZED HEALTH CARE EDUCATION/TRAINING PROGRAM

## 2021-01-22 PROCEDURE — 1126F AMNT PAIN NOTED NONE PRSNT: CPT | Mod: S$GLB,,, | Performed by: STUDENT IN AN ORGANIZED HEALTH CARE EDUCATION/TRAINING PROGRAM

## 2021-01-22 PROCEDURE — 99214 OFFICE O/P EST MOD 30 MIN: CPT | Mod: S$GLB,,, | Performed by: STUDENT IN AN ORGANIZED HEALTH CARE EDUCATION/TRAINING PROGRAM

## 2021-01-22 PROCEDURE — 3078F DIAST BP <80 MM HG: CPT | Mod: CPTII,S$GLB,, | Performed by: STUDENT IN AN ORGANIZED HEALTH CARE EDUCATION/TRAINING PROGRAM

## 2021-01-22 PROCEDURE — 3074F SYST BP LT 130 MM HG: CPT | Mod: CPTII,S$GLB,, | Performed by: STUDENT IN AN ORGANIZED HEALTH CARE EDUCATION/TRAINING PROGRAM

## 2021-01-22 PROCEDURE — 80053 COMPREHEN METABOLIC PANEL: CPT

## 2021-01-22 PROCEDURE — 85025 COMPLETE CBC W/AUTO DIFF WBC: CPT

## 2021-01-22 PROCEDURE — 96365 THER/PROPH/DIAG IV INF INIT: CPT

## 2021-01-22 PROCEDURE — 1159F PR MEDICATION LIST DOCUMENTED IN MEDICAL RECORD: ICD-10-PCS | Mod: S$GLB,,, | Performed by: STUDENT IN AN ORGANIZED HEALTH CARE EDUCATION/TRAINING PROGRAM

## 2021-01-22 PROCEDURE — 99214 PR OFFICE/OUTPT VISIT, EST, LEVL IV, 30-39 MIN: ICD-10-PCS | Mod: S$GLB,,, | Performed by: STUDENT IN AN ORGANIZED HEALTH CARE EDUCATION/TRAINING PROGRAM

## 2021-01-22 PROCEDURE — 3288F FALL RISK ASSESSMENT DOCD: CPT | Mod: CPTII,S$GLB,, | Performed by: STUDENT IN AN ORGANIZED HEALTH CARE EDUCATION/TRAINING PROGRAM

## 2021-01-22 PROCEDURE — 25000003 PHARM REV CODE 250: Performed by: STUDENT IN AN ORGANIZED HEALTH CARE EDUCATION/TRAINING PROGRAM

## 2021-01-22 PROCEDURE — 82607 VITAMIN B-12: CPT

## 2021-01-22 PROCEDURE — 83036 HEMOGLOBIN GLYCOSYLATED A1C: CPT

## 2021-01-22 PROCEDURE — 63600175 PHARM REV CODE 636 W HCPCS: Performed by: EMERGENCY MEDICINE

## 2021-01-22 PROCEDURE — 86140 C-REACTIVE PROTEIN: CPT

## 2021-01-22 PROCEDURE — 82746 ASSAY OF FOLIC ACID SERUM: CPT

## 2021-01-22 PROCEDURE — 87040 BLOOD CULTURE FOR BACTERIA: CPT

## 2021-01-22 PROCEDURE — 82728 ASSAY OF FERRITIN: CPT

## 2021-01-22 PROCEDURE — 11000001 HC ACUTE MED/SURG PRIVATE ROOM

## 2021-01-22 PROCEDURE — 99285 EMERGENCY DEPT VISIT HI MDM: CPT | Mod: 25

## 2021-01-22 PROCEDURE — 1101F PR PT FALLS ASSESS DOC 0-1 FALLS W/OUT INJ PAST YR: ICD-10-PCS | Mod: CPTII,S$GLB,, | Performed by: STUDENT IN AN ORGANIZED HEALTH CARE EDUCATION/TRAINING PROGRAM

## 2021-01-22 PROCEDURE — 3078F PR MOST RECENT DIASTOLIC BLOOD PRESSURE < 80 MM HG: ICD-10-PCS | Mod: CPTII,S$GLB,, | Performed by: STUDENT IN AN ORGANIZED HEALTH CARE EDUCATION/TRAINING PROGRAM

## 2021-01-22 PROCEDURE — 3288F PR FALLS RISK ASSESSMENT DOCUMENTED: ICD-10-PCS | Mod: CPTII,S$GLB,, | Performed by: STUDENT IN AN ORGANIZED HEALTH CARE EDUCATION/TRAINING PROGRAM

## 2021-01-22 PROCEDURE — 99999 PR PBB SHADOW E&M-EST. PATIENT-LVL III: ICD-10-PCS | Mod: PBBFAC,,, | Performed by: STUDENT IN AN ORGANIZED HEALTH CARE EDUCATION/TRAINING PROGRAM

## 2021-01-22 PROCEDURE — 1159F MED LIST DOCD IN RCRD: CPT | Mod: S$GLB,,, | Performed by: STUDENT IN AN ORGANIZED HEALTH CARE EDUCATION/TRAINING PROGRAM

## 2021-01-22 PROCEDURE — 1101F PT FALLS ASSESS-DOCD LE1/YR: CPT | Mod: CPTII,S$GLB,, | Performed by: STUDENT IN AN ORGANIZED HEALTH CARE EDUCATION/TRAINING PROGRAM

## 2021-01-22 PROCEDURE — 3074F PR MOST RECENT SYSTOLIC BLOOD PRESSURE < 130 MM HG: ICD-10-PCS | Mod: CPTII,S$GLB,, | Performed by: STUDENT IN AN ORGANIZED HEALTH CARE EDUCATION/TRAINING PROGRAM

## 2021-01-22 PROCEDURE — 83540 ASSAY OF IRON: CPT

## 2021-01-22 RX ORDER — METOPROLOL SUCCINATE 25 MG/1
25 TABLET, EXTENDED RELEASE ORAL DAILY
Status: DISCONTINUED | OUTPATIENT
Start: 2021-01-23 | End: 2021-01-22

## 2021-01-22 RX ORDER — SODIUM CHLORIDE 9 MG/ML
INJECTION, SOLUTION INTRAVENOUS
Status: COMPLETED | OUTPATIENT
Start: 2021-01-22 | End: 2021-01-22

## 2021-01-22 RX ORDER — SODIUM CHLORIDE 0.9 % (FLUSH) 0.9 %
10 SYRINGE (ML) INJECTION
Status: DISCONTINUED | OUTPATIENT
Start: 2021-01-22 | End: 2021-01-25 | Stop reason: HOSPADM

## 2021-01-22 RX ORDER — GLUCAGON 1 MG
1 KIT INJECTION
Status: DISCONTINUED | OUTPATIENT
Start: 2021-01-22 | End: 2021-01-25 | Stop reason: HOSPADM

## 2021-01-22 RX ORDER — FAMOTIDINE 20 MG/1
20 TABLET, FILM COATED ORAL DAILY
Status: DISCONTINUED | OUTPATIENT
Start: 2021-01-23 | End: 2021-01-25 | Stop reason: HOSPADM

## 2021-01-22 RX ORDER — METOPROLOL SUCCINATE 25 MG/1
25 TABLET, EXTENDED RELEASE ORAL NIGHTLY
Status: DISCONTINUED | OUTPATIENT
Start: 2021-01-22 | End: 2021-01-25 | Stop reason: HOSPADM

## 2021-01-22 RX ORDER — GABAPENTIN 400 MG/1
400 CAPSULE ORAL 3 TIMES DAILY
Status: DISCONTINUED | OUTPATIENT
Start: 2021-01-22 | End: 2021-01-25 | Stop reason: HOSPADM

## 2021-01-22 RX ORDER — VANCOMYCIN HCL IN 5 % DEXTROSE 1G/250ML
1000 PLASTIC BAG, INJECTION (ML) INTRAVENOUS
Status: DISCONTINUED | OUTPATIENT
Start: 2021-01-22 | End: 2021-01-22

## 2021-01-22 RX ORDER — INSULIN ASPART 100 [IU]/ML
0-5 INJECTION, SOLUTION INTRAVENOUS; SUBCUTANEOUS
Status: DISCONTINUED | OUTPATIENT
Start: 2021-01-22 | End: 2021-01-25 | Stop reason: HOSPADM

## 2021-01-22 RX ORDER — IBUPROFEN 200 MG
16 TABLET ORAL
Status: DISCONTINUED | OUTPATIENT
Start: 2021-01-22 | End: 2021-01-25 | Stop reason: HOSPADM

## 2021-01-22 RX ORDER — VANCOMYCIN HCL IN 5 % DEXTROSE 1G/250ML
1000 PLASTIC BAG, INJECTION (ML) INTRAVENOUS
Status: DISCONTINUED | OUTPATIENT
Start: 2021-01-23 | End: 2021-01-25 | Stop reason: HOSPADM

## 2021-01-22 RX ORDER — LISINOPRIL 20 MG/1
20 TABLET ORAL DAILY
Status: DISCONTINUED | OUTPATIENT
Start: 2021-01-23 | End: 2021-01-25

## 2021-01-22 RX ORDER — ENOXAPARIN SODIUM 100 MG/ML
40 INJECTION SUBCUTANEOUS EVERY 24 HOURS
Status: DISCONTINUED | OUTPATIENT
Start: 2021-01-22 | End: 2021-01-22

## 2021-01-22 RX ORDER — IBUPROFEN 200 MG
24 TABLET ORAL
Status: DISCONTINUED | OUTPATIENT
Start: 2021-01-22 | End: 2021-01-25 | Stop reason: HOSPADM

## 2021-01-22 RX ADMIN — APIXABAN 5 MG: 2.5 TABLET, FILM COATED ORAL at 10:01

## 2021-01-22 RX ADMIN — VANCOMYCIN HYDROCHLORIDE 1500 MG: 1.5 INJECTION, POWDER, LYOPHILIZED, FOR SOLUTION INTRAVENOUS at 05:01

## 2021-01-22 RX ADMIN — METOPROLOL SUCCINATE 25 MG: 25 TABLET, FILM COATED, EXTENDED RELEASE ORAL at 11:01

## 2021-01-22 RX ADMIN — GABAPENTIN 400 MG: 400 CAPSULE ORAL at 10:01

## 2021-01-22 RX ADMIN — SODIUM CHLORIDE 125 ML/HR: 0.9 INJECTION, SOLUTION INTRAVENOUS at 07:01

## 2021-01-23 LAB
ALBUMIN SERPL BCP-MCNC: 2.7 G/DL (ref 3.5–5.2)
ALP SERPL-CCNC: 46 U/L (ref 55–135)
ALT SERPL W/O P-5'-P-CCNC: 16 U/L (ref 10–44)
ANION GAP SERPL CALC-SCNC: 9 MMOL/L (ref 8–16)
AST SERPL-CCNC: 16 U/L (ref 10–40)
BASOPHILS # BLD AUTO: 0.04 K/UL (ref 0–0.2)
BASOPHILS NFR BLD: 0.5 % (ref 0–1.9)
BILIRUB SERPL-MCNC: 0.3 MG/DL (ref 0.1–1)
BUN SERPL-MCNC: 18 MG/DL (ref 8–23)
CALCIUM SERPL-MCNC: 8.3 MG/DL (ref 8.7–10.5)
CHLORIDE SERPL-SCNC: 109 MMOL/L (ref 95–110)
CO2 SERPL-SCNC: 20 MMOL/L (ref 23–29)
CREAT SERPL-MCNC: 1.1 MG/DL (ref 0.5–1.4)
DIFFERENTIAL METHOD: ABNORMAL
EOSINOPHIL # BLD AUTO: 0.3 K/UL (ref 0–0.5)
EOSINOPHIL NFR BLD: 4.2 % (ref 0–8)
ERYTHROCYTE [DISTWIDTH] IN BLOOD BY AUTOMATED COUNT: 13.1 % (ref 11.5–14.5)
EST. GFR  (AFRICAN AMERICAN): 54 ML/MIN/1.73 M^2
EST. GFR  (NON AFRICAN AMERICAN): 47 ML/MIN/1.73 M^2
FOLATE SERPL-MCNC: 17.4 NG/ML (ref 4–24)
GLUCOSE SERPL-MCNC: 131 MG/DL (ref 70–110)
HCT VFR BLD AUTO: 30.1 % (ref 37–48.5)
HGB BLD-MCNC: 10.2 G/DL (ref 12–16)
IMM GRANULOCYTES # BLD AUTO: 0.06 K/UL (ref 0–0.04)
IMM GRANULOCYTES NFR BLD AUTO: 0.8 % (ref 0–0.5)
IRON SERPL-MCNC: 29 UG/DL (ref 30–160)
LYMPHOCYTES # BLD AUTO: 1.8 K/UL (ref 1–4.8)
LYMPHOCYTES NFR BLD: 23 % (ref 18–48)
MAGNESIUM SERPL-MCNC: 1.9 MG/DL (ref 1.6–2.6)
MCH RBC QN AUTO: 29.7 PG (ref 27–31)
MCHC RBC AUTO-ENTMCNC: 33.9 G/DL (ref 32–36)
MCV RBC AUTO: 88 FL (ref 82–98)
MONOCYTES # BLD AUTO: 0.8 K/UL (ref 0.3–1)
MONOCYTES NFR BLD: 10.2 % (ref 4–15)
NEUTROPHILS # BLD AUTO: 4.9 K/UL (ref 1.8–7.7)
NEUTROPHILS NFR BLD: 61.3 % (ref 38–73)
NRBC BLD-RTO: 0 /100 WBC
PHOSPHATE SERPL-MCNC: 3.4 MG/DL (ref 2.7–4.5)
PLATELET # BLD AUTO: 328 K/UL (ref 150–350)
PMV BLD AUTO: 9.6 FL (ref 9.2–12.9)
POCT GLUCOSE: 133 MG/DL (ref 70–110)
POCT GLUCOSE: 143 MG/DL (ref 70–110)
POCT GLUCOSE: 189 MG/DL (ref 70–110)
POCT GLUCOSE: 240 MG/DL (ref 70–110)
POTASSIUM SERPL-SCNC: 4.1 MMOL/L (ref 3.5–5.1)
PROT SERPL-MCNC: 6 G/DL (ref 6–8.4)
RBC # BLD AUTO: 3.44 M/UL (ref 4–5.4)
SATURATED IRON: 10 % (ref 20–50)
SODIUM SERPL-SCNC: 138 MMOL/L (ref 136–145)
TOTAL IRON BINDING CAPACITY: 290 UG/DL (ref 250–450)
TRANSFERRIN SERPL-MCNC: 196 MG/DL (ref 200–375)
VIT B12 SERPL-MCNC: 1002 PG/ML (ref 210–950)
WBC # BLD AUTO: 7.93 K/UL (ref 3.9–12.7)

## 2021-01-23 PROCEDURE — 83735 ASSAY OF MAGNESIUM: CPT

## 2021-01-23 PROCEDURE — 99223 1ST HOSP IP/OBS HIGH 75: CPT | Mod: ,,, | Performed by: PODIATRIST

## 2021-01-23 PROCEDURE — 99220 PR INITIAL OBSERVATION CARE,LEVL III: CPT | Mod: ,,, | Performed by: INTERNAL MEDICINE

## 2021-01-23 PROCEDURE — 99223 PR INITIAL HOSPITAL CARE,LEVL III: ICD-10-PCS | Mod: ,,, | Performed by: PODIATRIST

## 2021-01-23 PROCEDURE — 36415 COLL VENOUS BLD VENIPUNCTURE: CPT

## 2021-01-23 PROCEDURE — 84100 ASSAY OF PHOSPHORUS: CPT

## 2021-01-23 PROCEDURE — 63600175 PHARM REV CODE 636 W HCPCS: Performed by: INTERNAL MEDICINE

## 2021-01-23 PROCEDURE — 85025 COMPLETE CBC W/AUTO DIFF WBC: CPT

## 2021-01-23 PROCEDURE — 99220 PR INITIAL OBSERVATION CARE,LEVL III: ICD-10-PCS | Mod: ,,, | Performed by: INTERNAL MEDICINE

## 2021-01-23 PROCEDURE — 11000001 HC ACUTE MED/SURG PRIVATE ROOM

## 2021-01-23 PROCEDURE — 25000003 PHARM REV CODE 250: Performed by: INTERNAL MEDICINE

## 2021-01-23 PROCEDURE — 63600175 PHARM REV CODE 636 W HCPCS: Performed by: STUDENT IN AN ORGANIZED HEALTH CARE EDUCATION/TRAINING PROGRAM

## 2021-01-23 PROCEDURE — 80053 COMPREHEN METABOLIC PANEL: CPT

## 2021-01-23 PROCEDURE — 25000003 PHARM REV CODE 250: Performed by: STUDENT IN AN ORGANIZED HEALTH CARE EDUCATION/TRAINING PROGRAM

## 2021-01-23 RX ADMIN — LISINOPRIL 20 MG: 20 TABLET ORAL at 10:01

## 2021-01-23 RX ADMIN — METOPROLOL SUCCINATE 25 MG: 25 TABLET, FILM COATED, EXTENDED RELEASE ORAL at 08:01

## 2021-01-23 RX ADMIN — APIXABAN 5 MG: 2.5 TABLET, FILM COATED ORAL at 10:01

## 2021-01-23 RX ADMIN — GABAPENTIN 400 MG: 400 CAPSULE ORAL at 03:01

## 2021-01-23 RX ADMIN — FAMOTIDINE 20 MG: 20 TABLET, FILM COATED ORAL at 10:01

## 2021-01-23 RX ADMIN — APIXABAN 5 MG: 2.5 TABLET, FILM COATED ORAL at 08:01

## 2021-01-23 RX ADMIN — GABAPENTIN 400 MG: 400 CAPSULE ORAL at 08:01

## 2021-01-23 RX ADMIN — GABAPENTIN 400 MG: 400 CAPSULE ORAL at 10:01

## 2021-01-23 RX ADMIN — VANCOMYCIN HYDROCHLORIDE 1000 MG: 1 INJECTION, POWDER, LYOPHILIZED, FOR SOLUTION INTRAVENOUS at 06:01

## 2021-01-23 RX ADMIN — INSULIN ASPART 1 UNITS: 100 INJECTION, SOLUTION INTRAVENOUS; SUBCUTANEOUS at 10:01

## 2021-01-24 LAB
ALBUMIN SERPL BCP-MCNC: 2.9 G/DL (ref 3.5–5.2)
ALP SERPL-CCNC: 46 U/L (ref 55–135)
ALT SERPL W/O P-5'-P-CCNC: 16 U/L (ref 10–44)
ANION GAP SERPL CALC-SCNC: 8 MMOL/L (ref 8–16)
AST SERPL-CCNC: 15 U/L (ref 10–40)
BASOPHILS # BLD AUTO: 0.05 K/UL (ref 0–0.2)
BASOPHILS NFR BLD: 0.6 % (ref 0–1.9)
BILIRUB SERPL-MCNC: 0.3 MG/DL (ref 0.1–1)
BUN SERPL-MCNC: 16 MG/DL (ref 8–23)
CALCIUM SERPL-MCNC: 8.7 MG/DL (ref 8.7–10.5)
CHLORIDE SERPL-SCNC: 108 MMOL/L (ref 95–110)
CO2 SERPL-SCNC: 24 MMOL/L (ref 23–29)
CREAT SERPL-MCNC: 1 MG/DL (ref 0.5–1.4)
DIFFERENTIAL METHOD: ABNORMAL
EOSINOPHIL # BLD AUTO: 0.3 K/UL (ref 0–0.5)
EOSINOPHIL NFR BLD: 3.5 % (ref 0–8)
ERYTHROCYTE [DISTWIDTH] IN BLOOD BY AUTOMATED COUNT: 12.7 % (ref 11.5–14.5)
EST. GFR  (AFRICAN AMERICAN): >60 ML/MIN/1.73 M^2
EST. GFR  (NON AFRICAN AMERICAN): 53 ML/MIN/1.73 M^2
GLUCOSE SERPL-MCNC: 116 MG/DL (ref 70–110)
HCT VFR BLD AUTO: 30 % (ref 37–48.5)
HGB BLD-MCNC: 10.3 G/DL (ref 12–16)
IMM GRANULOCYTES # BLD AUTO: 0.07 K/UL (ref 0–0.04)
IMM GRANULOCYTES NFR BLD AUTO: 0.9 % (ref 0–0.5)
LYMPHOCYTES # BLD AUTO: 2.1 K/UL (ref 1–4.8)
LYMPHOCYTES NFR BLD: 26.1 % (ref 18–48)
MAGNESIUM SERPL-MCNC: 1.9 MG/DL (ref 1.6–2.6)
MCH RBC QN AUTO: 29.6 PG (ref 27–31)
MCHC RBC AUTO-ENTMCNC: 34.3 G/DL (ref 32–36)
MCV RBC AUTO: 86 FL (ref 82–98)
MONOCYTES # BLD AUTO: 0.6 K/UL (ref 0.3–1)
MONOCYTES NFR BLD: 7.5 % (ref 4–15)
NEUTROPHILS # BLD AUTO: 4.9 K/UL (ref 1.8–7.7)
NEUTROPHILS NFR BLD: 61.4 % (ref 38–73)
NRBC BLD-RTO: 0 /100 WBC
PHOSPHATE SERPL-MCNC: 3.3 MG/DL (ref 2.7–4.5)
PLATELET # BLD AUTO: 348 K/UL (ref 150–350)
PMV BLD AUTO: 9.5 FL (ref 9.2–12.9)
POCT GLUCOSE: 123 MG/DL (ref 70–110)
POCT GLUCOSE: 254 MG/DL (ref 70–110)
POCT GLUCOSE: 259 MG/DL (ref 70–110)
POCT GLUCOSE: 97 MG/DL (ref 70–110)
POTASSIUM SERPL-SCNC: 4.1 MMOL/L (ref 3.5–5.1)
PROT SERPL-MCNC: 6.2 G/DL (ref 6–8.4)
RBC # BLD AUTO: 3.48 M/UL (ref 4–5.4)
SODIUM SERPL-SCNC: 140 MMOL/L (ref 136–145)
VANCOMYCIN TROUGH SERPL-MCNC: 12.2 UG/ML (ref 10–22)
WBC # BLD AUTO: 7.98 K/UL (ref 3.9–12.7)

## 2021-01-24 PROCEDURE — 85025 COMPLETE CBC W/AUTO DIFF WBC: CPT

## 2021-01-24 PROCEDURE — 36415 COLL VENOUS BLD VENIPUNCTURE: CPT

## 2021-01-24 PROCEDURE — 99223 1ST HOSP IP/OBS HIGH 75: CPT | Mod: ,,, | Performed by: PODIATRIST

## 2021-01-24 PROCEDURE — 11000001 HC ACUTE MED/SURG PRIVATE ROOM

## 2021-01-24 PROCEDURE — 99223 PR INITIAL HOSPITAL CARE,LEVL III: ICD-10-PCS | Mod: ,,, | Performed by: PODIATRIST

## 2021-01-24 PROCEDURE — 63600175 PHARM REV CODE 636 W HCPCS: Performed by: INTERNAL MEDICINE

## 2021-01-24 PROCEDURE — 83735 ASSAY OF MAGNESIUM: CPT

## 2021-01-24 PROCEDURE — 25000003 PHARM REV CODE 250: Performed by: INTERNAL MEDICINE

## 2021-01-24 PROCEDURE — 99225 PR SUBSEQUENT OBSERVATION CARE,LEVEL II: ICD-10-PCS | Mod: ,,, | Performed by: INTERNAL MEDICINE

## 2021-01-24 PROCEDURE — 63600175 PHARM REV CODE 636 W HCPCS: Performed by: STUDENT IN AN ORGANIZED HEALTH CARE EDUCATION/TRAINING PROGRAM

## 2021-01-24 PROCEDURE — 84100 ASSAY OF PHOSPHORUS: CPT

## 2021-01-24 PROCEDURE — 80053 COMPREHEN METABOLIC PANEL: CPT

## 2021-01-24 PROCEDURE — 25000003 PHARM REV CODE 250: Performed by: STUDENT IN AN ORGANIZED HEALTH CARE EDUCATION/TRAINING PROGRAM

## 2021-01-24 PROCEDURE — 99225 PR SUBSEQUENT OBSERVATION CARE,LEVEL II: CPT | Mod: ,,, | Performed by: INTERNAL MEDICINE

## 2021-01-24 PROCEDURE — 80202 ASSAY OF VANCOMYCIN: CPT

## 2021-01-24 RX ADMIN — INSULIN ASPART 1 UNITS: 100 INJECTION, SOLUTION INTRAVENOUS; SUBCUTANEOUS at 08:01

## 2021-01-24 RX ADMIN — GABAPENTIN 400 MG: 400 CAPSULE ORAL at 08:01

## 2021-01-24 RX ADMIN — APIXABAN 5 MG: 2.5 TABLET, FILM COATED ORAL at 08:01

## 2021-01-24 RX ADMIN — VANCOMYCIN HYDROCHLORIDE 1000 MG: 1 INJECTION, POWDER, LYOPHILIZED, FOR SOLUTION INTRAVENOUS at 05:01

## 2021-01-24 RX ADMIN — GABAPENTIN 400 MG: 400 CAPSULE ORAL at 03:01

## 2021-01-24 RX ADMIN — FAMOTIDINE 20 MG: 20 TABLET, FILM COATED ORAL at 08:01

## 2021-01-24 RX ADMIN — INSULIN ASPART 3 UNITS: 100 INJECTION, SOLUTION INTRAVENOUS; SUBCUTANEOUS at 11:01

## 2021-01-24 RX ADMIN — METOPROLOL SUCCINATE 25 MG: 25 TABLET, FILM COATED, EXTENDED RELEASE ORAL at 08:01

## 2021-01-24 RX ADMIN — LISINOPRIL 20 MG: 20 TABLET ORAL at 08:01

## 2021-01-25 VITALS
DIASTOLIC BLOOD PRESSURE: 65 MMHG | OXYGEN SATURATION: 97 % | BODY MASS INDEX: 32.25 KG/M2 | HEIGHT: 62 IN | RESPIRATION RATE: 16 BRPM | TEMPERATURE: 99 F | WEIGHT: 175.25 LBS | HEART RATE: 53 BPM | SYSTOLIC BLOOD PRESSURE: 154 MMHG

## 2021-01-25 LAB
ALBUMIN SERPL BCP-MCNC: 3 G/DL (ref 3.5–5.2)
ALP SERPL-CCNC: 47 U/L (ref 55–135)
ALT SERPL W/O P-5'-P-CCNC: 18 U/L (ref 10–44)
ANION GAP SERPL CALC-SCNC: 8 MMOL/L (ref 8–16)
AST SERPL-CCNC: 18 U/L (ref 10–40)
BACTERIA SPEC AEROBE CULT: ABNORMAL
BACTERIA SPEC AEROBE CULT: ABNORMAL
BASOPHILS # BLD AUTO: 0.05 K/UL (ref 0–0.2)
BASOPHILS NFR BLD: 0.6 % (ref 0–1.9)
BILIRUB SERPL-MCNC: 0.3 MG/DL (ref 0.1–1)
BUN SERPL-MCNC: 14 MG/DL (ref 8–23)
CALCIUM SERPL-MCNC: 8.9 MG/DL (ref 8.7–10.5)
CHLORIDE SERPL-SCNC: 107 MMOL/L (ref 95–110)
CO2 SERPL-SCNC: 25 MMOL/L (ref 23–29)
CREAT SERPL-MCNC: 1.1 MG/DL (ref 0.5–1.4)
DIFFERENTIAL METHOD: ABNORMAL
EOSINOPHIL # BLD AUTO: 0.2 K/UL (ref 0–0.5)
EOSINOPHIL NFR BLD: 2.7 % (ref 0–8)
ERYTHROCYTE [DISTWIDTH] IN BLOOD BY AUTOMATED COUNT: 12.7 % (ref 11.5–14.5)
EST. GFR  (AFRICAN AMERICAN): 54 ML/MIN/1.73 M^2
EST. GFR  (NON AFRICAN AMERICAN): 47 ML/MIN/1.73 M^2
GLUCOSE SERPL-MCNC: 138 MG/DL (ref 70–110)
HCT VFR BLD AUTO: 31.1 % (ref 37–48.5)
HGB BLD-MCNC: 10.4 G/DL (ref 12–16)
IMM GRANULOCYTES # BLD AUTO: 0.08 K/UL (ref 0–0.04)
IMM GRANULOCYTES NFR BLD AUTO: 0.9 % (ref 0–0.5)
LYMPHOCYTES # BLD AUTO: 2.4 K/UL (ref 1–4.8)
LYMPHOCYTES NFR BLD: 27.1 % (ref 18–48)
MAGNESIUM SERPL-MCNC: 2 MG/DL (ref 1.6–2.6)
MCH RBC QN AUTO: 29.2 PG (ref 27–31)
MCHC RBC AUTO-ENTMCNC: 33.4 G/DL (ref 32–36)
MCV RBC AUTO: 87 FL (ref 82–98)
MONOCYTES # BLD AUTO: 0.6 K/UL (ref 0.3–1)
MONOCYTES NFR BLD: 7.2 % (ref 4–15)
NEUTROPHILS # BLD AUTO: 5.5 K/UL (ref 1.8–7.7)
NEUTROPHILS NFR BLD: 61.5 % (ref 38–73)
NRBC BLD-RTO: 0 /100 WBC
PHOSPHATE SERPL-MCNC: 3.4 MG/DL (ref 2.7–4.5)
PLATELET # BLD AUTO: 382 K/UL (ref 150–350)
PMV BLD AUTO: 9.6 FL (ref 9.2–12.9)
POCT GLUCOSE: 155 MG/DL (ref 70–110)
POCT GLUCOSE: 208 MG/DL (ref 70–110)
POTASSIUM SERPL-SCNC: 4.2 MMOL/L (ref 3.5–5.1)
PROT SERPL-MCNC: 6.5 G/DL (ref 6–8.4)
RBC # BLD AUTO: 3.56 M/UL (ref 4–5.4)
SODIUM SERPL-SCNC: 140 MMOL/L (ref 136–145)
WBC # BLD AUTO: 8.87 K/UL (ref 3.9–12.7)

## 2021-01-25 PROCEDURE — 85025 COMPLETE CBC W/AUTO DIFF WBC: CPT

## 2021-01-25 PROCEDURE — 84100 ASSAY OF PHOSPHORUS: CPT

## 2021-01-25 PROCEDURE — 99217 PR OBSERVATION CARE DISCHARGE: ICD-10-PCS | Mod: ,,, | Performed by: INTERNAL MEDICINE

## 2021-01-25 PROCEDURE — 99217 PR OBSERVATION CARE DISCHARGE: CPT | Mod: ,,, | Performed by: INTERNAL MEDICINE

## 2021-01-25 PROCEDURE — 90471 IMMUNIZATION ADMIN: CPT | Performed by: INTERNAL MEDICINE

## 2021-01-25 PROCEDURE — 25000003 PHARM REV CODE 250: Performed by: STUDENT IN AN ORGANIZED HEALTH CARE EDUCATION/TRAINING PROGRAM

## 2021-01-25 PROCEDURE — 80053 COMPREHEN METABOLIC PANEL: CPT

## 2021-01-25 PROCEDURE — G0009 ADMIN PNEUMOCOCCAL VACCINE: HCPCS | Performed by: INTERNAL MEDICINE

## 2021-01-25 PROCEDURE — 99231 PR SUBSEQUENT HOSPITAL CARE,LEVL I: ICD-10-PCS | Mod: ,,, | Performed by: PODIATRIST

## 2021-01-25 PROCEDURE — 63600175 PHARM REV CODE 636 W HCPCS: Performed by: INTERNAL MEDICINE

## 2021-01-25 PROCEDURE — G0378 HOSPITAL OBSERVATION PER HR: HCPCS

## 2021-01-25 PROCEDURE — 90670 PCV13 VACCINE IM: CPT | Performed by: INTERNAL MEDICINE

## 2021-01-25 PROCEDURE — 36415 COLL VENOUS BLD VENIPUNCTURE: CPT

## 2021-01-25 PROCEDURE — 99231 SBSQ HOSP IP/OBS SF/LOW 25: CPT | Mod: ,,, | Performed by: PODIATRIST

## 2021-01-25 PROCEDURE — 83735 ASSAY OF MAGNESIUM: CPT

## 2021-01-25 RX ORDER — DOXYCYCLINE 100 MG/1
100 CAPSULE ORAL EVERY 12 HOURS
Qty: 28 CAPSULE | Refills: 0 | Status: SHIPPED | OUTPATIENT
Start: 2021-01-25 | End: 2021-02-01

## 2021-01-25 RX ORDER — LISINOPRIL 20 MG/1
40 TABLET ORAL DAILY
Status: DISCONTINUED | OUTPATIENT
Start: 2021-01-25 | End: 2021-01-25 | Stop reason: HOSPADM

## 2021-01-25 RX ORDER — AMOXICILLIN AND CLAVULANATE POTASSIUM 500; 125 MG/1; MG/1
1 TABLET, FILM COATED ORAL 3 TIMES DAILY
Qty: 42 TABLET | Refills: 0 | Status: SHIPPED | OUTPATIENT
Start: 2021-01-25 | End: 2021-02-01

## 2021-01-25 RX ADMIN — APIXABAN 5 MG: 2.5 TABLET, FILM COATED ORAL at 09:01

## 2021-01-25 RX ADMIN — INSULIN ASPART 2 UNITS: 100 INJECTION, SOLUTION INTRAVENOUS; SUBCUTANEOUS at 01:01

## 2021-01-25 RX ADMIN — FAMOTIDINE 20 MG: 20 TABLET, FILM COATED ORAL at 09:01

## 2021-01-25 RX ADMIN — LISINOPRIL 40 MG: 20 TABLET ORAL at 09:01

## 2021-01-25 RX ADMIN — GABAPENTIN 400 MG: 400 CAPSULE ORAL at 09:01

## 2021-01-25 RX ADMIN — PNEUMOCOCCAL 13-VALENT CONJUGATE VACCINE 0.5 ML: 2.2; 2.2; 2.2; 2.2; 2.2; 4.4; 2.2; 2.2; 2.2; 2.2; 2.2; 2.2; 2.2 INJECTION, SUSPENSION INTRAMUSCULAR at 01:01

## 2021-01-27 LAB
BACTERIA BLD CULT: NORMAL
BACTERIA BLD CULT: NORMAL
BACTERIA SPEC ANAEROBE CULT: ABNORMAL

## 2021-01-30 ENCOUNTER — IMMUNIZATION (OUTPATIENT)
Dept: INTERNAL MEDICINE | Facility: CLINIC | Age: 81
End: 2021-01-30
Payer: MEDICARE

## 2021-01-30 DIAGNOSIS — Z23 NEED FOR VACCINATION: Primary | ICD-10-CM

## 2021-01-30 PROCEDURE — 91300 COVID-19, MRNA, LNP-S, PF, 30 MCG/0.3 ML DOSE VACCINE: CPT | Mod: PBBFAC | Performed by: FAMILY MEDICINE

## 2021-01-30 PROCEDURE — 0002A COVID-19, MRNA, LNP-S, PF, 30 MCG/0.3 ML DOSE VACCINE: CPT | Mod: PBBFAC | Performed by: FAMILY MEDICINE

## 2021-02-02 ENCOUNTER — OFFICE VISIT (OUTPATIENT)
Dept: PODIATRY | Facility: CLINIC | Age: 81
End: 2021-02-02
Payer: MEDICARE

## 2021-02-02 VITALS — HEIGHT: 62 IN | WEIGHT: 167.13 LBS | BODY MASS INDEX: 30.76 KG/M2

## 2021-02-02 DIAGNOSIS — E08.621 DIABETIC ULCER OF OTHER PART OF RIGHT FOOT ASSOCIATED WITH DIABETES MELLITUS DUE TO UNDERLYING CONDITION, LIMITED TO BREAKDOWN OF SKIN: ICD-10-CM

## 2021-02-02 DIAGNOSIS — L08.9 DIABETIC FOOT INFECTION: Primary | ICD-10-CM

## 2021-02-02 DIAGNOSIS — L97.511 DIABETIC ULCER OF OTHER PART OF RIGHT FOOT ASSOCIATED WITH DIABETES MELLITUS DUE TO UNDERLYING CONDITION, LIMITED TO BREAKDOWN OF SKIN: ICD-10-CM

## 2021-02-02 DIAGNOSIS — E11.628 DIABETIC FOOT INFECTION: Primary | ICD-10-CM

## 2021-02-02 PROCEDURE — 1101F PT FALLS ASSESS-DOCD LE1/YR: CPT | Mod: CPTII,S$GLB,, | Performed by: STUDENT IN AN ORGANIZED HEALTH CARE EDUCATION/TRAINING PROGRAM

## 2021-02-02 PROCEDURE — 1126F PR PAIN SEVERITY QUANTIFIED, NO PAIN PRESENT: ICD-10-PCS | Mod: S$GLB,,, | Performed by: STUDENT IN AN ORGANIZED HEALTH CARE EDUCATION/TRAINING PROGRAM

## 2021-02-02 PROCEDURE — 1101F PR PT FALLS ASSESS DOC 0-1 FALLS W/OUT INJ PAST YR: ICD-10-PCS | Mod: CPTII,S$GLB,, | Performed by: STUDENT IN AN ORGANIZED HEALTH CARE EDUCATION/TRAINING PROGRAM

## 2021-02-02 PROCEDURE — 3288F FALL RISK ASSESSMENT DOCD: CPT | Mod: CPTII,S$GLB,, | Performed by: STUDENT IN AN ORGANIZED HEALTH CARE EDUCATION/TRAINING PROGRAM

## 2021-02-02 PROCEDURE — 3288F PR FALLS RISK ASSESSMENT DOCUMENTED: ICD-10-PCS | Mod: CPTII,S$GLB,, | Performed by: STUDENT IN AN ORGANIZED HEALTH CARE EDUCATION/TRAINING PROGRAM

## 2021-02-02 PROCEDURE — 99499 UNLISTED E&M SERVICE: CPT | Mod: S$GLB,,, | Performed by: STUDENT IN AN ORGANIZED HEALTH CARE EDUCATION/TRAINING PROGRAM

## 2021-02-02 PROCEDURE — 11042 DBRDMT SUBQ TIS 1ST 20SQCM/<: CPT | Mod: S$GLB,,, | Performed by: STUDENT IN AN ORGANIZED HEALTH CARE EDUCATION/TRAINING PROGRAM

## 2021-02-02 PROCEDURE — 1126F AMNT PAIN NOTED NONE PRSNT: CPT | Mod: S$GLB,,, | Performed by: STUDENT IN AN ORGANIZED HEALTH CARE EDUCATION/TRAINING PROGRAM

## 2021-02-02 PROCEDURE — 11042 WOUND DEBRIDEMENT: ICD-10-PCS | Mod: S$GLB,,, | Performed by: STUDENT IN AN ORGANIZED HEALTH CARE EDUCATION/TRAINING PROGRAM

## 2021-02-02 PROCEDURE — 99999 PR PBB SHADOW E&M-EST. PATIENT-LVL III: ICD-10-PCS | Mod: PBBFAC,,, | Performed by: STUDENT IN AN ORGANIZED HEALTH CARE EDUCATION/TRAINING PROGRAM

## 2021-02-02 PROCEDURE — 99499 NO LOS: ICD-10-PCS | Mod: S$GLB,,, | Performed by: STUDENT IN AN ORGANIZED HEALTH CARE EDUCATION/TRAINING PROGRAM

## 2021-02-02 PROCEDURE — 99999 PR PBB SHADOW E&M-EST. PATIENT-LVL III: CPT | Mod: PBBFAC,,, | Performed by: STUDENT IN AN ORGANIZED HEALTH CARE EDUCATION/TRAINING PROGRAM

## 2021-02-09 ENCOUNTER — OFFICE VISIT (OUTPATIENT)
Dept: PODIATRY | Facility: CLINIC | Age: 81
End: 2021-02-09
Payer: MEDICARE

## 2021-02-09 VITALS
DIASTOLIC BLOOD PRESSURE: 53 MMHG | SYSTOLIC BLOOD PRESSURE: 113 MMHG | BODY MASS INDEX: 30.76 KG/M2 | HEIGHT: 62 IN | HEART RATE: 51 BPM | WEIGHT: 167.13 LBS

## 2021-02-09 DIAGNOSIS — L85.3 DRY SKIN: ICD-10-CM

## 2021-02-09 DIAGNOSIS — Z89.421 HISTORY OF AMPUTATION OF LESSER TOE, RIGHT: ICD-10-CM

## 2021-02-09 DIAGNOSIS — L84 PRE-ULCERATIVE CALLUSES: Primary | ICD-10-CM

## 2021-02-09 DIAGNOSIS — E11.42 TYPE 2 DIABETES MELLITUS WITH PERIPHERAL NEUROPATHY: ICD-10-CM

## 2021-02-09 DIAGNOSIS — B35.1 ONYCHOMYCOSIS: ICD-10-CM

## 2021-02-09 PROCEDURE — 1101F PT FALLS ASSESS-DOCD LE1/YR: CPT | Mod: CPTII,S$GLB,, | Performed by: STUDENT IN AN ORGANIZED HEALTH CARE EDUCATION/TRAINING PROGRAM

## 2021-02-09 PROCEDURE — 99499 UNLISTED E&M SERVICE: CPT | Mod: S$GLB,,, | Performed by: STUDENT IN AN ORGANIZED HEALTH CARE EDUCATION/TRAINING PROGRAM

## 2021-02-09 PROCEDURE — 99499 NO LOS: ICD-10-PCS | Mod: S$GLB,,, | Performed by: STUDENT IN AN ORGANIZED HEALTH CARE EDUCATION/TRAINING PROGRAM

## 2021-02-09 PROCEDURE — 1126F AMNT PAIN NOTED NONE PRSNT: CPT | Mod: S$GLB,,, | Performed by: STUDENT IN AN ORGANIZED HEALTH CARE EDUCATION/TRAINING PROGRAM

## 2021-02-09 PROCEDURE — 11056 ROUTINE FOOT CARE: ICD-10-PCS | Mod: Q9,S$GLB,, | Performed by: STUDENT IN AN ORGANIZED HEALTH CARE EDUCATION/TRAINING PROGRAM

## 2021-02-09 PROCEDURE — 1126F PR PAIN SEVERITY QUANTIFIED, NO PAIN PRESENT: ICD-10-PCS | Mod: S$GLB,,, | Performed by: STUDENT IN AN ORGANIZED HEALTH CARE EDUCATION/TRAINING PROGRAM

## 2021-02-09 PROCEDURE — 11721 DEBRIDE NAIL 6 OR MORE: CPT | Mod: 59,Q9,S$GLB, | Performed by: STUDENT IN AN ORGANIZED HEALTH CARE EDUCATION/TRAINING PROGRAM

## 2021-02-09 PROCEDURE — 1101F PR PT FALLS ASSESS DOC 0-1 FALLS W/OUT INJ PAST YR: ICD-10-PCS | Mod: CPTII,S$GLB,, | Performed by: STUDENT IN AN ORGANIZED HEALTH CARE EDUCATION/TRAINING PROGRAM

## 2021-02-09 PROCEDURE — 11056 PARNG/CUTG B9 HYPRKR LES 2-4: CPT | Mod: Q9,S$GLB,, | Performed by: STUDENT IN AN ORGANIZED HEALTH CARE EDUCATION/TRAINING PROGRAM

## 2021-02-09 PROCEDURE — 3288F PR FALLS RISK ASSESSMENT DOCUMENTED: ICD-10-PCS | Mod: CPTII,S$GLB,, | Performed by: STUDENT IN AN ORGANIZED HEALTH CARE EDUCATION/TRAINING PROGRAM

## 2021-02-09 PROCEDURE — 99999 PR PBB SHADOW E&M-EST. PATIENT-LVL II: ICD-10-PCS | Mod: PBBFAC,,, | Performed by: STUDENT IN AN ORGANIZED HEALTH CARE EDUCATION/TRAINING PROGRAM

## 2021-02-09 PROCEDURE — 99999 PR PBB SHADOW E&M-EST. PATIENT-LVL II: CPT | Mod: PBBFAC,,, | Performed by: STUDENT IN AN ORGANIZED HEALTH CARE EDUCATION/TRAINING PROGRAM

## 2021-02-09 PROCEDURE — 11721 ROUTINE FOOT CARE: ICD-10-PCS | Mod: 59,Q9,S$GLB, | Performed by: STUDENT IN AN ORGANIZED HEALTH CARE EDUCATION/TRAINING PROGRAM

## 2021-02-09 PROCEDURE — 3288F FALL RISK ASSESSMENT DOCD: CPT | Mod: CPTII,S$GLB,, | Performed by: STUDENT IN AN ORGANIZED HEALTH CARE EDUCATION/TRAINING PROGRAM

## 2021-02-09 RX ORDER — AMMONIUM LACTATE 12 G/100G
CREAM TOPICAL
Qty: 140 G | Refills: 5 | Status: SHIPPED | OUTPATIENT
Start: 2021-02-09

## 2021-02-12 ENCOUNTER — TELEPHONE (OUTPATIENT)
Dept: PODIATRY | Facility: CLINIC | Age: 81
End: 2021-02-12

## 2021-03-03 ENCOUNTER — TELEPHONE (OUTPATIENT)
Dept: CARDIOLOGY | Facility: CLINIC | Age: 81
End: 2021-03-03

## 2021-03-04 ENCOUNTER — TELEPHONE (OUTPATIENT)
Dept: CARDIOLOGY | Facility: CLINIC | Age: 81
End: 2021-03-04

## 2021-03-04 DIAGNOSIS — D69.6 THROMBOCYTOPENIA, UNSPECIFIED: ICD-10-CM

## 2021-03-04 DIAGNOSIS — I48.92 UNSPECIFIED ATRIAL FLUTTER: ICD-10-CM

## 2021-03-08 ENCOUNTER — OFFICE VISIT (OUTPATIENT)
Dept: CARDIOLOGY | Facility: CLINIC | Age: 81
End: 2021-03-08
Payer: MEDICARE

## 2021-03-08 VITALS
WEIGHT: 170 LBS | HEART RATE: 65 BPM | SYSTOLIC BLOOD PRESSURE: 104 MMHG | DIASTOLIC BLOOD PRESSURE: 63 MMHG | HEIGHT: 62 IN | BODY MASS INDEX: 31.28 KG/M2

## 2021-03-08 DIAGNOSIS — I10 ESSENTIAL HYPERTENSION: ICD-10-CM

## 2021-03-08 DIAGNOSIS — I48.3 TYPICAL ATRIAL FLUTTER: Primary | ICD-10-CM

## 2021-03-08 PROCEDURE — 1101F PT FALLS ASSESS-DOCD LE1/YR: CPT | Mod: CPTII,S$GLB,, | Performed by: INTERNAL MEDICINE

## 2021-03-08 PROCEDURE — 3288F PR FALLS RISK ASSESSMENT DOCUMENTED: ICD-10-PCS | Mod: CPTII,S$GLB,, | Performed by: INTERNAL MEDICINE

## 2021-03-08 PROCEDURE — 1159F PR MEDICATION LIST DOCUMENTED IN MEDICAL RECORD: ICD-10-PCS | Mod: S$GLB,,, | Performed by: INTERNAL MEDICINE

## 2021-03-08 PROCEDURE — 3078F DIAST BP <80 MM HG: CPT | Mod: CPTII,S$GLB,, | Performed by: INTERNAL MEDICINE

## 2021-03-08 PROCEDURE — 99999 PR PBB SHADOW E&M-EST. PATIENT-LVL III: ICD-10-PCS | Mod: PBBFAC,,, | Performed by: INTERNAL MEDICINE

## 2021-03-08 PROCEDURE — 99213 OFFICE O/P EST LOW 20 MIN: CPT | Mod: S$GLB,,, | Performed by: INTERNAL MEDICINE

## 2021-03-08 PROCEDURE — 3074F PR MOST RECENT SYSTOLIC BLOOD PRESSURE < 130 MM HG: ICD-10-PCS | Mod: CPTII,S$GLB,, | Performed by: INTERNAL MEDICINE

## 2021-03-08 PROCEDURE — 3288F FALL RISK ASSESSMENT DOCD: CPT | Mod: CPTII,S$GLB,, | Performed by: INTERNAL MEDICINE

## 2021-03-08 PROCEDURE — 1101F PR PT FALLS ASSESS DOC 0-1 FALLS W/OUT INJ PAST YR: ICD-10-PCS | Mod: CPTII,S$GLB,, | Performed by: INTERNAL MEDICINE

## 2021-03-08 PROCEDURE — 1159F MED LIST DOCD IN RCRD: CPT | Mod: S$GLB,,, | Performed by: INTERNAL MEDICINE

## 2021-03-08 PROCEDURE — 3074F SYST BP LT 130 MM HG: CPT | Mod: CPTII,S$GLB,, | Performed by: INTERNAL MEDICINE

## 2021-03-08 PROCEDURE — 99999 PR PBB SHADOW E&M-EST. PATIENT-LVL III: CPT | Mod: PBBFAC,,, | Performed by: INTERNAL MEDICINE

## 2021-03-08 PROCEDURE — 99213 PR OFFICE/OUTPT VISIT, EST, LEVL III, 20-29 MIN: ICD-10-PCS | Mod: S$GLB,,, | Performed by: INTERNAL MEDICINE

## 2021-03-08 PROCEDURE — 3078F PR MOST RECENT DIASTOLIC BLOOD PRESSURE < 80 MM HG: ICD-10-PCS | Mod: CPTII,S$GLB,, | Performed by: INTERNAL MEDICINE

## 2021-03-08 RX ORDER — ATORVASTATIN CALCIUM 20 MG/1
20 TABLET, FILM COATED ORAL DAILY
Qty: 90 TABLET | Refills: 3 | Status: SHIPPED | OUTPATIENT
Start: 2021-03-08 | End: 2021-04-08

## 2021-03-09 ENCOUNTER — TELEPHONE (OUTPATIENT)
Dept: ADMINISTRATIVE | Facility: OTHER | Age: 81
End: 2021-03-09

## 2021-03-09 ENCOUNTER — OFFICE VISIT (OUTPATIENT)
Dept: PODIATRY | Facility: CLINIC | Age: 81
End: 2021-03-09
Payer: MEDICARE

## 2021-03-09 VITALS
BODY MASS INDEX: 31.1 KG/M2 | HEIGHT: 62 IN | HEART RATE: 68 BPM | WEIGHT: 169 LBS | DIASTOLIC BLOOD PRESSURE: 69 MMHG | SYSTOLIC BLOOD PRESSURE: 160 MMHG

## 2021-03-09 DIAGNOSIS — E11.42 TYPE 2 DIABETES MELLITUS WITH PERIPHERAL NEUROPATHY: ICD-10-CM

## 2021-03-09 DIAGNOSIS — L84 PRE-ULCERATIVE CALLUSES: Primary | ICD-10-CM

## 2021-03-09 DIAGNOSIS — L60.9 DISEASE OF NAIL: ICD-10-CM

## 2021-03-09 DIAGNOSIS — Z89.421 HISTORY OF AMPUTATION OF LESSER TOE, RIGHT: ICD-10-CM

## 2021-03-09 PROCEDURE — 1101F PT FALLS ASSESS-DOCD LE1/YR: CPT | Mod: CPTII,S$GLB,, | Performed by: STUDENT IN AN ORGANIZED HEALTH CARE EDUCATION/TRAINING PROGRAM

## 2021-03-09 PROCEDURE — 11042 WOUND DEBRIDEMENT: ICD-10-PCS | Mod: S$GLB,,, | Performed by: STUDENT IN AN ORGANIZED HEALTH CARE EDUCATION/TRAINING PROGRAM

## 2021-03-09 PROCEDURE — 11042 DBRDMT SUBQ TIS 1ST 20SQCM/<: CPT | Mod: S$GLB,,, | Performed by: STUDENT IN AN ORGANIZED HEALTH CARE EDUCATION/TRAINING PROGRAM

## 2021-03-09 PROCEDURE — 3077F SYST BP >= 140 MM HG: CPT | Mod: CPTII,S$GLB,, | Performed by: STUDENT IN AN ORGANIZED HEALTH CARE EDUCATION/TRAINING PROGRAM

## 2021-03-09 PROCEDURE — 1159F MED LIST DOCD IN RCRD: CPT | Mod: S$GLB,,, | Performed by: STUDENT IN AN ORGANIZED HEALTH CARE EDUCATION/TRAINING PROGRAM

## 2021-03-09 PROCEDURE — 1126F AMNT PAIN NOTED NONE PRSNT: CPT | Mod: S$GLB,,, | Performed by: STUDENT IN AN ORGANIZED HEALTH CARE EDUCATION/TRAINING PROGRAM

## 2021-03-09 PROCEDURE — 99999 PR PBB SHADOW E&M-EST. PATIENT-LVL III: ICD-10-PCS | Mod: PBBFAC,,, | Performed by: STUDENT IN AN ORGANIZED HEALTH CARE EDUCATION/TRAINING PROGRAM

## 2021-03-09 PROCEDURE — 1126F PR PAIN SEVERITY QUANTIFIED, NO PAIN PRESENT: ICD-10-PCS | Mod: S$GLB,,, | Performed by: STUDENT IN AN ORGANIZED HEALTH CARE EDUCATION/TRAINING PROGRAM

## 2021-03-09 PROCEDURE — 3288F PR FALLS RISK ASSESSMENT DOCUMENTED: ICD-10-PCS | Mod: CPTII,S$GLB,, | Performed by: STUDENT IN AN ORGANIZED HEALTH CARE EDUCATION/TRAINING PROGRAM

## 2021-03-09 PROCEDURE — 1159F PR MEDICATION LIST DOCUMENTED IN MEDICAL RECORD: ICD-10-PCS | Mod: S$GLB,,, | Performed by: STUDENT IN AN ORGANIZED HEALTH CARE EDUCATION/TRAINING PROGRAM

## 2021-03-09 PROCEDURE — 1101F PR PT FALLS ASSESS DOC 0-1 FALLS W/OUT INJ PAST YR: ICD-10-PCS | Mod: CPTII,S$GLB,, | Performed by: STUDENT IN AN ORGANIZED HEALTH CARE EDUCATION/TRAINING PROGRAM

## 2021-03-09 PROCEDURE — 99214 OFFICE O/P EST MOD 30 MIN: CPT | Mod: 25,S$GLB,, | Performed by: STUDENT IN AN ORGANIZED HEALTH CARE EDUCATION/TRAINING PROGRAM

## 2021-03-09 PROCEDURE — 99999 PR PBB SHADOW E&M-EST. PATIENT-LVL III: CPT | Mod: PBBFAC,,, | Performed by: STUDENT IN AN ORGANIZED HEALTH CARE EDUCATION/TRAINING PROGRAM

## 2021-03-09 PROCEDURE — 99214 PR OFFICE/OUTPT VISIT, EST, LEVL IV, 30-39 MIN: ICD-10-PCS | Mod: 25,S$GLB,, | Performed by: STUDENT IN AN ORGANIZED HEALTH CARE EDUCATION/TRAINING PROGRAM

## 2021-03-09 PROCEDURE — 3078F DIAST BP <80 MM HG: CPT | Mod: CPTII,S$GLB,, | Performed by: STUDENT IN AN ORGANIZED HEALTH CARE EDUCATION/TRAINING PROGRAM

## 2021-03-09 PROCEDURE — 3077F PR MOST RECENT SYSTOLIC BLOOD PRESSURE >= 140 MM HG: ICD-10-PCS | Mod: CPTII,S$GLB,, | Performed by: STUDENT IN AN ORGANIZED HEALTH CARE EDUCATION/TRAINING PROGRAM

## 2021-03-09 PROCEDURE — 3078F PR MOST RECENT DIASTOLIC BLOOD PRESSURE < 80 MM HG: ICD-10-PCS | Mod: CPTII,S$GLB,, | Performed by: STUDENT IN AN ORGANIZED HEALTH CARE EDUCATION/TRAINING PROGRAM

## 2021-03-09 PROCEDURE — 3288F FALL RISK ASSESSMENT DOCD: CPT | Mod: CPTII,S$GLB,, | Performed by: STUDENT IN AN ORGANIZED HEALTH CARE EDUCATION/TRAINING PROGRAM

## 2021-04-08 PROBLEM — N18.31 STAGE 3A CHRONIC KIDNEY DISEASE: Status: ACTIVE | Noted: 2021-04-08

## 2021-04-14 ENCOUNTER — OFFICE VISIT (OUTPATIENT)
Dept: PODIATRY | Facility: CLINIC | Age: 81
End: 2021-04-14
Payer: MEDICARE

## 2021-04-14 VITALS
SYSTOLIC BLOOD PRESSURE: 141 MMHG | BODY MASS INDEX: 31.64 KG/M2 | HEIGHT: 62 IN | HEART RATE: 88 BPM | WEIGHT: 171.94 LBS | DIASTOLIC BLOOD PRESSURE: 80 MMHG

## 2021-04-14 DIAGNOSIS — E11.42 TYPE 2 DIABETES MELLITUS WITH PERIPHERAL NEUROPATHY: ICD-10-CM

## 2021-04-14 DIAGNOSIS — L60.9 DISEASE OF NAIL: ICD-10-CM

## 2021-04-14 DIAGNOSIS — L84 PRE-ULCERATIVE CALLUSES: Primary | ICD-10-CM

## 2021-04-14 DIAGNOSIS — Z89.421 HISTORY OF AMPUTATION OF LESSER TOE, RIGHT: ICD-10-CM

## 2021-04-14 DIAGNOSIS — B35.1 ONYCHOMYCOSIS: ICD-10-CM

## 2021-04-14 PROCEDURE — 1101F PT FALLS ASSESS-DOCD LE1/YR: CPT | Mod: CPTII,S$GLB,, | Performed by: STUDENT IN AN ORGANIZED HEALTH CARE EDUCATION/TRAINING PROGRAM

## 2021-04-14 PROCEDURE — 99999 PR PBB SHADOW E&M-EST. PATIENT-LVL III: CPT | Mod: PBBFAC,,, | Performed by: STUDENT IN AN ORGANIZED HEALTH CARE EDUCATION/TRAINING PROGRAM

## 2021-04-14 PROCEDURE — 3288F FALL RISK ASSESSMENT DOCD: CPT | Mod: CPTII,S$GLB,, | Performed by: STUDENT IN AN ORGANIZED HEALTH CARE EDUCATION/TRAINING PROGRAM

## 2021-04-14 PROCEDURE — 99499 NO LOS: ICD-10-PCS | Mod: S$GLB,,, | Performed by: STUDENT IN AN ORGANIZED HEALTH CARE EDUCATION/TRAINING PROGRAM

## 2021-04-14 PROCEDURE — 3288F PR FALLS RISK ASSESSMENT DOCUMENTED: ICD-10-PCS | Mod: CPTII,S$GLB,, | Performed by: STUDENT IN AN ORGANIZED HEALTH CARE EDUCATION/TRAINING PROGRAM

## 2021-04-14 PROCEDURE — 1101F PR PT FALLS ASSESS DOC 0-1 FALLS W/OUT INJ PAST YR: ICD-10-PCS | Mod: CPTII,S$GLB,, | Performed by: STUDENT IN AN ORGANIZED HEALTH CARE EDUCATION/TRAINING PROGRAM

## 2021-04-14 PROCEDURE — 1126F PR PAIN SEVERITY QUANTIFIED, NO PAIN PRESENT: ICD-10-PCS | Mod: S$GLB,,, | Performed by: STUDENT IN AN ORGANIZED HEALTH CARE EDUCATION/TRAINING PROGRAM

## 2021-04-14 PROCEDURE — 11057 ROUTINE FOOT CARE: ICD-10-PCS | Mod: Q7,S$GLB,, | Performed by: STUDENT IN AN ORGANIZED HEALTH CARE EDUCATION/TRAINING PROGRAM

## 2021-04-14 PROCEDURE — 11721 ROUTINE FOOT CARE: ICD-10-PCS | Mod: Q7,59,S$GLB, | Performed by: STUDENT IN AN ORGANIZED HEALTH CARE EDUCATION/TRAINING PROGRAM

## 2021-04-14 PROCEDURE — 11057 PARNG/CUTG B9 HYPRKR LES >4: CPT | Mod: Q7,S$GLB,, | Performed by: STUDENT IN AN ORGANIZED HEALTH CARE EDUCATION/TRAINING PROGRAM

## 2021-04-14 PROCEDURE — 99999 PR PBB SHADOW E&M-EST. PATIENT-LVL III: ICD-10-PCS | Mod: PBBFAC,,, | Performed by: STUDENT IN AN ORGANIZED HEALTH CARE EDUCATION/TRAINING PROGRAM

## 2021-04-14 PROCEDURE — 1126F AMNT PAIN NOTED NONE PRSNT: CPT | Mod: S$GLB,,, | Performed by: STUDENT IN AN ORGANIZED HEALTH CARE EDUCATION/TRAINING PROGRAM

## 2021-04-14 PROCEDURE — 99499 UNLISTED E&M SERVICE: CPT | Mod: S$GLB,,, | Performed by: STUDENT IN AN ORGANIZED HEALTH CARE EDUCATION/TRAINING PROGRAM

## 2021-04-14 PROCEDURE — 11721 DEBRIDE NAIL 6 OR MORE: CPT | Mod: Q7,59,S$GLB, | Performed by: STUDENT IN AN ORGANIZED HEALTH CARE EDUCATION/TRAINING PROGRAM

## 2021-05-12 ENCOUNTER — OFFICE VISIT (OUTPATIENT)
Dept: PODIATRY | Facility: CLINIC | Age: 81
End: 2021-05-12
Payer: MEDICARE

## 2021-05-12 VITALS — WEIGHT: 171.94 LBS | BODY MASS INDEX: 31.64 KG/M2 | HEIGHT: 62 IN

## 2021-05-12 DIAGNOSIS — L85.3 DRY SKIN: ICD-10-CM

## 2021-05-12 DIAGNOSIS — E11.42 TYPE 2 DIABETES MELLITUS WITH PERIPHERAL NEUROPATHY: ICD-10-CM

## 2021-05-12 DIAGNOSIS — L84 PRE-ULCERATIVE CALLUSES: Primary | ICD-10-CM

## 2021-05-12 PROCEDURE — 99999 PR PBB SHADOW E&M-EST. PATIENT-LVL III: CPT | Mod: PBBFAC,,, | Performed by: STUDENT IN AN ORGANIZED HEALTH CARE EDUCATION/TRAINING PROGRAM

## 2021-05-12 PROCEDURE — 1126F AMNT PAIN NOTED NONE PRSNT: CPT | Mod: S$GLB,,, | Performed by: STUDENT IN AN ORGANIZED HEALTH CARE EDUCATION/TRAINING PROGRAM

## 2021-05-12 PROCEDURE — 3288F PR FALLS RISK ASSESSMENT DOCUMENTED: ICD-10-PCS | Mod: CPTII,S$GLB,, | Performed by: STUDENT IN AN ORGANIZED HEALTH CARE EDUCATION/TRAINING PROGRAM

## 2021-05-12 PROCEDURE — 99499 NO LOS: ICD-10-PCS | Mod: S$GLB,,, | Performed by: STUDENT IN AN ORGANIZED HEALTH CARE EDUCATION/TRAINING PROGRAM

## 2021-05-12 PROCEDURE — 1101F PR PT FALLS ASSESS DOC 0-1 FALLS W/OUT INJ PAST YR: ICD-10-PCS | Mod: CPTII,S$GLB,, | Performed by: STUDENT IN AN ORGANIZED HEALTH CARE EDUCATION/TRAINING PROGRAM

## 2021-05-12 PROCEDURE — 3288F FALL RISK ASSESSMENT DOCD: CPT | Mod: CPTII,S$GLB,, | Performed by: STUDENT IN AN ORGANIZED HEALTH CARE EDUCATION/TRAINING PROGRAM

## 2021-05-12 PROCEDURE — 1101F PT FALLS ASSESS-DOCD LE1/YR: CPT | Mod: CPTII,S$GLB,, | Performed by: STUDENT IN AN ORGANIZED HEALTH CARE EDUCATION/TRAINING PROGRAM

## 2021-05-12 PROCEDURE — 1126F PR PAIN SEVERITY QUANTIFIED, NO PAIN PRESENT: ICD-10-PCS | Mod: S$GLB,,, | Performed by: STUDENT IN AN ORGANIZED HEALTH CARE EDUCATION/TRAINING PROGRAM

## 2021-05-12 PROCEDURE — 99499 UNLISTED E&M SERVICE: CPT | Mod: S$GLB,,, | Performed by: STUDENT IN AN ORGANIZED HEALTH CARE EDUCATION/TRAINING PROGRAM

## 2021-05-12 PROCEDURE — 99999 PR PBB SHADOW E&M-EST. PATIENT-LVL III: ICD-10-PCS | Mod: PBBFAC,,, | Performed by: STUDENT IN AN ORGANIZED HEALTH CARE EDUCATION/TRAINING PROGRAM

## 2021-06-15 ENCOUNTER — TELEPHONE (OUTPATIENT)
Dept: PODIATRY | Facility: CLINIC | Age: 81
End: 2021-06-15

## 2021-06-17 ENCOUNTER — OFFICE VISIT (OUTPATIENT)
Dept: PODIATRY | Facility: CLINIC | Age: 81
End: 2021-06-17
Payer: MEDICARE

## 2021-06-17 VITALS
WEIGHT: 171 LBS | DIASTOLIC BLOOD PRESSURE: 60 MMHG | HEIGHT: 62 IN | BODY MASS INDEX: 31.47 KG/M2 | HEART RATE: 59 BPM | SYSTOLIC BLOOD PRESSURE: 108 MMHG

## 2021-06-17 DIAGNOSIS — L97.511 DIABETIC ULCER OF OTHER PART OF RIGHT FOOT ASSOCIATED WITH DIABETES MELLITUS DUE TO UNDERLYING CONDITION, LIMITED TO BREAKDOWN OF SKIN: Primary | ICD-10-CM

## 2021-06-17 DIAGNOSIS — E11.42 TYPE 2 DIABETES MELLITUS WITH PERIPHERAL NEUROPATHY: ICD-10-CM

## 2021-06-17 DIAGNOSIS — E08.621 DIABETIC ULCER OF OTHER PART OF RIGHT FOOT ASSOCIATED WITH DIABETES MELLITUS DUE TO UNDERLYING CONDITION, LIMITED TO BREAKDOWN OF SKIN: Primary | ICD-10-CM

## 2021-06-17 PROCEDURE — 97597 DBRDMT OPN WND 1ST 20 CM/<: CPT | Mod: S$GLB,,, | Performed by: PODIATRIST

## 2021-06-17 PROCEDURE — 99999 PR PBB SHADOW E&M-EST. PATIENT-LVL III: ICD-10-PCS | Mod: PBBFAC,,, | Performed by: PODIATRIST

## 2021-06-17 PROCEDURE — 99213 OFFICE O/P EST LOW 20 MIN: CPT | Mod: 25,S$GLB,, | Performed by: PODIATRIST

## 2021-06-17 PROCEDURE — 1101F PR PT FALLS ASSESS DOC 0-1 FALLS W/OUT INJ PAST YR: ICD-10-PCS | Mod: CPTII,S$GLB,, | Performed by: PODIATRIST

## 2021-06-17 PROCEDURE — 99213 PR OFFICE/OUTPT VISIT, EST, LEVL III, 20-29 MIN: ICD-10-PCS | Mod: 25,S$GLB,, | Performed by: PODIATRIST

## 2021-06-17 PROCEDURE — 1159F PR MEDICATION LIST DOCUMENTED IN MEDICAL RECORD: ICD-10-PCS | Mod: S$GLB,,, | Performed by: PODIATRIST

## 2021-06-17 PROCEDURE — 1159F MED LIST DOCD IN RCRD: CPT | Mod: S$GLB,,, | Performed by: PODIATRIST

## 2021-06-17 PROCEDURE — 97597 WOUND DEBRIDEMENT: ICD-10-PCS | Mod: S$GLB,,, | Performed by: PODIATRIST

## 2021-06-17 PROCEDURE — 99999 PR PBB SHADOW E&M-EST. PATIENT-LVL III: CPT | Mod: PBBFAC,,, | Performed by: PODIATRIST

## 2021-06-17 PROCEDURE — 1126F AMNT PAIN NOTED NONE PRSNT: CPT | Mod: S$GLB,,, | Performed by: PODIATRIST

## 2021-06-17 PROCEDURE — 1101F PT FALLS ASSESS-DOCD LE1/YR: CPT | Mod: CPTII,S$GLB,, | Performed by: PODIATRIST

## 2021-06-17 PROCEDURE — 3288F FALL RISK ASSESSMENT DOCD: CPT | Mod: CPTII,S$GLB,, | Performed by: PODIATRIST

## 2021-06-17 PROCEDURE — 3288F PR FALLS RISK ASSESSMENT DOCUMENTED: ICD-10-PCS | Mod: CPTII,S$GLB,, | Performed by: PODIATRIST

## 2021-06-17 PROCEDURE — 1126F PR PAIN SEVERITY QUANTIFIED, NO PAIN PRESENT: ICD-10-PCS | Mod: S$GLB,,, | Performed by: PODIATRIST

## 2021-06-17 RX ORDER — PRAMIPEXOLE DIHYDROCHLORIDE 0.12 MG/1
TABLET ORAL
COMMUNITY
End: 2021-09-16 | Stop reason: SDUPTHER

## 2021-07-14 ENCOUNTER — OFFICE VISIT (OUTPATIENT)
Dept: PODIATRY | Facility: CLINIC | Age: 81
End: 2021-07-14
Payer: MEDICARE

## 2021-07-14 ENCOUNTER — TELEPHONE (OUTPATIENT)
Dept: PODIATRY | Facility: CLINIC | Age: 81
End: 2021-07-14

## 2021-07-14 VITALS
HEIGHT: 62 IN | BODY MASS INDEX: 31.48 KG/M2 | SYSTOLIC BLOOD PRESSURE: 132 MMHG | WEIGHT: 171.06 LBS | HEART RATE: 59 BPM | DIASTOLIC BLOOD PRESSURE: 58 MMHG

## 2021-07-14 DIAGNOSIS — L85.3 DRY SKIN: ICD-10-CM

## 2021-07-14 DIAGNOSIS — Z89.421 HISTORY OF AMPUTATION OF LESSER TOE, RIGHT: ICD-10-CM

## 2021-07-14 DIAGNOSIS — L84 PRE-ULCERATIVE CALLUSES: Primary | ICD-10-CM

## 2021-07-14 DIAGNOSIS — L60.9 DISEASE OF NAIL: ICD-10-CM

## 2021-07-14 DIAGNOSIS — E11.42 TYPE 2 DIABETES MELLITUS WITH PERIPHERAL NEUROPATHY: ICD-10-CM

## 2021-07-14 DIAGNOSIS — B35.1 ONYCHOMYCOSIS: ICD-10-CM

## 2021-07-14 PROCEDURE — 1159F PR MEDICATION LIST DOCUMENTED IN MEDICAL RECORD: ICD-10-PCS | Mod: S$GLB,,, | Performed by: STUDENT IN AN ORGANIZED HEALTH CARE EDUCATION/TRAINING PROGRAM

## 2021-07-14 PROCEDURE — 1126F AMNT PAIN NOTED NONE PRSNT: CPT | Mod: S$GLB,,, | Performed by: STUDENT IN AN ORGANIZED HEALTH CARE EDUCATION/TRAINING PROGRAM

## 2021-07-14 PROCEDURE — 3288F FALL RISK ASSESSMENT DOCD: CPT | Mod: CPTII,S$GLB,, | Performed by: STUDENT IN AN ORGANIZED HEALTH CARE EDUCATION/TRAINING PROGRAM

## 2021-07-14 PROCEDURE — 11721 DEBRIDE NAIL 6 OR MORE: CPT | Mod: 59,Q7,S$GLB, | Performed by: STUDENT IN AN ORGANIZED HEALTH CARE EDUCATION/TRAINING PROGRAM

## 2021-07-14 PROCEDURE — 1126F PR PAIN SEVERITY QUANTIFIED, NO PAIN PRESENT: ICD-10-PCS | Mod: S$GLB,,, | Performed by: STUDENT IN AN ORGANIZED HEALTH CARE EDUCATION/TRAINING PROGRAM

## 2021-07-14 PROCEDURE — 99214 PR OFFICE/OUTPT VISIT, EST, LEVL IV, 30-39 MIN: ICD-10-PCS | Mod: 25,S$GLB,, | Performed by: STUDENT IN AN ORGANIZED HEALTH CARE EDUCATION/TRAINING PROGRAM

## 2021-07-14 PROCEDURE — 11057 ROUTINE FOOT CARE: ICD-10-PCS | Mod: Q7,S$GLB,, | Performed by: STUDENT IN AN ORGANIZED HEALTH CARE EDUCATION/TRAINING PROGRAM

## 2021-07-14 PROCEDURE — 1159F MED LIST DOCD IN RCRD: CPT | Mod: S$GLB,,, | Performed by: STUDENT IN AN ORGANIZED HEALTH CARE EDUCATION/TRAINING PROGRAM

## 2021-07-14 PROCEDURE — 99999 PR PBB SHADOW E&M-EST. PATIENT-LVL III: ICD-10-PCS | Mod: PBBFAC,,, | Performed by: STUDENT IN AN ORGANIZED HEALTH CARE EDUCATION/TRAINING PROGRAM

## 2021-07-14 PROCEDURE — 99999 PR PBB SHADOW E&M-EST. PATIENT-LVL III: CPT | Mod: PBBFAC,,, | Performed by: STUDENT IN AN ORGANIZED HEALTH CARE EDUCATION/TRAINING PROGRAM

## 2021-07-14 PROCEDURE — 11057 PARNG/CUTG B9 HYPRKR LES >4: CPT | Mod: Q7,S$GLB,, | Performed by: STUDENT IN AN ORGANIZED HEALTH CARE EDUCATION/TRAINING PROGRAM

## 2021-07-14 PROCEDURE — 1101F PT FALLS ASSESS-DOCD LE1/YR: CPT | Mod: CPTII,S$GLB,, | Performed by: STUDENT IN AN ORGANIZED HEALTH CARE EDUCATION/TRAINING PROGRAM

## 2021-07-14 PROCEDURE — 1101F PR PT FALLS ASSESS DOC 0-1 FALLS W/OUT INJ PAST YR: ICD-10-PCS | Mod: CPTII,S$GLB,, | Performed by: STUDENT IN AN ORGANIZED HEALTH CARE EDUCATION/TRAINING PROGRAM

## 2021-07-14 PROCEDURE — 11721 ROUTINE FOOT CARE: ICD-10-PCS | Mod: 59,Q7,S$GLB, | Performed by: STUDENT IN AN ORGANIZED HEALTH CARE EDUCATION/TRAINING PROGRAM

## 2021-07-14 PROCEDURE — 99214 OFFICE O/P EST MOD 30 MIN: CPT | Mod: 25,S$GLB,, | Performed by: STUDENT IN AN ORGANIZED HEALTH CARE EDUCATION/TRAINING PROGRAM

## 2021-07-14 PROCEDURE — 3288F PR FALLS RISK ASSESSMENT DOCUMENTED: ICD-10-PCS | Mod: CPTII,S$GLB,, | Performed by: STUDENT IN AN ORGANIZED HEALTH CARE EDUCATION/TRAINING PROGRAM

## 2021-07-14 RX ORDER — UREA 40 %
CREAM (GRAM) TOPICAL 2 TIMES DAILY
Qty: 1 BOTTLE | Refills: 3 | Status: SHIPPED | OUTPATIENT
Start: 2021-07-14

## 2021-08-04 RX ORDER — METOPROLOL SUCCINATE 25 MG/1
25 TABLET, EXTENDED RELEASE ORAL DAILY
Qty: 81 TABLET | Refills: 2 | Status: SHIPPED | OUTPATIENT
Start: 2021-08-04 | End: 2022-02-10 | Stop reason: SDUPTHER

## 2021-08-05 ENCOUNTER — TELEPHONE (OUTPATIENT)
Dept: PODIATRY | Facility: CLINIC | Age: 81
End: 2021-08-05

## 2021-08-06 ENCOUNTER — OFFICE VISIT (OUTPATIENT)
Dept: PODIATRY | Facility: CLINIC | Age: 81
End: 2021-08-06
Payer: MEDICARE

## 2021-08-06 VITALS — WEIGHT: 171.06 LBS | BODY MASS INDEX: 31.48 KG/M2 | HEIGHT: 62 IN

## 2021-08-06 DIAGNOSIS — L97.512 DIABETIC ULCER OF OTHER PART OF RIGHT FOOT ASSOCIATED WITH DIABETES MELLITUS DUE TO UNDERLYING CONDITION, WITH FAT LAYER EXPOSED: Primary | ICD-10-CM

## 2021-08-06 DIAGNOSIS — E08.621 DIABETIC ULCER OF OTHER PART OF RIGHT FOOT ASSOCIATED WITH DIABETES MELLITUS DUE TO UNDERLYING CONDITION, WITH FAT LAYER EXPOSED: Primary | ICD-10-CM

## 2021-08-06 PROCEDURE — 3288F FALL RISK ASSESSMENT DOCD: CPT | Mod: CPTII,S$GLB,, | Performed by: PODIATRIST

## 2021-08-06 PROCEDURE — 1126F AMNT PAIN NOTED NONE PRSNT: CPT | Mod: CPTII,S$GLB,, | Performed by: PODIATRIST

## 2021-08-06 PROCEDURE — 99213 PR OFFICE/OUTPT VISIT, EST, LEVL III, 20-29 MIN: ICD-10-PCS | Mod: 25,S$GLB,, | Performed by: PODIATRIST

## 2021-08-06 PROCEDURE — 1101F PR PT FALLS ASSESS DOC 0-1 FALLS W/OUT INJ PAST YR: ICD-10-PCS | Mod: CPTII,S$GLB,, | Performed by: PODIATRIST

## 2021-08-06 PROCEDURE — 1159F PR MEDICATION LIST DOCUMENTED IN MEDICAL RECORD: ICD-10-PCS | Mod: CPTII,S$GLB,, | Performed by: PODIATRIST

## 2021-08-06 PROCEDURE — 1101F PT FALLS ASSESS-DOCD LE1/YR: CPT | Mod: CPTII,S$GLB,, | Performed by: PODIATRIST

## 2021-08-06 PROCEDURE — 99213 OFFICE O/P EST LOW 20 MIN: CPT | Mod: 25,S$GLB,, | Performed by: PODIATRIST

## 2021-08-06 PROCEDURE — 1160F RVW MEDS BY RX/DR IN RCRD: CPT | Mod: CPTII,S$GLB,, | Performed by: PODIATRIST

## 2021-08-06 PROCEDURE — 99999 PR PBB SHADOW E&M-EST. PATIENT-LVL III: ICD-10-PCS | Mod: PBBFAC,,, | Performed by: PODIATRIST

## 2021-08-06 PROCEDURE — 1160F PR REVIEW ALL MEDS BY PRESCRIBER/CLIN PHARMACIST DOCUMENTED: ICD-10-PCS | Mod: CPTII,S$GLB,, | Performed by: PODIATRIST

## 2021-08-06 PROCEDURE — 99999 PR PBB SHADOW E&M-EST. PATIENT-LVL III: CPT | Mod: PBBFAC,,, | Performed by: PODIATRIST

## 2021-08-06 PROCEDURE — 11042 WOUND DEBRIDEMENT: ICD-10-PCS | Mod: S$GLB,,, | Performed by: PODIATRIST

## 2021-08-06 PROCEDURE — 1126F PR PAIN SEVERITY QUANTIFIED, NO PAIN PRESENT: ICD-10-PCS | Mod: CPTII,S$GLB,, | Performed by: PODIATRIST

## 2021-08-06 PROCEDURE — 3288F PR FALLS RISK ASSESSMENT DOCUMENTED: ICD-10-PCS | Mod: CPTII,S$GLB,, | Performed by: PODIATRIST

## 2021-08-06 PROCEDURE — 1159F MED LIST DOCD IN RCRD: CPT | Mod: CPTII,S$GLB,, | Performed by: PODIATRIST

## 2021-08-06 PROCEDURE — 11042 DBRDMT SUBQ TIS 1ST 20SQCM/<: CPT | Mod: S$GLB,,, | Performed by: PODIATRIST

## 2021-08-23 DIAGNOSIS — D69.6 THROMBOCYTOPENIA, UNSPECIFIED: ICD-10-CM

## 2021-08-23 DIAGNOSIS — I48.92 UNSPECIFIED ATRIAL FLUTTER: ICD-10-CM

## 2021-08-23 RX ORDER — APIXABAN 5 MG/1
TABLET, FILM COATED ORAL
Qty: 180 TABLET | Refills: 1 | Status: CANCELLED | OUTPATIENT
Start: 2021-08-23

## 2021-08-24 DIAGNOSIS — I48.92 UNSPECIFIED ATRIAL FLUTTER: ICD-10-CM

## 2021-08-24 DIAGNOSIS — D69.6 THROMBOCYTOPENIA, UNSPECIFIED: ICD-10-CM

## 2021-08-25 ENCOUNTER — OFFICE VISIT (OUTPATIENT)
Dept: PODIATRY | Facility: CLINIC | Age: 81
End: 2021-08-25
Payer: MEDICARE

## 2021-08-25 ENCOUNTER — HOSPITAL ENCOUNTER (OUTPATIENT)
Dept: RADIOLOGY | Facility: HOSPITAL | Age: 81
Discharge: HOME | End: 2021-08-25
Attending: STUDENT IN AN ORGANIZED HEALTH CARE EDUCATION/TRAINING PROGRAM
Payer: MEDICARE

## 2021-08-25 VITALS
SYSTOLIC BLOOD PRESSURE: 141 MMHG | HEART RATE: 51 BPM | DIASTOLIC BLOOD PRESSURE: 52 MMHG | BODY MASS INDEX: 31.29 KG/M2 | HEIGHT: 62 IN

## 2021-08-25 DIAGNOSIS — L84 PRE-ULCERATIVE CALLUSES: ICD-10-CM

## 2021-08-25 DIAGNOSIS — B35.1 ONYCHOMYCOSIS: ICD-10-CM

## 2021-08-25 DIAGNOSIS — E08.621 DIABETIC ULCER OF OTHER PART OF RIGHT FOOT ASSOCIATED WITH DIABETES MELLITUS DUE TO UNDERLYING CONDITION, WITH FAT LAYER EXPOSED: Primary | ICD-10-CM

## 2021-08-25 DIAGNOSIS — E08.621 DIABETIC ULCER OF OTHER PART OF RIGHT FOOT ASSOCIATED WITH DIABETES MELLITUS DUE TO UNDERLYING CONDITION, WITH FAT LAYER EXPOSED: ICD-10-CM

## 2021-08-25 DIAGNOSIS — L97.512 DIABETIC ULCER OF OTHER PART OF RIGHT FOOT ASSOCIATED WITH DIABETES MELLITUS DUE TO UNDERLYING CONDITION, WITH FAT LAYER EXPOSED: Primary | ICD-10-CM

## 2021-08-25 DIAGNOSIS — L97.512 DIABETIC ULCER OF OTHER PART OF RIGHT FOOT ASSOCIATED WITH DIABETES MELLITUS DUE TO UNDERLYING CONDITION, WITH FAT LAYER EXPOSED: ICD-10-CM

## 2021-08-25 PROCEDURE — 1101F PT FALLS ASSESS-DOCD LE1/YR: CPT | Mod: CPTII,S$GLB,, | Performed by: STUDENT IN AN ORGANIZED HEALTH CARE EDUCATION/TRAINING PROGRAM

## 2021-08-25 PROCEDURE — 3078F PR MOST RECENT DIASTOLIC BLOOD PRESSURE < 80 MM HG: ICD-10-PCS | Mod: CPTII,S$GLB,, | Performed by: STUDENT IN AN ORGANIZED HEALTH CARE EDUCATION/TRAINING PROGRAM

## 2021-08-25 PROCEDURE — 3078F DIAST BP <80 MM HG: CPT | Mod: CPTII,S$GLB,, | Performed by: STUDENT IN AN ORGANIZED HEALTH CARE EDUCATION/TRAINING PROGRAM

## 2021-08-25 PROCEDURE — 73630 X-RAY EXAM OF FOOT: CPT | Mod: 26,RT,, | Performed by: RADIOLOGY

## 2021-08-25 PROCEDURE — 73630 X-RAY EXAM OF FOOT: CPT | Mod: TC,PN,RT

## 2021-08-25 PROCEDURE — 3288F FALL RISK ASSESSMENT DOCD: CPT | Mod: CPTII,S$GLB,, | Performed by: STUDENT IN AN ORGANIZED HEALTH CARE EDUCATION/TRAINING PROGRAM

## 2021-08-25 PROCEDURE — 99999 PR PBB SHADOW E&M-EST. PATIENT-LVL III: CPT | Mod: PBBFAC,,, | Performed by: STUDENT IN AN ORGANIZED HEALTH CARE EDUCATION/TRAINING PROGRAM

## 2021-08-25 PROCEDURE — 11057 PARNG/CUTG B9 HYPRKR LES >4: CPT | Mod: Q7,S$GLB,, | Performed by: STUDENT IN AN ORGANIZED HEALTH CARE EDUCATION/TRAINING PROGRAM

## 2021-08-25 PROCEDURE — 3077F SYST BP >= 140 MM HG: CPT | Mod: CPTII,S$GLB,, | Performed by: STUDENT IN AN ORGANIZED HEALTH CARE EDUCATION/TRAINING PROGRAM

## 2021-08-25 PROCEDURE — 3077F PR MOST RECENT SYSTOLIC BLOOD PRESSURE >= 140 MM HG: ICD-10-PCS | Mod: CPTII,S$GLB,, | Performed by: STUDENT IN AN ORGANIZED HEALTH CARE EDUCATION/TRAINING PROGRAM

## 2021-08-25 PROCEDURE — 99999 PR PBB SHADOW E&M-EST. PATIENT-LVL III: ICD-10-PCS | Mod: PBBFAC,,, | Performed by: STUDENT IN AN ORGANIZED HEALTH CARE EDUCATION/TRAINING PROGRAM

## 2021-08-25 PROCEDURE — 11057 ROUTINE FOOT CARE: ICD-10-PCS | Mod: Q7,S$GLB,, | Performed by: STUDENT IN AN ORGANIZED HEALTH CARE EDUCATION/TRAINING PROGRAM

## 2021-08-25 PROCEDURE — 11721 ROUTINE FOOT CARE: ICD-10-PCS | Mod: 59,Q7,S$GLB, | Performed by: STUDENT IN AN ORGANIZED HEALTH CARE EDUCATION/TRAINING PROGRAM

## 2021-08-25 PROCEDURE — 1101F PR PT FALLS ASSESS DOC 0-1 FALLS W/OUT INJ PAST YR: ICD-10-PCS | Mod: CPTII,S$GLB,, | Performed by: STUDENT IN AN ORGANIZED HEALTH CARE EDUCATION/TRAINING PROGRAM

## 2021-08-25 PROCEDURE — 11721 DEBRIDE NAIL 6 OR MORE: CPT | Mod: 59,Q7,S$GLB, | Performed by: STUDENT IN AN ORGANIZED HEALTH CARE EDUCATION/TRAINING PROGRAM

## 2021-08-25 PROCEDURE — 1126F AMNT PAIN NOTED NONE PRSNT: CPT | Mod: CPTII,S$GLB,, | Performed by: STUDENT IN AN ORGANIZED HEALTH CARE EDUCATION/TRAINING PROGRAM

## 2021-08-25 PROCEDURE — 73630 XR FOOT COMPLETE 3 VIEW RIGHT: ICD-10-PCS | Mod: 26,RT,, | Performed by: RADIOLOGY

## 2021-08-25 PROCEDURE — 99214 PR OFFICE/OUTPT VISIT, EST, LEVL IV, 30-39 MIN: ICD-10-PCS | Mod: 25,S$GLB,, | Performed by: STUDENT IN AN ORGANIZED HEALTH CARE EDUCATION/TRAINING PROGRAM

## 2021-08-25 PROCEDURE — 1126F PR PAIN SEVERITY QUANTIFIED, NO PAIN PRESENT: ICD-10-PCS | Mod: CPTII,S$GLB,, | Performed by: STUDENT IN AN ORGANIZED HEALTH CARE EDUCATION/TRAINING PROGRAM

## 2021-08-25 PROCEDURE — 3288F PR FALLS RISK ASSESSMENT DOCUMENTED: ICD-10-PCS | Mod: CPTII,S$GLB,, | Performed by: STUDENT IN AN ORGANIZED HEALTH CARE EDUCATION/TRAINING PROGRAM

## 2021-08-25 PROCEDURE — 99214 OFFICE O/P EST MOD 30 MIN: CPT | Mod: 25,S$GLB,, | Performed by: STUDENT IN AN ORGANIZED HEALTH CARE EDUCATION/TRAINING PROGRAM

## 2021-10-06 ENCOUNTER — TELEPHONE (OUTPATIENT)
Dept: PODIATRY | Facility: CLINIC | Age: 81
End: 2021-10-06

## 2021-10-12 ENCOUNTER — OFFICE VISIT (OUTPATIENT)
Dept: PODIATRY | Facility: CLINIC | Age: 81
End: 2021-10-12
Payer: MEDICARE

## 2021-10-12 VITALS — WEIGHT: 171.06 LBS | HEIGHT: 62 IN | BODY MASS INDEX: 31.48 KG/M2

## 2021-10-12 DIAGNOSIS — I73.9 PAD (PERIPHERAL ARTERY DISEASE): ICD-10-CM

## 2021-10-12 DIAGNOSIS — S91.109A WOUND, OPEN, TOE, INITIAL ENCOUNTER: ICD-10-CM

## 2021-10-12 DIAGNOSIS — E08.621 DIABETIC ULCER OF OTHER PART OF RIGHT FOOT ASSOCIATED WITH DIABETES MELLITUS DUE TO UNDERLYING CONDITION, WITH FAT LAYER EXPOSED: ICD-10-CM

## 2021-10-12 DIAGNOSIS — M86.60 OTHER CHRONIC OSTEOMYELITIS, UNSPECIFIED SITE: ICD-10-CM

## 2021-10-12 DIAGNOSIS — E11.628 DIABETIC FOOT INFECTION: Primary | ICD-10-CM

## 2021-10-12 DIAGNOSIS — L08.9 DIABETIC FOOT INFECTION: Primary | ICD-10-CM

## 2021-10-12 DIAGNOSIS — L97.512 DIABETIC ULCER OF OTHER PART OF RIGHT FOOT ASSOCIATED WITH DIABETES MELLITUS DUE TO UNDERLYING CONDITION, WITH FAT LAYER EXPOSED: ICD-10-CM

## 2021-10-12 DIAGNOSIS — E11.42 TYPE 2 DIABETES MELLITUS WITH PERIPHERAL NEUROPATHY: ICD-10-CM

## 2021-10-12 PROCEDURE — 99999 PR PBB SHADOW E&M-EST. PATIENT-LVL V: ICD-10-PCS | Mod: PBBFAC,,, | Performed by: STUDENT IN AN ORGANIZED HEALTH CARE EDUCATION/TRAINING PROGRAM

## 2021-10-12 PROCEDURE — 1126F AMNT PAIN NOTED NONE PRSNT: CPT | Mod: CPTII,S$GLB,, | Performed by: STUDENT IN AN ORGANIZED HEALTH CARE EDUCATION/TRAINING PROGRAM

## 2021-10-12 PROCEDURE — 3288F FALL RISK ASSESSMENT DOCD: CPT | Mod: CPTII,S$GLB,, | Performed by: STUDENT IN AN ORGANIZED HEALTH CARE EDUCATION/TRAINING PROGRAM

## 2021-10-12 PROCEDURE — 11042 DBRDMT SUBQ TIS 1ST 20SQCM/<: CPT | Mod: 59,S$GLB,, | Performed by: STUDENT IN AN ORGANIZED HEALTH CARE EDUCATION/TRAINING PROGRAM

## 2021-10-12 PROCEDURE — 99214 OFFICE O/P EST MOD 30 MIN: CPT | Mod: 25,S$GLB,, | Performed by: STUDENT IN AN ORGANIZED HEALTH CARE EDUCATION/TRAINING PROGRAM

## 2021-10-12 PROCEDURE — 11721 DEBRIDE NAIL 6 OR MORE: CPT | Mod: 59,Q7,S$GLB, | Performed by: STUDENT IN AN ORGANIZED HEALTH CARE EDUCATION/TRAINING PROGRAM

## 2021-10-12 PROCEDURE — 11721 ROUTINE FOOT CARE: ICD-10-PCS | Mod: 59,Q7,S$GLB, | Performed by: STUDENT IN AN ORGANIZED HEALTH CARE EDUCATION/TRAINING PROGRAM

## 2021-10-12 PROCEDURE — 11042 WOUND DEBRIDEMENT: ICD-10-PCS | Mod: 59,S$GLB,, | Performed by: STUDENT IN AN ORGANIZED HEALTH CARE EDUCATION/TRAINING PROGRAM

## 2021-10-12 PROCEDURE — 1159F MED LIST DOCD IN RCRD: CPT | Mod: CPTII,S$GLB,, | Performed by: STUDENT IN AN ORGANIZED HEALTH CARE EDUCATION/TRAINING PROGRAM

## 2021-10-12 PROCEDURE — 1101F PT FALLS ASSESS-DOCD LE1/YR: CPT | Mod: CPTII,S$GLB,, | Performed by: STUDENT IN AN ORGANIZED HEALTH CARE EDUCATION/TRAINING PROGRAM

## 2021-10-12 PROCEDURE — 99999 PR PBB SHADOW E&M-EST. PATIENT-LVL V: CPT | Mod: PBBFAC,,, | Performed by: STUDENT IN AN ORGANIZED HEALTH CARE EDUCATION/TRAINING PROGRAM

## 2021-10-12 PROCEDURE — 99214 PR OFFICE/OUTPT VISIT, EST, LEVL IV, 30-39 MIN: ICD-10-PCS | Mod: 25,S$GLB,, | Performed by: STUDENT IN AN ORGANIZED HEALTH CARE EDUCATION/TRAINING PROGRAM

## 2021-10-12 PROCEDURE — 1101F PR PT FALLS ASSESS DOC 0-1 FALLS W/OUT INJ PAST YR: ICD-10-PCS | Mod: CPTII,S$GLB,, | Performed by: STUDENT IN AN ORGANIZED HEALTH CARE EDUCATION/TRAINING PROGRAM

## 2021-10-12 PROCEDURE — 1159F PR MEDICATION LIST DOCUMENTED IN MEDICAL RECORD: ICD-10-PCS | Mod: CPTII,S$GLB,, | Performed by: STUDENT IN AN ORGANIZED HEALTH CARE EDUCATION/TRAINING PROGRAM

## 2021-10-12 PROCEDURE — 1126F PR PAIN SEVERITY QUANTIFIED, NO PAIN PRESENT: ICD-10-PCS | Mod: CPTII,S$GLB,, | Performed by: STUDENT IN AN ORGANIZED HEALTH CARE EDUCATION/TRAINING PROGRAM

## 2021-10-12 PROCEDURE — 3288F PR FALLS RISK ASSESSMENT DOCUMENTED: ICD-10-PCS | Mod: CPTII,S$GLB,, | Performed by: STUDENT IN AN ORGANIZED HEALTH CARE EDUCATION/TRAINING PROGRAM

## 2021-10-12 PROCEDURE — 11044 WOUND DEBRIDEMENT: ICD-10-PCS | Mod: S$GLB,,, | Performed by: STUDENT IN AN ORGANIZED HEALTH CARE EDUCATION/TRAINING PROGRAM

## 2021-10-12 PROCEDURE — 11044 DBRDMT BONE 1ST 20 SQ CM/<: CPT | Mod: S$GLB,,, | Performed by: STUDENT IN AN ORGANIZED HEALTH CARE EDUCATION/TRAINING PROGRAM

## 2021-10-13 ENCOUNTER — HOSPITAL ENCOUNTER (OUTPATIENT)
Dept: RADIOLOGY | Facility: HOSPITAL | Age: 81
Discharge: HOME | End: 2021-10-13
Attending: STUDENT IN AN ORGANIZED HEALTH CARE EDUCATION/TRAINING PROGRAM
Payer: MEDICARE

## 2021-10-13 DIAGNOSIS — M86.60 OTHER CHRONIC OSTEOMYELITIS, UNSPECIFIED SITE: ICD-10-CM

## 2021-10-13 PROCEDURE — 73718 MRI FOOT (FOREFOOT) RIGHT WITHOUT CONTRAST: ICD-10-PCS | Mod: 26,RT,, | Performed by: RADIOLOGY

## 2021-10-13 PROCEDURE — 73630 XR FOOT COMPLETE 3 VIEW RIGHT: ICD-10-PCS | Mod: 26,RT,, | Performed by: RADIOLOGY

## 2021-10-13 PROCEDURE — 73718 MRI LOWER EXTREMITY W/O DYE: CPT | Mod: 26,RT,, | Performed by: RADIOLOGY

## 2021-10-13 PROCEDURE — 73630 X-RAY EXAM OF FOOT: CPT | Mod: TC,FY,RT

## 2021-10-13 PROCEDURE — 73630 X-RAY EXAM OF FOOT: CPT | Mod: 26,RT,, | Performed by: RADIOLOGY

## 2021-10-13 PROCEDURE — 73718 MRI LOWER EXTREMITY W/O DYE: CPT | Mod: TC,RT

## 2021-10-14 ENCOUNTER — TELEPHONE (OUTPATIENT)
Dept: CARDIOLOGY | Facility: CLINIC | Age: 81
End: 2021-10-14

## 2021-10-14 ENCOUNTER — TELEPHONE (OUTPATIENT)
Dept: PODIATRY | Facility: CLINIC | Age: 81
End: 2021-10-14

## 2021-10-14 PROCEDURE — G0180 PR HOME HEALTH MD CERTIFICATION: ICD-10-PCS | Mod: ,,, | Performed by: STUDENT IN AN ORGANIZED HEALTH CARE EDUCATION/TRAINING PROGRAM

## 2021-10-14 PROCEDURE — G0180 MD CERTIFICATION HHA PATIENT: HCPCS | Mod: ,,, | Performed by: STUDENT IN AN ORGANIZED HEALTH CARE EDUCATION/TRAINING PROGRAM

## 2021-10-18 ENCOUNTER — HOSPITAL ENCOUNTER (INPATIENT)
Facility: HOSPITAL | Age: 81
LOS: 3 days | Discharge: HOME HEALTH | DRG: 256 | End: 2021-10-22
Attending: EMERGENCY MEDICINE | Admitting: INTERNAL MEDICINE
Payer: MEDICARE

## 2021-10-18 ENCOUNTER — TELEPHONE (OUTPATIENT)
Dept: WOUND CARE | Facility: HOSPITAL | Age: 81
End: 2021-10-18

## 2021-10-18 ENCOUNTER — TELEPHONE (OUTPATIENT)
Dept: PODIATRY | Facility: CLINIC | Age: 81
End: 2021-10-18

## 2021-10-18 DIAGNOSIS — L97.512 DIABETIC ULCER OF TOE OF RIGHT FOOT ASSOCIATED WITH TYPE 2 DIABETES MELLITUS, WITH FAT LAYER EXPOSED: ICD-10-CM

## 2021-10-18 DIAGNOSIS — E11.621 DIABETIC ULCER OF TOE OF RIGHT FOOT ASSOCIATED WITH TYPE 2 DIABETES MELLITUS, WITH FAT LAYER EXPOSED: ICD-10-CM

## 2021-10-18 DIAGNOSIS — M86.10 OTHER ACUTE OSTEOMYELITIS, UNSPECIFIED SITE: ICD-10-CM

## 2021-10-18 DIAGNOSIS — E11.42 POLYNEUROPATHY DUE TO TYPE 2 DIABETES MELLITUS: ICD-10-CM

## 2021-10-18 DIAGNOSIS — I73.9 PAD (PERIPHERAL ARTERY DISEASE): ICD-10-CM

## 2021-10-18 DIAGNOSIS — L97.512 DIABETIC ULCER OF OTHER PART OF RIGHT FOOT ASSOCIATED WITH DIABETES MELLITUS DUE TO UNDERLYING CONDITION, WITH FAT LAYER EXPOSED: ICD-10-CM

## 2021-10-18 DIAGNOSIS — E08.621 DIABETIC ULCER OF OTHER PART OF RIGHT FOOT ASSOCIATED WITH DIABETES MELLITUS DUE TO UNDERLYING CONDITION, WITH FAT LAYER EXPOSED: ICD-10-CM

## 2021-10-18 DIAGNOSIS — N18.31 STAGE 3A CHRONIC KIDNEY DISEASE: ICD-10-CM

## 2021-10-18 DIAGNOSIS — E11.40 TYPE 2 DIABETES MELLITUS WITH DIABETIC NEUROPATHY, WITHOUT LONG-TERM CURRENT USE OF INSULIN: Primary | ICD-10-CM

## 2021-10-18 DIAGNOSIS — M86.9 OSTEOMYELITIS: ICD-10-CM

## 2021-10-18 DIAGNOSIS — M86.9 OSTEOMYELITIS, UNSPECIFIED SITE, UNSPECIFIED TYPE: ICD-10-CM

## 2021-10-18 LAB
ALBUMIN SERPL BCP-MCNC: 3.6 G/DL (ref 3.5–5.2)
ALP SERPL-CCNC: 56 U/L (ref 55–135)
ALT SERPL W/O P-5'-P-CCNC: 15 U/L (ref 10–44)
ANION GAP SERPL CALC-SCNC: 11 MMOL/L (ref 8–16)
AST SERPL-CCNC: 14 U/L (ref 10–40)
BASOPHILS # BLD AUTO: 0.03 K/UL (ref 0–0.2)
BASOPHILS NFR BLD: 0.3 % (ref 0–1.9)
BILIRUB SERPL-MCNC: 0.3 MG/DL (ref 0.1–1)
BUN SERPL-MCNC: 37 MG/DL (ref 8–23)
CALCIUM SERPL-MCNC: 9.6 MG/DL (ref 8.7–10.5)
CHLORIDE SERPL-SCNC: 106 MMOL/L (ref 95–110)
CO2 SERPL-SCNC: 21 MMOL/L (ref 23–29)
CREAT SERPL-MCNC: 1.4 MG/DL (ref 0.5–1.4)
CRP SERPL-MCNC: 21.8 MG/L (ref 0–8.2)
DIFFERENTIAL METHOD: ABNORMAL
EOSINOPHIL # BLD AUTO: 0.2 K/UL (ref 0–0.5)
EOSINOPHIL NFR BLD: 1.8 % (ref 0–8)
ERYTHROCYTE [DISTWIDTH] IN BLOOD BY AUTOMATED COUNT: 13.1 % (ref 11.5–14.5)
ERYTHROCYTE [SEDIMENTATION RATE] IN BLOOD BY WESTERGREN METHOD: 74 MM/HR (ref 0–20)
EST. GFR  (AFRICAN AMERICAN): 40 ML/MIN/1.73 M^2
EST. GFR  (NON AFRICAN AMERICAN): 35 ML/MIN/1.73 M^2
ESTIMATED AVG GLUCOSE: 146 MG/DL (ref 68–131)
GLUCOSE SERPL-MCNC: 155 MG/DL (ref 70–110)
HBA1C MFR BLD: 6.7 % (ref 4–5.6)
HCT VFR BLD AUTO: 36.4 % (ref 37–48.5)
HGB BLD-MCNC: 12.3 G/DL (ref 12–16)
IMM GRANULOCYTES # BLD AUTO: 0.02 K/UL (ref 0–0.04)
IMM GRANULOCYTES NFR BLD AUTO: 0.2 % (ref 0–0.5)
LYMPHOCYTES # BLD AUTO: 2.4 K/UL (ref 1–4.8)
LYMPHOCYTES NFR BLD: 25.9 % (ref 18–48)
MCH RBC QN AUTO: 30.2 PG (ref 27–31)
MCHC RBC AUTO-ENTMCNC: 33.8 G/DL (ref 32–36)
MCV RBC AUTO: 89 FL (ref 82–98)
MONOCYTES # BLD AUTO: 0.7 K/UL (ref 0.3–1)
MONOCYTES NFR BLD: 7.9 % (ref 4–15)
NEUTROPHILS # BLD AUTO: 5.9 K/UL (ref 1.8–7.7)
NEUTROPHILS NFR BLD: 63.9 % (ref 38–73)
NRBC BLD-RTO: 0 /100 WBC
PLATELET # BLD AUTO: 260 K/UL (ref 150–450)
PMV BLD AUTO: 10.7 FL (ref 9.2–12.9)
POTASSIUM SERPL-SCNC: 5 MMOL/L (ref 3.5–5.1)
PROT SERPL-MCNC: 7.1 G/DL (ref 6–8.4)
RBC # BLD AUTO: 4.07 M/UL (ref 4–5.4)
SODIUM SERPL-SCNC: 138 MMOL/L (ref 136–145)
WBC # BLD AUTO: 9.2 K/UL (ref 3.9–12.7)

## 2021-10-18 PROCEDURE — 99285 EMERGENCY DEPT VISIT HI MDM: CPT | Mod: 25

## 2021-10-18 PROCEDURE — 36415 COLL VENOUS BLD VENIPUNCTURE: CPT | Performed by: STUDENT IN AN ORGANIZED HEALTH CARE EDUCATION/TRAINING PROGRAM

## 2021-10-18 PROCEDURE — G0378 HOSPITAL OBSERVATION PER HR: HCPCS

## 2021-10-18 PROCEDURE — 25000003 PHARM REV CODE 250: Performed by: STUDENT IN AN ORGANIZED HEALTH CARE EDUCATION/TRAINING PROGRAM

## 2021-10-18 PROCEDURE — 87040 BLOOD CULTURE FOR BACTERIA: CPT | Mod: 59 | Performed by: EMERGENCY MEDICINE

## 2021-10-18 PROCEDURE — 83036 HEMOGLOBIN GLYCOSYLATED A1C: CPT | Performed by: STUDENT IN AN ORGANIZED HEALTH CARE EDUCATION/TRAINING PROGRAM

## 2021-10-18 PROCEDURE — 63600175 PHARM REV CODE 636 W HCPCS: Performed by: EMERGENCY MEDICINE

## 2021-10-18 PROCEDURE — 96365 THER/PROPH/DIAG IV INF INIT: CPT

## 2021-10-18 PROCEDURE — 85025 COMPLETE CBC W/AUTO DIFF WBC: CPT | Performed by: EMERGENCY MEDICINE

## 2021-10-18 PROCEDURE — 25000003 PHARM REV CODE 250: Performed by: EMERGENCY MEDICINE

## 2021-10-18 PROCEDURE — 86140 C-REACTIVE PROTEIN: CPT | Performed by: EMERGENCY MEDICINE

## 2021-10-18 PROCEDURE — 85652 RBC SED RATE AUTOMATED: CPT | Performed by: EMERGENCY MEDICINE

## 2021-10-18 PROCEDURE — 80053 COMPREHEN METABOLIC PANEL: CPT | Performed by: EMERGENCY MEDICINE

## 2021-10-18 PROCEDURE — 96366 THER/PROPH/DIAG IV INF ADDON: CPT

## 2021-10-18 RX ORDER — IBUPROFEN 200 MG
24 TABLET ORAL
Status: DISCONTINUED | OUTPATIENT
Start: 2021-10-18 | End: 2021-10-22 | Stop reason: HOSPADM

## 2021-10-18 RX ORDER — PRAMIPEXOLE DIHYDROCHLORIDE 0.12 MG/1
0.12 TABLET ORAL DAILY
Status: DISCONTINUED | OUTPATIENT
Start: 2021-10-19 | End: 2021-10-22 | Stop reason: HOSPADM

## 2021-10-18 RX ORDER — GABAPENTIN 300 MG/1
300 CAPSULE ORAL 3 TIMES DAILY
Status: DISCONTINUED | OUTPATIENT
Start: 2021-10-18 | End: 2021-10-22 | Stop reason: HOSPADM

## 2021-10-18 RX ORDER — FAMOTIDINE 20 MG/1
20 TABLET, FILM COATED ORAL NIGHTLY
Status: DISCONTINUED | OUTPATIENT
Start: 2021-10-18 | End: 2021-10-22 | Stop reason: HOSPADM

## 2021-10-18 RX ORDER — INSULIN ASPART 100 [IU]/ML
0-5 INJECTION, SOLUTION INTRAVENOUS; SUBCUTANEOUS
Status: DISCONTINUED | OUTPATIENT
Start: 2021-10-18 | End: 2021-10-22 | Stop reason: HOSPADM

## 2021-10-18 RX ORDER — METOPROLOL SUCCINATE 25 MG/1
25 TABLET, EXTENDED RELEASE ORAL NIGHTLY
Status: DISCONTINUED | OUTPATIENT
Start: 2021-10-18 | End: 2021-10-22 | Stop reason: HOSPADM

## 2021-10-18 RX ORDER — IBUPROFEN 200 MG
16 TABLET ORAL
Status: DISCONTINUED | OUTPATIENT
Start: 2021-10-18 | End: 2021-10-22 | Stop reason: HOSPADM

## 2021-10-18 RX ORDER — GLUCAGON 1 MG
1 KIT INJECTION
Status: DISCONTINUED | OUTPATIENT
Start: 2021-10-18 | End: 2021-10-22 | Stop reason: HOSPADM

## 2021-10-18 RX ORDER — SODIUM CHLORIDE 0.9 % (FLUSH) 0.9 %
10 SYRINGE (ML) INJECTION EVERY 12 HOURS PRN
Status: DISCONTINUED | OUTPATIENT
Start: 2021-10-18 | End: 2021-10-22 | Stop reason: HOSPADM

## 2021-10-18 RX ORDER — LISINOPRIL 20 MG/1
20 TABLET ORAL DAILY
Status: DISCONTINUED | OUTPATIENT
Start: 2021-10-19 | End: 2021-10-22 | Stop reason: HOSPADM

## 2021-10-18 RX ADMIN — GABAPENTIN 300 MG: 300 CAPSULE ORAL at 09:10

## 2021-10-18 RX ADMIN — METOPROLOL SUCCINATE 25 MG: 25 TABLET, FILM COATED, EXTENDED RELEASE ORAL at 09:10

## 2021-10-18 RX ADMIN — VANCOMYCIN HYDROCHLORIDE 1500 MG: 1.5 INJECTION, POWDER, LYOPHILIZED, FOR SOLUTION INTRAVENOUS at 05:10

## 2021-10-18 RX ADMIN — SODIUM CHLORIDE 500 ML: 0.9 INJECTION, SOLUTION INTRAVENOUS at 07:10

## 2021-10-18 RX ADMIN — APIXABAN 5 MG: 5 TABLET, FILM COATED ORAL at 09:10

## 2021-10-18 RX ADMIN — FAMOTIDINE 20 MG: 20 TABLET ORAL at 09:10

## 2021-10-19 ENCOUNTER — ANESTHESIA EVENT (OUTPATIENT)
Dept: SURGERY | Facility: HOSPITAL | Age: 81
DRG: 256 | End: 2021-10-19
Payer: MEDICARE

## 2021-10-19 LAB
ALBUMIN SERPL BCP-MCNC: 3.2 G/DL (ref 3.5–5.2)
ALP SERPL-CCNC: 56 U/L (ref 55–135)
ALT SERPL W/O P-5'-P-CCNC: 13 U/L (ref 10–44)
ANION GAP SERPL CALC-SCNC: 8 MMOL/L (ref 8–16)
AST SERPL-CCNC: 12 U/L (ref 10–40)
BASOPHILS # BLD AUTO: 0.04 K/UL (ref 0–0.2)
BASOPHILS NFR BLD: 0.5 % (ref 0–1.9)
BILIRUB SERPL-MCNC: 0.3 MG/DL (ref 0.1–1)
BUN SERPL-MCNC: 30 MG/DL (ref 8–23)
CALCIUM SERPL-MCNC: 8.9 MG/DL (ref 8.7–10.5)
CHLORIDE SERPL-SCNC: 109 MMOL/L (ref 95–110)
CO2 SERPL-SCNC: 22 MMOL/L (ref 23–29)
CREAT SERPL-MCNC: 1.2 MG/DL (ref 0.5–1.4)
DIFFERENTIAL METHOD: ABNORMAL
EOSINOPHIL # BLD AUTO: 0.2 K/UL (ref 0–0.5)
EOSINOPHIL NFR BLD: 2.5 % (ref 0–8)
ERYTHROCYTE [DISTWIDTH] IN BLOOD BY AUTOMATED COUNT: 12.8 % (ref 11.5–14.5)
EST. GFR  (AFRICAN AMERICAN): 49 ML/MIN/1.73 M^2
EST. GFR  (NON AFRICAN AMERICAN): 42 ML/MIN/1.73 M^2
GLUCOSE SERPL-MCNC: 110 MG/DL (ref 70–110)
HCT VFR BLD AUTO: 35.8 % (ref 37–48.5)
HGB BLD-MCNC: 12.1 G/DL (ref 12–16)
IMM GRANULOCYTES # BLD AUTO: 0.02 K/UL (ref 0–0.04)
IMM GRANULOCYTES NFR BLD AUTO: 0.3 % (ref 0–0.5)
LYMPHOCYTES # BLD AUTO: 2.3 K/UL (ref 1–4.8)
LYMPHOCYTES NFR BLD: 31.1 % (ref 18–48)
MCH RBC QN AUTO: 30 PG (ref 27–31)
MCHC RBC AUTO-ENTMCNC: 33.8 G/DL (ref 32–36)
MCV RBC AUTO: 89 FL (ref 82–98)
MONOCYTES # BLD AUTO: 0.8 K/UL (ref 0.3–1)
MONOCYTES NFR BLD: 10.4 % (ref 4–15)
NEUTROPHILS # BLD AUTO: 4.1 K/UL (ref 1.8–7.7)
NEUTROPHILS NFR BLD: 55.2 % (ref 38–73)
NRBC BLD-RTO: 0 /100 WBC
PLATELET # BLD AUTO: 249 K/UL (ref 150–450)
PMV BLD AUTO: 10.1 FL (ref 9.2–12.9)
POCT GLUCOSE: 114 MG/DL (ref 70–110)
POCT GLUCOSE: 160 MG/DL (ref 70–110)
POCT GLUCOSE: 174 MG/DL (ref 70–110)
POCT GLUCOSE: 193 MG/DL (ref 70–110)
POCT GLUCOSE: 207 MG/DL (ref 70–110)
POTASSIUM SERPL-SCNC: 4.6 MMOL/L (ref 3.5–5.1)
PROT SERPL-MCNC: 6.2 G/DL (ref 6–8.4)
RBC # BLD AUTO: 4.04 M/UL (ref 4–5.4)
SODIUM SERPL-SCNC: 139 MMOL/L (ref 136–145)
WBC # BLD AUTO: 7.47 K/UL (ref 3.9–12.7)

## 2021-10-19 PROCEDURE — 97535 SELF CARE MNGMENT TRAINING: CPT

## 2021-10-19 PROCEDURE — 97530 THERAPEUTIC ACTIVITIES: CPT

## 2021-10-19 PROCEDURE — 25000003 PHARM REV CODE 250: Performed by: NURSE PRACTITIONER

## 2021-10-19 PROCEDURE — 85025 COMPLETE CBC W/AUTO DIFF WBC: CPT | Performed by: STUDENT IN AN ORGANIZED HEALTH CARE EDUCATION/TRAINING PROGRAM

## 2021-10-19 PROCEDURE — 99223 1ST HOSP IP/OBS HIGH 75: CPT | Mod: 25,,, | Performed by: STUDENT IN AN ORGANIZED HEALTH CARE EDUCATION/TRAINING PROGRAM

## 2021-10-19 PROCEDURE — 80053 COMPREHEN METABOLIC PANEL: CPT | Performed by: STUDENT IN AN ORGANIZED HEALTH CARE EDUCATION/TRAINING PROGRAM

## 2021-10-19 PROCEDURE — 99223 1ST HOSP IP/OBS HIGH 75: CPT | Mod: ,,, | Performed by: NURSE PRACTITIONER

## 2021-10-19 PROCEDURE — 25000003 PHARM REV CODE 250: Performed by: STUDENT IN AN ORGANIZED HEALTH CARE EDUCATION/TRAINING PROGRAM

## 2021-10-19 PROCEDURE — 99223 PR INITIAL HOSPITAL CARE,LEVL III: ICD-10-PCS | Mod: ,,, | Performed by: NURSE PRACTITIONER

## 2021-10-19 PROCEDURE — 97161 PT EVAL LOW COMPLEX 20 MIN: CPT

## 2021-10-19 PROCEDURE — 99223 PR INITIAL HOSPITAL CARE,LEVL III: ICD-10-PCS | Mod: 25,,, | Performed by: STUDENT IN AN ORGANIZED HEALTH CARE EDUCATION/TRAINING PROGRAM

## 2021-10-19 PROCEDURE — 97165 OT EVAL LOW COMPLEX 30 MIN: CPT

## 2021-10-19 PROCEDURE — 36415 COLL VENOUS BLD VENIPUNCTURE: CPT | Performed by: STUDENT IN AN ORGANIZED HEALTH CARE EDUCATION/TRAINING PROGRAM

## 2021-10-19 PROCEDURE — 11000001 HC ACUTE MED/SURG PRIVATE ROOM

## 2021-10-19 RX ORDER — ENOXAPARIN SODIUM 100 MG/ML
1 INJECTION SUBCUTANEOUS
Status: DISCONTINUED | OUTPATIENT
Start: 2021-10-19 | End: 2021-10-19

## 2021-10-19 RX ORDER — ATORVASTATIN CALCIUM 40 MG/1
40 TABLET, FILM COATED ORAL DAILY
Status: DISCONTINUED | OUTPATIENT
Start: 2021-10-19 | End: 2021-10-22 | Stop reason: HOSPADM

## 2021-10-19 RX ORDER — ASPIRIN 81 MG/1
81 TABLET ORAL DAILY
Status: DISCONTINUED | OUTPATIENT
Start: 2021-10-19 | End: 2021-10-22 | Stop reason: HOSPADM

## 2021-10-19 RX ADMIN — GABAPENTIN 300 MG: 300 CAPSULE ORAL at 03:10

## 2021-10-19 RX ADMIN — FAMOTIDINE 20 MG: 20 TABLET ORAL at 08:10

## 2021-10-19 RX ADMIN — METOPROLOL SUCCINATE 25 MG: 25 TABLET, FILM COATED, EXTENDED RELEASE ORAL at 08:10

## 2021-10-19 RX ADMIN — ASPIRIN 81 MG: 81 TABLET, COATED ORAL at 11:10

## 2021-10-19 RX ADMIN — LISINOPRIL 20 MG: 20 TABLET ORAL at 09:10

## 2021-10-19 RX ADMIN — GABAPENTIN 300 MG: 300 CAPSULE ORAL at 09:10

## 2021-10-19 RX ADMIN — GABAPENTIN 300 MG: 300 CAPSULE ORAL at 08:10

## 2021-10-20 ENCOUNTER — ANESTHESIA (OUTPATIENT)
Dept: SURGERY | Facility: HOSPITAL | Age: 81
DRG: 256 | End: 2021-10-20
Payer: MEDICARE

## 2021-10-20 ENCOUNTER — PATIENT OUTREACH (OUTPATIENT)
Dept: ADMINISTRATIVE | Facility: OTHER | Age: 81
End: 2021-10-20

## 2021-10-20 VITALS — SYSTOLIC BLOOD PRESSURE: 104 MMHG | HEART RATE: 53 BPM | OXYGEN SATURATION: 99 % | DIASTOLIC BLOOD PRESSURE: 55 MMHG

## 2021-10-20 LAB
ALBUMIN SERPL BCP-MCNC: 3.2 G/DL (ref 3.5–5.2)
ALP SERPL-CCNC: 52 U/L (ref 55–135)
ALT SERPL W/O P-5'-P-CCNC: 12 U/L (ref 10–44)
ANION GAP SERPL CALC-SCNC: 10 MMOL/L (ref 8–16)
AST SERPL-CCNC: 13 U/L (ref 10–40)
BASOPHILS # BLD AUTO: 0.03 K/UL (ref 0–0.2)
BASOPHILS NFR BLD: 0.4 % (ref 0–1.9)
BILIRUB SERPL-MCNC: 0.4 MG/DL (ref 0.1–1)
BUN SERPL-MCNC: 29 MG/DL (ref 8–23)
CALCIUM SERPL-MCNC: 8.9 MG/DL (ref 8.7–10.5)
CHLORIDE SERPL-SCNC: 108 MMOL/L (ref 95–110)
CO2 SERPL-SCNC: 20 MMOL/L (ref 23–29)
CREAT SERPL-MCNC: 1.4 MG/DL (ref 0.5–1.4)
DIFFERENTIAL METHOD: ABNORMAL
EOSINOPHIL # BLD AUTO: 0.2 K/UL (ref 0–0.5)
EOSINOPHIL NFR BLD: 2.9 % (ref 0–8)
ERYTHROCYTE [DISTWIDTH] IN BLOOD BY AUTOMATED COUNT: 12.7 % (ref 11.5–14.5)
EST. GFR  (AFRICAN AMERICAN): 40 ML/MIN/1.73 M^2
EST. GFR  (NON AFRICAN AMERICAN): 35 ML/MIN/1.73 M^2
GLUCOSE SERPL-MCNC: 112 MG/DL (ref 70–110)
HCT VFR BLD AUTO: 35.7 % (ref 37–48.5)
HGB BLD-MCNC: 12.1 G/DL (ref 12–16)
IMM GRANULOCYTES # BLD AUTO: 0.02 K/UL (ref 0–0.04)
IMM GRANULOCYTES NFR BLD AUTO: 0.3 % (ref 0–0.5)
LYMPHOCYTES # BLD AUTO: 2.3 K/UL (ref 1–4.8)
LYMPHOCYTES NFR BLD: 34 % (ref 18–48)
MCH RBC QN AUTO: 30.3 PG (ref 27–31)
MCHC RBC AUTO-ENTMCNC: 33.9 G/DL (ref 32–36)
MCV RBC AUTO: 89 FL (ref 82–98)
MONOCYTES # BLD AUTO: 0.6 K/UL (ref 0.3–1)
MONOCYTES NFR BLD: 9.4 % (ref 4–15)
NEUTROPHILS # BLD AUTO: 3.6 K/UL (ref 1.8–7.7)
NEUTROPHILS NFR BLD: 53 % (ref 38–73)
NRBC BLD-RTO: 0 /100 WBC
PLATELET # BLD AUTO: 237 K/UL (ref 150–450)
PMV BLD AUTO: 10.4 FL (ref 9.2–12.9)
POCT GLUCOSE: 116 MG/DL (ref 70–110)
POCT GLUCOSE: 137 MG/DL (ref 70–110)
POCT GLUCOSE: 202 MG/DL (ref 70–110)
POTASSIUM SERPL-SCNC: 4.6 MMOL/L (ref 3.5–5.1)
PROT SERPL-MCNC: 6.3 G/DL (ref 6–8.4)
RBC # BLD AUTO: 4 M/UL (ref 4–5.4)
SODIUM SERPL-SCNC: 138 MMOL/L (ref 136–145)
WBC # BLD AUTO: 6.8 K/UL (ref 3.9–12.7)

## 2021-10-20 PROCEDURE — 25000003 PHARM REV CODE 250: Performed by: STUDENT IN AN ORGANIZED HEALTH CARE EDUCATION/TRAINING PROGRAM

## 2021-10-20 PROCEDURE — 87075 CULTR BACTERIA EXCEPT BLOOD: CPT | Performed by: FAMILY MEDICINE

## 2021-10-20 PROCEDURE — 88311 DECALCIFY TISSUE: CPT | Mod: 59 | Performed by: PATHOLOGY

## 2021-10-20 PROCEDURE — 80053 COMPREHEN METABOLIC PANEL: CPT | Performed by: STUDENT IN AN ORGANIZED HEALTH CARE EDUCATION/TRAINING PROGRAM

## 2021-10-20 PROCEDURE — 88311 PR  DECALCIFY TISSUE: ICD-10-PCS | Mod: 26,,, | Performed by: PATHOLOGY

## 2021-10-20 PROCEDURE — 36415 COLL VENOUS BLD VENIPUNCTURE: CPT | Performed by: STUDENT IN AN ORGANIZED HEALTH CARE EDUCATION/TRAINING PROGRAM

## 2021-10-20 PROCEDURE — 28820 PR AMPUTATION TOE,MT-P JT: ICD-10-PCS | Mod: T6,,, | Performed by: STUDENT IN AN ORGANIZED HEALTH CARE EDUCATION/TRAINING PROGRAM

## 2021-10-20 PROCEDURE — 11042 PR DEBRIDEMENT, SKIN, SUB-Q TISSUE,=<20 SQ CM: ICD-10-PCS | Mod: 59,,, | Performed by: STUDENT IN AN ORGANIZED HEALTH CARE EDUCATION/TRAINING PROGRAM

## 2021-10-20 PROCEDURE — 37000009 HC ANESTHESIA EA ADD 15 MINS: Performed by: STUDENT IN AN ORGANIZED HEALTH CARE EDUCATION/TRAINING PROGRAM

## 2021-10-20 PROCEDURE — 11000001 HC ACUTE MED/SURG PRIVATE ROOM

## 2021-10-20 PROCEDURE — 36000706: Performed by: STUDENT IN AN ORGANIZED HEALTH CARE EDUCATION/TRAINING PROGRAM

## 2021-10-20 PROCEDURE — 25000003 PHARM REV CODE 250: Performed by: NURSE ANESTHETIST, CERTIFIED REGISTERED

## 2021-10-20 PROCEDURE — 87206 SMEAR FLUORESCENT/ACID STAI: CPT | Performed by: FAMILY MEDICINE

## 2021-10-20 PROCEDURE — 87102 FUNGUS ISOLATION CULTURE: CPT | Mod: 59 | Performed by: FAMILY MEDICINE

## 2021-10-20 PROCEDURE — 97530 THERAPEUTIC ACTIVITIES: CPT

## 2021-10-20 PROCEDURE — 88311 DECALCIFY TISSUE: CPT | Mod: 26,,, | Performed by: PATHOLOGY

## 2021-10-20 PROCEDURE — 28010 INCISION OF TOE TENDON: CPT | Mod: 59,T7,, | Performed by: STUDENT IN AN ORGANIZED HEALTH CARE EDUCATION/TRAINING PROGRAM

## 2021-10-20 PROCEDURE — 25000003 PHARM REV CODE 250: Performed by: FAMILY MEDICINE

## 2021-10-20 PROCEDURE — 71000033 HC RECOVERY, INTIAL HOUR: Performed by: STUDENT IN AN ORGANIZED HEALTH CARE EDUCATION/TRAINING PROGRAM

## 2021-10-20 PROCEDURE — 87070 CULTURE OTHR SPECIMN AEROBIC: CPT | Performed by: FAMILY MEDICINE

## 2021-10-20 PROCEDURE — 36000707: Performed by: STUDENT IN AN ORGANIZED HEALTH CARE EDUCATION/TRAINING PROGRAM

## 2021-10-20 PROCEDURE — 88305 TISSUE EXAM BY PATHOLOGIST: CPT | Mod: 59 | Performed by: PATHOLOGY

## 2021-10-20 PROCEDURE — 28820 AMPUTATION OF TOE: CPT | Mod: T6,,, | Performed by: STUDENT IN AN ORGANIZED HEALTH CARE EDUCATION/TRAINING PROGRAM

## 2021-10-20 PROCEDURE — 28010 PR INCISION SUBCUT TOE TENDON: ICD-10-PCS | Mod: 59,T7,, | Performed by: STUDENT IN AN ORGANIZED HEALTH CARE EDUCATION/TRAINING PROGRAM

## 2021-10-20 PROCEDURE — 88305 TISSUE EXAM BY PATHOLOGIST: ICD-10-PCS | Mod: 26,,, | Performed by: PATHOLOGY

## 2021-10-20 PROCEDURE — 63600175 PHARM REV CODE 636 W HCPCS: Performed by: NURSE ANESTHETIST, CERTIFIED REGISTERED

## 2021-10-20 PROCEDURE — 37000008 HC ANESTHESIA 1ST 15 MINUTES: Performed by: STUDENT IN AN ORGANIZED HEALTH CARE EDUCATION/TRAINING PROGRAM

## 2021-10-20 PROCEDURE — 87205 SMEAR GRAM STAIN: CPT | Mod: 59 | Performed by: FAMILY MEDICINE

## 2021-10-20 PROCEDURE — 63600175 PHARM REV CODE 636 W HCPCS: Performed by: STUDENT IN AN ORGANIZED HEALTH CARE EDUCATION/TRAINING PROGRAM

## 2021-10-20 PROCEDURE — 11042 DBRDMT SUBQ TIS 1ST 20SQCM/<: CPT | Mod: 59,,, | Performed by: STUDENT IN AN ORGANIZED HEALTH CARE EDUCATION/TRAINING PROGRAM

## 2021-10-20 PROCEDURE — 87116 MYCOBACTERIA CULTURE: CPT | Performed by: FAMILY MEDICINE

## 2021-10-20 PROCEDURE — 87176 TISSUE HOMOGENIZATION CULTR: CPT | Performed by: FAMILY MEDICINE

## 2021-10-20 PROCEDURE — 85025 COMPLETE CBC W/AUTO DIFF WBC: CPT | Performed by: STUDENT IN AN ORGANIZED HEALTH CARE EDUCATION/TRAINING PROGRAM

## 2021-10-20 PROCEDURE — 88305 TISSUE EXAM BY PATHOLOGIST: CPT | Mod: 26,,, | Performed by: PATHOLOGY

## 2021-10-20 RX ORDER — ONDANSETRON 2 MG/ML
4 INJECTION INTRAMUSCULAR; INTRAVENOUS DAILY PRN
Status: CANCELLED | OUTPATIENT
Start: 2021-10-20

## 2021-10-20 RX ORDER — POLYETHYLENE GLYCOL 3350 17 G/17G
17 POWDER, FOR SOLUTION ORAL DAILY
Status: DISCONTINUED | OUTPATIENT
Start: 2021-10-20 | End: 2021-10-22 | Stop reason: HOSPADM

## 2021-10-20 RX ORDER — PROPOFOL 10 MG/ML
VIAL (ML) INTRAVENOUS
Status: DISCONTINUED | OUTPATIENT
Start: 2021-10-20 | End: 2021-10-20

## 2021-10-20 RX ORDER — ONDANSETRON 2 MG/ML
INJECTION INTRAMUSCULAR; INTRAVENOUS
Status: DISCONTINUED | OUTPATIENT
Start: 2021-10-20 | End: 2021-10-20

## 2021-10-20 RX ORDER — MIDAZOLAM HYDROCHLORIDE 1 MG/ML
INJECTION INTRAMUSCULAR; INTRAVENOUS
Status: DISCONTINUED | OUTPATIENT
Start: 2021-10-20 | End: 2021-10-20

## 2021-10-20 RX ORDER — SODIUM CHLORIDE 0.9 % (FLUSH) 0.9 %
3 SYRINGE (ML) INJECTION
Status: CANCELLED | OUTPATIENT
Start: 2021-10-20

## 2021-10-20 RX ORDER — CLOPIDOGREL BISULFATE 75 MG/1
75 TABLET ORAL DAILY
Status: DISCONTINUED | OUTPATIENT
Start: 2021-10-22 | End: 2021-10-22 | Stop reason: HOSPADM

## 2021-10-20 RX ORDER — BUPIVACAINE HYDROCHLORIDE 2.5 MG/ML
INJECTION, SOLUTION EPIDURAL; INFILTRATION; INTRACAUDAL
Status: DISCONTINUED | OUTPATIENT
Start: 2021-10-20 | End: 2021-10-20 | Stop reason: HOSPADM

## 2021-10-20 RX ORDER — SODIUM CHLORIDE, SODIUM LACTATE, POTASSIUM CHLORIDE, CALCIUM CHLORIDE 600; 310; 30; 20 MG/100ML; MG/100ML; MG/100ML; MG/100ML
INJECTION, SOLUTION INTRAVENOUS CONTINUOUS PRN
Status: DISCONTINUED | OUTPATIENT
Start: 2021-10-20 | End: 2021-10-20

## 2021-10-20 RX ORDER — DIPHENHYDRAMINE HYDROCHLORIDE 50 MG/ML
12.5 INJECTION INTRAMUSCULAR; INTRAVENOUS EVERY 6 HOURS PRN
Status: CANCELLED | OUTPATIENT
Start: 2021-10-20

## 2021-10-20 RX ORDER — CLOPIDOGREL BISULFATE 75 MG/1
300 TABLET ORAL ONCE
Status: COMPLETED | OUTPATIENT
Start: 2021-10-21 | End: 2021-10-21

## 2021-10-20 RX ORDER — LIDOCAINE HCL/PF 100 MG/5ML
SYRINGE (ML) INTRAVENOUS
Status: DISCONTINUED | OUTPATIENT
Start: 2021-10-20 | End: 2021-10-20

## 2021-10-20 RX ORDER — LIDOCAINE HYDROCHLORIDE 10 MG/ML
INJECTION INFILTRATION; PERINEURAL
Status: DISCONTINUED | OUTPATIENT
Start: 2021-10-20 | End: 2021-10-20 | Stop reason: HOSPADM

## 2021-10-20 RX ORDER — HYDROMORPHONE HYDROCHLORIDE 2 MG/ML
0.5 INJECTION, SOLUTION INTRAMUSCULAR; INTRAVENOUS; SUBCUTANEOUS EVERY 5 MIN PRN
Status: CANCELLED | OUTPATIENT
Start: 2021-10-20

## 2021-10-20 RX ORDER — TALC
6 POWDER (GRAM) TOPICAL NIGHTLY PRN
Status: DISCONTINUED | OUTPATIENT
Start: 2021-10-20 | End: 2021-10-22 | Stop reason: HOSPADM

## 2021-10-20 RX ORDER — PROPOFOL 10 MG/ML
VIAL (ML) INTRAVENOUS CONTINUOUS PRN
Status: DISCONTINUED | OUTPATIENT
Start: 2021-10-20 | End: 2021-10-20

## 2021-10-20 RX ORDER — HYDROXYZINE HYDROCHLORIDE 25 MG/1
50 TABLET, FILM COATED ORAL ONCE
Status: COMPLETED | OUTPATIENT
Start: 2021-10-20 | End: 2021-10-20

## 2021-10-20 RX ORDER — FENTANYL CITRATE 50 UG/ML
INJECTION, SOLUTION INTRAMUSCULAR; INTRAVENOUS
Status: DISCONTINUED | OUTPATIENT
Start: 2021-10-20 | End: 2021-10-20

## 2021-10-20 RX ADMIN — SODIUM CHLORIDE, SODIUM LACTATE, POTASSIUM CHLORIDE, AND CALCIUM CHLORIDE: .6; .31; .03; .02 INJECTION, SOLUTION INTRAVENOUS at 12:10

## 2021-10-20 RX ADMIN — Medication 20 MG: at 01:10

## 2021-10-20 RX ADMIN — POLYETHYLENE GLYCOL 3350 17 G: 17 POWDER, FOR SOLUTION ORAL at 05:10

## 2021-10-20 RX ADMIN — INSULIN ASPART 1 UNITS: 100 INJECTION, SOLUTION INTRAVENOUS; SUBCUTANEOUS at 08:10

## 2021-10-20 RX ADMIN — METOPROLOL SUCCINATE 25 MG: 25 TABLET, FILM COATED, EXTENDED RELEASE ORAL at 08:10

## 2021-10-20 RX ADMIN — GABAPENTIN 300 MG: 300 CAPSULE ORAL at 09:10

## 2021-10-20 RX ADMIN — MIDAZOLAM HYDROCHLORIDE 0.5 MG: 1 INJECTION, SOLUTION INTRAMUSCULAR; INTRAVENOUS at 12:10

## 2021-10-20 RX ADMIN — FENTANYL CITRATE 25 MCG: 50 INJECTION, SOLUTION INTRAMUSCULAR; INTRAVENOUS at 12:10

## 2021-10-20 RX ADMIN — PROPOFOL 50 MCG/KG/MIN: 10 INJECTION, EMULSION INTRAVENOUS at 12:10

## 2021-10-20 RX ADMIN — LISINOPRIL 20 MG: 20 TABLET ORAL at 09:10

## 2021-10-20 RX ADMIN — LIDOCAINE HYDROCHLORIDE 75 MG: 20 INJECTION, SOLUTION INTRAVENOUS at 12:10

## 2021-10-20 RX ADMIN — Medication 6 MG: at 08:10

## 2021-10-20 RX ADMIN — HYDROXYZINE HYDROCHLORIDE 50 MG: 25 TABLET, FILM COATED ORAL at 12:10

## 2021-10-20 RX ADMIN — FAMOTIDINE 20 MG: 20 TABLET ORAL at 08:10

## 2021-10-20 RX ADMIN — Medication 10 MG: at 01:10

## 2021-10-20 RX ADMIN — ONDANSETRON 4 MG: 2 INJECTION, SOLUTION INTRAMUSCULAR; INTRAVENOUS at 12:10

## 2021-10-20 RX ADMIN — GABAPENTIN 300 MG: 300 CAPSULE ORAL at 03:10

## 2021-10-20 RX ADMIN — GABAPENTIN 300 MG: 300 CAPSULE ORAL at 08:10

## 2021-10-21 ENCOUNTER — ANESTHESIA (OUTPATIENT)
Dept: CARDIOLOGY | Facility: HOSPITAL | Age: 81
DRG: 256 | End: 2021-10-21
Payer: MEDICARE

## 2021-10-21 ENCOUNTER — PATIENT OUTREACH (OUTPATIENT)
Dept: ADMINISTRATIVE | Facility: OTHER | Age: 81
End: 2021-10-21

## 2021-10-21 ENCOUNTER — ANESTHESIA EVENT (OUTPATIENT)
Dept: CARDIOLOGY | Facility: HOSPITAL | Age: 81
DRG: 256 | End: 2021-10-21
Payer: MEDICARE

## 2021-10-21 LAB
ALBUMIN SERPL BCP-MCNC: 3.3 G/DL (ref 3.5–5.2)
ALP SERPL-CCNC: 57 U/L (ref 55–135)
ALT SERPL W/O P-5'-P-CCNC: 17 U/L (ref 10–44)
ANION GAP SERPL CALC-SCNC: 12 MMOL/L (ref 8–16)
ANION GAP SERPL CALC-SCNC: 12 MMOL/L (ref 8–16)
ANION GAP SERPL CALC-SCNC: 9 MMOL/L (ref 8–16)
AST SERPL-CCNC: 17 U/L (ref 10–40)
BASOPHILS # BLD AUTO: 0.04 K/UL (ref 0–0.2)
BASOPHILS NFR BLD: 0.4 % (ref 0–1.9)
BILIRUB SERPL-MCNC: 0.4 MG/DL (ref 0.1–1)
BUN SERPL-MCNC: 32 MG/DL (ref 8–23)
BUN SERPL-MCNC: 32 MG/DL (ref 8–23)
BUN SERPL-MCNC: 35 MG/DL (ref 8–23)
CALCIUM SERPL-MCNC: 9.2 MG/DL (ref 8.7–10.5)
CALCIUM SERPL-MCNC: 9.3 MG/DL (ref 8.7–10.5)
CALCIUM SERPL-MCNC: 9.3 MG/DL (ref 8.7–10.5)
CHLORIDE SERPL-SCNC: 106 MMOL/L (ref 95–110)
CHLORIDE SERPL-SCNC: 107 MMOL/L (ref 95–110)
CHLORIDE SERPL-SCNC: 107 MMOL/L (ref 95–110)
CO2 SERPL-SCNC: 22 MMOL/L (ref 23–29)
CO2 SERPL-SCNC: 22 MMOL/L (ref 23–29)
CO2 SERPL-SCNC: 23 MMOL/L (ref 23–29)
CREAT SERPL-MCNC: 1.4 MG/DL (ref 0.5–1.4)
CREAT SERPL-MCNC: 1.4 MG/DL (ref 0.5–1.4)
CREAT SERPL-MCNC: 1.5 MG/DL (ref 0.5–1.4)
DIFFERENTIAL METHOD: ABNORMAL
EOSINOPHIL # BLD AUTO: 0.3 K/UL (ref 0–0.5)
EOSINOPHIL NFR BLD: 2.8 % (ref 0–8)
ERYTHROCYTE [DISTWIDTH] IN BLOOD BY AUTOMATED COUNT: 12.8 % (ref 11.5–14.5)
EST. GFR  (AFRICAN AMERICAN): 37 ML/MIN/1.73 M^2
EST. GFR  (AFRICAN AMERICAN): 40 ML/MIN/1.73 M^2
EST. GFR  (AFRICAN AMERICAN): 40 ML/MIN/1.73 M^2
EST. GFR  (NON AFRICAN AMERICAN): 32 ML/MIN/1.73 M^2
EST. GFR  (NON AFRICAN AMERICAN): 35 ML/MIN/1.73 M^2
EST. GFR  (NON AFRICAN AMERICAN): 35 ML/MIN/1.73 M^2
GLUCOSE SERPL-MCNC: 105 MG/DL (ref 70–110)
GLUCOSE SERPL-MCNC: 105 MG/DL (ref 70–110)
GLUCOSE SERPL-MCNC: 123 MG/DL (ref 70–110)
GRAM STN SPEC: NORMAL
HCT VFR BLD AUTO: 36.5 % (ref 37–48.5)
HGB BLD-MCNC: 12.1 G/DL (ref 12–16)
IMM GRANULOCYTES # BLD AUTO: 0.03 K/UL (ref 0–0.04)
IMM GRANULOCYTES NFR BLD AUTO: 0.3 % (ref 0–0.5)
LYMPHOCYTES # BLD AUTO: 2 K/UL (ref 1–4.8)
LYMPHOCYTES NFR BLD: 22.5 % (ref 18–48)
MCH RBC QN AUTO: 29.8 PG (ref 27–31)
MCHC RBC AUTO-ENTMCNC: 33.2 G/DL (ref 32–36)
MCV RBC AUTO: 90 FL (ref 82–98)
MONOCYTES # BLD AUTO: 0.8 K/UL (ref 0.3–1)
MONOCYTES NFR BLD: 9.3 % (ref 4–15)
NEUTROPHILS # BLD AUTO: 5.8 K/UL (ref 1.8–7.7)
NEUTROPHILS NFR BLD: 64.7 % (ref 38–73)
NRBC BLD-RTO: 0 /100 WBC
PLATELET # BLD AUTO: 240 K/UL (ref 150–450)
PMV BLD AUTO: 10.3 FL (ref 9.2–12.9)
POCT GLUCOSE: 116 MG/DL (ref 70–110)
POCT GLUCOSE: 121 MG/DL (ref 70–110)
POCT GLUCOSE: 129 MG/DL (ref 70–110)
POCT GLUCOSE: 131 MG/DL (ref 70–110)
POCT GLUCOSE: 135 MG/DL (ref 70–110)
POTASSIUM SERPL-SCNC: 4.9 MMOL/L (ref 3.5–5.1)
POTASSIUM SERPL-SCNC: 4.9 MMOL/L (ref 3.5–5.1)
POTASSIUM SERPL-SCNC: 5.6 MMOL/L (ref 3.5–5.1)
PROT SERPL-MCNC: 6.5 G/DL (ref 6–8.4)
RBC # BLD AUTO: 4.06 M/UL (ref 4–5.4)
SODIUM SERPL-SCNC: 138 MMOL/L (ref 136–145)
SODIUM SERPL-SCNC: 141 MMOL/L (ref 136–145)
SODIUM SERPL-SCNC: 141 MMOL/L (ref 136–145)
WBC # BLD AUTO: 8.95 K/UL (ref 3.9–12.7)

## 2021-10-21 PROCEDURE — 99152 PR MOD CONSCIOUS SEDATION, SAME PHYS, 5+ YRS, FIRST 15 MIN: ICD-10-PCS | Mod: ,,, | Performed by: INTERNAL MEDICINE

## 2021-10-21 PROCEDURE — 63600175 PHARM REV CODE 636 W HCPCS: Performed by: NURSE ANESTHETIST, CERTIFIED REGISTERED

## 2021-10-21 PROCEDURE — 25000003 PHARM REV CODE 250: Performed by: FAMILY MEDICINE

## 2021-10-21 PROCEDURE — 25000003 PHARM REV CODE 250: Performed by: NURSE PRACTITIONER

## 2021-10-21 PROCEDURE — 25000003 PHARM REV CODE 250: Performed by: INTERNAL MEDICINE

## 2021-10-21 PROCEDURE — 75710 ARTERY X-RAYS ARM/LEG: CPT | Mod: 26,,, | Performed by: INTERNAL MEDICINE

## 2021-10-21 PROCEDURE — 37000009 HC ANESTHESIA EA ADD 15 MINS: Performed by: INTERNAL MEDICINE

## 2021-10-21 PROCEDURE — 75625 CONTRAST EXAM ABDOMINL AORTA: CPT | Performed by: INTERNAL MEDICINE

## 2021-10-21 PROCEDURE — 75710 ARTERY X-RAYS ARM/LEG: CPT | Performed by: INTERNAL MEDICINE

## 2021-10-21 PROCEDURE — 75625 CONTRAST EXAM ABDOMINL AORTA: CPT | Mod: 26,,, | Performed by: INTERNAL MEDICINE

## 2021-10-21 PROCEDURE — C1894 INTRO/SHEATH, NON-LASER: HCPCS | Performed by: INTERNAL MEDICINE

## 2021-10-21 PROCEDURE — 25000003 PHARM REV CODE 250: Performed by: STUDENT IN AN ORGANIZED HEALTH CARE EDUCATION/TRAINING PROGRAM

## 2021-10-21 PROCEDURE — 99152 MOD SED SAME PHYS/QHP 5/>YRS: CPT | Mod: ,,, | Performed by: INTERNAL MEDICINE

## 2021-10-21 PROCEDURE — C1887 CATHETER, GUIDING: HCPCS | Performed by: INTERNAL MEDICINE

## 2021-10-21 PROCEDURE — 80048 BASIC METABOLIC PNL TOTAL CA: CPT | Performed by: INTERNAL MEDICINE

## 2021-10-21 PROCEDURE — 36415 COLL VENOUS BLD VENIPUNCTURE: CPT | Performed by: STUDENT IN AN ORGANIZED HEALTH CARE EDUCATION/TRAINING PROGRAM

## 2021-10-21 PROCEDURE — 37000008 HC ANESTHESIA 1ST 15 MINUTES: Performed by: INTERNAL MEDICINE

## 2021-10-21 PROCEDURE — 36247 INS CATH ABD/L-EXT ART 3RD: CPT | Mod: RT | Performed by: INTERNAL MEDICINE

## 2021-10-21 PROCEDURE — 25000003 PHARM REV CODE 250: Performed by: NURSE ANESTHETIST, CERTIFIED REGISTERED

## 2021-10-21 PROCEDURE — 11000001 HC ACUTE MED/SURG PRIVATE ROOM

## 2021-10-21 PROCEDURE — 36247 PR PLACE CATH SUBSUBSELECT ART,ABD/PEL: ICD-10-PCS | Mod: RT,,, | Performed by: INTERNAL MEDICINE

## 2021-10-21 PROCEDURE — 36247 INS CATH ABD/L-EXT ART 3RD: CPT | Mod: RT,,, | Performed by: INTERNAL MEDICINE

## 2021-10-21 PROCEDURE — 85025 COMPLETE CBC W/AUTO DIFF WBC: CPT | Performed by: STUDENT IN AN ORGANIZED HEALTH CARE EDUCATION/TRAINING PROGRAM

## 2021-10-21 PROCEDURE — 25500020 PHARM REV CODE 255: Performed by: INTERNAL MEDICINE

## 2021-10-21 PROCEDURE — 63600175 PHARM REV CODE 636 W HCPCS: Performed by: INTERNAL MEDICINE

## 2021-10-21 PROCEDURE — C1769 GUIDE WIRE: HCPCS | Performed by: INTERNAL MEDICINE

## 2021-10-21 PROCEDURE — 75625 PR  ANGIO AORTOGRAM ABD SERIAL: ICD-10-PCS | Mod: 26,,, | Performed by: INTERNAL MEDICINE

## 2021-10-21 PROCEDURE — 80053 COMPREHEN METABOLIC PANEL: CPT | Performed by: STUDENT IN AN ORGANIZED HEALTH CARE EDUCATION/TRAINING PROGRAM

## 2021-10-21 PROCEDURE — 36415 COLL VENOUS BLD VENIPUNCTURE: CPT | Performed by: INTERNAL MEDICINE

## 2021-10-21 PROCEDURE — 75710 PR  ANGIO EXTREMITY UNILAT: ICD-10-PCS | Mod: 26,,, | Performed by: INTERNAL MEDICINE

## 2021-10-21 RX ORDER — DIPHENHYDRAMINE HCL 25 MG
50 CAPSULE ORAL ONCE
Status: DISCONTINUED | OUTPATIENT
Start: 2021-10-21 | End: 2021-10-21 | Stop reason: HOSPADM

## 2021-10-21 RX ORDER — ONDANSETRON 8 MG/1
8 TABLET, ORALLY DISINTEGRATING ORAL EVERY 8 HOURS PRN
Status: DISCONTINUED | OUTPATIENT
Start: 2021-10-21 | End: 2021-10-22 | Stop reason: HOSPADM

## 2021-10-21 RX ORDER — HEPARIN SODIUM 200 [USP'U]/100ML
INJECTION, SOLUTION INTRAVENOUS
Status: DISCONTINUED | OUTPATIENT
Start: 2021-10-21 | End: 2021-10-22 | Stop reason: HOSPADM

## 2021-10-21 RX ORDER — FENTANYL CITRATE 50 UG/ML
INJECTION, SOLUTION INTRAMUSCULAR; INTRAVENOUS
Status: DISCONTINUED | OUTPATIENT
Start: 2021-10-21 | End: 2021-10-21

## 2021-10-21 RX ORDER — HEPARIN SODIUM 1000 [USP'U]/ML
INJECTION, SOLUTION INTRAVENOUS; SUBCUTANEOUS
Status: DISCONTINUED | OUTPATIENT
Start: 2021-10-21 | End: 2021-10-21 | Stop reason: HOSPADM

## 2021-10-21 RX ORDER — LIDOCAINE HYDROCHLORIDE 10 MG/ML
INJECTION INFILTRATION; PERINEURAL
Status: DISCONTINUED | OUTPATIENT
Start: 2021-10-21 | End: 2021-10-21 | Stop reason: HOSPADM

## 2021-10-21 RX ORDER — LIDOCAINE HCL/PF 100 MG/5ML
SYRINGE (ML) INTRAVENOUS
Status: DISCONTINUED | OUTPATIENT
Start: 2021-10-21 | End: 2021-10-21

## 2021-10-21 RX ORDER — ROCURONIUM BROMIDE 10 MG/ML
INJECTION, SOLUTION INTRAVENOUS
Status: DISCONTINUED | OUTPATIENT
Start: 2021-10-21 | End: 2021-10-21

## 2021-10-21 RX ORDER — EPHEDRINE SULFATE 50 MG/ML
INJECTION, SOLUTION INTRAVENOUS
Status: DISCONTINUED | OUTPATIENT
Start: 2021-10-21 | End: 2021-10-21

## 2021-10-21 RX ORDER — PROPOFOL 10 MG/ML
VIAL (ML) INTRAVENOUS
Status: DISCONTINUED | OUTPATIENT
Start: 2021-10-21 | End: 2021-10-21

## 2021-10-21 RX ORDER — ACETAMINOPHEN 325 MG/1
650 TABLET ORAL EVERY 4 HOURS PRN
Status: DISCONTINUED | OUTPATIENT
Start: 2021-10-21 | End: 2021-10-22 | Stop reason: HOSPADM

## 2021-10-21 RX ORDER — VERAPAMIL HYDROCHLORIDE 2.5 MG/ML
INJECTION, SOLUTION INTRAVENOUS
Status: DISCONTINUED | OUTPATIENT
Start: 2021-10-21 | End: 2021-10-21 | Stop reason: HOSPADM

## 2021-10-21 RX ORDER — PHENYLEPHRINE HYDROCHLORIDE 10 MG/ML
INJECTION INTRAVENOUS
Status: DISCONTINUED | OUTPATIENT
Start: 2021-10-21 | End: 2021-10-21

## 2021-10-21 RX ORDER — TAMSULOSIN HYDROCHLORIDE 0.4 MG/1
0.4 CAPSULE ORAL DAILY
Status: DISCONTINUED | OUTPATIENT
Start: 2021-10-21 | End: 2021-10-22 | Stop reason: HOSPADM

## 2021-10-21 RX ORDER — IODIXANOL 320 MG/ML
INJECTION, SOLUTION INTRAVASCULAR
Status: DISCONTINUED | OUTPATIENT
Start: 2021-10-21 | End: 2021-10-21 | Stop reason: HOSPADM

## 2021-10-21 RX ADMIN — CLOPIDOGREL 300 MG: 75 TABLET, FILM COATED ORAL at 09:10

## 2021-10-21 RX ADMIN — METOPROLOL SUCCINATE 25 MG: 25 TABLET, FILM COATED, EXTENDED RELEASE ORAL at 09:10

## 2021-10-21 RX ADMIN — POLYETHYLENE GLYCOL 3350 17 G: 17 POWDER, FOR SOLUTION ORAL at 09:10

## 2021-10-21 RX ADMIN — ASPIRIN 81 MG: 81 TABLET, COATED ORAL at 09:10

## 2021-10-21 RX ADMIN — SUGAMMADEX 307 MG: 100 INJECTION, SOLUTION INTRAVENOUS at 04:10

## 2021-10-21 RX ADMIN — SODIUM ZIRCONIUM CYCLOSILICATE 10 G: 5 POWDER, FOR SUSPENSION ORAL at 08:10

## 2021-10-21 RX ADMIN — GABAPENTIN 300 MG: 300 CAPSULE ORAL at 09:10

## 2021-10-21 RX ADMIN — PHENYLEPHRINE HYDROCHLORIDE 200 MCG: 10 INJECTION INTRAVENOUS at 04:10

## 2021-10-21 RX ADMIN — LIDOCAINE HYDROCHLORIDE 50 MG: 20 INJECTION, SOLUTION INTRAVENOUS at 04:10

## 2021-10-21 RX ADMIN — PROPOFOL 100 MG: 10 INJECTION, EMULSION INTRAVENOUS at 04:10

## 2021-10-21 RX ADMIN — ROCURONIUM BROMIDE 30 MG: 10 INJECTION, SOLUTION INTRAVENOUS at 04:10

## 2021-10-21 RX ADMIN — TAMSULOSIN HYDROCHLORIDE 0.4 MG: 0.4 CAPSULE ORAL at 05:10

## 2021-10-21 RX ADMIN — FAMOTIDINE 20 MG: 20 TABLET ORAL at 09:10

## 2021-10-21 RX ADMIN — ACETAMINOPHEN 650 MG: 325 TABLET ORAL at 09:10

## 2021-10-21 RX ADMIN — FENTANYL CITRATE 50 MCG: 50 INJECTION, SOLUTION INTRAMUSCULAR; INTRAVENOUS at 04:10

## 2021-10-21 RX ADMIN — EPHEDRINE SULFATE 15 MG: 50 INJECTION INTRAVENOUS at 04:10

## 2021-10-22 ENCOUNTER — EXTERNAL HOME HEALTH (OUTPATIENT)
Dept: HOME HEALTH SERVICES | Facility: HOSPITAL | Age: 81
End: 2021-10-22
Payer: MEDICARE

## 2021-10-22 VITALS
OXYGEN SATURATION: 98 % | SYSTOLIC BLOOD PRESSURE: 115 MMHG | HEART RATE: 77 BPM | RESPIRATION RATE: 17 BRPM | WEIGHT: 169.06 LBS | DIASTOLIC BLOOD PRESSURE: 56 MMHG | TEMPERATURE: 96 F | BODY MASS INDEX: 31.11 KG/M2 | HEIGHT: 62 IN

## 2021-10-22 LAB
ALBUMIN SERPL BCP-MCNC: 3.3 G/DL (ref 3.5–5.2)
ALP SERPL-CCNC: 58 U/L (ref 55–135)
ALT SERPL W/O P-5'-P-CCNC: 12 U/L (ref 10–44)
ANION GAP SERPL CALC-SCNC: 13 MMOL/L (ref 8–16)
AST SERPL-CCNC: 15 U/L (ref 10–40)
BASOPHILS # BLD AUTO: 0.04 K/UL (ref 0–0.2)
BASOPHILS NFR BLD: 0.7 % (ref 0–1.9)
BILIRUB SERPL-MCNC: 0.5 MG/DL (ref 0.1–1)
BUN SERPL-MCNC: 28 MG/DL (ref 8–23)
CALCIUM SERPL-MCNC: 9.1 MG/DL (ref 8.7–10.5)
CHLORIDE SERPL-SCNC: 106 MMOL/L (ref 95–110)
CO2 SERPL-SCNC: 21 MMOL/L (ref 23–29)
CREAT SERPL-MCNC: 1.3 MG/DL (ref 0.5–1.4)
DIFFERENTIAL METHOD: ABNORMAL
EOSINOPHIL # BLD AUTO: 0.1 K/UL (ref 0–0.5)
EOSINOPHIL NFR BLD: 2.3 % (ref 0–8)
ERYTHROCYTE [DISTWIDTH] IN BLOOD BY AUTOMATED COUNT: 12.7 % (ref 11.5–14.5)
EST. GFR  (AFRICAN AMERICAN): 44 ML/MIN/1.73 M^2
EST. GFR  (NON AFRICAN AMERICAN): 38 ML/MIN/1.73 M^2
GLUCOSE SERPL-MCNC: 131 MG/DL (ref 70–110)
HCT VFR BLD AUTO: 34.9 % (ref 37–48.5)
HGB BLD-MCNC: 11.8 G/DL (ref 12–16)
IMM GRANULOCYTES # BLD AUTO: 0.02 K/UL (ref 0–0.04)
IMM GRANULOCYTES NFR BLD AUTO: 0.3 % (ref 0–0.5)
LYMPHOCYTES # BLD AUTO: 1.5 K/UL (ref 1–4.8)
LYMPHOCYTES NFR BLD: 24.2 % (ref 18–48)
MCH RBC QN AUTO: 30.2 PG (ref 27–31)
MCHC RBC AUTO-ENTMCNC: 33.8 G/DL (ref 32–36)
MCV RBC AUTO: 89 FL (ref 82–98)
MONOCYTES # BLD AUTO: 0.7 K/UL (ref 0.3–1)
MONOCYTES NFR BLD: 10.7 % (ref 4–15)
NEUTROPHILS # BLD AUTO: 3.8 K/UL (ref 1.8–7.7)
NEUTROPHILS NFR BLD: 61.8 % (ref 38–73)
NRBC BLD-RTO: 0 /100 WBC
PLATELET # BLD AUTO: 210 K/UL (ref 150–450)
PMV BLD AUTO: 10.5 FL (ref 9.2–12.9)
POCT GLUCOSE: 141 MG/DL (ref 70–110)
POCT GLUCOSE: 270 MG/DL (ref 70–110)
POTASSIUM SERPL-SCNC: 4.3 MMOL/L (ref 3.5–5.1)
PROT SERPL-MCNC: 6.5 G/DL (ref 6–8.4)
RBC # BLD AUTO: 3.91 M/UL (ref 4–5.4)
SODIUM SERPL-SCNC: 140 MMOL/L (ref 136–145)
WBC # BLD AUTO: 6.07 K/UL (ref 3.9–12.7)

## 2021-10-22 PROCEDURE — 36415 COLL VENOUS BLD VENIPUNCTURE: CPT | Performed by: STUDENT IN AN ORGANIZED HEALTH CARE EDUCATION/TRAINING PROGRAM

## 2021-10-22 PROCEDURE — 99232 PR SUBSEQUENT HOSPITAL CARE,LEVL II: ICD-10-PCS | Mod: GC,,, | Performed by: INTERNAL MEDICINE

## 2021-10-22 PROCEDURE — 97530 THERAPEUTIC ACTIVITIES: CPT

## 2021-10-22 PROCEDURE — 97164 PT RE-EVAL EST PLAN CARE: CPT

## 2021-10-22 PROCEDURE — 25000003 PHARM REV CODE 250: Performed by: NURSE PRACTITIONER

## 2021-10-22 PROCEDURE — 99232 SBSQ HOSP IP/OBS MODERATE 35: CPT | Mod: GC,,, | Performed by: INTERNAL MEDICINE

## 2021-10-22 PROCEDURE — 97168 OT RE-EVAL EST PLAN CARE: CPT

## 2021-10-22 PROCEDURE — 97116 GAIT TRAINING THERAPY: CPT

## 2021-10-22 PROCEDURE — 63600175 PHARM REV CODE 636 W HCPCS: Performed by: FAMILY MEDICINE

## 2021-10-22 PROCEDURE — 85025 COMPLETE CBC W/AUTO DIFF WBC: CPT | Performed by: STUDENT IN AN ORGANIZED HEALTH CARE EDUCATION/TRAINING PROGRAM

## 2021-10-22 PROCEDURE — 25000003 PHARM REV CODE 250: Performed by: FAMILY MEDICINE

## 2021-10-22 PROCEDURE — 80053 COMPREHEN METABOLIC PANEL: CPT | Performed by: STUDENT IN AN ORGANIZED HEALTH CARE EDUCATION/TRAINING PROGRAM

## 2021-10-22 PROCEDURE — 25000003 PHARM REV CODE 250: Performed by: STUDENT IN AN ORGANIZED HEALTH CARE EDUCATION/TRAINING PROGRAM

## 2021-10-22 RX ORDER — ATORVASTATIN CALCIUM 40 MG/1
40 TABLET, FILM COATED ORAL DAILY
Qty: 90 TABLET | Refills: 3 | Status: ON HOLD | OUTPATIENT
Start: 2021-10-22 | End: 2021-11-10 | Stop reason: SDUPTHER

## 2021-10-22 RX ORDER — POLYETHYLENE GLYCOL 3350 17 G/17G
17 POWDER, FOR SOLUTION ORAL DAILY
Qty: 510 G | Refills: 0 | Status: SHIPPED | OUTPATIENT
Start: 2021-10-22

## 2021-10-22 RX ORDER — METRONIDAZOLE 500 MG/1
500 TABLET ORAL EVERY 8 HOURS
Qty: 63 TABLET | Refills: 0 | Status: SHIPPED | OUTPATIENT
Start: 2021-10-22 | End: 2021-11-12

## 2021-10-22 RX ORDER — GLYCERIN 1 G/1
1 SUPPOSITORY RECTAL ONCE
Status: DISCONTINUED | OUTPATIENT
Start: 2021-10-22 | End: 2021-10-22 | Stop reason: HOSPADM

## 2021-10-22 RX ORDER — ASPIRIN 81 MG/1
81 TABLET ORAL DAILY
Qty: 30 TABLET | Refills: 0 | Status: SHIPPED | OUTPATIENT
Start: 2021-10-22 | End: 2021-11-02

## 2021-10-22 RX ORDER — CLOPIDOGREL BISULFATE 75 MG/1
75 TABLET ORAL DAILY
Qty: 30 TABLET | Refills: 11 | Status: SHIPPED | OUTPATIENT
Start: 2021-10-22 | End: 2021-12-20 | Stop reason: SDUPTHER

## 2021-10-22 RX ORDER — METRONIDAZOLE 500 MG/1
500 TABLET ORAL EVERY 8 HOURS
Status: DISCONTINUED | OUTPATIENT
Start: 2021-10-22 | End: 2021-10-22 | Stop reason: HOSPADM

## 2021-10-22 RX ORDER — TAMSULOSIN HYDROCHLORIDE 0.4 MG/1
0.4 CAPSULE ORAL DAILY
Qty: 30 CAPSULE | Refills: 11 | Status: SHIPPED | OUTPATIENT
Start: 2021-10-22 | End: 2021-12-20

## 2021-10-22 RX ORDER — CIPROFLOXACIN 500 MG/1
500 TABLET ORAL 2 TIMES DAILY
Qty: 42 TABLET | Refills: 0 | Status: SHIPPED | OUTPATIENT
Start: 2021-10-22 | End: 2021-11-12

## 2021-10-22 RX ADMIN — ASPIRIN 81 MG: 81 TABLET, COATED ORAL at 09:10

## 2021-10-22 RX ADMIN — GABAPENTIN 300 MG: 300 CAPSULE ORAL at 03:10

## 2021-10-22 RX ADMIN — TAMSULOSIN HYDROCHLORIDE 0.4 MG: 0.4 CAPSULE ORAL at 09:10

## 2021-10-22 RX ADMIN — GABAPENTIN 300 MG: 300 CAPSULE ORAL at 09:10

## 2021-10-22 RX ADMIN — POLYETHYLENE GLYCOL 3350 17 G: 17 POWDER, FOR SOLUTION ORAL at 09:10

## 2021-10-22 RX ADMIN — METRONIDAZOLE 500 MG: 500 TABLET ORAL at 03:10

## 2021-10-22 RX ADMIN — ATORVASTATIN CALCIUM 40 MG: 40 TABLET, FILM COATED ORAL at 09:10

## 2021-10-22 RX ADMIN — PRAMIPEXOLE DIHYDROCHLORIDE 0.12 MG: 0.12 TABLET ORAL at 09:10

## 2021-10-22 RX ADMIN — CEFTRIAXONE SODIUM 2 G: 2 INJECTION, POWDER, FOR SOLUTION INTRAMUSCULAR; INTRAVENOUS at 10:10

## 2021-10-22 RX ADMIN — CLOPIDOGREL 75 MG: 75 TABLET, FILM COATED ORAL at 09:10

## 2021-10-22 RX ADMIN — METRONIDAZOLE 500 MG: 500 TABLET ORAL at 10:10

## 2021-10-22 RX ADMIN — LISINOPRIL 20 MG: 20 TABLET ORAL at 09:10

## 2021-10-22 RX ADMIN — ENEMA 1 ENEMA: 19; 7 ENEMA RECTAL at 05:10

## 2021-10-23 LAB
BACTERIA BLD CULT: NORMAL
BACTERIA BLD CULT: NORMAL

## 2021-10-24 LAB — BACTERIA SPEC AEROBE CULT: NO GROWTH

## 2021-10-25 ENCOUNTER — TELEPHONE (OUTPATIENT)
Dept: PODIATRY | Facility: CLINIC | Age: 81
End: 2021-10-25
Payer: MEDICARE

## 2021-10-25 ENCOUNTER — PATIENT OUTREACH (OUTPATIENT)
Dept: ADMINISTRATIVE | Facility: OTHER | Age: 81
End: 2021-10-25
Payer: MEDICARE

## 2021-10-25 LAB
BACTERIA SPEC AEROBE CULT: NO GROWTH
BACTERIA SPEC ANAEROBE CULT: NORMAL
FINAL PATHOLOGIC DIAGNOSIS: NORMAL
GROSS: NORMAL
Lab: NORMAL

## 2021-10-27 ENCOUNTER — TELEPHONE (OUTPATIENT)
Dept: CARDIOLOGY | Facility: CLINIC | Age: 81
End: 2021-10-27
Payer: MEDICARE

## 2021-10-27 ENCOUNTER — TELEPHONE (OUTPATIENT)
Dept: PODIATRY | Facility: CLINIC | Age: 81
End: 2021-10-27
Payer: MEDICARE

## 2021-10-28 ENCOUNTER — TELEPHONE (OUTPATIENT)
Dept: PODIATRY | Facility: CLINIC | Age: 81
End: 2021-10-28
Payer: MEDICARE

## 2021-10-28 LAB — BACTERIA SPEC ANAEROBE CULT: NORMAL

## 2021-10-29 ENCOUNTER — TELEPHONE (OUTPATIENT)
Dept: CARDIOLOGY | Facility: CLINIC | Age: 81
End: 2021-10-29
Payer: MEDICARE

## 2021-11-02 ENCOUNTER — DOCUMENT SCAN (OUTPATIENT)
Dept: HOME HEALTH SERVICES | Facility: HOSPITAL | Age: 81
End: 2021-11-02
Payer: MEDICARE

## 2021-11-02 ENCOUNTER — OFFICE VISIT (OUTPATIENT)
Dept: CARDIOLOGY | Facility: CLINIC | Age: 81
End: 2021-11-02
Payer: MEDICARE

## 2021-11-02 VITALS
OXYGEN SATURATION: 94 % | HEART RATE: 87 BPM | WEIGHT: 169.06 LBS | HEIGHT: 62 IN | SYSTOLIC BLOOD PRESSURE: 111 MMHG | BODY MASS INDEX: 31.11 KG/M2 | DIASTOLIC BLOOD PRESSURE: 53 MMHG

## 2021-11-02 DIAGNOSIS — I48.91 ATRIAL FIBRILLATION WITH RAPID VENTRICULAR RESPONSE: ICD-10-CM

## 2021-11-02 DIAGNOSIS — M86.10 OTHER ACUTE OSTEOMYELITIS, UNSPECIFIED SITE: ICD-10-CM

## 2021-11-02 DIAGNOSIS — I70.229 CRITICAL LOWER LIMB ISCHEMIA: Primary | ICD-10-CM

## 2021-11-02 DIAGNOSIS — E11.40 TYPE 2 DIABETES MELLITUS WITH DIABETIC NEUROPATHY, WITHOUT LONG-TERM CURRENT USE OF INSULIN: Chronic | ICD-10-CM

## 2021-11-02 DIAGNOSIS — E66.9 OBESITY (BMI 30-39.9): ICD-10-CM

## 2021-11-02 DIAGNOSIS — I48.92 ATRIAL FLUTTER, UNSPECIFIED TYPE: ICD-10-CM

## 2021-11-02 DIAGNOSIS — I31.39 PERICARDIAL EFFUSION: ICD-10-CM

## 2021-11-02 DIAGNOSIS — I10 ESSENTIAL HYPERTENSION: Chronic | ICD-10-CM

## 2021-11-02 DIAGNOSIS — I73.9 PAD (PERIPHERAL ARTERY DISEASE): ICD-10-CM

## 2021-11-02 PROCEDURE — 99999 PR PBB SHADOW E&M-EST. PATIENT-LVL V: CPT | Mod: PBBFAC,,, | Performed by: INTERNAL MEDICINE

## 2021-11-02 PROCEDURE — 1111F PR DISCHARGE MEDS RECONCILED W/ CURRENT OUTPATIENT MED LIST: ICD-10-PCS | Mod: CPTII,S$GLB,, | Performed by: INTERNAL MEDICINE

## 2021-11-02 PROCEDURE — 3078F PR MOST RECENT DIASTOLIC BLOOD PRESSURE < 80 MM HG: ICD-10-PCS | Mod: CPTII,S$GLB,, | Performed by: INTERNAL MEDICINE

## 2021-11-02 PROCEDURE — 1126F AMNT PAIN NOTED NONE PRSNT: CPT | Mod: CPTII,S$GLB,, | Performed by: INTERNAL MEDICINE

## 2021-11-02 PROCEDURE — 3288F FALL RISK ASSESSMENT DOCD: CPT | Mod: CPTII,S$GLB,, | Performed by: INTERNAL MEDICINE

## 2021-11-02 PROCEDURE — 99215 PR OFFICE/OUTPT VISIT, EST, LEVL V, 40-54 MIN: ICD-10-PCS | Mod: S$GLB,,, | Performed by: INTERNAL MEDICINE

## 2021-11-02 PROCEDURE — 99215 OFFICE O/P EST HI 40 MIN: CPT | Mod: S$GLB,,, | Performed by: INTERNAL MEDICINE

## 2021-11-02 PROCEDURE — 1101F PT FALLS ASSESS-DOCD LE1/YR: CPT | Mod: CPTII,S$GLB,, | Performed by: INTERNAL MEDICINE

## 2021-11-02 PROCEDURE — 1159F MED LIST DOCD IN RCRD: CPT | Mod: CPTII,S$GLB,, | Performed by: INTERNAL MEDICINE

## 2021-11-02 PROCEDURE — 1101F PR PT FALLS ASSESS DOC 0-1 FALLS W/OUT INJ PAST YR: ICD-10-PCS | Mod: CPTII,S$GLB,, | Performed by: INTERNAL MEDICINE

## 2021-11-02 PROCEDURE — 3074F PR MOST RECENT SYSTOLIC BLOOD PRESSURE < 130 MM HG: ICD-10-PCS | Mod: CPTII,S$GLB,, | Performed by: INTERNAL MEDICINE

## 2021-11-02 PROCEDURE — 3078F DIAST BP <80 MM HG: CPT | Mod: CPTII,S$GLB,, | Performed by: INTERNAL MEDICINE

## 2021-11-02 PROCEDURE — 1111F DSCHRG MED/CURRENT MED MERGE: CPT | Mod: CPTII,S$GLB,, | Performed by: INTERNAL MEDICINE

## 2021-11-02 PROCEDURE — 1159F PR MEDICATION LIST DOCUMENTED IN MEDICAL RECORD: ICD-10-PCS | Mod: CPTII,S$GLB,, | Performed by: INTERNAL MEDICINE

## 2021-11-02 PROCEDURE — 3074F SYST BP LT 130 MM HG: CPT | Mod: CPTII,S$GLB,, | Performed by: INTERNAL MEDICINE

## 2021-11-02 PROCEDURE — 3288F PR FALLS RISK ASSESSMENT DOCUMENTED: ICD-10-PCS | Mod: CPTII,S$GLB,, | Performed by: INTERNAL MEDICINE

## 2021-11-02 PROCEDURE — 1126F PR PAIN SEVERITY QUANTIFIED, NO PAIN PRESENT: ICD-10-PCS | Mod: CPTII,S$GLB,, | Performed by: INTERNAL MEDICINE

## 2021-11-02 PROCEDURE — 99999 PR PBB SHADOW E&M-EST. PATIENT-LVL V: ICD-10-PCS | Mod: PBBFAC,,, | Performed by: INTERNAL MEDICINE

## 2021-11-03 PROBLEM — I70.229 CRITICAL LOWER LIMB ISCHEMIA: Status: ACTIVE | Noted: 2021-11-03

## 2021-11-03 PROBLEM — E66.9 OBESITY (BMI 30-39.9): Status: ACTIVE | Noted: 2021-11-03

## 2021-11-03 RX ORDER — DIPHENHYDRAMINE HCL 25 MG
50 CAPSULE ORAL ONCE
Status: CANCELLED | OUTPATIENT
Start: 2021-11-03 | End: 2021-11-03

## 2021-11-03 RX ORDER — SODIUM CHLORIDE 9 MG/ML
INJECTION, SOLUTION INTRAVENOUS CONTINUOUS
Status: CANCELLED | OUTPATIENT
Start: 2021-11-03 | End: 2021-11-03

## 2021-11-09 ENCOUNTER — LAB VISIT (OUTPATIENT)
Dept: LAB | Facility: HOSPITAL | Age: 81
End: 2021-11-09
Attending: INTERNAL MEDICINE
Payer: MEDICARE

## 2021-11-09 DIAGNOSIS — Z01.818 PREOP TESTING: ICD-10-CM

## 2021-11-09 PROBLEM — E66.01 MORBID OBESITY: Chronic | Status: ACTIVE | Noted: 2021-11-03

## 2021-11-09 LAB
ANION GAP SERPL CALC-SCNC: 9 MMOL/L (ref 8–16)
BASOPHILS # BLD AUTO: 0.03 K/UL (ref 0–0.2)
BASOPHILS NFR BLD: 0.4 % (ref 0–1.9)
BUN SERPL-MCNC: 18 MG/DL (ref 8–23)
CALCIUM SERPL-MCNC: 9.1 MG/DL (ref 8.7–10.5)
CHLORIDE SERPL-SCNC: 109 MMOL/L (ref 95–110)
CO2 SERPL-SCNC: 23 MMOL/L (ref 23–29)
CREAT SERPL-MCNC: 1.2 MG/DL (ref 0.5–1.4)
DIFFERENTIAL METHOD: ABNORMAL
EOSINOPHIL # BLD AUTO: 0.2 K/UL (ref 0–0.5)
EOSINOPHIL NFR BLD: 2.7 % (ref 0–8)
ERYTHROCYTE [DISTWIDTH] IN BLOOD BY AUTOMATED COUNT: 13.5 % (ref 11.5–14.5)
EST. GFR  (AFRICAN AMERICAN): 49 ML/MIN/1.73 M^2
EST. GFR  (NON AFRICAN AMERICAN): 42 ML/MIN/1.73 M^2
GLUCOSE SERPL-MCNC: 116 MG/DL (ref 70–110)
HCT VFR BLD AUTO: 34.8 % (ref 37–48.5)
HGB BLD-MCNC: 11.7 G/DL (ref 12–16)
IMM GRANULOCYTES # BLD AUTO: 0.06 K/UL (ref 0–0.04)
IMM GRANULOCYTES NFR BLD AUTO: 0.8 % (ref 0–0.5)
LYMPHOCYTES # BLD AUTO: 1.5 K/UL (ref 1–4.8)
LYMPHOCYTES NFR BLD: 19.2 % (ref 18–48)
MCH RBC QN AUTO: 29.7 PG (ref 27–31)
MCHC RBC AUTO-ENTMCNC: 33.6 G/DL (ref 32–36)
MCV RBC AUTO: 88 FL (ref 82–98)
MONOCYTES # BLD AUTO: 0.7 K/UL (ref 0.3–1)
MONOCYTES NFR BLD: 9.4 % (ref 4–15)
NEUTROPHILS # BLD AUTO: 5.3 K/UL (ref 1.8–7.7)
NEUTROPHILS NFR BLD: 67.5 % (ref 38–73)
NRBC BLD-RTO: 0 /100 WBC
PLATELET # BLD AUTO: 297 K/UL (ref 150–450)
PMV BLD AUTO: 9.9 FL (ref 9.2–12.9)
POTASSIUM SERPL-SCNC: 5.1 MMOL/L (ref 3.5–5.1)
RBC # BLD AUTO: 3.94 M/UL (ref 4–5.4)
SODIUM SERPL-SCNC: 141 MMOL/L (ref 136–145)
WBC # BLD AUTO: 7.88 K/UL (ref 3.9–12.7)

## 2021-11-09 PROCEDURE — 36415 COLL VENOUS BLD VENIPUNCTURE: CPT | Performed by: INTERNAL MEDICINE

## 2021-11-09 PROCEDURE — 80048 BASIC METABOLIC PNL TOTAL CA: CPT | Performed by: INTERNAL MEDICINE

## 2021-11-09 PROCEDURE — 85025 COMPLETE CBC W/AUTO DIFF WBC: CPT | Performed by: INTERNAL MEDICINE

## 2021-11-10 ENCOUNTER — HOSPITAL ENCOUNTER (OUTPATIENT)
Facility: HOSPITAL | Age: 81
Discharge: HOME | End: 2021-11-10
Attending: INTERNAL MEDICINE | Admitting: INTERNAL MEDICINE
Payer: MEDICARE

## 2021-11-10 VITALS
HEIGHT: 62 IN | TEMPERATURE: 97 F | WEIGHT: 170 LBS | OXYGEN SATURATION: 98 % | DIASTOLIC BLOOD PRESSURE: 80 MMHG | SYSTOLIC BLOOD PRESSURE: 152 MMHG | RESPIRATION RATE: 15 BRPM | HEART RATE: 58 BPM | BODY MASS INDEX: 31.28 KG/M2

## 2021-11-10 DIAGNOSIS — Z01.810 PRE-OPERATIVE CARDIOVASCULAR EXAMINATION: ICD-10-CM

## 2021-11-10 DIAGNOSIS — I70.229 CRITICAL LOWER LIMB ISCHEMIA: ICD-10-CM

## 2021-11-10 DIAGNOSIS — Z01.818 PREOP TESTING: Primary | ICD-10-CM

## 2021-11-10 DIAGNOSIS — I73.9 PAD (PERIPHERAL ARTERY DISEASE): ICD-10-CM

## 2021-11-10 DIAGNOSIS — E11.52 TYPE 2 DIABETES MELLITUS WITH DIABETIC PERIPHERAL ANGIOPATHY AND GANGRENE, WITHOUT LONG-TERM CURRENT USE OF INSULIN: ICD-10-CM

## 2021-11-10 PROBLEM — L03.90 CELLULITIS: Status: RESOLVED | Noted: 2021-01-22 | Resolved: 2021-11-10

## 2021-11-10 PROBLEM — B99.9 INFECTION: Status: RESOLVED | Noted: 2021-01-22 | Resolved: 2021-11-10

## 2021-11-10 LAB — POCT GLUCOSE: 151 MG/DL (ref 70–110)

## 2021-11-10 PROCEDURE — 25000003 PHARM REV CODE 250: Performed by: INTERNAL MEDICINE

## 2021-11-10 PROCEDURE — 37232 PR REVASCULARIZE TIBIAL/PERON ARTERY,ANGIOPLASTY EA ADD: CPT | Mod: RT,,, | Performed by: INTERNAL MEDICINE

## 2021-11-10 PROCEDURE — C2623 CATH, TRANSLUMIN, DRUG-COAT: HCPCS | Performed by: INTERNAL MEDICINE

## 2021-11-10 PROCEDURE — 37224 PR FEM/POPL REVAS W/TLA: ICD-10-PCS | Mod: RT,,, | Performed by: INTERNAL MEDICINE

## 2021-11-10 PROCEDURE — 99152 MOD SED SAME PHYS/QHP 5/>YRS: CPT | Mod: ,,, | Performed by: INTERNAL MEDICINE

## 2021-11-10 PROCEDURE — 75710 ARTERY X-RAYS ARM/LEG: CPT | Mod: RT | Performed by: INTERNAL MEDICINE

## 2021-11-10 PROCEDURE — 99152 PR MOD CONSCIOUS SEDATION, SAME PHYS, 5+ YRS, FIRST 15 MIN: ICD-10-PCS | Mod: ,,, | Performed by: INTERNAL MEDICINE

## 2021-11-10 PROCEDURE — 93010 ELECTROCARDIOGRAM REPORT: CPT | Mod: ,,, | Performed by: INTERNAL MEDICINE

## 2021-11-10 PROCEDURE — 37224 PR FEM/POPL REVAS W/TLA: CPT | Mod: RT,,, | Performed by: INTERNAL MEDICINE

## 2021-11-10 PROCEDURE — C1894 INTRO/SHEATH, NON-LASER: HCPCS | Performed by: INTERNAL MEDICINE

## 2021-11-10 PROCEDURE — 75710 ARTERY X-RAYS ARM/LEG: CPT | Mod: 26,59,RT, | Performed by: INTERNAL MEDICINE

## 2021-11-10 PROCEDURE — C1887 CATHETER, GUIDING: HCPCS | Performed by: INTERNAL MEDICINE

## 2021-11-10 PROCEDURE — 63600175 PHARM REV CODE 636 W HCPCS: Performed by: INTERNAL MEDICINE

## 2021-11-10 PROCEDURE — 93005 ELECTROCARDIOGRAM TRACING: CPT | Mod: 59

## 2021-11-10 PROCEDURE — 85347 COAGULATION TIME ACTIVATED: CPT | Performed by: INTERNAL MEDICINE

## 2021-11-10 PROCEDURE — 37252 INTRVASC US NONCORONARY 1ST: CPT | Mod: RT | Performed by: INTERNAL MEDICINE

## 2021-11-10 PROCEDURE — C1725 CATH, TRANSLUMIN NON-LASER: HCPCS | Performed by: INTERNAL MEDICINE

## 2021-11-10 PROCEDURE — 75710 PR  ANGIO EXTREMITY UNILAT: ICD-10-PCS | Mod: 26,59,RT, | Performed by: INTERNAL MEDICINE

## 2021-11-10 PROCEDURE — 37252 INTRVASC US NONCORONARY 1ST: CPT | Mod: RT,,, | Performed by: INTERNAL MEDICINE

## 2021-11-10 PROCEDURE — 37232 HC TIB/PER REVASC ADD-ON: CPT | Mod: RT | Performed by: INTERNAL MEDICINE

## 2021-11-10 PROCEDURE — 37224 HC FEM/POPL REVAS W/TLA: CPT | Mod: RT | Performed by: INTERNAL MEDICINE

## 2021-11-10 PROCEDURE — 37252 PR IVUS, NON-CORONARY, 1ST VESSEL: ICD-10-PCS | Mod: RT,,, | Performed by: INTERNAL MEDICINE

## 2021-11-10 PROCEDURE — 37229 PR TIB/PER REVASC W/ATHERECTOMY: CPT | Mod: RT,,, | Performed by: INTERNAL MEDICINE

## 2021-11-10 PROCEDURE — 99152 MOD SED SAME PHYS/QHP 5/>YRS: CPT | Performed by: INTERNAL MEDICINE

## 2021-11-10 PROCEDURE — 37232 PR REVASCULARIZE TIBIAL/PERON ARTERY,ANGIOPLASTY EA ADD: ICD-10-PCS | Mod: RT,,, | Performed by: INTERNAL MEDICINE

## 2021-11-10 PROCEDURE — C1760 CLOSURE DEV, VASC: HCPCS | Performed by: INTERNAL MEDICINE

## 2021-11-10 PROCEDURE — C1753 CATH, INTRAVAS ULTRASOUND: HCPCS | Performed by: INTERNAL MEDICINE

## 2021-11-10 PROCEDURE — 25500020 PHARM REV CODE 255: Performed by: INTERNAL MEDICINE

## 2021-11-10 PROCEDURE — 37229 PR TIB/PER REVASC W/ATHERECTOMY: ICD-10-PCS | Mod: RT,,, | Performed by: INTERNAL MEDICINE

## 2021-11-10 PROCEDURE — 37229 HC TIB/PER REVASC W/ATHER: CPT | Mod: RT | Performed by: INTERNAL MEDICINE

## 2021-11-10 PROCEDURE — 99153 MOD SED SAME PHYS/QHP EA: CPT | Performed by: INTERNAL MEDICINE

## 2021-11-10 PROCEDURE — 27201423 OPTIME MED/SURG SUP & DEVICES STERILE SUPPLY: Performed by: INTERNAL MEDICINE

## 2021-11-10 PROCEDURE — 93010 EKG 12-LEAD: ICD-10-PCS | Mod: ,,, | Performed by: INTERNAL MEDICINE

## 2021-11-10 PROCEDURE — C1769 GUIDE WIRE: HCPCS | Performed by: INTERNAL MEDICINE

## 2021-11-10 RX ORDER — LIDOCAINE HYDROCHLORIDE 10 MG/ML
INJECTION, SOLUTION EPIDURAL; INFILTRATION; INTRACAUDAL; PERINEURAL
Status: DISCONTINUED | OUTPATIENT
Start: 2021-11-10 | End: 2021-11-10 | Stop reason: HOSPADM

## 2021-11-10 RX ORDER — DIPHENHYDRAMINE HYDROCHLORIDE 50 MG/ML
12.5 INJECTION INTRAMUSCULAR; INTRAVENOUS EVERY 6 HOURS PRN
Status: DISCONTINUED | OUTPATIENT
Start: 2021-11-10 | End: 2021-11-10 | Stop reason: HOSPADM

## 2021-11-10 RX ORDER — ATORVASTATIN CALCIUM 80 MG/1
80 TABLET, FILM COATED ORAL DAILY
Qty: 90 TABLET | Refills: 3 | Status: SHIPPED | OUTPATIENT
Start: 2021-11-10 | End: 2022-10-07 | Stop reason: SDUPTHER

## 2021-11-10 RX ORDER — DIPHENHYDRAMINE HCL 25 MG
50 CAPSULE ORAL ONCE
Status: DISCONTINUED | OUTPATIENT
Start: 2021-11-10 | End: 2021-11-10 | Stop reason: HOSPADM

## 2021-11-10 RX ORDER — DIPHENHYDRAMINE HYDROCHLORIDE 50 MG/ML
INJECTION INTRAMUSCULAR; INTRAVENOUS
Status: DISCONTINUED | OUTPATIENT
Start: 2021-11-10 | End: 2021-11-10 | Stop reason: HOSPADM

## 2021-11-10 RX ORDER — VERAPAMIL HYDROCHLORIDE 2.5 MG/ML
INJECTION, SOLUTION INTRAVENOUS
Status: DISCONTINUED | OUTPATIENT
Start: 2021-11-10 | End: 2021-11-10 | Stop reason: HOSPADM

## 2021-11-10 RX ORDER — FENTANYL CITRATE 50 UG/ML
INJECTION, SOLUTION INTRAMUSCULAR; INTRAVENOUS
Status: DISCONTINUED | OUTPATIENT
Start: 2021-11-10 | End: 2021-11-10 | Stop reason: HOSPADM

## 2021-11-10 RX ORDER — HEPARIN SODIUM 1000 [USP'U]/ML
INJECTION, SOLUTION INTRAVENOUS; SUBCUTANEOUS
Status: DISCONTINUED | OUTPATIENT
Start: 2021-11-10 | End: 2021-11-10 | Stop reason: HOSPADM

## 2021-11-10 RX ORDER — ACETAMINOPHEN 325 MG/1
650 TABLET ORAL EVERY 4 HOURS PRN
Status: DISCONTINUED | OUTPATIENT
Start: 2021-11-10 | End: 2021-11-10 | Stop reason: HOSPADM

## 2021-11-10 RX ORDER — ONDANSETRON 4 MG/1
8 TABLET, ORALLY DISINTEGRATING ORAL EVERY 8 HOURS PRN
Status: DISCONTINUED | OUTPATIENT
Start: 2021-11-10 | End: 2021-11-10 | Stop reason: HOSPADM

## 2021-11-10 RX ORDER — HYDROMORPHONE HYDROCHLORIDE 2 MG/ML
0.5 INJECTION, SOLUTION INTRAMUSCULAR; INTRAVENOUS; SUBCUTANEOUS EVERY 5 MIN PRN
Status: DISCONTINUED | OUTPATIENT
Start: 2021-11-10 | End: 2021-11-10 | Stop reason: HOSPADM

## 2021-11-10 RX ORDER — MIDAZOLAM HYDROCHLORIDE 1 MG/ML
INJECTION INTRAMUSCULAR; INTRAVENOUS
Status: DISCONTINUED | OUTPATIENT
Start: 2021-11-10 | End: 2021-11-10 | Stop reason: HOSPADM

## 2021-11-10 RX ORDER — SODIUM CHLORIDE 9 MG/ML
INJECTION, SOLUTION INTRAVENOUS
Status: DISCONTINUED | OUTPATIENT
Start: 2021-11-10 | End: 2021-11-10 | Stop reason: HOSPADM

## 2021-11-10 RX ORDER — CLOPIDOGREL BISULFATE 75 MG/1
75 TABLET ORAL DAILY
Status: DISCONTINUED | OUTPATIENT
Start: 2021-11-10 | End: 2021-11-10 | Stop reason: HOSPADM

## 2021-11-10 RX ORDER — SODIUM CHLORIDE 9 MG/ML
INJECTION, SOLUTION INTRAVENOUS CONTINUOUS
Status: ACTIVE | OUTPATIENT
Start: 2021-11-10 | End: 2021-11-10

## 2021-11-10 RX ORDER — IODIXANOL 320 MG/ML
INJECTION, SOLUTION INTRAVASCULAR
Status: DISCONTINUED | OUTPATIENT
Start: 2021-11-10 | End: 2021-11-10 | Stop reason: HOSPADM

## 2021-11-10 RX ORDER — SODIUM CHLORIDE 0.9 % (FLUSH) 0.9 %
3 SYRINGE (ML) INJECTION
Status: DISCONTINUED | OUTPATIENT
Start: 2021-11-10 | End: 2021-11-10 | Stop reason: HOSPADM

## 2021-11-10 RX ORDER — ONDANSETRON 2 MG/ML
4 INJECTION INTRAMUSCULAR; INTRAVENOUS DAILY PRN
Status: DISCONTINUED | OUTPATIENT
Start: 2021-11-10 | End: 2021-11-10 | Stop reason: HOSPADM

## 2021-11-10 RX ORDER — CEFAZOLIN SODIUM 1 G/3ML
INJECTION, POWDER, FOR SOLUTION INTRAMUSCULAR; INTRAVENOUS
Status: DISCONTINUED | OUTPATIENT
Start: 2021-11-10 | End: 2021-11-10 | Stop reason: HOSPADM

## 2021-11-10 RX ORDER — HEPARIN SODIUM 200 [USP'U]/100ML
INJECTION, SOLUTION INTRAVENOUS
Status: DISCONTINUED | OUTPATIENT
Start: 2021-11-10 | End: 2021-11-10 | Stop reason: HOSPADM

## 2021-11-10 RX ADMIN — CLOPIDOGREL 75 MG: 75 TABLET, FILM COATED ORAL at 07:11

## 2021-11-10 RX ADMIN — SODIUM CHLORIDE 231.3 ML: 0.9 INJECTION, SOLUTION INTRAVENOUS at 11:11

## 2021-11-10 RX ADMIN — SODIUM CHLORIDE: 0.9 INJECTION, SOLUTION INTRAVENOUS at 07:11

## 2021-11-11 ENCOUNTER — TELEPHONE (OUTPATIENT)
Dept: CARDIOLOGY | Facility: CLINIC | Age: 81
End: 2021-11-11
Payer: MEDICARE

## 2021-11-12 ENCOUNTER — HOSPITAL ENCOUNTER (OUTPATIENT)
Dept: WOUND CARE | Facility: HOSPITAL | Age: 81
Discharge: HOME | End: 2021-11-12
Attending: STUDENT IN AN ORGANIZED HEALTH CARE EDUCATION/TRAINING PROGRAM
Payer: MEDICARE

## 2021-11-12 VITALS
SYSTOLIC BLOOD PRESSURE: 109 MMHG | TEMPERATURE: 98 F | BODY MASS INDEX: 30.12 KG/M2 | HEIGHT: 63 IN | DIASTOLIC BLOOD PRESSURE: 45 MMHG | HEART RATE: 61 BPM | WEIGHT: 170 LBS

## 2021-11-12 DIAGNOSIS — I73.9 PAD (PERIPHERAL ARTERY DISEASE): ICD-10-CM

## 2021-11-12 DIAGNOSIS — M86.60 OTHER CHRONIC OSTEOMYELITIS, UNSPECIFIED SITE: ICD-10-CM

## 2021-11-12 DIAGNOSIS — Z89.421 S/P AMPUTATION OF LESSER TOE, RIGHT: Primary | ICD-10-CM

## 2021-11-12 DIAGNOSIS — E11.42 DIABETIC POLYNEUROPATHY ASSOCIATED WITH TYPE 2 DIABETES MELLITUS: ICD-10-CM

## 2021-11-12 DIAGNOSIS — B35.1 ONYCHOMYCOSIS: ICD-10-CM

## 2021-11-12 LAB
POC ACTIVATED CLOTTING TIME K: 230 SEC (ref 74–137)
POC ACTIVATED CLOTTING TIME K: 235 SEC (ref 74–137)
POC ACTIVATED CLOTTING TIME K: 252 SEC (ref 74–137)
POC ACTIVATED CLOTTING TIME K: 268 SEC (ref 74–137)
SAMPLE: ABNORMAL

## 2021-11-12 PROCEDURE — 11042 DBRDMT SUBQ TIS 1ST 20SQCM/<: CPT

## 2021-11-12 PROCEDURE — 11721 DEBRIDE NAIL 6 OR MORE: CPT | Mod: Q9,TA,T1,T2,T3,T5,T7,T8,T9

## 2021-11-12 PROCEDURE — 99214 PR OFFICE/OUTPT VISIT, EST, LEVL IV, 30-39 MIN: ICD-10-PCS | Mod: 25,,, | Performed by: STUDENT IN AN ORGANIZED HEALTH CARE EDUCATION/TRAINING PROGRAM

## 2021-11-12 PROCEDURE — 11721 DEBRIDE NAIL 6 OR MORE: CPT | Mod: 79,59,, | Performed by: STUDENT IN AN ORGANIZED HEALTH CARE EDUCATION/TRAINING PROGRAM

## 2021-11-12 PROCEDURE — 27201912 HC WOUND CARE DEBRIDEMENT SUPPLIES

## 2021-11-12 PROCEDURE — 11721 ROUTINE FOOT CARE: ICD-10-PCS | Mod: 79,59,, | Performed by: STUDENT IN AN ORGANIZED HEALTH CARE EDUCATION/TRAINING PROGRAM

## 2021-11-12 PROCEDURE — 99214 OFFICE O/P EST MOD 30 MIN: CPT | Mod: 25,,, | Performed by: STUDENT IN AN ORGANIZED HEALTH CARE EDUCATION/TRAINING PROGRAM

## 2021-11-12 PROCEDURE — 11042 WOUND DEBRIDEMENT: ICD-10-PCS | Mod: 79,,, | Performed by: STUDENT IN AN ORGANIZED HEALTH CARE EDUCATION/TRAINING PROGRAM

## 2021-11-12 PROCEDURE — 11719 TRIM NAIL(S) ANY NUMBER: CPT

## 2021-11-12 PROCEDURE — 99203 OFFICE O/P NEW LOW 30 MIN: CPT | Mod: 25

## 2021-11-12 PROCEDURE — 11042 DBRDMT SUBQ TIS 1ST 20SQCM/<: CPT | Mod: 79,,, | Performed by: STUDENT IN AN ORGANIZED HEALTH CARE EDUCATION/TRAINING PROGRAM

## 2021-11-15 ENCOUNTER — TELEPHONE (OUTPATIENT)
Dept: PODIATRY | Facility: CLINIC | Age: 81
End: 2021-11-15
Payer: MEDICARE

## 2021-11-19 ENCOUNTER — HOSPITAL ENCOUNTER (OUTPATIENT)
Dept: WOUND CARE | Facility: HOSPITAL | Age: 81
Discharge: HOME | End: 2021-11-19
Attending: STUDENT IN AN ORGANIZED HEALTH CARE EDUCATION/TRAINING PROGRAM
Payer: MEDICARE

## 2021-11-19 VITALS
TEMPERATURE: 97 F | DIASTOLIC BLOOD PRESSURE: 58 MMHG | BODY MASS INDEX: 30.12 KG/M2 | WEIGHT: 170 LBS | HEIGHT: 63 IN | HEART RATE: 66 BPM | SYSTOLIC BLOOD PRESSURE: 133 MMHG

## 2021-11-19 DIAGNOSIS — E11.42 DIABETIC POLYNEUROPATHY ASSOCIATED WITH TYPE 2 DIABETES MELLITUS: ICD-10-CM

## 2021-11-19 DIAGNOSIS — I73.9 PAD (PERIPHERAL ARTERY DISEASE): ICD-10-CM

## 2021-11-19 DIAGNOSIS — Z87.2 HEALED ULCER OF FOOT ON EXAMINATION: Primary | ICD-10-CM

## 2021-11-19 PROCEDURE — 99214 PR OFFICE/OUTPT VISIT, EST, LEVL IV, 30-39 MIN: ICD-10-PCS | Mod: 24,,, | Performed by: STUDENT IN AN ORGANIZED HEALTH CARE EDUCATION/TRAINING PROGRAM

## 2021-11-19 PROCEDURE — 99214 OFFICE O/P EST MOD 30 MIN: CPT | Mod: 24,,, | Performed by: STUDENT IN AN ORGANIZED HEALTH CARE EDUCATION/TRAINING PROGRAM

## 2021-11-19 PROCEDURE — 99211 OFF/OP EST MAY X REQ PHY/QHP: CPT

## 2021-11-22 ENCOUNTER — TELEPHONE (OUTPATIENT)
Dept: PODIATRY | Facility: CLINIC | Age: 81
End: 2021-11-22
Payer: MEDICARE

## 2021-11-23 ENCOUNTER — TELEPHONE (OUTPATIENT)
Dept: CARDIOLOGY | Facility: CLINIC | Age: 81
End: 2021-11-23
Payer: MEDICARE

## 2021-11-24 ENCOUNTER — TELEPHONE (OUTPATIENT)
Dept: PODIATRY | Facility: CLINIC | Age: 81
End: 2021-11-24
Payer: MEDICARE

## 2021-11-26 LAB
FUNGUS SPEC CULT: NORMAL
FUNGUS SPEC CULT: NORMAL

## 2021-12-07 ENCOUNTER — OFFICE VISIT (OUTPATIENT)
Dept: CARDIOLOGY | Facility: CLINIC | Age: 81
End: 2021-12-07
Payer: MEDICARE

## 2021-12-07 VITALS
BODY MASS INDEX: 30.36 KG/M2 | SYSTOLIC BLOOD PRESSURE: 100 MMHG | OXYGEN SATURATION: 98 % | HEART RATE: 65 BPM | HEIGHT: 62 IN | DIASTOLIC BLOOD PRESSURE: 66 MMHG | WEIGHT: 165 LBS

## 2021-12-07 DIAGNOSIS — I48.92 ATRIAL FLUTTER, UNSPECIFIED TYPE: ICD-10-CM

## 2021-12-07 DIAGNOSIS — I70.229 CRITICAL LOWER LIMB ISCHEMIA: Primary | ICD-10-CM

## 2021-12-07 DIAGNOSIS — I48.91 ATRIAL FIBRILLATION WITH RAPID VENTRICULAR RESPONSE: ICD-10-CM

## 2021-12-07 DIAGNOSIS — I10 ESSENTIAL HYPERTENSION: Chronic | ICD-10-CM

## 2021-12-07 DIAGNOSIS — E66.01 MORBID OBESITY: Chronic | ICD-10-CM

## 2021-12-07 DIAGNOSIS — M86.10 OTHER ACUTE OSTEOMYELITIS, UNSPECIFIED SITE: ICD-10-CM

## 2021-12-07 DIAGNOSIS — I73.9 PAD (PERIPHERAL ARTERY DISEASE): ICD-10-CM

## 2021-12-07 DIAGNOSIS — I31.39 PERICARDIAL EFFUSION: ICD-10-CM

## 2021-12-07 DIAGNOSIS — E11.40 TYPE 2 DIABETES MELLITUS WITH DIABETIC NEUROPATHY, WITHOUT LONG-TERM CURRENT USE OF INSULIN: Chronic | ICD-10-CM

## 2021-12-07 PROCEDURE — 99999 PR PBB SHADOW E&M-EST. PATIENT-LVL V: CPT | Mod: PBBFAC,,, | Performed by: INTERNAL MEDICINE

## 2021-12-07 PROCEDURE — 99214 PR OFFICE/OUTPT VISIT, EST, LEVL IV, 30-39 MIN: ICD-10-PCS | Mod: S$GLB,,, | Performed by: INTERNAL MEDICINE

## 2021-12-07 PROCEDURE — 99999 PR PBB SHADOW E&M-EST. PATIENT-LVL V: ICD-10-PCS | Mod: PBBFAC,,, | Performed by: INTERNAL MEDICINE

## 2021-12-07 PROCEDURE — 99214 OFFICE O/P EST MOD 30 MIN: CPT | Mod: S$GLB,,, | Performed by: INTERNAL MEDICINE

## 2021-12-14 ENCOUNTER — OFFICE VISIT (OUTPATIENT)
Dept: PODIATRY | Facility: CLINIC | Age: 81
End: 2021-12-14
Payer: MEDICARE

## 2021-12-14 VITALS
DIASTOLIC BLOOD PRESSURE: 52 MMHG | HEIGHT: 62 IN | WEIGHT: 165 LBS | BODY MASS INDEX: 30.36 KG/M2 | HEART RATE: 59 BPM | SYSTOLIC BLOOD PRESSURE: 103 MMHG

## 2021-12-14 DIAGNOSIS — I73.9 PAD (PERIPHERAL ARTERY DISEASE): Primary | ICD-10-CM

## 2021-12-14 DIAGNOSIS — B35.1 ONYCHOMYCOSIS: ICD-10-CM

## 2021-12-14 DIAGNOSIS — Z89.421 HISTORY OF AMPUTATION OF LESSER TOE, RIGHT: ICD-10-CM

## 2021-12-14 DIAGNOSIS — E11.42 TYPE 2 DIABETES MELLITUS WITH PERIPHERAL NEUROPATHY: ICD-10-CM

## 2021-12-14 DIAGNOSIS — L84 PRE-ULCERATIVE CALLUSES: ICD-10-CM

## 2021-12-14 PROCEDURE — 11057 ROUTINE FOOT CARE: ICD-10-PCS | Mod: 79,Q7,S$GLB, | Performed by: STUDENT IN AN ORGANIZED HEALTH CARE EDUCATION/TRAINING PROGRAM

## 2021-12-14 PROCEDURE — 99499 NO LOS: ICD-10-PCS | Mod: S$GLB,,, | Performed by: STUDENT IN AN ORGANIZED HEALTH CARE EDUCATION/TRAINING PROGRAM

## 2021-12-14 PROCEDURE — 11057 PARNG/CUTG B9 HYPRKR LES >4: CPT | Mod: 79,Q7,S$GLB, | Performed by: STUDENT IN AN ORGANIZED HEALTH CARE EDUCATION/TRAINING PROGRAM

## 2021-12-14 PROCEDURE — 99999 PR PBB SHADOW E&M-EST. PATIENT-LVL III: ICD-10-PCS | Mod: PBBFAC,,, | Performed by: STUDENT IN AN ORGANIZED HEALTH CARE EDUCATION/TRAINING PROGRAM

## 2021-12-14 PROCEDURE — 99999 PR PBB SHADOW E&M-EST. PATIENT-LVL III: CPT | Mod: PBBFAC,,, | Performed by: STUDENT IN AN ORGANIZED HEALTH CARE EDUCATION/TRAINING PROGRAM

## 2021-12-14 PROCEDURE — 99499 UNLISTED E&M SERVICE: CPT | Mod: S$GLB,,, | Performed by: STUDENT IN AN ORGANIZED HEALTH CARE EDUCATION/TRAINING PROGRAM

## 2021-12-20 ENCOUNTER — LAB VISIT (OUTPATIENT)
Dept: LAB | Facility: HOSPITAL | Age: 81
End: 2021-12-20
Attending: INTERNAL MEDICINE
Payer: MEDICARE

## 2021-12-20 DIAGNOSIS — R10.9 LEFT FLANK PAIN: ICD-10-CM

## 2021-12-20 LAB
BILIRUB UR QL STRIP: NEGATIVE
CLARITY UR: CLEAR
COLOR UR: YELLOW
GLUCOSE UR QL STRIP: NEGATIVE
HGB UR QL STRIP: NEGATIVE
HYALINE CASTS #/AREA URNS LPF: 1 /LPF
KETONES UR QL STRIP: NEGATIVE
LEUKOCYTE ESTERASE UR QL STRIP: ABNORMAL
MICROSCOPIC COMMENT: ABNORMAL
NITRITE UR QL STRIP: NEGATIVE
PH UR STRIP: 5 [PH] (ref 5–8)
PROT UR QL STRIP: ABNORMAL
RBC #/AREA URNS HPF: 2 /HPF (ref 0–4)
SP GR UR STRIP: 1.02 (ref 1–1.03)
SQUAMOUS #/AREA URNS HPF: 4 /HPF
URN SPEC COLLECT METH UR: ABNORMAL
UROBILINOGEN UR STRIP-ACNC: NEGATIVE EU/DL
WBC #/AREA URNS HPF: 14 /HPF (ref 0–5)

## 2021-12-20 PROCEDURE — 81000 URINALYSIS NONAUTO W/SCOPE: CPT | Performed by: INTERNAL MEDICINE

## 2021-12-22 ENCOUNTER — HOSPITAL ENCOUNTER (OUTPATIENT)
Dept: RADIOLOGY | Facility: HOSPITAL | Age: 81
Discharge: HOME | End: 2021-12-22
Attending: INTERNAL MEDICINE
Payer: MEDICARE

## 2021-12-22 DIAGNOSIS — I70.229 CRITICAL LOWER LIMB ISCHEMIA: ICD-10-CM

## 2021-12-22 DIAGNOSIS — I73.9 PAD (PERIPHERAL ARTERY DISEASE): ICD-10-CM

## 2021-12-22 PROCEDURE — 93926 LOWER EXTREMITY STUDY: CPT | Mod: 26,RT,, | Performed by: RADIOLOGY

## 2021-12-22 PROCEDURE — 93926 LOWER EXTREMITY STUDY: CPT | Mod: TC,RT

## 2021-12-22 PROCEDURE — 93926 US LOWER EXTREMITY ARTERIES RIGHT: ICD-10-PCS | Mod: 26,RT,, | Performed by: RADIOLOGY

## 2022-01-05 ENCOUNTER — OFFICE VISIT (OUTPATIENT)
Dept: CARDIOLOGY | Facility: CLINIC | Age: 82
End: 2022-01-05
Payer: MEDICARE

## 2022-01-05 VITALS
BODY MASS INDEX: 30.69 KG/M2 | HEART RATE: 56 BPM | DIASTOLIC BLOOD PRESSURE: 69 MMHG | RESPIRATION RATE: 18 BRPM | SYSTOLIC BLOOD PRESSURE: 140 MMHG | WEIGHT: 166.75 LBS | HEIGHT: 62 IN

## 2022-01-05 DIAGNOSIS — I10 ESSENTIAL HYPERTENSION: ICD-10-CM

## 2022-01-05 DIAGNOSIS — I73.9 PAD (PERIPHERAL ARTERY DISEASE): Primary | ICD-10-CM

## 2022-01-05 DIAGNOSIS — I48.91 ATRIAL FIBRILLATION WITH RAPID VENTRICULAR RESPONSE: ICD-10-CM

## 2022-01-05 PROCEDURE — 1160F PR REVIEW ALL MEDS BY PRESCRIBER/CLIN PHARMACIST DOCUMENTED: ICD-10-PCS | Mod: HCWC,CPTII,S$GLB, | Performed by: INTERNAL MEDICINE

## 2022-01-05 PROCEDURE — 99214 PR OFFICE/OUTPT VISIT, EST, LEVL IV, 30-39 MIN: ICD-10-PCS | Mod: HCWC,S$GLB,, | Performed by: INTERNAL MEDICINE

## 2022-01-05 PROCEDURE — 99999 PR PBB SHADOW E&M-EST. PATIENT-LVL IV: CPT | Mod: PBBFAC,HCWC,, | Performed by: INTERNAL MEDICINE

## 2022-01-05 PROCEDURE — 3288F FALL RISK ASSESSMENT DOCD: CPT | Mod: HCWC,CPTII,S$GLB, | Performed by: INTERNAL MEDICINE

## 2022-01-05 PROCEDURE — 1160F RVW MEDS BY RX/DR IN RCRD: CPT | Mod: HCWC,CPTII,S$GLB, | Performed by: INTERNAL MEDICINE

## 2022-01-05 PROCEDURE — 1126F PR PAIN SEVERITY QUANTIFIED, NO PAIN PRESENT: ICD-10-PCS | Mod: HCWC,CPTII,S$GLB, | Performed by: INTERNAL MEDICINE

## 2022-01-05 PROCEDURE — 3288F PR FALLS RISK ASSESSMENT DOCUMENTED: ICD-10-PCS | Mod: HCWC,CPTII,S$GLB, | Performed by: INTERNAL MEDICINE

## 2022-01-05 PROCEDURE — 1159F MED LIST DOCD IN RCRD: CPT | Mod: HCWC,CPTII,S$GLB, | Performed by: INTERNAL MEDICINE

## 2022-01-05 PROCEDURE — 1101F PR PT FALLS ASSESS DOC 0-1 FALLS W/OUT INJ PAST YR: ICD-10-PCS | Mod: HCWC,CPTII,S$GLB, | Performed by: INTERNAL MEDICINE

## 2022-01-05 PROCEDURE — 99999 PR PBB SHADOW E&M-EST. PATIENT-LVL IV: ICD-10-PCS | Mod: PBBFAC,HCWC,, | Performed by: INTERNAL MEDICINE

## 2022-01-05 PROCEDURE — 3077F SYST BP >= 140 MM HG: CPT | Mod: HCWC,CPTII,S$GLB, | Performed by: INTERNAL MEDICINE

## 2022-01-05 PROCEDURE — 1159F PR MEDICATION LIST DOCUMENTED IN MEDICAL RECORD: ICD-10-PCS | Mod: HCWC,CPTII,S$GLB, | Performed by: INTERNAL MEDICINE

## 2022-01-05 PROCEDURE — 1101F PT FALLS ASSESS-DOCD LE1/YR: CPT | Mod: HCWC,CPTII,S$GLB, | Performed by: INTERNAL MEDICINE

## 2022-01-05 PROCEDURE — 3078F DIAST BP <80 MM HG: CPT | Mod: HCWC,CPTII,S$GLB, | Performed by: INTERNAL MEDICINE

## 2022-01-05 PROCEDURE — 3078F PR MOST RECENT DIASTOLIC BLOOD PRESSURE < 80 MM HG: ICD-10-PCS | Mod: HCWC,CPTII,S$GLB, | Performed by: INTERNAL MEDICINE

## 2022-01-05 PROCEDURE — 3077F PR MOST RECENT SYSTOLIC BLOOD PRESSURE >= 140 MM HG: ICD-10-PCS | Mod: HCWC,CPTII,S$GLB, | Performed by: INTERNAL MEDICINE

## 2022-01-05 PROCEDURE — 1126F AMNT PAIN NOTED NONE PRSNT: CPT | Mod: HCWC,CPTII,S$GLB, | Performed by: INTERNAL MEDICINE

## 2022-01-05 PROCEDURE — 99214 OFFICE O/P EST MOD 30 MIN: CPT | Mod: HCWC,S$GLB,, | Performed by: INTERNAL MEDICINE

## 2022-01-05 NOTE — PROGRESS NOTES
[facility] [place] [facility]     SUBJECTIVE:     History of Present Illness:  Patient is a 81 y.o. female presents with atrial flutter in the past and abnormal echo here for followup . About 2 months ago, had another ulcer in her foot and was under care of podiatry. Seen by cardiology 10/21 - patent right SFA and POP; diffuse AT with multiple lesions and 90% mid PT     Primary Diagnosis:    1. hypertension  2. DM  3. atrial flutter - resolved  4. abnormal Echo: pericardial effusion - small .     5.  PVD - Amputation right second tow and right fifth toe . S/p atherectomy 11/21 right distal vessels     ROS  Since last visit in 3/21:   1. no syncope  2. no chest pains  3. no shortness of breath; no PND or orthopnea  4. heart rate lowest is 60  5. no bleeding     6. stepped on the nail in the carpet and is under care of podiatry ; then had infection again and had hospitalization for 3 days with antibiotics .Had atherectomy distal vessels 11/21; no claudication symptoms now      Past Hospitalization    Review of patient's allergies indicates:   Allergen Reactions    Codeine Nausea Only and Other (See Comments)       Past Medical History:   Diagnosis Date    Anticoagulant long-term use     Arthritis     Atrial flutter     Calcium nephrolithiasis 2007    Diabetes mellitus, type 2     Diabetic peripheral neuropathy associated with type 2 diabetes mellitus     Hypertension        Past Surgical History:   Procedure Laterality Date    ANGIOGRAPHY OF LOWER EXTREMITY N/A 10/21/2021    Procedure: Angiogram Extremity Unilateral;  Surgeon: Dre Martino MD;  Location: [facility] CATH LAB/EP;  Service: Cardiology;  Laterality: N/A;    ANGIOGRAPHY OF LOWER EXTREMITY Right 11/10/2021    Procedure: Angiogram Extremity Unilateral;  Surgeon: Ben Rooney MD;  Location: [facility] CATH LAB/EP;  Service: Cardiology;  Laterality: Right;    COLONOSCOPY  11/28/2011    sigmoid diverticulosis, external hemorrhoids    DEBRIDEMENT Right 10/20/2021     Procedure: DEBRIDEMENT;  Surgeon: Ava Atkins DPM;  Location: [facility] OR;  Service: Podiatry;  Laterality: Right;    ENDOSCOPIC GASTROCNEMIUS RECESSION Right 9/10/2019    Procedure: RECESSION, GASTROCNEMIUS, ENDOSCOPIC;  Surgeon: Derek Jose DPM;  Location: [facility] OR;  Service: Podiatry;  Laterality: Right;  Arthrex center line (ron notified)  Video    FLEXOR TENOTOMY Right 10/20/2021    Procedure: TENOTOMY, FLEXOR 3rd toe;  Surgeon: Ava Atkins DPM;  Location: [facility] OR;  Service: Podiatry;  Laterality: Right;    HYSTERECTOMY      SHOULDER SURGERY Left     TOE AMPUTATION Right 05/22/2017    5th toe    TOE AMPUTATION Right 10/20/2021    Procedure: AMPUTATION, TOE;  Surgeon: Ava Atkins DPM;  Location: [facility] OR;  Service: Podiatry;  Laterality: Right;       Family History   Problem Relation Age of Onset    Diabetes Mother     Heart failure Father     Kidney failure Brother        Social History     Tobacco Use    Smoking status: Never Smoker    Smokeless tobacco: Never Used   Substance Use Topics    Alcohol use: No    Drug use: No        Home meds:  Current Outpatient Medications on File Prior to Visit   Medication Sig Dispense Refill    ACCU-CHEK SAMI PLUS METER Misc TEST  AS DIRECTED 1 each 0    ACCU-CHEK SAMI PLUS TEST STRP Strp USE TO TEST BLOOD SUGAR ONCE DAILY 100 strip 3    ACCU-CHEK SOFT DEV LANCETS Kit       ACCU-CHEK SOFTCLIX LANCETS Misc TEST ONCE DAILY 100 each 3    ammonium lactate 12 % Crea Apply to feet twice daily. Avoid use between toes. 140 g 5    apixaban (ELIQUIS) 5 mg Tab TAKE 1 TABLET BY MOUTH TWICE DAILY 180 tablet 1    atorvastatin (LIPITOR) 80 MG tablet Take 1 tablet (80 mg total) by mouth once daily. 90 tablet 3    clopidogreL (PLAVIX) 75 mg tablet Take 1 tablet (75 mg total) by mouth once daily. 90 tablet 3    diazePAM (VALIUM) 5 MG tablet Take 1 tablet (5 mg total) by mouth daily as needed for Anxiety. 90 tablet 3    famotidine (PEPCID) 20  MG tablet TAKE 1 TABLET ONE TIME DAILY AT BEDTIME 90 tablet 3    gabapentin (NEURONTIN) 400 MG capsule 2 pills in the AM, 1 pill at noon, and 2 pills in the evening. 450 capsule 3    glimepiride (AMARYL) 1 MG tablet TAKE 1 TABLET TWICE DAILY 180 tablet 3    lisinopriL (PRINIVIL,ZESTRIL) 20 MG tablet TAKE 1 TABLET EVERY DAY 90 tablet 3    metoprolol succinate (TOPROL-XL) 25 MG 24 hr tablet Take 1 tablet (25 mg total) by mouth once daily. 81 tablet 2    polyethylene glycol (GLYCOLAX) 17 gram/dose powder Take 17 g by mouth once daily. 510 g 0    pramipexole (MIRAPEX) 0.125 MG tablet Take 1 tablet (0.125 mg total) by mouth once daily. 90 tablet 3    urea (CARMOL) 40 % Crea Apply topically 2 (two) times daily. 1 Bottle 3     No current facility-administered medications on file prior to visit.       Cardiac meds:   Eliquis 5 mg BID  metoprolol succinate 25 mg   Glimepiride 1 mg  lisinopril 20 mg   gabapentin TID       Atorvastatin 80 mg   plavix 75 mg         OBJECTIVE:     Vital Signs (Most Recent)  Pulse: (!) 56 (01/05/22 1329)  Resp: 18 (01/05/22 1329)  BP: (!) 140/69 (01/05/22 1329)      Physical Exam:   Neck: normal carotid upstrokes; normal JVP, no bruits, right  basilar carotid from murmur  Lungs: clear  Heart: RR, normal S1,S2, systolic ejection murmur base; no AI  Abd: obese  Exts: normal DP left; faint  PT right, no edema.           LABS    CBC  Hemoglobin (g/dL)   Date Value   11/09/2021 11.7 (L)   10/22/2021 11.8 (L)   10/21/2021 12.1     Hematocrit (%)   Date Value   11/09/2021 34.8 (L)   10/22/2021 34.9 (L)   10/21/2021 36.5 (L)          BMP  Sodium (mmol/L)   Date Value   11/09/2021 141   10/22/2021 140   10/21/2021 141   10/21/2021 141     Potassium (mmol/L)   Date Value   11/09/2021 5.1   10/22/2021 4.3   10/21/2021 4.9   10/21/2021 4.9     CO2 (mmol/L)   Date Value   11/09/2021 23   10/22/2021 21 (L)   10/21/2021 22 (L)   10/21/2021 22 (L)     Chloride (mmol/L)   Date Value   11/09/2021 109    10/22/2021 106   10/21/2021 107   10/21/2021 107     BUN (mg/dL)   Date Value   2021 18   10/22/2021 28 (H)   10/21/2021 32 (H)   10/21/2021 32 (H)     Creatinine (mg/dL)   Date Value   2021 1.2   10/22/2021 1.3   10/21/2021 1.4   10/21/2021 1.4     Glucose (mg/dL)   Date Value   2021 116 (H)   10/22/2021 131 (H)   10/21/2021 105   10/21/2021 105     eGFR if non African American (mL/min/1.73 m^2)   Date Value   2021 42 (A)   10/22/2021 38 (A)   10/21/2021 35 (A)   10/21/2021 35 (A)       BNP  BNP (pg/mL)   Date Value   2017 192 (H)   2017 91       Troponin panel:   No results found for: TROPONIN      COAGS  INR (no units)   Date Value   2017 1.1   2017 1.1   2017 1.1     aPTT (sec)   Date Value   2017 45.3 (H)   2017 47.6 (H)   2017 31.8       Lipid panel:    Lab Results   Component Value Date    CHOL 225 (H) 2020    CHOL 151 2017     Lab Results   Component Value Date    HDL 47 (L) 2020    HDL 79 (A) 2018    HDL 48 2017     Lab Results   Component Value Date    LDLCALC 135 (H) 2020    LDLCALC 146 2018    LDLCALC 80.2 2017     Lab Results   Component Value Date    TRIG 300 (H) 2020    TRIG 131 2018    TRIG 114 2017     Lab Results   Component Value Date    CHOLHDL 4.8 2020    CHOLHDL 31.8 2017         Old Results:      Diagnostic Results:    1a.  EKGs- 17: sinus; possible old anteroseptal infarction   1b. EK17: atrial flutter with ventricular rate of 147;   1c. EK17: sinus  1d. EK/19: sinus with nonspecific ivcd   1e. EK/21: sinus ; LAD     2. Echos- 17: normal EF, diastolic dysfunction; mild LAE, moderate pericardial effusion, aortic sclerosis   2b. Echo: 17: normal EF, small pericardial effusion; LAE      3. Stress Test- [  ]  4. Cath- [  ]  5. arterial study: no significant disease. : left clear; right distal right  popliteal 50-75%  5b. Arterial study lower exts: disease in small vessels; underwent atherectomy 11/10/21 with good results       ASSESSMENT/PLAN:        1. atrial flutter: resolved; [id number]: however 4; no bleeding ; now in sinus   2. diastolic dysfunction  3. moderate pericardial effusion: resolving to mild   4. shortness of breath: not cardiac probably due to back issues and deconditioning - improved tremendously   5. dizziness: resolved   6. blood pressures normal      7. CKD with GFR 38 10/21  8. Right toes healed and doing better     Plan: continue the same medications  eliquis help needed will refer to specialty services   Echocardiogram   Return 3 months     Sam Paulino MD

## 2022-01-12 ENCOUNTER — OFFICE VISIT (OUTPATIENT)
Dept: PODIATRY | Facility: CLINIC | Age: 82
End: 2022-01-12
Payer: MEDICARE

## 2022-01-12 VITALS — HEIGHT: 62 IN | BODY MASS INDEX: 30.55 KG/M2 | WEIGHT: 166 LBS

## 2022-01-12 DIAGNOSIS — L84 PRE-ULCERATIVE CALLUSES: ICD-10-CM

## 2022-01-12 DIAGNOSIS — I73.9 PAD (PERIPHERAL ARTERY DISEASE): Primary | ICD-10-CM

## 2022-01-12 PROCEDURE — 99999 PR PBB SHADOW E&M-EST. PATIENT-LVL III: ICD-10-PCS | Mod: PBBFAC,HCWC,, | Performed by: STUDENT IN AN ORGANIZED HEALTH CARE EDUCATION/TRAINING PROGRAM

## 2022-01-12 PROCEDURE — 99999 PR PBB SHADOW E&M-EST. PATIENT-LVL III: CPT | Mod: PBBFAC,HCWC,, | Performed by: STUDENT IN AN ORGANIZED HEALTH CARE EDUCATION/TRAINING PROGRAM

## 2022-01-12 PROCEDURE — 99499 UNLISTED E&M SERVICE: CPT | Mod: HCWC,S$GLB,, | Performed by: STUDENT IN AN ORGANIZED HEALTH CARE EDUCATION/TRAINING PROGRAM

## 2022-01-12 PROCEDURE — 1126F PR PAIN SEVERITY QUANTIFIED, NO PAIN PRESENT: ICD-10-PCS | Mod: HCWC,CPTII,S$GLB, | Performed by: STUDENT IN AN ORGANIZED HEALTH CARE EDUCATION/TRAINING PROGRAM

## 2022-01-12 PROCEDURE — 1126F AMNT PAIN NOTED NONE PRSNT: CPT | Mod: HCWC,CPTII,S$GLB, | Performed by: STUDENT IN AN ORGANIZED HEALTH CARE EDUCATION/TRAINING PROGRAM

## 2022-01-12 PROCEDURE — 99499 NO LOS: ICD-10-PCS | Mod: HCWC,S$GLB,, | Performed by: STUDENT IN AN ORGANIZED HEALTH CARE EDUCATION/TRAINING PROGRAM

## 2022-01-12 NOTE — PROCEDURES
Wound Debridement    Date/Time: 1/12/2022 1:30 PM  Performed by: Ava Atkins DPM  Authorized by: Ava Atkins DPM     Consent Done?:  Yes (Verbal)  Local anesthesia used?: No      Wound Details:    Location:  Right foot    Location:  Right 1st Toe    Type of Debridement:  Non-excisional       Length (cm):  0       Area (sq cm):  0       Width (cm):  0       Percent Debrided (%):  100       Depth (cm):  0       Total Area Debrided (sq cm):  0    Depth of debridement:  Epidermis/Dermis    Devitalized tissue debrided:  Callus    Instruments:  Blade    Bleeding:  None  Patient tolerance:  Patient tolerated the procedure well with no immediate complications     No cultures were taken during this visit

## 2022-01-12 NOTE — PROGRESS NOTES
Subjective:      Patient ID: Caro Powell is a 81 y.o. female.    Chief Complaint: Follow-up (Right foot)    Caro is a 81 y.o. female who presents to the clinic for evaluation and treatment of high risk feet. Caro has a past medical history of Anticoagulant long-term use, Arthritis, Atrial flutter, Calcium nephrolithiasis (2007), Diabetes mellitus, type 2, Diabetic peripheral neuropathy associated with type 2 diabetes mellitus, and Hypertension. The patient's chief complaint is long, thick toenails. This patient has documented high risk feet requiring routine maintenance secondary to diabetes mellitis and those secondary complications of diabetes, as mentioned.. Patient states she is following closely with Dr. Rooney for PAD. Relates has new SAS shoes which she put her diabetic shoes in and her feet are looking much better. No new pedal complaints.     1/12/22: Pt seen today, denies ulcers, states foot is looking much better, no new pedal complaints.     PCP: Brayan Penny MD    Date Last Seen by PCP: Dr. Rooney 11/02/21    Current shoe gear:  SAS diabetic shoes    Hemoglobin A1C   Date Value Ref Range Status   10/18/2021 6.7 (H) 4.0 - 5.6 % Final     Comment:     ADA Screening Guidelines:  5.7-6.4%  Consistent with prediabetes  >or=6.5%  Consistent with diabetes    High levels of fetal hemoglobin interfere with the HbA1C  assay. Heterozygous hemoglobin variants (HbS, HgC, etc)do  not significantly interfere with this assay.   However, presence of multiple variants may affect accuracy.     01/22/2021 6.6 (H) 4.0 - 5.6 % Final     Comment:     ADA Screening Guidelines:  5.7-6.4%  Consistent with prediabetes  >or=6.5%  Consistent with diabetes  High levels of fetal hemoglobin interfere with the HbA1C  assay. Heterozygous hemoglobin variants (HbS, HgC, etc)do  not significantly interfere with this assay.   However, presence of multiple variants may affect accuracy.     06/25/2020 6.2 (H) <5.7 % of  total Hgb Final     Comment:     For someone without known diabetes, a hemoglobin   A1c value between 5.7% and 6.4% is consistent with  prediabetes and should be confirmed with a   follow-up test.     For someone with known diabetes, a value <7%  indicates that their diabetes is well controlled. A1c  targets should be individualized based on duration of  diabetes, age, comorbid conditions, and other  considerations.     This assay result is consistent with an increased risk  of diabetes.     Currently, no consensus exists regarding use of  hemoglobin A1c for diagnosis of diabetes for children.        01/12/2018 8.3 % Final       Review of Systems   Constitutional: Negative for chills, decreased appetite, diaphoresis and fever.   HENT: Negative for congestion and hearing loss.    Cardiovascular: Negative for chest pain, claudication, leg swelling and syncope.   Respiratory: Negative for cough and shortness of breath.    Skin: Positive for dry skin, nail changes and poor wound healing. Negative for color change, flushing, itching and rash.   Musculoskeletal: Positive for arthritis. Negative for joint pain and joint swelling.   Gastrointestinal: Negative for nausea and vomiting.   Neurological: Positive for numbness. Negative for focal weakness, paresthesias and weakness.   Psychiatric/Behavioral: Negative for altered mental status. The patient is not nervous/anxious.            Objective:      Physical Exam  Constitutional:       General: She is not in acute distress.     Appearance: She is well-developed. She is not diaphoretic.   Cardiovascular:      Comments: Dorsalis pedis and posterior tibial pulses are diminished. Skin temperature is within normal limits. Toes are cool to touch and feet are warm proximally. Hair growth is diminished. Skin is mildly atrophic and with mild hyperpigmentation. Mild edema noted, bilaterally. Telangiectasias bilaterally.  Musculoskeletal:         General: No tenderness.      Comments:  Adequate joint range of motion without pain, limitation, nor crepitation to bilateral feet and ankle joints. Muscle strength is 5/5 in all groups bilaterally.    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 HAV, bilaterally. Semi rigid hammertoes to remaining digits.    Lymphadenopathy:      Comments: Negative lymphangitic streaking    Skin:     General: Skin is warm and dry.      Findings: No lesion.      Comments: Skin is warm and dry, no acute signs of infection noted. No open wounds, macerations or hyperkeratotic lesions, bilaterally.     Diffuse calluses to medial 1st MTPJ and hallux, bilaterally.     Toenails are thickened by 2-4 mm's, dystrophic, and are darkened in coloration with subungual fungal debris, bilaterally.  Skin is very dry, bilaterally.      Neurological:      Mental Status: She is alert and oriented to person, place, and time.      Sensory: Sensory deficit present.      Motor: No abnormal muscle tone.      Comments: Light touch is diminished. [place] 5.07 monofilamant testing is diminished. Vibratory sensation  is diminished, bilaterally    Psychiatric:         Behavior: Behavior normal.         Thought Content: Thought content normal.         Judgment: Judgment normal.               Assessment:       Encounter Diagnoses   Name Primary?    PAD (peripheral artery disease) Yes    Pre-ulcerative calluses          Plan:       Caro was seen today for follow-up.    Diagnoses and all orders for this visit:    PAD (peripheral artery disease)  -     Wound Debridement    Pre-ulcerative calluses  -     Wound Debridement      I counseled the patient on her conditions, their implications and medical management.    Debridement performed, no open wounds or signs of infection noted.   Continue diabetic shoes with inserts  Rest, elevate. Daily foot inspections   Shoe inspection. Diabetic Foot Education. Patient reminded of the importance of good nutrition  and blood sugar control to help prevent podiatric complications of diabetes. Patient instructed on proper foot hygeine. We discussed wearing proper shoe gear, daily foot inspections, never walking without protective shoe gear, never putting sharp instruments to feet, routine podiatric nail visits      RTC in 1 mo, sooner PRN

## 2022-01-14 ENCOUNTER — HOSPITAL ENCOUNTER (OUTPATIENT)
Dept: CARDIOLOGY | Facility: HOSPITAL | Age: 82
Discharge: HOME | End: 2022-01-14
Attending: INTERNAL MEDICINE
Payer: MEDICARE

## 2022-01-14 VITALS — WEIGHT: 166 LBS | BODY MASS INDEX: 30.55 KG/M2 | HEIGHT: 62 IN

## 2022-01-14 DIAGNOSIS — I73.9 PAD (PERIPHERAL ARTERY DISEASE): ICD-10-CM

## 2022-01-14 DIAGNOSIS — I48.91 ATRIAL FIBRILLATION WITH RAPID VENTRICULAR RESPONSE: ICD-10-CM

## 2022-01-14 LAB
AORTIC ROOT ANNULUS: 2.97 CM
AORTIC VALVE CUSP SEPERATION: 1.67 CM
ASCENDING AORTA: 2.76 CM
AV INDEX (PROSTH): 0.53
AV MEAN GRADIENT: 9 MMHG
AV PEAK GRADIENT: 17 MMHG
AV VALVE AREA: 1.67 CM2
AV VELOCITY RATIO: 0.52
BSA FOR ECHO PROCEDURE: 1.81 M2
CV ECHO LV RWT: 0.65 CM
DOP CALC AO PEAK VEL: 2.04 M/S
DOP CALC AO VTI: 54.58 CM
DOP CALC LVOT AREA: 3.1 CM2
DOP CALC LVOT DIAMETER: 2 CM
DOP CALC LVOT PEAK VEL: 1.07 M/S
DOP CALC LVOT STROKE VOLUME: 91 CM3
DOP CALC MV VTI: 40.86 CM
DOP CALCLVOT PEAK VEL VTI: 28.98 CM
E WAVE DECELERATION TIME: 232.23 MSEC
E/A RATIO: 0.88
E/E' RATIO: 17.64 M/S
ECHO LV POSTERIOR WALL: 1.33 CM (ref 0.6–1.1)
EJECTION FRACTION: 65 %
FRACTIONAL SHORTENING: 39 % (ref 28–44)
INTERVENTRICULAR SEPTUM: 1.16 CM (ref 0.6–1.1)
LA MAJOR: 5.77 CM
LA MINOR: 5.6 CM
LA WIDTH: 3.9 CM
LEFT ATRIUM SIZE: 4.03 CM
LEFT ATRIUM VOLUME INDEX MOD: 25.8 ML/M2
LEFT ATRIUM VOLUME INDEX: 42.9 ML/M2
LEFT ATRIUM VOLUME MOD: 45.67 CM3
LEFT ATRIUM VOLUME: 75.93 CM3
LEFT INTERNAL DIMENSION IN SYSTOLE: 2.48 CM (ref 2.1–4)
LEFT VENTRICLE DIASTOLIC VOLUME INDEX: 41.25 ML/M2
LEFT VENTRICLE DIASTOLIC VOLUME: 73.02 ML
LEFT VENTRICLE MASS INDEX: 101 G/M2
LEFT VENTRICLE SYSTOLIC VOLUME INDEX: 12.3 ML/M2
LEFT VENTRICLE SYSTOLIC VOLUME: 21.77 ML
LEFT VENTRICULAR INTERNAL DIMENSION IN DIASTOLE: 4.07 CM (ref 3.5–6)
LEFT VENTRICULAR MASS: 179.38 G
LV LATERAL E/E' RATIO: 16.17 M/S
LV SEPTAL E/E' RATIO: 19.4 M/S
MV A" WAVE DURATION": 12.56 MSEC
MV MEAN GRADIENT: 1 MMHG
MV PEAK A VEL: 1.1 M/S
MV PEAK E VEL: 0.97 M/S
MV PEAK GRADIENT: 6 MMHG
MV STENOSIS PRESSURE HALF TIME: 67.35 MS
MV VALVE AREA BY CONTINUITY EQUATION: 2.23 CM2
MV VALVE AREA P 1/2 METHOD: 3.27 CM2
PISA TR MAX VEL: 2.56 M/S
PULM VEIN S/D RATIO: 1.82
PV PEAK D VEL: 0.33 M/S
PV PEAK S VEL: 0.6 M/S
PV PEAK VELOCITY: 1.32 CM/S
RA MAJOR: 4.8 CM
RA PRESSURE: 3 MMHG
RA WIDTH: 3.45 CM
RIGHT VENTRICULAR END-DIASTOLIC DIMENSION: 2.69 CM
RV TISSUE DOPPLER FREE WALL SYSTOLIC VELOCITY 1 (APICAL 4 CHAMBER VIEW): 12.51 CM/S
STJ: 2.23 CM
TDI LATERAL: 0.06 M/S
TDI SEPTAL: 0.05 M/S
TDI: 0.06 M/S
TR MAX PG: 26 MMHG
TRICUSPID ANNULAR PLANE SYSTOLIC EXCURSION: 2.21 CM
TV REST PULMONARY ARTERY PRESSURE: 29 MMHG

## 2022-01-14 PROCEDURE — 93356 MYOCRD STRAIN IMG SPCKL TRCK: CPT | Mod: HCWC

## 2022-01-18 ENCOUNTER — HOSPITAL ENCOUNTER (OUTPATIENT)
Dept: RADIOLOGY | Facility: HOSPITAL | Age: 82
Discharge: HOME | End: 2022-01-18
Attending: INTERNAL MEDICINE
Payer: MEDICARE

## 2022-01-18 DIAGNOSIS — R10.32 LLQ ABDOMINAL PAIN: ICD-10-CM

## 2022-01-18 PROCEDURE — 74019 RADEX ABDOMEN 2 VIEWS: CPT | Mod: TC,HCWC,FY

## 2022-01-18 PROCEDURE — 74019 RADEX ABDOMEN 2 VIEWS: CPT | Mod: 26,HCWC,, | Performed by: RADIOLOGY

## 2022-01-18 PROCEDURE — 74019 XR ABDOMEN FLAT AND ERECT: ICD-10-PCS | Mod: 26,HCWC,, | Performed by: RADIOLOGY

## 2022-01-28 ENCOUNTER — TELEPHONE (OUTPATIENT)
Dept: PODIATRY | Facility: CLINIC | Age: 82
End: 2022-01-28
Payer: MEDICARE

## 2022-01-28 NOTE — TELEPHONE ENCOUNTER
----- Message from Conor Richardson sent at 1/28/2022 12:28 PM CST -----  Contact: patient  [phone]  Patient calling to speak with you regarding where she can order diabetic shoes   Please advise

## 2022-02-10 ENCOUNTER — TELEPHONE (OUTPATIENT)
Dept: CARDIOLOGY | Facility: CLINIC | Age: 82
End: 2022-02-10
Payer: MEDICARE

## 2022-02-10 RX ORDER — METOPROLOL SUCCINATE 25 MG/1
25 TABLET, EXTENDED RELEASE ORAL DAILY
Qty: 90 TABLET | Refills: 2 | Status: SHIPPED | OUTPATIENT
Start: 2022-02-10 | End: 2022-02-15 | Stop reason: SDUPTHER

## 2022-02-10 NOTE — TELEPHONE ENCOUNTER
----- Message from Angelica Ruiz sent at 2/10/2022  1:52 PM CST -----  Contact: [phone]  Type:  RX Refill Request    Who Called: pt called  Refill or New Rx:refill  RX Name and Strength:metoprolol succinate (TOPROL-XL) 25 MG 24 hr tablet  How is the patient currently taking it? (ex. 1XDay)  Is this a 30 day or 90 day RX:90 days  Preferred Pharmacy with phone number:[facility] - [facility] [place] [address]  Local or Mail Order:local  Ordering Provider:dr. Escobar  Would the patient rather a call back or a response via [facility]? Call back  Best Call Back Number:[phone]  Additional Information:

## 2022-02-14 ENCOUNTER — TELEPHONE (OUTPATIENT)
Dept: CARDIOLOGY | Facility: CLINIC | Age: 82
End: 2022-02-14
Payer: MEDICARE

## 2022-02-14 NOTE — TELEPHONE ENCOUNTER
----- Message from Yosiolegario Hendrix sent at 2/14/2022  9:57 AM CST -----  Type:  Needs Medical Advice    Who Called: patient  Pharmacy name and phone #:  [facility]  Would the patient rather a call back or a response via [facility]? call  Best Call Back Number: [phone]         Additional Information: She needs the metoprolol succinate (TOPROL-XL) 25 MG 24 hr tablet   Sig - Route: Take 1 tablet (25 mg total) by mouth once daily. - Oral called in to the local pharmacy and Flutter.  She thinks she requested it with [employer] but would like to make sure the office has received it.  She would also like to speak to the doctor concerning a personal matter.

## 2022-02-15 NOTE — TELEPHONE ENCOUNTER
----- Message from Angelica Ruiz sent at 2/15/2022  3:01 PM CST -----  Type:  Needs Medical Advice    Who Called: pt called  Would the patient rather a call back or a response via [facility]? Call back  Best Call Back Number:[phone]  Additional Information: pt would like to speak to dr. Pt is very upset. Pt says someone needs to call her today. Has been  calling for 3 days

## 2022-02-16 ENCOUNTER — TELEPHONE (OUTPATIENT)
Dept: CARDIOLOGY | Facility: CLINIC | Age: 82
End: 2022-02-16
Payer: MEDICARE

## 2022-02-16 RX ORDER — METOPROLOL SUCCINATE 25 MG/1
25 TABLET, EXTENDED RELEASE ORAL DAILY
Qty: 90 TABLET | Refills: 2 | Status: SHIPPED | OUTPATIENT
Start: 2022-02-16 | End: 2022-04-13 | Stop reason: SDUPTHER

## 2022-02-16 NOTE — TELEPHONE ENCOUNTER
----- Message from Preston Carlos sent at 2/16/2022  9:21 AM CST -----  Type:  Needs Medical Advice    Who Called: self  Reason:need an emergency supply(1 week) of metoprolol succinate (TOPROL-XL) 25 MG 24 hr tablet sent to her local pharmacy until she cant get the script though her mail order . Patient is very mad and upset so please call [facility]  Would the patient rather a call back or a response via [facility]? call  Best Call Back Number:[phone]  Additional Information: [id number] - [place] - [address] [facility] [address]   Phone:  [phone]  Fax:  [phone]

## 2022-02-17 ENCOUNTER — DOCUMENTATION ONLY (OUTPATIENT)
Dept: CARDIOLOGY | Facility: CLINIC | Age: 82
End: 2022-02-17
Payer: MEDICARE

## 2022-02-17 RX ORDER — FUROSEMIDE 40 MG/1
40 TABLET ORAL DAILY
Qty: 30 TABLET | Refills: 11 | Status: SHIPPED | OUTPATIENT
Start: 2022-02-17 | End: 2022-09-12 | Stop reason: SDUPTHER

## 2022-02-17 NOTE — PROGRESS NOTES
Spoke to patient. She has the metoprolol but has noted some ankle edema bilaterally. Sent script for lasix for few days and then hold

## 2022-03-02 ENCOUNTER — OFFICE VISIT (OUTPATIENT)
Dept: PODIATRY | Facility: CLINIC | Age: 82
End: 2022-03-02
Payer: MEDICARE

## 2022-03-02 VITALS
DIASTOLIC BLOOD PRESSURE: 75 MMHG | WEIGHT: 168 LBS | HEIGHT: 62 IN | HEART RATE: 68 BPM | SYSTOLIC BLOOD PRESSURE: 150 MMHG | BODY MASS INDEX: 30.91 KG/M2

## 2022-03-02 DIAGNOSIS — B35.1 ONYCHOMYCOSIS: ICD-10-CM

## 2022-03-02 DIAGNOSIS — I73.9 PAD (PERIPHERAL ARTERY DISEASE): Primary | ICD-10-CM

## 2022-03-02 DIAGNOSIS — E11.42 TYPE 2 DIABETES MELLITUS WITH PERIPHERAL NEUROPATHY: ICD-10-CM

## 2022-03-02 DIAGNOSIS — Z89.421 HISTORY OF AMPUTATION OF LESSER TOE, RIGHT: ICD-10-CM

## 2022-03-02 PROCEDURE — 11721 DEBRIDE NAIL 6 OR MORE: CPT | Mod: 59,Q7,HCWC,S$GLB | Performed by: STUDENT IN AN ORGANIZED HEALTH CARE EDUCATION/TRAINING PROGRAM

## 2022-03-02 PROCEDURE — 3078F DIAST BP <80 MM HG: CPT | Mod: HCWC,CPTII,S$GLB, | Performed by: STUDENT IN AN ORGANIZED HEALTH CARE EDUCATION/TRAINING PROGRAM

## 2022-03-02 PROCEDURE — 1101F PT FALLS ASSESS-DOCD LE1/YR: CPT | Mod: HCWC,CPTII,S$GLB, | Performed by: STUDENT IN AN ORGANIZED HEALTH CARE EDUCATION/TRAINING PROGRAM

## 2022-03-02 PROCEDURE — 99999 PR PBB SHADOW E&M-EST. PATIENT-LVL IV: CPT | Mod: PBBFAC,HCWC,, | Performed by: STUDENT IN AN ORGANIZED HEALTH CARE EDUCATION/TRAINING PROGRAM

## 2022-03-02 PROCEDURE — 99499 UNLISTED E&M SERVICE: CPT | Mod: HCWC,S$GLB,, | Performed by: STUDENT IN AN ORGANIZED HEALTH CARE EDUCATION/TRAINING PROGRAM

## 2022-03-02 PROCEDURE — 3288F FALL RISK ASSESSMENT DOCD: CPT | Mod: HCWC,CPTII,S$GLB, | Performed by: STUDENT IN AN ORGANIZED HEALTH CARE EDUCATION/TRAINING PROGRAM

## 2022-03-02 PROCEDURE — 1159F PR MEDICATION LIST DOCUMENTED IN MEDICAL RECORD: ICD-10-PCS | Mod: HCWC,CPTII,S$GLB, | Performed by: STUDENT IN AN ORGANIZED HEALTH CARE EDUCATION/TRAINING PROGRAM

## 2022-03-02 PROCEDURE — 99999 PR PBB SHADOW E&M-EST. PATIENT-LVL IV: ICD-10-PCS | Mod: PBBFAC,HCWC,, | Performed by: STUDENT IN AN ORGANIZED HEALTH CARE EDUCATION/TRAINING PROGRAM

## 2022-03-02 PROCEDURE — 11057 PARNG/CUTG B9 HYPRKR LES >4: CPT | Mod: Q7,HCWC,S$GLB, | Performed by: STUDENT IN AN ORGANIZED HEALTH CARE EDUCATION/TRAINING PROGRAM

## 2022-03-02 PROCEDURE — 3078F PR MOST RECENT DIASTOLIC BLOOD PRESSURE < 80 MM HG: ICD-10-PCS | Mod: HCWC,CPTII,S$GLB, | Performed by: STUDENT IN AN ORGANIZED HEALTH CARE EDUCATION/TRAINING PROGRAM

## 2022-03-02 PROCEDURE — 11721 ROUTINE FOOT CARE: ICD-10-PCS | Mod: 59,Q7,HCWC,S$GLB | Performed by: STUDENT IN AN ORGANIZED HEALTH CARE EDUCATION/TRAINING PROGRAM

## 2022-03-02 PROCEDURE — 3077F PR MOST RECENT SYSTOLIC BLOOD PRESSURE >= 140 MM HG: ICD-10-PCS | Mod: HCWC,CPTII,S$GLB, | Performed by: STUDENT IN AN ORGANIZED HEALTH CARE EDUCATION/TRAINING PROGRAM

## 2022-03-02 PROCEDURE — 1159F MED LIST DOCD IN RCRD: CPT | Mod: HCWC,CPTII,S$GLB, | Performed by: STUDENT IN AN ORGANIZED HEALTH CARE EDUCATION/TRAINING PROGRAM

## 2022-03-02 PROCEDURE — 3288F PR FALLS RISK ASSESSMENT DOCUMENTED: ICD-10-PCS | Mod: HCWC,CPTII,S$GLB, | Performed by: STUDENT IN AN ORGANIZED HEALTH CARE EDUCATION/TRAINING PROGRAM

## 2022-03-02 PROCEDURE — 99499 RISK ADDL DX/OHS AUDIT: ICD-10-PCS | Mod: HCWC,S$GLB,, | Performed by: STUDENT IN AN ORGANIZED HEALTH CARE EDUCATION/TRAINING PROGRAM

## 2022-03-02 PROCEDURE — 1126F PR PAIN SEVERITY QUANTIFIED, NO PAIN PRESENT: ICD-10-PCS | Mod: HCWC,CPTII,S$GLB, | Performed by: STUDENT IN AN ORGANIZED HEALTH CARE EDUCATION/TRAINING PROGRAM

## 2022-03-02 PROCEDURE — 1101F PR PT FALLS ASSESS DOC 0-1 FALLS W/OUT INJ PAST YR: ICD-10-PCS | Mod: HCWC,CPTII,S$GLB, | Performed by: STUDENT IN AN ORGANIZED HEALTH CARE EDUCATION/TRAINING PROGRAM

## 2022-03-02 PROCEDURE — 1126F AMNT PAIN NOTED NONE PRSNT: CPT | Mod: HCWC,CPTII,S$GLB, | Performed by: STUDENT IN AN ORGANIZED HEALTH CARE EDUCATION/TRAINING PROGRAM

## 2022-03-02 PROCEDURE — 11057 ROUTINE FOOT CARE: ICD-10-PCS | Mod: Q7,HCWC,S$GLB, | Performed by: STUDENT IN AN ORGANIZED HEALTH CARE EDUCATION/TRAINING PROGRAM

## 2022-03-02 PROCEDURE — 3077F SYST BP >= 140 MM HG: CPT | Mod: HCWC,CPTII,S$GLB, | Performed by: STUDENT IN AN ORGANIZED HEALTH CARE EDUCATION/TRAINING PROGRAM

## 2022-03-02 NOTE — PROGRESS NOTES
Subjective:      Patient ID: Caro Powell is a 81 y.o. female.    Chief Complaint: Nail Care    Caro is a 81 y.o. female who presents to the clinic for evaluation and treatment of high risk feet. Caro has a past medical history of Anticoagulant long-term use, Arthritis, Atrial flutter, Calcium nephrolithiasis (2007), Diabetes mellitus, type 2, Diabetic peripheral neuropathy associated with type 2 diabetes mellitus, and Hypertension. The patient's chief complaint is long, thick toenails. This patient has documented high risk feet requiring routine maintenance secondary to diabetes mellitis and those secondary complications of diabetes, as mentioned.. Patient states she is following closely with Dr. Rooney for PAD. Relates has new SAS shoes which she put her diabetic shoes in and her feet are looking much better. No new pedal complaints.     1/12/22: Pt seen today, denies ulcers, states foot is looking much better, no new pedal complaints.     3/7/22: Pt seen today for RFC. No new pedal complaints.     PCP: Brayan Penny MD    Date Last Seen by PCP: 1/18/22    Current shoe gear:  SAS diabetic shoes    Hemoglobin A1C   Date Value Ref Range Status   10/18/2021 6.7 (H) 4.0 - 5.6 % Final     Comment:     ADA Screening Guidelines:  5.7-6.4%  Consistent with prediabetes  >or=6.5%  Consistent with diabetes    High levels of fetal hemoglobin interfere with the HbA1C  assay. Heterozygous hemoglobin variants (HbS, HgC, etc)do  not significantly interfere with this assay.   However, presence of multiple variants may affect accuracy.     01/22/2021 6.6 (H) 4.0 - 5.6 % Final     Comment:     ADA Screening Guidelines:  5.7-6.4%  Consistent with prediabetes  >or=6.5%  Consistent with diabetes  High levels of fetal hemoglobin interfere with the HbA1C  assay. Heterozygous hemoglobin variants (HbS, HgC, etc)do  not significantly interfere with this assay.   However, presence of multiple variants may affect  accuracy.     06/25/2020 6.2 (H) <5.7 % of total Hgb Final     Comment:     For someone without known diabetes, a hemoglobin   A1c value between 5.7% and 6.4% is consistent with  prediabetes and should be confirmed with a   follow-up test.     For someone with known diabetes, a value <7%  indicates that their diabetes is well controlled. A1c  targets should be individualized based on duration of  diabetes, age, comorbid conditions, and other  considerations.     This assay result is consistent with an increased risk  of diabetes.     Currently, no consensus exists regarding use of  hemoglobin A1c for diagnosis of diabetes for children.        01/12/2018 8.3 % Final       Review of Systems   Constitutional: Negative for chills, decreased appetite, diaphoresis and fever.   HENT: Negative for congestion and hearing loss.    Cardiovascular: Negative for chest pain, claudication, leg swelling and syncope.   Respiratory: Negative for cough and shortness of breath.    Skin: Positive for dry skin, nail changes and poor wound healing. Negative for color change, flushing, itching and rash.   Musculoskeletal: Positive for arthritis. Negative for joint pain and joint swelling.   Gastrointestinal: Negative for nausea and vomiting.   Neurological: Positive for numbness. Negative for focal weakness, paresthesias and weakness.   Psychiatric/Behavioral: Negative for altered mental status. The patient is not nervous/anxious.            Objective:      Physical Exam  Constitutional:       General: She is not in acute distress.     Appearance: She is well-developed. She is not diaphoretic.   Cardiovascular:      Comments: Dorsalis pedis and posterior tibial pulses are diminished. Skin temperature is within normal limits. Toes are cool to touch and feet are warm proximally. Hair growth is diminished. Skin is mildly atrophic and with mild hyperpigmentation. Mild edema noted, bilaterally. Telangiectasias bilaterally.  Musculoskeletal:          General: No tenderness.      Comments: Adequate joint range of motion without pain, limitation, nor crepitation to bilateral feet and ankle joints. Muscle strength is 5/5 in all groups bilaterally.    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 HAV, bilaterally. Semi rigid hammertoes to remaining digits.    Lymphadenopathy:      Comments: Negative lymphangitic streaking    Skin:     General: Skin is warm and dry.      Findings: No lesion.      Comments: Skin is warm and dry, no acute signs of infection noted. No open wounds, macerations or hyperkeratotic lesions, bilaterally.     Diffuse calluses to medial 1st MTPJ and hallux, bilaterally.     Toenails are thickened by 2-4 mm's, dystrophic, and are darkened in coloration with subungual fungal debris, bilaterally.  Skin is very dry, bilaterally.      Neurological:      Mental Status: She is alert and oriented to person, place, and time.      Sensory: Sensory deficit present.      Motor: No abnormal muscle tone.      Comments: Light touch is diminished. Gilmore-Chucho 5.07 monofilamant testing is diminished. Vibratory sensation  is diminished, bilaterally    Psychiatric:         Behavior: Behavior normal.         Thought Content: Thought content normal.         Judgment: Judgment normal.               Assessment:       Encounter Diagnoses   Name Primary?    PAD (peripheral artery disease) Yes    Type 2 diabetes mellitus with peripheral neuropathy     Onychomycosis     History of amputation of lesser toe, right          Plan:       Caro was seen today for nail care.    Diagnoses and all orders for this visit:    PAD (peripheral artery disease)    Type 2 diabetes mellitus with peripheral neuropathy    Onychomycosis    History of amputation of lesser toe, right      I counseled the patient on her conditions, their implications and medical management.    RFC per attached note  Ulcer remains healed.   Continue  diabetic shoes at all times while ambulating  Rest, elevate. Daily foot inspections   Shoe inspection. Diabetic Foot Education. Patient reminded of the importance of good nutrition and blood sugar control to help prevent podiatric complications of diabetes. Patient instructed on proper foot hygeine. We discussed wearing proper shoe gear, daily foot inspections, never walking without protective shoe gear, never putting sharp instruments to feet, routine podiatric nail visits      RTC in 2-3 mo, sooner PRN

## 2022-03-07 NOTE — PROCEDURES
"Routine Foot Care    Date/Time: 3/2/2022 2:45 PM  Performed by: Ava Atkins DPM  Authorized by: Ava Aktins DPM     Time out: Immediately prior to procedure a "time out" was called to verify the correct patient, procedure, equipment, support staff and site/side marked as required.    Consent Done?:  Yes (Verbal)  Hyperkeratotic Skin Lesions?: Yes    Number of trimmed lesions:  5  Location(s):  Left 1st Toe, Right 1st Toe, Left 1st Metatarsal Head, Right 1st Metatarsal Head and Right 3rd Toe    Nail Care Type:  Debride(Left 1st Toe, Left 3rd Toe, Left 2nd Toe, Left 4th Toe, Left 5th Toe, Right 1st Toe, Right 3rd Toe and Right 4th Toe)  Patient tolerance:  Patient tolerated the procedure well with no immediate complications      "

## 2022-03-16 ENCOUNTER — OFFICE VISIT (OUTPATIENT)
Dept: CARDIOLOGY | Facility: CLINIC | Age: 82
End: 2022-03-16
Payer: MEDICARE

## 2022-03-16 VITALS
HEIGHT: 62 IN | DIASTOLIC BLOOD PRESSURE: 70 MMHG | WEIGHT: 165.81 LBS | HEART RATE: 74 BPM | BODY MASS INDEX: 30.51 KG/M2 | SYSTOLIC BLOOD PRESSURE: 104 MMHG

## 2022-03-16 DIAGNOSIS — I73.9 PAD (PERIPHERAL ARTERY DISEASE): Primary | ICD-10-CM

## 2022-03-16 DIAGNOSIS — I10 ESSENTIAL HYPERTENSION: ICD-10-CM

## 2022-03-16 DIAGNOSIS — I48.92 ATRIAL FLUTTER, UNSPECIFIED TYPE: ICD-10-CM

## 2022-03-16 DIAGNOSIS — E11.40 TYPE 2 DIABETES MELLITUS WITH DIABETIC NEUROPATHY, WITHOUT LONG-TERM CURRENT USE OF INSULIN: ICD-10-CM

## 2022-03-16 PROCEDURE — 1101F PT FALLS ASSESS-DOCD LE1/YR: CPT | Mod: CPTII,S$GLB,, | Performed by: INTERNAL MEDICINE

## 2022-03-16 PROCEDURE — 1160F RVW MEDS BY RX/DR IN RCRD: CPT | Mod: CPTII,S$GLB,, | Performed by: INTERNAL MEDICINE

## 2022-03-16 PROCEDURE — 99214 PR OFFICE/OUTPT VISIT, EST, LEVL IV, 30-39 MIN: ICD-10-PCS | Mod: S$GLB,,, | Performed by: INTERNAL MEDICINE

## 2022-03-16 PROCEDURE — 3074F SYST BP LT 130 MM HG: CPT | Mod: CPTII,S$GLB,, | Performed by: INTERNAL MEDICINE

## 2022-03-16 PROCEDURE — 1160F PR REVIEW ALL MEDS BY PRESCRIBER/CLIN PHARMACIST DOCUMENTED: ICD-10-PCS | Mod: CPTII,S$GLB,, | Performed by: INTERNAL MEDICINE

## 2022-03-16 PROCEDURE — 99999 PR PBB SHADOW E&M-EST. PATIENT-LVL III: CPT | Mod: PBBFAC,,, | Performed by: INTERNAL MEDICINE

## 2022-03-16 PROCEDURE — 99214 OFFICE O/P EST MOD 30 MIN: CPT | Mod: S$GLB,,, | Performed by: INTERNAL MEDICINE

## 2022-03-16 PROCEDURE — 3288F FALL RISK ASSESSMENT DOCD: CPT | Mod: CPTII,S$GLB,, | Performed by: INTERNAL MEDICINE

## 2022-03-16 PROCEDURE — 1159F PR MEDICATION LIST DOCUMENTED IN MEDICAL RECORD: ICD-10-PCS | Mod: CPTII,S$GLB,, | Performed by: INTERNAL MEDICINE

## 2022-03-16 PROCEDURE — 1101F PR PT FALLS ASSESS DOC 0-1 FALLS W/OUT INJ PAST YR: ICD-10-PCS | Mod: CPTII,S$GLB,, | Performed by: INTERNAL MEDICINE

## 2022-03-16 PROCEDURE — 99999 PR PBB SHADOW E&M-EST. PATIENT-LVL III: ICD-10-PCS | Mod: PBBFAC,,, | Performed by: INTERNAL MEDICINE

## 2022-03-16 PROCEDURE — 1159F MED LIST DOCD IN RCRD: CPT | Mod: CPTII,S$GLB,, | Performed by: INTERNAL MEDICINE

## 2022-03-16 PROCEDURE — 1126F AMNT PAIN NOTED NONE PRSNT: CPT | Mod: CPTII,S$GLB,, | Performed by: INTERNAL MEDICINE

## 2022-03-16 PROCEDURE — 3078F PR MOST RECENT DIASTOLIC BLOOD PRESSURE < 80 MM HG: ICD-10-PCS | Mod: CPTII,S$GLB,, | Performed by: INTERNAL MEDICINE

## 2022-03-16 PROCEDURE — 3078F DIAST BP <80 MM HG: CPT | Mod: CPTII,S$GLB,, | Performed by: INTERNAL MEDICINE

## 2022-03-16 PROCEDURE — 1126F PR PAIN SEVERITY QUANTIFIED, NO PAIN PRESENT: ICD-10-PCS | Mod: CPTII,S$GLB,, | Performed by: INTERNAL MEDICINE

## 2022-03-16 PROCEDURE — 3074F PR MOST RECENT SYSTOLIC BLOOD PRESSURE < 130 MM HG: ICD-10-PCS | Mod: CPTII,S$GLB,, | Performed by: INTERNAL MEDICINE

## 2022-03-16 PROCEDURE — 3288F PR FALLS RISK ASSESSMENT DOCUMENTED: ICD-10-PCS | Mod: CPTII,S$GLB,, | Performed by: INTERNAL MEDICINE

## 2022-03-16 NOTE — PROGRESS NOTES
[facility] Cardiology [address]     SUBJECTIVE:     History of Present Illness:  Patient is a 81 y.o. female presents with atrial flutter and PVD. Called regarding fluid in legs and given script for lasix in February   Primary Diagnosis:    1. hypertension  2. DM  3. atrial flutter - resolved  4. abnormal Echo: pericardial effusion - small .     5.  PVD - Amputation right second toe and right fifth toe . S/p atherectomy 11/21 right distal vessels     ROS  Since last visit in 1/22:    1. no syncope  2. no chest pains  3. no shortness of breath; no PND or orthopnea  4. heart rate lowest is 60  5. no bleeding     6. Occasional fluid in ankles and takes the lasix for a few days      7. Walks around without issues and no shortness of breath   Past Hospitalization    Review of patient's allergies indicates:   Allergen Reactions    Codeine Nausea Only and Other (See Comments)       Past Medical History:   Diagnosis Date    Anticoagulant long-term use     Arthritis     Atrial flutter     Calcium nephrolithiasis 2007    Diabetes mellitus, type 2     Diabetic peripheral neuropathy associated with type 2 diabetes mellitus     Hypertension        Past Surgical History:   Procedure Laterality Date    ANGIOGRAPHY OF LOWER EXTREMITY N/A 10/21/2021    Procedure: Angiogram Extremity Unilateral;  Surgeon: Dre Martino MD;  Location: [facility] CATH LAB/EP;  Service: Cardiology;  Laterality: N/A;    ANGIOGRAPHY OF LOWER EXTREMITY Right 11/10/2021    Procedure: Angiogram Extremity Unilateral;  Surgeon: Ben Rooney MD;  Location: [facility] CATH LAB/EP;  Service: Cardiology;  Laterality: Right;    COLONOSCOPY  11/28/2011    sigmoid diverticulosis, external hemorrhoids    DEBRIDEMENT Right 10/20/2021    Procedure: DEBRIDEMENT;  Surgeon: Ava Atkins DPM;  Location: [facility] OR;  Service: Podiatry;  Laterality: Right;    ENDOSCOPIC GASTROCNEMIUS RECESSION Right 9/10/2019    Procedure: RECESSION, GASTROCNEMIUS, ENDOSCOPIC;  Surgeon: Derek  KAJAL Jose DPM;  Location: [facility] OR;  Service: Podiatry;  Laterality: Right;  Arthrex center line (ron notified)  Video    FLEXOR TENOTOMY Right 10/20/2021    Procedure: TENOTOMY, FLEXOR 3rd toe;  Surgeon: Ava Atkins DPM;  Location: [facility] OR;  Service: Podiatry;  Laterality: Right;    HYSTERECTOMY      SHOULDER SURGERY Left     TOE AMPUTATION Right 05/22/2017    5th toe    TOE AMPUTATION Right 10/20/2021    Procedure: AMPUTATION, TOE;  Surgeon: Ava Atkins DPM;  Location: [facility] OR;  Service: Podiatry;  Laterality: Right;       Family History   Problem Relation Age of Onset    Diabetes Mother     Heart failure Father     Kidney failure Brother        Social History     Tobacco Use    Smoking status: Never Smoker    Smokeless tobacco: Never Used   Substance Use Topics    Alcohol use: No    Drug use: No        Home meds:  Current Outpatient Medications on File Prior to Visit   Medication Sig Dispense Refill    ACCU-CHEK SAMI PLUS METER Misc TEST  AS DIRECTED 1 each 0    ACCU-CHEK SAMI PLUS TEST STRP Strp USE TO TEST BLOOD SUGAR ONCE DAILY 100 strip 3    ACCU-CHEK SOFT DEV LANCETS Kit       ACCU-CHEK SOFTCLIX LANCETS Misc TEST ONCE DAILY 100 each 3    ammonium lactate 12 % Crea Apply to feet twice daily. Avoid use between toes. 140 g 5    apixaban (ELIQUIS) 5 mg Tab TAKE 1 TABLET BY MOUTH TWICE DAILY 180 tablet 1    atorvastatin (LIPITOR) 80 MG tablet Take 1 tablet (80 mg total) by mouth once daily. 90 tablet 3    clopidogreL (PLAVIX) 75 mg tablet Take 1 tablet (75 mg total) by mouth once daily. 90 tablet 3    diazePAM (VALIUM) 5 MG tablet Take 1 tablet (5 mg total) by mouth daily as needed for Anxiety. 90 tablet 3    famotidine (PEPCID) 20 MG tablet TAKE 1 TABLET ONE TIME DAILY AT BEDTIME 90 tablet 3    furosemide (LASIX) 40 MG tablet Take 1 tablet (40 mg total) by mouth once daily. 30 tablet 11    gabapentin (NEURONTIN) 400 MG capsule 2 pills in the AM, 1 pill at noon, and 2  pills in the evening. 450 capsule 3    glimepiride (AMARYL) 1 MG tablet TAKE 1 TABLET TWICE DAILY 180 tablet 3    lisinopriL (PRINIVIL,ZESTRIL) 20 MG tablet TAKE 1 TABLET EVERY DAY 90 tablet 3    metoprolol succinate (TOPROL-XL) 25 MG 24 hr tablet Take 1 tablet (25 mg total) by mouth once daily. 90 tablet 2    polyethylene glycol (GLYCOLAX) 17 gram/dose powder Take 17 g by mouth once daily. 510 g 0    pramipexole (MIRAPEX) 0.125 MG tablet Take 1 tablet (0.125 mg total) by mouth once daily. 90 tablet 3    urea (CARMOL) 40 % Crea Apply topically 2 (two) times daily. 1 Bottle 3     No current facility-administered medications on file prior to visit.       Cardiac meds:   Eliquis 5 mg BID  metoprolol succinate 25 mg   Glimepiride 1 mg  lisinopril 20 mg   gabapentin TID       Atorvastatin 80 mg   plavix 75 mg   Lasix 40 mg as needed       OBJECTIVE:     Vital Signs (Most Recent)  Pulse: 74 (03/16/22 0942)  BP: 104/70 (03/16/22 0942)  Weight stable but down 3 lb s from 168 to 165    Physical Exam:   Neck: normal carotid upstrokes; normal JVP, no bruits, right  basilar carotid from murmur  Lungs: clear  Heart: RR, normal S1,S2, systolic ejection murmur base; no AI  Abd: obese  Exts: normal DP left; faint  PT right, no edema bilaterally           LABS    CBC  Hemoglobin (g/dL)   Date Value   11/09/2021 11.7 (L)   10/22/2021 11.8 (L)   10/21/2021 12.1     Hematocrit (%)   Date Value   11/09/2021 34.8 (L)   10/22/2021 34.9 (L)   10/21/2021 36.5 (L)          BMP  Sodium (mmol/L)   Date Value   11/09/2021 141   10/22/2021 140   10/21/2021 141   10/21/2021 141     Potassium (mmol/L)   Date Value   11/09/2021 5.1   10/22/2021 4.3   10/21/2021 4.9   10/21/2021 4.9     CO2 (mmol/L)   Date Value   11/09/2021 23   10/22/2021 21 (L)   10/21/2021 22 (L)   10/21/2021 22 (L)     Chloride (mmol/L)   Date Value   11/09/2021 109   10/22/2021 106   10/21/2021 107   10/21/2021 107     BUN (mg/dL)   Date Value   11/09/2021 18    10/22/2021 28 (H)   10/21/2021 32 (H)   10/21/2021 32 (H)     Creatinine (mg/dL)   Date Value   2021 1.2   10/22/2021 1.3   10/21/2021 1.4   10/21/2021 1.4     Glucose (mg/dL)   Date Value   2021 116 (H)   10/22/2021 131 (H)   10/21/2021 105   10/21/2021 105     eGFR if non African American (mL/min/1.73 m^2)   Date Value   2021 42 (A)   10/22/2021 38 (A)   10/21/2021 35 (A)   10/21/2021 35 (A)       BNP  BNP (pg/mL)   Date Value   2017 192 (H)   2017 91       Troponin panel:   No results found for: TROPONIN      COAGS  INR (no units)   Date Value   2017 1.1   2017 1.1   2017 1.1     aPTT (sec)   Date Value   2017 45.3 (H)   2017 47.6 (H)   2017 31.8       Lipid panel:    Lab Results   Component Value Date    CHOL 225 (H) 2020    CHOL 151 2017     Lab Results   Component Value Date    HDL 47 (L) 2020    HDL 79 (A) 2018    HDL 48 2017     Lab Results   Component Value Date    LDLCALC 135 (H) 2020    LDLCALC 146 2018    LDLCALC 80.2 2017     Lab Results   Component Value Date    TRIG 300 (H) 2020    TRIG 131 2018    TRIG 114 2017     Lab Results   Component Value Date    CHOLHDL 4.8 2020    CHOLHDL 31.8 2017         Old Results:      Diagnostic Results:    1a.  EKGs- 17: sinus; possible old anteroseptal infarction   1b. EK17: atrial flutter with ventricular rate of 147;   1c. EK17: sinus  1d. EK/19: sinus with nonspecific ivcd   1e. EK/21: sinus ; LAD     2. Echos- 17: normal EF, diastolic dysfunction; mild LAE, moderate pericardial effusion, aortic sclerosis   2b. Echo: 17: normal EF, small pericardial effusion; LAE    2c. Echo: : normal EF, diastolic dysfunction; LAE, PAS 29 mm normal; mild TR     3. Stress Test- [  ]  4. Cath- [  ]  5. arterial study: no significant disease. : left clear; right distal right popliteal  50-75%  5b. Arterial study lower exts: disease in small vessels; underwent atherectomy 11/10/21 with good results       ASSESSMENT/PLAN:        1. atrial flutter: resolved; [id number]: however 4; no bleeding ; now in sinus   2. diastolic dysfunction - fluid status is perfect   3. moderate pericardial effusion: resolving to mild to none in 1/22   4. shortness of breath:resolved  5. dizziness: resolved   6. blood pressures normal      7. CKD with GFR 38 10/21; GFR 42 11/21   8. Right toes healed and doing better     Plan: continue the same medications; needs lipids   Use the lasix as needed - maybe once a week since she does go out and eats crawfish etc   Return 4 months     Sam Paulino MD

## 2022-03-24 ENCOUNTER — HOSPITAL ENCOUNTER (OUTPATIENT)
Dept: RADIOLOGY | Facility: HOSPITAL | Age: 82
Discharge: HOME | End: 2022-03-24
Attending: INTERNAL MEDICINE
Payer: MEDICARE

## 2022-03-24 DIAGNOSIS — I70.229 CRITICAL LOWER LIMB ISCHEMIA: ICD-10-CM

## 2022-03-24 DIAGNOSIS — I73.9 PAD (PERIPHERAL ARTERY DISEASE): ICD-10-CM

## 2022-03-24 PROCEDURE — 93926 LOWER EXTREMITY STUDY: CPT | Mod: TC,RT

## 2022-03-24 PROCEDURE — 93926 LOWER EXTREMITY STUDY: CPT | Mod: 26,RT,, | Performed by: RADIOLOGY

## 2022-03-24 PROCEDURE — 93926 US LOWER EXTREMITY ARTERIES RIGHT: ICD-10-PCS | Mod: 26,RT,, | Performed by: RADIOLOGY

## 2022-03-31 ENCOUNTER — TELEPHONE (OUTPATIENT)
Dept: CARDIOLOGY | Facility: CLINIC | Age: 82
End: 2022-03-31
Payer: MEDICARE

## 2022-03-31 NOTE — TELEPHONE ENCOUNTER
----- Message from Angelica Ruiz sent at 3/31/2022  3:51 PM CDT -----  Contact: [phone]  Type:  Needs Medical Advice    Who Called: Itzel from [facility] patients assistance department   Would the patient rather a call back or a response via [facility]? Call back  Best Call Back Number: [phone]  Additional Information: date on paperwork wrong . Please correct and sent to [phone]

## 2022-04-06 ENCOUNTER — TELEPHONE (OUTPATIENT)
Dept: NEUROLOGY | Facility: HOSPITAL | Age: 82
End: 2022-04-06
Payer: MEDICARE

## 2022-04-06 NOTE — TELEPHONE ENCOUNTER
Called number below.  This patient was not seen by our office.  Inquired about what medicine or provider they are asking about, she could not tell me.  informed that the phone message was sent to the incorrect md.

## 2022-04-06 NOTE — TELEPHONE ENCOUNTER
----- Message from Melly Davila sent at 4/6/2022  8:59 AM CDT -----  Contact: [phone]/[phone]  Who Called: Melanie  Regarding:  updated paperwork , date on paper work is  incorrect has 2021 instead of 2022  Would the patient rather a call back or a response via [facility]? Call back  Best Call Back Number: [phone]   Additional Information: n/a

## 2022-04-11 RX ORDER — FAMOTIDINE 20 MG/1
TABLET, FILM COATED ORAL
Qty: 90 TABLET | Refills: 3 | Status: SHIPPED | OUTPATIENT
Start: 2022-04-11 | End: 2022-07-06

## 2022-04-28 ENCOUNTER — IMMUNIZATION (OUTPATIENT)
Dept: INTERNAL MEDICINE | Facility: CLINIC | Age: 82
End: 2022-04-28
Payer: MEDICARE

## 2022-04-28 DIAGNOSIS — Z23 NEED FOR VACCINATION: Primary | ICD-10-CM

## 2022-04-28 PROCEDURE — 91305 COVID-19, MRNA, LNP-S, PF, 30 MCG/0.3 ML DOSE VACCINE (PFIZER): CPT | Mod: PBBFAC | Performed by: FAMILY MEDICINE

## 2022-05-03 ENCOUNTER — OFFICE VISIT (OUTPATIENT)
Dept: PODIATRY | Facility: CLINIC | Age: 82
End: 2022-05-03
Payer: MEDICARE

## 2022-05-03 VITALS
BODY MASS INDEX: 30.36 KG/M2 | DIASTOLIC BLOOD PRESSURE: 51 MMHG | WEIGHT: 165 LBS | HEIGHT: 62 IN | SYSTOLIC BLOOD PRESSURE: 129 MMHG | HEART RATE: 63 BPM

## 2022-05-03 DIAGNOSIS — E11.42 TYPE 2 DIABETES MELLITUS WITH PERIPHERAL NEUROPATHY: ICD-10-CM

## 2022-05-03 DIAGNOSIS — I73.9 PAD (PERIPHERAL ARTERY DISEASE): Primary | ICD-10-CM

## 2022-05-03 DIAGNOSIS — B35.1 ONYCHOMYCOSIS: ICD-10-CM

## 2022-05-03 DIAGNOSIS — Z89.421 HISTORY OF AMPUTATION OF LESSER TOE, RIGHT: ICD-10-CM

## 2022-05-03 DIAGNOSIS — L84 CORN OR CALLUS: ICD-10-CM

## 2022-05-03 PROCEDURE — 11721 DEBRIDE NAIL 6 OR MORE: CPT | Mod: 59,Q7,S$GLB, | Performed by: STUDENT IN AN ORGANIZED HEALTH CARE EDUCATION/TRAINING PROGRAM

## 2022-05-03 PROCEDURE — 99999 PR PBB SHADOW E&M-EST. PATIENT-LVL III: CPT | Mod: PBBFAC,,, | Performed by: STUDENT IN AN ORGANIZED HEALTH CARE EDUCATION/TRAINING PROGRAM

## 2022-05-03 PROCEDURE — 99499 NO LOS: ICD-10-PCS | Mod: S$GLB,,, | Performed by: STUDENT IN AN ORGANIZED HEALTH CARE EDUCATION/TRAINING PROGRAM

## 2022-05-03 PROCEDURE — 99499 UNLISTED E&M SERVICE: CPT | Mod: HCWC,S$GLB,, | Performed by: STUDENT IN AN ORGANIZED HEALTH CARE EDUCATION/TRAINING PROGRAM

## 2022-05-03 PROCEDURE — 1101F PR PT FALLS ASSESS DOC 0-1 FALLS W/OUT INJ PAST YR: ICD-10-PCS | Mod: CPTII,S$GLB,, | Performed by: STUDENT IN AN ORGANIZED HEALTH CARE EDUCATION/TRAINING PROGRAM

## 2022-05-03 PROCEDURE — 3074F SYST BP LT 130 MM HG: CPT | Mod: CPTII,S$GLB,, | Performed by: STUDENT IN AN ORGANIZED HEALTH CARE EDUCATION/TRAINING PROGRAM

## 2022-05-03 PROCEDURE — 3078F DIAST BP <80 MM HG: CPT | Mod: CPTII,S$GLB,, | Performed by: STUDENT IN AN ORGANIZED HEALTH CARE EDUCATION/TRAINING PROGRAM

## 2022-05-03 PROCEDURE — 1159F MED LIST DOCD IN RCRD: CPT | Mod: CPTII,S$GLB,, | Performed by: STUDENT IN AN ORGANIZED HEALTH CARE EDUCATION/TRAINING PROGRAM

## 2022-05-03 PROCEDURE — 11721 ROUTINE FOOT CARE: ICD-10-PCS | Mod: 59,Q7,S$GLB, | Performed by: STUDENT IN AN ORGANIZED HEALTH CARE EDUCATION/TRAINING PROGRAM

## 2022-05-03 PROCEDURE — 1101F PT FALLS ASSESS-DOCD LE1/YR: CPT | Mod: CPTII,S$GLB,, | Performed by: STUDENT IN AN ORGANIZED HEALTH CARE EDUCATION/TRAINING PROGRAM

## 2022-05-03 PROCEDURE — 3288F FALL RISK ASSESSMENT DOCD: CPT | Mod: CPTII,S$GLB,, | Performed by: STUDENT IN AN ORGANIZED HEALTH CARE EDUCATION/TRAINING PROGRAM

## 2022-05-03 PROCEDURE — 99499 UNLISTED E&M SERVICE: CPT | Mod: S$GLB,,, | Performed by: STUDENT IN AN ORGANIZED HEALTH CARE EDUCATION/TRAINING PROGRAM

## 2022-05-03 PROCEDURE — 1159F PR MEDICATION LIST DOCUMENTED IN MEDICAL RECORD: ICD-10-PCS | Mod: CPTII,S$GLB,, | Performed by: STUDENT IN AN ORGANIZED HEALTH CARE EDUCATION/TRAINING PROGRAM

## 2022-05-03 PROCEDURE — 99999 PR PBB SHADOW E&M-EST. PATIENT-LVL III: ICD-10-PCS | Mod: PBBFAC,,, | Performed by: STUDENT IN AN ORGANIZED HEALTH CARE EDUCATION/TRAINING PROGRAM

## 2022-05-03 PROCEDURE — 3074F PR MOST RECENT SYSTOLIC BLOOD PRESSURE < 130 MM HG: ICD-10-PCS | Mod: CPTII,S$GLB,, | Performed by: STUDENT IN AN ORGANIZED HEALTH CARE EDUCATION/TRAINING PROGRAM

## 2022-05-03 PROCEDURE — 1126F PR PAIN SEVERITY QUANTIFIED, NO PAIN PRESENT: ICD-10-PCS | Mod: CPTII,S$GLB,, | Performed by: STUDENT IN AN ORGANIZED HEALTH CARE EDUCATION/TRAINING PROGRAM

## 2022-05-03 PROCEDURE — 1126F AMNT PAIN NOTED NONE PRSNT: CPT | Mod: CPTII,S$GLB,, | Performed by: STUDENT IN AN ORGANIZED HEALTH CARE EDUCATION/TRAINING PROGRAM

## 2022-05-03 PROCEDURE — 3288F PR FALLS RISK ASSESSMENT DOCUMENTED: ICD-10-PCS | Mod: CPTII,S$GLB,, | Performed by: STUDENT IN AN ORGANIZED HEALTH CARE EDUCATION/TRAINING PROGRAM

## 2022-05-03 PROCEDURE — 3078F PR MOST RECENT DIASTOLIC BLOOD PRESSURE < 80 MM HG: ICD-10-PCS | Mod: CPTII,S$GLB,, | Performed by: STUDENT IN AN ORGANIZED HEALTH CARE EDUCATION/TRAINING PROGRAM

## 2022-05-03 PROCEDURE — 11057 PARNG/CUTG B9 HYPRKR LES >4: CPT | Mod: Q7,S$GLB,, | Performed by: STUDENT IN AN ORGANIZED HEALTH CARE EDUCATION/TRAINING PROGRAM

## 2022-05-03 PROCEDURE — 11057 ROUTINE FOOT CARE: ICD-10-PCS | Mod: Q7,S$GLB,, | Performed by: STUDENT IN AN ORGANIZED HEALTH CARE EDUCATION/TRAINING PROGRAM

## 2022-05-03 NOTE — PROCEDURES
"Routine Foot Care    Date/Time: 5/3/2022 1:45 PM  Performed by: Ava Atkins DPM  Authorized by: Ava Atkins DPM     Time out: Immediately prior to procedure a "time out" was called to verify the correct patient, procedure, equipment, support staff and site/side marked as required.    Consent Done?:  Yes (Verbal)  Hyperkeratotic Skin Lesions?: Yes    Number of trimmed lesions:  6  Location(s):  Right 1st Toe, Right 3rd Toe, Right 4th Toe, Right 1st Metatarsal Head, Left 1st Toe and Left 5th Toe    Nail Care Type:  Debride(Left 1st Toe, Left 3rd Toe, Left 2nd Toe, Left 4th Toe, Left 5th Toe, Right 1st Toe, Right 3rd Toe and Right 4th Toe)  Patient tolerance:  Patient tolerated the procedure well with no immediate complications      "

## 2022-05-03 NOTE — PROGRESS NOTES
Subjective:      Patient ID: Caro Powell is a 81 y.o. female.    Chief Complaint: Nail Care (2 month nail care f/u)    Caor is a 81 y.o. female who presents to the clinic for evaluation and treatment of high risk feet. Caro has a past medical history of Anticoagulant long-term use, Arthritis, Atrial flutter, Calcium nephrolithiasis (2007), Diabetes mellitus, type 2, Diabetic peripheral neuropathy associated with type 2 diabetes mellitus, and Hypertension. The patient's chief complaint is long, thick toenails. This patient has documented high risk feet requiring routine maintenance secondary to diabetes mellitis and those secondary complications of diabetes, as mentioned.. Patient states she is following closely with Dr. Rooney for PAD. Relates has new SAS shoes which she put her diabetic shoes in and her feet are looking much better. No new pedal complaints.     1/12/22: Pt seen today, denies ulcers, states foot is looking much better, no new pedal complaints.     3/7/22: Pt seen today for RFC. No new pedal complaints.     5/3/22: Seen today for RFC. No new pedal complaints.     PCP: Brayan Penny MD    Date Last Seen by PCP: 1/18/22    Current shoe gear:  SAS diabetic shoes    Hemoglobin A1C   Date Value Ref Range Status   10/18/2021 6.7 (H) 4.0 - 5.6 % Final     Comment:     ADA Screening Guidelines:  5.7-6.4%  Consistent with prediabetes  >or=6.5%  Consistent with diabetes    High levels of fetal hemoglobin interfere with the HbA1C  assay. Heterozygous hemoglobin variants (HbS, HgC, etc)do  not significantly interfere with this assay.   However, presence of multiple variants may affect accuracy.     01/22/2021 6.6 (H) 4.0 - 5.6 % Final     Comment:     ADA Screening Guidelines:  5.7-6.4%  Consistent with prediabetes  >or=6.5%  Consistent with diabetes  High levels of fetal hemoglobin interfere with the HbA1C  assay. Heterozygous hemoglobin variants (HbS, HgC, etc)do  not significantly  interfere with this assay.   However, presence of multiple variants may affect accuracy.     06/25/2020 6.2 (H) <5.7 % of total Hgb Final     Comment:     For someone without known diabetes, a hemoglobin   A1c value between 5.7% and 6.4% is consistent with  prediabetes and should be confirmed with a   follow-up test.     For someone with known diabetes, a value <7%  indicates that their diabetes is well controlled. A1c  targets should be individualized based on duration of  diabetes, age, comorbid conditions, and other  considerations.     This assay result is consistent with an increased risk  of diabetes.     Currently, no consensus exists regarding use of  hemoglobin A1c for diagnosis of diabetes for children.        01/12/2018 8.3 % Final       Review of Systems   Constitutional: Negative for chills, decreased appetite, diaphoresis and fever.   HENT: Negative for congestion and hearing loss.    Cardiovascular: Negative for chest pain, claudication, leg swelling and syncope.   Respiratory: Negative for cough and shortness of breath.    Skin: Positive for dry skin, nail changes and poor wound healing. Negative for color change, flushing, itching and rash.   Musculoskeletal: Positive for arthritis. Negative for joint pain and joint swelling.   Gastrointestinal: Negative for nausea and vomiting.   Neurological: Positive for numbness. Negative for focal weakness, paresthesias and weakness.   Psychiatric/Behavioral: Negative for altered mental status. The patient is not nervous/anxious.            Objective:      Physical Exam  Constitutional:       General: She is not in acute distress.     Appearance: She is well-developed. She is not diaphoretic.   Cardiovascular:      Comments: Dorsalis pedis and posterior tibial pulses are diminished. Skin temperature is within normal limits. Toes are cool to touch and feet are warm proximally. Hair growth is diminished. Skin is mildly atrophic and with mild hyperpigmentation.  Mild edema noted, bilaterally. Telangiectasias bilaterally.  Musculoskeletal:         General: No tenderness.      Comments: Adequate joint range of motion without pain, limitation, nor crepitation to bilateral feet and ankle joints. Muscle strength is 5/5 in all groups bilaterally.    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 HAV, bilaterally. Semi rigid hammertoes to remaining digits.    Lymphadenopathy:      Comments: Negative lymphangitic streaking    Skin:     General: Skin is warm and dry.      Findings: No lesion.      Comments: Skin is warm and dry, no acute signs of infection noted. No open wounds, macerations or hyperkeratotic lesions, bilaterally.     Diffuse calluses to medial 1st MTPJ and hallux, and 5th digit bilaterally. Right distal 3rd and 4th digit with callus noted.      Toenails are thickened by 2-4 mm's, dystrophic, and are darkened in coloration with subungual fungal debris, bilaterally.  Skin is very dry, bilaterally.      Neurological:      Mental Status: She is alert and oriented to person, place, and time.      Sensory: Sensory deficit present.      Motor: No abnormal muscle tone.      Comments: Light touch is diminished. [place] 5.07 monofilamant testing is diminished. Vibratory sensation  is diminished, bilaterally    Psychiatric:         Behavior: Behavior normal.         Thought Content: Thought content normal.         Judgment: Judgment normal.               Assessment:       Encounter Diagnoses   Name Primary?    PAD (peripheral artery disease) Yes    Type 2 diabetes mellitus with peripheral neuropathy     Onychomycosis     History of amputation of lesser toe, right     Corn or callus          Plan:       Caro was seen today for nail care.    Diagnoses and all orders for this visit:    PAD (peripheral artery disease)  -     Routine Foot Care    Type 2 diabetes mellitus with peripheral neuropathy  -     Routine Foot  Care    Onychomycosis  -     Routine Foot Care    History of amputation of lesser toe, right  -     Routine Foot Care    [place] or callus  -     Routine Foot Care      I counseled the patient on her conditions, their implications and medical management.    RFC per attached note  Ulcer remains healed.   Continue diabetic shoes at all times while ambulating  Rest, elevate. Daily foot inspections   Shoe inspection. Diabetic Foot Education. Patient reminded of the importance of good nutrition and blood sugar control to help prevent podiatric complications of diabetes. Patient instructed on proper foot hygeine. We discussed wearing proper shoe gear, daily foot inspections, never walking without protective shoe gear, never putting sharp instruments to feet, routine podiatric nail visits      RTC in 2-3 mo, sooner PRN

## 2022-05-24 ENCOUNTER — LAB VISIT (OUTPATIENT)
Dept: LAB | Facility: HOSPITAL | Age: 82
End: 2022-05-24
Attending: INTERNAL MEDICINE
Payer: MEDICARE

## 2022-05-24 DIAGNOSIS — E11.52 TYPE 2 DIABETES MELLITUS WITH DIABETIC PERIPHERAL ANGIOPATHY AND GANGRENE, WITHOUT LONG-TERM CURRENT USE OF INSULIN: ICD-10-CM

## 2022-05-24 DIAGNOSIS — I48.91 ATRIAL FIBRILLATION WITH RAPID VENTRICULAR RESPONSE: ICD-10-CM

## 2022-05-24 LAB
ALBUMIN SERPL BCP-MCNC: 3.8 G/DL (ref 3.5–5.2)
ALP SERPL-CCNC: 82 U/L (ref 55–135)
ALT SERPL W/O P-5'-P-CCNC: 23 U/L (ref 10–44)
ANION GAP SERPL CALC-SCNC: 9 MMOL/L (ref 8–16)
AST SERPL-CCNC: 20 U/L (ref 10–40)
BASOPHILS # BLD AUTO: 0.03 K/UL (ref 0–0.2)
BASOPHILS NFR BLD: 0.5 % (ref 0–1.9)
BILIRUB SERPL-MCNC: 0.7 MG/DL (ref 0.1–1)
BUN SERPL-MCNC: 26 MG/DL (ref 8–23)
CALCIUM SERPL-MCNC: 9.5 MG/DL (ref 8.7–10.5)
CHLORIDE SERPL-SCNC: 106 MMOL/L (ref 95–110)
CHOLEST SERPL-MCNC: 130 MG/DL (ref 120–199)
CHOLEST/HDLC SERPL: 3.3 {RATIO} (ref 2–5)
CO2 SERPL-SCNC: 27 MMOL/L (ref 23–29)
CREAT SERPL-MCNC: 1.3 MG/DL (ref 0.5–1.4)
DIFFERENTIAL METHOD: ABNORMAL
EOSINOPHIL # BLD AUTO: 0.3 K/UL (ref 0–0.5)
EOSINOPHIL NFR BLD: 4.3 % (ref 0–8)
ERYTHROCYTE [DISTWIDTH] IN BLOOD BY AUTOMATED COUNT: 13.4 % (ref 11.5–14.5)
EST. GFR  (AFRICAN AMERICAN): 44 ML/MIN/1.73 M^2
EST. GFR  (NON AFRICAN AMERICAN): 38 ML/MIN/1.73 M^2
ESTIMATED AVG GLUCOSE: 163 MG/DL (ref 68–131)
GLUCOSE SERPL-MCNC: 238 MG/DL (ref 70–110)
HBA1C MFR BLD: 7.3 % (ref 4–5.6)
HCT VFR BLD AUTO: 34.7 % (ref 37–48.5)
HDLC SERPL-MCNC: 40 MG/DL (ref 40–75)
HDLC SERPL: 30.8 % (ref 20–50)
HGB BLD-MCNC: 11.7 G/DL (ref 12–16)
IMM GRANULOCYTES # BLD AUTO: 0.02 K/UL (ref 0–0.04)
IMM GRANULOCYTES NFR BLD AUTO: 0.3 % (ref 0–0.5)
LDLC SERPL CALC-MCNC: 56 MG/DL (ref 63–159)
LYMPHOCYTES # BLD AUTO: 1.2 K/UL (ref 1–4.8)
LYMPHOCYTES NFR BLD: 18.3 % (ref 18–48)
MCH RBC QN AUTO: 30 PG (ref 27–31)
MCHC RBC AUTO-ENTMCNC: 33.7 G/DL (ref 32–36)
MCV RBC AUTO: 89 FL (ref 82–98)
MONOCYTES # BLD AUTO: 0.5 K/UL (ref 0.3–1)
MONOCYTES NFR BLD: 8 % (ref 4–15)
NEUTROPHILS # BLD AUTO: 4.5 K/UL (ref 1.8–7.7)
NEUTROPHILS NFR BLD: 68.6 % (ref 38–73)
NONHDLC SERPL-MCNC: 90 MG/DL
NRBC BLD-RTO: 0 /100 WBC
PLATELET # BLD AUTO: 283 K/UL (ref 150–450)
PMV BLD AUTO: 10.3 FL (ref 9.2–12.9)
POTASSIUM SERPL-SCNC: 4.9 MMOL/L (ref 3.5–5.1)
PROT SERPL-MCNC: 6.4 G/DL (ref 6–8.4)
RBC # BLD AUTO: 3.9 M/UL (ref 4–5.4)
SODIUM SERPL-SCNC: 142 MMOL/L (ref 136–145)
TRIGL SERPL-MCNC: 170 MG/DL (ref 30–150)
WBC # BLD AUTO: 6.54 K/UL (ref 3.9–12.7)

## 2022-05-24 PROCEDURE — 83036 HEMOGLOBIN GLYCOSYLATED A1C: CPT | Performed by: INTERNAL MEDICINE

## 2022-05-24 PROCEDURE — 85025 COMPLETE CBC W/AUTO DIFF WBC: CPT | Performed by: INTERNAL MEDICINE

## 2022-05-24 PROCEDURE — 80053 COMPREHEN METABOLIC PANEL: CPT | Performed by: INTERNAL MEDICINE

## 2022-05-24 PROCEDURE — 80061 LIPID PANEL: CPT | Performed by: INTERNAL MEDICINE

## 2022-05-24 PROCEDURE — 36415 COLL VENOUS BLD VENIPUNCTURE: CPT | Performed by: INTERNAL MEDICINE

## 2022-06-21 ENCOUNTER — HOSPITAL ENCOUNTER (OUTPATIENT)
Dept: RADIOLOGY | Facility: HOSPITAL | Age: 82
Discharge: HOME | End: 2022-06-21
Attending: INTERNAL MEDICINE
Payer: MEDICARE

## 2022-06-21 DIAGNOSIS — N63.20 LEFT BREAST LUMP: ICD-10-CM

## 2022-06-21 DIAGNOSIS — N63.22 UNSPECIFIED LUMP IN THE LEFT BREAST, UPPER INNER QUADRANT: ICD-10-CM

## 2022-06-21 PROCEDURE — 77062 BREAST TOMOSYNTHESIS BI: CPT | Mod: 26,,, | Performed by: RADIOLOGY

## 2022-06-21 PROCEDURE — 76642 ULTRASOUND BREAST LIMITED: CPT | Mod: 26,50,, | Performed by: RADIOLOGY

## 2022-06-21 PROCEDURE — 77062 MAMMO DIGITAL DIAGNOSTIC BILAT WITH TOMO: ICD-10-PCS | Mod: 26,,, | Performed by: RADIOLOGY

## 2022-06-21 PROCEDURE — 77066 DX MAMMO INCL CAD BI: CPT | Mod: 26,,, | Performed by: RADIOLOGY

## 2022-06-21 PROCEDURE — 77066 DX MAMMO INCL CAD BI: CPT | Mod: TC

## 2022-06-21 PROCEDURE — 77066 MAMMO DIGITAL DIAGNOSTIC BILAT WITH TOMO: ICD-10-PCS | Mod: 26,,, | Performed by: RADIOLOGY

## 2022-06-21 PROCEDURE — 76642 US BREAST BILATERAL LIMITED: ICD-10-PCS | Mod: 26,50,, | Performed by: RADIOLOGY

## 2022-06-21 PROCEDURE — 76642 ULTRASOUND BREAST LIMITED: CPT | Mod: TC,50

## 2022-06-28 ENCOUNTER — HOSPITAL ENCOUNTER (OUTPATIENT)
Dept: RADIOLOGY | Facility: HOSPITAL | Age: 82
Discharge: HOME | End: 2022-06-28
Attending: INTERNAL MEDICINE
Payer: MEDICARE

## 2022-06-28 DIAGNOSIS — R92.8 ABNORMAL MAMMOGRAM: ICD-10-CM

## 2022-06-28 DIAGNOSIS — R92.8 ABNORMAL MAMMOGRAM: Primary | ICD-10-CM

## 2022-06-28 PROCEDURE — 38505 PR NEEDLE BIOPSY, LYMPH 2DE(S): ICD-10-PCS | Mod: RT,,, | Performed by: RADIOLOGY

## 2022-06-28 PROCEDURE — 27200934 US BREAST BIOPSY WITH IMAGING EA ADDITIONAL

## 2022-06-28 PROCEDURE — 19084 PR BX BRST, EA ADD'L LESION, US GUIDANCE: ICD-10-PCS | Mod: RT,,, | Performed by: RADIOLOGY

## 2022-06-28 PROCEDURE — 77066 DX MAMMO INCL CAD BI: CPT | Mod: 26,,, | Performed by: RADIOLOGY

## 2022-06-28 PROCEDURE — 19083 US BREAST BIOPSY WITH IMAGING 1ST SITE RIGHT: ICD-10-PCS | Mod: LT,,, | Performed by: RADIOLOGY

## 2022-06-28 PROCEDURE — A4648 IMPLANTABLE TISSUE MARKER: HCPCS

## 2022-06-28 PROCEDURE — 19083 BX BREAST 1ST LESION US IMAG: CPT | Mod: LT,,, | Performed by: RADIOLOGY

## 2022-06-28 PROCEDURE — 19084 BX BREAST ADD LESION US IMAG: CPT | Mod: RT,,, | Performed by: RADIOLOGY

## 2022-06-28 PROCEDURE — 77066 PR MAMMO, CAD, DIAGNOSTIC, BILAT: ICD-10-PCS | Mod: 26,,, | Performed by: RADIOLOGY

## 2022-06-28 PROCEDURE — 38505 NEEDLE BIOPSY LYMPH NODES: CPT | Mod: RT,,, | Performed by: RADIOLOGY

## 2022-06-28 PROCEDURE — 76942 ECHO GUIDE FOR BIOPSY: CPT | Mod: 26,59,, | Performed by: RADIOLOGY

## 2022-06-28 PROCEDURE — 77066 DX MAMMO INCL CAD BI: CPT | Mod: TC

## 2022-06-28 PROCEDURE — 27200934 US BREAST BIOPSY WITH IMAGING 1ST SITE RIGHT

## 2022-06-28 PROCEDURE — 76942 PR U/S GUIDANCE FOR NEEDLE GUIDANCE: ICD-10-PCS | Mod: 26,59,, | Performed by: RADIOLOGY

## 2022-07-01 ENCOUNTER — TELEPHONE (OUTPATIENT)
Dept: SURGERY | Facility: CLINIC | Age: 82
End: 2022-07-01
Payer: MEDICARE

## 2022-07-01 DIAGNOSIS — C50.919 MALIGNANT NEOPLASM OF BREAST: Primary | ICD-10-CM

## 2022-07-01 DIAGNOSIS — C50.912 INVASIVE LOBULAR CARCINOMA OF LEFT BREAST IN FEMALE: Primary | ICD-10-CM

## 2022-07-01 DIAGNOSIS — C50.912 INVASIVE DUCTAL CARCINOMA OF LEFT BREAST: Primary | ICD-10-CM

## 2022-07-01 NOTE — TELEPHONE ENCOUNTER
Contacted patient and scheduled appt wth Dr. Quinn and Breast MRI. Reviewed appt dates and times. Patient verbalized understanding

## 2022-07-06 ENCOUNTER — OFFICE VISIT (OUTPATIENT)
Dept: SURGERY | Facility: CLINIC | Age: 82
End: 2022-07-06
Payer: MEDICARE

## 2022-07-06 VITALS
DIASTOLIC BLOOD PRESSURE: 56 MMHG | WEIGHT: 166 LBS | SYSTOLIC BLOOD PRESSURE: 109 MMHG | BODY MASS INDEX: 30.55 KG/M2 | HEIGHT: 62 IN | HEART RATE: 70 BPM

## 2022-07-06 DIAGNOSIS — C50.912 INVASIVE DUCTAL CARCINOMA OF LEFT BREAST: ICD-10-CM

## 2022-07-06 DIAGNOSIS — C50.911 INVASIVE DUCTAL CARCINOMA OF RIGHT BREAST: Primary | ICD-10-CM

## 2022-07-06 PROCEDURE — 1159F MED LIST DOCD IN RCRD: CPT | Mod: CPTII,S$GLB,, | Performed by: SURGERY

## 2022-07-06 PROCEDURE — 3074F SYST BP LT 130 MM HG: CPT | Mod: CPTII,S$GLB,, | Performed by: SURGERY

## 2022-07-06 PROCEDURE — 1160F PR REVIEW ALL MEDS BY PRESCRIBER/CLIN PHARMACIST DOCUMENTED: ICD-10-PCS | Mod: CPTII,S$GLB,, | Performed by: SURGERY

## 2022-07-06 PROCEDURE — 1100F PR PT FALLS ASSESS DOC 2+ FALLS/FALL W/INJURY/YR: ICD-10-PCS | Mod: CPTII,S$GLB,, | Performed by: SURGERY

## 2022-07-06 PROCEDURE — 3078F DIAST BP <80 MM HG: CPT | Mod: CPTII,S$GLB,, | Performed by: SURGERY

## 2022-07-06 PROCEDURE — 3288F FALL RISK ASSESSMENT DOCD: CPT | Mod: CPTII,S$GLB,, | Performed by: SURGERY

## 2022-07-06 PROCEDURE — 3074F PR MOST RECENT SYSTOLIC BLOOD PRESSURE < 130 MM HG: ICD-10-PCS | Mod: CPTII,S$GLB,, | Performed by: SURGERY

## 2022-07-06 PROCEDURE — 1100F PTFALLS ASSESS-DOCD GE2>/YR: CPT | Mod: CPTII,S$GLB,, | Performed by: SURGERY

## 2022-07-06 PROCEDURE — 1159F PR MEDICATION LIST DOCUMENTED IN MEDICAL RECORD: ICD-10-PCS | Mod: CPTII,S$GLB,, | Performed by: SURGERY

## 2022-07-06 PROCEDURE — 99205 OFFICE O/P NEW HI 60 MIN: CPT | Mod: S$GLB,,, | Performed by: SURGERY

## 2022-07-06 PROCEDURE — 99999 PR PBB SHADOW E&M-EST. PATIENT-LVL IV: CPT | Mod: PBBFAC,,, | Performed by: SURGERY

## 2022-07-06 PROCEDURE — 3288F PR FALLS RISK ASSESSMENT DOCUMENTED: ICD-10-PCS | Mod: CPTII,S$GLB,, | Performed by: SURGERY

## 2022-07-06 PROCEDURE — 99999 PR PBB SHADOW E&M-EST. PATIENT-LVL IV: ICD-10-PCS | Mod: PBBFAC,,, | Performed by: SURGERY

## 2022-07-06 PROCEDURE — 99205 PR OFFICE/OUTPT VISIT, NEW, LEVL V, 60-74 MIN: ICD-10-PCS | Mod: S$GLB,,, | Performed by: SURGERY

## 2022-07-06 PROCEDURE — 3078F PR MOST RECENT DIASTOLIC BLOOD PRESSURE < 80 MM HG: ICD-10-PCS | Mod: CPTII,S$GLB,, | Performed by: SURGERY

## 2022-07-06 PROCEDURE — 1160F RVW MEDS BY RX/DR IN RCRD: CPT | Mod: CPTII,S$GLB,, | Performed by: SURGERY

## 2022-07-06 NOTE — PROGRESS NOTES
Breast Surgery  [facility]  Department of Surgery      REFERRING PROVIDER: Morgan Caputo MD  [address]  [address]  [place]  [place] [address]    Chief Complaint: Breast Cancer (New Patient left Breast Mass Invasive Ductal Carcinoma  .)      Subjective:      Patient ID: Caro Powell is a 81 y.o. female who presents with bilateral breast Invasive Ductal Carcinoma.     She presented for diagnostic breast imaging on 6/21/22 for new breast symptoms. , a palpable breast mass identified by referred physician. This identified multiple lesions in bilateral breast (see below) as well as an abnormal lymph node. A ultrasound guided biopsy was performed on 6/28/22 with pathology revealing infiltrating ductal carcinoma of the bilateral breast as well as right axillary lymph node positive for metastatic ductal carcinoma.    Findings at that time were the following:   Lesion 1:    Location: Left Breast, 4:00 5FTN  Clip:Twirl, in expected position only seen on ML view.   Tumor size: 1.6 cm   Tumor ndgndrndanddndend:nd nd2nd Estrogen Receptor: +   Progesterone Receptor: +   Her-2 ender: -   Lymph node status: Clinically negative on the left      Lesion 2:    Location: Right Breast, 8:00 12FTN   Clip:Twirl, not visualized on the mammogram because posterior.   Tumor size: 2 cm   Tumor ndgndrndanddndend:nd nd2nd Estrogen Receptor: +   Progesterone Receptor: +   Her-2 ender: Equivocal, fish pending  Lymph node status: Biopsy + metastatic ductal carcinoma     Lesion 3:    Location: Right Breast, 10:00 12FTN   Clip:Hydromark, in exspected position  Tumor size: 2 cm   Tumor rdgrdrrdarddrderd:rd rd3rd Estrogen Receptor: +   Progesterone Receptor: +   Her-2 ender: -  Lymph node status: Biopsy + metastatic ductal carcinoma    Patient had noted a change on breast exam.  Patient denies nipple discharge. Patient denies previous breast biopsy. Patient denies a personal history of breast cancer. Family history includes sister with breast cancer at 69, maternal aunt with breast  cancer in her early 40s.  Sister underwent genetic testing which was negative.     GYN History:  Age of menarche was 13. Age of menopause was 31.  Patient denies hormonal therapy. Patient is . Age of first live birth was 19. Patient did not breast feed.    Past Medical History:   Diagnosis Date    Anticoagulant long-term use     Arthritis     Atrial flutter     Calcium nephrolithiasis 2007    Diabetes mellitus, type 2     Diabetic peripheral neuropathy associated with type 2 diabetes mellitus     Hypertension     Invasive ductal carcinoma of left breast 2022     Past Surgical History:   Procedure Laterality Date    ANGIOGRAPHY OF LOWER EXTREMITY N/A 10/21/2021    Procedure: Angiogram Extremity Unilateral;  Surgeon: Dre Martino MD;  Location: [facility] CATH LAB/EP;  Service: Cardiology;  Laterality: N/A;    ANGIOGRAPHY OF LOWER EXTREMITY Right 11/10/2021    Procedure: Angiogram Extremity Unilateral;  Surgeon: Ben Rooney MD;  Location: [facility] CATH LAB/EP;  Service: Cardiology;  Laterality: Right;    COLONOSCOPY  2011    sigmoid diverticulosis, external hemorrhoids    DEBRIDEMENT Right 10/20/2021    Procedure: DEBRIDEMENT;  Surgeon: Ava Atkins DPM;  Location: [facility] OR;  Service: Podiatry;  Laterality: Right;    ENDOSCOPIC GASTROCNEMIUS RECESSION Right 9/10/2019    Procedure: RECESSION, GASTROCNEMIUS, ENDOSCOPIC;  Surgeon: Derek Jose DPM;  Location: [facility] OR;  Service: Podiatry;  Laterality: Right;  Arthrex center line (ron notified)  Video    FLEXOR TENOTOMY Right 10/20/2021    Procedure: TENOTOMY, FLEXOR 3rd toe;  Surgeon: Ava Atkins DPM;  Location: [facility] OR;  Service: Podiatry;  Laterality: Right;    HYSTERECTOMY      SHOULDER SURGERY Left     TOE AMPUTATION Right 2017    5th toe    TOE AMPUTATION Right 10/20/2021    Procedure: AMPUTATION, TOE;  Surgeon: Ava Atkins DPM;  Location: [facility] OR;  Service: Podiatry;  Laterality: Right;     Current  Outpatient Medications on File Prior to Visit   Medication Sig Dispense Refill    ACCU-CHEK SAMI PLUS METER Misc TEST  AS DIRECTED 1 each 0    ACCU-CHEK SAMI PLUS TEST STRP Strp USE TO TEST BLOOD SUGAR ONCE DAILY 100 strip 3    ACCU-CHEK SOFT DEV LANCETS Kit       ACCU-CHEK SOFTCLIX LANCETS Misc TEST ONCE DAILY 100 each 3    ammonium lactate 12 % Crea Apply to feet twice daily. Avoid use between toes. 140 g 5    apixaban (ELIQUIS) 5 mg Tab TAKE 1 TABLET BY MOUTH TWICE DAILY 180 tablet 1    atorvastatin (LIPITOR) 80 MG tablet Take 1 tablet (80 mg total) by mouth once daily. 90 tablet 3    clopidogreL (PLAVIX) 75 mg tablet Take 1 tablet (75 mg total) by mouth once daily. 90 tablet 3    diazePAM (VALIUM) 5 MG tablet TAKE 1 TABLET EVERY DAY AS NEEDED FOR ANXIETY 30 tablet 5    famotidine (PEPCID) 20 MG tablet TAKE 1 TABLET ONE TIME DAILY AT BEDTIME 90 tablet 3    gabapentin (NEURONTIN) 400 MG capsule 2 pills in the AM, 1 pill at noon, and 2 pills in the evening. 450 capsule 3    glimepiride (AMARYL) 1 MG tablet TAKE 1 TABLET TWICE DAILY 180 tablet 3    lisinopriL (PRINIVIL,ZESTRIL) 20 MG tablet TAKE 1 TABLET EVERY DAY 90 tablet 3    metoprolol succinate (TOPROL-XL) 25 MG 24 hr tablet Take 1 tablet (25 mg total) by mouth once daily. 90 tablet 3    polyethylene glycol (GLYCOLAX) 17 gram/dose powder Take 17 g by mouth once daily. 510 g 0    pramipexole (MIRAPEX) 0.125 MG tablet Take 1 tablet (0.125 mg total) by mouth once daily. 90 tablet 3    urea (CARMOL) 40 % Crea Apply topically 2 (two) times daily. 1 Bottle 3    furosemide (LASIX) 40 MG tablet Take 1 tablet (40 mg total) by mouth once daily. (Patient not taking: Reported on 7/6/2022) 30 tablet 11     No current facility-administered medications on file prior to visit.     Social History     Socioeconomic History    Marital status:    Tobacco Use    Smoking status: Never Smoker    Smokeless tobacco: Never Used   Substance and Sexual  "Activity    Alcohol use: No    Drug use: No    Sexual activity: Yes     Social Determinants of Health     Financial Resource Strain: Medium Risk    Difficulty of Paying Living Expenses: Somewhat hard   Food Insecurity: No Food Insecurity    Worried About Running Out of Food in the Last Year: Never true    Ran Out of Food in the Last Year: Never true   Transportation Needs: No Transportation Needs    Lack of Transportation (Medical): No    Lack of Transportation (Non-Medical): No   Physical Activity: Inactive    Days of Exercise per Week: 0 days    Minutes of Exercise per Session: 0 min   Stress: Stress Concern Present    Feeling of Stress : To some extent   Social Connections: Moderately Isolated    Frequency of Communication with Friends and Family: Twice a week    Frequency of Social Gatherings with Friends and Family: Never    Attends [employer] Services: 1 to 4 times per year    Active Member of Clubs or Organizations: No    Attends Club or Organization Meetings: Never    Marital Status:    Housing Stability: Low Risk     Unable to Pay for Housing in the Last Year: No    Number of Places Lived in the Last Year: 1    Unstable Housing in the Last Year: No     Family History   Problem Relation Age of Onset    Diabetes Mother     Heart failure Father     Kidney failure Brother         Review of Systems   All other systems reviewed and are negative.    Objective:   BP (!) 109/56 (BP Location: Right arm, Patient Position: Sitting, BP Method: Medium (Automatic))   Pulse 70   Ht 5' 2" (1.575 m)   Wt 75.3 kg (166 lb)   LMP  (LMP Unknown)   BMI 30.36 kg/m²     Physical Exam   Constitutional: She is oriented to person, place, and time. She appears well-developed and well-nourished.   HENT:   Head: Normocephalic and atraumatic.   Cardiovascular: Normal rate.    Pulmonary/Chest: Breath sounds normal. Right breast exhibits mass and tenderness. Right breast exhibits no inverted nipple, no " nipple discharge and no skin change. Left breast exhibits mass. Left breast exhibits no inverted nipple, no nipple discharge, no skin change and no tenderness.       Lymphadenopathy:     She has no cervical adenopathy.     She has no axillary adenopathy.        Right: No supraclavicular adenopathy present.        Left: No supraclavicular adenopathy present.   Neurological: She is alert and oriented to person, place, and time.   Skin: Skin is warm and dry. No rash noted. No erythema. No pallor.     Psychiatric: She has a normal mood and affect. Her behavior is normal. Judgment and thought content normal.       Radiology review: Images personally reviewed by me in the clinic and shown to the patient during the consultation.     Assessment:       1. Invasive ductal carcinoma of right breast    2. Invasive ductal carcinoma of left breast        Plan:   left breast, Clinical Stage IA ([id number]), invasive ductal carcinoma with lobular features of the Lower-outer quadrant of breast, estrogen receptor Positive, progesterone receptor Positive, HER2 Negative    Right breast, Clinical Stage IIA ([id number]), invasive ductal carcinoma of the Upper-outer quadrant of breast, estrogen receptor Positive, progesterone receptor Positive, HER2 FISH pending    Her HER2 testing is pending. We discussed that if this is found to be positive then there may be impacts to her treatment plan. I will call her if the results are positive to discuss. If HER2 testing is negative then we will proceed with the treatment plan as discussed.     Multidisciplinary nature of breast cancer care was discussed in detail at today's visit.    We discussed that surgical options would include a lumpectomy versus a mastectomy.  We discussed there is no survival benefit to undergoing a mastectomy compared to lumpectomy.  Based on clinical exam and imaging, she is not a candidate for breast conservation on the right side due to the extent of disease however on the  left side she could consider lumpectomy.  The surgical procedures of lumpectomy and mastectomy were discussed in full detail. We discussed that a lumpectomy would be done as a reflector localized excision of the primary tumor along with a surrounding margin of normal breast tissue.  The concept of local control was explained to the patient.  She understands that we would aim to achieve negative margins at the time of initial surgery.  We discussed the option for mastectomy.  We discussed the risks and benefits of mastectomy. Risks include but are not limited to risk of bleeding, infection, poor cosmesis, need for additional surgery, clip placement, scaring, pain including phantom pain, fluid collections, shoulder stiffness, prolonged healing, and recurrence.   As she requires a mastectomy on the right she would also desire mastectomy the left in an attempt to avoid radiation.    We discussed breast MRI to further evaluate the extent of disease on the left due to the lobular features of the biopsy.  She expressed claustrophobia.  She also strongly desires a mastectomy on the left if she proceeds with surgery.  MRI was canceled.    Reconstruction after mastectomy was discussed.  Reconstructive generally include implant or autologous tissue reconstruction. There are certain health qualifications that the patient must meet in order to be a candidate for reconstruction.  With her age and medical comorbidities including diabetes and peripheral arterial disease she would be a poor candidate for reconstruction.  We discussed consultation to discuss Plastic surgery with the plastic surgeon further but she would like to proceed without breast reconstruction.  We discussed breast prosthesis.    We discussed that on her right breast the lymph node that has proven metastatic disease.  We discussed that if surgery is done prior to any systemic therapy that it is recommended that we proceed with an axillary lymph node dissection.    She will need medical oncology discuss systemic therapy further but due to her age medical comorbidities unlikely that she would proceed with up-front chemotherapy.  We will plan for axilla dissection on the right.  We discussed the increased risk of lymphedema as well as nerve and vessel injury with axilla lymph node dissection.      In the left we would plan for a sentinel lymph node biopsy.  [place] lymph node biopsies performed utilizing the injection of blue and radioactive dye.  This dye travels to the 1st few lymph nodes that drain the breast.  Lymph nodes that uptake the blue or radioactive dye or are palpable are surgically removed and sent to pathology.  Typically 1-5 lymph nodes are removed during this procedure although exact numbers vary depending on the patient.  This procedure allows sampling of the lymph nodes most at risk for metastasis.  The risks and benefits of the procedure discussed the patient.  Risks include but are not limited to lymphedema, bleeding, infection, poor cosmesis, numbness of the incision site, seroma, failure of dye to map, nerve injury leading to permanent arm numbness and or muscle weakness, possibility of a false negative finding, and need for additional surgery.  If metastatic disease is identified on pathology of the lymph nodes been axillary dissection (a second surgery) may be recommended.      Referral to physical therapy preoperatively to discuss lymphedema risk and prevention.    The role of adjuvant radiation therapy following breast conservation surgery was also discussed with the patient.  We discussed that if she proceed with a lumpectomy on the left side then she would be recommended for radiation.  We discussed that if she proceeds with mastectomy she would be unlikely be recommended for radiation unless a large number of lymph nodes are found to be involved or the breast cancer is found to be large.     PET scan pending to evaluate for metastatic disease.   She has scheduled follow-up with Medical Oncology after the results.  They will discuss indications for systemic therapy further with her.  I anticipate she will be recommended for hormone based therapy.  She expressed interest in not proceeding with surgery and proceeding with just endocrine therapy.  We discussed that this would not be considered a curative approach to her breast cancer.  She would like to talk medical oncology further about this option.    She does meet NCCN guidelines for genetic testing based on her family history.  She will consider genetic testing.  Her sister genetic testing was negative.    Following her discussion today, Caro Powell is considering surgery but may decide to proceed with endocrine therapy in a noncurative approach.  If she decides to proceed with surgery she would like to proceed with bilateral mastectomy, right axillary lymph node dissection and left sentinel lymph node biopsy.  She is undergoing PET scan in meeting with Medical Oncology preoperatively.      Patient [facility].  All her questions were answered.    Total time spent with the patient: 60 minutes.  45 minutes of face to face consultation and 15 minutes of chart review and coordination of care.

## 2022-07-08 ENCOUNTER — DOCUMENTATION ONLY (OUTPATIENT)
Dept: SURGERY | Facility: CLINIC | Age: 82
End: 2022-07-08
Payer: MEDICARE

## 2022-07-08 LAB
FINAL PATHOLOGIC DIAGNOSIS: NORMAL
GROSS: NORMAL
Lab: NORMAL
MICROSCOPIC EXAM: NORMAL
SUPPLEMENTAL DIAGNOSIS: NORMAL

## 2022-07-08 NOTE — NURSING
Nurse Navigator Note:     Met with patient during her consult with Dr. Quinn.  Patient and I reviewed the information she discussed with Dr. Quinn, including treatment options, diagnosis, and future plans for workup. Patient and I went through the new patient binder, explained some of the information and why it is provided.     Also offered patient consults with our other specialty clinics: Dr. Carrillo for gynecological health during treatment, our breast physical therapy department for pre-op and post-operative assessments, Dr. Montilla for psychological support, and Alina Gonzalez for nutritional counseling. Explained to patient that all of these support services are completely optional. Discussed that physical therapy may call patient to offer pre-op appt, and what that appt would entail.     Patient was given a copy of her appointments, Dr. Quinn's card, and my card. Encouraged her to call me if she has any questions or concerns or would like to schedule any additional appointments. Verbalized understanding of all information.     Genetics and supportive service referrals deferred at this time.  Oncology Navigation   Intake  Date of Diagnosis: 6/28/2022  Cancer Type: Breast  Internal / External Referral: Internal  Referral Source: Dr. Penny and Dr. Caputo  Date of Referral: 7/1/2022  Initial Nurse Navigator Contact: 7/1/2022  Referral to Initial Contact Timeline (days): 0  Date Worked: 7/6/2022  First Appointment Available: 7/6/2022  Appointment Date: 7/6/2022  First Available Date vs. Scheduled Date (days): 0     Treatment  Current Status: Staging work-up    Surgical Oncologist: Dr. Quinn  Consult Date: 7/6/2022    Medical Oncologist: Dr. Caputo       Procedures: Biopsy; PET scan  Biopsy Schedule Date: 6/28/2022  PET Scan Schedule Date: 7/15/2022       ER: Positive  [place]: Positive  Her2: Negative       Support Systems: Spouse/significant other; Family members     Acuity      Follow Up  Follow up in about 8 days  (around 7/16/2022) for f/u after PET.

## 2022-07-12 DIAGNOSIS — F06.30 MOOD DISORDER DUE TO MEDICAL CONDITION: Primary | ICD-10-CM

## 2022-07-12 RX ORDER — ALPRAZOLAM 0.5 MG/1
0.5 TABLET ORAL 2 TIMES DAILY PRN
Qty: 30 TABLET | Refills: 0 | Status: SHIPPED | OUTPATIENT
Start: 2022-07-12 | End: 2022-08-30

## 2022-07-15 ENCOUNTER — HOSPITAL ENCOUNTER (OUTPATIENT)
Dept: RADIOLOGY | Facility: HOSPITAL | Age: 82
Discharge: HOME | End: 2022-07-15
Attending: INTERNAL MEDICINE
Payer: MEDICARE

## 2022-07-15 ENCOUNTER — TELEPHONE (OUTPATIENT)
Dept: HEMATOLOGY/ONCOLOGY | Facility: CLINIC | Age: 82
End: 2022-07-15
Payer: MEDICARE

## 2022-07-15 DIAGNOSIS — C50.912 INVASIVE DUCTAL CARCINOMA OF LEFT BREAST: ICD-10-CM

## 2022-07-15 PROCEDURE — 78815 PET IMAGE W/CT SKULL-THIGH: CPT | Mod: TC

## 2022-07-15 PROCEDURE — 78815 PET IMAGE W/CT SKULL-THIGH: CPT | Mod: 26,PI,, | Performed by: RADIOLOGY

## 2022-07-15 PROCEDURE — 78815 NM PET CT ROUTINE: ICD-10-PCS | Mod: 26,PI,, | Performed by: RADIOLOGY

## 2022-07-15 NOTE — TELEPHONE ENCOUNTER
----- Message from Hannah Robles sent at 7/15/2022  4:09 PM CDT -----  Type:  Needs Medical Advice    Who Called:  pt   Symptoms (please be specific):  would  like to get a call back      Would the patient rather a call back or a response via [facility]?  Call   Best Call Back Number:  [phone]  Additional Information:         
ADMIT

## 2022-07-20 ENCOUNTER — OFFICE VISIT (OUTPATIENT)
Dept: CARDIOLOGY | Facility: CLINIC | Age: 82
End: 2022-07-20
Payer: MEDICARE

## 2022-07-20 VITALS
HEART RATE: 65 BPM | WEIGHT: 165.56 LBS | SYSTOLIC BLOOD PRESSURE: 101 MMHG | DIASTOLIC BLOOD PRESSURE: 64 MMHG | HEIGHT: 62 IN | BODY MASS INDEX: 30.47 KG/M2

## 2022-07-20 DIAGNOSIS — I48.92 ATRIAL FLUTTER, UNSPECIFIED TYPE: ICD-10-CM

## 2022-07-20 DIAGNOSIS — I73.9 PAD (PERIPHERAL ARTERY DISEASE): Primary | ICD-10-CM

## 2022-07-20 DIAGNOSIS — I10 ESSENTIAL HYPERTENSION: ICD-10-CM

## 2022-07-20 PROCEDURE — 1101F PR PT FALLS ASSESS DOC 0-1 FALLS W/OUT INJ PAST YR: ICD-10-PCS | Mod: CPTII,S$GLB,, | Performed by: INTERNAL MEDICINE

## 2022-07-20 PROCEDURE — 1101F PT FALLS ASSESS-DOCD LE1/YR: CPT | Mod: CPTII,S$GLB,, | Performed by: INTERNAL MEDICINE

## 2022-07-20 PROCEDURE — 1160F RVW MEDS BY RX/DR IN RCRD: CPT | Mod: CPTII,S$GLB,, | Performed by: INTERNAL MEDICINE

## 2022-07-20 PROCEDURE — 99999 PR PBB SHADOW E&M-EST. PATIENT-LVL III: ICD-10-PCS | Mod: PBBFAC,,, | Performed by: INTERNAL MEDICINE

## 2022-07-20 PROCEDURE — 1159F PR MEDICATION LIST DOCUMENTED IN MEDICAL RECORD: ICD-10-PCS | Mod: CPTII,S$GLB,, | Performed by: INTERNAL MEDICINE

## 2022-07-20 PROCEDURE — 99999 PR PBB SHADOW E&M-EST. PATIENT-LVL III: CPT | Mod: PBBFAC,,, | Performed by: INTERNAL MEDICINE

## 2022-07-20 PROCEDURE — 1159F MED LIST DOCD IN RCRD: CPT | Mod: CPTII,S$GLB,, | Performed by: INTERNAL MEDICINE

## 2022-07-20 PROCEDURE — 3288F PR FALLS RISK ASSESSMENT DOCUMENTED: ICD-10-PCS | Mod: CPTII,S$GLB,, | Performed by: INTERNAL MEDICINE

## 2022-07-20 PROCEDURE — 1126F AMNT PAIN NOTED NONE PRSNT: CPT | Mod: CPTII,S$GLB,, | Performed by: INTERNAL MEDICINE

## 2022-07-20 PROCEDURE — 1126F PR PAIN SEVERITY QUANTIFIED, NO PAIN PRESENT: ICD-10-PCS | Mod: CPTII,S$GLB,, | Performed by: INTERNAL MEDICINE

## 2022-07-20 PROCEDURE — 99214 OFFICE O/P EST MOD 30 MIN: CPT | Mod: S$GLB,,, | Performed by: INTERNAL MEDICINE

## 2022-07-20 PROCEDURE — 1160F PR REVIEW ALL MEDS BY PRESCRIBER/CLIN PHARMACIST DOCUMENTED: ICD-10-PCS | Mod: CPTII,S$GLB,, | Performed by: INTERNAL MEDICINE

## 2022-07-20 PROCEDURE — 3288F FALL RISK ASSESSMENT DOCD: CPT | Mod: CPTII,S$GLB,, | Performed by: INTERNAL MEDICINE

## 2022-07-20 PROCEDURE — 3074F PR MOST RECENT SYSTOLIC BLOOD PRESSURE < 130 MM HG: ICD-10-PCS | Mod: CPTII,S$GLB,, | Performed by: INTERNAL MEDICINE

## 2022-07-20 PROCEDURE — 3074F SYST BP LT 130 MM HG: CPT | Mod: CPTII,S$GLB,, | Performed by: INTERNAL MEDICINE

## 2022-07-20 PROCEDURE — 3078F DIAST BP <80 MM HG: CPT | Mod: CPTII,S$GLB,, | Performed by: INTERNAL MEDICINE

## 2022-07-20 PROCEDURE — 99214 PR OFFICE/OUTPT VISIT, EST, LEVL IV, 30-39 MIN: ICD-10-PCS | Mod: S$GLB,,, | Performed by: INTERNAL MEDICINE

## 2022-07-20 PROCEDURE — 3078F PR MOST RECENT DIASTOLIC BLOOD PRESSURE < 80 MM HG: ICD-10-PCS | Mod: CPTII,S$GLB,, | Performed by: INTERNAL MEDICINE

## 2022-07-20 NOTE — PROGRESS NOTES
[facility] [address]     SUBJECTIVE:     History of Present Illness:  Patient is a 81 y.o. female presents with atrial flutter and PVD. Called regarding fluid in legs and given script for lasix in February . Also diagnosed with breast cancer recently   Primary Diagnosis:    1. hypertension  2. DM  3. atrial flutter - resolved  4. abnormal Echo: pericardial effusion - small .     5.  PVD - Amputation right second toe and right fifth toe . S/p atherectomy 11/21 right distal vessels     ROS  Since last visit in 3/22:    1. One month ago, fell in the kitchen. Had a bruise on the left breast and diagnosed with cancer. Getting worked up for surgery etc.   2. No chest pains  3. No shortness of breath; no PND or orthopnea  4. No palpitations  5. No syncope  6. No lower extremity edema on lasix as needed     Past Hospitalization    Review of patient's allergies indicates:   Allergen Reactions    Codeine Nausea Only and Other (See Comments)       Past Medical History:   Diagnosis Date    Anticoagulant long-term use     Arthritis     Atrial flutter     Calcium nephrolithiasis 2007    Diabetes mellitus, type 2     Diabetic peripheral neuropathy associated with type 2 diabetes mellitus     Hypertension     Invasive ductal carcinoma of left breast 7/6/2022       Past Surgical History:   Procedure Laterality Date    ANGIOGRAPHY OF LOWER EXTREMITY N/A 10/21/2021    Procedure: Angiogram Extremity Unilateral;  Surgeon: Dre Martino MD;  Location: [facility] CATH LAB/EP;  Service: Cardiology;  Laterality: N/A;    ANGIOGRAPHY OF LOWER EXTREMITY Right 11/10/2021    Procedure: Angiogram Extremity Unilateral;  Surgeon: Ben Rooney MD;  Location: [facility] CATH LAB/EP;  Service: Cardiology;  Laterality: Right;    COLONOSCOPY  11/28/2011    sigmoid diverticulosis, external hemorrhoids    DEBRIDEMENT Right 10/20/2021    Procedure: DEBRIDEMENT;  Surgeon: Ava Atkins DPM;  Location: [facility] OR;  Service: Podiatry;  Laterality:  Right;    ENDOSCOPIC GASTROCNEMIUS RECESSION Right 9/10/2019    Procedure: RECESSION, GASTROCNEMIUS, ENDOSCOPIC;  Surgeon: Derek Jose DPM;  Location: [facility] OR;  Service: Podiatry;  Laterality: Right;  Arthrex center line (ron notified)  Video    FLEXOR TENOTOMY Right 10/20/2021    Procedure: TENOTOMY, FLEXOR 3rd toe;  Surgeon: Ava Atkins DPM;  Location: [facility] OR;  Service: Podiatry;  Laterality: Right;    HYSTERECTOMY      SHOULDER SURGERY Left     TOE AMPUTATION Right 05/22/2017    5th toe    TOE AMPUTATION Right 10/20/2021    Procedure: AMPUTATION, TOE;  Surgeon: Ava Atkins DPM;  Location: [facility] OR;  Service: Podiatry;  Laterality: Right;       Family History   Problem Relation Age of Onset    Diabetes Mother     Heart failure Father     Kidney failure Brother        Social History     Tobacco Use    Smoking status: Never Smoker    Smokeless tobacco: Never Used   Substance Use Topics    Alcohol use: No    Drug use: No        Home meds:  Current Outpatient Medications on File Prior to Visit   Medication Sig Dispense Refill    ACCU-CHEK SAMI PLUS METER Misc TEST  AS DIRECTED 1 each 0    ACCU-CHEK SAMI PLUS TEST STRP Strp USE TO TEST BLOOD SUGAR ONCE DAILY 100 strip 3    ACCU-CHEK SOFT DEV LANCETS Kit       ACCU-CHEK SOFTCLIX LANCETS Misc TEST ONCE DAILY 100 each 3    ALPRAZolam (XANAX) 0.5 MG tablet Take 1 tablet (0.5 mg total) by mouth 2 (two) times daily as needed for Anxiety. 30 tablet 0    ammonium lactate 12 % Crea Apply to feet twice daily. Avoid use between toes. 140 g 5    apixaban (ELIQUIS) 5 mg Tab TAKE 1 TABLET BY MOUTH TWICE DAILY 180 tablet 1    atorvastatin (LIPITOR) 80 MG tablet Take 1 tablet (80 mg total) by mouth once daily. 90 tablet 3    clopidogreL (PLAVIX) 75 mg tablet Take 1 tablet (75 mg total) by mouth once daily. 90 tablet 3    furosemide (LASIX) 40 MG tablet Take 1 tablet (40 mg total) by mouth once daily. 30 tablet 11    gabapentin  (NEURONTIN) 400 MG capsule 2 pills in the AM, 1 pill at noon, and 2 pills in the evening. 450 capsule 3    glimepiride (AMARYL) 1 MG tablet TAKE 1 TABLET TWICE DAILY 180 tablet 3    lisinopriL (PRINIVIL,ZESTRIL) 20 MG tablet TAKE 1 TABLET EVERY DAY 90 tablet 3    metoprolol succinate (TOPROL-XL) 25 MG 24 hr tablet Take 1 tablet (25 mg total) by mouth once daily. 90 tablet 3    polyethylene glycol (GLYCOLAX) 17 gram/dose powder Take 17 g by mouth once daily. 510 g 0    pramipexole (MIRAPEX) 0.125 MG tablet Take 1 tablet (0.125 mg total) by mouth once daily. 90 tablet 3    urea (CARMOL) 40 % Crea Apply topically 2 (two) times daily. 1 Bottle 3    famotidine (PEPCID) 20 MG tablet TAKE 1 TABLET ONE TIME DAILY AT BEDTIME 90 tablet 3     No current facility-administered medications on file prior to visit.       Cardiac meds:   Eliquis 5 mg BID  metoprolol succinate 25 mg   Glimepiride 1 mg  lisinopril 20 mg   gabapentin TID       Atorvastatin 80 mg   plavix 75 mg   Lasix 40 mg as needed       OBJECTIVE:     Vital Signs (Most Recent)  Pulse: 65 (07/20/22 1346)  BP: 101/64 (07/20/22 1346)  Weight stable but down 3 lb s from 168 to 165    Physical Exam:   Neck: normal carotid upstrokes; normal JVP, no bruits, right  basilar carotid from murmur  Lungs: clear  Heart: RR, normal S1,S2, systolic ejection murmur base; no AI  Abd: obese  Exts: normal DP left; faint  PT right, no edema bilaterally           LABS    CBC  Hemoglobin (g/dL)   Date Value   05/24/2022 11.7 (L)   11/09/2021 11.7 (L)   10/22/2021 11.8 (L)     Hematocrit (%)   Date Value   05/24/2022 34.7 (L)   11/09/2021 34.8 (L)   10/22/2021 34.9 (L)          BMP  Sodium (mmol/L)   Date Value   05/24/2022 142   11/09/2021 141   10/22/2021 140     Potassium (mmol/L)   Date Value   05/24/2022 4.9   11/09/2021 5.1   10/22/2021 4.3     CO2 (mmol/L)   Date Value   05/24/2022 27   11/09/2021 23   10/22/2021 21 (L)     Chloride (mmol/L)   Date Value   05/24/2022 106    2021 109   10/22/2021 106     BUN (mg/dL)   Date Value   2022 26 (H)   2021 18   10/22/2021 28 (H)     Creatinine (mg/dL)   Date Value   2022 1.3   2021 1.2   10/22/2021 1.3     Glucose (mg/dL)   Date Value   2022 238 (H)   2021 116 (H)   10/22/2021 131 (H)     eGFR if non African American (mL/min/1.73 m^2)   Date Value   2022 38 (A)   2021 42 (A)   10/22/2021 38 (A)       BNP  BNP (pg/mL)   Date Value   2017 192 (H)   2017 91       Troponin panel:   No results found for: TROPONIN      COAGS  INR (no units)   Date Value   2017 1.1   2017 1.1   2017 1.1     aPTT (sec)   Date Value   2017 45.3 (H)   2017 47.6 (H)   2017 31.8       Lipid panel:    Lab Results   Component Value Date    CHOL 130 2022    CHOL 225 (H) 2020    CHOL 151 2017     Lab Results   Component Value Date    HDL 40 2022    HDL 47 (L) 2020    HDL 79 (A) 2018     Lab Results   Component Value Date    LDLCALC 56.0 (L) 2022    LDLCALC 135 (H) 2020    LDLCALC 146 2018     Lab Results   Component Value Date    TRIG 170 (H) 2022    TRIG 300 (H) 2020    TRIG 131 2018     Lab Results   Component Value Date    CHOLHDL 30.8 2022    CHOLHDL 4.8 2020    CHOLHDL 31.8 2017         Old Results:  : A1c 7.3, GFR 38     Diagnostic Results:    1a.  EKGs- 17: sinus; possible old anteroseptal infarction   1b. EK17: atrial flutter with ventricular rate of 147;   1c. EK17: sinus  1d. EK/19: sinus with nonspecific ivcd   1e. EK/21: sinus ; LAD     2. Echos- 17: normal EF, diastolic dysfunction; mild LAE, moderate pericardial effusion, aortic sclerosis   2b. Echo: 17: normal EF, small pericardial effusion; LAE    2c. Echo: : normal EF, diastolic dysfunction; LAE, PAS 29 mm normal; mild TR     3. Stress Test- [  ]  4. Cath- [  ]  5. arterial  study: no significant disease. 2017; 1/21: left clear; right distal right popliteal 50-75%  5b. Arterial study lower exts: disease in small vessels; underwent atherectomy 11/10/21 with good results       ASSESSMENT/PLAN:        1. atrial flutter: resolved; [id number]: however 4; no bleeding ; now in sinus   2. diastolic dysfunction - fluid status is perfect   3. moderate pericardial effusion: resolving to mild to none in 1/22   4. shortness of breath:resolved  5. dizziness: resolved   6. blood pressures normal      7. CKD with GFR 38 10/21; GFR 42 11/21 5/22: 38 and stable   8. Right toes healed and doing better     Plan: continue the same medications  If surgery is contemplated, she will tolerate it well  Return 4 months     Sam Paulino MD

## 2022-07-20 NOTE — PROGRESS NOTES
PATIENT: Caro Powell  MRN: [id number]  DATE: 7/21/2022    Diagnosis:   1. Invasive ductal carcinoma of left breast    2. Invasive ductal carcinoma of right breast    3. Secondary malignant neoplasm of axillary node    4. Mood disorder due to medical condition    5. Type 2 diabetes mellitus with diabetic neuropathy, without long-term current use of insulin      Chief Complaint: Breast Cancer    Oncologic History:      Oncologic History 1. Bilateral breast cancer      Oncologic Treatment To be determined, possible surgery.      Pathology 6/28/22:  1. LEFT BREAST MASS, 4:00 O'CLOCK, 5 CM FROM THE NIPPLE, NEEDLE CORE   BIOPSIES:   -  Invasive ductal carcinoma with lobular features, [place] histologic   grade 1, and microcalcifications.           Glandular (acinar)/tubular differentiation:  Score 3.           Nuclear pleomorphism:  Score 1.           Mitotic rate:  Score 1.           Overall grade:  Grade 1 (score 5).           Size of invasive carcinoma as measured from the H&E slides:  12 mm.   -  Focal intermediate grade ductal carcinoma in situ with microcalcifications   and focal lobular extension.           Architectural patterns:  Cribriform and solid.           Nuclear grade:  Grade II (intermediate).           Necrosis:  Not identified.   BREAST BIOMARKER RESULTS:   Estrogen receptor (ER) status:  Positive.        Percentage of cells with nuclear positivity:  90%.        Average intensity of staining:  Strong.   Progesterone receptor (PgR) status:  Positive.        Percentage of cells with nuclear positivity:  70-80%.        Average intensity of staining:  Moderate.   HER2 by IHC:  Negative (score 1).   Ki-67, percentage of cells with nuclear positivity:  10-20%.   2. RIGHT BREAST MASS, 8:00 O'CLOCK, 12 CM FROM THE NIPPLE, NEEDLE CORE   BIOPSIES:   -  Invasive ductal carcinoma, Ene histologic grade 1, with   microcalcifications.           Glandular (acinar)/tubular differentiation:  Score 1.            Nuclear pleomorphism:  Score 2.           Mitotic rate:  Score 1.           Overall grade:  Grade 1 (score 4).           Size of invasive carcinoma as measured from the H&E slides:  15 mm.   -  Focal intermediate grade ductal carcinoma in situ with microcalcifications.           Architectural patterns:  Cribriform and solid.           Nuclear grade:  Grade II (intermediate).           Necrosis:  Not identified.   -  Focal fibrocystic changes with columnar cell change, stromal fibrosis and   microcalcifications.   BREAST BIOMARKER RESULTS:   Estrogen receptor (ER) status:  Positive.        Percentage of cells with nuclear positivity:  98%.        Average intensity of staining:  Strong.   Progesterone receptor (PgR) status:  Positive.        Percentage of cells with nuclear positivity:  40-50%.        Average intensity of staining:  Moderate.   HER2 by IHC:  Equivocal (score 2+, see comment).   Ki-67, percentage of positive nuclei:  10-20%.   3. RIGHT BREAST MASS, 10:00 O'CLOCK, 3 CM FROM THE NIPPLE, NEEDLE CORE   BIOPSIES:   -  Invasive ductal carcinoma, Ene histologic grade 2, with focal   microcalcifications.           Glandular (acinar)/tubular differentiation:  Score 2.           Nuclear pleomorphism:  Score 2.           Mitotic rate:  Score 2.           Overall grade:  Grade 2 (score 6).           Size of invasive carcinoma as measured from the H&E slides:  13 mm.   -  Focal fibrocystic changes with columnar cell change, sclerosing adenosis   and stromal fibrosis.   BREAST BIOMARKER RESULTS:   Estrogen receptor (ER) status:  Positive.        Percentage of cells with nuclear positivity:  98%.        Average intensity of staining:  Strong.   Progesterone receptor (PGR) status:  Positive.        Percentage of cells with nuclear positivity:  10-15%.        Average intensity of staining:  Strong.   HER2 by IHC:  Negative (score 1+).   Ki-67, percentage of positive nuclei:  10-20%.   4. RIGHT AXILLARY  LYMPH NODE, NEEDLE CORE BIOPSIES:   -  Metastatic ductal carcinoma of the breast, size = 10 mm, with focus   suspicious for, but not diagnostic of, extranodal extension.   HER2, Breast Tumor, FISH, Tissue   Result Summary   Negative         Subjective:    History of Present Illness: Ms. Powell is a 81 y.o. female who presents for evaluation and management of bilateral breast cancer. She is referred by Dr. Penny.    - mammogram on 6/21/22 revealed bilateral breast masses.  - biopsy of breast masses (6/28/22) revealed invasive ductal carcinoma with lobular features of the left breast (ER/IN-positive, HER2-negative) and invasive ductal carcinoma of right breast (ER/IN-positive, HER2- [by FISH]), and metastatic ductal carcinoma to right axillary lymph node.  - she met with breast surgery on 7/6/22. They offered surgical resection of bilateral breast cancers, but wanted to see results of PET/CT scan first. At the visit, she expressed hesitancy about proceeding with surgery.  - she underwent PET/CT scan on 7/15/22.    - today, she is doing well. She endorses fatigue, diarrhea. She denies shortness of breath, chest pain, nausea, vomiting, diarrhea, constipation.    Past medical, surgical, family, and social histories have been reviewed and updated below.    Past Medical History:   Past Medical History:   Diagnosis Date    Anticoagulant long-term use     Arthritis     Atrial flutter     Calcium nephrolithiasis 2007    Diabetes mellitus, type 2     Diabetic peripheral neuropathy associated with type 2 diabetes mellitus     Hypertension     Invasive ductal carcinoma of left breast 7/6/2022       Past Surgical History:   Past Surgical History:   Procedure Laterality Date    ANGIOGRAPHY OF LOWER EXTREMITY N/A 10/21/2021    Procedure: Angiogram Extremity Unilateral;  Surgeon: Dre Martino MD;  Location: [facility] CATH LAB/EP;  Service: Cardiology;  Laterality: N/A;    ANGIOGRAPHY OF LOWER EXTREMITY Right 11/10/2021     Procedure: Angiogram Extremity Unilateral;  Surgeon: eBn Rooney MD;  Location: [facility] CATH LAB/EP;  Service: Cardiology;  Laterality: Right;    COLONOSCOPY  11/28/2011    sigmoid diverticulosis, external hemorrhoids    DEBRIDEMENT Right 10/20/2021    Procedure: DEBRIDEMENT;  Surgeon: Ava Atkins DPM;  Location: [facility] OR;  Service: Podiatry;  Laterality: Right;    ENDOSCOPIC GASTROCNEMIUS RECESSION Right 9/10/2019    Procedure: RECESSION, GASTROCNEMIUS, ENDOSCOPIC;  Surgeon: Derek Jose DPM;  Location: [facility] OR;  Service: Podiatry;  Laterality: Right;  Arthrex center line (ron notified)  Video    FLEXOR TENOTOMY Right 10/20/2021    Procedure: TENOTOMY, FLEXOR 3rd toe;  Surgeon: Ava Atkins DPM;  Location: [facility] OR;  Service: Podiatry;  Laterality: Right;    HYSTERECTOMY      SHOULDER SURGERY Left     TOE AMPUTATION Right 05/22/2017    5th toe    TOE AMPUTATION Right 10/20/2021    Procedure: AMPUTATION, TOE;  Surgeon: Ava Atkins DPM;  Location: [facility] OR;  Service: Podiatry;  Laterality: Right;       Family History:   Family History   Problem Relation Age of Onset    Diabetes Mother     Heart failure Father     Kidney failure Brother        Social History:  reports that she has never smoked. She has never used smokeless tobacco. She reports that she does not drink alcohol and does not use drugs.    Allergies:  Review of patient's allergies indicates:   Allergen Reactions    Codeine Nausea Only and Other (See Comments)       Medications:  Current Outpatient Medications   Medication Sig Dispense Refill    ACCU-CHEK SAMI PLUS METER Misc TEST  AS DIRECTED 1 each 0    ACCU-CHEK SAMI PLUS TEST STRP Strp USE TO TEST BLOOD SUGAR ONCE DAILY 100 strip 3    ACCU-CHEK SOFT DEV LANCETS Kit       ACCU-CHEK SOFTCLIX LANCETS Misc TEST ONCE DAILY 100 each 3    ALPRAZolam (XANAX) 0.5 MG tablet Take 1 tablet (0.5 mg total) by mouth 2 (two) times daily as needed for Anxiety. 30 tablet  0    ammonium lactate 12 % Crea Apply to feet twice daily. Avoid use between toes. 140 g 5    apixaban (ELIQUIS) 5 mg Tab TAKE 1 TABLET BY MOUTH TWICE DAILY 180 tablet 1    atorvastatin (LIPITOR) 80 MG tablet Take 1 tablet (80 mg total) by mouth once daily. 90 tablet 3    clopidogreL (PLAVIX) 75 mg tablet Take 1 tablet (75 mg total) by mouth once daily. 90 tablet 3    furosemide (LASIX) 40 MG tablet Take 1 tablet (40 mg total) by mouth once daily. (Patient not taking: Reported on 7/6/2022) 30 tablet 11    gabapentin (NEURONTIN) 400 MG capsule 2 pills in the AM, 1 pill at noon, and 2 pills in the evening. 450 capsule 3    glimepiride (AMARYL) 1 MG tablet TAKE 1 TABLET TWICE DAILY 180 tablet 3    lisinopriL (PRINIVIL,ZESTRIL) 20 MG tablet TAKE 1 TABLET EVERY DAY 90 tablet 3    metoprolol succinate (TOPROL-XL) 25 MG 24 hr tablet Take 1 tablet (25 mg total) by mouth once daily. 90 tablet 3    polyethylene glycol (GLYCOLAX) 17 gram/dose powder Take 17 g by mouth once daily. 510 g 0    pramipexole (MIRAPEX) 0.125 MG tablet Take 1 tablet (0.125 mg total) by mouth once daily. 90 tablet 3    urea (CARMOL) 40 % Crea Apply topically 2 (two) times daily. 1 Bottle 3     No current facility-administered medications for this visit.       Review of Systems   Constitutional: Positive for fatigue.   HENT: Negative for sore throat.    Eyes: Negative for visual disturbance.   Respiratory: Negative for cough and shortness of breath.    Cardiovascular: Negative for chest pain.   Gastrointestinal: Positive for diarrhea. Negative for abdominal pain, constipation, nausea and vomiting.   Genitourinary: Negative for dysuria.   Musculoskeletal: Negative for back pain.   Skin: Negative for rash.   Neurological: Negative for headaches.   Hematological: Negative for adenopathy.   Psychiatric/Behavioral: The patient is not nervous/anxious.        ECOG Performance Status:   ECOG SCORE           Objective:      Vitals:   Vitals:     07/21/22 0811   BP: (!) 128/49   BP Location: Right arm   Patient Position: Sitting   BP Method: Medium (Automatic)   Pulse: 60   Resp: 18   SpO2: 96%   Weight: 74.9 kg (165 lb 2 oz)     BMI: Body mass index is 30.2 kg/m².    Physical Exam  Vitals and nursing note reviewed.   Constitutional:       Appearance: She is well-developed.   HENT:      Head: Normocephalic and atraumatic.   Eyes:      Pupils: Pupils are equal, round, and reactive to light.   Cardiovascular:      Rate and Rhythm: Normal rate and regular rhythm.   Pulmonary:      Effort: Pulmonary effort is normal.      Breath sounds: Normal breath sounds.   Abdominal:      General: Bowel sounds are normal.      Palpations: Abdomen is soft.   Musculoskeletal:         General: Normal range of motion.      Cervical back: Normal range of motion and neck supple.   Skin:     General: Skin is warm and dry.   Neurological:      Mental Status: She is alert and oriented to person, place, and time.   Psychiatric:         Behavior: Behavior normal.         Thought Content: Thought content normal.         Judgment: Judgment normal.         Laboratory Data:  Labs have been reviewed.    Lab Results   Component Value Date    WBC 6.54 05/24/2022    HGB 11.7 (L) 05/24/2022    HCT 34.7 (L) 05/24/2022    MCV 89 05/24/2022     05/24/2022       Imaging:     PET/CT scan (7/15/22): I have personally reviewed the images  In this patient with breast cancer, there is mildly hypermetabolic right breast mass and axillary lymph nodes.  Focal subtle right pleural thickening with faint radiotracer uptake, metastatic lesion is not excluded.     1.7 cm indeterminate left adrenal nodule without abnormal uptake, likely a lipid poor benign adenoma.  If management would change, then recommend adrenal protocol CT or MRI for further evaluation.     Bilateral pulmonary micronodules some which are new and under size threshold for reliable PET evaluation and percutaneous biopsy.  Oncological  considerations will determine ongoing follow-up.     Perianal focal mild increased uptake, which may indicate physiologic sphincter tone or infectious/inflammatory etiology such as hemorrhoids.     Mammogram (6/21/22):    Impression:  Left  Mass: Left breast 16 mm x 15 mm x 15 mm mass at the 4 o'clock position. Assessment: 5 - Highly suggestive of malignancy. Biopsy is recommended.      Right  Mass: Right breast 13 mm x 10 mm x 8 mm mass at the 10 o'clock position. Assessment: 5 - Highly suggestive of malignancy. Biopsy is recommended.   Mass: Right breast 20 mm x 19 mm x 19 mm mass at the 8 o'clock position. Assessment: 5 - Highly suggestive of malignancy. Biopsy is recommended.   Mass: Right breast 15 mm x 7 mm x 7 mm mass at the 9 o'clock position. Assessment: 5 - Highly suggestive of malignancy.   The masses span at least 7.1 cm.      Lymph Node: Right axilla 8 mm x 7 mm x 8 mm lymph node. Assessment: 4 - Suspicious finding. Biopsy is recommended.       Assessment:       1. Invasive ductal carcinoma of left breast    2. Invasive ductal carcinoma of right breast    3. Secondary malignant neoplasm of axillary node    4. Mood disorder due to medical condition    5. Type 2 diabetes mellitus with diabetic neuropathy, without long-term current use of insulin           Plan:     1. Bilateral invasive ductal carcinoma of breasts  - I have reviewed her chart  - mammogram on 6/21/22 revealed bilateral breast masses.  - biopsy of breast masses (6/28/22) revealed invasive ductal carcinoma with lobular features of the left breast (ER/IA-positive, HER2-negative) and invasive ductal carcinoma of right breast (ER/IA-positive, HER2- [by FISH]), and metastatic ductal carcinoma to right axillary lymph node.  - she met with breast surgery on 7/6/22. They offered surgical resection of bilateral breast cancers, but wanted to see results of PET/CT scan first. At the visit, she expressed hesitancy about proceeding with surgery.  -  "PET/CT (7/15/22) revealed localized disease. There was "focal subtle right pleural thickening with faint radiotracer uptake, metastatic lesion is not excluded." pulmonary micronodules are also noted of unclear significance  - after discussion, she is more inclined to proceed with surgery.  - I will send a message to the breast surgery team with this information.  - return to clinic in 2 months to review results of surgery and discuss adjuvant treatment.    2. Type 2 diabetes  - last hemoglobin A1c (5/24/22) was 7.3%  - continue diabetic medications  - defer management to Dr. Penny    - return to clinic in 2 months to review results of surgery and discuss adjuvant treatment.    Morgan Caputo M.D.  Hematology/Oncology  [facility] - [address], [address]  [place] [address]  Phone: [phone]  Fax: [phone]  "

## 2022-07-21 ENCOUNTER — OFFICE VISIT (OUTPATIENT)
Dept: HEMATOLOGY/ONCOLOGY | Facility: CLINIC | Age: 82
End: 2022-07-21
Payer: MEDICARE

## 2022-07-21 ENCOUNTER — TELEPHONE (OUTPATIENT)
Dept: SURGERY | Facility: CLINIC | Age: 82
End: 2022-07-21
Payer: MEDICARE

## 2022-07-21 VITALS
BODY MASS INDEX: 30.2 KG/M2 | OXYGEN SATURATION: 96 % | RESPIRATION RATE: 18 BRPM | DIASTOLIC BLOOD PRESSURE: 49 MMHG | WEIGHT: 165.13 LBS | SYSTOLIC BLOOD PRESSURE: 128 MMHG | HEART RATE: 60 BPM

## 2022-07-21 DIAGNOSIS — F06.30 MOOD DISORDER DUE TO MEDICAL CONDITION: ICD-10-CM

## 2022-07-21 DIAGNOSIS — C77.3 SECONDARY MALIGNANT NEOPLASM OF AXILLARY NODE: ICD-10-CM

## 2022-07-21 DIAGNOSIS — C50.912 INVASIVE DUCTAL CARCINOMA OF LEFT BREAST: Primary | ICD-10-CM

## 2022-07-21 DIAGNOSIS — C50.911 INVASIVE DUCTAL CARCINOMA OF RIGHT BREAST: ICD-10-CM

## 2022-07-21 DIAGNOSIS — E11.40 TYPE 2 DIABETES MELLITUS WITH DIABETIC NEUROPATHY, WITHOUT LONG-TERM CURRENT USE OF INSULIN: ICD-10-CM

## 2022-07-21 PROCEDURE — 99499 RISK ADDL DX/OHS AUDIT: ICD-10-PCS | Mod: S$GLB,,, | Performed by: INTERNAL MEDICINE

## 2022-07-21 PROCEDURE — 1160F PR REVIEW ALL MEDS BY PRESCRIBER/CLIN PHARMACIST DOCUMENTED: ICD-10-PCS | Mod: CPTII,S$GLB,, | Performed by: INTERNAL MEDICINE

## 2022-07-21 PROCEDURE — 1160F RVW MEDS BY RX/DR IN RCRD: CPT | Mod: CPTII,S$GLB,, | Performed by: INTERNAL MEDICINE

## 2022-07-21 PROCEDURE — 3078F DIAST BP <80 MM HG: CPT | Mod: CPTII,S$GLB,, | Performed by: INTERNAL MEDICINE

## 2022-07-21 PROCEDURE — 99204 OFFICE O/P NEW MOD 45 MIN: CPT | Mod: S$GLB,,, | Performed by: INTERNAL MEDICINE

## 2022-07-21 PROCEDURE — 1159F PR MEDICATION LIST DOCUMENTED IN MEDICAL RECORD: ICD-10-PCS | Mod: CPTII,S$GLB,, | Performed by: INTERNAL MEDICINE

## 2022-07-21 PROCEDURE — 3074F PR MOST RECENT SYSTOLIC BLOOD PRESSURE < 130 MM HG: ICD-10-PCS | Mod: CPTII,S$GLB,, | Performed by: INTERNAL MEDICINE

## 2022-07-21 PROCEDURE — 3078F PR MOST RECENT DIASTOLIC BLOOD PRESSURE < 80 MM HG: ICD-10-PCS | Mod: CPTII,S$GLB,, | Performed by: INTERNAL MEDICINE

## 2022-07-21 PROCEDURE — 99204 PR OFFICE/OUTPT VISIT, NEW, LEVL IV, 45-59 MIN: ICD-10-PCS | Mod: S$GLB,,, | Performed by: INTERNAL MEDICINE

## 2022-07-21 PROCEDURE — 99999 PR PBB SHADOW E&M-EST. PATIENT-LVL IV: CPT | Mod: PBBFAC,,, | Performed by: INTERNAL MEDICINE

## 2022-07-21 PROCEDURE — 3288F PR FALLS RISK ASSESSMENT DOCUMENTED: ICD-10-PCS | Mod: CPTII,S$GLB,, | Performed by: INTERNAL MEDICINE

## 2022-07-21 PROCEDURE — 1158F ADVNC CARE PLAN TLK DOCD: CPT | Mod: CPTII,S$GLB,, | Performed by: INTERNAL MEDICINE

## 2022-07-21 PROCEDURE — 1100F PR PT FALLS ASSESS DOC 2+ FALLS/FALL W/INJURY/YR: ICD-10-PCS | Mod: CPTII,S$GLB,, | Performed by: INTERNAL MEDICINE

## 2022-07-21 PROCEDURE — 1126F AMNT PAIN NOTED NONE PRSNT: CPT | Mod: CPTII,S$GLB,, | Performed by: INTERNAL MEDICINE

## 2022-07-21 PROCEDURE — 3288F FALL RISK ASSESSMENT DOCD: CPT | Mod: CPTII,S$GLB,, | Performed by: INTERNAL MEDICINE

## 2022-07-21 PROCEDURE — 1158F PR ADVANCE CARE PLANNING DISCUSS DOCUMENTED IN MEDICAL RECORD: ICD-10-PCS | Mod: CPTII,S$GLB,, | Performed by: INTERNAL MEDICINE

## 2022-07-21 PROCEDURE — 1126F PR PAIN SEVERITY QUANTIFIED, NO PAIN PRESENT: ICD-10-PCS | Mod: CPTII,S$GLB,, | Performed by: INTERNAL MEDICINE

## 2022-07-21 PROCEDURE — 99499 UNLISTED E&M SERVICE: CPT | Mod: S$GLB,,, | Performed by: INTERNAL MEDICINE

## 2022-07-21 PROCEDURE — 99999 PR PBB SHADOW E&M-EST. PATIENT-LVL IV: ICD-10-PCS | Mod: PBBFAC,,, | Performed by: INTERNAL MEDICINE

## 2022-07-21 PROCEDURE — 1159F MED LIST DOCD IN RCRD: CPT | Mod: CPTII,S$GLB,, | Performed by: INTERNAL MEDICINE

## 2022-07-21 PROCEDURE — 1100F PTFALLS ASSESS-DOCD GE2>/YR: CPT | Mod: CPTII,S$GLB,, | Performed by: INTERNAL MEDICINE

## 2022-07-21 PROCEDURE — 3074F SYST BP LT 130 MM HG: CPT | Mod: CPTII,S$GLB,, | Performed by: INTERNAL MEDICINE

## 2022-07-21 NOTE — Clinical Note
Good morning,  Just seeing Mrs. Powell. I believe you had seen her for bilateral breast cancer. I reviewed the pet scan with her and her family. No clear evidence of metastatic disease (faint right pleural thickening and some micronodules). She is more willing to proceed with surgery now (I think she was hesitant when she met y'all). Can you schedule an appointment with her to finalize surgery?  Thanks so much! Morgan

## 2022-07-21 NOTE — Clinical Note
She also requests that her case is discussed and that the whole breast surgery team is on board. Thanks!

## 2022-07-21 NOTE — TELEPHONE ENCOUNTER
Left VM with my direct contact information, patient advised to give a return call to schedule an appointment with Dr. Quinn.

## 2022-07-26 ENCOUNTER — OFFICE VISIT (OUTPATIENT)
Dept: SURGERY | Facility: CLINIC | Age: 82
End: 2022-07-26
Payer: MEDICARE

## 2022-07-26 VITALS
SYSTOLIC BLOOD PRESSURE: 151 MMHG | DIASTOLIC BLOOD PRESSURE: 63 MMHG | HEIGHT: 62 IN | WEIGHT: 165 LBS | HEART RATE: 51 BPM | BODY MASS INDEX: 30.36 KG/M2

## 2022-07-26 DIAGNOSIS — C50.912 INVASIVE DUCTAL CARCINOMA OF LEFT BREAST: Primary | ICD-10-CM

## 2022-07-26 DIAGNOSIS — C50.911 INVASIVE DUCTAL CARCINOMA OF RIGHT BREAST: ICD-10-CM

## 2022-07-26 DIAGNOSIS — Z01.818 PRE-OP TESTING: ICD-10-CM

## 2022-07-26 PROCEDURE — 3077F PR MOST RECENT SYSTOLIC BLOOD PRESSURE >= 140 MM HG: ICD-10-PCS | Mod: CPTII,S$GLB,, | Performed by: SURGERY

## 2022-07-26 PROCEDURE — 1125F PR PAIN SEVERITY QUANTIFIED, PAIN PRESENT: ICD-10-PCS | Mod: CPTII,S$GLB,, | Performed by: SURGERY

## 2022-07-26 PROCEDURE — 99214 PR OFFICE/OUTPT VISIT, EST, LEVL IV, 30-39 MIN: ICD-10-PCS | Mod: S$GLB,,, | Performed by: SURGERY

## 2022-07-26 PROCEDURE — 1100F PTFALLS ASSESS-DOCD GE2>/YR: CPT | Mod: CPTII,S$GLB,, | Performed by: SURGERY

## 2022-07-26 PROCEDURE — 1160F RVW MEDS BY RX/DR IN RCRD: CPT | Mod: CPTII,S$GLB,, | Performed by: SURGERY

## 2022-07-26 PROCEDURE — 99214 OFFICE O/P EST MOD 30 MIN: CPT | Mod: S$GLB,,, | Performed by: SURGERY

## 2022-07-26 PROCEDURE — 1100F PR PT FALLS ASSESS DOC 2+ FALLS/FALL W/INJURY/YR: ICD-10-PCS | Mod: CPTII,S$GLB,, | Performed by: SURGERY

## 2022-07-26 PROCEDURE — 3288F FALL RISK ASSESSMENT DOCD: CPT | Mod: CPTII,S$GLB,, | Performed by: SURGERY

## 2022-07-26 PROCEDURE — 3078F PR MOST RECENT DIASTOLIC BLOOD PRESSURE < 80 MM HG: ICD-10-PCS | Mod: CPTII,S$GLB,, | Performed by: SURGERY

## 2022-07-26 PROCEDURE — 1125F AMNT PAIN NOTED PAIN PRSNT: CPT | Mod: CPTII,S$GLB,, | Performed by: SURGERY

## 2022-07-26 PROCEDURE — 3077F SYST BP >= 140 MM HG: CPT | Mod: CPTII,S$GLB,, | Performed by: SURGERY

## 2022-07-26 PROCEDURE — 1159F MED LIST DOCD IN RCRD: CPT | Mod: CPTII,S$GLB,, | Performed by: SURGERY

## 2022-07-26 PROCEDURE — 1160F PR REVIEW ALL MEDS BY PRESCRIBER/CLIN PHARMACIST DOCUMENTED: ICD-10-PCS | Mod: CPTII,S$GLB,, | Performed by: SURGERY

## 2022-07-26 PROCEDURE — 3078F DIAST BP <80 MM HG: CPT | Mod: CPTII,S$GLB,, | Performed by: SURGERY

## 2022-07-26 PROCEDURE — 3288F PR FALLS RISK ASSESSMENT DOCUMENTED: ICD-10-PCS | Mod: CPTII,S$GLB,, | Performed by: SURGERY

## 2022-07-26 PROCEDURE — 1159F PR MEDICATION LIST DOCUMENTED IN MEDICAL RECORD: ICD-10-PCS | Mod: CPTII,S$GLB,, | Performed by: SURGERY

## 2022-07-26 NOTE — PROGRESS NOTES
Breast Surgery  [facility]  Department of Surgery      REFERRING PROVIDER: No referring provider defined for this encounter.    Chief Complaint: Follow-up (Surgical Discussions Invasive Ductal Carcinoma 6/28/22 .)      Subjective:      Patient ID: Caro Powell is a 81 y.o. female who presents with bilateral breast Invasive Ductal Carcinoma.     She presented for diagnostic breast imaging on 6/21/22 for new breast symptoms. , a palpable breast mass identified by referred physician. This identified multiple lesions in bilateral breast (see below) as well as an abnormal lymph node. A ultrasound guided biopsy was performed on 6/28/22 with pathology revealing infiltrating ductal carcinoma of the bilateral breast as well as right axillary lymph node positive for metastatic ductal carcinoma.    Findings at that time were the following:   Lesion 1:    Location: Left Breast, 4:00 5FTN  Clip:Twirl, in expected position only seen on ML view.   Tumor size: 1.6 cm   Tumor ndgndrndanddndend:nd nd2nd Estrogen Receptor: +   Progesterone Receptor: +   Her-2 ender: -   Lymph node status: Clinically negative on the left      Lesion 2:    Location: Right Breast, 8:00 12FTN   Clip:Twirl, not visualized on the mammogram because posterior.   Tumor size: 2 cm   Tumor ndgndrndanddndend:nd nd2nd Estrogen Receptor: +   Progesterone Receptor: +   Her-2 ender: Equivocal, fish pending  Lymph node status: Biopsy + metastatic ductal carcinoma     Lesion 3:    Location: Right Breast, 10:00 12FTN   Clip:Hydromark, in exspected position  Tumor size: 2 cm   Tumor stgstrstastdstest:st st1st Estrogen Receptor: +   Progesterone Receptor: +   Her-2 ender: -  Lymph node status: Biopsy + metastatic ductal carcinoma    Patient had noted a change on breast exam.  Patient denies nipple discharge. Patient denies previous breast biopsy. Patient denies a personal history of breast cancer. Family history includes sister with breast cancer at 69, maternal aunt with breast cancer in her early 40s.   Sister underwent genetic testing which was negative.     GYN History:  Age of menarche was 13. Age of menopause was 31.  Patient denies hormonal therapy. Patient is . Age of first live birth was 19. Patient did not breast feed.    Interval History:   Patient presents today for follow up for discussion of surgical options. She recently met with Dr. Polanco. She was discussed this morning at Tumor Board who were in agreement to proceed with Surgical Therapy first. She denies any new changes to her breast, and her swelling and bruising to her bilateral breasts is markedly improved. She, in discussion with her family is ready to proceed with surgery.    Past Medical History:   Diagnosis Date    Anticoagulant long-term use     Arthritis     Atrial flutter     Calcium nephrolithiasis     Diabetes mellitus, type 2     Diabetic peripheral neuropathy associated with type 2 diabetes mellitus     Hypertension     Invasive ductal carcinoma of left breast 2022     Past Surgical History:   Procedure Laterality Date    ANGIOGRAPHY OF LOWER EXTREMITY N/A 10/21/2021    Procedure: Angiogram Extremity Unilateral;  Surgeon: Dre Martino MD;  Location: [facility] CATH LAB/EP;  Service: Cardiology;  Laterality: N/A;    ANGIOGRAPHY OF LOWER EXTREMITY Right 11/10/2021    Procedure: Angiogram Extremity Unilateral;  Surgeon: Ben Rooney MD;  Location: [facility] CATH LAB/EP;  Service: Cardiology;  Laterality: Right;    COLONOSCOPY  2011    sigmoid diverticulosis, external hemorrhoids    DEBRIDEMENT Right 10/20/2021    Procedure: DEBRIDEMENT;  Surgeon: Ava Atkins DPM;  Location: [facility] OR;  Service: Podiatry;  Laterality: Right;    ENDOSCOPIC GASTROCNEMIUS RECESSION Right 9/10/2019    Procedure: RECESSION, GASTROCNEMIUS, ENDOSCOPIC;  Surgeon: Derek Jose DPM;  Location: [facility] OR;  Service: Podiatry;  Laterality: Right;  Arthrex center line (ron notified)  Video    FLEXOR TENOTOMY Right 10/20/2021     Procedure: TENOTOMY, FLEXOR 3rd toe;  Surgeon: Ava Atkins DPM;  Location: [facility] OR;  Service: Podiatry;  Laterality: Right;    HYSTERECTOMY      SHOULDER SURGERY Left     TOE AMPUTATION Right 05/22/2017    5th toe    TOE AMPUTATION Right 10/20/2021    Procedure: AMPUTATION, TOE;  Surgeon: Ava Atkins DPM;  Location: [facility] OR;  Service: Podiatry;  Laterality: Right;     Current Outpatient Medications on File Prior to Visit   Medication Sig Dispense Refill    ACCU-CHEK SAMI PLUS METER Misc TEST  AS DIRECTED 1 each 0    ACCU-CHEK SAMI PLUS TEST STRP Strp USE TO TEST BLOOD SUGAR ONCE DAILY 100 strip 3    ACCU-CHEK SOFT DEV LANCETS Kit       ACCU-CHEK SOFTCLIX LANCETS Misc TEST ONCE DAILY 100 each 3    ALPRAZolam (XANAX) 0.5 MG tablet Take 1 tablet (0.5 mg total) by mouth 2 (two) times daily as needed for Anxiety. 30 tablet 0    ammonium lactate 12 % Crea Apply to feet twice daily. Avoid use between toes. 140 g 5    apixaban (ELIQUIS) 5 mg Tab TAKE 1 TABLET BY MOUTH TWICE DAILY 180 tablet 1    atorvastatin (LIPITOR) 80 MG tablet Take 1 tablet (80 mg total) by mouth once daily. 90 tablet 3    clopidogreL (PLAVIX) 75 mg tablet Take 1 tablet (75 mg total) by mouth once daily. 90 tablet 3    furosemide (LASIX) 40 MG tablet Take 1 tablet (40 mg total) by mouth once daily. 30 tablet 11    gabapentin (NEURONTIN) 400 MG capsule 2 pills in the AM, 1 pill at noon, and 2 pills in the evening. 450 capsule 3    glimepiride (AMARYL) 1 MG tablet TAKE 1 TABLET TWICE DAILY 180 tablet 3    lisinopriL (PRINIVIL,ZESTRIL) 20 MG tablet TAKE 1 TABLET EVERY DAY 90 tablet 3    metoprolol succinate (TOPROL-XL) 25 MG 24 hr tablet Take 1 tablet (25 mg total) by mouth once daily. 90 tablet 3    polyethylene glycol (GLYCOLAX) 17 gram/dose powder Take 17 g by mouth once daily. 510 g 0    pramipexole (MIRAPEX) 0.125 MG tablet Take 1 tablet (0.125 mg total) by mouth once daily. 90 tablet 3    urea (CARMOL) 40 % Crea  Apply topically 2 (two) times daily. 1 Bottle 3    famotidine (PEPCID) 20 MG tablet TAKE 1 TABLET ONE TIME DAILY AT BEDTIME (Patient not taking: Reported on 7/26/2022) 90 tablet 3     No current facility-administered medications on file prior to visit.     Social History     Socioeconomic History    Marital status:    Tobacco Use    Smoking status: Never Smoker    Smokeless tobacco: Never Used   Substance and Sexual Activity    Alcohol use: No    Drug use: No    Sexual activity: Yes     Social Determinants of Health     Financial Resource Strain: Medium Risk    Difficulty of Paying Living Expenses: Somewhat hard   Food Insecurity: No Food Insecurity    Worried About Running Out of Food in the Last Year: Never true    Ran Out of Food in the Last Year: Never true   Transportation Needs: No Transportation Needs    Lack of Transportation (Medical): No    Lack of Transportation (Non-Medical): No   Physical Activity: Inactive    Days of Exercise per Week: 0 days    Minutes of Exercise per Session: 0 min   Stress: Stress Concern Present    Feeling of Stress : To some extent   Social Connections: Moderately Isolated    Frequency of Communication with Friends and Family: Twice a week    Frequency of Social Gatherings with Friends and Family: Never    Attends Judaism Services: 1 to 4 times per year    Active Member of Clubs or Organizations: No    Attends Club or Organization Meetings: Never    Marital Status:    Housing Stability: Low Risk     Unable to Pay for Housing in the Last Year: No    Number of Places Lived in the Last Year: 1    Unstable Housing in the Last Year: No     Family History   Problem Relation Age of Onset    Diabetes Mother     Heart failure Father     Breast cancer Sister 69        Genetic testing negative    Kidney failure Brother     Breast cancer Maternal Aunt         early 40s        Review of Systems   All other systems reviewed and are  "negative.    Objective:   BP (!) 151/63 (BP Location: Left arm, Patient Position: Sitting, BP Method: Large (Automatic))   Pulse (!) 51   Ht 5' 2" (1.575 m)   Wt 74.8 kg (165 lb)   LMP  (LMP Unknown)   BMI 30.18 kg/m²     Physical Exam   Constitutional: She is oriented to person, place, and time. She appears well-developed and well-nourished.   HENT:   Head: Normocephalic and atraumatic.   Cardiovascular: Normal rate.    Pulmonary/Chest: Effort normal and breath sounds normal. Right breast exhibits mass. Right breast exhibits no inverted nipple, no nipple discharge, no skin change and no tenderness. Left breast exhibits mass. Left breast exhibits no inverted nipple, no nipple discharge, no skin change and no tenderness.       Neurological: She is alert and oriented to person, place, and time.   Skin: Skin is warm and dry.     Psychiatric: She has a normal mood and affect. Her behavior is normal. Judgment and thought content normal.       Radiology review: Images personally reviewed by me in the clinic and shown to the patient during the consultation.     Assessment:       1. Invasive ductal carcinoma of left breast    2. Invasive ductal carcinoma of right breast        Plan:   left breast, Clinical Stage IA ([id number]), invasive ductal carcinoma with lobular features of the Lower-outer quadrant of breast, estrogen receptor Positive, progesterone receptor Positive, HER2 Negative    Right breast, Clinical Stage IIA ([id number]), invasive ductal carcinoma of the Upper-outer quadrant of breast, estrogen receptor Positive, progesterone receptor Positive, HER2 FISH negative    Multidisciplinary nature of breast cancer care was discussed in detail at today's visit.    We discussed that surgical options would include a lumpectomy versus a mastectomy.  We discussed there is no survival benefit to undergoing a mastectomy compared to lumpectomy.  Based on clinical exam and imaging, she is not a candidate for breast " conservation on the right side due to the extent of disease however on the left side she could consider lumpectomy.  The surgical procedures of lumpectomy and mastectomy were discussed in full detail. We discussed that a lumpectomy would be done as a reflector localized excision of the primary tumor along with a surrounding margin of normal breast tissue.  The concept of local control was explained to the patient.  She understands that we would aim to achieve negative margins at the time of initial surgery.  We discussed the option for mastectomy.  We discussed the risks and benefits of mastectomy. Risks include but are not limited to risk of bleeding, infection, poor cosmesis, need for additional surgery, clip placement, scaring, pain including phantom pain, fluid collections, shoulder stiffness, prolonged healing, and recurrence.   As she requires a mastectomy on the right she would also desire mastectomy the left in an attempt to avoid radiation.    We discussed breast MRI to further evaluate the extent of disease on the left due to the lobular features of the biopsy.  She expressed claustrophobia.  She also strongly desires a mastectomy on the left if she proceeds with surgery.  MRI was canceled.    Reconstruction after mastectomy was discussed.  Reconstructive generally include implant or autologous tissue reconstruction. There are certain health qualifications that the patient must meet in order to be a candidate for reconstruction.  With her age and medical comorbidities including diabetes and peripheral arterial disease she would be a poor candidate for reconstruction.  We discussed consultation to discuss Plastic surgery with the plastic surgeon further but she would like to proceed without breast reconstruction.  We discussed breast prosthesis.    We discussed that on her right breast the lymph node that has proven metastatic disease.  We discussed that if surgery is done prior to any systemic therapy that  it is recommended that we proceed with an axillary lymph node dissection.   She will need medical oncology discuss systemic therapy further but due to her age medical comorbidities unlikely that she would proceed with up-front chemotherapy.  We will plan for axilla dissection on the right.  We discussed the increased risk of lymphedema as well as nerve and vessel injury with axilla lymph node dissection.      In the left we would plan for a sentinel lymph node biopsy.  [place] lymph node biopsies performed utilizing the injection of blue and radioactive dye.  This dye travels to the 1st few lymph nodes that drain the breast.  Lymph nodes that uptake the blue or radioactive dye or are palpable are surgically removed and sent to pathology.  Typically 1-5 lymph nodes are removed during this procedure although exact numbers vary depending on the patient.  This procedure allows sampling of the lymph nodes most at risk for metastasis.  The risks and benefits of the procedure discussed the patient.  Risks include but are not limited to lymphedema, bleeding, infection, poor cosmesis, numbness of the incision site, seroma, failure of dye to map, nerve injury leading to permanent arm numbness and or muscle weakness, possibility of a false negative finding, and need for additional surgery.  If metastatic disease is identified on pathology of the lymph nodes been axillary dissection (a second surgery) may be recommended.      Referral to physical therapy preoperatively to discuss lymphedema risk and prevention.    The role of adjuvant radiation therapy following breast conservation surgery was also discussed with the patient.  We discussed that if she proceed with a lumpectomy on the left side then she would be recommended for radiation.  We discussed that if she proceeds with mastectomy she would be unlikely be recommended for radiation unless a large number of lymph nodes are found to be involved or the breast cancer is  found to be large.     PET scan with no signs of metastsic disease. Saw Dr. Caputo. Plan for upfront surgery.     She does meet NCCN guidelines for genetic testing based on her family history.  She will consider genetic testing.  Her sister genetic testing was negative.    We re-discussed the surgery plan with the patient and her family today in full detail. All questions were answered to the best of our ability.   Caro Powell decided to proceed with with bilateral mastectomy, right axillary lymph node dissection and left sentinel lymph node biopsy.      Total time spent with the patient: 30 minutes.  30 minutes of face to face consultation and 5 minutes of chart review and coordination of care.

## 2022-07-26 NOTE — H&P (VIEW-ONLY)
Breast Surgery  [facility]  Department of Surgery      REFERRING PROVIDER: No referring provider defined for this encounter.    Chief Complaint: Follow-up (Surgical Discussions Invasive Ductal Carcinoma 6/28/22 .)      Subjective:      Patient ID: Caro Powell is a 81 y.o. female who presents with bilateral breast Invasive Ductal Carcinoma.     She presented for diagnostic breast imaging on 6/21/22 for new breast symptoms. , a palpable breast mass identified by referred physician. This identified multiple lesions in bilateral breast (see below) as well as an abnormal lymph node. A ultrasound guided biopsy was performed on 6/28/22 with pathology revealing infiltrating ductal carcinoma of the bilateral breast as well as right axillary lymph node positive for metastatic ductal carcinoma.    Findings at that time were the following:   Lesion 1:    Location: Left Breast, 4:00 5FTN  Clip:Twirl, in expected position only seen on ML view.   Tumor size: 1.6 cm   Tumor ndgndrndanddndend:nd nd2nd Estrogen Receptor: +   Progesterone Receptor: +   Her-2 ender: -   Lymph node status: Clinically negative on the left      Lesion 2:    Location: Right Breast, 8:00 12FTN   Clip:Twirl, not visualized on the mammogram because posterior.   Tumor size: 2 cm   Tumor ndgndrndanddndend:nd nd2nd Estrogen Receptor: +   Progesterone Receptor: +   Her-2 ender: Equivocal, fish pending  Lymph node status: Biopsy + metastatic ductal carcinoma     Lesion 3:    Location: Right Breast, 10:00 12FTN   Clip:Hydromark, in exspected position  Tumor size: 2 cm   Tumor stgstrstastdstest:st st1st Estrogen Receptor: +   Progesterone Receptor: +   Her-2 ender: -  Lymph node status: Biopsy + metastatic ductal carcinoma    Patient had noted a change on breast exam.  Patient denies nipple discharge. Patient denies previous breast biopsy. Patient denies a personal history of breast cancer. Family history includes sister with breast cancer at 69, maternal aunt with breast cancer in her early 40s.   Sister underwent genetic testing which was negative.     GYN History:  Age of menarche was 13. Age of menopause was 31.  Patient denies hormonal therapy. Patient is . Age of first live birth was 19. Patient did not breast feed.    Interval History:   Patient presents today for follow up for discussion of surgical options. She recently met with Dr. Polanco. She was discussed this morning at Tumor Board who were in agreement to proceed with Surgical Therapy first. She denies any new changes to her breast, and her swelling and bruising to her bilateral breasts is markedly improved. She, in discussion with her family is ready to proceed with surgery.    Past Medical History:   Diagnosis Date    Anticoagulant long-term use     Arthritis     Atrial flutter     Calcium nephrolithiasis     Diabetes mellitus, type 2     Diabetic peripheral neuropathy associated with type 2 diabetes mellitus     Hypertension     Invasive ductal carcinoma of left breast 2022     Past Surgical History:   Procedure Laterality Date    ANGIOGRAPHY OF LOWER EXTREMITY N/A 10/21/2021    Procedure: Angiogram Extremity Unilateral;  Surgeon: Dre Martino MD;  Location: [facility] CATH LAB/EP;  Service: Cardiology;  Laterality: N/A;    ANGIOGRAPHY OF LOWER EXTREMITY Right 11/10/2021    Procedure: Angiogram Extremity Unilateral;  Surgeon: Ben Rooney MD;  Location: [facility] CATH LAB/EP;  Service: Cardiology;  Laterality: Right;    COLONOSCOPY  2011    sigmoid diverticulosis, external hemorrhoids    DEBRIDEMENT Right 10/20/2021    Procedure: DEBRIDEMENT;  Surgeon: Ava Atkins DPM;  Location: [facility] OR;  Service: Podiatry;  Laterality: Right;    ENDOSCOPIC GASTROCNEMIUS RECESSION Right 9/10/2019    Procedure: RECESSION, GASTROCNEMIUS, ENDOSCOPIC;  Surgeon: Derek Jose DPM;  Location: [facility] OR;  Service: Podiatry;  Laterality: Right;  Arthrex center line (ron notified)  Video    FLEXOR TENOTOMY Right 10/20/2021     Procedure: TENOTOMY, FLEXOR 3rd toe;  Surgeon: Ava Atkins DPM;  Location: [facility] OR;  Service: Podiatry;  Laterality: Right;    HYSTERECTOMY      SHOULDER SURGERY Left     TOE AMPUTATION Right 05/22/2017    5th toe    TOE AMPUTATION Right 10/20/2021    Procedure: AMPUTATION, TOE;  Surgeon: Ava Atkins DPM;  Location: [facility] OR;  Service: Podiatry;  Laterality: Right;     Current Outpatient Medications on File Prior to Visit   Medication Sig Dispense Refill    ACCU-CHEK SAMI PLUS METER Misc TEST  AS DIRECTED 1 each 0    ACCU-CHEK SAMI PLUS TEST STRP Strp USE TO TEST BLOOD SUGAR ONCE DAILY 100 strip 3    ACCU-CHEK SOFT DEV LANCETS Kit       ACCU-CHEK SOFTCLIX LANCETS Misc TEST ONCE DAILY 100 each 3    ALPRAZolam (XANAX) 0.5 MG tablet Take 1 tablet (0.5 mg total) by mouth 2 (two) times daily as needed for Anxiety. 30 tablet 0    ammonium lactate 12 % Crea Apply to feet twice daily. Avoid use between toes. 140 g 5    apixaban (ELIQUIS) 5 mg Tab TAKE 1 TABLET BY MOUTH TWICE DAILY 180 tablet 1    atorvastatin (LIPITOR) 80 MG tablet Take 1 tablet (80 mg total) by mouth once daily. 90 tablet 3    clopidogreL (PLAVIX) 75 mg tablet Take 1 tablet (75 mg total) by mouth once daily. 90 tablet 3    furosemide (LASIX) 40 MG tablet Take 1 tablet (40 mg total) by mouth once daily. 30 tablet 11    gabapentin (NEURONTIN) 400 MG capsule 2 pills in the AM, 1 pill at noon, and 2 pills in the evening. 450 capsule 3    glimepiride (AMARYL) 1 MG tablet TAKE 1 TABLET TWICE DAILY 180 tablet 3    lisinopriL (PRINIVIL,ZESTRIL) 20 MG tablet TAKE 1 TABLET EVERY DAY 90 tablet 3    metoprolol succinate (TOPROL-XL) 25 MG 24 hr tablet Take 1 tablet (25 mg total) by mouth once daily. 90 tablet 3    polyethylene glycol (GLYCOLAX) 17 gram/dose powder Take 17 g by mouth once daily. 510 g 0    pramipexole (MIRAPEX) 0.125 MG tablet Take 1 tablet (0.125 mg total) by mouth once daily. 90 tablet 3    urea (CARMOL) 40 % Crea  Apply topically 2 (two) times daily. 1 Bottle 3    famotidine (PEPCID) 20 MG tablet TAKE 1 TABLET ONE TIME DAILY AT BEDTIME (Patient not taking: Reported on 7/26/2022) 90 tablet 3     No current facility-administered medications on file prior to visit.     Social History     Socioeconomic History    Marital status:    Tobacco Use    Smoking status: Never Smoker    Smokeless tobacco: Never Used   Substance and Sexual Activity    Alcohol use: No    Drug use: No    Sexual activity: Yes     Social Determinants of Health     Financial Resource Strain: Medium Risk    Difficulty of Paying Living Expenses: Somewhat hard   Food Insecurity: No Food Insecurity    Worried About Running Out of Food in the Last Year: Never true    Ran Out of Food in the Last Year: Never true   Transportation Needs: No Transportation Needs    Lack of Transportation (Medical): No    Lack of Transportation (Non-Medical): No   Physical Activity: Inactive    Days of Exercise per Week: 0 days    Minutes of Exercise per Session: 0 min   Stress: Stress Concern Present    Feeling of Stress : To some extent   Social Connections: Moderately Isolated    Frequency of Communication with Friends and Family: Twice a week    Frequency of Social Gatherings with Friends and Family: Never    Attends [employer] Services: 1 to 4 times per year    Active Member of Clubs or Organizations: No    Attends Club or Organization Meetings: Never    Marital Status:    Housing Stability: Low Risk     Unable to Pay for Housing in the Last Year: No    Number of Places Lived in the Last Year: 1    Unstable Housing in the Last Year: No     Family History   Problem Relation Age of Onset    Diabetes Mother     Heart failure Father     Breast cancer Sister 69        Genetic testing negative    Kidney failure Brother     Breast cancer Maternal Aunt         early 40s        Review of Systems   All other systems reviewed and are  "negative.    Objective:   BP (!) 151/63 (BP Location: Left arm, Patient Position: Sitting, BP Method: Large (Automatic))   Pulse (!) 51   Ht 5' 2" (1.575 m)   Wt 74.8 kg (165 lb)   LMP  (LMP Unknown)   BMI 30.18 kg/m²     Physical Exam   Constitutional: She is oriented to person, place, and time. She appears well-developed and well-nourished.   HENT:   Head: Normocephalic and atraumatic.   Cardiovascular: Normal rate.    Pulmonary/Chest: Effort normal and breath sounds normal. Right breast exhibits mass. Right breast exhibits no inverted nipple, no nipple discharge, no skin change and no tenderness. Left breast exhibits mass. Left breast exhibits no inverted nipple, no nipple discharge, no skin change and no tenderness.       Neurological: She is alert and oriented to person, place, and time.   Skin: Skin is warm and dry.     Psychiatric: She has a normal mood and affect. Her behavior is normal. Judgment and thought content normal.       Radiology review: Images personally reviewed by me in the clinic and shown to the patient during the consultation.     Assessment:       1. Invasive ductal carcinoma of left breast    2. Invasive ductal carcinoma of right breast        Plan:   left breast, Clinical Stage IA ([id number]), invasive ductal carcinoma with lobular features of the Lower-outer quadrant of breast, estrogen receptor Positive, progesterone receptor Positive, HER2 Negative    Right breast, Clinical Stage IIA ([id number]), invasive ductal carcinoma of the Upper-outer quadrant of breast, estrogen receptor Positive, progesterone receptor Positive, HER2 FISH negative    Multidisciplinary nature of breast cancer care was discussed in detail at today's visit.    We discussed that surgical options would include a lumpectomy versus a mastectomy.  We discussed there is no survival benefit to undergoing a mastectomy compared to lumpectomy.  Based on clinical exam and imaging, she is not a candidate for breast " conservation on the right side due to the extent of disease however on the left side she could consider lumpectomy.  The surgical procedures of lumpectomy and mastectomy were discussed in full detail. We discussed that a lumpectomy would be done as a reflector localized excision of the primary tumor along with a surrounding margin of normal breast tissue.  The concept of local control was explained to the patient.  She understands that we would aim to achieve negative margins at the time of initial surgery.  We discussed the option for mastectomy.  We discussed the risks and benefits of mastectomy. Risks include but are not limited to risk of bleeding, infection, poor cosmesis, need for additional surgery, clip placement, scaring, pain including phantom pain, fluid collections, shoulder stiffness, prolonged healing, and recurrence.   As she requires a mastectomy on the right she would also desire mastectomy the left in an attempt to avoid radiation.    We discussed breast MRI to further evaluate the extent of disease on the left due to the lobular features of the biopsy.  She expressed claustrophobia.  She also strongly desires a mastectomy on the left if she proceeds with surgery.  MRI was canceled.    Reconstruction after mastectomy was discussed.  Reconstructive generally include implant or autologous tissue reconstruction. There are certain health qualifications that the patient must meet in order to be a candidate for reconstruction.  With her age and medical comorbidities including diabetes and peripheral arterial disease she would be a poor candidate for reconstruction.  We discussed consultation to discuss Plastic surgery with the plastic surgeon further but she would like to proceed without breast reconstruction.  We discussed breast prosthesis.    We discussed that on her right breast the lymph node that has proven metastatic disease.  We discussed that if surgery is done prior to any systemic therapy that  it is recommended that we proceed with an axillary lymph node dissection.   She will need medical oncology discuss systemic therapy further but due to her age medical comorbidities unlikely that she would proceed with up-front chemotherapy.  We will plan for axilla dissection on the right.  We discussed the increased risk of lymphedema as well as nerve and vessel injury with axilla lymph node dissection.      In the left we would plan for a sentinel lymph node biopsy.  W[place]burn lymph node biopsies performed utilizing the injection of blue and radioactive dye.  This dye travels to the 1st few lymph nodes that drain the breast.  Lymph nodes that uptake the blue or radioactive dye or are palpable are surgically removed and sent to pathology.  Typically 1-5 lymph nodes are removed during this procedure although exact numbers vary depending on the patient.  This procedure allows sampling of the lymph nodes most at risk for metastasis.  The risks and benefits of the procedure discussed the patient.  Risks include but are not limited to lymphedema, bleeding, infection, poor cosmesis, numbness of the incision site, seroma, failure of dye to map, nerve injury leading to permanent arm numbness and or muscle weakness, possibility of a false negative finding, and need for additional surgery.  If metastatic disease is identified on pathology of the lymph nodes been axillary dissection (a second surgery) may be recommended.      Referral to physical therapy preoperatively to discuss lymphedema risk and prevention.    The role of adjuvant radiation therapy following breast conservation surgery was also discussed with the patient.  We discussed that if she proceed with a lumpectomy on the left side then she would be recommended for radiation.  We discussed that if she proceeds with mastectomy she would be unlikely be recommended for radiation unless a large number of lymph nodes are found to be involved or the breast cancer is  found to be large.     PET scan with no signs of metastsic disease. Saw Dr. Caputo. Plan for upfront surgery.     She does meet NCCN guidelines for genetic testing based on her family history.  She will consider genetic testing.  Her sister genetic testing was negative.    We re-discussed the surgery plan with the patient and her family today in full detail. All questions were answered to the best of our ability.   Caro Powell decided to proceed with with bilateral mastectomy, right axillary lymph node dissection and left sentinel lymph node biopsy.      Total time spent with the patient: 30 minutes.  30 minutes of face to face consultation and 5 minutes of chart review and coordination of care.

## 2022-07-27 ENCOUNTER — TUMOR BOARD CONFERENCE (OUTPATIENT)
Dept: SURGERY | Facility: CLINIC | Age: 82
End: 2022-07-27
Payer: MEDICARE

## 2022-07-27 NOTE — PROGRESS NOTES
Oncology History   Invasive ductal carcinoma of right breast   6/28/2022 Biopsy    1. LEFT BREAST MASS, 4:00 O'CLOCK, 5 CM FROM THE NIPPLE, NEEDLE CORE BIOPSIES:   - Invasive ductal carcinoma with lobular features,   Grade 1   Focal intermediate grade ductal carcinoma in situ with microcalcifications and focal lobular extension. Architectural patterns: Cribriform and solid. Nuclear grade: Grade II (intermediate).    2. RIGHT BREAST MASS, 8:00 O'CLOCK, 12 CM FROM THE NIPPLE, NEEDLE CORE BIOPSIES:   - Invasive ductal carcinoma,   Grade 1    3. RIGHT BREAST MASS, 10:00 O'CLOCK, 3 CM FROM THE NIPPLE, NEEDLE CORE BIOPSIES:   - Invasive ductal carcinoma,  Overall grade: Grade 2     4. RIGHT AXILLARY LYMPH NODE, NEEDLE CORE BIOPSIES:   - Metastatic ductal carcinoma of the breast     6/28/2022 Breast Tumor Markers    1. Left Breast mass 4:00  BREAST BIOMARKER RESULTS:   Estrogen receptor (ER) status: Positive.  Progesterone receptor (PgR) status: Positive.   HER2 by IHC: Negative (score 1).    2. Right Breast mass 8:00  Estrogen receptor (ER) status: Positive.   Progesterone receptor (PgR) status: Positive.   HER2 by IHC: Equivocal (score 2+) Negative by FISH    3. Right Breast mass 10:00  Estrogen receptor (ER) status: Positive  Progesterone receptor (PGR) status: Positive.   HER2 by IHC: Negative (score 1+).     7/6/2022 Initial Diagnosis    Invasive ductal carcinoma of right breast     7/6/2022 Genetic Testing    Not completed      7/26/2022 Tumor Conference    The team agreed upfront surgery with bilateral mastectomy with Right axillary lymph node dissection and Left sentinel lymph node biopsy. Will follow up with Dr. Caputo after surgery for endocrine therapy.

## 2022-08-03 ENCOUNTER — OFFICE VISIT (OUTPATIENT)
Dept: PODIATRY | Facility: CLINIC | Age: 82
End: 2022-08-03
Payer: MEDICARE

## 2022-08-03 VITALS
HEART RATE: 45 BPM | HEIGHT: 62 IN | BODY MASS INDEX: 30.18 KG/M2 | DIASTOLIC BLOOD PRESSURE: 55 MMHG | SYSTOLIC BLOOD PRESSURE: 142 MMHG

## 2022-08-03 DIAGNOSIS — E11.9 ENCOUNTER FOR DIABETIC FOOT EXAM: ICD-10-CM

## 2022-08-03 DIAGNOSIS — Z89.421 HISTORY OF AMPUTATION OF LESSER TOE, RIGHT: ICD-10-CM

## 2022-08-03 DIAGNOSIS — B35.1 ONYCHOMYCOSIS: ICD-10-CM

## 2022-08-03 DIAGNOSIS — E11.42 TYPE 2 DIABETES MELLITUS WITH PERIPHERAL NEUROPATHY: ICD-10-CM

## 2022-08-03 DIAGNOSIS — I73.9 PAD (PERIPHERAL ARTERY DISEASE): Primary | ICD-10-CM

## 2022-08-03 PROCEDURE — 11057 ROUTINE FOOT CARE: ICD-10-PCS | Mod: Q7,S$GLB,, | Performed by: STUDENT IN AN ORGANIZED HEALTH CARE EDUCATION/TRAINING PROGRAM

## 2022-08-03 PROCEDURE — 99213 OFFICE O/P EST LOW 20 MIN: CPT | Mod: 25,S$GLB,, | Performed by: STUDENT IN AN ORGANIZED HEALTH CARE EDUCATION/TRAINING PROGRAM

## 2022-08-03 PROCEDURE — 1159F PR MEDICATION LIST DOCUMENTED IN MEDICAL RECORD: ICD-10-PCS | Mod: CPTII,S$GLB,, | Performed by: STUDENT IN AN ORGANIZED HEALTH CARE EDUCATION/TRAINING PROGRAM

## 2022-08-03 PROCEDURE — 1159F MED LIST DOCD IN RCRD: CPT | Mod: CPTII,S$GLB,, | Performed by: STUDENT IN AN ORGANIZED HEALTH CARE EDUCATION/TRAINING PROGRAM

## 2022-08-03 PROCEDURE — 1126F PR PAIN SEVERITY QUANTIFIED, NO PAIN PRESENT: ICD-10-PCS | Mod: CPTII,S$GLB,, | Performed by: STUDENT IN AN ORGANIZED HEALTH CARE EDUCATION/TRAINING PROGRAM

## 2022-08-03 PROCEDURE — 11721 ROUTINE FOOT CARE: ICD-10-PCS | Mod: 59,Q7,S$GLB, | Performed by: STUDENT IN AN ORGANIZED HEALTH CARE EDUCATION/TRAINING PROGRAM

## 2022-08-03 PROCEDURE — 11057 PARNG/CUTG B9 HYPRKR LES >4: CPT | Mod: Q7,S$GLB,, | Performed by: STUDENT IN AN ORGANIZED HEALTH CARE EDUCATION/TRAINING PROGRAM

## 2022-08-03 PROCEDURE — 3077F SYST BP >= 140 MM HG: CPT | Mod: CPTII,S$GLB,, | Performed by: STUDENT IN AN ORGANIZED HEALTH CARE EDUCATION/TRAINING PROGRAM

## 2022-08-03 PROCEDURE — 3078F PR MOST RECENT DIASTOLIC BLOOD PRESSURE < 80 MM HG: ICD-10-PCS | Mod: CPTII,S$GLB,, | Performed by: STUDENT IN AN ORGANIZED HEALTH CARE EDUCATION/TRAINING PROGRAM

## 2022-08-03 PROCEDURE — 3078F DIAST BP <80 MM HG: CPT | Mod: CPTII,S$GLB,, | Performed by: STUDENT IN AN ORGANIZED HEALTH CARE EDUCATION/TRAINING PROGRAM

## 2022-08-03 PROCEDURE — 99213 PR OFFICE/OUTPT VISIT, EST, LEVL III, 20-29 MIN: ICD-10-PCS | Mod: 25,S$GLB,, | Performed by: STUDENT IN AN ORGANIZED HEALTH CARE EDUCATION/TRAINING PROGRAM

## 2022-08-03 PROCEDURE — 11721 DEBRIDE NAIL 6 OR MORE: CPT | Mod: 59,Q7,S$GLB, | Performed by: STUDENT IN AN ORGANIZED HEALTH CARE EDUCATION/TRAINING PROGRAM

## 2022-08-03 PROCEDURE — 99999 PR PBB SHADOW E&M-EST. PATIENT-LVL III: CPT | Mod: PBBFAC,,, | Performed by: STUDENT IN AN ORGANIZED HEALTH CARE EDUCATION/TRAINING PROGRAM

## 2022-08-03 PROCEDURE — 99499 UNLISTED E&M SERVICE: CPT | Mod: HCWC,S$GLB,, | Performed by: STUDENT IN AN ORGANIZED HEALTH CARE EDUCATION/TRAINING PROGRAM

## 2022-08-03 PROCEDURE — 1126F AMNT PAIN NOTED NONE PRSNT: CPT | Mod: CPTII,S$GLB,, | Performed by: STUDENT IN AN ORGANIZED HEALTH CARE EDUCATION/TRAINING PROGRAM

## 2022-08-03 PROCEDURE — 3077F PR MOST RECENT SYSTOLIC BLOOD PRESSURE >= 140 MM HG: ICD-10-PCS | Mod: CPTII,S$GLB,, | Performed by: STUDENT IN AN ORGANIZED HEALTH CARE EDUCATION/TRAINING PROGRAM

## 2022-08-03 PROCEDURE — 99999 PR PBB SHADOW E&M-EST. PATIENT-LVL III: ICD-10-PCS | Mod: PBBFAC,,, | Performed by: STUDENT IN AN ORGANIZED HEALTH CARE EDUCATION/TRAINING PROGRAM

## 2022-08-03 PROCEDURE — 99499 RISK ADDL DX/OHS AUDIT: ICD-10-PCS | Mod: HCWC,S$GLB,, | Performed by: STUDENT IN AN ORGANIZED HEALTH CARE EDUCATION/TRAINING PROGRAM

## 2022-08-03 NOTE — PROGRESS NOTES
Subjective:      Patient ID: Caro Powell is a 81 y.o. female.    Chief Complaint: Diabetes Mellitus (Brayan Penny MD  06/21/2022) and Nail Care    Caro is a 81 y.o. female who presents to the clinic for evaluation and treatment of high risk feet. Caro has a past medical history of Anticoagulant long-term use, Arthritis, Atrial flutter, Calcium nephrolithiasis (2007), Diabetes mellitus, type 2, Diabetic peripheral neuropathy associated with type 2 diabetes mellitus, Hypertension, and Invasive ductal carcinoma of left breast (7/6/2022). The patient's chief complaint is long, thick toenails. This patient has documented high risk feet requiring routine maintenance secondary to diabetes mellitis and those secondary complications of diabetes, as mentioned.. Patient states she is following closely with Dr. Rooney for PAD. Relates has new SAS shoes which she put her diabetic shoes in and her feet are looking much better. No new pedal complaints.     1/12/22: Pt seen today, denies ulcers, states foot is looking much better, no new pedal complaints.     3/7/22: Pt seen today for RFC. No new pedal complaints.     5/3/22: Seen today for RFC. No new pedal complaints.     8/3/22: Seen today for RFC and annual diabetic foot exam. Denies open wounds. No new pedal complaints. States has upcoming breast surgery.     PCP: Brayan Penny MD    Date Last Seen by PCP: 6/21/22    Current shoe gear:  SAS diabetic shoes    Hemoglobin A1C   Date Value Ref Range Status   05/24/2022 7.3 (H) 4.0 - 5.6 % Final     Comment:     ADA Screening Guidelines:  5.7-6.4%  Consistent with prediabetes  >or=6.5%  Consistent with diabetes    High levels of fetal hemoglobin interfere with the HbA1C  assay. Heterozygous hemoglobin variants (HbS, HgC, etc)do  not significantly interfere with this assay.   However, presence of multiple variants may affect accuracy.     10/18/2021 6.7 (H) 4.0 - 5.6 % Final     Comment:     ADA Screening  Guidelines:  5.7-6.4%  Consistent with prediabetes  >or=6.5%  Consistent with diabetes    High levels of fetal hemoglobin interfere with the HbA1C  assay. Heterozygous hemoglobin variants (HbS, HgC, etc)do  not significantly interfere with this assay.   However, presence of multiple variants may affect accuracy.     01/22/2021 6.6 (H) 4.0 - 5.6 % Final     Comment:     ADA Screening Guidelines:  5.7-6.4%  Consistent with prediabetes  >or=6.5%  Consistent with diabetes  High levels of fetal hemoglobin interfere with the HbA1C  assay. Heterozygous hemoglobin variants (HbS, HgC, etc)do  not significantly interfere with this assay.   However, presence of multiple variants may affect accuracy.     01/12/2018 8.3 % Final       Review of Systems   Constitutional: Negative for chills, decreased appetite, diaphoresis and fever.   HENT: Negative for congestion and hearing loss.    Cardiovascular: Negative for chest pain, claudication, leg swelling and syncope.   Respiratory: Negative for cough and shortness of breath.    Skin: Positive for dry skin, nail changes and poor wound healing. Negative for color change, flushing, itching and rash.   Musculoskeletal: Positive for arthritis. Negative for joint pain and joint swelling.   Gastrointestinal: Negative for nausea and vomiting.   Neurological: Positive for numbness. Negative for focal weakness, paresthesias and weakness.   Psychiatric/Behavioral: Negative for altered mental status. The patient is not nervous/anxious.            Objective:      Physical Exam  Constitutional:       General: She is not in acute distress.     Appearance: She is well-developed. She is not diaphoretic.   Cardiovascular:      Comments: Dorsalis pedis and posterior tibial pulses are diminished. Skin temperature is within normal limits. Toes are cool to touch and feet are warm proximally. Hair growth is diminished. Skin is mildly atrophic and with mild hyperpigmentation. Mild edema noted, bilaterally.  Telangiectasias bilaterally.  Musculoskeletal:         General: No tenderness.      Comments: Adequate joint range of motion without pain, limitation, nor crepitation to bilateral feet and ankle joints. Muscle strength is 5/5 in all groups bilaterally.    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 HAV, bilaterally. Semi rigid hammertoes to remaining digits.    Lymphadenopathy:      Comments: Negative lymphangitic streaking    Skin:     General: Skin is warm and dry.      Findings: No lesion.      Comments: Skin is warm and dry, no acute signs of infection noted. No open wounds, macerations or hyperkeratotic lesions, bilaterally.     Diffuse calluses to medial 1st MTPJ and hallux, and 5th digit bilaterally. Right distal 3rd and 4th digit with callus noted.      Toenails are thickened by 2-4 mm's, dystrophic, and are darkened in coloration with subungual fungal debris, bilaterally.  Skin is very dry, bilaterally.      Neurological:      Mental Status: She is alert and oriented to person, place, and time.      Sensory: Sensory deficit present.      Motor: No abnormal muscle tone.      Comments: Light touch is diminished. Madison-Chucho 5.07 monofilamant testing is diminished. Vibratory sensation  is diminished, bilaterally    Psychiatric:         Behavior: Behavior normal.         Thought Content: Thought content normal.         Judgment: Judgment normal.               Assessment:       Encounter Diagnoses   Name Primary?    PAD (peripheral artery disease) Yes    Onychomycosis     Type 2 diabetes mellitus with peripheral neuropathy     History of amputation of lesser toe, right          Plan:       Caro was seen today for diabetes mellitus and nail care.    Diagnoses and all orders for this visit:    PAD (peripheral artery disease)    Onychomycosis  -     Routine Foot Care    Type 2 diabetes mellitus with peripheral neuropathy  -     Routine Foot Care    History of  amputation of lesser toe, right  -     Routine Foot Care      I counseled the patient on her conditions, their implications and medical management.    RFC per attached note  Ulcer remains healed.   Continue diabetic shoes at all times while ambulating  Rest, elevate. Daily foot inspections   Shoe inspection. Diabetic Foot Education. Patient reminded of the importance of good nutrition and blood sugar control to help prevent podiatric complications of diabetes. Patient instructed on proper foot hygeine. We discussed wearing proper shoe gear, daily foot inspections, never walking without protective shoe gear, never putting sharp instruments to feet, routine podiatric nail visits      RTC in 2-3 mo, sooner PRN

## 2022-08-03 NOTE — PROCEDURES
"Routine Foot Care    Date/Time: 8/3/2022 1:45 PM  Performed by: Ava Atkins DPM  Authorized by: Ava Atkins DPM     Time out: Immediately prior to procedure a "time out" was called to verify the correct patient, procedure, equipment, support staff and site/side marked as required.    Consent Done?:  Yes (Verbal)  Hyperkeratotic Skin Lesions?: Yes    Number of trimmed lesions:  6  Location(s):  Left 1st Toe, Right 1st Toe, Right 1st Metatarsal Head, Left 1st Metatarsal Head, Left 5th Metatarsal Head and Right 5th Metatarsal Head    Nail Care Type:  Debride(Left 1st Toe, Left 3rd Toe, Left 2nd Toe, Left 4th Toe, Left 5th Toe, Right 1st Toe, Right 3rd Toe and Right 4th Toe)  Patient tolerance:  Patient tolerated the procedure well with no immediate complications      "

## 2022-08-10 ENCOUNTER — HOSPITAL ENCOUNTER (OUTPATIENT)
Dept: PREADMISSION TESTING | Facility: OTHER | Age: 82
Discharge: HOME | End: 2022-08-10
Attending: SURGERY
Payer: MEDICARE

## 2022-08-10 ENCOUNTER — ANESTHESIA EVENT (OUTPATIENT)
Dept: SURGERY | Facility: OTHER | Age: 82
End: 2022-08-10
Payer: MEDICARE

## 2022-08-10 VITALS
RESPIRATION RATE: 16 BRPM | OXYGEN SATURATION: 98 % | SYSTOLIC BLOOD PRESSURE: 114 MMHG | WEIGHT: 165 LBS | HEART RATE: 54 BPM | HEIGHT: 62 IN | BODY MASS INDEX: 30.36 KG/M2 | TEMPERATURE: 98 F | DIASTOLIC BLOOD PRESSURE: 64 MMHG

## 2022-08-10 DIAGNOSIS — Z01.818 PRE-OP TESTING: ICD-10-CM

## 2022-08-10 LAB
ALBUMIN SERPL BCP-MCNC: 3.6 G/DL (ref 3.5–5.2)
ALP SERPL-CCNC: 69 U/L (ref 55–135)
ALT SERPL W/O P-5'-P-CCNC: 23 U/L (ref 10–44)
ANION GAP SERPL CALC-SCNC: 9 MMOL/L (ref 8–16)
AST SERPL-CCNC: 20 U/L (ref 10–40)
BASOPHILS # BLD AUTO: 0.03 K/UL (ref 0–0.2)
BASOPHILS NFR BLD: 0.5 % (ref 0–1.9)
BILIRUB SERPL-MCNC: 0.5 MG/DL (ref 0.1–1)
BUN SERPL-MCNC: 24 MG/DL (ref 8–23)
CALCIUM SERPL-MCNC: 9.2 MG/DL (ref 8.7–10.5)
CHLORIDE SERPL-SCNC: 108 MMOL/L (ref 95–110)
CO2 SERPL-SCNC: 25 MMOL/L (ref 23–29)
CREAT SERPL-MCNC: 1.3 MG/DL (ref 0.5–1.4)
DIFFERENTIAL METHOD: ABNORMAL
EOSINOPHIL # BLD AUTO: 0.3 K/UL (ref 0–0.5)
EOSINOPHIL NFR BLD: 4.3 % (ref 0–8)
ERYTHROCYTE [DISTWIDTH] IN BLOOD BY AUTOMATED COUNT: 13.4 % (ref 11.5–14.5)
EST. GFR  (NO RACE VARIABLE): 41 ML/MIN/1.73 M^2
GLUCOSE SERPL-MCNC: 131 MG/DL (ref 70–110)
HCT VFR BLD AUTO: 33.7 % (ref 37–48.5)
HGB BLD-MCNC: 11.4 G/DL (ref 12–16)
IMM GRANULOCYTES # BLD AUTO: 0.02 K/UL (ref 0–0.04)
IMM GRANULOCYTES NFR BLD AUTO: 0.3 % (ref 0–0.5)
LYMPHOCYTES # BLD AUTO: 1.2 K/UL (ref 1–4.8)
LYMPHOCYTES NFR BLD: 18 % (ref 18–48)
MCH RBC QN AUTO: 30.9 PG (ref 27–31)
MCHC RBC AUTO-ENTMCNC: 33.8 G/DL (ref 32–36)
MCV RBC AUTO: 91 FL (ref 82–98)
MONOCYTES # BLD AUTO: 0.6 K/UL (ref 0.3–1)
MONOCYTES NFR BLD: 8.9 % (ref 4–15)
NEUTROPHILS # BLD AUTO: 4.5 K/UL (ref 1.8–7.7)
NEUTROPHILS NFR BLD: 68 % (ref 38–73)
NRBC BLD-RTO: 0 /100 WBC
PLATELET # BLD AUTO: 249 K/UL (ref 150–450)
PMV BLD AUTO: 9.8 FL (ref 9.2–12.9)
POTASSIUM SERPL-SCNC: 4.9 MMOL/L (ref 3.5–5.1)
PROT SERPL-MCNC: 6.1 G/DL (ref 6–8.4)
RBC # BLD AUTO: 3.69 M/UL (ref 4–5.4)
SODIUM SERPL-SCNC: 142 MMOL/L (ref 136–145)
WBC # BLD AUTO: 6.54 K/UL (ref 3.9–12.7)

## 2022-08-10 PROCEDURE — 80053 COMPREHEN METABOLIC PANEL: CPT | Performed by: SURGERY

## 2022-08-10 PROCEDURE — 93010 ELECTROCARDIOGRAM REPORT: CPT | Mod: ,,, | Performed by: INTERNAL MEDICINE

## 2022-08-10 PROCEDURE — 93010 EKG 12-LEAD: ICD-10-PCS | Mod: ,,, | Performed by: INTERNAL MEDICINE

## 2022-08-10 PROCEDURE — 36415 COLL VENOUS BLD VENIPUNCTURE: CPT | Performed by: SURGERY

## 2022-08-10 PROCEDURE — 85025 COMPLETE CBC W/AUTO DIFF WBC: CPT | Performed by: SURGERY

## 2022-08-10 PROCEDURE — 93005 ELECTROCARDIOGRAM TRACING: CPT

## 2022-08-10 RX ORDER — SODIUM CHLORIDE, SODIUM LACTATE, POTASSIUM CHLORIDE, CALCIUM CHLORIDE 600; 310; 30; 20 MG/100ML; MG/100ML; MG/100ML; MG/100ML
INJECTION, SOLUTION INTRAVENOUS CONTINUOUS
Status: CANCELLED | OUTPATIENT
Start: 2022-08-10

## 2022-08-10 RX ORDER — LIDOCAINE HYDROCHLORIDE 10 MG/ML
0.5 INJECTION, SOLUTION EPIDURAL; INFILTRATION; INTRACAUDAL; PERINEURAL ONCE
Status: CANCELLED | OUTPATIENT
Start: 2022-08-10 | End: 2022-08-10

## 2022-08-10 NOTE — ANESTHESIA PREPROCEDURE EVALUATION
08/10/2022  Caro Powell is a 81 y.o., female.      Pre-op Assessment    I have reviewed the Patient Summary Reports.     I have reviewed the Nursing Notes. I have reviewed the NPO Status.   I have reviewed the Medications.     Review of Systems  Anesthesia Hx:  Hx diff intubation History of prior surgery of interest to airway management or planning: Denies Family Hx of Anesthesia complications.  Personal Hx of Anesthesia complications  Difficult Intubation, according to patient history  Severe Sore Throat after Anesthesia   Social:  Non-Smoker    Hematology/Oncology:        Hematology Comments: On blood thinners Current/Recent Cancer. Breast   EENT/Dental:EENT/Dental Normal   Cardiovascular:   Hypertension Dysrhythmias atrial fibrillation PVD ECG has been reviewed. See recent cardiology note in epic  Fib-flutter history  Recent echo basically WNL slight diastolic dysfcn   Pulmonary:  Pulmonary Normal    Renal/:   Chronic Renal Disease, CRI    Musculoskeletal:   Arthritis     Endocrine:   Diabetes, type 2    Psych:   anxiety          Physical Exam  General: Cooperative, Alert, Anxious and Oriented    Airway:  Mallampati: II   Mouth Opening: Normal    Dental:  Intact, Caps / Implants        Anesthesia Plan  Type of Anesthesia, risks & benefits discussed:    Anesthesia Type: Gen ETT  Intra-op Monitoring Plan: Standard ASA Monitors  Post Op Pain Control Plan: multimodal analgesia  Induction:  IV  Airway Plan: Video, Post-Induction  Informed Consent: Informed consent signed with the Patient and all parties understand the risks and agree with anesthesia plan.  All questions answered.   ASA Score: 3    Ready For Surgery From Anesthesia Perspective.     .

## 2022-08-10 NOTE — DISCHARGE INSTRUCTIONS
Information to Prepare you for your Surgery    PRE-ADMIT TESTING -  [phone]    [address] [facility]          Your surgery has been scheduled at [facility]. We are pleased to have the opportunity to serve you. For Further Information please call [phone].    On the day of surgery please report to the Information Desk on the 1st floor.    CONTACT YOUR PHYSICIAN'S OFFICE THE DAY PRIOR TO YOUR SURGERY TO OBTAIN YOUR ARRIVAL TIME.     The evening before surgery do not eat anything after 9 p.m. ( this includes hard candy, chewing gum and mints).  You may only have GATORADE, POWERADE AND WATER  from 9 p.m. until you leave your home.   DO NOT DRINK ANY LIQUIDS ON THE WAY TO THE HOSPITAL.      Why does your anesthesiologist allow you to drink Gatorade/Powerade before surgery?  Gatorade/Powerade helps to increase your comfort before surgery and to decrease your nausea after surgery. The carbohydrates in Gatorade/Powerade help reduce your body's stress response to surgery.  If you are a diabetic-drink only water prior to surgery.      Current Visitor policy(12/27/2021) - Patients may have 2 visitors pre and post procedure. Only 2 visitors will be allowed in the Surgical building with the patient.     SPECIAL MEDICATION INSTRUCTIONS: TAKE medications checked off by the Anesthesiologist on your Medication List.    Angiogram Patients: Take medications as instructed by your physician, including aspirin.     Surgery Patients:    If you take ASPIRIN - Your PHYSICIAN/SURGEON will need to inform you IF/OR when you need to stop taking aspirin prior to your surgery.     Do Not take any medications containing IBUPROFEN.    Do Not Wear any make-up (especially eye make-up) to surgery. Please remove any false eyelashes or eyelash extensions. If you arrive the day of surgery with makeup/eyelashes on you will be required to remove prior to surgery. (There is a risk of corneal  abrasions if eye makeup/eyelash extensions are not removed)      Leave all valuables at home.   Do Not wear any jewelry or watches, including any metal in body piercings. Jewelry must be removed prior to coming to the hospital.  There is a possibility that rings that are unable to be removed may be cut off if they are on the surgical extremity.    Please remove all hair extensions, wigs, clips and any other metal accessories/ ornaments from your hair.  These items may pose a flammable/fire risk in Surgery and must be removed.    Do not shave your surgical area at least 5 days prior to your surgery. The surgical prep will be performed at the hospital according to Infection Control regulations.    Contact Lens must be removed before surgery. Either do not wear the contact lens or bring a case and solution for storage.  Please bring a container for eyeglasses or dentures as required.  Bring any paperwork your physician has provided, such as consent forms,  history and physicals, doctor's orders, etc.   Bring comfortable clothes that are loose fitting to wear upon discharge. Take into consideration the type of surgery being performed.  Maintain your diet as advised per your physician the day prior to surgery.      Adequate rest the night before surgery is advised.   Park in the Parking lot behind the hospital or in the [employer] [facility] across the street from the parking lot. Parking is complimentary.  If you will be discharged the same day as your procedure, please arrange for a responsible adult to drive you home or to accompany you if traveling by taxi.   YOU WILL NOT BE PERMITTED TO DRIVE OR TO LEAVE THE HOSPITAL ALONE AFTER SURGERY.   If you are being discharged the same day, it is strongly recommended that you arrange for someone to remain with you for the first 24 hrs following your surgery.    The Surgeon will speak to your family/visitor after your surgery regarding the outcome of your surgery and post op  care.  The Surgeon may speak to you after your surgery, but there is a possibility you may not remember the details.  Please check with your family members regarding the conversation with the Surgeon.    We strongly recommend whoever is bringing you home be present for discharge instructions.  This will ensure a thorough understanding for your post op home care.    ALL CHILDREN MUST ALWAYS BE ACCOMPANIED BY AN ADULT.    Visitors-Refer to current Visitor policy handouts.    Thank you for your cooperation.  The Staff of [facility].            Bathing Instructions with Hibiclens    Shower the evening before and morning of your procedure with Hibiclens:  Wash your face with water and your regular face wash/soap  Apply Hibiclens directly on your skin or on a wet washcloth and wash gently. When showering: Move away from the shower stream when applying Hibiclens to avoid rinsing off too soon.  Rinse thoroughly with warm water  Do not dilute Hibiclens        Dry off as usual, do not use any deodorant, powder, body lotions, perfume, after shave or cologne.

## 2022-08-12 ENCOUNTER — TELEPHONE (OUTPATIENT)
Dept: SURGERY | Facility: CLINIC | Age: 82
End: 2022-08-12
Payer: MEDICARE

## 2022-08-12 ENCOUNTER — PATIENT MESSAGE (OUTPATIENT)
Dept: SURGERY | Facility: CLINIC | Age: 82
End: 2022-08-12
Payer: MEDICARE

## 2022-08-12 NOTE — TELEPHONE ENCOUNTER
Confirmed arrival time for surgery on 8/15/22 at the [facility] with . Arrival time is   for 5 am surgery is scheduled for 7 am . Nothing to eat after 9 pm on Sunday evening 8/14/22. Patient may have clear liquids up until 4 hrs prior to surgery. Patient may   have 2 people with her on the morning of surgery if needed. Informed patient to leave all jewelry home also asked to wear a button down shirt on the morning of surgery.   Mrs.Gertrude Guptaamberlymari voiced understanding to this call .

## 2022-08-15 ENCOUNTER — HOSPITAL ENCOUNTER (OUTPATIENT)
Facility: OTHER | Age: 82
LOS: 1 days | Discharge: HOME | End: 2022-08-16
Attending: SURGERY | Admitting: SURGERY
Payer: MEDICARE

## 2022-08-15 ENCOUNTER — HOSPITAL ENCOUNTER (OUTPATIENT)
Dept: RADIOLOGY | Facility: OTHER | Age: 82
Discharge: HOME | End: 2022-08-15
Attending: SURGERY
Payer: MEDICARE

## 2022-08-15 ENCOUNTER — ANESTHESIA (OUTPATIENT)
Dept: SURGERY | Facility: OTHER | Age: 82
End: 2022-08-15
Payer: MEDICARE

## 2022-08-15 DIAGNOSIS — C50.912 INVASIVE DUCTAL CARCINOMA OF LEFT BREAST: ICD-10-CM

## 2022-08-15 DIAGNOSIS — C50.911 INVASIVE DUCTAL CARCINOMA OF RIGHT BREAST: ICD-10-CM

## 2022-08-15 DIAGNOSIS — C50.919 INVASIVE DUCTAL CARCINOMA OF BREAST: Primary | ICD-10-CM

## 2022-08-15 LAB
POCT GLUCOSE: 198 MG/DL (ref 70–110)
POCT GLUCOSE: 245 MG/DL (ref 70–110)
POCT GLUCOSE: 322 MG/DL (ref 70–110)

## 2022-08-15 PROCEDURE — 88307 TISSUE EXAM BY PATHOLOGIST: CPT | Mod: 26,,, | Performed by: STUDENT IN AN ORGANIZED HEALTH CARE EDUCATION/TRAINING PROGRAM

## 2022-08-15 PROCEDURE — 88332 PR  PATH CONSULT IN SURG,W ADDN FRZ SEC: ICD-10-PCS | Mod: 26,,, | Performed by: STUDENT IN AN ORGANIZED HEALTH CARE EDUCATION/TRAINING PROGRAM

## 2022-08-15 PROCEDURE — 88331 PATH CONSLTJ SURG 1 BLK 1SPC: CPT | Mod: 26,,, | Performed by: STUDENT IN AN ORGANIZED HEALTH CARE EDUCATION/TRAINING PROGRAM

## 2022-08-15 PROCEDURE — 63600175 PHARM REV CODE 636 W HCPCS: Mod: JG | Performed by: NURSE ANESTHETIST, CERTIFIED REGISTERED

## 2022-08-15 PROCEDURE — 88307 TISSUE EXAM BY PATHOLOGIST: CPT | Performed by: STUDENT IN AN ORGANIZED HEALTH CARE EDUCATION/TRAINING PROGRAM

## 2022-08-15 PROCEDURE — 19307 PR MASTECTOMY, MODIFIED RADICAL: ICD-10-PCS | Mod: RT,,, | Performed by: SURGERY

## 2022-08-15 PROCEDURE — 38792 RA TRACER ID OF SENTINL NODE: CPT | Mod: 59,51,LT, | Performed by: SURGERY

## 2022-08-15 PROCEDURE — 88341 IMHCHEM/IMCYTCHM EA ADD ANTB: CPT | Mod: 59 | Performed by: STUDENT IN AN ORGANIZED HEALTH CARE EDUCATION/TRAINING PROGRAM

## 2022-08-15 PROCEDURE — 37000008 HC ANESTHESIA 1ST 15 MINUTES: Performed by: SURGERY

## 2022-08-15 PROCEDURE — C1729 CATH, DRAINAGE: HCPCS | Performed by: SURGERY

## 2022-08-15 PROCEDURE — 19303 MAST SIMPLE COMPLETE: CPT | Mod: 59,51,LT, | Performed by: SURGERY

## 2022-08-15 PROCEDURE — 63600175 PHARM REV CODE 636 W HCPCS

## 2022-08-15 PROCEDURE — 88341 PR IHC OR ICC EACH ADD'L SINGLE ANTIBODY  STAINPR: ICD-10-PCS | Mod: 26,,, | Performed by: STUDENT IN AN ORGANIZED HEALTH CARE EDUCATION/TRAINING PROGRAM

## 2022-08-15 PROCEDURE — 88331 PATH CONSLTJ SURG 1 BLK 1SPC: CPT | Mod: 59 | Performed by: STUDENT IN AN ORGANIZED HEALTH CARE EDUCATION/TRAINING PROGRAM

## 2022-08-15 PROCEDURE — 38900 IO MAP OF SENT LYMPH NODE: CPT | Mod: LT,,, | Performed by: SURGERY

## 2022-08-15 PROCEDURE — 19307 MAST MOD RAD: CPT | Mod: RT,,, | Performed by: SURGERY

## 2022-08-15 PROCEDURE — 71000033 HC RECOVERY, INTIAL HOUR: Performed by: SURGERY

## 2022-08-15 PROCEDURE — 88332 PATH CONSLTJ SURG EA ADD BLK: CPT | Mod: 26,,, | Performed by: STUDENT IN AN ORGANIZED HEALTH CARE EDUCATION/TRAINING PROGRAM

## 2022-08-15 PROCEDURE — 38792 PR IDENTIFY SENTINEL 2DE: ICD-10-PCS | Mod: 59,51,LT, | Performed by: SURGERY

## 2022-08-15 PROCEDURE — 38900 PR INTRAOPERATIVE SENTINEL LYMPH NODE ID W DYE INJECTION: ICD-10-PCS | Mod: LT,,, | Performed by: SURGERY

## 2022-08-15 PROCEDURE — 27201423 OPTIME MED/SURG SUP & DEVICES STERILE SUPPLY: Performed by: SURGERY

## 2022-08-15 PROCEDURE — 88305 TISSUE EXAM BY PATHOLOGIST: ICD-10-PCS | Mod: 26,,, | Performed by: STUDENT IN AN ORGANIZED HEALTH CARE EDUCATION/TRAINING PROGRAM

## 2022-08-15 PROCEDURE — A9520 TC99 TILMANOCEPT DIAG 0.5MCI: HCPCS

## 2022-08-15 PROCEDURE — 88342 IMHCHEM/IMCYTCHM 1ST ANTB: CPT | Performed by: STUDENT IN AN ORGANIZED HEALTH CARE EDUCATION/TRAINING PROGRAM

## 2022-08-15 PROCEDURE — 19303 PR MASTECTOMY, SIMPLE, COMPLETE: ICD-10-PCS | Mod: 59,51,LT, | Performed by: SURGERY

## 2022-08-15 PROCEDURE — 38525 BIOPSY/REMOVAL LYMPH NODES: CPT | Mod: 59,51,LT, | Performed by: SURGERY

## 2022-08-15 PROCEDURE — 82962 GLUCOSE BLOOD TEST: CPT | Performed by: SURGERY

## 2022-08-15 PROCEDURE — P9045 ALBUMIN (HUMAN), 5%, 250 ML: HCPCS | Mod: JG | Performed by: NURSE ANESTHETIST, CERTIFIED REGISTERED

## 2022-08-15 PROCEDURE — 38525 PR BIOPSY/REM LYMPH NODES, AXILLARY: ICD-10-PCS | Mod: 59,51,LT, | Performed by: SURGERY

## 2022-08-15 PROCEDURE — 36000706: Performed by: SURGERY

## 2022-08-15 PROCEDURE — 88332 PATH CONSLTJ SURG EA ADD BLK: CPT | Performed by: STUDENT IN AN ORGANIZED HEALTH CARE EDUCATION/TRAINING PROGRAM

## 2022-08-15 PROCEDURE — 71000039 HC RECOVERY, EACH ADD'L HOUR: Performed by: SURGERY

## 2022-08-15 PROCEDURE — 37000009 HC ANESTHESIA EA ADD 15 MINS: Performed by: SURGERY

## 2022-08-15 PROCEDURE — 88342 IMHCHEM/IMCYTCHM 1ST ANTB: CPT | Mod: 26,,, | Performed by: STUDENT IN AN ORGANIZED HEALTH CARE EDUCATION/TRAINING PROGRAM

## 2022-08-15 PROCEDURE — 88307 PR  SURG PATH,LEVEL V: ICD-10-PCS | Mod: 26,,, | Performed by: STUDENT IN AN ORGANIZED HEALTH CARE EDUCATION/TRAINING PROGRAM

## 2022-08-15 PROCEDURE — 36000707: Performed by: SURGERY

## 2022-08-15 PROCEDURE — 88341 IMHCHEM/IMCYTCHM EA ADD ANTB: CPT | Mod: 26,,, | Performed by: STUDENT IN AN ORGANIZED HEALTH CARE EDUCATION/TRAINING PROGRAM

## 2022-08-15 PROCEDURE — 25000003 PHARM REV CODE 250: Performed by: NURSE ANESTHETIST, CERTIFIED REGISTERED

## 2022-08-15 PROCEDURE — C9290 INJ, BUPIVACAINE LIPOSOME: HCPCS | Performed by: SURGERY

## 2022-08-15 PROCEDURE — 63600175 PHARM REV CODE 636 W HCPCS: Mod: JG | Performed by: SURGERY

## 2022-08-15 PROCEDURE — 63600175 PHARM REV CODE 636 W HCPCS: Performed by: ANESTHESIOLOGY

## 2022-08-15 PROCEDURE — 88305 TISSUE EXAM BY PATHOLOGIST: CPT | Mod: 26,,, | Performed by: STUDENT IN AN ORGANIZED HEALTH CARE EDUCATION/TRAINING PROGRAM

## 2022-08-15 PROCEDURE — 88305 TISSUE EXAM BY PATHOLOGIST: CPT | Performed by: STUDENT IN AN ORGANIZED HEALTH CARE EDUCATION/TRAINING PROGRAM

## 2022-08-15 PROCEDURE — 88342 CHG IMMUNOCYTOCHEMISTRY: ICD-10-PCS | Mod: 26,,, | Performed by: STUDENT IN AN ORGANIZED HEALTH CARE EDUCATION/TRAINING PROGRAM

## 2022-08-15 PROCEDURE — 88331 PR  PATH CONSULT IN SURG,W FRZ SEC: ICD-10-PCS | Mod: 26,,, | Performed by: STUDENT IN AN ORGANIZED HEALTH CARE EDUCATION/TRAINING PROGRAM

## 2022-08-15 RX ORDER — SODIUM CHLORIDE 0.9 % (FLUSH) 0.9 %
10 SYRINGE (ML) INJECTION
Status: DISCONTINUED | OUTPATIENT
Start: 2022-08-15 | End: 2022-08-16 | Stop reason: HOSPADM

## 2022-08-15 RX ORDER — SODIUM CHLORIDE 9 MG/ML
INJECTION, SOLUTION INTRAVENOUS CONTINUOUS
Status: DISCONTINUED | OUTPATIENT
Start: 2022-08-15 | End: 2022-08-15

## 2022-08-15 RX ORDER — PROCHLORPERAZINE EDISYLATE 5 MG/ML
5 INJECTION INTRAMUSCULAR; INTRAVENOUS EVERY 30 MIN PRN
Status: DISCONTINUED | OUTPATIENT
Start: 2022-08-15 | End: 2022-08-15 | Stop reason: HOSPADM

## 2022-08-15 RX ORDER — MIDAZOLAM HYDROCHLORIDE 1 MG/ML
INJECTION INTRAMUSCULAR; INTRAVENOUS
Status: DISCONTINUED | OUTPATIENT
Start: 2022-08-15 | End: 2022-08-15

## 2022-08-15 RX ORDER — SODIUM CHLORIDE, SODIUM LACTATE, POTASSIUM CHLORIDE, CALCIUM CHLORIDE 600; 310; 30; 20 MG/100ML; MG/100ML; MG/100ML; MG/100ML
INJECTION, SOLUTION INTRAVENOUS CONTINUOUS
Status: DISCONTINUED | OUTPATIENT
Start: 2022-08-15 | End: 2022-08-15

## 2022-08-15 RX ORDER — KETAMINE HCL IN 0.9 % NACL 50 MG/5 ML
SYRINGE (ML) INTRAVENOUS
Status: DISCONTINUED | OUTPATIENT
Start: 2022-08-15 | End: 2022-08-15

## 2022-08-15 RX ORDER — HYDROMORPHONE HYDROCHLORIDE 2 MG/ML
0.4 INJECTION, SOLUTION INTRAMUSCULAR; INTRAVENOUS; SUBCUTANEOUS EVERY 5 MIN PRN
Status: DISCONTINUED | OUTPATIENT
Start: 2022-08-15 | End: 2022-08-15 | Stop reason: HOSPADM

## 2022-08-15 RX ORDER — HYDRALAZINE HYDROCHLORIDE 20 MG/ML
10 INJECTION INTRAMUSCULAR; INTRAVENOUS EVERY 6 HOURS PRN
Status: DISCONTINUED | OUTPATIENT
Start: 2022-08-15 | End: 2022-08-16 | Stop reason: HOSPADM

## 2022-08-15 RX ORDER — ROCURONIUM BROMIDE 10 MG/ML
INJECTION, SOLUTION INTRAVENOUS
Status: DISCONTINUED | OUTPATIENT
Start: 2022-08-15 | End: 2022-08-15

## 2022-08-15 RX ORDER — ONDANSETRON 8 MG/1
8 TABLET, ORALLY DISINTEGRATING ORAL EVERY 8 HOURS PRN
Status: DISCONTINUED | OUTPATIENT
Start: 2022-08-15 | End: 2022-08-16 | Stop reason: HOSPADM

## 2022-08-15 RX ORDER — LIDOCAINE HCL/PF 100 MG/5ML
SYRINGE (ML) INTRAVENOUS
Status: DISCONTINUED | OUTPATIENT
Start: 2022-08-15 | End: 2022-08-15

## 2022-08-15 RX ORDER — ALBUMIN HUMAN 50 G/1000ML
SOLUTION INTRAVENOUS CONTINUOUS PRN
Status: DISCONTINUED | OUTPATIENT
Start: 2022-08-15 | End: 2022-08-15

## 2022-08-15 RX ORDER — DEXAMETHASONE SODIUM PHOSPHATE 4 MG/ML
INJECTION, SOLUTION INTRA-ARTICULAR; INTRALESIONAL; INTRAMUSCULAR; INTRAVENOUS; SOFT TISSUE
Status: DISCONTINUED | OUTPATIENT
Start: 2022-08-15 | End: 2022-08-15

## 2022-08-15 RX ORDER — ONDANSETRON 2 MG/ML
INJECTION INTRAMUSCULAR; INTRAVENOUS
Status: DISCONTINUED | OUTPATIENT
Start: 2022-08-15 | End: 2022-08-15

## 2022-08-15 RX ORDER — INSULIN ASPART 100 [IU]/ML
1-10 INJECTION, SOLUTION INTRAVENOUS; SUBCUTANEOUS
Status: DISCONTINUED | OUTPATIENT
Start: 2022-08-15 | End: 2022-08-16 | Stop reason: HOSPADM

## 2022-08-15 RX ORDER — FENTANYL CITRATE 50 UG/ML
INJECTION, SOLUTION INTRAMUSCULAR; INTRAVENOUS
Status: DISCONTINUED | OUTPATIENT
Start: 2022-08-15 | End: 2022-08-15

## 2022-08-15 RX ORDER — MEPERIDINE HYDROCHLORIDE 25 MG/ML
12.5 INJECTION INTRAMUSCULAR; INTRAVENOUS; SUBCUTANEOUS ONCE AS NEEDED
Status: DISCONTINUED | OUTPATIENT
Start: 2022-08-15 | End: 2022-08-15 | Stop reason: HOSPADM

## 2022-08-15 RX ORDER — SODIUM CHLORIDE 0.9 % (FLUSH) 0.9 %
3 SYRINGE (ML) INJECTION
Status: DISCONTINUED | OUTPATIENT
Start: 2022-08-15 | End: 2022-08-15

## 2022-08-15 RX ORDER — PROPOFOL 10 MG/ML
VIAL (ML) INTRAVENOUS
Status: DISCONTINUED | OUTPATIENT
Start: 2022-08-15 | End: 2022-08-15

## 2022-08-15 RX ORDER — DIPHENHYDRAMINE HYDROCHLORIDE 50 MG/ML
INJECTION INTRAMUSCULAR; INTRAVENOUS
Status: DISCONTINUED | OUTPATIENT
Start: 2022-08-15 | End: 2022-08-15

## 2022-08-15 RX ORDER — ACETAMINOPHEN 325 MG/1
650 TABLET ORAL EVERY 8 HOURS PRN
Status: DISCONTINUED | OUTPATIENT
Start: 2022-08-15 | End: 2022-08-16 | Stop reason: HOSPADM

## 2022-08-15 RX ORDER — GLUCAGON 1 MG
1 KIT INJECTION
Status: DISCONTINUED | OUTPATIENT
Start: 2022-08-15 | End: 2022-08-16 | Stop reason: HOSPADM

## 2022-08-15 RX ORDER — LIDOCAINE HYDROCHLORIDE 10 MG/ML
1 INJECTION, SOLUTION EPIDURAL; INFILTRATION; INTRACAUDAL; PERINEURAL ONCE
Status: DISCONTINUED | OUTPATIENT
Start: 2022-08-15 | End: 2022-08-15

## 2022-08-15 RX ORDER — ISOSULFAN BLUE 50 MG/5ML
INJECTION, SOLUTION SUBCUTANEOUS
Status: DISCONTINUED | OUTPATIENT
Start: 2022-08-15 | End: 2022-08-15 | Stop reason: HOSPADM

## 2022-08-15 RX ORDER — IBUPROFEN 200 MG
16 TABLET ORAL
Status: DISCONTINUED | OUTPATIENT
Start: 2022-08-15 | End: 2022-08-16 | Stop reason: HOSPADM

## 2022-08-15 RX ORDER — OXYCODONE HYDROCHLORIDE 5 MG/1
5 TABLET ORAL
Status: DISCONTINUED | OUTPATIENT
Start: 2022-08-15 | End: 2022-08-15 | Stop reason: HOSPADM

## 2022-08-15 RX ORDER — EPHEDRINE SULFATE 50 MG/ML
INJECTION, SOLUTION INTRAVENOUS
Status: DISCONTINUED | OUTPATIENT
Start: 2022-08-15 | End: 2022-08-15

## 2022-08-15 RX ORDER — HYDROCODONE BITARTRATE AND ACETAMINOPHEN 5; 325 MG/1; MG/1
1 TABLET ORAL EVERY 4 HOURS PRN
Status: DISCONTINUED | OUTPATIENT
Start: 2022-08-15 | End: 2022-08-16 | Stop reason: HOSPADM

## 2022-08-15 RX ORDER — TALC
6 POWDER (GRAM) TOPICAL NIGHTLY PRN
Status: DISCONTINUED | OUTPATIENT
Start: 2022-08-15 | End: 2022-08-16 | Stop reason: HOSPADM

## 2022-08-15 RX ORDER — LIDOCAINE HYDROCHLORIDE 10 MG/ML
0.5 INJECTION, SOLUTION EPIDURAL; INFILTRATION; INTRACAUDAL; PERINEURAL ONCE
Status: DISCONTINUED | OUTPATIENT
Start: 2022-08-15 | End: 2022-08-15

## 2022-08-15 RX ORDER — LABETALOL HYDROCHLORIDE 5 MG/ML
10 INJECTION, SOLUTION INTRAVENOUS EVERY 6 HOURS PRN
Status: DISCONTINUED | OUTPATIENT
Start: 2022-08-15 | End: 2022-08-16 | Stop reason: HOSPADM

## 2022-08-15 RX ORDER — CEFAZOLIN SODIUM 1 G/3ML
2 INJECTION, POWDER, FOR SOLUTION INTRAMUSCULAR; INTRAVENOUS
Status: COMPLETED | OUTPATIENT
Start: 2022-08-15 | End: 2022-08-15

## 2022-08-15 RX ORDER — IBUPROFEN 200 MG
24 TABLET ORAL
Status: DISCONTINUED | OUTPATIENT
Start: 2022-08-15 | End: 2022-08-16 | Stop reason: HOSPADM

## 2022-08-15 RX ADMIN — PROPOFOL 150 MG: 10 INJECTION, EMULSION INTRAVENOUS at 07:08

## 2022-08-15 RX ADMIN — MIDAZOLAM HYDROCHLORIDE 2 MG: 1 INJECTION, SOLUTION INTRAMUSCULAR; INTRAVENOUS at 06:08

## 2022-08-15 RX ADMIN — PROPOFOL 50 MG: 10 INJECTION, EMULSION INTRAVENOUS at 08:08

## 2022-08-15 RX ADMIN — ROCURONIUM BROMIDE 20 MG: 10 INJECTION, SOLUTION INTRAVENOUS at 07:08

## 2022-08-15 RX ADMIN — EPHEDRINE SULFATE 10 MG: 50 INJECTION INTRAVENOUS at 12:08

## 2022-08-15 RX ADMIN — LIDOCAINE HYDROCHLORIDE 75 MG: 20 INJECTION, SOLUTION INTRAVENOUS at 07:08

## 2022-08-15 RX ADMIN — ALBUMIN (HUMAN): 12.5 SOLUTION INTRAVENOUS at 09:08

## 2022-08-15 RX ADMIN — Medication 25 MG: at 07:08

## 2022-08-15 RX ADMIN — CEFAZOLIN 2 G: 330 INJECTION, POWDER, FOR SOLUTION INTRAMUSCULAR; INTRAVENOUS at 11:08

## 2022-08-15 RX ADMIN — DIPHENHYDRAMINE HYDROCHLORIDE 12.5 MG: 50 INJECTION, SOLUTION INTRAMUSCULAR; INTRAVENOUS at 07:08

## 2022-08-15 RX ADMIN — SODIUM CHLORIDE, SODIUM LACTATE, POTASSIUM CHLORIDE, AND CALCIUM CHLORIDE: 600; 310; 30; 20 INJECTION, SOLUTION INTRAVENOUS at 06:08

## 2022-08-15 RX ADMIN — Medication 25 MG: at 09:08

## 2022-08-15 RX ADMIN — INSULIN ASPART 4 UNITS: 100 INJECTION, SOLUTION INTRAVENOUS; SUBCUTANEOUS at 09:08

## 2022-08-15 RX ADMIN — FENTANYL CITRATE 50 MCG: 50 INJECTION, SOLUTION INTRAMUSCULAR; INTRAVENOUS at 08:08

## 2022-08-15 RX ADMIN — SODIUM CHLORIDE, SODIUM LACTATE, POTASSIUM CHLORIDE, AND CALCIUM CHLORIDE: 600; 310; 30; 20 INJECTION, SOLUTION INTRAVENOUS at 08:08

## 2022-08-15 RX ADMIN — INSULIN ASPART 4 UNITS: 100 INJECTION, SOLUTION INTRAVENOUS; SUBCUTANEOUS at 05:08

## 2022-08-15 RX ADMIN — SODIUM CHLORIDE, SODIUM LACTATE, POTASSIUM CHLORIDE, AND CALCIUM CHLORIDE: 600; 310; 30; 20 INJECTION, SOLUTION INTRAVENOUS at 12:08

## 2022-08-15 RX ADMIN — GLYCOPYRROLATE 0.2 MG: 0.2 INJECTION, SOLUTION INTRAMUSCULAR; INTRAVITREAL at 10:08

## 2022-08-15 RX ADMIN — ONDANSETRON HYDROCHLORIDE 4 MG: 2 INJECTION INTRAMUSCULAR; INTRAVENOUS at 12:08

## 2022-08-15 RX ADMIN — DEXAMETHASONE SODIUM PHOSPHATE 8 MG: 4 INJECTION, SOLUTION INTRAMUSCULAR; INTRAVENOUS at 07:08

## 2022-08-15 RX ADMIN — FENTANYL CITRATE 100 MCG: 50 INJECTION, SOLUTION INTRAMUSCULAR; INTRAVENOUS at 07:08

## 2022-08-15 RX ADMIN — GLYCOPYRROLATE 0.2 MG: 0.2 INJECTION, SOLUTION INTRAMUSCULAR; INTRAVITREAL at 07:08

## 2022-08-15 RX ADMIN — FENTANYL CITRATE 50 MCG: 50 INJECTION, SOLUTION INTRAMUSCULAR; INTRAVENOUS at 07:08

## 2022-08-15 RX ADMIN — CEFAZOLIN 2 G: 330 INJECTION, POWDER, FOR SOLUTION INTRAMUSCULAR; INTRAVENOUS at 07:08

## 2022-08-15 NOTE — NURSING
Patient arrived to the floor AAOX4 able to make needs known . No c/o pain or discomfort noted . Two eyes on skin perform with recovery nurse . Incision noted to both breast with derma bond no drainage noted no redness noted . Three kai drain noted . Iv to left hand patent and intact no redness or swelling noted . Bowels active in all four quadrants .  Plan of care discuss with patient and family verbalizes understanding , patient orient to room . Bed lock in lowest position bed alarm on and working , call bell in reach of patient . Family at bedside

## 2022-08-15 NOTE — PLAN OF CARE
Problem: Adult Inpatient Plan of Care  Goal: Plan of Care Review  Outcome: Ongoing, Progressing  Goal: Patient-Specific Goal (Individualized)  Outcome: Ongoing, Progressing  Goal: Absence of Hospital-Acquired Illness or Injury  Outcome: Ongoing, Progressing  Goal: Optimal Comfort and Wellbeing  Outcome: Ongoing, Progressing  Goal: Readiness for Transition of Care  Outcome: Ongoing, Progressing     Problem: Diabetes Comorbidity  Goal: Blood Glucose Level Within Targeted Range  Outcome: Ongoing, Progressing     Problem: Impaired Wound Healing  Goal: Optimal Wound Healing  Outcome: Ongoing, Progressing     Problem: Infection  Goal: Absence of Infection Signs and Symptoms  Outcome: Ongoing, Progressing

## 2022-08-15 NOTE — ANESTHESIA POSTPROCEDURE EVALUATION
Anesthesia Post Evaluation    Patient: Caro R Perniciaro    Procedure(s) Performed: Procedure(s) (LRB):  MASTECTOMY BILATERAL  / BREAST (Bilateral)  LYMPHADENECTOMY, AXILLARY RIGHT (Right)  BIOPSY, LYMPH NODE, SENTINEL LEFT (Left)  INJECTION, FOR SENTINEL NODE IDENTIFICATION (Left)    Final Anesthesia Type: general      Patient location during evaluation: PACU  Patient participation: Yes- Able to Participate  Level of consciousness: awake and alert  Post-procedure vital signs: reviewed and stable  Pain management: adequate  Airway patency: patent    PONV status at discharge: No PONV  Anesthetic complications: no      Cardiovascular status: blood pressure returned to baseline  Respiratory status: unassisted and spontaneous ventilation  Hydration status: euvolemic  Follow-up not needed.          Vitals Value Taken Time   /67 08/15/22 1455   Temp 36.7 °C (98 °F) 08/15/22 1410   Pulse 62 08/15/22 1507   Resp 16 08/15/22 1455   SpO2 98 % 08/15/22 1507   Vitals shown include unvalidated device data.      Event Time   Out of Recovery 15:18:00         Pain/Flex Score: Flex Score: 10 (8/15/2022  3:00 PM)

## 2022-08-15 NOTE — TRANSFER OF CARE
"Anesthesia Transfer of Care Note    Patient: Caro Powell    Procedure(s) Performed: Procedure(s) (LRB):  MASTECTOMY BILATERAL  / BREAST (Bilateral)  LYMPHADENECTOMY, AXILLARY RIGHT (Right)  BIOPSY, LYMPH NODE, SENTINEL LEFT (Left)  INJECTION, FOR SENTINEL NODE IDENTIFICATION (Left)    Patient location: PACU    Anesthesia Type: general    Transport from OR: Transported from OR on 2-3 L/min O2 by NC with adequate spontaneous ventilation    Post pain: adequate analgesia    Post assessment: no apparent anesthetic complications    Post vital signs: stable    Level of consciousness: awake    Nausea/Vomiting: no nausea/vomiting    Complications: none    Transfer of care protocol was followed      Last vitals:   Visit Vitals  BP (!) 141/71 (BP Location: Left arm, Patient Position: Sitting)   Pulse (!) 52   Temp 36.5 °C (97.7 °F) (Oral)   Resp 16   Ht 5' 2" (1.575 m)   Wt 74.8 kg (165 lb)   LMP  (LMP Unknown)   SpO2 98%   Breastfeeding No   BMI 30.18 kg/m²     "

## 2022-08-15 NOTE — BRIEF OP NOTE
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 Right Axillary Lymph Node Dissection sent for permanent section. Left [place] lymph node biopsy. 3 sentinel nodes sent for frozen section, all 3 negative for metastatic carcinoma.    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  LEFT MASTECTOMY SHORT STITCH- SUPERIOR, LONG LATERAL2.  LEFT MASTECTOMY NEW ANTERIOR MARGIN, INFERIOR LATERAL ASPECT3.  LEFT AXILLARY SENTINEL LYMPH NODE # 1 HOT AND BLUE @1177(FROZEN)4.     LEFT AXILLARY SENTINEL LYMPH NODE #2 HOT AND BLUE @ 1459(FROZEN)5.  LEFT AXILLARY SENTINEL LYMPH NODE # 3 HOT AND BLUE @ 714 (FROZEN)6.  RIGHT MASTECTOMY, SHORT-SUPERIOR, LONG- LATERAL7.  RIGHT MASTECTOMY-NEW ANTERIOR MARGIN, INFERIOR LATERAL ASPECT8.  RIGHT AXILLARY CONTENT9.  RIGHT MASTECTOMY LATERAL SKIN.  SHORT STITCH SUPERIOR,LONG-LATERAL     References:    Click here for ordering Quick Tip   Question Answer Comment   Procedure Type: Breast    Specimen Class: Known or suspected malignancy    Which provider would you like to cc? 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  LEFT MASTECTOMY SHORT STITCH- SUPERIOR, LONG LATERAL2.  LEFT MASTECTOMY NEW ANTERIOR MARGIN, INFERIOR LATERAL ASPECT3.  LEFT AXILLARY SENTINEL LYMPH NODE # 1 HOT AND BLUE @1177(FROZEN)4.     LEFT AXILLARY SENTINEL LYMPH NODE #2 HOT AND BLUE @ 1459(FROZEN)5.  LEFT AXILLARY SENTINEL LYMPH NODE # 3 HOT AND BLUE @ 714 (FROZEN)6.  RIGHT MASTECTOMY, SHORT-SUPERIOR, LONG- LATERAL7.  RIGHT MASTECTOMY-NEW ANTERIOR MARGIN, INFERIOR LATERAL ASPECT8.  RIGHT AXILLARY CONTENT     References:    Click here for ordering Quick Tip   Question Answer Comment   Procedure Type: Breast    Specimen Class: Known or suspected malignancy    Which provider would you like to cc? RADHA WARREN    Release to patient Immediate        08/15/22 1118

## 2022-08-15 NOTE — ANESTHESIA PROCEDURE NOTES
Intubation    Date/Time: 8/15/2022 7:08 AM  Performed by: Elba Leiva CRNA  Authorized by: Sriram Neal MD     Intubation:     Induction:  Intravenous    Intubated:  Postinduction    Mask Ventilation:  Easy mask    Attempts:  1    Attempted By:  CRNA    Method of Intubation:  Video laryngoscopy    Blade:  Oquendo 3    Laryngeal View Grade: Grade I - full view of cords      Difficult Airway Encountered?: No      Complications:  None    Airway Device:  Oral endotracheal tube    Style/Cuff Inflation:  Cuffed (inflated to minimal occlusive pressure)    Tube secured:  21    Secured at:  The lips    Placement Verified By:  Capnometry    Complicating Factors:  Anterior larynx and poor neck/head extension    Findings Post-Intubation:  BS equal bilateral and atraumatic/condition of teeth unchanged

## 2022-08-16 VITALS
RESPIRATION RATE: 18 BRPM | WEIGHT: 165 LBS | DIASTOLIC BLOOD PRESSURE: 60 MMHG | TEMPERATURE: 98 F | HEIGHT: 62 IN | SYSTOLIC BLOOD PRESSURE: 132 MMHG | OXYGEN SATURATION: 97 % | BODY MASS INDEX: 30.36 KG/M2 | HEART RATE: 63 BPM

## 2022-08-16 LAB
POCT GLUCOSE: 103 MG/DL (ref 70–110)
POCT GLUCOSE: 144 MG/DL (ref 70–110)

## 2022-08-16 PROCEDURE — 94761 N-INVAS EAR/PLS OXIMETRY MLT: CPT

## 2022-08-16 PROCEDURE — 25000003 PHARM REV CODE 250

## 2022-08-16 RX ORDER — GABAPENTIN 300 MG/1
300 CAPSULE ORAL 3 TIMES DAILY
Status: DISCONTINUED | OUTPATIENT
Start: 2022-08-16 | End: 2022-08-16 | Stop reason: HOSPADM

## 2022-08-16 RX ORDER — HYDROCODONE BITARTRATE AND ACETAMINOPHEN 5; 325 MG/1; MG/1
1 TABLET ORAL EVERY 6 HOURS PRN
Qty: 20 TABLET | Refills: 0 | Status: SHIPPED | OUTPATIENT
Start: 2022-08-16 | End: 2022-09-12 | Stop reason: SDUPTHER

## 2022-08-16 RX ADMIN — HYDROCODONE BITARTRATE AND ACETAMINOPHEN 1 TABLET: 5; 325 TABLET ORAL at 10:08

## 2022-08-16 RX ADMIN — GABAPENTIN 300 MG: 300 CAPSULE ORAL at 08:08

## 2022-08-16 NOTE — PLAN OF CARE
Patient AAOX4 able to make needs known. C/o pain to feet . Prn pain medication administer per md orders patient tolerated well . Incision to breast intact with derma bond and gauze .  Ambulate to bedside commode without difficulty. Plan of care and discharge instruction discuss with patient and family verbalizes understanding .   Problem: Adult Inpatient Plan of Care  Goal: Plan of Care Review  Outcome: Met  Goal: Patient-Specific Goal (Individualized)  Outcome: Met  Goal: Absence of Hospital-Acquired Illness or Injury  Outcome: Met  Goal: Optimal Comfort and Wellbeing  Outcome: Met  Goal: Readiness for Transition of Care  Outcome: Met     Problem: Diabetes Comorbidity  Goal: Blood Glucose Level Within Targeted Range  Outcome: Met     Problem: Impaired Wound Healing  Goal: Optimal Wound Healing  Outcome: Met     Problem: Infection  Goal: Absence of Infection Signs and Symptoms  Outcome: Met     Problem: Adjustment to Surgery (Breast Surgery)  Goal: Optimal Coping with Surgery  Outcome: Met     Problem: Bleeding (Breast Surgery)  Goal: Absence of Bleeding  Outcome: Met     Problem: Pain (Breast Surgery)  Goal: Acceptable Pain Control  Outcome: Met

## 2022-08-16 NOTE — PLAN OF CARE
CM is being discharged this morning.  CM spoke with friend at pt's bedside who stated she is here to  pt to take home.  Patient has no needs.  Pt is clear for discharge from a CM perspective.

## 2022-08-16 NOTE — DISCHARGE INSTRUCTIONS
POSTOPERATIVE INSTRUCTIONS FOLLOWING   MASTECTOMY AND AXILLARY LYMPH NODE DISSECTION    The following are post-operative instructions that will help you to recover from your surgery.  Please read over these instructions carefully and contact us if we can answer any of your questions or concerns.    Dressing/breast binder (surgi-bra)  A surgical bra may be placed around your chest after your surgery.  If you are given the bra, please wear it as close to 24 hours a day as possible until your post-operative clinic appointment.  If the elastic around the bra irritates your skin, you may wear a soft t-shirt underneath the bra.    You may go without wearing the bra long enough to bath, to launder and dry the bra. If you have fluffy filler placed inside the bra, the filler should be removed whenever the bra is taken off. Please reinsert the fluffy filler, or insert the new soft filler, under the bra when you put the bra back on.  If the bra is uncomfortable, you may wear a supportive sports bra instead after 2 days.    You may shower. Do not take a tub bath and do not soak the surgical site. Please do not remove the glue that covers your incision.  It may flake off around 2-3 weeks after surgery and if not then it will be removed at your clinic visit.  If the small clear dressing and disc shaped dressing near your drain becomes loose then it is ok to remove this dressing and cover the area with gauze. Just be careful to remove the drain itself.     Activity   You will be able to do much of your own personal care, such as bathing, dressing, preparing simple meals, etc.  A short walk each day will help with your recovery  You may find that you need to take rest breaks between activities, but you should not need to stay in bed for prolonged periods of time during the day  You may resume light household activities such as simple meal preparation, folding laundry, using your computer, and completing paperwork as you feel  ready  Please avoid activities that require moderate to heavy lifting (grocery shopping) or pushing/pulling (vacuuming) and repetitive motions (such as washing windows or long hours on the computer). Do not lift anything heavier than a gallon of milk.  A good rule during this time is to listen to your body, do what is comfortable, and stop and rest when your feel tired.  If it hurts, dont do it.  Following a lymph node dissection, dont avoid using your arm, but dont exercise your arm until after your first post-operative visit.  At your first post-op visit, you will be given arm exercises to regain movement and flexibility.  You may be referred to physical therapy.  You may restart driving when you are no longer on narcotics and you feel safe turning the wheel and stopping quickly.  You will need to be out of work approximately 2-6 weeks depending on your particular surgery and how well you are recovering.  We will evaluate how you are doing at the first post-op appointment.  This is a good time to ask when you may return to work and what activities you may do.    Medication for pain  You will be given a prescription for pain medication. You should not drive or operate machinery while taking these.  Please take narcotics with food.  Narcotics can cause, or worse, constipation.  You will need to increase your fluid intake, eat high fiber foods (such as fruits and bran) and make sure that you are up and walking. You may need to take an over the counter stool softener for constipation.  An icepack may be helpful to decrease discomfort and swelling, particularly to the armpit after a lymph node dissection.  A small pillow positioned in the armpit may also decrease discomfort after a lymph node dissection.    How to care for your Drain(s)  Wash hands--STRIP or milk the drainage tube as it comes out of your body toward the bulb.   Beginning where the drain comes out of your body, hold drainage tubing with one hand  and with the other, stretch and release tubing an inch at time while moving downward with both hands toward the bulb.  Do this 2-3 times before emptying the bulb.  Remove the stopper from the bulbs port  (drainage port)  Pour the drainage in the measuring cup provided by the nurse  Flatten/squeeze the bulb to create a vacuum and replace the stopper before letting go the of the bulb.  Record the date, time and amount of drainage in ccs (not ounces) each time bulb is emptied. If you have more than one drain, record each separately.  Discard the drainage into the toilet after measuring and then wash hands.  Empty bulbs as needed but at least once daily.   Remember to bring the output record with you to your doctors appointment.    Please report the following:  Temperature greater than 101 degrees  Discharge or bad odor from the wound  Excessive bleeding, such as bloody dressing or extreme bruising  Redness at incision and/or drain sites  Swelling or buildup of fluid around incision  If blue dye was used to locate your sentinel lymph nodes, your urine and stool may be blue-green in color for 1 or 2 days.    Additional information  I will see you approximately 2 weeks following your surgery.  If this follow-up appointment has not been made, please call the office.    If you have any questions or problems, please call my office or my nurse.  Dr. Elba Quinn MD  If you have questions, please call my nurse: Karin at [phone] or Brittaney at [phone]    After hours and on weekends, you may call the main [facility] line at [phone].    If directed to the emergency room it would be best to proceed to the [facility] ER. However if very ill or unable to travel safely then please present to your nearest ER.     Lymphedema Risk Reduction    Lymphedema is a swelling of a part of the body, caused by an insufficient lymphatic system and an accumulation of fluid in the bodys tissues.  Lymphedema may occur  when normal drainage of fluid is disrupted, such as an infection, injury, cancer, scar tissue, or removal of lymph nodes.    If you had a full axillary node dissection procedure, you may be at great risk for lymphedema.     For those patients having a sentinel lymph node biopsy, these risks may be smaller and the recommendations are provided for your review and consideration.    The following list contains recommendations for reducing your risk of developing lymphedema.    Skin Care--avoid trauma/injury to reduce infection risk  Keep the hand and arm on the side of surgery clean and dry  Pay attention to nail care and do not cut cuticles  Avoid punctures, such as injections and blood draws from you on the side of your surgery.   Wear gloves while doing activities that may cause skin injury (washing dishes, gardening, etc.)  If scratches or punctures occur, wash area with soap and water, and observe for signs of infections (redness, drainage, swelling)  If a rash, itching, redness, pain, increased skin temperatures, fever, or flu-like symptoms occur, contact your physician immediately for early treatment of a possible infection  Activity/Lifestyle  Gradually build up the duration and intensity of any activity or exercise  Take frequent rest periods during activity to allow for arm recovery  Monitor your arm and upper body during and after activity for any change in size, shape, tissue, texture, soreness, heaviness, or firmness                  PLEASE RECORD ALL AMOUNTS IN CCS AND BRIND THIS RECORD   WITH YOU TO YOUR RETURN APPOINTMENT  Date Time Drain #1 Drain #2 comment                                                                                                                                                                                                                         Date Time Drain #1 Drain #2 comment

## 2022-08-16 NOTE — PLAN OF CARE
Patient AAOX4; VSS on R/A. No distress noted. Patient is extremely anxious and emotional. States that she had a very serious surgery and is having a terrible time. The patient feels she is not being heard; despite nursing staff tending to her needs. Patient was tearful multiple times throughout the night. She is uneasy about drains, scds, and post op overall. Up to the bedside commode; unsteady on her feet; purewick in place. Patient denied pain overnight. States she had a hard time going back to sleep, but refused PRN medications. States she won't take anything. Incisions to bilateral chest C/D/I. Drains C/D/I; output in flowsheet. Nurse continuously monitoring.   Problem: Adult Inpatient Plan of Care  Goal: Plan of Care Review  Outcome: Ongoing, Progressing  Goal: Optimal Comfort and Wellbeing  Outcome: Ongoing, Progressing     Problem: Diabetes Comorbidity  Goal: Blood Glucose Level Within Targeted Range  Outcome: Ongoing, Progressing     Problem: Adjustment to Surgery (Breast Surgery)  Goal: Optimal Coping with Surgery  Outcome: Ongoing, Progressing     Problem: Pain (Breast Surgery)  Goal: Acceptable Pain Control  Outcome: Ongoing, Progressing

## 2022-08-16 NOTE — DISCHARGE SUMMARY
[facility]  General Surgery  Discharge Summary      Patient Name: Caro Powell  MRN: [id number]  Admission Date: 8/15/2022  Hospital Length of Stay: 1 days  Discharge Date and Time:  08/16/2022 9:30 AM  Attending Physician: RADHA Quinn MD   Discharging Provider: Gray Moreira MD  Primary Care Provider: Brayan Penny MD     HPI: Caro Powell is a 81 y.o. female who presents with bilateral breast Invasive Ductal Carcinoma.      She presented for diagnostic breast imaging on 6/21/22 for new breast symptoms. , a palpable breast mass identified by referred physician. This identified multiple lesions in bilateral breast (see below) as well as an abnormal lymph node. A ultrasound guided biopsy was performed on 6/28/22 with pathology revealing infiltrating ductal carcinoma of the bilateral breast as well as right axillary lymph node positive for metastatic ductal carcinoma.    Procedure(s) (LRB):  MASTECTOMY BILATERAL  / BREAST (Bilateral)  LYMPHADENECTOMY, AXILLARY RIGHT (Right)  BIOPSY, LYMPH NODE, SENTINEL LEFT (Left)  INJECTION, FOR SENTINEL NODE IDENTIFICATION (Left)     Hospital Course:   Briefly, the patient went to the OR on 8/15/22 for a bilateral mastectomy and right axillary lymph node dissection and left sentinel node biopsy secondary to bilateral breast carcinoma.  The patient tolerated the procedure well and was transferred to the PACU in stable condition.  Their post op course was uncomplicated.  The patients pain was controlled with PO medications.  Ambulating without issue.  Voiding without issue with adequate urine output. Passing gas and stool.  Tolerating diet without nausea or vomiting.  Incision site is clean, dry, and intact.  Discharged on 8/16/22.      Consults: None    Significant Diagnostic Studies: Labs: All labs within the past 24 hours have been reviewed  Physical Exam  Vitals and nursing note reviewed.   Constitutional:       Appearance: Normal  appearance.   HENT:      Head: Normocephalic and atraumatic.   Cardiovascular:      Rate and Rhythm: Normal rate and regular rhythm.      Pulses: Normal pulses.   Pulmonary:      Effort: Pulmonary effort is normal. No respiratory distress.   Abdominal:      General: Abdomen is flat.      Palpations: Abdomen is soft.   Skin:     General: Skin is warm and dry.      Comments: Bilateral breast incisions cdi    Bilateral breast drains with thin serosanguinous output   Neurological:      General: No focal deficit present.      Mental Status: She is alert and oriented to person, place, and time.   Psychiatric:         Mood and Affect: Mood normal.         Behavior: Behavior normal.       Pending Diagnostic Studies:     Procedure Component Value Units Date/Time    Specimen to Pathology, Surgery Breast [[id number]] Collected: 08/15/22 0855    Order Status: Sent Lab Status: In process Updated: 08/15/22 1255    Specimen: Tissue         There are no hospital problems to display for this patient.     Discharged Condition: good    Disposition: Home or Self Care    Follow Up:   Follow-up Information     Elba Quinn MD Follow up in 2 week(s).    Specialties: Surgery, Breast Surgery  Contact information:  [address] [place] [address] [id number] [phone]                       Patient Instructions:      Notify your health care provider if you experience any of the following:  temperature >100.4     Notify your health care provider if you experience any of the following:  persistent nausea and vomiting or diarrhea     Notify your health care provider if you experience any of the following:  severe uncontrolled pain     Notify your health care provider if you experience any of the following:  redness, tenderness, or signs of infection (pain, swelling, redness, odor or green/yellow discharge around incision site)     Notify your health care provider if you experience any of the following:  difficulty breathing or increased  cough     Notify your health care provider if you experience any of the following:  severe persistent headache     Notify your health care provider if you experience any of the following:  worsening rash     Notify your health care provider if you experience any of the following:  persistent dizziness, light-headedness, or visual disturbances     Notify your health care provider if you experience any of the following:  increased confusion or weakness     Activity as tolerated     Medications:  Reconciled Home Medications:      Medication List      START taking these medications    HYDROcodone-acetaminophen 5-325 mg per tablet  Commonly known as: NORCO  Take 1 tablet by mouth every 6 (six) hours as needed for Pain.        CONTINUE taking these medications    ACCU-CHEK SAMI PLUS METER Misc  Generic drug: blood-glucose meter  TEST  AS DIRECTED     ACCU-CHEK SAMI PLUS TEST STRP Strp  Generic drug: blood sugar diagnostic  USE TO TEST BLOOD SUGAR ONCE DAILY     ACCU-CHEK SOFT DEV LANCETS Kit  Generic drug: lancing device with lancets     ACCU-CHEK SOFTCLIX LANCETS Misc  Generic drug: lancets  TEST ONCE DAILY     ALPRAZolam 0.5 MG tablet  Commonly known as: XANAX  Take 1 tablet (0.5 mg total) by mouth 2 (two) times daily as needed for Anxiety.     ammonium lactate 12 % Crea  Apply to feet twice daily. Avoid use between toes.     atorvastatin 80 MG tablet  Commonly known as: LIPITOR  Take 1 tablet (80 mg total) by mouth once daily.     clopidogreL 75 mg tablet  Commonly known as: PLAVIX  Take 1 tablet (75 mg total) by mouth once daily.     ELIQUIS 5 mg Tab  Generic drug: apixaban  TAKE 1 TABLET BY MOUTH TWICE DAILY     furosemide 40 MG tablet  Commonly known as: LASIX  Take 1 tablet (40 mg total) by mouth once daily.     gabapentin 400 MG capsule  Commonly known as: NEURONTIN  2 pills in the AM, 1 pill at noon, and 2 pills in the evening.     glimepiride 1 MG tablet  Commonly known as: AMARYL  TAKE 1 TABLET TWICE DAILY      lisinopriL 20 MG tablet  Commonly known as: PRINIVIL,ZESTRIL  TAKE 1 TABLET EVERY DAY     metoprolol succinate 25 MG 24 hr tablet  Commonly known as: TOPROL-XL  Take 1 tablet (25 mg total) by mouth once daily.     polyethylene glycol 17 gram/dose powder  Commonly known as: GLYCOLAX  Take 17 g by mouth once daily.     pramipexole 0.125 MG tablet  Commonly known as: MIRAPEX  Take 1 tablet (0.125 mg total) by mouth once daily.     urea 40 % Crea  Commonly known as: CARMOL  Apply topically 2 (two) times daily.            Gray Moreira MD  General Surgery  [facility]

## 2022-08-22 ENCOUNTER — PATIENT MESSAGE (OUTPATIENT)
Dept: SURGERY | Facility: CLINIC | Age: 82
End: 2022-08-22
Payer: MEDICARE

## 2022-08-25 ENCOUNTER — OFFICE VISIT (OUTPATIENT)
Dept: SURGERY | Facility: CLINIC | Age: 82
End: 2022-08-25
Payer: MEDICARE

## 2022-08-25 ENCOUNTER — PATIENT MESSAGE (OUTPATIENT)
Dept: SURGERY | Facility: CLINIC | Age: 82
End: 2022-08-25
Payer: MEDICARE

## 2022-08-25 ENCOUNTER — TELEPHONE (OUTPATIENT)
Dept: SURGERY | Facility: CLINIC | Age: 82
End: 2022-08-25
Payer: MEDICARE

## 2022-08-25 VITALS
HEIGHT: 62 IN | BODY MASS INDEX: 30.36 KG/M2 | DIASTOLIC BLOOD PRESSURE: 56 MMHG | SYSTOLIC BLOOD PRESSURE: 128 MMHG | HEART RATE: 60 BPM | WEIGHT: 165 LBS

## 2022-08-25 DIAGNOSIS — G89.18 POST-OP PAIN: Primary | ICD-10-CM

## 2022-08-25 LAB
FINAL PATHOLOGIC DIAGNOSIS: NORMAL
FROZEN SECTION DIAGNOSIS: NORMAL
FROZEN SECTION FOOTNOTE: NORMAL
GROSS: NORMAL
Lab: NORMAL

## 2022-08-25 PROCEDURE — 99999 PR PBB SHADOW E&M-EST. PATIENT-LVL III: ICD-10-PCS | Mod: PBBFAC,,, | Performed by: NURSE PRACTITIONER

## 2022-08-25 PROCEDURE — 3078F PR MOST RECENT DIASTOLIC BLOOD PRESSURE < 80 MM HG: ICD-10-PCS | Mod: CPTII,S$GLB,, | Performed by: NURSE PRACTITIONER

## 2022-08-25 PROCEDURE — 3074F PR MOST RECENT SYSTOLIC BLOOD PRESSURE < 130 MM HG: ICD-10-PCS | Mod: CPTII,S$GLB,, | Performed by: NURSE PRACTITIONER

## 2022-08-25 PROCEDURE — 1101F PT FALLS ASSESS-DOCD LE1/YR: CPT | Mod: CPTII,S$GLB,, | Performed by: NURSE PRACTITIONER

## 2022-08-25 PROCEDURE — 3288F PR FALLS RISK ASSESSMENT DOCUMENTED: ICD-10-PCS | Mod: CPTII,S$GLB,, | Performed by: NURSE PRACTITIONER

## 2022-08-25 PROCEDURE — 1101F PR PT FALLS ASSESS DOC 0-1 FALLS W/OUT INJ PAST YR: ICD-10-PCS | Mod: CPTII,S$GLB,, | Performed by: NURSE PRACTITIONER

## 2022-08-25 PROCEDURE — 3074F SYST BP LT 130 MM HG: CPT | Mod: CPTII,S$GLB,, | Performed by: NURSE PRACTITIONER

## 2022-08-25 PROCEDURE — 1125F PR PAIN SEVERITY QUANTIFIED, PAIN PRESENT: ICD-10-PCS | Mod: CPTII,S$GLB,, | Performed by: NURSE PRACTITIONER

## 2022-08-25 PROCEDURE — 1125F AMNT PAIN NOTED PAIN PRSNT: CPT | Mod: CPTII,S$GLB,, | Performed by: NURSE PRACTITIONER

## 2022-08-25 PROCEDURE — 99024 POSTOP FOLLOW-UP VISIT: CPT | Mod: S$GLB,,, | Performed by: NURSE PRACTITIONER

## 2022-08-25 PROCEDURE — 3288F FALL RISK ASSESSMENT DOCD: CPT | Mod: CPTII,S$GLB,, | Performed by: NURSE PRACTITIONER

## 2022-08-25 PROCEDURE — 3078F DIAST BP <80 MM HG: CPT | Mod: CPTII,S$GLB,, | Performed by: NURSE PRACTITIONER

## 2022-08-25 PROCEDURE — 99999 PR PBB SHADOW E&M-EST. PATIENT-LVL III: CPT | Mod: PBBFAC,,, | Performed by: NURSE PRACTITIONER

## 2022-08-25 PROCEDURE — 99024 PR POST-OP FOLLOW-UP VISIT: ICD-10-PCS | Mod: S$GLB,,, | Performed by: NURSE PRACTITIONER

## 2022-08-25 RX ORDER — IBUPROFEN 800 MG/1
800 TABLET ORAL EVERY 6 HOURS PRN
Qty: 24 TABLET | Refills: 0 | Status: SHIPPED | OUTPATIENT
Start: 2022-08-25 | End: 2022-09-21

## 2022-08-25 NOTE — TELEPHONE ENCOUNTER
Spoke to daughter Lisbeth.  Patient scheduled to come in today to see SARI Valdovinos.  Patient agreeable to time/date of appointment.

## 2022-08-25 NOTE — PROGRESS NOTES
Post-Op  [facility]  Department of Surgery    REFERRING PROVIDER: No referring provider defined for this encounter. Brayan Penny MD  MEDICAL ONCOLOGIST:    Pending   RADIATION ONCOLOGIST:   Pending     DIAGNOSIS:    This is a 81 y.o. female with ER + KS + HER2 - Invasive Ductal Carcinoma of bilateral breast. + Right axillary lymph node.     TREATMENT SUMMARY:  The patient is status post bilateral total mastectomy with left SLNB and right axillary lymph node dissection on 8/15/2022.  Final pathology still pending.     INTERVAL HISTORY:   Caro Powell comes in for a post-op check. Patient complains of increased pain to the right chest and axilla. Has not been taking pain medication because she doesn't like the way it makes her feel. She denies fever, chills, chest pain or shortness of breath. Her family reports some swelling to the right chest as well. They obtained Augmentin and bacitracin which they have been using.   MEDICATIONS:  Current Outpatient Medications   Medication Sig Dispense Refill    ACCU-CHEK SAMI PLUS METER Misc TEST  AS DIRECTED 1 each 0    ACCU-CHEK SAMI PLUS TEST STRP Strp USE TO TEST BLOOD SUGAR ONCE DAILY 100 strip 3    ACCU-CHEK SOFT DEV LANCETS Kit       ACCU-CHEK SOFTCLIX LANCETS Misc TEST ONCE DAILY 100 each 3    ALPRAZolam (XANAX) 0.5 MG tablet Take 1 tablet (0.5 mg total) by mouth 2 (two) times daily as needed for Anxiety. 30 tablet 0    ammonium lactate 12 % Crea Apply to feet twice daily. Avoid use between toes. 140 g 5    apixaban (ELIQUIS) 5 mg Tab TAKE 1 TABLET BY MOUTH TWICE DAILY 180 tablet 1    atorvastatin (LIPITOR) 80 MG tablet Take 1 tablet (80 mg total) by mouth once daily. 90 tablet 3    clopidogreL (PLAVIX) 75 mg tablet Take 1 tablet (75 mg total) by mouth once daily. 90 tablet 3    furosemide (LASIX) 40 MG tablet Take 1 tablet (40 mg total) by mouth once daily. 30 tablet 11    gabapentin (NEURONTIN) 400 MG capsule 2 pills in the AM, 1 pill at noon,  and 2 pills in the evening. 450 capsule 3    glimepiride (AMARYL) 1 MG tablet TAKE 1 TABLET TWICE DAILY 180 tablet 3    HYDROcodone-acetaminophen (NORCO) 5-325 mg per tablet Take 1 tablet by mouth every 6 (six) hours as needed for Pain. 20 tablet 0    lisinopriL (PRINIVIL,ZESTRIL) 20 MG tablet TAKE 1 TABLET EVERY DAY 90 tablet 3    metoprolol succinate (TOPROL-XL) 25 MG 24 hr tablet Take 1 tablet (25 mg total) by mouth once daily. 90 tablet 3    polyethylene glycol (GLYCOLAX) 17 gram/dose powder Take 17 g by mouth once daily. 510 g 0    pramipexole (MIRAPEX) 0.125 MG tablet Take 1 tablet (0.125 mg total) by mouth once daily. 90 tablet 3    urea (CARMOL) 40 % Crea Apply topically 2 (two) times daily. 1 Bottle 3     No current facility-administered medications for this visit.       ALLERGIES:   Review of patient's allergies indicates:   Allergen Reactions    Codeine Nausea Only and Other (See Comments)     Can Take Tylenol, hrdrocodone       PHYSICAL EXAMINATION:   General:  This is a well appearing female with appropriate speech, affect and gait.     Breast:  Incision clean, dry, and intact/ SAMI drains intact with no erythema. Right SAMI insertion site with slight edema but no fluid collection noted.       IMPRESSION:   The patient has had an uneventful postoperative course.    PLAN:   1. return to clinic for pathology review and follow up with Dr. Quinn next week  2. Continue bacitracin and Augmentin   3. Discussed ibuprofen and Tylenol every 6-8 hours for pain    4. Pink bra given     The patient is in agreement with the plan. Questions were encouraged and answered to patient's satisfaction. Caro will call our office with any questions or concerns.

## 2022-08-30 ENCOUNTER — OFFICE VISIT (OUTPATIENT)
Dept: SURGERY | Facility: CLINIC | Age: 82
End: 2022-08-30
Payer: MEDICARE

## 2022-08-30 VITALS
HEIGHT: 62 IN | WEIGHT: 168 LBS | BODY MASS INDEX: 30.91 KG/M2 | HEART RATE: 57 BPM | DIASTOLIC BLOOD PRESSURE: 75 MMHG | SYSTOLIC BLOOD PRESSURE: 189 MMHG

## 2022-08-30 DIAGNOSIS — C50.911 INVASIVE DUCTAL CARCINOMA OF RIGHT BREAST: ICD-10-CM

## 2022-08-30 DIAGNOSIS — C50.912 INVASIVE DUCTAL CARCINOMA OF LEFT BREAST: ICD-10-CM

## 2022-08-30 DIAGNOSIS — Z90.13 S/P BILATERAL MASTECTOMY: Primary | ICD-10-CM

## 2022-08-30 PROCEDURE — 3078F DIAST BP <80 MM HG: CPT | Mod: CPTII,S$GLB,, | Performed by: SURGERY

## 2022-08-30 PROCEDURE — 1101F PR PT FALLS ASSESS DOC 0-1 FALLS W/OUT INJ PAST YR: ICD-10-PCS | Mod: CPTII,S$GLB,, | Performed by: SURGERY

## 2022-08-30 PROCEDURE — 99024 PR POST-OP FOLLOW-UP VISIT: ICD-10-PCS | Mod: S$GLB,,, | Performed by: SURGERY

## 2022-08-30 PROCEDURE — 1159F MED LIST DOCD IN RCRD: CPT | Mod: CPTII,S$GLB,, | Performed by: SURGERY

## 2022-08-30 PROCEDURE — 1160F PR REVIEW ALL MEDS BY PRESCRIBER/CLIN PHARMACIST DOCUMENTED: ICD-10-PCS | Mod: CPTII,S$GLB,, | Performed by: SURGERY

## 2022-08-30 PROCEDURE — 1160F RVW MEDS BY RX/DR IN RCRD: CPT | Mod: CPTII,S$GLB,, | Performed by: SURGERY

## 2022-08-30 PROCEDURE — 3077F SYST BP >= 140 MM HG: CPT | Mod: CPTII,S$GLB,, | Performed by: SURGERY

## 2022-08-30 PROCEDURE — 3288F PR FALLS RISK ASSESSMENT DOCUMENTED: ICD-10-PCS | Mod: CPTII,S$GLB,, | Performed by: SURGERY

## 2022-08-30 PROCEDURE — 1159F PR MEDICATION LIST DOCUMENTED IN MEDICAL RECORD: ICD-10-PCS | Mod: CPTII,S$GLB,, | Performed by: SURGERY

## 2022-08-30 PROCEDURE — 99024 POSTOP FOLLOW-UP VISIT: CPT | Mod: S$GLB,,, | Performed by: SURGERY

## 2022-08-30 PROCEDURE — 1125F AMNT PAIN NOTED PAIN PRSNT: CPT | Mod: CPTII,S$GLB,, | Performed by: SURGERY

## 2022-08-30 PROCEDURE — 3078F PR MOST RECENT DIASTOLIC BLOOD PRESSURE < 80 MM HG: ICD-10-PCS | Mod: CPTII,S$GLB,, | Performed by: SURGERY

## 2022-08-30 PROCEDURE — 3288F FALL RISK ASSESSMENT DOCD: CPT | Mod: CPTII,S$GLB,, | Performed by: SURGERY

## 2022-08-30 PROCEDURE — 1125F PR PAIN SEVERITY QUANTIFIED, PAIN PRESENT: ICD-10-PCS | Mod: CPTII,S$GLB,, | Performed by: SURGERY

## 2022-08-30 PROCEDURE — 3077F PR MOST RECENT SYSTOLIC BLOOD PRESSURE >= 140 MM HG: ICD-10-PCS | Mod: CPTII,S$GLB,, | Performed by: SURGERY

## 2022-08-30 PROCEDURE — 1101F PT FALLS ASSESS-DOCD LE1/YR: CPT | Mod: CPTII,S$GLB,, | Performed by: SURGERY

## 2022-08-31 ENCOUNTER — DOCUMENTATION ONLY (OUTPATIENT)
Dept: SURGERY | Facility: CLINIC | Age: 82
End: 2022-08-31
Payer: MEDICARE

## 2022-08-31 NOTE — NURSING
Spoke with patient and scheduled appt to see radiation oncology on 9/15/2022, per patients request. Patient asked about payment for radiation. Asked patient if she would like to be screened for financial assistance. Message sent over to the team and she was approved for financial assistance. Message sent to BRENDA Mccallum LCSW to reach out to patient for patient assistance as well.   Oncology Navigation   Intake  Date of Diagnosis: 06/28/22  Cancer Type: Breast  Internal / External Referral: Internal  Referral Source: Dr. Penny and Dr. Caputo  Date of Referral: 07/01/22  Initial Nurse Navigator Contact: 07/01/22  Referral to Initial Contact Timeline (days): 0  Date Worked: 08/31/22  First Appointment Available: 07/06/22  Appointment Date: 07/06/22  First Available Date vs. Scheduled Date (days): 0     Treatment  Current Status: Active    Surgery: Completed  Surgical Oncologist: Dr. Quinn  Type of Surgery: Bilateral mastectomies with SLNB and ALND  Consult Date: 07/06/22  Surgery Schedule Date: 08/15/22    Medical Oncologist: Dr. Caputo    Radiation Oncologist: Dr. Mathew    Procedures: Biopsy; PET scan  Biopsy Schedule Date: 06/28/22  PET Scan Schedule Date: 07/15/22    General Referrals: Financial Assistance; Social work    ER: Positive  KS: Positive  Her2: Negative    Radiation Oncologist: Dr. Mathew    Support Systems: Spouse/significant other; Family members  Barriers of Care: Financial concerns  Financial Concerns: Financial hardship     Acuity      Follow Up  Follow up in about 16 days (around 9/16/2022) for f/u after rad onc.

## 2022-09-02 ENCOUNTER — PATIENT MESSAGE (OUTPATIENT)
Dept: SURGERY | Facility: CLINIC | Age: 82
End: 2022-09-02
Payer: MEDICARE

## 2022-09-02 ENCOUNTER — TELEPHONE (OUTPATIENT)
Dept: SURGERY | Facility: CLINIC | Age: 82
End: 2022-09-02
Payer: MEDICARE

## 2022-09-02 NOTE — TELEPHONE ENCOUNTER
Spoke with patient's daughter in law regarding drain output. Patient's most recent drain output 110 and 35 ml. Drains to remain in place over the weekend. Pt to call office next week to update on status of output.

## 2022-09-06 ENCOUNTER — OFFICE VISIT (OUTPATIENT)
Dept: SURGERY | Facility: CLINIC | Age: 82
End: 2022-09-06
Payer: MEDICARE

## 2022-09-06 ENCOUNTER — HOSPITAL ENCOUNTER (EMERGENCY)
Facility: HOSPITAL | Age: 82
Discharge: LEFT BEFORE DISCHARGE | End: 2022-09-06
Payer: MEDICARE

## 2022-09-06 VITALS
TEMPERATURE: 98 F | HEIGHT: 62 IN | SYSTOLIC BLOOD PRESSURE: 150 MMHG | WEIGHT: 153 LBS | DIASTOLIC BLOOD PRESSURE: 67 MMHG | RESPIRATION RATE: 18 BRPM | BODY MASS INDEX: 28.16 KG/M2 | OXYGEN SATURATION: 96 % | HEART RATE: 58 BPM

## 2022-09-06 VITALS
WEIGHT: 153 LBS | BODY MASS INDEX: 28.16 KG/M2 | DIASTOLIC BLOOD PRESSURE: 67 MMHG | SYSTOLIC BLOOD PRESSURE: 155 MMHG | HEIGHT: 62 IN | HEART RATE: 66 BPM

## 2022-09-06 DIAGNOSIS — Z85.3 PERSONAL HISTORY OF BREAST CANCER: ICD-10-CM

## 2022-09-06 DIAGNOSIS — B02.7 DISSEMINATED HERPES ZOSTER: Primary | ICD-10-CM

## 2022-09-06 DIAGNOSIS — Z90.13 S/P MASTECTOMY, BILATERAL: ICD-10-CM

## 2022-09-06 DIAGNOSIS — Z12.39 SCREENING BREAST EXAMINATION: ICD-10-CM

## 2022-09-06 DIAGNOSIS — R21 RASH: Primary | ICD-10-CM

## 2022-09-06 PROCEDURE — 1101F PR PT FALLS ASSESS DOC 0-1 FALLS W/OUT INJ PAST YR: ICD-10-PCS | Mod: CPTII,S$GLB,, | Performed by: PHYSICIAN ASSISTANT

## 2022-09-06 PROCEDURE — 3288F FALL RISK ASSESSMENT DOCD: CPT | Mod: CPTII,S$GLB,, | Performed by: PHYSICIAN ASSISTANT

## 2022-09-06 PROCEDURE — 99499 NO LOS: ICD-10-PCS | Mod: ,,, | Performed by: EMERGENCY MEDICINE

## 2022-09-06 PROCEDURE — 1159F MED LIST DOCD IN RCRD: CPT | Mod: CPTII,S$GLB,, | Performed by: PHYSICIAN ASSISTANT

## 2022-09-06 PROCEDURE — 99024 PR POST-OP FOLLOW-UP VISIT: ICD-10-PCS | Mod: S$GLB,,, | Performed by: PHYSICIAN ASSISTANT

## 2022-09-06 PROCEDURE — 3077F PR MOST RECENT SYSTOLIC BLOOD PRESSURE >= 140 MM HG: ICD-10-PCS | Mod: CPTII,S$GLB,, | Performed by: PHYSICIAN ASSISTANT

## 2022-09-06 PROCEDURE — 3288F PR FALLS RISK ASSESSMENT DOCUMENTED: ICD-10-PCS | Mod: CPTII,S$GLB,, | Performed by: PHYSICIAN ASSISTANT

## 2022-09-06 PROCEDURE — 99499 UNLISTED E&M SERVICE: CPT | Mod: ,,, | Performed by: EMERGENCY MEDICINE

## 2022-09-06 PROCEDURE — 3077F SYST BP >= 140 MM HG: CPT | Mod: CPTII,S$GLB,, | Performed by: PHYSICIAN ASSISTANT

## 2022-09-06 PROCEDURE — 1126F AMNT PAIN NOTED NONE PRSNT: CPT | Mod: CPTII,S$GLB,, | Performed by: PHYSICIAN ASSISTANT

## 2022-09-06 PROCEDURE — 99999 PR PBB SHADOW E&M-EST. PATIENT-LVL III: ICD-10-PCS | Mod: PBBFAC,,, | Performed by: PHYSICIAN ASSISTANT

## 2022-09-06 PROCEDURE — 1159F PR MEDICATION LIST DOCUMENTED IN MEDICAL RECORD: ICD-10-PCS | Mod: CPTII,S$GLB,, | Performed by: PHYSICIAN ASSISTANT

## 2022-09-06 PROCEDURE — 99281 EMR DPT VST MAYX REQ PHY/QHP: CPT

## 2022-09-06 PROCEDURE — 1101F PT FALLS ASSESS-DOCD LE1/YR: CPT | Mod: CPTII,S$GLB,, | Performed by: PHYSICIAN ASSISTANT

## 2022-09-06 PROCEDURE — 3078F PR MOST RECENT DIASTOLIC BLOOD PRESSURE < 80 MM HG: ICD-10-PCS | Mod: CPTII,S$GLB,, | Performed by: PHYSICIAN ASSISTANT

## 2022-09-06 PROCEDURE — 99999 PR PBB SHADOW E&M-EST. PATIENT-LVL III: CPT | Mod: PBBFAC,,, | Performed by: PHYSICIAN ASSISTANT

## 2022-09-06 PROCEDURE — 3078F DIAST BP <80 MM HG: CPT | Mod: CPTII,S$GLB,, | Performed by: PHYSICIAN ASSISTANT

## 2022-09-06 PROCEDURE — 99024 POSTOP FOLLOW-UP VISIT: CPT | Mod: S$GLB,,, | Performed by: PHYSICIAN ASSISTANT

## 2022-09-06 PROCEDURE — 1126F PR PAIN SEVERITY QUANTIFIED, NO PAIN PRESENT: ICD-10-PCS | Mod: CPTII,S$GLB,, | Performed by: PHYSICIAN ASSISTANT

## 2022-09-06 RX ORDER — VALACYCLOVIR HYDROCHLORIDE 1 G/1
1000 TABLET, FILM COATED ORAL 2 TIMES DAILY
Qty: 60 TABLET | Refills: 11 | Status: SHIPPED | OUTPATIENT
Start: 2022-09-06 | End: 2022-09-21

## 2022-09-06 NOTE — PROGRESS NOTES
"[facility]      Post-Op      REFERRING PHYSICIAN:  No referring provider defined for this encounter.       Brayan Penny MD    MEDICAL ONCOLOGIST:    Harshal  RADIATION ONCOLOGIST:   [place]    DIAGNOSIS:    This is a 81 y.o. 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    TREATMENT SUMMARY:  The patient is status post bilateral  mastectomy, left sentinel node biopsy, and right axillary lymph node dissection on 8/15/2022.  Final pathology showed   1. Breast, left, mastectomy:       - Invasive carcinoma with mixed ductal and lobular features, see note       - Tumor size: 22 mm  - Histologic grade (Ene Histologic Score)           - Glandular/Tubular Differentiation: 2           - Nuclear pleomorphism: 2           - Mitotic Rate: 1           - Overall Grade: grade 1       - Lobular carcinoma in situ (LCIS), classic type       - Biopsy site change: identified       - Calcification: present in invasive carcinoma and benign epithelium       - Resection margins: free of tumor, all margins are at least 10 mm away       - Benign nipple       - Skin with seborrheic keratosis       - Non-neoplastic breast tissue: usual ductal hyperplasia, apocrine   metaplasia, duct ectasia and fibrocystic change       - See synoptic report   2.  "left mastectomy new anterior margin inferior lateral aspect", excision:       - Benign fibroadipose tissue       - No definitive breast duct elements seen   3.  "left axillary sentinel lymph node #1 hot and blue @ 1177", excision:       - One lymph node, negative for carcinoma (0/1)   4.  "left axillary sentinel lymph node #2 Hot & blue @ 1459", excision:       - One lymph node, positive for carcinoma (1/1)       -  Size of Largest Metastatic Deposit: 2.1 mm, Macrometastases       -  Extranodal Extension: not identified   5.  "left " "axillary sentinel lymph node #3 Hot & blue @ 714", excision:       - One lymph node, negative for carcinoma (0/1)   6. Breast, right, mastectomy:       - Invasive carcinoma with mixed ductal and lobular features, see note       - Multiple foci, tumor size: 23 mm (mass 1, LIQ); 21 mm (mass 2, LOQ); 22   mm (mass 3, LOQ); 4 mm (mass 4, LOQ) - Histologic grade (Ene   Histologic Score)           - Glandular/Tubular Differentiation: 2           - Nuclear pleomorphism: 2           - Mitotic Rate: 1           - Overall Grade: grade 1       - Ductal carcinoma in situ: identified           - Nuclear Grade (in situ carcinoma): intermediate           - Necrosis: not present           - Architectural pattern: cribriform       - Lobular carcinoma in situ (LCIS), classic type       - Resection margins: free of tumor (invasive and in-situ), all margins   are at least 5 mm away       - Biopsy site change: identified       - Calcification: present in invasive carcinoma and benign epithelium       - Benign nipple       - Skin with seborrheic keratosis       - Two lymph nodes, negative for carcinoma (0/2)       - Non-neoplastic breast tissue: complex sclerosing lesion, adenosis,   usual ductal hyperplasia, apocrine metaplasia, duct ectasia and fibrocystic   change       - See synoptic report   7.  "right mastectomy new anterior margin inferior lateral aspect", excision:       - Benign fibroadipose tissue       - No definitive breast duct elements seen   8.  "right axillary content", dissection:       - Seven of twenty-eight lymph nodes, positive for carcinoma (7/28)       -  Size of Largest Metastatic Deposit: 15 mm, all tumor involved lymph   nodes are macrometastases       -  Extranodal Extension: identified, 5 mm       - Biopsy site change: identified   9.  "right mastectomy lateral skin short-superior, long lateral", excision:       - Skin without significant pathologic change   Note: Multiple foci of invasive carcinoma " with simlar morphology are   identified on right breast (part 6). E-cadherin immunostains support   the above diagnosis. GATA3 and ER immunostains highlights the tumor cells in   lymph nodes.  Immunostains performed with appropriate positive and negative   controls.     Results of immunohistochemical stains (See [facility]-)   Left breast invasive carcinoma:   Estrogen receptor (ER): positive (strong intensity, 90% of tumor cell nuclei)   Progesterone receptor (DC): positive (moderate intensity, 70-80% of tumor   cell nuclei)   HER2: negative (+1)   Ki-67:10%-20   Right breast invasive carcinoma (8 o'clock):   Estrogen receptor (ER): positive (strong intensity, 98% of tumor cell nuclei)   Progesterone receptor (DC): positive (moderate intensity, 40-50% of tumor   cell nuclei)   HER2: negative by FISH   Ki-67:10%-20   Right breast invasive carcinoma (10 o'clock):   Estrogen receptor (ER): positive (strong intensity, 98% of tumor cell nuclei)   Progesterone receptor (DC): positive (strong intensity, 10-15% of tumor cell   nuclei)   HER2: negative (+1)   Ki-67:10%-20     INTERVAL HISTORY:   Caro Powell comes in for a post-op check. Her pain is well controlled. Per output log, drains have been < 30 CC's for the last three days. Presents with rash of left neck and scalp onset 2 days ago.     MEDICATIONS:  Current Outpatient Medications   Medication Sig Dispense Refill    ACCU-CHEK SAMI PLUS METER Misc TEST  AS DIRECTED 1 each 0    ACCU-CHEK SAMI PLUS TEST STRP Strp USE TO TEST BLOOD SUGAR ONCE DAILY 100 strip 3    ACCU-CHEK SOFT DEV LANCETS Kit       ACCU-CHEK SOFTCLIX LANCETS Misc TEST ONCE DAILY 100 each 3    ammonium lactate 12 % Crea Apply to feet twice daily. Avoid use between toes. 140 g 5    apixaban (ELIQUIS) 5 mg Tab TAKE 1 TABLET BY MOUTH TWICE DAILY 180 tablet 1    atorvastatin (LIPITOR) 80 MG tablet Take 1 tablet (80 mg total) by mouth once daily. 90 tablet 3    clopidogreL (PLAVIX) 75 mg tablet  Take 1 tablet (75 mg total) by mouth once daily. 90 tablet 3    furosemide (LASIX) 40 MG tablet Take 1 tablet (40 mg total) by mouth once daily. 30 tablet 11    gabapentin (NEURONTIN) 400 MG capsule 2 pills in the AM, 1 pill at noon, and 2 pills in the evening. 450 capsule 3    glimepiride (AMARYL) 1 MG tablet TAKE 1 TABLET TWICE DAILY 180 tablet 3    HYDROcodone-acetaminophen (NORCO) 5-325 mg per tablet Take 1 tablet by mouth every 6 (six) hours as needed for Pain. 20 tablet 0    ibuprofen (ADVIL,MOTRIN) 800 MG tablet Take 1 tablet (800 mg total) by mouth every 6 (six) hours as needed for Pain. 24 tablet 0    lisinopriL (PRINIVIL,ZESTRIL) 20 MG tablet TAKE 1 TABLET EVERY DAY 90 tablet 3    metoprolol succinate (TOPROL-XL) 25 MG 24 hr tablet Take 1 tablet (25 mg total) by mouth once daily. 90 tablet 3    polyethylene glycol (GLYCOLAX) 17 gram/dose powder Take 17 g by mouth once daily. 510 g 0    pramipexole (MIRAPEX) 0.125 MG tablet Take 1 tablet (0.125 mg total) by mouth once daily. 90 tablet 3    urea (CARMOL) 40 % Crea Apply topically 2 (two) times daily. 1 Bottle 3    valACYclovir (VALTREX) 1000 MG tablet Take 1 tablet (1,000 mg total) by mouth 2 (two) times daily. 60 tablet 11     No current facility-administered medications for this visit.       ALLERGIES:   Review of patient's allergies indicates:   Allergen Reactions    Codeine Nausea Only and Other (See Comments)     Can Take Tylenol, hrdrocodone       PHYSICAL EXAMINATION:   General:  This is a well appearing female with appropriate speech, affect and gait.   Vesicles/blistering rash of left side of neck/hairline/jaw. Does not cross midline.   Breast:  Incisions clean, dry, and intact. Drains serosanguineous.     IMPRESSION:   The patient has had an uneventful postoperative course.    PLAN:   1. Drains now less than 30cc for the last three days.   2. Patient with active shingles infection at today's visit. She presented to the ED prior to our visit but  left AMA due to crowded waiting room  3. Given script for Valacyclovir.   4. Return to clinic in 6 months.   5. Patient to proceed with radiation, but is planning on pushing back visit until her shingles has resolved.   6. Continue to follow up with Dr. Caputo.     RICO IslasC  Breast Surgery

## 2022-09-06 NOTE — FIRST PROVIDER EVALUATION
"Medical screening exam completed.  I have conducted a focused provider triage encounter, findings are as follows:    Brief history of present illness:  82 yo F presents with rash to left neck and scalp.  Family concerned for shingles.  Came to ED, but has appointment to get breast drains removed.  Prefers to leave AMA and go see PCP after drain removal.      Vitals:    09/06/22 1100   BP: (!) 150/67   Pulse: (!) 58   Resp: 18   Temp: 98 °F (36.7 °C)   TempSrc: Oral   SpO2: 96%   Weight: 69.4 kg (153 lb)   Height: 5' 2" (1.575 m)       Pertinent physical exam:  No acute distress or altered mental status.  Vesicular rash to left neck    Brief workup plan:  none, patient left AMA, patient and family counseled and acknowledges risks.  Will return to ED if needed.    "

## 2022-09-07 ENCOUNTER — PATIENT MESSAGE (OUTPATIENT)
Dept: SURGERY | Facility: CLINIC | Age: 82
End: 2022-09-07
Payer: MEDICARE

## 2022-09-07 ENCOUNTER — TELEPHONE (OUTPATIENT)
Dept: SURGERY | Facility: CLINIC | Age: 82
End: 2022-09-07
Payer: MEDICARE

## 2022-09-08 ENCOUNTER — TELEPHONE (OUTPATIENT)
Dept: HEMATOLOGY/ONCOLOGY | Facility: CLINIC | Age: 82
End: 2022-09-08
Payer: MEDICARE

## 2022-09-08 DIAGNOSIS — R53.81 PHYSICAL DECONDITIONING: ICD-10-CM

## 2022-09-08 DIAGNOSIS — C50.912 INVASIVE DUCTAL CARCINOMA OF LEFT BREAST: Primary | ICD-10-CM

## 2022-09-08 NOTE — TELEPHONE ENCOUNTER
----- Message -----  From: Yoav Thomason  Sent: 9/8/2022   3:49 PM CDT  To: [facility] Staff  Subject: orders                                           Type:  Patient Returning Call    Who Called:patient daughter in law    Who Left Message for Patient:n/a    Does the patient know what this is regarding?:patient family would like orders for home health care.    Would the patient rather a call back or a response via [employer]? Call back     Best Call Back Number:[phone]  Additional Information: n/a

## 2022-09-12 ENCOUNTER — PATIENT MESSAGE (OUTPATIENT)
Dept: HEMATOLOGY/ONCOLOGY | Facility: CLINIC | Age: 82
End: 2022-09-12
Payer: MEDICARE

## 2022-09-12 ENCOUNTER — NURSE TRIAGE (OUTPATIENT)
Dept: ADMINISTRATIVE | Facility: CLINIC | Age: 82
End: 2022-09-12
Payer: MEDICARE

## 2022-09-12 DIAGNOSIS — I48.92 UNSPECIFIED ATRIAL FLUTTER: ICD-10-CM

## 2022-09-12 DIAGNOSIS — G89.3 CHRONIC NEOPLASM-RELATED PAIN: Primary | ICD-10-CM

## 2022-09-12 DIAGNOSIS — D69.6 THROMBOCYTOPENIA, UNSPECIFIED: ICD-10-CM

## 2022-09-12 PROCEDURE — G0180 MD CERTIFICATION HHA PATIENT: HCPCS | Mod: ,,, | Performed by: INTERNAL MEDICINE

## 2022-09-12 PROCEDURE — G0180 PR HOME HEALTH MD CERTIFICATION: ICD-10-PCS | Mod: ,,, | Performed by: INTERNAL MEDICINE

## 2022-09-12 RX ORDER — HYDROCODONE BITARTRATE AND ACETAMINOPHEN 5; 325 MG/1; MG/1
1 TABLET ORAL EVERY 6 HOURS PRN
Qty: 40 TABLET | Refills: 0 | Status: SHIPPED | OUTPATIENT
Start: 2022-09-12 | End: 2022-10-10 | Stop reason: SDUPTHER

## 2022-09-12 NOTE — TELEPHONE ENCOUNTER
Pt transferred to me. Spoke with Lisbeth, in law. Family hung up after start of call  prior to triage- states will call back.   Reason for Disposition   Caller has cancelled the call before the first contact    Protocols used: No Contact or Duplicate Contact Call-A-[place]

## 2022-09-13 DIAGNOSIS — G89.3 CHRONIC NEOPLASM-RELATED PAIN: ICD-10-CM

## 2022-09-15 ENCOUNTER — TELEPHONE (OUTPATIENT)
Dept: HEMATOLOGY/ONCOLOGY | Facility: CLINIC | Age: 82
End: 2022-09-15
Payer: MEDICARE

## 2022-09-15 NOTE — TELEPHONE ENCOUNTER
"----- Message from Corrynamrata Alonso sent at 9/15/2022  3:24 PM CDT -----  Regarding: RN  notes    Home Health RN / Patient Status Notes:            RN or caller?: Claudia    Treating Provider?: Dr Caputo    Contact Preference?: [phone]    Message/Note:  calling to advise drain tube under right breast showing serosanguineous fluid, no infection            Additional Notes:  "Thank you for all that you do for our patients'"                      "

## 2022-09-18 ENCOUNTER — PATIENT MESSAGE (OUTPATIENT)
Dept: SURGERY | Facility: CLINIC | Age: 82
End: 2022-09-18
Payer: MEDICARE

## 2022-09-19 ENCOUNTER — PATIENT MESSAGE (OUTPATIENT)
Dept: SURGERY | Facility: CLINIC | Age: 82
End: 2022-09-19
Payer: MEDICARE

## 2022-09-19 ENCOUNTER — TELEPHONE (OUTPATIENT)
Dept: SURGERY | Facility: CLINIC | Age: 82
End: 2022-09-19
Payer: MEDICARE

## 2022-09-19 NOTE — TELEPHONE ENCOUNTER
Spoke with kam Carballo scheduled appt on 9/22/2022. Reviewed location of appt. Son verbalized understanding

## 2022-09-20 NOTE — PROGRESS NOTES
"PATIENT: Caro Powell  MRN: [id number]  DATE: 9/21/2022    Diagnosis:   1. Invasive ductal carcinoma of left breast    2. Invasive ductal carcinoma of right breast    3. Secondary malignant neoplasm of axillary node    4. Mood disorder due to medical condition    5. Type 2 diabetes mellitus with diabetic neuropathy, without long-term current use of insulin    6. 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 Long term (current) use of aromatase inhibitors    8. Post herpetic neuralgia      Chief Complaint: Breast Cancer    Oncologic History:      Oncologic History 1. Bilateral breast cancer      Oncologic Treatment Bilateral mastectomy  Consideration for radiation (seeing rad onc on 9/22)  Adjuvant anastrozole      Pathology 8/15/22:  1. Breast, left, mastectomy:       - Invasive carcinoma with mixed ductal and lobular features, see note       - Tumor size: 22 mm  - Histologic grade ([place] Histologic Score)           - Glandular/Tubular Differentiation: 2           - Nuclear pleomorphism: 2           - Mitotic Rate: 1           - Overall Grade: grade 1       - Lobular carcinoma in situ (LCIS), classic type       - Biopsy site change: identified       - Calcification: present in invasive carcinoma and benign epithelium       - Resection margins: free of tumor, all margins are at least 10 mm away       - Benign nipple       - Skin with seborrheic keratosis       - Non-neoplastic breast tissue: usual ductal hyperplasia, apocrine   metaplasia, duct ectasia and fibrocystic change       - See synoptic report   2.  "left mastectomy new anterior margin inferior lateral aspect", excision:       - Benign fibroadipose tissue       - No definitive breast duct elements seen   3.  "left axillary sentinel lymph node #1 hot and blue @ 1177", excision:       - One lymph node, negative for carcinoma (0/1)   4.  "left axillary sentinel lymph node #2 Hot & blue @ 1459", excision:       - One lymph node, positive " "for carcinoma (1/1)       -  Size of Largest Metastatic Deposit: 2.1 mm, Macrometastases       -  Extranodal Extension: not identified   5.  "left axillary sentinel lymph node #3 Hot & blue @ 714", excision:       - One lymph node, negative for carcinoma (0/1)   6. Breast, right, mastectomy:       - Invasive carcinoma with mixed ductal and lobular features, see note       - Multiple foci, tumor size: 23 mm (mass 1, LIQ); 21 mm (mass 2, LOQ); 22   mm (mass 3, LOQ); 4 mm (mass 4, LOQ) - Histologic grade ([place]   Histologic Score)           - Glandular/Tubular Differentiation: 2           - Nuclear pleomorphism: 2           - Mitotic Rate: 1           - Overall Grade: grade 1       - Ductal carcinoma in situ: identified           - Nuclear Grade (in situ carcinoma): intermediate           - Necrosis: not present           - Architectural pattern: cribriform       - Lobular carcinoma in situ (LCIS), classic type       - Resection margins: free of tumor (invasive and in-situ), all margins   are at least 5 mm away       - Biopsy site change: identified   - Calcification: present in invasive carcinoma and benign epithelium       - Benign nipple       - Skin with seborrheic keratosis       - Two lymph nodes, negative for carcinoma (0/2)       - Non-neoplastic breast tissue: complex sclerosing lesion, adenosis,   usual ductal hyperplasia, apocrine metaplasia, duct ectasia and fibrocystic   change       - See synoptic report   7.  "right mastectomy new anterior margin inferior lateral aspect", excision:       - Benign fibroadipose tissue       - No definitive breast duct elements seen   8.  "right axillary content", dissection:       - Seven of twenty-eight lymph nodes, positive for carcinoma (7/28)       -  Size of Largest Metastatic Deposit: 15 mm, all tumor involved lymph   nodes are macrometastases       -  Extranodal Extension: identified, 5 mm       - Biopsy site change: identified   9.  "right mastectomy lateral " "skin short-superior, long lateral", excision:       - Skin without significant pathologic change   Note: Multiple foci of invasive carcinoma with simlar morphology are   identified on right breast (part 6). E-cadherin immunostains support   the above diagnosis. GATA3 and ER immunostains highlights the tumor cells in   lymph nodes.  Results of immunohistochemical stains (See [facility]-)   Left breast invasive carcinoma:   Estrogen receptor (ER): positive (strong intensity, 90% of tumor cell nuclei)   Progesterone receptor (AR): positive (moderate intensity, 70-80% of tumor   cell nuclei)   HER2: negative (+1)   Ki-67:10%-20   Right breast invasive carcinoma (8 o'clock):   Estrogen receptor (ER): positive (strong intensity, 98% of tumor cell nuclei)   Progesterone receptor (AR): positive (moderate intensity, 40-50% of tumor   cell nuclei)   HER2: negative by FISH   Ki-67:10%-20   Right breast invasive carcinoma (10 o'clock):   Estrogen receptor (ER): positive (strong intensity, 98% of tumor cell nuclei)   Progesterone receptor (AR): positive (strong intensity, 10-15% of tumor cell   nuclei)   HER2: negative (+1)   Ki-67:10%-20     6/28/22:  1. LEFT BREAST MASS, 4:00 O'CLOCK, 5 CM FROM THE NIPPLE, NEEDLE CORE   BIOPSIES:   -  Invasive ductal carcinoma with lobular features, [place] histologic   grade 1, and microcalcifications.           Glandular (acinar)/tubular differentiation:  Score 3.           Nuclear pleomorphism:  Score 1.           Mitotic rate:  Score 1.           Overall grade:  Grade 1 (score 5).           Size of invasive carcinoma as measured from the H&E slides:  12 mm.   -  Focal intermediate grade ductal carcinoma in situ with microcalcifications   and focal lobular extension.           Architectural patterns:  Cribriform and solid.           Nuclear grade:  Grade II (intermediate).           Necrosis:  Not identified.   BREAST BIOMARKER RESULTS:   Estrogen receptor (ER) status:  Positive.        " Percentage of cells with nuclear positivity:  90%.        Average intensity of staining:  Strong.   Progesterone receptor (PgR) status:  Positive.        Percentage of cells with nuclear positivity:  70-80%.        Average intensity of staining:  Moderate.   HER2 by IHC:  Negative (score 1).   Ki-67, percentage of cells with nuclear positivity:  10-20%.   2. RIGHT BREAST MASS, 8:00 O'CLOCK, 12 CM FROM THE NIPPLE, NEEDLE CORE   BIOPSIES:   -  Invasive ductal carcinoma, [place] histologic grade 1, with   microcalcifications.           Glandular (acinar)/tubular differentiation:  Score 1.           Nuclear pleomorphism:  Score 2.           Mitotic rate:  Score 1.           Overall grade:  Grade 1 (score 4).           Size of invasive carcinoma as measured from the H&E slides:  15 mm.   -  Focal intermediate grade ductal carcinoma in situ with microcalcifications.           Architectural patterns:  Cribriform and solid.           Nuclear grade:  Grade II (intermediate).           Necrosis:  Not identified.   -  Focal fibrocystic changes with columnar cell change, stromal fibrosis and   microcalcifications.   BREAST BIOMARKER RESULTS:   Estrogen receptor (ER) status:  Positive.        Percentage of cells with nuclear positivity:  98%.        Average intensity of staining:  Strong.   Progesterone receptor (PgR) status:  Positive.        Percentage of cells with nuclear positivity:  40-50%.        Average intensity of staining:  Moderate.   HER2 by IHC:  Equivocal (score 2+, see comment).   Ki-67, percentage of positive nuclei:  10-20%.   3. RIGHT BREAST MASS, 10:00 O'CLOCK, 3 CM FROM THE NIPPLE, NEEDLE CORE   BIOPSIES:   -  Invasive ductal carcinoma, [place] histologic grade 2, with focal   microcalcifications.           Glandular (acinar)/tubular differentiation:  Score 2.           Nuclear pleomorphism:  Score 2.           Mitotic rate:  Score 2.           Overall grade:  Grade 2 (score 6).           Size of  invasive carcinoma as measured from the H&E slides:  13 mm.   -  Focal fibrocystic changes with columnar cell change, sclerosing adenosis   and stromal fibrosis.   BREAST BIOMARKER RESULTS:   Estrogen receptor (ER) status:  Positive.        Percentage of cells with nuclear positivity:  98%.        Average intensity of staining:  Strong.   Progesterone receptor (PGR) status:  Positive.        Percentage of cells with nuclear positivity:  10-15%.        Average intensity of staining:  Strong.   HER2 by IHC:  Negative (score 1+).   Ki-67, percentage of positive nuclei:  10-20%.   4. RIGHT AXILLARY LYMPH NODE, NEEDLE CORE BIOPSIES:   -  Metastatic ductal carcinoma of the breast, size = 10 mm, with focus   suspicious for, but not diagnostic of, extranodal extension.   HER2, Breast Tumor, FISH, Tissue   Result Summary   Negative         Subjective:    History of Present Illness: Ms. Powell is a 82 y.o. female who presented in July 2022 for evaluation and management of bilateral breast cancer. She was referred by Dr. Penny.    - mammogram on 6/21/22 revealed bilateral breast masses.  - biopsy of breast masses (6/28/22) revealed invasive ductal carcinoma with lobular features of the left breast (ER/KS-positive, HER2-negative) and invasive ductal carcinoma of right breast (ER/KS-positive, HER2- [by FISH]), and metastatic ductal carcinoma to right axillary lymph node.  - she met with breast surgery on 7/6/22. They offered surgical resection of bilateral breast cancers, but wanted to see results of PET/CT scan first. At the visit, she expressed hesitancy about proceeding with surgery.  - she underwent PET/CT scan on 7/15/22.    Interval history:  - she presents for a follow-up appointment for her breast cancer  - she underwent bilateral mastectomy on 8/15/22.  - today, she is having drainage from the site where the left drain was removed. She will see the surgeon tomorrow to get a stitch placed.  - she will see radiation  oncology tomorrow.  - today, her main issue is post-shingles neuralgia/pain. She endorses fatigue. She denies shortness of breath, chest pain, nausea, vomiting, diarrhea, constipation.    Past medical, surgical, family, and social histories have been reviewed and updated below.    Past Medical History:   Past Medical History:   Diagnosis Date    Anticoagulant long-term use     Arthritis     Atrial flutter     Calcium nephrolithiasis 2007    ckd 3    Diabetes mellitus, type 2     Diabetic peripheral neuropathy associated with type 2 diabetes mellitus     Difficult intubation     NARROW AIRWAY    Hypertension     Invasive ductal carcinoma of left breast 7/6/2022    Pulmonary aspiration of gastric contents     Shingles        Past Surgical History:   Past Surgical History:   Procedure Laterality Date    ANGIOGRAPHY OF LOWER EXTREMITY N/A 10/21/2021    Procedure: Angiogram Extremity Unilateral;  Surgeon: Dre Martino MD;  Location: [facility] CATH LAB/EP;  Service: Cardiology;  Laterality: N/A;    ANGIOGRAPHY OF LOWER EXTREMITY Right 11/10/2021    Procedure: Angiogram Extremity Unilateral;  Surgeon: Ben Rooney MD;  Location: [facility] CATH LAB/EP;  Service: Cardiology;  Laterality: Right;    AXILLARY NODE DISSECTION Right 8/15/2022    Procedure: LYMPHADENECTOMY, AXILLARY RIGHT;  Surgeon: RADHA Quinn MD;  Location: [facility] OR;  Service: General;  Laterality: Right;    COLONOSCOPY  11/28/2011    sigmoid diverticulosis, external hemorrhoids    DEBRIDEMENT Right 10/20/2021    Procedure: DEBRIDEMENT;  Surgeon: Ava Atkins DPM;  Location: [facility] OR;  Service: Podiatry;  Laterality: Right;    ENDOSCOPIC GASTROCNEMIUS RECESSION Right 9/10/2019    Procedure: RECESSION, GASTROCNEMIUS, ENDOSCOPIC;  Surgeon: Derek Jose DPM;  Location: [facility] OR;  Service: Podiatry;  Laterality: Right;  Arthrex center line (ron notified)  Video    EXTRACORPOREAL SHOCK WAVE LITHOTRIPSY      FLEXOR TENOTOMY Right 10/20/2021     Procedure: TENOTOMY, FLEXOR 3rd toe;  Surgeon: Ava Atkins DPM;  Location: [facility] OR;  Service: Podiatry;  Laterality: Right;    HYSTERECTOMY      INJECTION FOR SENTINEL NODE IDENTIFICATION Left 8/15/2022    Procedure: INJECTION, FOR SENTINEL NODE IDENTIFICATION;  Surgeon: RADHA Quinn MD;  Location: [facility];  Service: General;  Laterality: Left;    MASTECTOMY Bilateral 8/15/2022    Procedure: MASTECTOMY BILATERAL  / BREAST;  Surgeon: RADHA Quinn MD;  Location: [facility];  Service: General;  Laterality: Bilateral;  5 HOURS / EMAIL SENT 8-11 @ 9:02 LK    SENTINEL LYMPH NODE BIOPSY Left 8/15/2022    Procedure: BIOPSY, LYMPH NODE, SENTINEL LEFT;  Surgeon: RADHA Quinn MD;  Location: [facility];  Service: General;  Laterality: Left;    SHOULDER SURGERY Left     TOE AMPUTATION Right 05/22/2017    5th toe    TOE AMPUTATION Right 10/20/2021    Procedure: AMPUTATION, TOE;  Surgeon: Ava Atkins DPM;  Location: [facility] OR;  Service: Podiatry;  Laterality: Right;    TONSILLECTOMY         Family History:   Family History   Problem Relation Age of Onset    Diabetes Mother     Heart failure Father     Breast cancer Sister 69        Genetic testing negative    Kidney failure Brother     Breast cancer Maternal Aunt         early 40s       Social History:  reports that she has never smoked. She has never been exposed to tobacco smoke. She has never used smokeless tobacco. She reports that she does not drink alcohol and does not use drugs.    Allergies:  Review of patient's allergies indicates:   Allergen Reactions    Codeine Nausea Only and Other (See Comments)     Can Take Tylenol, hrdrocodone       Medications:  Current Outpatient Medications   Medication Sig Dispense Refill    ACCU-CHEK SAMI PLUS METER Misc TEST  AS DIRECTED 1 each 0    ACCU-CHEK SAMI PLUS TEST STRP Strp USE TO TEST BLOOD SUGAR ONCE DAILY 100 strip 3    ACCU-CHEK SOFT DEV LANCETS Kit       ACCU-CHEK SOFTCLIX LANCETS Misc TEST ONCE DAILY 100 each 3     ammonium lactate 12 % Crea Apply to feet twice daily. Avoid use between toes. 140 g 5    apixaban (ELIQUIS) 5 mg Tab TAKE 1 TABLET BY MOUTH TWICE DAILY 180 tablet 3    atorvastatin (LIPITOR) 80 MG tablet Take 1 tablet (80 mg total) by mouth once daily. 90 tablet 3    clopidogreL (PLAVIX) 75 mg tablet Take 1 tablet (75 mg total) by mouth once daily. 90 tablet 3    furosemide (LASIX) 40 MG tablet Take 1 tablet (40 mg total) by mouth once daily. 30 tablet 11    gabapentin (NEURONTIN) 400 MG capsule Take 2 tablets  in the morming, 1 tablet  at noon, and 2 tablets in the evening. 450 capsule 3    glimepiride (AMARYL) 1 MG tablet Take 1 tablet (1 mg total) by mouth 2 (two) times daily. 180 tablet 3    HYDROcodone-acetaminophen (NORCO) 5-325 mg per tablet Take 1 tablet by mouth every 6 (six) hours as needed for Pain (cancer-related pain). 40 tablet 0    ibuprofen (ADVIL,MOTRIN) 800 MG tablet Take 1 tablet (800 mg total) by mouth every 6 (six) hours as needed for Pain. 24 tablet 0    lisinopriL (PRINIVIL,ZESTRIL) 20 MG tablet TAKE 1 TABLET EVERY DAY 90 tablet 3    metoprolol succinate (TOPROL-XL) 25 MG 24 hr tablet Take 1 tablet (25 mg total) by mouth once daily. 90 tablet 3    polyethylene glycol (GLYCOLAX) 17 gram/dose powder Take 17 g by mouth once daily. 510 g 0    pramipexole (MIRAPEX) 0.125 MG tablet Take 1 tablet (0.125 mg total) by mouth once daily. 90 tablet 3    urea (CARMOL) 40 % Crea Apply topically 2 (two) times daily. 1 Bottle 3    valACYclovir (VALTREX) 1000 MG tablet Take 1 tablet (1,000 mg total) by mouth 2 (two) times daily. 60 tablet 11     No current facility-administered medications for this visit.       Review of Systems   Constitutional:  Positive for fatigue.   HENT:  Negative for sore throat.    Eyes:  Negative for visual disturbance.   Respiratory:  Negative for cough and shortness of breath.    Cardiovascular:  Negative for chest pain.   Gastrointestinal:  Positive for diarrhea. Negative for  abdominal pain, constipation, nausea and vomiting.   Genitourinary:  Negative for dysuria.   Musculoskeletal:  Negative for back pain.   Skin:  Negative for rash.   Neurological:  Negative for headaches.        Neuralgia   Hematological:  Negative for adenopathy.   Psychiatric/Behavioral:  The patient is not nervous/anxious.      ECOG Performance Status:   ECOG SCORE 1            Objective:      Vitals:   Vitals:    09/21/22 0824   BP: (!) 144/65   Pulse: 78   Temp: 97 °F (36.1 °C)   SpO2: 98%   Weight: 70.5 kg (155 lb 6.8 oz)     BMI: Body mass index is 28.43 kg/m².    Physical Exam  Vitals and nursing note reviewed.   Constitutional:       Appearance: She is well-developed.   HENT:      Head: Normocephalic and atraumatic.   Eyes:      Pupils: Pupils are equal, round, and reactive to light.   Cardiovascular:      Rate and Rhythm: Normal rate and regular rhythm.   Pulmonary:      Effort: Pulmonary effort is normal.      Breath sounds: Normal breath sounds.   Chest:      Comments: S/p bilateral mastectomy  Abdominal:      General: Bowel sounds are normal.      Palpations: Abdomen is soft.   Musculoskeletal:         General: Normal range of motion.      Cervical back: Normal range of motion and neck supple.   Skin:     General: Skin is warm and dry.   Neurological:      Mental Status: She is alert and oriented to person, place, and time.   Psychiatric:         Behavior: Behavior normal.         Thought Content: Thought content normal.         Judgment: Judgment normal.       Laboratory Data:  Labs have been reviewed.    Lab Results   Component Value Date    WBC 6.54 08/10/2022    HGB 11.4 (L) 08/10/2022    HCT 33.7 (L) 08/10/2022    MCV 91 08/10/2022     08/10/2022       Imaging:     PET/CT scan (7/15/22): I have personally reviewed the images  In this patient with breast cancer, there is mildly hypermetabolic right breast mass and axillary lymph nodes.  Focal subtle right pleural thickening with faint  radiotracer uptake, metastatic lesion is not excluded.     1.7 cm indeterminate left adrenal nodule without abnormal uptake, likely a lipid poor benign adenoma.  If management would change, then recommend adrenal protocol CT or MRI for further evaluation.     Bilateral pulmonary micronodules some which are new and under size threshold for reliable PET evaluation and percutaneous biopsy.  Oncological considerations will determine ongoing follow-up.     Perianal focal mild increased uptake, which may indicate physiologic sphincter tone or infectious/inflammatory etiology such as hemorrhoids.     Mammogram (6/21/22):    Impression:  Left  Mass: Left breast 16 mm x 15 mm x 15 mm mass at the 4 o'clock position. Assessment: 5 - Highly suggestive of malignancy. Biopsy is recommended.      Right  Mass: Right breast 13 mm x 10 mm x 8 mm mass at the 10 o'clock position. Assessment: 5 - Highly suggestive of malignancy. Biopsy is recommended.   Mass: Right breast 20 mm x 19 mm x 19 mm mass at the 8 o'clock position. Assessment: 5 - Highly suggestive of malignancy. Biopsy is recommended.   Mass: Right breast 15 mm x 7 mm x 7 mm mass at the 9 o'clock position. Assessment: 5 - Highly suggestive of malignancy.   The masses span at least 7.1 cm.      Lymph Node: Right axilla 8 mm x 7 mm x 8 mm lymph node. Assessment: 4 - Suspicious finding. Biopsy is recommended.       Assessment:       1. Invasive ductal carcinoma of left breast    2. Invasive ductal carcinoma of right breast    3. Secondary malignant neoplasm of axillary node    4. Mood disorder due to medical condition    5. Type 2 diabetes mellitus with diabetic neuropathy, without long-term current use of insulin    6. 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 Long term (current) use of aromatase inhibitors    8. Post herpetic neuralgia           Plan:     1. Bilateral invasive ductal carcinoma of breasts  - I have reviewed her chart  - mammogram on 6/21/22  "revealed bilateral breast masses.  - biopsy of breast masses (6/28/22) revealed invasive ductal carcinoma with lobular features of the left breast (ER/UT-positive, HER2-negative) and invasive ductal carcinoma of right breast (ER/UT-positive, HER2- [by FISH]), and metastatic ductal carcinoma to right axillary lymph node.  - she met with breast surgery on 7/6/22. They offered surgical resection of bilateral breast cancers, but wanted to see results of PET/CT scan first. At the visit, she expressed hesitancy about proceeding with surgery.  - PET/CT (7/15/22) revealed localized disease. There was "focal subtle right pleural thickening with faint radiotracer uptake, metastatic lesion is not excluded." pulmonary micronodules are also noted of unclear significance  - she underwent bilateral mastectomy on 8/15/22.  - pathology:  Left breast: 22mm grade 1 invasive carcinoma with mixed ductal and lobular features, 1 positive lymph node  -right breast: multiple foci (23, 21, 22, 4mm) invasive carcinoma with mixed ductal and lobular features, 7 of 28 lymph nodes positive.  - I recommend adjuvant hormonal therapy with anastrozole x 5 years.  - I sent a prescription to her pharmacy  - she is seeing radiation oncology on 9/22/22. I asked her to not take anastrozole until radiation therapy is completed (if it is recommended by radiation oncology)  - check bone density test to see if she has osteopenia/osteoporosis  - return to clinic in 2 months to see how she is tolerating adjuvant hormonal therapy.    2. Type 2 diabetes  - last hemoglobin A1c (5/24/22) was 7.3%  - continue diabetic medications  - defer management to Dr. Penny    3. Post-herpetic neuralgia  - moderate symptoms  - I asked her to follow up with primary care.    - return to clinic in 2 months to see how she is tolerating adjuvant hormonal therapy.    Morgan Caputo M.D.  Hematology/Oncology  [facility] - [address], [address]  [place] [address]  Phone: [phone]  Fax: [phone]

## 2022-09-21 ENCOUNTER — OFFICE VISIT (OUTPATIENT)
Dept: HEMATOLOGY/ONCOLOGY | Facility: CLINIC | Age: 82
End: 2022-09-21
Payer: MEDICARE

## 2022-09-21 VITALS
TEMPERATURE: 97 F | OXYGEN SATURATION: 98 % | DIASTOLIC BLOOD PRESSURE: 65 MMHG | BODY MASS INDEX: 28.43 KG/M2 | SYSTOLIC BLOOD PRESSURE: 144 MMHG | HEART RATE: 78 BPM | WEIGHT: 155.44 LBS

## 2022-09-21 DIAGNOSIS — F06.30 MOOD DISORDER DUE TO MEDICAL CONDITION: ICD-10-CM

## 2022-09-21 DIAGNOSIS — C77.3 SECONDARY MALIGNANT NEOPLASM OF AXILLARY NODE: ICD-10-CM

## 2022-09-21 DIAGNOSIS — E11.40 TYPE 2 DIABETES MELLITUS WITH DIABETIC NEUROPATHY, WITHOUT LONG-TERM CURRENT USE OF INSULIN: ICD-10-CM

## 2022-09-21 DIAGNOSIS — B02.29 POST HERPETIC NEURALGIA: ICD-10-CM

## 2022-09-21 DIAGNOSIS — C50.919 INFILTRATING DUCTAL CARCINOMA OF BREAST, UNSPECIFIED LATERALITY: ICD-10-CM

## 2022-09-21 DIAGNOSIS — Z79.811 LONG TERM (CURRENT) USE OF AROMATASE INHIBITORS: ICD-10-CM

## 2022-09-21 DIAGNOSIS — C50.912 INVASIVE DUCTAL CARCINOMA OF LEFT BREAST: Primary | ICD-10-CM

## 2022-09-21 DIAGNOSIS — C50.911 INVASIVE DUCTAL CARCINOMA OF RIGHT BREAST: ICD-10-CM

## 2022-09-21 PROCEDURE — 3077F SYST BP >= 140 MM HG: CPT | Mod: CPTII,S$GLB,, | Performed by: INTERNAL MEDICINE

## 2022-09-21 PROCEDURE — 99215 PR OFFICE/OUTPT VISIT, EST, LEVL V, 40-54 MIN: ICD-10-PCS | Mod: S$GLB,,, | Performed by: INTERNAL MEDICINE

## 2022-09-21 PROCEDURE — 3077F PR MOST RECENT SYSTOLIC BLOOD PRESSURE >= 140 MM HG: ICD-10-PCS | Mod: CPTII,S$GLB,, | Performed by: INTERNAL MEDICINE

## 2022-09-21 PROCEDURE — 3288F PR FALLS RISK ASSESSMENT DOCUMENTED: ICD-10-PCS | Mod: CPTII,S$GLB,, | Performed by: INTERNAL MEDICINE

## 2022-09-21 PROCEDURE — 99215 OFFICE O/P EST HI 40 MIN: CPT | Mod: S$GLB,,, | Performed by: INTERNAL MEDICINE

## 2022-09-21 PROCEDURE — 1101F PT FALLS ASSESS-DOCD LE1/YR: CPT | Mod: CPTII,S$GLB,, | Performed by: INTERNAL MEDICINE

## 2022-09-21 PROCEDURE — 1159F MED LIST DOCD IN RCRD: CPT | Mod: CPTII,S$GLB,, | Performed by: INTERNAL MEDICINE

## 2022-09-21 PROCEDURE — 3078F DIAST BP <80 MM HG: CPT | Mod: CPTII,S$GLB,, | Performed by: INTERNAL MEDICINE

## 2022-09-21 PROCEDURE — 99499 UNLISTED E&M SERVICE: CPT | Mod: S$GLB,,, | Performed by: INTERNAL MEDICINE

## 2022-09-21 PROCEDURE — 99999 PR PBB SHADOW E&M-EST. PATIENT-LVL V: ICD-10-PCS | Mod: PBBFAC,,, | Performed by: INTERNAL MEDICINE

## 2022-09-21 PROCEDURE — 1159F PR MEDICATION LIST DOCUMENTED IN MEDICAL RECORD: ICD-10-PCS | Mod: CPTII,S$GLB,, | Performed by: INTERNAL MEDICINE

## 2022-09-21 PROCEDURE — 1160F RVW MEDS BY RX/DR IN RCRD: CPT | Mod: CPTII,S$GLB,, | Performed by: INTERNAL MEDICINE

## 2022-09-21 PROCEDURE — 3288F FALL RISK ASSESSMENT DOCD: CPT | Mod: CPTII,S$GLB,, | Performed by: INTERNAL MEDICINE

## 2022-09-21 PROCEDURE — 1160F PR REVIEW ALL MEDS BY PRESCRIBER/CLIN PHARMACIST DOCUMENTED: ICD-10-PCS | Mod: CPTII,S$GLB,, | Performed by: INTERNAL MEDICINE

## 2022-09-21 PROCEDURE — 99499 RISK ADDL DX/OHS AUDIT: ICD-10-PCS | Mod: S$GLB,,, | Performed by: INTERNAL MEDICINE

## 2022-09-21 PROCEDURE — 1125F AMNT PAIN NOTED PAIN PRSNT: CPT | Mod: CPTII,S$GLB,, | Performed by: INTERNAL MEDICINE

## 2022-09-21 PROCEDURE — 1125F PR PAIN SEVERITY QUANTIFIED, PAIN PRESENT: ICD-10-PCS | Mod: CPTII,S$GLB,, | Performed by: INTERNAL MEDICINE

## 2022-09-21 PROCEDURE — 3078F PR MOST RECENT DIASTOLIC BLOOD PRESSURE < 80 MM HG: ICD-10-PCS | Mod: CPTII,S$GLB,, | Performed by: INTERNAL MEDICINE

## 2022-09-21 PROCEDURE — 1101F PR PT FALLS ASSESS DOC 0-1 FALLS W/OUT INJ PAST YR: ICD-10-PCS | Mod: CPTII,S$GLB,, | Performed by: INTERNAL MEDICINE

## 2022-09-21 PROCEDURE — 99999 PR PBB SHADOW E&M-EST. PATIENT-LVL V: CPT | Mod: PBBFAC,,, | Performed by: INTERNAL MEDICINE

## 2022-09-21 RX ORDER — ANASTROZOLE 1 MG/1
1 TABLET ORAL DAILY
Qty: 90 TABLET | Refills: 3 | Status: SHIPPED | OUTPATIENT
Start: 2022-09-21 | End: 2023-08-09

## 2022-09-21 NOTE — PROGRESS NOTES
PATIENT IDENTIFICATION:  Patient Name: Caro Powell  MRN: [id number]  : 1940    DIAGNOSIS:  Cancer Staging   Invasive ductal carcinoma of right breast  Staging form: Breast, AJCC 8th Edition  - Pathologic: Stage IB (pT2, pN2a, cM0, G1, ER+, WV+, HER2-) - Signed by Katelyn Mathew MD on 2022    Malignant neoplasm of lower-outer quadrant of left breast of female, estrogen receptor positive  Staging form: Breast, AJCC 8th Edition  - Pathologic: Stage IA (pT2, pN1a(sn), cM0, G1, ER+, WV+, HER2-) - Signed by Katelyn Mathew MD on 2022    HISTORY OF PRESENT ILLNESS:   The patient is an 82-year-old woman with bilateral breast cancer status post bilateral mastectomies.  She has been referred for discussion regarding adjuvant treatment options.      The patient presented after screening mammogram performed on 2022 which revealed bilateral breast masses.    Biopsy of the left breast mass, 4 o'clock position revealed grade 1 invasive ductal carcinoma with lobular features.  On immunohistochemistry, the tumor cells were ER WV positive and HER2/ender negative.  The Ki-67 proliferative index was 10-20%.  The lesion in the right breast 8 o'clock position revealed a grade 1 invasive ductal carcinoma.  On immunohistochemistry, the tumor cells were ER WV positive and HER2/ender negative by fish.  The right axillary lymph node was biopsied in revealed a metastatic ductal carcinoma measuring 10 mm.    The patient was referred to Dr. Quinn.  She was taken to the operating room on 08/15/2022 for bilateral mastectomy.  The left mastectomy specimen revealed a grade 1 invasive carcinoma with mixed ductal and lobular features measuring 22 mm in greatest dimension.  Classic LCIS was present within the specimen.  The margins of resection were negative with all margins at least 10 mm away.  One of 3 left axillary lymph nodes was positive for carcinoma with the deposit measuring 2.1 mm.  There was no extranodal  extension identified.      The right mastectomy specimen revealed multiple foci of invasive carcinoma with mixed ductal and lobular features.  The foci measured 23, 21, 22, 4 mm in size.  There was intermediate grade DCIS present within specimen.  The margins of resection were negative.  7/30 lymph nodes were positive for carcinoma.  The largest metastatic deposit measured 15 mm with extranodal extension identified measuring 5 mm.    Oncology History   Invasive ductal carcinoma of right breast   6/28/2022 Biopsy    1. LEFT BREAST MASS, 4:00 O'CLOCK, 5 CM FROM THE NIPPLE, NEEDLE CORE BIOPSIES:   - Invasive ductal carcinoma with lobular features,   Grade 1   Focal intermediate grade ductal carcinoma in situ with microcalcifications and focal lobular extension. Architectural patterns: Cribriform and solid. Nuclear grade: Grade II (intermediate).    2. RIGHT BREAST MASS, 8:00 O'CLOCK, 12 CM FROM THE NIPPLE, NEEDLE CORE BIOPSIES:   - Invasive ductal carcinoma,   Grade 1    3. RIGHT BREAST MASS, 10:00 O'CLOCK, 3 CM FROM THE NIPPLE, NEEDLE CORE BIOPSIES:   - Invasive ductal carcinoma,  Overall grade: Grade 2     4. RIGHT AXILLARY LYMPH NODE, NEEDLE CORE BIOPSIES:   - Metastatic ductal carcinoma of the breast     6/28/2022 Breast Tumor Markers    Left Breast mass 4:00  BREAST BIOMARKER RESULTS:   Estrogen receptor (ER) status: Positive.  Progesterone receptor (PgR) status: Positive.   HER2 by IHC: Negative (score 1).    2. Right Breast mass 8:00  Estrogen receptor (ER) status: Positive.   Progesterone receptor (PgR) status: Positive.   HER2 by IHC: Equivocal (score 2+) Negative by FISH    3. Right Breast mass 10:00  Estrogen receptor (ER) status: Positive  Progesterone receptor (PGR) status: Positive.   HER2 by IHC: Negative (score 1+).     7/6/2022 Initial Diagnosis    Invasive ductal carcinoma of right breast     7/6/2022 Genetic Testing    Not completed      7/26/2022 Tumor Conference    The team agreed upfront surgery  with bilateral mastectomy with Right axillary lymph node dissection and Left sentinel lymph node biopsy. Will follow up with Dr. Caputo after surgery for endocrine therapy.        9/22/2022 Cancer Staged    Staging form: Breast, AJCC 8th Edition  - Pathologic: Stage IB (pT2, pN2a, cM0, G1, ER+, DC+, HER2-)       Malignant neoplasm of lower-outer quadrant of left breast of female, estrogen receptor positive   9/22/2022 Initial Diagnosis    Malignant neoplasm of lower-outer quadrant of left breast of female, estrogen receptor positive     9/22/2022 Cancer Staged    Staging form: Breast, AJCC 8th Edition  - Pathologic: Stage IA (pT2, pN1a(sn), cM0, G1, ER+, DC+, HER2-)            REVIEW OF SYSTEMS:   Review of Systems   Constitutional:  Negative for fever, malaise/fatigue and weight loss.   HENT:  Negative for ear pain, hearing loss, sinus pain and sore throat.         Pain along left neck/posterior scalp from shingles   Eyes:  Negative for blurred vision, double vision and pain.   Respiratory:  Negative for cough, hemoptysis, shortness of breath and wheezing.    Cardiovascular:  Negative for chest pain, palpitations and leg swelling.   Gastrointestinal:  Negative for abdominal pain, blood in stool, constipation, diarrhea, heartburn, nausea and vomiting.   Genitourinary:  Negative for dysuria, frequency, hematuria and urgency.   Musculoskeletal:  Negative for back pain and joint pain.   Skin:  Positive for rash. Negative for itching.   Neurological:  Positive for sensory change. Negative for tingling, focal weakness, seizures and headaches.   Psychiatric/Behavioral:  Negative for depression. The patient is not nervous/anxious.      PAST MEDICAL HISTORY:  Past Medical History:   Diagnosis Date    Anticoagulant long-term use     Arthritis     Atrial flutter     Calcium nephrolithiasis 2007    ckd 3    Diabetes mellitus, type 2     Diabetic peripheral neuropathy associated with type 2 diabetes mellitus     Difficult  intubation     NARROW AIRWAY    Hypertension     Invasive ductal carcinoma of left breast 7/6/2022    Pulmonary aspiration of gastric contents     Shingles        PAST SURGICAL HISTORY:  Past Surgical History:   Procedure Laterality Date    ANGIOGRAPHY OF LOWER EXTREMITY N/A 10/21/2021    Procedure: Angiogram Extremity Unilateral;  Surgeon: Dre Martino MD;  Location: [facility] CATH LAB/EP;  Service: Cardiology;  Laterality: N/A;    ANGIOGRAPHY OF LOWER EXTREMITY Right 11/10/2021    Procedure: Angiogram Extremity Unilateral;  Surgeon: Ben Rooney MD;  Location: [facility] CATH LAB/EP;  Service: Cardiology;  Laterality: Right;    AXILLARY NODE DISSECTION Right 8/15/2022    Procedure: LYMPHADENECTOMY, AXILLARY RIGHT;  Surgeon: RADHA Quinn MD;  Location: [facility];  Service: General;  Laterality: Right;    COLONOSCOPY  11/28/2011    sigmoid diverticulosis, external hemorrhoids    DEBRIDEMENT Right 10/20/2021    Procedure: DEBRIDEMENT;  Surgeon: Ava Atkins DPM;  Location: [facility] OR;  Service: Podiatry;  Laterality: Right;    ENDOSCOPIC GASTROCNEMIUS RECESSION Right 9/10/2019    Procedure: RECESSION, GASTROCNEMIUS, ENDOSCOPIC;  Surgeon: Derek Jose DPM;  Location: [facility] OR;  Service: Podiatry;  Laterality: Right;  Arthrex center line (ron notified)  Video    EXTRACORPOREAL SHOCK WAVE LITHOTRIPSY      FLEXOR TENOTOMY Right 10/20/2021    Procedure: TENOTOMY, FLEXOR 3rd toe;  Surgeon: Ava Atkins DPM;  Location: [facility] OR;  Service: Podiatry;  Laterality: Right;    HYSTERECTOMY      INJECTION FOR SENTINEL NODE IDENTIFICATION Left 8/15/2022    Procedure: INJECTION, FOR SENTINEL NODE IDENTIFICATION;  Surgeon: RADHA Quinn MD;  Location: [facility] OR;  Service: General;  Laterality: Left;    MASTECTOMY Bilateral 8/15/2022    Procedure: MASTECTOMY BILATERAL  / BREAST;  Surgeon: RADHA Quinn MD;  Location: [facility];  Service: General;  Laterality: Bilateral;  5 HOURS / EMAIL SENT 8-11 @ 9:02 [facility]  LYMPH NODE BIOPSY Left 8/15/2022    Procedure: BIOPSY, LYMPH NODE, SENTINEL LEFT;  Surgeon: RADHA Quinn MD;  Location: [facility] OR;  Service: General;  Laterality: Left;    SHOULDER SURGERY Left     TOE AMPUTATION Right 05/22/2017    5th toe    TOE AMPUTATION Right 10/20/2021    Procedure: AMPUTATION, TOE;  Surgeon: Ava Atkins DPM;  Location: [facility] OR;  Service: Podiatry;  Laterality: Right;    TONSILLECTOMY         ALLERGIES:   Review of patient's allergies indicates:   Allergen Reactions    Codeine Nausea Only and Other (See Comments)     Can Take Tylenol, hrdrocodone       MEDICATIONS:  Current Outpatient Medications   Medication Sig    ACCU-CHEK SAMI PLUS METER Misc TEST  AS DIRECTED    ACCU-CHEK SAMI PLUS TEST STRP Strp USE TO TEST BLOOD SUGAR ONCE DAILY    ACCU-CHEK SOFT DEV LANCETS Kit     ACCU-CHEK SOFTCLIX LANCETS Misc TEST ONCE DAILY    ammonium lactate 12 % Crea Apply to feet twice daily. Avoid use between toes.    apixaban (ELIQUIS) 5 mg Tab TAKE 1 TABLET BY MOUTH TWICE DAILY    atorvastatin (LIPITOR) 80 MG tablet Take 1 tablet (80 mg total) by mouth once daily.    clopidogreL (PLAVIX) 75 mg tablet Take 1 tablet (75 mg total) by mouth once daily.    furosemide (LASIX) 40 MG tablet Take 1 tablet (40 mg total) by mouth once daily.    gabapentin (NEURONTIN) 400 MG capsule Take 2 tablets  in the morming, 1 tablet  at noon, and 2 tablets in the evening.    glimepiride (AMARYL) 1 MG tablet Take 1 tablet (1 mg total) by mouth 2 (two) times daily.    lisinopriL (PRINIVIL,ZESTRIL) 20 MG tablet TAKE 1 TABLET EVERY DAY    metoprolol succinate (TOPROL-XL) 25 MG 24 hr tablet Take 1 tablet (25 mg total) by mouth once daily.    pramipexole (MIRAPEX) 0.125 MG tablet Take 1 tablet (0.125 mg total) by mouth once daily.    urea (CARMOL) 40 % Crea Apply topically 2 (two) times daily.    anastrozole (ARIMIDEX) 1 mg Tab Take 1 tablet (1 mg total) by mouth once daily. (Patient not taking: Reported on 9/22/2022.)     HYDROcodone-acetaminophen (NORCO) 5-325 mg per tablet Take 1 tablet by mouth every 6 (six) hours as needed for Pain (cancer-related pain).    polyethylene glycol (GLYCOLAX) 17 gram/dose powder Take 17 g by mouth once daily.     No current facility-administered medications for this visit.       SOCIAL HISTORY:  Social History     Socioeconomic History    Marital status:    Tobacco Use    Smoking status: Never     Passive exposure: Never    Smokeless tobacco: Never   Substance and Sexual Activity    Alcohol use: No    Drug use: No    Sexual activity: Yes     Social Determinants of Health     Financial Resource Strain: Medium Risk    Difficulty of Paying Living Expenses: Somewhat hard   Food Insecurity: No Food Insecurity    Worried About Running Out of Food in the Last Year: Never true    Ran Out of Food in the Last Year: Never true   Transportation Needs: No Transportation Needs    Lack of Transportation (Medical): No    Lack of Transportation (Non-Medical): No   Physical Activity: Inactive    Days of Exercise per Week: 0 days    Minutes of Exercise per Session: 0 min   Stress: Stress Concern Present    Feeling of Stress : To some extent   Social Connections: Moderately Isolated    Frequency of Communication with Friends and Family: Twice a week    Frequency of Social Gatherings with Friends and Family: Never    Attends Islam Services: 1 to 4 times per year    Active Member of Clubs or Organizations: No    Attends Club or Organization Meetings: Never    Marital Status:    Housing Stability: Low Risk     Unable to Pay for Housing in the Last Year: No    Number of Places Lived in the Last Year: 1    Unstable Housing in the Last Year: No       FAMILY HISTORY:  Family History   Problem Relation Age of Onset    Diabetes Mother     Heart failure Father     Breast cancer Sister 69        Genetic testing negative    Kidney failure Brother     Breast cancer Maternal Aunt         early 40s         PHYSICAL  EXAMINATION:  Vitals:    22 1119   BP: (!) 143/62   Pulse: 67   Temp: 96.3 °F (35.7 °C)     Body mass index is 28.17 kg/m².    ECO  Physical Exam  Constitutional:       Appearance: Normal appearance.   HENT:      Head: Normocephalic and atraumatic.      Nose: Nose normal.      Mouth/Throat:      Mouth: Mucous membranes are moist.   Cardiovascular:      Rate and Rhythm: Normal rate.   Pulmonary:      Effort: Pulmonary effort is normal.   Chest:          Comments: Seroma along left chest wall  Abdominal:      General: Abdomen is flat.      Palpations: Abdomen is soft.   Musculoskeletal:         General: Normal range of motion.      Cervical back: Normal range of motion.   Skin:     General: Skin is warm and dry.   Neurological:      General: No focal deficit present.      Mental Status: She is alert. Mental status is at baseline.           ASSESSMENT/PLAN:  Caro was seen today for breast cancer.    Diagnoses and all orders for this visit:    Invasive ductal carcinoma of right breast    Malignant neoplasm of lower-outer quadrant of left breast of female, estrogen receptor positive  The patient has high risk disease on the right side given the presence of multiple positive nodes with SARITA.  She is recommended PMRT to the right chest wall and draining lymphatics.  Given her age and performance status a hypofractionated  course of treatment is recommended to a dose of 42.4 Gy in 16 fractions over 3 weeks.    The patient has low risk disease on the left side given tumor biology with just 1 of 3 positive nodes.  Recommend observation of the left side.    The risks and benefits of treatment have been discussed with the patient and she expressed full understanding. she understands the treatment plan and willing to proceed accordingly.    I spent approximately 60 minutes reviewing the available records and evaluating the patient, out of which over 50% of the time was spent face to face with the patient in  counseling and coordinating this patient's care.

## 2022-09-22 ENCOUNTER — OFFICE VISIT (OUTPATIENT)
Dept: RADIATION ONCOLOGY | Facility: CLINIC | Age: 82
End: 2022-09-22
Payer: MEDICARE

## 2022-09-22 ENCOUNTER — OFFICE VISIT (OUTPATIENT)
Dept: SURGERY | Facility: CLINIC | Age: 82
End: 2022-09-22
Payer: MEDICARE

## 2022-09-22 VITALS
HEIGHT: 62 IN | HEART RATE: 67 BPM | WEIGHT: 154 LBS | BODY MASS INDEX: 28.34 KG/M2 | SYSTOLIC BLOOD PRESSURE: 143 MMHG | TEMPERATURE: 96 F | DIASTOLIC BLOOD PRESSURE: 62 MMHG

## 2022-09-22 DIAGNOSIS — C50.911 INVASIVE DUCTAL CARCINOMA OF RIGHT BREAST: Primary | ICD-10-CM

## 2022-09-22 DIAGNOSIS — C50.512 MALIGNANT NEOPLASM OF LOWER-OUTER QUADRANT OF LEFT BREAST OF FEMALE, ESTROGEN RECEPTOR POSITIVE: ICD-10-CM

## 2022-09-22 DIAGNOSIS — Z17.0 MALIGNANT NEOPLASM OF LOWER-OUTER QUADRANT OF LEFT BREAST OF FEMALE, ESTROGEN RECEPTOR POSITIVE: ICD-10-CM

## 2022-09-22 DIAGNOSIS — T81.49XA WOUND INFECTION AFTER SURGERY: Primary | ICD-10-CM

## 2022-09-22 PROCEDURE — 99024 POSTOP FOLLOW-UP VISIT: CPT | Mod: S$GLB,,, | Performed by: NURSE PRACTITIONER

## 2022-09-22 PROCEDURE — 99999 PR PBB SHADOW E&M-EST. PATIENT-LVL III: ICD-10-PCS | Mod: PBBFAC,,, | Performed by: RADIOLOGY

## 2022-09-22 PROCEDURE — 3077F PR MOST RECENT SYSTOLIC BLOOD PRESSURE >= 140 MM HG: ICD-10-PCS | Mod: CPTII,S$GLB,, | Performed by: RADIOLOGY

## 2022-09-22 PROCEDURE — 87075 CULTR BACTERIA EXCEPT BLOOD: CPT | Performed by: NURSE PRACTITIONER

## 2022-09-22 PROCEDURE — 99999 PR PBB SHADOW E&M-EST. PATIENT-LVL III: CPT | Mod: PBBFAC,,, | Performed by: RADIOLOGY

## 2022-09-22 PROCEDURE — 99205 OFFICE O/P NEW HI 60 MIN: CPT | Mod: S$GLB,,, | Performed by: RADIOLOGY

## 2022-09-22 PROCEDURE — 87070 CULTURE OTHR SPECIMN AEROBIC: CPT | Performed by: NURSE PRACTITIONER

## 2022-09-22 PROCEDURE — 3078F PR MOST RECENT DIASTOLIC BLOOD PRESSURE < 80 MM HG: ICD-10-PCS | Mod: CPTII,S$GLB,, | Performed by: RADIOLOGY

## 2022-09-22 PROCEDURE — 3078F DIAST BP <80 MM HG: CPT | Mod: CPTII,S$GLB,, | Performed by: RADIOLOGY

## 2022-09-22 PROCEDURE — 87077 CULTURE AEROBIC IDENTIFY: CPT | Performed by: NURSE PRACTITIONER

## 2022-09-22 PROCEDURE — 3077F SYST BP >= 140 MM HG: CPT | Mod: CPTII,S$GLB,, | Performed by: RADIOLOGY

## 2022-09-22 PROCEDURE — 99024 PR POST-OP FOLLOW-UP VISIT: ICD-10-PCS | Mod: S$GLB,,, | Performed by: NURSE PRACTITIONER

## 2022-09-22 PROCEDURE — 1125F AMNT PAIN NOTED PAIN PRSNT: CPT | Mod: CPTII,S$GLB,, | Performed by: RADIOLOGY

## 2022-09-22 PROCEDURE — 1125F PR PAIN SEVERITY QUANTIFIED, PAIN PRESENT: ICD-10-PCS | Mod: CPTII,S$GLB,, | Performed by: RADIOLOGY

## 2022-09-22 PROCEDURE — 87186 SC STD MICRODIL/AGAR DIL: CPT | Performed by: NURSE PRACTITIONER

## 2022-09-22 PROCEDURE — 99205 PR OFFICE/OUTPT VISIT, NEW, LEVL V, 60-74 MIN: ICD-10-PCS | Mod: S$GLB,,, | Performed by: RADIOLOGY

## 2022-09-22 RX ORDER — SULFAMETHOXAZOLE AND TRIMETHOPRIM 800; 160 MG/1; MG/1
1 TABLET ORAL 2 TIMES DAILY
Qty: 14 TABLET | Refills: 0 | Status: SHIPPED | OUTPATIENT
Start: 2022-09-22 | End: 2022-09-29

## 2022-09-22 RX ORDER — IBUPROFEN 800 MG/1
800 TABLET ORAL EVERY 6 HOURS PRN
Qty: 20 TABLET | Refills: 0 | Status: SHIPPED | OUTPATIENT
Start: 2022-09-22 | End: 2022-10-02

## 2022-09-22 NOTE — Clinical Note
Pranav Quinn,   Saw her today since Meghana was sick again. She had thick/purulent drainage in the mastectomy cavity which is why she was having continued drainage. I was able to aspirate majority of it but did open the SAMI drain site to allow evacuation of the remaining. I packed it and started on antibiotic. I did send cultures so will be on the lookout in case we need to switch. I am going to have her come back on Monday to see how she is doing.   Thanks, Aruna

## 2022-09-22 NOTE — PROGRESS NOTES
"[facility]      Post-Op      REFERRING PHYSICIAN:  No referring provider defined for this encounter.       Brayan Penny MD    MEDICAL ONCOLOGIST:    Harshal  RADIATION ONCOLOGIST:   [place]    DIAGNOSIS:    This is a 82 y.o. female with a stage pT2 N1a M0 grade 1 ER + [place] + HER2 - IDC with lobular features of the left breast and  pT2N2a M0 grade 1 ER + [place] + HER2 - IDC with lobular features of the right breast.    TREATMENT SUMMARY:  The patient is status post bilateral  mastectomy, left sentinel node biopsy, and right axillary lymph node dissection on 8/15/2022.  Final pathology showed   1. Breast, left, mastectomy:       - Invasive carcinoma with mixed ductal and lobular features, see note       - Tumor size: 22 mm  - Histologic grade (Ene Histologic Score)           - Glandular/Tubular Differentiation: 2           - Nuclear pleomorphism: 2           - Mitotic Rate: 1           - Overall Grade: grade 1       - Lobular carcinoma in situ (LCIS), classic type       - Biopsy site change: identified       - Calcification: present in invasive carcinoma and benign epithelium       - Resection margins: free of tumor, all margins are at least 10 mm away       - Benign nipple       - Skin with seborrheic keratosis       - Non-neoplastic breast tissue: usual ductal hyperplasia, apocrine   metaplasia, duct ectasia and fibrocystic change       - See synoptic report   2.  "left mastectomy new anterior margin inferior lateral aspect", excision:       - Benign fibroadipose tissue       - No definitive breast duct elements seen   3.  "left axillary sentinel lymph node #1 hot and blue @ 1177", excision:       - One lymph node, negative for carcinoma (0/1)   4.  "left axillary sentinel lymph node #2 Hot & blue @ 1459", excision:       - One lymph node, positive for carcinoma (1/1)       -  Size of Largest Metastatic Deposit: 2.1 mm, Macrometastases       -  Extranodal Extension: not identified   5.  "left " "axillary sentinel lymph node #3 Hot & blue @ 714", excision:       - One lymph node, negative for carcinoma (0/1)   6. Breast, right, mastectomy:       - Invasive carcinoma with mixed ductal and lobular features, see note       - Multiple foci, tumor size: 23 mm (mass 1, LIQ); 21 mm (mass 2, LOQ); 22   mm (mass 3, LOQ); 4 mm (mass 4, LOQ) - Histologic grade (Ene   Histologic Score)           - Glandular/Tubular Differentiation: 2           - Nuclear pleomorphism: 2           - Mitotic Rate: 1           - Overall Grade: grade 1       - Ductal carcinoma in situ: identified           - Nuclear Grade (in situ carcinoma): intermediate           - Necrosis: not present           - Architectural pattern: cribriform       - Lobular carcinoma in situ (LCIS), classic type       - Resection margins: free of tumor (invasive and in-situ), all margins   are at least 5 mm away       - Biopsy site change: identified       - Calcification: present in invasive carcinoma and benign epithelium       - Benign nipple       - Skin with seborrheic keratosis       - Two lymph nodes, negative for carcinoma (0/2)       - Non-neoplastic breast tissue: complex sclerosing lesion, adenosis,   usual ductal hyperplasia, apocrine metaplasia, duct ectasia and fibrocystic   change       - See synoptic report   7.  "right mastectomy new anterior margin inferior lateral aspect", excision:       - Benign fibroadipose tissue       - No definitive breast duct elements seen   8.  "right axillary content", dissection:       - Seven of twenty-eight lymph nodes, positive for carcinoma (7/28)       -  Size of Largest Metastatic Deposit: 15 mm, all tumor involved lymph   nodes are macrometastases       -  Extranodal Extension: identified, 5 mm       - Biopsy site change: identified   9.  "right mastectomy lateral skin short-superior, long lateral", excision:       - Skin without significant pathologic change   Note: Multiple foci of invasive carcinoma " with simlar morphology are   identified on right breast (part 6). E-cadherin immunostains support   the above diagnosis. GATA3 and ER immunostains highlights the tumor cells in   lymph nodes.  Immunostains performed with appropriate positive and negative   controls.     Results of immunohistochemical stains (See [facility]-)   Left breast invasive carcinoma:   Estrogen receptor (ER): positive (strong intensity, 90% of tumor cell nuclei)   Progesterone receptor (ID): positive (moderate intensity, 70-80% of tumor   cell nuclei)   HER2: negative (+1)   Ki-67:10%-20   Right breast invasive carcinoma (8 o'clock):   Estrogen receptor (ER): positive (strong intensity, 98% of tumor cell nuclei)   Progesterone receptor (ID): positive (moderate intensity, 40-50% of tumor   cell nuclei)   HER2: negative by FISH   Ki-67:10%-20   Right breast invasive carcinoma (10 o'clock):   Estrogen receptor (ER): positive (strong intensity, 98% of tumor cell nuclei)   Progesterone receptor (ID): positive (strong intensity, 10-15% of tumor cell   nuclei)   HER2: negative (+1)   Ki-67:10%-20     INTERVAL HISTORY:   Caro Powell comes in for a post-op check. Patient reports continued drainage from the left SAMI drain site. Does complain of left breast pain as well. Patient had episode of shingles which she is currently recovering from. Has significant residual neck and head pain. Is taking gabapentin with minimal relief.     MEDICATIONS:  Current Outpatient Medications   Medication Sig Dispense Refill    ACCU-CHEK SAMI PLUS METER Misc TEST  AS DIRECTED 1 each 0    ACCU-CHEK SAMI PLUS TEST STRP Strp USE TO TEST BLOOD SUGAR ONCE DAILY 100 strip 3    ACCU-CHEK SOFT DEV LANCETS Kit       ACCU-CHEK SOFTCLIX LANCETS Misc TEST ONCE DAILY 100 each 3    ammonium lactate 12 % Crea Apply to feet twice daily. Avoid use between toes. 140 g 5    anastrozole (ARIMIDEX) 1 mg Tab Take 1 tablet (1 mg total) by mouth once daily. (Patient not taking:  Reported on 9/22/2022.) 90 tablet 3    apixaban (ELIQUIS) 5 mg Tab TAKE 1 TABLET BY MOUTH TWICE DAILY 180 tablet 3    atorvastatin (LIPITOR) 80 MG tablet Take 1 tablet (80 mg total) by mouth once daily. 90 tablet 3    clopidogreL (PLAVIX) 75 mg tablet Take 1 tablet (75 mg total) by mouth once daily. 90 tablet 3    furosemide (LASIX) 40 MG tablet Take 1 tablet (40 mg total) by mouth once daily. 30 tablet 11    gabapentin (NEURONTIN) 400 MG capsule Take 2 tablets  in the morming, 1 tablet  at noon, and 2 tablets in the evening. 450 capsule 3    glimepiride (AMARYL) 1 MG tablet Take 1 tablet (1 mg total) by mouth 2 (two) times daily. 180 tablet 3    HYDROcodone-acetaminophen (NORCO) 5-325 mg per tablet Take 1 tablet by mouth every 6 (six) hours as needed for Pain (cancer-related pain). 40 tablet 0    ibuprofen (ADVIL,MOTRIN) 800 MG tablet Take 1 tablet (800 mg total) by mouth every 6 (six) hours as needed for Pain. 20 tablet 0    lisinopriL (PRINIVIL,ZESTRIL) 20 MG tablet TAKE 1 TABLET EVERY DAY 90 tablet 3    metoprolol succinate (TOPROL-XL) 25 MG 24 hr tablet Take 1 tablet (25 mg total) by mouth once daily. 90 tablet 3    polyethylene glycol (GLYCOLAX) 17 gram/dose powder Take 17 g by mouth once daily. 510 g 0    pramipexole (MIRAPEX) 0.125 MG tablet Take 1 tablet (0.125 mg total) by mouth once daily. 90 tablet 3    sulfamethoxazole-trimethoprim 800-160mg (BACTRIM DS) 800-160 mg Tab Take 1 tablet by mouth 2 (two) times daily. for 7 days 14 tablet 0    urea (CARMOL) 40 % Crea Apply topically 2 (two) times daily. 1 Bottle 3     No current facility-administered medications for this visit.       ALLERGIES:   Review of patient's allergies indicates:   Allergen Reactions    Codeine Nausea Only and Other (See Comments)     Can Take Tylenol, hrdrocodone       PHYSICAL EXAMINATION:   General:  This is a well appearing female with appropriate speech, affect and gait.   Vesicles/blistering rash of left side of  neck/hairline/jaw. Does not cross midline.   Breast:  Incisions clean, dry, and intact. Erythema and edema overlying left mastectomy bed. Left SAMI drain with purulent drainage.     Fine Needle Aspiration Procedure Note    Pre-operative Diagnosis: left breast seroma     Post-operative Diagnosis: same    Location: left breast    Anesthesia: 1% plain lidocaine    Procedure Details   The Procedure, risks and complications have been discussed in detail (including, but not limited to pain, infection, bleeding) with the patient, and the patient has signed consent to have the surgery completed.    The skin was sterilely prepped and draped over the affected area in the usual fashion.  Fine Needle Aspiration was performed with a 16 guage needle using ultrasound guidance.  Contents of Aspiration: 90 ml of thick purulent fluid.  Cultures sent   EBL: none  Sterile dressing placed.  May place ice pack over affected area during the first 24 hours.    Condition:  Stable    Complications:  none.    After aspiration of 90 cc of thick/purulent material. There was a far bit left in the mastectomy bed which was difficult to aspirate. I opened the SAMI site with cotton tip and moderate amount of purulent material drained. This was then packed and covered with gauze.       IMPRESSION:   The patient presents for evaluation of left SAMI drain site. On exam, thick/purulent material in mastectomy cavity was aspirated today. Unable to aspirated all material so SAMI drain site was opened to evaluate the remaining. Patient tolerated well. Cultures sent.     PLAN:   1. Left wound packed and instructed family on care.    2. Cultures sent (patient was on Augmentin after surgery for right breast cellulitis)   3. Started Bactrim x 7 days (will most likely need extended course but will wait for cultures)   4. Return to clinic Monday for evaluation      The patient is in agreement with the plan. Questions were encouraged and answered to patient's  satisfaction. Caro will call our office with any questions or concerns.

## 2022-09-23 ENCOUNTER — DOCUMENT SCAN (OUTPATIENT)
Dept: HOME HEALTH SERVICES | Facility: HOSPITAL | Age: 82
End: 2022-09-23
Payer: MEDICARE

## 2022-09-24 ENCOUNTER — PATIENT MESSAGE (OUTPATIENT)
Dept: SURGERY | Facility: CLINIC | Age: 82
End: 2022-09-24
Payer: MEDICARE

## 2022-09-24 LAB — BACTERIA SPEC AEROBE CULT: ABNORMAL

## 2022-09-26 ENCOUNTER — OFFICE VISIT (OUTPATIENT)
Dept: SURGERY | Facility: CLINIC | Age: 82
End: 2022-09-26
Payer: MEDICARE

## 2022-09-26 VITALS
DIASTOLIC BLOOD PRESSURE: 60 MMHG | HEART RATE: 67 BPM | HEIGHT: 62 IN | WEIGHT: 153 LBS | SYSTOLIC BLOOD PRESSURE: 135 MMHG | BODY MASS INDEX: 28.16 KG/M2

## 2022-09-26 DIAGNOSIS — T81.49XA WOUND INFECTION AFTER SURGERY: Primary | ICD-10-CM

## 2022-09-26 LAB — BACTERIA SPEC ANAEROBE CULT: NORMAL

## 2022-09-26 PROCEDURE — 1159F MED LIST DOCD IN RCRD: CPT | Mod: CPTII,S$GLB,, | Performed by: PHYSICIAN ASSISTANT

## 2022-09-26 PROCEDURE — 3078F DIAST BP <80 MM HG: CPT | Mod: CPTII,S$GLB,, | Performed by: PHYSICIAN ASSISTANT

## 2022-09-26 PROCEDURE — 1126F AMNT PAIN NOTED NONE PRSNT: CPT | Mod: CPTII,S$GLB,, | Performed by: PHYSICIAN ASSISTANT

## 2022-09-26 PROCEDURE — 1101F PT FALLS ASSESS-DOCD LE1/YR: CPT | Mod: CPTII,S$GLB,, | Performed by: PHYSICIAN ASSISTANT

## 2022-09-26 PROCEDURE — 99024 PR POST-OP FOLLOW-UP VISIT: ICD-10-PCS | Mod: S$GLB,,, | Performed by: PHYSICIAN ASSISTANT

## 2022-09-26 PROCEDURE — 3288F PR FALLS RISK ASSESSMENT DOCUMENTED: ICD-10-PCS | Mod: CPTII,S$GLB,, | Performed by: PHYSICIAN ASSISTANT

## 2022-09-26 PROCEDURE — 1126F PR PAIN SEVERITY QUANTIFIED, NO PAIN PRESENT: ICD-10-PCS | Mod: CPTII,S$GLB,, | Performed by: PHYSICIAN ASSISTANT

## 2022-09-26 PROCEDURE — 3075F PR MOST RECENT SYSTOLIC BLOOD PRESS GE 130-139MM HG: ICD-10-PCS | Mod: CPTII,S$GLB,, | Performed by: PHYSICIAN ASSISTANT

## 2022-09-26 PROCEDURE — 99999 PR PBB SHADOW E&M-EST. PATIENT-LVL III: CPT | Mod: PBBFAC,,, | Performed by: PHYSICIAN ASSISTANT

## 2022-09-26 PROCEDURE — 1160F PR REVIEW ALL MEDS BY PRESCRIBER/CLIN PHARMACIST DOCUMENTED: ICD-10-PCS | Mod: CPTII,S$GLB,, | Performed by: PHYSICIAN ASSISTANT

## 2022-09-26 PROCEDURE — 3078F PR MOST RECENT DIASTOLIC BLOOD PRESSURE < 80 MM HG: ICD-10-PCS | Mod: CPTII,S$GLB,, | Performed by: PHYSICIAN ASSISTANT

## 2022-09-26 PROCEDURE — 99024 POSTOP FOLLOW-UP VISIT: CPT | Mod: S$GLB,,, | Performed by: PHYSICIAN ASSISTANT

## 2022-09-26 PROCEDURE — 1159F PR MEDICATION LIST DOCUMENTED IN MEDICAL RECORD: ICD-10-PCS | Mod: CPTII,S$GLB,, | Performed by: PHYSICIAN ASSISTANT

## 2022-09-26 PROCEDURE — 1101F PR PT FALLS ASSESS DOC 0-1 FALLS W/OUT INJ PAST YR: ICD-10-PCS | Mod: CPTII,S$GLB,, | Performed by: PHYSICIAN ASSISTANT

## 2022-09-26 PROCEDURE — 1160F RVW MEDS BY RX/DR IN RCRD: CPT | Mod: CPTII,S$GLB,, | Performed by: PHYSICIAN ASSISTANT

## 2022-09-26 PROCEDURE — 3075F SYST BP GE 130 - 139MM HG: CPT | Mod: CPTII,S$GLB,, | Performed by: PHYSICIAN ASSISTANT

## 2022-09-26 PROCEDURE — 99999 PR PBB SHADOW E&M-EST. PATIENT-LVL III: ICD-10-PCS | Mod: PBBFAC,,, | Performed by: PHYSICIAN ASSISTANT

## 2022-09-26 PROCEDURE — 3288F FALL RISK ASSESSMENT DOCD: CPT | Mod: CPTII,S$GLB,, | Performed by: PHYSICIAN ASSISTANT

## 2022-09-26 RX ORDER — SULFAMETHOXAZOLE AND TRIMETHOPRIM 800; 160 MG/1; MG/1
1 TABLET ORAL 2 TIMES DAILY
Qty: 6 TABLET | Refills: 0 | Status: SHIPPED | OUTPATIENT
Start: 2022-09-26 | End: 2022-09-29

## 2022-09-26 NOTE — PROGRESS NOTES
"[facility]      Post-Op      REFERRING PHYSICIAN:  No referring provider defined for this encounter.       Brayan Penny MD    MEDICAL ONCOLOGIST:    Harshal  RADIATION ONCOLOGIST:   [place]    DIAGNOSIS:    This is a 82 y.o. 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    TREATMENT SUMMARY:  The patient is status post bilateral  mastectomy, left sentinel node biopsy, and right axillary lymph node dissection on 8/15/2022.  Final pathology showed   1. Breast, left, mastectomy:       - Invasive carcinoma with mixed ductal and lobular features, see note       - Tumor size: 22 mm  - Histologic grade (Ene Histologic Score)           - Glandular/Tubular Differentiation: 2           - Nuclear pleomorphism: 2           - Mitotic Rate: 1           - Overall Grade: grade 1       - Lobular carcinoma in situ (LCIS), classic type       - Biopsy site change: identified       - Calcification: present in invasive carcinoma and benign epithelium       - Resection margins: free of tumor, all margins are at least 10 mm away       - Benign nipple       - Skin with seborrheic keratosis       - Non-neoplastic breast tissue: usual ductal hyperplasia, apocrine   metaplasia, duct ectasia and fibrocystic change       - See synoptic report   2.  "left mastectomy new anterior margin inferior lateral aspect", excision:       - Benign fibroadipose tissue       - No definitive breast duct elements seen   3.  "left axillary sentinel lymph node #1 hot and blue @ 1177", excision:       - One lymph node, negative for carcinoma (0/1)   4.  "left axillary sentinel lymph node #2 Hot & blue @ 1459", excision:       - One lymph node, positive for carcinoma (1/1)       -  Size of Largest Metastatic Deposit: 2.1 mm, Macrometastases       -  Extranodal Extension: not identified   5.  "left " "axillary sentinel lymph node #3 Hot & blue @ 714", excision:       - One lymph node, negative for carcinoma (0/1)   6. Breast, right, mastectomy:       - Invasive carcinoma with mixed ductal and lobular features, see note       - Multiple foci, tumor size: 23 mm (mass 1, LIQ); 21 mm (mass 2, LOQ); 22   mm (mass 3, LOQ); 4 mm (mass 4, LOQ) - Histologic grade (Ene   Histologic Score)           - Glandular/Tubular Differentiation: 2           - Nuclear pleomorphism: 2           - Mitotic Rate: 1           - Overall Grade: grade 1       - Ductal carcinoma in situ: identified           - Nuclear Grade (in situ carcinoma): intermediate           - Necrosis: not present           - Architectural pattern: cribriform       - Lobular carcinoma in situ (LCIS), classic type       - Resection margins: free of tumor (invasive and in-situ), all margins   are at least 5 mm away       - Biopsy site change: identified       - Calcification: present in invasive carcinoma and benign epithelium       - Benign nipple       - Skin with seborrheic keratosis       - Two lymph nodes, negative for carcinoma (0/2)       - Non-neoplastic breast tissue: complex sclerosing lesion, adenosis,   usual ductal hyperplasia, apocrine metaplasia, duct ectasia and fibrocystic   change       - See synoptic report   7.  "right mastectomy new anterior margin inferior lateral aspect", excision:       - Benign fibroadipose tissue       - No definitive breast duct elements seen   8.  "right axillary content", dissection:       - Seven of twenty-eight lymph nodes, positive for carcinoma (7/28)       -  Size of Largest Metastatic Deposit: 15 mm, all tumor involved lymph   nodes are macrometastases       -  Extranodal Extension: identified, 5 mm       - Biopsy site change: identified   9.  "right mastectomy lateral skin short-superior, long lateral", excision:       - Skin without significant pathologic change   Note: Multiple foci of invasive carcinoma " with simlar morphology are   identified on right breast (part 6). E-cadherin immunostains support   the above diagnosis. GATA3 and ER immunostains highlights the tumor cells in   lymph nodes.  Immunostains performed with appropriate positive and negative   controls.     Results of immunohistochemical stains (See [id number])   Left breast invasive carcinoma:   Estrogen receptor (ER): positive (strong intensity, 90% of tumor cell nuclei)   Progesterone receptor (ME): positive (moderate intensity, 70-80% of tumor   cell nuclei)   HER2: negative (+1)   Ki-67:10%-20   Right breast invasive carcinoma (8 o'clock):   Estrogen receptor (ER): positive (strong intensity, 98% of tumor cell nuclei)   Progesterone receptor (ME): positive (moderate intensity, 40-50% of tumor   cell nuclei)   HER2: negative by FISH   Ki-67:10%-20   Right breast invasive carcinoma (10 o'clock):   Estrogen receptor (ER): positive (strong intensity, 98% of tumor cell nuclei)   Progesterone receptor (ME): positive (strong intensity, 10-15% of tumor cell   nuclei)   HER2: negative (+1)   Ki-67:10%-20     INTERVAL HISTORY:   Caro Powell comes in for a post-op check. Patient had episode of shingles which she is currently recovering from. Has significant residual neck and head pain. Is taking gabapentin with minimal relief. Patient seen in clinic by TITUS De Jesus NP for left SAMI site drainage. On exam there was purulent material draining for site. There was erythema overlying mastectomy bed with fluid collection. 90 cc of purulent material drained and cultures were sent. Patient reports over the weekend the incision opened up and thick/purulent material was expelled. After this, the patient reports significant improvement in pain and overlying redness.    MEDICATIONS:  Current Outpatient Medications   Medication Sig Dispense Refill    ACCU-CHEK SAMI PLUS METER Misc TEST  AS DIRECTED 1 each 0    ACCU-CHEK SAMI PLUS TEST STRP Strp USE TO TEST BLOOD  SUGAR ONCE DAILY 100 strip 3    ACCU-CHEK SOFT DEV LANCETS Kit       ACCU-CHEK SOFTCLIX LANCETS Misc TEST ONCE DAILY 100 each 3    ammonium lactate 12 % Crea Apply to feet twice daily. Avoid use between toes. 140 g 5    apixaban (ELIQUIS) 5 mg Tab TAKE 1 TABLET BY MOUTH TWICE DAILY 180 tablet 3    atorvastatin (LIPITOR) 80 MG tablet Take 1 tablet (80 mg total) by mouth once daily. 90 tablet 3    clopidogreL (PLAVIX) 75 mg tablet Take 1 tablet (75 mg total) by mouth once daily. 90 tablet 3    furosemide (LASIX) 40 MG tablet Take 1 tablet (40 mg total) by mouth once daily. 30 tablet 11    gabapentin (NEURONTIN) 400 MG capsule Take 2 tablets  in the morming, 1 tablet  at noon, and 2 tablets in the evening. 450 capsule 3    glimepiride (AMARYL) 1 MG tablet Take 1 tablet (1 mg total) by mouth 2 (two) times daily. 180 tablet 3    HYDROcodone-acetaminophen (NORCO) 5-325 mg per tablet Take 1 tablet by mouth every 6 (six) hours as needed for Pain (cancer-related pain). 40 tablet 0    ibuprofen (ADVIL,MOTRIN) 800 MG tablet Take 1 tablet (800 mg total) by mouth every 6 (six) hours as needed for Pain. 20 tablet 0    lisinopriL (PRINIVIL,ZESTRIL) 20 MG tablet TAKE 1 TABLET EVERY DAY 90 tablet 3    metoprolol succinate (TOPROL-XL) 25 MG 24 hr tablet Take 1 tablet (25 mg total) by mouth once daily. 90 tablet 3    polyethylene glycol (GLYCOLAX) 17 gram/dose powder Take 17 g by mouth once daily. 510 g 0    pramipexole (MIRAPEX) 0.125 MG tablet Take 1 tablet (0.125 mg total) by mouth once daily. 90 tablet 3    sulfamethoxazole-trimethoprim 800-160mg (BACTRIM DS) 800-160 mg Tab Take 1 tablet by mouth 2 (two) times daily. for 7 days 14 tablet 0    urea (CARMOL) 40 % Crea Apply topically 2 (two) times daily. 1 Bottle 3    anastrozole (ARIMIDEX) 1 mg Tab Take 1 tablet (1 mg total) by mouth once daily. (Patient not taking: No sig reported) 90 tablet 3    sulfamethoxazole-trimethoprim 800-160mg (BACTRIM DS) 800-160 mg Tab Take 1 tablet by  mouth 2 (two) times daily. for 3 days 6 tablet 0     No current facility-administered medications for this visit.       ALLERGIES:   Review of patient's allergies indicates:   Allergen Reactions    Codeine Nausea Only and Other (See Comments)     Can Take Tylenol, hrdrocodone       PHYSICAL EXAMINATION:   General:  This is a well appearing female with appropriate speech, affect and gait.    Breast:  Incisions clean, dry, and intact. Mild edema without erythema overlying left mastectomy bed. 2 cm superficial opening at medial aspect of incision without drainage.     IMPRESSION:   The patient presents for evaluation of left chest cellulitis s/p aspiration. Patient doing well. Great response on Bactrim     PLAN:   1. Cultures revealed MRSA, sent in additional 3 days of Bactrim    2. Can proceed with XRT  3. Patient will return to clinic following XRT for CBE with Dr. Quinn       The patient is in agreement with the plan. Questions were encouraged and answered to patient's satisfaction. Caro will call our office with any questions or concerns.

## 2022-09-29 ENCOUNTER — PATIENT MESSAGE (OUTPATIENT)
Dept: HEMATOLOGY/ONCOLOGY | Facility: CLINIC | Age: 82
End: 2022-09-29
Payer: MEDICARE

## 2022-09-29 DIAGNOSIS — B02.29 POST HERPETIC NEURALGIA: ICD-10-CM

## 2022-09-29 DIAGNOSIS — B02.29 POST HERPETIC NEURALGIA: Primary | ICD-10-CM

## 2022-09-29 RX ORDER — LIDOCAINE AND PRILOCAINE 25; 25 MG/G; MG/G
CREAM TOPICAL
Qty: 30 G | Refills: 1 | Status: SHIPPED | OUTPATIENT
Start: 2022-09-29 | End: 2023-03-08

## 2022-09-29 RX ORDER — PREGABALIN 75 MG/1
75 CAPSULE ORAL 2 TIMES DAILY
Qty: 60 CAPSULE | Refills: 2 | Status: SHIPPED | OUTPATIENT
Start: 2022-09-29 | End: 2022-09-29

## 2022-09-29 RX ORDER — LIDOCAINE AND PRILOCAINE 25; 25 MG/G; MG/G
CREAM TOPICAL
Qty: 30 G | Refills: 1 | Status: SHIPPED | OUTPATIENT
Start: 2022-09-29 | End: 2022-09-29

## 2022-09-29 RX ORDER — PREGABALIN 75 MG/1
75 CAPSULE ORAL 2 TIMES DAILY
Qty: 60 CAPSULE | Refills: 2 | Status: SHIPPED | OUTPATIENT
Start: 2022-09-29 | End: 2022-12-06

## 2022-09-30 ENCOUNTER — EXTERNAL HOME HEALTH (OUTPATIENT)
Dept: HOME HEALTH SERVICES | Facility: HOSPITAL | Age: 82
End: 2022-09-30
Payer: MEDICARE

## 2022-10-06 ENCOUNTER — HOSPITAL ENCOUNTER (OUTPATIENT)
Dept: RADIATION THERAPY | Facility: HOSPITAL | Age: 82
Discharge: HOME | End: 2022-10-06
Attending: RADIOLOGY
Payer: MEDICARE

## 2022-10-06 PROCEDURE — 77290 THER RAD SIMULAJ FIELD CPLX: CPT | Mod: TC | Performed by: RADIOLOGY

## 2022-10-06 PROCEDURE — 77263 THER RADIOLOGY TX PLNG CPLX: CPT | Mod: ,,, | Performed by: RADIOLOGY

## 2022-10-06 PROCEDURE — 77333 PR  RADN TREATMENT AID(S) INTERM: ICD-10-PCS | Mod: 26,,, | Performed by: RADIOLOGY

## 2022-10-06 PROCEDURE — 77290 THER RAD SIMULAJ FIELD CPLX: CPT | Mod: 26,,, | Performed by: RADIOLOGY

## 2022-10-06 PROCEDURE — 77333 RADIATION TREATMENT AID(S): CPT | Mod: 26,,, | Performed by: RADIOLOGY

## 2022-10-06 PROCEDURE — 77290 PR  SET RADN THERAPY FIELD COMPLEX: ICD-10-PCS | Mod: 26,,, | Performed by: RADIOLOGY

## 2022-10-06 PROCEDURE — 77263 PR  RADIATION THERAPY PLAN COMPLEX: ICD-10-PCS | Mod: ,,, | Performed by: RADIOLOGY

## 2022-10-06 PROCEDURE — 77333 RADIATION TREATMENT AID(S): CPT | Mod: TC | Performed by: RADIOLOGY

## 2022-10-06 PROCEDURE — 77014 HC CT GUIDANCE RADIATION THERAPY FLDS PLACEMENT: CPT | Mod: TC | Performed by: RADIOLOGY

## 2022-10-10 ENCOUNTER — TELEPHONE (OUTPATIENT)
Dept: HEMATOLOGY/ONCOLOGY | Facility: CLINIC | Age: 82
End: 2022-10-10
Payer: MEDICARE

## 2022-10-10 DIAGNOSIS — G89.3 CHRONIC NEOPLASM-RELATED PAIN: ICD-10-CM

## 2022-10-10 DIAGNOSIS — G89.3 CHRONIC NEOPLASM-RELATED PAIN: Primary | ICD-10-CM

## 2022-10-10 RX ORDER — HYDROCODONE BITARTRATE AND ACETAMINOPHEN 5; 325 MG/1; MG/1
1 TABLET ORAL EVERY 6 HOURS PRN
Qty: 40 TABLET | Refills: 0 | Status: SHIPPED | OUTPATIENT
Start: 2022-10-10 | End: 2022-10-10

## 2022-10-10 RX ORDER — TRAMADOL HYDROCHLORIDE 50 MG/1
50 TABLET ORAL EVERY 6 HOURS PRN
Qty: 40 TABLET | Refills: 0 | Status: SHIPPED | OUTPATIENT
Start: 2022-10-10 | End: 2023-10-16

## 2022-10-10 NOTE — TELEPHONE ENCOUNTER
----- Message from Lisbeth Allen, Patient Care Assistant sent at 10/10/2022 10:18 AM CDT -----  Type:  Needs Medical Advice    Who Called:  pt  Symptoms (please be specific):  Patient would like a call back to discuss getting a stronger pain medication   Would the patient rather a call back or a response via [facility]?  Please call  Best Call Back Number:  [phone]   Additional Information:

## 2022-10-10 NOTE — TELEPHONE ENCOUNTER
----- Message from Ariela Mooney sent at 10/10/2022 11:21 AM CDT -----  Type:  Needs Medical Advice    Who Called: pt  Symptoms (please be specific): pain  How long has patient had these symptoms: ongoing   Pharmacy name and phone #:    Would the patient rather a call back or a response via [facility]? Call  Best Call Back Number: [phone]  Additional Information: Pt would like a call back. Pt states last call was to her son number and not her. Pt would like to speak to office.

## 2022-10-10 NOTE — TELEPHONE ENCOUNTER
Informed pt that tramadol is a narcotic but she can take tylenol/ibuprofen if she does not want to take this medication. Pt was concerned with being drowsy from medication. Educated pt that this medication may make her drowsy but its ordered as needed pain medication so she can only take every 6 hours. Pt verbalized understanding.

## 2022-10-10 NOTE — TELEPHONE ENCOUNTER
----- Message from Shayna Don sent at 10/10/2022  2:34 PM CDT -----  Type:  Needs Medical Advice    Who Called: pt  Symptoms (please be specific):    How long has patient had these symptoms:    Pharmacy name and phone #:    Would the patient rather a call back or a response via [facility]? call  Best Call Back Number: [phone]  Additional Information: pt wants to speak to office she would like to know if Tramadol is narcotic

## 2022-10-13 PROCEDURE — 77280 PR  SET RADN THERAPY FIELD SIMPLE: ICD-10-PCS | Mod: 26,,, | Performed by: RADIOLOGY

## 2022-10-13 PROCEDURE — 77295 3-D RADIOTHERAPY PLAN: CPT | Mod: 26,,, | Performed by: RADIOLOGY

## 2022-10-13 PROCEDURE — 77334 RADIATION TREATMENT AID(S): CPT | Mod: TC | Performed by: RADIOLOGY

## 2022-10-13 PROCEDURE — 77280 THER RAD SIMULAJ FIELD SMPL: CPT | Mod: TC | Performed by: RADIOLOGY

## 2022-10-13 PROCEDURE — 77295 PR 3D RADIOTHERAPY PLAN: ICD-10-PCS | Mod: 26,,, | Performed by: RADIOLOGY

## 2022-10-13 PROCEDURE — 77295 3-D RADIOTHERAPY PLAN: CPT | Mod: TC | Performed by: RADIOLOGY

## 2022-10-13 PROCEDURE — 77280 THER RAD SIMULAJ FIELD SMPL: CPT | Mod: 26,,, | Performed by: RADIOLOGY

## 2022-10-13 PROCEDURE — 77334 PR  RADN TREATMENT AID(S) COMPLX: ICD-10-PCS | Mod: 26,,, | Performed by: RADIOLOGY

## 2022-10-13 PROCEDURE — 77300 PR RADIATION THERAPY,DOSIMETRY PLAN: ICD-10-PCS | Mod: 26,,, | Performed by: RADIOLOGY

## 2022-10-13 PROCEDURE — 77300 RADIATION THERAPY DOSE PLAN: CPT | Mod: TC | Performed by: RADIOLOGY

## 2022-10-13 PROCEDURE — 77300 RADIATION THERAPY DOSE PLAN: CPT | Mod: 26,,, | Performed by: RADIOLOGY

## 2022-10-13 PROCEDURE — 77334 RADIATION TREATMENT AID(S): CPT | Mod: 26,,, | Performed by: RADIOLOGY

## 2022-10-14 DIAGNOSIS — F06.30 MOOD DISORDER DUE TO MEDICAL CONDITION: Primary | ICD-10-CM

## 2022-10-14 RX ORDER — ALPRAZOLAM 0.5 MG/1
0.5 TABLET ORAL 2 TIMES DAILY PRN
Qty: 60 TABLET | Refills: 0 | Status: SHIPPED | OUTPATIENT
Start: 2022-10-14 | End: 2023-01-12 | Stop reason: ALTCHOICE

## 2022-10-17 ENCOUNTER — DOCUMENTATION ONLY (OUTPATIENT)
Dept: RADIATION ONCOLOGY | Facility: CLINIC | Age: 82
End: 2022-10-17
Payer: MEDICARE

## 2022-10-17 PROCEDURE — 77417 THER RADIOLOGY PORT IMAGE(S): CPT | Performed by: RADIOLOGY

## 2022-10-17 PROCEDURE — G6002 STEREOSCOPIC X-RAY GUIDANCE: HCPCS | Mod: 26,,, | Performed by: RADIOLOGY

## 2022-10-17 PROCEDURE — 77387 GUIDANCE FOR RADJ TX DLVR: CPT | Mod: TC | Performed by: RADIOLOGY

## 2022-10-17 PROCEDURE — G6002 PR STEREOSCOPIC XRAY GUIDE FOR RADIATION TX DELIV: ICD-10-PCS | Mod: 26,,, | Performed by: RADIOLOGY

## 2022-10-17 PROCEDURE — 77412 RADIATION TX DELIVERY LVL 3: CPT | Performed by: RADIOLOGY

## 2022-10-18 PROCEDURE — G6002 STEREOSCOPIC X-RAY GUIDANCE: HCPCS | Mod: 26,,, | Performed by: RADIOLOGY

## 2022-10-18 PROCEDURE — 77412 RADIATION TX DELIVERY LVL 3: CPT | Performed by: RADIOLOGY

## 2022-10-18 PROCEDURE — 77387 GUIDANCE FOR RADJ TX DLVR: CPT | Mod: TC | Performed by: RADIOLOGY

## 2022-10-18 PROCEDURE — G6002 PR STEREOSCOPIC XRAY GUIDE FOR RADIATION TX DELIV: ICD-10-PCS | Mod: 26,,, | Performed by: RADIOLOGY

## 2022-10-19 ENCOUNTER — DOCUMENTATION ONLY (OUTPATIENT)
Dept: RADIATION ONCOLOGY | Facility: CLINIC | Age: 82
End: 2022-10-19
Payer: MEDICARE

## 2022-10-19 PROCEDURE — G6002 STEREOSCOPIC X-RAY GUIDANCE: HCPCS | Mod: 26,,, | Performed by: RADIOLOGY

## 2022-10-19 PROCEDURE — 77412 RADIATION TX DELIVERY LVL 3: CPT | Performed by: RADIOLOGY

## 2022-10-19 PROCEDURE — 77387 GUIDANCE FOR RADJ TX DLVR: CPT | Mod: TC | Performed by: RADIOLOGY

## 2022-10-19 PROCEDURE — G6002 PR STEREOSCOPIC XRAY GUIDE FOR RADIATION TX DELIV: ICD-10-PCS | Mod: 26,,, | Performed by: RADIOLOGY

## 2022-10-19 NOTE — PLAN OF CARE
Day 3 of outpatient radiation to the right chest wall. Pt tolerating treatment well. No skin issues seen or reported. Pt using Miaderm cream.Pt asked for information regarding a ketty through [email or web address]. Message sent to Mayelin Smith as pt under the care of Christen Mathew and Harshal.

## 2022-10-20 PROCEDURE — G6002 PR STEREOSCOPIC XRAY GUIDE FOR RADIATION TX DELIV: ICD-10-PCS | Mod: 26,,, | Performed by: RADIOLOGY

## 2022-10-20 PROCEDURE — G6002 STEREOSCOPIC X-RAY GUIDANCE: HCPCS | Mod: 26,,, | Performed by: RADIOLOGY

## 2022-10-20 PROCEDURE — 77336 RADIATION PHYSICS CONSULT: CPT | Performed by: RADIOLOGY

## 2022-10-20 PROCEDURE — 77387 GUIDANCE FOR RADJ TX DLVR: CPT | Mod: TC | Performed by: RADIOLOGY

## 2022-10-20 PROCEDURE — 77412 RADIATION TX DELIVERY LVL 3: CPT | Performed by: RADIOLOGY

## 2022-10-21 PROCEDURE — 77412 RADIATION TX DELIVERY LVL 3: CPT | Performed by: RADIOLOGY

## 2022-10-21 PROCEDURE — 77387 GUIDANCE FOR RADJ TX DLVR: CPT | Mod: TC | Performed by: RADIOLOGY

## 2022-10-21 PROCEDURE — G6002 STEREOSCOPIC X-RAY GUIDANCE: HCPCS | Mod: 26,,, | Performed by: RADIOLOGY

## 2022-10-21 PROCEDURE — G6002 PR STEREOSCOPIC XRAY GUIDE FOR RADIATION TX DELIV: ICD-10-PCS | Mod: 26,,, | Performed by: RADIOLOGY

## 2022-10-24 ENCOUNTER — DOCUMENTATION ONLY (OUTPATIENT)
Dept: HEMATOLOGY/ONCOLOGY | Facility: CLINIC | Age: 82
End: 2022-10-24
Payer: MEDICARE

## 2022-10-25 PROCEDURE — G6002 PR STEREOSCOPIC XRAY GUIDE FOR RADIATION TX DELIV: ICD-10-PCS | Mod: 26,,, | Performed by: RADIOLOGY

## 2022-10-25 PROCEDURE — 77387 GUIDANCE FOR RADJ TX DLVR: CPT | Mod: TC | Performed by: RADIOLOGY

## 2022-10-25 PROCEDURE — 77412 RADIATION TX DELIVERY LVL 3: CPT | Performed by: RADIOLOGY

## 2022-10-25 PROCEDURE — G6002 STEREOSCOPIC X-RAY GUIDANCE: HCPCS | Mod: 26,,, | Performed by: RADIOLOGY

## 2022-10-25 NOTE — PROGRESS NOTES
Received call/voice mail message left by patient at 9:53 AM today and returned the call to her at her cell. phone number [phone] to schedule a time to meet with her re: financial assistance. Explained the [facility] Patient Assistance Fund with maximum assistance up to $1,000.00 per patient and answered her questions. She asked if she qualified for the $2500.00 ketty (at one of her radiation treatment appointments, while patient was receiving her treatment, her sister was in the waiting area and another patient mentioned that she qualified for a $2500 ketty and told her to speak with the  to apply for it). But, she does not know the specifics about this ketty. Explained to patient that there are other grants in varying amounts for various diagnoses and treatments specific to each patient from various agencies, [employer], [employer], etc.  Scheduled to meet with patient in Rad Onc on Wed.10/26 afternoon after she has her radiation treatment and sees her physician, and will complete the [facility] Patient Assistance Fund application form with her and obtain copies of her verifications for reimbursement for utility bill costs since she was diagnosed with cancer earlier this year. Asked patient to call me after she has finished these appointments. Patient stated understanding and agreement. Will continue to follow and assist as needs are identified.

## 2022-10-26 ENCOUNTER — DOCUMENTATION ONLY (OUTPATIENT)
Dept: RADIATION ONCOLOGY | Facility: CLINIC | Age: 82
End: 2022-10-26
Payer: MEDICARE

## 2022-10-26 PROCEDURE — 77387 GUIDANCE FOR RADJ TX DLVR: CPT | Mod: TC | Performed by: RADIOLOGY

## 2022-10-26 PROCEDURE — G6002 PR STEREOSCOPIC XRAY GUIDE FOR RADIATION TX DELIV: ICD-10-PCS | Mod: 26,,, | Performed by: RADIOLOGY

## 2022-10-26 PROCEDURE — 77417 THER RADIOLOGY PORT IMAGE(S): CPT | Performed by: RADIOLOGY

## 2022-10-26 PROCEDURE — G6002 STEREOSCOPIC X-RAY GUIDANCE: HCPCS | Mod: 26,,, | Performed by: RADIOLOGY

## 2022-10-26 PROCEDURE — 77412 RADIATION TX DELIVERY LVL 3: CPT | Performed by: RADIOLOGY

## 2022-10-26 NOTE — PLAN OF CARE
Day 7 of outpatient radiation to the right chest wall. Pt tolerating treatment well. No skin issues seen or reported. Using Miaderm cream. Pt reports that she will be meeting with the , Mayelin today.

## 2022-10-27 PROCEDURE — 77412 RADIATION TX DELIVERY LVL 3: CPT | Performed by: RADIOLOGY

## 2022-10-27 PROCEDURE — 77387 GUIDANCE FOR RADJ TX DLVR: CPT | Mod: TC | Performed by: RADIOLOGY

## 2022-10-27 PROCEDURE — G6002 STEREOSCOPIC X-RAY GUIDANCE: HCPCS | Mod: 26,,, | Performed by: RADIOLOGY

## 2022-10-27 PROCEDURE — G6002 PR STEREOSCOPIC XRAY GUIDE FOR RADIATION TX DELIV: ICD-10-PCS | Mod: 26,,, | Performed by: RADIOLOGY

## 2022-10-28 PROCEDURE — G6002 PR STEREOSCOPIC XRAY GUIDE FOR RADIATION TX DELIV: ICD-10-PCS | Mod: 26,,, | Performed by: RADIOLOGY

## 2022-10-28 PROCEDURE — G6002 STEREOSCOPIC X-RAY GUIDANCE: HCPCS | Mod: 26,,, | Performed by: RADIOLOGY

## 2022-10-28 PROCEDURE — 77387 GUIDANCE FOR RADJ TX DLVR: CPT | Mod: TC | Performed by: RADIOLOGY

## 2022-10-28 PROCEDURE — 77412 RADIATION TX DELIVERY LVL 3: CPT | Performed by: RADIOLOGY

## 2022-10-31 PROCEDURE — 77387 GUIDANCE FOR RADJ TX DLVR: CPT | Mod: TC | Performed by: RADIOLOGY

## 2022-10-31 PROCEDURE — G6002 PR STEREOSCOPIC XRAY GUIDE FOR RADIATION TX DELIV: ICD-10-PCS | Mod: 26,,, | Performed by: RADIOLOGY

## 2022-10-31 PROCEDURE — 77336 RADIATION PHYSICS CONSULT: CPT | Performed by: RADIOLOGY

## 2022-10-31 PROCEDURE — G6002 STEREOSCOPIC X-RAY GUIDANCE: HCPCS | Mod: 26,,, | Performed by: RADIOLOGY

## 2022-10-31 PROCEDURE — 77412 RADIATION TX DELIVERY LVL 3: CPT | Performed by: RADIOLOGY

## 2022-11-01 ENCOUNTER — HOSPITAL ENCOUNTER (EMERGENCY)
Facility: HOSPITAL | Age: 82
Discharge: HOME | End: 2022-11-01
Attending: EMERGENCY MEDICINE
Payer: MEDICARE

## 2022-11-01 ENCOUNTER — TELEPHONE (OUTPATIENT)
Dept: HEMATOLOGY/ONCOLOGY | Facility: CLINIC | Age: 82
End: 2022-11-01
Payer: MEDICARE

## 2022-11-01 VITALS
OXYGEN SATURATION: 100 % | DIASTOLIC BLOOD PRESSURE: 74 MMHG | HEART RATE: 67 BPM | WEIGHT: 153 LBS | RESPIRATION RATE: 18 BRPM | TEMPERATURE: 98 F | BODY MASS INDEX: 27.98 KG/M2 | SYSTOLIC BLOOD PRESSURE: 185 MMHG

## 2022-11-01 DIAGNOSIS — M54.50 ACUTE LEFT-SIDED LOW BACK PAIN WITHOUT SCIATICA: Primary | ICD-10-CM

## 2022-11-01 LAB
BACTERIA #/AREA URNS HPF: ABNORMAL /HPF
BILIRUB UR QL STRIP: NEGATIVE
CLARITY UR: CLEAR
COLOR UR: COLORLESS
GLUCOSE UR QL STRIP: NEGATIVE
HGB UR QL STRIP: NEGATIVE
KETONES UR QL STRIP: NEGATIVE
LEUKOCYTE ESTERASE UR QL STRIP: ABNORMAL
MICROSCOPIC COMMENT: ABNORMAL
NITRITE UR QL STRIP: NEGATIVE
PH UR STRIP: 6 [PH] (ref 5–8)
PROT UR QL STRIP: NEGATIVE
RBC #/AREA URNS HPF: 0 /HPF (ref 0–4)
SP GR UR STRIP: 1.01 (ref 1–1.03)
SQUAMOUS #/AREA URNS HPF: 1 /HPF
URN SPEC COLLECT METH UR: ABNORMAL
UROBILINOGEN UR STRIP-ACNC: NEGATIVE EU/DL
WBC #/AREA URNS HPF: 3 /HPF (ref 0–5)
YEAST URNS QL MICRO: ABNORMAL

## 2022-11-01 PROCEDURE — 99284 EMERGENCY DEPT VISIT MOD MDM: CPT | Mod: 25

## 2022-11-01 PROCEDURE — 81000 URINALYSIS NONAUTO W/SCOPE: CPT | Performed by: EMERGENCY MEDICINE

## 2022-11-01 RX ORDER — IBUPROFEN 600 MG/1
600 TABLET ORAL EVERY 8 HOURS PRN
Qty: 30 TABLET | Refills: 0 | Status: SHIPPED | OUTPATIENT
Start: 2022-11-01 | End: 2022-11-01 | Stop reason: SDUPTHER

## 2022-11-01 RX ORDER — METHOCARBAMOL 500 MG/1
1000 TABLET, FILM COATED ORAL 3 TIMES DAILY PRN
Qty: 30 TABLET | Refills: 0 | Status: SHIPPED | OUTPATIENT
Start: 2022-11-01 | End: 2022-11-01 | Stop reason: SDUPTHER

## 2022-11-01 RX ORDER — METHOCARBAMOL 500 MG/1
1000 TABLET, FILM COATED ORAL 3 TIMES DAILY PRN
Qty: 30 TABLET | Refills: 0 | Status: SHIPPED | OUTPATIENT
Start: 2022-11-01 | End: 2022-11-06

## 2022-11-01 RX ORDER — IBUPROFEN 600 MG/1
600 TABLET ORAL EVERY 8 HOURS PRN
Qty: 30 TABLET | Refills: 0 | Status: SHIPPED | OUTPATIENT
Start: 2022-11-01 | End: 2023-03-08

## 2022-11-01 NOTE — ED PROVIDER NOTES
NAME:  Caro Powell  CSN:     [id number]  MRN:    [id number]  ADMIT DATE: 11/1/2022        eMERGENCY dEPARTMENT eNCOUnter    CHIEF COMPLAINT    Chief Complaint   Patient presents with    Back Pain     Left side back pain that began yesterday, denies falls/trauma  deneis pain with urination, walks with cane at baseline        HPI      Caro Powell is a 82 y.o. female who presents to the ED for evaluation of left lower back pain that started yesterday.  Patient denies any recent falls or trauma.  She also denies any flank pain or dysuria or hematuria.  She tried to apply an icy Hot patch for pain with no relief.          ALLERGIES    Review of patient's allergies indicates:   Allergen Reactions    Codeine Nausea Only and Other (See Comments)     Can Take Tylenol, hrdrocodone       PAST MEDICAL HISTORY  Past Medical History:   Diagnosis Date    Anticoagulant long-term use     Arthritis     Atrial flutter     Calcium nephrolithiasis 2007    ckd 3    Diabetes mellitus, type 2     Diabetic peripheral neuropathy associated with type 2 diabetes mellitus     Difficult intubation     NARROW AIRWAY    Hypertension     Invasive ductal carcinoma of left breast 7/6/2022    Pulmonary aspiration of gastric contents     Shingles        SURGICAL HISTORY    Past Surgical History:   Procedure Laterality Date    ANGIOGRAPHY OF LOWER EXTREMITY N/A 10/21/2021    Procedure: Angiogram Extremity Unilateral;  Surgeon: Dre Martino MD;  Location: [facility] CATH LAB/EP;  Service: Cardiology;  Laterality: N/A;    ANGIOGRAPHY OF LOWER EXTREMITY Right 11/10/2021    Procedure: Angiogram Extremity Unilateral;  Surgeon: Ben Rooney MD;  Location: [facility] CATH LAB/EP;  Service: Cardiology;  Laterality: Right;    AXILLARY NODE DISSECTION Right 8/15/2022    Procedure: LYMPHADENECTOMY, AXILLARY RIGHT;  Surgeon: RADHA Quinn MD;  Location: [facility] OR;  Service: General;  Laterality: Right;    COLONOSCOPY  11/28/2011    sigmoid  diverticulosis, external hemorrhoids    DEBRIDEMENT Right 10/20/2021    Procedure: DEBRIDEMENT;  Surgeon: Ava Atkins DPM;  Location: [facility] OR;  Service: Podiatry;  Laterality: Right;    ENDOSCOPIC GASTROCNEMIUS RECESSION Right 9/10/2019    Procedure: RECESSION, GASTROCNEMIUS, ENDOSCOPIC;  Surgeon: Derek Jose DPM;  Location: [facility] OR;  Service: Podiatry;  Laterality: Right;  Arthrex center line (ron notified)  Video    EXTRACORPOREAL SHOCK WAVE LITHOTRIPSY      FLEXOR TENOTOMY Right 10/20/2021    Procedure: TENOTOMY, FLEXOR 3rd toe;  Surgeon: Ava Atkins DPM;  Location: [facility] OR;  Service: Podiatry;  Laterality: Right;    HYSTERECTOMY      INJECTION FOR SENTINEL NODE IDENTIFICATION Left 8/15/2022    Procedure: INJECTION, FOR SENTINEL NODE IDENTIFICATION;  Surgeon: RADHA Quinn MD;  Location: [facility];  Service: General;  Laterality: Left;    MASTECTOMY Bilateral 8/15/2022    Procedure: MASTECTOMY BILATERAL  / BREAST;  Surgeon: RADHA Quinn MD;  Location: [facility];  Service: General;  Laterality: Bilateral;  5 HOURS / EMAIL SENT 8-11 @ 9:02 LK    SENTINEL LYMPH NODE BIOPSY Left 8/15/2022    Procedure: BIOPSY, LYMPH NODE, SENTINEL LEFT;  Surgeon: RADHA Quinn MD;  Location: [facility];  Service: General;  Laterality: Left;    SHOULDER SURGERY Left     TOE AMPUTATION Right 05/22/2017    5th toe    TOE AMPUTATION Right 10/20/2021    Procedure: AMPUTATION, TOE;  Surgeon: Ava Atkins DPM;  Location: [facility];  Service: Podiatry;  Laterality: Right;    TONSILLECTOMY         SOCIAL HISTORY    Social History     Socioeconomic History    Marital status:    Tobacco Use    Smoking status: Never     Passive exposure: Never    Smokeless tobacco: Never   Substance and Sexual Activity    Alcohol use: No    Drug use: No    Sexual activity: Yes       FAMILY HISTORY    Family History   Problem Relation Age of Onset    Diabetes Mother     Heart failure Father     Breast cancer Sister 69         Genetic testing negative    Kidney failure Brother     Breast cancer Maternal Aunt         early 40s       REVIEW OF SYSTEMS   ROS  All Systems otherwise negative except as noted in the History of Present Illness.        PHYSICAL EXAM    Reviewed Triage Note  VITAL SIGNS:   ED Triage Vitals [11/01/22 1657]   Enc Vitals Group      BP (!) 185/74      Pulse 67      Resp 18      Temp 97.8 °F (36.6 °C)      Temp src Oral      SpO2 100 %      Weight 153 lb      Height       Head Circumference       Peak Flow       Pain Score       Pain Loc       Pain Edu?       Excl. in GC?        Patient Vitals for the past 24 hrs:   BP Temp Temp src Pulse Resp SpO2 Weight   11/01/22 1657 (!) 185/74 97.8 °F (36.6 °C) Oral 67 18 100 % 69.4 kg (153 lb)           Physical Exam    Constitutional:  Well-developed, well-nourished. No acute distress  HENT:  Normocephalic, atraumatic.  Eyes:  EOMI. Conjunctiva normal without discharge.   Neck: Normal range of motion.No stridor. No meningismus.   Respiratory:  No respiratory distress, retractions, or conversational dyspnea.   Cardiovascular:  Normal heart rate. No pitting lower extremity edema.   Musculoskeletal:  Tenderness to the left lower back.   Integument:  Warm and dry. No rash.  Neurologic:  Normal motor function. No focal deficits noted. Alert and Interactive.  Psychiatric:  Affect normal. Mood normal.         LABS  Pertinent labs reviewed. (See chart for details)   Labs Reviewed   URINALYSIS, REFLEX TO URINE CULTURE - Abnormal; Notable for the following components:       Result Value    Color, UA Colorless (*)     Leukocytes, UA 1+ (*)     All other components within normal limits    Narrative:     Specimen Source->Urine   URINALYSIS MICROSCOPIC - Abnormal; Notable for the following components:    Yeast, UA Rare (*)     All other components within normal limits    Narrative:     Specimen Source->Urine         RADIOLOGY    Imaging Results    None         PROCEDURES    Procedures      EKG      Interpreted by ERP:         ED COURSE & MEDICAL DECISION MAKING    Pertinent & Imaging studies reviewed. (See chart for details and specific orders.)        Medications - No data to display              DISPOSITION  Patient discharged in stable condition at No discharge date for patient encounter.      DISCHARGE INSTRUCTIONS & MEDS    @DISCHARGEMEDSLIST(<NOROUTINE> error)@      New Prescriptions    IBUPROFEN (ADVIL,MOTRIN) 600 MG TABLET    Take 1 tablet (600 mg total) by mouth every 8 (eight) hours as needed for Pain.    METHOCARBAMOL (ROBAXIN) 500 MG TAB    Take 2 tablets (1,000 mg total) by mouth 3 (three) times daily as needed (muscle pain).           FINAL IMPRESSION    1. Acute left-sided low back pain without sciatica              Blood Pressure Follow-Up Advised  Patient advised to follow up with PCP within 3-5 days for blood pressure re-check if blood pressure is equal to or greater than 120/80.         Critical care time spent with this patient (not including separately billable items) was  0 minutes.     DISCLAIMER: This note was prepared with Dragon NaturallySpeaking voice recognition transcription software. Garbled syntax, mangled pronouns, and other bizarre constructions may be attributed to that software system.      Bhaskar Mcgarry MD  11/01/2022  6:29 PM           Bhaskar Mcgarry MD  11/01/22 2038

## 2022-11-01 NOTE — TELEPHONE ENCOUNTER
Spoke with the pt to see what was going on with her, pt state that she has been in pain since yesterday. Patient wanted to know if the doctor had to tell her to go to the ER for the pain that she is in. I advise the pt that if she is in that much pain please don't wait to be told to go to the ER

## 2022-11-02 ENCOUNTER — DOCUMENTATION ONLY (OUTPATIENT)
Dept: RADIATION ONCOLOGY | Facility: CLINIC | Age: 82
End: 2022-11-02
Payer: MEDICARE

## 2022-11-02 NOTE — PLAN OF CARE
Day 12 of outpatient radiation to the right chest wall. Pt doing well with treatment. No skin issues seen or reported. Using Miaderm cream.

## 2022-11-03 ENCOUNTER — DOCUMENTATION ONLY (OUTPATIENT)
Dept: RADIATION THERAPY | Facility: HOSPITAL | Age: 82
End: 2022-11-03
Payer: MEDICARE

## 2022-11-03 NOTE — PROGRESS NOTES
Received a call this morning from patient who stated that she has completed the application forms Jeremy BELLA and [facility]) and will bring them with her when she comes in for her radiation treatment this afternoon and leave them with the staff there for me to . Informed her that this will be fine and I will pick them up tomorrow as I will be off this afternoon; and that she can tell the staff they are for me. Sent a message via Epic to Ruel Hernandez RN, in Rad Onc re: above. Will continue to follow and assist as needs are identified.

## 2022-11-05 ENCOUNTER — PATIENT MESSAGE (OUTPATIENT)
Dept: HEMATOLOGY/ONCOLOGY | Facility: CLINIC | Age: 82
End: 2022-11-05
Payer: MEDICARE

## 2022-11-07 ENCOUNTER — TELEPHONE (OUTPATIENT)
Dept: PODIATRY | Facility: CLINIC | Age: 82
End: 2022-11-07
Payer: MEDICARE

## 2022-11-07 NOTE — TELEPHONE ENCOUNTER
----- Message from Shayna Don sent at 11/7/2022 11:24 AM CST -----  Type:  Sooner Apoointment Request    Caller is requesting a sooner appointment.  Caller declined first available appointment listed below.  Caller will not accept being placed on the waitlist and is requesting a message be sent to doctor.  Name of Caller: pt  When is the first available appointment? Dec   Symptoms: bleeding   Would the patient rather a call back or a response via [facility]?   Best Call Back Number:[phone] (M)   Additional Information: pt thinks she has an ulcer or sore but it is bleeding. Pt has her last radiation sched today & tomorrow so Wedn.

## 2022-11-08 ENCOUNTER — TELEPHONE (OUTPATIENT)
Dept: HEMATOLOGY/ONCOLOGY | Facility: CLINIC | Age: 82
End: 2022-11-08
Payer: MEDICARE

## 2022-11-08 NOTE — TELEPHONE ENCOUNTER
----- Message from Germaine Miller sent at 11/8/2022  9:34 AM CST -----  Type:  Needs Medical Advice    Who Called: pt  Symptoms (please be specific): opt is requesting to get a return call pt has questions about a new medication     Would the patient rather a call back or a response via [employer]? call  Best Call Back Number: [phone]  Additional Information:

## 2022-11-09 ENCOUNTER — OFFICE VISIT (OUTPATIENT)
Dept: PODIATRY | Facility: CLINIC | Age: 82
End: 2022-11-09
Payer: MEDICARE

## 2022-11-09 ENCOUNTER — DOCUMENTATION ONLY (OUTPATIENT)
Dept: RADIATION ONCOLOGY | Facility: CLINIC | Age: 82
End: 2022-11-09
Payer: MEDICARE

## 2022-11-09 VITALS
SYSTOLIC BLOOD PRESSURE: 117 MMHG | BODY MASS INDEX: 28.16 KG/M2 | DIASTOLIC BLOOD PRESSURE: 60 MMHG | HEART RATE: 77 BPM | HEIGHT: 62 IN | WEIGHT: 153 LBS

## 2022-11-09 DIAGNOSIS — E11.42 TYPE 2 DIABETES MELLITUS WITH PERIPHERAL NEUROPATHY: ICD-10-CM

## 2022-11-09 DIAGNOSIS — B35.1 ONYCHOMYCOSIS: ICD-10-CM

## 2022-11-09 DIAGNOSIS — I73.9 PAD (PERIPHERAL ARTERY DISEASE): Primary | ICD-10-CM

## 2022-11-09 DIAGNOSIS — L97.502 SKIN ULCER OF TOE WITH FAT LAYER EXPOSED, UNSPECIFIED LATERALITY: ICD-10-CM

## 2022-11-09 DIAGNOSIS — L84 CORN OR CALLUS: ICD-10-CM

## 2022-11-09 PROCEDURE — 1159F MED LIST DOCD IN RCRD: CPT | Mod: CPTII,S$GLB,, | Performed by: STUDENT IN AN ORGANIZED HEALTH CARE EDUCATION/TRAINING PROGRAM

## 2022-11-09 PROCEDURE — 1126F AMNT PAIN NOTED NONE PRSNT: CPT | Mod: CPTII,S$GLB,, | Performed by: STUDENT IN AN ORGANIZED HEALTH CARE EDUCATION/TRAINING PROGRAM

## 2022-11-09 PROCEDURE — 3288F FALL RISK ASSESSMENT DOCD: CPT | Mod: CPTII,S$GLB,, | Performed by: STUDENT IN AN ORGANIZED HEALTH CARE EDUCATION/TRAINING PROGRAM

## 2022-11-09 PROCEDURE — 3288F PR FALLS RISK ASSESSMENT DOCUMENTED: ICD-10-PCS | Mod: CPTII,S$GLB,, | Performed by: STUDENT IN AN ORGANIZED HEALTH CARE EDUCATION/TRAINING PROGRAM

## 2022-11-09 PROCEDURE — 99999 PR PBB SHADOW E&M-EST. PATIENT-LVL V: CPT | Mod: PBBFAC,,, | Performed by: STUDENT IN AN ORGANIZED HEALTH CARE EDUCATION/TRAINING PROGRAM

## 2022-11-09 PROCEDURE — 99999 PR PBB SHADOW E&M-EST. PATIENT-LVL V: ICD-10-PCS | Mod: PBBFAC,,, | Performed by: STUDENT IN AN ORGANIZED HEALTH CARE EDUCATION/TRAINING PROGRAM

## 2022-11-09 PROCEDURE — 1101F PT FALLS ASSESS-DOCD LE1/YR: CPT | Mod: CPTII,S$GLB,, | Performed by: STUDENT IN AN ORGANIZED HEALTH CARE EDUCATION/TRAINING PROGRAM

## 2022-11-09 PROCEDURE — 3074F PR MOST RECENT SYSTOLIC BLOOD PRESSURE < 130 MM HG: ICD-10-PCS | Mod: CPTII,S$GLB,, | Performed by: STUDENT IN AN ORGANIZED HEALTH CARE EDUCATION/TRAINING PROGRAM

## 2022-11-09 PROCEDURE — 11042 WOUND DEBRIDEMENT: ICD-10-PCS | Mod: 59,S$GLB,, | Performed by: STUDENT IN AN ORGANIZED HEALTH CARE EDUCATION/TRAINING PROGRAM

## 2022-11-09 PROCEDURE — 11721 ROUTINE FOOT CARE: ICD-10-PCS | Mod: 59,Q7,S$GLB, | Performed by: STUDENT IN AN ORGANIZED HEALTH CARE EDUCATION/TRAINING PROGRAM

## 2022-11-09 PROCEDURE — 11042 DBRDMT SUBQ TIS 1ST 20SQCM/<: CPT | Mod: 59,S$GLB,, | Performed by: STUDENT IN AN ORGANIZED HEALTH CARE EDUCATION/TRAINING PROGRAM

## 2022-11-09 PROCEDURE — 1126F PR PAIN SEVERITY QUANTIFIED, NO PAIN PRESENT: ICD-10-PCS | Mod: CPTII,S$GLB,, | Performed by: STUDENT IN AN ORGANIZED HEALTH CARE EDUCATION/TRAINING PROGRAM

## 2022-11-09 PROCEDURE — 11721 DEBRIDE NAIL 6 OR MORE: CPT | Mod: 59,Q7,S$GLB, | Performed by: STUDENT IN AN ORGANIZED HEALTH CARE EDUCATION/TRAINING PROGRAM

## 2022-11-09 PROCEDURE — 3074F SYST BP LT 130 MM HG: CPT | Mod: CPTII,S$GLB,, | Performed by: STUDENT IN AN ORGANIZED HEALTH CARE EDUCATION/TRAINING PROGRAM

## 2022-11-09 PROCEDURE — 99214 PR OFFICE/OUTPT VISIT, EST, LEVL IV, 30-39 MIN: ICD-10-PCS | Mod: 25,S$GLB,, | Performed by: STUDENT IN AN ORGANIZED HEALTH CARE EDUCATION/TRAINING PROGRAM

## 2022-11-09 PROCEDURE — 1159F PR MEDICATION LIST DOCUMENTED IN MEDICAL RECORD: ICD-10-PCS | Mod: CPTII,S$GLB,, | Performed by: STUDENT IN AN ORGANIZED HEALTH CARE EDUCATION/TRAINING PROGRAM

## 2022-11-09 PROCEDURE — 11056 PARNG/CUTG B9 HYPRKR LES 2-4: CPT | Mod: Q7,S$GLB,, | Performed by: STUDENT IN AN ORGANIZED HEALTH CARE EDUCATION/TRAINING PROGRAM

## 2022-11-09 PROCEDURE — 3078F PR MOST RECENT DIASTOLIC BLOOD PRESSURE < 80 MM HG: ICD-10-PCS | Mod: CPTII,S$GLB,, | Performed by: STUDENT IN AN ORGANIZED HEALTH CARE EDUCATION/TRAINING PROGRAM

## 2022-11-09 PROCEDURE — 99214 OFFICE O/P EST MOD 30 MIN: CPT | Mod: 25,S$GLB,, | Performed by: STUDENT IN AN ORGANIZED HEALTH CARE EDUCATION/TRAINING PROGRAM

## 2022-11-09 PROCEDURE — 11056 ROUTINE FOOT CARE: ICD-10-PCS | Mod: Q7,S$GLB,, | Performed by: STUDENT IN AN ORGANIZED HEALTH CARE EDUCATION/TRAINING PROGRAM

## 2022-11-09 PROCEDURE — 1101F PR PT FALLS ASSESS DOC 0-1 FALLS W/OUT INJ PAST YR: ICD-10-PCS | Mod: CPTII,S$GLB,, | Performed by: STUDENT IN AN ORGANIZED HEALTH CARE EDUCATION/TRAINING PROGRAM

## 2022-11-09 PROCEDURE — 3078F DIAST BP <80 MM HG: CPT | Mod: CPTII,S$GLB,, | Performed by: STUDENT IN AN ORGANIZED HEALTH CARE EDUCATION/TRAINING PROGRAM

## 2022-11-09 NOTE — PROCEDURES
"Routine Foot Care    Date/Time: 11/9/2022 8:30 AM  Performed by: Ava Atkins DPM  Authorized by: Ava Atkins DPM     Time out: Immediately prior to procedure a "time out" was called to verify the correct patient, procedure, equipment, support staff and site/side marked as required.    Consent Done?:  Yes (Verbal)  Hyperkeratotic Skin Lesions?: Yes    Number of trimmed lesions:  2  Location(s):  Right 1st Toe and Right 1st Metatarsal Head    Nail Care Type:  Debride(Left 1st Toe, Left 3rd Toe, Left 2nd Toe, Left 4th Toe, Left 5th Toe, Right 1st Toe, Right 3rd Toe and Right 4th Toe)  Patient tolerance:  Patient tolerated the procedure well with no immediate complications  "

## 2022-11-09 NOTE — PROCEDURES
Wound Debridement    Date/Time: 11/9/2022 8:30 AM  Performed by: Ava Atkins DPM  Authorized by: Ava Atkins DPM     Consent Done?:  Yes (Verbal)  Local anesthesia used?: No      Wound Details:    Location:  Right foot    Location:  Right 1st Metatarsal Head    Type of Debridement:  Excisional       Length (cm):  0.8       Area (sq cm):  0.4       Width (cm):  0.5       Percent Debrided (%):  100       Depth (cm):  0.2       Total Area Debrided (sq cm):  0.4    Depth of debridement:  Subcutaneous tissue    Tissue debrided:  Subcutaneous and Other    Devitalized tissue debrided:  Biofilm, Callus and Fibrin    Instruments:  Blade and Curette    2nd Wound Details:     Location:  Right foot    Location:  Right 1st Toe    Location:  Right 1st Toe    Type of Debridement:  Excisional       Length (cm):  0.5       Area (sq cm):  0.25       Width (cm):  0.5       Percent Debrided (%):  100       Depth (cm):  0.2       Total Area Debrided (sq cm):  0.25    Depth of debridement:  Subcutaneous tissue    Tissue debrided:  Subcutaneous    Devitalized tissue debrided:  Callus    Instruments:  Blade and Curette    Bleeding:  Minimal  Patient tolerance:  Patient tolerated the procedure well with no immediate complications     No cultures were taken during this visit

## 2022-11-09 NOTE — PLAN OF CARE
Outpatient radiation to the right chest wall completed 11/8/22. Pt to f/u with roberta Mathew in 4-6 weeks.

## 2022-11-09 NOTE — PROGRESS NOTES
Subjective:      Patient ID: Caro Powell is a 82 y.o. female.    Chief Complaint: Callouses (Callouses to right foot with concern for wound)    Caro is a 82 y.o. female who presents to the clinic for evaluation and treatment of high risk feet. 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 The patient's chief complaint is long, thick toenails. This patient has documented high risk feet requiring routine maintenance secondary to diabetes mellitis and those secondary complications of diabetes, as mentioned.. Patient states she is following closely with Dr. Rooney for PAD. Relates has new SAS shoes which she put her diabetic shoes in and her feet are looking much better. No new pedal complaints.     8/3/22: Seen today for RFC and annual diabetic foot exam. Denies open wounds. No new pedal complaints. States has upcoming breast surgery.     11/9/22: Seen today for routine foot care. States she noticed blood in her sock and is concerned for a new diabetic foot ulcer.    PCP: Brayan Penny MD    Date Last Seen by PCP: 6/21/22    Current shoe gear:  SAS diabetic shoes    Hemoglobin A1C   Date Value Ref Range Status   05/24/2022 7.3 (H) 4.0 - 5.6 % Final     Comment:     ADA Screening Guidelines:  5.7-6.4%  Consistent with prediabetes  >or=6.5%  Consistent with diabetes    High levels of fetal hemoglobin interfere with the HbA1C  assay. Heterozygous hemoglobin variants (HbS, HgC, etc)do  not significantly interfere with this assay.   However, presence of multiple variants may affect accuracy.     10/18/2021 6.7 (H) 4.0 - 5.6 % Final     Comment:     ADA Screening Guidelines:  5.7-6.4%   Consistent with prediabetes  >or=6.5%  Consistent with diabetes    High levels of fetal hemoglobin interfere with the HbA1C  assay. Heterozygous hemoglobin variants (HbS, HgC, etc)do  not significantly interfere with this assay.   However, presence of multiple variants may affect accuracy.     01/22/2021 6.6 (H) 4.0 - 5.6 % Final     Comment:     ADA Screening Guidelines:  5.7-6.4%  Consistent with prediabetes  >or=6.5%  Consistent with diabetes  High levels of fetal hemoglobin interfere with the HbA1C  assay. Heterozygous hemoglobin variants (HbS, HgC, etc)do  not significantly interfere with this assay.   However, presence of multiple variants may affect accuracy.     01/12/2018 8.3 % Final       Review of Systems   Constitutional: Negative for chills, decreased appetite, diaphoresis and fever.   HENT:  Negative for congestion and hearing loss.    Cardiovascular:  Negative for chest pain, claudication, leg swelling and syncope.   Respiratory:  Negative for cough and shortness of breath.    Skin:  Positive for dry skin, nail changes and poor wound healing. Negative for color change, flushing, itching and rash.   Musculoskeletal:  Positive for arthritis. Negative for joint pain and joint swelling.   Gastrointestinal:  Negative for nausea and vomiting.   Neurological:  Positive for numbness. Negative for focal weakness, paresthesias and weakness.   Psychiatric/Behavioral:  Negative for altered mental status. The patient is not nervous/anxious.          Objective:      Physical Exam  Constitutional:       General: She is not in acute distress.     Appearance: She is well-developed. She is not diaphoretic.   Cardiovascular:      Comments: Dorsalis pedis and posterior tibial pulses are diminished. Skin temperature is within normal limits. Toes are cool to touch and feet are warm proximally. Hair growth is diminished. Skin is mildly atrophic and with mild hyperpigmentation. Mild edema noted, bilaterally. Telangiectasias  bilaterally.  Musculoskeletal:         General: No tenderness.      Comments: Adequate joint range of motion without pain, limitation, nor crepitation to bilateral feet and ankle joints. Muscle strength is 5/5 in all groups bilaterally.    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 HAV, bilaterally. Semi rigid hammertoes to remaining digits.    Lymphadenopathy:      Comments: Negative lymphangitic streaking    Skin:     General: Skin is warm and dry.      Findings: No lesion.      Comments: Skin is warm and dry, no acute signs of infection noted. No open wounds, macerations or hyperkeratotic lesions, bilaterally.     Calluses to medial 1st MTPJ and hallux of right foot, upon debridement with ulcer. Hallux ulcer measures 0.5x0.5x0.2cm with granular base and hyperkeratotic borders, mild serosanguinous draiange. No signs of infection noted. Ulcer sub 1st metatarsal head measures 0.8x0.5x0.3cm with granular base and hyperkeratotic borders. Mild serosanguinous drainage. No purulence, does not probe to bone. No signs of infection    Toenails are thickened by 2-4 mm's, dystrophic, and are darkened in coloration with subungual fungal debris, bilaterally.  Skin is very dry, bilaterally.      Neurological:      Mental Status: She is alert and oriented to person, place, and time.      Sensory: Sensory deficit present.      Motor: No abnormal muscle tone.      Comments: Light touch is diminished. Monetta-Chucho 5.07 monofilamant testing is diminished. Vibratory sensation  is diminished, bilaterally    Psychiatric:         Behavior: Behavior normal.         Thought Content: Thought content normal.         Judgment: Judgment normal.             Assessment:       Encounter Diagnoses   Name Primary?    PAD (peripheral artery disease) Yes    Onychomycosis     Type 2 diabetes mellitus with peripheral neuropathy     Skin ulcer of toe with fat layer exposed, unspecified laterality           Plan:       Caro was seen today for callouses.    Diagnoses and all orders for this visit:    PAD (peripheral artery disease)  -     SUBSEQUENT HOME HEALTH ORDERS    Onychomycosis  -     SUBSEQUENT HOME HEALTH ORDERS    Type 2 diabetes mellitus with peripheral neuropathy  -     SUBSEQUENT HOME HEALTH ORDERS    Skin ulcer of toe with fat layer exposed, unspecified laterality  -     SUBSEQUENT HOME HEALTH ORDERS    I counseled the patient on her conditions, their implications and medical management.  RFC per attached note  Ulcer debridement performed, see procedure note. Sites dressed with hydrafera blue ready, pete foam, cast padding and flexinet, secured in darco shoe. Home health orders to change dressings 2x per week  Rest, elevate. Recommend PWB to heel for short distances only  Shoe inspection. Diabetic Foot Education. Patient reminded of the importance of good nutrition and blood sugar control to help prevent podiatric complications of diabetes. Patient instructed on proper foot hygeine. We discussed wearing proper shoe gear, daily foot inspections, never walking without protective shoe gear, never putting sharp instruments to feet, routine podiatric nail visits    HH to change dressings as follows:  Remove foot dressings.   Cleanse wound with normal saline or wound . Dry well.    Apply gentian violet to davonte wound maceration if needed.   Apply hydrafera blue ready to toe wound(s).   Next apply foam pad x 3 directly on top of wound bed   Place cast padding between toes to prevent pressure ulcers .  Top with football dressing consisting 3 rolls of cast padding. Top with coban or ace wrap  .  Change 2  times per week and PRN  Return to clinic in 2-4 weeks, sooner PRN

## 2022-11-11 PROCEDURE — G0180 PR HOME HEALTH MD CERTIFICATION: ICD-10-PCS | Mod: ,,, | Performed by: STUDENT IN AN ORGANIZED HEALTH CARE EDUCATION/TRAINING PROGRAM

## 2022-11-11 PROCEDURE — G0180 MD CERTIFICATION HHA PATIENT: HCPCS | Mod: ,,, | Performed by: STUDENT IN AN ORGANIZED HEALTH CARE EDUCATION/TRAINING PROGRAM

## 2022-11-14 ENCOUNTER — TELEPHONE (OUTPATIENT)
Dept: PODIATRY | Facility: CLINIC | Age: 82
End: 2022-11-14
Payer: MEDICARE

## 2022-11-14 NOTE — TELEPHONE ENCOUNTER
Called pt back and she told me the nurse came to her on Friday and she said she will be back today or tomorrow, nobody showed today. Told her she may is coming tomorrow and that we don't have any impact in that

## 2022-11-14 NOTE — TELEPHONE ENCOUNTER
----- Message from Lisbeth Allen, Patient Care Assistant sent at 11/14/2022 11:55 AM CST -----  Type:  Needs Medical Advice    Who Called:  pt  Symptoms (please be specific):  patient would like a call back to discuss wound care  changes   Would the patient rather a call back or a response via [facility]?  call  Best Call Back Number:  [phone]  Additional Information:

## 2022-11-16 ENCOUNTER — TELEPHONE (OUTPATIENT)
Dept: CARDIOLOGY | Facility: CLINIC | Age: 82
End: 2022-11-16
Payer: MEDICARE

## 2022-11-16 ENCOUNTER — OFFICE VISIT (OUTPATIENT)
Dept: CARDIOLOGY | Facility: CLINIC | Age: 82
End: 2022-11-16
Payer: MEDICARE

## 2022-11-16 VITALS
WEIGHT: 153 LBS | HEART RATE: 80 BPM | SYSTOLIC BLOOD PRESSURE: 130 MMHG | HEIGHT: 62 IN | DIASTOLIC BLOOD PRESSURE: 74 MMHG | BODY MASS INDEX: 28.16 KG/M2

## 2022-11-16 DIAGNOSIS — I73.9 PAD (PERIPHERAL ARTERY DISEASE): Primary | ICD-10-CM

## 2022-11-16 DIAGNOSIS — I10 ESSENTIAL HYPERTENSION: ICD-10-CM

## 2022-11-16 DIAGNOSIS — I48.92 ATRIAL FLUTTER, UNSPECIFIED TYPE: ICD-10-CM

## 2022-11-16 PROCEDURE — 99213 PR OFFICE/OUTPT VISIT, EST, LEVL III, 20-29 MIN: ICD-10-PCS | Mod: S$GLB,,, | Performed by: INTERNAL MEDICINE

## 2022-11-16 PROCEDURE — 1126F AMNT PAIN NOTED NONE PRSNT: CPT | Mod: CPTII,S$GLB,, | Performed by: INTERNAL MEDICINE

## 2022-11-16 PROCEDURE — 99213 OFFICE O/P EST LOW 20 MIN: CPT | Mod: S$GLB,,, | Performed by: INTERNAL MEDICINE

## 2022-11-16 PROCEDURE — 1101F PR PT FALLS ASSESS DOC 0-1 FALLS W/OUT INJ PAST YR: ICD-10-PCS | Mod: CPTII,S$GLB,, | Performed by: INTERNAL MEDICINE

## 2022-11-16 PROCEDURE — 3078F DIAST BP <80 MM HG: CPT | Mod: CPTII,S$GLB,, | Performed by: INTERNAL MEDICINE

## 2022-11-16 PROCEDURE — 3075F SYST BP GE 130 - 139MM HG: CPT | Mod: CPTII,S$GLB,, | Performed by: INTERNAL MEDICINE

## 2022-11-16 PROCEDURE — 1101F PT FALLS ASSESS-DOCD LE1/YR: CPT | Mod: CPTII,S$GLB,, | Performed by: INTERNAL MEDICINE

## 2022-11-16 PROCEDURE — 99999 PR PBB SHADOW E&M-EST. PATIENT-LVL IV: CPT | Mod: PBBFAC,,, | Performed by: INTERNAL MEDICINE

## 2022-11-16 PROCEDURE — 3288F FALL RISK ASSESSMENT DOCD: CPT | Mod: CPTII,S$GLB,, | Performed by: INTERNAL MEDICINE

## 2022-11-16 PROCEDURE — 1159F PR MEDICATION LIST DOCUMENTED IN MEDICAL RECORD: ICD-10-PCS | Mod: CPTII,S$GLB,, | Performed by: INTERNAL MEDICINE

## 2022-11-16 PROCEDURE — 1126F PR PAIN SEVERITY QUANTIFIED, NO PAIN PRESENT: ICD-10-PCS | Mod: CPTII,S$GLB,, | Performed by: INTERNAL MEDICINE

## 2022-11-16 PROCEDURE — 3288F PR FALLS RISK ASSESSMENT DOCUMENTED: ICD-10-PCS | Mod: CPTII,S$GLB,, | Performed by: INTERNAL MEDICINE

## 2022-11-16 PROCEDURE — 1160F RVW MEDS BY RX/DR IN RCRD: CPT | Mod: CPTII,S$GLB,, | Performed by: INTERNAL MEDICINE

## 2022-11-16 PROCEDURE — 1159F MED LIST DOCD IN RCRD: CPT | Mod: CPTII,S$GLB,, | Performed by: INTERNAL MEDICINE

## 2022-11-16 PROCEDURE — 3078F PR MOST RECENT DIASTOLIC BLOOD PRESSURE < 80 MM HG: ICD-10-PCS | Mod: CPTII,S$GLB,, | Performed by: INTERNAL MEDICINE

## 2022-11-16 PROCEDURE — 99999 PR PBB SHADOW E&M-EST. PATIENT-LVL IV: ICD-10-PCS | Mod: PBBFAC,,, | Performed by: INTERNAL MEDICINE

## 2022-11-16 PROCEDURE — 1160F PR REVIEW ALL MEDS BY PRESCRIBER/CLIN PHARMACIST DOCUMENTED: ICD-10-PCS | Mod: CPTII,S$GLB,, | Performed by: INTERNAL MEDICINE

## 2022-11-16 PROCEDURE — 3075F PR MOST RECENT SYSTOLIC BLOOD PRESS GE 130-139MM HG: ICD-10-PCS | Mod: CPTII,S$GLB,, | Performed by: INTERNAL MEDICINE

## 2022-11-16 NOTE — TELEPHONE ENCOUNTER
Pt is currently in office today for f/u appt w Dr. Paulino and is in need of Abbey Akins, CHARAN already sent message over to Pt assistance program for assistance for pt     Called in a 2 wk supply for pt to the [facility] and pt is aware has to pay $46.21 out of pocket and should be picking up shortly         V/a

## 2022-11-16 NOTE — PROGRESS NOTES
[facility] Cardiology 701     SUBJECTIVE:     History of Present Illness:  Patient is a 82 y.o. female presents with atrial flutter and PVD.   Primary Diagnosis:    1. hypertension  2. DM  3. atrial flutter - resolved  4. abnormal Echo: pericardial effusion - small .     5.  PVD - Amputation right second toe and right fifth toe . S/p atherectomy 11/21 right distal vessels   6. Breast cancer: s/p mastectomy 8/22 - followed by Dr.Bonilla GLASER  Since last visit in 7/22:    1. Had breast surgery with no issues; had radiation treatment and completed this and now on medications   2. No chest pains  3. No shortness of breath; no PND or orthopnea  4. No palpitations  5. No syncope  6. No lower extremity edema on lasix as needed   7. Developed shingles after the surgery   8. Walking around now and taking care of herself and      Past Hospitalization    Review of patient's allergies indicates:   Allergen Reactions    Codeine Nausea Only and Other (See Comments)     Can Take Tylenol, hrdrocodone       Past Medical History:   Diagnosis Date    Anticoagulant long-term use     Arthritis     Atrial flutter     Calcium nephrolithiasis 2007    ckd 3    Diabetes mellitus, type 2     Diabetic peripheral neuropathy associated with type 2 diabetes mellitus     Difficult intubation     NARROW AIRWAY    Hypertension     Invasive ductal carcinoma of left breast 7/6/2022    Pulmonary aspiration of gastric contents     Shingles        Past Surgical History:   Procedure Laterality Date    ANGIOGRAPHY OF LOWER EXTREMITY N/A 10/21/2021    Procedure: Angiogram Extremity Unilateral;  Surgeon: Dre Martino MD;  Location: [facility] CATH LAB/EP;  Service: Cardiology;  Laterality: N/A;    ANGIOGRAPHY OF LOWER EXTREMITY Right 11/10/2021    Procedure: Angiogram Extremity Unilateral;  Surgeon: Ben Rooney MD;  Location: [facility] CATH LAB/EP;  Service: Cardiology;  Laterality: Right;    AXILLARY NODE DISSECTION Right 8/15/2022    Procedure:  LYMPHADENECTOMY, AXILLARY RIGHT;  Surgeon: RADHA Quinn MD;  Location: [facility];  Service: General;  Laterality: Right;    COLONOSCOPY  11/28/2011    sigmoid diverticulosis, external hemorrhoids    DEBRIDEMENT Right 10/20/2021    Procedure: DEBRIDEMENT;  Surgeon: Ava Atkins DPM;  Location: [facility] OR;  Service: Podiatry;  Laterality: Right;    ENDOSCOPIC GASTROCNEMIUS RECESSION Right 9/10/2019    Procedure: RECESSION, GASTROCNEMIUS, ENDOSCOPIC;  Surgeon: Derek Jose DPM;  Location: [facility] OR;  Service: Podiatry;  Laterality: Right;  Arthrex center line (ron notified)  Video    EXTRACORPOREAL SHOCK WAVE LITHOTRIPSY      FLEXOR TENOTOMY Right 10/20/2021    Procedure: TENOTOMY, FLEXOR 3rd toe;  Surgeon: Ava Atkins DPM;  Location: [facility] OR;  Service: Podiatry;  Laterality: Right;    HYSTERECTOMY      INJECTION FOR SENTINEL NODE IDENTIFICATION Left 8/15/2022    Procedure: INJECTION, FOR SENTINEL NODE IDENTIFICATION;  Surgeon: RADHA Quinn MD;  Location: [facility];  Service: General;  Laterality: Left;    MASTECTOMY Bilateral 8/15/2022    Procedure: MASTECTOMY BILATERAL  / BREAST;  Surgeon: RADHA Quinn MD;  Location: [facility];  Service: General;  Laterality: Bilateral;  5 HOURS / EMAIL SENT 8-11 @ 9:02 LK    SENTINEL LYMPH NODE BIOPSY Left 8/15/2022    Procedure: BIOPSY, LYMPH NODE, SENTINEL LEFT;  Surgeon: RADHA Quinn MD;  Location: [facility];  Service: General;  Laterality: Left;    SHOULDER SURGERY Left     TOE AMPUTATION Right 05/22/2017    5th toe    TOE AMPUTATION Right 10/20/2021    Procedure: AMPUTATION, TOE;  Surgeon: Ava Atkins DPM;  Location: [facility];  Service: Podiatry;  Laterality: Right;    TONSILLECTOMY         Family History   Problem Relation Age of Onset    Diabetes Mother     Heart failure Father     Breast cancer Sister 69        Genetic testing negative    Kidney failure Brother     Breast cancer Maternal Aunt         early 40s       Social History     Tobacco Use     Smoking status: Never     Passive exposure: Never    Smokeless tobacco: Never   Substance Use Topics    Alcohol use: No    Drug use: No        Home meds:  Current Outpatient Medications on File Prior to Visit   Medication Sig Dispense Refill    ACCU-CHEK SAMI PLUS METER Misc TEST  AS DIRECTED 1 each 0    ACCU-CHEK SAMI PLUS TEST STRP Strp USE TO TEST BLOOD SUGAR ONCE DAILY 100 strip 3    ACCU-CHEK SOFT DEV LANCETS Kit       ACCU-CHEK SOFTCLIX LANCETS Misc TEST ONCE DAILY 100 each 3    ammonium lactate 12 % Crea Apply to feet twice daily. Avoid use between toes. 140 g 5    anastrozole (ARIMIDEX) 1 mg Tab Take 1 tablet (1 mg total) by mouth once daily. 90 tablet 3    apixaban (ELIQUIS) 5 mg Tab TAKE 1 TABLET BY MOUTH TWICE DAILY 180 tablet 3    atorvastatin (LIPITOR) 80 MG tablet Take 1 tablet (80 mg total) by mouth once daily. 90 tablet 3    clopidogreL (PLAVIX) 75 mg tablet Take 1 tablet (75 mg total) by mouth once daily. 90 tablet 3    furosemide (LASIX) 40 MG tablet Take 1 tablet (40 mg total) by mouth once daily. 30 tablet 11    gabapentin (NEURONTIN) 400 MG capsule Take 2 tablets  in the morming, 1 tablet  at noon, and 2 tablets in the evening. 450 capsule 3    glimepiride (AMARYL) 1 MG tablet Take 1 tablet (1 mg total) by mouth 2 (two) times daily. 180 tablet 3    ibuprofen (ADVIL,MOTRIN) 600 MG tablet Take 1 tablet (600 mg total) by mouth every 8 (eight) hours as needed for Pain. 30 tablet 0    LIDOcaine-prilocaine (EMLA) cream Apply topically as needed (apply to affected area twice daily as needed for pain.). 30 g 1    lisinopriL (PRINIVIL,ZESTRIL) 20 MG tablet TAKE 1 TABLET EVERY DAY 90 tablet 3    metoprolol succinate (TOPROL-XL) 25 MG 24 hr tablet Take 1 tablet (25 mg total) by mouth once daily. 90 tablet 3    polyethylene glycol (GLYCOLAX) 17 gram/dose powder Take 17 g by mouth once daily. 510 g 0    pramipexole (MIRAPEX) 0.125 MG tablet TAKE 1 TABLET EVERY DAY 90 tablet 3    pregabalin (LYRICA) 75 MG  capsule Take 1 capsule (75 mg total) by mouth 2 (two) times daily. 60 capsule 2    traMADoL (ULTRAM) 50 mg tablet Take 1 tablet (50 mg total) by mouth every 6 (six) hours as needed for Pain. 40 tablet 0    urea (CARMOL) 40 % Crea Apply topically 2 (two) times daily. 1 Bottle 3    ALPRAZolam (XANAX) 0.5 MG tablet Take 1 tablet (0.5 mg total) by mouth 2 (two) times daily as needed for Anxiety. 60 tablet 0     No current facility-administered medications on file prior to visit.       Cardiac meds:   Eliquis 5 mg BID  metoprolol succinate 25 mg   Glimepiride 1 mg  lisinopril 20 mg   gabapentin TID       Atorvastatin 80 mg   plavix 75 mg   Lasix 40 mg as needed       OBJECTIVE:     Vital Signs (Most Recent)  Pulse: 80 (11/16/22 1344)  BP: 130/74 (11/16/22 1344)  Weight stable but down 3 lb s from 168 to 165    Physical Exam:   Neck: normal carotid upstrokes; normal JVP, no bruits, right  basilar carotid from murmur  Lungs: clear  Heart: RR, normal S1,S2, systolic ejection murmur base; no AI  Abd: obese  Exts: normal DP left; faint  PT right, no edema bilaterally           LABS    CBC  Hemoglobin (g/dL)   Date Value   08/10/2022 11.4 (L)   05/24/2022 11.7 (L)   11/09/2021 11.7 (L)     Hematocrit (%)   Date Value   08/10/2022 33.7 (L)   05/24/2022 34.7 (L)   11/09/2021 34.8 (L)          BMP  Sodium (mmol/L)   Date Value   08/10/2022 142   05/24/2022 142   11/09/2021 141     Potassium (mmol/L)   Date Value   08/10/2022 4.9   05/24/2022 4.9   11/09/2021 5.1     CO2 (mmol/L)   Date Value   08/10/2022 25   05/24/2022 27   11/09/2021 23     Chloride (mmol/L)   Date Value   08/10/2022 108   05/24/2022 106   11/09/2021 109     BUN (mg/dL)   Date Value   08/10/2022 24 (H)   05/24/2022 26 (H)   11/09/2021 18     Creatinine (mg/dL)   Date Value   08/10/2022 1.3   05/24/2022 1.3   11/09/2021 1.2     Glucose (mg/dL)   Date Value   08/10/2022 131 (H)   05/24/2022 238 (H)   11/09/2021 116 (H)     eGFR if non   (mL/min/1.73 m^2)   Date Value   2022 38 (A)   2021 42 (A)   10/22/2021 38 (A)       BNP  BNP (pg/mL)   Date Value   2017 192 (H)   2017 91       Troponin panel:   No results found for: TROPONIN      COAGS  INR (no units)   Date Value   2017 1.1   2017 1.1   2017 1.1     aPTT (sec)   Date Value   2017 45.3 (H)   2017 47.6 (H)   2017 31.8       Lipid panel:    Lab Results   Component Value Date    CHOL 130 2022    CHOL 225 (H) 2020    CHOL 151 2017     Lab Results   Component Value Date    HDL 40 2022    HDL 47 (L) 2020    HDL 79 (A) 2018     Lab Results   Component Value Date    LDLCALC 56.0 (L) 2022    LDLCALC 135 (H) 2020    LDLCALC 146 2018     Lab Results   Component Value Date    TRIG 170 (H) 2022    TRIG 300 (H) 2020    TRIG 131 2018     Lab Results   Component Value Date    CHOLHDL 30.8 2022    CHOLHDL 4.8 2020    CHOLHDL 31.8 2017         Old Results:  : A1c 7.3, GFR 38     Diagnostic Results:    1a.  EKGs- 17: sinus; possible old anteroseptal infarction   1b. EK17: atrial flutter with ventricular rate of 147;   1c. EK17: sinus  1d. EK/19: sinus with nonspecific ivcd   1e. EK/21: sinus ; LAD   1f. EK/22: NSR: nonspecific T wave c hanges   2. Echos- 17: normal EF, diastolic dysfunction; mild LAE, moderate pericardial effusion, aortic sclerosis   2b. Echo: 17: normal EF, small pericardial effusion; LAE    2c. Echo: : normal EF, diastolic dysfunction; LAE, PAS 29 mm normal; mild TR     3. Stress Test- [  ]  4. Cath- [  ]  5. arterial study: no significant disease. : left clear; right distal right popliteal 50-75%  5b. Arterial study lower exts: disease in small vessels; underwent atherectomy 11/10/21 with good results       ASSESSMENT/PLAN:        1. atrial flutter: resolved; [id number]: however 4; no  bleeding ; now in sinus   2. diastolic dysfunction - fluid status is perfect   3. moderate pericardial effusion: resolving to mild to none in 1/22   4. shortness of breath:resolved  5. dizziness: resolved   6. blood pressures normal      7. CKD with GFR 38 10/21; GFR 42 11/21 5/22: 38 and stable ; 8/22: GFR 41  8. Right toes healed and doing better     Plan: continue the same medications  Followup with Dr. Rooney   Return 4 months     Sam Paulino MD

## 2022-11-22 ENCOUNTER — HOSPITAL ENCOUNTER (OUTPATIENT)
Dept: RADIOLOGY | Facility: HOSPITAL | Age: 82
Discharge: HOME | End: 2022-11-22
Attending: INTERNAL MEDICINE
Payer: MEDICARE

## 2022-11-22 ENCOUNTER — TELEPHONE (OUTPATIENT)
Dept: PHARMACY | Facility: CLINIC | Age: 82
End: 2022-11-22
Payer: MEDICARE

## 2022-11-22 ENCOUNTER — EXTERNAL HOME HEALTH (OUTPATIENT)
Dept: HOME HEALTH SERVICES | Facility: HOSPITAL | Age: 82
End: 2022-11-22
Payer: MEDICARE

## 2022-11-22 ENCOUNTER — OFFICE VISIT (OUTPATIENT)
Dept: PODIATRY | Facility: CLINIC | Age: 82
End: 2022-11-22
Payer: MEDICARE

## 2022-11-22 VITALS
SYSTOLIC BLOOD PRESSURE: 120 MMHG | HEIGHT: 62 IN | DIASTOLIC BLOOD PRESSURE: 43 MMHG | BODY MASS INDEX: 27.98 KG/M2 | HEART RATE: 65 BPM

## 2022-11-22 DIAGNOSIS — L84 PRE-ULCERATIVE CALLUSES: ICD-10-CM

## 2022-11-22 DIAGNOSIS — I73.9 PAD (PERIPHERAL ARTERY DISEASE): ICD-10-CM

## 2022-11-22 DIAGNOSIS — E11.42 TYPE 2 DIABETES MELLITUS WITH PERIPHERAL NEUROPATHY: ICD-10-CM

## 2022-11-22 DIAGNOSIS — Z87.828 HEALED WOUND: Primary | ICD-10-CM

## 2022-11-22 DIAGNOSIS — L85.3 DRY SKIN: ICD-10-CM

## 2022-11-22 DIAGNOSIS — C50.912 INVASIVE DUCTAL CARCINOMA OF LEFT BREAST: ICD-10-CM

## 2022-11-22 DIAGNOSIS — Z79.811 LONG TERM (CURRENT) USE OF AROMATASE INHIBITORS: ICD-10-CM

## 2022-11-22 PROCEDURE — 1159F PR MEDICATION LIST DOCUMENTED IN MEDICAL RECORD: ICD-10-PCS | Mod: CPTII,S$GLB,, | Performed by: STUDENT IN AN ORGANIZED HEALTH CARE EDUCATION/TRAINING PROGRAM

## 2022-11-22 PROCEDURE — 3078F PR MOST RECENT DIASTOLIC BLOOD PRESSURE < 80 MM HG: ICD-10-PCS | Mod: CPTII,S$GLB,, | Performed by: STUDENT IN AN ORGANIZED HEALTH CARE EDUCATION/TRAINING PROGRAM

## 2022-11-22 PROCEDURE — 77080 DEXA BONE DENSITY SPINE HIP: ICD-10-PCS | Mod: 26,,, | Performed by: RADIOLOGY

## 2022-11-22 PROCEDURE — 99213 PR OFFICE/OUTPT VISIT, EST, LEVL III, 20-29 MIN: ICD-10-PCS | Mod: 25,S$GLB,, | Performed by: STUDENT IN AN ORGANIZED HEALTH CARE EDUCATION/TRAINING PROGRAM

## 2022-11-22 PROCEDURE — 1126F PR PAIN SEVERITY QUANTIFIED, NO PAIN PRESENT: ICD-10-PCS | Mod: CPTII,S$GLB,, | Performed by: STUDENT IN AN ORGANIZED HEALTH CARE EDUCATION/TRAINING PROGRAM

## 2022-11-22 PROCEDURE — 99999 PR PBB SHADOW E&M-EST. PATIENT-LVL III: CPT | Mod: PBBFAC,,, | Performed by: STUDENT IN AN ORGANIZED HEALTH CARE EDUCATION/TRAINING PROGRAM

## 2022-11-22 PROCEDURE — 3074F PR MOST RECENT SYSTOLIC BLOOD PRESSURE < 130 MM HG: ICD-10-PCS | Mod: CPTII,S$GLB,, | Performed by: STUDENT IN AN ORGANIZED HEALTH CARE EDUCATION/TRAINING PROGRAM

## 2022-11-22 PROCEDURE — 3074F SYST BP LT 130 MM HG: CPT | Mod: CPTII,S$GLB,, | Performed by: STUDENT IN AN ORGANIZED HEALTH CARE EDUCATION/TRAINING PROGRAM

## 2022-11-22 PROCEDURE — 97597 WOUND DEBRIDEMENT: ICD-10-PCS | Mod: S$GLB,,, | Performed by: STUDENT IN AN ORGANIZED HEALTH CARE EDUCATION/TRAINING PROGRAM

## 2022-11-22 PROCEDURE — 99999 PR PBB SHADOW E&M-EST. PATIENT-LVL III: ICD-10-PCS | Mod: PBBFAC,,, | Performed by: STUDENT IN AN ORGANIZED HEALTH CARE EDUCATION/TRAINING PROGRAM

## 2022-11-22 PROCEDURE — 3078F DIAST BP <80 MM HG: CPT | Mod: CPTII,S$GLB,, | Performed by: STUDENT IN AN ORGANIZED HEALTH CARE EDUCATION/TRAINING PROGRAM

## 2022-11-22 PROCEDURE — 1159F MED LIST DOCD IN RCRD: CPT | Mod: CPTII,S$GLB,, | Performed by: STUDENT IN AN ORGANIZED HEALTH CARE EDUCATION/TRAINING PROGRAM

## 2022-11-22 PROCEDURE — 77080 DXA BONE DENSITY AXIAL: CPT | Mod: TC

## 2022-11-22 PROCEDURE — 99213 OFFICE O/P EST LOW 20 MIN: CPT | Mod: 25,S$GLB,, | Performed by: STUDENT IN AN ORGANIZED HEALTH CARE EDUCATION/TRAINING PROGRAM

## 2022-11-22 PROCEDURE — 97597 DBRDMT OPN WND 1ST 20 CM/<: CPT | Mod: S$GLB,,, | Performed by: STUDENT IN AN ORGANIZED HEALTH CARE EDUCATION/TRAINING PROGRAM

## 2022-11-22 PROCEDURE — 1126F AMNT PAIN NOTED NONE PRSNT: CPT | Mod: CPTII,S$GLB,, | Performed by: STUDENT IN AN ORGANIZED HEALTH CARE EDUCATION/TRAINING PROGRAM

## 2022-11-22 PROCEDURE — 77080 DXA BONE DENSITY AXIAL: CPT | Mod: 26,,, | Performed by: RADIOLOGY

## 2022-11-22 NOTE — LETTER
November 22, 2022    Caro Powell  [address]  [address]  [place] [address]             [facility] Pharmacy Assistance  [address]  [facility] [address]  Phone: [phone]  Fax: [phone] Dear Ms. Caro Powell     It was a pleasure speaking with you. To follow up on our conversation on 11/17/2022, the Pharmacy Patient Assistance Program needs more information from you before we can submit your Eliquis application to the [place]  Program. Please return the following documents to the Pharmacy Patient Assistance office (address, email and fax listed below) asap:      Proof of household Income( such as social security statement, 1099 form, pension statement or 3 consecutive pay stubs, Copy of all Insurance cards( front and back), Printout from your Insurance or Pharmacy that shows how much you have spent on prescriptions this year and HIPAA Authorization Forms            Whats Next:     Once I receive your documentation and authorization from your Provider, your application will be submitted to the [facility] for review. Please be advised it will take 2 to 4 weeks for your application to be processed so you may have to purchase a month's supply of medication from your pharmacy to hold you over during the waiting period. You will be notified of approval or denial by The Program(mail) or myself.      If you have any questions or concerns, please give me a call         Sincerely   Dayron Moreira

## 2022-11-22 NOTE — TELEPHONE ENCOUNTER
----- Message from Merlyn Luna sent at 11/16/2022  3:14 PM CST -----  Regarding: RE: needs asst  Hello,     Thank you for submitting a referral to the Pharmacy Patient Assistance Team. We have received your referral for Caro Powell. This patient case has been assigned to Dayron Moreira, who will review the case and contact the patient with assistance options. You may review progress in the patient's chart through Telephone Encounter notes from Pharmacy Patient Assistance.       Thank you,   Pharmacy Patient Assistance Team   ----- Message -----  From: Garrett Mcneal, ArleyD  Sent: 11/15/2022   2:11 PM CST  To: Pharmacy Patient Assistance Team  Subject: needs asst Miles,  Can someone contact this patient?

## 2022-11-22 NOTE — PROCEDURES
Wound Debridement    Date/Time: 11/22/2022 2:30 PM  Performed by: Ava Atkins DPM  Authorized by: Ava Atkins DPM     Consent Done?:  Yes (Verbal)  Local anesthesia used?: No      Wound Details:    Location:  Right foot    Location:  Right 1st Metatarsal Head    Type of Debridement:  Non-excisional       Length (cm):  1       Area (sq cm):  1       Width (cm):  1       Percent Debrided (%):  100       Depth (cm):  0       Total Area Debrided (sq cm):  1    Depth of debridement:  Epidermis/Dermis    Tissue debrided:  Epidermis    Devitalized tissue debrided:  Callus    Instruments:  Blade    2nd Wound Details:     Location:  Right foot    Location:  Right 1st Toe    Location:  Right 1st Toe    Type of Debridement:  Non-excisional       Length (cm):  0.5       Area (sq cm):  0.25       Width (cm):  0.5       Percent Debrided (%):  100       Depth (cm):  0       Total Area Debrided (sq cm):  0.25    Depth of debridement:  Epidermis/Dermis    Tissue debrided:  Epidermis    Devitalized tissue debrided:  Callus    Instruments:  Blade    Bleeding:  None  Patient tolerance:  Patient tolerated the procedure well with no immediate complications     No cultures were taken during this visit. All wounds are healed.

## 2022-11-22 NOTE — TELEPHONE ENCOUNTER
I have spoken with Caro Powell and informed her of the [place]  application process for Eliquis and what's required to apply.  Caro Powell will provide the following documents: Proof of household Income( such as social security statement, 1099 form, pension statement or 3 consecutive pay stubs, Copy of all Insurance cards( front and back), Printout from your Insurance or Pharmacy that shows how much you have spent on prescriptions this year, and HIPAA Authorization Forms      I will follow up with the patient in 5 business days.

## 2022-11-22 NOTE — PROGRESS NOTES
Subjective:      Patient ID: Caro Powell is a 82 y.o. female.    Chief Complaint: Foot Ulcer (Right ft.,), Follow-up, and Wound Care    Caro is a 82 y.o. female who presents to the clinic for evaluation and treatment of high risk feet. 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 The patient's chief complaint is long, thick toenails. This patient has documented high risk feet requiring routine maintenance secondary to diabetes mellitis and those secondary complications of diabetes, as mentioned.. Patient states she is following closely with Dr. Rooney for PAD. Relates has new SAS shoes which she put her diabetic shoes in and her feet are looking much better. No new pedal complaints.     8/3/22: Seen today for RFC and annual diabetic foot exam. Denies open wounds. No new pedal complaints. States has upcoming breast surgery.     11/9/22: Seen today for routine foot care. States she noticed blood in her sock and is concerned for a new diabetic foot ulcer.    11/22/22 Pt seen today for follow up of right foot ulcers. States she thinks they are healed. Admits she was not wearing her diabetic shoes previously and that is how the ulcers formed. No further pedal complaints.     PCP: Brayan Penny MD    Date Last Seen by PCP: 6/21/22    Current shoe gear:  SAS diabetic shoes    Hemoglobin A1C   Date Value Ref Range Status   05/24/2022 7.3 (H) 4.0 - 5.6 % Final     Comment:     ADA Screening Guidelines:  5.7-6.4%  Consistent with prediabetes  >or=6.5%  Consistent with diabetes    High levels of fetal hemoglobin interfere with the HbA1C  assay. Heterozygous hemoglobin variants (HbS, HgC,  etc)do  not significantly interfere with this assay.   However, presence of multiple variants may affect accuracy.     10/18/2021 6.7 (H) 4.0 - 5.6 % Final     Comment:     ADA Screening Guidelines:  5.7-6.4%  Consistent with prediabetes  >or=6.5%  Consistent with diabetes    High levels of fetal hemoglobin interfere with the HbA1C  assay. Heterozygous hemoglobin variants (HbS, HgC, etc)do  not significantly interfere with this assay.   However, presence of multiple variants may affect accuracy.     01/22/2021 6.6 (H) 4.0 - 5.6 % Final     Comment:     ADA Screening Guidelines:  5.7-6.4%  Consistent with prediabetes  >or=6.5%  Consistent with diabetes  High levels of fetal hemoglobin interfere with the HbA1C  assay. Heterozygous hemoglobin variants (HbS, HgC, etc)do  not significantly interfere with this assay.   However, presence of multiple variants may affect accuracy.     01/12/2018 8.3 % Final       Review of Systems   Constitutional: Negative for chills, decreased appetite, diaphoresis and fever.   HENT:  Negative for congestion and hearing loss.    Cardiovascular:  Negative for chest pain, claudication, leg swelling and syncope.   Respiratory:  Negative for cough and shortness of breath.    Skin:  Positive for dry skin, nail changes and poor wound healing. Negative for color change, flushing, itching and rash.   Musculoskeletal:  Positive for arthritis. Negative for joint pain and joint swelling.   Gastrointestinal:  Negative for nausea and vomiting.   Neurological:  Positive for numbness. Negative for focal weakness, paresthesias and weakness.   Psychiatric/Behavioral:  Negative for altered mental status. The patient is not nervous/anxious.          Objective:      Physical Exam  Constitutional:       General: She is not in acute distress.     Appearance: She is well-developed. She is not diaphoretic.   Cardiovascular:      Comments: Dorsalis pedis and posterior tibial pulses are diminished. Skin temperature  is within normal limits. Toes are cool to touch and feet are warm proximally. Hair growth is diminished. Skin is mildly atrophic and with mild hyperpigmentation. Mild edema noted, bilaterally. Telangiectasias bilaterally.  Musculoskeletal:         General: No tenderness.      Comments: Adequate joint range of motion without pain, limitation, nor crepitation to bilateral feet and ankle joints. Muscle strength is 5/5 in all groups bilaterally.    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 HAV, bilaterally. Semi rigid hammertoes to remaining digits.    Lymphadenopathy:      Comments: Negative lymphangitic streaking    Skin:     General: Skin is warm and dry.      Findings: No lesion.      Comments: Skin is warm and dry, no acute signs of infection noted. No open wounds, macerations or hyperkeratotic lesions, bilaterally.     Pre-ulcerative callus to medial right hallux and 1st MTP, upon debridement, previous ulcers have healed, no signs of infection noted.     Toenails are thickened by 2-4 mm's, dystrophic, and are darkened in coloration with subungual fungal debris, bilaterally.  Skin is very dry, bilaterally.      Neurological:      Mental Status: She is alert and oriented to person, place, and time.      Sensory: Sensory deficit present.      Motor: No abnormal muscle tone.      Comments: Light touch is diminished. [place] 5.07 monofilamant testing is diminished. Vibratory sensation  is diminished, bilaterally    Psychiatric:         Behavior: Behavior normal.         Thought Content: Thought content normal.         Judgment: Judgment normal.             Assessment:       Encounter Diagnoses   Name Primary?    Healed wound Yes    PAD (peripheral artery disease)     Type 2 diabetes mellitus with peripheral neuropathy     Dry skin     Pre-ulcerative calluses            Plan:       Caro was seen today for foot ulcer, follow-up and wound care.    Diagnoses and all  orders for this visit:    Healed wound    PAD (peripheral artery disease)  -     Wound Debridement    Type 2 diabetes mellitus with peripheral neuropathy  -     Wound Debridement    Dry skin  -     Wound Debridement    Pre-ulcerative calluses      I counseled the patient on her conditions, their implications and medical management.  Ulcer debridement performed, site is healed, may slowly transition back to diabetic shoes, recommend wearing at all times while ambulating. Recommend daily and frequent foot checks  Shoe inspection. Diabetic Foot Education. Patient reminded of the importance of good nutrition and blood sugar control to help prevent podiatric complications of diabetes. Patient instructed on proper foot hygeine. We discussed wearing proper shoe gear, daily foot inspections, never walking without protective shoe gear, never putting sharp instruments to feet, routine podiatric nail visits    Return to clinic in 1 month, sooner PRN

## 2022-11-22 NOTE — LETTER
April 24, 2023    Caro Powell  [address]  [address]  [place] [address]             [facility] - Pharmacy Assistance  [address]  [facility] [address]  Phone: [phone]  Fax: [phone] Dear Ms. Powell    My Name is Deangelo Moreira. I am a Pharmacy Technician reaching out on behalf of [facility]. We last spoke on 04/17/23 about assistance with your medication. A letter was sent to your [facility] Portal requesting documentation required to begin the Pharmacy Assistance Process. Unfortunately, we have not heard back from you. Please reach out to my phone number below if you are still in need of assistance with your medications. We look forward to hearing from you soon!     Thank you for choosing [facility] for your healthcare needs.      Deangelo Moreira  Pharmacy Patient Assistance

## 2022-11-25 ENCOUNTER — OFFICE VISIT (OUTPATIENT)
Dept: CARDIOLOGY | Facility: CLINIC | Age: 82
End: 2022-11-25
Payer: MEDICARE

## 2022-11-25 VITALS
SYSTOLIC BLOOD PRESSURE: 123 MMHG | WEIGHT: 162.69 LBS | BODY MASS INDEX: 29.94 KG/M2 | HEART RATE: 69 BPM | OXYGEN SATURATION: 100 % | HEIGHT: 62 IN | DIASTOLIC BLOOD PRESSURE: 73 MMHG

## 2022-11-25 DIAGNOSIS — I48.91 ATRIAL FIBRILLATION WITH RAPID VENTRICULAR RESPONSE: ICD-10-CM

## 2022-11-25 DIAGNOSIS — I70.229 CRITICAL LOWER LIMB ISCHEMIA: ICD-10-CM

## 2022-11-25 DIAGNOSIS — I48.92 ATRIAL FLUTTER, UNSPECIFIED TYPE: ICD-10-CM

## 2022-11-25 DIAGNOSIS — I73.9 PAD (PERIPHERAL ARTERY DISEASE): Primary | ICD-10-CM

## 2022-11-25 DIAGNOSIS — E11.52 TYPE 2 DIABETES MELLITUS WITH DIABETIC PERIPHERAL ANGIOPATHY AND GANGRENE, WITHOUT LONG-TERM CURRENT USE OF INSULIN: ICD-10-CM

## 2022-11-25 PROCEDURE — 99999 PR PBB SHADOW E&M-EST. PATIENT-LVL V: ICD-10-PCS | Mod: PBBFAC,,, | Performed by: INTERNAL MEDICINE

## 2022-11-25 PROCEDURE — 3078F PR MOST RECENT DIASTOLIC BLOOD PRESSURE < 80 MM HG: ICD-10-PCS | Mod: CPTII,S$GLB,, | Performed by: INTERNAL MEDICINE

## 2022-11-25 PROCEDURE — 1159F PR MEDICATION LIST DOCUMENTED IN MEDICAL RECORD: ICD-10-PCS | Mod: CPTII,S$GLB,, | Performed by: INTERNAL MEDICINE

## 2022-11-25 PROCEDURE — 1160F PR REVIEW ALL MEDS BY PRESCRIBER/CLIN PHARMACIST DOCUMENTED: ICD-10-PCS | Mod: CPTII,S$GLB,, | Performed by: INTERNAL MEDICINE

## 2022-11-25 PROCEDURE — 99499 RISK ADDL DX/OHS AUDIT: ICD-10-PCS | Mod: HCWC,S$GLB,, | Performed by: INTERNAL MEDICINE

## 2022-11-25 PROCEDURE — 3078F DIAST BP <80 MM HG: CPT | Mod: CPTII,S$GLB,, | Performed by: INTERNAL MEDICINE

## 2022-11-25 PROCEDURE — 1101F PT FALLS ASSESS-DOCD LE1/YR: CPT | Mod: CPTII,S$GLB,, | Performed by: INTERNAL MEDICINE

## 2022-11-25 PROCEDURE — 1159F MED LIST DOCD IN RCRD: CPT | Mod: CPTII,S$GLB,, | Performed by: INTERNAL MEDICINE

## 2022-11-25 PROCEDURE — 3074F PR MOST RECENT SYSTOLIC BLOOD PRESSURE < 130 MM HG: ICD-10-PCS | Mod: CPTII,S$GLB,, | Performed by: INTERNAL MEDICINE

## 2022-11-25 PROCEDURE — 1160F RVW MEDS BY RX/DR IN RCRD: CPT | Mod: CPTII,S$GLB,, | Performed by: INTERNAL MEDICINE

## 2022-11-25 PROCEDURE — 1126F AMNT PAIN NOTED NONE PRSNT: CPT | Mod: CPTII,S$GLB,, | Performed by: INTERNAL MEDICINE

## 2022-11-25 PROCEDURE — 99214 PR OFFICE/OUTPT VISIT, EST, LEVL IV, 30-39 MIN: ICD-10-PCS | Mod: S$GLB,,, | Performed by: INTERNAL MEDICINE

## 2022-11-25 PROCEDURE — 3288F FALL RISK ASSESSMENT DOCD: CPT | Mod: CPTII,S$GLB,, | Performed by: INTERNAL MEDICINE

## 2022-11-25 PROCEDURE — 99214 OFFICE O/P EST MOD 30 MIN: CPT | Mod: S$GLB,,, | Performed by: INTERNAL MEDICINE

## 2022-11-25 PROCEDURE — 3288F PR FALLS RISK ASSESSMENT DOCUMENTED: ICD-10-PCS | Mod: CPTII,S$GLB,, | Performed by: INTERNAL MEDICINE

## 2022-11-25 PROCEDURE — 1126F PR PAIN SEVERITY QUANTIFIED, NO PAIN PRESENT: ICD-10-PCS | Mod: CPTII,S$GLB,, | Performed by: INTERNAL MEDICINE

## 2022-11-25 PROCEDURE — 99499 UNLISTED E&M SERVICE: CPT | Mod: HCWC,S$GLB,, | Performed by: INTERNAL MEDICINE

## 2022-11-25 PROCEDURE — 1101F PR PT FALLS ASSESS DOC 0-1 FALLS W/OUT INJ PAST YR: ICD-10-PCS | Mod: CPTII,S$GLB,, | Performed by: INTERNAL MEDICINE

## 2022-11-25 PROCEDURE — 3074F SYST BP LT 130 MM HG: CPT | Mod: CPTII,S$GLB,, | Performed by: INTERNAL MEDICINE

## 2022-11-25 PROCEDURE — 99999 PR PBB SHADOW E&M-EST. PATIENT-LVL V: CPT | Mod: PBBFAC,,, | Performed by: INTERNAL MEDICINE

## 2022-11-25 NOTE — PROGRESS NOTES
Subjective:    Patient ID:  Caro Powell is a 82 y.o. female who presents for follow-up of Peripheral Arterial Disease      HPI    83 y/o female with hx of PAD with CLI s/p amputation of right fifth toe with most recent right second toe, Aflutter on chronic AC, HTN, DM, obesity who presents for hospital f/u. She evacuated to [place] where she was not receiving regular wound care and wounds on right foot worsened. MRI with osteo and admitted for further management. Had second toe amputation 10/20. Had arterial doppler on 10/18 with:  1. Multifocal areas of stenosis of the right lower extremity arteries, involving the distal popliteal artery, the proximal and distal anterior tibial artery, and the mid posterior tibial artery.  2. Short segment area of focal occlusion of the distal anterior tibial artery with collateral flow distally.  Had peripheral angiogram on 10/21 with:   Patent aorta + bilateral MARVIN, EIA, IIA, CFA, and DFA              Patent right SFA + POP with 3 vessel run off              50% distal POP stenosis              Diffusely diseased AT with multiple tandem 95% lesions              Distal AT fills from PER collaterals              Patent TPT              90% mid PT and 75% distal stenosis                 Plantar arch fills from both PT and DP  Wound on dorsal aspect looked similar, wound on distal great toe looked worsened, and amputation site appeared stable.   Had peripheral intervention on 11/10/2021 with:  Successful atherectomy followed by IVUS guided PTA to distal PT, AT , distal Pop/TPT stenosis with excellent results  Wound completely healed now. Denies CP, SOB/HERNANDEZ, orthopnea, PND, syncope, palps. Compliant with meds.       11/25/2022:  Has developed right great toe callous without ulceration and recently seen by Podiatry. No signs of limb ischemia or non healing ulceration. Palpable distal pulses bilaterally.    Review of Systems   Constitutional: Negative for malaise/fatigue.    HENT:  Negative for congestion.    Eyes:  Negative for blurred vision.   Cardiovascular:  Negative for chest pain, claudication, cyanosis, dyspnea on exertion, irregular heartbeat, leg swelling, near-syncope, orthopnea, palpitations, paroxysmal nocturnal dyspnea and syncope.   Respiratory:  Negative for shortness of breath.    Endocrine: Negative for polyuria.   Hematologic/Lymphatic: Negative for bleeding problem.   Skin:  Positive for poor wound healing. Negative for itching and rash.   Musculoskeletal:  Negative for joint swelling, muscle cramps and muscle weakness.   Gastrointestinal:  Negative for abdominal pain, hematemesis, hematochezia, melena, nausea and vomiting.   Genitourinary:  Negative for dysuria and hematuria.   Neurological:  Negative for dizziness, focal weakness, headaches, light-headedness, loss of balance and weakness.   Psychiatric/Behavioral:  Negative for depression. The patient is not nervous/anxious.       Objective:    Physical Exam  Constitutional:       Appearance: She is well-developed.   HENT:      Head: Normocephalic and atraumatic.   Neck:      Vascular: No JVD.   Cardiovascular:      Rate and Rhythm: Normal rate and regular rhythm.      Pulses:           Carotid pulses are 2+ on the right side and 2+ on the left side.       Radial pulses are 2+ on the right side and 2+ on the left side.        Femoral pulses are 2+ on the right side and 2+ on the left side.       Dorsalis pedis pulses are 1+ on the right side and 1+ on the left side.        Posterior tibial pulses are 1+ on the right side and 1+ on the left side.      Heart sounds: Normal heart sounds.      Comments: Triphasic bilateral DP  Biphasic bilatearl PT  Pulmonary:      Effort: Pulmonary effort is normal.      Breath sounds: Normal breath sounds.   Abdominal:      General: Bowel sounds are normal.      Palpations: Abdomen is soft.   Musculoskeletal:      Cervical back: Neck supple.   Skin:     General: Skin is warm and  dry.      Findings: Lesion present.   Neurological:      Mental Status: She is alert and oriented to person, place, and time.   Psychiatric:         Behavior: Behavior normal.         Thought Content: Thought content normal.         Assessment:       1. PAD (peripheral artery disease)    2. Critical lower limb ischemia    3. Atrial fibrillation with rapid ventricular response    4. Atrial flutter, unspecified type    5. Type 2 diabetes mellitus with diabetic peripheral angiopathy and gangrene, without long-term current use of insulin      81 y/o pt with hx and presentation as above. Doing well from a cardiac perspective and compensated from a HF perspective. Regarding PAD, wounds healed now. Walking program. RLE doppler for surveillance. Discussed the etiology, evaluation, and management of PAD, CLI, Afib, Aflutter, HTN, DM, osteo, obesity. Discussed the importance of med compliance, heart healthy diet, and regular exercise.     Plan:       -RLE arterial doppler  -f/u in 1 year with arterial doppler before

## 2022-11-28 ENCOUNTER — OFFICE VISIT (OUTPATIENT)
Dept: HEMATOLOGY/ONCOLOGY | Facility: CLINIC | Age: 82
End: 2022-11-28
Payer: MEDICARE

## 2022-11-28 ENCOUNTER — TELEPHONE (OUTPATIENT)
Dept: CARDIOLOGY | Facility: CLINIC | Age: 82
End: 2022-11-28
Payer: MEDICARE

## 2022-11-28 VITALS
OXYGEN SATURATION: 98 % | RESPIRATION RATE: 18 BRPM | TEMPERATURE: 98 F | HEART RATE: 60 BPM | DIASTOLIC BLOOD PRESSURE: 74 MMHG | HEIGHT: 62 IN | BODY MASS INDEX: 29.53 KG/M2 | SYSTOLIC BLOOD PRESSURE: 128 MMHG | WEIGHT: 160.5 LBS

## 2022-11-28 DIAGNOSIS — Z79.811 LONG TERM (CURRENT) USE OF AROMATASE INHIBITORS: ICD-10-CM

## 2022-11-28 DIAGNOSIS — C50.912 INVASIVE DUCTAL CARCINOMA OF LEFT BREAST: Primary | ICD-10-CM

## 2022-11-28 DIAGNOSIS — F06.30 MOOD DISORDER DUE TO MEDICAL CONDITION: ICD-10-CM

## 2022-11-28 DIAGNOSIS — C77.3 SECONDARY MALIGNANT NEOPLASM OF AXILLARY NODE: ICD-10-CM

## 2022-11-28 DIAGNOSIS — N18.32 CHRONIC KIDNEY DISEASE, STAGE 3B: ICD-10-CM

## 2022-11-28 DIAGNOSIS — M85.852 OSTEOPENIA OF LEFT FEMORAL NECK: ICD-10-CM

## 2022-11-28 DIAGNOSIS — E11.40 TYPE 2 DIABETES MELLITUS WITH DIABETIC NEUROPATHY, WITHOUT LONG-TERM CURRENT USE OF INSULIN: ICD-10-CM

## 2022-11-28 DIAGNOSIS — C50.911 INVASIVE DUCTAL CARCINOMA OF RIGHT BREAST: ICD-10-CM

## 2022-11-28 PROCEDURE — 3288F FALL RISK ASSESSMENT DOCD: CPT | Mod: CPTII,S$GLB,, | Performed by: INTERNAL MEDICINE

## 2022-11-28 PROCEDURE — 3074F SYST BP LT 130 MM HG: CPT | Mod: CPTII,S$GLB,, | Performed by: INTERNAL MEDICINE

## 2022-11-28 PROCEDURE — 99214 PR OFFICE/OUTPT VISIT, EST, LEVL IV, 30-39 MIN: ICD-10-PCS | Mod: S$GLB,,, | Performed by: INTERNAL MEDICINE

## 2022-11-28 PROCEDURE — 1160F PR REVIEW ALL MEDS BY PRESCRIBER/CLIN PHARMACIST DOCUMENTED: ICD-10-PCS | Mod: CPTII,S$GLB,, | Performed by: INTERNAL MEDICINE

## 2022-11-28 PROCEDURE — 99499 RISK ADDL DX/OHS AUDIT: ICD-10-PCS | Mod: HCWC,S$GLB,, | Performed by: INTERNAL MEDICINE

## 2022-11-28 PROCEDURE — 1159F MED LIST DOCD IN RCRD: CPT | Mod: CPTII,S$GLB,, | Performed by: INTERNAL MEDICINE

## 2022-11-28 PROCEDURE — 3288F PR FALLS RISK ASSESSMENT DOCUMENTED: ICD-10-PCS | Mod: CPTII,S$GLB,, | Performed by: INTERNAL MEDICINE

## 2022-11-28 PROCEDURE — 1101F PT FALLS ASSESS-DOCD LE1/YR: CPT | Mod: CPTII,S$GLB,, | Performed by: INTERNAL MEDICINE

## 2022-11-28 PROCEDURE — 1126F PR PAIN SEVERITY QUANTIFIED, NO PAIN PRESENT: ICD-10-PCS | Mod: CPTII,S$GLB,, | Performed by: INTERNAL MEDICINE

## 2022-11-28 PROCEDURE — 1159F PR MEDICATION LIST DOCUMENTED IN MEDICAL RECORD: ICD-10-PCS | Mod: CPTII,S$GLB,, | Performed by: INTERNAL MEDICINE

## 2022-11-28 PROCEDURE — 99214 OFFICE O/P EST MOD 30 MIN: CPT | Mod: S$GLB,,, | Performed by: INTERNAL MEDICINE

## 2022-11-28 PROCEDURE — 99999 PR PBB SHADOW E&M-EST. PATIENT-LVL III: CPT | Mod: PBBFAC,,, | Performed by: INTERNAL MEDICINE

## 2022-11-28 PROCEDURE — 3074F PR MOST RECENT SYSTOLIC BLOOD PRESSURE < 130 MM HG: ICD-10-PCS | Mod: CPTII,S$GLB,, | Performed by: INTERNAL MEDICINE

## 2022-11-28 PROCEDURE — 1101F PR PT FALLS ASSESS DOC 0-1 FALLS W/OUT INJ PAST YR: ICD-10-PCS | Mod: CPTII,S$GLB,, | Performed by: INTERNAL MEDICINE

## 2022-11-28 PROCEDURE — 1160F RVW MEDS BY RX/DR IN RCRD: CPT | Mod: CPTII,S$GLB,, | Performed by: INTERNAL MEDICINE

## 2022-11-28 PROCEDURE — 1126F AMNT PAIN NOTED NONE PRSNT: CPT | Mod: CPTII,S$GLB,, | Performed by: INTERNAL MEDICINE

## 2022-11-28 PROCEDURE — 3078F DIAST BP <80 MM HG: CPT | Mod: CPTII,S$GLB,, | Performed by: INTERNAL MEDICINE

## 2022-11-28 PROCEDURE — 3078F PR MOST RECENT DIASTOLIC BLOOD PRESSURE < 80 MM HG: ICD-10-PCS | Mod: CPTII,S$GLB,, | Performed by: INTERNAL MEDICINE

## 2022-11-28 PROCEDURE — 99999 PR PBB SHADOW E&M-EST. PATIENT-LVL III: ICD-10-PCS | Mod: PBBFAC,,, | Performed by: INTERNAL MEDICINE

## 2022-11-28 PROCEDURE — 99499 UNLISTED E&M SERVICE: CPT | Mod: HCWC,S$GLB,, | Performed by: INTERNAL MEDICINE

## 2022-11-28 RX ORDER — FERROUS SULFATE, DRIED 160(50) MG
1 TABLET, EXTENDED RELEASE ORAL DAILY
Qty: 180 TABLET | Refills: 1 | Status: SHIPPED | OUTPATIENT
Start: 2022-11-28 | End: 2023-10-23

## 2022-11-28 RX ORDER — ATORVASTATIN CALCIUM 80 MG/1
TABLET, FILM COATED ORAL
COMMUNITY
Start: 2022-09-01 | End: 2023-03-27

## 2022-11-28 NOTE — TELEPHONE ENCOUNTER
Pt came to office requesting samples explained to pt unfortunately we do not provide samples   Tried to called in co-pay to the pharmacy however pt has already used one previously so pharmacy cannot take another one     Pt will  a 2 wk supply from pharmacy and forms for assistance through [employer] has been printed out and handed to pt   Pt states she will bring in forms along w copies of the information being asked for so we can fax it for her     Advised her Dr. Paulino will be back in office Wednesday 11/29/22 and will sign and fill out the providers portion            V/a  ja

## 2022-11-28 NOTE — TELEPHONE ENCOUNTER
----- Message from Garrett Tellez sent at 11/28/2022  9:23 AM CST -----  Contact: pt  .Type:  Needs Medical Advice    Who Called: pt  Would the patient rather a call back or a response via [facility]? Call back  Best Call Back Number: [phone]   Additional Information: Pt. Is requesting a call back regarding some samples of  (ELIQUIS)  she [place] and needs some samples to carry her over.

## 2022-11-28 NOTE — PROGRESS NOTES
"PATIENT: Caro Powell  MRN: [id number]  DATE: 11/28/2022    Diagnosis:   1. Invasive ductal carcinoma of left breast    2. Invasive ductal carcinoma of right breast    3. Secondary malignant neoplasm of axillary node    4. Mood disorder due to medical condition    5. Type 2 diabetes mellitus with diabetic neuropathy, without long-term current use of insulin    6. Long term (current) use of aromatase inhibitors    7. Chronic kidney disease, stage 3b    8. Osteopenia of left femoral neck      Chief Complaint: Breast Cancer    Oncologic History:      Oncologic History 1. Bilateral breast cancer      Oncologic Treatment Bilateral mastectomy  Adjuvant radiation therapy.  Adjuvant anastrozole      Pathology 8/15/22:  1. Breast, left, mastectomy:       - Invasive carcinoma with mixed ductal and lobular features, see note       - Tumor size: 22 mm  - Histologic grade (Ene Histologic Score)           - Glandular/Tubular Differentiation: 2           - Nuclear pleomorphism: 2           - Mitotic Rate: 1           - Overall Grade: grade 1       - Lobular carcinoma in situ (LCIS), classic type       - Biopsy site change: identified       - Calcification: present in invasive carcinoma and benign epithelium       - Resection margins: free of tumor, all margins are at least 10 mm away       - Benign nipple       - Skin with seborrheic keratosis       - Non-neoplastic breast tissue: usual ductal hyperplasia, apocrine   metaplasia, duct ectasia and fibrocystic change       - See synoptic report   2.  "left mastectomy new anterior margin inferior lateral aspect", excision:       - Benign fibroadipose tissue       - No definitive breast duct elements seen   3.  "left axillary sentinel lymph node #1 hot and blue @ 1177", excision:       - One lymph node, negative for carcinoma (0/1)   4.  "left axillary sentinel lymph node #2 Hot & blue @ 1459", excision:       - One lymph node, positive for carcinoma (1/1)       -  Size of " "Largest Metastatic Deposit: 2.1 mm, Macrometastases       -  Extranodal Extension: not identified   5.  "left axillary sentinel lymph node #3 Hot & blue @ 714", excision:       - One lymph node, negative for carcinoma (0/1)   6. Breast, right, mastectomy:       - Invasive carcinoma with mixed ductal and lobular features, see note       - Multiple foci, tumor size: 23 mm (mass 1, LIQ); 21 mm (mass 2, LOQ); 22   mm (mass 3, LOQ); 4 mm (mass 4, LOQ) - Histologic grade (Ene   Histologic Score)           - Glandular/Tubular Differentiation: 2           - Nuclear pleomorphism: 2           - Mitotic Rate: 1           - Overall Grade: grade 1       - Ductal carcinoma in situ: identified           - Nuclear Grade (in situ carcinoma): intermediate           - Necrosis: not present           - Architectural pattern: cribriform       - Lobular carcinoma in situ (LCIS), classic type       - Resection margins: free of tumor (invasive and in-situ), all margins   are at least 5 mm away       - Biopsy site change: identified   - Calcification: present in invasive carcinoma and benign epithelium       - Benign nipple       - Skin with seborrheic keratosis       - Two lymph nodes, negative for carcinoma (0/2)       - Non-neoplastic breast tissue: complex sclerosing lesion, adenosis,   usual ductal hyperplasia, apocrine metaplasia, duct ectasia and fibrocystic   change       - See synoptic report   7.  "right mastectomy new anterior margin inferior lateral aspect", excision:       - Benign fibroadipose tissue       - No definitive breast duct elements seen   8.  "right axillary content", dissection:       - Seven of twenty-eight lymph nodes, positive for carcinoma (7/28)       -  Size of Largest Metastatic Deposit: 15 mm, all tumor involved lymph   nodes are macrometastases       -  Extranodal Extension: identified, 5 mm       - Biopsy site change: identified   9.  "right mastectomy lateral skin short-superior, long lateral", " excision:       - Skin without significant pathologic change   Note: Multiple foci of invasive carcinoma with simlar morphology are   identified on right breast (part 6). E-cadherin immunostains support   the above diagnosis. GATA3 and ER immunostains highlights the tumor cells in   lymph nodes.  Results of immunohistochemical stains (See [facility]   Left breast invasive carcinoma:   Estrogen receptor (ER): positive (strong intensity, 90% of tumor cell nuclei)   Progesterone receptor (KY): positive (moderate intensity, 70-80% of tumor   cell nuclei)   HER2: negative (+1)   Ki-67:10%-20   Right breast invasive carcinoma (8 o'clock):   Estrogen receptor (ER): positive (strong intensity, 98% of tumor cell nuclei)   Progesterone receptor ([place]): positive (moderate intensity, 40-50% of tumor   cell nuclei)   HER2: negative by FISH   Ki-67:10%-20   Right breast invasive carcinoma (10 o'clock):   Estrogen receptor (ER): positive (strong intensity, 98% of tumor cell nuclei)   Progesterone receptor ([place]): positive (strong intensity, 10-15% of tumor cell   nuclei)   HER2: negative (+1)   Ki-67:10%-20     6/28/22:  1. LEFT BREAST MASS, 4:00 O'CLOCK, 5 CM FROM THE NIPPLE, NEEDLE CORE   BIOPSIES:   -  Invasive ductal carcinoma with lobular features, [place] histologic   grade 1, and microcalcifications.           Glandular (acinar)/tubular differentiation:  Score 3.           Nuclear pleomorphism:  Score 1.           Mitotic rate:  Score 1.           Overall grade:  Grade 1 (score 5).           Size of invasive carcinoma as measured from the H&E slides:  12 mm.   -  Focal intermediate grade ductal carcinoma in situ with microcalcifications   and focal lobular extension.           Architectural patterns:  Cribriform and solid.           Nuclear grade:  Grade II (intermediate).           Necrosis:  Not identified.   BREAST BIOMARKER RESULTS:   Estrogen receptor (ER) status:  Positive.        Percentage of cells with nuclear  positivity:  90%.        Average intensity of staining:  Strong.   Progesterone receptor (PgR) status:  Positive.        Percentage of cells with nuclear positivity:  70-80%.        Average intensity of staining:  Moderate.   HER2 by IHC:  Negative (score 1).   Ki-67, percentage of cells with nuclear positivity:  10-20%.   2. RIGHT BREAST MASS, 8:00 O'CLOCK, 12 CM FROM THE NIPPLE, NEEDLE CORE   BIOPSIES:   -  Invasive ductal carcinoma, Ene histologic grade 1, with   microcalcifications.           Glandular (acinar)/tubular differentiation:  Score 1.           Nuclear pleomorphism:  Score 2.           Mitotic rate:  Score 1.           Overall grade:  Grade 1 (score 4).           Size of invasive carcinoma as measured from the H&E slides:  15 mm.   -  Focal intermediate grade ductal carcinoma in situ with microcalcifications.           Architectural patterns:  Cribriform and solid.           Nuclear grade:  Grade II (intermediate).           Necrosis:  Not identified.   -  Focal fibrocystic changes with columnar cell change, stromal fibrosis and   microcalcifications.   BREAST BIOMARKER RESULTS:   Estrogen receptor (ER) status:  Positive.        Percentage of cells with nuclear positivity:  98%.        Average intensity of staining:  Strong.   Progesterone receptor (PgR) status:  Positive.        Percentage of cells with nuclear positivity:  40-50%.        Average intensity of staining:  Moderate.   HER2 by IHC:  Equivocal (score 2+, see comment).   Ki-67, percentage of positive nuclei:  10-20%.   3. RIGHT BREAST MASS, 10:00 O'CLOCK, 3 CM FROM THE NIPPLE, NEEDLE CORE   BIOPSIES:   -  Invasive ductal carcinoma, Ene histologic grade 2, with focal   microcalcifications.           Glandular (acinar)/tubular differentiation:  Score 2.           Nuclear pleomorphism:  Score 2.           Mitotic rate:  Score 2.           Overall grade:  Grade 2 (score 6).           Size of invasive carcinoma as measured from  the H&E slides:  13 mm.   -  Focal fibrocystic changes with columnar cell change, sclerosing adenosis   and stromal fibrosis.   BREAST BIOMARKER RESULTS:   Estrogen receptor (ER) status:  Positive.        Percentage of cells with nuclear positivity:  98%.        Average intensity of staining:  Strong.   Progesterone receptor (PGR) status:  Positive.        Percentage of cells with nuclear positivity:  10-15%.        Average intensity of staining:  Strong.   HER2 by IHC:  Negative (score 1+).   Ki-67, percentage of positive nuclei:  10-20%.   4. RIGHT AXILLARY LYMPH NODE, NEEDLE CORE BIOPSIES:   -  Metastatic ductal carcinoma of the breast, size = 10 mm, with focus   suspicious for, but not diagnostic of, extranodal extension.   HER2, Breast Tumor, FISH, Tissue   Result Summary   Negative         Subjective:    History of Present Illness: Ms. Powell is a 82 y.o. female who presented in July 2022 for evaluation and management of bilateral breast cancer. She was referred by Dr. Penny.    - mammogram on 6/21/22 revealed bilateral breast masses.  - biopsy of breast masses (6/28/22) revealed invasive ductal carcinoma with lobular features of the left breast (ER/KS-positive, HER2-negative) and invasive ductal carcinoma of right breast (ER/KS-positive, HER2- [by FISH]), and metastatic ductal carcinoma to right axillary lymph node.  - she met with breast surgery on 7/6/22. They offered surgical resection of bilateral breast cancers, but wanted to see results of PET/CT scan first. At the visit, she expressed hesitancy about proceeding with surgery.  - she underwent PET/CT scan on 7/15/22.    - she underwent bilateral mastectomy on 8/15/22.  - today, she is having drainage from the site where the left drain was removed. She will see the surgeon tomorrow to get a stitch placed.  - she will see radiation oncology tomorrow.      Interval history:  - she presents for a follow-up appointment for her breast cancer  - she  completed radiation therapy:    - she underwent bone density test on 11/22/22.  - today, she endorses fatigue. She denies shortness of breath, chest pain, nausea, vomiting, diarrhea, constipation. She is tolerating the anastrozole well.    Past medical, surgical, family, and social histories have been reviewed and updated below.    Past Medical History:   Past Medical History:   Diagnosis Date    Anticoagulant long-term use     Arthritis     Atrial flutter     Calcium nephrolithiasis 2007    ckd 3    Diabetes mellitus, type 2     Diabetic peripheral neuropathy associated with type 2 diabetes mellitus     Difficult intubation     NARROW AIRWAY    Hypertension     Invasive ductal carcinoma of left breast 7/6/2022    Pulmonary aspiration of gastric contents     Shingles        Past Surgical History:   Past Surgical History:   Procedure Laterality Date    ANGIOGRAPHY OF LOWER EXTREMITY N/A 10/21/2021    Procedure: Angiogram Extremity Unilateral;  Surgeon: Dre Martino MD;  Location: [facility] CATH LAB/EP;  Service: Cardiology;  Laterality: N/A;    ANGIOGRAPHY OF LOWER EXTREMITY Right 11/10/2021    Procedure: Angiogram Extremity Unilateral;  Surgeon: Ben Rooney MD;  Location: [facility] CATH LAB/EP;  Service: Cardiology;  Laterality: Right;    AXILLARY NODE DISSECTION Right 8/15/2022    Procedure: LYMPHADENECTOMY, AXILLARY RIGHT;  Surgeon: RADHA Quinn MD;  Location: [facility] OR;  Service: General;  Laterality: Right;    COLONOSCOPY  11/28/2011    sigmoid diverticulosis, external hemorrhoids    DEBRIDEMENT Right 10/20/2021    Procedure: DEBRIDEMENT;  Surgeon: Ava Atkins DPM;  Location: [facility] OR;  Service: Podiatry;  Laterality: Right;    ENDOSCOPIC GASTROCNEMIUS RECESSION Right 9/10/2019    Procedure: RECESSION, GASTROCNEMIUS, ENDOSCOPIC;  Surgeon: Derek Jose DPM;  Location: [facility] OR;  Service: Podiatry;  Laterality: Right;  Arthrex center line (ron notified)  Video    EXTRACORPOREAL SHOCK WAVE  LITHOTRIPSY      FLEXOR TENOTOMY Right 10/20/2021    Procedure: TENOTOMY, FLEXOR 3rd toe;  Surgeon: Ava Atkins DPM;  Location: [facility] OR;  Service: Podiatry;  Laterality: Right;    HYSTERECTOMY      INJECTION FOR SENTINEL NODE IDENTIFICATION Left 8/15/2022    Procedure: INJECTION, FOR SENTINEL NODE IDENTIFICATION;  Surgeon: RADHA Quinn MD;  Location: [facility];  Service: General;  Laterality: Left;    MASTECTOMY Bilateral 8/15/2022    Procedure: MASTECTOMY BILATERAL  / BREAST;  Surgeon: RADHA Quinn MD;  Location: [facility];  Service: General;  Laterality: Bilateral;  5 HOURS / EMAIL SENT 8-11 @ 9:02 LK    SENTINEL LYMPH NODE BIOPSY Left 8/15/2022    Procedure: BIOPSY, LYMPH NODE, SENTINEL LEFT;  Surgeon: RADHA Quinn MD;  Location: [facility];  Service: General;  Laterality: Left;    SHOULDER SURGERY Left     TOE AMPUTATION Right 05/22/2017    5th toe    TOE AMPUTATION Right 10/20/2021    Procedure: AMPUTATION, TOE;  Surgeon: Ava Atkins DPM;  Location: [facility];  Service: Podiatry;  Laterality: Right;    TONSILLECTOMY         Family History:   Family History   Problem Relation Age of Onset    Diabetes Mother     Heart failure Father     Breast cancer Sister 69        Genetic testing negative    Kidney failure Brother     Breast cancer Maternal Aunt         early 40s       Social History:  reports that she has never smoked. She has never been exposed to tobacco smoke. She has never used smokeless tobacco. She reports that she does not drink alcohol and does not use drugs.    Allergies:  Review of patient's allergies indicates:   Allergen Reactions    Codeine Nausea Only and Other (See Comments)     Can Take Tylenol, hrdrocodone       Medications:  Current Outpatient Medications   Medication Sig Dispense Refill    ACCU-CHEK SAMI PLUS METER Misc TEST  AS DIRECTED 1 each 0    ACCU-CHEK SAMI PLUS TEST STRP Strp USE TO TEST BLOOD SUGAR ONCE DAILY 100 strip 3    ACCU-CHEK SOFT DEV LANCETS Kit        ACCU-CHEK SOFTCLIX LANCETS Misc TEST ONCE DAILY 100 each 3    ALPRAZolam (XANAX) 0.5 MG tablet Take 1 tablet (0.5 mg total) by mouth 2 (two) times daily as needed for Anxiety. 60 tablet 0    ammonium lactate 12 % Crea Apply to feet twice daily. Avoid use between toes. 140 g 5    anastrozole (ARIMIDEX) 1 mg Tab Take 1 tablet (1 mg total) by mouth once daily. 90 tablet 3    apixaban (ELIQUIS) 5 mg Tab TAKE 1 TABLET BY MOUTH TWICE DAILY 180 tablet 3    atorvastatin (LIPITOR) 80 MG tablet Take 1 tablet (80 mg total) by mouth once daily. 90 tablet 3    clopidogreL (PLAVIX) 75 mg tablet Take 1 tablet (75 mg total) by mouth once daily. 90 tablet 3    furosemide (LASIX) 40 MG tablet Take 1 tablet (40 mg total) by mouth once daily. 30 tablet 11    gabapentin (NEURONTIN) 400 MG capsule Take 2 tablets  in the morming, 1 tablet  at noon, and 2 tablets in the evening. 450 capsule 3    glimepiride (AMARYL) 1 MG tablet Take 1 tablet (1 mg total) by mouth 2 (two) times daily. 180 tablet 3    ibuprofen (ADVIL,MOTRIN) 600 MG tablet Take 1 tablet (600 mg total) by mouth every 8 (eight) hours as needed for Pain. 30 tablet 0    LIDOcaine-prilocaine (EMLA) cream Apply topically as needed (apply to affected area twice daily as needed for pain.). 30 g 1    lisinopriL (PRINIVIL,ZESTRIL) 20 MG tablet TAKE 1 TABLET EVERY DAY 90 tablet 3    metoprolol succinate (TOPROL-XL) 25 MG 24 hr tablet Take 1 tablet (25 mg total) by mouth once daily. 90 tablet 3    polyethylene glycol (GLYCOLAX) 17 gram/dose powder Take 17 g by mouth once daily. 510 g 0    pramipexole (MIRAPEX) 0.125 MG tablet TAKE 1 TABLET EVERY DAY 90 tablet 3    pregabalin (LYRICA) 75 MG capsule Take 1 capsule (75 mg total) by mouth 2 (two) times daily. 60 capsule 2    traMADoL (ULTRAM) 50 mg tablet Take 1 tablet (50 mg total) by mouth every 6 (six) hours as needed for Pain. 40 tablet 0    urea (CARMOL) 40 % Crea Apply topically 2 (two) times daily. 1 Bottle 3     No current  "facility-administered medications for this visit.       Review of Systems   Constitutional:  Positive for fatigue.   HENT:  Negative for sore throat.    Eyes:  Negative for visual disturbance.   Respiratory:  Negative for cough and shortness of breath.    Cardiovascular:  Negative for chest pain.   Gastrointestinal:  Negative for abdominal pain, constipation, nausea and vomiting.   Genitourinary:  Negative for dysuria.   Musculoskeletal:  Negative for back pain.   Skin:  Negative for rash.   Neurological:  Negative for headaches.        Neuralgia   Hematological:  Negative for adenopathy.   Psychiatric/Behavioral:  The patient is not nervous/anxious.      ECOG Performance Status:   ECOG SCORE 1            Objective:      Vitals:   Vitals:    11/28/22 1032   BP: 128/74   BP Location: Left arm   Patient Position: Sitting   BP Method: Medium (Automatic)   Pulse: 60   Resp: 18   Temp: 98 °F (36.7 °C)   TempSrc: Oral   SpO2: 98%   Weight: 72.8 kg (160 lb 7.9 oz)   Height: 5' 2" (1.575 m)     BMI: Body mass index is 29.35 kg/m².    Physical Exam  Vitals and nursing note reviewed.   Constitutional:       Appearance: She is well-developed.   HENT:      Head: Normocephalic and atraumatic.   Eyes:      Pupils: Pupils are equal, round, and reactive to light.   Cardiovascular:      Rate and Rhythm: Normal rate and regular rhythm.   Pulmonary:      Effort: Pulmonary effort is normal.      Breath sounds: Normal breath sounds.   Chest:      Comments: S/p bilateral mastectomy  Abdominal:      General: Bowel sounds are normal.      Palpations: Abdomen is soft.   Musculoskeletal:         General: Normal range of motion.      Cervical back: Normal range of motion and neck supple.   Skin:     General: Skin is warm and dry.   Neurological:      Mental Status: She is alert and oriented to person, place, and time.   Psychiatric:         Behavior: Behavior normal.         Thought Content: Thought content normal.         Judgment: Judgment " normal.       Laboratory Data:  Labs have been reviewed.    Lab Results   Component Value Date    WBC 6.54 08/10/2022    HGB 11.4 (L) 08/10/2022    HCT 33.7 (L) 08/10/2022    MCV 91 08/10/2022     08/10/2022       Imaging:       Bone density test (11/22/22):  Osteopenia of the left femoral neck.     Normal bone mineral density of the lumbar spine and right femoral neck.    PET/CT scan (7/15/22): I have personally reviewed the images  In this patient with breast cancer, there is mildly hypermetabolic right breast mass and axillary lymph nodes.  Focal subtle right pleural thickening with faint radiotracer uptake, metastatic lesion is not excluded.     1.7 cm indeterminate left adrenal nodule without abnormal uptake, likely a lipid poor benign adenoma.  If management would change, then recommend adrenal protocol CT or MRI for further evaluation.     Bilateral pulmonary micronodules some which are new and under size threshold for reliable PET evaluation and percutaneous biopsy.  Oncological considerations will determine ongoing follow-up.     Perianal focal mild increased uptake, which may indicate physiologic sphincter tone or infectious/inflammatory etiology such as hemorrhoids.     Mammogram (6/21/22):    Impression:  Left  Mass: Left breast 16 mm x 15 mm x 15 mm mass at the 4 o'clock position. Assessment: 5 - Highly suggestive of malignancy. Biopsy is recommended.      Right  Mass: Right breast 13 mm x 10 mm x 8 mm mass at the 10 o'clock position. Assessment: 5 - Highly suggestive of malignancy. Biopsy is recommended.   Mass: Right breast 20 mm x 19 mm x 19 mm mass at the 8 o'clock position. Assessment: 5 - Highly suggestive of malignancy. Biopsy is recommended.   Mass: Right breast 15 mm x 7 mm x 7 mm mass at the 9 o'clock position. Assessment: 5 - Highly suggestive of malignancy.   The masses span at least 7.1 cm.      Lymph Node: Right axilla 8 mm x 7 mm x 8 mm lymph node. Assessment: 4 - Suspicious  "finding. Biopsy is recommended.       Assessment:       1. Invasive ductal carcinoma of left breast    2. Invasive ductal carcinoma of right breast    3. Secondary malignant neoplasm of axillary node    4. Mood disorder due to medical condition    5. Type 2 diabetes mellitus with diabetic neuropathy, without long-term current use of insulin    6. Long term (current) use of aromatase inhibitors    7. Chronic kidney disease, stage 3b    8. Osteopenia of left femoral neck           Plan:     1. Bilateral invasive ductal carcinoma of breasts  - I have reviewed her chart  - mammogram on 6/21/22 revealed bilateral breast masses.  - biopsy of breast masses (6/28/22) revealed invasive ductal carcinoma with lobular features of the left breast (ER/ME-positive, HER2-negative) and invasive ductal carcinoma of right breast (ER/ME-positive, HER2- [by FISH]), and metastatic ductal carcinoma to right axillary lymph node.  - she met with breast surgery on 7/6/22. They offered surgical resection of bilateral breast cancers, but wanted to see results of PET/CT scan first. At the visit, she expressed hesitancy about proceeding with surgery.  - PET/CT (7/15/22) revealed localized disease. There was "focal subtle right pleural thickening with faint radiotracer uptake, metastatic lesion is not excluded." pulmonary micronodules are also noted of unclear significance  - she underwent bilateral mastectomy on 8/15/22.  - pathology:  Left breast: 22mm grade 1 invasive carcinoma with mixed ductal and lobular features, 1 positive lymph node  -right breast: multiple foci (23, 21, 22, 4mm) invasive carcinoma with mixed ductal and lobular features, 7 of 28 lymph nodes positive.  - I recommend adjuvant hormonal therapy with anastrozole x 5 years.  - she completed radiation therapy in November 2022.  - she is tolerating anastrozole well.  - bone density test (11/22/22) revealed osteopenia of left femoral neck.   - return to clinic in 3 months.    2. " Type 2 diabetes  - last hemoglobin A1c (5/24/22) was 7.3%  - continue diabetic medications  - defer management to Dr. Penny    3. Chronic kidney disease stage 3b  - eGFR on 8/10/22 was 41 ml/min/m2.    4. Osteopenia of left femoral neck  - bone density test (11/22/22) revealed osteopenia of left femoral neck.   - I discussed zometa q3 months or prolia twice a year. She would need to see a dentist beforehand.  - I prescribed calcium/vitamin D.    - return to clinic in 3 months.    Morgan Caputo M.D.  Hematology/Oncology  [facility] - [address], [address]  [place] [address]  Phone: [phone]  Fax: [phone]

## 2022-11-29 ENCOUNTER — TELEPHONE (OUTPATIENT)
Dept: RADIATION ONCOLOGY | Facility: CLINIC | Age: 82
End: 2022-11-29
Payer: MEDICARE

## 2022-11-29 ENCOUNTER — TELEPHONE (OUTPATIENT)
Dept: HEMATOLOGY/ONCOLOGY | Facility: CLINIC | Age: 82
End: 2022-11-29
Payer: MEDICARE

## 2022-11-29 NOTE — TELEPHONE ENCOUNTER
2nd request for documentation sent via [employer]  for Caro Powell and informed her of the [facility]  application process for Eliquis and what's required to apply.  Caro Powell will provide the following documents: Proof of household Income( such as social security statement, 1099 form, pension statement or 3 consecutive pay stubs, Copy of all Insurance cards( front and back), Printout from your Insurance or Pharmacy that shows how much you have spent on prescriptions this year, and HIPAA Authorization Forms

## 2022-11-29 NOTE — TELEPHONE ENCOUNTER
----- Message from Hannah Robles sent at 11/29/2022  1:03 PM CST -----  Type:  Needs Medical Advice    Who Called:  pt  Symptoms (please be specific):  would like to get appt rescheduled to an earlier time of the day , she said 4pm is to late in the evening      Would the patient rather a call back or a response via [facility]?    Best Call Back Number:  [phone]  Additional Information:

## 2022-11-29 NOTE — TELEPHONE ENCOUNTER
Call to pt to see how she is doing since completing radiation 11/8/22. Pt had a burning sensation in the axilla. Has applied Miaderm cream with relief. Pt prefers to wait until after [place] for f/u appt with Dr Mathew. Appt scheduled 1/4/23 at 1:00 PM. Appt reminder mailed to pt.

## 2022-12-06 PROBLEM — E66.01 MORBID OBESITY: Chronic | Status: RESOLVED | Noted: 2021-11-03 | Resolved: 2022-12-06

## 2022-12-06 NOTE — TELEPHONE ENCOUNTER
On  Caro CHANELL Powell was requested 11/17/2022 to provide the following documents to start the application process Proof of household Income( such as social security statement, 1099 form, pension statement or 3 consecutive pay stubs, Copy of all Insurance cards( front and back), and HIPAA Authorization Forms      As of today, the documents have not been received. A reminder letter has been mailed to Ms. Powell home.  Please instruct the patient to reach out to the Pharmacy Patient Assistance Team  at [email or web address] if she is still in need of assistance.

## 2022-12-07 ENCOUNTER — PATIENT MESSAGE (OUTPATIENT)
Dept: PHARMACY | Facility: CLINIC | Age: 82
End: 2022-12-07
Payer: MEDICARE

## 2022-12-13 ENCOUNTER — NURSE TRIAGE (OUTPATIENT)
Dept: ADMINISTRATIVE | Facility: CLINIC | Age: 82
End: 2022-12-13
Payer: MEDICARE

## 2022-12-13 NOTE — TELEPHONE ENCOUNTER
Mrs. Powell is calling today very upset, crying, because she cannot pay for her Eliquis and is unable to reach her /, Mayelin Smith.  States she has been trying to reach her for some time now, has left several messages, but never gets a call back.  I sent a [place] message to Mrs. Smith, she was available to speak with the patient and I warm transferred the call to her.  I also gave the patient information on [facility], where she may be able to find assistance with paying for medications, financial assistance; she verbalized understanding.      Reason for Disposition   Information only question and nurse able to answer    Additional Information   Negative: Nursing judgment   Negative: Nursing judgment   Negative: Nursing judgment   Negative: Nursing judgment    Protocols used: Information Only Call - No Triage-A-[place]

## 2022-12-15 ENCOUNTER — HOSPITAL ENCOUNTER (OUTPATIENT)
Dept: CARDIOLOGY | Facility: HOSPITAL | Age: 82
Discharge: HOME | End: 2022-12-15
Attending: INTERNAL MEDICINE
Payer: MEDICARE

## 2022-12-15 DIAGNOSIS — I73.9 PAD (PERIPHERAL ARTERY DISEASE): ICD-10-CM

## 2022-12-15 PROCEDURE — 93926 LOWER EXTREMITY STUDY: CPT | Mod: RT

## 2022-12-15 PROCEDURE — 93926 LOWER EXTREMITY STUDY: CPT | Mod: 26,RT,, | Performed by: INTERNAL MEDICINE

## 2022-12-15 PROCEDURE — 93926 CV US DOPPLER ARTERIAL LEG RIGHT (CUPID ONLY): ICD-10-PCS | Mod: 26,RT,, | Performed by: INTERNAL MEDICINE

## 2022-12-16 LAB
RIGHT ANT TIBIAL SYS PSV: 27 CM/S
RIGHT CFA PSV: 232 CM/S
RIGHT DORSALIS PEDIS PSV: 28 CM/S
RIGHT PERONEAL SYS PSV: 100 CM/S
RIGHT POPLITEAL PSV: 124 CM/S
RIGHT POST TIBIAL SYS PSV: 75 CM/S
RIGHT PROFUNDA SYS PSV: 105 CM/S
RIGHT SUPER FEMORAL DIST SYS PSV: 73 CM/S
RIGHT SUPER FEMORAL MID SYS PSV: 123 CM/S
RIGHT SUPER FEMORAL PROX SYS PSV: 173 CM/S
RIGHT TIB/PER TRUNK SYS PSV: 189 CM/S

## 2023-01-03 ENCOUNTER — TELEPHONE (OUTPATIENT)
Dept: PODIATRY | Facility: CLINIC | Age: 83
End: 2023-01-03
Payer: MEDICARE

## 2023-01-03 NOTE — TELEPHONE ENCOUNTER
Called pt in regards to message in the portal. Pt was advise that I switched her appointment to tomorrow morning. Pt agreed to the change and no further concerns was expressed. Call ended. ----- Message from Chen Jain sent at 1/3/2023 12:25 PM CST -----  Type:  Patient Returning Call    Who Called:Pt   Who Left Message for Patient:C[place]orro   Does the patient know what this is regarding?:yes   Would the patient rather a call back or a response via [facility]? Call back  Best Call Back Number:[phone]  Additional Information: pt is calling to say that she will keep the appt for tomorrow @ 9:15

## 2023-01-03 NOTE — TELEPHONE ENCOUNTER
Spoke with pt in regards to message in the portal. Pt was advise that Dr. Atkins have a cancellation for 11:15 today but if she can't make it I can put her on for 8 am tomorrow on 01/04/0222 per Dr. Atkins. Pt stated her understanding. Call ended. ----- Message from Rosa Cano sent at 1/3/2023  9:22 AM CST -----  Needs advice from nurse:      Who Called:pt  Regarding:needs to be seen today for a blister on toe that has turned into a sore/diabetic  Would the patient rather a call back or [facility]?  Best Call Back number:[phone]  Additional Info:

## 2023-01-03 NOTE — PROGRESS NOTES
[facility] Hospital Medicine  Progress Note    Patient Name: Caro Powell  MRN: [id number]  Patient Class: IP- Inpatient   Admission Date: 5/22/2017  Length of Stay: 1 days  Attending Physician: Derek Jose DPM  Primary Care Provider: Manuel Laird MD        Subjective:     Principal Problem:Osteomyelitis of toe of right foot    HPI:  75 yo female with PMHx of diabetes type 2, diabetic peripheral neuropathy, and HTN is admitted to Podiatry at  [facility] on 5/22/2017 for arthroplasty with resection of infected bone versus toe amputation of right fifth toe.  Hospital Medicine is consulted for medical management for patient's diabetes and HTN and for risk stratification for surgery.      Hospital Course:  Awaiting Podiatric surgery this morning.     Interval History: Pt anxious about surgery today. Friend and  at bedside. Pt confirms she has not been told she has CKD and that her last A1c was 6.something.  Denies CP, SOB.     Review of Systems   Constitutional: Negative for chills and fever.   Respiratory: Negative for shortness of breath.    Cardiovascular: Negative for chest pain.   Gastrointestinal: Negative for abdominal pain.   Psychiatric/Behavioral: The patient is nervous/anxious.      Objective:     Vital Signs (Most Recent):  Temp: 98.2 °F (36.8 °C) (05/23/17 1200)  Pulse: 76 (05/23/17 1200)  Resp: 18 (05/23/17 1200)  BP: (!) 190/80 (05/23/17 1143)  SpO2: 99 % (05/23/17 1219) Vital Signs (24h Range):  Temp:  [96.8 °F (36 °C)-98.8 °F (37.1 °C)] 98.2 °F (36.8 °C)  Pulse:  [64-87] 76  Resp:  [16-18] 18  SpO2:  [92 %-99 %] 99 %  BP: (126-190)/(57-80) 190/80     Weight: 85.6 kg (188 lb 12.8 oz)  Body mass index is 34.53 kg/m².    Intake/Output Summary (Last 24 hours) at 05/23/17 1230  Last data filed at 05/23/17 1130   Gross per 24 hour   Intake              250 ml   Output             1350 ml   Net            -1100 ml      Physical Exam   Constitutional: She is  oriented to person, place, and time.   Cardiovascular: Normal rate and regular rhythm.    Pulmonary/Chest: Effort normal and breath sounds normal. No respiratory distress.   Neurological: She is alert and oriented to person, place, and time.       Significant Labs:   CBC:   Recent Labs  Lab 05/22/17  1703 05/23/17  0548   WBC 18.92* 16.08*   HGB 11.7* 11.0*   HCT 35.3* 33.1*    222     CMP:   Recent Labs  Lab 05/22/17  1703 05/23/17  0547    137   K 4.6 4.2    106   CO2 23 23   * 162*   BUN 37* 31*   CREATININE 1.7* 1.2   CALCIUM 9.7 9.3   PROT 6.9 6.4   ALBUMIN 3.2* 2.8*   BILITOT 0.5 0.5   ALKPHOS 61 60   AST 16 13   ALT 23 20   ANIONGAP 11 8   EGFRNONAA 29* 44*     POCT Glucose:   Recent Labs  Lab 05/22/17  2130 05/23/17  0437 05/23/17  1056   POCTGLUCOSE 300* 190* 136*       Significant Imaging:     Imaging Results          X-Ray Chest AP Portable (Final result)  Result time 05/23/17 08:27:14    Final result by Felicia Cheema MD (05/23/17 08:27:14)                 Impression:     Diffuse bilateral middle and lower lung zone predominant interstitial and ill-defined air space opacities, possibly edema or infectious/inflammatory disease.      Electronically signed by: FELICIA CHEEMA MD  Date:     05/23/17  Time:    08:27              Narrative:    Chest AP    Comparison: None    Findings: Diffuse bilateral middle and lower lung zone predominant interstitial and ill-defined airspace opacities. No effusion or pneumothorax. No displaced fracture.    Interpretation is limited by technique and patient body habitus.                             MRI Lower Extremity Without Contrast Right (Final result)  Result time 05/22/17 19:08:48   Procedure changed from MRI Lower Extremity With Contrast Right     Final result by John Pedro MD (05/22/17 19:08:48)                 Impression:     Findings concerning for osteomyelitis involving the proximal and distal phalanges of the fifth toe as  described above.  No evidence for abscess.      Electronically signed by: ANICETO NICK MD  Date:     05/22/17  Time:    19:08              Narrative:    Osteomyelitis.    Comparison: 4/19/17.    Technique: What L. multisequence noncontrast MRI of the right forefoot was obtained.    Findings: There is abnormal signal intensity involving the distal aspect of the proximal phalanx of the fifth toe as well as the entire distal phalanx of the fifth toe.  Additionally, there is evidence for open wound.  Therefore, these findings are concerning for osteomyelitis.  Remaining osseous structures demonstrate normal marrow signal.  There is joint space narrowing which is likely related to osteoarthritis.  There is no evidence for fluid collection to suggest abscess.  No radiopaque retained foreign body.  Tendons demonstrate normal signal intensity.  No significant edema of the subcutaneous soft tissues.                                Assessment/Plan:      * Osteomyelitis of toe of right foot    Leukocytosis  As per Primary Team - Podiatry. Elevated WBC (16K today) likely 2/2 osteomyelitis. Defer decision on antibiotics to Primary Team and ID.  Plan to remove source of infection today.            Type 2 diabetes mellitus with diabetic neuropathy, without long-term current use of insulin     this morning. Check blood glucose AC and HS and use SSI.  Check A1c.  Diabetic diet. Pt on glimepiride 1 mg daily at home - holding here - and gabapentin 400 mg daily for neuropathy.            Essential hypertension    BP elevated likely 2/2 anxiety about surgery.  Pt on lisinopril 5 mg at home which was held today 2/2 elevated creatinine. Resume tomorrow.   Monitor. PRN hydralazine.           CKD stage 3 due to type 2 diabetes mellitus    Pt denies being told she has CKD. Unknown baseline creatinine. 1.2 today after IVF administration.   Pt has h/o nephrolithiasis.  Denies  complaints. Monitor.           Pre-operative clearance     Ms. Caro Powell is a 76 y.o. female who will be undergoing a right 5th toe arthroplasty with resection of infected toe vs amputation which is a(n) Intermediate Risk cardiac procedure, and has the following:  · An exercise tolerance which is greater than four METs.  · Clinical factors that are Low Risk.  · No current signs/symptoms of unstable coronary syndrome, decompensated heart failure, significant arrhythmias, nor severe valvular disease.     The patient is currenlty medically optimized.  Labs and EKG reviewed.           VTE Risk Mitigation         Ordered     Place AZUL hose  Until discontinued      05/22/17 1643     Medium Risk of VTE  Once      05/22/17 1643          Liza Junior PA-C  Department of Hospital Medicine   [facility]  Pager: [phone]    Attending: Palmer Irving MD     normal

## 2023-01-04 ENCOUNTER — OFFICE VISIT (OUTPATIENT)
Dept: RADIATION ONCOLOGY | Facility: CLINIC | Age: 83
End: 2023-01-04
Payer: MEDICARE

## 2023-01-04 ENCOUNTER — OFFICE VISIT (OUTPATIENT)
Dept: PODIATRY | Facility: CLINIC | Age: 83
End: 2023-01-04
Payer: MEDICARE

## 2023-01-04 VITALS
WEIGHT: 164.63 LBS | OXYGEN SATURATION: 98 % | DIASTOLIC BLOOD PRESSURE: 65 MMHG | BODY MASS INDEX: 30.11 KG/M2 | TEMPERATURE: 98 F | SYSTOLIC BLOOD PRESSURE: 147 MMHG | HEART RATE: 70 BPM

## 2023-01-04 VITALS
DIASTOLIC BLOOD PRESSURE: 49 MMHG | SYSTOLIC BLOOD PRESSURE: 113 MMHG | HEART RATE: 68 BPM | BODY MASS INDEX: 29.45 KG/M2 | HEIGHT: 62 IN

## 2023-01-04 DIAGNOSIS — S90.422S: ICD-10-CM

## 2023-01-04 DIAGNOSIS — Z17.0 MALIGNANT NEOPLASM OF LOWER-OUTER QUADRANT OF LEFT BREAST OF FEMALE, ESTROGEN RECEPTOR POSITIVE: Primary | ICD-10-CM

## 2023-01-04 DIAGNOSIS — C50.512 MALIGNANT NEOPLASM OF LOWER-OUTER QUADRANT OF LEFT BREAST OF FEMALE, ESTROGEN RECEPTOR POSITIVE: Primary | ICD-10-CM

## 2023-01-04 DIAGNOSIS — I73.9 PAD (PERIPHERAL ARTERY DISEASE): Primary | ICD-10-CM

## 2023-01-04 DIAGNOSIS — M21.6X1 OTHER ACQUIRED DEFORMITIES OF RIGHT FOOT: ICD-10-CM

## 2023-01-04 DIAGNOSIS — E11.42 TYPE 2 DIABETES MELLITUS WITH PERIPHERAL NEUROPATHY: ICD-10-CM

## 2023-01-04 DIAGNOSIS — L84 PRE-ULCERATIVE CALLUSES: ICD-10-CM

## 2023-01-04 DIAGNOSIS — Z89.421 HISTORY OF AMPUTATION OF LESSER TOE, RIGHT: ICD-10-CM

## 2023-01-04 PROCEDURE — 3077F PR MOST RECENT SYSTOLIC BLOOD PRESSURE >= 140 MM HG: ICD-10-PCS | Mod: HCWC,CPTII,S$GLB, | Performed by: RADIOLOGY

## 2023-01-04 PROCEDURE — 3074F SYST BP LT 130 MM HG: CPT | Mod: HCWC,CPTII,S$GLB, | Performed by: STUDENT IN AN ORGANIZED HEALTH CARE EDUCATION/TRAINING PROGRAM

## 2023-01-04 PROCEDURE — 99999 PR PBB SHADOW E&M-EST. PATIENT-LVL II: ICD-10-PCS | Mod: PBBFAC,HCWC,, | Performed by: RADIOLOGY

## 2023-01-04 PROCEDURE — 11042 WOUND DEBRIDEMENT: ICD-10-PCS | Mod: HCWC,S$GLB,, | Performed by: STUDENT IN AN ORGANIZED HEALTH CARE EDUCATION/TRAINING PROGRAM

## 2023-01-04 PROCEDURE — 1159F PR MEDICATION LIST DOCUMENTED IN MEDICAL RECORD: ICD-10-PCS | Mod: HCWC,CPTII,S$GLB, | Performed by: STUDENT IN AN ORGANIZED HEALTH CARE EDUCATION/TRAINING PROGRAM

## 2023-01-04 PROCEDURE — 99024 POSTOP FOLLOW-UP VISIT: CPT | Mod: HCWC,S$GLB,, | Performed by: RADIOLOGY

## 2023-01-04 PROCEDURE — 3078F DIAST BP <80 MM HG: CPT | Mod: HCWC,CPTII,S$GLB, | Performed by: RADIOLOGY

## 2023-01-04 PROCEDURE — 1159F MED LIST DOCD IN RCRD: CPT | Mod: HCWC,CPTII,S$GLB, | Performed by: STUDENT IN AN ORGANIZED HEALTH CARE EDUCATION/TRAINING PROGRAM

## 2023-01-04 PROCEDURE — 3077F SYST BP >= 140 MM HG: CPT | Mod: HCWC,CPTII,S$GLB, | Performed by: RADIOLOGY

## 2023-01-04 PROCEDURE — 3078F PR MOST RECENT DIASTOLIC BLOOD PRESSURE < 80 MM HG: ICD-10-PCS | Mod: HCWC,CPTII,S$GLB, | Performed by: RADIOLOGY

## 2023-01-04 PROCEDURE — 1101F PR PT FALLS ASSESS DOC 0-1 FALLS W/OUT INJ PAST YR: ICD-10-PCS | Mod: HCWC,CPTII,S$GLB, | Performed by: RADIOLOGY

## 2023-01-04 PROCEDURE — 11042 DBRDMT SUBQ TIS 1ST 20SQCM/<: CPT | Mod: HCWC,S$GLB,, | Performed by: STUDENT IN AN ORGANIZED HEALTH CARE EDUCATION/TRAINING PROGRAM

## 2023-01-04 PROCEDURE — 99024 PR POST-OP FOLLOW-UP VISIT: ICD-10-PCS | Mod: HCWC,S$GLB,, | Performed by: RADIOLOGY

## 2023-01-04 PROCEDURE — 3288F PR FALLS RISK ASSESSMENT DOCUMENTED: ICD-10-PCS | Mod: HCWC,CPTII,S$GLB, | Performed by: RADIOLOGY

## 2023-01-04 PROCEDURE — 3078F PR MOST RECENT DIASTOLIC BLOOD PRESSURE < 80 MM HG: ICD-10-PCS | Mod: HCWC,CPTII,S$GLB, | Performed by: STUDENT IN AN ORGANIZED HEALTH CARE EDUCATION/TRAINING PROGRAM

## 2023-01-04 PROCEDURE — 3288F FALL RISK ASSESSMENT DOCD: CPT | Mod: HCWC,CPTII,S$GLB, | Performed by: RADIOLOGY

## 2023-01-04 PROCEDURE — 99499 RISK ADDL DX/OHS AUDIT: ICD-10-PCS | Mod: HCWC,S$GLB,, | Performed by: RADIOLOGY

## 2023-01-04 PROCEDURE — 99999 PR PBB SHADOW E&M-EST. PATIENT-LVL II: CPT | Mod: PBBFAC,HCWC,, | Performed by: RADIOLOGY

## 2023-01-04 PROCEDURE — 1126F PR PAIN SEVERITY QUANTIFIED, NO PAIN PRESENT: ICD-10-PCS | Mod: HCWC,CPTII,S$GLB, | Performed by: STUDENT IN AN ORGANIZED HEALTH CARE EDUCATION/TRAINING PROGRAM

## 2023-01-04 PROCEDURE — 1126F PR PAIN SEVERITY QUANTIFIED, NO PAIN PRESENT: ICD-10-PCS | Mod: HCWC,CPTII,S$GLB, | Performed by: RADIOLOGY

## 2023-01-04 PROCEDURE — 99999 PR PBB SHADOW E&M-EST. PATIENT-LVL IV: CPT | Mod: PBBFAC,HCWC,, | Performed by: STUDENT IN AN ORGANIZED HEALTH CARE EDUCATION/TRAINING PROGRAM

## 2023-01-04 PROCEDURE — 1126F AMNT PAIN NOTED NONE PRSNT: CPT | Mod: HCWC,CPTII,S$GLB, | Performed by: RADIOLOGY

## 2023-01-04 PROCEDURE — 99499 UNLISTED E&M SERVICE: CPT | Mod: HCWC,S$GLB,, | Performed by: STUDENT IN AN ORGANIZED HEALTH CARE EDUCATION/TRAINING PROGRAM

## 2023-01-04 PROCEDURE — 99499 UNLISTED E&M SERVICE: CPT | Mod: HCWC,S$GLB,, | Performed by: RADIOLOGY

## 2023-01-04 PROCEDURE — 99499 RISK ADDL DX/OHS AUDIT: ICD-10-PCS | Mod: HCWC,S$GLB,, | Performed by: STUDENT IN AN ORGANIZED HEALTH CARE EDUCATION/TRAINING PROGRAM

## 2023-01-04 PROCEDURE — 3074F PR MOST RECENT SYSTOLIC BLOOD PRESSURE < 130 MM HG: ICD-10-PCS | Mod: HCWC,CPTII,S$GLB, | Performed by: STUDENT IN AN ORGANIZED HEALTH CARE EDUCATION/TRAINING PROGRAM

## 2023-01-04 PROCEDURE — 3078F DIAST BP <80 MM HG: CPT | Mod: HCWC,CPTII,S$GLB, | Performed by: STUDENT IN AN ORGANIZED HEALTH CARE EDUCATION/TRAINING PROGRAM

## 2023-01-04 PROCEDURE — 1101F PT FALLS ASSESS-DOCD LE1/YR: CPT | Mod: HCWC,CPTII,S$GLB, | Performed by: RADIOLOGY

## 2023-01-04 PROCEDURE — 99214 OFFICE O/P EST MOD 30 MIN: CPT | Mod: 25,HCWC,S$GLB, | Performed by: STUDENT IN AN ORGANIZED HEALTH CARE EDUCATION/TRAINING PROGRAM

## 2023-01-04 PROCEDURE — 99214 PR OFFICE/OUTPT VISIT, EST, LEVL IV, 30-39 MIN: ICD-10-PCS | Mod: 25,HCWC,S$GLB, | Performed by: STUDENT IN AN ORGANIZED HEALTH CARE EDUCATION/TRAINING PROGRAM

## 2023-01-04 PROCEDURE — 99999 PR PBB SHADOW E&M-EST. PATIENT-LVL IV: ICD-10-PCS | Mod: PBBFAC,HCWC,, | Performed by: STUDENT IN AN ORGANIZED HEALTH CARE EDUCATION/TRAINING PROGRAM

## 2023-01-04 PROCEDURE — 1126F AMNT PAIN NOTED NONE PRSNT: CPT | Mod: HCWC,CPTII,S$GLB, | Performed by: STUDENT IN AN ORGANIZED HEALTH CARE EDUCATION/TRAINING PROGRAM

## 2023-01-04 RX ORDER — MUPIROCIN 20 MG/G
OINTMENT TOPICAL 2 TIMES DAILY
Qty: 30 G | Refills: 2 | Status: SHIPPED | OUTPATIENT
Start: 2023-01-04

## 2023-01-04 NOTE — PROGRESS NOTES
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    Biopsy of the left breast mass, 4 o'clock position revealed grade 1 invasive ductal carcinoma with lobular features.  On immunohistochemistry, the tumor cells were ER CA positive and HER2/ender negative.  The Ki-67 proliferative index was 10-20%.  The lesion in the right breast 8 o'clock position revealed a grade 1 invasive ductal carcinoma.  On immunohistochemistry, the tumor cells were ER CA positive and HER2/ender negative by fish.  The right axillary lymph node was biopsied in revealed a metastatic ductal carcinoma measuring 10 mm.    The patient was referred to Dr. Quinn.  She was taken to the operating room on 08/15/2022 for bilateral mastectomy.  The left mastectomy specimen revealed a grade 1 invasive carcinoma with mixed ductal and lobular features measuring 22 mm in greatest dimension.  Classic LCIS was present within the specimen.  The margins of resection were negative with all margins at least 10 mm away.  One of 3 left axillary lymph nodes was positive for carcinoma with the deposit measuring 2.1 mm.  There was no extranodal extension identified.       The right mastectomy specimen revealed multiple foci of invasive carcinoma with mixed ductal and lobular features.  The foci  measured 23, 21, 22, 4 mm in size.  There was intermediate grade DCIS present within specimen.  The margins of resection were negative.  7/30 lymph nodes were positive for carcinoma.  The largest metastatic deposit measured 15 mm with extranodal extension identified measuring 5 mm.    Oncology History   Invasive ductal carcinoma of right breast   6/28/2022 Biopsy    1. LEFT BREAST MASS, 4:00 O'CLOCK, 5 CM FROM THE NIPPLE, NEEDLE CORE BIOPSIES:   - Invasive ductal carcinoma with lobular features,   Grade 1   Focal intermediate grade ductal carcinoma in situ with microcalcifications and focal lobular extension. Architectural patterns: Cribriform and solid. Nuclear grade: Grade II (intermediate).    2. RIGHT BREAST MASS, 8:00 O'CLOCK, 12 CM FROM THE NIPPLE, NEEDLE CORE BIOPSIES:   - Invasive ductal carcinoma,   Grade 1    3. RIGHT BREAST MASS, 10:00 O'CLOCK, 3 CM FROM THE NIPPLE, NEEDLE CORE BIOPSIES:   - Invasive ductal carcinoma,  Overall grade: Grade 2     4. RIGHT AXILLARY LYMPH NODE, NEEDLE CORE BIOPSIES:   - Metastatic ductal carcinoma of the breast     6/28/2022 Breast Tumor Markers    Left Breast mass 4:00  BREAST BIOMARKER RESULTS:   Estrogen receptor (ER) status: Positive.  Progesterone receptor (PgR) status: Positive.   HER2 by IHC: Negative (score 1).    2. Right Breast mass 8:00  Estrogen receptor (ER) status: Positive.   Progesterone receptor (PgR) status: Positive.   HER2 by IHC: Equivocal (score 2+) Negative by FISH    3. Right Breast mass 10:00  Estrogen receptor (ER) status: Positive  Progesterone receptor (PGR) status: Positive.   HER2 by IHC: Negative (score 1+).     7/6/2022 Initial Diagnosis    Invasive ductal carcinoma of right breast     7/6/2022 Genetic Testing    Not completed      7/26/2022 Tumor Conference    The team agreed upfront surgery with bilateral mastectomy with Right axillary lymph node dissection and Left sentinel lymph node biopsy. Will follow up with Dr. Caputo after surgery  for endocrine therapy.        9/22/2022 Cancer Staged    Staging form: Breast, AJCC 8th Edition  - Pathologic: Stage IB (pT2, pN2a, cM0, G1, ER+, TX+, HER2-)       10/17/2022 - 11/8/2022 Radiation Therapy    Treating physician: Dr. 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 ID Energy Dose/Fx (Gy) #Fx Dose Correction (Gy) Total Dose (Gy) Start Date End Date Elapsed Days   3D CW_R CW_R/IMN 18X/6X 2.65 16 / 16 0 42.4 10/17/2022 11/8/2022 22   3D Sclav_R2 Sclav_R/AX 18X/6X 2.65 16 / 16 0 42.4 10/17/2022 11/8/2022 22           HISTORY OF PRESENT ILLNESS: Ms. Powell returns to clinic today for routine post-radiation follow-up.  The patient reports doing well  post radiation.  She has started endocrine therapy with Arimidex which she Is tolerating well.    REVIEW OF SYSTEMS:   Review of Systems   Constitutional:  Negative for fever, malaise/fatigue and weight loss.   HENT:  Negative for ear pain, hearing loss, sinus pain and sore throat.    Eyes:  Negative for blurred vision, double vision and pain.   Respiratory:  Negative for cough, hemoptysis, shortness of breath and wheezing.    Cardiovascular:  Negative for chest pain, palpitations and leg swelling.   Gastrointestinal:  Positive for diarrhea. Negative for abdominal pain, blood in stool, constipation, heartburn, nausea and vomiting.   Genitourinary:  Negative for dysuria, frequency, hematuria and urgency.   Musculoskeletal:  Positive for back pain. Negative for joint pain.   Skin:  Positive  for itching. Negative for rash.   Neurological:  Negative for tingling, focal weakness, seizures and headaches.   Psychiatric/Behavioral:  Negative for depression. The patient is not nervous/anxious.        ALLERGIES:   Review of patient's allergies indicates:   Allergen Reactions    Codeine Nausea Only and Other (See Comments)     Can Take Tylenol, hrdrocodone       MEDICATIONS:  Current Outpatient Medications   Medication Sig    ACCU-CHEK SAMI PLUS METER Misc TEST  AS DIRECTED    ACCU-CHEK SAMI PLUS TEST STRP Strp USE TO TEST BLOOD SUGAR ONCE DAILY    ACCU-CHEK SOFT DEV LANCETS Kit     ACCU-CHEK SOFTCLIX LANCETS Misc TEST ONCE DAILY    ammonium lactate 12 % Crea Apply to feet twice daily. Avoid use between toes.    anastrozole (ARIMIDEX) 1 mg Tab Take 1 tablet (1 mg total) by mouth once daily.    apixaban (ELIQUIS) 5 mg Tab TAKE 1 TABLET BY MOUTH TWICE DAILY    atorvastatin (LIPITOR) 80 MG tablet Take 1 tablet (80 mg total) by mouth once daily.    azithromycin (Z-APOLLO) 250 MG tablet Take 2 tablets by mouth on day 1; Take 1 tablet by mouth on days 2-5    calcium-vitamin D3 (OYSTER SHELL CALCIUM-VIT D3) 500 mg-5 mcg (200 unit) per tablet Take 1 tablet by mouth once daily.    clopidogreL (PLAVIX) 75 mg tablet Take 1 tablet (75 mg total) by mouth once daily.    diazePAM (VALIUM) 5 MG tablet     famotidine (PEPCID) 20 MG tablet     furosemide (LASIX) 40 MG tablet Take 1 tablet (40 mg total) by mouth once daily.    gabapentin (NEURONTIN) 400 MG capsule Take 1 capsule (400 mg total) by mouth 3 (three) times daily.    glimepiride (AMARYL) 1 MG tablet Take 1 tablet (1 mg total) by mouth 2 (two) times daily.    ibuprofen (ADVIL,MOTRIN) 600 MG tablet Take 1 tablet (600 mg total) by mouth every 8 (eight) hours as needed for Pain.    LIDOcaine-prilocaine (EMLA) cream Apply topically as needed (apply to affected area twice daily as needed for pain.).    lisinopriL (PRINIVIL,ZESTRIL) 20 MG tablet TAKE 1 TABLET EVERY DAY     metoprolol succinate (TOPROL-XL) 25 MG 24 hr tablet Take 1 tablet (25 mg total) by mouth once daily.    mupirocin (BACTROBAN) 2 % ointment Apply topically 2 (two) times daily.    polyethylene glycol (GLYCOLAX) 17 gram/dose powder Take 17 g by mouth once daily.    pramipexole (MIRAPEX) 0.125 MG tablet TAKE 1 TABLET EVERY DAY    traMADoL (ULTRAM) 50 mg tablet Take 1 tablet (50 mg total) by mouth every 6 (six) hours as needed for Pain.    urea (CARMOL) 40 % Crea Apply topically 2 (two) times daily.    ALPRAZolam (XANAX) 0.5 MG tablet Take 1 tablet (0.5 mg total) by mouth 2 (two) times daily as needed for Anxiety.    atorvastatin (LIPITOR) 80 MG tablet 1 tablet EVERY PM (route: oral)     No current facility-administered medications for this visit.           PHYSICAL EXAMINATION:  Vitals:    23 1250   BP: (!) 147/65   Pulse: 70   Temp: 97.7 °F (36.5 °C)     ECO  Body mass index is 30.11 kg/m².   Physical Exam  Constitutional:       Appearance: She is well-developed.   HENT:      Head: Normocephalic and atraumatic.   Eyes:      Conjunctiva/sclera: Conjunctivae normal.   Cardiovascular:      Rate and Rhythm: Normal rate.   Pulmonary:      Effort: Pulmonary effort is normal.   Chest:       Abdominal:      Palpations: Abdomen is soft.   Musculoskeletal:         General: Normal range of motion.      Cervical back: Normal range of motion and neck supple.   Skin:     General: Skin is warm and dry.   Neurological:      Mental Status: She is alert and oriented to person, place, and time.   Psychiatric:         Behavior: Behavior normal.         Thought Content: Thought content normal.        ASSESSMENT/PLAN:  Caro was seen today for follow-up.    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  She was advised on continued skin care with a  mild moisturizer such as Eucerin or vitamin-E oil.  The patient we will continue endocrine therapy with Arimidex for 5 years.  She returned to department as needed in the future.

## 2023-01-04 NOTE — PROGRESS NOTES
Subjective:      Patient ID: Caro Powell is a 82 y.o. female.    Chief Complaint: Diabetes Mellitus (Brayan Penny MD     12/06/2022), Blister (Left foot ), and Foot Problem (Right foot )    Caro is a 82 y.o. female who presents to the clinic for evaluation and treatment of high risk feet. 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 The patient's chief complaint is long, thick toenails. This patient has documented high risk feet requiring routine maintenance secondary to diabetes mellitis and those secondary complications of diabetes, as mentioned.. Patient states she is following closely with Dr. Rooney for PAD. Relates has new SAS shoes which she put her diabetic shoes in and her feet are looking much better. No new pedal complaints.     8/3/22: Seen today for RFC and annual diabetic foot exam. Denies open wounds. No new pedal complaints. States has upcoming breast surgery.     11/9/22: Seen today for routine foot care. States she noticed blood in her sock and is concerned for a new diabetic foot ulcer.    11/22/22 Pt seen today for follow up of right foot ulcers. States she thinks they are healed. Admits she was not wearing her diabetic shoes previously and that is how the ulcers formed. No further pedal complaints.     1/4/23: Seen today, states she noticed a new blister to her left great toe, states she has been applying neosporin to it. No further pedal complaints.     PCP: Brayan Penny MD    Date Last Seen by PCP: 6/21/22    Current shoe gear:  SAS diabetic shoes    Hemoglobin A1C   Date Value Ref Range Status   05/24/2022 7.3 (H) 4.0 - 5.6 % Final     Comment:      ADA Screening Guidelines:  5.7-6.4%  Consistent with prediabetes  >or=6.5%  Consistent with diabetes    High levels of fetal hemoglobin interfere with the HbA1C  assay. Heterozygous hemoglobin variants (HbS, HgC, etc)do  not significantly interfere with this assay.   However, presence of multiple variants may affect accuracy.     10/18/2021 6.7 (H) 4.0 - 5.6 % Final     Comment:     ADA Screening Guidelines:  5.7-6.4%  Consistent with prediabetes  >or=6.5%  Consistent with diabetes    High levels of fetal hemoglobin interfere with the HbA1C  assay. Heterozygous hemoglobin variants (HbS, HgC, etc)do  not significantly interfere with this assay.   However, presence of multiple variants may affect accuracy.     01/22/2021 6.6 (H) 4.0 - 5.6 % Final     Comment:     ADA Screening Guidelines:  5.7-6.4%  Consistent with prediabetes  >or=6.5%  Consistent with diabetes  High levels of fetal hemoglobin interfere with the HbA1C  assay. Heterozygous hemoglobin variants (HbS, HgC, etc)do  not significantly interfere with this assay.   However, presence of multiple variants may affect accuracy.     01/12/2018 8.3 % Final       Review of Systems   Constitutional: Negative for chills, decreased appetite, diaphoresis and fever.   HENT:  Negative for congestion and hearing loss.    Cardiovascular:  Negative for chest pain, claudication, leg swelling and syncope.   Respiratory:  Negative for cough and shortness of breath.    Skin:  Positive for dry skin, nail changes and poor wound healing. Negative for color change, flushing, itching and rash.   Musculoskeletal:  Positive for arthritis. Negative for joint pain and joint swelling.   Gastrointestinal:  Negative for nausea and vomiting.   Neurological:  Positive for numbness. Negative for focal weakness, paresthesias and weakness.   Psychiatric/Behavioral:  Negative for altered mental status. The patient is not nervous/anxious.          Objective:      Physical Exam  Constitutional:        General: She is not in acute distress.     Appearance: She is well-developed. She is not diaphoretic.   Cardiovascular:      Comments: Dorsalis pedis and posterior tibial pulses are diminished. Skin temperature is within normal limits. Toes are cool to touch and feet are warm proximally. Hair growth is diminished. Skin is mildly atrophic and with mild hyperpigmentation. Mild edema noted, bilaterally. Telangiectasias bilaterally.  Musculoskeletal:         General: No tenderness.      Comments: Adequate joint range of motion without pain, limitation, nor crepitation to bilateral feet and ankle joints. Muscle strength is 5/5 in all groups bilaterally.    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 HAV, bilaterally. Semi rigid hammertoes to remaining digits.    Lymphadenopathy:      Comments: Negative lymphangitic streaking    Skin:     General: Skin is warm and dry.      Findings: No lesion.      Comments: Skin is warm and dry, no acute signs of infection noted. No open wounds, macerations or hyperkeratotic lesions, bilaterally.     See wound description below    Toenails are thickened by 2-4 mm's, dystrophic, and are darkened in coloration with subungual fungal debris, bilaterally.  Skin is very dry, bilaterally.      Neurological:      Mental Status: She is alert and oriented to person, place, and time.      Sensory: Sensory deficit present.      Motor: No abnormal muscle tone.      Comments: Light touch is diminished. [place] 5.07 monofilamant testing is diminished. Vibratory sensation  is diminished, bilaterally    Psychiatric:         Behavior: Behavior normal.         Thought Content: Thought content normal.         Judgment: Judgment normal.       Partial thickness wound to distal left great toe, measures 0.3x0.5x0.1cm with granular base and hyperkeratotic borders, no signs of infection. Appears stable and is healing well.       Pre-ulcerative callus sub 1st  metatarsal head of right foot, upon debridement with partial thickness ulcer, measures 0.5x1.1x0.1cm with hyperkeratotic borders and mild serosanguinous drainage, no signs of infection, probes to dermal/superficial subcutaneous layer            Assessment:       Encounter Diagnoses   Name Primary?    PAD (peripheral artery disease) Yes    Type 2 diabetes mellitus with peripheral neuropathy     Pre-ulcerative calluses     History of amputation of lesser toe, right     Other acquired deformities of right foot     Blister of left great toe, sequela            Plan:       Caro was seen today for diabetes mellitus, blister and foot problem.    Diagnoses and all orders for this visit:    PAD (peripheral artery disease)  -     DIABETIC SHOES FOR HOME USE  -     Wound Debridement    Type 2 diabetes mellitus with peripheral neuropathy  -     DIABETIC SHOES FOR HOME USE  -     Wound Debridement    Pre-ulcerative calluses  -     DIABETIC SHOES FOR HOME USE  -     Wound Debridement    History of amputation of lesser toe, right    Other acquired deformities of right foot    Blister of left great toe, sequela  -     Wound Debridement    Other orders  -     mupirocin (BACTROBAN) 2 % ointment; Apply topically 2 (two) times daily.        I counseled the patient on her conditions, their implications and medical management.  May continue mupirocin and bandage to left great toe 2-3x per day, recommend daily and frequent foot checks. Right foot dressed with protective football with hydrafera blue ready followed by pete foam, cast padding and secured in flexinet and in darco shoe. Placed in bilateral darco shoe. She is PWB to heel only. Rest, elevate  Shoe inspection. Diabetic Foot Education. Patient reminded of the importance of good nutrition and blood sugar control to help prevent podiatric complications of diabetes. Patient instructed on proper foot hygeine. We discussed wearing proper shoe gear, daily foot inspections, never  walking without protective shoe gear, never putting sharp instruments to feet, routine podiatric nail visits    Return to clinic in 1-2 weeks, sooner PRN

## 2023-01-05 ENCOUNTER — DOCUMENTATION ONLY (OUTPATIENT)
Dept: HEMATOLOGY/ONCOLOGY | Facility: CLINIC | Age: 83
End: 2023-01-05
Payer: MEDICARE

## 2023-01-05 NOTE — PROGRESS NOTES
Received call from patient re: financial assistance, prescription assistance.  She has spoken with staff in the pharmacy for her Eliquis refill and her copay is $47. She has four pills remaining at this time and needs to  the refill soon. She is unable to afford this ongoing and will need the Eliquis long term her doctor told her. She hasn't heard anything from [employer] or the pharmaceutical  program. Sent a message via Epic to Deangelo Moreira with the Pharmacy Patient Assistance Team, asking him to check on the status and contact patient. Called [facility], spoke with Mira via phone to the [place] location [phone], and faxed a cost transfer form to her attention at [phone]. She conformed that the refill is ready for .  Assisting patient through the [facility] Patient Assistance Fund with the $47 copay. Called patient and gave her an update. Will continue to follow and assist as needs are identified.

## 2023-01-06 NOTE — TELEPHONE ENCOUNTER
Patient requesting assistance, but Patient does not currently meet the 3% co-pay of yearly household income requirement. Patient will contact at later date for assistance eligibility.

## 2023-01-09 NOTE — PROCEDURES
Wound Debridement    Date/Time: 1/4/2023 9:15 AM  Performed by: Ava Atkins DPM  Authorized by: Ava Atkins DPM     Consent Done?:  Yes (Verbal)  Local anesthesia used?: No      Wound Details:    Location:  Left foot    Location:  Left 1st Toe    Type of Debridement:  Excisional       Length (cm):  0.3       Area (sq cm):  0.15       Width (cm):  0.5       Percent Debrided (%):  100       Depth (cm):  0.1       Total Area Debrided (sq cm):  0.15    Depth of debridement:  Subcutaneous tissue    Tissue debrided:  Subcutaneous and Other    Devitalized tissue debrided:  Biofilm, Callus and Fibrin    Instruments:  Blade and Curette    2nd Wound Details:     Location:  Right foot    Location:  Right 1st Metatarsal Head    Location:  Right 1st Metatarsal Head       Length (cm):  0.5       Area (sq cm):  0.55       Width (cm):  1.1       Depth (cm):  0.1    Depth of debridement:  Epidermis/Dermis    Tissue debrided:  Dermis    Devitalized tissue debrided:  Biofilm and Callus    Instruments:  Blade and Curette    Bleeding:  Minimal  Patient tolerance:  Patient tolerated the procedure well with no immediate complications     No cultures were taken during this visit

## 2023-01-11 ENCOUNTER — PATIENT MESSAGE (OUTPATIENT)
Dept: HEMATOLOGY/ONCOLOGY | Facility: CLINIC | Age: 83
End: 2023-01-11
Payer: MEDICARE

## 2023-01-19 ENCOUNTER — OFFICE VISIT (OUTPATIENT)
Dept: PODIATRY | Facility: CLINIC | Age: 83
End: 2023-01-19
Payer: MEDICARE

## 2023-01-19 VITALS
SYSTOLIC BLOOD PRESSURE: 94 MMHG | HEIGHT: 62 IN | DIASTOLIC BLOOD PRESSURE: 50 MMHG | BODY MASS INDEX: 30.11 KG/M2 | HEART RATE: 49 BPM

## 2023-01-19 DIAGNOSIS — B35.1 ONYCHOMYCOSIS: ICD-10-CM

## 2023-01-19 DIAGNOSIS — E11.42 TYPE 2 DIABETES MELLITUS WITH PERIPHERAL NEUROPATHY: ICD-10-CM

## 2023-01-19 DIAGNOSIS — I73.9 PAD (PERIPHERAL ARTERY DISEASE): Primary | ICD-10-CM

## 2023-01-19 DIAGNOSIS — L84 PRE-ULCERATIVE CALLUSES: ICD-10-CM

## 2023-01-19 DIAGNOSIS — Z89.421 HISTORY OF AMPUTATION OF LESSER TOE, RIGHT: ICD-10-CM

## 2023-01-19 DIAGNOSIS — Z87.2 HEALED FOOT ULCER: ICD-10-CM

## 2023-01-19 PROCEDURE — 11056 ROUTINE FOOT CARE: ICD-10-PCS | Mod: Q7,HCWC,S$GLB, | Performed by: STUDENT IN AN ORGANIZED HEALTH CARE EDUCATION/TRAINING PROGRAM

## 2023-01-19 PROCEDURE — 1126F AMNT PAIN NOTED NONE PRSNT: CPT | Mod: HCWC,CPTII,S$GLB, | Performed by: STUDENT IN AN ORGANIZED HEALTH CARE EDUCATION/TRAINING PROGRAM

## 2023-01-19 PROCEDURE — 3074F PR MOST RECENT SYSTOLIC BLOOD PRESSURE < 130 MM HG: ICD-10-PCS | Mod: HCWC,CPTII,S$GLB, | Performed by: STUDENT IN AN ORGANIZED HEALTH CARE EDUCATION/TRAINING PROGRAM

## 2023-01-19 PROCEDURE — 1126F PR PAIN SEVERITY QUANTIFIED, NO PAIN PRESENT: ICD-10-PCS | Mod: HCWC,CPTII,S$GLB, | Performed by: STUDENT IN AN ORGANIZED HEALTH CARE EDUCATION/TRAINING PROGRAM

## 2023-01-19 PROCEDURE — 99999 PR PBB SHADOW E&M-EST. PATIENT-LVL IV: CPT | Mod: PBBFAC,HCWC,, | Performed by: STUDENT IN AN ORGANIZED HEALTH CARE EDUCATION/TRAINING PROGRAM

## 2023-01-19 PROCEDURE — 99499 UNLISTED E&M SERVICE: CPT | Mod: HCWC,S$GLB,, | Performed by: STUDENT IN AN ORGANIZED HEALTH CARE EDUCATION/TRAINING PROGRAM

## 2023-01-19 PROCEDURE — 3074F SYST BP LT 130 MM HG: CPT | Mod: HCWC,CPTII,S$GLB, | Performed by: STUDENT IN AN ORGANIZED HEALTH CARE EDUCATION/TRAINING PROGRAM

## 2023-01-19 PROCEDURE — 1159F PR MEDICATION LIST DOCUMENTED IN MEDICAL RECORD: ICD-10-PCS | Mod: HCWC,CPTII,S$GLB, | Performed by: STUDENT IN AN ORGANIZED HEALTH CARE EDUCATION/TRAINING PROGRAM

## 2023-01-19 PROCEDURE — 11721 DEBRIDE NAIL 6 OR MORE: CPT | Mod: 59,Q7,HCWC,S$GLB | Performed by: STUDENT IN AN ORGANIZED HEALTH CARE EDUCATION/TRAINING PROGRAM

## 2023-01-19 PROCEDURE — 1159F MED LIST DOCD IN RCRD: CPT | Mod: HCWC,CPTII,S$GLB, | Performed by: STUDENT IN AN ORGANIZED HEALTH CARE EDUCATION/TRAINING PROGRAM

## 2023-01-19 PROCEDURE — 99499 NO LOS: ICD-10-PCS | Mod: HCWC,S$GLB,, | Performed by: STUDENT IN AN ORGANIZED HEALTH CARE EDUCATION/TRAINING PROGRAM

## 2023-01-19 PROCEDURE — 11721 ROUTINE FOOT CARE: ICD-10-PCS | Mod: 59,Q7,HCWC,S$GLB | Performed by: STUDENT IN AN ORGANIZED HEALTH CARE EDUCATION/TRAINING PROGRAM

## 2023-01-19 PROCEDURE — 3078F DIAST BP <80 MM HG: CPT | Mod: HCWC,CPTII,S$GLB, | Performed by: STUDENT IN AN ORGANIZED HEALTH CARE EDUCATION/TRAINING PROGRAM

## 2023-01-19 PROCEDURE — 99999 PR PBB SHADOW E&M-EST. PATIENT-LVL IV: ICD-10-PCS | Mod: PBBFAC,HCWC,, | Performed by: STUDENT IN AN ORGANIZED HEALTH CARE EDUCATION/TRAINING PROGRAM

## 2023-01-19 PROCEDURE — 3078F PR MOST RECENT DIASTOLIC BLOOD PRESSURE < 80 MM HG: ICD-10-PCS | Mod: HCWC,CPTII,S$GLB, | Performed by: STUDENT IN AN ORGANIZED HEALTH CARE EDUCATION/TRAINING PROGRAM

## 2023-01-19 PROCEDURE — 97597 WOUND DEBRIDEMENT: ICD-10-PCS | Mod: 59,HCWC,S$GLB, | Performed by: STUDENT IN AN ORGANIZED HEALTH CARE EDUCATION/TRAINING PROGRAM

## 2023-01-19 PROCEDURE — 97597 DBRDMT OPN WND 1ST 20 CM/<: CPT | Mod: 59,HCWC,S$GLB, | Performed by: STUDENT IN AN ORGANIZED HEALTH CARE EDUCATION/TRAINING PROGRAM

## 2023-01-19 PROCEDURE — 11056 PARNG/CUTG B9 HYPRKR LES 2-4: CPT | Mod: Q7,HCWC,S$GLB, | Performed by: STUDENT IN AN ORGANIZED HEALTH CARE EDUCATION/TRAINING PROGRAM

## 2023-01-19 NOTE — PROGRESS NOTES
Subjective:      Patient ID: Caro Powell is a 82 y.o. female.    Chief Complaint: Foot Ulcer (Right ft., ), Follow-up, Wound Care, and Dressing Change    Caro is a 82 y.o. female who presents to the clinic for evaluation and treatment of high risk feet. 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 The patient's chief complaint is long, thick toenails. This patient has documented high risk feet requiring routine maintenance secondary to diabetes mellitis and those secondary complications of diabetes, as mentioned.. Patient states she is following closely with Dr. Rooney for PAD. Relates has new SAS shoes which she put her diabetic shoes in and her feet are looking much better. No new pedal complaints.     8/3/22: Seen today for RFC and annual diabetic foot exam. Denies open wounds. No new pedal complaints. States has upcoming breast surgery.     11/9/22: Seen today for routine foot care. States she noticed blood in her sock and is concerned for a new diabetic foot ulcer.    11/22/22 Pt seen today for follow up of right foot ulcers. States she thinks they are healed. Admits she was not wearing her diabetic shoes previously and that is how the ulcers formed. No further pedal complaints.     1/4/23: Seen today, states she noticed a new blister to her left great toe, states she has been applying neosporin to it. No further pedal complaints.     1/19/23: Seen today for pre-ulcerative callus check to right plantar 1st metatarsal head and distal left great toe. States has been in a football to right foot. States she is worried her ulcer will break down again and is worried her diabetic  shoes with inserts are not fully offloading her foot. Denies open wounds, drainage or signs of infection. No further pedal complaints.     PCP: Brayan Penny MD    Date Last Seen by PCP: 6/21/22    Current shoe gear:  SAS diabetic shoes    Hemoglobin A1C   Date Value Ref Range Status   05/24/2022 7.3 (H) 4.0 - 5.6 % Final     Comment:     ADA Screening Guidelines:  5.7-6.4%  Consistent with prediabetes  >or=6.5%  Consistent with diabetes    High levels of fetal hemoglobin interfere with the HbA1C  assay. Heterozygous hemoglobin variants (HbS, HgC, etc)do  not significantly interfere with this assay.   However, presence of multiple variants may affect accuracy.     10/18/2021 6.7 (H) 4.0 - 5.6 % Final     Comment:     ADA Screening Guidelines:  5.7-6.4%  Consistent with prediabetes  >or=6.5%  Consistent with diabetes    High levels of fetal hemoglobin interfere with the HbA1C  assay. Heterozygous hemoglobin variants (HbS, HgC, etc)do  not significantly interfere with this assay.   However, presence of multiple variants may affect accuracy.     01/22/2021 6.6 (H) 4.0 - 5.6 % Final     Comment:     ADA Screening Guidelines:  5.7-6.4%  Consistent with prediabetes  >or=6.5%  Consistent with diabetes  High levels of fetal hemoglobin interfere with the HbA1C  assay. Heterozygous hemoglobin variants (HbS, HgC, etc)do  not significantly interfere with this assay.   However, presence of multiple variants may affect accuracy.     01/12/2018 8.3 % Final       Review of Systems   Constitutional: Negative for chills, decreased appetite, diaphoresis and fever.   HENT:  Negative for congestion and hearing loss.    Cardiovascular:  Negative for chest pain, claudication, leg swelling and syncope.   Respiratory:  Negative for cough and shortness of breath.    Skin:  Positive for dry skin, nail changes and poor wound healing. Negative for color change, flushing, itching and rash.   Musculoskeletal:  Positive for arthritis. Negative  for joint pain and joint swelling.   Gastrointestinal:  Negative for nausea and vomiting.   Neurological:  Positive for numbness. Negative for focal weakness, paresthesias and weakness.   Psychiatric/Behavioral:  Negative for altered mental status. The patient is not nervous/anxious.          Objective:      Physical Exam  Constitutional:       General: She is not in acute distress.     Appearance: She is well-developed. She is not diaphoretic.   Cardiovascular:      Comments: Dorsalis pedis and posterior tibial pulses are diminished. Skin temperature is within normal limits. Toes are cool to touch and feet are warm proximally. Hair growth is diminished. Skin is mildly atrophic and with mild hyperpigmentation. Mild edema noted, bilaterally. Telangiectasias bilaterally.  Musculoskeletal:         General: No tenderness.      Comments: Adequate joint range of motion without pain, limitation, nor crepitation to bilateral feet and ankle joints. Muscle strength is 5/5 in all groups bilaterally.    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 HAV, bilaterally. Semi rigid hammertoes to remaining digits.    Lymphadenopathy:      Comments: Negative lymphangitic streaking    Skin:     General: Skin is warm and dry.      Findings: No lesion.      Comments: Skin is warm and dry, no acute signs of infection noted. No open wounds, macerations or hyperkeratotic lesions, bilaterally.     See wound description below    Toenails are thickened by 2-4 mm's, dystrophic, and are darkened in coloration with subungual fungal debris, bilaterally.  Skin is very dry, bilaterally.      Neurological:      Mental Status: She is alert and oriented to person, place, and time.      Sensory: Sensory deficit present.      Motor: No abnormal muscle tone.      Comments: Light touch is diminished. [place] 5.07 monofilamant testing is diminished. Vibratory sensation  is diminished, bilaterally     Psychiatric:         Behavior: Behavior normal.         Thought Content: Thought content normal.         Judgment: Judgment normal.       All ulcers are healed to bilateral foot, no signs of infection    PREVIOUS IMAGES:                 Assessment:       Encounter Diagnoses   Name Primary?    PAD (peripheral artery disease) Yes    Type 2 diabetes mellitus with peripheral neuropathy     Pre-ulcerative calluses     History of amputation of lesser toe, right     Onychomycosis     Healed foot ulcer              Plan:       Caro was seen today for foot ulcer, follow-up, wound care and dressing change.    Diagnoses and all orders for this visit:    PAD (peripheral artery disease)  -     Routine Foot Care    Type 2 diabetes mellitus with peripheral neuropathy  -     Routine Foot Care    Pre-ulcerative calluses  -     Routine Foot Care  -     Wound Debridement    History of amputation of lesser toe, right  -     Routine Foot Care    Onychomycosis  -     Routine Foot Care    Healed foot ulcer  -     Wound Debridement      I counseled the patient on her conditions, their implications and medical management.  Routine foot care per attached note  Pre-ulcerative callus debrided to bilateral foot, sites are healed with no open wounds or signs of infection  Discussed diabetic shoes are over a year old, recommend obtaining new SAS shoes. She is to continue inserts, padding with 1st ray cut out added for further cushion and offloading today.   Lotion BID for dry skin, recommend [employer] diabetic lotion  Shoe inspection. Diabetic Foot Education. Patient reminded of the importance of good nutrition and blood sugar control to help prevent podiatric complications of diabetes. Patient instructed on proper foot hygeine. We discussed wearing proper shoe gear, daily foot inspections, never walking without protective shoe gear, never putting sharp instruments to feet, routine podiatric nail visits    Return to clinic in  2-4 weeks,  sooner PRN

## 2023-01-20 NOTE — PROCEDURES
Wound Debridement    Date/Time: 1/19/2023 2:00 PM  Performed by: Ava Atkins DPM  Authorized by: Ava Atkins DPM     Consent Done?:  Yes (Verbal)  Local anesthesia used?: No      Wound Details:    Location:  Right foot    Location:  Right 1st Metatarsal Head    Type of Debridement:  Non-excisional       Length (cm):  1       Area (sq cm):  1       Width (cm):  1       Percent Debrided (%):  100       Depth (cm):  0       Total Area Debrided (sq cm):  1    Depth of debridement:  Epidermis/Dermis    Tissue debrided:  Epidermis    Devitalized tissue debrided:  Callus    Instruments:  Blade    Bleeding:  None  Patient tolerance:  Patient tolerated the procedure well with no immediate complications     No cultures were taken during this visit . Pre-ulcerative callus debridement performed, underlying previous ulcer is now healed

## 2023-01-20 NOTE — PROCEDURES
"Routine Foot Care    Date/Time: 1/19/2023 2:00 PM  Performed by: Ava Atkins DPM  Authorized by: Ava Atkins DPM     Time out: Immediately prior to procedure a "time out" was called to verify the correct patient, procedure, equipment, support staff and site/side marked as required.    Consent Done?:  Yes (Verbal)  Hyperkeratotic Skin Lesions?: Yes    Number of trimmed lesions:  2  Location(s):  Right 1st Metatarsal Head and Left 1st Toe    Nail Care Type:  Debride(Left 1st Toe, Left 3rd Toe, Left 2nd Toe, Left 4th Toe, Left 5th Toe, Right 1st Toe, Right 3rd Toe and Right 4th Toe)  Patient tolerance:  Patient tolerated the procedure well with no immediate complications  "

## 2023-01-26 ENCOUNTER — PATIENT MESSAGE (OUTPATIENT)
Dept: HEMATOLOGY/ONCOLOGY | Facility: CLINIC | Age: 83
End: 2023-01-26
Payer: MEDICARE

## 2023-01-27 ENCOUNTER — DOCUMENTATION ONLY (OUTPATIENT)
Dept: HEMATOLOGY/ONCOLOGY | Facility: CLINIC | Age: 83
End: 2023-01-27
Payer: MEDICARE

## 2023-01-27 NOTE — PROGRESS NOTES
BREANA covering for Mayelin Smith LCSW. BREANA received consult from Concepción Vasques RN. Patient's daughter, Lisbeth, is requesting information on [employer] but had not heard back from Mayelin.    BREANA reached out to Lisbeth, [phone], to explain [employer]. Lisbeth was very appreciative. She will work to obtain rent receipts and will email to BREANA. BREANA provided email address.

## 2023-02-06 ENCOUNTER — TELEPHONE (OUTPATIENT)
Dept: CARDIOLOGY | Facility: CLINIC | Age: 83
End: 2023-02-06
Payer: MEDICARE

## 2023-02-06 NOTE — TELEPHONE ENCOUNTER
----- Message from Kalani Amaya sent at 2/6/2023  4:49 PM CST -----  .Type:  Needs Medical Advice    Who Called: pt    Would the patient rather a call back or a response via [facility]? Call back  Best Call Back Number: [phone]  Additional Information:     Pt stated she missed a call and would like a call back

## 2023-02-16 ENCOUNTER — OFFICE VISIT (OUTPATIENT)
Dept: PODIATRY | Facility: CLINIC | Age: 83
End: 2023-02-16
Payer: MEDICARE

## 2023-02-16 VITALS
BODY MASS INDEX: 30.11 KG/M2 | HEIGHT: 62 IN | SYSTOLIC BLOOD PRESSURE: 176 MMHG | DIASTOLIC BLOOD PRESSURE: 66 MMHG | HEART RATE: 61 BPM

## 2023-02-16 DIAGNOSIS — I73.9 PAD (PERIPHERAL ARTERY DISEASE): ICD-10-CM

## 2023-02-16 DIAGNOSIS — E11.42 TYPE 2 DIABETES MELLITUS WITH PERIPHERAL NEUROPATHY: ICD-10-CM

## 2023-02-16 DIAGNOSIS — L97.502 SKIN ULCER OF TOE WITH FAT LAYER EXPOSED, UNSPECIFIED LATERALITY: ICD-10-CM

## 2023-02-16 DIAGNOSIS — L84 PRE-ULCERATIVE CALLUSES: Primary | ICD-10-CM

## 2023-02-16 PROCEDURE — 11042 DBRDMT SUBQ TIS 1ST 20SQCM/<: CPT | Mod: HCWC,S$GLB,, | Performed by: STUDENT IN AN ORGANIZED HEALTH CARE EDUCATION/TRAINING PROGRAM

## 2023-02-16 PROCEDURE — 99214 OFFICE O/P EST MOD 30 MIN: CPT | Mod: 25,HCWC,S$GLB, | Performed by: STUDENT IN AN ORGANIZED HEALTH CARE EDUCATION/TRAINING PROGRAM

## 2023-02-16 PROCEDURE — 1159F MED LIST DOCD IN RCRD: CPT | Mod: HCWC,CPTII,S$GLB, | Performed by: STUDENT IN AN ORGANIZED HEALTH CARE EDUCATION/TRAINING PROGRAM

## 2023-02-16 PROCEDURE — 3078F PR MOST RECENT DIASTOLIC BLOOD PRESSURE < 80 MM HG: ICD-10-PCS | Mod: HCWC,CPTII,S$GLB, | Performed by: STUDENT IN AN ORGANIZED HEALTH CARE EDUCATION/TRAINING PROGRAM

## 2023-02-16 PROCEDURE — 99999 PR PBB SHADOW E&M-EST. PATIENT-LVL IV: ICD-10-PCS | Mod: PBBFAC,HCWC,, | Performed by: STUDENT IN AN ORGANIZED HEALTH CARE EDUCATION/TRAINING PROGRAM

## 2023-02-16 PROCEDURE — 11042 WOUND DEBRIDEMENT: ICD-10-PCS | Mod: HCWC,S$GLB,, | Performed by: STUDENT IN AN ORGANIZED HEALTH CARE EDUCATION/TRAINING PROGRAM

## 2023-02-16 PROCEDURE — 3078F DIAST BP <80 MM HG: CPT | Mod: HCWC,CPTII,S$GLB, | Performed by: STUDENT IN AN ORGANIZED HEALTH CARE EDUCATION/TRAINING PROGRAM

## 2023-02-16 PROCEDURE — 3077F PR MOST RECENT SYSTOLIC BLOOD PRESSURE >= 140 MM HG: ICD-10-PCS | Mod: HCWC,CPTII,S$GLB, | Performed by: STUDENT IN AN ORGANIZED HEALTH CARE EDUCATION/TRAINING PROGRAM

## 2023-02-16 PROCEDURE — 1126F AMNT PAIN NOTED NONE PRSNT: CPT | Mod: HCWC,CPTII,S$GLB, | Performed by: STUDENT IN AN ORGANIZED HEALTH CARE EDUCATION/TRAINING PROGRAM

## 2023-02-16 PROCEDURE — 3077F SYST BP >= 140 MM HG: CPT | Mod: HCWC,CPTII,S$GLB, | Performed by: STUDENT IN AN ORGANIZED HEALTH CARE EDUCATION/TRAINING PROGRAM

## 2023-02-16 PROCEDURE — 99214 PR OFFICE/OUTPT VISIT, EST, LEVL IV, 30-39 MIN: ICD-10-PCS | Mod: 25,HCWC,S$GLB, | Performed by: STUDENT IN AN ORGANIZED HEALTH CARE EDUCATION/TRAINING PROGRAM

## 2023-02-16 PROCEDURE — 1126F PR PAIN SEVERITY QUANTIFIED, NO PAIN PRESENT: ICD-10-PCS | Mod: HCWC,CPTII,S$GLB, | Performed by: STUDENT IN AN ORGANIZED HEALTH CARE EDUCATION/TRAINING PROGRAM

## 2023-02-16 PROCEDURE — 99999 PR PBB SHADOW E&M-EST. PATIENT-LVL IV: CPT | Mod: PBBFAC,HCWC,, | Performed by: STUDENT IN AN ORGANIZED HEALTH CARE EDUCATION/TRAINING PROGRAM

## 2023-02-16 PROCEDURE — 1159F PR MEDICATION LIST DOCUMENTED IN MEDICAL RECORD: ICD-10-PCS | Mod: HCWC,CPTII,S$GLB, | Performed by: STUDENT IN AN ORGANIZED HEALTH CARE EDUCATION/TRAINING PROGRAM

## 2023-02-16 NOTE — PROGRESS NOTES
Subjective:      Patient ID: Caro Powell is a 82 y.o. female.    Chief Complaint: Diabetes Mellitus, Foot Ulcer, and Follow-up    Caro is a 82 y.o. female who presents to the clinic for evaluation and treatment of high risk feet. 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 The patient's chief complaint is long, thick toenails. This patient has documented high risk feet requiring routine maintenance secondary to diabetes mellitis and those secondary complications of diabetes, as mentioned.. Patient states she is following closely with Dr. Rooney for PAD. Relates has new SAS shoes which she put her diabetic shoes in and her feet are looking much better. No new pedal complaints.     8/3/22: Seen today for RFC and annual diabetic foot exam. Denies open wounds. No new pedal complaints. States has upcoming breast surgery.     11/9/22: Seen today for routine foot care. States she noticed blood in her sock and is concerned for a new diabetic foot ulcer.    11/22/22 Pt seen today for follow up of right foot ulcers. States she thinks they are healed. Admits she was not wearing her diabetic shoes previously and that is how the ulcers formed. No further pedal complaints.     1/4/23: Seen today, states she noticed a new blister to her left great toe, states she has been applying neosporin to it. No further pedal complaints.     1/19/23: Seen today for routine foot care, pre-ulcerative callus check to right plantar 1st metatarsal head and distal left great toe. States has been in a football to right foot. States she is worried her ulcer will break down again and is worried her diabetic shoes  with inserts are not fully offloading her foot. Denies open wounds, drainage or signs of infection. No further pedal complaints.     2/16/23: Seen today for follow up, for pre-ulcerative callus check. States her  is in the hospital, not doing well. States she has been on her feet a lot taking care of him. No new pedal complaints.     PCP: Brayan Penny MD    Date Last Seen by PCP: 6/21/22    Current shoe gear:  SAS diabetic shoes    Hemoglobin A1C   Date Value Ref Range Status   05/24/2022 7.3 (H) 4.0 - 5.6 % Final     Comment:     ADA Screening Guidelines:  5.7-6.4%  Consistent with prediabetes  >or=6.5%  Consistent with diabetes    High levels of fetal hemoglobin interfere with the HbA1C  assay. Heterozygous hemoglobin variants (HbS, HgC, etc)do  not significantly interfere with this assay.   However, presence of multiple variants may affect accuracy.     10/18/2021 6.7 (H) 4.0 - 5.6 % Final     Comment:     ADA Screening Guidelines:  5.7-6.4%  Consistent with prediabetes  >or=6.5%  Consistent with diabetes    High levels of fetal hemoglobin interfere with the HbA1C  assay. Heterozygous hemoglobin variants (HbS, HgC, etc)do  not significantly interfere with this assay.   However, presence of multiple variants may affect accuracy.     01/22/2021 6.6 (H) 4.0 - 5.6 % Final     Comment:     ADA Screening Guidelines:  5.7-6.4%  Consistent with prediabetes  >or=6.5%  Consistent with diabetes  High levels of fetal hemoglobin interfere with the HbA1C  assay. Heterozygous hemoglobin variants (HbS, HgC, etc)do  not significantly interfere with this assay.   However, presence of multiple variants may affect accuracy.     01/12/2018 8.3 % Final       Review of Systems   Constitutional: Negative for chills, decreased appetite, diaphoresis and fever.   HENT:  Negative for congestion and hearing loss.    Cardiovascular:  Negative for chest pain, claudication, leg swelling and syncope.   Respiratory:  Negative for cough  and shortness of breath.    Skin:  Positive for dry skin, nail changes and poor wound healing. Negative for color change, flushing, itching and rash.   Musculoskeletal:  Positive for arthritis. Negative for joint pain and joint swelling.   Gastrointestinal:  Negative for nausea and vomiting.   Neurological:  Positive for numbness. Negative for focal weakness, paresthesias and weakness.   Psychiatric/Behavioral:  Negative for altered mental status. The patient is not nervous/anxious.          Objective:      Physical Exam  Constitutional:       General: She is not in acute distress.     Appearance: She is well-developed. She is not diaphoretic.   Cardiovascular:      Comments: Dorsalis pedis and posterior tibial pulses are diminished. Skin temperature is within normal limits. Toes are cool to touch and feet are warm proximally. Hair growth is diminished. Skin is mildly atrophic and with mild hyperpigmentation. Mild edema noted, bilaterally. Telangiectasias bilaterally.  Musculoskeletal:         General: No tenderness.      Comments: Adequate joint range of motion without pain, limitation, nor crepitation to bilateral feet and ankle joints. Muscle strength is 5/5 in all groups bilaterally.    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 HAV, bilaterally. Semi rigid hammertoes to remaining digits.    Lymphadenopathy:      Comments: Negative lymphangitic streaking    Skin:     General: Skin is warm and dry.      Findings: No lesion.      Comments: Skin is warm and dry, no acute signs of infection noted. No open wounds, macerations or hyperkeratotic lesions, bilaterally.     See wound description below    Toenails are thickened by 2-4 mm's, dystrophic, and are darkened in coloration with subungual fungal debris, bilaterally.  Skin is very dry, bilaterally.      Neurological:      Mental Status: She is alert and oriented to person, place, and time.      Sensory: Sensory deficit  present.      Motor: No abnormal muscle tone.      Comments: Light touch is diminished. [place]-Chucho 5.07 monofilamant testing is diminished. Vibratory sensation  is diminished, bilaterally    Psychiatric:         Behavior: Behavior normal.         Thought Content: Thought content normal.         Judgment: Judgment normal.     Pre-ulcerative callus, upon debridement with ulcer to plantar right 1st metatarsal head, measures 0.3x1.3x0.3cm with bleeding granular base, hyperkeratotic borders, probes to deep subcutaneous layer. No signs of infection.       PREVIOUS IMAGES:                 Assessment:       Encounter Diagnoses   Name Primary?    Pre-ulcerative calluses Yes    Type 2 diabetes mellitus with peripheral neuropathy     PAD (peripheral artery disease)     Skin ulcer of toe with fat layer exposed, unspecified laterality                Plan:       Caro was seen today for diabetes mellitus, foot ulcer and follow-up.    Diagnoses and all orders for this visit:    Pre-ulcerative calluses  -     Wound Debridement    Type 2 diabetes mellitus with peripheral neuropathy  -     Ambulatory referral/consult to Home Health; Future    PAD (peripheral artery disease)  -     Wound Debridement  -     Ambulatory referral/consult to Home Health; Future    Skin ulcer of toe with fat layer exposed, unspecified laterality  -     Wound Debridement  -     Ambulatory referral/consult to Home Health; Future        I counseled the patient on her conditions, their implications and medical management.  Debridement performed  Right foot dressed with protective football with hydrafera blue ready followed by pete foamx2, cast padding in between toes and secured cast padding in flexinet and in darco shoe. Placed in darco shoe. She is PWB to heel only. Home health to change same q3x per week and PRN until healed. Rest, elevate  Previously discussed diabetic shoes are over a year old, recommend obtaining new SAS shoes. She is to  continue inserts. Previously, padding with 1st ray cut out added for further cushion and offloading to right foot  Lotion BID for dry skin, recommend Brittney diabetic lotion  Shoe inspection. Diabetic Foot Education. Patient reminded of the importance of good nutrition and blood sugar control to help prevent podiatric complications of diabetes. Patient instructed on proper foot hygeine. We discussed wearing proper shoe gear, daily foot inspections, never walking without protective shoe gear, never putting sharp instruments to feet, routine podiatric nail visits    Return to clinic in  2-4 weeks, sooner PRN

## 2023-02-16 NOTE — PROCEDURES
Wound Debridement    Date/Time: 2/16/2023 1:15 PM  Performed by: Ava Atkins DPM  Authorized by: Ava Atkins DPM     Consent Done?:  Yes (Verbal)  Local anesthesia used?: No      Wound Details:    Location:  Right foot    Location:  Right 1st Metatarsal Head    Type of Debridement:  Excisional       Length (cm):  0.3       Area (sq cm):  0.39       Width (cm):  1.3       Percent Debrided (%):  100       Depth (cm):  0.3       Total Area Debrided (sq cm):  0.39    Depth of debridement:  Subcutaneous tissue    Tissue debrided:  Subcutaneous and Other    Devitalized tissue debrided:  Biofilm, Callus and Fibrin    Instruments:  Blade and Curette    Bleeding:  Minimal  Patient tolerance:  Patient tolerated the procedure well with no immediate complications     No cultures were taken during this visit

## 2023-03-02 ENCOUNTER — OFFICE VISIT (OUTPATIENT)
Dept: PODIATRY | Facility: CLINIC | Age: 83
End: 2023-03-02
Payer: MEDICARE

## 2023-03-02 VITALS
HEART RATE: 55 BPM | BODY MASS INDEX: 30.11 KG/M2 | HEIGHT: 62 IN | SYSTOLIC BLOOD PRESSURE: 127 MMHG | DIASTOLIC BLOOD PRESSURE: 52 MMHG

## 2023-03-02 DIAGNOSIS — L84 PRE-ULCERATIVE CALLUSES: Primary | ICD-10-CM

## 2023-03-02 DIAGNOSIS — E11.42 TYPE 2 DIABETES MELLITUS WITH PERIPHERAL NEUROPATHY: ICD-10-CM

## 2023-03-02 DIAGNOSIS — Z87.2 HEALED FOOT ULCER: ICD-10-CM

## 2023-03-02 DIAGNOSIS — I73.9 PAD (PERIPHERAL ARTERY DISEASE): ICD-10-CM

## 2023-03-02 PROCEDURE — 99213 OFFICE O/P EST LOW 20 MIN: CPT | Mod: 25,HCWC,S$GLB, | Performed by: STUDENT IN AN ORGANIZED HEALTH CARE EDUCATION/TRAINING PROGRAM

## 2023-03-02 PROCEDURE — 99999 PR PBB SHADOW E&M-EST. PATIENT-LVL III: ICD-10-PCS | Mod: PBBFAC,HCWC,, | Performed by: STUDENT IN AN ORGANIZED HEALTH CARE EDUCATION/TRAINING PROGRAM

## 2023-03-02 PROCEDURE — 1159F MED LIST DOCD IN RCRD: CPT | Mod: HCWC,CPTII,S$GLB, | Performed by: STUDENT IN AN ORGANIZED HEALTH CARE EDUCATION/TRAINING PROGRAM

## 2023-03-02 PROCEDURE — 97597 WOUND DEBRIDEMENT: ICD-10-PCS | Mod: HCWC,S$GLB,, | Performed by: STUDENT IN AN ORGANIZED HEALTH CARE EDUCATION/TRAINING PROGRAM

## 2023-03-02 PROCEDURE — 99213 PR OFFICE/OUTPT VISIT, EST, LEVL III, 20-29 MIN: ICD-10-PCS | Mod: 25,HCWC,S$GLB, | Performed by: STUDENT IN AN ORGANIZED HEALTH CARE EDUCATION/TRAINING PROGRAM

## 2023-03-02 PROCEDURE — 99999 PR PBB SHADOW E&M-EST. PATIENT-LVL III: CPT | Mod: PBBFAC,HCWC,, | Performed by: STUDENT IN AN ORGANIZED HEALTH CARE EDUCATION/TRAINING PROGRAM

## 2023-03-02 PROCEDURE — 3074F PR MOST RECENT SYSTOLIC BLOOD PRESSURE < 130 MM HG: ICD-10-PCS | Mod: HCWC,CPTII,S$GLB, | Performed by: STUDENT IN AN ORGANIZED HEALTH CARE EDUCATION/TRAINING PROGRAM

## 2023-03-02 PROCEDURE — 3074F SYST BP LT 130 MM HG: CPT | Mod: HCWC,CPTII,S$GLB, | Performed by: STUDENT IN AN ORGANIZED HEALTH CARE EDUCATION/TRAINING PROGRAM

## 2023-03-02 PROCEDURE — 3078F DIAST BP <80 MM HG: CPT | Mod: HCWC,CPTII,S$GLB, | Performed by: STUDENT IN AN ORGANIZED HEALTH CARE EDUCATION/TRAINING PROGRAM

## 2023-03-02 PROCEDURE — 3078F PR MOST RECENT DIASTOLIC BLOOD PRESSURE < 80 MM HG: ICD-10-PCS | Mod: HCWC,CPTII,S$GLB, | Performed by: STUDENT IN AN ORGANIZED HEALTH CARE EDUCATION/TRAINING PROGRAM

## 2023-03-02 PROCEDURE — 97597 DBRDMT OPN WND 1ST 20 CM/<: CPT | Mod: HCWC,S$GLB,, | Performed by: STUDENT IN AN ORGANIZED HEALTH CARE EDUCATION/TRAINING PROGRAM

## 2023-03-02 PROCEDURE — 1159F PR MEDICATION LIST DOCUMENTED IN MEDICAL RECORD: ICD-10-PCS | Mod: HCWC,CPTII,S$GLB, | Performed by: STUDENT IN AN ORGANIZED HEALTH CARE EDUCATION/TRAINING PROGRAM

## 2023-03-02 NOTE — PROCEDURES
Wound Debridement    Date/Time: 3/2/2023 2:00 PM  Performed by: Ava Atkins DPM  Authorized by: Ava Atkins DPM     Consent Done?:  Yes (Verbal)  Local anesthesia used?: No      Wound Details:    Location:  Right foot    Location:  Right 1st Metatarsal Head    Type of Debridement:  Non-excisional       Length (cm):  1       Area (sq cm):  1       Width (cm):  1       Percent Debrided (%):  100       Depth (cm):  0       Total Area Debrided (sq cm):  1    Depth of debridement:  Epidermis/Dermis    Tissue debrided:  Epidermis    Devitalized tissue debrided:  Callus    Instruments:  Blade    Bleeding:  None  Patient tolerance:  Patient tolerated the procedure well with no immediate complications     No cultures were taken during this visit . Site is healed

## 2023-03-02 NOTE — PROGRESS NOTES
Subjective:      Patient ID: Caro Powell is a 82 y.o. female.    Chief Complaint: Foot Ulcer, Wound Care, Dressing Change, and Follow-up    Caro is a 82 y.o. female who presents to the clinic for evaluation and treatment of high risk feet. 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 The patient's chief complaint is long, thick toenails. This patient has documented high risk feet requiring routine maintenance secondary to diabetes mellitis and those secondary complications of diabetes, as mentioned.. Patient states she is following closely with Dr. Rooney for PAD. Relates has new SAS shoes which she put her diabetic shoes in and her feet are looking much better. No new pedal complaints.     8/3/22: Seen today for RFC and annual diabetic foot exam. Denies open wounds. No new pedal complaints. States has upcoming breast surgery.     11/9/22: Seen today for routine foot care. States she noticed blood in her sock and is concerned for a new diabetic foot ulcer.    11/22/22 Pt seen today for follow up of right foot ulcers. States she thinks they are healed. Admits she was not wearing her diabetic shoes previously and that is how the ulcers formed. No further pedal complaints.     1/4/23: Seen today, states she noticed a new blister to her left great toe, states she has been applying neosporin to it. No further pedal complaints.     1/19/23: Seen today for routine foot care, pre-ulcerative callus check to right plantar 1st metatarsal head and distal left great toe. States has been in a football to right foot. States she is worried her ulcer will break down again and is worried her  diabetic shoes with inserts are not fully offloading her foot. Denies open wounds, drainage or signs of infection. No further pedal complaints.     2/16/23: Seen today for follow up, for pre-ulcerative callus check. States her  is in the hospital, not doing well. States she has been on her feet a lot taking care of him. No new pedal complaints.     3/2/23: Seen today for follow up pre-ulcerative callus. No new pedal complaints. Relates she is caring for her  at home and has a nurse helping them.    PCP: Brayan Penny MD    Date Last Seen by PCP: 6/21/22    Current shoe gear:  SAS diabetic shoes    Hemoglobin A1C   Date Value Ref Range Status   05/24/2022 7.3 (H) 4.0 - 5.6 % Final     Comment:     ADA Screening Guidelines:  5.7-6.4%  Consistent with prediabetes  >or=6.5%  Consistent with diabetes    High levels of fetal hemoglobin interfere with the HbA1C  assay. Heterozygous hemoglobin variants (HbS, HgC, etc)do  not significantly interfere with this assay.   However, presence of multiple variants may affect accuracy.     10/18/2021 6.7 (H) 4.0 - 5.6 % Final     Comment:     ADA Screening Guidelines:  5.7-6.4%  Consistent with prediabetes  >or=6.5%  Consistent with diabetes    High levels of fetal hemoglobin interfere with the HbA1C  assay. Heterozygous hemoglobin variants (HbS, HgC, etc)do  not significantly interfere with this assay.   However, presence of multiple variants may affect accuracy.     01/22/2021 6.6 (H) 4.0 - 5.6 % Final     Comment:     ADA Screening Guidelines:  5.7-6.4%  Consistent with prediabetes  >or=6.5%  Consistent with diabetes  High levels of fetal hemoglobin interfere with the HbA1C  assay. Heterozygous hemoglobin variants (HbS, HgC, etc)do  not significantly interfere with this assay.   However, presence of multiple variants may affect accuracy.     01/12/2018 8.3 % Final       Review of Systems   Constitutional: Negative for chills, decreased appetite, diaphoresis and  fever.   HENT:  Negative for congestion and hearing loss.    Cardiovascular:  Negative for chest pain, claudication, leg swelling and syncope.   Respiratory:  Negative for cough and shortness of breath.    Skin:  Positive for dry skin, nail changes and poor wound healing. Negative for color change, flushing, itching and rash.   Musculoskeletal:  Positive for arthritis. Negative for joint pain and joint swelling.   Gastrointestinal:  Negative for nausea and vomiting.   Neurological:  Positive for numbness. Negative for focal weakness, paresthesias and weakness.   Psychiatric/Behavioral:  Negative for altered mental status. The patient is not nervous/anxious.          Objective:      Physical Exam  Constitutional:       General: She is not in acute distress.     Appearance: She is well-developed. She is not diaphoretic.   Cardiovascular:      Comments: Dorsalis pedis and posterior tibial pulses are diminished. Skin temperature is within normal limits. Toes are cool to touch and feet are warm proximally. Hair growth is diminished. Skin is mildly atrophic and with mild hyperpigmentation. Mild edema noted, bilaterally. Telangiectasias bilaterally.  Musculoskeletal:         General: No tenderness.      Comments: Adequate joint range of motion without pain, limitation, nor crepitation to bilateral feet and ankle joints. Muscle strength is 5/5 in all groups bilaterally.    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 HAV, bilaterally. Semi rigid hammertoes to remaining digits.    Lymphadenopathy:      Comments: Negative lymphangitic streaking    Skin:     General: Skin is warm and dry.      Findings: No lesion.      Comments: Skin is warm and dry, no acute signs of infection noted. No open wounds, macerations or hyperkeratotic lesions, bilaterally.     See wound description below    Toenails are thickened by 2-4 mm's, dystrophic, and are darkened in coloration with subungual fungal  debris, bilaterally.  Skin is very dry, bilaterally.      Neurological:      Mental Status: She is alert and oriented to person, place, and time.      Sensory: Sensory deficit present.      Motor: No abnormal muscle tone.      Comments: Light touch is diminished. [place]ucho 5.07 monofilamant testing is diminished. Vibratory sensation  is diminished, bilaterally    Psychiatric:         Behavior: Behavior normal.         Thought Content: Thought content normal.         Judgment: Judgment normal.     Pre-ulcerative callus to plantar right 1st metatarsal head, upon debridement, site is healed. No signs of infection.       PREVIOUS IMAGES:                 Assessment:       Encounter Diagnoses   Name Primary?    Pre-ulcerative calluses Yes    Type 2 diabetes mellitus with peripheral neuropathy     PAD (peripheral artery disease)     Healed foot ulcer                Plan:       Caro was seen today for foot ulcer, wound care, dressing change and follow-up.    Diagnoses and all orders for this visit:    Pre-ulcerative calluses    Type 2 diabetes mellitus with peripheral neuropathy    PAD (peripheral artery disease)    Healed foot ulcer        I counseled the patient on her conditions, their implications and medical management.  Debridement performed, site is healed  May transition to diabetic SAS shoes. She is to continue inserts. Previously, padding with 1st ray cut out added for further cushion and offloading to right foot  Lotion BID for dry skin, recommend [employer] diabetic foot cream  Shoe inspection. Diabetic Foot Education. Patient reminded of the importance of good nutrition and blood sugar control to help prevent podiatric complications of diabetes. Patient instructed on proper foot hygeine. We discussed wearing proper shoe gear, daily foot inspections, never walking without protective shoe gear, never putting sharp instruments to feet, routine podiatric nail visits    Return to clinic in  2-4 weeks,  sooner PRN

## 2023-03-08 ENCOUNTER — OFFICE VISIT (OUTPATIENT)
Dept: SURGERY | Facility: CLINIC | Age: 83
End: 2023-03-08
Payer: MEDICARE

## 2023-03-08 VITALS
HEART RATE: 53 BPM | SYSTOLIC BLOOD PRESSURE: 136 MMHG | TEMPERATURE: 98 F | DIASTOLIC BLOOD PRESSURE: 63 MMHG | BODY MASS INDEX: 29.44 KG/M2 | OXYGEN SATURATION: 95 % | HEIGHT: 62 IN | WEIGHT: 160 LBS

## 2023-03-08 DIAGNOSIS — Z85.3 PERSONAL HISTORY OF BREAST CANCER: Primary | ICD-10-CM

## 2023-03-08 DIAGNOSIS — Z12.39 SCREENING BREAST EXAMINATION: ICD-10-CM

## 2023-03-08 DIAGNOSIS — Z90.13 S/P MASTECTOMY, BILATERAL: ICD-10-CM

## 2023-03-08 PROCEDURE — 3078F DIAST BP <80 MM HG: CPT | Mod: HCWC,CPTII,S$GLB, | Performed by: PHYSICIAN ASSISTANT

## 2023-03-08 PROCEDURE — 1159F PR MEDICATION LIST DOCUMENTED IN MEDICAL RECORD: ICD-10-PCS | Mod: HCWC,CPTII,S$GLB, | Performed by: PHYSICIAN ASSISTANT

## 2023-03-08 PROCEDURE — 1101F PR PT FALLS ASSESS DOC 0-1 FALLS W/OUT INJ PAST YR: ICD-10-PCS | Mod: HCWC,CPTII,S$GLB, | Performed by: PHYSICIAN ASSISTANT

## 2023-03-08 PROCEDURE — 1125F PR PAIN SEVERITY QUANTIFIED, PAIN PRESENT: ICD-10-PCS | Mod: HCWC,CPTII,S$GLB, | Performed by: PHYSICIAN ASSISTANT

## 2023-03-08 PROCEDURE — 3078F PR MOST RECENT DIASTOLIC BLOOD PRESSURE < 80 MM HG: ICD-10-PCS | Mod: HCWC,CPTII,S$GLB, | Performed by: PHYSICIAN ASSISTANT

## 2023-03-08 PROCEDURE — 1159F MED LIST DOCD IN RCRD: CPT | Mod: HCWC,CPTII,S$GLB, | Performed by: PHYSICIAN ASSISTANT

## 2023-03-08 PROCEDURE — 99999 PR PBB SHADOW E&M-EST. PATIENT-LVL IV: CPT | Mod: PBBFAC,HCWC,, | Performed by: PHYSICIAN ASSISTANT

## 2023-03-08 PROCEDURE — 3075F PR MOST RECENT SYSTOLIC BLOOD PRESS GE 130-139MM HG: ICD-10-PCS | Mod: HCWC,CPTII,S$GLB, | Performed by: PHYSICIAN ASSISTANT

## 2023-03-08 PROCEDURE — 3288F PR FALLS RISK ASSESSMENT DOCUMENTED: ICD-10-PCS | Mod: HCWC,CPTII,S$GLB, | Performed by: PHYSICIAN ASSISTANT

## 2023-03-08 PROCEDURE — 99999 PR PBB SHADOW E&M-EST. PATIENT-LVL IV: ICD-10-PCS | Mod: PBBFAC,HCWC,, | Performed by: PHYSICIAN ASSISTANT

## 2023-03-08 PROCEDURE — 3288F FALL RISK ASSESSMENT DOCD: CPT | Mod: HCWC,CPTII,S$GLB, | Performed by: PHYSICIAN ASSISTANT

## 2023-03-08 PROCEDURE — 1125F AMNT PAIN NOTED PAIN PRSNT: CPT | Mod: HCWC,CPTII,S$GLB, | Performed by: PHYSICIAN ASSISTANT

## 2023-03-08 PROCEDURE — 3075F SYST BP GE 130 - 139MM HG: CPT | Mod: HCWC,CPTII,S$GLB, | Performed by: PHYSICIAN ASSISTANT

## 2023-03-08 PROCEDURE — 99214 PR OFFICE/OUTPT VISIT, EST, LEVL IV, 30-39 MIN: ICD-10-PCS | Mod: HCWC,S$GLB,, | Performed by: PHYSICIAN ASSISTANT

## 2023-03-08 PROCEDURE — 1101F PT FALLS ASSESS-DOCD LE1/YR: CPT | Mod: HCWC,CPTII,S$GLB, | Performed by: PHYSICIAN ASSISTANT

## 2023-03-08 PROCEDURE — 99214 OFFICE O/P EST MOD 30 MIN: CPT | Mod: HCWC,S$GLB,, | Performed by: PHYSICIAN ASSISTANT

## 2023-03-08 NOTE — PROGRESS NOTES
[facility]  Department of Surgery    PCP:  Brayan Penny MD  CHIEF COMPLAINT:   Chief Complaint   Patient presents with    Follow-up     6 month follow up.       DIAGNOSIS:   This is a 82 y.o. 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     TREATMENT:   1. bilateral mastectomy with L SLNB and R ALND on 8/15/2022. Dr. Kai M.D. Surgical Oncology  2. Final Path: Left mastectomy showed 22 mm grade 1 invasive with mixed ductal and lobular features. 1 node positive for carcinoma (2.1 mm). Right mastectomy showed multifocal disease with 23 mm, 21 mm, 22 mm, and 4 mm masses grade 1 invasive carcinoma with mixed ductal and lobular features. 7/30 nodes positive with macrometastases.   3. Radiation therapy of the right chest and axilla completed November 2022 with Dr. Quincy M.D. Radiation Oncology   4. Adjuvant endocrine therapy with Anastrozole started November. Dr. Harshal M.D. Medical Oncology     HISTORY OF PRESENT ILLNESS:   Caro Powell is a 82 y.o. female comes in for oncological follow up. Patient had a somewhat complicated post operative course with a poor healing infectious wound of the right breast. States her incisions are both well healed now without further issue. Patient completed XRT with good tolerance and only moderate skin changes with darkening. She denies new complaints such as palpable masses or pain. Past medical and surgical history is updated without new changes. There have been no changes to family history. The patient denies constitutional symptoms of night sweats, chills, weight loss, new headaches, visual changes, new back or bony pain, chest pain, or shortness of breath.        Review of Systems: See HPI/Interval History for other systems reviewed.     IMAGING:    None     MEDICATIONS/ALLERGIES:     Current Outpatient  Medications   Medication Sig    ACCU-CHEK SAMI PLUS METER Misc TEST  AS DIRECTED    ACCU-CHEK SAMI PLUS TEST STRP Strp USE TO TEST BLOOD SUGAR ONCE DAILY    ACCU-CHEK SOFT DEV LANCETS Kit     ACCU-CHEK SOFTCLIX LANCETS Misc TEST ONCE DAILY    ammonium lactate 12 % Crea Apply to feet twice daily. Avoid use between toes.    anastrozole (ARIMIDEX) 1 mg Tab Take 1 tablet (1 mg total) by mouth once daily.    apixaban (ELIQUIS) 5 mg Tab TAKE 1 TABLET BY MOUTH TWICE DAILY    atorvastatin (LIPITOR) 80 MG tablet Take 1 tablet (80 mg total) by mouth once daily.    atorvastatin (LIPITOR) 80 MG tablet 1 tablet EVERY PM (route: oral)    calcium-vitamin D3 (OYSTER SHELL CALCIUM-VIT D3) 500 mg-5 mcg (200 unit) per tablet Take 1 tablet by mouth once daily.    clopidogreL (PLAVIX) 75 mg tablet Take 1 tablet (75 mg total) by mouth once daily.    diazePAM (VALIUM) 5 MG tablet TAKE 1 TABLET EVERY DAY AS NEEDED FOR ANXIETY    famotidine (PEPCID) 20 MG tablet     furosemide (LASIX) 40 MG tablet Take 1 tablet (40 mg total) by mouth once daily.    gabapentin (NEURONTIN) 400 MG capsule Take 1 capsule (400 mg total) by mouth 3 (three) times daily.    glimepiride (AMARYL) 1 MG tablet Take 1 tablet (1 mg total) by mouth 2 (two) times daily.    lisinopriL (PRINIVIL,ZESTRIL) 20 MG tablet TAKE 1 TABLET EVERY DAY    metoprolol succinate (TOPROL-XL) 25 MG 24 hr tablet Take 1 tablet (25 mg total) by mouth once daily.    mupirocin (BACTROBAN) 2 % ointment Apply topically 2 (two) times daily.    polyethylene glycol (GLYCOLAX) 17 gram/dose powder Take 17 g by mouth once daily.    pramipexole (MIRAPEX) 0.125 MG tablet TAKE 1 TABLET EVERY DAY    traMADoL (ULTRAM) 50 mg tablet Take 1 tablet (50 mg total) by mouth every 6 (six) hours as needed for Pain.    urea (CARMOL) 40 % Crea Apply topically 2 (two) times daily.    azithromycin (Z-APOLLO) 250 MG tablet Take 2 tablets by mouth on day 1; Take 1 tablet by mouth on days 2-5    ibuprofen (ADVIL,MOTRIN) 600  "MG tablet Take 1 tablet (600 mg total) by mouth every 8 (eight) hours as needed for Pain.    LIDOcaine-prilocaine (EMLA) cream Apply topically as needed (apply to affected area twice daily as needed for pain.).     No current facility-administered medications for this visit.      Review of patient's allergies indicates:   Allergen Reactions    Codeine Nausea Only and Other (See Comments)     Can Take Tylenol, hrdrocodone       PHYSICAL EXAM:   /63 (BP Location: Right arm, Patient Position: Sitting, BP Method: Large (Automatic))   Pulse (!) 53   Temp 98.4 °F (36.9 °C) (Oral)   Ht 5' 2" (1.575 m)   Wt 72.6 kg (160 lb)   LMP  (LMP Unknown)   SpO2 95%   BMI 29.26 kg/m²     Physical Exam   Vitals reviewed.  Constitutional: She is oriented to person, place, and time.   HENT:   Head: Normocephalic and atraumatic.   Nose: Nose normal.   Eyes: Pupils are equal, round, and reactive to light. Right eye exhibits no discharge. Left eye exhibits no discharge.   Pulmonary/Chest: Effort normal and breath sounds normal. No stridor. No respiratory distress. She exhibits no mass, no tenderness and no edema. Right breast exhibits no mass, no skin change and no tenderness. Left breast exhibits no mass, no skin change and no tenderness. No breast swelling or bleeding. Breasts are symmetrical.       Abdominal: Normal appearance.   Genitourinary: No breast swelling or bleeding.   Neurological: She is alert and oriented to person, place, and time.   Skin: Skin is warm and dry.     Psychiatric: Her behavior is normal. Mood, judgment and thought content normal.     ASSESSMENT:   This is a 82 y.o. female without evidence of recurrence by exam, history or imaging.       PLAN:   1. Continue to follow up with Dr. Caputo in Medical Oncology.    2. Continue monthly self breast exams and call the clinic with any changes or problems.  3. No further screening imaging given bilateral mastectomies.   4. Return to clinic in 6 months " .    The patient is in agreement with the plan. Questions were encouraged and answered to patient's satisfaction. Caro will call our office with any questions or concerns.       Total time spent with the patient: 30.  20 minutes of face to face consultation and 10 minutes of chart review and coordination of care.

## 2023-03-09 ENCOUNTER — DOCUMENTATION ONLY (OUTPATIENT)
Dept: HEMATOLOGY/ONCOLOGY | Facility: CLINIC | Age: 83
End: 2023-03-09
Payer: MEDICARE

## 2023-03-09 DIAGNOSIS — C50.911 INVASIVE DUCTAL CARCINOMA OF RIGHT BREAST: ICD-10-CM

## 2023-03-09 DIAGNOSIS — C50.912 INVASIVE DUCTAL CARCINOMA OF LEFT BREAST: ICD-10-CM

## 2023-03-09 DIAGNOSIS — Z90.13 S/P MASTECTOMY, BILATERAL: Primary | ICD-10-CM

## 2023-03-09 NOTE — PROGRESS NOTES
BREANA received email from patient's daughter in law, Lisbeth Almanza. Patient has not heard from Mayelin Smith LCSW. BREANA forwarded email to Mayelin. BREANA notified Lisbeth and Katelyn Teran LCSW that SW would be reaching out to patient.    BREANA called number listed as patient's, it was her son, [phone]. He provided patient's direct number [phone]. BREANA called and left a VM requesting a call back. BREANA also updated Lisbeth via email and request she have patient call BREANA directly, number provided.    Patient called back, she is very anxious. Her  is experiencing dementia and needs more assistance. Finances are a concern even with family assisting with bill. Patient explained that she thought she had provided Mayelin with everything needed for [employer] PAF but was not sure. BREANA explained what was needed to obtain the ketty and that some funds had already been provided to cover the cost of Elequis. Patient understood.     BREANA reached out to Ruel Hernandez RN, in radiation (Mayelin had spoken to her about receiving the applications). Ruel recalls Mayelin picking up the paperwork. BREANA will work with Mayelin Smith to recover these applications (SHAYNE and Jeremy and Aislinn) and any receipts provided for [employer] PAF.    Patient was tearful throughout phone call but expressed that it felt better to talk about the concerns. Currently son from [place] is picking up food, helping with errands etc. Other son is in [place] and helps out where he can.    Patient stated she only makes $800/month and would like to apply for Medicaid. BREANA placed referral with [facility] requesting they reach out to patient to complete application.

## 2023-03-10 ENCOUNTER — DOCUMENTATION ONLY (OUTPATIENT)
Dept: HEMATOLOGY/ONCOLOGY | Facility: CLINIC | Age: 83
End: 2023-03-10
Payer: MEDICARE

## 2023-03-10 NOTE — PROGRESS NOTES
Called patient at [phone] and left a detailed voice mail message re: prescription assistance, wanting to confirm that she picked up her Eliquis prescription from [facility] in [place] (pharmacy staff contacted KILLIAN for her copay); message has been sent to Pharmacy Patient Assistance team asking their staff to assist her and her  with any available programs; [employer] card ($50) and gas card ($25) have been mailed to her home address (envelope delivered to [facility]  today); and I will follow up on the [facility] Patient Assistance Fund for her (have utilized some of the funds to pay patient's Eliquis copays at the end of last year and the beginning of this year and will calculate the amount that is remaining and a check will be requested for the difference), and want to discuss with her further about the [facility] Medicaid program for waiver or nursing facility services that I explained to her during our phone conversation last week. Called the alternate number [phone] and her  answered and said that she was in the bathroom and that he would let her know that I called; informed him that I had left a detailed message for her at the other number. Will continue to follow.

## 2023-03-13 NOTE — TELEPHONE ENCOUNTER
A 2nd attempt has been made to establish contact with Caro Powell  via MY CHART and Letter. The final contact attempt will be made in 5 business days

## 2023-03-15 NOTE — PROGRESS NOTES
"PATIENT: Caro Powell  MRN: [id number]  DATE: 3/16/2023    Diagnosis:   1. Invasive ductal carcinoma of left breast    2. Invasive ductal carcinoma of right breast    3. Secondary malignant neoplasm of axillary node    4. Mood disorder due to medical condition    5. Type 2 diabetes mellitus with diabetic neuropathy, without long-term current use of insulin    6. Long term (current) use of aromatase inhibitors    7. Chronic kidney disease, stage 3b    8. Osteopenia of left femoral neck    9. Atrial fibrillation with rapid ventricular response    10. Type 2 diabetes mellitus with diabetic peripheral angiopathy and gangrene, without long-term current use of insulin      Chief Complaint: Breast Cancer      Oncologic History:      Oncologic History 1. Bilateral breast cancer      Oncologic Treatment Bilateral mastectomy  Adjuvant radiation therapy.  Adjuvant anastrozole      Pathology 8/15/22:  1. Breast, left, mastectomy:       - Invasive carcinoma with mixed ductal and lobular features, see note       - Tumor size: 22 mm  - Histologic grade ([place] Histologic Score)           - Glandular/Tubular Differentiation: 2           - Nuclear pleomorphism: 2           - Mitotic Rate: 1           - Overall Grade: grade 1       - Lobular carcinoma in situ (LCIS), classic type       - Biopsy site change: identified       - Calcification: present in invasive carcinoma and benign epithelium       - Resection margins: free of tumor, all margins are at least 10 mm away       - Benign nipple       - Skin with seborrheic keratosis       - Non-neoplastic breast tissue: usual ductal hyperplasia, apocrine   metaplasia, duct ectasia and fibrocystic change       - See synoptic report   2.  "left mastectomy new anterior margin inferior lateral aspect", excision:       - Benign fibroadipose tissue       - No definitive breast duct elements seen   3.  "left axillary sentinel lymph node #1 hot and blue @ 1177", excision:       - One " "lymph node, negative for carcinoma (0/1)   4.  "left axillary sentinel lymph node #2 Hot & blue @ 1459", excision:       - One lymph node, positive for carcinoma (1/1)       -  Size of Largest Metastatic Deposit: 2.1 mm, Macrometastases       -  Extranodal Extension: not identified   5.  "left axillary sentinel lymph node #3 Hot & blue @ 714", excision:       - One lymph node, negative for carcinoma (0/1)   6. Breast, right, mastectomy:       - Invasive carcinoma with mixed ductal and lobular features, see note       - Multiple foci, tumor size: 23 mm (mass 1, LIQ); 21 mm (mass 2, LOQ); 22   mm (mass 3, LOQ); 4 mm (mass 4, LOQ) - Histologic grade (Ene   Histologic Score)           - Glandular/Tubular Differentiation: 2           - Nuclear pleomorphism: 2           - Mitotic Rate: 1           - Overall Grade: grade 1       - Ductal carcinoma in situ: identified           - Nuclear Grade (in situ carcinoma): intermediate           - Necrosis: not present           - Architectural pattern: cribriform       - Lobular carcinoma in situ (LCIS), classic type       - Resection margins: free of tumor (invasive and in-situ), all margins   are at least 5 mm away       - Biopsy site change: identified   - Calcification: present in invasive carcinoma and benign epithelium       - Benign nipple       - Skin with seborrheic keratosis       - Two lymph nodes, negative for carcinoma (0/2)       - Non-neoplastic breast tissue: complex sclerosing lesion, adenosis,   usual ductal hyperplasia, apocrine metaplasia, duct ectasia and fibrocystic   change       - See synoptic report   7.  "right mastectomy new anterior margin inferior lateral aspect", excision:       - Benign fibroadipose tissue       - No definitive breast duct elements seen   8.  "right axillary content", dissection:       - Seven of twenty-eight lymph nodes, positive for carcinoma (7/28)       -  Size of Largest Metastatic Deposit: 15 mm, all tumor involved lymph " "  nodes are macrometastases       -  Extranodal Extension: identified, 5 mm       - Biopsy site change: identified   9.  "right mastectomy lateral skin short-superior, long lateral", excision:       - Skin without significant pathologic change   Note: Multiple foci of invasive carcinoma with simlar morphology are   identified on right breast (part 6). E-cadherin immunostains support   the above diagnosis. GATA3 and ER immunostains highlights the tumor cells in   lymph nodes.  Results of immunohistochemical stains (See [facility]-)   Left breast invasive carcinoma:   Estrogen receptor (ER): positive (strong intensity, 90% of tumor cell nuclei)   Progesterone receptor (KS): positive (moderate intensity, 70-80% of tumor   cell nuclei)   HER2: negative (+1)   Ki-67:10%-20   Right breast invasive carcinoma (8 o'clock):   Estrogen receptor (ER): positive (strong intensity, 98% of tumor cell nuclei)   Progesterone receptor (KS): positive (moderate intensity, 40-50% of tumor   cell nuclei)   HER2: negative by FISH   Ki-67:10%-20   Right breast invasive carcinoma (10 o'clock):   Estrogen receptor (ER): positive (strong intensity, 98% of tumor cell nuclei)   Progesterone receptor (KS): positive (strong intensity, 10-15% of tumor cell   nuclei)   HER2: negative (+1)   Ki-67:10%-20     6/28/22:  1. LEFT BREAST MASS, 4:00 O'CLOCK, 5 CM FROM THE NIPPLE, NEEDLE CORE   BIOPSIES:   -  Invasive ductal carcinoma with lobular features, Ene histologic   grade 1, and microcalcifications.           Glandular (acinar)/tubular differentiation:  Score 3.           Nuclear pleomorphism:  Score 1.           Mitotic rate:  Score 1.           Overall grade:  Grade 1 (score 5).           Size of invasive carcinoma as measured from the H&E slides:  12 mm.   -  Focal intermediate grade ductal carcinoma in situ with microcalcifications   and focal lobular extension.           Architectural patterns:  Cribriform and solid.           Nuclear " grade:  Grade II (intermediate).           Necrosis:  Not identified.   BREAST BIOMARKER RESULTS:   Estrogen receptor (ER) status:  Positive.        Percentage of cells with nuclear positivity:  90%.        Average intensity of staining:  Strong.   Progesterone receptor (PgR) status:  Positive.        Percentage of cells with nuclear positivity:  70-80%.        Average intensity of staining:  Moderate.   HER2 by IHC:  Negative (score 1).   Ki-67, percentage of cells with nuclear positivity:  10-20%.   2. RIGHT BREAST MASS, 8:00 O'CLOCK, 12 CM FROM THE NIPPLE, NEEDLE CORE   BIOPSIES:   -  Invasive ductal carcinoma, [place] histologic grade 1, with   microcalcifications.           Glandular (acinar)/tubular differentiation:  Score 1.           Nuclear pleomorphism:  Score 2.           Mitotic rate:  Score 1.           Overall grade:  Grade 1 (score 4).           Size of invasive carcinoma as measured from the H&E slides:  15 mm.   -  Focal intermediate grade ductal carcinoma in situ with microcalcifications.           Architectural patterns:  Cribriform and solid.           Nuclear grade:  Grade II (intermediate).           Necrosis:  Not identified.   -  Focal fibrocystic changes with columnar cell change, stromal fibrosis and   microcalcifications.   BREAST BIOMARKER RESULTS:   Estrogen receptor (ER) status:  Positive.        Percentage of cells with nuclear positivity:  98%.        Average intensity of staining:  Strong.   Progesterone receptor (PgR) status:  Positive.        Percentage of cells with nuclear positivity:  40-50%.        Average intensity of staining:  Moderate.   HER2 by IHC:  Equivocal (score 2+, see comment).   Ki-67, percentage of positive nuclei:  10-20%.   3. RIGHT BREAST MASS, 10:00 O'CLOCK, 3 CM FROM THE NIPPLE, NEEDLE CORE   BIOPSIES:   -  Invasive ductal carcinoma, Ene histologic grade 2, with focal   microcalcifications.           Glandular (acinar)/tubular differentiation:   Score 2.           Nuclear pleomorphism:  Score 2.           Mitotic rate:  Score 2.           Overall grade:  Grade 2 (score 6).           Size of invasive carcinoma as measured from the H&E slides:  13 mm.   -  Focal fibrocystic changes with columnar cell change, sclerosing adenosis   and stromal fibrosis.   BREAST BIOMARKER RESULTS:   Estrogen receptor (ER) status:  Positive.        Percentage of cells with nuclear positivity:  98%.        Average intensity of staining:  Strong.   Progesterone receptor (PGR) status:  Positive.        Percentage of cells with nuclear positivity:  10-15%.        Average intensity of staining:  Strong.   HER2 by IHC:  Negative (score 1+).   Ki-67, percentage of positive nuclei:  10-20%.   4. RIGHT AXILLARY LYMPH NODE, NEEDLE CORE BIOPSIES:   -  Metastatic ductal carcinoma of the breast, size = 10 mm, with focus   suspicious for, but not diagnostic of, extranodal extension.   HER2, Breast Tumor, FISH, Tissue   Result Summary   Negative         Subjective:    History of Present Illness: Ms. Powell is a 82 y.o. female who presented in July 2022 for evaluation and management of bilateral breast cancer. She was referred by Dr. Penny.    - mammogram on 6/21/22 revealed bilateral breast masses.  - biopsy of breast masses (6/28/22) revealed invasive ductal carcinoma with lobular features of the left breast (ER/NC-positive, HER2-negative) and invasive ductal carcinoma of right breast (ER/NC-positive, HER2- [by FISH]), and metastatic ductal carcinoma to right axillary lymph node.  - she met with breast surgery on 7/6/22. They offered surgical resection of bilateral breast cancers, but wanted to see results of PET/CT scan first. At the visit, she expressed hesitancy about proceeding with surgery.  - she underwent PET/CT scan on 7/15/22.    - she underwent bilateral mastectomy on 8/15/22.  - today, she is having drainage from the site where the left drain was removed. She will see the  surgeon tomorrow to get a stitch placed.  - she will see radiation oncology tomorrow.    - she completed radiation therapy:    - she underwent bone density test on 11/22/22.      Interval history:  - she presents for a follow-up appointment for her breast cancer  - today, she endorses fatigue. She denies shortness of breath, chest pain, nausea, vomiting, diarrhea, constipation. She is still taking anastrozole.  - she states her  had covid in January 2023 and was hospitalized.    Past medical, surgical, family, and social histories have been reviewed and updated below.    Past Medical History:   Past Medical History:   Diagnosis Date    Anticoagulant long-term use     Arthritis     Atrial flutter     Calcium nephrolithiasis 2007    ckd 3    Diabetes mellitus, type 2     Diabetic peripheral neuropathy associated with type 2 diabetes mellitus     Difficult intubation     NARROW AIRWAY    Hypertension     Invasive ductal carcinoma of left breast 7/6/2022    Pulmonary aspiration of gastric contents     Shingles        Past Surgical History:   Past Surgical History:   Procedure Laterality Date    ANGIOGRAPHY OF LOWER EXTREMITY N/A 10/21/2021    Procedure: Angiogram Extremity Unilateral;  Surgeon: Dre Martino MD;  Location: [facility] CATH LAB/EP;  Service: Cardiology;  Laterality: N/A;    ANGIOGRAPHY OF LOWER EXTREMITY Right 11/10/2021    Procedure: Angiogram Extremity Unilateral;  Surgeon: Ben Rooney MD;  Location: [facility] CATH LAB/EP;  Service: Cardiology;  Laterality: Right;    AXILLARY NODE DISSECTION Right 8/15/2022    Procedure: LYMPHADENECTOMY, AXILLARY RIGHT;  Surgeon: RADHA Quinn MD;  Location: [facility] OR;  Service: General;  Laterality: Right;    COLONOSCOPY  11/28/2011    sigmoid diverticulosis, external hemorrhoids    DEBRIDEMENT Right 10/20/2021    Procedure: DEBRIDEMENT;  Surgeon: Ava Atkins DPM;  Location: [facility] OR;  Service: Podiatry;  Laterality: Right;    ENDOSCOPIC GASTROCNEMIUS  RECESSION Right 9/10/2019    Procedure: RECESSION, GASTROCNEMIUS, ENDOSCOPIC;  Surgeon: Derek Jose DPM;  Location: [facility] OR;  Service: Podiatry;  Laterality: Right;  Arthrex center line (ron notified)  Video    EXTRACORPOREAL SHOCK WAVE LITHOTRIPSY      FLEXOR TENOTOMY Right 10/20/2021    Procedure: TENOTOMY, FLEXOR 3rd toe;  Surgeon: Ava Atkins DPM;  Location: [facility] OR;  Service: Podiatry;  Laterality: Right;    HYSTERECTOMY      INJECTION FOR SENTINEL NODE IDENTIFICATION Left 8/15/2022    Procedure: INJECTION, FOR SENTINEL NODE IDENTIFICATION;  Surgeon: RADHA Quinn MD;  Location: [facility];  Service: General;  Laterality: Left;    MASTECTOMY Bilateral 8/15/2022    Procedure: MASTECTOMY BILATERAL  / BREAST;  Surgeon: RADHA Quinn MD;  Location: [facility];  Service: General;  Laterality: Bilateral;  5 HOURS / EMAIL SENT 8-11 @ 9:02 LK    SENTINEL LYMPH NODE BIOPSY Left 8/15/2022    Procedure: BIOPSY, LYMPH NODE, SENTINEL LEFT;  Surgeon: RADHA Quinn MD;  Location: [facility];  Service: General;  Laterality: Left;    SHOULDER SURGERY Left     TOE AMPUTATION Right 05/22/2017    5th toe    TOE AMPUTATION Right 10/20/2021    Procedure: AMPUTATION, TOE;  Surgeon: Ava Atkins DPM;  Location: [facility];  Service: Podiatry;  Laterality: Right;    TONSILLECTOMY         Family History:   Family History   Problem Relation Age of Onset    Diabetes Mother     Heart failure Father     Breast cancer Sister 69        Genetic testing negative    Kidney failure Brother     Breast cancer Maternal Aunt         early 40s       Social History:  reports that she has never smoked. She has never been exposed to tobacco smoke. She has never used smokeless tobacco. She reports that she does not drink alcohol and does not use drugs.    Allergies:  Review of patient's allergies indicates:   Allergen Reactions    Codeine Nausea Only and Other (See Comments)     Can Take Tylenol, hrdrocodone       Medications:  Current  Outpatient Medications   Medication Sig Dispense Refill    ACCU-CHEK SAMI PLUS METER Misc TEST  AS DIRECTED 1 each 0    ACCU-CHEK SAMI PLUS TEST STRP Strp USE TO TEST BLOOD SUGAR ONCE DAILY 100 strip 3    ACCU-CHEK SOFT DEV LANCETS Kit       ACCU-CHEK SOFTCLIX LANCETS Misc TEST ONCE DAILY 100 each 3    ammonium lactate 12 % Crea Apply to feet twice daily. Avoid use between toes. 140 g 5    anastrozole (ARIMIDEX) 1 mg Tab Take 1 tablet (1 mg total) by mouth once daily. 90 tablet 3    apixaban (ELIQUIS) 5 mg Tab TAKE 1 TABLET BY MOUTH TWICE DAILY 180 tablet 3    atorvastatin (LIPITOR) 80 MG tablet Take 1 tablet (80 mg total) by mouth once daily. 90 tablet 3    atorvastatin (LIPITOR) 80 MG tablet 1 tablet EVERY PM (route: oral)      calcium-vitamin D3 (OYSTER SHELL CALCIUM-VIT D3) 500 mg-5 mcg (200 unit) per tablet Take 1 tablet by mouth once daily. 180 tablet 1    clopidogreL (PLAVIX) 75 mg tablet Take 1 tablet (75 mg total) by mouth once daily. 90 tablet 3    diazePAM (VALIUM) 5 MG tablet TAKE 1 TABLET EVERY DAY AS NEEDED FOR ANXIETY 30 tablet 5    famotidine (PEPCID) 20 MG tablet       furosemide (LASIX) 40 MG tablet Take 1 tablet (40 mg total) by mouth once daily. 30 tablet 11    gabapentin (NEURONTIN) 400 MG capsule Take 1 capsule (400 mg total) by mouth 3 (three) times daily. 450 capsule 3    glimepiride (AMARYL) 1 MG tablet Take 1 tablet (1 mg total) by mouth 2 (two) times daily. 180 tablet 3    lisinopriL (PRINIVIL,ZESTRIL) 20 MG tablet TAKE 1 TABLET EVERY DAY 90 tablet 3    metoprolol succinate (TOPROL-XL) 25 MG 24 hr tablet Take 1 tablet (25 mg total) by mouth once daily. 90 tablet 3    mupirocin (BACTROBAN) 2 % ointment Apply topically 2 (two) times daily. 30 g 2    polyethylene glycol (GLYCOLAX) 17 gram/dose powder Take 17 g by mouth once daily. 510 g 0    pramipexole (MIRAPEX) 0.125 MG tablet TAKE 1 TABLET EVERY DAY 90 tablet 3    traMADoL (ULTRAM) 50 mg tablet Take 1 tablet (50 mg total) by mouth every  "6 (six) hours as needed for Pain. 40 tablet 0    urea (CARMOL) 40 % Crea Apply topically 2 (two) times daily. 1 Bottle 3     No current facility-administered medications for this visit.       Review of Systems   Constitutional:  Positive for fatigue.   HENT:  Negative for sore throat.    Eyes:  Negative for visual disturbance.   Respiratory:  Negative for cough and shortness of breath.    Cardiovascular:  Negative for chest pain.   Gastrointestinal:  Negative for abdominal pain, constipation, nausea and vomiting.   Genitourinary:  Negative for dysuria.   Musculoskeletal:  Negative for back pain.   Skin:  Negative for rash.   Neurological:  Negative for headaches.        Neuralgia   Hematological:  Negative for adenopathy.   Psychiatric/Behavioral:  The patient is not nervous/anxious.      ECOG Performance Status:   ECOG SCORE 1            Objective:      Vitals:   Vitals:    03/16/23 1114   BP: (!) 120/56   BP Location: Left arm   Patient Position: Sitting   BP Method: Medium (Automatic)   Pulse: 107   Resp: (!) 22   Temp: 98.1 °F (36.7 °C)   TempSrc: Oral   SpO2: (!) 94%   Weight: 72.1 kg (158 lb 15.2 oz)   Height: 5' 2" (1.575 m)     BMI: Body mass index is 29.07 kg/m².    Physical Exam  Vitals and nursing note reviewed.   Constitutional:       Appearance: She is well-developed.   HENT:      Head: Normocephalic and atraumatic.   Eyes:      Pupils: Pupils are equal, round, and reactive to light.   Cardiovascular:      Rate and Rhythm: Normal rate and regular rhythm.   Pulmonary:      Effort: Pulmonary effort is normal.      Breath sounds: Normal breath sounds.   Chest:      Comments: S/p bilateral mastectomy  Abdominal:      General: Bowel sounds are normal.      Palpations: Abdomen is soft.   Musculoskeletal:         General: Normal range of motion.      Cervical back: Normal range of motion and neck supple.   Skin:     General: Skin is warm and dry.   Neurological:      Mental Status: She is alert and oriented to " person, place, and time.   Psychiatric:         Behavior: Behavior normal.         Thought Content: Thought content normal.         Judgment: Judgment normal.       Laboratory Data:  Labs have been reviewed.    Lab Results   Component Value Date    WBC 6.54 08/10/2022    HGB 11.4 (L) 08/10/2022    HCT 33.7 (L) 08/10/2022    MCV 91 08/10/2022     08/10/2022       Imaging:       Bone density test (11/22/22):  Osteopenia of the left femoral neck.     Normal bone mineral density of the lumbar spine and right femoral neck.    PET/CT scan (7/15/22): I have personally reviewed the images  In this patient with breast cancer, there is mildly hypermetabolic right breast mass and axillary lymph nodes.  Focal subtle right pleural thickening with faint radiotracer uptake, metastatic lesion is not excluded.     1.7 cm indeterminate left adrenal nodule without abnormal uptake, likely a lipid poor benign adenoma.  If management would change, then recommend adrenal protocol CT or MRI for further evaluation.     Bilateral pulmonary micronodules some which are new and under size threshold for reliable PET evaluation and percutaneous biopsy.  Oncological considerations will determine ongoing follow-up.     Perianal focal mild increased uptake, which may indicate physiologic sphincter tone or infectious/inflammatory etiology such as hemorrhoids.     Mammogram (6/21/22):    Impression:  Left  Mass: Left breast 16 mm x 15 mm x 15 mm mass at the 4 o'clock position. Assessment: 5 - Highly suggestive of malignancy. Biopsy is recommended.      Right  Mass: Right breast 13 mm x 10 mm x 8 mm mass at the 10 o'clock position. Assessment: 5 - Highly suggestive of malignancy. Biopsy is recommended.   Mass: Right breast 20 mm x 19 mm x 19 mm mass at the 8 o'clock position. Assessment: 5 - Highly suggestive of malignancy. Biopsy is recommended.   Mass: Right breast 15 mm x 7 mm x 7 mm mass at the 9 o'clock position. Assessment: 5 - Highly  "suggestive of malignancy.   The masses span at least 7.1 cm.      Lymph Node: Right axilla 8 mm x 7 mm x 8 mm lymph node. Assessment: 4 - Suspicious finding. Biopsy is recommended.       Assessment:       1. Invasive ductal carcinoma of left breast    2. Invasive ductal carcinoma of right breast    3. Secondary malignant neoplasm of axillary node    4. Mood disorder due to medical condition    5. Type 2 diabetes mellitus with diabetic neuropathy, without long-term current use of insulin    6. Long term (current) use of aromatase inhibitors    7. Chronic kidney disease, stage 3b    8. Osteopenia of left femoral neck    9. Atrial fibrillation with rapid ventricular response    10. Type 2 diabetes mellitus with diabetic peripheral angiopathy and gangrene, without long-term current use of insulin           Plan:     1. Bilateral invasive ductal carcinoma of breasts  - I have reviewed her chart  - mammogram on 6/21/22 revealed bilateral breast masses.  - biopsy of breast masses (6/28/22) revealed invasive ductal carcinoma with lobular features of the left breast (ER/AK-positive, HER2-negative) and invasive ductal carcinoma of right breast (ER/AK-positive, HER2- [by FISH]), and metastatic ductal carcinoma to right axillary lymph node.  - she met with breast surgery on 7/6/22. They offered surgical resection of bilateral breast cancers, but wanted to see results of PET/CT scan first. At the visit, she expressed hesitancy about proceeding with surgery.  - PET/CT (7/15/22) revealed localized disease. There was "focal subtle right pleural thickening with faint radiotracer uptake, metastatic lesion is not excluded." pulmonary micronodules are also noted of unclear significance  - she underwent bilateral mastectomy on 8/15/22.  - pathology:  Left breast: 22mm grade 1 invasive carcinoma with mixed ductal and lobular features, 1 positive lymph node  -right breast: multiple foci (23, 21, 22, 4mm) invasive carcinoma with mixed " ductal and lobular features, 7 of 28 lymph nodes positive.  - I recommend adjuvant hormonal therapy with anastrozole x 5 years.  - she completed radiation therapy in November 2022.  - she is tolerating anastrozole well.  - bone density test (11/22/22) revealed osteopenia of left femoral neck. She decided not to proceed with zometa q3 months. Continue calcium/vitamin D.  - return to clinic in 6 months.    2. Type 2 diabetes  - last hemoglobin A1c (5/24/22) was 7.3%  - continue diabetic medications  - defer management to Dr. Penny    3. Chronic kidney disease stage 3b  - eGFR on 8/10/22 was 41 ml/min/m2.    4. Osteopenia of left femoral neck  - bone density test (11/22/22) revealed osteopenia of left femoral neck. She decided not to proceed with zometa q3 months. Continue calcium/vitamin D.  - I prescribed calcium/vitamin D.    - return to clinic in 6 months.    Morgan Caputo M.D.  Hematology/Oncology  [facility] - [address], [address]  [place] [address]  Phone: [phone]  Fax: [phone]

## 2023-03-16 ENCOUNTER — OFFICE VISIT (OUTPATIENT)
Dept: HEMATOLOGY/ONCOLOGY | Facility: CLINIC | Age: 83
End: 2023-03-16
Payer: MEDICARE

## 2023-03-16 ENCOUNTER — TELEPHONE (OUTPATIENT)
Dept: FAMILY MEDICINE | Facility: CLINIC | Age: 83
End: 2023-03-16
Payer: MEDICARE

## 2023-03-16 VITALS
TEMPERATURE: 98 F | OXYGEN SATURATION: 94 % | WEIGHT: 158.94 LBS | HEIGHT: 62 IN | DIASTOLIC BLOOD PRESSURE: 56 MMHG | RESPIRATION RATE: 22 BRPM | SYSTOLIC BLOOD PRESSURE: 120 MMHG | BODY MASS INDEX: 29.25 KG/M2 | HEART RATE: 107 BPM

## 2023-03-16 DIAGNOSIS — E11.52 TYPE 2 DIABETES MELLITUS WITH DIABETIC PERIPHERAL ANGIOPATHY AND GANGRENE, WITHOUT LONG-TERM CURRENT USE OF INSULIN: ICD-10-CM

## 2023-03-16 DIAGNOSIS — F06.30 MOOD DISORDER DUE TO MEDICAL CONDITION: ICD-10-CM

## 2023-03-16 DIAGNOSIS — Z79.811 LONG TERM (CURRENT) USE OF AROMATASE INHIBITORS: ICD-10-CM

## 2023-03-16 DIAGNOSIS — C50.911 INVASIVE DUCTAL CARCINOMA OF RIGHT BREAST: ICD-10-CM

## 2023-03-16 DIAGNOSIS — N18.32 CHRONIC KIDNEY DISEASE, STAGE 3B: ICD-10-CM

## 2023-03-16 DIAGNOSIS — I48.91 ATRIAL FIBRILLATION WITH RAPID VENTRICULAR RESPONSE: ICD-10-CM

## 2023-03-16 DIAGNOSIS — M85.852 OSTEOPENIA OF LEFT FEMORAL NECK: ICD-10-CM

## 2023-03-16 DIAGNOSIS — C77.3 SECONDARY MALIGNANT NEOPLASM OF AXILLARY NODE: ICD-10-CM

## 2023-03-16 DIAGNOSIS — E11.40 TYPE 2 DIABETES MELLITUS WITH DIABETIC NEUROPATHY, WITHOUT LONG-TERM CURRENT USE OF INSULIN: ICD-10-CM

## 2023-03-16 DIAGNOSIS — C50.912 INVASIVE DUCTAL CARCINOMA OF LEFT BREAST: Primary | ICD-10-CM

## 2023-03-16 PROCEDURE — 99999 PR PBB SHADOW E&M-EST. PATIENT-LVL III: ICD-10-PCS | Mod: PBBFAC,HCWC,, | Performed by: INTERNAL MEDICINE

## 2023-03-16 PROCEDURE — 1159F MED LIST DOCD IN RCRD: CPT | Mod: HCWC,CPTII,S$GLB, | Performed by: INTERNAL MEDICINE

## 2023-03-16 PROCEDURE — 99215 OFFICE O/P EST HI 40 MIN: CPT | Mod: HCWC,S$GLB,, | Performed by: INTERNAL MEDICINE

## 2023-03-16 PROCEDURE — 1160F PR REVIEW ALL MEDS BY PRESCRIBER/CLIN PHARMACIST DOCUMENTED: ICD-10-PCS | Mod: HCWC,CPTII,S$GLB, | Performed by: INTERNAL MEDICINE

## 2023-03-16 PROCEDURE — 3074F PR MOST RECENT SYSTOLIC BLOOD PRESSURE < 130 MM HG: ICD-10-PCS | Mod: HCWC,CPTII,S$GLB, | Performed by: INTERNAL MEDICINE

## 2023-03-16 PROCEDURE — 3074F SYST BP LT 130 MM HG: CPT | Mod: HCWC,CPTII,S$GLB, | Performed by: INTERNAL MEDICINE

## 2023-03-16 PROCEDURE — 3078F DIAST BP <80 MM HG: CPT | Mod: HCWC,CPTII,S$GLB, | Performed by: INTERNAL MEDICINE

## 2023-03-16 PROCEDURE — 1101F PR PT FALLS ASSESS DOC 0-1 FALLS W/OUT INJ PAST YR: ICD-10-PCS | Mod: HCWC,CPTII,S$GLB, | Performed by: INTERNAL MEDICINE

## 2023-03-16 PROCEDURE — 1126F PR PAIN SEVERITY QUANTIFIED, NO PAIN PRESENT: ICD-10-PCS | Mod: HCWC,CPTII,S$GLB, | Performed by: INTERNAL MEDICINE

## 2023-03-16 PROCEDURE — 3078F PR MOST RECENT DIASTOLIC BLOOD PRESSURE < 80 MM HG: ICD-10-PCS | Mod: HCWC,CPTII,S$GLB, | Performed by: INTERNAL MEDICINE

## 2023-03-16 PROCEDURE — 1101F PT FALLS ASSESS-DOCD LE1/YR: CPT | Mod: HCWC,CPTII,S$GLB, | Performed by: INTERNAL MEDICINE

## 2023-03-16 PROCEDURE — 1159F PR MEDICATION LIST DOCUMENTED IN MEDICAL RECORD: ICD-10-PCS | Mod: HCWC,CPTII,S$GLB, | Performed by: INTERNAL MEDICINE

## 2023-03-16 PROCEDURE — 3288F PR FALLS RISK ASSESSMENT DOCUMENTED: ICD-10-PCS | Mod: HCWC,CPTII,S$GLB, | Performed by: INTERNAL MEDICINE

## 2023-03-16 PROCEDURE — 1126F AMNT PAIN NOTED NONE PRSNT: CPT | Mod: HCWC,CPTII,S$GLB, | Performed by: INTERNAL MEDICINE

## 2023-03-16 PROCEDURE — 1160F RVW MEDS BY RX/DR IN RCRD: CPT | Mod: HCWC,CPTII,S$GLB, | Performed by: INTERNAL MEDICINE

## 2023-03-16 PROCEDURE — 99999 PR PBB SHADOW E&M-EST. PATIENT-LVL III: CPT | Mod: PBBFAC,HCWC,, | Performed by: INTERNAL MEDICINE

## 2023-03-16 PROCEDURE — 99215 PR OFFICE/OUTPT VISIT, EST, LEVL V, 40-54 MIN: ICD-10-PCS | Mod: HCWC,S$GLB,, | Performed by: INTERNAL MEDICINE

## 2023-03-16 PROCEDURE — 3288F FALL RISK ASSESSMENT DOCD: CPT | Mod: HCWC,CPTII,S$GLB, | Performed by: INTERNAL MEDICINE

## 2023-03-16 RX ORDER — INFLUENZA A VIRUS A/VICTORIA/2570/2019 IVR-215 (H1N1) ANTIGEN (FORMALDEHYDE INACTIVATED), INFLUENZA A VIRUS A/DARWIN/6/2021 IVR-227 (H3N2) ANTIGEN (FORMALDEHYDE INACTIVATED), INFLUENZA B VIRUS B/AUSTRIA/1359417/2021 BVR-26 ANTIGEN (FORMALDEHYDE INACTIVATED), INFLUENZA B VIRUS B/PHUKET/3073/2013 BVR-1B ANTIGEN (FORMALDEHYDE INACTIVATED) 15; 15; 15; 15 UG/.5ML; UG/.5ML; UG/.5ML; UG/.5ML
INJECTION, SUSPENSION INTRAMUSCULAR
COMMUNITY
Start: 2022-11-08

## 2023-03-16 NOTE — TELEPHONE ENCOUNTER
----- Message from Concepción Vasques RN sent at 3/16/2023 11:41 AM CDT -----  Good morning, Dr. Caputo is referring this pt to family medicine. Thanks.

## 2023-03-27 ENCOUNTER — LAB VISIT (OUTPATIENT)
Dept: LAB | Facility: HOSPITAL | Age: 83
End: 2023-03-27
Attending: INTERNAL MEDICINE
Payer: MEDICARE

## 2023-03-27 DIAGNOSIS — Z86.16 HISTORY OF COVID-19: ICD-10-CM

## 2023-03-27 DIAGNOSIS — I48.91 ATRIAL FIBRILLATION WITH RAPID VENTRICULAR RESPONSE: ICD-10-CM

## 2023-03-27 DIAGNOSIS — R42 DIZZINESS: ICD-10-CM

## 2023-03-27 DIAGNOSIS — E11.40 TYPE 2 DIABETES MELLITUS WITH DIABETIC NEUROPATHY, WITHOUT LONG-TERM CURRENT USE OF INSULIN: ICD-10-CM

## 2023-03-27 LAB
ALBUMIN SERPL BCP-MCNC: 4 G/DL (ref 3.5–5.2)
ALP SERPL-CCNC: 65 U/L (ref 55–135)
ALT SERPL W/O P-5'-P-CCNC: 23 U/L (ref 10–44)
ANION GAP SERPL CALC-SCNC: 11 MMOL/L (ref 8–16)
AST SERPL-CCNC: 24 U/L (ref 10–40)
BILIRUB SERPL-MCNC: 0.3 MG/DL (ref 0.1–1)
BUN SERPL-MCNC: 30 MG/DL (ref 8–23)
CALCIUM SERPL-MCNC: 9.8 MG/DL (ref 8.7–10.5)
CHLORIDE SERPL-SCNC: 107 MMOL/L (ref 95–110)
CO2 SERPL-SCNC: 21 MMOL/L (ref 23–29)
CREAT SERPL-MCNC: 1.5 MG/DL (ref 0.5–1.4)
ERYTHROCYTE [SEDIMENTATION RATE] IN BLOOD BY PHOTOMETRIC METHOD: 22 MM/HR (ref 0–36)
EST. GFR  (NO RACE VARIABLE): 34 ML/MIN/1.73 M^2
ESTIMATED AVG GLUCOSE: 146 MG/DL (ref 68–131)
GLUCOSE SERPL-MCNC: 257 MG/DL (ref 70–110)
HBA1C MFR BLD: 6.7 % (ref 4–5.6)
POTASSIUM SERPL-SCNC: 4.1 MMOL/L (ref 3.5–5.1)
PROT SERPL-MCNC: 6.9 G/DL (ref 6–8.4)
SODIUM SERPL-SCNC: 139 MMOL/L (ref 136–145)

## 2023-03-27 PROCEDURE — 83036 HEMOGLOBIN GLYCOSYLATED A1C: CPT | Mod: HCWC | Performed by: INTERNAL MEDICINE

## 2023-03-27 PROCEDURE — 85652 RBC SED RATE AUTOMATED: CPT | Mod: HCWC | Performed by: INTERNAL MEDICINE

## 2023-03-27 PROCEDURE — 80053 COMPREHEN METABOLIC PANEL: CPT | Mod: HCWC | Performed by: INTERNAL MEDICINE

## 2023-03-27 PROCEDURE — 36415 COLL VENOUS BLD VENIPUNCTURE: CPT | Mod: HCWC | Performed by: INTERNAL MEDICINE

## 2023-04-06 ENCOUNTER — OFFICE VISIT (OUTPATIENT)
Dept: PODIATRY | Facility: CLINIC | Age: 83
End: 2023-04-06
Payer: MEDICARE

## 2023-04-06 VITALS
HEIGHT: 62 IN | SYSTOLIC BLOOD PRESSURE: 108 MMHG | DIASTOLIC BLOOD PRESSURE: 49 MMHG | HEART RATE: 49 BPM | BODY MASS INDEX: 28.9 KG/M2

## 2023-04-06 DIAGNOSIS — L60.0 INGROWN NAIL: ICD-10-CM

## 2023-04-06 DIAGNOSIS — I73.9 PAD (PERIPHERAL ARTERY DISEASE): ICD-10-CM

## 2023-04-06 DIAGNOSIS — E11.42 TYPE 2 DIABETES MELLITUS WITH PERIPHERAL NEUROPATHY: ICD-10-CM

## 2023-04-06 DIAGNOSIS — L84 PRE-ULCERATIVE CALLUSES: Primary | ICD-10-CM

## 2023-04-06 PROCEDURE — 1159F MED LIST DOCD IN RCRD: CPT | Mod: HCWC,CPTII,S$GLB, | Performed by: STUDENT IN AN ORGANIZED HEALTH CARE EDUCATION/TRAINING PROGRAM

## 2023-04-06 PROCEDURE — 3078F DIAST BP <80 MM HG: CPT | Mod: HCWC,CPTII,S$GLB, | Performed by: STUDENT IN AN ORGANIZED HEALTH CARE EDUCATION/TRAINING PROGRAM

## 2023-04-06 PROCEDURE — 99214 PR OFFICE/OUTPT VISIT, EST, LEVL IV, 30-39 MIN: ICD-10-PCS | Mod: 25,HCWC,S$GLB, | Performed by: STUDENT IN AN ORGANIZED HEALTH CARE EDUCATION/TRAINING PROGRAM

## 2023-04-06 PROCEDURE — 3078F PR MOST RECENT DIASTOLIC BLOOD PRESSURE < 80 MM HG: ICD-10-PCS | Mod: HCWC,CPTII,S$GLB, | Performed by: STUDENT IN AN ORGANIZED HEALTH CARE EDUCATION/TRAINING PROGRAM

## 2023-04-06 PROCEDURE — 99999 PR PBB SHADOW E&M-EST. PATIENT-LVL III: CPT | Mod: PBBFAC,HCWC,, | Performed by: STUDENT IN AN ORGANIZED HEALTH CARE EDUCATION/TRAINING PROGRAM

## 2023-04-06 PROCEDURE — 97597 DBRDMT OPN WND 1ST 20 CM/<: CPT | Mod: HCWC,S$GLB,, | Performed by: STUDENT IN AN ORGANIZED HEALTH CARE EDUCATION/TRAINING PROGRAM

## 2023-04-06 PROCEDURE — 1159F PR MEDICATION LIST DOCUMENTED IN MEDICAL RECORD: ICD-10-PCS | Mod: HCWC,CPTII,S$GLB, | Performed by: STUDENT IN AN ORGANIZED HEALTH CARE EDUCATION/TRAINING PROGRAM

## 2023-04-06 PROCEDURE — 97597 WOUND DEBRIDEMENT: ICD-10-PCS | Mod: HCWC,S$GLB,, | Performed by: STUDENT IN AN ORGANIZED HEALTH CARE EDUCATION/TRAINING PROGRAM

## 2023-04-06 PROCEDURE — 3074F PR MOST RECENT SYSTOLIC BLOOD PRESSURE < 130 MM HG: ICD-10-PCS | Mod: HCWC,CPTII,S$GLB, | Performed by: STUDENT IN AN ORGANIZED HEALTH CARE EDUCATION/TRAINING PROGRAM

## 2023-04-06 PROCEDURE — 99999 PR PBB SHADOW E&M-EST. PATIENT-LVL III: ICD-10-PCS | Mod: PBBFAC,HCWC,, | Performed by: STUDENT IN AN ORGANIZED HEALTH CARE EDUCATION/TRAINING PROGRAM

## 2023-04-06 PROCEDURE — 99214 OFFICE O/P EST MOD 30 MIN: CPT | Mod: 25,HCWC,S$GLB, | Performed by: STUDENT IN AN ORGANIZED HEALTH CARE EDUCATION/TRAINING PROGRAM

## 2023-04-06 PROCEDURE — 3074F SYST BP LT 130 MM HG: CPT | Mod: HCWC,CPTII,S$GLB, | Performed by: STUDENT IN AN ORGANIZED HEALTH CARE EDUCATION/TRAINING PROGRAM

## 2023-04-06 NOTE — PROGRESS NOTES
Subjective:      Patient ID: Caro Powell is a 82 y.o. female.    Chief Complaint: Foot Ulcer, Follow-up, and Diabetes Mellitus    Caro is a 82 y.o. female who presents to the clinic for evaluation and treatment of high risk feet. 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 The patient's chief complaint is long, thick toenails. This patient has documented high risk feet requiring routine maintenance secondary to diabetes mellitis and those secondary complications of diabetes, as mentioned.. Patient states she is following closely with Dr. Rooney for PAD. Relates has new SAS shoes which she put her diabetic shoes in and her feet are looking much better. No new pedal complaints.     8/3/22: Seen today for RFC and annual diabetic foot exam. Denies open wounds. No new pedal complaints. States has upcoming breast surgery.     11/9/22: Seen today for routine foot care. States she noticed blood in her sock and is concerned for a new diabetic foot ulcer.    11/22/22 Pt seen today for follow up of right foot ulcers. States she thinks they are healed. Admits she was not wearing her diabetic shoes previously and that is how the ulcers formed. No further pedal complaints.     1/4/23: Seen today, states she noticed a new blister to her left great toe, states she has been applying neosporin to it. No further pedal complaints.     1/19/23: Seen today for routine foot care, pre-ulcerative callus check to right plantar 1st metatarsal head and distal left great toe. States has been in a football to right foot. States she is worried her ulcer will break down again and is worried her diabetic shoes  with inserts are not fully offloading her foot. Denies open wounds, drainage or signs of infection. No further pedal complaints.     2/16/23: Seen today for follow up, for pre-ulcerative callus check. States her  is in the hospital, not doing well. States she has been on her feet a lot taking care of him. No new pedal complaints.     3/2/23: Seen today for follow up pre-ulcerative callus. No new pedal complaints. Relates she is caring for her  at home and has a nurse helping them.    4/6/23: Seen today for pre-ulcerative callus, sub 1st metatarsal head of right foot and to distal 3rd  digit of left foot. Denies pain or drainage, no further pedal complaints.    PCP: Brayan Penny MD    Date Last Seen by PCP: 6/21/22    Current shoe gear:  SAS diabetic shoes    Hemoglobin A1C   Date Value Ref Range Status   03/27/2023 6.7 (H) 4.0 - 5.6 % Final     Comment:     ADA Screening Guidelines:  5.7-6.4%  Consistent with prediabetes  >or=6.5%  Consistent with diabetes    High levels of fetal hemoglobin interfere with the HbA1C  assay. Heterozygous hemoglobin variants (HbS, HgC, etc)do  not significantly interfere with this assay.   However, presence of multiple variants may affect accuracy.     05/24/2022 7.3 (H) 4.0 - 5.6 % Final     Comment:     ADA Screening Guidelines:  5.7-6.4%  Consistent with prediabetes  >or=6.5%  Consistent with diabetes    High levels of fetal hemoglobin interfere with the HbA1C  assay. Heterozygous hemoglobin variants (HbS, HgC, etc)do  not significantly interfere with this assay.   However, presence of multiple variants may affect accuracy.     10/18/2021 6.7 (H) 4.0 - 5.6 % Final     Comment:     ADA Screening Guidelines:  5.7-6.4%  Consistent with prediabetes  >or=6.5%  Consistent with diabetes    High levels of fetal hemoglobin interfere with the HbA1C  assay. Heterozygous hemoglobin variants (HbS, HgC, etc)do  not significantly interfere with this assay.   However, presence of  multiple variants may affect accuracy.     01/12/2018 8.3 % Final       Review of Systems   Constitutional: Negative for chills, decreased appetite, diaphoresis and fever.   HENT:  Negative for congestion and hearing loss.    Cardiovascular:  Negative for chest pain, claudication, leg swelling and syncope.   Respiratory:  Negative for cough and shortness of breath.    Skin:  Positive for dry skin, nail changes and poor wound healing. Negative for color change, flushing, itching and rash.   Musculoskeletal:  Positive for arthritis. Negative for joint pain and joint swelling.   Gastrointestinal:  Negative for nausea and vomiting.   Neurological:  Positive for numbness. Negative for focal weakness, paresthesias and weakness.   Psychiatric/Behavioral:  Negative for altered mental status. The patient is not nervous/anxious.          Objective:      Physical Exam  Constitutional:       General: She is not in acute distress.     Appearance: She is well-developed. She is not diaphoretic.   Cardiovascular:      Comments: Dorsalis pedis and posterior tibial pulses are diminished. Skin temperature is within normal limits. Toes are cool to touch and feet are warm proximally. Hair growth is diminished. Skin is mildly atrophic and with mild hyperpigmentation. Mild edema noted, bilaterally. Telangiectasias bilaterally.  Musculoskeletal:         General: No tenderness.      Comments: Adequate joint range of motion without pain, limitation, nor crepitation to bilateral feet and ankle joints. Muscle strength is 5/5 in all groups bilaterally.    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 HAV, bilaterally. Semi rigid hammertoes to remaining digits.    Lymphadenopathy:      Comments: Negative lymphangitic streaking    Skin:     General: Skin is warm and dry.      Findings: No lesion.      Comments: Skin is warm and dry, no acute signs of infection noted. No open wounds, macerations or  hyperkeratotic lesions, bilaterally.     See wound description below    Toenails are thickened by 2-4 mm's, dystrophic, and are darkened in coloration with subungual fungal debris, bilaterally.  Skin is very dry, bilaterally.      Neurological:      Mental Status: She is alert and oriented to person, place, and time.      Sensory: Sensory deficit present.      Motor: No abnormal muscle tone.      Comments: Light touch is diminished. [place]-Chucho 5.07 monofilamant testing is diminished. Vibratory sensation  is diminished, bilaterally    Psychiatric:         Behavior: Behavior normal.         Thought Content: Thought content normal.         Judgment: Judgment normal.     Pre-ulcerative callus to plantar right 1st metatarsal head and distal  left 3rd digit, upon debridement, sites are healed. No signs of infection. Distal lateral cryptotic nail border to left 3rd digit toenail      PREVIOUS IMAGES:                 Assessment:       Encounter Diagnoses   Name Primary?    Pre-ulcerative calluses Yes    Type 2 diabetes mellitus with peripheral neuropathy     PAD (peripheral artery disease)     Ingrown nail                  Plan:       Caro was seen today for foot ulcer, follow-up and diabetes mellitus.    Diagnoses and all orders for this visit:    Pre-ulcerative calluses    Type 2 diabetes mellitus with peripheral neuropathy    PAD (peripheral artery disease)    Ingrown nail          I counseled the patient on her conditions, their implications and medical management.  Debridement performed, ulcers are healed. Sites dressed with protective neosporin and bandage, she is to inform clinic if worsens in appearance or does not continue to improve.  Utilizing sterile toenail clippers I aggressively trimmed  the offending above mentioned  nail border approximately 3 mm from its edge and carried the nail plate incision down at an angle in order to wedge out the offending cryptotic portion of the nail plate. The offending  border was then removed in toto.  No blood was drawn. Patient tolerated the procedure well and related significant relief.   May continue diabetic SAS shoes. She is to continue inserts. Previously, padding with 1st ray cut out added for further cushion and offloading to right foot  Lotion BID for dry skin, recommend Goldbonds diabetic foot cream  Shoe inspection. Diabetic Foot Education. Patient reminded of the importance of good nutrition and blood sugar control to help prevent podiatric complications of diabetes. Patient instructed on proper foot hygeine. We discussed wearing proper shoe gear, daily foot inspections, never walking without protective shoe gear, never putting sharp instruments to feet, routine podiatric nail visits    Return to clinic in  2-4 weeks, sooner PRN

## 2023-04-06 NOTE — PROCEDURES
Wound Debridement    Date/Time: 4/6/2023 10:45 AM  Performed by: Ava Atkins DPM  Authorized by: Ava Atkins DPM     Consent Done?:  Yes (Verbal)  Local anesthesia used?: No      Wound Details:    Location:  Right foot    Location:  Right 1st Metatarsal Head    Type of Debridement:  Non-excisional       Length (cm):  1.5       Area (sq cm):  2.25       Width (cm):  1.5       Percent Debrided (%):  100       Depth (cm):  0       Total Area Debrided (sq cm):  2.25    Depth of debridement:  Epidermis/Dermis    Tissue debrided:  Epidermis    Devitalized tissue debrided:  Callus    Instruments:  Blade  Patient tolerance:  Patient tolerated the procedure well with no immediate complications     No cultures were taken during this visit

## 2023-04-06 NOTE — PROCEDURES
Wound Debridement    Date/Time: 4/6/2023 10:45 AM  Performed by: Ava Atkins DPM  Authorized by: Ava Atkins DPM     Consent Done?:  Yes (Verbal)  Local anesthesia used?: No      Wound Details:    Location:  Left foot    Location:  Left 3rd Toe    Type of Debridement:  Excisional       Length (cm):  1       Area (sq cm):  1       Width (cm):  1       Percent Debrided (%):  100       Depth (cm):  0       Total Area Debrided (sq cm):  1    Depth of debridement:  Epidermis/Dermis    Tissue debrided:  Dermis    Devitalized tissue debrided:  Callus    Instruments:  Blade  Bleeding:  Minimal  Hemostasis Achieved: Yes  Method Used:  Pressure and Silver Nitrate  Patient tolerance:  Patient tolerated the procedure well with no immediate complications     No cultures were taken during this visit . Sites dressed with neosporin and bandage, she is to change same at home 2-3x  per day until healed.

## 2023-04-17 ENCOUNTER — PATIENT MESSAGE (OUTPATIENT)
Dept: PHARMACY | Facility: CLINIC | Age: 83
End: 2023-04-17
Payer: MEDICARE

## 2023-04-19 ENCOUNTER — TELEPHONE (OUTPATIENT)
Dept: NEUROLOGY | Facility: CLINIC | Age: 83
End: 2023-04-19
Payer: MEDICARE

## 2023-04-19 NOTE — TELEPHONE ENCOUNTER
"SW contacted pt to discuss her care needs and conduct a safety assessment. She states that he gets angry and agitated, and at times becomes violent. Pt told SW how her  will start "screaming and throwing things" due to his dementia condition. She is experiencing severe stress and anxiety regarding both of his and her own safety. The family doesn't have financial resources to receive long term care at this moment. Memory Social Workers are coordinating care with her PCP to discuss medication management. SW are researching case management resources to support the patient and her .     She explained that Friday night, she was scared for her safety and she left to stay with her sister. She was stressed to leave him alone, but did not know what else to do. SW inquired about warning signs for his anger and agitation. Pt stated that he gets angry at "everything" and that she doesn't know what to do. She states that she does not know how much longer she can do this (meaning taking care of her  and managing his aggression). SW asked pt what calms him down or distracts him when he is agitated. She responded that he likes to read the paper and take naps during the day. SW pointed out that the pt can take care of herself and calm herself down during his nap times. Pt expresses anxiety about her heart conditions, expressing that she feels like she's having heart palpitations when she gets upset and nervous. SW encouraged the patient to call 911 emergency services if that ever occur. SW also encouraged her to contact emergency services if she ever feel unsafe or in danger due to her 's disturbed behavior. Pt and SW discussed calming strategies to use in the evening time to prevent his sundowning, such as drinking chamomile tea.     SW and pt worked together to construct a safety plan. SW advised that the pt contact emergency services immediately if she feels that she or her  are in danger. SW " expressed that caregiver should have a bag packed with essential items, such as medication and important documents. SW discussed safety hazards in the home, including sharp objects, weapons, and chemicals. Pt confirmed that her son removed weapons from the home, but she still has kitchen knives.     SW assured pt that the social work care management team is working to contact [place] [facility] and [facility]. BREANA will call pt tomorrow to provide any updates.

## 2023-05-01 ENCOUNTER — TELEPHONE (OUTPATIENT)
Dept: CARDIOLOGY | Facility: CLINIC | Age: 83
End: 2023-05-01
Payer: MEDICARE

## 2023-05-01 NOTE — TELEPHONE ENCOUNTER
Returned call to pt and pt wants to know if its safe to take Lexapro with all her heart medications that she take.

## 2023-05-01 NOTE — TELEPHONE ENCOUNTER
----- Message from Giancarlo Latif sent at 5/1/2023 10:46 AM CDT -----  Type:  Needs Medical Advice    Who Called: pt  Would the patient rather a call back or a response via [facility]? call  Best Call Back Number: [phone]  Additional Information: pt would like a call from nurse in office regarding her health please call

## 2023-05-04 ENCOUNTER — OFFICE VISIT (OUTPATIENT)
Dept: PODIATRY | Facility: CLINIC | Age: 83
End: 2023-05-04
Payer: MEDICARE

## 2023-05-04 VITALS
BODY MASS INDEX: 28.9 KG/M2 | SYSTOLIC BLOOD PRESSURE: 134 MMHG | HEIGHT: 62 IN | DIASTOLIC BLOOD PRESSURE: 51 MMHG | HEART RATE: 57 BPM

## 2023-05-04 DIAGNOSIS — B35.1 ONYCHOMYCOSIS: ICD-10-CM

## 2023-05-04 DIAGNOSIS — I73.9 PAD (PERIPHERAL ARTERY DISEASE): Primary | ICD-10-CM

## 2023-05-04 DIAGNOSIS — E11.42 TYPE 2 DIABETES MELLITUS WITH PERIPHERAL NEUROPATHY: ICD-10-CM

## 2023-05-04 DIAGNOSIS — L84 PRE-ULCERATIVE CALLUSES: ICD-10-CM

## 2023-05-04 PROCEDURE — 3078F DIAST BP <80 MM HG: CPT | Mod: HCWC,CPTII,S$GLB, | Performed by: STUDENT IN AN ORGANIZED HEALTH CARE EDUCATION/TRAINING PROGRAM

## 2023-05-04 PROCEDURE — 11056 PARNG/CUTG B9 HYPRKR LES 2-4: CPT | Mod: Q7,HCWC,S$GLB, | Performed by: STUDENT IN AN ORGANIZED HEALTH CARE EDUCATION/TRAINING PROGRAM

## 2023-05-04 PROCEDURE — 99214 OFFICE O/P EST MOD 30 MIN: CPT | Mod: 25,HCWC,S$GLB, | Performed by: STUDENT IN AN ORGANIZED HEALTH CARE EDUCATION/TRAINING PROGRAM

## 2023-05-04 PROCEDURE — 3078F PR MOST RECENT DIASTOLIC BLOOD PRESSURE < 80 MM HG: ICD-10-PCS | Mod: HCWC,CPTII,S$GLB, | Performed by: STUDENT IN AN ORGANIZED HEALTH CARE EDUCATION/TRAINING PROGRAM

## 2023-05-04 PROCEDURE — 11056 ROUTINE FOOT CARE: ICD-10-PCS | Mod: Q7,HCWC,S$GLB, | Performed by: STUDENT IN AN ORGANIZED HEALTH CARE EDUCATION/TRAINING PROGRAM

## 2023-05-04 PROCEDURE — 99999 PR PBB SHADOW E&M-EST. PATIENT-LVL IV: CPT | Mod: PBBFAC,HCWC,, | Performed by: STUDENT IN AN ORGANIZED HEALTH CARE EDUCATION/TRAINING PROGRAM

## 2023-05-04 PROCEDURE — 1126F PR PAIN SEVERITY QUANTIFIED, NO PAIN PRESENT: ICD-10-PCS | Mod: HCWC,CPTII,S$GLB, | Performed by: STUDENT IN AN ORGANIZED HEALTH CARE EDUCATION/TRAINING PROGRAM

## 2023-05-04 PROCEDURE — 3075F SYST BP GE 130 - 139MM HG: CPT | Mod: HCWC,CPTII,S$GLB, | Performed by: STUDENT IN AN ORGANIZED HEALTH CARE EDUCATION/TRAINING PROGRAM

## 2023-05-04 PROCEDURE — 3075F PR MOST RECENT SYSTOLIC BLOOD PRESS GE 130-139MM HG: ICD-10-PCS | Mod: HCWC,CPTII,S$GLB, | Performed by: STUDENT IN AN ORGANIZED HEALTH CARE EDUCATION/TRAINING PROGRAM

## 2023-05-04 PROCEDURE — 1159F MED LIST DOCD IN RCRD: CPT | Mod: HCWC,CPTII,S$GLB, | Performed by: STUDENT IN AN ORGANIZED HEALTH CARE EDUCATION/TRAINING PROGRAM

## 2023-05-04 PROCEDURE — 11721 DEBRIDE NAIL 6 OR MORE: CPT | Mod: 59,Q7,HCWC,S$GLB | Performed by: STUDENT IN AN ORGANIZED HEALTH CARE EDUCATION/TRAINING PROGRAM

## 2023-05-04 PROCEDURE — 99214 PR OFFICE/OUTPT VISIT, EST, LEVL IV, 30-39 MIN: ICD-10-PCS | Mod: 25,HCWC,S$GLB, | Performed by: STUDENT IN AN ORGANIZED HEALTH CARE EDUCATION/TRAINING PROGRAM

## 2023-05-04 PROCEDURE — 99999 PR PBB SHADOW E&M-EST. PATIENT-LVL IV: ICD-10-PCS | Mod: PBBFAC,HCWC,, | Performed by: STUDENT IN AN ORGANIZED HEALTH CARE EDUCATION/TRAINING PROGRAM

## 2023-05-04 PROCEDURE — 1126F AMNT PAIN NOTED NONE PRSNT: CPT | Mod: HCWC,CPTII,S$GLB, | Performed by: STUDENT IN AN ORGANIZED HEALTH CARE EDUCATION/TRAINING PROGRAM

## 2023-05-04 PROCEDURE — 11721 ROUTINE FOOT CARE: ICD-10-PCS | Mod: 59,Q7,HCWC,S$GLB | Performed by: STUDENT IN AN ORGANIZED HEALTH CARE EDUCATION/TRAINING PROGRAM

## 2023-05-04 PROCEDURE — 1159F PR MEDICATION LIST DOCUMENTED IN MEDICAL RECORD: ICD-10-PCS | Mod: HCWC,CPTII,S$GLB, | Performed by: STUDENT IN AN ORGANIZED HEALTH CARE EDUCATION/TRAINING PROGRAM

## 2023-05-04 NOTE — PROGRESS NOTES
Subjective:      Patient ID: Caro Powell is a 82 y.o. female.    Chief Complaint: Diabetes Mellitus (Brayan Penny MD     3/27/2023) and Nail Care    Caro is a 82 y.o. female who presents to the clinic for evaluation and treatment of high risk feet. 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 The patient's chief complaint is long, thick toenails. This patient has documented high risk feet requiring routine maintenance secondary to diabetes mellitis and those secondary complications of diabetes, as mentioned.. Patient states she is following closely with Dr. Rooney for PAD. Relates has new SAS shoes which she put her diabetic shoes in and her feet are looking much better. No new pedal complaints.     8/3/22: Seen today for RFC and annual diabetic foot exam. Denies open wounds. No new pedal complaints. States has upcoming breast surgery.     11/9/22: Seen today for routine foot care. States she noticed blood in her sock and is concerned for a new diabetic foot ulcer.    11/22/22 Pt seen today for follow up of right foot ulcers. States she thinks they are healed. Admits she was not wearing her diabetic shoes previously and that is how the ulcers formed. No further pedal complaints.     1/4/23: Seen today, states she noticed a new blister to her left great toe, states she has been applying neosporin to it. No further pedal complaints.     1/19/23: Seen today for routine foot care, pre-ulcerative callus check to right plantar 1st metatarsal head and distal left great toe. States has been in a football to right foot. States she is worried her ulcer will break down again and is  worried her diabetic shoes with inserts are not fully offloading her foot. Denies open wounds, drainage or signs of infection. No further pedal complaints.     2/16/23: Seen today for follow up, for pre-ulcerative callus check. States her  is in the hospital, not doing well. States she has been on her feet a lot taking care of him. No new pedal complaints.     3/2/23: Seen today for follow up pre-ulcerative callus. No new pedal complaints. Relates she is caring for her  at home and has a nurse helping them.    4/6/23: Seen today for pre-ulcerative callus, sub 1st metatarsal head of right foot and to distal 3rd  digit of left foot. Denies pain or drainage, no further pedal complaints.    5/4/23: Seen today for callus and routine foot care. No further pedal complaints.       PCP: Brayan Penny MD    Date Last Seen by PCP: 6/21/22    Current shoe gear:  SAS diabetic shoes    Hemoglobin A1C   Date Value Ref Range Status   03/27/2023 6.7 (H) 4.0 - 5.6 % Final     Comment:     ADA Screening Guidelines:  5.7-6.4%  Consistent with prediabetes  >or=6.5%  Consistent with diabetes    High levels of fetal hemoglobin interfere with the HbA1C  assay. Heterozygous hemoglobin variants (HbS, HgC, etc)do  not significantly interfere with this assay.   However, presence of multiple variants may affect accuracy.     05/24/2022 7.3 (H) 4.0 - 5.6 % Final     Comment:     ADA Screening Guidelines:  5.7-6.4%  Consistent with prediabetes  >or=6.5%  Consistent with diabetes    High levels of fetal hemoglobin interfere with the HbA1C  assay. Heterozygous hemoglobin variants (HbS, HgC, etc)do  not significantly interfere with this assay.   However, presence of multiple variants may affect accuracy.     10/18/2021 6.7 (H) 4.0 - 5.6 % Final     Comment:     ADA Screening Guidelines:  5.7-6.4%  Consistent with prediabetes  >or=6.5%  Consistent with diabetes    High levels of fetal hemoglobin interfere with the HbA1C  assay.  Heterozygous hemoglobin variants (HbS, HgC, etc)do  not significantly interfere with this assay.   However, presence of multiple variants may affect accuracy.     01/12/2018 8.3 % Final       Review of Systems   Constitutional: Negative for chills, decreased appetite, diaphoresis and fever.   HENT:  Negative for congestion and hearing loss.    Cardiovascular:  Negative for chest pain, claudication, leg swelling and syncope.   Respiratory:  Negative for cough and shortness of breath.    Skin:  Positive for dry skin, nail changes and poor wound healing. Negative for color change, flushing, itching and rash.   Musculoskeletal:  Positive for arthritis. Negative for joint pain and joint swelling.   Gastrointestinal:  Negative for nausea and vomiting.   Neurological:  Positive for numbness. Negative for focal weakness, paresthesias and weakness.   Psychiatric/Behavioral:  Negative for altered mental status. The patient is not nervous/anxious.          Objective:      Physical Exam  Constitutional:       General: She is not in acute distress.     Appearance: She is well-developed. She is not diaphoretic.   Cardiovascular:      Comments: Dorsalis pedis and posterior tibial pulses are diminished. Skin temperature is within normal limits. Toes are cool to touch and feet are warm proximally. Hair growth is diminished. Skin is mildly atrophic and with mild hyperpigmentation. Mild edema noted, bilaterally. Telangiectasias bilaterally.  Musculoskeletal:         General: No tenderness.      Comments: Adequate joint range of motion without pain, limitation, nor crepitation to bilateral feet and ankle joints. Muscle strength is 5/5 in all groups bilaterally.    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 HAV, bilaterally. Semi rigid hammertoes to remaining digits.    Lymphadenopathy:      Comments: Negative lymphangitic streaking    Skin:     General: Skin is warm and dry.      Findings: No  lesion.      Comments: Skin is warm and dry, no acute signs of infection noted. No open wounds, macerations or hyperkeratotic lesions, bilaterally.     See wound description below    Toenails are thickened by 2-4 mm's, dystrophic, and are darkened in coloration with subungual fungal debris, bilaterally.  Skin is very dry, bilaterally.      Neurological:      Mental Status: She is alert and oriented to person, place, and time.      Sensory: Sensory deficit present.      Motor: No abnormal muscle tone.      Comments: Light touch is diminished. [place]ucho 5.07 monofilamant testing is diminished. Vibratory sensation  is diminished, bilaterally    Psychiatric:         Behavior: Behavior normal.         Thought Content: Thought content normal.         Judgment: Judgment normal.     Pre-ulcerative callus to plantar right 1st metatarsal head and distal  left hallux, upon debridement, sites are healed. No signs of infection.    PREVIOUS IMAGES:                 Assessment:       Encounter Diagnoses   Name Primary?    PAD (peripheral artery disease) Yes    Pre-ulcerative calluses     Onychomycosis     Type 2 diabetes mellitus with peripheral neuropathy                    Plan:       Caro was seen today for diabetes mellitus and nail care.    Diagnoses and all orders for this visit:    PAD (peripheral artery disease)  -     DIABETIC SHOES FOR HOME USE  -     Routine Foot Care    Pre-ulcerative calluses  -     DIABETIC SHOES FOR HOME USE  -     Routine Foot Care  -     Wound Debridement    Onychomycosis  -     DIABETIC SHOES FOR HOME USE  -     Routine Foot Care    Type 2 diabetes mellitus with peripheral neuropathy  -     DIABETIC SHOES FOR HOME USE            I counseled the patient on her conditions, their implications and medical management.  Routine foot care per attached note  No open wounds or signs of infection  Rx diabetic shoes  Lotion BID for dry skin, recommend Goldbonds diabetic foot cream  Shoe  inspection. Diabetic Foot Education. Patient reminded of the importance of good nutrition and blood sugar control to help prevent podiatric complications of diabetes. Patient instructed on proper foot hygeine. We discussed wearing proper shoe gear, daily foot inspections, never walking without protective shoe gear, never putting sharp instruments to feet, routine podiatric nail visits    Return to clinic in  2-4 weeks, sooner PRN

## 2023-05-15 NOTE — PROCEDURES
"Routine Foot Care    Date/Time: 5/4/2023 11:00 AM  Performed by: Ava Atkins DPM  Authorized by: Ava Atkins DPM     Time out: Immediately prior to procedure a "time out" was called to verify the correct patient, procedure, equipment, support staff and site/side marked as required.    Consent Done?:  Yes (Verbal)  Hyperkeratotic Skin Lesions?: Yes    Number of trimmed lesions:  2  Location(s):  Right 1st Metatarsal Head and Left 1st Toe    Nail Care Type:  Debride(Left 1st Toe, Left 3rd Toe, Left 2nd Toe, Left 4th Toe, Left 5th Toe, Right 1st Toe, Right 3rd Toe and Right 4th Toe)  Patient tolerance:  Patient tolerated the procedure well with no immediate complications  Wound Debridement    Date/Time: 5/4/2023 11:00 AM  Performed by: Ava Atkins DPM  Authorized by: Ava Atkins DPM     Consent Done?:  Yes (Verbal)  Local anesthesia used?: No      Wound Details:    Location:  Right foot    Location:  Right 1st Metatarsal Head    Type of Debridement:  Non-excisional       Length (cm):  1       Area (sq cm):  1       Width (cm):  1       Percent Debrided (%):  100       Depth (cm):  0       Total Area Debrided (sq cm):  1    Depth of debridement:  Epidermis/Dermis    Tissue debrided:  Epidermis    Devitalized tissue debrided:  Callus    Instruments:  Blade    2nd Wound Details:     Location:  Left foot    Location:  Left 1st Toe    Location:  Left 1st Toe    Type of Debridement:  Non-excisional       Length (cm):  0.5       Area (sq cm):  0.5       Width (cm):  1       Percent Debrided (%):  100       Depth (cm):  0       Total Area Debrided (sq cm):  0.5    Depth of debridement:  Epidermis/Dermis    Tissue debrided:  Epidermis    Devitalized tissue debrided:  Callus    Instruments:  Blade  Bleeding:  None  Patient tolerance:  Patient tolerated the procedure well with no immediate complications     No cultures were taken during this visit   "

## 2023-05-17 ENCOUNTER — OFFICE VISIT (OUTPATIENT)
Dept: CARDIOLOGY | Facility: CLINIC | Age: 83
End: 2023-05-17
Payer: MEDICARE

## 2023-05-17 VITALS
DIASTOLIC BLOOD PRESSURE: 67 MMHG | WEIGHT: 156 LBS | SYSTOLIC BLOOD PRESSURE: 149 MMHG | BODY MASS INDEX: 28.71 KG/M2 | HEIGHT: 62 IN | HEART RATE: 65 BPM | OXYGEN SATURATION: 96 %

## 2023-05-17 DIAGNOSIS — E11.52 TYPE 2 DIABETES MELLITUS WITH DIABETIC PERIPHERAL ANGIOPATHY AND GANGRENE, WITHOUT LONG-TERM CURRENT USE OF INSULIN: ICD-10-CM

## 2023-05-17 DIAGNOSIS — I10 ESSENTIAL HYPERTENSION: ICD-10-CM

## 2023-05-17 DIAGNOSIS — I73.9 PAD (PERIPHERAL ARTERY DISEASE): ICD-10-CM

## 2023-05-17 DIAGNOSIS — N18.30 STAGE 3 CHRONIC KIDNEY DISEASE, UNSPECIFIED WHETHER STAGE 3A OR 3B CKD: Primary | ICD-10-CM

## 2023-05-17 DIAGNOSIS — I48.92 ATRIAL FLUTTER, UNSPECIFIED TYPE: ICD-10-CM

## 2023-05-17 PROCEDURE — 3288F PR FALLS RISK ASSESSMENT DOCUMENTED: ICD-10-PCS | Mod: HCWC,CPTII,, | Performed by: INTERNAL MEDICINE

## 2023-05-17 PROCEDURE — 3288F FALL RISK ASSESSMENT DOCD: CPT | Mod: HCWC,CPTII,, | Performed by: INTERNAL MEDICINE

## 2023-05-17 PROCEDURE — 1101F PT FALLS ASSESS-DOCD LE1/YR: CPT | Mod: HCWC,CPTII,, | Performed by: INTERNAL MEDICINE

## 2023-05-17 PROCEDURE — 3078F DIAST BP <80 MM HG: CPT | Mod: HCWC,CPTII,, | Performed by: INTERNAL MEDICINE

## 2023-05-17 PROCEDURE — 3077F SYST BP >= 140 MM HG: CPT | Mod: HCWC,CPTII,, | Performed by: INTERNAL MEDICINE

## 2023-05-17 PROCEDURE — 99999 PR PBB SHADOW E&M-EST. PATIENT-LVL V: ICD-10-PCS | Mod: PBBFAC,HCWC,, | Performed by: INTERNAL MEDICINE

## 2023-05-17 PROCEDURE — 3078F PR MOST RECENT DIASTOLIC BLOOD PRESSURE < 80 MM HG: ICD-10-PCS | Mod: HCWC,CPTII,, | Performed by: INTERNAL MEDICINE

## 2023-05-17 PROCEDURE — 99214 PR OFFICE/OUTPT VISIT, EST, LEVL IV, 30-39 MIN: ICD-10-PCS | Mod: HCWC,,, | Performed by: INTERNAL MEDICINE

## 2023-05-17 PROCEDURE — 99215 OFFICE O/P EST HI 40 MIN: CPT | Mod: HCWC,PN | Performed by: INTERNAL MEDICINE

## 2023-05-17 PROCEDURE — 3077F PR MOST RECENT SYSTOLIC BLOOD PRESSURE >= 140 MM HG: ICD-10-PCS | Mod: HCWC,CPTII,, | Performed by: INTERNAL MEDICINE

## 2023-05-17 PROCEDURE — 1126F PR PAIN SEVERITY QUANTIFIED, NO PAIN PRESENT: ICD-10-PCS | Mod: HCWC,CPTII,, | Performed by: INTERNAL MEDICINE

## 2023-05-17 PROCEDURE — 99214 OFFICE O/P EST MOD 30 MIN: CPT | Mod: HCWC,,, | Performed by: INTERNAL MEDICINE

## 2023-05-17 PROCEDURE — 1101F PR PT FALLS ASSESS DOC 0-1 FALLS W/OUT INJ PAST YR: ICD-10-PCS | Mod: HCWC,CPTII,, | Performed by: INTERNAL MEDICINE

## 2023-05-17 PROCEDURE — 99999 PR PBB SHADOW E&M-EST. PATIENT-LVL V: CPT | Mod: PBBFAC,HCWC,, | Performed by: INTERNAL MEDICINE

## 2023-05-17 PROCEDURE — 1126F AMNT PAIN NOTED NONE PRSNT: CPT | Mod: HCWC,CPTII,, | Performed by: INTERNAL MEDICINE

## 2023-05-17 NOTE — PROGRESS NOTES
[facility] Cardiology 701     SUBJECTIVE:     History of Present Illness:  Patient is a 82 y.o. female presents with atrial flutter and PVD. Recent loss of      Primary Diagnosis:  1. hypertension  2. DM  3. atrial flutter - resolved  4. abnormal Echo: pericardial effusion - small .     5.  PVD - Amputation right second toe and right fifth toe . S/p atherectomy 11/21 right distal vessels   6. Breast cancer: s/p mastectomy 8/22 -   ROS  Since last visit in 11/22:    1. Had breast surgery with no issues; had radiation treatment and completed this and now on medications   2. No chest pains  3. No shortness of breath at rest; no PND or orthopnea  4. No palpitations  5. No syncope  6. No lower extremity edema on lasix as needed   7. Developed shingles after the surgery   8. Staying with her sister; not doing a lot; walks in the house   9. Blood pressures at home: unknown     Past Hospitalization    Review of patient's allergies indicates:   Allergen Reactions    Codeine Nausea Only and Other (See Comments)     Can Take Tylenol, hrdrocodone       Past Medical History:   Diagnosis Date    Anticoagulant long-term use     Arthritis     Atrial flutter     Calcium nephrolithiasis 2007    ckd 3    Diabetes mellitus, type 2     Diabetic peripheral neuropathy associated with type 2 diabetes mellitus     Difficult intubation     NARROW AIRWAY    Hypertension     Invasive ductal carcinoma of left breast 7/6/2022    Pulmonary aspiration of gastric contents     Shingles        Past Surgical History:   Procedure Laterality Date    ANGIOGRAPHY OF LOWER EXTREMITY N/A 10/21/2021    Procedure: Angiogram Extremity Unilateral;  Surgeon: Dre Martino MD;  Location: [facility] CATH LAB/EP;  Service: Cardiology;  Laterality: N/A;    ANGIOGRAPHY OF LOWER EXTREMITY Right 11/10/2021    Procedure: Angiogram Extremity Unilateral;  Surgeon: Ben Rooney MD;  Location: [facility] CATH LAB/EP;  Service: Cardiology;  Laterality: Right;    AXILLARY NODE  DISSECTION Right 8/15/2022    Procedure: LYMPHADENECTOMY, AXILLARY RIGHT;  Surgeon: RADHA Quinn MD;  Location: [facility] OR;  Service: General;  Laterality: Right;    COLONOSCOPY  11/28/2011    sigmoid diverticulosis, external hemorrhoids    DEBRIDEMENT Right 10/20/2021    Procedure: DEBRIDEMENT;  Surgeon: Ava Atkins DPM;  Location: [facility] OR;  Service: Podiatry;  Laterality: Right;    ENDOSCOPIC GASTROCNEMIUS RECESSION Right 9/10/2019    Procedure: RECESSION, GASTROCNEMIUS, ENDOSCOPIC;  Surgeon: Derek Jose DPM;  Location: [facility] OR;  Service: Podiatry;  Laterality: Right;  Arthrex center line (ron notified)  Video    EXTRACORPOREAL SHOCK WAVE LITHOTRIPSY      FLEXOR TENOTOMY Right 10/20/2021    Procedure: TENOTOMY, FLEXOR 3rd toe;  Surgeon: Ava Atkins DPM;  Location: [facility] OR;  Service: Podiatry;  Laterality: Right;    HYSTERECTOMY      INJECTION FOR SENTINEL NODE IDENTIFICATION Left 8/15/2022    Procedure: INJECTION, FOR SENTINEL NODE IDENTIFICATION;  Surgeon: RADHA Quinn MD;  Location: [facility] OR;  Service: General;  Laterality: Left;    MASTECTOMY Bilateral 8/15/2022    Procedure: MASTECTOMY BILATERAL  / BREAST;  Surgeon: RADHA Quinn MD;  Location: [facility];  Service: General;  Laterality: Bilateral;  5 HOURS / EMAIL SENT 8-11 @ 9:02 LK    SENTINEL LYMPH NODE BIOPSY Left 8/15/2022    Procedure: BIOPSY, LYMPH NODE, SENTINEL LEFT;  Surgeon: RADHA Quinn MD;  Location: [facility];  Service: General;  Laterality: Left;    SHOULDER SURGERY Left     TOE AMPUTATION Right 05/22/2017    5th toe    TOE AMPUTATION Right 10/20/2021    Procedure: AMPUTATION, TOE;  Surgeon: Ava Atkins DPM;  Location: [facility];  Service: Podiatry;  Laterality: Right;    TONSILLECTOMY         Family History   Problem Relation Age of Onset    Diabetes Mother     Heart failure Father     Breast cancer Sister 69        Genetic testing negative    Kidney failure Brother     Breast cancer Maternal Aunt         early  40s       Social History     Tobacco Use    Smoking status: Never     Passive exposure: Never    Smokeless tobacco: Never   Substance Use Topics    Alcohol use: No    Drug use: No        Home meds:  Current Outpatient Medications on File Prior to Visit   Medication Sig Dispense Refill    anastrozole (ARIMIDEX) 1 mg Tab Take 1 tablet (1 mg total) by mouth once daily. 90 tablet 3    apixaban (ELIQUIS) 5 mg Tab TAKE 1 TABLET BY MOUTH TWICE DAILY 180 tablet 3    atorvastatin (LIPITOR) 80 MG tablet Take 1 tablet (80 mg total) by mouth once daily. 90 tablet 3    clopidogreL (PLAVIX) 75 mg tablet Take 1 tablet (75 mg total) by mouth once daily. 90 tablet 3    EScitalopram oxalate (LEXAPRO) 10 MG tablet Take 1 tablet (10 mg total) by mouth once daily. 30 tablet 11    furosemide (LASIX) 40 MG tablet Take 1 tablet (40 mg total) by mouth once daily. 30 tablet 11    glimepiride (AMARYL) 1 MG tablet Take 1 tablet (1 mg total) by mouth 2 (two) times daily. 180 tablet 3    lisinopriL (PRINIVIL,ZESTRIL) 20 MG tablet TAKE 1 TABLET EVERY DAY 90 tablet 3    LORazepam (ATIVAN) 0.5 MG tablet Take 1-2 tablets (0.5-1 mg total) by mouth every 8 (eight) hours as needed for Anxiety. 60 tablet 4    metoprolol succinate (TOPROL-XL) 25 MG 24 hr tablet TAKE 1 TABLET EVERY DAY 90 tablet 3    polyethylene glycol (GLYCOLAX) 17 gram/dose powder Take 17 g by mouth once daily. 510 g 0    pramipexole (MIRAPEX) 0.125 MG tablet TAKE 1 TABLET EVERY DAY 90 tablet 3    urea (CARMOL) 40 % Crea Apply topically 2 (two) times daily. 1 Bottle 3    ACCU-CHEK SAMI PLUS METER Misc TEST  AS DIRECTED 1 each 0    ACCU-CHEK SAMI PLUS TEST STRP Strp USE TO TEST BLOOD SUGAR ONCE DAILY 100 strip 3    ACCU-CHEK SOFT DEV LANCETS Kit       ACCU-CHEK SOFTCLIX LANCETS Misc TEST ONCE DAILY 100 each 3    ammonium lactate 12 % Crea Apply to feet twice daily. Avoid use between toes. (Patient not taking: Reported on 5/17/2023) 140 g 5    calcium-vitamin D3 (OYSTER SHELL  CALCIUM-VIT D3) 500 mg-5 mcg (200 unit) per tablet Take 1 tablet by mouth once daily. (Patient not taking: Reported on 5/17/2023) 180 tablet 1    famotidine (PEPCID) 20 MG tablet       FLUAD QUAD 2022-23,65Y UP,,PF, 60 mcg (15 mcg x 4)/0.5 mL Syrg       mupirocin (BACTROBAN) 2 % ointment Apply topically 2 (two) times daily. (Patient not taking: Reported on 5/17/2023) 30 g 2    traMADoL (ULTRAM) 50 mg tablet Take 1 tablet (50 mg total) by mouth every 6 (six) hours as needed for Pain. (Patient not taking: Reported on 5/17/2023) 40 tablet 0     No current facility-administered medications on file prior to visit.       Cardiac meds:   Eliquis 5 mg BID  metoprolol succinate 25 mg   Glimepiride 1 mg  lisinopril 20 mg   gabapentin TID       Atorvastatin 80 mg   plavix 75 mg   Lasix 40 mg as needed       OBJECTIVE:     Vital Signs (Most Recent)  Pulse: 65 (05/17/23 1052)  BP: (!) 149/67 (05/17/23 1052)  SpO2: 96 % (05/17/23 1052)  Weight stable but down 3 lb s from 168 to 165    Physical Exam:   Neck: normal carotid upstrokes; normal JVP, no bruits, right  basilar carotid from murmur  Lungs: clear  Heart: RR, normal S1,S2, systolic ejection murmur base; no AI  Abd: obese  Exts: normal DP left; faint  PT right, no edema bilaterally           LABS    CBC  Hemoglobin (g/dL)   Date Value   08/10/2022 11.4 (L)   05/24/2022 11.7 (L)   11/09/2021 11.7 (L)     Hematocrit (%)   Date Value   08/10/2022 33.7 (L)   05/24/2022 34.7 (L)   11/09/2021 34.8 (L)          BMP  Sodium (mmol/L)   Date Value   03/27/2023 139   08/10/2022 142   05/24/2022 142     Potassium (mmol/L)   Date Value   03/27/2023 4.1   08/10/2022 4.9   05/24/2022 4.9     CO2 (mmol/L)   Date Value   03/27/2023 21 (L)   08/10/2022 25   05/24/2022 27     Chloride (mmol/L)   Date Value   03/27/2023 107   08/10/2022 108   05/24/2022 106     BUN (mg/dL)   Date Value   03/27/2023 30 (H)   08/10/2022 24 (H)   05/24/2022 26 (H)     Creatinine (mg/dL)   Date Value   03/27/2023  1.5 (H)   08/10/2022 1.3   2022 1.3     Glucose (mg/dL)   Date Value   2023 257 (H)   08/10/2022 131 (H)   2022 238 (H)     eGFR if non African American (mL/min/1.73 m^2)   Date Value   2022 38 (A)   2021 42 (A)   10/22/2021 38 (A)       BNP  BNP (pg/mL)   Date Value   2017 192 (H)   2017 91       Troponin panel:   No results found for: TROPONIN      COAGS  INR (no units)   Date Value   2017 1.1   2017 1.1   2017 1.1     aPTT (sec)   Date Value   2017 45.3 (H)   2017 47.6 (H)   2017 31.8       Lipid panel:    Lab Results   Component Value Date    CHOL 130 2022    CHOL 225 (H) 2020    CHOL 151 2017     Lab Results   Component Value Date    HDL 40 2022    HDL 47 (L) 2020    HDL 79 (A) 2018     Lab Results   Component Value Date    LDLCALC 56.0 (L) 2022    LDLCALC 135 (H) 2020    LDLCALC 146 2018     Lab Results   Component Value Date    TRIG 170 (H) 2022    TRIG 300 (H) 2020    TRIG 131 2018     Lab Results   Component Value Date    CHOLHDL 30.8 2022    CHOLHDL 4.8 2020    CHOLHDL 31.8 2017         Old Results:  : A1c 7.3, GFR 38   3/23: 6.7; GFR 34; LFT's normal   Diagnostic Results:    1a.  EKGs- 17: sinus; possible old anteroseptal infarction   1b. EK17: atrial flutter with ventricular rate of 147;   1c. EK17: sinus  1d. EK/19: sinus with nonspecific ivcd   1e. EK/21: sinus ; LAD   1f. EK/22: NSR: nonspecific T wave c hanges   2. Echos- 17: normal EF, diastolic dysfunction; mild LAE, moderate pericardial effusion, aortic sclerosis   2b. Echo: 17: normal EF, small pericardial effusion; LAE    2c. Echo: : normal EF, diastolic dysfunction; LAE, PAS 29 mm normal; mild TR     3. Stress Test- [  ]  4. Cath- [  ]  5. arterial study: no significant disease. : left clear; right distal right popliteal  50-75%  5b. Arterial study lower exts: disease in small vessels; underwent atherectomy 11/10/21 with good results       ASSESSMENT/PLAN:        1. atrial flutter: resolved; [id number]: however 4; no bleeding ; now in sinus  and on anticoagulation   2. diastolic dysfunction - fluid status is perfect   3. moderate pericardial effusion: resolving to mild to none in 1/22   4. shortness of breath:resolved  5. dizziness: resolved   6. blood pressures normal      7. CKD with GFR 38 10/21; GFR 42 11/21 5/22: 38 and stable ; 8/22: GFR 41  8. Right toes healed and doing better   9. Systolic ejection murmur: aortic sclerosis   Plan: continue the same medications  2. Monitor blood pressures  3. Cut the salt  4. Needs nephrology followup   5. Return 4 months     Sam Paulino MD

## 2023-05-18 ENCOUNTER — OFFICE VISIT (OUTPATIENT)
Dept: PODIATRY | Facility: CLINIC | Age: 83
End: 2023-05-18
Payer: MEDICARE

## 2023-05-18 VITALS
BODY MASS INDEX: 28.53 KG/M2 | HEART RATE: 60 BPM | SYSTOLIC BLOOD PRESSURE: 154 MMHG | DIASTOLIC BLOOD PRESSURE: 53 MMHG | HEIGHT: 62 IN

## 2023-05-18 DIAGNOSIS — Z87.2 HEALED ULCER OF FOOT ON EXAMINATION: Primary | ICD-10-CM

## 2023-05-18 DIAGNOSIS — L84 PRE-ULCERATIVE CALLUSES: ICD-10-CM

## 2023-05-18 PROCEDURE — 99999 PR PBB SHADOW E&M-EST. PATIENT-LVL III: ICD-10-PCS | Mod: PBBFAC,HCWC,, | Performed by: STUDENT IN AN ORGANIZED HEALTH CARE EDUCATION/TRAINING PROGRAM

## 2023-05-18 PROCEDURE — 99499 UNLISTED E&M SERVICE: CPT | Mod: HCWC,,, | Performed by: STUDENT IN AN ORGANIZED HEALTH CARE EDUCATION/TRAINING PROGRAM

## 2023-05-18 PROCEDURE — 99999 PR PBB SHADOW E&M-EST. PATIENT-LVL III: CPT | Mod: PBBFAC,HCWC,, | Performed by: STUDENT IN AN ORGANIZED HEALTH CARE EDUCATION/TRAINING PROGRAM

## 2023-05-18 PROCEDURE — 97597 DBRDMT OPN WND 1ST 20 CM/<: CPT | Mod: HCWC,,, | Performed by: STUDENT IN AN ORGANIZED HEALTH CARE EDUCATION/TRAINING PROGRAM

## 2023-05-18 PROCEDURE — 97597 WOUND DEBRIDEMENT: ICD-10-PCS | Mod: HCWC,,, | Performed by: STUDENT IN AN ORGANIZED HEALTH CARE EDUCATION/TRAINING PROGRAM

## 2023-05-18 PROCEDURE — 99499 NO LOS: ICD-10-PCS | Mod: HCWC,,, | Performed by: STUDENT IN AN ORGANIZED HEALTH CARE EDUCATION/TRAINING PROGRAM

## 2023-05-18 PROCEDURE — 99213 OFFICE O/P EST LOW 20 MIN: CPT | Mod: HCWC,PN | Performed by: STUDENT IN AN ORGANIZED HEALTH CARE EDUCATION/TRAINING PROGRAM

## 2023-05-18 NOTE — PROCEDURES
Wound Debridement    Date/Time: 5/18/2023 2:30 PM  Performed by: Ava Atkins DPM  Authorized by: Ava Atkins DPM     Consent Done?:  Yes (Verbal)  Local anesthesia used?: No      Wound Details:    Location:  Right foot    Location:  Right 1st Metatarsal Head    Type of Debridement:  Non-excisional       Length (cm):  1       Area (sq cm):  1       Width (cm):  1       Percent Debrided (%):  100       Depth (cm):  0       Total Area Debrided (sq cm):  1    Depth of debridement:  Epidermis/Dermis    Tissue debrided:  Epidermis    Devitalized tissue debrided:  Callus    Instruments:  Blade  Bleeding:  None  Patient tolerance:  Patient tolerated the procedure well with no immediate complications     No cultures were taken during this visit

## 2023-05-18 NOTE — PROGRESS NOTES
Subjective:      Patient ID: Caro Powell is a 82 y.o. female.    Chief Complaint: Foot Problem (PAD), Follow-up, and Foot Ulcer    Caro is a 82 y.o. female who presents to the clinic for evaluation and treatment of high risk feet. 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 The patient's chief complaint is long, thick toenails. This patient has documented high risk feet requiring routine maintenance secondary to diabetes mellitis and those secondary complications of diabetes, as mentioned.. Patient states she is following closely with Dr. Rooney for PAD. Relates has new SAS shoes which she put her diabetic shoes in and her feet are looking much better. No new pedal complaints.     8/3/22: Seen today for RFC and annual diabetic foot exam. Denies open wounds. No new pedal complaints. States has upcoming breast surgery.     11/9/22: Seen today for routine foot care. States she noticed blood in her sock and is concerned for a new diabetic foot ulcer.    11/22/22 Pt seen today for follow up of right foot ulcers. States she thinks they are healed. Admits she was not wearing her diabetic shoes previously and that is how the ulcers formed. No further pedal complaints.     1/4/23: Seen today, states she noticed a new blister to her left great toe, states she has been applying neosporin to it. No further pedal complaints.     1/19/23: Seen today for routine foot care, pre-ulcerative callus check to right plantar 1st metatarsal head and distal left great toe. States has been in a football to right foot. States she is worried her ulcer will break down again and is worried her diabetic shoes  with inserts are not fully offloading her foot. Denies open wounds, drainage or signs of infection. No further pedal complaints.     2/16/23: Seen today for follow up, for pre-ulcerative callus check. States her  is in the hospital, not doing well. States she has been on her feet a lot taking care of him. No new pedal complaints.     3/2/23: Seen today for follow up pre-ulcerative callus. No new pedal complaints. Relates she is caring for her  at home and has a nurse helping them.    4/6/23: Seen today for pre-ulcerative callus, sub 1st metatarsal head of right foot and to distal 3rd  digit of left foot. Denies pain or drainage, no further pedal complaints.    5/4/23: Seen today for callus and routine foot care. No further pedal complaints.     5/18/23: Seen today for follow up of foot ulcer/preulcerative callus. No new pedal complaints.       PCP: Brayan Penny MD    Date Last Seen by PCP: 6/21/22    Current shoe gear:  [employer] diabetic shoes    Hemoglobin A1C   Date Value Ref Range Status   03/27/2023 6.7 (H) 4.0 - 5.6 % Final     Comment:     ADA Screening Guidelines:  5.7-6.4%  Consistent with prediabetes  >or=6.5%  Consistent with diabetes    High levels of fetal hemoglobin interfere with the HbA1C  assay. Heterozygous hemoglobin variants (HbS, HgC, etc)do  not significantly interfere with this assay.   However, presence of multiple variants may affect accuracy.     05/24/2022 7.3 (H) 4.0 - 5.6 % Final     Comment:     ADA Screening Guidelines:  5.7-6.4%  Consistent with prediabetes  >or=6.5%  Consistent with diabetes    High levels of fetal hemoglobin interfere with the HbA1C  assay. Heterozygous hemoglobin variants (HbS, HgC, etc)do  not significantly interfere with this assay.   However, presence of multiple variants may affect accuracy.     10/18/2021 6.7 (H) 4.0 - 5.6 % Final     Comment:     ADA Screening Guidelines:  5.7-6.4%  Consistent with prediabetes  >or=6.5%  Consistent with  diabetes    High levels of fetal hemoglobin interfere with the HbA1C  assay. Heterozygous hemoglobin variants (HbS, HgC, etc)do  not significantly interfere with this assay.   However, presence of multiple variants may affect accuracy.     01/12/2018 8.3 % Final       Review of Systems   Constitutional: Negative for chills, decreased appetite, diaphoresis and fever.   HENT:  Negative for congestion and hearing loss.    Cardiovascular:  Negative for chest pain, claudication, leg swelling and syncope.   Respiratory:  Negative for cough and shortness of breath.    Skin:  Positive for dry skin, nail changes and poor wound healing. Negative for color change, flushing, itching and rash.   Musculoskeletal:  Positive for arthritis. Negative for joint pain and joint swelling.   Gastrointestinal:  Negative for nausea and vomiting.   Neurological:  Positive for numbness. Negative for focal weakness, paresthesias and weakness.   Psychiatric/Behavioral:  Negative for altered mental status. The patient is not nervous/anxious.          Objective:      Physical Exam  Constitutional:       General: She is not in acute distress.     Appearance: She is well-developed. She is not diaphoretic.   Cardiovascular:      Comments: Dorsalis pedis and posterior tibial pulses are diminished. Skin temperature is within normal limits. Toes are cool to touch and feet are warm proximally. Hair growth is diminished. Skin is mildly atrophic and with mild hyperpigmentation. Mild edema noted, bilaterally. Telangiectasias bilaterally.  Musculoskeletal:         General: No tenderness.      Comments: Adequate joint range of motion without pain, limitation, nor crepitation to bilateral feet and ankle joints. Muscle strength is 5/5 in all groups bilaterally.    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 HAV, bilaterally. Semi rigid hammertoes to remaining digits.    Lymphadenopathy:      Comments: Negative  lymphangitic streaking    Skin:     General: Skin is warm and dry.      Findings: No lesion.      Comments: Skin is warm and dry, no acute signs of infection noted. No open wounds, macerations or hyperkeratotic lesions, bilaterally.     See wound description below    Toenails are thickened by 2-4 mm's, dystrophic, and are darkened in coloration with subungual fungal debris, bilaterally.  Skin is very dry, bilaterally.      Neurological:      Mental Status: She is alert and oriented to person, place, and time.      Sensory: Sensory deficit present.      Motor: No abnormal muscle tone.      Comments: Light touch is diminished. [place] 5.07 monofilamant testing is diminished. Vibratory sensation  is diminished, bilaterally    Psychiatric:         Behavior: Behavior normal.         Thought Content: Thought content normal.         Judgment: Judgment normal.     Pre-ulcerative callus to plantar right 1st metatarsal head, upon debridement, site remains healed. No signs of infection.    PREVIOUS IMAGES:                 Assessment:       Encounter Diagnoses   Name Primary?    Healed ulcer of foot on examination Yes    Pre-ulcerative calluses                      Plan:       Caro was seen today for foot problem, follow-up and foot ulcer.    Diagnoses and all orders for this visit:    Healed ulcer of foot on examination    Pre-ulcerative calluses        I counseled the patient on her conditions, their implications and medical management.  Preulcerative callus debridement performed. No open wounds or signs of infection  Previously rx diabetic shoes  Lotion BID for dry skin, recommend [employer] diabetic foot cream  Shoe inspection. Diabetic Foot Education. Patient reminded of the importance of good nutrition and blood sugar control to help prevent podiatric complications of diabetes. Patient instructed on proper foot hygeine. We discussed wearing proper shoe gear, daily foot inspections, never walking without  protective shoe gear, never putting sharp instruments to feet, routine podiatric nail visits    Return to clinic in  2-3 months sooner PRN

## 2023-05-23 ENCOUNTER — TELEPHONE (OUTPATIENT)
Dept: CARDIOLOGY | Facility: CLINIC | Age: 83
End: 2023-05-23
Payer: MEDICARE

## 2023-05-23 NOTE — TELEPHONE ENCOUNTER
----- Message from Shayna Don sent at 5/23/2023  2:18 PM CDT -----  Type:  Needs Medical Advice    Who Called:  pt  Symptoms (please be specific):    How long has patient had these symptoms:    Pharmacy name and phone #:    Would the patient rather a call back or a response via [facility]?   Best Call Back Number: [phone]  Additional Information: wants to speak to the office about her referral for nephrology. She has never heard back from anyone. She doesn't want to go to anyone outside of [facility]

## 2023-05-25 ENCOUNTER — TELEPHONE (OUTPATIENT)
Dept: CARDIOLOGY | Facility: CLINIC | Age: 83
End: 2023-05-25
Payer: MEDICARE

## 2023-05-25 NOTE — TELEPHONE ENCOUNTER
Pt called to see if we could get someone to call her regarding her ckd referral I sent over a message to neph clinic for pt to get a call back called pt to let her know someone will reach out to her soon .

## 2023-06-05 ENCOUNTER — TELEPHONE (OUTPATIENT)
Dept: PRIMARY CARE CLINIC | Facility: CLINIC | Age: 83
End: 2023-06-05
Payer: MEDICARE

## 2023-06-05 NOTE — TELEPHONE ENCOUNTER
----- Message from Vinnie Jones sent at 6/5/2023  8:52 AM CDT -----  Contact: pt  Type:  Sooner Appointment Request    Caller is requesting a sooner appointment.  Caller declined first available appointment listed below.  Caller will not accept being placed on the waitlist and is requesting a message be sent to doctor.  Name of Caller:pt   When is the first available appointment?Oct ( pt refused)   Symptoms:est care   Would the patient rather a call back or a response via [facility]? call  Best Call Back Number:[phone]  Additional Information:

## 2023-06-08 DIAGNOSIS — N18.32 STAGE 3B CHRONIC KIDNEY DISEASE: Primary | ICD-10-CM

## 2023-06-14 ENCOUNTER — TELEPHONE (OUTPATIENT)
Dept: PODIATRY | Facility: CLINIC | Age: 83
End: 2023-06-14
Payer: MEDICARE

## 2023-06-14 RX ORDER — MUPIROCIN 20 MG/G
OINTMENT TOPICAL 3 TIMES DAILY
Qty: 15 G | Refills: 2 | Status: SHIPPED | OUTPATIENT
Start: 2023-06-14 | End: 2023-08-08 | Stop reason: SDUPTHER

## 2023-06-14 NOTE — TELEPHONE ENCOUNTER
----- Message from Kathryn Flores sent at 6/14/2023 10:14 AM CDT -----     Type:  Pharmacy Calling to Clarify an RX      Pharmacy Name:[facility] - [facility] [address]  Prescription Name:mupirocin (BACTROBAN) 2 % ointment  What do they need to clarify?:refill  Best Call Back Number:[phone]  Additional Information:

## 2023-06-14 NOTE — TELEPHONE ENCOUNTER
Spoke to the pharmacy and they request a new RX for Bactroban send out to [facility] for the patient, thank you

## 2023-07-12 ENCOUNTER — TELEPHONE (OUTPATIENT)
Dept: HEMATOLOGY/ONCOLOGY | Facility: CLINIC | Age: 83
End: 2023-07-12
Payer: MEDICARE

## 2023-07-12 NOTE — TELEPHONE ENCOUNTER
----- Message from Giancarlo Latif sent at 7/12/2023 10:51 AM CDT -----  Type:  Needs Medical Advice    Who Called: Pt  Would the patient rather a call back or a response via [facility]? call  Best Call Back Number: [phone]  Additional Information: Pt would like a call from nurse in office regarding her schedule appointment on 09/19/2023 pt would like to be seen on  09/18/2023 due to she is currently living across [facility] with family and will be seen in office with Dr Paulino on 09/18/2023 for 9 am that way she does not have to come back the next day please call pt regarding     NO AVAIL APPOINTMENT IN [facility] FOR ME TO RESCHEDULE PT ON 09/18/2023 WITH DR WEBSTER

## 2023-07-27 DIAGNOSIS — I48.92 UNSPECIFIED ATRIAL FLUTTER: ICD-10-CM

## 2023-07-27 DIAGNOSIS — D69.6 THROMBOCYTOPENIA, UNSPECIFIED: ICD-10-CM

## 2023-07-28 ENCOUNTER — TELEPHONE (OUTPATIENT)
Dept: CARDIOLOGY | Facility: CLINIC | Age: 83
End: 2023-07-28
Payer: MEDICARE

## 2023-07-28 RX ORDER — GLIMEPIRIDE 1 MG/1
TABLET ORAL
Qty: 180 TABLET | Refills: 3 | Status: SHIPPED | OUTPATIENT
Start: 2023-07-28

## 2023-07-28 NOTE — TELEPHONE ENCOUNTER
----- Message from Yessica Morris sent at 7/28/2023 10:27 AM CDT -----  Regarding: refill  Contact: [phone]  Type:  RX Refill Request    Who Called:  pt   Refill or New Rx: NEW  RX Name and Strength: apixaban (ELIQUIS) 5 mg Tab  How is the patient currently taking it? (ex. 1XDay): TAKE 1 TABLET BY MOUTH TWICE DAILY  Is this a 30 day or 90 day RX: 180 tablets   Preferred Pharmacy with phone number: [facility] - [place], [place] - [address]  Local or Mail Order: local   Ordering Provider:   Would the patient rather a call back or a response via [facility]?  Call   Best Call Back Number: [phone]  Additional Information:

## 2023-07-28 NOTE — TELEPHONE ENCOUNTER
----- Message from Kalani Amaya sent at 7/28/2023  9:44 AM CDT -----  .Type:  Needs Medical Advice    Who Called: pt    Would the patient rather a call back or a response via [facility]? Call back  Best Call Back Number:   Additional Information:     Pt stated she have an appt on 9/18 and would like to get it rescheduled for 8/14 please call pt back

## 2023-07-28 NOTE — TELEPHONE ENCOUNTER
----- Message from Germaine Miller sent at 7/28/2023 10:37 AM CDT -----  Type:  RX Refill Request    Who Called: pt  Refill or New Rx:refill  RX Name and Strength:apixaban (ELIQUIS) 5 mg Tab  How is the patient currently taking it? (ex. 1XDay):apixaban (ELIQUIS) 5 mg Tab  Is this a 30 day or 90 day RX:180  Preferred Pharmacy with phone number:[facility] - [facility] [address]  [address] [facility] [address]  Phone: [phone] Fax: [phone]      Local or Mail Order:mail  Ordering Provider:Dr Whitley  Would the patient rather a call back or a response via [facility]?call  Best Call Back Number:[phone]        Additional Information:

## 2023-07-31 ENCOUNTER — TELEPHONE (OUTPATIENT)
Dept: CARDIOLOGY | Facility: CLINIC | Age: 83
End: 2023-07-31
Payer: MEDICARE

## 2023-07-31 NOTE — TELEPHONE ENCOUNTER
----- Message from Laila Rodriguez sent at 7/28/2023  5:07 PM CDT -----  Type:  Needs Medical Advice    Who Called: pt    Would the patient rather a call back or a response via [facility]? call  Best Call Back Number: [phone]  Additional Information: pt requesting a call back to discuss appointment change from 8/8

## 2023-08-08 ENCOUNTER — OFFICE VISIT (OUTPATIENT)
Dept: CARDIOLOGY | Facility: CLINIC | Age: 83
End: 2023-08-08
Payer: MEDICARE

## 2023-08-08 VITALS
DIASTOLIC BLOOD PRESSURE: 59 MMHG | BODY MASS INDEX: 28.11 KG/M2 | HEART RATE: 53 BPM | HEIGHT: 62 IN | OXYGEN SATURATION: 97 % | SYSTOLIC BLOOD PRESSURE: 139 MMHG | WEIGHT: 152.75 LBS

## 2023-08-08 DIAGNOSIS — I73.9 PAD (PERIPHERAL ARTERY DISEASE): ICD-10-CM

## 2023-08-08 DIAGNOSIS — N18.32 STAGE 3B CHRONIC KIDNEY DISEASE: Primary | ICD-10-CM

## 2023-08-08 DIAGNOSIS — I48.91 ATRIAL FIBRILLATION WITH RAPID VENTRICULAR RESPONSE: ICD-10-CM

## 2023-08-08 PROCEDURE — 1159F PR MEDICATION LIST DOCUMENTED IN MEDICAL RECORD: ICD-10-PCS | Mod: HCWC,CPTII,S$GLB, | Performed by: INTERNAL MEDICINE

## 2023-08-08 PROCEDURE — 99214 OFFICE O/P EST MOD 30 MIN: CPT | Mod: HCWC,S$GLB,, | Performed by: INTERNAL MEDICINE

## 2023-08-08 PROCEDURE — 3075F PR MOST RECENT SYSTOLIC BLOOD PRESS GE 130-139MM HG: ICD-10-PCS | Mod: HCWC,CPTII,S$GLB, | Performed by: INTERNAL MEDICINE

## 2023-08-08 PROCEDURE — 3078F DIAST BP <80 MM HG: CPT | Mod: HCWC,CPTII,S$GLB, | Performed by: INTERNAL MEDICINE

## 2023-08-08 PROCEDURE — 99214 PR OFFICE/OUTPT VISIT, EST, LEVL IV, 30-39 MIN: ICD-10-PCS | Mod: HCWC,S$GLB,, | Performed by: INTERNAL MEDICINE

## 2023-08-08 PROCEDURE — 3075F SYST BP GE 130 - 139MM HG: CPT | Mod: HCWC,CPTII,S$GLB, | Performed by: INTERNAL MEDICINE

## 2023-08-08 PROCEDURE — 1126F PR PAIN SEVERITY QUANTIFIED, NO PAIN PRESENT: ICD-10-PCS | Mod: HCWC,CPTII,S$GLB, | Performed by: INTERNAL MEDICINE

## 2023-08-08 PROCEDURE — 1160F PR REVIEW ALL MEDS BY PRESCRIBER/CLIN PHARMACIST DOCUMENTED: ICD-10-PCS | Mod: HCWC,CPTII,S$GLB, | Performed by: INTERNAL MEDICINE

## 2023-08-08 PROCEDURE — 3078F PR MOST RECENT DIASTOLIC BLOOD PRESSURE < 80 MM HG: ICD-10-PCS | Mod: HCWC,CPTII,S$GLB, | Performed by: INTERNAL MEDICINE

## 2023-08-08 PROCEDURE — 1126F AMNT PAIN NOTED NONE PRSNT: CPT | Mod: HCWC,CPTII,S$GLB, | Performed by: INTERNAL MEDICINE

## 2023-08-08 PROCEDURE — 1160F RVW MEDS BY RX/DR IN RCRD: CPT | Mod: HCWC,CPTII,S$GLB, | Performed by: INTERNAL MEDICINE

## 2023-08-08 PROCEDURE — 99999 PR PBB SHADOW E&M-EST. PATIENT-LVL IV: ICD-10-PCS | Mod: PBBFAC,HCWC,, | Performed by: INTERNAL MEDICINE

## 2023-08-08 PROCEDURE — 99999 PR PBB SHADOW E&M-EST. PATIENT-LVL IV: CPT | Mod: PBBFAC,HCWC,, | Performed by: INTERNAL MEDICINE

## 2023-08-08 PROCEDURE — 3288F FALL RISK ASSESSMENT DOCD: CPT | Mod: HCWC,CPTII,S$GLB, | Performed by: INTERNAL MEDICINE

## 2023-08-08 PROCEDURE — 3288F PR FALLS RISK ASSESSMENT DOCUMENTED: ICD-10-PCS | Mod: HCWC,CPTII,S$GLB, | Performed by: INTERNAL MEDICINE

## 2023-08-08 PROCEDURE — 1101F PR PT FALLS ASSESS DOC 0-1 FALLS W/OUT INJ PAST YR: ICD-10-PCS | Mod: HCWC,CPTII,S$GLB, | Performed by: INTERNAL MEDICINE

## 2023-08-08 PROCEDURE — 1101F PT FALLS ASSESS-DOCD LE1/YR: CPT | Mod: HCWC,CPTII,S$GLB, | Performed by: INTERNAL MEDICINE

## 2023-08-08 PROCEDURE — 1159F MED LIST DOCD IN RCRD: CPT | Mod: HCWC,CPTII,S$GLB, | Performed by: INTERNAL MEDICINE

## 2023-08-08 NOTE — PROGRESS NOTES
[id number]     SUBJECTIVE:     History of Present Illness:  Patient is a 82 y.o. female presents with atrial flutter and PVD. Recent loss of      Primary Diagnosis:  1. hypertension  2. DM  3. atrial flutter - resolved  4. abnormal Echo: pericardial effusion - small .     5.  PVD - Amputation right second toe and right fifth toe . S/p atherectomy 11/21 right distal vessels   6. Breast cancer: s/p mastectomy 8/22 -   ROS  Since last visit in 5/23:   1. Had breast surgery with no issues; had radiation treatment and completed this and now on medications   2. No chest pains  3. No shortness of breath at rest; no PND or orthopnea; does get a little short of breath with walking - walked over here from the car without shortness of breath   4. No palpitations  5. No syncope  6. No lower extremity edema on lasix as needed   7. Developed shingles after the surgery   8. Staying with her son   9. No bleeding     Past Hospitalization    Review of patient's allergies indicates:   Allergen Reactions    Codeine Nausea Only and Other (See Comments)     Can Take Tylenol, hrdrocodone       Past Medical History:   Diagnosis Date    Anticoagulant long-term use     Arthritis     Atrial flutter     Calcium nephrolithiasis 2007    ckd 3    Diabetes mellitus, type 2     Diabetic peripheral neuropathy associated with type 2 diabetes mellitus     Difficult intubation     NARROW AIRWAY    Hypertension     Invasive ductal carcinoma of left breast 7/6/2022    Pulmonary aspiration of gastric contents     Shingles        Past Surgical History:   Procedure Laterality Date    ANGIOGRAPHY OF LOWER EXTREMITY N/A 10/21/2021    Procedure: Angiogram Extremity Unilateral;  Surgeon: Dre Martino MD;  Location: [facility] CATH LAB/EP;  Service: Cardiology;  Laterality: N/A;    ANGIOGRAPHY OF LOWER EXTREMITY Right 11/10/2021    Procedure: Angiogram Extremity Unilateral;  Surgeon: Ben Rooney MD;  Location: [facility] CATH LAB/EP;  Service:  Cardiology;  Laterality: Right;    AXILLARY NODE DISSECTION Right 8/15/2022    Procedure: LYMPHADENECTOMY, AXILLARY RIGHT;  Surgeon: RADHA Quinn MD;  Location: [facility] OR;  Service: General;  Laterality: Right;    COLONOSCOPY  11/28/2011    sigmoid diverticulosis, external hemorrhoids    DEBRIDEMENT Right 10/20/2021    Procedure: DEBRIDEMENT;  Surgeon: Ava Atkins DPM;  Location: [facility] OR;  Service: Podiatry;  Laterality: Right;    ENDOSCOPIC GASTROCNEMIUS RECESSION Right 9/10/2019    Procedure: RECESSION, GASTROCNEMIUS, ENDOSCOPIC;  Surgeon: Derek Jose DPM;  Location: [facility] OR;  Service: Podiatry;  Laterality: Right;  Arthrex center line (ron notified)  Video    EXTRACORPOREAL SHOCK WAVE LITHOTRIPSY      FLEXOR TENOTOMY Right 10/20/2021    Procedure: TENOTOMY, FLEXOR 3rd toe;  Surgeon: Ava Atkins DPM;  Location: [facility] OR;  Service: Podiatry;  Laterality: Right;    HYSTERECTOMY      INJECTION FOR SENTINEL NODE IDENTIFICATION Left 8/15/2022    Procedure: INJECTION, FOR SENTINEL NODE IDENTIFICATION;  Surgeon: RADHA Quinn MD;  Location: [facility];  Service: General;  Laterality: Left;    MASTECTOMY Bilateral 8/15/2022    Procedure: MASTECTOMY BILATERAL  / BREAST;  Surgeon: RADHA Quinn MD;  Location: [facility];  Service: General;  Laterality: Bilateral;  5 HOURS / EMAIL SENT 8-11 @ 9:02 LK    SENTINEL LYMPH NODE BIOPSY Left 8/15/2022    Procedure: BIOPSY, LYMPH NODE, SENTINEL LEFT;  Surgeon: RADHA Quinn MD;  Location: [facility];  Service: General;  Laterality: Left;    SHOULDER SURGERY Left     TOE AMPUTATION Right 05/22/2017    5th toe    TOE AMPUTATION Right 10/20/2021    Procedure: AMPUTATION, TOE;  Surgeon: Ava Atkins DPM;  Location: [facility] OR;  Service: Podiatry;  Laterality: Right;    TONSILLECTOMY         Family History   Problem Relation Age of Onset    Diabetes Mother     Heart failure Father     Breast cancer Sister 69        Genetic testing negative    Kidney failure Brother      Breast cancer Maternal Aunt         early 40s       Social History     Tobacco Use    Smoking status: Never     Passive exposure: Never    Smokeless tobacco: Never   Substance Use Topics    Alcohol use: No    Drug use: No        Home meds:  Current Outpatient Medications on File Prior to Visit   Medication Sig Dispense Refill    ammonium lactate 12 % Crea Apply to feet twice daily. Avoid use between toes. 140 g 5    anastrozole (ARIMIDEX) 1 mg Tab Take 1 tablet (1 mg total) by mouth once daily. 90 tablet 3    apixaban (ELIQUIS) 5 mg Tab TAKE 1 TABLET BY MOUTH TWICE DAILY 180 tablet 3    atorvastatin (LIPITOR) 80 MG tablet Take 1 tablet (80 mg total) by mouth once daily. 90 tablet 3    clopidogreL (PLAVIX) 75 mg tablet Take 1 tablet (75 mg total) by mouth once daily. 90 tablet 3    famotidine (PEPCID) 20 MG tablet       furosemide (LASIX) 40 MG tablet Take 1 tablet (40 mg total) by mouth once daily. 30 tablet 11    glimepiride (AMARYL) 1 MG tablet TAKE 1 TABLET TWICE DAILY 180 tablet 3    lisinopriL (PRINIVIL,ZESTRIL) 20 MG tablet TAKE 1 TABLET EVERY DAY 90 tablet 3    metoprolol succinate (TOPROL-XL) 25 MG 24 hr tablet TAKE 1 TABLET EVERY DAY 90 tablet 3    mupirocin (BACTROBAN) 2 % ointment Apply topically 2 (two) times daily. 30 g 2    polyethylene glycol (GLYCOLAX) 17 gram/dose powder Take 17 g by mouth once daily. 510 g 0    pramipexole (MIRAPEX) 0.125 MG tablet TAKE 1 TABLET EVERY DAY 90 tablet 3    urea (CARMOL) 40 % Crea Apply topically 2 (two) times daily. 1 Bottle 3    ACCU-CHEK SAMI PLUS METER Misc TEST  AS DIRECTED 1 each 0    ACCU-CHEK SAMI PLUS TEST STRP Strp USE TO TEST BLOOD SUGAR ONCE DAILY 100 strip 3    ACCU-CHEK SOFT DEV LANCETS Kit       ACCU-CHEK SOFTCLIX LANCETS Misc TEST ONCE DAILY 100 each 3    calcium-vitamin D3 (OYSTER SHELL CALCIUM-VIT D3) 500 mg-5 mcg (200 unit) per tablet Take 1 tablet by mouth once daily. (Patient not taking: Reported on 8/8/2023) 180 tablet 1    EScitalopram  oxalate (LEXAPRO) 10 MG tablet Take 1 tablet (10 mg total) by mouth once daily. (Patient not taking: Reported on 2023) 30 tablet 11    FLUAD QUAD ,65Y UP,,PF, 60 mcg (15 mcg x 4)/0.5 mL Syrg       LORazepam (ATIVAN) 0.5 MG tablet Take 1-2 tablets (0.5-1 mg total) by mouth every 8 (eight) hours as needed for Anxiety. 60 tablet 4    traMADoL (ULTRAM) 50 mg tablet Take 1 tablet (50 mg total) by mouth every 6 (six) hours as needed for Pain. (Patient not taking: Reported on 2023) 40 tablet 0    [DISCONTINUED] mupirocin (BACTROBAN) 2 % ointment Apply topically 3 (three) times daily. 15 g 2     No current facility-administered medications on file prior to visit.       Cardiac meds:     Eliquis 5 mg BID  metoprolol succinate 25 mg   Glimepiride 1 mg  lisinopril 20 mg   Atorvastatin 80 mg   plavix 75 mg   Lasix 40 mg as needed       OBJECTIVE:     Vital Signs (Most Recent)  Pulse: (!) 53 (23 1118)  BP: (!) 139/59 (23 1118)  SpO2: 97 % (23 1118)  Weight stable but down 3 lb s from 168 to 165 - 162     Physical Exam:     Neck: normal carotid upstrokes; normal JVP, no bruits, right  basilar carotid from murmur  Lungs: clear  Heart: RR, normal S1,S2, systolic ejection murmur base; no AI  Abd: obese  Exts: normal DP left; faint  PT right, no edema bilaterally           LABS    : A1c 7.3, GFR 38 ; LDL 56   3/23: 6.7; GFR 34; LFT's normal     Diagnostic Results:    1a.  EKGs- 17: sinus; possible old anteroseptal infarction   1b. EK17: atrial flutter with ventricular rate of 147;   1c. EK17: sinus  1d. EK/19: sinus with nonspecific ivcd   1e. EK/21: sinus ; LAD   1f. EK/22: NSR: nonspecific T wave c hanges   2. Echos- 17: normal EF, diastolic dysfunction; mild LAE, moderate pericardial effusion, aortic sclerosis   2b. Echo: 17: normal EF, small pericardial effusion; LAE    2c. Echo: : normal EF, diastolic dysfunction; LAE, PAS 29 mm normal; mild TR      3. Stress Test- [  ]  4. Cath- [  ]  5. arterial study: no significant disease. 2017; 1/21: left clear; right distal right popliteal 50-75%  5b. Arterial study lower exts: disease in small vessels; underwent atherectomy 11/10/21 with good results       ASSESSMENT/PLAN:        1. atrial flutter: resolved; [id number]: however 4; no bleeding ; now in sinus  and on anticoagulation   2. diastolic dysfunction - fluid status is perfect   3. moderate pericardial effusion: resolving to mild to none in 1/22   4. shortness of breath:minimal   5. dizziness: resolved   6. blood pressures normal      7. CKD with GFR 38 10/21; GFR 42 11/21 5/22: 38 and stable ; 8/22: GFR 41  8.  PVD: Right toes healed and doing better   9. Systolic ejection murmur: aortic sclerosis   Plan: 1.continue the same medications  2.[place]rolog[place]   5. Return 4 months     Sam Paulino MD

## 2023-08-09 DIAGNOSIS — C50.911 INVASIVE DUCTAL CARCINOMA OF RIGHT BREAST: ICD-10-CM

## 2023-08-09 DIAGNOSIS — C50.912 INVASIVE DUCTAL CARCINOMA OF LEFT BREAST: ICD-10-CM

## 2023-08-09 RX ORDER — ANASTROZOLE 1 MG/1
1 TABLET ORAL
Qty: 90 TABLET | Refills: 3 | Status: SHIPPED | OUTPATIENT
Start: 2023-08-09

## 2023-08-10 ENCOUNTER — OFFICE VISIT (OUTPATIENT)
Dept: PODIATRY | Facility: CLINIC | Age: 83
End: 2023-08-10
Payer: MEDICARE

## 2023-08-10 ENCOUNTER — OFFICE VISIT (OUTPATIENT)
Dept: HEMATOLOGY/ONCOLOGY | Facility: CLINIC | Age: 83
End: 2023-08-10
Payer: MEDICARE

## 2023-08-10 VITALS
DIASTOLIC BLOOD PRESSURE: 59 MMHG | SYSTOLIC BLOOD PRESSURE: 128 MMHG | HEART RATE: 64 BPM | BODY MASS INDEX: 27.94 KG/M2 | HEIGHT: 62 IN

## 2023-08-10 VITALS
OXYGEN SATURATION: 96 % | DIASTOLIC BLOOD PRESSURE: 55 MMHG | SYSTOLIC BLOOD PRESSURE: 99 MMHG | BODY MASS INDEX: 28.23 KG/M2 | HEART RATE: 70 BPM | WEIGHT: 154.31 LBS | RESPIRATION RATE: 18 BRPM

## 2023-08-10 DIAGNOSIS — M85.852 OSTEOPENIA OF LEFT FEMORAL NECK: ICD-10-CM

## 2023-08-10 DIAGNOSIS — B35.1 ONYCHOMYCOSIS: ICD-10-CM

## 2023-08-10 DIAGNOSIS — E11.40 TYPE 2 DIABETES MELLITUS WITH DIABETIC NEUROPATHY, WITHOUT LONG-TERM CURRENT USE OF INSULIN: ICD-10-CM

## 2023-08-10 DIAGNOSIS — Z79.811 LONG TERM (CURRENT) USE OF AROMATASE INHIBITORS: ICD-10-CM

## 2023-08-10 DIAGNOSIS — I73.9 PAD (PERIPHERAL ARTERY DISEASE): ICD-10-CM

## 2023-08-10 DIAGNOSIS — C50.911 INVASIVE DUCTAL CARCINOMA OF RIGHT BREAST: ICD-10-CM

## 2023-08-10 DIAGNOSIS — C50.912 INVASIVE DUCTAL CARCINOMA OF LEFT BREAST: Primary | ICD-10-CM

## 2023-08-10 DIAGNOSIS — E11.52 TYPE 2 DIABETES MELLITUS WITH DIABETIC PERIPHERAL ANGIOPATHY AND GANGRENE, WITHOUT LONG-TERM CURRENT USE OF INSULIN: ICD-10-CM

## 2023-08-10 DIAGNOSIS — L84 PRE-ULCERATIVE CALLUSES: Primary | ICD-10-CM

## 2023-08-10 DIAGNOSIS — I48.91 ATRIAL FIBRILLATION WITH RAPID VENTRICULAR RESPONSE: ICD-10-CM

## 2023-08-10 DIAGNOSIS — Z89.429 HISTORY OF AMPUTATION OF LESSER TOE, UNSPECIFIED LATERALITY: ICD-10-CM

## 2023-08-10 DIAGNOSIS — F06.30 MOOD DISORDER DUE TO MEDICAL CONDITION: ICD-10-CM

## 2023-08-10 DIAGNOSIS — N18.32 CHRONIC KIDNEY DISEASE, STAGE 3B: ICD-10-CM

## 2023-08-10 DIAGNOSIS — L97.502 SKIN ULCER OF TOE WITH FAT LAYER EXPOSED, UNSPECIFIED LATERALITY: ICD-10-CM

## 2023-08-10 DIAGNOSIS — C77.3 SECONDARY MALIGNANT NEOPLASM OF AXILLARY NODE: ICD-10-CM

## 2023-08-10 PROCEDURE — 3288F FALL RISK ASSESSMENT DOCD: CPT | Mod: HCWC,CPTII,S$GLB, | Performed by: INTERNAL MEDICINE

## 2023-08-10 PROCEDURE — 3074F SYST BP LT 130 MM HG: CPT | Mod: HCWC,CPTII,S$GLB, | Performed by: STUDENT IN AN ORGANIZED HEALTH CARE EDUCATION/TRAINING PROGRAM

## 2023-08-10 PROCEDURE — 3078F PR MOST RECENT DIASTOLIC BLOOD PRESSURE < 80 MM HG: ICD-10-PCS | Mod: HCWC,CPTII,S$GLB, | Performed by: STUDENT IN AN ORGANIZED HEALTH CARE EDUCATION/TRAINING PROGRAM

## 2023-08-10 PROCEDURE — 1101F PR PT FALLS ASSESS DOC 0-1 FALLS W/OUT INJ PAST YR: ICD-10-PCS | Mod: HCWC,CPTII,S$GLB, | Performed by: INTERNAL MEDICINE

## 2023-08-10 PROCEDURE — 11056 ROUTINE FOOT CARE: ICD-10-PCS | Mod: Q7,HCWC,S$GLB, | Performed by: STUDENT IN AN ORGANIZED HEALTH CARE EDUCATION/TRAINING PROGRAM

## 2023-08-10 PROCEDURE — 1101F PT FALLS ASSESS-DOCD LE1/YR: CPT | Mod: HCWC,CPTII,S$GLB, | Performed by: INTERNAL MEDICINE

## 2023-08-10 PROCEDURE — 1126F PR PAIN SEVERITY QUANTIFIED, NO PAIN PRESENT: ICD-10-PCS | Mod: HCWC,CPTII,S$GLB, | Performed by: INTERNAL MEDICINE

## 2023-08-10 PROCEDURE — 99499 NO LOS: ICD-10-PCS | Mod: HCWC,S$GLB,, | Performed by: STUDENT IN AN ORGANIZED HEALTH CARE EDUCATION/TRAINING PROGRAM

## 2023-08-10 PROCEDURE — 99999 PR PBB SHADOW E&M-EST. PATIENT-LVL IV: CPT | Mod: PBBFAC,HCWC,, | Performed by: INTERNAL MEDICINE

## 2023-08-10 PROCEDURE — 3074F PR MOST RECENT SYSTOLIC BLOOD PRESSURE < 130 MM HG: ICD-10-PCS | Mod: HCWC,CPTII,S$GLB, | Performed by: STUDENT IN AN ORGANIZED HEALTH CARE EDUCATION/TRAINING PROGRAM

## 2023-08-10 PROCEDURE — 11721 DEBRIDE NAIL 6 OR MORE: CPT | Mod: 59,Q7,HCWC,S$GLB | Performed by: STUDENT IN AN ORGANIZED HEALTH CARE EDUCATION/TRAINING PROGRAM

## 2023-08-10 PROCEDURE — 99999 PR PBB SHADOW E&M-EST. PATIENT-LVL III: ICD-10-PCS | Mod: PBBFAC,HCWC,, | Performed by: STUDENT IN AN ORGANIZED HEALTH CARE EDUCATION/TRAINING PROGRAM

## 2023-08-10 PROCEDURE — 11042 WOUND DEBRIDEMENT: ICD-10-PCS | Mod: 59,HCWC,S$GLB, | Performed by: STUDENT IN AN ORGANIZED HEALTH CARE EDUCATION/TRAINING PROGRAM

## 2023-08-10 PROCEDURE — 3078F DIAST BP <80 MM HG: CPT | Mod: HCWC,CPTII,S$GLB, | Performed by: INTERNAL MEDICINE

## 2023-08-10 PROCEDURE — 1159F PR MEDICATION LIST DOCUMENTED IN MEDICAL RECORD: ICD-10-PCS | Mod: HCWC,CPTII,S$GLB, | Performed by: INTERNAL MEDICINE

## 2023-08-10 PROCEDURE — 99214 OFFICE O/P EST MOD 30 MIN: CPT | Mod: HCWC,S$GLB,, | Performed by: INTERNAL MEDICINE

## 2023-08-10 PROCEDURE — 3078F DIAST BP <80 MM HG: CPT | Mod: HCWC,CPTII,S$GLB, | Performed by: STUDENT IN AN ORGANIZED HEALTH CARE EDUCATION/TRAINING PROGRAM

## 2023-08-10 PROCEDURE — 3074F PR MOST RECENT SYSTOLIC BLOOD PRESSURE < 130 MM HG: ICD-10-PCS | Mod: HCWC,CPTII,S$GLB, | Performed by: INTERNAL MEDICINE

## 2023-08-10 PROCEDURE — 3288F PR FALLS RISK ASSESSMENT DOCUMENTED: ICD-10-PCS | Mod: HCWC,CPTII,S$GLB, | Performed by: INTERNAL MEDICINE

## 2023-08-10 PROCEDURE — 11056 PARNG/CUTG B9 HYPRKR LES 2-4: CPT | Mod: Q7,HCWC,S$GLB, | Performed by: STUDENT IN AN ORGANIZED HEALTH CARE EDUCATION/TRAINING PROGRAM

## 2023-08-10 PROCEDURE — 1159F MED LIST DOCD IN RCRD: CPT | Mod: HCWC,CPTII,S$GLB, | Performed by: STUDENT IN AN ORGANIZED HEALTH CARE EDUCATION/TRAINING PROGRAM

## 2023-08-10 PROCEDURE — 3078F PR MOST RECENT DIASTOLIC BLOOD PRESSURE < 80 MM HG: ICD-10-PCS | Mod: HCWC,CPTII,S$GLB, | Performed by: INTERNAL MEDICINE

## 2023-08-10 PROCEDURE — 1159F PR MEDICATION LIST DOCUMENTED IN MEDICAL RECORD: ICD-10-PCS | Mod: HCWC,CPTII,S$GLB, | Performed by: STUDENT IN AN ORGANIZED HEALTH CARE EDUCATION/TRAINING PROGRAM

## 2023-08-10 PROCEDURE — 1160F PR REVIEW ALL MEDS BY PRESCRIBER/CLIN PHARMACIST DOCUMENTED: ICD-10-PCS | Mod: HCWC,CPTII,S$GLB, | Performed by: INTERNAL MEDICINE

## 2023-08-10 PROCEDURE — 99999 PR PBB SHADOW E&M-EST. PATIENT-LVL III: CPT | Mod: PBBFAC,HCWC,, | Performed by: STUDENT IN AN ORGANIZED HEALTH CARE EDUCATION/TRAINING PROGRAM

## 2023-08-10 PROCEDURE — 1160F RVW MEDS BY RX/DR IN RCRD: CPT | Mod: HCWC,CPTII,S$GLB, | Performed by: INTERNAL MEDICINE

## 2023-08-10 PROCEDURE — 3074F SYST BP LT 130 MM HG: CPT | Mod: HCWC,CPTII,S$GLB, | Performed by: INTERNAL MEDICINE

## 2023-08-10 PROCEDURE — 11721 ROUTINE FOOT CARE: ICD-10-PCS | Mod: 59,Q7,HCWC,S$GLB | Performed by: STUDENT IN AN ORGANIZED HEALTH CARE EDUCATION/TRAINING PROGRAM

## 2023-08-10 PROCEDURE — 1126F AMNT PAIN NOTED NONE PRSNT: CPT | Mod: HCWC,CPTII,S$GLB, | Performed by: INTERNAL MEDICINE

## 2023-08-10 PROCEDURE — 99999 PR PBB SHADOW E&M-EST. PATIENT-LVL IV: ICD-10-PCS | Mod: PBBFAC,HCWC,, | Performed by: INTERNAL MEDICINE

## 2023-08-10 PROCEDURE — 1159F MED LIST DOCD IN RCRD: CPT | Mod: HCWC,CPTII,S$GLB, | Performed by: INTERNAL MEDICINE

## 2023-08-10 PROCEDURE — 11042 DBRDMT SUBQ TIS 1ST 20SQCM/<: CPT | Mod: 59,HCWC,S$GLB, | Performed by: STUDENT IN AN ORGANIZED HEALTH CARE EDUCATION/TRAINING PROGRAM

## 2023-08-10 PROCEDURE — 99214 PR OFFICE/OUTPT VISIT, EST, LEVL IV, 30-39 MIN: ICD-10-PCS | Mod: HCWC,S$GLB,, | Performed by: INTERNAL MEDICINE

## 2023-08-10 PROCEDURE — 99499 UNLISTED E&M SERVICE: CPT | Mod: HCWC,S$GLB,, | Performed by: STUDENT IN AN ORGANIZED HEALTH CARE EDUCATION/TRAINING PROGRAM

## 2023-08-10 NOTE — PROCEDURES
"Wound Debridement    Date/Time: 8/10/2023 1:45 PM    Performed by: Ava Atkins DPM  Authorized by: Ava Atkins DPM    Consent Done?:  Yes (Verbal)  Local anesthesia used?: No      Wound Details:    Location:  Right foot    Location:  Right 1st Metatarsal Head    Type of Debridement:  Excisional       Length (cm):  0.3       Area (sq cm):  0.09       Width (cm):  0.3       Percent Debrided (%):  100       Depth (cm):  0.1       Total Area Debrided (sq cm):  0.09    Depth of debridement:  Subcutaneous tissue    Tissue debrided:  Subcutaneous and Other    Devitalized tissue debrided:  Biofilm, Callus and Fibrin    Instruments:  Blade and Curette  Patient tolerance:  Patient tolerated the procedure well with no immediate complications     No cultures were taken during this visit   Routine Foot Care    Date/Time: 8/10/2023 1:45 PM    Performed by: Ava Atkins DPM  Authorized by: Ava Atkins DPM    Time out: Immediately prior to procedure a "time out" was called to verify the correct patient, procedure, equipment, support staff and site/side marked as required.    Consent Done?:  Yes (Verbal)  Hyperkeratotic Skin Lesions?: Yes    Number of trimmed lesions:  2  Location(s):  Left 1st Toe and Right 1st Toe    Nail Care Type:  Debride(Left 1st Toe, Left 3rd Toe, Left 2nd Toe, Left 4th Toe, Left 5th Toe, Right 1st Toe, Right 3rd Toe and Right 4th Toe)  Patient tolerance:  Patient tolerated the procedure well with no immediate complications    "

## 2023-08-10 NOTE — PROGRESS NOTES
"PATIENT: Caro Powell  MRN: [id number]  DATE: 8/10/2023    Diagnosis:   1. Invasive ductal carcinoma of left breast    2. Invasive ductal carcinoma of right breast    3. Secondary malignant neoplasm of axillary node    4. Long term (current) use of aromatase inhibitors    5. Mood disorder due to medical condition    6. Type 2 diabetes mellitus with diabetic neuropathy, without long-term current use of insulin    7. Chronic kidney disease, stage 3b    8. Osteopenia of left femoral neck    9. Atrial fibrillation with rapid ventricular response    10. Type 2 diabetes mellitus with diabetic peripheral angiopathy and gangrene, without long-term current use of insulin      Chief Complaint: Breast Cancer    Oncologic History:      Oncologic History 1. Bilateral breast cancer      Oncologic Treatment Bilateral mastectomy  Adjuvant radiation therapy.  Adjuvant anastrozole      Pathology 8/15/22:  1. Breast, left, mastectomy:       - Invasive carcinoma with mixed ductal and lobular features, see note       - Tumor size: 22 mm  - Histologic grade (Ene Histologic Score)           - Glandular/Tubular Differentiation: 2           - Nuclear pleomorphism: 2           - Mitotic Rate: 1           - Overall Grade: grade 1       - Lobular carcinoma in situ (LCIS), classic type       - Biopsy site change: identified       - Calcification: present in invasive carcinoma and benign epithelium       - Resection margins: free of tumor, all margins are at least 10 mm away       - Benign nipple       - Skin with seborrheic keratosis       - Non-neoplastic breast tissue: usual ductal hyperplasia, apocrine   metaplasia, duct ectasia and fibrocystic change       - See synoptic report   2.  "left mastectomy new anterior margin inferior lateral aspect", excision:       - Benign fibroadipose tissue       - No definitive breast duct elements seen   3.  "left axillary sentinel lymph node #1 hot and blue @ 1177", excision:       - One lymph " "node, negative for carcinoma (0/1)   4.  "left axillary sentinel lymph node #2 Hot & blue @ 1459", excision:       - One lymph node, positive for carcinoma (1/1)       -  Size of Largest Metastatic Deposit: 2.1 mm, Macrometastases       -  Extranodal Extension: not identified   5.  "left axillary sentinel lymph node #3 Hot & blue @ 714", excision:       - One lymph node, negative for carcinoma (0/1)   6. Breast, right, mastectomy:       - Invasive carcinoma with mixed ductal and lobular features, see note       - Multiple foci, tumor size: 23 mm (mass 1, LIQ); 21 mm (mass 2, LOQ); 22   mm (mass 3, LOQ); 4 mm (mass 4, LOQ) - Histologic grade (Groom   Histologic Score)           - Glandular/Tubular Differentiation: 2           - Nuclear pleomorphism: 2           - Mitotic Rate: 1           - Overall Grade: grade 1       - Ductal carcinoma in situ: identified           - Nuclear Grade (in situ carcinoma): intermediate           - Necrosis: not present           - Architectural pattern: cribriform       - Lobular carcinoma in situ (LCIS), classic type       - Resection margins: free of tumor (invasive and in-situ), all margins   are at least 5 mm away       - Biopsy site change: identified   - Calcification: present in invasive carcinoma and benign epithelium       - Benign nipple       - Skin with seborrheic keratosis       - Two lymph nodes, negative for carcinoma (0/2)       - Non-neoplastic breast tissue: complex sclerosing lesion, adenosis,   usual ductal hyperplasia, apocrine metaplasia, duct ectasia and fibrocystic   change       - See synoptic report   7.  "right mastectomy new anterior margin inferior lateral aspect", excision:       - Benign fibroadipose tissue       - No definitive breast duct elements seen   8.  "right axillary content", dissection:       - Seven of twenty-eight lymph nodes, positive for carcinoma (7/28)       -  Size of Largest Metastatic Deposit: 15 mm, all tumor involved lymph " "  nodes are macrometastases       -  Extranodal Extension: identified, 5 mm       - Biopsy site change: identified   9.  "right mastectomy lateral skin short-superior, long lateral", excision:       - Skin without significant pathologic change   Note: Multiple foci of invasive carcinoma with simlar morphology are   identified on right breast (part 6). E-cadherin immunostains support   the above diagnosis. GATA3 and ER immunostains highlights the tumor cells in   lymph nodes.  Results of immunohistochemical stains (See [facility]-)   Left breast invasive carcinoma:   Estrogen receptor (ER): positive (strong intensity, 90% of tumor cell nuclei)   Progesterone receptor (LA): positive (moderate intensity, 70-80% of tumor   cell nuclei)   HER2: negative (+1)   Ki-67:10%-20   Right breast invasive carcinoma (8 o'clock):   Estrogen receptor (ER): positive (strong intensity, 98% of tumor cell nuclei)   Progesterone receptor (LA): positive (moderate intensity, 40-50% of tumor   cell nuclei)   HER2: negative by FISH   Ki-67:10%-20   Right breast invasive carcinoma (10 o'clock):   Estrogen receptor (ER): positive (strong intensity, 98% of tumor cell nuclei)   Progesterone receptor (LA): positive (strong intensity, 10-15% of tumor cell   nuclei)   HER2: negative (+1)   Ki-67:10%-20     6/28/22:  1. LEFT BREAST MASS, 4:00 O'CLOCK, 5 CM FROM THE NIPPLE, NEEDLE CORE   BIOPSIES:   -  Invasive ductal carcinoma with lobular features, Ene histologic   grade 1, and microcalcifications.           Glandular (acinar)/tubular differentiation:  Score 3.           Nuclear pleomorphism:  Score 1.           Mitotic rate:  Score 1.           Overall grade:  Grade 1 (score 5).           Size of invasive carcinoma as measured from the H&E slides:  12 mm.   -  Focal intermediate grade ductal carcinoma in situ with microcalcifications   and focal lobular extension.           Architectural patterns:  Cribriform and solid.           Nuclear " grade:  Grade II (intermediate).           Necrosis:  Not identified.   BREAST BIOMARKER RESULTS:   Estrogen receptor (ER) status:  Positive.        Percentage of cells with nuclear positivity:  90%.        Average intensity of staining:  Strong.   Progesterone receptor (PgR) status:  Positive.        Percentage of cells with nuclear positivity:  70-80%.        Average intensity of staining:  Moderate.   HER2 by IHC:  Negative (score 1).   Ki-67, percentage of cells with nuclear positivity:  10-20%.   2. RIGHT BREAST MASS, 8:00 O'CLOCK, 12 CM FROM THE NIPPLE, NEEDLE CORE   BIOPSIES:   -  Invasive ductal carcinoma, [place] histologic grade 1, with   microcalcifications.           Glandular (acinar)/tubular differentiation:  Score 1.           Nuclear pleomorphism:  Score 2.           Mitotic rate:  Score 1.           Overall grade:  Grade 1 (score 4).           Size of invasive carcinoma as measured from the H&E slides:  15 mm.   -  Focal intermediate grade ductal carcinoma in situ with microcalcifications.           Architectural patterns:  Cribriform and solid.           Nuclear grade:  Grade II (intermediate).           Necrosis:  Not identified.   -  Focal fibrocystic changes with columnar cell change, stromal fibrosis and   microcalcifications.   BREAST BIOMARKER RESULTS:   Estrogen receptor (ER) status:  Positive.        Percentage of cells with nuclear positivity:  98%.        Average intensity of staining:  Strong.   Progesterone receptor (PgR) status:  Positive.        Percentage of cells with nuclear positivity:  40-50%.        Average intensity of staining:  Moderate.   HER2 by IHC:  Equivocal (score 2+, see comment).   Ki-67, percentage of positive nuclei:  10-20%.   3. RIGHT BREAST MASS, 10:00 O'CLOCK, 3 CM FROM THE NIPPLE, NEEDLE CORE   BIOPSIES:   -  Invasive ductal carcinoma, Ene histologic grade 2, with focal   microcalcifications.           Glandular (acinar)/tubular differentiation:   Score 2.           Nuclear pleomorphism:  Score 2.           Mitotic rate:  Score 2.           Overall grade:  Grade 2 (score 6).           Size of invasive carcinoma as measured from the H&E slides:  13 mm.   -  Focal fibrocystic changes with columnar cell change, sclerosing adenosis   and stromal fibrosis.   BREAST BIOMARKER RESULTS:   Estrogen receptor (ER) status:  Positive.        Percentage of cells with nuclear positivity:  98%.        Average intensity of staining:  Strong.   Progesterone receptor (PGR) status:  Positive.        Percentage of cells with nuclear positivity:  10-15%.        Average intensity of staining:  Strong.   HER2 by IHC:  Negative (score 1+).   Ki-67, percentage of positive nuclei:  10-20%.   4. RIGHT AXILLARY LYMPH NODE, NEEDLE CORE BIOPSIES:   -  Metastatic ductal carcinoma of the breast, size = 10 mm, with focus   suspicious for, but not diagnostic of, extranodal extension.   HER2, Breast Tumor, FISH, Tissue   Result Summary   Negative         Subjective:    History of Present Illness: Ms. Powell is a 82 y.o. female who presented in July 2022 for evaluation and management of bilateral breast cancer. She was referred by Dr. Penny.    - mammogram on 6/21/22 revealed bilateral breast masses.  - biopsy of breast masses (6/28/22) revealed invasive ductal carcinoma with lobular features of the left breast (ER/TX-positive, HER2-negative) and invasive ductal carcinoma of right breast (ER/TX-positive, HER2- [by FISH]), and metastatic ductal carcinoma to right axillary lymph node.  - she met with breast surgery on 7/6/22. They offered surgical resection of bilateral breast cancers, but wanted to see results of PET/CT scan first. At the visit, she expressed hesitancy about proceeding with surgery.  - she underwent PET/CT scan on 7/15/22.    - she underwent bilateral mastectomy on 8/15/22.  - today, she is having drainage from the site where the left drain was removed. She will see the  surgeon tomorrow to get a stitch placed.  - she will see radiation oncology tomorrow.    - she completed radiation therapy:    - she underwent bone density test on 11/22/22.      Interval history:  - she presents for a follow-up appointment for her breast cancer  - she is taking anastrozole, calcium/vitamin D.  - today, she endorses fatigue. She denies shortness of breath, chest pain, nausea, vomiting, diarrhea, constipation.   - since last visit, her  passed away.    Past medical, surgical, family, and social histories have been reviewed and updated below.    Past Medical History:   Past Medical History:   Diagnosis Date    Anticoagulant long-term use     Arthritis     Atrial flutter     Calcium nephrolithiasis 2007    ckd 3    Diabetes mellitus, type 2     Diabetic peripheral neuropathy associated with type 2 diabetes mellitus     Difficult intubation     NARROW AIRWAY    Hypertension     Invasive ductal carcinoma of left breast 7/6/2022    Pulmonary aspiration of gastric contents     Shingles        Past Surgical History:   Past Surgical History:   Procedure Laterality Date    ANGIOGRAPHY OF LOWER EXTREMITY N/A 10/21/2021    Procedure: Angiogram Extremity Unilateral;  Surgeon: Dre Martino MD;  Location: [facility] CATH LAB/EP;  Service: Cardiology;  Laterality: N/A;    ANGIOGRAPHY OF LOWER EXTREMITY Right 11/10/2021    Procedure: Angiogram Extremity Unilateral;  Surgeon: Ben Rooney MD;  Location: [facility] CATH LAB/EP;  Service: Cardiology;  Laterality: Right;    AXILLARY NODE DISSECTION Right 8/15/2022    Procedure: LYMPHADENECTOMY, AXILLARY RIGHT;  Surgeon: RADHA Quinn MD;  Location: [facility] OR;  Service: General;  Laterality: Right;    COLONOSCOPY  11/28/2011    sigmoid diverticulosis, external hemorrhoids    DEBRIDEMENT Right 10/20/2021    Procedure: DEBRIDEMENT;  Surgeon: Ava Atkins DPM;  Location: [facility] OR;  Service: Podiatry;  Laterality: Right;    ENDOSCOPIC GASTROCNEMIUS RECESSION Right  9/10/2019    Procedure: RECESSION, GASTROCNEMIUS, ENDOSCOPIC;  Surgeon: Derek Jose DPM;  Location: [facility] OR;  Service: Podiatry;  Laterality: Right;  Arthrex center line (ron notified)  Video    EXTRACORPOREAL SHOCK WAVE LITHOTRIPSY      FLEXOR TENOTOMY Right 10/20/2021    Procedure: TENOTOMY, FLEXOR 3rd toe;  Surgeon: Ava Atkins DPM;  Location: [facility] OR;  Service: Podiatry;  Laterality: Right;    HYSTERECTOMY      INJECTION FOR SENTINEL NODE IDENTIFICATION Left 8/15/2022    Procedure: INJECTION, FOR SENTINEL NODE IDENTIFICATION;  Surgeon: RADHA Quinn MD;  Location: [facility];  Service: General;  Laterality: Left;    MASTECTOMY Bilateral 8/15/2022    Procedure: MASTECTOMY BILATERAL  / BREAST;  Surgeon: RADHA Quinn MD;  Location: [facility];  Service: General;  Laterality: Bilateral;  5 HOURS / EMAIL SENT 8-11 @ 9:02 LK    SENTINEL LYMPH NODE BIOPSY Left 8/15/2022    Procedure: BIOPSY, LYMPH NODE, SENTINEL LEFT;  Surgeon: RADHA Quinn MD;  Location: [facility];  Service: General;  Laterality: Left;    SHOULDER SURGERY Left     TOE AMPUTATION Right 05/22/2017    5th toe    TOE AMPUTATION Right 10/20/2021    Procedure: AMPUTATION, TOE;  Surgeon: Ava Atkins DPM;  Location: [facility];  Service: Podiatry;  Laterality: Right;    TONSILLECTOMY         Family History:   Family History   Problem Relation Age of Onset    Diabetes Mother     Heart failure Father     Breast cancer Sister 69        Genetic testing negative    Kidney failure Brother     Breast cancer Maternal Aunt         early 40s       Social History:  reports that she has never smoked. She has never been exposed to tobacco smoke. She has never used smokeless tobacco. She reports that she does not drink alcohol and does not use drugs.    Allergies:  Review of patient's allergies indicates:   Allergen Reactions    Codeine Nausea Only and Other (See Comments)     Can Take Tylenol, hrdrocodone       Medications:  Current Outpatient  Medications   Medication Sig Dispense Refill    ACCU-CHEK SAMI PLUS METER Misc TEST  AS DIRECTED 1 each 0    ACCU-CHEK SAMI PLUS TEST STRP Strp USE TO TEST BLOOD SUGAR ONCE DAILY 100 strip 3    ACCU-CHEK SOFT DEV LANCETS Kit       ACCU-CHEK SOFTCLIX LANCETS Misc TEST ONCE DAILY 100 each 3    ammonium lactate 12 % Crea Apply to feet twice daily. Avoid use between toes. 140 g 5    anastrozole (ARIMIDEX) 1 mg Tab TAKE 1 TABLET ONE TIME DAILY 90 tablet 3    apixaban (ELIQUIS) 5 mg Tab TAKE 1 TABLET BY MOUTH TWICE DAILY 180 tablet 3    atorvastatin (LIPITOR) 80 MG tablet Take 1 tablet (80 mg total) by mouth once daily. 90 tablet 3    calcium-vitamin D3 (OYSTER SHELL CALCIUM-VIT D3) 500 mg-5 mcg (200 unit) per tablet Take 1 tablet by mouth once daily. 180 tablet 1    clopidogreL (PLAVIX) 75 mg tablet Take 1 tablet (75 mg total) by mouth once daily. 90 tablet 3    EScitalopram oxalate (LEXAPRO) 10 MG tablet Take 1 tablet (10 mg total) by mouth once daily. 30 tablet 11    famotidine (PEPCID) 20 MG tablet TAKE 1 TABLET AT BEDTIME 90 tablet 3    FLUAD QUAD 2022-23,65Y UP,,PF, 60 mcg (15 mcg x 4)/0.5 mL Syrg       furosemide (LASIX) 40 MG tablet Take 1 tablet (40 mg total) by mouth once daily. 30 tablet 11    glimepiride (AMARYL) 1 MG tablet TAKE 1 TABLET TWICE DAILY 180 tablet 3    lisinopriL (PRINIVIL,ZESTRIL) 20 MG tablet TAKE 1 TABLET EVERY DAY 90 tablet 3    metoprolol succinate (TOPROL-XL) 25 MG 24 hr tablet TAKE 1 TABLET EVERY DAY 90 tablet 3    mupirocin (BACTROBAN) 2 % ointment Apply topically 2 (two) times daily. 30 g 2    polyethylene glycol (GLYCOLAX) 17 gram/dose powder Take 17 g by mouth once daily. 510 g 0    pramipexole (MIRAPEX) 0.125 MG tablet TAKE 1 TABLET EVERY DAY 90 tablet 3    traMADoL (ULTRAM) 50 mg tablet Take 1 tablet (50 mg total) by mouth every 6 (six) hours as needed for Pain. 40 tablet 0    urea (CARMOL) 40 % Crea Apply topically 2 (two) times daily. 1 Bottle 3    LORazepam (ATIVAN) 0.5 MG  tablet Take 1-2 tablets (0.5-1 mg total) by mouth every 8 (eight) hours as needed for Anxiety. 60 tablet 4     No current facility-administered medications for this visit.       Review of Systems   Constitutional:  Positive for fatigue.   HENT:  Negative for sore throat.    Eyes:  Negative for visual disturbance.   Respiratory:  Negative for cough and shortness of breath.    Cardiovascular:  Negative for chest pain.   Gastrointestinal:  Negative for abdominal pain, constipation, nausea and vomiting.   Genitourinary:  Negative for dysuria.   Musculoskeletal:  Negative for back pain.   Skin:  Negative for rash.   Neurological:  Negative for headaches.        Neuralgia   Hematological:  Negative for adenopathy.   Psychiatric/Behavioral:  The patient is not nervous/anxious.        ECOG Performance Status:   ECOG SCORE 1            Objective:      Vitals:   Vitals:    08/10/23 1417   BP: (!) 99/55   BP Location: Left arm   Patient Position: Sitting   BP Method: Large (Automatic)   Pulse: 70   Resp: 18   SpO2: 96%   Weight: 70 kg (154 lb 5.2 oz)     BMI: Body mass index is 28.23 kg/m².    Physical Exam  Vitals and nursing note reviewed.   Constitutional:       Appearance: She is well-developed.   HENT:      Head: Normocephalic and atraumatic.   Eyes:      Pupils: Pupils are equal, round, and reactive to light.   Cardiovascular:      Rate and Rhythm: Normal rate and regular rhythm.   Pulmonary:      Effort: Pulmonary effort is normal.      Breath sounds: Normal breath sounds.   Chest:      Comments: S/p bilateral mastectomy  Abdominal:      General: Bowel sounds are normal.      Palpations: Abdomen is soft.   Musculoskeletal:         General: Normal range of motion.      Cervical back: Normal range of motion and neck supple.   Skin:     General: Skin is warm and dry.   Neurological:      Mental Status: She is alert and oriented to person, place, and time.   Psychiatric:         Behavior: Behavior normal.         Thought  Content: Thought content normal.         Judgment: Judgment normal.         Laboratory Data:  Labs have been reviewed.    Lab Results   Component Value Date    WBC 6.54 08/10/2022    HGB 11.4 (L) 08/10/2022    HCT 33.7 (L) 08/10/2022    MCV 91 08/10/2022     08/10/2022       Imaging:       Bone density test (11/22/22):  Osteopenia of the left femoral neck.     Normal bone mineral density of the lumbar spine and right femoral neck.    PET/CT scan (7/15/22): I have personally reviewed the images  In this patient with breast cancer, there is mildly hypermetabolic right breast mass and axillary lymph nodes.  Focal subtle right pleural thickening with faint radiotracer uptake, metastatic lesion is not excluded.     1.7 cm indeterminate left adrenal nodule without abnormal uptake, likely a lipid poor benign adenoma.  If management would change, then recommend adrenal protocol CT or MRI for further evaluation.     Bilateral pulmonary micronodules some which are new and under size threshold for reliable PET evaluation and percutaneous biopsy.  Oncological considerations will determine ongoing follow-up.     Perianal focal mild increased uptake, which may indicate physiologic sphincter tone or infectious/inflammatory etiology such as hemorrhoids.     Mammogram (6/21/22):    Impression:  Left  Mass: Left breast 16 mm x 15 mm x 15 mm mass at the 4 o'clock position. Assessment: 5 - Highly suggestive of malignancy. Biopsy is recommended.      Right  Mass: Right breast 13 mm x 10 mm x 8 mm mass at the 10 o'clock position. Assessment: 5 - Highly suggestive of malignancy. Biopsy is recommended.   Mass: Right breast 20 mm x 19 mm x 19 mm mass at the 8 o'clock position. Assessment: 5 - Highly suggestive of malignancy. Biopsy is recommended.   Mass: Right breast 15 mm x 7 mm x 7 mm mass at the 9 o'clock position. Assessment: 5 - Highly suggestive of malignancy.   The masses span at least 7.1 cm.      Lymph Node: Right axilla 8  "mm x 7 mm x 8 mm lymph node. Assessment: 4 - Suspicious finding. Biopsy is recommended.       Assessment:       1. Invasive ductal carcinoma of left breast    2. Invasive ductal carcinoma of right breast    3. Secondary malignant neoplasm of axillary node    4. Long term (current) use of aromatase inhibitors    5. Mood disorder due to medical condition    6. Type 2 diabetes mellitus with diabetic neuropathy, without long-term current use of insulin    7. Chronic kidney disease, stage 3b    8. Osteopenia of left femoral neck    9. Atrial fibrillation with rapid ventricular response    10. Type 2 diabetes mellitus with diabetic peripheral angiopathy and gangrene, without long-term current use of insulin           Plan:     1. Bilateral invasive ductal carcinoma of breasts  - I have reviewed her chart  - mammogram on 6/21/22 revealed bilateral breast masses.  - biopsy of breast masses (6/28/22) revealed invasive ductal carcinoma with lobular features of the left breast (ER/WI-positive, HER2-negative) and invasive ductal carcinoma of right breast (ER/WI-positive, HER2- [by FISH]), and metastatic ductal carcinoma to right axillary lymph node.  - she met with breast surgery on 7/6/22. They offered surgical resection of bilateral breast cancers, but wanted to see results of PET/CT scan first. At the visit, she expressed hesitancy about proceeding with surgery.  - PET/CT (7/15/22) revealed localized disease. There was "focal subtle right pleural thickening with faint radiotracer uptake, metastatic lesion is not excluded." pulmonary micronodules are also noted of unclear significance  - she underwent bilateral mastectomy on 8/15/22.  - pathology:  Left breast: 22mm grade 1 invasive carcinoma with mixed ductal and lobular features, 1 positive lymph node  -right breast: multiple foci (23, 21, 22, 4mm) invasive carcinoma with mixed ductal and lobular features, 7 of 28 lymph nodes positive.  - I recommended adjuvant hormonal " therapy with anastrozole x 5 years.  - she completed radiation therapy in 2022.  - bone density test (22) revealed osteopenia of left femoral neck. She decided not to proceed with zometa q3 months. Continue calcium/vitamin D.  - she is tolerating anastrozole well. She will continue through the end of .  - since her  , she has moved in with her son in [place]. It is challenging for her to come in for appointments. We will keep her follow-up appointments open-ended. She will contact me in the future if she has any concerning symptoms concerning for recurrence/metastatic disease  - return to clinic as needed.    2. Type 2 diabetes  - last hemoglobin A1c (3/27/23) was 6.7%  - continue diabetic medications  - defer management to Dr. Penny. She may find a new primary care provider closer to where she lives.    3. Chronic kidney disease stage 3b  - eGFR on 3/27/23 was 34 ml/min/m2.    4. Osteopenia of left femoral neck  - bone density test (22) revealed osteopenia of left femoral neck. She decided not to proceed with zometa q3 months. Continue calcium/vitamin D.  - continue calcium/vitamin D.    - since her  , she has moved in with her son in [place]. It is challenging for her to come in for appointments. We will keep her follow-up appointments open-ended. She will contact me in the future if she has any concerning symptoms concerning for recurrence/metastatic disease  - return to clinic as needed.    Morgan Caputo M.D.  Hematology/Oncology  [facility] - [address], [address]  [place] [address]  Phone: [phone]  Fax: [phone]

## 2023-08-10 NOTE — PROGRESS NOTES
Subjective:      Patient ID: Caro Powell is a 82 y.o. female.    Chief Complaint: Diabetes Mellitus (Brayan Penny MD  03/27/2023) and Nail Care    Caro is a 82 y.o. female who presents to the clinic for evaluation and treatment of high risk feet. 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 The patient's chief complaint is long, thick toenails. This patient has documented high risk feet requiring routine maintenance secondary to diabetes mellitis and those secondary complications of diabetes, as mentioned.. Patient states she is following closely with Dr. Rooney for PAD. Relates has new SAS shoes which she put her diabetic shoes in and her feet are looking much better. No new pedal complaints.     8/3/22: Seen today for RFC and annual diabetic foot exam. Denies open wounds. No new pedal complaints. States has upcoming breast surgery.     11/9/22: Seen today for routine foot care. States she noticed blood in her sock and is concerned for a new diabetic foot ulcer.    11/22/22 Pt seen today for follow up of right foot ulcers. States she thinks they are healed. Admits she was not wearing her diabetic shoes previously and that is how the ulcers formed. No further pedal complaints.     1/4/23: Seen today, states she noticed a new blister to her left great toe, states she has been applying neosporin to it. No further pedal complaints.     1/19/23: Seen today for routine foot care, pre-ulcerative callus check to right plantar 1st metatarsal head and distal left great toe. States has been in a football to right foot. States she is worried her ulcer will break down again and is  worried her diabetic shoes with inserts are not fully offloading her foot. Denies open wounds, drainage or signs of infection. No further pedal complaints.     2/16/23: Seen today for follow up, for pre-ulcerative callus check. States her  is in the hospital, not doing well. States she has been on her feet a lot taking care of him. No new pedal complaints.     3/2/23: Seen today for follow up pre-ulcerative callus. No new pedal complaints. Relates she is caring for her  at home and has a nurse helping them.    4/6/23: Seen today for pre-ulcerative callus, sub 1st metatarsal head of right foot and to distal 3rd  digit of left foot. Denies pain or drainage, no further pedal complaints.    8/10/23: Seen today for annual diabetic foot exam, callus and routine foot care. No further pedal complaints.           PCP: Brayan Penny MD    Date Last Seen by PCP: 6/21/22    Current shoe gear:  SAS diabetic shoes    Hemoglobin A1C   Date Value Ref Range Status   03/27/2023 6.7 (H) 4.0 - 5.6 % Final     Comment:     ADA Screening Guidelines:  5.7-6.4%  Consistent with prediabetes  >or=6.5%  Consistent with diabetes    High levels of fetal hemoglobin interfere with the HbA1C  assay. Heterozygous hemoglobin variants (HbS, HgC, etc)do  not significantly interfere with this assay.   However, presence of multiple variants may affect accuracy.     05/24/2022 7.3 (H) 4.0 - 5.6 % Final     Comment:     ADA Screening Guidelines:  5.7-6.4%  Consistent with prediabetes  >or=6.5%  Consistent with diabetes    High levels of fetal hemoglobin interfere with the HbA1C  assay. Heterozygous hemoglobin variants (HbS, HgC, etc)do  not significantly interfere with this assay.   However, presence of multiple variants may affect accuracy.     10/18/2021 6.7 (H) 4.0 - 5.6 % Final     Comment:     ADA Screening Guidelines:  5.7-6.4%  Consistent with prediabetes  >or=6.5%  Consistent with diabetes    High levels of fetal hemoglobin  interfere with the HbA1C  assay. Heterozygous hemoglobin variants (HbS, HgC, etc)do  not significantly interfere with this assay.   However, presence of multiple variants may affect accuracy.     01/12/2018 8.3 % Final       Review of Systems   Constitutional: Negative for chills, decreased appetite, diaphoresis and fever.   HENT:  Negative for congestion and hearing loss.    Cardiovascular:  Negative for chest pain, claudication, leg swelling and syncope.   Respiratory:  Negative for cough and shortness of breath.    Skin:  Positive for dry skin, nail changes and poor wound healing. Negative for color change, flushing, itching and rash.   Musculoskeletal:  Positive for arthritis. Negative for joint pain and joint swelling.   Gastrointestinal:  Negative for nausea and vomiting.   Neurological:  Positive for numbness. Negative for focal weakness, paresthesias and weakness.   Psychiatric/Behavioral:  Negative for altered mental status. The patient is not nervous/anxious.            Objective:      Physical Exam  Constitutional:       General: She is not in acute distress.     Appearance: She is well-developed. She is not diaphoretic.   Cardiovascular:      Comments: Dorsalis pedis and posterior tibial pulses are diminished. Skin temperature is within normal limits. Toes are cool to touch and feet are warm proximally. Hair growth is diminished. Skin is mildly atrophic and with mild hyperpigmentation. Mild edema noted, bilaterally. Telangiectasias bilaterally.  Musculoskeletal:         General: No tenderness.      Comments: Adequate joint range of motion without pain, limitation, nor crepitation to bilateral feet and ankle joints. Muscle strength is 5/5 in all groups bilaterally.    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 HAV, bilaterally. Semi rigid hammertoes to remaining digits.    Lymphadenopathy:      Comments: Negative lymphangitic streaking    Skin:     General: Skin is  warm and dry.      Findings: No lesion.      Comments: Skin is warm and dry, no acute signs of infection noted. No open wounds, macerations or hyperkeratotic lesions, bilaterally.     See wound description below    Toenails are thickened by 2-4 mm's, dystrophic, and are darkened in coloration with subungual fungal debris, bilaterally.  Skin is very dry, bilaterally.      Neurological:      Mental Status: She is alert and oriented to person, place, and time.      Sensory: Sensory deficit present.      Motor: No abnormal muscle tone.      Comments: Light touch is diminished. Good[place]-Chucho 5.07 monofilamant testing is diminished. Vibratory sensation  is diminished, bilaterally    Psychiatric:         Behavior: Behavior normal.         Thought Content: Thought content normal.         Judgment: Judgment normal.       Pre-ulcerative callus to plantar right 1st metatarsal head, upon debridement, site with superficial ulcer, measures 0.3x0.3x0.1cm, probes to superficial subcutaneous layer. No signs of infection, mild serosanguinous drainage.     PREVIOUS IMAGES:                 Assessment:       Encounter Diagnoses   Name Primary?    Pre-ulcerative calluses Yes    PAD (peripheral artery disease)     Onychomycosis     Skin ulcer of toe with fat layer exposed, unspecified laterality     History of amputation of lesser toe, unspecified laterality                      Plan:       Caro was seen today for diabetes mellitus and nail care.    Diagnoses and all orders for this visit:    Pre-ulcerative calluses  -     Routine Foot Care    PAD (peripheral artery disease)  -     Routine Foot Care    Onychomycosis  -     Routine Foot Care    Skin ulcer of toe with fat layer exposed, unspecified laterality  -     Wound Debridement    History of amputation of lesser toe, unspecified laterality        I counseled the patient on her conditions, their implications and medical management.  Routine foot care per attached note  Wound  offloaded with donut apperature pad and dressed with hydrafera blue ready and secured with mepilex border, she is to change same at home q3-5 days. Keep site dry. Recommend PWB to heel only. Discussed if worsens in appearance or does not improve, she is to contact clinic or go to urgent care.   Previously rx diabetic shoes  Lotion BID for dry skin, recommend [employer] diabetic foot cream  Shoe inspection. Diabetic Foot Education. Patient reminded of the importance of good nutrition and blood sugar control to help prevent podiatric complications of diabetes. Patient instructed on proper foot hygeine. We discussed wearing proper shoe gear, daily foot inspections, never walking without protective shoe gear, never putting sharp instruments to feet, routine podiatric nail visits    She is to follow up with Dr. Miles for further care as he is closer to her home.

## 2023-08-17 ENCOUNTER — TELEPHONE (OUTPATIENT)
Dept: NEPHROLOGY | Facility: CLINIC | Age: 83
End: 2023-08-17
Payer: MEDICARE

## 2023-08-17 NOTE — TELEPHONE ENCOUNTER
----- Message from Ariela Montana sent at 8/17/2023  4:12 PM CDT -----  Regarding: Sooner Appointment Request  Contact: CARLOS GENTILE [phone]  Type:  Sooner Appointment Request      Patient is requesting a sooner appointment.  Patient declined first available appointment listed below.  Patient will not accept being placed on the waitlist and is requesting a message be sent to doctor.      Name of Caller:  CARLOS GENTILE    When is the first available appointment? 09-01-23    Symptoms:  BLADDER ISSUES    Best Call Back Number:  [phone]      Additional Information:  Patient is calling office to speak with nurse/MA to schedule sooner appointment.   Please call back and advise. Thanks

## 2023-08-17 NOTE — TELEPHONE ENCOUNTER
Please call patient     Patient states she has black and blue marks on her stomach. Last week it was a small spot that resolved.  Today it was a bigger spot. She is frightened by this and feels she needs to be seen sooner than October 16.   She saw Dr Penny in the past, [place] and feels she doesn't have a doctor and doesn't have a way to get to [place].     She is also having urinary frequency and having trouble holding her bladder, but the bruising on her abdomen has her very worried.     Advised patient will send the message to Primary care to see if anyone can see her tomorrow.    Will also send message to cardiology since she saw them recently and they manage her eliquis.

## 2023-08-18 ENCOUNTER — TELEPHONE (OUTPATIENT)
Dept: CARDIOLOGY | Facility: CLINIC | Age: 83
End: 2023-08-18
Payer: MEDICARE

## 2023-08-18 ENCOUNTER — TELEPHONE (OUTPATIENT)
Dept: SLEEP MEDICINE | Facility: CLINIC | Age: 83
End: 2023-08-18
Payer: MEDICARE

## 2023-08-18 ENCOUNTER — PATIENT MESSAGE (OUTPATIENT)
Dept: SLEEP MEDICINE | Facility: CLINIC | Age: 83
End: 2023-08-18
Payer: MEDICARE

## 2023-08-18 ENCOUNTER — NURSE TRIAGE (OUTPATIENT)
Dept: ADMINISTRATIVE | Facility: CLINIC | Age: 83
End: 2023-08-18
Payer: MEDICARE

## 2023-08-18 NOTE — TELEPHONE ENCOUNTER
Pt left message to call her regarding some bruising on her body in some areas     I stated to pt if she is worried about those bruises to go to [facility]  pt stated she will go when her son comes home    Pt asked is it the blood thinner have her bruising.

## 2023-08-18 NOTE — TELEPHONE ENCOUNTER
Upon of calling pt back I sent to area addresses to her portal for her to see what UC was close to her area where she resides

## 2023-08-18 NOTE — TELEPHONE ENCOUNTER
----- Message from Chen Jain sent at 8/18/2023  9:59 AM CDT -----  Type:  Needs Medical Advice    Who Called: Pt   Symptoms (please be specific): bruising on stomach     How long has patient had these symptoms:    Pharmacy name and phone #:    Would the patient rather a call back or a response via [facility]? Call back   Best Call Back Number: [phone]  Additional Information: pt states she takes  Eliquis 2 times eliquise and want to know what she should do

## 2023-08-18 NOTE — TELEPHONE ENCOUNTER
I called pt back and stated to her to go get seen in urgent care because dr won't be in clinic until Monday so pt stated to me that she will go to urgent care and I also asked pt was the bruise painful to touch swollen or if it was getting bigger in size pt denied all the above .

## 2023-08-18 NOTE — TELEPHONE ENCOUNTER
Called patient and she said the current bruise is on her belly, RLQ; similar bruising was noted around the same area last week, but resolved. Bruising is painless and patient noted a knot under it yesterday, but knot has resolved today. Patient reached out to her cardiologist yesterday because she is on Eliquis x5 years, but MD had left for the day and doesn't usually work on Fridays. Patient still has a PCP in [place], as she hasn't established with Dr Reyes yet, so was advised to reach out to that MD for advisement or go to ER, or UC if symptoms persist or worsen. Patient was informed blood thinners exacerbate incidences of bleeding with minor injuries, sometimes without our knowledge, until we notice bruising. Patient voiced understanding and will call PCP in [place] for advice, then go on to ER or UC if recommended.

## 2023-08-18 NOTE — TELEPHONE ENCOUNTER
----- Message from Alexandra Romero sent at 8/17/2023  5:15 PM CDT -----  Type:  Needs Medical Advice    Who Called: pt  Would the patient rather a call back or a response via [facility]? call  Best Call Back Number: [phone]  Additional Information: pt would like to speak with office she has concerns need to know what to do

## 2023-08-18 NOTE — TELEPHONE ENCOUNTER
Right side bottom of abdomen she has a big round black "[employer] like a bruise that is light in the middle. Its the size of a quarter. She take Eliquis 2 times a day. Pt received a phone call during triage. Pt placed triage nurse on hold and didn't return. Please call pt with further care advice.   Reason for Disposition   Information only question and nurse able to answer    Additional Information   Negative: Shock suspected (e.g., cold/pale/clammy skin, too weak to stand, low BP, rapid pulse)   Negative: Fever and purple or blood-colored spots or dots   Negative: Sounds like a life-threatening emergency to the triager    Protocols used: Bruises-A-OH, No Protocol Available - Information Only-A-OH

## 2023-08-19 ENCOUNTER — OFFICE VISIT (OUTPATIENT)
Dept: URGENT CARE | Facility: CLINIC | Age: 83
End: 2023-08-19
Payer: MEDICARE

## 2023-08-19 VITALS
OXYGEN SATURATION: 96 % | BODY MASS INDEX: 28.34 KG/M2 | SYSTOLIC BLOOD PRESSURE: 123 MMHG | WEIGHT: 154 LBS | HEART RATE: 60 BPM | DIASTOLIC BLOOD PRESSURE: 59 MMHG | TEMPERATURE: 97 F | HEIGHT: 62 IN | RESPIRATION RATE: 18 BRPM

## 2023-08-19 DIAGNOSIS — R14.0 ABDOMINAL DISTENSION: ICD-10-CM

## 2023-08-19 DIAGNOSIS — T14.8XXA BRUISING: Primary | ICD-10-CM

## 2023-08-19 PROCEDURE — 99213 PR OFFICE/OUTPT VISIT, EST, LEVL III, 20-29 MIN: ICD-10-PCS | Mod: S$GLB,,, | Performed by: PHYSICIAN ASSISTANT

## 2023-08-19 PROCEDURE — 99213 OFFICE O/P EST LOW 20 MIN: CPT | Mod: S$GLB,,, | Performed by: PHYSICIAN ASSISTANT

## 2023-08-19 PROCEDURE — 74018 XR KUB: ICD-10-PCS | Mod: S$GLB,,, | Performed by: RADIOLOGY

## 2023-08-19 PROCEDURE — 74018 RADEX ABDOMEN 1 VIEW: CPT | Mod: S$GLB,,, | Performed by: RADIOLOGY

## 2023-08-19 NOTE — PROGRESS NOTES
"Subjective:      Patient ID: Caro Powell is a 82 y.o. female.    Vitals:  height is 5' 2" (1.575 m) and weight is 69.9 kg (154 lb). Her oral temperature is 96.8 °F (36 °C). Her blood pressure is 123/59 (abnormal) and her pulse is 60. Her respiration is 18 and oxygen saturation is 96%.     Chief Complaint: Bleeding/Bruising    Pt presents today with c/o bruising on the abdomen x's 5 days. Pt states that she is on Plavix and Eliquis. Pt states that the meds are causing her to bruise. Pain level 0/10.      Constitution: Negative for chills, sweating, fatigue and fever.   HENT:  Negative for ear pain, drooling, congestion, sore throat, trouble swallowing and voice change.    Neck: Negative for neck pain, neck stiffness, painful lymph nodes and neck swelling.   Cardiovascular:  Negative for chest pain, leg swelling, palpitations, sob on exertion and passing out.   Eyes:  Negative for eye pain, eye redness, photophobia, double vision, blurred vision and eyelid swelling.   Respiratory:  Negative for chest tightness, cough, sputum production, bloody sputum, shortness of breath, stridor and wheezing.    Gastrointestinal:  Negative for abdominal pain, abdominal bloating, nausea, vomiting, constipation, diarrhea and heartburn.   Musculoskeletal:  Negative for joint pain, joint swelling, abnormal ROM of joint, back pain, muscle cramps and muscle ache.   Skin:  Positive for color change. Negative for rash and hives.   Allergic/Immunologic: Negative for seasonal allergies, food allergies, hives, itching and sneezing.   Neurological:  Negative for dizziness, light-headedness, passing out, loss of balance, headaches, altered mental status, loss of consciousness and seizures.   Hematologic/Lymphatic: Negative for swollen lymph nodes.   Psychiatric/Behavioral:  Negative for altered mental status and nervous/anxious. The patient is not nervous/anxious.       Objective:     Physical Exam   Constitutional: She is oriented to " person, place, and time. She appears well-developed. She is cooperative.   HENT:   Head: Normocephalic and atraumatic.   Ears:   Right Ear: Hearing, tympanic membrane, external ear and ear canal normal.   Left Ear: Hearing, tympanic membrane, external ear and ear canal normal.   Nose: Nose normal. No mucosal edema or nasal deformity. No epistaxis. Right sinus exhibits no maxillary sinus tenderness and no frontal sinus tenderness. Left sinus exhibits no maxillary sinus tenderness and no frontal sinus tenderness.   Mouth/Throat: Uvula is midline, oropharynx is clear and moist and mucous membranes are normal. No trismus in the jaw. Normal dentition. No uvula swelling.   Eyes: Conjunctivae and lids are normal.   Neck: Trachea normal and phonation normal. Neck supple.   Cardiovascular: Normal rate, regular rhythm, normal heart sounds and normal pulses.   Pulmonary/Chest: Effort normal and breath sounds normal.   Abdominal: Normal appearance and bowel sounds are normal. She exhibits no shifting dullness, no distension, no fluid wave, no pulsatile midline mass and no mass. Soft. There is no splenomegaly or hepatomegaly. No signs of injury.      Comments: + healing bruising to lower abdomen  nontender   Musculoskeletal: Normal range of motion.         General: Normal range of motion.   Neurological: She is alert and oriented to person, place, and time. She exhibits normal muscle tone.   Skin: Skin is warm, dry and intact.   Psychiatric: Her speech is normal and behavior is normal. Judgment and thought content normal.   Nursing note and vitals reviewed.    XR KUB    Result Date: 8/19/2023  EXAMINATION: XR KUB CLINICAL HISTORY: Abdominal distension (gaseous) TECHNIQUE: Single AP supine view of the abdomen (KUB) was performed COMPARISON: 01/18/2022 FINDINGS: Bowel gas pattern is nonobstructive.  No suspicious mass-effect.  No definite nephrolithiasis.  Pelvic calcifications suggesting mixed vascular calcifications.  Osseous  structures intact with degenerative findings of the spine and hips.     As above Electronically signed by: Ronny Lopes MD Date:    08/19/2023 Time:    09:35      Assessment:     1. Bruising    2. Abdominal distension        Plan:   Pt has follow up with PCP in October. Will check [facility] to ensure pt not bleeding. Go to ER if abdomen distends, HA, blood in stool etc    Bruising    Abdominal distension  -     CBC W/ AUTO DIFFERENTIAL; Future; Expected date: 08/20/2023  -     COMPREHENSIVE METABOLIC PANEL; Future; Expected date: 08/20/2023  -     XR KUB; Future; Expected date: 08/19/2023      Patient Instructions     You must understand that you've received an Urgent Care treatment only and that you may be released before all your medical problems are known or treated. You, the patient, will arrange for follow up care as instructed.  Follow up with your PCP or specialty clinic as directed if not improved or as needed. You can call [phone] to schedule an appointment with the appropriate provider.  If your condition worsens we recommend that you receive another evaluation at the Emergency Department for any concerns or worsening of condition.  Patient aware and verbalized understanding.

## 2023-08-19 NOTE — PATIENT INSTRUCTIONS
You must understand that you've received an Urgent Care treatment only and that you may be released before all your medical problems are known or treated. You, the patient, will arrange for follow up care as instructed.  Follow up with your PCP or specialty clinic as directed if not improved or as needed. You can call [phone] to schedule an appointment with the appropriate provider.  If your condition worsens we recommend that you receive another evaluation at the Emergency Department for any concerns or worsening of condition.  Patient aware and verbalized understanding.

## 2023-08-22 ENCOUNTER — LAB VISIT (OUTPATIENT)
Dept: LAB | Facility: HOSPITAL | Age: 83
End: 2023-08-22
Payer: MEDICARE

## 2023-08-22 DIAGNOSIS — Z86.16 HISTORY OF COVID-19: ICD-10-CM

## 2023-08-22 DIAGNOSIS — I48.91 ATRIAL FIBRILLATION WITH RAPID VENTRICULAR RESPONSE: ICD-10-CM

## 2023-08-22 DIAGNOSIS — E11.40 TYPE 2 DIABETES MELLITUS WITH DIABETIC NEUROPATHY, WITHOUT LONG-TERM CURRENT USE OF INSULIN: ICD-10-CM

## 2023-08-22 DIAGNOSIS — R14.0 ABDOMINAL DISTENSION: ICD-10-CM

## 2023-08-22 DIAGNOSIS — R42 DIZZINESS: ICD-10-CM

## 2023-08-22 LAB
ALBUMIN SERPL BCP-MCNC: 3.9 G/DL (ref 3.5–5.2)
ALP SERPL-CCNC: 71 U/L (ref 55–135)
ALT SERPL W/O P-5'-P-CCNC: 19 U/L (ref 10–44)
ANION GAP SERPL CALC-SCNC: 6 MMOL/L (ref 8–16)
AST SERPL-CCNC: 19 U/L (ref 10–40)
BASOPHILS # BLD AUTO: 0.03 K/UL (ref 0–0.2)
BASOPHILS # BLD AUTO: 0.03 K/UL (ref 0–0.2)
BASOPHILS NFR BLD: 0.3 % (ref 0–1.9)
BASOPHILS NFR BLD: 0.3 % (ref 0–1.9)
BILIRUB SERPL-MCNC: 0.5 MG/DL (ref 0.1–1)
BUN SERPL-MCNC: 22 MG/DL (ref 8–23)
CALCIUM SERPL-MCNC: 10 MG/DL (ref 8.7–10.5)
CHLORIDE SERPL-SCNC: 108 MMOL/L (ref 95–110)
CO2 SERPL-SCNC: 26 MMOL/L (ref 23–29)
CREAT SERPL-MCNC: 1.1 MG/DL (ref 0.5–1.4)
DIFFERENTIAL METHOD: ABNORMAL
DIFFERENTIAL METHOD: ABNORMAL
EOSINOPHIL # BLD AUTO: 0.2 K/UL (ref 0–0.5)
EOSINOPHIL # BLD AUTO: 0.2 K/UL (ref 0–0.5)
EOSINOPHIL NFR BLD: 1.7 % (ref 0–8)
EOSINOPHIL NFR BLD: 1.7 % (ref 0–8)
ERYTHROCYTE [DISTWIDTH] IN BLOOD BY AUTOMATED COUNT: 14.1 % (ref 11.5–14.5)
ERYTHROCYTE [DISTWIDTH] IN BLOOD BY AUTOMATED COUNT: 14.1 % (ref 11.5–14.5)
EST. GFR  (NO RACE VARIABLE): 49.9 ML/MIN/1.73 M^2
GLUCOSE SERPL-MCNC: 130 MG/DL (ref 70–110)
HCT VFR BLD AUTO: 33.6 % (ref 37–48.5)
HCT VFR BLD AUTO: 33.6 % (ref 37–48.5)
HGB BLD-MCNC: 11.3 G/DL (ref 12–16)
HGB BLD-MCNC: 11.3 G/DL (ref 12–16)
IMM GRANULOCYTES # BLD AUTO: 0.02 K/UL (ref 0–0.04)
IMM GRANULOCYTES # BLD AUTO: 0.02 K/UL (ref 0–0.04)
IMM GRANULOCYTES NFR BLD AUTO: 0.2 % (ref 0–0.5)
IMM GRANULOCYTES NFR BLD AUTO: 0.2 % (ref 0–0.5)
LYMPHOCYTES # BLD AUTO: 1.4 K/UL (ref 1–4.8)
LYMPHOCYTES # BLD AUTO: 1.4 K/UL (ref 1–4.8)
LYMPHOCYTES NFR BLD: 14.7 % (ref 18–48)
LYMPHOCYTES NFR BLD: 14.7 % (ref 18–48)
MCH RBC QN AUTO: 29.4 PG (ref 27–31)
MCH RBC QN AUTO: 29.4 PG (ref 27–31)
MCHC RBC AUTO-ENTMCNC: 33.6 G/DL (ref 32–36)
MCHC RBC AUTO-ENTMCNC: 33.6 G/DL (ref 32–36)
MCV RBC AUTO: 87 FL (ref 82–98)
MCV RBC AUTO: 87 FL (ref 82–98)
MONOCYTES # BLD AUTO: 0.7 K/UL (ref 0.3–1)
MONOCYTES # BLD AUTO: 0.7 K/UL (ref 0.3–1)
MONOCYTES NFR BLD: 7.5 % (ref 4–15)
MONOCYTES NFR BLD: 7.5 % (ref 4–15)
NEUTROPHILS # BLD AUTO: 7 K/UL (ref 1.8–7.7)
NEUTROPHILS # BLD AUTO: 7 K/UL (ref 1.8–7.7)
NEUTROPHILS NFR BLD: 75.6 % (ref 38–73)
NEUTROPHILS NFR BLD: 75.6 % (ref 38–73)
NRBC BLD-RTO: 0 /100 WBC
NRBC BLD-RTO: 0 /100 WBC
PLATELET # BLD AUTO: 272 K/UL (ref 150–450)
PLATELET # BLD AUTO: 272 K/UL (ref 150–450)
PMV BLD AUTO: 10.4 FL (ref 9.2–12.9)
PMV BLD AUTO: 10.4 FL (ref 9.2–12.9)
POTASSIUM SERPL-SCNC: 4.8 MMOL/L (ref 3.5–5.1)
PROT SERPL-MCNC: 6.8 G/DL (ref 6–8.4)
RBC # BLD AUTO: 3.85 M/UL (ref 4–5.4)
RBC # BLD AUTO: 3.85 M/UL (ref 4–5.4)
SODIUM SERPL-SCNC: 140 MMOL/L (ref 136–145)
WBC # BLD AUTO: 9.23 K/UL (ref 3.9–12.7)
WBC # BLD AUTO: 9.23 K/UL (ref 3.9–12.7)

## 2023-08-22 PROCEDURE — 36415 COLL VENOUS BLD VENIPUNCTURE: CPT | Mod: HCWC,PO | Performed by: PHYSICIAN ASSISTANT

## 2023-08-22 PROCEDURE — 85025 COMPLETE CBC W/AUTO DIFF WBC: CPT | Mod: HCWC | Performed by: INTERNAL MEDICINE

## 2023-08-22 PROCEDURE — 80053 COMPREHEN METABOLIC PANEL: CPT | Mod: HCWC | Performed by: PHYSICIAN ASSISTANT

## 2023-08-23 ENCOUNTER — TELEPHONE (OUTPATIENT)
Dept: SURGERY | Facility: CLINIC | Age: 83
End: 2023-08-23
Payer: MEDICARE

## 2023-08-23 NOTE — TELEPHONE ENCOUNTER
----- Message from Homa Tracy sent at 8/23/2023  8:48 AM CDT -----  Regarding: Appointment  Contact: [phone]  Calling to schedule an appointment for annual as soon as possible. Please call patient to schedule today with Dr Quinn

## 2023-08-25 ENCOUNTER — TELEPHONE (OUTPATIENT)
Dept: CARDIOLOGY | Facility: CLINIC | Age: 83
End: 2023-08-25
Payer: MEDICARE

## 2023-08-25 NOTE — TELEPHONE ENCOUNTER
----- Message from Chen Jain sent at 8/23/2023  8:43 AM CDT -----  Type:  Test Results    Who Called: Pt   Name of Test (Lab/Mammo/Etc): X Ray and labs   Date of Test: 08/19 08/22  Ordering Provider:   Where the test was performed: [facility]  Would the patient rather a call back or a response via [facility]? Call back  Best Call Back Number: [phone]  Additional Information:

## 2023-08-25 NOTE — TELEPHONE ENCOUNTER
----- Message from Chen Jain sent at 8/24/2023 10:34 AM CDT -----  Type:  Patient Returning Call    Who Called:Pt   Would the patient rather a call back or a response via [facility]? Call back   Best Call Back Number:[phone]  Additional Information: pt wants Dr Escobar to call her asap about test results

## 2023-08-25 NOTE — TELEPHONE ENCOUNTER
Reached out in regards to message    She reports that the bruising has resolved and feels better knowing that the x-ray with UC was ok.    Instructed to reach back to our office if bruising worsens or for any other concerns  Verb understanding

## 2023-08-25 NOTE — TELEPHONE ENCOUNTER
----- Message from Shaynacortez Don sent at 8/23/2023  4:35 PM CDT -----  Type:  Test Results    Who Called: pt  Name of Test (Lab/Mammo/Etc): lab & xray  Date of Test: 8/19  Ordering Provider:   Where the test was performed: [facility]  Would the patient rather a call back or a response via [facility]? Call   Best Call Back Number: [phone] (M)   Additional Information:  she left prev msg and f/u        : Yes

## 2023-08-25 NOTE — TELEPHONE ENCOUNTER
----- Message from Laila Rodriguez sent at 8/25/2023 12:31 PM CDT -----  Type:  Patient Returning Call    Who Called:pt  Who Left Message for Patient:onel  Does the patient know what this is regarding?:n/a  Would the patient rather a call back or a response via [facility]? call  Best Call Back Number:[phone]  Additional Information:

## 2023-08-25 NOTE — TELEPHONE ENCOUNTER
LVM    Attempted to reach in regards to message     LOV 8/8/2023 No new orders placed by Dr Luz Guallpa completed per PCP

## 2023-09-01 ENCOUNTER — OFFICE VISIT (OUTPATIENT)
Dept: NEPHROLOGY | Facility: CLINIC | Age: 83
End: 2023-09-01
Payer: MEDICARE

## 2023-09-01 VITALS — DIASTOLIC BLOOD PRESSURE: 56 MMHG | BODY MASS INDEX: 28.17 KG/M2 | HEIGHT: 62 IN | SYSTOLIC BLOOD PRESSURE: 138 MMHG

## 2023-09-01 DIAGNOSIS — I10 PRIMARY HYPERTENSION: ICD-10-CM

## 2023-09-01 DIAGNOSIS — N18.31 STAGE 3A CHRONIC KIDNEY DISEASE: ICD-10-CM

## 2023-09-01 DIAGNOSIS — E11.40 TYPE 2 DIABETES MELLITUS WITH DIABETIC NEUROPATHY, WITHOUT LONG-TERM CURRENT USE OF INSULIN: Primary | Chronic | ICD-10-CM

## 2023-09-01 PROBLEM — N18.32 STAGE 3B CHRONIC KIDNEY DISEASE: Status: ACTIVE | Noted: 2023-09-01

## 2023-09-01 PROCEDURE — 1160F RVW MEDS BY RX/DR IN RCRD: CPT | Mod: HCWC,CPTII,S$GLB, | Performed by: INTERNAL MEDICINE

## 2023-09-01 PROCEDURE — 1160F PR REVIEW ALL MEDS BY PRESCRIBER/CLIN PHARMACIST DOCUMENTED: ICD-10-PCS | Mod: HCWC,CPTII,S$GLB, | Performed by: INTERNAL MEDICINE

## 2023-09-01 PROCEDURE — 3288F FALL RISK ASSESSMENT DOCD: CPT | Mod: HCWC,CPTII,S$GLB, | Performed by: INTERNAL MEDICINE

## 2023-09-01 PROCEDURE — 1159F PR MEDICATION LIST DOCUMENTED IN MEDICAL RECORD: ICD-10-PCS | Mod: HCWC,CPTII,S$GLB, | Performed by: INTERNAL MEDICINE

## 2023-09-01 PROCEDURE — 1101F PT FALLS ASSESS-DOCD LE1/YR: CPT | Mod: HCWC,CPTII,S$GLB, | Performed by: INTERNAL MEDICINE

## 2023-09-01 PROCEDURE — 1101F PR PT FALLS ASSESS DOC 0-1 FALLS W/OUT INJ PAST YR: ICD-10-PCS | Mod: HCWC,CPTII,S$GLB, | Performed by: INTERNAL MEDICINE

## 2023-09-01 PROCEDURE — 3288F PR FALLS RISK ASSESSMENT DOCUMENTED: ICD-10-PCS | Mod: HCWC,CPTII,S$GLB, | Performed by: INTERNAL MEDICINE

## 2023-09-01 PROCEDURE — 3075F SYST BP GE 130 - 139MM HG: CPT | Mod: HCWC,CPTII,S$GLB, | Performed by: INTERNAL MEDICINE

## 2023-09-01 PROCEDURE — 3075F PR MOST RECENT SYSTOLIC BLOOD PRESS GE 130-139MM HG: ICD-10-PCS | Mod: HCWC,CPTII,S$GLB, | Performed by: INTERNAL MEDICINE

## 2023-09-01 PROCEDURE — 3078F DIAST BP <80 MM HG: CPT | Mod: HCWC,CPTII,S$GLB, | Performed by: INTERNAL MEDICINE

## 2023-09-01 PROCEDURE — 99204 PR OFFICE/OUTPT VISIT, NEW, LEVL IV, 45-59 MIN: ICD-10-PCS | Mod: HCWC,S$GLB,, | Performed by: INTERNAL MEDICINE

## 2023-09-01 PROCEDURE — 3078F PR MOST RECENT DIASTOLIC BLOOD PRESSURE < 80 MM HG: ICD-10-PCS | Mod: HCWC,CPTII,S$GLB, | Performed by: INTERNAL MEDICINE

## 2023-09-01 PROCEDURE — 99999 PR PBB SHADOW E&M-EST. PATIENT-LVL IV: ICD-10-PCS | Mod: PBBFAC,HCWC,, | Performed by: INTERNAL MEDICINE

## 2023-09-01 PROCEDURE — 99204 OFFICE O/P NEW MOD 45 MIN: CPT | Mod: HCWC,S$GLB,, | Performed by: INTERNAL MEDICINE

## 2023-09-01 PROCEDURE — 1159F MED LIST DOCD IN RCRD: CPT | Mod: HCWC,CPTII,S$GLB, | Performed by: INTERNAL MEDICINE

## 2023-09-01 PROCEDURE — 99999 PR PBB SHADOW E&M-EST. PATIENT-LVL IV: CPT | Mod: PBBFAC,HCWC,, | Performed by: INTERNAL MEDICINE

## 2023-09-01 NOTE — PROGRESS NOTES
Subjective:       Patient ID: Caro Powell is a 82 y.o. White female who presents for new patient evaluation for chronic renal failure.    Caro Powell is referred by Brayan Penny MD to be evaluated for chronic renal failure.  She has no uremic or urinary symptoms and is in her usual state of health.  There have been no recent illnesses, hospitalizations or procedures.  She denies chronic NSAIDs.        Review of Systems   Constitutional:  Negative for appetite change, chills and fever.   HENT:  Negative for congestion.    Eyes:  Negative for visual disturbance.   Respiratory:  Positive for shortness of breath (occasional). Negative for cough.    Cardiovascular:  Negative for chest pain and leg swelling.   Gastrointestinal:  Negative for abdominal pain, diarrhea, nausea and vomiting.   Genitourinary:  Negative for difficulty urinating, dysuria and hematuria.   Musculoskeletal:  Positive for back pain. Negative for myalgias.   Skin:  Negative for rash.   Neurological:  Positive for headaches.   Hematological:  Bruises/bleeds easily.   Psychiatric/Behavioral:  Negative for sleep disturbance. The patient is nervous/anxious.          The past medical, family and social histories were reviewed for this encounter.     Past Medical History:   Diagnosis Date    Anticoagulant long-term use     Arthritis     Atrial flutter     Calcium nephrolithiasis 2007    ckd 3    Diabetes mellitus, type 2     Diabetic peripheral neuropathy associated with type 2 diabetes mellitus     Difficult intubation     NARROW AIRWAY    Hypertension     Invasive ductal carcinoma of left breast 7/6/2022    Pulmonary aspiration of gastric contents     Shingles      Past Surgical History:   Procedure Laterality Date    ANGIOGRAPHY OF LOWER EXTREMITY N/A 10/21/2021    Procedure: Angiogram Extremity Unilateral;  Surgeon: Dre Martino MD;  Location: [facility] CATH LAB/EP;  Service: Cardiology;  Laterality: N/A;    ANGIOGRAPHY OF  LOWER EXTREMITY Right 11/10/2021    Procedure: Angiogram Extremity Unilateral;  Surgeon: Ben Rooney MD;  Location: [facility] CATH LAB/EP;  Service: Cardiology;  Laterality: Right;    AXILLARY NODE DISSECTION Right 8/15/2022    Procedure: LYMPHADENECTOMY, AXILLARY RIGHT;  Surgeon: RADHA Quinn MD;  Location: [facility];  Service: General;  Laterality: Right;    COLONOSCOPY  11/28/2011    sigmoid diverticulosis, external hemorrhoids    DEBRIDEMENT Right 10/20/2021    Procedure: DEBRIDEMENT;  Surgeon: Ava Atkins DPM;  Location: [facility] OR;  Service: Podiatry;  Laterality: Right;    ENDOSCOPIC GASTROCNEMIUS RECESSION Right 9/10/2019    Procedure: RECESSION, GASTROCNEMIUS, ENDOSCOPIC;  Surgeon: Derek Jose DPM;  Location: [facility] OR;  Service: Podiatry;  Laterality: Right;  Arthrex center line (ron notified)  Video    EXTRACORPOREAL SHOCK WAVE LITHOTRIPSY      FLEXOR TENOTOMY Right 10/20/2021    Procedure: TENOTOMY, FLEXOR 3rd toe;  Surgeon: Ava Atkins DPM;  Location: [facility] OR;  Service: Podiatry;  Laterality: Right;    HYSTERECTOMY      INJECTION FOR SENTINEL NODE IDENTIFICATION Left 8/15/2022    Procedure: INJECTION, FOR SENTINEL NODE IDENTIFICATION;  Surgeon: RADHA Quinn MD;  Location: [facility];  Service: General;  Laterality: Left;    MASTECTOMY Bilateral 8/15/2022    Procedure: MASTECTOMY BILATERAL  / BREAST;  Surgeon: RADHA Quinn MD;  Location: [facility];  Service: General;  Laterality: Bilateral;  5 HOURS / EMAIL SENT 8-11 @ 9:02 LK    SENTINEL LYMPH NODE BIOPSY Left 8/15/2022    Procedure: BIOPSY, LYMPH NODE, SENTINEL LEFT;  Surgeon: RADHA Quinn MD;  Location: [facility];  Service: General;  Laterality: Left;    SHOULDER SURGERY Left     TOE AMPUTATION Right 05/22/2017    5th toe    TOE AMPUTATION Right 10/20/2021    Procedure: AMPUTATION, TOE;  Surgeon: Ava Atkins DPM;  Location: [facility];  Service: Podiatry;  Laterality: Right;    TONSILLECTOMY       Social History      Socioeconomic History    Marital status:    Tobacco Use    Smoking status: Never     Passive exposure: Never    Smokeless tobacco: Never   Substance and Sexual Activity    Alcohol use: No    Drug use: No    Sexual activity: Yes     Social Determinants of Health     Financial Resource Strain: Medium Risk (10/20/2021)    Overall Financial Resource Strain (CARDIA)     Difficulty of Paying Living Expenses: Somewhat hard   Food Insecurity: No Food Insecurity (10/20/2021)    Hunger Vital Sign     Worried About Running Out of Food in the Last Year: Never true     Ran Out of Food in the Last Year: Never true   Transportation Needs: No Transportation Needs (10/20/2021)    PRAPARE - Transportation     Lack of Transportation (Medical): No     Lack of Transportation (Non-Medical): No   Physical Activity: Inactive (10/20/2021)    Exercise Vital Sign     Days of Exercise per Week: 0 days     Minutes of Exercise per Session: 0 min   Stress: Stress Concern Present (10/20/2021)    [employer] - Occupational Stress Questionnaire     Feeling of Stress : To some extent   Social Connections: Moderately Isolated (10/20/2021)    Social Connection and Isolation Panel [NHANES]     Frequency of Communication with Friends and Family: Twice a week     Frequency of Social Gatherings with Friends and Family: Never     Attends Protestant Services: 1 to 4 times per year     Active Member of Clubs or Organizations: No     Attends Club or Organization Meetings: Never     Marital Status:    Housing Stability: Low Risk  (10/20/2021)    Housing Stability Vital Sign     Unable to Pay for Housing in the Last Year: No     Number of Places Lived in the Last Year: 1     Unstable Housing in the Last Year: No     Current Outpatient Medications   Medication Sig    ACCU-CHEK SAMI PLUS METER Misc TEST  AS DIRECTED    ACCU-CHEK SAMI PLUS TEST STRP Strp USE TO TEST BLOOD SUGAR ONCE DAILY    ACCU-CHEK SOFT DEV LANCETS  "Kit     ACCU-CHEK SOFTCLIX LANCETS Misc TEST ONCE DAILY    ammonium lactate 12 % Crea Apply to feet twice daily. Avoid use between toes.    anastrozole (ARIMIDEX) 1 mg Tab TAKE 1 TABLET ONE TIME DAILY    apixaban (ELIQUIS) 5 mg Tab TAKE 1 TABLET BY MOUTH TWICE DAILY    atorvastatin (LIPITOR) 80 MG tablet Take 1 tablet (80 mg total) by mouth once daily.    calcium-vitamin D3 (OYSTER SHELL CALCIUM-VIT D3) 500 mg-5 mcg (200 unit) per tablet Take 1 tablet by mouth once daily.    clopidogreL (PLAVIX) 75 mg tablet Take 1 tablet (75 mg total) by mouth once daily.    EScitalopram oxalate (LEXAPRO) 10 MG tablet Take 1 tablet (10 mg total) by mouth once daily.    famotidine (PEPCID) 20 MG tablet TAKE 1 TABLET AT BEDTIME    FLUAD QUAD 2022-23,65Y UP,,PF, 60 mcg (15 mcg x 4)/0.5 mL Syrg     furosemide (LASIX) 40 MG tablet Take 1 tablet (40 mg total) by mouth once daily.    glimepiride (AMARYL) 1 MG tablet TAKE 1 TABLET TWICE DAILY    lisinopriL (PRINIVIL,ZESTRIL) 20 MG tablet TAKE 1 TABLET EVERY DAY    LORazepam (ATIVAN) 0.5 MG tablet Take 1-2 tablets (0.5-1 mg total) by mouth every 8 (eight) hours as needed for Anxiety.    metoprolol succinate (TOPROL-XL) 25 MG 24 hr tablet TAKE 1 TABLET EVERY DAY    mupirocin (BACTROBAN) 2 % ointment Apply topically 2 (two) times daily.    polyethylene glycol (GLYCOLAX) 17 gram/dose powder Take 17 g by mouth once daily.    pramipexole (MIRAPEX) 0.125 MG tablet TAKE 1 TABLET EVERY DAY    traMADoL (ULTRAM) 50 mg tablet Take 1 tablet (50 mg total) by mouth every 6 (six) hours as needed for Pain.    urea (CARMOL) 40 % Crea Apply topically 2 (two) times daily.     No current facility-administered medications for this visit.       BP (!) 138/56 (BP Location: Left arm, Patient Position: Sitting, BP Method: Large (Manual))   Ht 5' 2" (1.575 m)   LMP  (LMP Unknown)   BMI 28.17 kg/m²     Objective:      Physical Exam  Vitals reviewed.   Constitutional:       General: She is not in acute distress.    " " Appearance: She is well-developed.   HENT:      Head: Normocephalic and atraumatic.   Eyes:      General: No scleral icterus.     Conjunctiva/sclera: Conjunctivae normal.   Neck:      Vascular: No JVD.   Cardiovascular:      Rate and Rhythm: Normal rate and regular rhythm.      Heart sounds: Normal heart sounds. No murmur heard.     No friction rub. No gallop.   Pulmonary:      Effort: Pulmonary effort is normal. No respiratory distress.      Breath sounds: Normal breath sounds. No wheezing or rales.   Abdominal:      General: Bowel sounds are normal. There is no distension.      Palpations: Abdomen is soft.      Tenderness: There is no abdominal tenderness.   Musculoskeletal:      Cervical back: Normal range of motion.      Right lower leg: No edema.      Left lower leg: No edema.   Skin:     General: Skin is warm and dry.      Findings: No rash.   Neurological:      Mental Status: She is alert and oriented to person, place, and time.   Psychiatric:         Mood and Affect: Mood normal.         Behavior: Behavior normal.         Assessment:       1. Type 2 diabetes mellitus with diabetic neuropathy, without long-term current use of insulin    2. Stage 3a chronic kidney disease    3. Primary hypertension        Lab Results   Component Value Date    CREATININE 1.1 08/22/2023    BUN 22 08/22/2023     08/22/2023    K 4.8 08/22/2023     08/22/2023    CO2 26 08/22/2023     Lab Results   Component Value Date    CALCIUM 10.0 08/22/2023    PHOS 3.4 01/25/2021     Lab Results   Component Value Date    HCT 33.6 (L) 08/22/2023    HCT 33.6 (L) 08/22/2023     No results found for: "UTPCR"  Plan:   Return to clinic in 6 months.  Labs for next visit include rp pth upc per standing orders.  Baseline creatinine is 1.0-1.2 since 2017.  She has age-appropriate kidney function given that she is 82.  I reassured her.    Your blood pressure is controlled on your current medications.  Keep in mind that weight loss often " improves blood pressure.  If you do diet and lose weight we will likely need to adjust your medications.

## 2023-10-01 ENCOUNTER — OFFICE VISIT (OUTPATIENT)
Dept: URGENT CARE | Facility: CLINIC | Age: 83
End: 2023-10-01
Payer: MEDICARE

## 2023-10-01 VITALS
WEIGHT: 154 LBS | DIASTOLIC BLOOD PRESSURE: 64 MMHG | SYSTOLIC BLOOD PRESSURE: 149 MMHG | BODY MASS INDEX: 28.34 KG/M2 | RESPIRATION RATE: 18 BRPM | HEIGHT: 62 IN | HEART RATE: 58 BPM | TEMPERATURE: 98 F | OXYGEN SATURATION: 96 %

## 2023-10-01 DIAGNOSIS — L08.9 LEFT FOOT INFECTION: Primary | ICD-10-CM

## 2023-10-01 PROCEDURE — 99213 PR OFFICE/OUTPT VISIT, EST, LEVL III, 20-29 MIN: ICD-10-PCS | Mod: 25,S$GLB,, | Performed by: PHYSICIAN ASSISTANT

## 2023-10-01 PROCEDURE — 99213 OFFICE O/P EST LOW 20 MIN: CPT | Mod: 25,S$GLB,, | Performed by: PHYSICIAN ASSISTANT

## 2023-10-01 PROCEDURE — 96372 THER/PROPH/DIAG INJ SC/IM: CPT | Mod: S$GLB,,, | Performed by: PHYSICIAN ASSISTANT

## 2023-10-01 PROCEDURE — 96372 PR INJECTION,THERAP/PROPH/DIAG2ST, IM OR SUBCUT: ICD-10-PCS | Mod: S$GLB,,, | Performed by: PHYSICIAN ASSISTANT

## 2023-10-01 RX ORDER — DOXYCYCLINE HYCLATE 100 MG
100 TABLET ORAL 2 TIMES DAILY
Qty: 20 TABLET | Refills: 0 | Status: SHIPPED | OUTPATIENT
Start: 2023-10-01 | End: 2023-10-09 | Stop reason: SDUPTHER

## 2023-10-01 RX ORDER — CEFTRIAXONE 500 MG/1
500 INJECTION, POWDER, FOR SOLUTION INTRAMUSCULAR; INTRAVENOUS
Status: COMPLETED | OUTPATIENT
Start: 2023-10-01 | End: 2023-10-01

## 2023-10-01 RX ORDER — DOXYCYCLINE HYCLATE 100 MG
100 TABLET ORAL 2 TIMES DAILY
Qty: 20 TABLET | Refills: 0 | Status: SHIPPED | OUTPATIENT
Start: 2023-10-01 | End: 2023-10-01 | Stop reason: CLARIF

## 2023-10-01 RX ADMIN — CEFTRIAXONE 500 MG: 500 INJECTION, POWDER, FOR SOLUTION INTRAMUSCULAR; INTRAVENOUS at 10:10

## 2023-10-01 NOTE — PATIENT INSTRUCTIONS
If you were prescribed a narcotic or controlled medication, do not drive or operate heavy equipment or machinery while taking these medications.  You must understand that you've received an Urgent Care treatment only and that you may be released before all your medical problems are known or treated. You, the patient, will arrange for follow up care as instructed.  Follow up with your PCP or specialty clinic as directed if not improved or as needed. You can call [phone] to schedule an appointment with the appropriate provider.  If your condition worsens we recommend that you receive another evaluation at the Emergency Department for any concerns or worsening of condition.  Patient aware and verbalized understanding.    Drink plenty of fluids and get lots of rest.  Avoid picking/manipulating the wound.   Take antibiotics RX as prescribed to full completion.  Please clean with regular soap and water.  Cover during the day to avoid friction constantly rubbing up against the area of irritation.  Warm soaks with epsom salt two-three times daily as discussed.  Follow-up with your PCP and Podiatry for further evaluation as needed.  You should seek ER treatment if fever, increased pain, swelling, red streaks from affected area or other signs of increasing infection.   Patient aware and verbalized understanding.

## 2023-10-01 NOTE — PROGRESS NOTES
"Subjective:      Patient ID: Caro Powell is a 83 y.o. female.    Vitals:  height is 5' 2" (1.575 m) and weight is 69.9 kg (154 lb). Her oral temperature is 97.8 °F (36.6 °C). Her blood pressure is 149/64 (abnormal) and her pulse is 58 (abnormal). Her respiration is 18 and oxygen saturation is 96%.     Chief Complaint: Nail Problem    Patient is with son on exam. Patient presents with left toe blood blister that possibly turned into infection onset 3 days ago. No treatments. Denies any pain only discomfort. Patient already made appointment with Podiatry.    Nail Problem  This is a new problem. The current episode started in the past 7 days. The problem occurs constantly. The problem has been gradually worsening. Pertinent negatives include no abdominal pain, anorexia, arthralgias, change in bowel habit, chest pain, chills, congestion, coughing, diaphoresis, fatigue, fever, headaches, joint swelling, myalgias, nausea, neck pain, numbness, rash, sore throat, swollen glands, urinary symptoms, vertigo, visual change, vomiting or weakness. Nothing aggravates the symptoms. She has tried nothing for the symptoms.       Constitution: Negative for chills, sweating, fatigue and fever.   HENT:  Negative for ear pain, drooling, congestion, sore throat, trouble swallowing and voice change.    Neck: Negative for neck pain, neck stiffness, painful lymph nodes and neck swelling.   Cardiovascular:  Negative for chest pain, leg swelling, palpitations, sob on exertion and passing out.   Eyes:  Negative for eye pain, eye redness, photophobia, double vision, blurred vision and eyelid swelling.   Respiratory:  Negative for chest tightness, cough, sputum production, bloody sputum, shortness of breath, stridor and wheezing.    Gastrointestinal:  Negative for abdominal pain, abdominal bloating, nausea, vomiting, constipation, diarrhea and heartburn.   Musculoskeletal:  Negative for joint pain, joint swelling, abnormal ROM of joint, " back pain, muscle cramps and muscle ache.   Skin:  Positive for wound and erythema. Negative for rash and hives.   Allergic/Immunologic: Negative for seasonal allergies, food allergies, hives, itching and sneezing.   Neurological:  Negative for dizziness, history of vertigo, light-headedness, passing out, loss of balance, headaches, altered mental status, loss of consciousness, numbness and seizures.   Hematologic/Lymphatic: Negative for swollen lymph nodes.   Psychiatric/Behavioral:  Negative for altered mental status and nervous/anxious. The patient is not nervous/anxious.         Objective:     Physical Exam   Constitutional: She is oriented to person, place, and time. She appears well-developed. She is cooperative.  Non-toxic appearance. She does not appear ill. No distress.   HENT:   Head: Normocephalic and atraumatic.   Ears:   Right Ear: Hearing, tympanic membrane, external ear and ear canal normal.   Left Ear: Hearing, tympanic membrane, external ear and ear canal normal.   Nose: Nose normal. No mucosal edema, rhinorrhea or nasal deformity. No epistaxis. Right sinus exhibits no maxillary sinus tenderness and no frontal sinus tenderness. Left sinus exhibits no maxillary sinus tenderness and no frontal sinus tenderness.   Mouth/Throat: Uvula is midline, oropharynx is clear and moist and mucous membranes are normal. No trismus in the jaw. Normal dentition. No uvula swelling. No oropharyngeal exudate, posterior oropharyngeal edema or posterior oropharyngeal erythema.   Eyes: Conjunctivae and lids are normal. No scleral icterus.   Neck: Trachea normal and phonation normal. Neck supple. No edema present. No erythema present. No neck rigidity present.   Cardiovascular: Normal rate, regular rhythm, normal heart sounds and normal pulses.   Pulmonary/Chest: Effort normal and breath sounds normal. No respiratory distress. She has no decreased breath sounds. She has no rhonchi.   Abdominal: Normal appearance.    Musculoskeletal: Normal range of motion.         General: No deformity. Normal range of motion.   Neurological: She is alert and oriented to person, place, and time. She has normal sensation. She exhibits normal muscle tone. Gait normal. Coordination normal.   Skin: Skin is warm, dry, intact, not diaphoretic, not pale, not vesicular and no abscessed. Capillary refill takes less than 2 seconds. erythema No burn and No petechiae        Psychiatric: Her speech is normal and behavior is normal. Judgment and thought content normal.   Nursing note and vitals reviewed.                  Assessment:     1. Left foot infection        Plan:   Discussed the limitations in the urgent care setting. Advised close follow-up with PCP and Podiatry for further evaluation as discussed. Strict ER precautions given to patient as well. Patient aware, verbalized understanding and agreed with plan of care.    Left foot infection    Other orders  -     Discontinue: doxycycline (VIBRA-TABS) 100 MG tablet; Take 1 tablet (100 mg total) by mouth 2 (two) times daily. for 10 days  Dispense: 20 tablet; Refill: 0  -     cefTRIAXone injection 500 mg  -     doxycycline (VIBRA-TABS) 100 MG tablet; Take 1 tablet (100 mg total) by mouth 2 (two) times daily. for 10 days  Dispense: 20 tablet; Refill: 0      Patient Instructions   If you were prescribed a narcotic or controlled medication, do not drive or operate heavy equipment or machinery while taking these medications.  You must understand that you've received an Urgent Care treatment only and that you may be released before all your medical problems are known or treated. You, the patient, will arrange for follow up care as instructed.  Follow up with your PCP or specialty clinic as directed if not improved or as needed. You can call [phone] to schedule an appointment with the appropriate provider.  If your condition worsens we recommend that you receive another evaluation at the Emergency  Department for any concerns or worsening of condition.  Patient aware and verbalized understanding.    Drink plenty of fluids and get lots of rest.  Avoid picking/manipulating the wound.   Take antibiotics RX as prescribed to full completion.  Please clean with regular soap and water.  Cover during the day to avoid friction constantly rubbing up against the area of irritation.  Warm soaks with epsom salt two-three times daily as discussed.  Follow-up with your PCP and Podiatry for further evaluation as needed.  You should seek ER treatment if fever, increased pain, swelling, red streaks from affected area or other signs of increasing infection.   Patient aware and verbalized understanding.

## 2023-10-02 ENCOUNTER — OFFICE VISIT (OUTPATIENT)
Dept: PODIATRY | Facility: CLINIC | Age: 83
End: 2023-10-02
Payer: MEDICARE

## 2023-10-02 ENCOUNTER — HOSPITAL ENCOUNTER (OUTPATIENT)
Dept: RADIOLOGY | Facility: HOSPITAL | Age: 83
Discharge: HOME | End: 2023-10-02
Attending: STUDENT IN AN ORGANIZED HEALTH CARE EDUCATION/TRAINING PROGRAM
Payer: MEDICARE

## 2023-10-02 VITALS — BODY MASS INDEX: 28.34 KG/M2 | WEIGHT: 154 LBS | HEIGHT: 62 IN

## 2023-10-02 DIAGNOSIS — E11.621 DIABETIC ULCER OF OTHER PART OF RIGHT FOOT ASSOCIATED WITH TYPE 2 DIABETES MELLITUS, WITH FAT LAYER EXPOSED: ICD-10-CM

## 2023-10-02 DIAGNOSIS — L97.512 DIABETIC ULCER OF OTHER PART OF RIGHT FOOT ASSOCIATED WITH TYPE 2 DIABETES MELLITUS, WITH FAT LAYER EXPOSED: ICD-10-CM

## 2023-10-02 DIAGNOSIS — Z89.429 HISTORY OF AMPUTATION OF LESSER TOE, UNSPECIFIED LATERALITY: ICD-10-CM

## 2023-10-02 DIAGNOSIS — L84 PRE-ULCERATIVE CALLUSES: ICD-10-CM

## 2023-10-02 DIAGNOSIS — M86.072 ACUTE HEMATOGENOUS OSTEOMYELITIS OF LEFT FOOT: Primary | ICD-10-CM

## 2023-10-02 DIAGNOSIS — M86.072 ACUTE HEMATOGENOUS OSTEOMYELITIS OF LEFT FOOT: ICD-10-CM

## 2023-10-02 DIAGNOSIS — E11.621 DIABETIC ULCER OF TOE OF LEFT FOOT ASSOCIATED WITH TYPE 2 DIABETES MELLITUS, WITH NECROSIS OF BONE: ICD-10-CM

## 2023-10-02 DIAGNOSIS — I73.9 PAD (PERIPHERAL ARTERY DISEASE): ICD-10-CM

## 2023-10-02 DIAGNOSIS — L97.524 DIABETIC ULCER OF TOE OF LEFT FOOT ASSOCIATED WITH TYPE 2 DIABETES MELLITUS, WITH NECROSIS OF BONE: ICD-10-CM

## 2023-10-02 DIAGNOSIS — E11.42 TYPE 2 DIABETES MELLITUS WITH PERIPHERAL NEUROPATHY: ICD-10-CM

## 2023-10-02 LAB
GRAM STN SPEC: NORMAL
GRAM STN SPEC: NORMAL

## 2023-10-02 PROCEDURE — 88311 DECALCIFY TISSUE: CPT | Mod: HCWC,PO | Performed by: STUDENT IN AN ORGANIZED HEALTH CARE EDUCATION/TRAINING PROGRAM

## 2023-10-02 PROCEDURE — 99999 PR PBB SHADOW E&M-EST. PATIENT-LVL IV: CPT | Mod: PBBFAC,HCWC,, | Performed by: STUDENT IN AN ORGANIZED HEALTH CARE EDUCATION/TRAINING PROGRAM

## 2023-10-02 PROCEDURE — 1126F PR PAIN SEVERITY QUANTIFIED, NO PAIN PRESENT: ICD-10-PCS | Mod: HCWC,CPTII,S$GLB, | Performed by: STUDENT IN AN ORGANIZED HEALTH CARE EDUCATION/TRAINING PROGRAM

## 2023-10-02 PROCEDURE — 87205 SMEAR GRAM STAIN: CPT | Mod: HCWC | Performed by: STUDENT IN AN ORGANIZED HEALTH CARE EDUCATION/TRAINING PROGRAM

## 2023-10-02 PROCEDURE — 73630 X-RAY EXAM OF FOOT: CPT | Mod: TC,50,HCWC,PO

## 2023-10-02 PROCEDURE — 11042 WOUND DEBRIDEMENT: ICD-10-PCS | Mod: 59,HCWC,S$GLB, | Performed by: STUDENT IN AN ORGANIZED HEALTH CARE EDUCATION/TRAINING PROGRAM

## 2023-10-02 PROCEDURE — 1160F RVW MEDS BY RX/DR IN RCRD: CPT | Mod: HCWC,CPTII,S$GLB, | Performed by: STUDENT IN AN ORGANIZED HEALTH CARE EDUCATION/TRAINING PROGRAM

## 2023-10-02 PROCEDURE — 1160F PR REVIEW ALL MEDS BY PRESCRIBER/CLIN PHARMACIST DOCUMENTED: ICD-10-PCS | Mod: HCWC,CPTII,S$GLB, | Performed by: STUDENT IN AN ORGANIZED HEALTH CARE EDUCATION/TRAINING PROGRAM

## 2023-10-02 PROCEDURE — 11044 WOUND DEBRIDEMENT: ICD-10-PCS | Mod: HCWC,S$GLB,, | Performed by: STUDENT IN AN ORGANIZED HEALTH CARE EDUCATION/TRAINING PROGRAM

## 2023-10-02 PROCEDURE — 11044 DBRDMT BONE 1ST 20 SQ CM/<: CPT | Mod: HCWC,S$GLB,, | Performed by: STUDENT IN AN ORGANIZED HEALTH CARE EDUCATION/TRAINING PROGRAM

## 2023-10-02 PROCEDURE — 87075 CULTR BACTERIA EXCEPT BLOOD: CPT | Mod: HCWC | Performed by: STUDENT IN AN ORGANIZED HEALTH CARE EDUCATION/TRAINING PROGRAM

## 2023-10-02 PROCEDURE — 20220 BONE BIOPSY TROCAR/NDL SUPFC: CPT | Mod: 51,HCWC,S$GLB, | Performed by: STUDENT IN AN ORGANIZED HEALTH CARE EDUCATION/TRAINING PROGRAM

## 2023-10-02 PROCEDURE — 3288F FALL RISK ASSESSMENT DOCD: CPT | Mod: HCWC,CPTII,S$GLB, | Performed by: STUDENT IN AN ORGANIZED HEALTH CARE EDUCATION/TRAINING PROGRAM

## 2023-10-02 PROCEDURE — 99999 PR PBB SHADOW E&M-EST. PATIENT-LVL IV: ICD-10-PCS | Mod: PBBFAC,HCWC,, | Performed by: STUDENT IN AN ORGANIZED HEALTH CARE EDUCATION/TRAINING PROGRAM

## 2023-10-02 PROCEDURE — 1126F AMNT PAIN NOTED NONE PRSNT: CPT | Mod: HCWC,CPTII,S$GLB, | Performed by: STUDENT IN AN ORGANIZED HEALTH CARE EDUCATION/TRAINING PROGRAM

## 2023-10-02 PROCEDURE — 88305 TISSUE EXAM BY PATHOLOGIST: CPT | Mod: 26,HCWC,, | Performed by: STUDENT IN AN ORGANIZED HEALTH CARE EDUCATION/TRAINING PROGRAM

## 2023-10-02 PROCEDURE — 1159F MED LIST DOCD IN RCRD: CPT | Mod: HCWC,CPTII,S$GLB, | Performed by: STUDENT IN AN ORGANIZED HEALTH CARE EDUCATION/TRAINING PROGRAM

## 2023-10-02 PROCEDURE — 1159F PR MEDICATION LIST DOCUMENTED IN MEDICAL RECORD: ICD-10-PCS | Mod: HCWC,CPTII,S$GLB, | Performed by: STUDENT IN AN ORGANIZED HEALTH CARE EDUCATION/TRAINING PROGRAM

## 2023-10-02 PROCEDURE — 88305 TISSUE EXAM BY PATHOLOGIST: ICD-10-PCS | Mod: 26,HCWC,, | Performed by: STUDENT IN AN ORGANIZED HEALTH CARE EDUCATION/TRAINING PROGRAM

## 2023-10-02 PROCEDURE — 73630 XR FOOT COMPLETE 3 VIEW BILATERAL: ICD-10-PCS | Mod: 26,50,HCWC, | Performed by: RADIOLOGY

## 2023-10-02 PROCEDURE — 3288F PR FALLS RISK ASSESSMENT DOCUMENTED: ICD-10-PCS | Mod: HCWC,CPTII,S$GLB, | Performed by: STUDENT IN AN ORGANIZED HEALTH CARE EDUCATION/TRAINING PROGRAM

## 2023-10-02 PROCEDURE — 88311 DECALCIFY TISSUE: CPT | Mod: 26,HCWC,, | Performed by: STUDENT IN AN ORGANIZED HEALTH CARE EDUCATION/TRAINING PROGRAM

## 2023-10-02 PROCEDURE — 99215 PR OFFICE/OUTPT VISIT, EST, LEVL V, 40-54 MIN: ICD-10-PCS | Mod: 25,HCWC,S$GLB, | Performed by: STUDENT IN AN ORGANIZED HEALTH CARE EDUCATION/TRAINING PROGRAM

## 2023-10-02 PROCEDURE — 20220 PR BONE BIOPSY,TROCAR/NEEDLE SUPERF: ICD-10-PCS | Mod: 51,HCWC,S$GLB, | Performed by: STUDENT IN AN ORGANIZED HEALTH CARE EDUCATION/TRAINING PROGRAM

## 2023-10-02 PROCEDURE — 88305 TISSUE EXAM BY PATHOLOGIST: CPT | Mod: HCWC,PO | Performed by: STUDENT IN AN ORGANIZED HEALTH CARE EDUCATION/TRAINING PROGRAM

## 2023-10-02 PROCEDURE — 99215 OFFICE O/P EST HI 40 MIN: CPT | Mod: 25,HCWC,S$GLB, | Performed by: STUDENT IN AN ORGANIZED HEALTH CARE EDUCATION/TRAINING PROGRAM

## 2023-10-02 PROCEDURE — 1101F PT FALLS ASSESS-DOCD LE1/YR: CPT | Mod: HCWC,CPTII,S$GLB, | Performed by: STUDENT IN AN ORGANIZED HEALTH CARE EDUCATION/TRAINING PROGRAM

## 2023-10-02 PROCEDURE — 73630 X-RAY EXAM OF FOOT: CPT | Mod: 26,50,HCWC, | Performed by: RADIOLOGY

## 2023-10-02 PROCEDURE — 87070 CULTURE OTHR SPECIMN AEROBIC: CPT | Mod: HCWC | Performed by: STUDENT IN AN ORGANIZED HEALTH CARE EDUCATION/TRAINING PROGRAM

## 2023-10-02 PROCEDURE — 11042 DBRDMT SUBQ TIS 1ST 20SQCM/<: CPT | Mod: 59,HCWC,S$GLB, | Performed by: STUDENT IN AN ORGANIZED HEALTH CARE EDUCATION/TRAINING PROGRAM

## 2023-10-02 PROCEDURE — 88311 PR  DECALCIFY TISSUE: ICD-10-PCS | Mod: 26,HCWC,, | Performed by: STUDENT IN AN ORGANIZED HEALTH CARE EDUCATION/TRAINING PROGRAM

## 2023-10-02 PROCEDURE — 1101F PR PT FALLS ASSESS DOC 0-1 FALLS W/OUT INJ PAST YR: ICD-10-PCS | Mod: HCWC,CPTII,S$GLB, | Performed by: STUDENT IN AN ORGANIZED HEALTH CARE EDUCATION/TRAINING PROGRAM

## 2023-10-02 NOTE — PROGRESS NOTES
Subjective:      Patient ID: Caro Powell is a 83 y.o. female.    Chief Complaint: Toe Injury    Ms. Powell presents today with complaints of left 3rd toe infection. She notes drainage, redness and swelling a couple of days ago and presented to the urgent care for assistance. She was given doxy 100mg and an injection of rocephin which has helped with the redness and swelling. She doesn't have much feeling at baseline has amputations of the toes on the right foot.     She normally sees Dr. Atkins on the [place] but has recently moved to the [facility].     Review of Systems   Skin:  Positive for nail changes, poor wound healing, suspicious lesions and unusual hair distribution.   All other systems reviewed and are negative.          Objective:      Physical Exam  Cardiovascular:      Pulses:           Dorsalis pedis pulses are 0 on the right side and 0 on the left side.        Posterior tibial pulses are 0 on the right side and 0 on the left side.   Feet:      Right foot:      Protective Sensation: 10 sites tested.  0 sites sensed.      Skin integrity: Ulcer present.      Toenail Condition: Right toenails are abnormally thick and long. Fungal disease present.     Left foot:      Protective Sensation: 10 sites tested.  0 sites sensed.      Skin integrity: Ulcer and erythema present.      Toenail Condition: Left toenails are abnormally thick and long. Fungal disease present.     Comments: Right foot:  Submet 1 ulceration that measures 2x1x0.5cm without erythema, edema, fluctuance or crepitus.     Left foot:  Left 3rd toe ulceration that measures 1.5x1.5x1cm with probe to bone. Associated edema, erythema. Negative fluctuance or crepitus.               Assessment:       Encounter Diagnoses   Name Primary?    Acute hematogenous osteomyelitis of left foot Yes    Pre-ulcerative calluses     PAD (peripheral artery disease)     History of amputation of lesser toe, unspecified laterality     Type 2 diabetes  "mellitus with peripheral neuropathy     Diabetic ulcer of other part of right foot associated with type 2 diabetes mellitus, with fat layer exposed     Diabetic ulcer of toe of left foot associated with type 2 diabetes mellitus, with necrosis of bone          Plan:       Caro was seen today for toe injury.    Diagnoses and all orders for this visit:    Acute hematogenous osteomyelitis of left foot  -     CBC auto differential; Future  -     COMPREHENSIVE METABOLIC PANEL; Future  -     C-REACTIVE PROTEIN; Future  -     X-Ray Foot Complete 3 view Bilateral; Future  -     Aerobic culture  -     Culture, Anaerobic  -     Gram stain  -     Specimen to Pathology Orthopedics    Pre-ulcerative calluses    PAD (peripheral artery disease)    History of amputation of lesser toe, unspecified laterality    Type 2 diabetes mellitus with peripheral neuropathy    Diabetic ulcer of other part of right foot associated with type 2 diabetes mellitus, with fat layer exposed    Diabetic ulcer of toe of left foot associated with type 2 diabetes mellitus, with necrosis of bone      I counseled the patient on her conditions, their implications and medical management.    I discussed treatment with the patient at length. I recommend a bone biopsy with long term antibiotics and the patient is amenable.     Labs ordered  Imaging ordered  Continue antibiotics  ID referral    Wound Debridement    Date/Time: 10/2/2023 1:00 PM    Performed by: Stephen Barakat DPM  Authorized by: Stephen Barakat DPM    Time out: Immediately prior to procedure a "time out" was called to verify the correct patient, procedure, equipment, support staff and site/side marked as required.    Consent Done?:  Yes (Verbal)  Local anesthesia used?: Yes    Anesthesia:  Local infiltration    Wound Details:    Location:  Left foot    Location:  Left 3rd Toe    Type of Debridement:  Excisional       Length (cm):  1.5       Area (sq cm):  2.25       Width (cm):  1.5       " Percent Debrided (%):  100       Depth (cm):  0.5       Total Area Debrided (sq cm):  2.25    Depth of debridement:  Bone    Tissue debrided:  Subcutaneous    Devitalized tissue debrided:  Callus    Instruments:  Nippers and Curette  Bleeding:  Minimal  Hemostasis Achieved: Yes  Method Used:  Pressure and Alginate    Additional wounds:  1    2nd Wound Details:     Location:  Right foot    Location:  Right 1st Metatarsal Head    Location:  Right 1st Metatarsal Head    Type of Debridement:  Excisional       Length (cm):  2       Area (sq cm):  2       Width (cm):  1       Percent Debrided (%):  100       Depth (cm):  0.5       Total Area Debrided (sq cm):  2    Depth of debridement:  Subcutaneous tissue    Tissue debrided:  Subcutaneous    Devitalized tissue debrided:  Callus    Instruments:  Curette  Bleeding:  Minimal  Hemostasis Achieved: Yes    Method Used:  Pressure and Alginate  Patient tolerance:  Patient tolerated the procedure well with no immediate complications  Specimen Collected: Specimen sent to pathology     A jam shidi needle was used to procure a sample of bone for pathology and culture from the left 3rd toe.

## 2023-10-04 ENCOUNTER — TELEPHONE (OUTPATIENT)
Dept: PODIATRY | Facility: CLINIC | Age: 83
End: 2023-10-04
Payer: MEDICARE

## 2023-10-04 NOTE — TELEPHONE ENCOUNTER
----- Message from Kacie Fortune sent at 10/4/2023  4:14 PM CDT -----  Type: Patient Call Back         Who called:Pt Son Haris Powell         What is the request in detail:pt is calling to check on the HH service she was suppose to get this week, states she has not heard from anyone, pl call bk to advise thanks         Can the clinic reply by [facility]?no          Would the patient rather a call back or a response via [facility]? Call back          Best call back number:[phone]         Additional Information:           Thank You

## 2023-10-04 NOTE — TELEPHONE ENCOUNTER
----- Message from Rocio Hendrix sent at 10/4/2023  3:12 PM CDT -----  Type: Needs Medical Advice  Who Called:  pt  Best Call Back Number: [phone]  Additional Information: pt is calling to check on the HH service she was suppose to get this week, states she has not heard from anyone, pl call bk to advise thanks

## 2023-10-05 DIAGNOSIS — M86.072 ACUTE HEMATOGENOUS OSTEOMYELITIS OF LEFT FOOT: Primary | ICD-10-CM

## 2023-10-05 LAB
FINAL PATHOLOGIC DIAGNOSIS: NORMAL
GROSS: NORMAL
Lab: NORMAL

## 2023-10-05 NOTE — TELEPHONE ENCOUNTER
----- Message from Yeni Beebe sent at 10/5/2023  9:50 AM CDT -----  Contact: Rod Carballo  Type:  Needs Medical Advice    Who Called: Rod Carballo  Would the patient rather a call back or a response via [facility]? call  Best Call Back Number:     Additional Information: son said home health nurse never showed up and was suppose to give son a call but never did. St they were suppose to call Tuesday because she had an appt with the  Monday. Please advise and thank you.

## 2023-10-06 PROCEDURE — G0180 MD CERTIFICATION HHA PATIENT: HCPCS | Mod: ,,, | Performed by: STUDENT IN AN ORGANIZED HEALTH CARE EDUCATION/TRAINING PROGRAM

## 2023-10-06 PROCEDURE — G0180 PR HOME HEALTH MD CERTIFICATION: ICD-10-PCS | Mod: ,,, | Performed by: STUDENT IN AN ORGANIZED HEALTH CARE EDUCATION/TRAINING PROGRAM

## 2023-10-07 LAB — BACTERIA SPEC AEROBE CULT: NO GROWTH

## 2023-10-07 RX ORDER — FUROSEMIDE 40 MG/1
40 TABLET ORAL
Qty: 90 TABLET | Refills: 1 | Status: SHIPPED | OUTPATIENT
Start: 2023-10-07

## 2023-10-09 ENCOUNTER — OFFICE VISIT (OUTPATIENT)
Dept: PODIATRY | Facility: CLINIC | Age: 83
End: 2023-10-09
Payer: MEDICARE

## 2023-10-09 ENCOUNTER — TELEPHONE (OUTPATIENT)
Dept: INFECTIOUS DISEASES | Facility: CLINIC | Age: 83
End: 2023-10-09
Payer: MEDICARE

## 2023-10-09 VITALS — WEIGHT: 154 LBS | HEIGHT: 62 IN | BODY MASS INDEX: 28.34 KG/M2

## 2023-10-09 DIAGNOSIS — L97.524 DIABETIC ULCER OF TOE OF LEFT FOOT ASSOCIATED WITH TYPE 2 DIABETES MELLITUS, WITH NECROSIS OF BONE: ICD-10-CM

## 2023-10-09 DIAGNOSIS — L97.512 DIABETIC ULCER OF OTHER PART OF RIGHT FOOT ASSOCIATED WITH TYPE 2 DIABETES MELLITUS, WITH FAT LAYER EXPOSED: ICD-10-CM

## 2023-10-09 DIAGNOSIS — E11.621 DIABETIC ULCER OF TOE OF LEFT FOOT ASSOCIATED WITH TYPE 2 DIABETES MELLITUS, WITH NECROSIS OF BONE: ICD-10-CM

## 2023-10-09 DIAGNOSIS — I73.9 PAD (PERIPHERAL ARTERY DISEASE): ICD-10-CM

## 2023-10-09 DIAGNOSIS — M86.072 ACUTE HEMATOGENOUS OSTEOMYELITIS OF LEFT FOOT: Primary | ICD-10-CM

## 2023-10-09 DIAGNOSIS — E11.42 TYPE 2 DIABETES MELLITUS WITH PERIPHERAL NEUROPATHY: ICD-10-CM

## 2023-10-09 DIAGNOSIS — E11.621 DIABETIC ULCER OF OTHER PART OF RIGHT FOOT ASSOCIATED WITH TYPE 2 DIABETES MELLITUS, WITH FAT LAYER EXPOSED: ICD-10-CM

## 2023-10-09 DIAGNOSIS — Z89.429 HISTORY OF AMPUTATION OF LESSER TOE, UNSPECIFIED LATERALITY: ICD-10-CM

## 2023-10-09 PROCEDURE — 11042 WOUND DEBRIDEMENT: ICD-10-PCS | Mod: HCWC,S$GLB,, | Performed by: STUDENT IN AN ORGANIZED HEALTH CARE EDUCATION/TRAINING PROGRAM

## 2023-10-09 PROCEDURE — 99499 UNLISTED E&M SERVICE: CPT | Mod: HCWC,S$GLB,, | Performed by: STUDENT IN AN ORGANIZED HEALTH CARE EDUCATION/TRAINING PROGRAM

## 2023-10-09 PROCEDURE — 99999 PR PBB SHADOW E&M-EST. PATIENT-LVL III: ICD-10-PCS | Mod: PBBFAC,HCWC,, | Performed by: STUDENT IN AN ORGANIZED HEALTH CARE EDUCATION/TRAINING PROGRAM

## 2023-10-09 PROCEDURE — 99499 NO LOS: ICD-10-PCS | Mod: HCWC,S$GLB,, | Performed by: STUDENT IN AN ORGANIZED HEALTH CARE EDUCATION/TRAINING PROGRAM

## 2023-10-09 PROCEDURE — 11042 DBRDMT SUBQ TIS 1ST 20SQCM/<: CPT | Mod: HCWC,S$GLB,, | Performed by: STUDENT IN AN ORGANIZED HEALTH CARE EDUCATION/TRAINING PROGRAM

## 2023-10-09 PROCEDURE — 99999 PR PBB SHADOW E&M-EST. PATIENT-LVL III: CPT | Mod: PBBFAC,HCWC,, | Performed by: STUDENT IN AN ORGANIZED HEALTH CARE EDUCATION/TRAINING PROGRAM

## 2023-10-09 RX ORDER — DOXYCYCLINE HYCLATE 100 MG
100 TABLET ORAL 2 TIMES DAILY
Qty: 28 TABLET | Refills: 0 | Status: SHIPPED | OUTPATIENT
Start: 2023-10-09 | End: 2023-10-23

## 2023-10-09 NOTE — PROGRESS NOTES
Subjective:      Patient ID: Caro Powell is a 83 y.o. female.    Chief Complaint: Wound Check    Ms. Powell presents today with complaints of left 3rd toe infection. She notes drainage, redness and swelling a couple of days ago and presented to the urgent care for assistance. She was given doxy 100mg and an injection of rocephin which has helped with the redness and swelling. She doesn't have much feeling at baseline has amputations of the toes on the right foot.     She normally sees Dr. Atkins on the [facility] but has recently moved to the [facility].     10/9/23: Follow up for osteomyelitis of the left 2nd toe and R foot wound.     Review of Systems   Skin:  Positive for nail changes, poor wound healing, suspicious lesions and unusual hair distribution.   All other systems reviewed and are negative.          Objective:      Physical Exam  Cardiovascular:      Pulses:           Dorsalis pedis pulses are 0 on the right side and 0 on the left side.        Posterior tibial pulses are 0 on the right side and 0 on the left side.   Feet:      Right foot:      Protective Sensation: 10 sites tested.  0 sites sensed.      Skin integrity: Ulcer present.      Toenail Condition: Right toenails are abnormally thick and long. Fungal disease present.     Left foot:      Protective Sensation: 10 sites tested.  0 sites sensed.      Skin integrity: Ulcer and erythema present.      Toenail Condition: Left toenails are abnormally thick and long. Fungal disease present.     Comments: Right foot:  Submet 1 ulceration that measures 1.5x1x0.2cm without erythema, edema, fluctuance or crepitus.     Left foot:  Left 3rd toe ulceration that measures 1.2x1.2x0.5cm with bone coverage. Erythema is improved and edema is residual.             Assessment:       Encounter Diagnoses   Name Primary?    Acute hematogenous osteomyelitis of left foot Yes    PAD (peripheral artery disease)     Type 2 diabetes mellitus with peripheral  neuropathy     History of amputation of lesser toe, unspecified laterality     Diabetic ulcer of toe of left foot associated with type 2 diabetes mellitus, with necrosis of bone     Diabetic ulcer of other part of right foot associated with type 2 diabetes mellitus, with fat layer exposed          Plan:       Caro was seen today for wound check.    Diagnoses and all orders for this visit:    Acute hematogenous osteomyelitis of left foot    PAD (peripheral artery disease)    Type 2 diabetes mellitus with peripheral neuropathy    History of amputation of lesser toe, unspecified laterality    Diabetic ulcer of toe of left foot associated with type 2 diabetes mellitus, with necrosis of bone    Diabetic ulcer of other part of right foot associated with type 2 diabetes mellitus, with fat layer exposed    Other orders  -     doxycycline (VIBRA-TABS) 100 MG tablet; Take 1 tablet (100 mg total) by mouth 2 (two) times daily. for 14 days  -     Routine Foot Care      I counseled the patient on her conditions, their implications and medical management.    Pathology + for OM. Cx are negative for growth. Antibiotics ordered.    Await ID evaluation but the wound is improving well.    Wound Debridement    Date/Time: 10/9/2023 9:00 AM    Performed by: Stephen Barakat DPM  Authorized by: Stephen Barakat DPM    Consent Done?:  Yes (Verbal)    Wound Details:    Location:  Right foot    Location:  Right 1st Metatarsal Head    Type of Debridement:  Excisional       Length (cm):  1.5       Area (sq cm):  1.5       Width (cm):  1       Percent Debrided (%):  100       Depth (cm):  0.2       Total Area Debrided (sq cm):  1.5    Depth of debridement:  Subcutaneous tissue    Tissue debrided:  Subcutaneous    Devitalized tissue debrided:  Callus    Instruments:  Curette  Bleeding:  None    Additional wounds:  1    2nd Wound Details:     Location:  Left foot    Location:  Left 2nd Toe    Location:  Left 2nd Toe    Type of Debridement:   Excisional       Length (cm):  1.2       Area (sq cm):  1.44       Width (cm):  1.2       Percent Debrided (%):  100       Depth (cm):  0.2       Total Area Debrided (sq cm):  1.44    Depth of debridement:  Subcutaneous tissue    Tissue debrided:  Subcutaneous    Devitalized tissue debrided:  Callus    Instruments:  Curette and Nippers  Bleeding:  Minimal  Hemostasis Achieved: Yes    Method Used:  Alginate  Patient tolerance:  Patient tolerated the procedure well with no immediate complications

## 2023-10-09 NOTE — TELEPHONE ENCOUNTER
Regarding referral from Dr. Barakat - osteomyelitis, left foot  Called pt scheduled to see Barry on 10/12/23 at 10 am.  Per patient request, called kam Carballo, confirmed date/time.

## 2023-10-10 LAB — BACTERIA SPEC ANAEROBE CULT: NORMAL

## 2023-10-12 ENCOUNTER — LAB VISIT (OUTPATIENT)
Dept: LAB | Facility: HOSPITAL | Age: 83
End: 2023-10-12
Attending: PHYSICIAN ASSISTANT
Payer: MEDICARE

## 2023-10-12 ENCOUNTER — PATIENT MESSAGE (OUTPATIENT)
Dept: RESPIRATORY THERAPY | Facility: HOSPITAL | Age: 83
End: 2023-10-12
Payer: MEDICARE

## 2023-10-12 ENCOUNTER — OFFICE VISIT (OUTPATIENT)
Dept: INFECTIOUS DISEASES | Facility: CLINIC | Age: 83
End: 2023-10-12
Payer: MEDICARE

## 2023-10-12 VITALS — WEIGHT: 151.88 LBS | BODY MASS INDEX: 27.95 KG/M2 | HEIGHT: 62 IN

## 2023-10-12 DIAGNOSIS — E11.40 TYPE 2 DIABETES MELLITUS WITH DIABETIC NEUROPATHY, WITHOUT LONG-TERM CURRENT USE OF INSULIN: Chronic | ICD-10-CM

## 2023-10-12 DIAGNOSIS — N18.31 STAGE 3A CHRONIC KIDNEY DISEASE: ICD-10-CM

## 2023-10-12 DIAGNOSIS — M86.072 ACUTE HEMATOGENOUS OSTEOMYELITIS OF LEFT FOOT: Primary | ICD-10-CM

## 2023-10-12 DIAGNOSIS — M86.072 ACUTE HEMATOGENOUS OSTEOMYELITIS OF LEFT FOOT: ICD-10-CM

## 2023-10-12 DIAGNOSIS — I73.9 PAD (PERIPHERAL ARTERY DISEASE): ICD-10-CM

## 2023-10-12 LAB
BASOPHILS # BLD AUTO: 0.04 K/UL (ref 0–0.2)
BASOPHILS NFR BLD: 0.4 % (ref 0–1.9)
DIFFERENTIAL METHOD: ABNORMAL
EOSINOPHIL # BLD AUTO: 0.1 K/UL (ref 0–0.5)
EOSINOPHIL NFR BLD: 1.6 % (ref 0–8)
ERYTHROCYTE [DISTWIDTH] IN BLOOD BY AUTOMATED COUNT: 13.7 % (ref 11.5–14.5)
HCT VFR BLD AUTO: 33.6 % (ref 37–48.5)
HGB BLD-MCNC: 11.3 G/DL (ref 12–16)
IMM GRANULOCYTES # BLD AUTO: 0.04 K/UL (ref 0–0.04)
IMM GRANULOCYTES NFR BLD AUTO: 0.4 % (ref 0–0.5)
LYMPHOCYTES # BLD AUTO: 1.3 K/UL (ref 1–4.8)
LYMPHOCYTES NFR BLD: 14.3 % (ref 18–48)
MCH RBC QN AUTO: 29.3 PG (ref 27–31)
MCHC RBC AUTO-ENTMCNC: 33.6 G/DL (ref 32–36)
MCV RBC AUTO: 87 FL (ref 82–98)
MONOCYTES # BLD AUTO: 0.6 K/UL (ref 0.3–1)
MONOCYTES NFR BLD: 6.9 % (ref 4–15)
NEUTROPHILS # BLD AUTO: 6.8 K/UL (ref 1.8–7.7)
NEUTROPHILS NFR BLD: 76.4 % (ref 38–73)
NRBC BLD-RTO: 0 /100 WBC
PLATELET # BLD AUTO: 333 K/UL (ref 150–450)
PMV BLD AUTO: 10.4 FL (ref 9.2–12.9)
RBC # BLD AUTO: 3.86 M/UL (ref 4–5.4)
WBC # BLD AUTO: 8.93 K/UL (ref 3.9–12.7)

## 2023-10-12 PROCEDURE — 3288F PR FALLS RISK ASSESSMENT DOCUMENTED: ICD-10-PCS | Mod: HCWC,CPTII,S$GLB, | Performed by: PHYSICIAN ASSISTANT

## 2023-10-12 PROCEDURE — 1101F PT FALLS ASSESS-DOCD LE1/YR: CPT | Mod: HCWC,CPTII,S$GLB, | Performed by: PHYSICIAN ASSISTANT

## 2023-10-12 PROCEDURE — 85025 COMPLETE CBC W/AUTO DIFF WBC: CPT | Mod: HCWC | Performed by: PHYSICIAN ASSISTANT

## 2023-10-12 PROCEDURE — 99999 PR PBB SHADOW E&M-EST. PATIENT-LVL IV: ICD-10-PCS | Mod: PBBFAC,HCWC,, | Performed by: PHYSICIAN ASSISTANT

## 2023-10-12 PROCEDURE — 80048 BASIC METABOLIC PNL TOTAL CA: CPT | Mod: HCWC | Performed by: PHYSICIAN ASSISTANT

## 2023-10-12 PROCEDURE — 86140 C-REACTIVE PROTEIN: CPT | Mod: HCWC | Performed by: PHYSICIAN ASSISTANT

## 2023-10-12 PROCEDURE — 1160F PR REVIEW ALL MEDS BY PRESCRIBER/CLIN PHARMACIST DOCUMENTED: ICD-10-PCS | Mod: HCWC,CPTII,S$GLB, | Performed by: PHYSICIAN ASSISTANT

## 2023-10-12 PROCEDURE — 99214 PR OFFICE/OUTPT VISIT, EST, LEVL IV, 30-39 MIN: ICD-10-PCS | Mod: HCWC,S$GLB,, | Performed by: PHYSICIAN ASSISTANT

## 2023-10-12 PROCEDURE — 1101F PR PT FALLS ASSESS DOC 0-1 FALLS W/OUT INJ PAST YR: ICD-10-PCS | Mod: HCWC,CPTII,S$GLB, | Performed by: PHYSICIAN ASSISTANT

## 2023-10-12 PROCEDURE — 99999 PR PBB SHADOW E&M-EST. PATIENT-LVL IV: CPT | Mod: PBBFAC,HCWC,, | Performed by: PHYSICIAN ASSISTANT

## 2023-10-12 PROCEDURE — 99214 OFFICE O/P EST MOD 30 MIN: CPT | Mod: HCWC,S$GLB,, | Performed by: PHYSICIAN ASSISTANT

## 2023-10-12 PROCEDURE — 1126F PR PAIN SEVERITY QUANTIFIED, NO PAIN PRESENT: ICD-10-PCS | Mod: HCWC,CPTII,S$GLB, | Performed by: PHYSICIAN ASSISTANT

## 2023-10-12 PROCEDURE — 3288F FALL RISK ASSESSMENT DOCD: CPT | Mod: HCWC,CPTII,S$GLB, | Performed by: PHYSICIAN ASSISTANT

## 2023-10-12 PROCEDURE — 1159F PR MEDICATION LIST DOCUMENTED IN MEDICAL RECORD: ICD-10-PCS | Mod: HCWC,CPTII,S$GLB, | Performed by: PHYSICIAN ASSISTANT

## 2023-10-12 PROCEDURE — 1126F AMNT PAIN NOTED NONE PRSNT: CPT | Mod: HCWC,CPTII,S$GLB, | Performed by: PHYSICIAN ASSISTANT

## 2023-10-12 PROCEDURE — 1159F MED LIST DOCD IN RCRD: CPT | Mod: HCWC,CPTII,S$GLB, | Performed by: PHYSICIAN ASSISTANT

## 2023-10-12 PROCEDURE — 36415 COLL VENOUS BLD VENIPUNCTURE: CPT | Mod: HCWC,PO | Performed by: PHYSICIAN ASSISTANT

## 2023-10-12 PROCEDURE — 1160F RVW MEDS BY RX/DR IN RCRD: CPT | Mod: HCWC,CPTII,S$GLB, | Performed by: PHYSICIAN ASSISTANT

## 2023-10-13 ENCOUNTER — TELEPHONE (OUTPATIENT)
Dept: INFECTIOUS DISEASES | Facility: CLINIC | Age: 83
End: 2023-10-13
Payer: MEDICARE

## 2023-10-13 LAB
ANION GAP SERPL CALC-SCNC: 11 MMOL/L (ref 8–16)
BUN SERPL-MCNC: 27 MG/DL (ref 8–23)
CALCIUM SERPL-MCNC: 10.1 MG/DL (ref 8.7–10.5)
CHLORIDE SERPL-SCNC: 106 MMOL/L (ref 95–110)
CO2 SERPL-SCNC: 19 MMOL/L (ref 23–29)
CREAT SERPL-MCNC: 1 MG/DL (ref 0.5–1.4)
CRP SERPL-MCNC: 1.1 MG/L (ref 0–8.2)
EST. GFR  (NO RACE VARIABLE): 55.6 ML/MIN/1.73 M^2
GLUCOSE SERPL-MCNC: 160 MG/DL (ref 70–110)
POTASSIUM SERPL-SCNC: 4.4 MMOL/L (ref 3.5–5.1)
SODIUM SERPL-SCNC: 136 MMOL/L (ref 136–145)

## 2023-10-13 NOTE — TELEPHONE ENCOUNTER
----- Message from Eli Lawrence PA-C sent at 10/13/2023  9:42 AM CDT -----  Labs reviewed. Baselines noted on CBC/CMP. CRP is actually now normal which means antibiotics seem to be working. Will proceed with our discussed plan of continuing PO doxycycline to complete 6 weeks of treatment. Please relay message to patient.

## 2023-10-13 NOTE — TELEPHONE ENCOUNTER
Per LG Amaya - Labs reviewed. Baselines noted on CBC/CMP. CRP is actually now normal which means antibiotics seem to be working. Will proceed with our discussed plan of continuing PO doxycycline to complete 6 weeks of treatment. Please relay message to patient.    Called pt, discussed above regarding labs and treatment. Patient was very appreciative. She did ask if she would need additional bloodwork and/or a follow up.    I will discuss with Eli and check back with patient next week.

## 2023-10-16 ENCOUNTER — TELEPHONE (OUTPATIENT)
Dept: PRIMARY CARE CLINIC | Facility: CLINIC | Age: 83
End: 2023-10-16

## 2023-10-16 ENCOUNTER — OFFICE VISIT (OUTPATIENT)
Dept: PRIMARY CARE CLINIC | Facility: CLINIC | Age: 83
End: 2023-10-16
Payer: MEDICARE

## 2023-10-16 VITALS
HEIGHT: 62 IN | HEART RATE: 52 BPM | SYSTOLIC BLOOD PRESSURE: 142 MMHG | DIASTOLIC BLOOD PRESSURE: 76 MMHG | OXYGEN SATURATION: 97 % | RESPIRATION RATE: 16 BRPM | TEMPERATURE: 98 F | BODY MASS INDEX: 27.6 KG/M2 | WEIGHT: 150 LBS

## 2023-10-16 DIAGNOSIS — N18.31 STAGE 3A CHRONIC KIDNEY DISEASE: ICD-10-CM

## 2023-10-16 DIAGNOSIS — E11.621 DIABETIC ULCER OF TOE OF RIGHT FOOT ASSOCIATED WITH TYPE 2 DIABETES MELLITUS, WITH FAT LAYER EXPOSED: ICD-10-CM

## 2023-10-16 DIAGNOSIS — I48.92 ATRIAL FLUTTER, UNSPECIFIED TYPE: ICD-10-CM

## 2023-10-16 DIAGNOSIS — E11.40 TYPE 2 DIABETES MELLITUS WITH DIABETIC NEUROPATHY, WITHOUT LONG-TERM CURRENT USE OF INSULIN: Chronic | ICD-10-CM

## 2023-10-16 DIAGNOSIS — I73.9 PAD (PERIPHERAL ARTERY DISEASE): ICD-10-CM

## 2023-10-16 DIAGNOSIS — Z13.6 SCREENING FOR CARDIOVASCULAR CONDITION: ICD-10-CM

## 2023-10-16 DIAGNOSIS — D64.9 ANEMIA, UNSPECIFIED TYPE: ICD-10-CM

## 2023-10-16 DIAGNOSIS — G62.9 NEUROPATHY: ICD-10-CM

## 2023-10-16 DIAGNOSIS — D63.8 ANEMIA OF CHRONIC DISEASE: ICD-10-CM

## 2023-10-16 DIAGNOSIS — M86.072 ACUTE HEMATOGENOUS OSTEOMYELITIS OF LEFT FOOT: ICD-10-CM

## 2023-10-16 DIAGNOSIS — Z85.3 HISTORY OF BREAST CANCER: ICD-10-CM

## 2023-10-16 DIAGNOSIS — M54.30 SCIATICA, UNSPECIFIED LATERALITY: ICD-10-CM

## 2023-10-16 DIAGNOSIS — R06.09 DOE (DYSPNEA ON EXERTION): ICD-10-CM

## 2023-10-16 DIAGNOSIS — I77.89 OTHER SPECIFIED DISORDERS OF ARTERIES AND ARTERIOLES: ICD-10-CM

## 2023-10-16 DIAGNOSIS — L97.512 DIABETIC ULCER OF TOE OF RIGHT FOOT ASSOCIATED WITH TYPE 2 DIABETES MELLITUS, WITH FAT LAYER EXPOSED: ICD-10-CM

## 2023-10-16 DIAGNOSIS — L97.529 ULCER OF TOE OF LEFT FOOT, UNSPECIFIED ULCER STAGE: ICD-10-CM

## 2023-10-16 DIAGNOSIS — E11.52 TYPE 2 DIABETES MELLITUS WITH DIABETIC PERIPHERAL ANGIOPATHY AND GANGRENE, WITHOUT LONG-TERM CURRENT USE OF INSULIN: ICD-10-CM

## 2023-10-16 DIAGNOSIS — E11.42 POLYNEUROPATHY DUE TO TYPE 2 DIABETES MELLITUS: ICD-10-CM

## 2023-10-16 DIAGNOSIS — Z76.89 ENCOUNTER TO ESTABLISH CARE: Primary | ICD-10-CM

## 2023-10-16 DIAGNOSIS — I10 ESSENTIAL HYPERTENSION: Chronic | ICD-10-CM

## 2023-10-16 DIAGNOSIS — I48.91 ATRIAL FIBRILLATION WITH RAPID VENTRICULAR RESPONSE: ICD-10-CM

## 2023-10-16 DIAGNOSIS — N18.32 STAGE 3B CHRONIC KIDNEY DISEASE: ICD-10-CM

## 2023-10-16 DIAGNOSIS — I70.229 CRITICAL LOWER LIMB ISCHEMIA: ICD-10-CM

## 2023-10-16 PROCEDURE — G0009 ADMIN PNEUMOCOCCAL VACCINE: HCPCS | Mod: HCWC,S$GLB,, | Performed by: INTERNAL MEDICINE

## 2023-10-16 PROCEDURE — 90677 PCV20 VACCINE IM: CPT | Mod: HCWC,S$GLB,, | Performed by: INTERNAL MEDICINE

## 2023-10-16 PROCEDURE — 90677 PNEUMOCOCCAL CONJUGATE VACCINE 20-VALENT: ICD-10-PCS | Mod: HCWC,S$GLB,, | Performed by: INTERNAL MEDICINE

## 2023-10-16 PROCEDURE — 99215 PR OFFICE/OUTPT VISIT, EST, LEVL V, 40-54 MIN: ICD-10-PCS | Mod: 25,HCWC,S$GLB, | Performed by: INTERNAL MEDICINE

## 2023-10-16 PROCEDURE — 99215 OFFICE O/P EST HI 40 MIN: CPT | Mod: 25,HCWC,S$GLB, | Performed by: INTERNAL MEDICINE

## 2023-10-16 PROCEDURE — 99999 PR PBB SHADOW E&M-EST. PATIENT-LVL V: CPT | Mod: PBBFAC,HCWC,, | Performed by: INTERNAL MEDICINE

## 2023-10-16 PROCEDURE — 3077F SYST BP >= 140 MM HG: CPT | Mod: HCWC,CPTII,S$GLB, | Performed by: INTERNAL MEDICINE

## 2023-10-16 PROCEDURE — 90694 VACC AIIV4 NO PRSRV 0.5ML IM: CPT | Mod: HCWC,S$GLB,, | Performed by: INTERNAL MEDICINE

## 2023-10-16 PROCEDURE — 3288F PR FALLS RISK ASSESSMENT DOCUMENTED: ICD-10-PCS | Mod: HCWC,CPTII,S$GLB, | Performed by: INTERNAL MEDICINE

## 2023-10-16 PROCEDURE — 1160F PR REVIEW ALL MEDS BY PRESCRIBER/CLIN PHARMACIST DOCUMENTED: ICD-10-PCS | Mod: HCWC,CPTII,S$GLB, | Performed by: INTERNAL MEDICINE

## 2023-10-16 PROCEDURE — 99999 PR PBB SHADOW E&M-EST. PATIENT-LVL V: ICD-10-PCS | Mod: PBBFAC,HCWC,, | Performed by: INTERNAL MEDICINE

## 2023-10-16 PROCEDURE — 3078F PR MOST RECENT DIASTOLIC BLOOD PRESSURE < 80 MM HG: ICD-10-PCS | Mod: HCWC,CPTII,S$GLB, | Performed by: INTERNAL MEDICINE

## 2023-10-16 PROCEDURE — 1159F PR MEDICATION LIST DOCUMENTED IN MEDICAL RECORD: ICD-10-PCS | Mod: HCWC,CPTII,S$GLB, | Performed by: INTERNAL MEDICINE

## 2023-10-16 PROCEDURE — 1160F RVW MEDS BY RX/DR IN RCRD: CPT | Mod: HCWC,CPTII,S$GLB, | Performed by: INTERNAL MEDICINE

## 2023-10-16 PROCEDURE — 1126F AMNT PAIN NOTED NONE PRSNT: CPT | Mod: HCWC,CPTII,S$GLB, | Performed by: INTERNAL MEDICINE

## 2023-10-16 PROCEDURE — 1126F PR PAIN SEVERITY QUANTIFIED, NO PAIN PRESENT: ICD-10-PCS | Mod: HCWC,CPTII,S$GLB, | Performed by: INTERNAL MEDICINE

## 2023-10-16 PROCEDURE — 99417 PROLNG OP E/M EACH 15 MIN: CPT | Mod: HCWC,S$GLB,, | Performed by: INTERNAL MEDICINE

## 2023-10-16 PROCEDURE — 1159F MED LIST DOCD IN RCRD: CPT | Mod: HCWC,CPTII,S$GLB, | Performed by: INTERNAL MEDICINE

## 2023-10-16 PROCEDURE — 1101F PT FALLS ASSESS-DOCD LE1/YR: CPT | Mod: HCWC,CPTII,S$GLB, | Performed by: INTERNAL MEDICINE

## 2023-10-16 PROCEDURE — 90694 FLU VACCINE - QUADRIVALENT - ADJUVANTED: ICD-10-PCS | Mod: HCWC,S$GLB,, | Performed by: INTERNAL MEDICINE

## 2023-10-16 PROCEDURE — 1158F PR ADVANCE CARE PLANNING DISCUSS DOCUMENTED IN MEDICAL RECORD: ICD-10-PCS | Mod: HCWC,CPTII,S$GLB, | Performed by: INTERNAL MEDICINE

## 2023-10-16 PROCEDURE — 3077F PR MOST RECENT SYSTOLIC BLOOD PRESSURE >= 140 MM HG: ICD-10-PCS | Mod: HCWC,CPTII,S$GLB, | Performed by: INTERNAL MEDICINE

## 2023-10-16 PROCEDURE — 1101F PR PT FALLS ASSESS DOC 0-1 FALLS W/OUT INJ PAST YR: ICD-10-PCS | Mod: HCWC,CPTII,S$GLB, | Performed by: INTERNAL MEDICINE

## 2023-10-16 PROCEDURE — 99417 PR PROLONGED SVC, OUTPT, W/WO DIRECT PT CONTACT,  EA ADDTL 15 MIN: ICD-10-PCS | Mod: HCWC,S$GLB,, | Performed by: INTERNAL MEDICINE

## 2023-10-16 PROCEDURE — 3288F FALL RISK ASSESSMENT DOCD: CPT | Mod: HCWC,CPTII,S$GLB, | Performed by: INTERNAL MEDICINE

## 2023-10-16 PROCEDURE — G0009 PNEUMOCOCCAL CONJUGATE VACCINE 20-VALENT: ICD-10-PCS | Mod: HCWC,S$GLB,, | Performed by: INTERNAL MEDICINE

## 2023-10-16 PROCEDURE — G0008 FLU VACCINE - QUADRIVALENT - ADJUVANTED: ICD-10-PCS | Mod: HCWC,S$GLB,, | Performed by: INTERNAL MEDICINE

## 2023-10-16 PROCEDURE — G0008 ADMIN INFLUENZA VIRUS VAC: HCPCS | Mod: HCWC,S$GLB,, | Performed by: INTERNAL MEDICINE

## 2023-10-16 PROCEDURE — 1158F ADVNC CARE PLAN TLK DOCD: CPT | Mod: HCWC,CPTII,S$GLB, | Performed by: INTERNAL MEDICINE

## 2023-10-16 PROCEDURE — 3078F DIAST BP <80 MM HG: CPT | Mod: HCWC,CPTII,S$GLB, | Performed by: INTERNAL MEDICINE

## 2023-10-16 RX ORDER — ATORVASTATIN CALCIUM 40 MG/1
40 TABLET, FILM COATED ORAL DAILY
Qty: 90 TABLET | Refills: 3 | Status: SHIPPED | OUTPATIENT
Start: 2023-10-16 | End: 2024-10-15

## 2023-10-16 NOTE — PROGRESS NOTES
[facility] INITIAL VISIT NOTE      CHIEF COMPLAINT     Chief Complaint   Patient presents with    Establish Care     Medication review       HPI     Caro Powell is a 83 y.o.  female who presents with a PMHx of  breast cancer, afib, CKD 3, arthritis, DM2, HLD, HTN, who presents today to establish care.   Here with her son. Her   a few months ago after a long glover with dementia. She then moved in with her son and theyre both agreeable happy with the arrangement     Her main complaints and concerns are:   Being treated for an ulcer/cellulitis with osteomyelitis of the toe.   Had this a few years ago which lead to toe amputation.   Known PAD and needed vascular surgery, now on statin and plavix for this. Denies any claudication.   I question this diagnosis of celluliti and wonder if this is a vascular abscess    History of breast cancer: diagnosed in  august had mastectomy and radiation.    Needs to establish with breast surgeon over here. Referral placed    DM2: last hgb a1c on file from over 6 months ago was 6.7 and at goal. Her medications include amaryl 1mg. On ACE-I and statin therapy   Due for eye exam will call her doctor and schedule it  Foot exam done today     Afib: on eliquis. Rate controlled.         CHRONIC HEALTH ISSUES:   Past Medical History:  Past Medical History:   Diagnosis Date    Anticoagulant long-term use     Arthritis     Atrial flutter     Calcium nephrolithiasis     ckd 3    Diabetes mellitus, type 2     Diabetic peripheral neuropathy associated with type 2 diabetes mellitus     Difficult intubation     NARROW AIRWAY    Disc displacement, lumbar     Hypertension     Invasive ductal carcinoma of left breast 2022    Mixed hyperlipidemia     Pulmonary aspiration of gastric contents     Shingles          Geriatric Assessment    ADLs : independent   iADLs: independent     Polypharmacy:yes and appropriate     Visual impairments: no change      Hearing impairment: no change     Urinary incontinence: no     Malnutrition: no     Gait/balance/falls: no falls, normal gait and balance     Depression: PHQ2. No depression or anxiety    Sleep: great    Cognitive Problems: minicog.. forgot all three words.     Environmental/social problems: lives with son,  recently  from dementia. Rugs at home. Grabs bars in the bathrooms. Guns at home but locked away.     Functional status:     Support system:     Advanced care planning:  does not have on file. But discussed at length today and she will fill out the paperwork and return it to clinic to be scanned in.    Date: 10/16/2023    Power of   I initiated the process of voluntary advance care planning today and explained the importance of this process to the patient.  I introduced the concept of advance directives to the patient, as well. Then the patient received detailed information about the importance of designating a Health Care Power of  (HCPOA). She was also instructed to communicate with this person about their wishes for future healthcare, should she become sick and lose decision-making capacity. The patient has not previously appointed a HCPOA. After our discussion, the patient has decided to complete a HCPOA and has appointed her son, health care agent:  misael ocampo.   & health care agent number:  on file  I spent a total time of 10 minutes discussing this issue with the patient.              Worry score 1    Past Surgical History:  Past Surgical History:   Procedure Laterality Date    ANGIOGRAPHY OF LOWER EXTREMITY N/A 10/21/2021    Procedure: Angiogram Extremity Unilateral;  Surgeon: Dre Martino MD;  Location: [facility] CATH LAB/EP;  Service: Cardiology;  Laterality: N/A;    ANGIOGRAPHY OF LOWER EXTREMITY Right 11/10/2021    Procedure: Angiogram Extremity Unilateral;  Surgeon: Ben Rooney MD;  Location: [facility] CATH LAB/EP;  Service: Cardiology;  Laterality: Right;     AXILLARY NODE DISSECTION Right 08/15/2022    Procedure: LYMPHADENECTOMY, AXILLARY RIGHT;  Surgeon: RADHA Quinn MD;  Location: [facility] OR;  Service: General;  Laterality: Right;    COLONOSCOPY  11/28/2011    sigmoid diverticulosis, external hemorrhoids    COLONOSCOPY      DEBRIDEMENT Right 10/20/2021    Procedure: DEBRIDEMENT;  Surgeon: Ava Atkins DPM;  Location: [facility] OR;  Service: Podiatry;  Laterality: Right;    ENDOSCOPIC GASTROCNEMIUS RECESSION Right 09/10/2019    Procedure: RECESSION, GASTROCNEMIUS, ENDOSCOPIC;  Surgeon: Derek Jose DPM;  Location: [facility] OR;  Service: Podiatry;  Laterality: Right;  Arthrex center line (ron notified)  Video    EXTRACORPOREAL SHOCK WAVE LITHOTRIPSY      FLEXOR TENOTOMY Right 10/20/2021    Procedure: TENOTOMY, FLEXOR 3rd toe;  Surgeon: Ava Atkins DPM;  Location: [facility] OR;  Service: Podiatry;  Laterality: Right;    HYSTERECTOMY      INJECTION FOR SENTINEL NODE IDENTIFICATION Left 08/15/2022    Procedure: INJECTION, FOR SENTINEL NODE IDENTIFICATION;  Surgeon: RADHA Quinn MD;  Location: [facility] OR;  Service: General;  Laterality: Left;    MASTECTOMY Bilateral 08/15/2022    Procedure: MASTECTOMY BILATERAL  / BREAST;  Surgeon: RADHA Quinn MD;  Location: [facility];  Service: General;  Laterality: Bilateral;  5 HOURS / EMAIL SENT 8-11 @ 9:02 LK    SENTINEL LYMPH NODE BIOPSY Left 08/15/2022    Procedure: BIOPSY, LYMPH NODE, SENTINEL LEFT;  Surgeon: RADHA Quinn MD;  Location: [facility];  Service: General;  Laterality: Left;    SHOULDER SURGERY Left     TOE AMPUTATION Right 05/22/2017    5th toe    TOE AMPUTATION Right 10/20/2021    Procedure: AMPUTATION, TOE;  Surgeon: Ava Atkins DPM;  [facility];  Service: Podiatry;  Laterality: Right;    TONSILLECTOMY         Allergies:  Review of patient's allergies indicates:   Allergen Reactions    Codeine Nausea Only and Other (See Comments)     Can Take Tylenol, hrdrocodone       Home Medications:  Prior to  Admission medications    Medication Sig Start Date End Date Taking? Authorizing Provider   ACCU-CHEK SAMI PLUS METER Misc TEST  AS DIRECTED 8/11/21   Brayan Penny MD   ACCU-CHEK SAMI PLUS TEST STRP Strp USE TO TEST BLOOD SUGAR ONCE DAILY 12/16/19   Manuel Laird MD   ACCU-CHEK SOFT DEV LANCETS Kit  10/2/14   Provider, Historical   ACCU-CHEK SOFTCLIX LANCETS Misc TEST ONCE DAILY 12/16/19   Manuel Laird MD   ammonium lactate 12 % Crea Apply to feet twice daily. Avoid use between toes. 2/9/21   Ava Atkins DPM   anastrozole (ARIMIDEX) 1 mg Tab TAKE 1 TABLET ONE TIME DAILY 8/9/23   Morgan Caputo MD   apixaban (ELIQUIS) 5 mg Tab TAKE 1 TABLET BY MOUTH TWICE DAILY 7/28/23   Brayan Penny MD   atorvastatin (LIPITOR) 80 MG tablet Take 1 tablet (80 mg total) by mouth once daily. 10/7/22 10/7/23  Brayan Penny MD   calcium-vitamin D3 (OYSTER SHELL CALCIUM-VIT D3) 500 mg-5 mcg (200 unit) per tablet Take 1 tablet by mouth once daily. 11/28/22 11/28/23  Morgan Caputo MD   clopidogreL (PLAVIX) 75 mg tablet Take 1 tablet (75 mg total) by mouth once daily. 10/17/22 10/17/23  Brayan Penny MD   doxycycline (VIBRA-TABS) 100 MG tablet Take 1 tablet (100 mg total) by mouth 2 (two) times daily. for 14 days 10/9/23 10/23/23  Stephen Barakat, DPM   EScitalopram oxalate (LEXAPRO) 10 MG tablet Take 1 tablet (10 mg total) by mouth once daily. 4/19/23 4/18/24  Brayan Penny MD   famotidine (PEPCID) 20 MG tablet TAKE 1 TABLET AT BEDTIME 8/9/23   Brayan Penny MD   FLUAD QUAD 2022-23,65Y UP,,PF, 60 mcg (15 mcg x 4)/0.5 mL Syrg  11/8/22   Provider, Historical   furosemide (LASIX) 40 MG tablet TAKE 1 TABLET EVERY DAY 10/7/23   Sam Paulino MD   glimepiride (AMARYL) 1 MG tablet TAKE 1 TABLET TWICE DAILY 7/28/23   Brayan Penny MD   lisinopriL (PRINIVIL,ZESTRIL) 20 MG tablet TAKE 1 TABLET EVERY DAY 1/12/23   Brayan Penny MD   LORazepam (ATIVAN) 0.5 MG tablet Take  "1-2 tablets (0.5-1 mg total) by mouth every 8 (eight) hours as needed for Anxiety. 3/30/23 9/1/24  Brayan Penny MD   metoprolol succinate (TOPROL-XL) 25 MG 24 hr tablet TAKE 1 TABLET EVERY DAY 3/22/23   Brayan Penny MD   mupirocin (BACTROBAN) 2 % ointment Apply topically 2 (two) times daily. 1/4/23   Ava Atkins DPM   polyethylene glycol (GLYCOLAX) 17 gram/dose powder Take 17 g by mouth once daily. 10/22/21   Mariya Love MD   pramipexole (MIRAPEX) 0.125 MG tablet TAKE 1 TABLET EVERY DAY 10/9/23   Brayan Penny MD   traMADoL (ULTRAM) 50 mg tablet Take 1 tablet (50 mg total) by mouth every 6 (six) hours as needed for Pain. 10/10/22   Morgan Caputo MD   urea (CARMOL) 40 % Crea Apply topically 2 (two) times daily. 7/14/21   Ava Atkins DPM       Family History:  Family History   Problem Relation Age of Onset    Diabetes Mother     Heart failure Father     No Known Problems Sister     Breast cancer Sister 69        Genetic testing negative    Kidney failure Brother     Breast cancer Maternal Aunt         early 40s    Diabetes Maternal Grandmother     No Known Problems Maternal Grandfather     No Known Problems Paternal Grandmother     No Known Problems Paternal Grandfather        Social History:  Social History     Tobacco Use    Smoking status: Never     Passive exposure: Never    Smokeless tobacco: Never   Substance Use Topics    Alcohol use: No    Drug use: No       Review of Systems:  ROS      PHYSICAL EXAM     BP (!) 142/76 (BP Location: Right arm, Patient Position: Sitting, BP Method: Medium (Manual))   Pulse (!) 52   Temp 98 °F (36.7 °C) (Oral)   Resp 16   Ht 5' 1.9" (1.572 m)   Wt 68.1 kg (150 lb 0.4 oz)   LMP  (LMP Unknown)   SpO2 97%   BMI 27.53 kg/m²     GEN - A+OX4, NAD   HEENT - PERRL, EOMI, OP clear. MMM.   Neck - No thyromegaly or cervical LAD. No thyroid masses felt.vasc  CV - RRR, no m/r   Chest - CTAB, no wheezing or rhonchi  Abd - S/NT/ND/+BS.   Ext " - 2+BDP and radial pulses. No LE edema.   Neuro - PERRL, EOMI, no nystagmus, eyebrow raise, facial sensation, hearing, m of mastication, smile, palatal raise, shoulder shrug, tongue protrusion symmetric and intact. 5/5 BUE and BLE strength. Sensation to light touch intact throughout. 2+ DTRs. Normal gait.   MSK - No spinal tenderness to palpation. Normal gait.   Skin - No rash.  Protective Sensation (w/ 10 gram monofilament):  Right: Decreased  Left: Decreased    Visual Inspection:  Blister -  Bilateral, Skin Breakdown -  Bilateral, Erythema -  Bilateral, and Callus -  Bilateral    Pedal Pulses:   Right: Diminished  Left: Diminished    Posterior Tibialis Pulses:   Right:Diminished  Left: Diminished      LABS     Previous labs reviewed.      ASSESSMENT/PLAN     Caro Powell is a 83 y.o. female with  1. Encounter to establish care  -medications reviewed and consolidated  -reviewed PMH, medications, HCM including vaccinations.   -reviewed most recent lab work. Reordered as needed. Discussed abnormalities with patient with time allowed for questions.   -reviewed clinic policy with patient including to arrive 15 mins before appointments, labs and results including expected turn around for results.   -outside records and consultants notes reviewed as time allowed. Updated treatment team with name of other providers.   -reviewed and confirmed patients contact information, preferred pharmacy etc.   -AVS provided after visit to summarized things discussed.   -The following assessments were completed:  Living Situation  CAGE  Depression Screening  Timed Get Up and Go  Cognitive Function Screening-Minicog   Nutrition Screening  ADL Screening      2. Polyneuropathy due to type 2 diabetes mellitus  Overview:  Controlled   Continue with good glucose control      3. PAD (peripheral artery disease)  Overview:  On plavix and statin  S/p vascular srugery of the right leg in the past     Orders:  -     atorvastatin (LIPITOR)  40 MG tablet; Take 1 tablet (40 mg total) by mouth once daily.  Dispense: 90 tablet; Refill: 3  -     Ambulatory referral/consult to Vascular Surgery; Future; Expected date: 10/23/2023    4. Atrial fibrillation with rapid ventricular response  Overview:  Rate controlled with metoprolol  A/c with eeliquis      5. Essential hypertension  Overview:  BP is very well controlled. A bit elevated  Continue with current medication    Orders:  -     Hypertension Digital Medicine ([facility]) Enrollment Order  -     Hypertension Digital Medicine ([facility]): Assign Onboarding Questionnaires    6. Atrial flutter, unspecified type  Overview:  Rate controlled with metoprolol  A/c with eeliquis      7. Stage 3b chronic kidney disease    8. History of breast cancer  -     Ambulatory referral/consult to Breast Surgery; Future; Expected date: 10/23/2023    9. Type 2 diabetes mellitus with diabetic neuropathy, without long-term current use of insulin  Overview:  Diabetes is well controlled. Continue with current meds   Eye exam needed     Orders:  -     Hemoglobin A1C; Future; Expected date: 10/16/2023  -     Microalbumin/Creatinine Ratio, Urine; Future; Expected date: 10/16/2023  -     Ambulatory referral/consult to Ophthalmology; Future; Expected date: 10/23/2023    10. Sciatica, unspecified laterality  Overview:  Stable       11. Anemia of chronic disease  Overview:  Ordered iron studies and stool occult     Orders:  -     Iron and TIBC; Future; Expected date: 10/16/2023  -     FERRITIN; Future; Expected date: 10/16/2023  -     Occult blood x 1, stool; Future; Expected date: 10/16/2023    12. Anemia, unspecified type  Overview:  Ordered iron studies and stool occult       13. Screening for cardiovascular condition  -     LIPID PANEL; Future; Expected date: 10/16/2023    14. Critical lower limb ischemia  Overview:  S/p vascular surgery     Orders:  -     atorvastatin (LIPITOR) 40 MG tablet; Take 1 tablet (40 mg total) by mouth once daily.   Dispense: 90 tablet; Refill: 3    15. HERNANDEZ (dyspnea on exertion)  -     Nuclear Stress - Cardiology Interpreted; Future  -     US Carotid Bilateral; Future; Expected date: 10/16/2023    16. Ulcer of toe of left foot, unspecified ulcer stage  -     US Lower Extremity Arteries Bilateral; Future; Expected date: 10/16/2023  -     Ambulatory referral/consult to Vascular Surgery; Future; Expected date: 10/23/2023    17. Other specified disorders of arteries and arterioles  -     US Carotid Bilateral; Future; Expected date: 10/16/2023    18. Neuropathy  Overview:  Mild and barely noticeable   Control diabetes      19. Stage 3a chronic kidney disease  Overview:  Stable renal function.   Encouraged proper hydration and avoid NSAIDs      20. Acute hematogenous osteomyelitis of left foot  Overview:  On abx doxycycline per podiatry         21. Diabetic ulcer of toe of right foot associated with type 2 diabetes mellitus, with fat layer exposed  Overview:  Diabetes is well controlled. Continue with current meds   Eye exam needed       22. 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 Continue with current meds   Eye exam needed       Other orders  -     (In Office Administered) Pneumococcal Conjugate Vaccine (20 Valent) (IM) (Preferred)  -     Influenza - Quadrivalent (Adjuvanted)         ACP: discussion had about ACP to include definition of ACP, HCP, CODE STATUS. Paperwork handed to patient, to review, discuss with family and return it to clinic to be scanned into the chart.   Advance Care Planning             My total time spent on this encounter was at least 73 minutes which included  the following activities: preparing to see the patient, performing a medically appropriate and/or evaluation, counseling and educating the patient and family/caregiver, ordering medications, tests, or procedures, referring and  communicating with other healthcare providers, documenting clinical information in the electronic or other health record. This time is independent and non-overlapping.         RTC in 1 months, sooner if needed and depending on labs.    Anisha Reyes MD  Board Certified Internist/Geriatrician  [facility] [place] [address] ([place])

## 2023-10-16 NOTE — PATIENT INSTRUCTIONS
Please add a probiotic daily. Florastor or naturally with Jordanian, activia is a common probiotic.

## 2023-10-16 NOTE — TELEPHONE ENCOUNTER
Contacted pt to advise number for breast surgeon to be scheduled since pt has a hx of breast cancer. Pt did not answer left vm and provided number [phone]  Pt also needs an opthamology appt scheduled but I was unable pt can called [phone] for scheduling

## 2023-10-17 ENCOUNTER — OFFICE VISIT (OUTPATIENT)
Dept: PODIATRY | Facility: CLINIC | Age: 83
End: 2023-10-17
Payer: MEDICARE

## 2023-10-17 VITALS — WEIGHT: 150 LBS | HEIGHT: 62 IN | BODY MASS INDEX: 27.6 KG/M2

## 2023-10-17 DIAGNOSIS — E11.621 DIABETIC ULCER OF OTHER PART OF RIGHT FOOT ASSOCIATED WITH TYPE 2 DIABETES MELLITUS, WITH FAT LAYER EXPOSED: ICD-10-CM

## 2023-10-17 DIAGNOSIS — Z89.429 HISTORY OF AMPUTATION OF LESSER TOE, UNSPECIFIED LATERALITY: ICD-10-CM

## 2023-10-17 DIAGNOSIS — M86.072 ACUTE HEMATOGENOUS OSTEOMYELITIS OF LEFT FOOT: ICD-10-CM

## 2023-10-17 DIAGNOSIS — E11.42 TYPE 2 DIABETES MELLITUS WITH PERIPHERAL NEUROPATHY: ICD-10-CM

## 2023-10-17 DIAGNOSIS — L97.524 DIABETIC ULCER OF TOE OF LEFT FOOT ASSOCIATED WITH TYPE 2 DIABETES MELLITUS, WITH NECROSIS OF BONE: ICD-10-CM

## 2023-10-17 DIAGNOSIS — L97.512 DIABETIC ULCER OF OTHER PART OF RIGHT FOOT ASSOCIATED WITH TYPE 2 DIABETES MELLITUS, WITH FAT LAYER EXPOSED: ICD-10-CM

## 2023-10-17 DIAGNOSIS — I73.9 PAD (PERIPHERAL ARTERY DISEASE): Primary | ICD-10-CM

## 2023-10-17 DIAGNOSIS — E11.621 DIABETIC ULCER OF TOE OF LEFT FOOT ASSOCIATED WITH TYPE 2 DIABETES MELLITUS, WITH NECROSIS OF BONE: ICD-10-CM

## 2023-10-17 PROCEDURE — 1101F PR PT FALLS ASSESS DOC 0-1 FALLS W/OUT INJ PAST YR: ICD-10-PCS | Mod: HCWC,CPTII,S$GLB, | Performed by: STUDENT IN AN ORGANIZED HEALTH CARE EDUCATION/TRAINING PROGRAM

## 2023-10-17 PROCEDURE — 1159F MED LIST DOCD IN RCRD: CPT | Mod: HCWC,CPTII,S$GLB, | Performed by: STUDENT IN AN ORGANIZED HEALTH CARE EDUCATION/TRAINING PROGRAM

## 2023-10-17 PROCEDURE — 1160F RVW MEDS BY RX/DR IN RCRD: CPT | Mod: HCWC,CPTII,S$GLB, | Performed by: STUDENT IN AN ORGANIZED HEALTH CARE EDUCATION/TRAINING PROGRAM

## 2023-10-17 PROCEDURE — 1126F PR PAIN SEVERITY QUANTIFIED, NO PAIN PRESENT: ICD-10-PCS | Mod: HCWC,CPTII,S$GLB, | Performed by: STUDENT IN AN ORGANIZED HEALTH CARE EDUCATION/TRAINING PROGRAM

## 2023-10-17 PROCEDURE — 1159F PR MEDICATION LIST DOCUMENTED IN MEDICAL RECORD: ICD-10-PCS | Mod: HCWC,CPTII,S$GLB, | Performed by: STUDENT IN AN ORGANIZED HEALTH CARE EDUCATION/TRAINING PROGRAM

## 2023-10-17 PROCEDURE — 3288F FALL RISK ASSESSMENT DOCD: CPT | Mod: HCWC,CPTII,S$GLB, | Performed by: STUDENT IN AN ORGANIZED HEALTH CARE EDUCATION/TRAINING PROGRAM

## 2023-10-17 PROCEDURE — 99999 PR PBB SHADOW E&M-EST. PATIENT-LVL III: CPT | Mod: PBBFAC,HCWC,, | Performed by: STUDENT IN AN ORGANIZED HEALTH CARE EDUCATION/TRAINING PROGRAM

## 2023-10-17 PROCEDURE — 1126F AMNT PAIN NOTED NONE PRSNT: CPT | Mod: HCWC,CPTII,S$GLB, | Performed by: STUDENT IN AN ORGANIZED HEALTH CARE EDUCATION/TRAINING PROGRAM

## 2023-10-17 PROCEDURE — 99213 PR OFFICE/OUTPT VISIT, EST, LEVL III, 20-29 MIN: ICD-10-PCS | Mod: HCWC,S$GLB,, | Performed by: STUDENT IN AN ORGANIZED HEALTH CARE EDUCATION/TRAINING PROGRAM

## 2023-10-17 PROCEDURE — 1160F PR REVIEW ALL MEDS BY PRESCRIBER/CLIN PHARMACIST DOCUMENTED: ICD-10-PCS | Mod: HCWC,CPTII,S$GLB, | Performed by: STUDENT IN AN ORGANIZED HEALTH CARE EDUCATION/TRAINING PROGRAM

## 2023-10-17 PROCEDURE — 99999 PR PBB SHADOW E&M-EST. PATIENT-LVL III: ICD-10-PCS | Mod: PBBFAC,HCWC,, | Performed by: STUDENT IN AN ORGANIZED HEALTH CARE EDUCATION/TRAINING PROGRAM

## 2023-10-17 PROCEDURE — 1101F PT FALLS ASSESS-DOCD LE1/YR: CPT | Mod: HCWC,CPTII,S$GLB, | Performed by: STUDENT IN AN ORGANIZED HEALTH CARE EDUCATION/TRAINING PROGRAM

## 2023-10-17 PROCEDURE — 99213 OFFICE O/P EST LOW 20 MIN: CPT | Mod: HCWC,S$GLB,, | Performed by: STUDENT IN AN ORGANIZED HEALTH CARE EDUCATION/TRAINING PROGRAM

## 2023-10-17 PROCEDURE — 3288F PR FALLS RISK ASSESSMENT DOCUMENTED: ICD-10-PCS | Mod: HCWC,CPTII,S$GLB, | Performed by: STUDENT IN AN ORGANIZED HEALTH CARE EDUCATION/TRAINING PROGRAM

## 2023-10-17 RX ORDER — DOXYCYCLINE HYCLATE 100 MG
100 TABLET ORAL 2 TIMES DAILY
Qty: 38 TABLET | Refills: 0 | Status: SHIPPED | OUTPATIENT
Start: 2023-10-24 | End: 2023-11-12

## 2023-10-17 NOTE — PROGRESS NOTES
Patient ID: Caro Powell is a 83 y.o. female.    Subjective:           Chief Complaint: osteo of both feet      HPI    84yo female with multiple comorbidities, most notable for h/o breast CA (2022), CKD3, PAD, Afib, T2DM with diabetic neuropathy and history of osteomyelitis requiring amputation of toes on the right foot (OSH), and continued issues with diabetic foot ulcers, referred to Infectious Diseases for osteomyelitis of the left 3rd toe.    - patient recently relocated to [place] from the [facility] so is establishing with new providers  - noticed an ulcer to the end of her left 3rd toe around the end of September  - 10/1/23 went to Urgent Care for worsening appearance and was given IM CTX + PO doxycycline  - saw Podiatry Dr. Barakat next day who performed bone biopsy. She continued on the PO doxycycline and noted improvement in the wound/toe appearance  - Pathology: acute osteomyelitis  - cultures: Negative  - she was continued on PO doxycycline and referred to Infectious Diseases for treatment recommendations    Interval HPI:  10/12/23  - Initial ID clinic visit. Patient here with her son. They report she has been continuing to improve on the doxycycline and that her wound is healing and looking better   - denies fevers, chills, night sweats, or increasing erythema/edema/pain to the left foot  - she has neuropathy so does not having good sensation  - seeing Dr. Barakat regularly for wound checks/serial debridements and having HH change dressing MW    Past Medical History:   Diagnosis Date    Anticoagulant long-term use     Arthritis     Atrial flutter     Calcium nephrolithiasis 2007    ckd 3    Diabetes mellitus, type 2     Diabetic peripheral neuropathy associated with type 2 diabetes mellitus     Difficult intubation     NARROW AIRWAY    Disc displacement, lumbar     Hypertension     Invasive ductal carcinoma of left breast 07/06/2022    Mixed hyperlipidemia     Pulmonary aspiration of gastric  contents     Shingles        Past Surgical History:   Procedure Laterality Date    ANGIOGRAPHY OF LOWER EXTREMITY N/A 10/21/2021    Procedure: Angiogram Extremity Unilateral;  Surgeon: Dre Martino MD;  Location: [facility] CATH LAB/EP;  Service: Cardiology;  Laterality: N/A;    ANGIOGRAPHY OF LOWER EXTREMITY Right 11/10/2021    Procedure: Angiogram Extremity Unilateral;  Surgeon: Ben Rooney MD;  Location: [facility] CATH LAB/EP;  Service: Cardiology;  Laterality: Right;    AXILLARY NODE DISSECTION Right 08/15/2022    Procedure: LYMPHADENECTOMY, AXILLARY RIGHT;  Surgeon: RADHA Quinn MD;  Location: [facility];  Service: General;  Laterality: Right;    COLONOSCOPY  11/28/2011    sigmoid diverticulosis, external hemorrhoids    COLONOSCOPY      DEBRIDEMENT Right 10/20/2021    Procedure: DEBRIDEMENT;  Surgeon: Ava Atkins DPM;  Location: [facility];  Service: Podiatry;  Laterality: Right;    ENDOSCOPIC GASTROCNEMIUS RECESSION Right 09/10/2019    Procedure: RECESSION, GASTROCNEMIUS, ENDOSCOPIC;  Surgeon: Derek Jose DPM;  Location: [facility];  Service: Podiatry;  Laterality: Right;  Arthrex center line (ron notified)  Video    EXTRACORPOREAL SHOCK WAVE LITHOTRIPSY      FLEXOR TENOTOMY Right 10/20/2021    Procedure: TENOTOMY, FLEXOR 3rd toe;  Surgeon: Ava Atkins DPM;  Location: [facility];  Service: Podiatry;  Laterality: Right;    HYSTERECTOMY      INJECTION FOR SENTINEL NODE IDENTIFICATION Left 08/15/2022    Procedure: INJECTION, FOR SENTINEL NODE IDENTIFICATION;  Surgeon: RADHA Quinn MD;  Location: [facility];  Service: General;  Laterality: Left;    MASTECTOMY Bilateral 08/15/2022    Procedure: MASTECTOMY BILATERAL  / BREAST;  Surgeon: RADHA Quinn MD;  Location: [facility];  Service: General;  Laterality: Bilateral;  5 HOURS / EMAIL SENT 8-11 @ 9:02 LK    SENTINEL LYMPH NODE BIOPSY Left 08/15/2022    Procedure: BIOPSY, LYMPH NODE, SENTINEL LEFT;  Surgeon: RADHA Quinn MD;  Location: [facility];   Service: General;  Laterality: Left;    SHOULDER SURGERY Left     TOE AMPUTATION Right 05/22/2017    5th toe    TOE AMPUTATION Right 10/20/2021    Procedure: AMPUTATION, TOE;  Surgeon: Ava Atkins DPM;  Location: [facility];  Service: Podiatry;  Laterality: Right;    TONSILLECTOMY         Review of patient's allergies indicates:   Allergen Reactions    Codeine Nausea Only and Other (See Comments)     Can Take Tylenol, hrdrocodone       Family History   Problem Relation Age of Onset    Diabetes Mother     Heart failure Father     No Known Problems Sister     Breast cancer Sister 69        Genetic testing negative    Kidney failure Brother     Breast cancer Maternal Aunt         early 40s    Diabetes Maternal Grandmother     No Known Problems Maternal Grandfather     No Known Problems Paternal Grandmother     No Known Problems Paternal Grandfather        Social History     Socioeconomic History    Marital status:    Tobacco Use    Smoking status: Never     Passive exposure: Never    Smokeless tobacco: Never   Substance and Sexual Activity    Alcohol use: No    Drug use: No    Sexual activity: Not Currently     Partners: Male     Social Determinants of Health     Financial Resource Strain: Medium Risk (10/20/2021)    Overall Financial Resource Strain (CARDIA)     Difficulty of Paying Living Expenses: Somewhat hard   Food Insecurity: No Food Insecurity (10/20/2021)    Hunger Vital Sign     Worried About Running Out of Food in the Last Year: Never true     Ran Out of Food in the Last Year: Never true   Transportation Needs: No Transportation Needs (10/20/2021)    PRAPARE - Transportation     Lack of Transportation (Medical): No     Lack of Transportation (Non-Medical): No   Physical Activity: Inactive (10/20/2021)    Exercise Vital Sign     Days of Exercise per Week: 0 days     Minutes of Exercise per Session: 0 min   Stress: Stress Concern Present (10/20/2021)    [place] [employer] -  Occupational Stress Questionnaire     Feeling of Stress : To some extent   Social Connections: Moderately Isolated (10/20/2021)    Social Connection and Isolation Panel [NHANES]     Frequency of Communication with Friends and Family: Twice a week     Frequency of Social Gatherings with Friends and Family: Never     Attends Jain Services: 1 to 4 times per year     Active Member of Clubs or Organizations: No     Attends Club or Organization Meetings: Never     Marital Status:    Housing Stability: Low Risk  (10/20/2021)    Housing Stability Vital Sign     Unable to Pay for Housing in the Last Year: No     Number of Places Lived in the Last Year: 1     Unstable Housing in the Last Year: No       Current Outpatient Medications   Medication Instructions    ACCU-CHEK SAMI PLUS METER Misc TEST  AS DIRECTED    ACCU-CHEK SAMI PLUS TEST STRP Strp USE TO TEST BLOOD SUGAR ONCE DAILY    ACCU-CHEK SOFT DEV LANCETS Kit No dose, route, or frequency recorded.    ACCU-CHEK SOFTCLIX LANCETS Misc TEST ONCE DAILY    ammonium lactate 12 % Crea Apply to feet twice daily. Avoid use between toes.    anastrozole (ARIMIDEX) 1 mg, Oral    apixaban (ELIQUIS) 5 mg Tab TAKE 1 TABLET BY MOUTH TWICE DAILY    atorvastatin (LIPITOR) 40 mg, Oral, Daily    calcium-vitamin D3 ([place] CALCIUM-VIT D3) 500 mg-5 mcg (200 unit) per tablet 1 tablet, Oral, Daily    clopidogreL (PLAVIX) 75 mg, Oral, Daily    doxycycline (VIBRA-TABS) 100 mg, Oral, 2 times daily    EScitalopram oxalate (LEXAPRO) 10 mg, Oral, Daily    famotidine (PEPCID) 20 mg, Oral, Nightly    FLUAD QUAD 2022-23,65Y UP,,PF, 60 mcg (15 mcg x 4)/0.5 mL Syrg No dose, route, or frequency recorded.    furosemide (LASIX) 40 mg, Oral    glimepiride (AMARYL) 1 MG tablet TAKE 1 TABLET TWICE DAILY    lisinopriL (PRINIVIL,ZESTRIL) 20 MG tablet TAKE 1 TABLET EVERY DAY    metoprolol succinate (TOPROL-XL) 25 MG 24 hr tablet TAKE 1 TABLET EVERY DAY    mupirocin (BACTROBAN) 2 % ointment  Topical (Top), 2 times daily    polyethylene glycol (GLYCOLAX) 17 g, Oral, Daily    pramipexole (MIRAPEX) 0.125 MG tablet TAKE 1 TABLET EVERY DAY    urea (CARMOL) 40 % Crea Topical (Top), 2 times daily         Review of Systems   Constitutional:  Negative for chills, diaphoresis, fatigue and fever.   HENT:  Negative for congestion and sore throat.    Eyes:  Negative for photophobia and visual disturbance.   Respiratory:  Negative for cough and shortness of breath.    Cardiovascular:  Negative for chest pain, palpitations and leg swelling.   Gastrointestinal:  Negative for abdominal pain, diarrhea, nausea and vomiting.   Genitourinary:  Negative for dysuria, flank pain, hematuria and urgency.   Musculoskeletal:  Negative for joint swelling, myalgias, neck pain and neck stiffness.   Skin:  Positive for wound. Negative for color change and rash.   Allergic/Immunologic: Negative for immunocompromised state.   Neurological:  Negative for dizziness, weakness, numbness and headaches.   Psychiatric/Behavioral:  Negative for confusion.            Objective:     There were no vitals filed for this visit.    Body mass index is 27.78 kg/m².         Physical Exam  Vitals and nursing note reviewed.   Constitutional:       General: She is awake. She is not in acute distress.     Appearance: Normal appearance. She is well-developed. She is not diaphoretic.   HENT:      Head: Normocephalic and atraumatic.      Right Ear: External ear normal.      Left Ear: External ear normal.      Nose: Nose normal.   Eyes:      General: No scleral icterus.     Conjunctiva/sclera: Conjunctivae normal.      Pupils: Pupils are equal, round, and reactive to light.   Cardiovascular:      Rate and Rhythm: Normal rate and regular rhythm.   Pulmonary:      Effort: Pulmonary effort is normal. No accessory muscle usage or respiratory distress.   Abdominal:      General: There is no distension.   Musculoskeletal:      Cervical back: Normal range of motion  and neck supple.   Feet:      Comments: Bilateral feet in football dressings, not removed. Images reviewed in chart  Skin:     General: Skin is warm and dry.   Neurological:      General: No focal deficit present.      Mental Status: She is alert and oriented to person, place, and time.   Psychiatric:         Attention and Perception: Attention normal.         Mood and Affect: Mood normal.         Speech: Speech normal.         Behavior: Behavior normal.           Assessment:           Caro was seen today for osteo of both feet.    Diagnoses and all orders for this visit:    Acute hematogenous osteomyelitis of left foot  -     Ambulatory referral/consult to Infectious Disease  -     Basic Metabolic Panel; Future  -     CBC Auto Differential; Future  -     C-Reactive Protein; Future    Stage 3a chronic kidney disease    PAD (peripheral artery disease)    Type 2 diabetes mellitus with diabetic neuropathy, without long-term current use of insulin         Plan:     - labs reviewed from 10/2 check. No leukocytosis but CRP was elevated at 29.7  - micro remains elusive, but feel it is reasonable at this point to continue Doxy and extend to full 6-week course (EOC 11/12/23) since patient and Podiatry are reporting that her toe is improving on current regimen, and doxycycline is known to make it into the bone well. Additional Rx sent to patient's pharmacy to complete her course  - will repeat labs today to ensure CRP is downtrending  - if toe regresses or labs are abnormal, can consider broadening regimen to IV Dalbavancin x2 + PO Levofloxacin (would need to get new EKG)  - if she continues to improve, can f/u with ID at the end of her course      Discussed with Podiatry, Dr. Barakat  Discussed with ID Staff, Dr. Simpson       Greater than 30 minutes was spent on this encounter, which included: review of recent encounters, review and interpretation of labs/images, obtaining pertinent history, performing a physical  examination, counseling and educating the patient/family/caregiver, ordering medications/tests, documenting in the electronic health record, and coordinating care with necessary providers.    Eli Lawrence PA-C  Infectious Diseases  [facility]

## 2023-10-19 ENCOUNTER — HOSPITAL ENCOUNTER (OUTPATIENT)
Dept: RADIOLOGY | Facility: HOSPITAL | Age: 83
Discharge: HOME | End: 2023-10-19
Attending: INTERNAL MEDICINE
Payer: MEDICARE

## 2023-10-19 ENCOUNTER — TELEPHONE (OUTPATIENT)
Dept: PRIMARY CARE CLINIC | Facility: CLINIC | Age: 83
End: 2023-10-19
Payer: MEDICARE

## 2023-10-19 ENCOUNTER — PATIENT MESSAGE (OUTPATIENT)
Dept: ADMINISTRATIVE | Facility: OTHER | Age: 83
End: 2023-10-19
Payer: MEDICARE

## 2023-10-19 DIAGNOSIS — I73.9 PERIPHERAL VASCULAR DISEASE, UNSPECIFIED: ICD-10-CM

## 2023-10-19 DIAGNOSIS — I77.89 OTHER SPECIFIED DISORDERS OF ARTERIES AND ARTERIOLES: ICD-10-CM

## 2023-10-19 DIAGNOSIS — I73.9 PAD (PERIPHERAL ARTERY DISEASE): Primary | ICD-10-CM

## 2023-10-19 DIAGNOSIS — L97.529 ULCER OF TOE OF LEFT FOOT, UNSPECIFIED ULCER STAGE: ICD-10-CM

## 2023-10-19 DIAGNOSIS — R06.09 DOE (DYSPNEA ON EXERTION): ICD-10-CM

## 2023-10-19 PROCEDURE — 93922 UPR/L XTREMITY ART 2 LEVELS: CPT | Mod: 26,HCWC,, | Performed by: RADIOLOGY

## 2023-10-19 PROCEDURE — 93880 US CAROTID BILATERAL: ICD-10-PCS | Mod: 26,HCWC,, | Performed by: RADIOLOGY

## 2023-10-19 PROCEDURE — 93922 US ARTERIAL LOWER EXTREMITY BILAT WITH ABI (XPD): ICD-10-PCS | Mod: 26,HCWC,, | Performed by: RADIOLOGY

## 2023-10-19 PROCEDURE — 93925 LOWER EXTREMITY STUDY: CPT | Mod: TC,HCWC,PO

## 2023-10-19 PROCEDURE — 93925 US ARTERIAL LOWER EXTREMITY BILAT WITH ABI (XPD): ICD-10-PCS | Mod: 26,HCWC,, | Performed by: RADIOLOGY

## 2023-10-19 PROCEDURE — 93880 EXTRACRANIAL BILAT STUDY: CPT | Mod: 26,HCWC,, | Performed by: RADIOLOGY

## 2023-10-19 PROCEDURE — 93880 EXTRACRANIAL BILAT STUDY: CPT | Mod: TC,HCWC,PO

## 2023-10-19 PROCEDURE — 93925 LOWER EXTREMITY STUDY: CPT | Mod: 26,HCWC,, | Performed by: RADIOLOGY

## 2023-10-19 NOTE — TELEPHONE ENCOUNTER
That's fine. Thank youi saw the appointment was moved for December. If its next week I'm fine with that

## 2023-10-19 NOTE — TELEPHONE ENCOUNTER
[facility]. [phone]. Pt has [facility] [facility], so pt is out of network.    This office is not an affiliation with [facility]. If pt needs to have any procedures, pt will need to pay the bill at 100%.    [facility]   Pt is scheduled for 10/26/2023 with [facility] doctor, pt verbalized understanding.    Dr. Wray's office  I had to override your schedule to schedule this appt. I am not sure that is ok with you. Please call pt if she needs to choose a different time.     Thank you    Lydia

## 2023-10-19 NOTE — TELEPHONE ENCOUNTER
----- Message from Anisha Reyes MD sent at 10/19/2023  1:38 PM CDT -----  No significant stenosis or blockage of the carotid arteries

## 2023-10-19 NOTE — TELEPHONE ENCOUNTER
----- Message from Anisha Reyes MD sent at 10/19/2023  1:43 PM CDT -----  Arterial US of the left leg suggesting approximately a 75% stenosis, reccomending a CTA to confirm.   We need to move up that vascular appointment if possible.   Please help her schedule the CTA

## 2023-10-19 NOTE — PROGRESS NOTES
Arterial US of the left leg suggesting approximately a 75% stenosis, reccomending a CTA to confirm.   We need to move up that vascular appointment if possible.   Please help her schedule the CTA

## 2023-10-19 NOTE — TELEPHONE ENCOUNTER
Called pt, discussed your message. Appt for vascular surgeon is in a week, 10/26/2023. Explained to pt that this is an important appt.   Pt verbalized understanding.    Do you feel this pt needs to be seen prior than a week?

## 2023-10-20 ENCOUNTER — TELEPHONE (OUTPATIENT)
Dept: VASCULAR SURGERY | Facility: CLINIC | Age: 83
End: 2023-10-20
Payer: MEDICARE

## 2023-10-20 ENCOUNTER — TELEPHONE (OUTPATIENT)
Dept: PODIATRY | Facility: CLINIC | Age: 83
End: 2023-10-20
Payer: MEDICARE

## 2023-10-20 NOTE — TELEPHONE ENCOUNTER
----- Message from Concepción Tsyon sent at 10/20/2023  9:50 AM CDT -----  Regarding: appt access  Contact: pt [phone]  Pt is calling to speak with someone in this department in regards to scheduling an appt within a few weeks with a provider in Vascular Surgery on the [facility]  pt had appt on [facility] but stated she prefers the [facility] pt is asking for a return call please call pt at  [phone]

## 2023-10-20 NOTE — TELEPHONE ENCOUNTER
Spoke with patient and she voiced understanding that ATB Rx was at pharmacy ordered by another provider

## 2023-10-20 NOTE — TELEPHONE ENCOUNTER
----- Message from Ashlie Telles sent at 10/20/2023 10:04 AM CDT -----  Contact: Pt  Type:  RX Refill Request    Who Called: Pt  Refill or New Rx:Refill  RX Name and Strength:doxycycline (VIBRA-TABS) 100 MG tablet  How is the patient currently taking it? (ex. 1XDay):2XDay  Is this a 30 day or 90 day RX:30 day  Preferred Pharmacy with phone number:    [id number] [address] & [address] [address]  Phone: [phone] Fax: [phone]      Local or Mail Order:Local  Ordering Provider:Stephen Barakat  Would the patient rather a call back or a response via [facility]? call  Best Call Back Number:[phone]  Additional Information: Pt is requesting a refill sent to her pharmacy.

## 2023-10-20 NOTE — TELEPHONE ENCOUNTER
Pt called, she realized the vascular appt is on [facility], pt does not want to go to [facility]. She would like to stay on the [facility].     Gave her phone number to the dept on [facility] to schedule on the [facility].     Pt will call back if she has any problems.

## 2023-10-23 ENCOUNTER — TELEPHONE (OUTPATIENT)
Dept: PRIMARY CARE CLINIC | Facility: CLINIC | Age: 83
End: 2023-10-23

## 2023-10-23 DIAGNOSIS — Z17.0 MALIGNANT NEOPLASM OF LOWER-OUTER QUADRANT OF LEFT BREAST OF FEMALE, ESTROGEN RECEPTOR POSITIVE: ICD-10-CM

## 2023-10-23 DIAGNOSIS — D64.9 NORMOCYTIC ANEMIA: ICD-10-CM

## 2023-10-23 DIAGNOSIS — C50.911 INVASIVE DUCTAL CARCINOMA OF RIGHT BREAST: ICD-10-CM

## 2023-10-23 DIAGNOSIS — C50.912 INVASIVE DUCTAL CARCINOMA OF LEFT BREAST: ICD-10-CM

## 2023-10-23 DIAGNOSIS — D63.8 ANEMIA OF CHRONIC DISEASE: ICD-10-CM

## 2023-10-23 DIAGNOSIS — C50.512 MALIGNANT NEOPLASM OF LOWER-OUTER QUADRANT OF LEFT BREAST OF FEMALE, ESTROGEN RECEPTOR POSITIVE: ICD-10-CM

## 2023-10-23 DIAGNOSIS — M85.852 OSTEOPENIA OF LEFT FEMORAL NECK: ICD-10-CM

## 2023-10-23 DIAGNOSIS — Z85.3 HISTORY OF BREAST CANCER: Primary | ICD-10-CM

## 2023-10-23 RX ORDER — LANOLIN ALCOHOL/MO/W.PET/CERES
1 CREAM (GRAM) TOPICAL
Qty: 90 TABLET | Refills: 10 | Status: SHIPPED | OUTPATIENT
Start: 2023-10-23

## 2023-10-23 NOTE — TELEPHONE ENCOUNTER
----- Message from Anisha Reyes MD sent at 10/20/2023  7:38 AM CDT -----  Iron stores are low.I would advise her to take iron of if shes already takingthis, increase from once per day to twice

## 2023-10-23 NOTE — TELEPHONE ENCOUNTER
Patient was informed of her lab results and Dr. Reyes's recs. She verbalized understanding.    Patient asked if Dr. Reyes would refer her to a hematologist/oncologist on the [facility].

## 2023-10-23 NOTE — TELEPHONE ENCOUNTER
----- Message from Anisha Reyes MD sent at 10/20/2023  7:39 AM CDT -----  Choelsterol looks great.   Hgb a1c shows diabetes is very well controlled.

## 2023-10-24 ENCOUNTER — OFFICE VISIT (OUTPATIENT)
Dept: PODIATRY | Facility: CLINIC | Age: 83
End: 2023-10-24
Payer: MEDICARE

## 2023-10-24 ENCOUNTER — LAB VISIT (OUTPATIENT)
Dept: LAB | Facility: HOSPITAL | Age: 83
End: 2023-10-24
Attending: INTERNAL MEDICINE
Payer: MEDICARE

## 2023-10-24 VITALS — HEIGHT: 62 IN | BODY MASS INDEX: 27.6 KG/M2 | WEIGHT: 150 LBS

## 2023-10-24 DIAGNOSIS — M86.072 ACUTE HEMATOGENOUS OSTEOMYELITIS OF LEFT FOOT: Primary | ICD-10-CM

## 2023-10-24 DIAGNOSIS — D63.8 ANEMIA OF CHRONIC DISEASE: ICD-10-CM

## 2023-10-24 DIAGNOSIS — I73.9 PAD (PERIPHERAL ARTERY DISEASE): ICD-10-CM

## 2023-10-24 DIAGNOSIS — E11.42 TYPE 2 DIABETES MELLITUS WITH PERIPHERAL NEUROPATHY: ICD-10-CM

## 2023-10-24 DIAGNOSIS — M20.41 HAMMER TOES OF BOTH FEET: ICD-10-CM

## 2023-10-24 DIAGNOSIS — L84 PRE-ULCERATIVE CALLUSES: ICD-10-CM

## 2023-10-24 DIAGNOSIS — M20.42 HAMMER TOES OF BOTH FEET: ICD-10-CM

## 2023-10-24 DIAGNOSIS — Z89.429 HISTORY OF AMPUTATION OF LESSER TOE, UNSPECIFIED LATERALITY: ICD-10-CM

## 2023-10-24 DIAGNOSIS — Z87.2 HEALED ULCER OF FOOT ON EXAMINATION: ICD-10-CM

## 2023-10-24 PROCEDURE — 1101F PT FALLS ASSESS-DOCD LE1/YR: CPT | Mod: HCWC,CPTII,S$GLB, | Performed by: STUDENT IN AN ORGANIZED HEALTH CARE EDUCATION/TRAINING PROGRAM

## 2023-10-24 PROCEDURE — 1126F PR PAIN SEVERITY QUANTIFIED, NO PAIN PRESENT: ICD-10-PCS | Mod: HCWC,CPTII,S$GLB, | Performed by: STUDENT IN AN ORGANIZED HEALTH CARE EDUCATION/TRAINING PROGRAM

## 2023-10-24 PROCEDURE — 28232 INCISION OF TOE TENDON: CPT | Mod: 59,T2,HCWC,S$GLB | Performed by: STUDENT IN AN ORGANIZED HEALTH CARE EDUCATION/TRAINING PROGRAM

## 2023-10-24 PROCEDURE — 3288F PR FALLS RISK ASSESSMENT DOCUMENTED: ICD-10-PCS | Mod: HCWC,CPTII,S$GLB, | Performed by: STUDENT IN AN ORGANIZED HEALTH CARE EDUCATION/TRAINING PROGRAM

## 2023-10-24 PROCEDURE — 82272 OCCULT BLD FECES 1-3 TESTS: CPT | Mod: HCWC | Performed by: INTERNAL MEDICINE

## 2023-10-24 PROCEDURE — 1159F MED LIST DOCD IN RCRD: CPT | Mod: HCWC,CPTII,S$GLB, | Performed by: STUDENT IN AN ORGANIZED HEALTH CARE EDUCATION/TRAINING PROGRAM

## 2023-10-24 PROCEDURE — 99999 PR PBB SHADOW E&M-EST. PATIENT-LVL II: CPT | Mod: PBBFAC,HCWC,, | Performed by: STUDENT IN AN ORGANIZED HEALTH CARE EDUCATION/TRAINING PROGRAM

## 2023-10-24 PROCEDURE — 1160F PR REVIEW ALL MEDS BY PRESCRIBER/CLIN PHARMACIST DOCUMENTED: ICD-10-PCS | Mod: HCWC,CPTII,S$GLB, | Performed by: STUDENT IN AN ORGANIZED HEALTH CARE EDUCATION/TRAINING PROGRAM

## 2023-10-24 PROCEDURE — 3288F FALL RISK ASSESSMENT DOCD: CPT | Mod: HCWC,CPTII,S$GLB, | Performed by: STUDENT IN AN ORGANIZED HEALTH CARE EDUCATION/TRAINING PROGRAM

## 2023-10-24 PROCEDURE — 99214 OFFICE O/P EST MOD 30 MIN: CPT | Mod: 57,25,HCWC,S$GLB | Performed by: STUDENT IN AN ORGANIZED HEALTH CARE EDUCATION/TRAINING PROGRAM

## 2023-10-24 PROCEDURE — 1126F AMNT PAIN NOTED NONE PRSNT: CPT | Mod: HCWC,CPTII,S$GLB, | Performed by: STUDENT IN AN ORGANIZED HEALTH CARE EDUCATION/TRAINING PROGRAM

## 2023-10-24 PROCEDURE — 1159F PR MEDICATION LIST DOCUMENTED IN MEDICAL RECORD: ICD-10-PCS | Mod: HCWC,CPTII,S$GLB, | Performed by: STUDENT IN AN ORGANIZED HEALTH CARE EDUCATION/TRAINING PROGRAM

## 2023-10-24 PROCEDURE — 28232 PR INCISION FLEX TOE TENDON: ICD-10-PCS | Mod: 59,T2,HCWC,S$GLB | Performed by: STUDENT IN AN ORGANIZED HEALTH CARE EDUCATION/TRAINING PROGRAM

## 2023-10-24 PROCEDURE — 1101F PR PT FALLS ASSESS DOC 0-1 FALLS W/OUT INJ PAST YR: ICD-10-PCS | Mod: HCWC,CPTII,S$GLB, | Performed by: STUDENT IN AN ORGANIZED HEALTH CARE EDUCATION/TRAINING PROGRAM

## 2023-10-24 PROCEDURE — 1160F RVW MEDS BY RX/DR IN RCRD: CPT | Mod: HCWC,CPTII,S$GLB, | Performed by: STUDENT IN AN ORGANIZED HEALTH CARE EDUCATION/TRAINING PROGRAM

## 2023-10-24 PROCEDURE — 99214 PR OFFICE/OUTPT VISIT, EST, LEVL IV, 30-39 MIN: ICD-10-PCS | Mod: 57,25,HCWC,S$GLB | Performed by: STUDENT IN AN ORGANIZED HEALTH CARE EDUCATION/TRAINING PROGRAM

## 2023-10-24 PROCEDURE — 99999 PR PBB SHADOW E&M-EST. PATIENT-LVL II: ICD-10-PCS | Mod: PBBFAC,HCWC,, | Performed by: STUDENT IN AN ORGANIZED HEALTH CARE EDUCATION/TRAINING PROGRAM

## 2023-10-24 NOTE — PROGRESS NOTES
Subjective:      Patient ID: Caro Powell is a 83 y.o. female.    Chief Complaint: Wound Care    Ms. Pwoell presents today with complaints of left 3rd toe infection. She notes drainage, redness and swelling a couple of days ago and presented to the urgent care for assistance. She was given doxy 100mg and an injection of rocephin which has helped with the redness and swelling. She doesn't have much feeling at baseline has amputations of the toes on the right foot.     She normally sees Dr. Atkins on the [place] but has recently moved to the [facility].     10/9/23: Follow up for osteomyelitis of the left 2nd toe and R foot wound.     10/17/23: f/u b/l foot wounds.     10/24/23: f/u for R and L foot wounds. Doing well overall.    Review of Systems   Skin:  Positive for nail changes, poor wound healing, suspicious lesions and unusual hair distribution.   All other systems reviewed and are negative.          Objective:      Physical Exam  Cardiovascular:      Pulses:           Dorsalis pedis pulses are 0 on the right side and 0 on the left side.        Posterior tibial pulses are 0 on the right side and 0 on the left side.   Feet:      Right foot:      Protective Sensation: 10 sites tested.  0 sites sensed.      Skin integrity: Ulcer present.      Toenail Condition: Right toenails are abnormally thick and long. Fungal disease present.     Left foot:      Protective Sensation: 10 sites tested.  0 sites sensed.      Skin integrity: Ulcer and erythema present.      Toenail Condition: Left toenails are abnormally thick and long. Fungal disease present.     Comments: Right foot:  Submet 1 ulceration that is fully epithelialized but fragile.     Left foot:  Left 3rd toe ulceration is fully epithelialized.              Assessment:       Encounter Diagnoses   Name Primary?    Acute hematogenous osteomyelitis of left foot Yes    PAD (peripheral artery disease)     Type 2 diabetes mellitus with peripheral  neuropathy     History of amputation of lesser toe, unspecified laterality     Pre-ulcerative calluses     Healed ulcer of foot on examination            Plan:       Caro was seen today for wound care.    Diagnoses and all orders for this visit:    Acute hematogenous osteomyelitis of left foot    PAD (peripheral artery disease)    Type 2 diabetes mellitus with peripheral neuropathy    History of amputation of lesser toe, unspecified laterality    Pre-ulcerative calluses    Healed ulcer of foot on examination        I counseled the patient on her conditions, their implications and medical management.    Recommend flexor tenotomy of the left 3rd toe today. She is amenable after risks and complications were discussed with her. Consent was obtained.    B/l football wraps for protection. F/u 1 week.     Procedure: Flexor tenotomy L 3rd toe    10/24/2023    After a time out was called confirming the patient's information and the surgical site, a digital block was given with 3mL of 1% lido plaine. A #15 blade was used to release the flexor tenon at the left 3rd toe, interphalangeal sulcus. Good correction was obtained and the incision site was closed with a single 4-0 nylon suture. She tolerated the procedure well and was discharged in offloading materials.

## 2023-10-25 ENCOUNTER — TELEPHONE (OUTPATIENT)
Dept: PRIMARY CARE CLINIC | Facility: CLINIC | Age: 83
End: 2023-10-25
Payer: MEDICARE

## 2023-10-25 LAB — OB PNL STL: NEGATIVE

## 2023-10-25 NOTE — TELEPHONE ENCOUNTER
Spoke with Prabha, vascular surgery.    She stated there are no urgent visits on [facility]. Pt has appt on 11/30/2023. Pt would have to go to [facility] to have an urgent appt. Pt cancelled appt on 10/25/2023 because she did not have transportation to main campus.

## 2023-10-25 NOTE — TELEPHONE ENCOUNTER
Patient called about breast surgeon. She was given Dr. Zavala's number to schedule an appointment.

## 2023-10-26 NOTE — TELEPHONE ENCOUNTER
Thanks for your help. You tried. She will have to just ensure that she keeps this one. But thanks  a lot for trying. Nov isnt too far. Its just that she has a wound that will likely not heal with the stenosis she has. But we tried

## 2023-10-30 ENCOUNTER — TELEPHONE (OUTPATIENT)
Dept: INFECTIOUS DISEASES | Facility: CLINIC | Age: 83
End: 2023-10-30
Payer: MEDICARE

## 2023-10-30 NOTE — TELEPHONE ENCOUNTER
Status check: spoke with pt, pt states she on oral abx until November 12, continues to see Dr. Barakat, next appt tomorrow 10/31/23,  states doing well, urged pt to call with any questions/concerns.    LG Amaya updates via this note.

## 2023-11-02 NOTE — TELEPHONE ENCOUNTER
Status check:   LG Fay spoke to Dr. Barakat, who reported that pt's wound is healed up. Her EOC for doxy is 11/12 and she is seeing him on 11/13 and thinks she is fine to stop antibiotics at the 6 week jian. So no need for me to see her or repeat labs since the last ones were normal

## 2023-11-03 ENCOUNTER — EXTERNAL HOME HEALTH (OUTPATIENT)
Dept: HOME HEALTH SERVICES | Facility: HOSPITAL | Age: 83
End: 2023-11-03
Payer: MEDICARE

## 2023-11-06 ENCOUNTER — OFFICE VISIT (OUTPATIENT)
Dept: PODIATRY | Facility: CLINIC | Age: 83
End: 2023-11-06
Payer: MEDICARE

## 2023-11-06 VITALS — WEIGHT: 150 LBS | BODY MASS INDEX: 27.6 KG/M2 | HEIGHT: 62 IN

## 2023-11-06 DIAGNOSIS — E11.42 TYPE 2 DIABETES MELLITUS WITH PERIPHERAL NEUROPATHY: ICD-10-CM

## 2023-11-06 DIAGNOSIS — Z87.2 HEALED ULCER OF FOOT ON EXAMINATION: ICD-10-CM

## 2023-11-06 DIAGNOSIS — M20.42 HAMMER TOES OF BOTH FEET: ICD-10-CM

## 2023-11-06 DIAGNOSIS — M20.41 HAMMER TOES OF BOTH FEET: ICD-10-CM

## 2023-11-06 DIAGNOSIS — I73.9 PAD (PERIPHERAL ARTERY DISEASE): Primary | ICD-10-CM

## 2023-11-06 DIAGNOSIS — L84 PRE-ULCERATIVE CALLUSES: ICD-10-CM

## 2023-11-06 PROCEDURE — 99213 OFFICE O/P EST LOW 20 MIN: CPT | Mod: 24,HCWC,S$GLB, | Performed by: STUDENT IN AN ORGANIZED HEALTH CARE EDUCATION/TRAINING PROGRAM

## 2023-11-06 PROCEDURE — 1160F PR REVIEW ALL MEDS BY PRESCRIBER/CLIN PHARMACIST DOCUMENTED: ICD-10-PCS | Mod: HCWC,CPTII,S$GLB, | Performed by: STUDENT IN AN ORGANIZED HEALTH CARE EDUCATION/TRAINING PROGRAM

## 2023-11-06 PROCEDURE — 1101F PR PT FALLS ASSESS DOC 0-1 FALLS W/OUT INJ PAST YR: ICD-10-PCS | Mod: HCWC,CPTII,S$GLB, | Performed by: STUDENT IN AN ORGANIZED HEALTH CARE EDUCATION/TRAINING PROGRAM

## 2023-11-06 PROCEDURE — 1126F PR PAIN SEVERITY QUANTIFIED, NO PAIN PRESENT: ICD-10-PCS | Mod: HCWC,CPTII,S$GLB, | Performed by: STUDENT IN AN ORGANIZED HEALTH CARE EDUCATION/TRAINING PROGRAM

## 2023-11-06 PROCEDURE — 1126F AMNT PAIN NOTED NONE PRSNT: CPT | Mod: HCWC,CPTII,S$GLB, | Performed by: STUDENT IN AN ORGANIZED HEALTH CARE EDUCATION/TRAINING PROGRAM

## 2023-11-06 PROCEDURE — 1159F PR MEDICATION LIST DOCUMENTED IN MEDICAL RECORD: ICD-10-PCS | Mod: HCWC,CPTII,S$GLB, | Performed by: STUDENT IN AN ORGANIZED HEALTH CARE EDUCATION/TRAINING PROGRAM

## 2023-11-06 PROCEDURE — 3288F PR FALLS RISK ASSESSMENT DOCUMENTED: ICD-10-PCS | Mod: HCWC,CPTII,S$GLB, | Performed by: STUDENT IN AN ORGANIZED HEALTH CARE EDUCATION/TRAINING PROGRAM

## 2023-11-06 PROCEDURE — 1159F MED LIST DOCD IN RCRD: CPT | Mod: HCWC,CPTII,S$GLB, | Performed by: STUDENT IN AN ORGANIZED HEALTH CARE EDUCATION/TRAINING PROGRAM

## 2023-11-06 PROCEDURE — 1160F RVW MEDS BY RX/DR IN RCRD: CPT | Mod: HCWC,CPTII,S$GLB, | Performed by: STUDENT IN AN ORGANIZED HEALTH CARE EDUCATION/TRAINING PROGRAM

## 2023-11-06 PROCEDURE — 3288F FALL RISK ASSESSMENT DOCD: CPT | Mod: HCWC,CPTII,S$GLB, | Performed by: STUDENT IN AN ORGANIZED HEALTH CARE EDUCATION/TRAINING PROGRAM

## 2023-11-06 PROCEDURE — 99999 PR PBB SHADOW E&M-EST. PATIENT-LVL III: CPT | Mod: PBBFAC,HCWC,, | Performed by: STUDENT IN AN ORGANIZED HEALTH CARE EDUCATION/TRAINING PROGRAM

## 2023-11-06 PROCEDURE — 99213 PR OFFICE/OUTPT VISIT, EST, LEVL III, 20-29 MIN: ICD-10-PCS | Mod: 24,HCWC,S$GLB, | Performed by: STUDENT IN AN ORGANIZED HEALTH CARE EDUCATION/TRAINING PROGRAM

## 2023-11-06 PROCEDURE — 99999 PR PBB SHADOW E&M-EST. PATIENT-LVL III: ICD-10-PCS | Mod: PBBFAC,HCWC,, | Performed by: STUDENT IN AN ORGANIZED HEALTH CARE EDUCATION/TRAINING PROGRAM

## 2023-11-06 PROCEDURE — 1101F PT FALLS ASSESS-DOCD LE1/YR: CPT | Mod: HCWC,CPTII,S$GLB, | Performed by: STUDENT IN AN ORGANIZED HEALTH CARE EDUCATION/TRAINING PROGRAM

## 2023-11-06 NOTE — PROGRESS NOTES
Subjective:      Patient ID: Caro Powell is a 83 y.o. female.    Chief Complaint: Wound Check    Ms. Powell presents today with complaints of left 3rd toe infection. She notes drainage, redness and swelling a couple of days ago and presented to the urgent care for assistance. She was given doxy 100mg and an injection of rocephin which has helped with the redness and swelling. She doesn't have much feeling at baseline has amputations of the toes on the right foot.     She normally sees Dr. Atkins on the [place] but has recently moved to the [facility].     10/9/23: Follow up for osteomyelitis of the left 2nd toe and R foot wound.     10/17/23: f/u b/l foot wounds.     10/24/23: f/u for R and L foot wounds. Doing well overall.    11/6/23: f/u L foot wound and post op flexor tenotomy. Doing well overall.    Review of Systems   Skin:  Positive for nail changes, poor wound healing, suspicious lesions and unusual hair distribution.   All other systems reviewed and are negative.          Objective:      Physical Exam  Cardiovascular:      Pulses:           Dorsalis pedis pulses are 0 on the right side and 0 on the left side.        Posterior tibial pulses are 0 on the right side and 0 on the left side.   Feet:      Right foot:      Protective Sensation: 10 sites tested.  0 sites sensed.      Skin integrity: Ulcer present.      Toenail Condition: Right toenails are abnormally thick and long. Fungal disease present.     Left foot:      Protective Sensation: 10 sites tested.  0 sites sensed.      Skin integrity: Ulcer and erythema present.      Toenail Condition: Left toenails are abnormally thick and long. Fungal disease present.     Comments: Right foot:  Submet 1 ulceration that is fully epithelialized but fragile.     Left foot:  Left 3rd toe ulceration is still there. Measures 1x1mm with minor drainage upon debridement.  Incision site is healed.               Assessment:       Encounter Diagnoses    Name Primary?    PAD (peripheral artery disease) Yes    Pre-ulcerative calluses     Healed ulcer of foot on examination     Type 2 diabetes mellitus with peripheral neuropathy     Hammer toes of both feet            Plan:       Caro was seen today for wound check.    Diagnoses and all orders for this visit:    PAD (peripheral artery disease)  -     DIABETIC SHOES FOR HOME USE    Pre-ulcerative calluses  -     DIABETIC SHOES FOR HOME USE    Healed ulcer of foot on examination  -     DIABETIC SHOES FOR HOME USE    Type 2 diabetes mellitus with peripheral neuropathy  -     DIABETIC SHOES FOR HOME USE    Hammer toes of both feet  -     DIABETIC SHOES FOR HOME USE        I counseled the patient on her conditions, their implications and medical management.    Suture was removed.     Left 3rd toe wound was again debrided. Recommend bandaid and neosporin for now.    DM shoe Rx given.    F/u 2 weeks    Stephen Barakat DPM

## 2023-11-07 ENCOUNTER — PATIENT MESSAGE (OUTPATIENT)
Dept: RADIOLOGY | Facility: HOSPITAL | Age: 83
End: 2023-11-07
Payer: MEDICARE

## 2023-11-07 NOTE — TELEPHONE ENCOUNTER
Left voicemail for patient to return call to schedule appt with breast surgery  
How Severe Is This Condition?: mild

## 2023-11-09 ENCOUNTER — HOSPITAL ENCOUNTER (OUTPATIENT)
Dept: RADIOLOGY | Facility: HOSPITAL | Age: 83
Discharge: HOME | End: 2023-11-09
Attending: INTERNAL MEDICINE
Payer: MEDICARE

## 2023-11-09 ENCOUNTER — CLINICAL SUPPORT (OUTPATIENT)
Dept: CARDIOLOGY | Facility: HOSPITAL | Age: 83
End: 2023-11-09
Attending: INTERNAL MEDICINE
Payer: MEDICARE

## 2023-11-09 ENCOUNTER — TELEPHONE (OUTPATIENT)
Dept: PRIMARY CARE CLINIC | Facility: CLINIC | Age: 83
End: 2023-11-09
Payer: MEDICARE

## 2023-11-09 VITALS — WEIGHT: 150 LBS | BODY MASS INDEX: 28.32 KG/M2 | HEIGHT: 61 IN

## 2023-11-09 DIAGNOSIS — R06.09 DOE (DYSPNEA ON EXERTION): ICD-10-CM

## 2023-11-09 LAB
CV PHARM DOSE: 0.4 MG
CV STRESS BASE HR: 52 BPM
DIASTOLIC BLOOD PRESSURE: 77 MMHG
NUC STRESS EJECTION FRACTION: 77 %
OHS CV CPX 1 MINUTE RECOVERY HEART RATE: 70 BPM
OHS CV CPX 85 PERCENT MAX PREDICTED HEART RATE MALE: 116
OHS CV CPX MAX PREDICTED HEART RATE: 136
OHS CV CPX PATIENT IS FEMALE: 1
OHS CV CPX PATIENT IS MALE: 0
OHS CV CPX PEAK DIASTOLIC BLOOD PRESSURE: 77 MMHG
OHS CV CPX PEAK HEAR RATE: 70 BPM
OHS CV CPX PEAK RATE PRESSURE PRODUCT: NORMAL
OHS CV CPX PEAK SYSTOLIC BLOOD PRESSURE: 203 MMHG
OHS CV CPX PERCENT MAX PREDICTED HEART RATE ACHIEVED: 53
OHS CV CPX RATE PRESSURE PRODUCT PRESENTING: NORMAL
OHS CV PHARM TIME: 956 MIN
SYSTOLIC BLOOD PRESSURE: 203 MMHG

## 2023-11-09 PROCEDURE — 93018 PR CARDIAC STRESS TST,INTERP/REPT ONLY: ICD-10-PCS | Mod: HCWC,,, | Performed by: INTERNAL MEDICINE

## 2023-11-09 PROCEDURE — 93018 CV STRESS TEST I&R ONLY: CPT | Mod: HCWC,,, | Performed by: INTERNAL MEDICINE

## 2023-11-09 PROCEDURE — 78452 NUCLEAR STRESS - CARDIOLOGY INTERPRETED (CUPID ONLY): ICD-10-PCS | Mod: 26,HCWC,, | Performed by: INTERNAL MEDICINE

## 2023-11-09 PROCEDURE — 78452 HT MUSCLE IMAGE SPECT MULT: CPT | Mod: 26,HCWC,, | Performed by: INTERNAL MEDICINE

## 2023-11-09 PROCEDURE — 93016 CV STRESS TEST SUPVJ ONLY: CPT | Mod: HCWC,,, | Performed by: INTERNAL MEDICINE

## 2023-11-09 PROCEDURE — 93017 CV STRESS TEST TRACING ONLY: CPT | Mod: HCWC,PO

## 2023-11-09 PROCEDURE — 93016 NUCLEAR STRESS - CARDIOLOGY INTERPRETED (CUPID ONLY): ICD-10-PCS | Mod: HCWC,,, | Performed by: INTERNAL MEDICINE

## 2023-11-09 PROCEDURE — 63600175 PHARM REV CODE 636 W HCPCS: Mod: HCWC,PO | Performed by: INTERNAL MEDICINE

## 2023-11-09 PROCEDURE — 78452 HT MUSCLE IMAGE SPECT MULT: CPT | Mod: HCWC,PO

## 2023-11-09 RX ORDER — REGADENOSON 0.08 MG/ML
0.4 INJECTION, SOLUTION INTRAVENOUS
Status: COMPLETED | OUTPATIENT
Start: 2023-11-09 | End: 2023-11-09

## 2023-11-09 RX ADMIN — REGADENOSON 0.4 MG: 0.08 INJECTION, SOLUTION INTRAVENOUS at 09:11

## 2023-11-09 NOTE — PROGRESS NOTES
Normal stress test which is great news. So likely trouble breathing is not cardiac. Likely needs more exercise I would recommend an aggressive exc ercise regimen lifting weights and doing cardio but resistance exercise

## 2023-11-09 NOTE — TELEPHONE ENCOUNTER
----- Message from Anisha Reyes MD sent at 11/9/2023  2:12 PM CST -----  Normal stress test which is great news. So likely trouble breathing is not cardiac. Likely needs more exercise I would recommend an aggressive exc ercise regimen lifting weights and doing cardio but resistance exercise

## 2023-11-10 ENCOUNTER — DOCUMENT SCAN (OUTPATIENT)
Dept: HOME HEALTH SERVICES | Facility: HOSPITAL | Age: 83
End: 2023-11-10
Payer: MEDICARE

## 2023-11-13 ENCOUNTER — OFFICE VISIT (OUTPATIENT)
Dept: PODIATRY | Facility: CLINIC | Age: 83
End: 2023-11-13
Payer: MEDICARE

## 2023-11-13 VITALS — WEIGHT: 150 LBS | BODY MASS INDEX: 28.32 KG/M2 | HEIGHT: 61 IN

## 2023-11-13 DIAGNOSIS — E11.621 DIABETIC ULCER OF TOE OF LEFT FOOT ASSOCIATED WITH TYPE 2 DIABETES MELLITUS, WITH NECROSIS OF BONE: ICD-10-CM

## 2023-11-13 DIAGNOSIS — L97.524 DIABETIC ULCER OF TOE OF LEFT FOOT ASSOCIATED WITH TYPE 2 DIABETES MELLITUS, WITH NECROSIS OF BONE: ICD-10-CM

## 2023-11-13 DIAGNOSIS — I73.9 PAD (PERIPHERAL ARTERY DISEASE): Primary | ICD-10-CM

## 2023-11-13 DIAGNOSIS — E11.42 TYPE 2 DIABETES MELLITUS WITH PERIPHERAL NEUROPATHY: ICD-10-CM

## 2023-11-13 PROCEDURE — 99999 PR PBB SHADOW E&M-EST. PATIENT-LVL III: CPT | Mod: PBBFAC,HCWC,, | Performed by: STUDENT IN AN ORGANIZED HEALTH CARE EDUCATION/TRAINING PROGRAM

## 2023-11-13 PROCEDURE — 11042 DBRDMT SUBQ TIS 1ST 20SQCM/<: CPT | Mod: 79,HCWC,S$GLB, | Performed by: STUDENT IN AN ORGANIZED HEALTH CARE EDUCATION/TRAINING PROGRAM

## 2023-11-13 PROCEDURE — 99499 UNLISTED E&M SERVICE: CPT | Mod: HCWC,S$GLB,, | Performed by: STUDENT IN AN ORGANIZED HEALTH CARE EDUCATION/TRAINING PROGRAM

## 2023-11-13 PROCEDURE — 99999 PR PBB SHADOW E&M-EST. PATIENT-LVL III: ICD-10-PCS | Mod: PBBFAC,HCWC,, | Performed by: STUDENT IN AN ORGANIZED HEALTH CARE EDUCATION/TRAINING PROGRAM

## 2023-11-13 PROCEDURE — 11042 WOUND DEBRIDEMENT: ICD-10-PCS | Mod: 79,HCWC,S$GLB, | Performed by: STUDENT IN AN ORGANIZED HEALTH CARE EDUCATION/TRAINING PROGRAM

## 2023-11-13 PROCEDURE — 99499 NO LOS: ICD-10-PCS | Mod: HCWC,S$GLB,, | Performed by: STUDENT IN AN ORGANIZED HEALTH CARE EDUCATION/TRAINING PROGRAM

## 2023-11-13 NOTE — PROGRESS NOTES
Subjective:      Patient ID: Caro Powell is a 83 y.o. female.    Chief Complaint: Wound Check    Ms. Powell presents today with complaints of left 3rd toe infection. She notes drainage, redness and swelling a couple of days ago and presented to the urgent care for assistance. She was given doxy 100mg and an injection of rocephin which has helped with the redness and swelling. She doesn't have much feeling at baseline has amputations of the toes on the right foot.     She normally sees Dr. Atkins on the [address] but has recently moved to the [facility].     10/9/23: Follow up for osteomyelitis of the left 2nd toe and R foot wound.     10/17/23: f/u left 2nd toe and R foot wound.    Review of Systems   Skin:  Positive for nail changes, poor wound healing, suspicious lesions and unusual hair distribution.   All other systems reviewed and are negative.          Objective:      Physical Exam  Cardiovascular:      Pulses:           Dorsalis pedis pulses are 0 on the right side and 0 on the left side.        Posterior tibial pulses are 0 on the right side and 0 on the left side.   Feet:      Right foot:      Protective Sensation: 10 sites tested.  0 sites sensed.      Skin integrity: Ulcer present.      Toenail Condition: Right toenails are abnormally thick and long. Fungal disease present.     Left foot:      Protective Sensation: 10 sites tested.  0 sites sensed.      Skin integrity: Ulcer and erythema present.      Toenail Condition: Left toenails are abnormally thick and long. Fungal disease present.     Comments: Right foot:  Submet 1 ulceration that measures 1.5x1x0.2cm without erythema, edema, fluctuance or crepitus.     Left foot:  Left 3rd toe ulceration that measures 1.2x1.2x0.5cm with bone coverage. Erythema is improved and edema is residual.               Assessment:       Encounter Diagnoses   Name Primary?    PAD (peripheral artery disease) Yes    Acute hematogenous osteomyelitis of left  foot     Type 2 diabetes mellitus with peripheral neuropathy     Diabetic ulcer of other part of right foot associated with type 2 diabetes mellitus, with fat layer exposed     Diabetic ulcer of toe of left foot associated with type 2 diabetes mellitus, with necrosis of bone     History of amputation of lesser toe, unspecified laterality            Plan:       Caro was seen today for wound check.    Diagnoses and all orders for this visit:    PAD (peripheral artery disease)    Acute hematogenous osteomyelitis of left foot    Type 2 diabetes mellitus with peripheral neuropathy    Diabetic ulcer of other part of right foot associated with type 2 diabetes mellitus, with fat layer exposed    Diabetic ulcer of toe of left foot associated with type 2 diabetes mellitus, with necrosis of bone    History of amputation of lesser toe, unspecified laterality        I counseled the patient on her conditions, their implications and medical management.    She is tolerating the antibiotics and wound care well. Continues to progress well.

## 2023-11-13 NOTE — PROGRESS NOTES
Subjective:      Patient ID: Caro Powell is a 83 y.o. female.    Chief Complaint: Wound Check    Ms. Powell presents today with complaints of left 3rd toe infection. She notes drainage, redness and swelling a couple of days ago and presented to the urgent care for assistance. She was given doxy 100mg and an injection of rocephin which has helped with the redness and swelling. She doesn't have much feeling at baseline has amputations of the toes on the right foot.     She normally sees Dr. Atkins on the [address] but has recently moved to the [facility].     10/9/23: Follow up for osteomyelitis of the left 2nd toe and R foot wound.     10/17/23: f/u b/l foot wounds.     10/24/23: f/u for R and L foot wounds. Doing well overall.    11/6/23: f/u L foot wound and post op flexor tenotomy. Doing well overall.    11/13/23: f/u L foot wound    Review of Systems   Skin:  Positive for nail changes, poor wound healing, suspicious lesions and unusual hair distribution.   All other systems reviewed and are negative.          Objective:      Physical Exam  Cardiovascular:      Pulses:           Dorsalis pedis pulses are 0 on the right side and 0 on the left side.        Posterior tibial pulses are 0 on the right side and 0 on the left side.   Feet:      Right foot:      Protective Sensation: 10 sites tested.  0 sites sensed.      Skin integrity: Ulcer present.      Toenail Condition: Right toenails are abnormally thick and long. Fungal disease present.     Left foot:      Protective Sensation: 10 sites tested.  0 sites sensed.      Skin integrity: Ulcer and erythema present.      Toenail Condition: Left toenails are abnormally thick and long. Fungal disease present.     Comments: Right foot:  Submet 1 ulceration that is fully epithelialized but fragile.     Left foot:  Left 3rd toe ulceration is still there. Measures 1x1mm with minor drainage upon debridement.  Incision site is healed.               Assessment:  "      Encounter Diagnoses   Name Primary?    PAD (peripheral artery disease) Yes    Diabetic ulcer of toe of left foot associated with type 2 diabetes mellitus, with necrosis of bone     Type 2 diabetes mellitus with peripheral neuropathy              Plan:       Caro was seen today for wound check.    Diagnoses and all orders for this visit:    PAD (peripheral artery disease)    Diabetic ulcer of toe of left foot associated with type 2 diabetes mellitus, with necrosis of bone    Type 2 diabetes mellitus with peripheral neuropathy          I counseled the patient on her conditions, their implications and medical management.      Left 3rd toe wound was again debrided. Recommend bandaid and neosporin for now.    Erythema and edema are fully resolved.     F/u 2 weeks    Stephen Barakat DPM    Wound Debridement    Date/Time: 11/13/2023 2:20 PM    Performed by: Stephen Barakat DPM  Authorized by: Stephen Barakat DPM    Time out: Immediately prior to procedure a "time out" was called to verify the correct patient, procedure, equipment, support staff and site/side marked as required.      Wound Details:    Location:  Left foot    Location:  Left 2nd Toe    Type of Debridement:  Excisional       Length (cm):  0.1       Area (sq cm):  0.01       Width (cm):  0.1       Percent Debrided (%):  100       Depth (cm):  0.1       Total Area Debrided (sq cm):  0.01    Depth of debridement:  Subcutaneous tissue    Tissue debrided:  Dermis and Epidermis    Devitalized tissue debrided:  Callus    Instruments:  Curette  Bleeding:  Minimal  Hemostasis Achieved: Yes  Method Used:  Pressure  1st Wound Pain Assessment: 0                "

## 2023-11-15 ENCOUNTER — TELEPHONE (OUTPATIENT)
Dept: PODIATRY | Facility: CLINIC | Age: 83
End: 2023-11-15
Payer: MEDICARE

## 2023-11-15 NOTE — TELEPHONE ENCOUNTER
----- Message from Sav Durbin sent at 11/14/2023  2:24 PM CST -----  Contact: pt  Type: Needs Medical Advice  Who Called: pt  Best Call Back Number: [phone]    Additional Information: Pt is calling in regarding to her appt that was suppose to be for 11/20 she is coming in for a wound.Please call back and advise.      Spoke with pt and scheduled appointment for 11/27 @ 10:40

## 2023-11-27 ENCOUNTER — OFFICE VISIT (OUTPATIENT)
Dept: PODIATRY | Facility: CLINIC | Age: 83
End: 2023-11-27
Payer: MEDICARE

## 2023-11-27 VITALS — WEIGHT: 150 LBS | BODY MASS INDEX: 28.32 KG/M2 | HEIGHT: 61 IN

## 2023-11-27 DIAGNOSIS — Z87.2 HEALED ULCER OF FOOT ON EXAMINATION: ICD-10-CM

## 2023-11-27 DIAGNOSIS — E11.42 TYPE 2 DIABETES MELLITUS WITH PERIPHERAL NEUROPATHY: ICD-10-CM

## 2023-11-27 DIAGNOSIS — Z89.9 HISTORY OF AMPUTATION: ICD-10-CM

## 2023-11-27 DIAGNOSIS — I73.9 PAD (PERIPHERAL ARTERY DISEASE): Primary | ICD-10-CM

## 2023-11-27 PROCEDURE — 3288F PR FALLS RISK ASSESSMENT DOCUMENTED: ICD-10-PCS | Mod: HCWC,CPTII,S$GLB, | Performed by: STUDENT IN AN ORGANIZED HEALTH CARE EDUCATION/TRAINING PROGRAM

## 2023-11-27 PROCEDURE — 1160F PR REVIEW ALL MEDS BY PRESCRIBER/CLIN PHARMACIST DOCUMENTED: ICD-10-PCS | Mod: HCWC,CPTII,S$GLB, | Performed by: STUDENT IN AN ORGANIZED HEALTH CARE EDUCATION/TRAINING PROGRAM

## 2023-11-27 PROCEDURE — 11721 DEBRIDE NAIL 6 OR MORE: CPT | Mod: 79,Q7,HCWC,S$GLB | Performed by: STUDENT IN AN ORGANIZED HEALTH CARE EDUCATION/TRAINING PROGRAM

## 2023-11-27 PROCEDURE — 1126F PR PAIN SEVERITY QUANTIFIED, NO PAIN PRESENT: ICD-10-PCS | Mod: HCWC,CPTII,S$GLB, | Performed by: STUDENT IN AN ORGANIZED HEALTH CARE EDUCATION/TRAINING PROGRAM

## 2023-11-27 PROCEDURE — 99999 PR PBB SHADOW E&M-EST. PATIENT-LVL III: ICD-10-PCS | Mod: PBBFAC,HCWC,, | Performed by: STUDENT IN AN ORGANIZED HEALTH CARE EDUCATION/TRAINING PROGRAM

## 2023-11-27 PROCEDURE — 3288F FALL RISK ASSESSMENT DOCD: CPT | Mod: HCWC,CPTII,S$GLB, | Performed by: STUDENT IN AN ORGANIZED HEALTH CARE EDUCATION/TRAINING PROGRAM

## 2023-11-27 PROCEDURE — 99024 POSTOP FOLLOW-UP VISIT: CPT | Mod: HCWC,S$GLB,, | Performed by: STUDENT IN AN ORGANIZED HEALTH CARE EDUCATION/TRAINING PROGRAM

## 2023-11-27 PROCEDURE — 99999 PR PBB SHADOW E&M-EST. PATIENT-LVL III: CPT | Mod: PBBFAC,HCWC,, | Performed by: STUDENT IN AN ORGANIZED HEALTH CARE EDUCATION/TRAINING PROGRAM

## 2023-11-27 PROCEDURE — 1101F PR PT FALLS ASSESS DOC 0-1 FALLS W/OUT INJ PAST YR: ICD-10-PCS | Mod: HCWC,CPTII,S$GLB, | Performed by: STUDENT IN AN ORGANIZED HEALTH CARE EDUCATION/TRAINING PROGRAM

## 2023-11-27 PROCEDURE — 1159F MED LIST DOCD IN RCRD: CPT | Mod: HCWC,CPTII,S$GLB, | Performed by: STUDENT IN AN ORGANIZED HEALTH CARE EDUCATION/TRAINING PROGRAM

## 2023-11-27 PROCEDURE — 99024 PR POST-OP FOLLOW-UP VISIT: ICD-10-PCS | Mod: HCWC,S$GLB,, | Performed by: STUDENT IN AN ORGANIZED HEALTH CARE EDUCATION/TRAINING PROGRAM

## 2023-11-27 PROCEDURE — 11721 ROUTINE FOOT CARE: ICD-10-PCS | Mod: 79,Q7,HCWC,S$GLB | Performed by: STUDENT IN AN ORGANIZED HEALTH CARE EDUCATION/TRAINING PROGRAM

## 2023-11-27 PROCEDURE — 1159F PR MEDICATION LIST DOCUMENTED IN MEDICAL RECORD: ICD-10-PCS | Mod: HCWC,CPTII,S$GLB, | Performed by: STUDENT IN AN ORGANIZED HEALTH CARE EDUCATION/TRAINING PROGRAM

## 2023-11-27 PROCEDURE — 1101F PT FALLS ASSESS-DOCD LE1/YR: CPT | Mod: HCWC,CPTII,S$GLB, | Performed by: STUDENT IN AN ORGANIZED HEALTH CARE EDUCATION/TRAINING PROGRAM

## 2023-11-27 PROCEDURE — 1126F AMNT PAIN NOTED NONE PRSNT: CPT | Mod: HCWC,CPTII,S$GLB, | Performed by: STUDENT IN AN ORGANIZED HEALTH CARE EDUCATION/TRAINING PROGRAM

## 2023-11-27 PROCEDURE — 1160F RVW MEDS BY RX/DR IN RCRD: CPT | Mod: HCWC,CPTII,S$GLB, | Performed by: STUDENT IN AN ORGANIZED HEALTH CARE EDUCATION/TRAINING PROGRAM

## 2023-11-28 NOTE — PROGRESS NOTES
Subjective:      Patient ID: Caro Powell is a 83 y.o. female.    Chief Complaint: No chief complaint on file.    Ms. Powell presents today with complaints of left 3rd toe infection. She notes drainage, redness and swelling a couple of days ago and presented to the urgent care for assistance. She was given doxy 100mg and an injection of rocephin which has helped with the redness and swelling. She doesn't have much feeling at baseline has amputations of the toes on the right foot.     She normally sees Dr. Atkins on the [address] but has recently moved to the [facility].     10/9/23: Follow up for osteomyelitis of the left 2nd toe and R foot wound.     10/17/23: f/u b/l foot wounds.     10/24/23: f/u for R and L foot wounds. Doing well overall.    11/6/23: f/u L foot wound and post op flexor tenotomy. Doing well overall.    11/13/23: f/u L foot wound    11/27/23:  F/u left foot wound.     Review of Systems   Skin:  Positive for nail changes, poor wound healing, suspicious lesions and unusual hair distribution.   All other systems reviewed and are negative.          Objective:      Physical Exam  Cardiovascular:      Pulses:           Dorsalis pedis pulses are 0 on the right side and 0 on the left side.        Posterior tibial pulses are 0 on the right side and 0 on the left side.   Feet:      Right foot:      Protective Sensation: 10 sites tested.  0 sites sensed.      Skin integrity: Ulcer present.      Toenail Condition: Right toenails are abnormally thick and long. Fungal disease present.     Left foot:      Protective Sensation: 10 sites tested.  0 sites sensed.      Skin integrity: Ulcer and erythema present.      Toenail Condition: Left toenails are abnormally thick and long. Fungal disease present.     Comments: Right foot:  Submet 1 ulceration that is fully epithelialized.     Left foot:  Left 3rd toe ulceration is still fully epithelialized    Amputation of the R 4,5 toes.         "  Assessment:       No diagnosis found.            Plan:       There are no diagnoses linked to this encounter.        I counseled the patient on her conditions, their implications and medical management.    Await DM shoes and inserts. She hasn't had time to get fitted for them yet.    Nail care provided.    F/u 2 months until she can safely ambulate with lower risk for ulceration.    Stephen Barakat DPM    Routine Foot Care    Date/Time: 11/27/2023 10:40 AM    Performed by: Stephen Barakat DPM  Authorized by: Stephen Barakat DPM    Time out: Immediately prior to procedure a "time out" was called to verify the correct patient, procedure, equipment, support staff and site/side marked as required.    Consent Done?:  Yes (Verbal)  Hyperkeratotic Skin Lesions?: No      Nail Care Type:  Debride(Left 1st Toe, Left 5th Toe, Right 1st Toe, Left 2nd Toe, Left 3rd Toe, Right 2nd Toe, Left 4th Toe and Right 3rd Toe)  Patient tolerance:  Patient tolerated the procedure well with no immediate complications                "

## 2023-11-30 ENCOUNTER — OFFICE VISIT (OUTPATIENT)
Dept: OPTOMETRY | Facility: CLINIC | Age: 83
End: 2023-11-30
Payer: MEDICARE

## 2023-11-30 ENCOUNTER — OFFICE VISIT (OUTPATIENT)
Dept: VASCULAR SURGERY | Facility: CLINIC | Age: 83
End: 2023-11-30
Payer: MEDICARE

## 2023-11-30 VITALS
HEIGHT: 61 IN | BODY MASS INDEX: 29.74 KG/M2 | DIASTOLIC BLOOD PRESSURE: 54 MMHG | SYSTOLIC BLOOD PRESSURE: 138 MMHG | WEIGHT: 157.5 LBS | HEART RATE: 54 BPM

## 2023-11-30 DIAGNOSIS — Z96.1 PSEUDOPHAKIA OF BOTH EYES: ICD-10-CM

## 2023-11-30 DIAGNOSIS — H10.13 ALLERGIC CONJUNCTIVITIS, BILATERAL: ICD-10-CM

## 2023-11-30 DIAGNOSIS — H52.03 HYPEROPIA WITH ASTIGMATISM AND PRESBYOPIA, BILATERAL: ICD-10-CM

## 2023-11-30 DIAGNOSIS — I73.9 PAD (PERIPHERAL ARTERY DISEASE): Primary | ICD-10-CM

## 2023-11-30 DIAGNOSIS — E11.9 TYPE 2 DIABETES MELLITUS WITHOUT RETINOPATHY: Primary | ICD-10-CM

## 2023-11-30 DIAGNOSIS — H52.203 HYPEROPIA WITH ASTIGMATISM AND PRESBYOPIA, BILATERAL: ICD-10-CM

## 2023-11-30 DIAGNOSIS — E11.40 TYPE 2 DIABETES MELLITUS WITH DIABETIC NEUROPATHY, WITHOUT LONG-TERM CURRENT USE OF INSULIN: Chronic | ICD-10-CM

## 2023-11-30 DIAGNOSIS — H52.4 HYPEROPIA WITH ASTIGMATISM AND PRESBYOPIA, BILATERAL: ICD-10-CM

## 2023-11-30 PROCEDURE — 3075F SYST BP GE 130 - 139MM HG: CPT | Mod: HCWC,CPTII,S$GLB, | Performed by: THORACIC SURGERY (CARDIOTHORACIC VASCULAR SURGERY)

## 2023-11-30 PROCEDURE — 99999 PR PBB SHADOW E&M-EST. PATIENT-LVL III: ICD-10-PCS | Mod: PBBFAC,HCWC,,

## 2023-11-30 PROCEDURE — 1126F AMNT PAIN NOTED NONE PRSNT: CPT | Mod: HCWC,CPTII,S$GLB,

## 2023-11-30 PROCEDURE — 1126F PR PAIN SEVERITY QUANTIFIED, NO PAIN PRESENT: ICD-10-PCS | Mod: HCWC,CPTII,S$GLB,

## 2023-11-30 PROCEDURE — 92004 PR EYE EXAM, NEW PATIENT,COMPREHESV: ICD-10-PCS | Mod: HCWC,S$GLB,,

## 2023-11-30 PROCEDURE — 1160F PR REVIEW ALL MEDS BY PRESCRIBER/CLIN PHARMACIST DOCUMENTED: ICD-10-PCS | Mod: HCWC,CPTII,S$GLB, | Performed by: THORACIC SURGERY (CARDIOTHORACIC VASCULAR SURGERY)

## 2023-11-30 PROCEDURE — 1159F PR MEDICATION LIST DOCUMENTED IN MEDICAL RECORD: ICD-10-PCS | Mod: HCWC,CPTII,S$GLB, | Performed by: THORACIC SURGERY (CARDIOTHORACIC VASCULAR SURGERY)

## 2023-11-30 PROCEDURE — 1159F MED LIST DOCD IN RCRD: CPT | Mod: HCWC,CPTII,S$GLB,

## 2023-11-30 PROCEDURE — 3078F PR MOST RECENT DIASTOLIC BLOOD PRESSURE < 80 MM HG: ICD-10-PCS | Mod: HCWC,CPTII,S$GLB, | Performed by: THORACIC SURGERY (CARDIOTHORACIC VASCULAR SURGERY)

## 2023-11-30 PROCEDURE — 3288F FALL RISK ASSESSMENT DOCD: CPT | Mod: HCWC,CPTII,S$GLB, | Performed by: THORACIC SURGERY (CARDIOTHORACIC VASCULAR SURGERY)

## 2023-11-30 PROCEDURE — 3288F FALL RISK ASSESSMENT DOCD: CPT | Mod: HCWC,CPTII,S$GLB,

## 2023-11-30 PROCEDURE — 99204 OFFICE O/P NEW MOD 45 MIN: CPT | Mod: HCWC,S$GLB,, | Performed by: THORACIC SURGERY (CARDIOTHORACIC VASCULAR SURGERY)

## 2023-11-30 PROCEDURE — 99204 PR OFFICE/OUTPT VISIT, NEW, LEVL IV, 45-59 MIN: ICD-10-PCS | Mod: HCWC,S$GLB,, | Performed by: THORACIC SURGERY (CARDIOTHORACIC VASCULAR SURGERY)

## 2023-11-30 PROCEDURE — 1101F PT FALLS ASSESS-DOCD LE1/YR: CPT | Mod: HCWC,CPTII,S$GLB,

## 2023-11-30 PROCEDURE — 1159F MED LIST DOCD IN RCRD: CPT | Mod: HCWC,CPTII,S$GLB, | Performed by: THORACIC SURGERY (CARDIOTHORACIC VASCULAR SURGERY)

## 2023-11-30 PROCEDURE — 3078F DIAST BP <80 MM HG: CPT | Mod: HCWC,CPTII,S$GLB, | Performed by: THORACIC SURGERY (CARDIOTHORACIC VASCULAR SURGERY)

## 2023-11-30 PROCEDURE — 92004 COMPRE OPH EXAM NEW PT 1/>: CPT | Mod: HCWC,S$GLB,,

## 2023-11-30 PROCEDURE — 99999 PR PBB SHADOW E&M-EST. PATIENT-LVL III: CPT | Mod: PBBFAC,HCWC,,

## 2023-11-30 PROCEDURE — 99999 PR PBB SHADOW E&M-EST. PATIENT-LVL III: CPT | Mod: PBBFAC,HCWC,, | Performed by: THORACIC SURGERY (CARDIOTHORACIC VASCULAR SURGERY)

## 2023-11-30 PROCEDURE — 1126F AMNT PAIN NOTED NONE PRSNT: CPT | Mod: HCWC,CPTII,S$GLB, | Performed by: THORACIC SURGERY (CARDIOTHORACIC VASCULAR SURGERY)

## 2023-11-30 PROCEDURE — 1159F PR MEDICATION LIST DOCUMENTED IN MEDICAL RECORD: ICD-10-PCS | Mod: HCWC,CPTII,S$GLB,

## 2023-11-30 PROCEDURE — 2023F PR DILATED RETINAL EXAM W/O EVID OF RETINOPATHY: ICD-10-PCS | Mod: HCWC,CPTII,S$GLB,

## 2023-11-30 PROCEDURE — 1160F RVW MEDS BY RX/DR IN RCRD: CPT | Mod: HCWC,CPTII,S$GLB, | Performed by: THORACIC SURGERY (CARDIOTHORACIC VASCULAR SURGERY)

## 2023-11-30 PROCEDURE — 3288F PR FALLS RISK ASSESSMENT DOCUMENTED: ICD-10-PCS | Mod: HCWC,CPTII,S$GLB,

## 2023-11-30 PROCEDURE — 3288F PR FALLS RISK ASSESSMENT DOCUMENTED: ICD-10-PCS | Mod: HCWC,CPTII,S$GLB, | Performed by: THORACIC SURGERY (CARDIOTHORACIC VASCULAR SURGERY)

## 2023-11-30 PROCEDURE — 1126F PR PAIN SEVERITY QUANTIFIED, NO PAIN PRESENT: ICD-10-PCS | Mod: HCWC,CPTII,S$GLB, | Performed by: THORACIC SURGERY (CARDIOTHORACIC VASCULAR SURGERY)

## 2023-11-30 PROCEDURE — 3075F PR MOST RECENT SYSTOLIC BLOOD PRESS GE 130-139MM HG: ICD-10-PCS | Mod: HCWC,CPTII,S$GLB, | Performed by: THORACIC SURGERY (CARDIOTHORACIC VASCULAR SURGERY)

## 2023-11-30 PROCEDURE — 99999 PR PBB SHADOW E&M-EST. PATIENT-LVL III: ICD-10-PCS | Mod: PBBFAC,HCWC,, | Performed by: THORACIC SURGERY (CARDIOTHORACIC VASCULAR SURGERY)

## 2023-11-30 PROCEDURE — 1101F PT FALLS ASSESS-DOCD LE1/YR: CPT | Mod: HCWC,CPTII,S$GLB, | Performed by: THORACIC SURGERY (CARDIOTHORACIC VASCULAR SURGERY)

## 2023-11-30 PROCEDURE — 1101F PR PT FALLS ASSESS DOC 0-1 FALLS W/OUT INJ PAST YR: ICD-10-PCS | Mod: HCWC,CPTII,S$GLB,

## 2023-11-30 PROCEDURE — 1101F PR PT FALLS ASSESS DOC 0-1 FALLS W/OUT INJ PAST YR: ICD-10-PCS | Mod: HCWC,CPTII,S$GLB, | Performed by: THORACIC SURGERY (CARDIOTHORACIC VASCULAR SURGERY)

## 2023-11-30 PROCEDURE — 2023F DILAT RTA XM W/O RTNOPTHY: CPT | Mod: HCWC,CPTII,S$GLB,

## 2023-11-30 NOTE — PROGRESS NOTES
This patient was referred to the office with peripheral arterial disease.  She had an ultrasound showing moderate vascular occlusive disease of the extremities.  She does have history of peripheral arterial disease with treatment of a vessel in the right lower extremity for a nonhealing ulcer on the toe years ago.    She is not a smoker.  She has longstanding diabetes.    She is a history of episodic atrial fibrillation.  She does take Eliquis.  Other medicines are noted.    On exam vital signs are stable.  Pupils are equal and round reactive to light.  Neck is supple.  Chest is equal breath sounds.  Heart is in a regular rate and rhythm.  Abdomen is benign.  Peripheral pulses are diminished.  She had an ulcer on the toe foot that has resolved.    At this point observation is warranted.  No intervention is necessary.  I would recommend repeat arterial duplex of the extremities in 4-6 months.

## 2023-11-30 NOTE — PROGRESS NOTES
HPI    Referral - pt here for routine DM exam ELENI 2022 (in [place])    Pt complains of eyes burning, itching, and watering. Pt uses AT PRN but no   relief. Symptoms started 2-3 months ago. Pt uses readers to aid near (not   w pt) but denies blurred distance vision. DM and HTN controlled w aid. Pt   had cataract surgery w Dr. Malin [id number].    Hemoglobin A1C       Date                     Value               Ref Range             Status                10/19/2023               6.5 (H)             4.0 - 5.6 %           Final                 03/27/2023               6.7 (H)             4.0 - 5.6 %           Final                 05/24/2022               7.3 (H)             4.0 - 5.6 %           Final            Last edited by Nacho Arias, OD on 11/30/2023 11:38 AM.            Assessment /Plan     For exam results, see Encounter Report.    Type 2 diabetes mellitus without retinopathy    Type 2 diabetes mellitus with diabetic neuropathy, without long-term current use of insulin  -     Ambulatory referral/consult to Ophthalmology    Allergic conjunctivitis, bilateral    Pseudophakia of both eyes    Hyperopia with astigmatism and presbyopia, bilateral      1-2. No diabetic retinopathy or macular edema evident on today's exam OD, OS. Ed pt on importance of maintaining strict BS control due to potential for visual fluctuations and/or vision loss. Monitor with yearly dilated exam.    3. Mild signs and symptoms of allergic conjunctivitis, pt states eyes are always watering and itching. OTC artificial tears did not help signs/symptoms. Pt states oral antihistamines does help . Gave OTC allergy drop recommendations to use QD-BID for relief of symptoms. Ed pt to RTC if symptoms worsen or fail to resolve. Otherwise, monitor.    4. PCIOL OU with open posterior capsules. Stable. Monitor yearly for changes.    5. Pt happy with uncorrected DVA. Ok to continue with OTC readers as needed for near work. No spec rx given  today.      RTC: 1 year for comprehensive exam or sooner prn

## 2023-12-15 ENCOUNTER — TELEPHONE (OUTPATIENT)
Dept: PODIATRY | Facility: CLINIC | Age: 83
End: 2023-12-15
Payer: MEDICARE

## 2023-12-15 NOTE — TELEPHONE ENCOUNTER
----- Message from Mary Ellen Woodruff sent at 12/15/2023  9:50 AM CST -----  Type:  Sooner Appointment Request    Caller is requesting a sooner appointment.  Caller declined first available appointment listed below.  Caller will not accept being placed on the waitlist and is requesting a message be sent to doctor.    Name of Caller:pt    When is the first available appointment?01/03/2024    Symptoms:ulcer on right foot    Would the patient rather a call back or a response via [facility]? Call back    Best Call Back Number:[phone]      Additional Information:      Please call Back to advise. Thanks!

## 2023-12-20 ENCOUNTER — OFFICE VISIT (OUTPATIENT)
Dept: PODIATRY | Facility: CLINIC | Age: 83
End: 2023-12-20
Payer: MEDICARE

## 2023-12-20 VITALS — WEIGHT: 157 LBS | BODY MASS INDEX: 29.64 KG/M2 | HEIGHT: 61 IN

## 2023-12-20 DIAGNOSIS — L97.512 DIABETIC ULCER OF OTHER PART OF RIGHT FOOT ASSOCIATED WITH TYPE 2 DIABETES MELLITUS, WITH FAT LAYER EXPOSED: ICD-10-CM

## 2023-12-20 DIAGNOSIS — Z89.429 HISTORY OF AMPUTATION OF LESSER TOE, UNSPECIFIED LATERALITY: ICD-10-CM

## 2023-12-20 DIAGNOSIS — I73.9 PAD (PERIPHERAL ARTERY DISEASE): Primary | ICD-10-CM

## 2023-12-20 DIAGNOSIS — E11.621 DIABETIC ULCER OF OTHER PART OF RIGHT FOOT ASSOCIATED WITH TYPE 2 DIABETES MELLITUS, WITH FAT LAYER EXPOSED: ICD-10-CM

## 2023-12-20 PROCEDURE — 1125F AMNT PAIN NOTED PAIN PRSNT: CPT | Mod: HCWC,CPTII,S$GLB, | Performed by: STUDENT IN AN ORGANIZED HEALTH CARE EDUCATION/TRAINING PROGRAM

## 2023-12-20 PROCEDURE — 1159F MED LIST DOCD IN RCRD: CPT | Mod: HCWC,CPTII,S$GLB, | Performed by: STUDENT IN AN ORGANIZED HEALTH CARE EDUCATION/TRAINING PROGRAM

## 2023-12-20 PROCEDURE — 99999 PR PBB SHADOW E&M-EST. PATIENT-LVL III: CPT | Mod: PBBFAC,HCWC,, | Performed by: STUDENT IN AN ORGANIZED HEALTH CARE EDUCATION/TRAINING PROGRAM

## 2023-12-20 PROCEDURE — 1160F RVW MEDS BY RX/DR IN RCRD: CPT | Mod: HCWC,CPTII,S$GLB, | Performed by: STUDENT IN AN ORGANIZED HEALTH CARE EDUCATION/TRAINING PROGRAM

## 2023-12-20 PROCEDURE — 3288F FALL RISK ASSESSMENT DOCD: CPT | Mod: HCWC,CPTII,S$GLB, | Performed by: STUDENT IN AN ORGANIZED HEALTH CARE EDUCATION/TRAINING PROGRAM

## 2023-12-20 PROCEDURE — 99213 OFFICE O/P EST LOW 20 MIN: CPT | Mod: 24,25,HCWC,S$GLB | Performed by: STUDENT IN AN ORGANIZED HEALTH CARE EDUCATION/TRAINING PROGRAM

## 2023-12-20 PROCEDURE — 1101F PT FALLS ASSESS-DOCD LE1/YR: CPT | Mod: HCWC,CPTII,S$GLB, | Performed by: STUDENT IN AN ORGANIZED HEALTH CARE EDUCATION/TRAINING PROGRAM

## 2023-12-20 PROCEDURE — 11042 DBRDMT SUBQ TIS 1ST 20SQCM/<: CPT | Mod: 79,HCWC,S$GLB, | Performed by: STUDENT IN AN ORGANIZED HEALTH CARE EDUCATION/TRAINING PROGRAM

## 2023-12-20 PROCEDURE — 1101F PR PT FALLS ASSESS DOC 0-1 FALLS W/OUT INJ PAST YR: ICD-10-PCS | Mod: HCWC,CPTII,S$GLB, | Performed by: STUDENT IN AN ORGANIZED HEALTH CARE EDUCATION/TRAINING PROGRAM

## 2023-12-20 PROCEDURE — 1125F PR PAIN SEVERITY QUANTIFIED, PAIN PRESENT: ICD-10-PCS | Mod: HCWC,CPTII,S$GLB, | Performed by: STUDENT IN AN ORGANIZED HEALTH CARE EDUCATION/TRAINING PROGRAM

## 2023-12-20 PROCEDURE — 3288F PR FALLS RISK ASSESSMENT DOCUMENTED: ICD-10-PCS | Mod: HCWC,CPTII,S$GLB, | Performed by: STUDENT IN AN ORGANIZED HEALTH CARE EDUCATION/TRAINING PROGRAM

## 2023-12-20 PROCEDURE — 99999 PR PBB SHADOW E&M-EST. PATIENT-LVL III: ICD-10-PCS | Mod: PBBFAC,HCWC,, | Performed by: STUDENT IN AN ORGANIZED HEALTH CARE EDUCATION/TRAINING PROGRAM

## 2023-12-20 PROCEDURE — 1160F PR REVIEW ALL MEDS BY PRESCRIBER/CLIN PHARMACIST DOCUMENTED: ICD-10-PCS | Mod: HCWC,CPTII,S$GLB, | Performed by: STUDENT IN AN ORGANIZED HEALTH CARE EDUCATION/TRAINING PROGRAM

## 2023-12-20 PROCEDURE — 11042 WOUND DEBRIDEMENT: ICD-10-PCS | Mod: 79,HCWC,S$GLB, | Performed by: STUDENT IN AN ORGANIZED HEALTH CARE EDUCATION/TRAINING PROGRAM

## 2023-12-20 PROCEDURE — 99213 PR OFFICE/OUTPT VISIT, EST, LEVL III, 20-29 MIN: ICD-10-PCS | Mod: 24,25,HCWC,S$GLB | Performed by: STUDENT IN AN ORGANIZED HEALTH CARE EDUCATION/TRAINING PROGRAM

## 2023-12-20 PROCEDURE — 1159F PR MEDICATION LIST DOCUMENTED IN MEDICAL RECORD: ICD-10-PCS | Mod: HCWC,CPTII,S$GLB, | Performed by: STUDENT IN AN ORGANIZED HEALTH CARE EDUCATION/TRAINING PROGRAM

## 2023-12-21 NOTE — PROGRESS NOTES
Subjective:      Patient ID: Caro Powell is a 83 y.o. female.    Chief Complaint: Wound Check    Ms. Powell presents today with complaints of left 3rd toe infection. She notes drainage, redness and swelling a couple of days ago and presented to the urgent care for assistance. She was given doxy 100mg and an injection of rocephin which has helped with the redness and swelling. She doesn't have much feeling at baseline has amputations of the toes on the right foot.     She normally sees Dr. Atkins on the [address] [place] but has recently moved to the [facility].     10/9/23: Follow up for osteomyelitis of the left 2nd toe and R foot wound.     10/17/23: f/u b/l foot wounds.     10/24/23: f/u for R and L foot wounds. Doing well overall.    11/6/23: f/u L foot wound and post op flexor tenotomy. Doing well overall.    11/13/23: f/u L foot wound    11/27/23:  F/u left foot wound.     12/21/23:  F/u L foot wound. Doing well overall except for the new concerning lesions on the R foot.. Waiting for the DM shoes and inserts.     Review of Systems   Skin:  Positive for nail changes, poor wound healing, suspicious lesions and unusual hair distribution.   All other systems reviewed and are negative.          Objective:      Physical Exam  Cardiovascular:      Pulses:           Dorsalis pedis pulses are 0 on the right side and 0 on the left side.        Posterior tibial pulses are 0 on the right side and 0 on the left side.   Feet:      Right foot:      Protective Sensation: 10 sites tested.  0 sites sensed.      Skin integrity: Ulcer present.      Toenail Condition: Right toenails are abnormally thick and long. Fungal disease present.     Left foot:      Protective Sensation: 10 sites tested.  0 sites sensed.      Skin integrity: Ulcer and erythema present.      Toenail Condition: Left toenails are abnormally thick and long. Fungal disease present.     Comments: Right foot:  Submet 1 ulceration that is fully  epithelialized.     Left foot:  Left 3rd toe ulceration is still fully epithelialized    Amputation of the R 4,5 toes.    New ulcerations submet 1 and the plantar hallux of the R great toe.   Hallux wound measures [id number]  Submet 1 measures 1.5cmx0.5cmx0.1cm.          Assessment:       Encounter Diagnoses   Name Primary?    PAD (peripheral artery disease) Yes    Diabetic ulcer of other part of right foot associated with type 2 diabetes mellitus, with fat layer exposed     History of amputation of lesser toe, unspecified laterality                Plan:       Caro was seen today for wound check.    Diagnoses and all orders for this visit:    PAD (peripheral artery disease)    Diabetic ulcer of other part of right foot associated with type 2 diabetes mellitus, with fat layer exposed    History of amputation of lesser toe, unspecified laterality    Other orders  -     Wound Debridement            I counseled the patient on her conditions, their implications and medical management.    New ulcerations today. Start local wound care with forefoot offloader.     Stephen Barakat DPM    Wound Debridement    Date/Time: 12/20/2023 1:20 PM    Performed by: Stephen Barakat DPM  Authorized by: Stephen Barakat DPM    Consent Done?:  Yes (Verbal)    Wound Details:    Location:  Right foot    Location:  Right 1st Toe    Type of Debridement:  Excisional       Length (cm):  0.3       Area (sq cm):  0.09       Width (cm):  0.3       Percent Debrided (%):  100       Depth (cm):  0.1       Total Area Debrided (sq cm):  0.09    Depth of debridement:  Subcutaneous tissue    Tissue debrided:  Dermis and Epidermis    Devitalized tissue debrided:  Callus    Instruments:  Curette  Bleeding:  None    Additional wounds:  1    2nd Wound Details:     Location:  Right foot    Location:  Right 1st Metatarsal Head    Location:  Right 1st Metatarsal Head    Type of Debridement:  Excisional       Length (cm):  1.5       Area (sq cm):  0.75        Width (cm):  0.5       Percent Debrided (%):  100       Depth (cm):  0.1       Total Area Debrided (sq cm):  0.75    Depth of debridement:  Subcutaneous tissue    Tissue debrided:  Subcutaneous    Devitalized tissue debrided:  Callus and Clots    Instruments:  Curette  Bleeding:  Minimal  Hemostasis Achieved: Yes    Method Used:  Pressure  Patient tolerance:  Patient tolerated the procedure well with no immediate complications

## 2023-12-28 ENCOUNTER — OFFICE VISIT (OUTPATIENT)
Dept: PODIATRY | Facility: CLINIC | Age: 83
End: 2023-12-28
Payer: MEDICARE

## 2023-12-28 VITALS — BODY MASS INDEX: 29.63 KG/M2 | WEIGHT: 156.94 LBS | HEIGHT: 61 IN

## 2023-12-28 DIAGNOSIS — L97.512 DIABETIC ULCER OF OTHER PART OF RIGHT FOOT ASSOCIATED WITH TYPE 2 DIABETES MELLITUS, WITH FAT LAYER EXPOSED: ICD-10-CM

## 2023-12-28 DIAGNOSIS — E11.42 TYPE 2 DIABETES MELLITUS WITH PERIPHERAL NEUROPATHY: ICD-10-CM

## 2023-12-28 DIAGNOSIS — E11.621 DIABETIC ULCER OF OTHER PART OF RIGHT FOOT ASSOCIATED WITH TYPE 2 DIABETES MELLITUS, WITH FAT LAYER EXPOSED: ICD-10-CM

## 2023-12-28 DIAGNOSIS — I73.9 PAD (PERIPHERAL ARTERY DISEASE): Primary | ICD-10-CM

## 2023-12-28 PROCEDURE — 99999 PR PBB SHADOW E&M-EST. PATIENT-LVL III: ICD-10-PCS | Mod: PBBFAC,HCWC,, | Performed by: STUDENT IN AN ORGANIZED HEALTH CARE EDUCATION/TRAINING PROGRAM

## 2023-12-28 PROCEDURE — 99499 UNLISTED E&M SERVICE: CPT | Mod: 25,HCWC,S$GLB, | Performed by: STUDENT IN AN ORGANIZED HEALTH CARE EDUCATION/TRAINING PROGRAM

## 2023-12-28 PROCEDURE — 97597 WOUND DEBRIDEMENT: ICD-10-PCS | Mod: 79,HCWC,S$GLB, | Performed by: STUDENT IN AN ORGANIZED HEALTH CARE EDUCATION/TRAINING PROGRAM

## 2023-12-28 PROCEDURE — 99999 PR PBB SHADOW E&M-EST. PATIENT-LVL III: CPT | Mod: PBBFAC,HCWC,, | Performed by: STUDENT IN AN ORGANIZED HEALTH CARE EDUCATION/TRAINING PROGRAM

## 2023-12-28 PROCEDURE — 97597 DBRDMT OPN WND 1ST 20 CM/<: CPT | Mod: 79,HCWC,S$GLB, | Performed by: STUDENT IN AN ORGANIZED HEALTH CARE EDUCATION/TRAINING PROGRAM

## 2023-12-28 PROCEDURE — 99499 NO LOS: ICD-10-PCS | Mod: 25,HCWC,S$GLB, | Performed by: STUDENT IN AN ORGANIZED HEALTH CARE EDUCATION/TRAINING PROGRAM

## 2023-12-29 NOTE — PROGRESS NOTES
Subjective:      Patient ID: Caro Powell is a 83 y.o. female.    Chief Complaint: Wound Care    Ms. Powell presents today with complaints of left 3rd toe infection. She notes drainage, redness and swelling a couple of days ago and presented to the urgent care for assistance. She was given doxy 100mg and an injection of rocephin which has helped with the redness and swelling. She doesn't have much feeling at baseline has amputations of the toes on the right foot.     She normally sees Dr. Atkins on the [place] but has recently moved to the [facility].     10/9/23: Follow up for osteomyelitis of the left 2nd toe and R foot wound.     10/17/23: f/u b/l foot wounds.     10/24/23: f/u for R and L foot wounds. Doing well overall.    11/6/23: f/u L foot wound and post op flexor tenotomy. Doing well overall.    11/13/23: f/u L foot wound    11/27/23:  F/u left foot wound.     12/21/23:  F/u L foot wound. Doing well overall except for the new concerning lesions on the R foot.. Waiting for the DM shoes and inserts.     12/28/23:  F/u R foot wounds. Still waiting for DM shoes.     Review of Systems   Skin:  Positive for nail changes, poor wound healing, suspicious lesions and unusual hair distribution.   All other systems reviewed and are negative.          Objective:      Physical Exam  Cardiovascular:      Pulses:           Dorsalis pedis pulses are 0 on the right side and 0 on the left side.        Posterior tibial pulses are 0 on the right side and 0 on the left side.   Feet:      Right foot:      Protective Sensation: 10 sites tested.  0 sites sensed.      Skin integrity: Ulcer present.      Toenail Condition: Right toenails are abnormally thick and long. Fungal disease present.     Left foot:      Protective Sensation: 10 sites tested.  0 sites sensed.      Skin integrity: No ulcer or erythema.      Toenail Condition: Left toenails are abnormally thick and long. Fungal disease present.    Amputation  of the R 4,5 toes.    Ulcerations submet 1 and the plantar hallux of the R great toe.   Hallux wound measures [id number]  Submet 1 measures Fully epithelialized          Assessment:       Encounter Diagnoses   Name Primary?    PAD (peripheral artery disease) Yes    Diabetic ulcer of other part of right foot associated with type 2 diabetes mellitus, with fat layer exposed     Type 2 diabetes mellitus with peripheral neuropathy                  Plan:       Caro was seen today for wound care.    Diagnoses and all orders for this visit:    PAD (peripheral artery disease)    Diabetic ulcer of other part of right foot associated with type 2 diabetes mellitus, with fat layer exposed    Type 2 diabetes mellitus with peripheral neuropathy    Other orders  -     Wound Debridement              I counseled the patient on her conditions, their implications and medical management.    New ulcerations today. Start local wound care with forefoot offloader.     Stephen Barakat DPM    Wound Debridement    Date/Time: 12/28/2023 2:00 PM    Performed by: Stephen Barakat DPM  Authorized by: Stephen Braakat DPM    Consent Done?:  Yes (Verbal)    Wound Details:    Location:  Right foot    Location:  Right 1st Toe    Type of Debridement:  Excisional       Length (cm):  0.3       Area (sq cm):  0.09       Width (cm):  0.3       Percent Debrided (%):  100       Depth (cm):  0       Total Area Debrided (sq cm):  0.09    Depth of debridement:  Epidermis/Dermis    Tissue debrided:  Dermis and Epidermis    Devitalized tissue debrided:  Callus    Instruments:  Curette  Bleeding:  None  Patient tolerance:  Patient tolerated the procedure well with no immediate complications  1st Wound Pain Assessment: 0

## 2024-01-04 ENCOUNTER — OFFICE VISIT (OUTPATIENT)
Dept: PODIATRY | Facility: CLINIC | Age: 84
End: 2024-01-04
Payer: MEDICARE

## 2024-01-04 VITALS — BODY MASS INDEX: 29.45 KG/M2 | HEIGHT: 61 IN | WEIGHT: 156 LBS

## 2024-01-04 DIAGNOSIS — Z87.2 HEALED ULCER OF RIGHT FOOT: ICD-10-CM

## 2024-01-04 DIAGNOSIS — E11.42 TYPE 2 DIABETES MELLITUS WITH PERIPHERAL NEUROPATHY: ICD-10-CM

## 2024-01-04 DIAGNOSIS — I73.9 PAD (PERIPHERAL ARTERY DISEASE): Primary | ICD-10-CM

## 2024-01-04 PROCEDURE — 99214 OFFICE O/P EST MOD 30 MIN: CPT | Mod: PBBFAC,PO | Performed by: STUDENT IN AN ORGANIZED HEALTH CARE EDUCATION/TRAINING PROGRAM

## 2024-01-04 PROCEDURE — 99999 PR PBB SHADOW E&M-EST. PATIENT-LVL IV: CPT | Mod: PBBFAC,HCWC,, | Performed by: STUDENT IN AN ORGANIZED HEALTH CARE EDUCATION/TRAINING PROGRAM

## 2024-01-04 PROCEDURE — 99213 OFFICE O/P EST LOW 20 MIN: CPT | Mod: S$PBB,,, | Performed by: STUDENT IN AN ORGANIZED HEALTH CARE EDUCATION/TRAINING PROGRAM

## 2024-01-04 NOTE — PROGRESS NOTES
Subjective:      Patient ID: Caro Powell is a 83 y.o. female.    Chief Complaint: Wound Check    Ms. Powell presents today with complaints of left 3rd toe infection. She notes drainage, redness and swelling a couple of days ago and presented to the urgent care for assistance. She was given doxy 100mg and an injection of rocephin which has helped with the redness and swelling. She doesn't have much feeling at baseline has amputations of the toes on the right foot.     She normally sees Dr. Atkins on the [place] but has recently moved to the [facility].     10/9/23: Follow up for osteomyelitis of the left 2nd toe and R foot wound.     10/17/23: f/u b/l foot wounds.     10/24/23: f/u for R and L foot wounds. Doing well overall.    11/6/23: f/u L foot wound and post op flexor tenotomy. Doing well overall.    11/13/23: f/u L foot wound    11/27/23:  F/u left foot wound.     12/21/23:  F/u L foot wound. Doing well overall except for the new concerning lesions on the R foot.. Waiting for the DM shoes and inserts.     12/28/23:  F/u R foot wounds. Still waiting for DM shoes.     1/4/24:  F/u R foot wounds. Picking up DM shoes today.     Review of Systems   Skin:  Positive for nail changes, poor wound healing, suspicious lesions and unusual hair distribution.   All other systems reviewed and are negative.          Objective:      Physical Exam  Cardiovascular:      Pulses:           Dorsalis pedis pulses are 0 on the right side and 0 on the left side.        Posterior tibial pulses are 0 on the right side and 0 on the left side.   Feet:      Right foot:      Protective Sensation: 10 sites tested.  0 sites sensed.      Skin integrity: Ulcer present.      Toenail Condition: Right toenails are abnormally thick and long. Fungal disease present.     Left foot:      Protective Sensation: 10 sites tested.  0 sites sensed.      Skin integrity: No ulcer or erythema.      Toenail Condition: Left toenails are  abnormally thick and long. Fungal disease present.    Amputation of the R 4,5 toes.    Ulcerations submet 1 and the plantar hallux of the R great toe.   Hallux wound appears closed  Submet 1 measures: Fully epithelialized          Assessment:       Encounter Diagnoses   Name Primary?    PAD (peripheral artery disease) Yes    Type 2 diabetes mellitus with peripheral neuropathy     Healed ulcer of right foot                    Plan:       Caro was seen today for wound check.    Diagnoses and all orders for this visit:    PAD (peripheral artery disease)    Type 2 diabetes mellitus with peripheral neuropathy    Healed ulcer of right foot                I counseled the patient on her conditions, their implications and medical management.    Continue offloading R foot. Appears healed today.     F/u 1 week with new DM shoes.    Stephen Barakat DPM

## 2024-01-05 NOTE — PROGRESS NOTES
[facility]     SUBJECTIVE:     History of Present Illness:  Patient is a 83 y.o. female presents with atrial flutter and PVD. Recent loss of      Primary Diagnosis:  1. hypertension  2. DM  3. atrial flutter - resolved  4. abnormal Echo: pericardial effusion - small .     5.  PVD - Amputation right second toe and right fifth toe . S/p atherectomy 11/21 right distal vessels   6. Breast cancer: s/p mastectomy 8/22 -   ROS  Since last visit in 8/23: no change   1. Had breast surgery with no issues; had radiation treatment and completed this and now on medications   2. No chest pains  3. No shortness of breath at rest; no PND or orthopnea; does get a little short of breath with walking - walked over here from the car without shortness of breath   4. No palpitations  5. No syncope  6. No lower extremity edema on lasix as needed   7. Developed shingles after the surgery   8. Staying with her son   9. No bleeding   10. Activity: getting a little better   11. Blood pressures at home: takes it sometimes;   Past Hospitalization    Review of patient's allergies indicates:   Allergen Reactions    Codeine Nausea Only and Other (See Comments)     Can Take Tylenol, hrdrocodone       Past Medical History:   Diagnosis Date    Anticoagulant long-term use     Arthritis     Atrial flutter     Calcium nephrolithiasis 2007    ckd 3    Diabetes mellitus, type 2     Diabetic peripheral neuropathy associated with type 2 diabetes mellitus     Difficult intubation     NARROW AIRWAY    Disc displacement, lumbar     Hypertension     Invasive ductal carcinoma of left breast 07/06/2022    Mixed hyperlipidemia     Pulmonary aspiration of gastric contents     Shingles        Past Surgical History:   Procedure Laterality Date    ANGIOGRAPHY OF LOWER EXTREMITY N/A 10/21/2021    Procedure: Angiogram Extremity Unilateral;  Surgeon: Dre Martino MD;  Location: [facility] CATH LAB/EP;  Service: Cardiology;  Laterality: N/A;    ANGIOGRAPHY OF  LOWER EXTREMITY Right 11/10/2021    Procedure: Angiogram Extremity Unilateral;  Surgeon: Ben Rooney MD;  Location: [facility] CATH LAB/EP;  Service: Cardiology;  Laterality: Right;    AXILLARY NODE DISSECTION Right 08/15/2022    Procedure: LYMPHADENECTOMY, AXILLARY RIGHT;  Surgeon: RADHA Quinn MD;  Location: [facility];  Service: General;  Laterality: Right;    CATARACT EXTRACTION      COLONOSCOPY  11/28/2011    sigmoid diverticulosis, external hemorrhoids    COLONOSCOPY      DEBRIDEMENT Right 10/20/2021    Procedure: DEBRIDEMENT;  Surgeon: Ava Atkins DPM;  Location: [facility] OR;  Service: Podiatry;  Laterality: Right;    ENDOSCOPIC GASTROCNEMIUS RECESSION Right 09/10/2019    Procedure: RECESSION, GASTROCNEMIUS, ENDOSCOPIC;  Surgeon: Derek Jose DPM;  Location: [facility] OR;  Service: Podiatry;  Laterality: Right;  Arthrex center line (ron notified)  Video    EXTRACORPOREAL SHOCK WAVE LITHOTRIPSY      FLEXOR TENOTOMY Right 10/20/2021    Procedure: TENOTOMY, FLEXOR 3rd toe;  Surgeon: Ava Atkins DPM;  Location: [facility] OR;  Service: Podiatry;  Laterality: Right;    HYSTERECTOMY      INJECTION FOR SENTINEL NODE IDENTIFICATION Left 08/15/2022    Procedure: INJECTION, FOR SENTINEL NODE IDENTIFICATION;  Surgeon: RADHA Quinn MD;  Location: [facility];  Service: General;  Laterality: Left;    MASTECTOMY Bilateral 08/15/2022    Procedure: MASTECTOMY BILATERAL  / BREAST;  Surgeon: RADHA Quinn MD;  Location: [facility];  Service: General;  Laterality: Bilateral;  5 HOURS / EMAIL SENT 8-11 @ 9:02 LK    SENTINEL LYMPH NODE BIOPSY Left 08/15/2022    Procedure: BIOPSY, LYMPH NODE, SENTINEL LEFT;  Surgeon: RADHA Quinn MD;  Location: [facility];  Service: General;  Laterality: Left;    SHOULDER SURGERY Left     TOE AMPUTATION Right 05/22/2017    5th toe    TOE AMPUTATION Right 10/20/2021    Procedure: AMPUTATION, TOE;  Surgeon: Ava Atkins DPM;  Location: [facility] OR;  Service: Podiatry;  Laterality: Right;     TONSILLECTOMY         Family History   Problem Relation Age of Onset    Diabetes Mother     Heart failure Father     No Known Problems Sister     Breast cancer Sister 69        Genetic testing negative    Kidney failure Brother     Breast cancer Maternal Aunt         early 40s    Diabetes Maternal Grandmother     No Known Problems Maternal Grandfather     No Known Problems Paternal Grandmother     No Known Problems Paternal Grandfather     Glaucoma Neg Hx     Cataracts Neg Hx     Macular degeneration Neg Hx     Retinal detachment Neg Hx     Strabismus Neg Hx        Social History     Tobacco Use    Smoking status: Never     Passive exposure: Never    Smokeless tobacco: Never   Substance Use Topics    Alcohol use: No    Drug use: No        Home meds:  Current Outpatient Medications on File Prior to Visit   Medication Sig Dispense Refill    ACCU-CHEK SAMI PLUS METER Misc TEST  AS DIRECTED 1 each 0    ACCU-CHEK SAMI PLUS TEST STRP Strp USE TO TEST BLOOD SUGAR ONCE DAILY 100 strip 3    ACCU-CHEK SOFT DEV LANCETS Kit       ACCU-CHEK SOFTCLIX LANCETS Misc TEST ONCE DAILY 100 each 3    ammonium lactate 12 % Crea Apply to feet twice daily. Avoid use between toes. 140 g 5    anastrozole (ARIMIDEX) 1 mg Tab TAKE 1 TABLET ONE TIME DAILY 90 tablet 3    apixaban (ELIQUIS) 5 mg Tab TAKE 1 TABLET BY MOUTH TWICE DAILY 180 tablet 3    atorvastatin (LIPITOR) 40 MG tablet Take 1 tablet (40 mg total) by mouth once daily. 90 tablet 3    clopidogreL (PLAVIX) 75 mg tablet Take 1 tablet (75 mg total) by mouth once daily. 90 tablet 3    EScitalopram oxalate (LEXAPRO) 10 MG tablet Take 1 tablet (10 mg total) by mouth once daily. 30 tablet 11    famotidine (PEPCID) 20 MG tablet TAKE 1 TABLET AT BEDTIME 90 tablet 3    FLUAD QUAD 2022-23,65Y UP,,PF, 60 mcg (15 mcg x 4)/0.5 mL Syrg       furosemide (LASIX) 40 MG tablet TAKE 1 TABLET EVERY DAY 90 tablet 1    glimepiride (AMARYL) 1 MG tablet TAKE 1 TABLET TWICE DAILY 180 tablet 3    lisinopriL  (PRINIVIL,ZESTRIL) 20 MG tablet TAKE 1 TABLET EVERY DAY 90 tablet 3    metoprolol succinate (TOPROL-XL) 25 MG 24 hr tablet TAKE 1 TABLET EVERY DAY 90 tablet 3    mupirocin (BACTROBAN) 2 % ointment Apply topically 2 (two) times daily. 30 g 2    OYSTER SHELL CALCIUM-VIT D3 500 mg-5 mcg (200 unit) per tablet TAKE 1 TABLET EVERY DAY 90 tablet 10    polyethylene glycol (GLYCOLAX) 17 gram/dose powder Take 17 g by mouth once daily. 510 g 0    pramipexole (MIRAPEX) 0.125 MG tablet TAKE 1 TABLET EVERY DAY 90 tablet 3    urea (CARMOL) 40 % Crea Apply topically 2 (two) times daily. 1 Bottle 3     No current facility-administered medications on file prior to visit.       Cardiac meds:     Eliquis 5 mg BID  metoprolol succinate 25 mg   Glimepiride 1 mg  lisinopril 20 mg   Atorvastatin 40 mg   plavix 75 mg - not on this   Lasix 40 mg as needed       OBJECTIVE:     Vital Signs (Most Recent)  Pulse: 60 (24 1028)  BP: (!) 153/81 (24 1028)  SpO2: 98 % (24 1028)  Weight stable but down 3 lb s from 168 to 165 - 162     Physical Exam:     Neck: normal carotid upstrokes; normal JVP, no bruits, right  basilar carotid from murmur  Lungs: clear  Heart: RR, normal S1,S2, systolic ejection murmur base; no AI  Abd: obese  Exts: normal DP left; faint  PT right, no edema bilaterally           LABS    : A1c 7.3, GFR 38 ; LDL 56   3/23: 6.7; GFR 34; LFT's normal   10/23: LDL 60; GFR 55; A1c 6.5;   Diagnostic Results:    1a.  EKGs- 17: sinus; possible old anteroseptal infarction   1b. EK17: atrial flutter with ventricular rate of 147;   1c. EK17: sinus  1d. EK/19: sinus with nonspecific ivcd   1e. EK/21: sinus ; LAD   1f. EK/22: NSR: nonspecific T wave c hanges   2. Echos- 17: normal EF, diastolic dysfunction; mild LAE, moderate pericardial effusion, aortic sclerosis   2b. Echo: 17: normal EF, small pericardial effusion; LAE    2c. Echo: : normal EF, diastolic dysfunction; LAE, PAS  29 mm normal; mild TR     3. Stress Test- [  ]  4. Cath- [  ]  5. arterial study: no significant disease. 2017; 1/21: left clear; right distal right popliteal 50-75%  5b. Arterial study lower exts: disease in small vessels; underwent atherectomy 11/10/21 with good results       ASSESSMENT/PLAN:        1. atrial flutter: resolved; [id number]: however 4; no bleeding ; now in sinus  and on anticoagulation - plavix discontinued   2. diastolic dysfunction - fluid status is perfect   3. moderate pericardial effusion: resolving to mild to none in 1/22   4. shortness of breath:minimal   5. dizziness: resolved   6. blood pressures normal ?      7. CKD with GFR 38 10/21; GFR 42 11/21 5/22: 38 and stable ; 8/22: GFR 41 to 55 on 10/23  8.  PVD: Right toes healed and doing better   9. Systolic ejection murmur: aortic sclerosis   Plan: 1.continue the same medications  2.[place]   5. [place] 4 months     Sam Paulino MD

## 2024-01-08 ENCOUNTER — OFFICE VISIT (OUTPATIENT)
Dept: CARDIOLOGY | Facility: CLINIC | Age: 84
End: 2024-01-08
Payer: MEDICARE

## 2024-01-08 VITALS
HEART RATE: 60 BPM | DIASTOLIC BLOOD PRESSURE: 81 MMHG | BODY MASS INDEX: 29.9 KG/M2 | WEIGHT: 158.38 LBS | SYSTOLIC BLOOD PRESSURE: 153 MMHG | OXYGEN SATURATION: 98 % | HEIGHT: 61 IN

## 2024-01-08 DIAGNOSIS — I48.92 ATRIAL FLUTTER, UNSPECIFIED TYPE: ICD-10-CM

## 2024-01-08 DIAGNOSIS — N18.32 STAGE 3B CHRONIC KIDNEY DISEASE: Primary | ICD-10-CM

## 2024-01-08 DIAGNOSIS — I48.91 ATRIAL FIBRILLATION WITH RAPID VENTRICULAR RESPONSE: ICD-10-CM

## 2024-01-08 PROCEDURE — 99214 OFFICE O/P EST MOD 30 MIN: CPT | Mod: S$GLB,,, | Performed by: INTERNAL MEDICINE

## 2024-01-08 PROCEDURE — 3288F FALL RISK ASSESSMENT DOCD: CPT | Mod: CPTII,S$GLB,, | Performed by: INTERNAL MEDICINE

## 2024-01-08 PROCEDURE — 1159F MED LIST DOCD IN RCRD: CPT | Mod: CPTII,S$GLB,, | Performed by: INTERNAL MEDICINE

## 2024-01-08 PROCEDURE — 3072F LOW RISK FOR RETINOPATHY: CPT | Mod: CPTII,S$GLB,, | Performed by: INTERNAL MEDICINE

## 2024-01-08 PROCEDURE — 1126F AMNT PAIN NOTED NONE PRSNT: CPT | Mod: CPTII,S$GLB,, | Performed by: INTERNAL MEDICINE

## 2024-01-08 PROCEDURE — 99999 PR PBB SHADOW E&M-EST. PATIENT-LVL III: CPT | Mod: PBBFAC,,, | Performed by: INTERNAL MEDICINE

## 2024-01-08 PROCEDURE — 1160F RVW MEDS BY RX/DR IN RCRD: CPT | Mod: CPTII,S$GLB,, | Performed by: INTERNAL MEDICINE

## 2024-01-08 PROCEDURE — 3077F SYST BP >= 140 MM HG: CPT | Mod: CPTII,S$GLB,, | Performed by: INTERNAL MEDICINE

## 2024-01-08 PROCEDURE — 3079F DIAST BP 80-89 MM HG: CPT | Mod: CPTII,S$GLB,, | Performed by: INTERNAL MEDICINE

## 2024-01-08 PROCEDURE — 1101F PT FALLS ASSESS-DOCD LE1/YR: CPT | Mod: CPTII,S$GLB,, | Performed by: INTERNAL MEDICINE

## 2024-01-11 ENCOUNTER — OFFICE VISIT (OUTPATIENT)
Dept: PODIATRY | Facility: CLINIC | Age: 84
End: 2024-01-11
Payer: MEDICARE

## 2024-01-11 ENCOUNTER — OFFICE VISIT (OUTPATIENT)
Dept: PRIMARY CARE CLINIC | Facility: CLINIC | Age: 84
End: 2024-01-11
Payer: MEDICARE

## 2024-01-11 VITALS
DIASTOLIC BLOOD PRESSURE: 76 MMHG | BODY MASS INDEX: 29.98 KG/M2 | RESPIRATION RATE: 16 BRPM | HEART RATE: 59 BPM | SYSTOLIC BLOOD PRESSURE: 134 MMHG | HEIGHT: 61 IN | WEIGHT: 158.81 LBS | TEMPERATURE: 98 F | OXYGEN SATURATION: 98 %

## 2024-01-11 VITALS — HEIGHT: 61 IN | BODY MASS INDEX: 29.83 KG/M2 | WEIGHT: 158 LBS

## 2024-01-11 DIAGNOSIS — E11.40 TYPE 2 DIABETES MELLITUS WITH DIABETIC NEUROPATHY, WITHOUT LONG-TERM CURRENT USE OF INSULIN: ICD-10-CM

## 2024-01-11 DIAGNOSIS — E11.42 TYPE 2 DIABETES MELLITUS WITH PERIPHERAL NEUROPATHY: ICD-10-CM

## 2024-01-11 DIAGNOSIS — E78.2 MIXED HYPERLIPIDEMIA: ICD-10-CM

## 2024-01-11 DIAGNOSIS — I10 ESSENTIAL HYPERTENSION: ICD-10-CM

## 2024-01-11 DIAGNOSIS — D63.8 ANEMIA OF CHRONIC DISEASE: Primary | ICD-10-CM

## 2024-01-11 DIAGNOSIS — Z87.2 HEALED ULCER OF FOOT ON EXAMINATION: ICD-10-CM

## 2024-01-11 DIAGNOSIS — I48.92 ATRIAL FLUTTER, UNSPECIFIED TYPE: ICD-10-CM

## 2024-01-11 DIAGNOSIS — Z87.2 HEALED ULCER OF RIGHT FOOT: ICD-10-CM

## 2024-01-11 DIAGNOSIS — I73.9 PAD (PERIPHERAL ARTERY DISEASE): ICD-10-CM

## 2024-01-11 DIAGNOSIS — Z85.3 HISTORY OF BREAST CANCER: ICD-10-CM

## 2024-01-11 DIAGNOSIS — I73.9 PAD (PERIPHERAL ARTERY DISEASE): Primary | ICD-10-CM

## 2024-01-11 DIAGNOSIS — E11.42 POLYNEUROPATHY DUE TO TYPE 2 DIABETES MELLITUS: ICD-10-CM

## 2024-01-11 DIAGNOSIS — N18.32 STAGE 3B CHRONIC KIDNEY DISEASE: ICD-10-CM

## 2024-01-11 DIAGNOSIS — N18.31 STAGE 3A CHRONIC KIDNEY DISEASE: ICD-10-CM

## 2024-01-11 DIAGNOSIS — F41.9 ANXIETY: ICD-10-CM

## 2024-01-11 DIAGNOSIS — E11.52 TYPE 2 DIABETES MELLITUS WITH DIABETIC PERIPHERAL ANGIOPATHY AND GANGRENE, WITHOUT LONG-TERM CURRENT USE OF INSULIN: ICD-10-CM

## 2024-01-11 DIAGNOSIS — I48.91 ATRIAL FIBRILLATION WITH RAPID VENTRICULAR RESPONSE: ICD-10-CM

## 2024-01-11 PROCEDURE — 1160F RVW MEDS BY RX/DR IN RCRD: CPT | Mod: HCWC,CPTII,S$GLB, | Performed by: STUDENT IN AN ORGANIZED HEALTH CARE EDUCATION/TRAINING PROGRAM

## 2024-01-11 PROCEDURE — 3075F SYST BP GE 130 - 139MM HG: CPT | Mod: HCWC,CPTII,S$GLB, | Performed by: INTERNAL MEDICINE

## 2024-01-11 PROCEDURE — 1126F AMNT PAIN NOTED NONE PRSNT: CPT | Mod: HCWC,CPTII,S$GLB, | Performed by: STUDENT IN AN ORGANIZED HEALTH CARE EDUCATION/TRAINING PROGRAM

## 2024-01-11 PROCEDURE — 3288F FALL RISK ASSESSMENT DOCD: CPT | Mod: HCWC,CPTII,S$GLB, | Performed by: INTERNAL MEDICINE

## 2024-01-11 PROCEDURE — 3288F FALL RISK ASSESSMENT DOCD: CPT | Mod: HCWC,CPTII,S$GLB, | Performed by: STUDENT IN AN ORGANIZED HEALTH CARE EDUCATION/TRAINING PROGRAM

## 2024-01-11 PROCEDURE — 99999 PR PBB SHADOW E&M-EST. PATIENT-LVL III: CPT | Mod: PBBFAC,HCWC,, | Performed by: STUDENT IN AN ORGANIZED HEALTH CARE EDUCATION/TRAINING PROGRAM

## 2024-01-11 PROCEDURE — 1159F MED LIST DOCD IN RCRD: CPT | Mod: HCWC,CPTII,S$GLB, | Performed by: INTERNAL MEDICINE

## 2024-01-11 PROCEDURE — 3072F LOW RISK FOR RETINOPATHY: CPT | Mod: HCWC,CPTII,S$GLB, | Performed by: STUDENT IN AN ORGANIZED HEALTH CARE EDUCATION/TRAINING PROGRAM

## 2024-01-11 PROCEDURE — 99999 PR PBB SHADOW E&M-EST. PATIENT-LVL V: CPT | Mod: PBBFAC,HCWC,, | Performed by: INTERNAL MEDICINE

## 2024-01-11 PROCEDURE — 99215 OFFICE O/P EST HI 40 MIN: CPT | Mod: HCWC,S$GLB,, | Performed by: INTERNAL MEDICINE

## 2024-01-11 PROCEDURE — 1126F AMNT PAIN NOTED NONE PRSNT: CPT | Mod: HCWC,CPTII,S$GLB, | Performed by: INTERNAL MEDICINE

## 2024-01-11 PROCEDURE — 1101F PT FALLS ASSESS-DOCD LE1/YR: CPT | Mod: HCWC,CPTII,S$GLB, | Performed by: INTERNAL MEDICINE

## 2024-01-11 PROCEDURE — 3072F LOW RISK FOR RETINOPATHY: CPT | Mod: HCWC,CPTII,S$GLB, | Performed by: INTERNAL MEDICINE

## 2024-01-11 PROCEDURE — 1160F RVW MEDS BY RX/DR IN RCRD: CPT | Mod: HCWC,CPTII,S$GLB, | Performed by: INTERNAL MEDICINE

## 2024-01-11 PROCEDURE — 3078F DIAST BP <80 MM HG: CPT | Mod: HCWC,CPTII,S$GLB, | Performed by: INTERNAL MEDICINE

## 2024-01-11 PROCEDURE — 99213 OFFICE O/P EST LOW 20 MIN: CPT | Mod: HCWC,24,S$GLB, | Performed by: STUDENT IN AN ORGANIZED HEALTH CARE EDUCATION/TRAINING PROGRAM

## 2024-01-11 PROCEDURE — 1159F MED LIST DOCD IN RCRD: CPT | Mod: HCWC,CPTII,S$GLB, | Performed by: STUDENT IN AN ORGANIZED HEALTH CARE EDUCATION/TRAINING PROGRAM

## 2024-01-11 PROCEDURE — 1101F PT FALLS ASSESS-DOCD LE1/YR: CPT | Mod: HCWC,CPTII,S$GLB, | Performed by: STUDENT IN AN ORGANIZED HEALTH CARE EDUCATION/TRAINING PROGRAM

## 2024-01-11 RX ORDER — ESCITALOPRAM OXALATE 10 MG/1
10 TABLET ORAL DAILY
Qty: 30 TABLET | Refills: 11 | Status: SHIPPED | OUTPATIENT
Start: 2024-01-11 | End: 2025-01-10

## 2024-01-11 NOTE — PROGRESS NOTES
INTERNAL MEDICINE PROGRESS/URGENT CARE NOTE    CHIEF COMPLAINT     Chief Complaint   Patient presents with    Eye Problem     Itchy and watery - tried eye drops from optometrist and taking claritin allergy relief and has not had relief     Follow-up     4 week f/u       TIM   Caro Powell is a 83 y.o.  female who presents with a PMHx of  breast cancer, afib, CKD 3, arthritis, DM2, HLD, HTN, who presents today for routine follow up visit.      Her   a few months ago after a long glover with dementia. She then moved in with her son and theyre both agreeably happy with the arrangement     Was seeing her monthly mostly due to the vascular ulcers but now its healed and she's doing better.      Her main complaints and concerns are:   Recently bought show inserts to try to help with he recurrent ulcers.   Did have the US legs done which showed 75% blockage. Has consulted with cardiovascular who plans to monitor and managed conservatively      At her last visit, we discussed that she was being treated for an ulcer/cellulitis with osteomyelitis of the toe.   Had this a few years ago which lead to toe amputation.   Known PAD and needed vascular surgery, now on statin and plavix for this. Denies any claudication.   I question this diagnosis of celluliti and wonder if this is a vascular abscess     History of breast cancer: diagnosed in  august had mastectomy and radiation.    Needs to establish with breast surgeon over here. Referral placed     DM2: last hgb a1c on file from over 6 months ago was 6.7 and at goal. Her medications include amaryl 1mg. On ACE-I and statin therapy   Due for eye exam will call her doctor and schedule it  Foot exam done today      Afib: on eliquis. Rate controlled.     Home Medications:  Prior to Admission medications    Medication Sig Start Date End Date Taking? Authorizing Provider   ACCU-CHEK SAMI PLUS METER Misc TEST  AS DIRECTED 21   Brayan Penny MD ACCU-JANIS  SAMI PLUS TEST STRP Strp USE TO TEST BLOOD SUGAR ONCE DAILY 12/16/19   Manuel Laird MD   ACCU-CHEK SOFT DEV LANCETS Kit  10/2/14   Provider, Historical   ACCU-CHEK SOFTCLIX LANCETS Misc TEST ONCE DAILY 12/16/19   Manuel Laird MD   ammonium lactate 12 % Crea Apply to feet twice daily. Avoid use between toes. 2/9/21   Ava Atkins DPM   anastrozole (ARIMIDEX) 1 mg Tab TAKE 1 TABLET ONE TIME DAILY 8/9/23   Morgan Caputo MD   apixaban (ELIQUIS) 5 mg Tab TAKE 1 TABLET BY MOUTH TWICE DAILY 7/28/23   Brayan Penny MD   atorvastatin (LIPITOR) 40 MG tablet Take 1 tablet (40 mg total) by mouth once daily. 10/16/23 10/15/24  Anisha Reyes MD   EScitalopram oxalate (LEXAPRO) 10 MG tablet Take 1 tablet (10 mg total) by mouth once daily. 4/19/23 4/18/24  Brayan Penny MD   famotidine (PEPCID) 20 MG tablet TAKE 1 TABLET AT BEDTIME 8/9/23   Brayan Penny MD   FLUAD QUAD 2022-23,65Y UP,,PF, 60 mcg (15 mcg x 4)/0.5 mL Syrg  11/8/22   Provider, Historical   furosemide (LASIX) 40 MG tablet TAKE 1 TABLET EVERY DAY 10/7/23   Sam Paulino MD   glimepiride (AMARYL) 1 MG tablet TAKE 1 TABLET TWICE DAILY 7/28/23   Brayan Penny MD   lisinopriL (PRINIVIL,ZESTRIL) 20 MG tablet TAKE 1 TABLET EVERY DAY 1/12/23   Brayan Penny MD   metoprolol succinate (TOPROL-XL) 25 MG 24 hr tablet TAKE 1 TABLET EVERY DAY 3/22/23   Brayan Penny MD   mupirocin (BACTROBAN) 2 % ointment Apply topically 2 (two) times daily. 1/4/23   Ava Atkins DPM   OYSTER SHELL CALCIUM-VIT D3 500 mg-5 mcg (200 unit) per tablet TAKE 1 TABLET EVERY DAY 10/23/23   Morgan Caputo MD   polyethylene glycol (GLYCOLAX) 17 gram/dose powder Take 17 g by mouth once daily. 10/22/21   Mariya Love MD   pramipexole (MIRAPEX) 0.125 MG tablet TAKE 1 TABLET EVERY DAY 10/9/23   Brayan Penny MD   urea (CARMOL) 40 % Crea Apply topically 2 (two) times daily. 7/14/21   Ava Atkins DPM  "      Review of Systems:  Review of Systems        PHYSICAL EXAM     /76 (BP Location: Left arm, Patient Position: Sitting, BP Method: Medium (Manual))   Pulse (!) 59   Temp 97.8 °F (36.6 °C) (Oral)   Resp 16   Ht 5' 1" (1.549 m)   Wt 72 kg (158 lb 13.5 oz)   LMP  (LMP Unknown)   SpO2 98%   BMI 30.01 kg/m²     GEN - A+OX4, NAD   HEENT - PERRL, EOMI, OP clear  Neck - No thyromegaly or cervical LAD. No thyroid masses felt.  CV - RRR, no m/r   Chest - CTAB, no wheezing or rhonchi  Abd - S/NT/ND/+BS.   Ext - 2+BDP and radial pulses. No C/C/E.  Skin - No rash.    LABS         ASSESSMENT/PLAN     Caro Powell is a 83 y.o. female with  1. Anemia of chronic disease  Reviewed iron studies      Orders:  -     CBC Auto Differential; Future; Expected date: 01/11/2024    2. History of breast cancer  -     CBC Auto Differential; Future; Expected date: 01/11/2024    3. PAD (peripheral artery disease)  Overview:  On plavix and statin  S/p vascular srugery of the right leg in the past . Following with vascular for stenosis of the left LE     Orders:  -     CBC Auto Differential; Future; Expected date: 01/11/2024    4. Polyneuropathy due to type 2 diabetes mellitus  Overview:  Controlled   Continue with good glucose control      5. Atrial fibrillation with rapid ventricular response  Overview:  Rate controlled with metoprolol  A/c with eeliquis      6. Essential hypertension  Overview:  BP is very well controlled. A bit elevated  Continue with current medication    Orders:  -     Comprehensive Metabolic Panel; Future; Expected date: 01/11/2024  -     TSH; Future; Expected date: 01/11/2024    7. Atrial flutter, unspecified type  Overview:  Rate controlled with metoprolol  A/c with eeliquis      8. Type 2 diabetes mellitus with diabetic neuropathy, without long-term current use of insulin  Overview:  Diabetes is well controlled. Continue with current meds   Eye exam needed       9. Stage 3b chronic kidney " disease  Stable renal function    10. Stage 3a chronic kidney disease  Overview:  Stable renal function.   Encouraged proper hydration and avoid NSAIDs      11. 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 Continue with current meds   Eye exam needed       12. Mixed hyperlipidemia  -     Lipid Panel; Future; Expected date: 01/11/2024    13. Anxiety  -     EScitalopram oxalate (LEXAPRO) 10 MG tablet; Take 1 tablet (10 mg total) by mouth once daily.  Dispense: 30 tablet; Refill: 11       My total time spent on this encounter was 62 minutes which included  the following activities: preparing to see the patient, performing a medically appropriate and/or evaluation, counseling and educating the patient and family/caregiver, ordering medications, tests, or procedures, referring and communicating with other healthcare providers, documenting clinical information in the electronic or other health record. This time is independent and non-overlapping.     WORRY SCORE 2    RTC in 3 months, sooner if needed and depending on labs.    Anisha Reyes MD  Board Certified Internist/Geriatrician  [facility] ([place])

## 2024-01-11 NOTE — PROGRESS NOTES
Subjective:      Patient ID: Caro Powell is a 83 y.o. female.    Chief Complaint: Wound Check    Ms. Powell presents today with complaints of left 3rd toe infection. She notes drainage, redness and swelling a couple of days ago and presented to the urgent care for assistance. She was given doxy 100mg and an injection of rocephin which has helped with the redness and swelling. She doesn't have much feeling at baseline has amputations of the toes on the right foot.     She normally sees Dr. Atkins on the [place] but has recently moved to the [facility].     10/9/23: Follow up for osteomyelitis of the left 2nd toe and R foot wound.     10/17/23: f/u b/l foot wounds.     10/24/23: f/u for R and L foot wounds. Doing well overall.    11/6/23: f/u L foot wound and post op flexor tenotomy. Doing well overall.    11/13/23: f/u L foot wound    11/27/23:  F/u left foot wound.     12/21/23:  F/u L foot wound. Doing well overall except for the new concerning lesions on the R foot.. Waiting for the DM shoes and inserts.     12/28/23:  F/u R foot wounds. Still waiting for DM shoes.     1/4/24:  F/u R foot wounds. Picking up DM shoes today.     1/11/24:  F/u R foot wound and has her inserts today. Some complaints with the feel of the inserts but nothing major.     Review of Systems   Skin:  Positive for nail changes, poor wound healing, suspicious lesions and unusual hair distribution.   All other systems reviewed and are negative.          Objective:      Physical Exam  Cardiovascular:      Pulses:           Dorsalis pedis pulses are 0 on the right side and 0 on the left side.        Posterior tibial pulses are 0 on the right side and 0 on the left side.   Feet:      Right foot:      Protective Sensation: 10 sites tested.  0 sites sensed.      Skin integrity: Ulcer present.      Toenail Condition: Right toenails are abnormally thick and long. Fungal disease present.     Left foot:      Protective Sensation: 10  sites tested.  0 sites sensed.      Skin integrity: No ulcer or erythema.      Toenail Condition: Left toenails are abnormally thick and long. Fungal disease present.    Amputation of the R 4,5 toes.    Ulcerations submet 1 and the plantar hallux of the R great toe.   Hallux wound appears fully epithelialzied   Submet 1 measures: Fully epithelialized          Assessment:       Encounter Diagnoses   Name Primary?    PAD (peripheral artery disease) Yes    Type 2 diabetes mellitus with peripheral neuropathy     Healed ulcer of right foot     Healed ulcer of foot on examination - Left Foot                      Plan:       Caro was seen today for wound check.    Diagnoses and all orders for this visit:    PAD (peripheral artery disease)    Type 2 diabetes mellitus with peripheral neuropathy    Healed ulcer of right foot    Healed ulcer of foot on examination - Left Foot                  I counseled the patient on her conditions, their implications and medical management.    Inserts were checked for fitment and it is adequate.     Follow up in 3 months.     Stephen Barakat DPM

## 2024-01-17 ENCOUNTER — TELEPHONE (OUTPATIENT)
Dept: PODIATRY | Facility: CLINIC | Age: 84
End: 2024-01-17
Payer: MEDICARE

## 2024-01-17 NOTE — TELEPHONE ENCOUNTER
----- Message from Rocio Hendrix sent at 1/17/2024  9:38 AM CST -----  Type:  Same Day Appointment Request    Caller is requesting a same day appointment.  Caller declined first available appointment listed below.      Name of Caller:  pt  When is the first available appointment?   1/18  Symptoms:   wound on big toe  Best Call Back Number:   [phone]  Additional Information:   pl call bk to advise thanks

## 2024-01-18 ENCOUNTER — OFFICE VISIT (OUTPATIENT)
Dept: PODIATRY | Facility: CLINIC | Age: 84
End: 2024-01-18
Payer: MEDICARE

## 2024-01-18 VITALS — HEIGHT: 61 IN | BODY MASS INDEX: 29.84 KG/M2 | WEIGHT: 158.06 LBS

## 2024-01-18 DIAGNOSIS — I73.9 PAD (PERIPHERAL ARTERY DISEASE): Primary | ICD-10-CM

## 2024-01-18 DIAGNOSIS — M20.5X1 HALLUX LIMITUS OF RIGHT FOOT: ICD-10-CM

## 2024-01-18 DIAGNOSIS — L97.512 DIABETIC ULCER OF OTHER PART OF RIGHT FOOT ASSOCIATED WITH TYPE 2 DIABETES MELLITUS, WITH FAT LAYER EXPOSED: ICD-10-CM

## 2024-01-18 DIAGNOSIS — E11.621 DIABETIC ULCER OF OTHER PART OF RIGHT FOOT ASSOCIATED WITH TYPE 2 DIABETES MELLITUS, WITH FAT LAYER EXPOSED: ICD-10-CM

## 2024-01-18 DIAGNOSIS — E11.42 TYPE 2 DIABETES MELLITUS WITH PERIPHERAL NEUROPATHY: ICD-10-CM

## 2024-01-18 PROCEDURE — 99999 PR PBB SHADOW E&M-EST. PATIENT-LVL III: CPT | Mod: PBBFAC,HCWC,, | Performed by: STUDENT IN AN ORGANIZED HEALTH CARE EDUCATION/TRAINING PROGRAM

## 2024-01-18 PROCEDURE — 99213 OFFICE O/P EST LOW 20 MIN: CPT | Mod: 24,25,HCWC,S$GLB | Performed by: STUDENT IN AN ORGANIZED HEALTH CARE EDUCATION/TRAINING PROGRAM

## 2024-01-18 PROCEDURE — 1159F MED LIST DOCD IN RCRD: CPT | Mod: HCWC,CPTII,S$GLB, | Performed by: STUDENT IN AN ORGANIZED HEALTH CARE EDUCATION/TRAINING PROGRAM

## 2024-01-18 PROCEDURE — 3072F LOW RISK FOR RETINOPATHY: CPT | Mod: HCWC,CPTII,S$GLB, | Performed by: STUDENT IN AN ORGANIZED HEALTH CARE EDUCATION/TRAINING PROGRAM

## 2024-01-18 PROCEDURE — 3288F FALL RISK ASSESSMENT DOCD: CPT | Mod: HCWC,CPTII,S$GLB, | Performed by: STUDENT IN AN ORGANIZED HEALTH CARE EDUCATION/TRAINING PROGRAM

## 2024-01-18 PROCEDURE — 1101F PT FALLS ASSESS-DOCD LE1/YR: CPT | Mod: HCWC,CPTII,S$GLB, | Performed by: STUDENT IN AN ORGANIZED HEALTH CARE EDUCATION/TRAINING PROGRAM

## 2024-01-18 PROCEDURE — 1126F AMNT PAIN NOTED NONE PRSNT: CPT | Mod: HCWC,CPTII,S$GLB, | Performed by: STUDENT IN AN ORGANIZED HEALTH CARE EDUCATION/TRAINING PROGRAM

## 2024-01-18 PROCEDURE — 1160F RVW MEDS BY RX/DR IN RCRD: CPT | Mod: HCWC,CPTII,S$GLB, | Performed by: STUDENT IN AN ORGANIZED HEALTH CARE EDUCATION/TRAINING PROGRAM

## 2024-01-18 PROCEDURE — 97597 DBRDMT OPN WND 1ST 20 CM/<: CPT | Mod: 79,HCWC,S$GLB, | Performed by: STUDENT IN AN ORGANIZED HEALTH CARE EDUCATION/TRAINING PROGRAM

## 2024-01-18 NOTE — PROGRESS NOTES
Subjective:      Patient ID: Caro Powell is a 83 y.o. female.    Chief Complaint: Diabetes Mellitus and Callouses (Wound on right great toe)    Ms. Powell presents today with complaints of left 3rd toe infection. She notes drainage, redness and swelling a couple of days ago and presented to the urgent care for assistance. She was given doxy 100mg and an injection of rocephin which has helped with the redness and swelling. She doesn't have much feeling at baseline has amputations of the toes on the right foot.     She normally sees Dr. Atkins on the [place] but has recently moved to the [facility].     10/9/23: Follow up for osteomyelitis of the left 2nd toe and R foot wound.     10/17/23: f/u b/l foot wounds.     10/24/23: f/u for R and L foot wounds. Doing well overall.    11/6/23: f/u L foot wound and post op flexor tenotomy. Doing well overall.    11/13/23: f/u L foot wound    11/27/23:  F/u left foot wound.     12/21/23:  F/u L foot wound. Doing well overall except for the new concerning lesions on the R foot.. Waiting for the DM shoes and inserts.     12/28/23:  F/u R foot wounds. Still waiting for DM shoes.     1/4/24:  F/u R foot wounds. Picking up DM shoes today.     1/11/24:  F/u R foot wound and has her inserts today. Some complaints with the feel of the inserts but nothing major.     1/18/24: f.u R foot. Wound has returned.    Review of Systems   Skin:  Positive for nail changes, poor wound healing, suspicious lesions and unusual hair distribution.   All other systems reviewed and are negative.          Objective:      Physical Exam  Cardiovascular:      Pulses:           Dorsalis pedis pulses are 0 on the right side and 0 on the left side.        Posterior tibial pulses are 0 on the right side and 0 on the left side.   Feet:      Right foot:      Protective Sensation: 10 sites tested.  0 sites sensed.      Skin integrity: Ulcer present.      Toenail Condition: Right toenails are  abnormally thick and long. Fungal disease present.     Left foot:      Protective Sensation: 10 sites tested.  0 sites sensed.      Skin integrity: No ulcer or erythema.      Toenail Condition: Left toenails are abnormally thick and long. Fungal disease present.    Amputation of the R 4,5 toes.    Ulcer R medial IPJ is partial thickness and measures [id number]. R MTPJ limitus.          Assessment:       Encounter Diagnoses   Name Primary?    PAD (peripheral artery disease) Yes    Type 2 diabetes mellitus with peripheral neuropathy     Hallux limitus of right foot     Diabetic ulcer of other part of right foot associated with type 2 diabetes mellitus, with fat layer exposed                        Plan:       Caro was seen today for diabetes mellitus and callouses.    Diagnoses and all orders for this visit:    PAD (peripheral artery disease)    Type 2 diabetes mellitus with peripheral neuropathy    Hallux limitus of right foot    Diabetic ulcer of other part of right foot associated with type 2 diabetes mellitus, with fat layer exposed        I counseled the patient on her conditions, their implications and medical management.    Wound R great toe was debrided. Foot ball dressing applied.    Need to consider selective plantar fascial release to reduce pressure to the medial hallux.     Follow up 1 week for wound care.    Stephen Barakat DPM    Wound Debridement    Date/Time: 1/18/2024 9:40 AM    Performed by: Stephen Barakat DPM  Authorized by: Stephen Barakat DPM    Consent Done?:  Yes (Verbal)    Wound Details:    Location:  Right foot    Location:  Right 1st Toe    Type of Debridement:  Non-excisional       Length (cm):  0.2       Area (sq cm):  0.04       Width (cm):  0.2       Percent Debrided (%):  80       Depth (cm):  0.1       Total Area Debrided (sq cm):  0.03    Depth of debridement:  Epidermis/Dermis    Tissue debrided:  Epidermis    Instruments:  Curette  Bleeding:  Minimal  Hemostasis Achieved:  Yes  Method Used:  Pressure and Alginate  Patient tolerance:  Patient tolerated the procedure well with no immediate complications  1st Wound Pain Assessment: 0

## 2024-01-25 ENCOUNTER — TELEPHONE (OUTPATIENT)
Dept: PODIATRY | Facility: CLINIC | Age: 84
End: 2024-01-25
Payer: MEDICARE

## 2024-01-25 ENCOUNTER — OFFICE VISIT (OUTPATIENT)
Dept: PODIATRY | Facility: CLINIC | Age: 84
End: 2024-01-25
Payer: MEDICARE

## 2024-01-25 VITALS — HEIGHT: 61 IN | WEIGHT: 158 LBS | BODY MASS INDEX: 29.83 KG/M2

## 2024-01-25 DIAGNOSIS — I73.9 PAD (PERIPHERAL ARTERY DISEASE): Primary | ICD-10-CM

## 2024-01-25 DIAGNOSIS — L97.512 DIABETIC ULCER OF OTHER PART OF RIGHT FOOT ASSOCIATED WITH TYPE 2 DIABETES MELLITUS, WITH FAT LAYER EXPOSED: ICD-10-CM

## 2024-01-25 DIAGNOSIS — E11.42 TYPE 2 DIABETES MELLITUS WITH PERIPHERAL NEUROPATHY: ICD-10-CM

## 2024-01-25 DIAGNOSIS — E11.621 DIABETIC ULCER OF OTHER PART OF RIGHT FOOT ASSOCIATED WITH TYPE 2 DIABETES MELLITUS, WITH FAT LAYER EXPOSED: ICD-10-CM

## 2024-01-25 PROCEDURE — 99499 UNLISTED E&M SERVICE: CPT | Mod: HCWC,S$GLB,, | Performed by: STUDENT IN AN ORGANIZED HEALTH CARE EDUCATION/TRAINING PROGRAM

## 2024-01-25 PROCEDURE — 99999 PR PBB SHADOW E&M-EST. PATIENT-LVL III: CPT | Mod: PBBFAC,HCWC,, | Performed by: STUDENT IN AN ORGANIZED HEALTH CARE EDUCATION/TRAINING PROGRAM

## 2024-01-25 PROCEDURE — 97597 DBRDMT OPN WND 1ST 20 CM/<: CPT | Mod: HCWC,S$GLB,, | Performed by: STUDENT IN AN ORGANIZED HEALTH CARE EDUCATION/TRAINING PROGRAM

## 2024-01-25 NOTE — TELEPHONE ENCOUNTER
----- Message from Fidelia Martinez sent at 1/25/2024  1:27 PM CST -----  Regarding: Pt want earlier appt for today  Type:  Needs Medical Advice    Who Called: Pt      Would the patient rather a call back or a response via [facility]? Call back    Best Call Back Number: [phone]    Additional Information: Pt have appt today at 2:0pm and alexandria like to know if she can come in early, pt would like a call back. Thank you

## 2024-01-25 NOTE — PROGRESS NOTES
Subjective:      Patient ID: Crao Powell is a 83 y.o. female.    Chief Complaint: Wound Check    Ms. Powell presents today with complaints of left 3rd toe infection. She notes drainage, redness and swelling a couple of days ago and presented to the urgent care for assistance. She was given doxy 100mg and an injection of rocephin which has helped with the redness and swelling. She doesn't have much feeling at baseline has amputations of the toes on the right foot.     She normally sees Dr. Atkins on the [place] but has recently moved to the [facility].     10/9/23: Follow up for osteomyelitis of the left 2nd toe and R foot wound.     10/17/23: f/u b/l foot wounds.     10/24/23: f/u for R and L foot wounds. Doing well overall.    11/6/23: f/u L foot wound and post op flexor tenotomy. Doing well overall.    11/13/23: f/u L foot wound    11/27/23:  F/u left foot wound.     12/21/23:  F/u L foot wound. Doing well overall except for the new concerning lesions on the R foot.. Waiting for the DM shoes and inserts.     12/28/23:  F/u R foot wounds. Still waiting for DM shoes.     1/4/24:  F/u R foot wounds. Picking up DM shoes today.     1/11/24:  F/u R foot wound and has her inserts today. Some complaints with the feel of the inserts but nothing major.     1/18/24: f.u R foot. Wound has returned.    1/25/24: f/u R foot wound    Review of Systems   Skin:  Positive for nail changes, poor wound healing, suspicious lesions and unusual hair distribution.   All other systems reviewed and are negative.          Objective:      Physical Exam  Cardiovascular:      Pulses:           Dorsalis pedis pulses are 0 on the right side and 0 on the left side.        Posterior tibial pulses are 0 on the right side and 0 on the left side.   Feet:      Right foot:      Protective Sensation: 10 sites tested.  0 sites sensed.      Skin integrity: Ulcer present.      Toenail Condition: Right toenails are abnormally thick and  "long. Fungal disease present.     Left foot:      Protective Sensation: 10 sites tested.  0 sites sensed.      Skin integrity: No ulcer or erythema.      Toenail Condition: Left toenails are abnormally thick and long. Fungal disease present.    Amputation of the R 4,5 toes.    Ulcer R medial IPJ is partial thickness and measures [id number]. R MTPJ limitus.          Assessment:       Encounter Diagnoses   Name Primary?    PAD (peripheral artery disease) Yes    Type 2 diabetes mellitus with peripheral neuropathy     Diabetic ulcer of other part of right foot associated with type 2 diabetes mellitus, with fat layer exposed                          Plan:       Caro was seen today for wound check.    Diagnoses and all orders for this visit:    PAD (peripheral artery disease)    Type 2 diabetes mellitus with peripheral neuropathy    Diabetic ulcer of other part of right foot associated with type 2 diabetes mellitus, with fat layer exposed          I counseled the patient on her conditions, their implications and medical management.    Wound R great toe was debrided. Foot ball dressing applied.    Need to consider selective plantar fascial release to reduce pressure to the medial hallux.     Follow up 1 week for wound care.    Stephen Barakat DPM    Wound Debridement    Date/Time: 1/25/2024 2:40 PM    Performed by: Stephen Barakat DPM  Authorized by: Stephen Barakat DPM    Time out: Immediately prior to procedure a "time out" was called to verify the correct patient, procedure, equipment, support staff and site/side marked as required.    Consent Done?:  Yes (Verbal)    Wound Details:    Location:  Right foot    Location:  Right 1st Toe    Type of Debridement:  Excisional       Length (cm):  0.3       Area (sq cm):  0.09       Width (cm):  0.3       Percent Debrided (%):  100       Depth (cm):  0.1       Total Area Debrided (sq cm):  0.09    Depth of debridement:  Epidermis/Dermis    Tissue debrided:  Subcutaneous    " Devitalized tissue debrided:  Callus    Instruments:  Curette and Forceps  Bleeding:  Minimal  Hemostasis Achieved: Yes  Method Used:  Alginate  Patient tolerance:  Patient tolerated the procedure well with no immediate complications  1st Wound Pain Assessment: 0

## 2024-02-01 ENCOUNTER — OFFICE VISIT (OUTPATIENT)
Dept: PODIATRY | Facility: CLINIC | Age: 84
End: 2024-02-01
Payer: MEDICARE

## 2024-02-01 VITALS — WEIGHT: 158 LBS | HEIGHT: 61 IN | BODY MASS INDEX: 29.83 KG/M2

## 2024-02-01 DIAGNOSIS — M20.5X1 HALLUX LIMITUS OF RIGHT FOOT: ICD-10-CM

## 2024-02-01 DIAGNOSIS — I73.9 PAD (PERIPHERAL ARTERY DISEASE): Primary | ICD-10-CM

## 2024-02-01 DIAGNOSIS — E11.42 TYPE 2 DIABETES MELLITUS WITH PERIPHERAL NEUROPATHY: ICD-10-CM

## 2024-02-01 DIAGNOSIS — L84 PRE-ULCERATIVE CALLUSES: ICD-10-CM

## 2024-02-01 PROCEDURE — 99214 OFFICE O/P EST MOD 30 MIN: CPT | Mod: 25,HCWC,S$GLB,ICN | Performed by: STUDENT IN AN ORGANIZED HEALTH CARE EDUCATION/TRAINING PROGRAM

## 2024-02-01 PROCEDURE — 11042 DBRDMT SUBQ TIS 1ST 20SQCM/<: CPT | Mod: HCWC,S$GLB,ICN, | Performed by: STUDENT IN AN ORGANIZED HEALTH CARE EDUCATION/TRAINING PROGRAM

## 2024-02-01 PROCEDURE — 3288F FALL RISK ASSESSMENT DOCD: CPT | Mod: HCWC,CPTII,S$GLB,ICN | Performed by: STUDENT IN AN ORGANIZED HEALTH CARE EDUCATION/TRAINING PROGRAM

## 2024-02-01 PROCEDURE — 99999 PR PBB SHADOW E&M-EST. PATIENT-LVL III: CPT | Mod: PBBFAC,HCWC,, | Performed by: STUDENT IN AN ORGANIZED HEALTH CARE EDUCATION/TRAINING PROGRAM

## 2024-02-01 PROCEDURE — 3072F LOW RISK FOR RETINOPATHY: CPT | Mod: HCWC,CPTII,S$GLB,ICN | Performed by: STUDENT IN AN ORGANIZED HEALTH CARE EDUCATION/TRAINING PROGRAM

## 2024-02-01 PROCEDURE — 1101F PT FALLS ASSESS-DOCD LE1/YR: CPT | Mod: HCWC,CPTII,S$GLB,ICN | Performed by: STUDENT IN AN ORGANIZED HEALTH CARE EDUCATION/TRAINING PROGRAM

## 2024-02-01 PROCEDURE — 1159F MED LIST DOCD IN RCRD: CPT | Mod: HCWC,CPTII,S$GLB,ICN | Performed by: STUDENT IN AN ORGANIZED HEALTH CARE EDUCATION/TRAINING PROGRAM

## 2024-02-01 PROCEDURE — 1160F RVW MEDS BY RX/DR IN RCRD: CPT | Mod: HCWC,CPTII,S$GLB,ICN | Performed by: STUDENT IN AN ORGANIZED HEALTH CARE EDUCATION/TRAINING PROGRAM

## 2024-02-01 PROCEDURE — 1126F AMNT PAIN NOTED NONE PRSNT: CPT | Mod: HCWC,CPTII,S$GLB,ICN | Performed by: STUDENT IN AN ORGANIZED HEALTH CARE EDUCATION/TRAINING PROGRAM

## 2024-02-01 NOTE — PROGRESS NOTES
Subjective:      Patient ID: Caro Powell is a 83 y.o. female.    Chief Complaint: Wound Check    Ms. Powell presents today with complaints of left 3rd toe infection. She notes drainage, redness and swelling a couple of days ago and presented to the urgent care for assistance. She was given doxy 100mg and an injection of rocephin which has helped with the redness and swelling. She doesn't have much feeling at baseline has amputations of the toes on the right foot.     She normally sees Dr. Atkins on the [place] but has recently moved to the [facility].     10/9/23: Follow up for osteomyelitis of the left 2nd toe and R foot wound.     10/17/23: f/u b/l foot wounds.     10/24/23: f/u for R and L foot wounds. Doing well overall.    11/6/23: f/u L foot wound and post op flexor tenotomy. Doing well overall.    11/13/23: f/u L foot wound    11/27/23:  F/u left foot wound.     12/21/23:  F/u L foot wound. Doing well overall except for the new concerning lesions on the R foot.. Waiting for the DM shoes and inserts.     12/28/23:  F/u R foot wounds. Still waiting for DM shoes.     1/4/24:  F/u R foot wounds. Picking up DM shoes today.     1/11/24:  F/u R foot wound and has her inserts today. Some complaints with the feel of the inserts but nothing major.     1/18/24: f.u R foot. Wound has returned.    1/25/24: f/u R foot wound    2/1/24: f/u R foot wound. Feeling normal.    Review of Systems   Skin:  Positive for nail changes, poor wound healing, suspicious lesions and unusual hair distribution.   All other systems reviewed and are negative.          Objective:      Physical Exam  Cardiovascular:      Pulses:           Dorsalis pedis pulses are 0 on the right side and 0 on the left side.        Posterior tibial pulses are 0 on the right side and 0 on the left side.   Feet:      Right foot:      Protective Sensation: 10 sites tested.  0 sites sensed.      Skin integrity: Ulcer present.      Toenail  "Condition: Right toenails are abnormally thick and long. Fungal disease present.     Left foot:      Protective Sensation: 10 sites tested.  0 sites sensed.      Skin integrity: No ulcer or erythema.      Toenail Condition: Left toenails are abnormally thick and long. Fungal disease present.    Amputation of the R 4,5 toes.    Ulcer R medial IPJ is partial thickness and measures [id number]. R MTPJ limitus.          Assessment:       Encounter Diagnoses   Name Primary?    PAD (peripheral artery disease) Yes    Pre-ulcerative calluses     Hallux limitus of right foot     Type 2 diabetes mellitus with peripheral neuropathy                            Plan:       Caro was seen today for wound check.    Diagnoses and all orders for this visit:    PAD (peripheral artery disease)    Pre-ulcerative calluses    Hallux limitus of right foot    Type 2 diabetes mellitus with peripheral neuropathy    Other orders  -     Wound Debridement            I counseled the patient on her conditions, their implications and medical management.    Wound R great toe was debrided. Foot ball dressing applied.    As the wound is not healing, I recommend a selective partial plantar fascia release. She is amenable. I discussed risks and complications with her and she will f/u next week for the procedure.     Stephen Barakat DPM    Wound Debridement    Date/Time: 2/1/2024 10:40 AM    Performed by: Stephen Barakat DPM  Authorized by: Stephen Barakat DPM    Time out: Immediately prior to procedure a "time out" was called to verify the correct patient, procedure, equipment, support staff and site/side marked as required.    Consent Done?:  Yes (Verbal)    Wound Details:    Location:  Right foot    Location:  Right 1st Toe    Type of Debridement:  Excisional       Length (cm):  0.3       Area (sq cm):  0.09       Width (cm):  0.3       Percent Debrided (%):  100       Depth (cm):  0.1       Total Area Debrided (sq cm):  0.09    Depth of " debridement:  Subcutaneous tissue    Tissue debrided:  Dermis and Epidermis    Devitalized tissue debrided:  Callus    Instruments:  Blade  Bleeding:  None  Patient tolerance:  Patient tolerated the procedure well with no immediate complications  1st Wound Pain Assessment: 0

## 2024-02-06 ENCOUNTER — TELEPHONE (OUTPATIENT)
Dept: PODIATRY | Facility: CLINIC | Age: 84
End: 2024-02-06
Payer: MEDICARE

## 2024-02-06 NOTE — TELEPHONE ENCOUNTER
----- Message from Lexis Siddiqui sent at 2/6/2024 11:27 AM CST -----  Type:  Needs Medical Advice    Who Called: pt   Best Call Back Number: [phone]    Additional Information:  Requesting call back  wants to discuss rx apixaban (ELIQUIS) 5 mg Tab and poss appt on 2/8 @ 1040     please advise thank you

## 2024-02-08 ENCOUNTER — OFFICE VISIT (OUTPATIENT)
Dept: PODIATRY | Facility: CLINIC | Age: 84
End: 2024-02-08
Payer: MEDICARE

## 2024-02-08 VITALS — HEIGHT: 61 IN | WEIGHT: 158 LBS | BODY MASS INDEX: 29.83 KG/M2

## 2024-02-08 DIAGNOSIS — E11.621 DIABETIC ULCER OF OTHER PART OF RIGHT FOOT ASSOCIATED WITH TYPE 2 DIABETES MELLITUS, WITH FAT LAYER EXPOSED: ICD-10-CM

## 2024-02-08 DIAGNOSIS — I73.9 PAD (PERIPHERAL ARTERY DISEASE): Primary | ICD-10-CM

## 2024-02-08 DIAGNOSIS — L97.512 DIABETIC ULCER OF OTHER PART OF RIGHT FOOT ASSOCIATED WITH TYPE 2 DIABETES MELLITUS, WITH FAT LAYER EXPOSED: ICD-10-CM

## 2024-02-08 DIAGNOSIS — M20.5X1 HALLUX LIMITUS OF RIGHT FOOT: ICD-10-CM

## 2024-02-08 DIAGNOSIS — E11.42 TYPE 2 DIABETES MELLITUS WITH PERIPHERAL NEUROPATHY: ICD-10-CM

## 2024-02-08 PROCEDURE — 28008 INCISION OF FOOT FASCIA: CPT | Mod: HCWC,RT,S$GLB, | Performed by: STUDENT IN AN ORGANIZED HEALTH CARE EDUCATION/TRAINING PROGRAM

## 2024-02-08 PROCEDURE — 1101F PT FALLS ASSESS-DOCD LE1/YR: CPT | Mod: HCWC,CPTII,S$GLB, | Performed by: STUDENT IN AN ORGANIZED HEALTH CARE EDUCATION/TRAINING PROGRAM

## 2024-02-08 PROCEDURE — 99213 OFFICE O/P EST LOW 20 MIN: CPT | Mod: 57,HCWC,S$GLB, | Performed by: STUDENT IN AN ORGANIZED HEALTH CARE EDUCATION/TRAINING PROGRAM

## 2024-02-08 PROCEDURE — 1159F MED LIST DOCD IN RCRD: CPT | Mod: HCWC,CPTII,S$GLB, | Performed by: STUDENT IN AN ORGANIZED HEALTH CARE EDUCATION/TRAINING PROGRAM

## 2024-02-08 PROCEDURE — 1160F RVW MEDS BY RX/DR IN RCRD: CPT | Mod: HCWC,CPTII,S$GLB, | Performed by: STUDENT IN AN ORGANIZED HEALTH CARE EDUCATION/TRAINING PROGRAM

## 2024-02-08 PROCEDURE — 1126F AMNT PAIN NOTED NONE PRSNT: CPT | Mod: HCWC,CPTII,S$GLB, | Performed by: STUDENT IN AN ORGANIZED HEALTH CARE EDUCATION/TRAINING PROGRAM

## 2024-02-08 PROCEDURE — 99999 PR PBB SHADOW E&M-EST. PATIENT-LVL III: CPT | Mod: PBBFAC,HCWC,, | Performed by: STUDENT IN AN ORGANIZED HEALTH CARE EDUCATION/TRAINING PROGRAM

## 2024-02-08 PROCEDURE — 3072F LOW RISK FOR RETINOPATHY: CPT | Mod: HCWC,CPTII,S$GLB, | Performed by: STUDENT IN AN ORGANIZED HEALTH CARE EDUCATION/TRAINING PROGRAM

## 2024-02-08 PROCEDURE — 3288F FALL RISK ASSESSMENT DOCD: CPT | Mod: HCWC,CPTII,S$GLB, | Performed by: STUDENT IN AN ORGANIZED HEALTH CARE EDUCATION/TRAINING PROGRAM

## 2024-02-08 NOTE — PROGRESS NOTES
Subjective:      Patient ID: Caro Powell is a 83 y.o. female.    Chief Complaint: Wound Check    Ms. Powell presents today with complaints of left 3rd toe infection. She notes drainage, redness and swelling a couple of days ago and presented to the urgent care for assistance. She was given doxy 100mg and an injection of rocephin which has helped with the redness and swelling. She doesn't have much feeling at baseline has amputations of the toes on the right foot.     She normally sees Dr. Atkins on the [place] but has recently moved to the [facility].     10/9/23: Follow up for osteomyelitis of the left 2nd toe and R foot wound.     10/17/23: f/u b/l foot wounds.     10/24/23: f/u for R and L foot wounds. Doing well overall.    11/6/23: f/u L foot wound and post op flexor tenotomy. Doing well overall.    11/13/23: f/u L foot wound    11/27/23:  F/u left foot wound.     12/21/23:  F/u L foot wound. Doing well overall except for the new concerning lesions on the R foot.. Waiting for the DM shoes and inserts.     12/28/23:  F/u R foot wounds. Still waiting for DM shoes.     1/4/24:  F/u R foot wounds. Picking up DM shoes today.     1/11/24:  F/u R foot wound and has her inserts today. Some complaints with the feel of the inserts but nothing major.     1/18/24: f.u R foot. Wound has returned.    1/25/24: f/u R foot wound    2/1/24: f/u R foot wound. Feeling normal.    2/8/24: f/u R foot wound.     Review of Systems   Skin:  Positive for nail changes, poor wound healing, suspicious lesions and unusual hair distribution.   All other systems reviewed and are negative.          Objective:      Physical Exam  Cardiovascular:      Pulses:           Dorsalis pedis pulses are 0 on the right side and 0 on the left side.        Posterior tibial pulses are 0 on the right side and 0 on the left side.   Feet:      Right foot:      Protective Sensation: 10 sites tested.  0 sites sensed.      Skin integrity: Ulcer  present.      Toenail Condition: Right toenails are abnormally thick and long. Fungal disease present.     Left foot:      Protective Sensation: 10 sites tested.  0 sites sensed.      Skin integrity: No ulcer or erythema.      Toenail Condition: Left toenails are abnormally thick and long. Fungal disease present.    Amputation of the R 4,5 toes.    Ulcer R medial IPJ is partial thickness and measures [id number]. R MTPJ limitus.          Assessment:       Encounter Diagnoses   Name Primary?    PAD (peripheral artery disease) Yes    Type 2 diabetes mellitus with peripheral neuropathy     Diabetic ulcer of other part of right foot associated with type 2 diabetes mellitus, with fat layer exposed     Hallux limitus of right foot                  Plan:       Caro was seen today for wound check.    Diagnoses and all orders for this visit:    PAD (peripheral artery disease)    Type 2 diabetes mellitus with peripheral neuropathy    Diabetic ulcer of other part of right foot associated with type 2 diabetes mellitus, with fat layer exposed    Hallux limitus of right foot        I counseled the patient on her conditions, their implications and medical management.    Wound R great toe was debrided. Foot ball dressing applied.    As the wound is not healing, I recommend a selective partial plantar fascia release. She is amenable. I discussed risks and complications with her and she will f/u next week for the procedure.     F/u 1 week.    Stephen Barakat DPM    Procedure: Plantar fasciotomy R foot    02/08/2024    With patient's written consent, a local block using 3mL of 1% lidocaine plain was injected around the band of the plantar fascia just proximal to the 1st MTPJ. Windlass was initiated to identify the plantar fascia and an 18 gauge needle was inserted and used to transect the plantar fascia. Multiple passes were made and there is no longer a taut band with initiation of windlass. Plantarflexion of the hallux is maintained.  The area was painted with betadine and covered with aquacel AG and xeroform.

## 2024-02-14 ENCOUNTER — TELEPHONE (OUTPATIENT)
Dept: PODIATRY | Facility: CLINIC | Age: 84
End: 2024-02-14
Payer: MEDICARE

## 2024-02-14 NOTE — TELEPHONE ENCOUNTER
----- Message from Genevieve James sent at 2/14/2024 11:14 AM CST -----  Type:  Sooner Appointment Request    Caller is requesting a sooner appointment.  Caller declined first available appointment listed below.  Caller will not accept being placed on the waitlist and is requesting a message be sent to doctor.  Name of Caller:pt  When is the first available appointment?03/06/24  Symptoms:wound  Would the patient rather a call back or a response via [facility]? call  Best Call Back Number:[phone]  Additional Information: Pt would like an appt tomorrow, she accidentally cancelled the one she had scheduled.        Spoke with pt and was able to get her back on the schedule for 1/15.   Pt verbalized understanding.

## 2024-02-15 ENCOUNTER — OFFICE VISIT (OUTPATIENT)
Dept: PODIATRY | Facility: CLINIC | Age: 84
End: 2024-02-15
Payer: MEDICARE

## 2024-02-15 VITALS — BODY MASS INDEX: 29.83 KG/M2 | WEIGHT: 158 LBS | HEIGHT: 61 IN

## 2024-02-15 DIAGNOSIS — E11.621 DIABETIC ULCER OF OTHER PART OF RIGHT FOOT ASSOCIATED WITH TYPE 2 DIABETES MELLITUS, WITH FAT LAYER EXPOSED: ICD-10-CM

## 2024-02-15 DIAGNOSIS — E11.42 TYPE 2 DIABETES MELLITUS WITH PERIPHERAL NEUROPATHY: Primary | ICD-10-CM

## 2024-02-15 DIAGNOSIS — L97.512 DIABETIC ULCER OF OTHER PART OF RIGHT FOOT ASSOCIATED WITH TYPE 2 DIABETES MELLITUS, WITH FAT LAYER EXPOSED: ICD-10-CM

## 2024-02-15 PROCEDURE — 99999 PR PBB SHADOW E&M-EST. PATIENT-LVL III: CPT | Mod: PBBFAC,HCWC,, | Performed by: STUDENT IN AN ORGANIZED HEALTH CARE EDUCATION/TRAINING PROGRAM

## 2024-02-15 PROCEDURE — 99212 OFFICE O/P EST SF 10 MIN: CPT | Mod: 24,HCWC,S$GLB, | Performed by: STUDENT IN AN ORGANIZED HEALTH CARE EDUCATION/TRAINING PROGRAM

## 2024-02-15 PROCEDURE — 3072F LOW RISK FOR RETINOPATHY: CPT | Mod: HCWC,CPTII,S$GLB, | Performed by: STUDENT IN AN ORGANIZED HEALTH CARE EDUCATION/TRAINING PROGRAM

## 2024-02-15 PROCEDURE — 1160F RVW MEDS BY RX/DR IN RCRD: CPT | Mod: HCWC,CPTII,S$GLB, | Performed by: STUDENT IN AN ORGANIZED HEALTH CARE EDUCATION/TRAINING PROGRAM

## 2024-02-15 PROCEDURE — 1126F AMNT PAIN NOTED NONE PRSNT: CPT | Mod: HCWC,CPTII,S$GLB, | Performed by: STUDENT IN AN ORGANIZED HEALTH CARE EDUCATION/TRAINING PROGRAM

## 2024-02-15 PROCEDURE — 3288F FALL RISK ASSESSMENT DOCD: CPT | Mod: HCWC,CPTII,S$GLB, | Performed by: STUDENT IN AN ORGANIZED HEALTH CARE EDUCATION/TRAINING PROGRAM

## 2024-02-15 PROCEDURE — 1101F PT FALLS ASSESS-DOCD LE1/YR: CPT | Mod: HCWC,CPTII,S$GLB, | Performed by: STUDENT IN AN ORGANIZED HEALTH CARE EDUCATION/TRAINING PROGRAM

## 2024-02-15 PROCEDURE — 1159F MED LIST DOCD IN RCRD: CPT | Mod: HCWC,CPTII,S$GLB, | Performed by: STUDENT IN AN ORGANIZED HEALTH CARE EDUCATION/TRAINING PROGRAM

## 2024-02-15 NOTE — PROGRESS NOTES
Subjective:      Patient ID: Caro Powell is a 83 y.o. female.    Chief Complaint: Wound Check    Ms. Powell presents today with complaints of left 3rd toe infection. She notes drainage, redness and swelling a couple of days ago and presented to the urgent care for assistance. She was given doxy 100mg and an injection of rocephin which has helped with the redness and swelling. She doesn't have much feeling at baseline has amputations of the toes on the right foot.     She normally sees Dr. Atkins on the [place] but has recently moved to the [facility].     10/9/23: Follow up for osteomyelitis of the left 2nd toe and R foot wound.     10/17/23: f/u b/l foot wounds.     10/24/23: f/u for R and L foot wounds. Doing well overall.    11/6/23: f/u L foot wound and post op flexor tenotomy. Doing well overall.    11/13/23: f/u L foot wound    11/27/23:  F/u left foot wound.     12/21/23:  F/u L foot wound. Doing well overall except for the new concerning lesions on the R foot.. Waiting for the DM shoes and inserts.     12/28/23:  F/u R foot wounds. Still waiting for DM shoes.     1/4/24:  F/u R foot wounds. Picking up DM shoes today.     1/11/24:  F/u R foot wound and has her inserts today. Some complaints with the feel of the inserts but nothing major.     1/18/24: f.u R foot. Wound has returned.    1/25/24: f/u R foot wound    2/1/24: f/u R foot wound. Feeling normal.    2/8/24: f/u R foot wound.     2/15/24: f/u R foot wound. Post op selective plantar fascial release.    Review of Systems   Skin:  Positive for nail changes, poor wound healing, suspicious lesions and unusual hair distribution.   All other systems reviewed and are negative.          Objective:      Physical Exam  Cardiovascular:      Pulses:           Dorsalis pedis pulses are 0 on the right side and 0 on the left side.        Posterior tibial pulses are 0 on the right side and 0 on the left side.   Feet:      Right foot:      Protective  Sensation: 10 sites tested.  0 sites sensed.      Skin integrity: Ulcer present.      Toenail Condition: Right toenails are abnormally thick and long. Fungal disease present.     Left foot:      Protective Sensation: 10 sites tested.  0 sites sensed.      Skin integrity: No ulcer or erythema.      Toenail Condition: Left toenails are abnormally thick and long. Fungal disease present.    Amputation of the R 4,5 toes.    Ulcer R medial IPJ is partial thickness and measures [id number]. R MTPJ limitus. Surgical puncture site has healed.          Assessment:       Encounter Diagnoses   Name Primary?    Type 2 diabetes mellitus with peripheral neuropathy Yes    Diabetic ulcer of other part of right foot associated with type 2 diabetes mellitus, with fat layer exposed                    Plan:       Caro was seen today for wound check.    Diagnoses and all orders for this visit:    Type 2 diabetes mellitus with peripheral neuropathy    Diabetic ulcer of other part of right foot associated with type 2 diabetes mellitus, with fat layer exposed          I counseled the patient on her conditions, their implications and medical management.    Wound is healing well. F/u 1 week.    F/u 1 week.    Stephen Barakat DPM

## 2024-02-22 ENCOUNTER — OFFICE VISIT (OUTPATIENT)
Dept: PODIATRY | Facility: CLINIC | Age: 84
End: 2024-02-22
Payer: MEDICARE

## 2024-02-22 VITALS — WEIGHT: 158.06 LBS | BODY MASS INDEX: 29.84 KG/M2 | HEIGHT: 61 IN

## 2024-02-22 DIAGNOSIS — T25.131A: Primary | ICD-10-CM

## 2024-02-22 DIAGNOSIS — L97.512 DIABETIC ULCER OF OTHER PART OF RIGHT FOOT ASSOCIATED WITH TYPE 2 DIABETES MELLITUS, WITH FAT LAYER EXPOSED: ICD-10-CM

## 2024-02-22 DIAGNOSIS — E11.42 TYPE 2 DIABETES MELLITUS WITH PERIPHERAL NEUROPATHY: ICD-10-CM

## 2024-02-22 DIAGNOSIS — E11.621 DIABETIC ULCER OF OTHER PART OF RIGHT FOOT ASSOCIATED WITH TYPE 2 DIABETES MELLITUS, WITH FAT LAYER EXPOSED: ICD-10-CM

## 2024-02-22 PROCEDURE — 1101F PT FALLS ASSESS-DOCD LE1/YR: CPT | Mod: HCWC,CPTII,S$GLB, | Performed by: STUDENT IN AN ORGANIZED HEALTH CARE EDUCATION/TRAINING PROGRAM

## 2024-02-22 PROCEDURE — 1160F RVW MEDS BY RX/DR IN RCRD: CPT | Mod: HCWC,CPTII,S$GLB, | Performed by: STUDENT IN AN ORGANIZED HEALTH CARE EDUCATION/TRAINING PROGRAM

## 2024-02-22 PROCEDURE — 1159F MED LIST DOCD IN RCRD: CPT | Mod: HCWC,CPTII,S$GLB, | Performed by: STUDENT IN AN ORGANIZED HEALTH CARE EDUCATION/TRAINING PROGRAM

## 2024-02-22 PROCEDURE — 99214 OFFICE O/P EST MOD 30 MIN: CPT | Mod: 24,25,HCWC,S$GLB | Performed by: STUDENT IN AN ORGANIZED HEALTH CARE EDUCATION/TRAINING PROGRAM

## 2024-02-22 PROCEDURE — 99999 PR PBB SHADOW E&M-EST. PATIENT-LVL III: CPT | Mod: PBBFAC,HCWC,, | Performed by: STUDENT IN AN ORGANIZED HEALTH CARE EDUCATION/TRAINING PROGRAM

## 2024-02-22 PROCEDURE — 3288F FALL RISK ASSESSMENT DOCD: CPT | Mod: HCWC,CPTII,S$GLB, | Performed by: STUDENT IN AN ORGANIZED HEALTH CARE EDUCATION/TRAINING PROGRAM

## 2024-02-22 PROCEDURE — 1126F AMNT PAIN NOTED NONE PRSNT: CPT | Mod: HCWC,CPTII,S$GLB, | Performed by: STUDENT IN AN ORGANIZED HEALTH CARE EDUCATION/TRAINING PROGRAM

## 2024-02-22 PROCEDURE — 11042 DBRDMT SUBQ TIS 1ST 20SQCM/<: CPT | Mod: 79,HCWC,S$GLB, | Performed by: STUDENT IN AN ORGANIZED HEALTH CARE EDUCATION/TRAINING PROGRAM

## 2024-02-22 PROCEDURE — 3072F LOW RISK FOR RETINOPATHY: CPT | Mod: HCWC,CPTII,S$GLB, | Performed by: STUDENT IN AN ORGANIZED HEALTH CARE EDUCATION/TRAINING PROGRAM

## 2024-02-22 RX ORDER — DOXYCYCLINE HYCLATE 100 MG
100 TABLET ORAL 2 TIMES DAILY
Qty: 14 TABLET | Refills: 0 | Status: SHIPPED | OUTPATIENT
Start: 2024-02-22 | End: 2024-02-29

## 2024-02-22 NOTE — PROGRESS NOTES
Subjective:      Patient ID: Caro Powell is a 83 y.o. female.    Chief Complaint: Wound Care    Ms. Powell presents today with complaints of left 3rd toe infection. She notes drainage, redness and swelling a couple of days ago and presented to the urgent care for assistance. She was given doxy 100mg and an injection of rocephin which has helped with the redness and swelling. She doesn't have much feeling at baseline has amputations of the toes on the right foot.     She normally sees Dr. Atkins on the [place] but has recently moved to the [facility].     10/9/23: Follow up for osteomyelitis of the left 2nd toe and R foot wound.     10/17/23: f/u b/l foot wounds.     10/24/23: f/u for R and L foot wounds. Doing well overall.    11/6/23: f/u L foot wound and post op flexor tenotomy. Doing well overall.    11/13/23: f/u L foot wound    11/27/23:  F/u left foot wound.     12/21/23:  F/u L foot wound. Doing well overall except for the new concerning lesions on the R foot.. Waiting for the DM shoes and inserts.     12/28/23:  F/u R foot wounds. Still waiting for DM shoes.     1/4/24:  F/u R foot wounds. Picking up DM shoes today.     1/11/24:  F/u R foot wound and has her inserts today. Some complaints with the feel of the inserts but nothing major.     1/18/24: f.u R foot. Wound has returned.    1/25/24: f/u R foot wound    2/1/24: f/u R foot wound. Feeling normal.    2/8/24: f/u R foot wound.     2/15/24: f/u R foot wound. Post op selective plantar fascial release.    2/22/24: f/u R foot wound with worsening. There is redness to the 3rd toe and a new blister to the R great toe.     Review of Systems   Skin:  Positive for nail changes, poor wound healing, suspicious lesions and unusual hair distribution.   All other systems reviewed and are negative.          Objective:      Physical Exam  Cardiovascular:      Pulses:           Dorsalis pedis pulses are 0 on the right side and 0 on the left side.         Posterior tibial pulses are 0 on the right side and 0 on the left side.   Feet:      Right foot:      Protective Sensation: 10 sites tested.  0 sites sensed.      Skin integrity: Ulcer present.      Toenail Condition: Right toenails are abnormally thick and long. Fungal disease present.     Left foot:      Protective Sensation: 10 sites tested.  0 sites sensed.      Skin integrity: No ulcer or erythema.      Toenail Condition: Left toenails are abnormally thick and long. Fungal disease present.    Amputation of the R 4,5 toes.    Ulcer R medial IPJ is partial thickness and measures [id number]. R MTPJ limitus. There is loose, blistering of the tip of the R toe. Measures 1.5x1.5cm with minimal depth.     Erythema of the 3rd toe w/o open lesions.           Assessment:       Encounter Diagnoses   Name Primary?    Burn erythema of toe of right foot, initial encounter Yes    Type 2 diabetes mellitus with peripheral neuropathy     Diabetic ulcer of other part of right foot associated with type 2 diabetes mellitus, with fat layer exposed                    Plan:       Caro was seen today for wound care.    Diagnoses and all orders for this visit:    Burn erythema of toe of right foot, initial encounter    Type 2 diabetes mellitus with peripheral neuropathy    Diabetic ulcer of other part of right foot associated with type 2 diabetes mellitus, with fat layer exposed    Other orders  -     doxycycline (VIBRA-TABS) 100 MG tablet; Take 1 tablet (100 mg total) by mouth 2 (two) times daily. for 7 days      I counseled the patient on her conditions, their implications and medical management.    There has been worsening today. Concern that there was simply too much within her right shoe with the dressing and her addition of thicker/padded socks.     Return to football and surgical shoe.    F/u 1 week.     Stephen Barakat DPM    Wound Debridement    Date/Time: 2/22/2024 1:40 PM    Performed by: Stephen Barakat DPM  Authorized  "by: Stephen Barakat DPM    Time out: Immediately prior to procedure a "time out" was called to verify the correct patient, procedure, equipment, support staff and site/side marked as required.    Consent Done?:  Yes (Verbal)    Wound Details:    Location:  Right foot    Location:  Right 1st Toe    Type of Debridement:  Excisional       Length (cm):  1.5       Area (sq cm):  2.25       Width (cm):  1.5       Percent Debrided (%):  100       Depth (cm):  0.1       Total Area Debrided (sq cm):  2.25    Depth of debridement:  Subcutaneous tissue    Tissue debrided:  Dermis and Epidermis    Devitalized tissue debrided:  Callus    Instruments:  Nippers  Bleeding:  None  Patient tolerance:  Patient tolerated the procedure well with no immediate complications  1st Wound Pain Assessment: 0      "

## 2024-02-29 ENCOUNTER — OFFICE VISIT (OUTPATIENT)
Dept: PODIATRY | Facility: CLINIC | Age: 84
End: 2024-02-29
Payer: MEDICARE

## 2024-02-29 VITALS — HEIGHT: 61 IN | WEIGHT: 158.06 LBS | BODY MASS INDEX: 29.84 KG/M2

## 2024-02-29 DIAGNOSIS — E11.42 TYPE 2 DIABETES MELLITUS WITH PERIPHERAL NEUROPATHY: ICD-10-CM

## 2024-02-29 DIAGNOSIS — E11.621 DIABETIC ULCER OF OTHER PART OF RIGHT FOOT ASSOCIATED WITH TYPE 2 DIABETES MELLITUS, WITH FAT LAYER EXPOSED: ICD-10-CM

## 2024-02-29 DIAGNOSIS — I73.9 PAD (PERIPHERAL ARTERY DISEASE): Primary | ICD-10-CM

## 2024-02-29 DIAGNOSIS — L97.512 DIABETIC ULCER OF OTHER PART OF RIGHT FOOT ASSOCIATED WITH TYPE 2 DIABETES MELLITUS, WITH FAT LAYER EXPOSED: ICD-10-CM

## 2024-02-29 PROCEDURE — 99499 UNLISTED E&M SERVICE: CPT | Mod: HCWC,S$GLB,, | Performed by: STUDENT IN AN ORGANIZED HEALTH CARE EDUCATION/TRAINING PROGRAM

## 2024-02-29 PROCEDURE — 99024 POSTOP FOLLOW-UP VISIT: CPT | Mod: HCWC,S$GLB,, | Performed by: STUDENT IN AN ORGANIZED HEALTH CARE EDUCATION/TRAINING PROGRAM

## 2024-02-29 PROCEDURE — 99999 PR PBB SHADOW E&M-EST. PATIENT-LVL III: CPT | Mod: PBBFAC,HCWC,, | Performed by: STUDENT IN AN ORGANIZED HEALTH CARE EDUCATION/TRAINING PROGRAM

## 2024-02-29 PROCEDURE — 11042 DBRDMT SUBQ TIS 1ST 20SQCM/<: CPT | Mod: 58,HCWC,S$GLB, | Performed by: STUDENT IN AN ORGANIZED HEALTH CARE EDUCATION/TRAINING PROGRAM

## 2024-02-29 NOTE — PROGRESS NOTES
Subjective:      Patient ID: Caro Powell is a 83 y.o. female.    Chief Complaint: Wound Care    Ms. Powell presents today with complaints of left 3rd toe infection. She notes drainage, redness and swelling a couple of days ago and presented to the urgent care for assistance. She was given doxy 100mg and an injection of rocephin which has helped with the redness and swelling. She doesn't have much feeling at baseline has amputations of the toes on the right foot.     She normally sees Dr. Atkins on the [place] but has recently moved to the [facility].     10/9/23: Follow up for osteomyelitis of the left 2nd toe and R foot wound.     10/17/23: f/u b/l foot wounds.     10/24/23: f/u for R and L foot wounds. Doing well overall.    11/6/23: f/u L foot wound and post op flexor tenotomy. Doing well overall.    11/13/23: f/u L foot wound    11/27/23:  F/u left foot wound.     12/21/23:  F/u L foot wound. Doing well overall except for the new concerning lesions on the R foot.. Waiting for the DM shoes and inserts.     12/28/23:  F/u R foot wounds. Still waiting for DM shoes.     1/4/24:  F/u R foot wounds. Picking up DM shoes today.     1/11/24:  F/u R foot wound and has her inserts today. Some complaints with the feel of the inserts but nothing major.     1/18/24: f.u R foot. Wound has returned.    1/25/24: f/u R foot wound    2/1/24: f/u R foot wound. Feeling normal.    2/8/24: f/u R foot wound.     2/15/24: f/u R foot wound. Post op selective plantar fascial release.    2/22/24: f/u R foot wound with worsening. There is redness to the 3rd toe and a new blister to the R great toe.     2/29/24: f/u R foot wound. Redness has resolved.     Review of Systems   Skin:  Positive for nail changes, poor wound healing, suspicious lesions and unusual hair distribution.   All other systems reviewed and are negative.          Objective:      Physical Exam  Cardiovascular:      Pulses:           Dorsalis pedis pulses  are 0 on the right side and 0 on the left side.        Posterior tibial pulses are 0 on the right side and 0 on the left side.   Feet:      Right foot:      Protective Sensation: 10 sites tested.  0 sites sensed.      Skin integrity: Ulcer present.      Toenail Condition: Right toenails are abnormally thick and long. Fungal disease present.     Left foot:      Protective Sensation: 10 sites tested.  0 sites sensed.      Skin integrity: No ulcer or erythema.      Toenail Condition: Left toenails are abnormally thick and long. Fungal disease present.    Amputation of the R 4,5 toes.    Ulcer R medial IPJ is partial thickness and measures [id number]. R MTPJ limitus. There is wound at the distal right toe. Measures 1x1cm with exposed subcue.     Erythema of the 3rd toe resolved          Assessment:       Encounter Diagnoses   Name Primary?    PAD (peripheral artery disease) Yes    Type 2 diabetes mellitus with peripheral neuropathy     Diabetic ulcer of other part of right foot associated with type 2 diabetes mellitus, with fat layer exposed                      Plan:       Caro was seen today for wound care.    Diagnoses and all orders for this visit:    PAD (peripheral artery disease)    Type 2 diabetes mellitus with peripheral neuropathy    Diabetic ulcer of other part of right foot associated with type 2 diabetes mellitus, with fat layer exposed        I counseled the patient on her conditions, their implications and medical management.    Ms. Elizondo wants to return to her shoes for her trip to [place]. I advise against a bulky dressing as that has caused her issues in the past. Recommend bandaids for now and shoes at all times.    F/u upon return from [place].     Stephen Barakat DPM    Wound Debridement    Date/Time: 2/29/2024 9:00 AM    Performed by: Stephen Barakat DPM  Authorized by: Stephen Barakat DPM    Consent Done?:  Yes (Verbal)    Wound Details:    Location:  Right foot    Location:  Right 1st Toe     Type of Debridement:  Excisional       Length (cm):  1       Area (sq cm):  1       Width (cm):  1       Percent Debrided (%):  100       Depth (cm):  0.1       Total Area Debrided (sq cm):  1    Depth of debridement:  Subcutaneous tissue    Tissue debrided:  Subcutaneous    Devitalized tissue debrided:  Callus    Instruments:  Curette  Bleeding:  None  Patient tolerance:  Patient tolerated the procedure well with no immediate complications  1st Wound Pain Assessment: 0

## 2024-03-06 ENCOUNTER — TELEPHONE (OUTPATIENT)
Dept: PRIMARY CARE CLINIC | Facility: CLINIC | Age: 84
End: 2024-03-06
Payer: MEDICARE

## 2024-04-11 ENCOUNTER — TELEPHONE (OUTPATIENT)
Dept: PODIATRY | Facility: CLINIC | Age: 84
End: 2024-04-11
Payer: MEDICARE

## 2024-04-11 ENCOUNTER — OFFICE VISIT (OUTPATIENT)
Dept: PRIMARY CARE CLINIC | Facility: CLINIC | Age: 84
End: 2024-04-11
Payer: MEDICARE

## 2024-04-11 VITALS
HEIGHT: 61 IN | BODY MASS INDEX: 29.55 KG/M2 | DIASTOLIC BLOOD PRESSURE: 76 MMHG | SYSTOLIC BLOOD PRESSURE: 130 MMHG | OXYGEN SATURATION: 99 % | WEIGHT: 156.5 LBS | HEART RATE: 55 BPM

## 2024-04-11 DIAGNOSIS — I10 ESSENTIAL HYPERTENSION: Chronic | ICD-10-CM

## 2024-04-11 DIAGNOSIS — D63.8 ANEMIA OF CHRONIC DISEASE: ICD-10-CM

## 2024-04-11 DIAGNOSIS — I48.91 ATRIAL FIBRILLATION WITH RAPID VENTRICULAR RESPONSE: ICD-10-CM

## 2024-04-11 DIAGNOSIS — N18.31 STAGE 3A CHRONIC KIDNEY DISEASE: ICD-10-CM

## 2024-04-11 DIAGNOSIS — E11.42 POLYNEUROPATHY DUE TO TYPE 2 DIABETES MELLITUS: Primary | ICD-10-CM

## 2024-04-11 DIAGNOSIS — I73.9 PAD (PERIPHERAL ARTERY DISEASE): ICD-10-CM

## 2024-04-11 PROCEDURE — 99214 OFFICE O/P EST MOD 30 MIN: CPT | Mod: HCWC,S$GLB,, | Performed by: INTERNAL MEDICINE

## 2024-04-11 PROCEDURE — 1159F MED LIST DOCD IN RCRD: CPT | Mod: HCWC,CPTII,S$GLB, | Performed by: INTERNAL MEDICINE

## 2024-04-11 PROCEDURE — 1160F RVW MEDS BY RX/DR IN RCRD: CPT | Mod: HCWC,CPTII,S$GLB, | Performed by: INTERNAL MEDICINE

## 2024-04-11 PROCEDURE — 3075F SYST BP GE 130 - 139MM HG: CPT | Mod: HCWC,CPTII,S$GLB, | Performed by: INTERNAL MEDICINE

## 2024-04-11 PROCEDURE — 3078F DIAST BP <80 MM HG: CPT | Mod: HCWC,CPTII,S$GLB, | Performed by: INTERNAL MEDICINE

## 2024-04-11 PROCEDURE — 99999 PR PBB SHADOW E&M-EST. PATIENT-LVL V: CPT | Mod: PBBFAC,HCWC,, | Performed by: INTERNAL MEDICINE

## 2024-04-11 PROCEDURE — 3072F LOW RISK FOR RETINOPATHY: CPT | Mod: HCWC,CPTII,S$GLB, | Performed by: INTERNAL MEDICINE

## 2024-04-11 PROCEDURE — 1101F PT FALLS ASSESS-DOCD LE1/YR: CPT | Mod: HCWC,CPTII,S$GLB, | Performed by: INTERNAL MEDICINE

## 2024-04-11 PROCEDURE — 3288F FALL RISK ASSESSMENT DOCD: CPT | Mod: HCWC,CPTII,S$GLB, | Performed by: INTERNAL MEDICINE

## 2024-04-11 PROCEDURE — 1126F AMNT PAIN NOTED NONE PRSNT: CPT | Mod: HCWC,CPTII,S$GLB, | Performed by: INTERNAL MEDICINE

## 2024-04-11 NOTE — PROGRESS NOTES
INTERNAL MEDICINE PROGRESS/URGENT CARE NOTE    CHIEF COMPLAINT     Chief Complaint   Patient presents with    Follow-up     3 month follow up       HPI   Caro Powell is a 83 y.o.  female who presents with a PMHx of  breast cancer, afib, CKD 3, arthritis, DM2, HLD, HTN, who presents today for routine follow up visit.       Her   a few months ago after a long glover with dementia. She then moved in with her son and theyre both agreeably happy with the arrangement      Was seeing her monthly mostly due to the vascular ulcers but now its healed and she's doing better.      Her main complaints and concerns are: her right foot ulcer has recurred. Had healed up, then she went away to [place] to visit her grand daughter which she limited any walking and activity but still  her ulcer returned. She is very worried as she says she has not been told what's causing this and as she's had amputation this worries her.   She will try to get back in with podiatry today.     Recently bought shoe inserts to try to help with he recurrent ulcers.   Did have the US legs done which showed 75% blockage. Has consulted with cardiovascular who plans to monitor and manage conservatively     History of breast cancer: diagnosed in  august had mastectomy and radiation.    Needs to establish with breast surgeon over here. Referral placed     DM2: last hgb a1c on file from over 6 months ago was 6.7 and at goal. Her medications include amaryl 1mg. On ACE-I and statin therapy   Due for eye exam will call her doctor and schedule it  Foot exam done today      Afib: on eliquis. Rate controlled.     Home Medications:  Prior to Admission medications    Medication Sig Start Date End Date Taking? Authorizing Provider   ACCU-CHEK SAMI PLUS METER Misc TEST  AS DIRECTED 21   Brayan Penny MD   ACCU-CHEK SAMI PLUS TEST STRP Strp USE TO TEST BLOOD SUGAR ONCE DAILY 19   Manuel Laird MD   ACCU-CHEK SOFT DEV  "LANCETS Kit  10/2/14   Provider, Historical   ACCU-CHEK SOFTCLIX LANCETS Misc TEST ONCE DAILY 12/16/19   Manuel Laird MD   ammonium lactate 12 % Crea Apply to feet twice daily. Avoid use between toes. 2/9/21   Ava Atkins DPM   anastrozole (ARIMIDEX) 1 mg Tab TAKE 1 TABLET ONE TIME DAILY 8/9/23   Morgan Caputo MD   apixaban (ELIQUIS) 5 mg Tab TAKE 1 TABLET BY MOUTH TWICE DAILY 7/28/23   Brayan Penny MD   atorvastatin (LIPITOR) 40 MG tablet Take 1 tablet (40 mg total) by mouth once daily. 10/16/23 10/15/24  Anisha Reyes MD   EScitalopram oxalate (LEXAPRO) 10 MG tablet Take 1 tablet (10 mg total) by mouth once daily. 1/11/24 1/10/25  Anisha Reyes MD   FLUAD QUAD 2022-23,65Y UP,,PF, 60 mcg (15 mcg x 4)/0.5 mL Syrg  11/8/22   Provider, Historical   furosemide (LASIX) 40 MG tablet TAKE 1 TABLET EVERY DAY 10/7/23   Sam Paulino MD   glimepiride (AMARYL) 1 MG tablet TAKE 1 TABLET TWICE DAILY 7/28/23   Brayan Penny MD   lisinopriL (PRINIVIL,ZESTRIL) 20 MG tablet TAKE 1 TABLET EVERY DAY 1/12/23   Brayan Penny MD   metoprolol succinate (TOPROL-XL) 25 MG 24 hr tablet TAKE 1 TABLET EVERY DAY 3/22/23   Brayan Penny MD   mupirocin (BACTROBAN) 2 % ointment Apply topically 2 (two) times daily. 1/4/23   Ava Atkins DPM   OYSTER SHELL CALCIUM-VIT D3 500 mg-5 mcg (200 unit) per tablet TAKE 1 TABLET EVERY DAY 10/23/23   Morgan Caputo MD   polyethylene glycol (GLYCOLAX) 17 gram/dose powder Take 17 g by mouth once daily. 10/22/21   Mariya Love MD   pramipexole (MIRAPEX) 0.125 MG tablet TAKE 1 TABLET EVERY DAY 10/9/23   Brayan Penny MD   urea (CARMOL) 40 % Crea Apply topically 2 (two) times daily. 7/14/21   Ava Atkins DPM       Review of Systems:  Review of Systems          PHYSICAL EXAM     /76 (BP Location: Left arm, Patient Position: Sitting, BP Method: Medium (Manual))   Pulse (!) 55   Ht 5' 1" (1.549 m)   Wt 71 kg (156 " lb 8.4 oz)   LMP  (LMP Unknown)   SpO2 99%   BMI 29.58 kg/m²     GEN - A+OX4, NAD   HEENT - PERRL, EOMI, OP clear  Neck - No thyromegaly or cervical LAD. No thyroid masses felt.  CV - RRR, no m/r   Chest - CTAB, no wheezing or rhonchi  Abd - S/NT/ND/+BS.   Ext - 2+BDP and radial pulses. No C/C/E.  Skin - No rash.    LABS       ASSESSMENT/PLAN     Caro Powell is a 83 y.o. female with  1. 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 Atrial fibrillation with rapid ventricular response  Overview:  Rate controlled with metoprolol  A/c with eeliquis      3. Essential hypertension  Overview:  BP is very well controlled. A bit elevated  Continue with current medication      4. PAD (peripheral artery disease)  Symptomatic  Encouraged excercise  Overview:  On ELIQUIS, ASA and statin  S/p vascular srugery of the right leg in the past       5. Stage 3a chronic kidney disease  Overview:  Stable renal function.   Encouraged proper hydration and avoid NSAIDs      6. Anemia of chronic disease  Overview:  Ordered iron studies and stool occult         WORRY SCORE 4    RTC in 2 weeks, sooner if needed and depending on labs.    Anisha Reyes MD  Board Certified Internist/Geriatrician  [facility] ([place])

## 2024-04-11 NOTE — TELEPHONE ENCOUNTER
----- Message from Daisha Adrian sent at 4/11/2024 11:42 AM CDT -----  Regarding: appt  Type:  Sooner Apoointment Request      Name of Caller:pt    When is the first available appointment? 05/08    Symptoms:foot pain       Best Call Back Number:[phone]      Additional Information: pt st she was told by her pcp that she needs to be seen asap. She wants to know if she could be seen Monday 04/15.  please call to discuss.

## 2024-04-15 ENCOUNTER — OFFICE VISIT (OUTPATIENT)
Dept: PODIATRY | Facility: CLINIC | Age: 84
End: 2024-04-15
Payer: MEDICARE

## 2024-04-15 ENCOUNTER — LAB VISIT (OUTPATIENT)
Dept: LAB | Facility: HOSPITAL | Age: 84
End: 2024-04-15
Attending: INTERNAL MEDICINE
Payer: MEDICARE

## 2024-04-15 VITALS — BODY MASS INDEX: 29.55 KG/M2 | WEIGHT: 156.5 LBS | HEIGHT: 61 IN

## 2024-04-15 DIAGNOSIS — E11.42 TYPE 2 DIABETES MELLITUS WITH PERIPHERAL NEUROPATHY: ICD-10-CM

## 2024-04-15 DIAGNOSIS — D63.8 ANEMIA OF CHRONIC DISEASE: ICD-10-CM

## 2024-04-15 DIAGNOSIS — E78.2 MIXED HYPERLIPIDEMIA: ICD-10-CM

## 2024-04-15 DIAGNOSIS — I73.9 PAD (PERIPHERAL ARTERY DISEASE): ICD-10-CM

## 2024-04-15 DIAGNOSIS — I10 ESSENTIAL HYPERTENSION: ICD-10-CM

## 2024-04-15 DIAGNOSIS — E11.42 POLYNEUROPATHY DUE TO TYPE 2 DIABETES MELLITUS: ICD-10-CM

## 2024-04-15 DIAGNOSIS — L97.512 DIABETIC ULCER OF OTHER PART OF RIGHT FOOT ASSOCIATED WITH TYPE 2 DIABETES MELLITUS, WITH FAT LAYER EXPOSED: ICD-10-CM

## 2024-04-15 DIAGNOSIS — I73.9 PAD (PERIPHERAL ARTERY DISEASE): Primary | ICD-10-CM

## 2024-04-15 DIAGNOSIS — E11.621 DIABETIC ULCER OF OTHER PART OF RIGHT FOOT ASSOCIATED WITH TYPE 2 DIABETES MELLITUS, WITH FAT LAYER EXPOSED: ICD-10-CM

## 2024-04-15 DIAGNOSIS — Z85.3 HISTORY OF BREAST CANCER: ICD-10-CM

## 2024-04-15 LAB
ALBUMIN SERPL BCP-MCNC: 3.9 G/DL (ref 3.5–5.2)
ALP SERPL-CCNC: 81 U/L (ref 55–135)
ALT SERPL W/O P-5'-P-CCNC: 19 U/L (ref 10–44)
ANION GAP SERPL CALC-SCNC: 11 MMOL/L (ref 8–16)
AST SERPL-CCNC: 21 U/L (ref 10–40)
BASOPHILS # BLD AUTO: 0.04 K/UL (ref 0–0.2)
BASOPHILS NFR BLD: 0.5 % (ref 0–1.9)
BILIRUB SERPL-MCNC: 0.5 MG/DL (ref 0.1–1)
BUN SERPL-MCNC: 26 MG/DL (ref 8–23)
CALCIUM SERPL-MCNC: 9.9 MG/DL (ref 8.7–10.5)
CHLORIDE SERPL-SCNC: 107 MMOL/L (ref 95–110)
CHOLEST SERPL-MCNC: 141 MG/DL (ref 120–199)
CHOLEST/HDLC SERPL: 2.9 {RATIO} (ref 2–5)
CO2 SERPL-SCNC: 22 MMOL/L (ref 23–29)
CREAT SERPL-MCNC: 1.2 MG/DL (ref 0.5–1.4)
DIFFERENTIAL METHOD BLD: ABNORMAL
EOSINOPHIL # BLD AUTO: 0.2 K/UL (ref 0–0.5)
EOSINOPHIL NFR BLD: 2.2 % (ref 0–8)
ERYTHROCYTE [DISTWIDTH] IN BLOOD BY AUTOMATED COUNT: 14.6 % (ref 11.5–14.5)
EST. GFR  (NO RACE VARIABLE): 44.6 ML/MIN/1.73 M^2
ESTIMATED AVG GLUCOSE: 154 MG/DL (ref 68–131)
GLUCOSE SERPL-MCNC: 147 MG/DL (ref 70–110)
HBA1C MFR BLD: 7 % (ref 4–5.6)
HCT VFR BLD AUTO: 34.4 % (ref 37–48.5)
HDLC SERPL-MCNC: 49 MG/DL (ref 40–75)
HDLC SERPL: 34.8 % (ref 20–50)
HGB BLD-MCNC: 11.5 G/DL (ref 12–16)
IMM GRANULOCYTES # BLD AUTO: 0.03 K/UL (ref 0–0.04)
IMM GRANULOCYTES NFR BLD AUTO: 0.4 % (ref 0–0.5)
LDLC SERPL CALC-MCNC: 65.8 MG/DL (ref 63–159)
LYMPHOCYTES # BLD AUTO: 1.6 K/UL (ref 1–4.8)
LYMPHOCYTES NFR BLD: 20.3 % (ref 18–48)
MCH RBC QN AUTO: 28.7 PG (ref 27–31)
MCHC RBC AUTO-ENTMCNC: 33.4 G/DL (ref 32–36)
MCV RBC AUTO: 86 FL (ref 82–98)
MONOCYTES # BLD AUTO: 0.7 K/UL (ref 0.3–1)
MONOCYTES NFR BLD: 8.5 % (ref 4–15)
NEUTROPHILS # BLD AUTO: 5.3 K/UL (ref 1.8–7.7)
NEUTROPHILS NFR BLD: 68.1 % (ref 38–73)
NONHDLC SERPL-MCNC: 92 MG/DL
NRBC BLD-RTO: 0 /100 WBC
PLATELET # BLD AUTO: 270 K/UL (ref 150–450)
PMV BLD AUTO: 10.9 FL (ref 9.2–12.9)
POTASSIUM SERPL-SCNC: 4.6 MMOL/L (ref 3.5–5.1)
PROT SERPL-MCNC: 6.9 G/DL (ref 6–8.4)
RBC # BLD AUTO: 4.01 M/UL (ref 4–5.4)
SODIUM SERPL-SCNC: 140 MMOL/L (ref 136–145)
TRIGL SERPL-MCNC: 131 MG/DL (ref 30–150)
TSH SERPL DL<=0.005 MIU/L-ACNC: 1.03 UIU/ML (ref 0.4–4)
WBC # BLD AUTO: 7.75 K/UL (ref 3.9–12.7)

## 2024-04-15 PROCEDURE — 84443 ASSAY THYROID STIM HORMONE: CPT | Mod: HCWC | Performed by: INTERNAL MEDICINE

## 2024-04-15 PROCEDURE — 85025 COMPLETE CBC W/AUTO DIFF WBC: CPT | Mod: HCWC | Performed by: INTERNAL MEDICINE

## 2024-04-15 PROCEDURE — 1101F PT FALLS ASSESS-DOCD LE1/YR: CPT | Mod: HCWC,CPTII,S$GLB, | Performed by: STUDENT IN AN ORGANIZED HEALTH CARE EDUCATION/TRAINING PROGRAM

## 2024-04-15 PROCEDURE — 80053 COMPREHEN METABOLIC PANEL: CPT | Mod: HCWC | Performed by: INTERNAL MEDICINE

## 2024-04-15 PROCEDURE — 11042 DBRDMT SUBQ TIS 1ST 20SQCM/<: CPT | Mod: 79,HCWC,S$GLB, | Performed by: STUDENT IN AN ORGANIZED HEALTH CARE EDUCATION/TRAINING PROGRAM

## 2024-04-15 PROCEDURE — 1126F AMNT PAIN NOTED NONE PRSNT: CPT | Mod: HCWC,CPTII,S$GLB, | Performed by: STUDENT IN AN ORGANIZED HEALTH CARE EDUCATION/TRAINING PROGRAM

## 2024-04-15 PROCEDURE — 83036 HEMOGLOBIN GLYCOSYLATED A1C: CPT | Mod: HCWC | Performed by: INTERNAL MEDICINE

## 2024-04-15 PROCEDURE — 3072F LOW RISK FOR RETINOPATHY: CPT | Mod: HCWC,CPTII,S$GLB, | Performed by: STUDENT IN AN ORGANIZED HEALTH CARE EDUCATION/TRAINING PROGRAM

## 2024-04-15 PROCEDURE — 80061 LIPID PANEL: CPT | Mod: HCWC | Performed by: INTERNAL MEDICINE

## 2024-04-15 PROCEDURE — 36415 COLL VENOUS BLD VENIPUNCTURE: CPT | Mod: HCWC,PO | Performed by: INTERNAL MEDICINE

## 2024-04-15 PROCEDURE — 1160F RVW MEDS BY RX/DR IN RCRD: CPT | Mod: HCWC,CPTII,S$GLB, | Performed by: STUDENT IN AN ORGANIZED HEALTH CARE EDUCATION/TRAINING PROGRAM

## 2024-04-15 PROCEDURE — 1159F MED LIST DOCD IN RCRD: CPT | Mod: HCWC,CPTII,S$GLB, | Performed by: STUDENT IN AN ORGANIZED HEALTH CARE EDUCATION/TRAINING PROGRAM

## 2024-04-15 PROCEDURE — 99999 PR PBB SHADOW E&M-EST. PATIENT-LVL III: CPT | Mod: PBBFAC,HCWC,, | Performed by: STUDENT IN AN ORGANIZED HEALTH CARE EDUCATION/TRAINING PROGRAM

## 2024-04-15 PROCEDURE — 3288F FALL RISK ASSESSMENT DOCD: CPT | Mod: HCWC,CPTII,S$GLB, | Performed by: STUDENT IN AN ORGANIZED HEALTH CARE EDUCATION/TRAINING PROGRAM

## 2024-04-15 PROCEDURE — 99213 OFFICE O/P EST LOW 20 MIN: CPT | Mod: 24,25,HCWC,S$GLB | Performed by: STUDENT IN AN ORGANIZED HEALTH CARE EDUCATION/TRAINING PROGRAM

## 2024-04-15 NOTE — PROGRESS NOTES
Subjective:      Patient ID: Caro Powell is a 83 y.o. female.    Chief Complaint: Wound Care (Ulcer on right foot opened again, great toe bruised on wound, left great toe pressure on toe)    Ms. Powell presents today with complaints of left 3rd toe infection. She notes drainage, redness and swelling a couple of days ago and presented to the urgent care for assistance. She was given doxy 100mg and an injection of rocephin which has helped with the redness and swelling. She doesn't have much feeling at baseline has amputations of the toes on the right foot.     She normally sees Dr. Atkins on the [place] but has recently moved to the [facility].     10/9/23: Follow up for osteomyelitis of the left 2nd toe and R foot wound.     10/17/23: f/u b/l foot wounds.     10/24/23: f/u for R and L foot wounds. Doing well overall.    11/6/23: f/u L foot wound and post op flexor tenotomy. Doing well overall.    11/13/23: f/u L foot wound    11/27/23:  F/u left foot wound.     12/21/23:  F/u L foot wound. Doing well overall except for the new concerning lesions on the R foot.. Waiting for the DM shoes and inserts.     12/28/23:  F/u R foot wounds. Still waiting for DM shoes.     1/4/24:  F/u R foot wounds. Picking up DM shoes today.     1/11/24:  F/u R foot wound and has her inserts today. Some complaints with the feel of the inserts but nothing major.     1/18/24: f.u R foot. Wound has returned.    1/25/24: f/u R foot wound    2/1/24: f/u R foot wound. Feeling normal.    2/8/24: f/u R foot wound.     2/15/24: f/u R foot wound. Post op selective plantar fascial release.    2/22/24: f/u R foot wound with worsening. There is redness to the 3rd toe and a new blister to the R great toe.     2/29/24: f/u R foot wound. Redness has resolved.     Review of Systems   Skin:  Positive for nail changes, poor wound healing, suspicious lesions and unusual hair distribution.   All other systems reviewed and are negative.           Objective:      Physical Exam  Cardiovascular:      Pulses:           Dorsalis pedis pulses are 0 on the right side and 0 on the left side.        Posterior tibial pulses are 0 on the right side and 0 on the left side.   Feet:      Right foot:      Protective Sensation: 10 sites tested.  0 sites sensed.      Skin integrity: Ulcer present.      Toenail Condition: Right toenails are abnormally thick and long. Fungal disease present.     Left foot:      Protective Sensation: 10 sites tested.  0 sites sensed.      Skin integrity: No ulcer or erythema.      Toenail Condition: Left toenails are abnormally thick and long. Fungal disease present.    Amputation of the R 4,5 toes.    R MTPJ limitus. There is wound at the distal right toe. Measures 1x1cm with exposed subcue.   Submet 1 R ulceration that measures 1x1cm.          Assessment:       Encounter Diagnosis   Name Primary?    PAD (peripheral artery disease) Yes                     Plan:       Caro was seen today for wound care.    Diagnoses and all orders for this visit:    PAD (peripheral artery disease)  -     US Lower Extrem Arteries Bilat with KAMI (xpd); Future        I counseled the patient on her conditions, their implications and medical management.    Pending ABIs.    Football dressing for offloading.    Surgical options? Would like to avoid but this is a chronic issue.     Stephen Barakat DPM    Wound Debridement    Date/Time: 4/15/2024 3:00 PM    Performed by: Stephen Barakat DPM  Authorized by: Stephen Barakat DPM    Consent Done?:  Yes (Verbal)    Wound Details:    Location:  Right foot    Location:  Right 1st Toe    Type of Debridement:  Excisional       Length (cm):  1       Area (sq cm):  1       Width (cm):  1       Percent Debrided (%):  70       Depth (cm):  0.1       Total Area Debrided (sq cm):  0.7    Depth of debridement:  Subcutaneous tissue    Tissue debrided:  Subcutaneous    Devitalized tissue debrided:  Callus    Instruments:   Curette  Bleeding:  Minimal  Hemostasis Achieved: Yes  Method Used:  Pressure  Patient tolerance:  Patient tolerated the procedure well with no immediate complications  1st Wound Pain Assessment: 0

## 2024-04-16 DIAGNOSIS — E11.40 TYPE 2 DIABETES MELLITUS WITH DIABETIC NEUROPATHY, WITHOUT LONG-TERM CURRENT USE OF INSULIN: Primary | Chronic | ICD-10-CM

## 2024-04-22 ENCOUNTER — OFFICE VISIT (OUTPATIENT)
Dept: PODIATRY | Facility: CLINIC | Age: 84
End: 2024-04-22
Payer: MEDICARE

## 2024-04-22 ENCOUNTER — HOSPITAL ENCOUNTER (OUTPATIENT)
Dept: RADIOLOGY | Facility: HOSPITAL | Age: 84
Discharge: HOME | End: 2024-04-22
Attending: STUDENT IN AN ORGANIZED HEALTH CARE EDUCATION/TRAINING PROGRAM
Payer: MEDICARE

## 2024-04-22 VITALS — HEIGHT: 61 IN | WEIGHT: 156.5 LBS | BODY MASS INDEX: 29.55 KG/M2

## 2024-04-22 DIAGNOSIS — E11.42 TYPE 2 DIABETES MELLITUS WITH PERIPHERAL NEUROPATHY: ICD-10-CM

## 2024-04-22 DIAGNOSIS — L97.512 DIABETIC ULCER OF OTHER PART OF RIGHT FOOT ASSOCIATED WITH TYPE 2 DIABETES MELLITUS, WITH FAT LAYER EXPOSED: ICD-10-CM

## 2024-04-22 DIAGNOSIS — E11.621 DIABETIC ULCER OF OTHER PART OF RIGHT FOOT ASSOCIATED WITH TYPE 2 DIABETES MELLITUS, WITH FAT LAYER EXPOSED: ICD-10-CM

## 2024-04-22 DIAGNOSIS — I73.9 PAD (PERIPHERAL ARTERY DISEASE): Primary | ICD-10-CM

## 2024-04-22 DIAGNOSIS — I73.9 PAD (PERIPHERAL ARTERY DISEASE): ICD-10-CM

## 2024-04-22 PROCEDURE — 93925 LOWER EXTREMITY STUDY: CPT | Mod: TC,HCWC,PO

## 2024-04-22 PROCEDURE — 99499 UNLISTED E&M SERVICE: CPT | Mod: HCWC,S$GLB,, | Performed by: STUDENT IN AN ORGANIZED HEALTH CARE EDUCATION/TRAINING PROGRAM

## 2024-04-22 PROCEDURE — 11042 DBRDMT SUBQ TIS 1ST 20SQCM/<: CPT | Mod: HCWC,79,S$GLB, | Performed by: STUDENT IN AN ORGANIZED HEALTH CARE EDUCATION/TRAINING PROGRAM

## 2024-04-22 PROCEDURE — 93925 LOWER EXTREMITY STUDY: CPT | Mod: 26,HCWC,, | Performed by: RADIOLOGY

## 2024-04-22 PROCEDURE — 93922 UPR/L XTREMITY ART 2 LEVELS: CPT | Mod: 26,HCWC,, | Performed by: RADIOLOGY

## 2024-04-22 PROCEDURE — 99999 PR PBB SHADOW E&M-EST. PATIENT-LVL III: CPT | Mod: PBBFAC,HCWC,, | Performed by: STUDENT IN AN ORGANIZED HEALTH CARE EDUCATION/TRAINING PROGRAM

## 2024-04-22 NOTE — PROGRESS NOTES
Subjective:      Patient ID: Caro Powell is a 83 y.o. female.    Chief Complaint: Wound Care    Ms. Powell presents today with complaints of left 3rd toe infection. She notes drainage, redness and swelling a couple of days ago and presented to the urgent care for assistance. She was given doxy 100mg and an injection of rocephin which has helped with the redness and swelling. She doesn't have much feeling at baseline has amputations of the toes on the right foot.     She normally sees Dr. Atkins on the [place] but has recently moved to the [facility].     4/22/24:  F/u R foot wounds. Doing well with the new modified dressing.     Review of Systems   Skin:  Positive for nail changes, poor wound healing, suspicious lesions and unusual hair distribution.   All other systems reviewed and are negative.          Objective:      Physical Exam  Cardiovascular:      Pulses:           Dorsalis pedis pulses are 0 on the right side and 0 on the left side.        Posterior tibial pulses are 0 on the right side and 0 on the left side.   Feet:      Right foot:      Protective Sensation: 10 sites tested.  0 sites sensed.      Skin integrity: Ulcer present.      Toenail Condition: Right toenails are abnormally thick and long. Fungal disease present.     Left foot:      Protective Sensation: 10 sites tested.  0 sites sensed.      Skin integrity: No ulcer or erythema.      Toenail Condition: Left toenails are abnormally thick and long. Fungal disease present.    Amputation of the R 4,5 toes.    R MTPJ limitus. There is wound at the distal right toe. Measures 0.5x0.5cm with exposed subcue.   Submet 1 R ulceration that measures 1x1cm. - now fully epithelialized.           Assessment:       Encounter Diagnoses   Name Primary?    PAD (peripheral artery disease) Yes    Type 2 diabetes mellitus with peripheral neuropathy     Diabetic ulcer of other part of right foot associated with type 2 diabetes mellitus, with fat  "layer exposed            Plan:       Caro was seen today for wound care.    Diagnoses and all orders for this visit:    PAD (peripheral artery disease)    Type 2 diabetes mellitus with peripheral neuropathy    Diabetic ulcer of other part of right foot associated with type 2 diabetes mellitus, with fat layer exposed          I counseled the patient on her conditions, their implications and medical management.    Pending ABIs.    Football dressing for offloading.    Surgical options? Would like to avoid but this is a chronic issue.     Stephen Barakat DPM    Wound Debridement    Date/Time: 4/22/2024 8:40 AM    Performed by: Stephen Barakat DPM  Authorized by: Stephen Barakat DPM    Time out: Immediately prior to procedure a "time out" was called to verify the correct patient, procedure, equipment, support staff and site/side marked as required.    Consent Done?:  Yes (Verbal)    Wound Details:    Location:  Right foot    Location:  Right 1st Toe    Type of Debridement:  Excisional       Length (cm):  0.5       Area (sq cm):  0.25       Width (cm):  0.5       Percent Debrided (%):  100       Depth (cm):  0.1       Total Area Debrided (sq cm):  0.25    Depth of debridement:  Subcutaneous tissue    Tissue debrided:  Dermis and Epidermis    Devitalized tissue debrided:  Callus    Instruments:  Curette  Bleeding:  Minimal  Hemostasis Achieved: Yes  Method Used:  Pressure  Patient tolerance:  Patient tolerated the procedure well with no immediate complications  1st Wound Pain Assessment: 0          "

## 2024-04-24 RX ORDER — DEXTROSE 4 G
TABLET,CHEWABLE ORAL
Qty: 1 EACH | Refills: 0 | Status: SHIPPED | OUTPATIENT
Start: 2024-04-24

## 2024-04-24 RX ORDER — ISOPROPYL ALCOHOL 70 ML/100ML
1 SWAB TOPICAL DAILY
Qty: 140 EACH | Refills: 1 | Status: SHIPPED | OUTPATIENT
Start: 2024-04-24

## 2024-04-24 RX ORDER — LANCETS
100 EACH MISCELLANEOUS DAILY
Qty: 100 EACH | Refills: 1 | Status: SHIPPED | OUTPATIENT
Start: 2024-04-24

## 2024-04-25 ENCOUNTER — TELEPHONE (OUTPATIENT)
Dept: PRIMARY CARE CLINIC | Facility: CLINIC | Age: 84
End: 2024-04-25
Payer: MEDICARE

## 2024-04-25 NOTE — TELEPHONE ENCOUNTER
Patient was informed that these orders were filled yesterday with Taniya. She verbalized understanding.

## 2024-04-29 ENCOUNTER — OFFICE VISIT (OUTPATIENT)
Dept: PODIATRY | Facility: CLINIC | Age: 84
End: 2024-04-29
Payer: MEDICARE

## 2024-04-29 VITALS — BODY MASS INDEX: 29.55 KG/M2 | WEIGHT: 156.5 LBS | HEIGHT: 61 IN

## 2024-04-29 DIAGNOSIS — Z87.2 HEALED ULCER OF RIGHT FOOT: Primary | ICD-10-CM

## 2024-04-29 PROCEDURE — 1101F PT FALLS ASSESS-DOCD LE1/YR: CPT | Mod: HCWC,CPTII,S$GLB, | Performed by: STUDENT IN AN ORGANIZED HEALTH CARE EDUCATION/TRAINING PROGRAM

## 2024-04-29 PROCEDURE — 3288F FALL RISK ASSESSMENT DOCD: CPT | Mod: HCWC,CPTII,S$GLB, | Performed by: STUDENT IN AN ORGANIZED HEALTH CARE EDUCATION/TRAINING PROGRAM

## 2024-04-29 PROCEDURE — 1159F MED LIST DOCD IN RCRD: CPT | Mod: HCWC,CPTII,S$GLB, | Performed by: STUDENT IN AN ORGANIZED HEALTH CARE EDUCATION/TRAINING PROGRAM

## 2024-04-29 PROCEDURE — 99024 POSTOP FOLLOW-UP VISIT: CPT | Mod: HCWC,S$GLB,, | Performed by: STUDENT IN AN ORGANIZED HEALTH CARE EDUCATION/TRAINING PROGRAM

## 2024-04-29 PROCEDURE — 1160F RVW MEDS BY RX/DR IN RCRD: CPT | Mod: HCWC,CPTII,S$GLB, | Performed by: STUDENT IN AN ORGANIZED HEALTH CARE EDUCATION/TRAINING PROGRAM

## 2024-04-29 PROCEDURE — 1126F AMNT PAIN NOTED NONE PRSNT: CPT | Mod: HCWC,CPTII,S$GLB, | Performed by: STUDENT IN AN ORGANIZED HEALTH CARE EDUCATION/TRAINING PROGRAM

## 2024-04-29 PROCEDURE — 3072F LOW RISK FOR RETINOPATHY: CPT | Mod: HCWC,CPTII,S$GLB, | Performed by: STUDENT IN AN ORGANIZED HEALTH CARE EDUCATION/TRAINING PROGRAM

## 2024-04-29 PROCEDURE — 99999 PR PBB SHADOW E&M-EST. PATIENT-LVL III: CPT | Mod: PBBFAC,HCWC,, | Performed by: STUDENT IN AN ORGANIZED HEALTH CARE EDUCATION/TRAINING PROGRAM

## 2024-04-30 NOTE — PROGRESS NOTES
Subjective:      Patient ID: Caro Powell is a 83 y.o. female.    Chief Complaint: Wound Care    Ms. Powell presents today with complaints of left 3rd toe infection. She notes drainage, redness and swelling a couple of days ago and presented to the urgent care for assistance. She was given doxy 100mg and an injection of rocephin which has helped with the redness and swelling. She doesn't have much feeling at baseline has amputations of the toes on the right foot.     She normally sees Dr. Atkins on the [place] but has recently moved to the [facility].     4/22/24:  F/u R foot wounds. Doing well with the new modified dressing.     Review of Systems   Skin:  Positive for nail changes, poor wound healing, suspicious lesions and unusual hair distribution.   All other systems reviewed and are negative.          Objective:      Physical Exam  Cardiovascular:      Pulses:           Dorsalis pedis pulses are 0 on the right side and 0 on the left side.        Posterior tibial pulses are 0 on the right side and 0 on the left side.   Feet:      Right foot:      Protective Sensation: 10 sites tested.  0 sites sensed.      Skin integrity: Ulcer present.      Toenail Condition: Right toenails are abnormally thick and long. Fungal disease present.     Left foot:      Protective Sensation: 10 sites tested.  0 sites sensed.      Skin integrity: No ulcer or erythema.      Toenail Condition: Left toenails are abnormally thick and long. Fungal disease present.    Amputation of the R 4,5 toes.    R MTPJ limitus.     4/29/24:  Wounds appear to be covered with fragile epithelium          Assessment:       Encounter Diagnosis   Name Primary?    Healed ulcer of right foot Yes             Plan:       Caro was seen today for wound care.    Diagnoses and all orders for this visit:    Healed ulcer of right foot        I counseled the patient on her conditions, their implications and medical management.    ABIs reviewed.  Levels appear sufficient for healing with worse stenosis on the L than R.    Wounds are currently healed. Allow for for maturation of the skin.    Bring inserts next visit to modify them for further offloading.     Stephen Barakat DPM

## 2024-05-03 ENCOUNTER — OFFICE VISIT (OUTPATIENT)
Dept: PRIMARY CARE CLINIC | Facility: CLINIC | Age: 84
End: 2024-05-03
Payer: MEDICARE

## 2024-05-03 VITALS
WEIGHT: 156.5 LBS | OXYGEN SATURATION: 97 % | BODY MASS INDEX: 29.55 KG/M2 | HEART RATE: 66 BPM | SYSTOLIC BLOOD PRESSURE: 138 MMHG | TEMPERATURE: 98 F | DIASTOLIC BLOOD PRESSURE: 64 MMHG | HEIGHT: 61 IN

## 2024-05-03 DIAGNOSIS — I73.9 PAD (PERIPHERAL ARTERY DISEASE): ICD-10-CM

## 2024-05-03 DIAGNOSIS — S81.802S WOUND OF LEFT LOWER EXTREMITY, SEQUELA: Primary | ICD-10-CM

## 2024-05-03 PROBLEM — S81.802A WOUND OF LEFT LEG: Status: ACTIVE | Noted: 2024-05-03

## 2024-05-03 PROBLEM — C50.512 MALIGNANT NEOPLASM OF LOWER-OUTER QUADRANT OF LEFT BREAST OF FEMALE, ESTROGEN RECEPTOR POSITIVE: Status: RESOLVED | Noted: 2022-09-22 | Resolved: 2024-05-03

## 2024-05-03 PROBLEM — Z17.0 MALIGNANT NEOPLASM OF LOWER-OUTER QUADRANT OF LEFT BREAST OF FEMALE, ESTROGEN RECEPTOR POSITIVE: Status: RESOLVED | Noted: 2022-09-22 | Resolved: 2024-05-03

## 2024-05-03 PROBLEM — C50.911 INVASIVE DUCTAL CARCINOMA OF RIGHT BREAST: Status: RESOLVED | Noted: 2022-07-06 | Resolved: 2024-05-03

## 2024-05-03 PROCEDURE — 1126F AMNT PAIN NOTED NONE PRSNT: CPT | Mod: HCWC,CPTII,S$GLB, | Performed by: INTERNAL MEDICINE

## 2024-05-03 PROCEDURE — 3078F DIAST BP <80 MM HG: CPT | Mod: HCWC,CPTII,S$GLB, | Performed by: INTERNAL MEDICINE

## 2024-05-03 PROCEDURE — 99212 OFFICE O/P EST SF 10 MIN: CPT | Mod: HCWC,S$GLB,, | Performed by: INTERNAL MEDICINE

## 2024-05-03 PROCEDURE — 1160F RVW MEDS BY RX/DR IN RCRD: CPT | Mod: HCWC,CPTII,S$GLB, | Performed by: INTERNAL MEDICINE

## 2024-05-03 PROCEDURE — 3288F FALL RISK ASSESSMENT DOCD: CPT | Mod: HCWC,CPTII,S$GLB, | Performed by: INTERNAL MEDICINE

## 2024-05-03 PROCEDURE — 1101F PT FALLS ASSESS-DOCD LE1/YR: CPT | Mod: HCWC,CPTII,S$GLB, | Performed by: INTERNAL MEDICINE

## 2024-05-03 PROCEDURE — 99999 PR PBB SHADOW E&M-EST. PATIENT-LVL IV: CPT | Mod: PBBFAC,HCWC,, | Performed by: INTERNAL MEDICINE

## 2024-05-03 PROCEDURE — 3072F LOW RISK FOR RETINOPATHY: CPT | Mod: HCWC,CPTII,S$GLB, | Performed by: INTERNAL MEDICINE

## 2024-05-03 PROCEDURE — 1159F MED LIST DOCD IN RCRD: CPT | Mod: HCWC,CPTII,S$GLB, | Performed by: INTERNAL MEDICINE

## 2024-05-03 PROCEDURE — 3075F SYST BP GE 130 - 139MM HG: CPT | Mod: HCWC,CPTII,S$GLB, | Performed by: INTERNAL MEDICINE

## 2024-05-03 NOTE — PROGRESS NOTES
INTERNAL MEDICINE PROGRESS/URGENT CARE NOTE    CHIEF COMPLAINT     Chief Complaint   Patient presents with    Follow-up    Foot Injury     Patient is here for follow up of wound to her right heel.        TIM Powell is a 83 y.o.  female who presents for an urgent/follow up visit today.  PATIENT IS here today to have a leg wound rechecked.   Wound is heeled    She wanted to discuss the US Artery LE. 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 Note history of right leg ulcers but none on the side of the stenosis. Has sen vascular surgeron who decided to do surveillance and repeat US in 4-6 months     Past Medical History:  Past Medical History:   Diagnosis Date    Anticoagulant long-term use     Arthritis     Atrial flutter     Calcium nephrolithiasis 2007    ckd 3    Diabetes mellitus, type 2     Diabetic peripheral neuropathy associated with type 2 diabetes mellitus     Difficult intubation     NARROW AIRWAY    Disc displacement, lumbar     Hypertension     Invasive ductal carcinoma of left breast 07/06/2022    Mixed hyperlipidemia     Pulmonary aspiration of gastric contents     Shingles        Home Medications:  Prior to Admission medications    Medication Sig Start Date End Date Taking? Authorizing Provider   ACCU-CHEK SOFT DEV LANCETS Kit  10/2/14   Provider, Historical   alcohol swabs (ALCOHOL PREP) PadM Apply 1 each topically once daily. 4/24/24   Anisha Reyes MD   ammonium lactate 12 % Crea Apply to feet twice daily. Avoid use between toes. 2/9/21   Ava Atkins DPM   anastrozole (ARIMIDEX) 1 mg Tab TAKE 1 TABLET ONE TIME DAILY 8/9/23   Morgan Caputo MD   apixaban (ELIQUIS) 5 mg Tab TAKE 1 TABLET BY MOUTH TWICE DAILY 7/28/23   Brayan Penny MD   atorvastatin (LIPITOR) 40 MG tablet Take 1 tablet (40 mg total) by mouth once daily. 10/16/23 10/15/24  Anisha Reyes MD   blood sugar diagnostic (ACCU-CHEK GUIDE TEST STRIPS) Strp 100 each by  "Misc.(Non-Drug; Combo Route) route once daily. 4/24/24   Anisha Reyes MD   blood-glucose meter (ACCU-CHEK GUIDE GLUCOSE METER) Misc Check blood glucose one time daily. 4/24/24   Anisha Reyes MD   EScitalopram oxalate (LEXAPRO) 10 MG tablet Take 1 tablet (10 mg total) by mouth once daily. 1/11/24 1/10/25  Anisha Reyes MD   FLUAD QUAD 2022-23,65Y UP,,PF, 60 mcg (15 mcg x 4)/0.5 mL Syrg  11/8/22   Provider, Historical   furosemide (LASIX) 40 MG tablet TAKE 1 TABLET EVERY DAY 10/7/23   Sam Paulino MD   glimepiride (AMARYL) 1 MG tablet TAKE 1 TABLET TWICE DAILY 7/28/23   Brayan Penny MD   lancets (ACCU-CHEK SOFTCLIX LANCETS) Misc 100 each by Misc.(Non-Drug; Combo Route) route once daily. Test blood glucose once daily. 4/24/24   Anisha Reyes MD   lisinopriL (PRINIVIL,ZESTRIL) 20 MG tablet TAKE 1 TABLET EVERY DAY 1/12/23   Brayan Penny MD   metoprolol succinate (TOPROL-XL) 25 MG 24 hr tablet TAKE 1 TABLET EVERY DAY 3/22/23   Brayan Penny MD   mupirocin (BACTROBAN) 2 % ointment Apply topically 2 (two) times daily. 1/4/23   Ava Atkins DPM   OYSTER SHELL CALCIUM-VIT D3 500 mg-5 mcg (200 unit) per tablet TAKE 1 TABLET EVERY DAY 10/23/23   Morgan Caputo MD   polyethylene glycol (GLYCOLAX) 17 gram/dose powder Take 17 g by mouth once daily. 10/22/21   Mariya Love MD   pramipexole (MIRAPEX) 0.125 MG tablet TAKE 1 TABLET EVERY DAY 10/9/23   Brayan Penny MD   urea (CARMOL) 40 % Crea Apply topically 2 (two) times daily. 7/14/21   Ava Atkins, DPM       Review of Systems:  Review of Systems          PHYSICAL EXAM     /64 (BP Location: Left arm, Patient Position: Sitting, BP Method: Medium (Manual))   Pulse 66   Temp 97.6 °F (36.4 °C) (Oral)   Ht 5' 1" (1.549 m)   Wt 71 kg (156 lb 8.4 oz)   LMP  (LMP Unknown)   SpO2 97%   BMI 29.58 kg/m²     GEN - A+OX4, NAD   HEENT - PERRL, EOMI, OP clear  Neck - No thyromegaly or cervical " LAD. No thyroid masses felt.  CV - RRR, no m/r   Chest - CTAB, no wheezing or rhonchi  Abd - S/NT/ND/+BS.   Ext - 2+BDP and radial pulses. No C/C/E.  Skin - No rash.    LABS         ASSESSMENT/PLAN     Caro Powell is a 83 y.o. female with  1. Wound of left lower extremity, sequela  Healed. With the help of podiatry.     2. PAD (peripheral artery disease)  Overview:  On eliquis and statin. Was on plavix but was stopped By cardiology  S/p vascular srugery of the right leg in the past   Symptomatic  Encouraged exercise  US artery with 75% cholesterol buildup per cardiovascular medical management             WORRY SCORE 3    RTC in 1 months, sooner if needed and depending on labs.    Anisha Reyes MD  Board Certified Internist/Geriatrician  [facility] ([place])

## 2024-05-06 ENCOUNTER — OFFICE VISIT (OUTPATIENT)
Dept: PODIATRY | Facility: CLINIC | Age: 84
End: 2024-05-06
Payer: MEDICARE

## 2024-05-06 VITALS — BODY MASS INDEX: 29.55 KG/M2 | WEIGHT: 156.5 LBS | HEIGHT: 61 IN

## 2024-05-06 DIAGNOSIS — Z87.2 HEALED ULCER OF RIGHT FOOT: Primary | ICD-10-CM

## 2024-05-06 PROCEDURE — 1101F PT FALLS ASSESS-DOCD LE1/YR: CPT | Mod: HCWC,CPTII,S$GLB, | Performed by: STUDENT IN AN ORGANIZED HEALTH CARE EDUCATION/TRAINING PROGRAM

## 2024-05-06 PROCEDURE — 99024 POSTOP FOLLOW-UP VISIT: CPT | Mod: HCWC,S$GLB,, | Performed by: STUDENT IN AN ORGANIZED HEALTH CARE EDUCATION/TRAINING PROGRAM

## 2024-05-06 PROCEDURE — 1126F AMNT PAIN NOTED NONE PRSNT: CPT | Mod: HCWC,CPTII,S$GLB, | Performed by: STUDENT IN AN ORGANIZED HEALTH CARE EDUCATION/TRAINING PROGRAM

## 2024-05-06 PROCEDURE — 99999 PR PBB SHADOW E&M-EST. PATIENT-LVL III: CPT | Mod: PBBFAC,HCWC,, | Performed by: STUDENT IN AN ORGANIZED HEALTH CARE EDUCATION/TRAINING PROGRAM

## 2024-05-06 PROCEDURE — 1159F MED LIST DOCD IN RCRD: CPT | Mod: HCWC,CPTII,S$GLB, | Performed by: STUDENT IN AN ORGANIZED HEALTH CARE EDUCATION/TRAINING PROGRAM

## 2024-05-06 PROCEDURE — 1160F RVW MEDS BY RX/DR IN RCRD: CPT | Mod: HCWC,CPTII,S$GLB, | Performed by: STUDENT IN AN ORGANIZED HEALTH CARE EDUCATION/TRAINING PROGRAM

## 2024-05-06 PROCEDURE — 3288F FALL RISK ASSESSMENT DOCD: CPT | Mod: HCWC,CPTII,S$GLB, | Performed by: STUDENT IN AN ORGANIZED HEALTH CARE EDUCATION/TRAINING PROGRAM

## 2024-05-06 PROCEDURE — 3072F LOW RISK FOR RETINOPATHY: CPT | Mod: HCWC,CPTII,S$GLB, | Performed by: STUDENT IN AN ORGANIZED HEALTH CARE EDUCATION/TRAINING PROGRAM

## 2024-05-06 NOTE — PROGRESS NOTES
Subjective:      Patient ID: Caro Powell is a 83 y.o. female.    Chief Complaint: Wound Care    Ms. Powell presents today with complaints of left 3rd toe infection. She notes drainage, redness and swelling a couple of days ago and presented to the urgent care for assistance. She was given doxy 100mg and an injection of rocephin which has helped with the redness and swelling. She doesn't have much feeling at baseline has amputations of the toes on the right foot.     She normally sees Dr. Atkins on the [place] but has recently moved to the [facility].     4/22/24:  F/u R foot wounds. Doing well with the new modified dressing.     5/6/24:  F/u R foot wound. Doing well    Review of Systems   Skin:  Positive for nail changes, poor wound healing, suspicious lesions and unusual hair distribution.   All other systems reviewed and are negative.          Objective:      Physical Exam  Cardiovascular:      Pulses:           Dorsalis pedis pulses are 0 on the right side and 0 on the left side.        Posterior tibial pulses are 0 on the right side and 0 on the left side.   Feet:      Right foot:      Protective Sensation: 10 sites tested.  0 sites sensed.      Skin integrity: Ulcer present.      Toenail Condition: Right toenails are abnormally thick and long. Fungal disease present.     Left foot:      Protective Sensation: 10 sites tested.  0 sites sensed.      Skin integrity: No ulcer or erythema.      Toenail Condition: Left toenails are abnormally thick and long. Fungal disease present.    Amputation of the R 4,5 toes.    R MTPJ limitus.     4/29/24:  Wounds appear to be covered with fragile epithelium    5/9/24:  Mature epithelium now.           Assessment:       Encounter Diagnosis   Name Primary?    Healed ulcer of right foot Yes               Plan:       Caro was seen today for wound care.    Diagnoses and all orders for this visit:    Healed ulcer of right foot          I counseled the patient  on her conditions, their implications and medical management.    Inserts were modified to provide more offloading    F/u in about 6 weeks for re-eval    Stephen Barakat DPM

## 2024-05-22 RX ORDER — FUROSEMIDE 40 MG/1
40 TABLET ORAL
Qty: 90 TABLET | Refills: 3 | Status: SHIPPED | OUTPATIENT
Start: 2024-05-22 | End: 2024-05-29

## 2024-05-23 ENCOUNTER — TELEPHONE (OUTPATIENT)
Dept: PRIMARY CARE CLINIC | Facility: CLINIC | Age: 84
End: 2024-05-23
Payer: MEDICARE

## 2024-05-23 NOTE — TELEPHONE ENCOUNTER
----- Message from Stephie Taylor sent at 5/23/2024 10:01 AM CDT -----  Contact: pt  Type:  Needs Medical Advice    Who Called: pt    Symptoms (please be specific):  cold like symptoms     How long has patient had these symptoms:   2 days    Pharmacy name and phone #:      [id number] - [place] [address] AT [address] [facility] & [address] [address]  Phone: [phone] Fax: [phone]      Would the patient rather a call back or a response via [facility]? Call back    Best Call Back Number: [phone]    Additional Information: wants to know if she can get something called in    Please call to advise  Thanks

## 2024-05-23 NOTE — TELEPHONE ENCOUNTER
Patient reports she had a cold for about 3 days, got better then her symptoms returned about 2 days ago. She c/o nasal congestion with green nasal mucus, an occasional dry cough, and fatigue. She has been taking Tylenol for body and head aches. She was given Dr. Reyes's recommendations for upper respiratory symptoms. She asked if she needs abx for the green nasal mucus.

## 2024-05-23 NOTE — TELEPHONE ENCOUNTER
Not at this time. Unless fever, sob, chest pain. Likely viral which is just supportive care. Mucinex to cough all that up then she will likely start feeling much brodie.r hydrate well and rest if she needs to. A teaspoon of local honey can often help

## 2024-05-29 DIAGNOSIS — I48.91 ATRIAL FIBRILLATION WITH RAPID VENTRICULAR RESPONSE: ICD-10-CM

## 2024-05-29 DIAGNOSIS — I10 ESSENTIAL HYPERTENSION: Primary | Chronic | ICD-10-CM

## 2024-05-29 RX ORDER — METOPROLOL SUCCINATE 25 MG/1
25 TABLET, EXTENDED RELEASE ORAL DAILY
Qty: 90 TABLET | Refills: 1 | Status: SHIPPED | OUTPATIENT
Start: 2024-05-29

## 2024-05-29 RX ORDER — FUROSEMIDE 40 MG/1
40 TABLET ORAL DAILY
Qty: 90 TABLET | Refills: 1 | Status: SHIPPED | OUTPATIENT
Start: 2024-05-29

## 2024-05-30 DIAGNOSIS — D69.6 THROMBOCYTOPENIA, UNSPECIFIED: ICD-10-CM

## 2024-05-30 DIAGNOSIS — I48.92 UNSPECIFIED ATRIAL FLUTTER: ICD-10-CM

## 2024-06-17 ENCOUNTER — OFFICE VISIT (OUTPATIENT)
Dept: PODIATRY | Facility: CLINIC | Age: 84
End: 2024-06-17
Payer: MEDICARE

## 2024-06-17 VITALS — BODY MASS INDEX: 29.55 KG/M2 | WEIGHT: 156.5 LBS | HEIGHT: 61 IN

## 2024-06-17 DIAGNOSIS — E11.42 TYPE 2 DIABETES MELLITUS WITH PERIPHERAL NEUROPATHY: ICD-10-CM

## 2024-06-17 DIAGNOSIS — I73.9 PAD (PERIPHERAL ARTERY DISEASE): Primary | ICD-10-CM

## 2024-06-17 DIAGNOSIS — E11.621 DIABETIC ULCER OF OTHER PART OF RIGHT FOOT ASSOCIATED WITH TYPE 2 DIABETES MELLITUS, WITH FAT LAYER EXPOSED: ICD-10-CM

## 2024-06-17 DIAGNOSIS — L97.512 DIABETIC ULCER OF OTHER PART OF RIGHT FOOT ASSOCIATED WITH TYPE 2 DIABETES MELLITUS, WITH FAT LAYER EXPOSED: ICD-10-CM

## 2024-06-17 PROCEDURE — 99999 PR PBB SHADOW E&M-EST. PATIENT-LVL IV: CPT | Mod: PBBFAC,HCNC,, | Performed by: STUDENT IN AN ORGANIZED HEALTH CARE EDUCATION/TRAINING PROGRAM

## 2024-06-17 NOTE — PROGRESS NOTES
Subjective:      Patient ID: Caro Powell is a 83 y.o. female.    Chief Complaint: Toe Injury (Scab of bilateral great toes, left toe hit on dresser)    Ms. Powell presents today with complaints of left 3rd toe infection. She notes drainage, redness and swelling a couple of days ago and presented to the urgent care for assistance. She was given doxy 100mg and an injection of rocephin which has helped with the redness and swelling. She doesn't have much feeling at baseline has amputations of the toes on the right foot.     She normally sees Dr. Atkins on the [place] but has recently moved to the [facility].     4/22/24:  F/u R foot wounds. Doing well with the new modified dressing.     5/6/24:  F/u R foot wound. Doing well    6/17/24:  Ulcer R great toe is back.    Review of Systems   Skin:  Positive for nail changes, poor wound healing, suspicious lesions and unusual hair distribution.   All other systems reviewed and are negative.          Objective:      Physical Exam  Cardiovascular:      Pulses:           Dorsalis pedis pulses are 0 on the right side and 0 on the left side.        Posterior tibial pulses are 0 on the right side and 0 on the left side.   Feet:      Right foot:      Protective Sensation: 10 sites tested.  0 sites sensed.      Skin integrity: Ulcer present.      Toenail Condition: Right toenails are abnormally thick and long. Fungal disease present.     Left foot:      Protective Sensation: 10 sites tested.  0 sites sensed.      Skin integrity: No ulcer or erythema.      Toenail Condition: Left toenails are abnormally thick and long. Fungal disease present.    Amputation of the R 4,5 toes.    R MTPJ limitus.     4/29/24:  Wounds appear to be covered with fragile epithelium    5/9/24:  Mature epithelium now.     6/17/24:  Wound measures [id number]        Assessment:       Encounter Diagnoses   Name Primary?    PAD (peripheral artery disease) Yes    Type 2 diabetes mellitus with  peripheral neuropathy     Diabetic ulcer of other part of right foot associated with type 2 diabetes mellitus, with fat layer exposed                  Plan:       Caro was seen today for toe injury.    Diagnoses and all orders for this visit:    PAD (peripheral artery disease)    Type 2 diabetes mellitus with peripheral neuropathy    Diabetic ulcer of other part of right foot associated with type 2 diabetes mellitus, with fat layer exposed            I counseled the patient on her conditions, their implications and medical management.    Ms. Powell reports that the special made orthotics are painful and she is wearing shoes that are not her DM shoes and inserts. The wound has re-opened and I discussed with her at length the plan of care. I recommend continuing local wound care and a boot for offloading.    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 She is not sure how she'd like to proceed but she refuses the boot today. I explained to her that she would need to heal the wound if she were considering surgery so that she should wear the boot anyway but she again refuses.    F/u 2 weeks.  for dressing changes. She will discuss with cardiology to see what her risks for an elective procedure would be.     MARJAN Gardner DPM

## 2024-06-22 NOTE — PROGRESS NOTES
[facility] Cardiology [address]     SUBJECTIVE:     History of Present Illness:  Patient is a 83 y.o. female presents with atrial flutter and PVD. Getting worked up for right big toe issues     Primary Diagnosis:  1. hypertension  2. DM  3. atrial flutter - resolved  4. abnormal Echo: pericardial effusion - small .     5.  PVD - Amputation right second toe and right fifth toe . S/p atherectomy 11/21 right distal vessels   6. Breast cancer: s/p mastectomy 8/22 -   ROS  Since last visit in 1/24: no change   1. Had breast surgery with no issues; had radiation treatment and completed this and now on medications   2. No chest pains  3. No shortness of breath at rest; no PND or orthopnea; does get a little short of breath with walking - walked over here from the car without shortness of breath    Does go up and down stairs to take a shower and gets a little short of breath; walks all over without issues though   4. No palpitations  5. No syncope  6. No lower extremity edema on lasix as needed   7. Developed shingles after the surgery   8. Staying with her son   9. No bleeding   10. Activity: getting a little better   11. Blood pressures at home: takes it sometimes;   Past Hospitalization    Review of patient's allergies indicates:   Allergen Reactions    Codeine Nausea Only and Other (See Comments)     Can Take Tylenol, hrdrocodone       Past Medical History:   Diagnosis Date    Anticoagulant long-term use     Arthritis     Atrial flutter     Calcium nephrolithiasis 2007    ckd 3    Diabetes mellitus, type 2     Diabetic peripheral neuropathy associated with type 2 diabetes mellitus     Difficult intubation     NARROW AIRWAY    Disc displacement, lumbar     Hypertension     Invasive ductal carcinoma of left breast 07/06/2022    Mixed hyperlipidemia     Pulmonary aspiration of gastric contents     Shingles        Past Surgical History:   Procedure Laterality Date    ANGIOGRAPHY OF LOWER EXTREMITY N/A 10/21/2021    Procedure: Angiogram  Extremity Unilateral;  Surgeon: Dre Martino MD;  Location: [facility] CATH LAB/EP;  Service: Cardiology;  Laterality: N/A;    ANGIOGRAPHY OF LOWER EXTREMITY Right 11/10/2021    Procedure: Angiogram Extremity Unilateral;  Surgeon: Ben Rooney MD;  Location: [facility] CATH LAB/EP;  Service: Cardiology;  Laterality: Right;    AXILLARY NODE DISSECTION Right 08/15/2022    Procedure: LYMPHADENECTOMY, AXILLARY RIGHT;  Surgeon: RADHA Quinn MD;  Location: [facility];  Service: General;  Laterality: Right;    CATARACT EXTRACTION      COLONOSCOPY  11/28/2011    sigmoid diverticulosis, external hemorrhoids    COLONOSCOPY      DEBRIDEMENT Right 10/20/2021    Procedure: DEBRIDEMENT;  Surgeon: Ava Atkins DPM;  Location: [facility] OR;  Service: Podiatry;  Laterality: Right;    ENDOSCOPIC GASTROCNEMIUS RECESSION Right 09/10/2019    Procedure: RECESSION, GASTROCNEMIUS, ENDOSCOPIC;  Surgeon: Derek Jose DPM;  Location: [facility] OR;  Service: Podiatry;  Laterality: Right;  Arthrex center line (ron notified)  Video    EXTRACORPOREAL SHOCK WAVE LITHOTRIPSY      FLEXOR TENOTOMY Right 10/20/2021    Procedure: TENOTOMY, FLEXOR 3rd toe;  Surgeon: Ava Atkins DPM;  Location: [facility] OR;  Service: Podiatry;  Laterality: Right;    HYSTERECTOMY      INJECTION FOR SENTINEL NODE IDENTIFICATION Left 08/15/2022    Procedure: INJECTION, FOR SENTINEL NODE IDENTIFICATION;  Surgeon: RADHA Quinn MD;  Location: [facility];  Service: General;  Laterality: Left;    MASTECTOMY Bilateral 08/15/2022    Procedure: MASTECTOMY BILATERAL  / BREAST;  Surgeon: RADHA Quinn MD;  Location: [facility];  Service: General;  Laterality: Bilateral;  5 HOURS / EMAIL SENT 8-11 @ 9:02 LK    SENTINEL LYMPH NODE BIOPSY Left 08/15/2022    Procedure: BIOPSY, LYMPH NODE, SENTINEL LEFT;  Surgeon: RADHA Quinn MD;  Location: [facility];  Service: General;  Laterality: Left;    SHOULDER SURGERY Left     TOE AMPUTATION Right 05/22/2017    5th toe    TOE AMPUTATION Right  10/20/2021    Procedure: AMPUTATION, TOE;  Surgeon: Ava Atkins DPM;  Location: [facility] OR;  Service: Podiatry;  Laterality: Right;    TONSILLECTOMY         Family History   Problem Relation Name Age of Onset    Diabetes Mother      Heart failure Father      No Known Problems Sister      Breast cancer Sister  69        Genetic testing negative    Kidney failure Brother      Breast cancer Maternal Aunt          early 40s    Diabetes Maternal Grandmother      No Known Problems Maternal Grandfather      No Known Problems Paternal Grandmother      No Known Problems Paternal Grandfather      Glaucoma Neg Hx      Cataracts Neg Hx      Macular degeneration Neg Hx      Retinal detachment Neg Hx      Strabismus Neg Hx         Social History     Tobacco Use    Smoking status: Never     Passive exposure: Never    Smokeless tobacco: Never   Substance Use Topics    Alcohol use: No    Drug use: No        Home meds:  Current Outpatient Medications on File Prior to Visit   Medication Sig Dispense Refill    ACCU-CHEK SOFT DEV LANCETS Kit       alcohol swabs (ALCOHOL PREP) PadM Apply 1 each topically once daily. 140 each 1    ammonium lactate 12 % Crea Apply to feet twice daily. Avoid use between toes. 140 g 5    anastrozole (ARIMIDEX) 1 mg Tab TAKE 1 TABLET ONE TIME DAILY 90 tablet 3    apixaban (ELIQUIS) 5 mg Tab TAKE 1 TABLET BY MOUTH TWICE DAILY 180 tablet 3    ascorbic acid, vitamin C, (VITAMIN C) 250 MG tablet Take 250 mg by mouth once daily. Indications: supplement      atorvastatin (LIPITOR) 40 MG tablet Take 1 tablet (40 mg total) by mouth once daily. 90 tablet 3    blood sugar diagnostic (ACCU-CHEK GUIDE TEST STRIPS) Strp 100 each by Misc.(Non-Drug; Combo Route) route once daily. 100 strip 1    blood-glucose meter (ACCU-CHEK GUIDE GLUCOSE METER) Misc Check blood glucose one time daily. 1 each 0    EScitalopram oxalate (LEXAPRO) 10 MG tablet Take 1 tablet (10 mg total) by mouth once daily. 30 tablet 11    furosemide  (LASIX) 40 MG tablet Take 1 tablet (40 mg total) by mouth once daily. (Patient taking differently: Take 1 tablet by mouth daily as needed. Indications: visible water retention) 90 tablet 1    glimepiride (AMARYL) 1 MG tablet TAKE 1 TABLET TWICE DAILY 180 tablet 3    lancets (ACCU-CHEK SOFTCLIX LANCETS) Misc 100 each by Misc.(Non-Drug; Combo Route) route once daily. Test blood glucose once daily. 100 each 1    lisinopriL (PRINIVIL,ZESTRIL) 20 MG tablet TAKE 1 TABLET EVERY DAY 90 tablet 3    metoprolol succinate (TOPROL-XL) 25 MG 24 hr tablet Take 1 tablet (25 mg total) by mouth once daily. 90 tablet 1    mupirocin (BACTROBAN) 2 % ointment Apply topically 2 (two) times daily. (Patient not taking: Reported on 6/20/2024) 30 g 2    OYSTER SHELL CALCIUM-VIT D3 500 mg-5 mcg (200 unit) per tablet TAKE 1 TABLET EVERY DAY 90 tablet 10    polyethylene glycol (GLYCOLAX) 17 gram/dose powder Take 17 g by mouth once daily. (Patient taking differently: Take 17 g by mouth daily as needed. Indications: constipation) 510 g 0    pramipexole (MIRAPEX) 0.125 MG tablet TAKE 1 TABLET EVERY DAY 90 tablet 3    urea (CARMOL) 40 % Crea Apply topically 2 (two) times daily. (Patient taking differently: Apply 1 Application topically 2 (two) times daily as needed. Indications: thickening of the outer horny layer of the skin) 1 Bottle 3    vits A,C,E/lutein/minerals (HEALTHY EYES ORAL) Take 0.5 tablets by mouth Daily. Indications: eye health       No current facility-administered medications on file prior to visit.       Cardiac meds:     Eliquis 5 mg BID  metoprolol succinate 25 mg   Glimepiride 1 mg  lisinopril 20 mg   Atorvastatin 40 mg   plavix 75 mg - not on this   Lasix 40 mg as needed       OBJECTIVE:     Vital Signs (Most Recent)     Weight stable but down 3 lb s from 168 to 165 - 162     Physical Exam:     Neck: normal carotid upstrokes; normal JVP, no bruits, right  basilar carotid from murmur  Lungs: clear  Heart: RR, normal S1,S2,  systolic ejection murmur base; no AI  Abd: obese  Exts: normal DP left; faint  PT right, no edema bilaterally           LABS    : A1c 7.3, GFR 38 ; LDL 56   3/23: 6.7; GFR 34; LFT's normal   10/23: LDL 60; GFR 55; A1c 6.5;   : A1c 7.0, TSH normal ; GFR 44; Lipids great   : Diagnostic Results:    1a.  EKGs- 17: sinus; possible old anteroseptal infarction   1b. EK17: atrial flutter with ventricular rate of 147;   1c. EK17: sinus  1d. EK/19: sinus with nonspecific ivcd   1e. EK/21: sinus ; LAD   1f. EK/22: NSR: nonspecific T wave c hanges   2. Echos- 17: normal EF, diastolic dysfunction; mild LAE, moderate pericardial effusion, aortic sclerosis   2b. Echo: 17: normal EF, small pericardial effusion; LAE    2c. Echo: : normal EF, diastolic dysfunction; LAE, PAS 29 mm normal; mild TR     3. Stress Test- : negative ; normal EF   4. Cath- [  ]  5. arterial study: no significant disease. : left clear; right distal right popliteal 50-75%  5b. Arterial study lower exts: disease in small vessels; underwent atherectomy 11/10/21 with good results       ASSESSMENT/PLAN:        1. atrial flutter: resolved; [id number]: however 4; no bleeding ; now in sinus  and on anticoagulation - plavix discontinued   2. diastolic dysfunction - fluid status is perfect   3. moderate pericardial effusion: resolving to mild to none in    4. shortness of breath:minimal   5. dizziness: resolved   6. blood pressures normal      7. CKD with GFR 38 10/21; GFR 42 : 38 and stable ; : GFR 41 to 55 on 10/23; 44 recent   8.  PVD: Right toes healed and doing better; recurrence wound right big toe    9. Systolic ejection murmur: aortic sclerosis   10. Last stress negative for ischemia with normal EF   Plan: 1.continue the same medications  2. Return 4 months     Pradavid Paulino MD

## 2024-06-24 ENCOUNTER — OFFICE VISIT (OUTPATIENT)
Dept: CARDIOLOGY | Facility: CLINIC | Age: 84
End: 2024-06-24
Payer: MEDICARE

## 2024-06-24 VITALS
SYSTOLIC BLOOD PRESSURE: 148 MMHG | OXYGEN SATURATION: 98 % | DIASTOLIC BLOOD PRESSURE: 71 MMHG | WEIGHT: 155.75 LBS | BODY MASS INDEX: 29.43 KG/M2 | HEART RATE: 62 BPM

## 2024-06-24 DIAGNOSIS — I73.9 PAD (PERIPHERAL ARTERY DISEASE): ICD-10-CM

## 2024-06-24 DIAGNOSIS — L97.509 ULCER OF GREAT TOE, UNSPECIFIED LATERALITY, UNSPECIFIED ULCER STAGE: ICD-10-CM

## 2024-06-24 DIAGNOSIS — N18.32 STAGE 3B CHRONIC KIDNEY DISEASE: Primary | ICD-10-CM

## 2024-06-24 DIAGNOSIS — I10 ESSENTIAL HYPERTENSION: ICD-10-CM

## 2024-06-24 DIAGNOSIS — I48.91 ATRIAL FIBRILLATION WITH RAPID VENTRICULAR RESPONSE: ICD-10-CM

## 2024-06-24 PROCEDURE — 99999 PR PBB SHADOW E&M-EST. PATIENT-LVL III: CPT | Mod: PBBFAC,HCNC,, | Performed by: INTERNAL MEDICINE

## 2024-06-25 DIAGNOSIS — Z00.00 ENCOUNTER FOR MEDICARE ANNUAL WELLNESS EXAM: ICD-10-CM

## 2024-06-27 ENCOUNTER — TELEPHONE (OUTPATIENT)
Dept: PODIATRY | Facility: CLINIC | Age: 84
End: 2024-06-27
Payer: MEDICARE

## 2024-06-27 NOTE — TELEPHONE ENCOUNTER
----- Message from Ana Maria Bryant sent at 6/27/2024  4:30 PM CDT -----  Contact: pt [phone]  Type: Needs Medical Advice  Who Called:  Pt     Best Call Back Number: [phone]   Additional Information: Pt calling to schedule wound f/u appt. Pls call back

## 2024-06-27 NOTE — TELEPHONE ENCOUNTER
Spoke with pt in regards to appointment. Pt stated she canceled appointment and her cardio dr put in a referral for Ortho. Pt stated she wanted to get a second opinion and would call us if Ortho could not do anything for her.

## 2024-07-01 ENCOUNTER — TELEPHONE (OUTPATIENT)
Dept: CARDIOLOGY | Facility: CLINIC | Age: 84
End: 2024-07-01
Payer: MEDICARE

## 2024-07-01 NOTE — TELEPHONE ENCOUNTER
----- Message from Brenton Mehta sent at 7/1/2024  3:58 PM CDT -----  Contact: self  Type: Sooner Appointment Request        Caller is requesting a sooner appointment. Caller declined first available appointment listed below. Caller will not accept being placed on the waitlist and is requesting a message be sent to doctor.        Name of Caller: Patient   Best Call Back Number: [phone]  Additional Information: Pt states the ortho doctor on this side doesn't treat feet and the pt doesn't want to be referred to an podiatrist plz call the pt with more recommendations . Thanks

## 2024-07-11 ENCOUNTER — OFFICE VISIT (OUTPATIENT)
Dept: PRIMARY CARE CLINIC | Facility: CLINIC | Age: 84
End: 2024-07-11
Payer: MEDICARE

## 2024-07-11 ENCOUNTER — TELEPHONE (OUTPATIENT)
Dept: PRIMARY CARE CLINIC | Facility: CLINIC | Age: 84
End: 2024-07-11

## 2024-07-11 VITALS
HEART RATE: 61 BPM | SYSTOLIC BLOOD PRESSURE: 122 MMHG | OXYGEN SATURATION: 99 % | HEIGHT: 61 IN | DIASTOLIC BLOOD PRESSURE: 48 MMHG | TEMPERATURE: 98 F | BODY MASS INDEX: 29.3 KG/M2 | WEIGHT: 155.19 LBS

## 2024-07-11 DIAGNOSIS — C50.512 MALIGNANT NEOPLASM OF LOWER-OUTER QUADRANT OF LEFT BREAST OF FEMALE, ESTROGEN RECEPTOR POSITIVE: ICD-10-CM

## 2024-07-11 DIAGNOSIS — N18.32 STAGE 3B CHRONIC KIDNEY DISEASE: ICD-10-CM

## 2024-07-11 DIAGNOSIS — D63.8 ANEMIA OF CHRONIC DISEASE: ICD-10-CM

## 2024-07-11 DIAGNOSIS — I10 ESSENTIAL HYPERTENSION: ICD-10-CM

## 2024-07-11 DIAGNOSIS — I48.91 ATRIAL FIBRILLATION WITH RAPID VENTRICULAR RESPONSE: ICD-10-CM

## 2024-07-11 DIAGNOSIS — S81.802S WOUND OF LEFT LOWER EXTREMITY, SEQUELA: ICD-10-CM

## 2024-07-11 DIAGNOSIS — N18.31 STAGE 3A CHRONIC KIDNEY DISEASE: ICD-10-CM

## 2024-07-11 DIAGNOSIS — E11.40 TYPE 2 DIABETES MELLITUS WITH DIABETIC NEUROPATHY, WITHOUT LONG-TERM CURRENT USE OF INSULIN: Primary | ICD-10-CM

## 2024-07-11 DIAGNOSIS — D64.9 NORMOCYTIC ANEMIA: ICD-10-CM

## 2024-07-11 DIAGNOSIS — E78.2 MIXED HYPERLIPIDEMIA: ICD-10-CM

## 2024-07-11 DIAGNOSIS — C50.911 INVASIVE DUCTAL CARCINOMA OF RIGHT BREAST: ICD-10-CM

## 2024-07-11 DIAGNOSIS — I73.9 PAD (PERIPHERAL ARTERY DISEASE): ICD-10-CM

## 2024-07-11 DIAGNOSIS — F41.9 ANXIETY: ICD-10-CM

## 2024-07-11 DIAGNOSIS — Z17.0 MALIGNANT NEOPLASM OF LOWER-OUTER QUADRANT OF LEFT BREAST OF FEMALE, ESTROGEN RECEPTOR POSITIVE: ICD-10-CM

## 2024-07-11 PROCEDURE — 99999 PR PBB SHADOW E&M-EST. PATIENT-LVL V: CPT | Mod: PBBFAC,,, | Performed by: INTERNAL MEDICINE

## 2024-07-11 NOTE — PROGRESS NOTES
INTERNAL MEDICINE PROGRESS/URGENT CARE NOTE    CHIEF COMPLAINT     Chief Complaint   Patient presents with    Follow-up     Patient is here for 2 month follow up.     Toe Pain     Patient c/o an ulcer on her great toe of her right foot. She has seen Dr. Barakat about this. She would like an order for diabetic shoes.        TIM Powell is a 83 y.o.  female who presents with a PMHx of  breast cancer, afib, CKD 3, arthritis, DM2, HLD, HTN, who presents today for routine follow up visit.       Her   a few months ago after a long glover with dementia. She then moved in with her son and theyre both agreeably happy with the arrangement      Was seeing her monthly mostly due to the vascular ulcers but now its healed and she's doing better.      Her main complaints and concerns are:   Nonhealing right foot wound. Has seen podiatry. Scheduled to see a wound care doctor.   On ROS, she has poor balance but has not had a fall. We discussed needing excercises. Complicated as she was told by podiatry to avoid pressure. We had a long discussion about chair aerobics and I've given her info on how to get that done.          Recently bought shoe inserts to try to help with he recurrent ulcers.   Did have the US legs done which showed 75% blockage. Has consulted with cardiovascular who plans to monitor and manage conservatively     History of breast cancer: diagnosed in  august had mastectomy and radiation.    Needs to establish with breast surgeon over here. Referral placed     DM2: last hgb a1c on file is up to 7 from 6.5 Her medications include amaryl 1mg. On ACE-I and statin therapy   Due for eye exam will call her doctor and schedule it  Foot exam done today      Afib: on eliquis. Rate controlled.     Home Medications:  Prior to Admission medications    Medication Sig Start Date End Date Taking? Authorizing Provider   ACCU-CHEK SOFT DEV LANCETS Kit  10/2/14   Provider, Historical   alcohol swabs  (ALCOHOL PREP) PadM Apply 1 each topically once daily. 4/24/24   Anisha Reyes MD   ammonium lactate 12 % Crea Apply to feet twice daily. Avoid use between toes. 2/9/21   Ava Atkins DPM   anastrozole (ARIMIDEX) 1 mg Tab TAKE 1 TABLET ONE TIME DAILY  Patient taking differently: Take 1 tablet by mouth Daily. Indications: hormone receptor positive breast cancer 8/9/23   Morgan Caputo MD   apixaban (ELIQUIS) 5 mg Tab TAKE 1 TABLET BY MOUTH TWICE DAILY 6/1/24   Sam Paulino MD   ascorbic acid, vitamin C, (VITAMIN C) 250 MG tablet Take 250 mg by mouth once daily. Indications: supplement    Provider, Historical   atorvastatin (LIPITOR) 40 MG tablet Take 1 tablet (40 mg total) by mouth once daily. 10/16/23 10/15/24  Anisha Reyes MD   blood sugar diagnostic (ACCU-CHEK GUIDE TEST STRIPS) Strp 100 each by Misc.(Non-Drug; Combo Route) route once daily. 4/24/24   Anisha Reyes MD   blood-glucose meter (ACCU-CHEK GUIDE GLUCOSE METER) Misc Check blood glucose one time daily. 4/24/24   Anisha Reyes MD   EScitalopram oxalate (LEXAPRO) 10 MG tablet Take 1 tablet (10 mg total) by mouth once daily. 1/11/24 1/10/25  Anisha Reyes MD   furosemide (LASIX) 40 MG tablet Take 1 tablet (40 mg total) by mouth once daily.  Patient taking differently: Take 40 mg by mouth daily as needed (swelling to BLE). Indications: visible water retention 5/29/24   Anisha Reyes MD   glimepiride (AMARYL) 1 MG tablet TAKE 1 TABLET TWICE DAILY  Patient taking differently: TAKE 1 TABLET BY MOUTHTWICE DAILY 7/28/23   Brayan Penny MD   lancets (ACCU-CHEK SOFTCLIX LANCETS) Misc 100 each by Misc.(Non-Drug; Combo Route) route once daily. Test blood glucose once daily. 4/24/24   Anisha Reyes MD   lisinopriL (PRINIVIL,ZESTRIL) 20 MG tablet TAKE 1 TABLET EVERY DAY  Patient taking differently: TAKE 1 TABLET BY MOUTH EVERY DAY 1/12/23   Brayan Penny MD   metoprolol succinate  (TOPROL-XL) 25 MG 24 hr tablet Take 1 tablet (25 mg total) by mouth once daily. 5/29/24   Anisha Reyes MD   OYSTER SHELL CALCIUM-VIT D3 500 mg-5 mcg (200 unit) per tablet TAKE 1 TABLET EVERY DAY  Patient taking differently: Take 1 tablet by mouth once daily. 10/23/23   Morgan Caputo MD   polyethylene glycol (GLYCOLAX) 17 gram/dose powder Take 17 g by mouth once daily.  Patient taking differently: Take 17 g by mouth daily as needed for Constipation. Mix with 4-8 ounces in liquid of choice  Indications: constipation 10/22/21   Mariya Love MD   pramipexole (MIRAPEX) 0.125 MG tablet TAKE 1 TABLET EVERY DAY  Patient taking differently: TAKE 1 TABLET BY MOUTH EVERY DAY 10/9/23   Brayan Penny MD   urea (CARMOL) 40 % Crea Apply topically 2 (two) times daily.  Patient taking differently: Apply 1 Application topically 2 (two) times daily as needed (hyperkeratosis). Indications: thickening of the outer horny layer of the skin 7/14/21   Ava Atkins, MARJAN   vits A,C,E/lutein/minerals (HEALTHY EYES ORAL) Take 0.5 tablets by mouth Daily. Indications: eye health    Provider, Historical       Review of Systems:  Review of Systems      Advance Care Planning     Date: 07/11/2024    Power of   I initiated the process of voluntary advance care planning today and explained the importance of this process to the patient.  I introduced the concept of advance directives to the patient, as well. Then the patient received detailed information about the importance of designating a Health Care Power of  (HCPOA). She was also instructed to communicate with this person about their wishes for future healthcare, should she become sick and lose decision-making capacity. The patient has previously appointed a HCPOA. After our discussion, the patient has decided to complete a HCPOA and has appointed her family health care agent:  radha  & health care agent number:  on fiel . I encouraged her to  "communicate with this person about their wishes for future healthcare, should she become sick and lose decision-making capacity.      A total of 10 min was spent on advance care planning, goals of care discussion, emotional support, formulating and communicating prognosis and exploring burden/benefit of various approaches of treatment. This discussion occurred on a fully voluntary basis with the verbal consent of the patient and/or family.           PHYSICAL EXAM     BP (!) 122/48 (BP Location: Left arm, Patient Position: Sitting, BP Method: Medium (Manual))   Pulse 61   Temp 97.6 °F (36.4 °C) (Oral)   Ht 5' 1" (1.549 m)   Wt 70.4 kg (155 lb 3.3 oz)   LMP  (LMP Unknown)   SpO2 99%   BMI 29.33 kg/m²     GEN - A+OX4, NAD   HEENT - PERRL, EOMI, OP clear  Neck - No thyromegaly or cervical LAD. No thyroid masses felt.  CV - RRR, no m/r   Chest - CTAB, no wheezing or rhonchi  Abd - S/NT/ND/+BS.   Ext - 2+BDP and radial pulses. No C/C/E.  Skin - No rash.    LABS         ASSESSMENT/PLAN     Caro Powell is a 83 y.o. female with  1. Type 2 diabetes mellitus with diabetic neuropathy, without long-term current use of insulin  Worsening controll.  Added jardiance today  Overview  Eye exam UTD   Foot exam UTD     Orders:  -     Hemoglobin A1C; Future; Expected date: 07/11/2024    2. PAD (peripheral artery disease)  Overview:  On eliquis and statin. Was on plavix but was stopped By cardiology  S/p vascular srugery of the right leg in the past   Symptomatic  Encouraged exercise  US artery with 75% cholesterol buildup per cardiovascular medical management      3. Atrial fibrillation with rapid ventricular response  Overview:  Rate controlled with metoprolol  A/c with eeliquis      4. Essential hypertension  Overview:  BP is very well controlled. A bit elevated  Continue with current medication      5. Wound of left lower extremity, sequela  Follows with podiatry  Will be seeing podiatry    6. Stage 3a chronic kidney " disease  Overview:  Stable renal function.   Encouraged proper hydration and avoid NSAIDs      7. Anemia of chronic disease  Overview:  Ordered iron studies and stool occult       8. Stage 3b chronic kidney disease  Stable  Encoruaged hdyration and avoidance of NSAIDs    9. Mixed hyperlipidemia  Controlled 65    10. Normocytic anemia  Stable     11. Anxiety  Mood is well controlled   Continue current management    12. Invasive ductal carcinoma of right breast  S/p treatment     13. 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  Dispense: 90 tablet; Refill: 0           WORRY SCORE 2    RTC in 3 months, sooner if needed and depending on labs.    Anisha Reyes MD  Board Certified Internist/Geriatrician  [facility] ([place])

## 2024-07-11 NOTE — TELEPHONE ENCOUNTER
----- Message from Regi Fonseca sent at 7/11/2024  1:15 PM CDT -----  Regarding: pt advice  [phone] - call back ; stated that  should prescribed her for antibiotics ; and also she needs the letter mailed about she is okay to wear diabetic socks.      Send to :      [id number] [place] [address] AT SEC OF ACCESS [facility]  [address] [address]Yodrj: [phone] Fax: [phone]: Not open 24 hours       Regi

## 2024-07-11 NOTE — PATIENT INSTRUCTIONS
"For excecrises go to [employer] and type in "senior fitness with be" pick the ones that are in a chair.   "

## 2024-07-11 NOTE — TELEPHONE ENCOUNTER
Patient was informed that we do not prescribe abx when a patient does not have an active infection. Also, patient agreed to have wound care contact us for any concerns about her wearing diabetic shoes.

## 2024-07-16 ENCOUNTER — NURSE TRIAGE (OUTPATIENT)
Dept: ADMINISTRATIVE | Facility: CLINIC | Age: 84
End: 2024-07-16
Payer: MEDICARE

## 2024-07-16 DIAGNOSIS — L98.9 FOOT LESION: Primary | ICD-10-CM

## 2024-07-16 NOTE — TELEPHONE ENCOUNTER
Pt trying to get in touch with 65[facility] to request a possible referral to podiatry. Advised pt that message would be sent to office, she verbalized understanding.    Reason for Disposition   [1] Caller requesting NON-URGENT health information AND [2] PCP's office is the best resource    Protocols used: Information Only Call - No Triage-A-

## 2024-07-17 NOTE — TELEPHONE ENCOUNTER
Patient requets a referral order to see a wound care doctor in [place]. She needs the order faxed to ATTN: Eileen Davies at [phone]

## 2024-07-18 ENCOUNTER — PATIENT MESSAGE (OUTPATIENT)
Dept: PRIMARY CARE CLINIC | Facility: CLINIC | Age: 84
End: 2024-07-18
Payer: MEDICARE

## 2024-07-22 ENCOUNTER — TELEPHONE (OUTPATIENT)
Dept: PODIATRY | Facility: CLINIC | Age: 84
End: 2024-07-22
Payer: MEDICARE

## 2024-07-22 ENCOUNTER — OFFICE VISIT (OUTPATIENT)
Dept: HOME HEALTH SERVICES | Facility: CLINIC | Age: 84
End: 2024-07-22
Payer: MEDICARE

## 2024-07-22 VITALS
WEIGHT: 155 LBS | HEART RATE: 55 BPM | SYSTOLIC BLOOD PRESSURE: 111 MMHG | OXYGEN SATURATION: 97 % | BODY MASS INDEX: 29.27 KG/M2 | DIASTOLIC BLOOD PRESSURE: 51 MMHG | HEIGHT: 61 IN

## 2024-07-22 DIAGNOSIS — Z74.09 OTHER REDUCED MOBILITY: ICD-10-CM

## 2024-07-22 DIAGNOSIS — C50.911 INVASIVE DUCTAL CARCINOMA OF RIGHT BREAST: ICD-10-CM

## 2024-07-22 DIAGNOSIS — L97.512 DIABETIC ULCER OF TOE OF RIGHT FOOT ASSOCIATED WITH TYPE 2 DIABETES MELLITUS, WITH FAT LAYER EXPOSED: ICD-10-CM

## 2024-07-22 DIAGNOSIS — Z17.0 MALIGNANT NEOPLASM OF LOWER-OUTER QUADRANT OF LEFT BREAST OF FEMALE, ESTROGEN RECEPTOR POSITIVE: ICD-10-CM

## 2024-07-22 DIAGNOSIS — D64.9 NORMOCYTIC ANEMIA: ICD-10-CM

## 2024-07-22 DIAGNOSIS — I48.92 ATRIAL FLUTTER, UNSPECIFIED TYPE: ICD-10-CM

## 2024-07-22 DIAGNOSIS — Z00.00 ENCOUNTER FOR MEDICARE ANNUAL WELLNESS EXAM: Primary | ICD-10-CM

## 2024-07-22 DIAGNOSIS — E11.621 DIABETIC ULCER OF TOE OF RIGHT FOOT ASSOCIATED WITH TYPE 2 DIABETES MELLITUS, WITH FAT LAYER EXPOSED: ICD-10-CM

## 2024-07-22 DIAGNOSIS — E78.2 MIXED HYPERLIPIDEMIA: ICD-10-CM

## 2024-07-22 DIAGNOSIS — I70.0 ATHEROSCLEROSIS OF AORTA: ICD-10-CM

## 2024-07-22 DIAGNOSIS — I10 ESSENTIAL HYPERTENSION: Chronic | ICD-10-CM

## 2024-07-22 DIAGNOSIS — M85.89 OSTEOPENIA OF MULTIPLE SITES: ICD-10-CM

## 2024-07-22 DIAGNOSIS — E27.8 ADRENAL NODULE: ICD-10-CM

## 2024-07-22 DIAGNOSIS — N18.31 STAGE 3A CHRONIC KIDNEY DISEASE: ICD-10-CM

## 2024-07-22 DIAGNOSIS — I48.0 PAROXYSMAL ATRIAL FIBRILLATION: ICD-10-CM

## 2024-07-22 DIAGNOSIS — Z99.89 DEPENDENCE ON OTHER ENABLING MACHINES AND DEVICES: ICD-10-CM

## 2024-07-22 DIAGNOSIS — E11.52 TYPE 2 DIABETES MELLITUS WITH DIABETIC PERIPHERAL ANGIOPATHY AND GANGRENE, WITHOUT LONG-TERM CURRENT USE OF INSULIN: ICD-10-CM

## 2024-07-22 DIAGNOSIS — C50.512 MALIGNANT NEOPLASM OF LOWER-OUTER QUADRANT OF LEFT BREAST OF FEMALE, ESTROGEN RECEPTOR POSITIVE: ICD-10-CM

## 2024-07-22 DIAGNOSIS — Z00.00 ENCOUNTER FOR PREVENTIVE HEALTH EXAMINATION: ICD-10-CM

## 2024-07-22 DIAGNOSIS — E11.42 POLYNEUROPATHY DUE TO TYPE 2 DIABETES MELLITUS: ICD-10-CM

## 2024-07-22 DIAGNOSIS — I73.9 PAD (PERIPHERAL ARTERY DISEASE): ICD-10-CM

## 2024-07-22 PROBLEM — E08.621 DIABETIC ULCER OF RIGHT FOOT ASSOCIATED WITH DIABETES MELLITUS DUE TO UNDERLYING CONDITION, WITH FAT LAYER EXPOSED: Status: RESOLVED | Noted: 2019-01-21 | Resolved: 2024-07-22

## 2024-07-22 PROBLEM — S81.802A WOUND OF LEFT LEG: Status: RESOLVED | Noted: 2024-05-03 | Resolved: 2024-07-22

## 2024-07-22 PROBLEM — L97.509 DIABETIC FOOT ULCER: Status: RESOLVED | Noted: 2019-06-09 | Resolved: 2024-07-22

## 2024-07-22 PROBLEM — Z76.89 ENCOUNTER TO ESTABLISH CARE: Status: RESOLVED | Noted: 2023-10-16 | Resolved: 2024-07-22

## 2024-07-22 PROBLEM — E27.9 ADRENAL NODULE: Status: ACTIVE | Noted: 2024-07-22

## 2024-07-22 PROBLEM — Z89.421 ACQUIRED ABSENCE OF OTHER RIGHT TOE(S): Status: ACTIVE | Noted: 2024-07-22

## 2024-07-22 NOTE — TELEPHONE ENCOUNTER
----- Message from Lukas Hartman sent at 7/22/2024  1:47 PM CDT -----  Contact: Self  Type:  Sooner Appointment Request    Caller is requesting a sooner appointment.  Caller declined first available appointment listed below.  Caller will not accept being placed on the waitlist and is requesting a message be sent to doctor.    Name of Caller:  Patient  When is the first available appointment?  Aug 21  Symptoms:  Ulcer on toe  Would the patient rather a call back or a response via [facility]? Call Back  Best Call Back Number:  [phone]  Additional Information:  Patient is requesting to be seen this week for the ulcer on her toe. Her home health nurse told her she needs to see a podiatrist

## 2024-07-23 NOTE — PATIENT INSTRUCTIONS
Counseling and Referral of Other Preventative  (Italic type indicates deductible and co-insurance are waived)    Patient Name: Caro Powell  Today's Date: 7/22/2024    Health Maintenance       Date Due Completion Date    Shingles Vaccine (1 of 2) Never done ---    RSV Vaccine (Age 60+ and Pregnant patients) (1 - 1-dose 60+ series) Never done ---    COVID-19 Vaccine (5 - 2023-24 season) 09/01/2023 4/28/2022    Influenza Vaccine (1) 09/01/2024 10/16/2023    Hemoglobin A1c 10/15/2024 4/15/2024    Eye Exam 11/30/2024 11/30/2023    Diabetes Urine Screening 04/15/2025 4/15/2024    Lipid Panel 04/15/2025 4/15/2024    Override on 1/12/2018: Done    DEXA Scan 11/22/2025 11/22/2022    TETANUS VACCINE 04/24/2029 4/24/2019        No orders of the defined types were placed in this encounter.    The following information is provided to all patients.  This information is to help you find resources for any of the problems found today that may be affecting your health:                  Living healthy guide: www.[facility].[place].gov      Understanding Diabetes: www.diabetes.org      Eating healthy: www.cdc.gov/healthyweight      CDC home safety checklist: www.cdc.gov/steadi/patient.html      Agency on Aging: www.goea.[place].[facility]      Alcoholics anonymous (AA): www.aa.org      Physical Activity: www.annetta.nih.gov/hz1ozjq      Tobacco use: www.quitwithusla.org

## 2024-07-25 ENCOUNTER — TELEPHONE (OUTPATIENT)
Dept: PODIATRY | Facility: CLINIC | Age: 84
End: 2024-07-25
Payer: MEDICARE

## 2024-07-25 NOTE — TELEPHONE ENCOUNTER
Per provider request, r/sed pt to nurse visit but pt refused appt and insisted she be r/sed with Dr Miles. Scheduled pt next week with provider as she also sees HH on Monday and Thursdays

## 2024-07-26 NOTE — PROGRESS NOTES
"  Caro Powell presented for a follow-up Medicare AWV today. The following components were reviewed and updated:    Medical history  Family History  Social history  Allergies and Current Medications  Health Risk Assessment  Health Maintenance  Care Team    **See Completed Assessments for Annual Wellness visit with in the encounter summary    The following assessments were completed:  Depression Screening  Cognitive function Screening  Timed Get Up Test  Whisper Test      Opioid documentation:      Patient does not have a current opioid prescription.          Vitals:    07/22/24 1057   BP: (!) 111/51   Pulse: (!) 55   SpO2: 97%   Weight: 70.3 kg (155 lb)   Height: 5' 1" (1.549 m)     Body mass index is 29.29 kg/m².       Physical Exam  Constitutional:       Appearance: Normal appearance.   HENT:      Head: Normocephalic and atraumatic.      Nose: Nose normal.      Mouth/Throat:      Mouth: Mucous membranes are moist.   Eyes:      Extraocular Movements: Extraocular movements intact.      Pupils: Pupils are equal, round, and reactive to light.   Cardiovascular:      Rate and Rhythm: Normal rate and regular rhythm.   Pulmonary:      Effort: Pulmonary effort is normal.      Breath sounds: Normal breath sounds.   Abdominal:      General: Bowel sounds are normal.      Palpations: Abdomen is soft.   Musculoskeletal:      Cervical back: Normal range of motion and neck supple.   Skin:     General: Skin is warm and dry.   Neurological:      General: No focal deficit present.      Mental Status: She is alert and oriented to person, place, and time.   Psychiatric:         Mood and Affect: Mood normal.         Behavior: Behavior normal.           Diagnoses and health risks identified today and associated recommendations/orders:  1. Encounter for Medicare annual wellness exam  - Ambulatory Referral/Consult to Enhanced Annual Wellness Visit (eAWV)    2. Encounter for preventive health examination  Awv completed      3. " Dependence on other enabling machines and devices  Walker as needed      4. Other reduced mobility  Walker to get around      5. Polyneuropathy due to type 2 diabetes mellitus  Control BS, added jardiance, on amaryl already    6. PAD (peripheral artery disease)  Chronic and stable. Continue current treatment. Follow with PCP.      7. Mixed hyperlipidemia  On statin      8. Essential hypertension  On meds- BP stable, monitoring    9. Stage 3a chronic kidney disease  Chronic and stable. Continue current treatment. Follow with PCP.  Monitoring with labs      10. Adrenal nodule  Following with CT scans      11. Type 2 diabetes mellitus with diabetic peripheral angiopathy and gangrene, without long-term current use of insulin  Managed with meds, starting jardiance      12. Diabetic ulcer of toe of right foot associated with type 2 diabetes mellitus, with fat layer exposed  HHC dressing wound, seen by podiatry, she has consult for wound care      13. Paroxysmal atrial fibrillation  Chronic and stable. Continue current treatment. Follow with PCP.  On eliquis      14. Atrial flutter, unspecified type  Seeing cardiology, on eliquis      15. Normocytic anemia  Monitored with labs      16. Osteopenia of multiple sites  Chronic and stable. Continue current treatment. Follow with PCP.  Vit d replacement      17. Malignant neoplasm of lower-outer quadrant of left breast of female, estrogen receptor positive  On arimidex, per hematology      18. Invasive ductal carcinoma of right breast  On arimidex per hematology    19. Atherosclerosis of aorta  On eliquis and statin        Provided Caro with a 5-10 year written screening schedule and personal prevention plan. Recommendations were developed using the USPSTF age appropriate recommendations. Education, counseling, and referrals were provided as needed.  After Visit Summary printed and given to patient which includes a list of additional screenings\tests needed.    Fu in 1 yr  for awv            Leah Bird, DNP, APRN

## 2024-07-28 ENCOUNTER — OFFICE VISIT (OUTPATIENT)
Dept: URGENT CARE | Facility: CLINIC | Age: 84
End: 2024-07-28
Payer: MEDICARE

## 2024-07-28 VITALS
WEIGHT: 155 LBS | RESPIRATION RATE: 15 BRPM | BODY MASS INDEX: 29.27 KG/M2 | TEMPERATURE: 98 F | DIASTOLIC BLOOD PRESSURE: 52 MMHG | HEIGHT: 61 IN | OXYGEN SATURATION: 95 % | HEART RATE: 58 BPM | SYSTOLIC BLOOD PRESSURE: 125 MMHG

## 2024-07-28 DIAGNOSIS — S90.421A BLISTER OF GREAT TOE OF RIGHT FOOT, INITIAL ENCOUNTER: ICD-10-CM

## 2024-07-28 DIAGNOSIS — L03.031 CELLULITIS OF GREAT TOE, RIGHT: Primary | ICD-10-CM

## 2024-07-28 DIAGNOSIS — L97.519 CHRONIC ULCER OF GREAT TOE OF RIGHT FOOT, UNSPECIFIED ULCER STAGE: ICD-10-CM

## 2024-07-28 DIAGNOSIS — G62.89 OTHER POLYNEUROPATHY: ICD-10-CM

## 2024-07-28 DIAGNOSIS — E11.40 TYPE 2 DIABETES MELLITUS WITH DIABETIC NEUROPATHY, WITHOUT LONG-TERM CURRENT USE OF INSULIN: Chronic | ICD-10-CM

## 2024-07-28 PROCEDURE — 73660 X-RAY EXAM OF TOE(S): CPT | Mod: RT,S$GLB,, | Performed by: STUDENT IN AN ORGANIZED HEALTH CARE EDUCATION/TRAINING PROGRAM

## 2024-07-28 PROCEDURE — 99214 OFFICE O/P EST MOD 30 MIN: CPT | Mod: S$GLB,,, | Performed by: EMERGENCY MEDICINE

## 2024-07-28 RX ORDER — AMOXICILLIN AND CLAVULANATE POTASSIUM 875; 125 MG/1; MG/1
1 TABLET, FILM COATED ORAL EVERY 12 HOURS
Qty: 14 TABLET | Refills: 0 | Status: SHIPPED | OUTPATIENT
Start: 2024-07-28 | End: 2024-08-04

## 2024-07-28 NOTE — PROGRESS NOTES
"Subjective:      Patient ID: Caro Powell is a 83 y.o. female.    Vitals:  height is 5' 1" (1.549 m) and weight is 70.3 kg (155 lb). Her oral temperature is 98.4 °F (36.9 °C). Her blood pressure is 125/52 (abnormal) and her pulse is 58 (abnormal). Her respiration is 15 and oxygen saturation is 95%.     Chief Complaint: Toe Injury    Patient reports right great toe swelling/redness x 2 days - has had an ulcer/wound to medial side of toe x 1 month - sees Podiatry Dr. Santiago and home health has been doing wound care. She has new redness and bleeding from toe x 2 days. Has severe neuropathy so does not feel pain. 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 Is diabetic. Sugar 140s today. No known trauma - thinks she might have irritated toe on a bike pedal but isn't sure. No fever/chills.    Toe Injury  This is a new problem. The current episode started 1 to 4 weeks ago. The problem occurs constantly. The problem has been unchanged. Associated symptoms include numbness. Pertinent negatives include no abdominal pain, anorexia, arthralgias, change in bowel habit, chest pain, chills, congestion, coughing, diaphoresis, fatigue, headaches, joint swelling, myalgias, nausea, neck pain, rash, sore throat, swollen glands, urinary symptoms, vertigo, visual change, vomiting or weakness. The symptoms are aggravated by standing and walking. She has tried nothing for the symptoms. The treatment provided no relief.       Constitution: Negative for chills, sweating and fatigue.   HENT:  Negative for congestion and sore throat.    Neck: Negative for neck pain.   Cardiovascular:  Negative for chest pain.   Respiratory:  Negative for cough.    Gastrointestinal:  Negative for abdominal pain, nausea and vomiting.   Musculoskeletal:  Negative for joint pain, joint swelling and muscle ache.   Skin:  Positive for wound. Negative for rash.   Neurological:  Positive for numbness. Negative for history of vertigo and headaches.      Objective: "     Physical Exam   Musculoskeletal:      Comments: Right great toe: erythema/swelling involving entire toe and extending proximally into midfoot; ulcer to medial toe, there is a hemorrhagic blister to underside of toe with active bleeding, also with chronic hemorrhagic callous to ball of foot; no foul smell; prior amputation to right 2nd toe, 1+ pedal pulses             X-Ray Toe 2 or More Views Right    Result Date: 7/28/2024  EXAMINATION: XR TOE 2 OR MORE VIEWS RIGHT CLINICAL HISTORY: Cellulitis of right toe TECHNIQUE: Three views of the right toes were performed COMPARISON: 10/02/2023 FINDINGS: Postoperative changes of 2nd and 5th digit phalanx resection.  No acute fracture or osseous destruction.  Soft tissue swelling of the great toe.  Prominent scattered degenerative changes.     As above. Electronically signed by: Ronny Edwards Date:    07/28/2024 Time:    15:09     Assessment:     1. Cellulitis of great toe, right    2. Chronic ulcer of great toe of right foot, unspecified ulcer stage    3. Blister of great toe of right foot, initial encounter    4. Other polyneuropathy    5. Type 2 diabetes mellitus with diabetic neuropathy, without long-term current use of insulin        Plan:     Diabetic patient with neuropathy now has right great toe cellulitis with hemorrhagic blister on underside - concern for severe infection/gangrene - no foul smell but feel patient needs to be evaluated in ED today.    Xray WNL - no bone destruction.    Has Podiatrist Dr. Santiago.    Cellulitis of great toe, right  -     X-Ray Toe 2 or More Views Right; Future; Expected date: 07/28/2024  -     amoxicillin-clavulanate 875-125mg (AUGMENTIN) 875-125 mg per tablet; Take 1 tablet by mouth every 12 (twelve) hours. for 7 days  Dispense: 14 tablet; Refill: 0    Chronic ulcer of great toe of right foot, unspecified ulcer stage  -     amoxicillin-clavulanate 875-125mg (AUGMENTIN) 875-125 mg per tablet; Take 1 tablet by mouth every 12  (twelve) hours. for 7 days  Dispense: 14 tablet; Refill: 0    Blister of great toe of right foot, initial encounter  Comments:  hemorrhagic    Other polyneuropathy    Type 2 diabetes mellitus with diabetic neuropathy, without long-term current use of insulin      Patient Instructions   Go to hospital ER for further evaluation of toe infection and concern for gangrene.     Patient is an 84yo female with chronic right great toe ulcer for 1 month now with new redness/swelling/hemorrhagic blister on underside of toe. 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     Podiatry is Dr. Santiago - has outpatient appt with him on 7/31/2024 but I feel this needs to be seen sooner.           Elevate when able.     You must understand that you've received an Urgent Care treatment only and that you may be released before all your medical problems are known or treated. You, the patient, will arrange for follow up care as instructed.    Follow up with your PCP or specialty clinic as directed if not improved or as needed. You can call [phone] to schedule an appointment with the appropriate provider.      You, the patient, will arrange for follow up care as instructed.     If your condition worsens or fails to improve we recommend that you receive another evaluation at the ER immediately or contact your PCP to discuss your concerns.     Patient aware of treatment plan and verbalized understanding.

## 2024-07-28 NOTE — PATIENT INSTRUCTIONS
Go to hospital ER for further evaluation of toe infection and concern for gangrene.     Patient is an 84yo female with chronic right great toe ulcer for 1 month now with new redness/swelling/hemorrhagic blister on underside of toe. 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     Podiatry is Dr. Santiago - has outpatient appt with him on 7/31/2024 but I feel this needs to be seen sooner.           Elevate when able.     You must understand that you've received an Urgent Care treatment only and that you may be released before all your medical problems are known or treated. You, the patient, will arrange for follow up care as instructed.    Follow up with your PCP or specialty clinic as directed if not improved or as needed. You can call [phone] to schedule an appointment with the appropriate provider.      You, the patient, will arrange for follow up care as instructed.     If your condition worsens or fails to improve we recommend that you receive another evaluation at the ER immediately or contact your PCP to discuss your concerns.     Patient aware of treatment plan and verbalized understanding.

## 2024-07-29 ENCOUNTER — TELEPHONE (OUTPATIENT)
Dept: PRIMARY CARE CLINIC | Facility: CLINIC | Age: 84
End: 2024-07-29
Payer: MEDICARE

## 2024-07-29 NOTE — TELEPHONE ENCOUNTER
I called the patient to schedule a hospital follow up. Patient states she will call back after she has seen Dr. Brijesh Miles on 7/31/24.

## 2024-07-31 ENCOUNTER — OFFICE VISIT (OUTPATIENT)
Dept: PODIATRY | Facility: CLINIC | Age: 84
End: 2024-07-31
Payer: MEDICARE

## 2024-07-31 VITALS — BODY MASS INDEX: 28.52 KG/M2 | HEIGHT: 62 IN | WEIGHT: 155 LBS

## 2024-07-31 DIAGNOSIS — E11.621 DIABETIC ULCER OF TOE OF RIGHT FOOT ASSOCIATED WITH TYPE 2 DIABETES MELLITUS, WITH NECROSIS OF BONE: ICD-10-CM

## 2024-07-31 DIAGNOSIS — E11.42 DIABETIC POLYNEUROPATHY ASSOCIATED WITH TYPE 2 DIABETES MELLITUS: ICD-10-CM

## 2024-07-31 DIAGNOSIS — M86.171 ACUTE OSTEOMYELITIS OF TOE OF RIGHT FOOT: Primary | ICD-10-CM

## 2024-07-31 DIAGNOSIS — L97.514 DIABETIC ULCER OF TOE OF RIGHT FOOT ASSOCIATED WITH TYPE 2 DIABETES MELLITUS, WITH NECROSIS OF BONE: ICD-10-CM

## 2024-07-31 PROCEDURE — 99999 PR PBB SHADOW E&M-EST. PATIENT-LVL II: CPT | Mod: PBBFAC,HCNC,, | Performed by: PODIATRIST

## 2024-07-31 PROCEDURE — 11042 DBRDMT SUBQ TIS 1ST 20SQCM/<: CPT | Mod: HCNC,S$GLB,, | Performed by: PODIATRIST

## 2024-07-31 PROCEDURE — 3288F FALL RISK ASSESSMENT DOCD: CPT | Mod: HCNC,CPTII,S$GLB, | Performed by: PODIATRIST

## 2024-07-31 PROCEDURE — 1101F PT FALLS ASSESS-DOCD LE1/YR: CPT | Mod: HCNC,CPTII,S$GLB, | Performed by: PODIATRIST

## 2024-07-31 PROCEDURE — 3072F LOW RISK FOR RETINOPATHY: CPT | Mod: HCNC,CPTII,S$GLB, | Performed by: PODIATRIST

## 2024-07-31 PROCEDURE — 99213 OFFICE O/P EST LOW 20 MIN: CPT | Mod: 25,HCNC,S$GLB, | Performed by: PODIATRIST

## 2024-07-31 PROCEDURE — 87076 CULTURE ANAEROBE IDENT EACH: CPT | Mod: HCNC | Performed by: PODIATRIST

## 2024-07-31 PROCEDURE — 87075 CULTR BACTERIA EXCEPT BLOOD: CPT | Performed by: PODIATRIST

## 2024-07-31 PROCEDURE — 1126F AMNT PAIN NOTED NONE PRSNT: CPT | Mod: HCNC,CPTII,S$GLB, | Performed by: PODIATRIST

## 2024-07-31 PROCEDURE — 87070 CULTURE OTHR SPECIMN AEROBIC: CPT | Performed by: PODIATRIST

## 2024-07-31 PROCEDURE — 1159F MED LIST DOCD IN RCRD: CPT | Mod: HCNC,CPTII,S$GLB, | Performed by: PODIATRIST

## 2024-08-01 ENCOUNTER — TELEPHONE (OUTPATIENT)
Dept: PRIMARY CARE CLINIC | Facility: CLINIC | Age: 84
End: 2024-08-01

## 2024-08-01 NOTE — TELEPHONE ENCOUNTER
Left message for pt requesting a call back at earliest convenience. Patient needs a hospital follow up.

## 2024-08-02 ENCOUNTER — PATIENT MESSAGE (OUTPATIENT)
Dept: FAMILY MEDICINE | Facility: CLINIC | Age: 84
End: 2024-08-02
Payer: MEDICARE

## 2024-08-03 LAB — BACTERIA SPEC AEROBE CULT: NORMAL

## 2024-08-05 ENCOUNTER — PATIENT MESSAGE (OUTPATIENT)
Dept: PODIATRY | Facility: CLINIC | Age: 84
End: 2024-08-05
Payer: MEDICARE

## 2024-08-05 ENCOUNTER — TELEPHONE (OUTPATIENT)
Dept: PODIATRY | Facility: CLINIC | Age: 84
End: 2024-08-05
Payer: MEDICARE

## 2024-08-05 DIAGNOSIS — I10 ESSENTIAL HYPERTENSION: Primary | Chronic | ICD-10-CM

## 2024-08-05 RX ORDER — LISINOPRIL 20 MG/1
20 TABLET ORAL DAILY
Qty: 90 TABLET | Refills: 3 | Status: SHIPPED | OUTPATIENT
Start: 2024-08-05

## 2024-08-07 ENCOUNTER — OFFICE VISIT (OUTPATIENT)
Dept: PODIATRY | Facility: CLINIC | Age: 84
End: 2024-08-07
Payer: MEDICARE

## 2024-08-07 DIAGNOSIS — E11.42 DIABETIC POLYNEUROPATHY ASSOCIATED WITH TYPE 2 DIABETES MELLITUS: ICD-10-CM

## 2024-08-07 DIAGNOSIS — L97.514 DIABETIC ULCER OF TOE OF RIGHT FOOT ASSOCIATED WITH TYPE 2 DIABETES MELLITUS, WITH NECROSIS OF BONE: ICD-10-CM

## 2024-08-07 DIAGNOSIS — M86.171 ACUTE OSTEOMYELITIS OF TOE OF RIGHT FOOT: Primary | ICD-10-CM

## 2024-08-07 DIAGNOSIS — E11.621 DIABETIC ULCER OF TOE OF RIGHT FOOT ASSOCIATED WITH TYPE 2 DIABETES MELLITUS, WITH NECROSIS OF BONE: ICD-10-CM

## 2024-08-07 LAB — BACTERIA SPEC ANAEROBE CULT: ABNORMAL

## 2024-08-07 PROCEDURE — 99499 UNLISTED E&M SERVICE: CPT | Mod: HCNC,S$GLB,, | Performed by: PODIATRIST

## 2024-08-07 PROCEDURE — 11042 DBRDMT SUBQ TIS 1ST 20SQCM/<: CPT | Mod: HCNC,S$GLB,, | Performed by: PODIATRIST

## 2024-08-07 PROCEDURE — 99999 PR PBB SHADOW E&M-EST. PATIENT-LVL I: CPT | Mod: PBBFAC,HCNC,, | Performed by: PODIATRIST

## 2024-08-08 ENCOUNTER — TELEPHONE (OUTPATIENT)
Dept: CARDIOLOGY | Facility: CLINIC | Age: 84
End: 2024-08-08
Payer: MEDICARE

## 2024-08-08 ENCOUNTER — TELEPHONE (OUTPATIENT)
Dept: PODIATRY | Facility: CLINIC | Age: 84
End: 2024-08-08
Payer: MEDICARE

## 2024-08-08 ENCOUNTER — TELEPHONE (OUTPATIENT)
Dept: PRIMARY CARE CLINIC | Facility: CLINIC | Age: 84
End: 2024-08-08
Payer: MEDICARE

## 2024-08-08 DIAGNOSIS — Z01.818 PREOP TESTING: Primary | ICD-10-CM

## 2024-08-08 RX ORDER — METRONIDAZOLE 500 MG/1
500 TABLET ORAL EVERY 12 HOURS
Qty: 20 TABLET | Refills: 0 | Status: SHIPPED | OUTPATIENT
Start: 2024-08-08

## 2024-08-08 NOTE — TELEPHONE ENCOUNTER
----- Message from Ana Maria Bryant sent at 8/8/2024 11:11 AM CDT -----  Contact: pt [phone]  Type: Needs Medical Advice  Who Called:  pt     Pharmacy name and phone #:    [id number] - [place] [address] AT [address] [facility] & [address] [address]  Phone: [phone] Fax: [phone]      Best Call Back Number: [phone]    Additional Information: Pt stated she was supposed to have an antibiotic called in to pharmacy but they have nothing on file for pt

## 2024-08-09 ENCOUNTER — OFFICE VISIT (OUTPATIENT)
Dept: PRIMARY CARE CLINIC | Facility: CLINIC | Age: 84
End: 2024-08-09
Payer: MEDICARE

## 2024-08-09 ENCOUNTER — TELEPHONE (OUTPATIENT)
Dept: PRIMARY CARE CLINIC | Facility: CLINIC | Age: 84
End: 2024-08-09

## 2024-08-09 VITALS
WEIGHT: 150.13 LBS | BODY MASS INDEX: 27.63 KG/M2 | HEART RATE: 52 BPM | HEIGHT: 62 IN | DIASTOLIC BLOOD PRESSURE: 58 MMHG | SYSTOLIC BLOOD PRESSURE: 124 MMHG | TEMPERATURE: 98 F | OXYGEN SATURATION: 97 %

## 2024-08-09 DIAGNOSIS — R94.2 DECREASED FUNCTIONAL RESIDUAL CAPACITY: ICD-10-CM

## 2024-08-09 DIAGNOSIS — E11.40 TYPE 2 DIABETES MELLITUS WITH DIABETIC NEUROPATHY, WITHOUT LONG-TERM CURRENT USE OF INSULIN: ICD-10-CM

## 2024-08-09 DIAGNOSIS — Z01.818 PREOP TESTING: Primary | ICD-10-CM

## 2024-08-09 DIAGNOSIS — L08.9 TOE INFECTION: ICD-10-CM

## 2024-08-09 DIAGNOSIS — I10 ESSENTIAL HYPERTENSION: ICD-10-CM

## 2024-08-09 DIAGNOSIS — Z79.01 CURRENT USE OF LONG TERM ANTICOAGULATION: ICD-10-CM

## 2024-08-09 PROCEDURE — 99999 PR PBB SHADOW E&M-EST. PATIENT-LVL V: CPT | Mod: PBBFAC,HCNC,, | Performed by: INTERNAL MEDICINE

## 2024-08-09 RX ORDER — BUSPIRONE HYDROCHLORIDE 7.5 MG/1
7.5 TABLET ORAL 3 TIMES DAILY
Qty: 30 TABLET | Refills: 0 | Status: SHIPPED | OUTPATIENT
Start: 2024-08-09 | End: 2025-08-09

## 2024-08-10 ENCOUNTER — PATIENT MESSAGE (OUTPATIENT)
Dept: PODIATRY | Facility: CLINIC | Age: 84
End: 2024-08-10
Payer: MEDICARE

## 2024-08-12 ENCOUNTER — TELEPHONE (OUTPATIENT)
Dept: PRIMARY CARE CLINIC | Facility: CLINIC | Age: 84
End: 2024-08-12
Payer: MEDICARE

## 2024-08-12 DIAGNOSIS — I70.229 CRITICAL LOWER LIMB ISCHEMIA: ICD-10-CM

## 2024-08-12 DIAGNOSIS — I73.9 PAD (PERIPHERAL ARTERY DISEASE): ICD-10-CM

## 2024-08-12 RX ORDER — ATORVASTATIN CALCIUM 40 MG/1
40 TABLET, FILM COATED ORAL
Qty: 90 TABLET | Refills: 3 | Status: SHIPPED | OUTPATIENT
Start: 2024-08-12

## 2024-08-13 ENCOUNTER — TELEPHONE (OUTPATIENT)
Dept: PODIATRY | Facility: CLINIC | Age: 84
End: 2024-08-13
Payer: MEDICARE

## 2024-08-13 NOTE — TELEPHONE ENCOUNTER
----- Message from Bart Tillman sent at 8/13/2024 11:09 AM CDT -----  Type:  Needs Medical Advice    Who Called: pt  Symptoms (please be specific): - no symptoms just not feeling well, thank you  How long has patient had these symptoms:    Pharmacy name and phone #:    Would the patient rather a call back or a response via [facility]? call  Best Call Back Number:  [phone]  Additional Information: pt states she is not feeling well and would like a call back from office asap. Thank you

## 2024-08-13 NOTE — TELEPHONE ENCOUNTER
Called and answered her question     The chest x-ray is not my call.  This is routinely performed for surgical clearance.  As far as the surgical date, most likely the 27th.

## 2024-08-13 NOTE — TELEPHONE ENCOUNTER
Patient would like to know when will she have the surgery?  Does she have to have a chest x ray? She does not want to a chest x ray.

## 2024-08-14 ENCOUNTER — OFFICE VISIT (OUTPATIENT)
Dept: PODIATRY | Facility: CLINIC | Age: 84
End: 2024-08-14
Payer: MEDICARE

## 2024-08-14 ENCOUNTER — HOSPITAL ENCOUNTER (OUTPATIENT)
Dept: RADIOLOGY | Facility: HOSPITAL | Age: 84
Discharge: HOME | End: 2024-08-14
Attending: INTERNAL MEDICINE
Payer: MEDICARE

## 2024-08-14 VITALS — BODY MASS INDEX: 27.63 KG/M2 | WEIGHT: 150.13 LBS | HEIGHT: 62 IN

## 2024-08-14 DIAGNOSIS — M86.171 ACUTE OSTEOMYELITIS OF TOE OF RIGHT FOOT: Primary | ICD-10-CM

## 2024-08-14 DIAGNOSIS — E11.621 DIABETIC ULCER OF TOE OF RIGHT FOOT ASSOCIATED WITH TYPE 2 DIABETES MELLITUS, WITH NECROSIS OF BONE: ICD-10-CM

## 2024-08-14 DIAGNOSIS — L97.514 DIABETIC ULCER OF TOE OF RIGHT FOOT ASSOCIATED WITH TYPE 2 DIABETES MELLITUS, WITH NECROSIS OF BONE: ICD-10-CM

## 2024-08-14 DIAGNOSIS — Z01.818 PREOP TESTING: ICD-10-CM

## 2024-08-14 DIAGNOSIS — E11.42 DIABETIC POLYNEUROPATHY ASSOCIATED WITH TYPE 2 DIABETES MELLITUS: ICD-10-CM

## 2024-08-14 PROCEDURE — 99999 PR PBB SHADOW E&M-EST. PATIENT-LVL IV: CPT | Mod: PBBFAC,HCNC,, | Performed by: PODIATRIST

## 2024-08-14 PROCEDURE — 1159F MED LIST DOCD IN RCRD: CPT | Mod: HCNC,CPTII,S$GLB, | Performed by: PODIATRIST

## 2024-08-14 PROCEDURE — 3288F FALL RISK ASSESSMENT DOCD: CPT | Mod: HCNC,CPTII,S$GLB, | Performed by: PODIATRIST

## 2024-08-14 PROCEDURE — 1101F PT FALLS ASSESS-DOCD LE1/YR: CPT | Mod: HCNC,CPTII,S$GLB, | Performed by: PODIATRIST

## 2024-08-14 PROCEDURE — 1125F AMNT PAIN NOTED PAIN PRSNT: CPT | Mod: HCNC,CPTII,S$GLB, | Performed by: PODIATRIST

## 2024-08-14 PROCEDURE — 99212 OFFICE O/P EST SF 10 MIN: CPT | Mod: HCNC,S$GLB,, | Performed by: PODIATRIST

## 2024-08-14 PROCEDURE — 71046 X-RAY EXAM CHEST 2 VIEWS: CPT | Mod: TC,HCNC,PO

## 2024-08-14 PROCEDURE — 71046 X-RAY EXAM CHEST 2 VIEWS: CPT | Mod: 26,HCNC,, | Performed by: RADIOLOGY

## 2024-08-14 PROCEDURE — 3072F LOW RISK FOR RETINOPATHY: CPT | Mod: HCNC,CPTII,S$GLB, | Performed by: PODIATRIST

## 2024-08-14 NOTE — PROGRESS NOTES
Subjective:     Patient ID: Caro Powell is a 83 y.o. female.    Chief Complaint: No chief complaint on file.  Patient presents to clinic for a 1 week wound check of the Rt. Great toe.  Denies pain from the wound with today's exam.  Hhas kept the prior football dressing CDI.  Notes minimizing weight bearing in the DARCO shoe.  Denies experiencing N/V/F/C/D. Denies any additional pedal complaints.      Past Medical History:   Diagnosis Date    Anticoagulant long-term use     Arthritis     Atrial flutter     Calcium nephrolithiasis 2007    ckd 3    Diabetes mellitus, type 2     Diabetic peripheral neuropathy associated with type 2 diabetes mellitus     Difficult intubation     NARROW AIRWAY    Disc displacement, lumbar     Hypertension     Invasive ductal carcinoma of left breast 07/06/2022    Mixed hyperlipidemia     Pulmonary aspiration of gastric contents     Shingles        Past Surgical History:   Procedure Laterality Date    ANGIOGRAPHY OF LOWER EXTREMITY N/A 10/21/2021    Procedure: Angiogram Extremity Unilateral;  Surgeon: Dre Martino MD;  Location: [facility] CATH LAB/EP;  Service: Cardiology;  Laterality: N/A;    ANGIOGRAPHY OF LOWER EXTREMITY Right 11/10/2021    Procedure: Angiogram Extremity Unilateral;  Surgeon: Ben Rooney MD;  Location: [facility] CATH LAB/EP;  Service: Cardiology;  Laterality: Right;    AXILLARY NODE DISSECTION Right 08/15/2022    Procedure: LYMPHADENECTOMY, AXILLARY RIGHT;  Surgeon: RADHA Quinn MD;  Location: [facility] OR;  Service: General;  Laterality: Right;    CATARACT EXTRACTION      COLONOSCOPY  11/28/2011    sigmoid diverticulosis, external hemorrhoids    COLONOSCOPY      DEBRIDEMENT Right 10/20/2021    Procedure: DEBRIDEMENT;  Surgeon: Ava Atkins DPM;  Location: [facility] OR;  Service: Podiatry;  Laterality: Right;    ENDOSCOPIC GASTROCNEMIUS RECESSION Right 09/10/2019    Procedure: RECESSION, GASTROCNEMIUS, ENDOSCOPIC;  Surgeon: Derek Jose DPM;  Location:  [facility] OR;  Service: Podiatry;  Laterality: Right;  Arthrex center line (ron notified)  Video    EXTRACORPOREAL SHOCK WAVE LITHOTRIPSY      FLEXOR TENOTOMY Right 10/20/2021    Procedure: TENOTOMY, FLEXOR 3rd toe;  Surgeon: Ava Atkins DPM;  Location: [facility] OR;  Service: Podiatry;  Laterality: Right;    HYSTERECTOMY      INJECTION FOR SENTINEL NODE IDENTIFICATION Left 08/15/2022    Procedure: INJECTION, FOR SENTINEL NODE IDENTIFICATION;  Surgeon: RADHA Quinn MD;  Location: [facility];  Service: General;  Laterality: Left;    MASTECTOMY Bilateral 08/15/2022    Procedure: MASTECTOMY BILATERAL  / BREAST;  Surgeon: RADHA Quinn MD;  Location: [facility];  Service: General;  Laterality: Bilateral;  5 HOURS / EMAIL SENT 8-11 @ 9:02 LK    SENTINEL LYMPH NODE BIOPSY Left 08/15/2022    Procedure: BIOPSY, LYMPH NODE, SENTINEL LEFT;  Surgeon: RADHA Quinn MD;  Location: [facility];  Service: General;  Laterality: Left;    SHOULDER SURGERY Left     TOE AMPUTATION Right 05/22/2017    5th toe    TOE AMPUTATION Right 10/20/2021    Procedure: AMPUTATION, TOE;  Surgeon: Ava Atkins DPM;  Location: [facility];  Service: Podiatry;  Laterality: Right;    TONSILLECTOMY         Family History   Problem Relation Name Age of Onset    Diabetes Mother      Heart failure Father      No Known Problems Sister      Breast cancer Sister  69        Genetic testing negative    Kidney failure Brother      Breast cancer Maternal Aunt          early 40s    Diabetes Maternal Grandmother      No Known Problems Maternal Grandfather      No Known Problems Paternal Grandmother      No Known Problems Paternal Grandfather      Glaucoma Neg Hx      Cataracts Neg Hx      Macular degeneration Neg Hx      Retinal detachment Neg Hx      Strabismus Neg Hx         Social History     Socioeconomic History    Marital status:    Tobacco Use    Smoking status: Never     Passive exposure: Never    Smokeless tobacco: Never   Substance and Sexual Activity     Alcohol use: No    Drug use: No    Sexual activity: Not Currently     Partners: Male     Social Determinants of Health     Financial Resource Strain: Low Risk  (7/22/2024)    Overall Financial Resource Strain (CARDIA)     Difficulty of Paying Living Expenses: Not very hard   Food Insecurity: No Food Insecurity (7/22/2024)    Hunger Vital Sign     Worried About Running Out of Food in the Last Year: Never true     Ran Out of Food in the Last Year: Never true   Transportation Needs: No Transportation Needs (8/7/2024)    OASIS : Transportation     Lack of Transportation (Medical): No     Lack of Transportation (Non-Medical): No     Patient Unable or Declines to Respond: No   Physical Activity: Insufficiently Active (7/22/2024)    Exercise Vital Sign     Days of Exercise per Week: 4 days     Minutes of Exercise per Session: 30 min   Stress: No Stress Concern Present (7/22/2024)    [employer] - Occupational Stress Questionnaire     Feeling of Stress : Not at all   Housing Stability: Low Risk  (7/22/2024)    Housing Stability Vital Sign     Unable to Pay for Housing in the Last Year: No     Homeless in the Last Year: No       Current Outpatient Medications   Medication Sig Dispense Refill    ACCU-CHEK SOFT DEV LANCETS Kit       alcohol swabs (ALCOHOL PREP) PadM Apply 1 each topically once daily. 140 each 1    ammonium lactate 12 % Crea Apply to feet twice daily. Avoid use between toes. 140 g 5    anastrozole (ARIMIDEX) 1 mg Tab TAKE 1 TABLET ONE TIME DAILY 90 tablet 3    apixaban (ELIQUIS) 5 mg Tab TAKE 1 TABLET BY MOUTH TWICE DAILY 180 tablet 3    ascorbic acid, vitamin C, (VITAMIN C) 250 MG tablet Take 250 mg by mouth once daily. Indications: supplement      atorvastatin (LIPITOR) 40 MG tablet TAKE 1 TABLET ONE TIME DAILY 90 tablet 3    blood sugar diagnostic (ACCU-CHEK GUIDE TEST STRIPS) Strp 100 each by Misc.(Non-Drug; Combo Route) route once daily. 100 strip 1    blood-glucose  meter (ACCU-CHEK GUIDE GLUCOSE METER) Misc Check blood glucose one time daily. 1 each 0    busPIRone (BUSPAR) 7.5 MG tablet Take 1 tablet (7.5 mg total) by mouth 3 (three) times daily. 30 tablet 0    empagliflozin (JARDIANCE) 10 mg tablet Take 1 tablet (10 mg total) by mouth once daily. 90 tablet 0    EScitalopram oxalate (LEXAPRO) 10 MG tablet Take 1 tablet (10 mg total) by mouth once daily. 30 tablet 11    furosemide (LASIX) 40 MG tablet Take 1 tablet (40 mg total) by mouth once daily. (Patient taking differently: Take 40 mg by mouth daily as needed (swelling to BLE).) 90 tablet 1    glimepiride (AMARYL) 1 MG tablet TAKE 1 TABLET TWICE DAILY 180 tablet 3    lancets (ACCU-CHEK SOFTCLIX LANCETS) Misc 100 each by Misc.(Non-Drug; Combo Route) route once daily. Test blood glucose once daily. 100 each 1    lisinopriL (PRINIVIL,ZESTRIL) 20 MG tablet Take 1 tablet (20 mg total) by mouth once daily. 90 tablet 3    metoprolol succinate (TOPROL-XL) 25 MG 24 hr tablet Take 1 tablet (25 mg total) by mouth once daily. 90 tablet 1    metroNIDAZOLE (FLAGYL) 500 MG tablet Take 1 tablet (500 mg total) by mouth every 12 (twelve) hours. 20 tablet 0    OYSTER SHELL CALCIUM-VIT D3 500 mg-5 mcg (200 unit) per tablet TAKE 1 TABLET EVERY DAY (Patient taking differently: Take 1 tablet by mouth once daily.) 90 tablet 10    polyethylene glycol (GLYCOLAX) 17 gram/dose powder Take 17 g by mouth once daily. (Patient taking differently: Take 17 g by mouth daily as needed for Constipation. Mix with 4-8 ounces in liquid of choice) 510 g 0    pramipexole (MIRAPEX) 0.125 MG tablet TAKE 1 TABLET EVERY DAY 90 tablet 3    urea (CARMOL) 40 % Crea Apply topically 2 (two) times daily. (Patient taking differently: Apply 1 Application topically 2 (two) times daily as needed (hyperkeratosis).) 1 Bottle 3    vits A,C,E/lutein/minerals (HEALTHY EYES ORAL) Take 0.5 tablets by mouth Daily. Indications: eye health       No current facility-administered  medications for this visit.       Review of patient's allergies indicates:   Allergen Reactions    Codeine Nausea Only and Other (See Comments)     Patient can Take Tylenol and Hydrocodone        Last encounter in this department: 6/17/2024    Hemoglobin A1C   Date Value Ref Range Status   04/15/2024 7.0 (H) 4.0 - 5.6 % Final     Comment:     ADA Screening Guidelines:  5.7-6.4%  Consistent with prediabetes  >or=6.5%  Consistent with diabetes    High levels of fetal hemoglobin interfere with the HbA1C  assay. Heterozygous hemoglobin variants (HbS, HgC, etc)do  not significantly interfere with this assay.   However, presence of multiple variants may affect accuracy.     10/19/2023 6.5 (H) 4.0 - 5.6 % Final     Comment:     ADA Screening Guidelines:  5.7-6.4%  Consistent with prediabetes  >or=6.5%  Consistent with diabetes    High levels of fetal hemoglobin interfere with the HbA1C  assay. Heterozygous hemoglobin variants (HbS, HgC, etc)do  not significantly interfere with this assay.   However, presence of multiple variants may affect accuracy.     03/27/2023 6.7 (H) 4.0 - 5.6 % Final     Comment:     ADA Screening Guidelines:  5.7-6.4%  Consistent with prediabetes  >or=6.5%  Consistent with diabetes    High levels of fetal hemoglobin interfere with the HbA1C  assay. Heterozygous hemoglobin variants (HbS, HgC, etc)do  not significantly interfere with this assay.   However, presence of multiple variants may affect accuracy.     01/12/2018 8.3 % Final       Review of Systems   Constitutional: Negative for chills and fever.   Cardiovascular:  Negative for claudication and leg swelling.   Skin:  Positive for poor wound healing. Negative for color change and nail changes.   Musculoskeletal:  Negative for joint swelling, muscle cramps and muscle weakness.   Gastrointestinal:  Negative for nausea and vomiting.   Neurological:  Positive for numbness.   Psychiatric/Behavioral:  Negative for altered mental status.          Objective:     Physical Exam  Constitutional:       Appearance: Normal appearance. She is not ill-appearing.   Cardiovascular:      Pulses:           Dorsalis pedis pulses are 2+ on the right side and 2+ on the left side.        Posterior tibial pulses are 2+ on the right side and 2+ on the left side.      Comments: CFT is < 3 seconds bilateral.  Pedal hair growth is decreased bilateral.  Varicosities noted bilateral.  Mild edema noted to the Rt. Great toe.  Toes are warm to touch bilateral.    Musculoskeletal:         General: No tenderness or signs of injury.      Right lower leg: No edema.      Left lower leg: No edema.      Comments: Muscle strength 5/5 in all muscle groups bilateral.  No tenderness nor crepitation with ROM of foot/ankle joints bilateral.  Slight pain with palpation to the wound of the Rt. Plantar hallux.     Skin:     Capillary Refill: Capillary refill takes 2 to 3 seconds.      Findings: Wound present. No bruising, ecchymosis, erythema, signs of injury, laceration, lesion, petechiae or rash.      Comments: Location: Open wound noted to the Rt. Plantar hallux.  Base: Probes down to distal phalanx and comprised of subQ.  Drainage: None  Chasity wound: Devoid of erythema, edema, fluctuance, purulence, and malodor.   Measurement: 3.2 x 5.2 x 0.8cm     Neurological:      Mental Status: She is alert.      Sensory: Sensory deficit present.      Motor: No weakness or atrophy.      Comments: Protective sensation is absent bilateral.  Light touch is absent bilateral.             Assessment:      Encounter Diagnoses   Name Primary?    Acute osteomyelitis of toe of right foot Yes    Diabetic ulcer of toe of right foot associated with type 2 diabetes mellitus, with necrosis of bone     Diabetic polyneuropathy associated with type 2 diabetes mellitus      Plan:     Diagnoses and all orders for this visit:    Acute osteomyelitis of toe of right foot  -     Case Request Operating Room: AMPUTATION,  TOE    Diabetic ulcer of toe of right foot associated with type 2 diabetes mellitus, with necrosis of bone  -     Case Request Operating Room: AMPUTATION, TOE    Diabetic polyneuropathy associated with type 2 diabetes mellitus      I counseled the patient on her conditions, their implications and medical management.    No wound debridement performed with today's exam.    The wound base was covered with iodosorb ointment, and a football dressing was applied.    Advised to keep the dressing CDI x 1 week.    Advised to rest and elevate the affected extremity.    Instructed to minimize weight bearing to facilitate wound healing.    Advised to ambulate only in the postoperative shoe/boot.    Surgical clearance obtained for a Rt. Hallux amputation for source control.    Case request to be placed for 8/27/24 at [facility].    Written informed consent to be obtained the day of the procedure.    Patient to be NPO after midnight the day of the procedure.    RTC in 1 week for continued wound care and one week postop.    Brijesh Miles DPM

## 2024-08-21 ENCOUNTER — OFFICE VISIT (OUTPATIENT)
Dept: PODIATRY | Facility: CLINIC | Age: 84
End: 2024-08-21
Payer: MEDICARE

## 2024-08-21 VITALS — BODY MASS INDEX: 27.63 KG/M2 | HEIGHT: 62 IN | WEIGHT: 150.13 LBS

## 2024-08-21 DIAGNOSIS — E11.42 DIABETIC POLYNEUROPATHY ASSOCIATED WITH TYPE 2 DIABETES MELLITUS: ICD-10-CM

## 2024-08-21 DIAGNOSIS — E11.621 DIABETIC ULCER OF TOE OF RIGHT FOOT ASSOCIATED WITH TYPE 2 DIABETES MELLITUS, WITH NECROSIS OF BONE: ICD-10-CM

## 2024-08-21 DIAGNOSIS — M86.171 ACUTE OSTEOMYELITIS OF TOE OF RIGHT FOOT: Primary | ICD-10-CM

## 2024-08-21 DIAGNOSIS — L97.514 DIABETIC ULCER OF TOE OF RIGHT FOOT ASSOCIATED WITH TYPE 2 DIABETES MELLITUS, WITH NECROSIS OF BONE: ICD-10-CM

## 2024-08-21 PROCEDURE — 99999 PR PBB SHADOW E&M-EST. PATIENT-LVL II: CPT | Mod: PBBFAC,HCNC,, | Performed by: PODIATRIST

## 2024-08-21 NOTE — PROGRESS NOTES
Subjective:     Patient ID: Caro Powell is a 83 y.o. female.    Chief Complaint: Wound Care  Patient presents to clinic for a 1 week wound check of the Rt. Great toe.  Denies pain from the wound with today's exam.  Hhas kept the prior football dressing CDI.  Notes minimizing weight bearing in the DARCO shoe.  Denies experiencing N/V/F/C/D. Denies any additional pedal complaints.      Past Medical History:   Diagnosis Date    Anticoagulant long-term use     Arthritis     Atrial flutter     Calcium nephrolithiasis 2007    ckd 3    Diabetes mellitus, type 2     Diabetic peripheral neuropathy associated with type 2 diabetes mellitus     Difficult intubation     NARROW AIRWAY    Disc displacement, lumbar     Hypertension     Invasive ductal carcinoma of left breast 07/06/2022    Mixed hyperlipidemia     Pulmonary aspiration of gastric contents     Shingles        Past Surgical History:   Procedure Laterality Date    ANGIOGRAPHY OF LOWER EXTREMITY N/A 10/21/2021    Procedure: Angiogram Extremity Unilateral;  Surgeon: Dre Martino MD;  Location: [facility] CATH LAB/EP;  Service: Cardiology;  Laterality: N/A;    ANGIOGRAPHY OF LOWER EXTREMITY Right 11/10/2021    Procedure: Angiogram Extremity Unilateral;  Surgeon: Ben Rooney MD;  Location: [facility] CATH LAB/EP;  Service: Cardiology;  Laterality: Right;    AXILLARY NODE DISSECTION Right 08/15/2022    Procedure: LYMPHADENECTOMY, AXILLARY RIGHT;  Surgeon: RADHA Quinn MD;  Location: [facility] OR;  Service: General;  Laterality: Right;    CATARACT EXTRACTION      COLONOSCOPY  11/28/2011    sigmoid diverticulosis, external hemorrhoids    COLONOSCOPY      DEBRIDEMENT Right 10/20/2021    Procedure: DEBRIDEMENT;  Surgeon: Ava Atkins DPM;  Location: [facility] OR;  Service: Podiatry;  Laterality: Right;    ENDOSCOPIC GASTROCNEMIUS RECESSION Right 09/10/2019    Procedure: RECESSION, GASTROCNEMIUS, ENDOSCOPIC;  Surgeon: Derek Jose DPM;  Location: [facility] OR;  Service:  Podiatry;  Laterality: Right;  Arthrex center line (ron notified)  Video    EXTRACORPOREAL SHOCK WAVE LITHOTRIPSY      FLEXOR TENOTOMY Right 10/20/2021    Procedure: TENOTOMY, FLEXOR 3rd toe;  Surgeon: Ava Atkins DPM;  Location: [facility] OR;  Service: Podiatry;  Laterality: Right;    HYSTERECTOMY      INJECTION FOR SENTINEL NODE IDENTIFICATION Left 08/15/2022    Procedure: INJECTION, FOR SENTINEL NODE IDENTIFICATION;  Surgeon: RADHA Quinn MD;  Location: [facility];  Service: General;  Laterality: Left;    MASTECTOMY Bilateral 08/15/2022    Procedure: MASTECTOMY BILATERAL  / BREAST;  Surgeon: RADHA Quinn MD;  Location: [facility];  Service: General;  Laterality: Bilateral;  5 HOURS / EMAIL SENT 8-11 @ 9:02 LK    SENTINEL LYMPH NODE BIOPSY Left 08/15/2022    Procedure: BIOPSY, LYMPH NODE, SENTINEL LEFT;  Surgeon: RADHA Quinn MD;  Location: [facility];  Service: General;  Laterality: Left;    SHOULDER SURGERY Left     TOE AMPUTATION Right 05/22/2017    5th toe    TOE AMPUTATION Right 10/20/2021    Procedure: AMPUTATION, TOE;  Surgeon: Ava Atkins DPM;  Location: [facility] OR;  Service: Podiatry;  Laterality: Right;    TONSILLECTOMY         Family History   Problem Relation Name Age of Onset    Diabetes Mother      Heart failure Father      No Known Problems Sister      Breast cancer Sister  69        Genetic testing negative    Kidney failure Brother      Breast cancer Maternal Aunt          early 40s    Diabetes Maternal Grandmother      No Known Problems Maternal Grandfather      No Known Problems Paternal Grandmother      No Known Problems Paternal Grandfather      Glaucoma Neg Hx      Cataracts Neg Hx      Macular degeneration Neg Hx      Retinal detachment Neg Hx      Strabismus Neg Hx         Social History     Socioeconomic History    Marital status:    Tobacco Use    Smoking status: Never     Passive exposure: Never    Smokeless tobacco: Never   Substance and Sexual Activity    Alcohol use: No     Drug use: No    Sexual activity: Not Currently     Partners: Male     Social Determinants of Health     Financial Resource Strain: Low Risk  (7/22/2024)    Overall Financial Resource Strain (CARDIA)     Difficulty of Paying Living Expenses: Not very hard   Food Insecurity: No Food Insecurity (7/22/2024)    Hunger Vital Sign     Worried About Running Out of Food in the Last Year: Never true     Ran Out of Food in the Last Year: Never true   Transportation Needs: No Transportation Needs (8/7/2024)    OASIS : Transportation     Lack of Transportation (Medical): No     Lack of Transportation (Non-Medical): No     Patient Unable or Declines to Respond: No   Physical Activity: Insufficiently Active (7/22/2024)    Exercise Vital Sign     Days of Exercise per Week: 4 days     Minutes of Exercise per Session: 30 min   Stress: No Stress Concern Present (7/22/2024)    [employer] - Occupational Stress Questionnaire     Feeling of Stress : Not at all   Housing Stability: Low Risk  (7/22/2024)    Housing Stability Vital Sign     Unable to Pay for Housing in the Last Year: No     Homeless in the Last Year: No       Current Outpatient Medications   Medication Sig Dispense Refill    ACCU-CHEK SOFT DEV LANCETS Kit       alcohol swabs (ALCOHOL PREP) PadM Apply 1 each topically once daily. 140 each 1    ammonium lactate 12 % Crea Apply to feet twice daily. Avoid use between toes. 140 g 5    anastrozole (ARIMIDEX) 1 mg Tab TAKE 1 TABLET ONE TIME DAILY 90 tablet 3    apixaban (ELIQUIS) 5 mg Tab TAKE 1 TABLET BY MOUTH TWICE DAILY 180 tablet 3    ascorbic acid, vitamin C, (VITAMIN C) 250 MG tablet Take 250 mg by mouth once daily. Indications: supplement      atorvastatin (LIPITOR) 40 MG tablet TAKE 1 TABLET ONE TIME DAILY 90 tablet 3    blood sugar diagnostic (ACCU-CHEK GUIDE TEST STRIPS) Strp 100 each by Misc.(Non-Drug; Combo Route) route once daily. 100 strip 1    blood-glucose meter (ACCU-CHEK GUIDE  GLUCOSE METER) Misc Check blood glucose one time daily. 1 each 0    busPIRone (BUSPAR) 7.5 MG tablet Take 1 tablet (7.5 mg total) by mouth 3 (three) times daily. 30 tablet 0    empagliflozin (JARDIANCE) 10 mg tablet Take 1 tablet (10 mg total) by mouth once daily. 90 tablet 0    EScitalopram oxalate (LEXAPRO) 10 MG tablet Take 1 tablet (10 mg total) by mouth once daily. 30 tablet 11    furosemide (LASIX) 40 MG tablet Take 1 tablet (40 mg total) by mouth once daily. (Patient taking differently: Take 40 mg by mouth daily as needed (swelling to BLE).) 90 tablet 1    glimepiride (AMARYL) 1 MG tablet TAKE 1 TABLET TWICE DAILY 180 tablet 3    lancets (ACCU-CHEK SOFTCLIX LANCETS) Misc 100 each by Misc.(Non-Drug; Combo Route) route once daily. Test blood glucose once daily. 100 each 1    lisinopriL (PRINIVIL,ZESTRIL) 20 MG tablet Take 1 tablet (20 mg total) by mouth once daily. 90 tablet 3    metoprolol succinate (TOPROL-XL) 25 MG 24 hr tablet Take 1 tablet (25 mg total) by mouth once daily. 90 tablet 1    metroNIDAZOLE (FLAGYL) 500 MG tablet Take 1 tablet (500 mg total) by mouth every 12 (twelve) hours. 20 tablet 0    OYSTER SHELL CALCIUM-VIT D3 500 mg-5 mcg (200 unit) per tablet TAKE 1 TABLET EVERY DAY (Patient taking differently: Take 1 tablet by mouth once daily.) 90 tablet 10    polyethylene glycol (GLYCOLAX) 17 gram/dose powder Take 17 g by mouth once daily. (Patient taking differently: Take 17 g by mouth daily as needed for Constipation. Mix with 4-8 ounces in liquid of choice) 510 g 0    pramipexole (MIRAPEX) 0.125 MG tablet TAKE 1 TABLET EVERY DAY 90 tablet 3    urea (CARMOL) 40 % Crea Apply topically 2 (two) times daily. (Patient taking differently: Apply 1 Application topically 2 (two) times daily as needed (hyperkeratosis).) 1 Bottle 3    vits A,C,E/lutein/minerals (HEALTHY EYES ORAL) Take 0.5 tablets by mouth Daily. Indications: eye health       No current facility-administered medications for this visit.        Review of patient's allergies indicates:   Allergen Reactions    Codeine Nausea Only and Other (See Comments)     Patient can Take Tylenol and Hydrocodone        Last encounter in this department: 6/17/2024    Hemoglobin A1C   Date Value Ref Range Status   04/15/2024 7.0 (H) 4.0 - 5.6 % Final     Comment:     ADA Screening Guidelines:  5.7-6.4%  Consistent with prediabetes  >or=6.5%  Consistent with diabetes    High levels of fetal hemoglobin interfere with the HbA1C  assay. Heterozygous hemoglobin variants (HbS, HgC, etc)do  not significantly interfere with this assay.   However, presence of multiple variants may affect accuracy.     10/19/2023 6.5 (H) 4.0 - 5.6 % Final     Comment:     ADA Screening Guidelines:  5.7-6.4%  Consistent with prediabetes  >or=6.5%  Consistent with diabetes    High levels of fetal hemoglobin interfere with the HbA1C  assay. Heterozygous hemoglobin variants (HbS, HgC, etc)do  not significantly interfere with this assay.   However, presence of multiple variants may affect accuracy.     03/27/2023 6.7 (H) 4.0 - 5.6 % Final     Comment:     ADA Screening Guidelines:  5.7-6.4%  Consistent with prediabetes  >or=6.5%  Consistent with diabetes    High levels of fetal hemoglobin interfere with the HbA1C  assay. Heterozygous hemoglobin variants (HbS, HgC, etc)do  not significantly interfere with this assay.   However, presence of multiple variants may affect accuracy.     01/12/2018 8.3 % Final       Review of Systems   Constitutional: Negative for chills and fever.   Cardiovascular:  Negative for claudication and leg swelling.   Skin:  Positive for poor wound healing. Negative for color change and nail changes.   Musculoskeletal:  Negative for joint swelling, muscle cramps and muscle weakness.   Gastrointestinal:  Negative for nausea and vomiting.   Neurological:  Positive for numbness.   Psychiatric/Behavioral:  Negative for altered mental status.         Objective:     Physical  Exam  Constitutional:       Appearance: Normal appearance. She is not ill-appearing.   Cardiovascular:      Pulses:           Dorsalis pedis pulses are 2+ on the right side and 2+ on the left side.        Posterior tibial pulses are 2+ on the right side and 2+ on the left side.      Comments: CFT is < 3 seconds bilateral.  Pedal hair growth is decreased bilateral.  Varicosities noted bilateral.  Mild edema noted to the Rt. Great toe.  Toes are warm to touch bilateral.    Musculoskeletal:         General: No tenderness or signs of injury.      Right lower leg: No edema.      Left lower leg: No edema.      Comments: Muscle strength 5/5 in all muscle groups bilateral.  No tenderness nor crepitation with ROM of foot/ankle joints bilateral.  Slight pain with palpation to the wound of the Rt. Plantar hallux.     Skin:     Capillary Refill: Capillary refill takes 2 to 3 seconds.      Findings: Wound present. No bruising, ecchymosis, erythema, signs of injury, laceration, lesion, petechiae or rash.      Comments: Location: Open wound noted to the Rt. Plantar hallux.  Base: Probes down to distal phalanx and comprised of subQ.  Drainage: None  Chasity wound: Devoid of erythema, edema, fluctuance, purulence, and malodor.   Measurement: 3.2 x 5.2 x 0.8cm  Unchanged in comparison to the prior exam.   Neurological:      Mental Status: She is alert.      Sensory: Sensory deficit present.      Motor: No weakness or atrophy.      Comments: Protective sensation is absent bilateral.  Light touch is absent bilateral.             Assessment:      Encounter Diagnoses   Name Primary?    Acute osteomyelitis of toe of right foot Yes    Diabetic ulcer of toe of right foot associated with type 2 diabetes mellitus, with necrosis of bone     Diabetic polyneuropathy associated with type 2 diabetes mellitus      Plan:     Caro was seen today for wound care.    Diagnoses and all orders for this visit:    Acute osteomyelitis of toe of right  foot    Diabetic ulcer of toe of right foot associated with type 2 diabetes mellitus, with necrosis of bone    Diabetic polyneuropathy associated with type 2 diabetes mellitus      I counseled the patient on her conditions, their implications and medical management.    No overt change in wound appearance with today's exam.  Remains stable for the time being.    The wound base was covered with iodosorb ointment, and a football dressing was applied.    Advised to keep the dressing CDI x 1 week.    Advised to rest and elevate the affected extremity.    Instructed to minimize weight bearing to facilitate wound healing.    Advised to ambulate only in the postoperative shoe/boot.    Surgical clearance obtained for a Rt. Hallux amputation for source control.    Case request to be placed for 8/27/24 at [facility].    Written informed consent to be obtained the day of the procedure.    Patient to be NPO after midnight the day of the procedure.    RTC in 1 week for continued wound care and one week postop.    Brijesh Miles DPM

## 2024-09-04 ENCOUNTER — OFFICE VISIT (OUTPATIENT)
Dept: PODIATRY | Facility: CLINIC | Age: 84
End: 2024-09-04
Payer: MEDICARE

## 2024-09-04 VITALS — BODY MASS INDEX: 27.59 KG/M2 | WEIGHT: 149.94 LBS | HEIGHT: 62 IN

## 2024-09-04 DIAGNOSIS — T81.31XA DISRUPTION OF EXTERNAL SURGICAL WOUND, INITIAL ENCOUNTER: ICD-10-CM

## 2024-09-04 DIAGNOSIS — Z98.890 POSTOPERATIVE STATE: Primary | ICD-10-CM

## 2024-09-04 DIAGNOSIS — E11.42 DIABETIC POLYNEUROPATHY ASSOCIATED WITH TYPE 2 DIABETES MELLITUS: ICD-10-CM

## 2024-09-04 PROCEDURE — 3288F FALL RISK ASSESSMENT DOCD: CPT | Mod: HCNC,CPTII,S$GLB, | Performed by: PODIATRIST

## 2024-09-04 PROCEDURE — 1126F AMNT PAIN NOTED NONE PRSNT: CPT | Mod: HCNC,CPTII,S$GLB, | Performed by: PODIATRIST

## 2024-09-04 PROCEDURE — 1159F MED LIST DOCD IN RCRD: CPT | Mod: HCNC,CPTII,S$GLB, | Performed by: PODIATRIST

## 2024-09-04 PROCEDURE — 99024 POSTOP FOLLOW-UP VISIT: CPT | Mod: HCNC,S$GLB,, | Performed by: PODIATRIST

## 2024-09-04 PROCEDURE — 1101F PT FALLS ASSESS-DOCD LE1/YR: CPT | Mod: HCNC,CPTII,S$GLB, | Performed by: PODIATRIST

## 2024-09-04 PROCEDURE — 99999 PR PBB SHADOW E&M-EST. PATIENT-LVL II: CPT | Mod: PBBFAC,HCNC,, | Performed by: PODIATRIST

## 2024-09-04 PROCEDURE — 3072F LOW RISK FOR RETINOPATHY: CPT | Mod: HCNC,CPTII,S$GLB, | Performed by: PODIATRIST

## 2024-09-04 NOTE — PROGRESS NOTES
Subjective:     Patient ID: Caro Powell is a 83 y.o. female.    Chief Complaint: Post-op Evaluation  Patient presents to clinic 1 week S/P amputation of the Rt. Hallux.  She denies pain from the surgical site with today's exam.  Has kept the prior surgical dressing CDI.  Notes minimizing weight bearing in the DARCO shoe and has been elevating since the procedure.  Denies experiencing N/V/F/C/D.  Denies SOB, chest pain, and calf/thigh pain.  Denies any additional pedal complaints.      Past Medical History:   Diagnosis Date    Anticoagulant long-term use     Arthritis     Atrial flutter     Calcium nephrolithiasis 2007    ckd 3    Diabetes mellitus, type 2     Diabetic peripheral neuropathy associated with type 2 diabetes mellitus     Difficult intubation     NARROW AIRWAY    Disc displacement, lumbar     Hypertension     Invasive ductal carcinoma of left breast 07/06/2022    Mixed hyperlipidemia     Pulmonary aspiration of gastric contents     Shingles        Past Surgical History:   Procedure Laterality Date    ANGIOGRAPHY OF LOWER EXTREMITY N/A 10/21/2021    Procedure: Angiogram Extremity Unilateral;  Surgeon: Dre Martino MD;  Location: [facility] CATH LAB/EP;  Service: Cardiology;  Laterality: N/A;    ANGIOGRAPHY OF LOWER EXTREMITY Right 11/10/2021    Procedure: Angiogram Extremity Unilateral;  Surgeon: Ben Rooney MD;  Location: [facility] CATH LAB/EP;  Service: Cardiology;  Laterality: Right;    AXILLARY NODE DISSECTION Right 08/15/2022    Procedure: LYMPHADENECTOMY, AXILLARY RIGHT;  Surgeon: RADHA Quinn MD;  [place] [facility] [place] [place] [facility] OR;  Service: General;  Laterality: Right;    CATARACT EXTRACTION      COLONOSCOPY  11/28/2011    sigmoid diverticulosis, external hemorrhoids    COLONOSCOPY      DEBRIDEMENT Right 10/20/2021    Procedure: DEBRIDEMENT;  Surgeon: Ava Atkins DPM;  Location: [facility] OR;  Service: Podiatry;  Laterality: Right;    ENDOSCOPIC GASTROCNEMIUS RECESSION Right 09/10/2019    Procedure:  RECESSION, GASTROCNEMIUS, ENDOSCOPIC;  Surgeon: Derek Jose DPM;  Location: [facility] OR;  Service: Podiatry;  Laterality: Right;  Arthrex center line (ron notified)  Video    EXTRACORPOREAL SHOCK WAVE LITHOTRIPSY      FLEXOR TENOTOMY Right 10/20/2021    Procedure: TENOTOMY, FLEXOR 3rd toe;  Surgeon: Ava Atkins DPM;  Location: [facility] OR;  Service: Podiatry;  Laterality: Right;    HYSTERECTOMY      INJECTION FOR SENTINEL NODE IDENTIFICATION Left 08/15/2022    Procedure: INJECTION, FOR SENTINEL NODE IDENTIFICATION;  Surgeon: RADHA Quinn MD;  Location: [facility];  Service: General;  Laterality: Left;    MASTECTOMY Bilateral 08/15/2022    Procedure: MASTECTOMY BILATERAL  / BREAST;  Surgeon: RADHA Quinn MD;  Location: [facility];  Service: General;  Laterality: Bilateral;  5 HOURS / EMAIL SENT 8-11 @ 9:02 LK    SENTINEL LYMPH NODE BIOPSY Left 08/15/2022    Procedure: BIOPSY, LYMPH NODE, SENTINEL LEFT;  Surgeon: RADHA Quinn MD;  Location: [facility];  Service: General;  Laterality: Left;    SHOULDER SURGERY Left     TOE AMPUTATION Right 05/22/2017    5th toe    TOE AMPUTATION Right 10/20/2021    Procedure: AMPUTATION, TOE;  Surgeon: Ava Atkins DPM;  Location: [facility] OR;  Service: Podiatry;  Laterality: Right;    TOE AMPUTATION Right 8/27/2024    Procedure: AMPUTATION, TOE, Great toe;  Surgeon: Brijesh Miles DPM;  Location: [facility] OR;  Service: Podiatry;  Laterality: Right;    TONSILLECTOMY         Family History   Problem Relation Name Age of Onset    Diabetes Mother      Heart failure Father      No Known Problems Sister      Breast cancer Sister  69        Genetic testing negative    Kidney failure Brother      Breast cancer Maternal Aunt          early 40s    Diabetes Maternal Grandmother      No Known Problems Maternal Grandfather      No Known Problems Paternal Grandmother      No Known Problems Paternal Grandfather      Glaucoma Neg Hx      Cataracts Neg Hx      Macular degeneration Neg Hx       Retinal detachment Neg Hx      Strabismus Neg Hx         Social History     Socioeconomic History    Marital status:    Tobacco Use    Smoking status: Never     Passive exposure: Never    Smokeless tobacco: Never   Substance and Sexual Activity    Alcohol use: No    Drug use: No    Sexual activity: Not Currently     Partners: Male     Social Determinants of Health     Financial Resource Strain: Low Risk  (7/22/2024)    Overall Financial Resource Strain (CARDIA)     Difficulty of Paying Living Expenses: Not very hard   Food Insecurity: No Food Insecurity (7/22/2024)    Hunger Vital Sign     Worried About Running Out of Food in the Last Year: Never true     Ran Out of Food in the Last Year: Never true   Transportation Needs: No Transportation Needs (8/7/2024)    OASIS : Transportation     Lack of Transportation (Medical): No     Lack of Transportation (Non-Medical): No     Patient Unable or Declines to Respond: No   Physical Activity: Insufficiently Active (7/22/2024)    Exercise Vital Sign     Days of Exercise per Week: 4 days     Minutes of Exercise per Session: 30 min   Stress: No Stress Concern Present (7/22/2024)    [employer] - Occupational Stress Questionnaire     Feeling of Stress : Not at all   Housing Stability: Low Risk  (7/22/2024)    Housing Stability Vital Sign     Unable to Pay for Housing in the Last Year: No     Homeless in the Last Year: No       Current Outpatient Medications   Medication Sig Dispense Refill    ACCU-CHEK SOFT DEV LANCETS Kit       alcohol swabs (ALCOHOL PREP) PadM Apply 1 each topically once daily. 140 each 1    ammonium lactate 12 % Crea Apply to feet twice daily. Avoid use between toes. 140 g 5    anastrozole (ARIMIDEX) 1 mg Tab TAKE 1 TABLET ONE TIME DAILY 90 tablet 3    apixaban (ELIQUIS) 5 mg Tab TAKE 1 TABLET BY MOUTH TWICE DAILY 180 tablet 3    ascorbic acid, vitamin C, (VITAMIN C) 250 MG tablet Take 250 mg by mouth once daily.  Indications: supplement      atorvastatin (LIPITOR) 40 MG tablet TAKE 1 TABLET ONE TIME DAILY 90 tablet 3    blood sugar diagnostic (ACCU-CHEK GUIDE TEST STRIPS) Strp 100 each by Misc.(Non-Drug; Combo Route) route once daily. 100 strip 1    blood-glucose meter (ACCU-CHEK GUIDE GLUCOSE METER) Misc Check blood glucose one time daily. 1 each 0    busPIRone (BUSPAR) 7.5 MG tablet Take 1 tablet (7.5 mg total) by mouth 3 (three) times daily. (Patient not taking: Reported on 8/27/2024) 30 tablet 0    empagliflozin (JARDIANCE) 10 mg tablet Take 1 tablet (10 mg total) by mouth once daily. 90 tablet 0    EScitalopram oxalate (LEXAPRO) 10 MG tablet Take 1 tablet (10 mg total) by mouth once daily. (Patient not taking: Reported on 8/27/2024) 30 tablet 11    furosemide (LASIX) 40 MG tablet Take 1 tablet (40 mg total) by mouth once daily. (Patient taking differently: Take 40 mg by mouth daily as needed (swelling to BLE).) 90 tablet 1    glimepiride (AMARYL) 1 MG tablet TAKE 1 TABLET TWICE DAILY 180 tablet 3    HYDROcodone-acetaminophen (NORCO) 5-325 mg per tablet Take 1 tablet by mouth every 6 (six) hours as needed for Pain. 28 tablet 0    lancets (ACCU-CHEK SOFTCLIX LANCETS) Misc 100 each by Misc.(Non-Drug; Combo Route) route once daily. Test blood glucose once daily. 100 each 1    lisinopriL (PRINIVIL,ZESTRIL) 20 MG tablet Take 1 tablet (20 mg total) by mouth once daily. 90 tablet 3    metoprolol succinate (TOPROL-XL) 25 MG 24 hr tablet Take 1 tablet (25 mg total) by mouth once daily. 90 tablet 1    metroNIDAZOLE (FLAGYL) 500 MG tablet Take 1 tablet (500 mg total) by mouth every 12 (twelve) hours. 20 tablet 0    OYSTER SHELL CALCIUM-VIT D3 500 mg-5 mcg (200 unit) per tablet TAKE 1 TABLET EVERY DAY (Patient taking differently: Take 1 tablet by mouth once daily.) 90 tablet 10    polyethylene glycol (GLYCOLAX) 17 gram/dose powder Take 17 g by mouth once daily. (Patient taking differently: Take 17 g by mouth daily as needed for  Constipation. Mix with 4-8 ounces in liquid of choice) 510 g 0    pramipexole (MIRAPEX) 0.125 MG tablet TAKE 1 TABLET EVERY DAY (Patient taking differently: Take 0.125 mg by mouth every evening.) 90 tablet 3    urea (CARMOL) 40 % Crea Apply topically 2 (two) times daily. (Patient taking differently: Apply 1 Application topically 2 (two) times daily as needed (hyperkeratosis).) 1 Bottle 3    vits A,C,E/lutein/minerals (HEALTHY EYES ORAL) Take 0.5 tablets by mouth Daily. Indications: eye health       No current facility-administered medications for this visit.       Review of patient's allergies indicates:   Allergen Reactions    Codeine Nausea Only and Other (See Comments)     Patient can Take Tylenol and Hydrocodone        Last encounter in this department: 6/17/2024    Hemoglobin A1C   Date Value Ref Range Status   08/27/2024 6.8 (H) 0.0 - 5.6 % Final     Comment:     Reference Interval:  5.0 - 5.6 Normal   5.7 - 6.4 High Risk   > 6.5 Diabetic      Hgb A1c results are standardized based on the (NGSP) National   Glycohemoglobin Standardization Program.      Hemoglobin A1C levels are related to mean serum/plasma glucose   during the preceding 2-3 months.        04/15/2024 7.0 (H) 4.0 - 5.6 % Final     Comment:     ADA Screening Guidelines:  5.7-6.4%  Consistent with prediabetes  >or=6.5%  Consistent with diabetes    High levels of fetal hemoglobin interfere with the HbA1C  assay. Heterozygous hemoglobin variants (HbS, HgC, etc)do  not significantly interfere with this assay.   However, presence of multiple variants may affect accuracy.     10/19/2023 6.5 (H) 4.0 - 5.6 % Final     Comment:     ADA Screening Guidelines:  5.7-6.4%  Consistent with prediabetes  >or=6.5%  Consistent with diabetes    High levels of fetal hemoglobin interfere with the HbA1C  assay. Heterozygous hemoglobin variants (HbS, HgC, etc)do  not significantly interfere with this assay.   However, presence of multiple variants may affect accuracy.      01/12/2018 8.3 % Final       Review of Systems   Constitutional: Negative for chills and fever.   Cardiovascular:  Negative for claudication and leg swelling.   Skin:  Positive for poor wound healing. Negative for color change and nail changes.   Musculoskeletal:  Negative for joint swelling, muscle cramps and muscle weakness.   Gastrointestinal:  Negative for nausea and vomiting.   Neurological:  Positive for numbness.   Psychiatric/Behavioral:  Negative for altered mental status.         Objective:     Physical Exam  Constitutional:       Appearance: Normal appearance. She is not ill-appearing.   Cardiovascular:      Pulses:           Dorsalis pedis pulses are 2+ on the right side and 2+ on the left side.        Posterior tibial pulses are 2+ on the right side and 2+ on the left side.      Comments: CFT is < 3 seconds bilateral.  Pedal hair growth is decreased bilateral.  Varicosities noted bilateral.  Moderate edema noted to the Rt. Hallux amputation site.  Toes are warm to touch bilateral.    Musculoskeletal:         General: No tenderness or signs of injury.      Right lower leg: No edema.      Left lower leg: No edema.      Comments: Muscle strength 5/5 in all muscle groups bilateral.  No tenderness nor crepitation with ROM of foot/ankle joints bilateral.  (-) for pain with palpation of the Rt. Hallux amputation site.     Skin:     Capillary Refill: Capillary refill takes 2 to 3 seconds.      Findings: Wound present. No bruising, ecchymosis, erythema, signs of injury, laceration, lesion, petechiae or rash.      Comments: Incision for the Rt. Hallux amputation is well approximated both proximally and distally with a small area of dehiscence centrally.  Wound base is down to dermis and comprised of granulation tissue.  Chasity wound is devoid of erythema, fluctuance, purulence, and malodor.  Measures 1 x 0.3 x 0.1cm   Neurological:      Mental Status: She is alert.      Sensory: Sensory deficit present.       Motor: No weakness or atrophy.      Comments: Protective sensation is absent bilateral.  Light touch is absent bilateral.             Assessment:      Encounter Diagnoses   Name Primary?    Postoperative state Yes    Disruption of external surgical wound, initial encounter     Diabetic polyneuropathy associated with type 2 diabetes mellitus      Plan:     Caro was seen today for post-op evaluation.    Diagnoses and all orders for this visit:    Postoperative state    Disruption of external surgical wound, initial encounter    Diabetic polyneuropathy associated with type 2 diabetes mellitus      I counseled the patient on her conditions, their implications and medical management.    Satisfactory postoperative results noted both distally and proximal, however, there is an area of centralized dehiscence.  No obvious sign of postop infection noted.     The incision site was painted with betadine, silver alginate was applied to the site of dehiscence, and a football dressing was applied.    Advised to keep the dressing CDI x 1 week.    Advised to rest and elevate the affected extremity.    Instructed to minimize weight bearing to facilitate wound healing.    Advised to transfer only in the postoperative shoe/boot.    RTC in 1 week for postop visit #2.    Brijesh Miles DPM

## 2024-09-11 DIAGNOSIS — C50.912 INVASIVE DUCTAL CARCINOMA OF LEFT BREAST: ICD-10-CM

## 2024-09-11 DIAGNOSIS — C50.911 INVASIVE DUCTAL CARCINOMA OF RIGHT BREAST: ICD-10-CM

## 2024-09-11 RX ORDER — ANASTROZOLE 1 MG/1
1 TABLET ORAL
Qty: 90 TABLET | Refills: 3 | Status: SHIPPED | OUTPATIENT
Start: 2024-09-11

## 2024-09-12 ENCOUNTER — TELEPHONE (OUTPATIENT)
Dept: PODIATRY | Facility: CLINIC | Age: 84
End: 2024-09-12
Payer: MEDICARE

## 2024-09-12 NOTE — TELEPHONE ENCOUNTER
----- Message from Michelle Elam sent at 9/12/2024  2:51 PM CDT -----  Regarding: appointment  Contact: patient  Type:  Sooner Appointment Request    Caller is requesting a sooner appointment.  Caller declined first available appointment listed below.  Caller will not accept being placed on the waitlist and is requesting a message be sent to doctor.    Name of Caller:  patient  When is the first available appointment?    Symptoms:  surgery follow up  Would the patient rather a call back or a response via [facility]? call  Best Call Back Number: [phone]    Additional Information:  Please call patient to advise.  Thanks!

## 2024-09-18 ENCOUNTER — OFFICE VISIT (OUTPATIENT)
Dept: PODIATRY | Facility: CLINIC | Age: 84
End: 2024-09-18
Payer: MEDICARE

## 2024-09-18 VITALS — BODY MASS INDEX: 27.59 KG/M2 | HEIGHT: 62 IN | WEIGHT: 149.94 LBS

## 2024-09-18 DIAGNOSIS — E11.42 DIABETIC POLYNEUROPATHY ASSOCIATED WITH TYPE 2 DIABETES MELLITUS: ICD-10-CM

## 2024-09-18 DIAGNOSIS — E11.40 TYPE 2 DIABETES MELLITUS WITH DIABETIC NEUROPATHY, WITHOUT LONG-TERM CURRENT USE OF INSULIN: Chronic | ICD-10-CM

## 2024-09-18 DIAGNOSIS — Z98.890 POSTOPERATIVE STATE: Primary | ICD-10-CM

## 2024-09-18 PROCEDURE — 99999 PR PBB SHADOW E&M-EST. PATIENT-LVL II: CPT | Mod: PBBFAC,HCNC,, | Performed by: PODIATRIST

## 2024-09-18 RX ORDER — LANCETS
EACH MISCELLANEOUS
Qty: 100 EACH | Refills: 3 | Status: SHIPPED | OUTPATIENT
Start: 2024-09-18

## 2024-09-18 RX ORDER — BLOOD SUGAR DIAGNOSTIC
STRIP MISCELLANEOUS
Qty: 100 STRIP | Refills: 3 | Status: SHIPPED | OUTPATIENT
Start: 2024-09-18

## 2024-09-18 NOTE — PROGRESS NOTES
Subjective:     Patient ID: Caro Powell is a 84 y.o. female.    Chief Complaint: Post-op Evaluation  Patient presents to clinic 3 weeks S/P amputation of the Rt. Hallux.  She denies pain from the surgical site with today's exam.  Has kept the prior surgical dressing CDI.  Notes minimizing weight bearing in the DARCO shoe and has been elevating since the procedure.  Denies experiencing N/V/F/C/D.  Denies any additional pedal complaints.      Past Medical History:   Diagnosis Date    Anticoagulant long-term use     Arthritis     Atrial flutter     Calcium nephrolithiasis 2007    ckd 3    Diabetes mellitus, type 2     Diabetic peripheral neuropathy associated with type 2 diabetes mellitus     Difficult intubation     NARROW AIRWAY    Disc displacement, lumbar     Hypertension     Invasive ductal carcinoma of left breast 07/06/2022    Mixed hyperlipidemia     Pulmonary aspiration of gastric contents     Shingles        Past Surgical History:   Procedure Laterality Date    ANGIOGRAPHY OF LOWER EXTREMITY N/A 10/21/2021    Procedure: Angiogram Extremity Unilateral;  Surgeon: Dre Martino MD;  Location: [facility] CATH LAB/EP;  Service: Cardiology;  Laterality: N/A;    ANGIOGRAPHY OF LOWER EXTREMITY Right 11/10/2021    Procedure: Angiogram Extremity Unilateral;  Surgeon: Ben Rooney MD;  Location: [facility] CATH LAB/EP;  Service: Cardiology;  Laterality: Right;    AXILLARY NODE DISSECTION Right 08/15/2022    Procedure: LYMPHADENECTOMY, AXILLARY RIGHT;  Surgeon: RADHA Quinn MD;  Location: [facility] OR;  Service: General;  Laterality: Right;    CATARACT EXTRACTION      COLONOSCOPY  11/28/2011    sigmoid diverticulosis, external hemorrhoids    COLONOSCOPY      DEBRIDEMENT Right 10/20/2021    Procedure: DEBRIDEMENT;  Surgeon: Ava Atkins DPM;  Location: [facility] OR;  Service: Podiatry;  Laterality: Right;    ENDOSCOPIC GASTROCNEMIUS RECESSION Right 09/10/2019    Procedure: RECESSION, GASTROCNEMIUS, ENDOSCOPIC;   Surgeon: Derek Jose DPM;  Location: [facility] OR;  Service: Podiatry;  Laterality: Right;  Arthrex center line (ron notified)  Video    EXTRACORPOREAL SHOCK WAVE LITHOTRIPSY      FLEXOR TENOTOMY Right 10/20/2021    Procedure: TENOTOMY, FLEXOR 3rd toe;  Surgeon: Ava Atkins DPM;  Location: [facility] OR;  Service: Podiatry;  Laterality: Right;    HYSTERECTOMY      INJECTION FOR SENTINEL NODE IDENTIFICATION Left 08/15/2022    Procedure: INJECTION, FOR SENTINEL NODE IDENTIFICATION;  Surgeon: RADHA Quinn MD;  Location: [facility];  Service: General;  Laterality: Left;    MASTECTOMY Bilateral 08/15/2022    Procedure: MASTECTOMY BILATERAL  / BREAST;  Surgeon: RADHA Quinn MD;  Location: [facility];  Service: General;  Laterality: Bilateral;  5 HOURS / EMAIL SENT 8-11 @ 9:02 LK    SENTINEL LYMPH NODE BIOPSY Left 08/15/2022    Procedure: BIOPSY, LYMPH NODE, SENTINEL LEFT;  Surgeon: RADHA Quinn MD;  Location: [facility];  Service: General;  Laterality: Left;    SHOULDER SURGERY Left     TOE AMPUTATION Right 05/22/2017    5th toe    TOE AMPUTATION Right 10/20/2021    Procedure: AMPUTATION, TOE;  Surgeon: Ava Atkins DPM;  Location: [facility] OR;  Service: Podiatry;  Laterality: Right;    TOE AMPUTATION Right 8/27/2024    Procedure: AMPUTATION, TOE, Great toe;  Surgeon: Brijesh Miles DPM;  Location: [facility] OR;  Service: Podiatry;  Laterality: Right;    TONSILLECTOMY         Family History   Problem Relation Name Age of Onset    Diabetes Mother      Heart failure Father      No Known Problems Sister      Breast cancer Sister  69        Genetic testing negative    Kidney failure Brother      Breast cancer Maternal Aunt          early 40s    Diabetes Maternal Grandmother      No Known Problems Maternal Grandfather      No Known Problems Paternal Grandmother      No Known Problems Paternal Grandfather      Glaucoma Neg Hx      Cataracts Neg Hx      Macular degeneration Neg Hx      Retinal detachment Neg Hx       Strabismus Neg Hx         Social History     Socioeconomic History    Marital status:    Tobacco Use    Smoking status: Never     Passive exposure: Never    Smokeless tobacco: Never   Substance and Sexual Activity    Alcohol use: No    Drug use: No    Sexual activity: Not Currently     Partners: Male     Social Determinants of Health     Financial Resource Strain: Low Risk  (7/22/2024)    Overall Financial Resource Strain (CARDIA)     Difficulty of Paying Living Expenses: Not very hard   Food Insecurity: No Food Insecurity (7/22/2024)    Hunger Vital Sign     Worried About Running Out of Food in the Last Year: Never true     Ran Out of Food in the Last Year: Never true   Transportation Needs: No Transportation Needs (8/7/2024)    OASIS : Transportation     Lack of Transportation (Medical): No     Lack of Transportation (Non-Medical): No     Patient Unable or Declines to Respond: No   Physical Activity: Insufficiently Active (7/22/2024)    Exercise Vital Sign     Days of Exercise per Week: 4 days     Minutes of Exercise per Session: 30 min   Stress: No Stress Concern Present (7/22/2024)    [employer] - Occupational Stress Questionnaire     Feeling of Stress : Not at all   Housing Stability: Low Risk  (7/22/2024)    Housing Stability Vital Sign     Unable to Pay for Housing in the Last Year: No     Homeless in the Last Year: No       Current Outpatient Medications   Medication Sig Dispense Refill    ACCU-CHEK GUIDE TEST STRIPS Strp TEST BLOOD SUGAR ONE TIME DAILY 100 strip 3    ACCU-CHEK SOFT DEV LANCETS Kit       ACCU-CHEK SOFTCLIX LANCETS Misc TEST BLOOD SUGAR ONE TIME DAILY 100 each 3    alcohol swabs (ALCOHOL PREP) PadM Apply 1 each topically once daily. 140 each 1    ammonium lactate 12 % Crea Apply to feet twice daily. Avoid use between toes. 140 g 5    anastrozole (ARIMIDEX) 1 mg Tab TAKE 1 TABLET EVERY DAY 90 tablet 3    apixaban (ELIQUIS) 5 mg Tab TAKE 1 TABLET BY  MOUTH TWICE DAILY 180 tablet 3    ascorbic acid, vitamin C, (VITAMIN C) 250 MG tablet Take 250 mg by mouth once daily. Indications: supplement      atorvastatin (LIPITOR) 40 MG tablet TAKE 1 TABLET ONE TIME DAILY 90 tablet 3    blood-glucose meter (ACCU-CHEK GUIDE GLUCOSE METER) Misc Check blood glucose one time daily. 1 each 0    busPIRone (BUSPAR) 7.5 MG tablet Take 1 tablet (7.5 mg total) by mouth 3 (three) times daily. (Patient not taking: Reported on 8/27/2024) 30 tablet 0    empagliflozin (JARDIANCE) 10 mg tablet Take 1 tablet (10 mg total) by mouth once daily. 90 tablet 0    EScitalopram oxalate (LEXAPRO) 10 MG tablet Take 1 tablet (10 mg total) by mouth once daily. (Patient not taking: Reported on 8/27/2024) 30 tablet 11    furosemide (LASIX) 40 MG tablet Take 1 tablet (40 mg total) by mouth once daily. (Patient taking differently: Take 40 mg by mouth daily as needed (swelling to BLE).) 90 tablet 1    glimepiride (AMARYL) 1 MG tablet TAKE 1 TABLET TWICE DAILY 180 tablet 3    HYDROcodone-acetaminophen (NORCO) 5-325 mg per tablet Take 1 tablet by mouth every 6 (six) hours as needed for Pain. 28 tablet 0    lisinopriL (PRINIVIL,ZESTRIL) 20 MG tablet Take 1 tablet (20 mg total) by mouth once daily. 90 tablet 3    metoprolol succinate (TOPROL-XL) 25 MG 24 hr tablet Take 1 tablet (25 mg total) by mouth once daily. 90 tablet 1    metroNIDAZOLE (FLAGYL) 500 MG tablet Take 1 tablet (500 mg total) by mouth every 12 (twelve) hours. 20 tablet 0    OYSTER SHELL CALCIUM-VIT D3 500 mg-5 mcg (200 unit) per tablet TAKE 1 TABLET EVERY DAY (Patient taking differently: Take 1 tablet by mouth once daily.) 90 tablet 10    polyethylene glycol (GLYCOLAX) 17 gram/dose powder Take 17 g by mouth once daily. (Patient taking differently: Take 17 g by mouth daily as needed for Constipation. Mix with 4-8 ounces in liquid of choice) 510 g 0    pramipexole (MIRAPEX) 0.125 MG tablet TAKE 1 TABLET EVERY DAY (Patient taking differently: Take  0.125 mg by mouth every evening.) 90 tablet 3    urea (CARMOL) 40 % Crea Apply topically 2 (two) times daily. (Patient taking differently: Apply 1 Application topically 2 (two) times daily as needed (hyperkeratosis).) 1 Bottle 3    vits A,C,E/lutein/minerals (HEALTHY EYES ORAL) Take 0.5 tablets by mouth Daily. Indications: eye health       No current facility-administered medications for this visit.       Review of patient's allergies indicates:   Allergen Reactions    Codeine Nausea Only and Other (See Comments)     Patient can Take Tylenol and Hydrocodone        Last encounter in this department: 6/17/2024    Hemoglobin A1C   Date Value Ref Range Status   08/27/2024 6.8 (H) 0.0 - 5.6 % Final     Comment:     Reference Interval:  5.0 - 5.6 Normal   5.7 - 6.4 High Risk   > 6.5 Diabetic      Hgb A1c results are standardized based on the (NGSP) National   Glycohemoglobin Standardization Program.      Hemoglobin A1C levels are related to mean serum/plasma glucose   during the preceding 2-3 months.        04/15/2024 7.0 (H) 4.0 - 5.6 % Final     Comment:     ADA Screening Guidelines:  5.7-6.4%  Consistent with prediabetes  >or=6.5%  Consistent with diabetes    High levels of fetal hemoglobin interfere with the HbA1C  assay. Heterozygous hemoglobin variants (HbS, HgC, etc)do  not significantly interfere with this assay.   However, presence of multiple variants may affect accuracy.     10/19/2023 6.5 (H) 4.0 - 5.6 % Final     Comment:     ADA Screening Guidelines:  5.7-6.4%  Consistent with prediabetes  >or=6.5%  Consistent with diabetes    High levels of fetal hemoglobin interfere with the HbA1C  assay. Heterozygous hemoglobin variants (HbS, HgC, etc)do  not significantly interfere with this assay.   However, presence of multiple variants may affect accuracy.     01/12/2018 8.3 % Final       Review of Systems   Constitutional: Negative for chills and fever.   Cardiovascular:  Negative for claudication and leg swelling.    Skin:  Positive for poor wound healing. Negative for color change and nail changes.   Musculoskeletal:  Negative for joint swelling, muscle cramps and muscle weakness.   Gastrointestinal:  Negative for nausea and vomiting.   Neurological:  Positive for numbness.   Psychiatric/Behavioral:  Negative for altered mental status.         Objective:     Physical Exam  Constitutional:       Appearance: Normal appearance. She is not ill-appearing.   Cardiovascular:      Pulses:           Dorsalis pedis pulses are 2+ on the right side and 2+ on the left side.        Posterior tibial pulses are 2+ on the right side and 2+ on the left side.      Comments: CFT is < 3 seconds bilateral.  Pedal hair growth is decreased bilateral.  Varicosities noted bilateral.  Moderate edema noted to the Rt. Hallux amputation site.  Toes are warm to touch bilateral.    Musculoskeletal:         General: No tenderness or signs of injury.      Right lower leg: No edema.      Left lower leg: No edema.      Comments: Muscle strength 5/5 in all muscle groups bilateral.  No tenderness nor crepitation with ROM of foot/ankle joints bilateral.  (-) for pain with palpation of the Rt. Hallux amputation site.     Skin:     Capillary Refill: Capillary refill takes 2 to 3 seconds.      Findings: No bruising, ecchymosis, erythema, signs of injury, laceration, lesion, petechiae, rash or wound.      Comments: Incision for the Rt. Hallux amputation is well approximated along its length with suture.  No further evidence of dehiscence.  No ischemia, infection or necrosis noted the length of the incision.     Neurological:      Mental Status: She is alert.      Sensory: Sensory deficit present.      Motor: No weakness or atrophy.      Comments: Protective sensation is absent bilateral.  Light touch is absent bilateral.             Assessment:      Encounter Diagnoses   Name Primary?    Postoperative state Yes    Diabetic polyneuropathy associated with type 2  diabetes mellitus      Plan:     Caro was seen today for post-op evaluation.    Diagnoses and all orders for this visit:    Postoperative state  -     ORTHOTIC DEVICE (DME)    Diabetic polyneuropathy associated with type 2 diabetes mellitus  -     ORTHOTIC DEVICE (DME)      I counseled the patient on her conditions, their implications and medical management.    Satisfactory postoperative results noted.  No obvious sign of postop infection noted.     With the patient's verbal consent, sutures were removed without incident.  Patient tolerated this quite well.      The incision site was painted with betadine, and a football dressing was applied.    Advised to keep the dressing CDI x 1 week.    Advised to rest and elevate the affected extremity.    Instructed to minimize weight bearing to facilitate wound healing.    Advised to transfer only in the postoperative shoe/boot.    Rx written for custom molded orthotics with a toe filler.    Given information regarding the vendor who provides this service.    RTC in 1 week for postop visit #4.    Brijesh Miles DPM

## 2024-09-25 ENCOUNTER — OFFICE VISIT (OUTPATIENT)
Dept: PODIATRY | Facility: CLINIC | Age: 84
End: 2024-09-25
Payer: MEDICARE

## 2024-09-25 VITALS — WEIGHT: 149.94 LBS | HEIGHT: 62 IN | BODY MASS INDEX: 27.59 KG/M2

## 2024-09-25 DIAGNOSIS — E11.42 DIABETIC POLYNEUROPATHY ASSOCIATED WITH TYPE 2 DIABETES MELLITUS: ICD-10-CM

## 2024-09-25 DIAGNOSIS — Z98.890 POSTOPERATIVE STATE: Primary | ICD-10-CM

## 2024-09-25 PROCEDURE — 99999 PR PBB SHADOW E&M-EST. PATIENT-LVL II: CPT | Mod: PBBFAC,HCNC,, | Performed by: PODIATRIST

## 2024-09-25 NOTE — PROGRESS NOTES
Subjective:     Patient ID: Caro Powell is a 84 y.o. female.    Chief Complaint: Post-op Evaluation  Patient presents to clinic 4 weeks S/P amputation of the Rt. Hallux.  Continues to deny pain from the surgical site with today's exam.  Has kept the football wrap CDI x 1 week.   Has been ambulating in the DARCO shoe.  Denies experiencing N/V/F/C/D.  Denies any additional pedal complaints.      Past Medical History:   Diagnosis Date    Anticoagulant long-term use     Arthritis     Atrial flutter     Calcium nephrolithiasis 2007    ckd 3    Diabetes mellitus, type 2     Diabetic peripheral neuropathy associated with type 2 diabetes mellitus     Difficult intubation     NARROW AIRWAY    Disc displacement, lumbar     Hypertension     Invasive ductal carcinoma of left breast 07/06/2022    Mixed hyperlipidemia     Pulmonary aspiration of gastric contents     Shingles        Past Surgical History:   Procedure Laterality Date    ANGIOGRAPHY OF LOWER EXTREMITY N/A 10/21/2021    Procedure: Angiogram Extremity Unilateral;  Surgeon: Dre Martino MD;  Location: [facility] CATH LAB/EP;  Service: Cardiology;  Laterality: N/A;    ANGIOGRAPHY OF LOWER EXTREMITY Right 11/10/2021    Procedure: Angiogram Extremity Unilateral;  Surgeon: Ben Rooney MD;  Location: [facility] CATH LAB/EP;  Service: Cardiology;  Laterality: Right;    AXILLARY NODE DISSECTION Right 08/15/2022    Procedure: LYMPHADENECTOMY, AXILLARY RIGHT;  Surgeon: RADHA Quinn MD;  Location: [facility] OR;  Service: General;  Laterality: Right;    CATARACT EXTRACTION      COLONOSCOPY  11/28/2011    sigmoid diverticulosis, external hemorrhoids    COLONOSCOPY      DEBRIDEMENT Right 10/20/2021    Procedure: DEBRIDEMENT;  Surgeon: Ava Atkins DPM;  Location: [facility] OR;  Service: Podiatry;  Laterality: Right;    ENDOSCOPIC GASTROCNEMIUS RECESSION Right 09/10/2019    Procedure: RECESSION, GASTROCNEMIUS, ENDOSCOPIC;  Surgeon: Derek Jose DPM;  Location: [facility] OR;   Service: Podiatry;  Laterality: Right;  Arthrex center line (ron notified)  Video    EXTRACORPOREAL SHOCK WAVE LITHOTRIPSY      FLEXOR TENOTOMY Right 10/20/2021    Procedure: TENOTOMY, FLEXOR 3rd toe;  Surgeon: Ava Atkins DPM;  Location: [facility] OR;  Service: Podiatry;  Laterality: Right;    HYSTERECTOMY      INJECTION FOR SENTINEL NODE IDENTIFICATION Left 08/15/2022    Procedure: INJECTION, FOR SENTINEL NODE IDENTIFICATION;  Surgeon: RADHA Quinn MD;  Location: [facility];  Service: General;  Laterality: Left;    MASTECTOMY Bilateral 08/15/2022    Procedure: MASTECTOMY BILATERAL  / BREAST;  Surgeon: RADHA Quinn MD;  Location: [facility];  Service: General;  Laterality: Bilateral;  5 HOURS / EMAIL SENT 8-11 @ 9:02 LK    SENTINEL LYMPH NODE BIOPSY Left 08/15/2022    Procedure: BIOPSY, LYMPH NODE, SENTINEL LEFT;  Surgeon: RADHA Quinn MD;  Location: [facility];  Service: General;  Laterality: Left;    SHOULDER SURGERY Left     TOE AMPUTATION Right 05/22/2017    5th toe    TOE AMPUTATION Right 10/20/2021    Procedure: AMPUTATION, TOE;  Surgeon: Ava Atkins DPM;  Location: [facility] OR;  Service: Podiatry;  Laterality: Right;    TOE AMPUTATION Right 8/27/2024    Procedure: AMPUTATION, TOE, Great toe;  Surgeon: Brijesh Miles DPM;  Location: [facility];  Service: Podiatry;  Laterality: Right;    TONSILLECTOMY         Family History   Problem Relation Name Age of Onset    Diabetes Mother      Heart failure Father      No Known Problems Sister      Breast cancer Sister  69        Genetic testing negative    Kidney failure Brother      Breast cancer Maternal Aunt          early 40s    Diabetes Maternal Grandmother      No Known Problems Maternal Grandfather      No Known Problems Paternal Grandmother      No Known Problems Paternal Grandfather      Glaucoma Neg Hx      Cataracts Neg Hx      Macular degeneration Neg Hx      Retinal detachment Neg Hx      Strabismus Neg Hx         Social History     Socioeconomic  History    Marital status:    Tobacco Use    Smoking status: Never     Passive exposure: Never    Smokeless tobacco: Never   Substance and Sexual Activity    Alcohol use: No    Drug use: No    Sexual activity: Not Currently     Partners: Male     Social Determinants of Health     Financial Resource Strain: Low Risk  (7/22/2024)    Overall Financial Resource Strain (CARDIA)     Difficulty of Paying Living Expenses: Not very hard   Food Insecurity: No Food Insecurity (7/22/2024)    Hunger Vital Sign     Worried About Running Out of Food in the Last Year: Never true     Ran Out of Food in the Last Year: Never true   Transportation Needs: No Transportation Needs (8/7/2024)    OASIS : Transportation     Lack of Transportation (Medical): No     Lack of Transportation (Non-Medical): No     Patient Unable or Declines to Respond: No   Physical Activity: Insufficiently Active (7/22/2024)    Exercise Vital Sign     Days of Exercise per Week: 4 days     Minutes of Exercise per Session: 30 min   Stress: No Stress Concern Present (7/22/2024)    [employer] [place] [employer] - Occupational Stress Questionnaire     Feeling of Stress : Not at all   Housing Stability: Low Risk  (7/22/2024)    Housing Stability Vital Sign     Unable to Pay for Housing in the Last Year: No     Homeless in the Last Year: No       Current Outpatient Medications   Medication Sig Dispense Refill    ACCU-CHEK GUIDE TEST STRIPS Strp TEST BLOOD SUGAR ONE TIME DAILY 100 strip 3    ACCU-CHEK SOFT DEV LANCETS Kit       ACCU-CHEK SOFTCLIX LANCETS Misc TEST BLOOD SUGAR ONE TIME DAILY 100 each 3    alcohol swabs (ALCOHOL PREP) PadM Apply 1 each topically once daily. 140 each 1    ammonium lactate 12 % Crea Apply to feet twice daily. Avoid use between toes. 140 g 5    anastrozole (ARIMIDEX) 1 mg Tab TAKE 1 TABLET EVERY DAY 90 tablet 3    apixaban (ELIQUIS) 5 mg Tab TAKE 1 TABLET BY MOUTH TWICE DAILY 180 tablet 3    ascorbic acid, vitamin C,  (VITAMIN C) 250 MG tablet Take 250 mg by mouth once daily. Indications: supplement      atorvastatin (LIPITOR) 40 MG tablet TAKE 1 TABLET ONE TIME DAILY 90 tablet 3    blood-glucose meter (ACCU-CHEK GUIDE GLUCOSE METER) Misc Check blood glucose one time daily. 1 each 0    busPIRone (BUSPAR) 7.5 MG tablet Take 1 tablet (7.5 mg total) by mouth 3 (three) times daily. (Patient not taking: Reported on 8/27/2024) 30 tablet 0    empagliflozin (JARDIANCE) 10 mg tablet Take 1 tablet (10 mg total) by mouth once daily. 90 tablet 0    EScitalopram oxalate (LEXAPRO) 10 MG tablet Take 1 tablet (10 mg total) by mouth once daily. (Patient not taking: Reported on 8/27/2024) 30 tablet 11    furosemide (LASIX) 40 MG tablet Take 1 tablet (40 mg total) by mouth once daily. (Patient taking differently: Take 40 mg by mouth daily as needed (swelling to BLE).) 90 tablet 1    glimepiride (AMARYL) 1 MG tablet TAKE 1 TABLET TWICE DAILY 180 tablet 3    HYDROcodone-acetaminophen (NORCO) 5-325 mg per tablet Take 1 tablet by mouth every 6 (six) hours as needed for Pain. 28 tablet 0    lisinopriL (PRINIVIL,ZESTRIL) 20 MG tablet Take 1 tablet (20 mg total) by mouth once daily. 90 tablet 3    metoprolol succinate (TOPROL-XL) 25 MG 24 hr tablet Take 1 tablet (25 mg total) by mouth once daily. 90 tablet 1    metroNIDAZOLE (FLAGYL) 500 MG tablet Take 1 tablet (500 mg total) by mouth every 12 (twelve) hours. 20 tablet 0    OYSTER SHELL CALCIUM-VIT D3 500 mg-5 mcg (200 unit) per tablet TAKE 1 TABLET EVERY DAY (Patient taking differently: Take 1 tablet by mouth once daily.) 90 tablet 10    polyethylene glycol (GLYCOLAX) 17 gram/dose powder Take 17 g by mouth once daily. (Patient taking differently: Take 17 g by mouth daily as needed for Constipation. Mix with 4-8 ounces in liquid of choice) 510 g 0    pramipexole (MIRAPEX) 0.125 MG tablet TAKE 1 TABLET EVERY DAY (Patient taking differently: Take 0.125 mg by mouth every evening.) 90 tablet 3    urea  (CARMOL) 40 % Crea Apply topically 2 (two) times daily. (Patient taking differently: Apply 1 Application topically 2 (two) times daily as needed (hyperkeratosis).) 1 Bottle 3    vits A,C,E/lutein/minerals (HEALTHY EYES ORAL) Take 0.5 tablets by mouth Daily. Indications: eye health       No current facility-administered medications for this visit.       Review of patient's allergies indicates:   Allergen Reactions    Codeine Nausea Only and Other (See Comments)     Patient can Take Tylenol and Hydrocodone        Last encounter in this department: 6/17/2024    Hemoglobin A1C   Date Value Ref Range Status   08/27/2024 6.8 (H) 0.0 - 5.6 % Final     Comment:     Reference Interval:  5.0 - 5.6 Normal   5.7 - 6.4 High Risk   > 6.5 Diabetic      Hgb A1c results are standardized based on the (NGSP) National   Glycohemoglobin Standardization Program.      Hemoglobin A1C levels are related to mean serum/plasma glucose   during the preceding 2-3 months.        04/15/2024 7.0 (H) 4.0 - 5.6 % Final     Comment:     ADA Screening Guidelines:  5.7-6.4%  Consistent with prediabetes  >or=6.5%  Consistent with diabetes    High levels of fetal hemoglobin interfere with the HbA1C  assay. Heterozygous hemoglobin variants (HbS, HgC, etc)do  not significantly interfere with this assay.   However, presence of multiple variants may affect accuracy.     10/19/2023 6.5 (H) 4.0 - 5.6 % Final     Comment:     ADA Screening Guidelines:  5.7-6.4%  Consistent with prediabetes  >or=6.5%  Consistent with diabetes    High levels of fetal hemoglobin interfere with the HbA1C  assay. Heterozygous hemoglobin variants (HbS, HgC, etc)do  not significantly interfere with this assay.   However, presence of multiple variants may affect accuracy.     01/12/2018 8.3 % Final       Review of Systems   Constitutional: Negative for chills and fever.   Cardiovascular:  Negative for claudication and leg swelling.   Skin:  Positive for poor wound healing. Negative for  color change and nail changes.   Musculoskeletal:  Negative for joint swelling, muscle cramps and muscle weakness.   Gastrointestinal:  Negative for nausea and vomiting.   Neurological:  Positive for numbness.   Psychiatric/Behavioral:  Negative for altered mental status.         Objective:     Physical Exam  Constitutional:       Appearance: Normal appearance. She is not ill-appearing.   Cardiovascular:      Pulses:           Dorsalis pedis pulses are 2+ on the right side and 2+ on the left side.        Posterior tibial pulses are 2+ on the right side and 2+ on the left side.      Comments: CFT is < 3 seconds bilateral.  Pedal hair growth is decreased bilateral.  Varicosities noted bilateral.  Mild edema noted to the Rt. Hallux amputation site.  Toes are warm to touch bilateral.    Musculoskeletal:         General: No tenderness or signs of injury.      Right lower leg: No edema.      Left lower leg: No edema.      Comments: Muscle strength 5/5 in all muscle groups bilateral.  No tenderness nor crepitation with ROM of foot/ankle joints bilateral.  (-) for pain with palpation of the Rt. Hallux amputation site.     Skin:     Capillary Refill: Capillary refill takes 2 to 3 seconds.      Findings: No bruising, ecchymosis, erythema, signs of injury, laceration, lesion, petechiae, rash or wound.      Comments: Incision for the Rt. Hallux amputation remains well healed.     Neurological:      Mental Status: She is alert.      Sensory: Sensory deficit present.      Motor: No weakness or atrophy.      Comments: Protective sensation is absent bilateral.  Light touch is absent bilateral.             Assessment:      Encounter Diagnoses   Name Primary?    Postoperative state Yes    Diabetic polyneuropathy associated with type 2 diabetes mellitus        Plan:     Caro was seen today for post-op evaluation.    Diagnoses and all orders for this visit:    Postoperative state    Diabetic polyneuropathy associated with type 2  diabetes mellitus        I counseled the patient on her conditions, their implications and medical management.    Satisfactory postoperative results noted.  No obvious sign of postop infection noted.     The incision site remains healed with today's exam.    Will discontinue dressing changes, as wound care is no longer necessary.      To wear a padded sock with the DARCO shoe until she obtains her orthotic.    May resume her normal showering routine but discouraged from soaking/scrubbing the limb x 2 weeks.    May begin applying moisturizer to the site in the PM.      Rx written for custom molded orthotics with a toe filler.    Given information regarding the vendor who provides this service.    To inspect the skin for signs of shearing/breakdown QD.    RTC once she has obtained the orthotics.      Brijesh Miles DPM

## 2024-09-26 ENCOUNTER — TELEPHONE (OUTPATIENT)
Dept: PODIATRY | Facility: CLINIC | Age: 84
End: 2024-09-26
Payer: MEDICARE

## 2024-09-26 NOTE — TELEPHONE ENCOUNTER
Spoke with patient about concerns/message. Patient was encouraged to wait for the orthotic office to call. She expressed understanding of the message.

## 2024-09-26 NOTE — TELEPHONE ENCOUNTER
Spoke with the patient to let her know that she could call the list of providers to schedule an appointment for orthotics/ or wait for the call back. She stated that she would call tomorrow and try to schedule an appointment.

## 2024-09-30 DIAGNOSIS — E11.40 TYPE 2 DIABETES MELLITUS WITH DIABETIC NEUROPATHY, WITHOUT LONG-TERM CURRENT USE OF INSULIN: Chronic | ICD-10-CM

## 2024-09-30 RX ORDER — EMPAGLIFLOZIN 10 MG/1
10 TABLET, FILM COATED ORAL
Qty: 90 TABLET | Refills: 0 | Status: SHIPPED | OUTPATIENT
Start: 2024-09-30

## 2024-10-01 RX ORDER — ISOPROPYL ALCOHOL 70 ML/100ML
1 SWAB TOPICAL DAILY
Qty: 140 EACH | Refills: 2 | Status: SHIPPED | OUTPATIENT
Start: 2024-10-01

## 2024-10-14 ENCOUNTER — LAB VISIT (OUTPATIENT)
Dept: LAB | Facility: HOSPITAL | Age: 84
End: 2024-10-14
Attending: INTERNAL MEDICINE
Payer: MEDICARE

## 2024-10-14 DIAGNOSIS — N18.31 STAGE 3A CHRONIC KIDNEY DISEASE: ICD-10-CM

## 2024-10-14 DIAGNOSIS — E11.40 TYPE 2 DIABETES MELLITUS WITH DIABETIC NEUROPATHY, WITHOUT LONG-TERM CURRENT USE OF INSULIN: ICD-10-CM

## 2024-10-14 LAB
ANION GAP SERPL CALC-SCNC: 11 MMOL/L (ref 8–16)
BUN SERPL-MCNC: 28 MG/DL (ref 8–23)
CALCIUM SERPL-MCNC: 10.5 MG/DL (ref 8.7–10.5)
CHLORIDE SERPL-SCNC: 106 MMOL/L (ref 95–110)
CO2 SERPL-SCNC: 22 MMOL/L (ref 23–29)
CREAT SERPL-MCNC: 1.2 MG/DL (ref 0.5–1.4)
EST. GFR  (NO RACE VARIABLE): 44.4 ML/MIN/1.73 M^2
ESTIMATED AVG GLUCOSE: 157 MG/DL (ref 68–131)
GLUCOSE SERPL-MCNC: 136 MG/DL (ref 70–110)
HBA1C MFR BLD: 7.1 % (ref 4–5.6)
POTASSIUM SERPL-SCNC: 4.5 MMOL/L (ref 3.5–5.1)
PTH-INTACT SERPL-MCNC: 44.4 PG/ML (ref 9–77)
SODIUM SERPL-SCNC: 139 MMOL/L (ref 136–145)

## 2024-10-14 PROCEDURE — 36415 COLL VENOUS BLD VENIPUNCTURE: CPT | Mod: HCNC,PO | Performed by: INTERNAL MEDICINE

## 2024-10-14 PROCEDURE — 83970 ASSAY OF PARATHORMONE: CPT | Mod: HCNC | Performed by: INTERNAL MEDICINE

## 2024-10-14 PROCEDURE — 80048 BASIC METABOLIC PNL TOTAL CA: CPT | Mod: HCNC | Performed by: INTERNAL MEDICINE

## 2024-10-14 PROCEDURE — 83036 HEMOGLOBIN GLYCOSYLATED A1C: CPT | Mod: HCNC | Performed by: INTERNAL MEDICINE

## 2024-10-21 ENCOUNTER — OFFICE VISIT (OUTPATIENT)
Dept: PRIMARY CARE CLINIC | Facility: CLINIC | Age: 84
End: 2024-10-21
Payer: MEDICARE

## 2024-10-21 VITALS
OXYGEN SATURATION: 98 % | TEMPERATURE: 98 F | WEIGHT: 152.31 LBS | HEIGHT: 62 IN | DIASTOLIC BLOOD PRESSURE: 68 MMHG | HEART RATE: 58 BPM | BODY MASS INDEX: 28.03 KG/M2 | SYSTOLIC BLOOD PRESSURE: 142 MMHG

## 2024-10-21 DIAGNOSIS — N18.31 STAGE 3A CHRONIC KIDNEY DISEASE: ICD-10-CM

## 2024-10-21 DIAGNOSIS — E78.2 MIXED HYPERLIPIDEMIA: ICD-10-CM

## 2024-10-21 DIAGNOSIS — Z23 FLU VACCINE NEED: ICD-10-CM

## 2024-10-21 DIAGNOSIS — E11.40 TYPE 2 DIABETES MELLITUS WITH DIABETIC NEUROPATHY, WITHOUT LONG-TERM CURRENT USE OF INSULIN: Primary | ICD-10-CM

## 2024-10-21 DIAGNOSIS — I10 ESSENTIAL HYPERTENSION: ICD-10-CM

## 2024-10-21 PROCEDURE — 3072F LOW RISK FOR RETINOPATHY: CPT | Mod: HCNC,CPTII,S$GLB, | Performed by: INTERNAL MEDICINE

## 2024-10-21 PROCEDURE — 3078F DIAST BP <80 MM HG: CPT | Mod: HCNC,CPTII,S$GLB, | Performed by: INTERNAL MEDICINE

## 2024-10-21 PROCEDURE — 99999 PR PBB SHADOW E&M-EST. PATIENT-LVL IV: CPT | Mod: PBBFAC,HCNC,, | Performed by: INTERNAL MEDICINE

## 2024-10-21 PROCEDURE — 1159F MED LIST DOCD IN RCRD: CPT | Mod: HCNC,CPTII,S$GLB, | Performed by: INTERNAL MEDICINE

## 2024-10-21 PROCEDURE — 3077F SYST BP >= 140 MM HG: CPT | Mod: HCNC,CPTII,S$GLB, | Performed by: INTERNAL MEDICINE

## 2024-10-21 PROCEDURE — 1160F RVW MEDS BY RX/DR IN RCRD: CPT | Mod: HCNC,CPTII,S$GLB, | Performed by: INTERNAL MEDICINE

## 2024-10-21 PROCEDURE — 90653 IIV ADJUVANT VACCINE IM: CPT | Mod: HCNC,S$GLB,, | Performed by: INTERNAL MEDICINE

## 2024-10-21 PROCEDURE — 1126F AMNT PAIN NOTED NONE PRSNT: CPT | Mod: HCNC,CPTII,S$GLB, | Performed by: INTERNAL MEDICINE

## 2024-10-21 PROCEDURE — G0008 ADMIN INFLUENZA VIRUS VAC: HCPCS | Mod: HCNC,S$GLB,, | Performed by: INTERNAL MEDICINE

## 2024-10-21 PROCEDURE — 1158F ADVNC CARE PLAN TLK DOCD: CPT | Mod: HCNC,CPTII,S$GLB, | Performed by: INTERNAL MEDICINE

## 2024-10-21 PROCEDURE — 99214 OFFICE O/P EST MOD 30 MIN: CPT | Mod: HCNC,25,S$GLB, | Performed by: INTERNAL MEDICINE

## 2024-10-21 RX ORDER — LISINOPRIL 30 MG/1
30 TABLET ORAL DAILY
Qty: 90 TABLET | Refills: 0 | Status: SHIPPED | OUTPATIENT
Start: 2024-10-21

## 2024-10-21 RX ORDER — OMEPRAZOLE 20 MG/1
20 CAPSULE, DELAYED RELEASE ORAL DAILY
Qty: 90 CAPSULE | Refills: 3 | Status: SHIPPED | OUTPATIENT
Start: 2024-10-21 | End: 2025-10-21

## 2024-10-21 NOTE — PATIENT INSTRUCTIONS
"Go on youtube and type in" senior fitness with be" try to get one exercise session in every day. Use resistance bands etc to     The other two vaccines you are due for are shingles and your RSV vaccine      Consider taking vitamin C with zinc.     Elderberry is something that may help as well.     You need to hydrate more to protect your kidneys. And I need you get more exercise to strengthen up your muscles.   " 17-Jan-2024 16:30

## 2024-10-21 NOTE — PROGRESS NOTES
INTERNAL MEDICINE PROGRESS/URGENT CARE NOTE    CHIEF COMPLAINT     Chief Complaint   Patient presents with    Follow-up     Patient presents for her 3 month follow up appointment.    Gastroesophageal Reflux     Patient complaining of GERD x6 weeks, mostly in the evenings after meals. Patient takes Tums with some relief.    Depression     Patient would like to consider stopping Lexapro, as she feels she doesn't need it.    Mobility issues     S/P right great toe removal 24.       [facility]     Caro Powell is a 84 y.o.  female who presents with a PMHx of  breast cancer, afib, CKD 3, arthritis, DM2, HLD, HTN, who presents today for routine follow up visit.    Her   a few months ago after a long glover with dementia. She then moved in with her son and theyre both agreeably happy with the arrangement      Was seeing her monthly mostly due to the vascular ulcers but now its healed and she's doing better.      Her main complaints and concerns are:   GERD: was on prilosec. Was on prilosec but stopped it due to something she read. So she will now go back on.   Off balance. Says she is a bit weak. We discussed exercise  and she will try to do some on her own. Declined PT at this time. Tried the foot pedal but says it gave her a sore at her callus.     At her previous visits, we have discussed:   Nonhealing right foot wound. Has seen podiatry. Has now healed per patient.   On ROS, she has poor balance but has not had a fall. We discussed needing excercises. Complicated as she was told by podiatry to avoid pressure. We had a long discussion about chair aerobics and I've given her info on how to get that done.   Recently bought shoe inserts to try to help with he recurrent ulcers.   PAD: Did have the US legs done which showed 75% blockage. Has consulted with cardiovascular who plans to monitor and manage conservatively     History of breast cancer: diagnosed in  august had mastectomy and radiation.     Needs to establish with breast surgeon over here. Referral placed     DM2: last hgb a1c on file is up to 7.1 from 6.5 Her medications include amaryl 1mg. On ACE-I and statin therapy   Due for eye exam will call her doctor and schedule it  Foot exam done today      Afib: on eliquis. Rate controlled.       Home Medications:  Prior to Admission medications    Medication Sig Start Date End Date Taking? Authorizing Provider   ACCU-CHEK GUIDE TEST STRIPS Strp TEST BLOOD SUGAR ONE TIME DAILY 9/18/24   Anisha Reyes MD   ACCU-CHEK SOFT DEV LANCETS Kit  10/2/14   Provider, Historical   ACCU-CHEK SOFTCLIX LANCETS [facility] TEST BLOOD SUGAR ONE TIME DAILY 9/18/24   Anisha Reyes MD   alcohol swabs (ALCOHOL PREP) PadM Apply 1 each topically once daily. 10/1/24   Radha Crum MD   ammonium lactate 12 % Crea Apply to feet twice daily. Avoid use between toes. 2/9/21   Ava Atkins DPM   anastrozole (ARIMIDEX) 1 mg Tab TAKE 1 TABLET EVERY DAY 9/11/24   Morgan Caputo MD   apixaban (ELIQUIS) 5 mg Tab TAKE 1 TABLET BY MOUTH TWICE DAILY 6/1/24   Sam Paulino MD   ascorbic acid, vitamin C, (VITAMIN C) 250 MG tablet Take 250 mg by mouth once daily. Indications: supplement    Provider, Historical   atorvastatin (LIPITOR) 40 MG tablet TAKE 1 TABLET ONE TIME DAILY 8/12/24   Anisha Reyes MD   blood-glucose meter (ACCU-CHEK GUIDE GLUCOSE METER) Misc Check blood glucose one time daily. 4/24/24   Anisha Reyes MD   busPIRone (BUSPAR) 7.5 MG tablet Take 1 tablet (7.5 mg total) by mouth 3 (three) times daily.  Patient not taking: Reported on 8/27/2024 8/9/24 8/9/25  Anisha Reyes MD   empagliflozin (JARDIANCE) 10 mg tablet TAKE 1 TABLET ONE TIME DAILY 9/30/24   Rl Marroquin MD   EScitalopram oxalate (LEXAPRO) 10 MG tablet Take 1 tablet (10 mg total) by mouth once daily.  Patient not taking: Reported on 8/27/2024 1/11/24 1/10/25  Anisha Reyes MD   furosemide (LASIX) 40 MG  tablet Take 1 tablet (40 mg total) by mouth once daily.  Patient taking differently: Take 40 mg by mouth daily as needed (swelling to BLE). 5/29/24   Anisha Reyes MD   glimepiride (AMARYL) 1 MG tablet TAKE 1 TABLET TWICE DAILY 7/28/23   Brayan Penny MD   HYDROcodone-acetaminophen (NORCO) 5-325 mg per tablet Take 1 tablet by mouth every 6 (six) hours as needed for Pain. 8/27/24   Brijesh Miles DPM   lisinopriL (PRINIVIL,ZESTRIL) 20 MG tablet Take 1 tablet (20 mg total) by mouth once daily. 8/5/24   Anisha Reyes MD   metoprolol succinate (TOPROL-XL) 25 MG 24 hr tablet Take 1 tablet (25 mg total) by mouth once daily. 5/29/24   Anisha Reyes MD   metroNIDAZOLE (FLAGYL) 500 MG tablet Take 1 tablet (500 mg total) by mouth every 12 (twelve) hours. 8/8/24   Brijesh Miles DPM   OYSTER SHELL CALCIUM-VIT D3 500 mg-5 mcg (200 unit) per tablet TAKE 1 TABLET EVERY DAY  Patient taking differently: Take 1 tablet by mouth once daily. 10/23/23   Morgan Caputo MD   polyethylene glycol (GLYCOLAX) 17 gram/dose powder Take 17 g by mouth once daily.  Patient taking differently: Take 17 g by mouth daily as needed for Constipation. Mix with 4-8 ounces in liquid of choice 10/22/21   Mariya Love MD   pramipexole (MIRAPEX) 0.125 MG tablet TAKE 1 TABLET EVERY DAY  Patient taking differently: Take 0.125 mg by mouth every evening. 10/9/23   Brayan Penny MD   urea (CARMOL) 40 % Crea Apply topically 2 (two) times daily.  Patient taking differently: Apply 1 Application topically 2 (two) times daily as needed (hyperkeratosis). 7/14/21   Ava Atkins DPM   vits A,C,E/lutein/minerals (HEALTHY EYES ORAL) Take 0.5 tablets by mouth Daily. Indications: eye health    Provider, Historical       Review of Systems:  Review of Systems        Date: 10/21/2024    Power of   I initiated the process of voluntary advance care planning today and explained the importance of this process to the  "patient.  I introduced the concept of advance directives to the patient, as well. Then the patient received detailed information about the importance of designating a Health Care Power of  (HCPOA). She was also instructed to communicate with this person about their wishes for future healthcare, should she become sick and lose decision-making capacity. The patient has previously appointed a HCPOA. After our discussion, the patient has decided to complete a HCPOA and has appointed her son, health care agent:  on file  & health care agent number:  on file . I encouraged her to communicate with this person about their wishes for future healthcare, should she become sick and lose decision-making capacity.      A total of 10 min was spent on advance care planning, goals of care discussion, emotional support, formulating and communicating prognosis and exploring burden/benefit of various approaches of treatment. This discussion occurred on a fully voluntary basis with the verbal consent of the patient and/or family.           PHYSICAL EXAM     BP (!) 142/68 (BP Location: Right arm, Patient Position: Sitting)   Pulse (!) 58   Temp 97.8 °F (36.6 °C)   Ht 5' 2" (1.575 m)   Wt 69.1 kg (152 lb 5.4 oz)   LMP  (LMP Unknown)   SpO2 98%   BMI 27.86 kg/m²     GEN - A+OX4, NAD   HEENT - PERRL, EOMI, OP clear  Neck - No thyromegaly or cervical LAD. No thyroid masses felt.  CV - RRR, no m/r   Chest - CTAB, no wheezing or rhonchi  Abd - S/NT/ND/+BS.   Ext - 2+BDP and radial pulses. No C/C/E.  Skin - No rash.    LABS         ASSESSMENT/PLAN     Caro Powell is a 84 y.o. female with  1. Type 2 diabetes mellitus with diabetic neuropathy, without long-term current use of insulin  Well controlled  Overview:  Diabetes is well controlled. Continue with current meds   Eye exam needed     Orders:  -     Hemoglobin A1C; Future; Expected date: 10/21/2024  -     Microalbumin/Creatinine Ratio, Urine    2. Stage 3a chronic " kidney disease  Overview:  Stable renal function.   Encouraged proper hydration and avoid NSAIDs    Orders:  -     CBC Auto Differential; Future; Expected date: 10/21/2024    3. Essential hypertension  Overview:  BP is very well controlled. A bit elevated  Continue with current medication    Orders:  -     CBC Auto Differential; Future; Expected date: 10/21/2024  -     Comprehensive Metabolic Panel; Future; Expected date: 10/21/2024  -     TSH; Future; Expected date: 10/21/2024    4. Mixed hyperlipidemia  -     Lipid Panel; Future; Expected date: 10/21/2024           WORRY SCORE2    RTC in 3 months, sooner if needed and depending on labs.    Anisha Reyes MD  Board Certified Internist/Geriatrician  [facility] ([place])

## 2024-10-22 ENCOUNTER — TELEPHONE (OUTPATIENT)
Dept: HEMATOLOGY/ONCOLOGY | Facility: CLINIC | Age: 84
End: 2024-10-22
Payer: MEDICARE

## 2024-10-22 NOTE — NURSING
Chart reviewed. Ms. Powell was previously established with Dr. Caputo at [facility]. She has moved to [place] and would like to establish care with a local provider.     Returned call to Ms. Alvarado. Explained my role as oncology navigator.     We discussed her oncologic history. She is s/p bilateral mastectomy on 8/15/22 with Dr. Quinn. 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 ER+, MT+, HER2-, KI-67 10-20%. Right breast invasive carcinoma ER+, MT+, HER2- by FISH, Ki-67 10-20%.     She rec'd adjuvant radiation therapy (completed Nov 2022) and adjuvant anastrozole. She remains on anastrozole today. She states she has no current concern for recurrence.     Most recent breast u/s on 11/30/23 negative for recurrence.     Referral placed. We discussed [facility] providers that specialize in treatment of breast cancer. She elected to schedule NP apt on 11/14 with Dr. Rosa.     My contact information provided. I encouraged Ms. Elizondo to contact me with any questions or concerns moving forward. She verbalized understanding and thanked me for my call.     Will continue to follow for [facility] navigation needs.     Oncology Navigation   Intake  Date of Diagnosis: 06/28/22  Cancer Type: Breast  Type of Referral: External  Date of Referral: 10/22/24  Initial Nurse Navigator Contact: 10/22/24  Referral to Initial Contact Timeline (days): 0  First Appointment Available: 11/06/24  Appointment Date: 11/14/24  First Available Date vs. Scheduled Date (days): 8  Reason if booked > 7 days after scheduling: Transfer of care; Patient request     Treatment  Current Status: Surveillance    Surgery: Completed  Surgical Oncologist: Dr. Emily Quinn    Medical Oncologist: Dr. Charlene Rosa  Consult Date: 11/14/24    Radiation Therapy: Completed  Radiation Oncologist: Dr. Katelyn Mathew  Start Date: 10/17/22  End Date: 11/08/22    Procedures: Biopsy; Ultrasound; PET scan  Biopsy  "Schedule Date: 08/15/22  PET Scan Schedule Date: 07/15/22  Ultrasound Schedule Date: 11/20/23    ER: Positive  GA: Positive  Her2: Negative    Radiation Oncologist: Dr. Katelyn Mathew    Support Systems: Children  Barriers of Care: Barriers to Care "Assessment completed-no barriers noted"     Acuity      Follow Up  Follow up in about 23 days (around 11/14/2024).         "

## 2024-10-22 NOTE — TELEPHONE ENCOUNTER
----- Message from [place] sent at 10/22/2024 12:30 PM CDT -----  Type: Needs Medical Advice  Who Called:  CARLOS   Best Call Back Number: [phone]    Additional Information: CARLOS  states she had a total mastectomy  at [facility]  in 82 and would like to have a follow up with one of our providers  here  she will be a new patient ,  please call to further discuss thank you

## 2024-10-24 DIAGNOSIS — I48.91 ATRIAL FIBRILLATION WITH RAPID VENTRICULAR RESPONSE: ICD-10-CM

## 2024-10-24 RX ORDER — METOPROLOL SUCCINATE 25 MG/1
25 TABLET, EXTENDED RELEASE ORAL
Qty: 90 TABLET | Refills: 3 | Status: SHIPPED | OUTPATIENT
Start: 2024-10-24

## 2024-10-25 ENCOUNTER — TELEPHONE (OUTPATIENT)
Dept: PODIATRY | Facility: CLINIC | Age: 84
End: 2024-10-25
Payer: MEDICARE

## 2024-10-25 NOTE — TELEPHONE ENCOUNTER
----- Message from Radha sent at 10/25/2024 12:32 PM CDT -----  Type: Needs Medical Advice  Who Called:  pt  Symptoms (please be specific):    How long has patient had these symptoms:    Pharmacy name and phone #:    Best Call Back Number: [phone]    Additional Information: Pt needs smeone from the office to call her

## 2024-10-28 ENCOUNTER — OFFICE VISIT (OUTPATIENT)
Dept: CARDIOLOGY | Facility: CLINIC | Age: 84
End: 2024-10-28
Payer: MEDICARE

## 2024-10-28 VITALS
OXYGEN SATURATION: 99 % | WEIGHT: 151.44 LBS | HEART RATE: 63 BPM | DIASTOLIC BLOOD PRESSURE: 58 MMHG | BODY MASS INDEX: 27.87 KG/M2 | SYSTOLIC BLOOD PRESSURE: 141 MMHG | HEIGHT: 62 IN

## 2024-10-28 DIAGNOSIS — I73.9 PAD (PERIPHERAL ARTERY DISEASE): ICD-10-CM

## 2024-10-28 DIAGNOSIS — N18.32 STAGE 3B CHRONIC KIDNEY DISEASE: Primary | ICD-10-CM

## 2024-10-28 DIAGNOSIS — E11.52 TYPE 2 DIABETES MELLITUS WITH DIABETIC PERIPHERAL ANGIOPATHY AND GANGRENE, WITHOUT LONG-TERM CURRENT USE OF INSULIN: ICD-10-CM

## 2024-10-28 DIAGNOSIS — I48.91 ATRIAL FIBRILLATION WITH RAPID VENTRICULAR RESPONSE: ICD-10-CM

## 2024-10-28 PROCEDURE — 99999 PR PBB SHADOW E&M-EST. PATIENT-LVL III: CPT | Mod: PBBFAC,HCNC,, | Performed by: INTERNAL MEDICINE

## 2024-11-14 ENCOUNTER — HOSPITAL ENCOUNTER (OUTPATIENT)
Dept: RADIOLOGY | Facility: HOSPITAL | Age: 84
Discharge: HOME | End: 2024-11-14
Attending: INTERNAL MEDICINE
Payer: MEDICARE

## 2024-11-14 ENCOUNTER — TELEPHONE (OUTPATIENT)
Dept: HEMATOLOGY/ONCOLOGY | Facility: CLINIC | Age: 84
End: 2024-11-14
Payer: MEDICARE

## 2024-11-14 ENCOUNTER — OFFICE VISIT (OUTPATIENT)
Dept: HEMATOLOGY/ONCOLOGY | Facility: CLINIC | Age: 84
End: 2024-11-14
Payer: MEDICARE

## 2024-11-14 VITALS
SYSTOLIC BLOOD PRESSURE: 156 MMHG | BODY MASS INDEX: 28.47 KG/M2 | DIASTOLIC BLOOD PRESSURE: 67 MMHG | HEIGHT: 61 IN | RESPIRATION RATE: 16 BRPM | OXYGEN SATURATION: 95 % | HEART RATE: 67 BPM | TEMPERATURE: 97 F | WEIGHT: 150.81 LBS

## 2024-11-14 DIAGNOSIS — M85.89 OSTEOPENIA OF MULTIPLE SITES: ICD-10-CM

## 2024-11-14 DIAGNOSIS — I10 ESSENTIAL HYPERTENSION: Chronic | ICD-10-CM

## 2024-11-14 DIAGNOSIS — Z79.01 CURRENT USE OF LONG TERM ANTICOAGULATION: ICD-10-CM

## 2024-11-14 DIAGNOSIS — M25.552 LEFT HIP PAIN: ICD-10-CM

## 2024-11-14 DIAGNOSIS — E78.2 MIXED HYPERLIPIDEMIA: ICD-10-CM

## 2024-11-14 DIAGNOSIS — Z17.0 MALIGNANT NEOPLASM OF LOWER-OUTER QUADRANT OF LEFT BREAST OF FEMALE, ESTROGEN RECEPTOR POSITIVE: Primary | ICD-10-CM

## 2024-11-14 DIAGNOSIS — D64.9 NORMOCYTIC ANEMIA: ICD-10-CM

## 2024-11-14 DIAGNOSIS — I48.0 PAROXYSMAL ATRIAL FIBRILLATION: ICD-10-CM

## 2024-11-14 DIAGNOSIS — N18.32 CHRONIC KIDNEY DISEASE, STAGE 3B: ICD-10-CM

## 2024-11-14 DIAGNOSIS — C50.512 MALIGNANT NEOPLASM OF LOWER-OUTER QUADRANT OF LEFT BREAST OF FEMALE, ESTROGEN RECEPTOR POSITIVE: Primary | ICD-10-CM

## 2024-11-14 DIAGNOSIS — Z79.899 OTHER LONG TERM (CURRENT) DRUG THERAPY: ICD-10-CM

## 2024-11-14 DIAGNOSIS — E11.40 TYPE 2 DIABETES MELLITUS WITH DIABETIC NEUROPATHY, WITHOUT LONG-TERM CURRENT USE OF INSULIN: Chronic | ICD-10-CM

## 2024-11-14 DIAGNOSIS — C50.911 INVASIVE DUCTAL CARCINOMA OF RIGHT BREAST: ICD-10-CM

## 2024-11-14 DIAGNOSIS — E11.40 TYPE 2 DIABETES MELLITUS WITH DIABETIC NEUROPATHY, WITHOUT LONG-TERM CURRENT USE OF INSULIN: Primary | ICD-10-CM

## 2024-11-14 PROCEDURE — 73502 X-RAY EXAM HIP UNI 2-3 VIEWS: CPT | Mod: 26,HCNC,LT, | Performed by: RADIOLOGY

## 2024-11-14 PROCEDURE — 99999 PR PBB SHADOW E&M-EST. PATIENT-LVL V: CPT | Mod: PBBFAC,HCNC,, | Performed by: INTERNAL MEDICINE

## 2024-11-14 PROCEDURE — 73502 X-RAY EXAM HIP UNI 2-3 VIEWS: CPT | Mod: TC,HCNC,FY,PO,LT

## 2024-11-14 RX ORDER — GLIMEPIRIDE 1 MG/1
1 TABLET ORAL 2 TIMES DAILY
Qty: 180 TABLET | Refills: 0 | Status: SHIPPED | OUTPATIENT
Start: 2024-11-14

## 2024-11-14 NOTE — TELEPHONE ENCOUNTER
LVM to get scheduled w/ genetics----- Message from Med Assistant Hathaway sent at 11/14/2024 11:48 AM CST -----  Referral to genetic ( virtual)

## 2024-11-14 NOTE — PROGRESS NOTES
Subjective:       Name: Caro Powell  : 1940  MRN: [id number]    Chief Complaint   Patient presents with    bilateral breast cancer        Patient is in clinic by herself    HPI: Caro Powell is a 84 y.o. female presents for evaluation of bilateral breast cancer  She has been initially followed and treated by Dr. Caputo in [place].  Since she relocated to [facility] she has decided to establish a medical oncologist on the [place].    She has been compliant by taking her anastrozole.  She has been taking her calcium and vitamin-D.  She has been experiencing pain affecting her left hip that started about a week ago.  She denies any recent history of trauma.    The patient denies CP, cough, SOB, abdominal pain, nausea, vomiting, constipation.  The patient denies fever, chills, night sweats, weight loss, new lumps or bumps, easy bruising or bleeding.      ONCOLOGIC HISTORY  She presented in 2022 for evaluation and management of bilateral breast cancer. She was referred by Dr. Penny.     - mammogram on 22 revealed bilateral breast masses.  - biopsy of breast masses (22) revealed invasive ductal carcinoma with lobular features of the left breast (ER/MD-positive, HER2-negative) and invasive ductal carcinoma of right breast (ER/MD-positive, HER2- [by FISH]), and metastatic ductal carcinoma to right axillary lymph node.  - she met with breast surgery on 22. They offered surgical resection of bilateral breast cancers, but wanted to see results of PET/CT scan first. At the visit, she expressed hesitancy about proceeding with surgery.  - she underwent PET/CT scan on 7/15/22.     - she underwent bilateral mastectomy on 8/15/22.     she completed radiation therapy:    - she underwent bone density test on 22.     FAMILY HISTORY:  Sister had breast cancer in her 40's. She is still living in her70's.  Paternal aunt had breast cancer in her 50-60's.    Oncology History   Invasive  ductal carcinoma of right breast (Resolved)   6/28/2022 Biopsy    1. LEFT BREAST MASS, 4:00 O'CLOCK, 5 CM FROM THE NIPPLE, NEEDLE CORE BIOPSIES:   - Invasive ductal carcinoma with lobular features,   Grade 1   Focal intermediate grade ductal carcinoma in situ with microcalcifications and focal lobular extension. Architectural patterns: Cribriform and solid. Nuclear grade: Grade II (intermediate).    2. RIGHT BREAST MASS, 8:00 O'CLOCK, 12 CM FROM THE NIPPLE, NEEDLE CORE BIOPSIES:   - Invasive ductal carcinoma,   Grade 1    3. RIGHT BREAST MASS, 10:00 O'CLOCK, 3 CM FROM THE NIPPLE, NEEDLE CORE BIOPSIES:   - Invasive ductal carcinoma,  Overall grade: Grade 2     4. RIGHT AXILLARY LYMPH NODE, NEEDLE CORE BIOPSIES:   - Metastatic ductal carcinoma of the breast     6/28/2022 Breast Tumor Markers    Left Breast mass 4:00  BREAST BIOMARKER RESULTS:   Estrogen receptor (ER) status: Positive.  Progesterone receptor (PgR) status: Positive.   HER2 by IHC: Negative (score 1).    2. Right Breast mass 8:00  Estrogen receptor (ER) status: Positive.   Progesterone receptor (PgR) status: Positive.   HER2 by IHC: Equivocal (score 2+) Negative by FISH    3. Right Breast mass 10:00  Estrogen receptor (ER) status: Positive  Progesterone receptor (PGR) status: Positive.   HER2 by IHC: Negative (score 1+).     7/6/2022 Initial Diagnosis    Invasive ductal carcinoma of right breast     7/6/2022 Genetic Testing    Not completed      7/26/2022 Tumor Conference    The team agreed upfront surgery with bilateral mastectomy with Right axillary lymph node dissection and Left sentinel lymph node biopsy. Will follow up with Dr. Caputo after surgery for endocrine therapy.        9/22/2022 Cancer Staged    Staging form: Breast, AJCC 8th Edition  - Pathologic: Stage IB (pT2, pN2a, cM0, G1, ER+, MN+, HER2-)     10/17/2022 - 11/8/2022 Radiation Therapy    Treating physician: Dr. Katelyn Mathew  Total Dose: 42.4 Gy  Fractions: 16 to right chest  wall/SCV     Malignant neoplasm of lower-outer quadrant of left breast of female, estrogen receptor positive (Resolved)   9/22/2022 Initial Diagnosis    Malignant neoplasm of lower-outer quadrant of left breast of female, estrogen receptor positive     9/22/2022 Cancer Staged    Staging form: Breast, AJCC 8th Edition  - Pathologic: Stage IA (pT2, pN1a(sn), cM0, G1, ER+, ME+, HER2-)          Past Medical History:   Diagnosis Date    Anticoagulant long-term use     Arthritis     Atrial flutter     Calcium nephrolithiasis 2007    ckd 3    Diabetes mellitus, type 2     Diabetic peripheral neuropathy associated with type 2 diabetes mellitus     Difficult intubation     NARROW AIRWAY    Disc displacement, lumbar     Hypertension     Invasive ductal carcinoma of left breast 07/06/2022    Mixed hyperlipidemia     Pulmonary aspiration of gastric contents     Shingles        Past Surgical History:   Procedure Laterality Date    ANGIOGRAPHY OF LOWER EXTREMITY N/A 10/21/2021    Procedure: Angiogram Extremity Unilateral;  Surgeon: Dre Martino MD;  Location: [facility] CATH LAB/EP;  Service: Cardiology;  Laterality: N/A;    ANGIOGRAPHY OF LOWER EXTREMITY Right 11/10/2021    Procedure: Angiogram Extremity Unilateral;  Surgeon: Ben Rooney MD;  Location: [facility] CATH LAB/EP;  Service: Cardiology;  Laterality: Right;    AXILLARY NODE DISSECTION Right 08/15/2022    Procedure: LYMPHADENECTOMY, AXILLARY RIGHT;  Surgeon: RADHA Quinn MD;  Location: [facility] OR;  Service: General;  Laterality: Right;    CATARACT EXTRACTION      COLONOSCOPY  11/28/2011    sigmoid diverticulosis, external hemorrhoids    COLONOSCOPY      DEBRIDEMENT Right 10/20/2021    Procedure: DEBRIDEMENT;  Surgeon: Ava Atkins DPM;  Location: [facility] OR;  Service: Podiatry;  Laterality: Right;    ENDOSCOPIC GASTROCNEMIUS RECESSION Right 09/10/2019    Procedure: RECESSION, GASTROCNEMIUS, ENDOSCOPIC;  Surgeon: Derek Jose DPM;  Location: [facility] OR;  Service:  Podiatry;  Laterality: Right;  Arthrex center line (ron notified)  Video    EXTRACORPOREAL SHOCK WAVE LITHOTRIPSY      FLEXOR TENOTOMY Right 10/20/2021    Procedure: TENOTOMY, FLEXOR 3rd toe;  Surgeon: Ava Atkins DPM;  Location: [facility] OR;  Service: Podiatry;  Laterality: Right;    HYSTERECTOMY      INJECTION FOR SENTINEL NODE IDENTIFICATION Left 08/15/2022    Procedure: INJECTION, FOR SENTINEL NODE IDENTIFICATION;  Surgeon: RADHA Quinn MD;  Location: [facility];  Service: General;  Laterality: Left;    MASTECTOMY Bilateral 08/15/2022    Procedure: MASTECTOMY BILATERAL  / BREAST;  Surgeon: RADHA Quinn MD;  Location: [facility];  Service: General;  Laterality: Bilateral;  5 HOURS / EMAIL SENT 8-11 @ 9:02 LK    SENTINEL LYMPH NODE BIOPSY Left 08/15/2022    Procedure: BIOPSY, LYMPH NODE, SENTINEL LEFT;  Surgeon: RADHA Quinn MD;  Location: [facility];  Service: General;  Laterality: Left;    SHOULDER SURGERY Left     TOE AMPUTATION Right 05/22/2017    5th toe    TOE AMPUTATION Right 10/20/2021    Procedure: AMPUTATION, TOE;  Surgeon: Ava Atkins DPM;  Location: [facility] OR;  Service: Podiatry;  Laterality: Right;    TOE AMPUTATION Right 8/27/2024    Procedure: AMPUTATION, TOE, Great toe;  Surgeon: Brijesh Miles DPM;  Location: [facility];  Service: Podiatry;  Laterality: Right;    TONSILLECTOMY         Family History   Problem Relation Name Age of Onset    Diabetes Mother      Heart failure Father      No Known Problems Sister      Breast cancer Sister  69        Genetic testing negative    Kidney failure Brother      Breast cancer Maternal Aunt          early 40s    Diabetes Maternal Grandmother      No Known Problems Maternal Grandfather      No Known Problems Paternal Grandmother      No Known Problems Paternal Grandfather      Glaucoma Neg Hx      Cataracts Neg Hx      Macular degeneration Neg Hx      Retinal detachment Neg Hx      Strabismus Neg Hx         Social History     Socioeconomic History     Marital status:    Tobacco Use    Smoking status: Never     Passive exposure: Never    Smokeless tobacco: Never   Substance and Sexual Activity    Alcohol use: No    Drug use: No    Sexual activity: Not Currently     Partners: Male     Social Drivers of Health     Financial Resource Strain: Low Risk  (7/22/2024)    Overall Financial Resource Strain (CARDIA)     Difficulty of Paying Living Expenses: Not very hard   Food Insecurity: No Food Insecurity (7/22/2024)    Hunger Vital Sign     Worried About Running Out of Food in the Last Year: Never true     Ran Out of Food in the Last Year: Never true   Transportation Needs: No Transportation Needs (8/7/2024)    OASIS : Transportation     Lack of Transportation (Medical): No     Lack of Transportation (Non-Medical): No     Patient Unable or Declines to Respond: No   Physical Activity: Insufficiently Active (7/22/2024)    Exercise Vital Sign     Days of Exercise per Week: 4 days     Minutes of Exercise per Session: 30 min   Stress: No Stress Concern Present (7/22/2024)    [employer] - Occupational Stress Questionnaire     Feeling of Stress : Not at all   Housing Stability: Low Risk  (7/22/2024)    Housing Stability Vital Sign     Unable to Pay for Housing in the Last Year: No     Homeless in the Last Year: No       Review of patient's allergies indicates:   Allergen Reactions    Codeine Nausea Only and Other (See Comments)     Patient can Take Tylenol and Hydrocodone       Review of Systems   Constitutional:  Negative for fatigue and fever.   HENT:  Negative for mouth sores and sore throat.    Eyes:  Negative for photophobia and visual disturbance.   Respiratory:  Negative for cough and shortness of breath.    Cardiovascular:  Negative for chest pain.   Gastrointestinal:  Negative for abdominal pain, constipation and diarrhea.   Genitourinary:  Negative for dysuria and hematuria.   Musculoskeletal:  Positive for arthralgias, back  "pain and gait problem. Negative for joint swelling.   Neurological:  Negative for dizziness, weakness and light-headedness.   Hematological:  Does not bruise/bleed easily.   Psychiatric/Behavioral:  Negative for sleep disturbance. The patient is not nervous/anxious.             Objective:     Vitals:    11/14/24 1051   BP: (!) 156/67   BP Location: Left arm   Patient Position: Sitting   Pulse: 67   Resp: 16   Temp: 97.1 °F (36.2 °C)   TempSrc: Temporal   SpO2: 95%   Weight: 68.4 kg (150 lb 12.7 oz)   Height: 5' 1" (1.549 m)        Physical Exam  Vitals reviewed.   Constitutional:       Appearance: Normal appearance.   HENT:      Head: Normocephalic and atraumatic.   Eyes:      General: No scleral icterus.     Pupils: Pupils are equal, round, and reactive to light.   Cardiovascular:      Rate and Rhythm: Normal rate and regular rhythm.      Pulses: Normal pulses.      Heart sounds: Normal heart sounds.   Pulmonary:      Effort: Pulmonary effort is normal.      Breath sounds: Normal breath sounds.   Chest:      Comments: Patient is status post bilateral mastectomy and sentinel lymph node dissection.    The scars are healing well.    There is no signs of local recurrence.    There is no palpable axillary lymphadenopathy.    Abdominal:      General: Bowel sounds are normal. There is no distension.   Musculoskeletal:         General: No swelling.   Lymphadenopathy:      Cervical: No cervical adenopathy.   Skin:     General: Skin is warm.      Findings: No rash.   Neurological:      General: No focal deficit present.      Mental Status: She is alert and oriented to person, place, and time.      Cranial Nerves: No cranial nerve deficit.      Motor: No weakness.   Psychiatric:         Mood and Affect: Mood normal.         Behavior: Behavior normal.                Current Outpatient Medications on File Prior to Visit   Medication Sig    ACCU-CHEK GUIDE TEST STRIPS Strp TEST BLOOD SUGAR ONE TIME DAILY    ACCU-CHEK SOFT DEV " LANCETS Kit     ACCU-CHEK SOFTCLIX LANCETS [facility] [place] [place] TEST BLOOD SUGAR ONE TIME DAILY    alcohol swabs (ALCOHOL PREP) PadM Apply 1 each topically once daily.    anastrozole (ARIMIDEX) 1 mg Tab TAKE 1 TABLET EVERY DAY    apixaban (ELIQUIS) 5 mg Tab TAKE 1 TABLET BY MOUTH TWICE DAILY    ascorbic acid, vitamin C, (VITAMIN C) 250 MG tablet Take 250 mg by mouth once daily. Indications: supplement    atorvastatin (LIPITOR) 40 MG tablet TAKE 1 TABLET ONE TIME DAILY    blood-glucose meter (ACCU-CHEK GUIDE GLUCOSE METER) Misc Check blood glucose one time daily.    glimepiride (AMARYL) 1 MG tablet TAKE 1 TABLET TWICE DAILY    lisinopriL (PRINIVIL,ZESTRIL) 30 MG tablet Take 1 tablet (30 mg total) by mouth once daily.    metoprolol succinate (TOPROL-XL) 25 MG 24 hr tablet TAKE 1 TABLET ONE TIME DAILY    polyethylene glycol (GLYCOLAX) 17 gram/dose powder Take 17 g by mouth once daily. (Patient taking differently: Take 17 g by mouth daily as needed.)    pramipexole (MIRAPEX) 0.125 MG tablet TAKE 1 TABLET EVERY DAY    vits A,C,E/lutein/minerals (HEALTHY EYES ORAL) Take 0.5 tablets by mouth Daily. Indications: eye health    empagliflozin (JARDIANCE) 10 mg tablet TAKE 1 TABLET ONE TIME DAILY (Patient not taking: Reported on 11/14/2024)    furosemide (LASIX) 40 MG tablet Take 1 tablet (40 mg total) by mouth once daily. (Patient not taking: Reported on 11/14/2024)    omeprazole (PRILOSEC) 20 MG capsule Take 1 capsule (20 mg total) by mouth once daily. (Patient not taking: Reported on 11/14/2024)    [DISCONTINUED] EScitalopram oxalate (LEXAPRO) 10 MG tablet Take 1 tablet (10 mg total) by mouth once daily. (Patient not taking: Reported on 11/14/2024)    [DISCONTINUED] HYDROcodone-acetaminophen (NORCO) 5-325 mg per tablet Take 1 tablet by mouth every 6 (six) hours as needed for Pain.     No current facility-administered medications on file prior to visit.       CBC:  Lab Results   Component Value Date    WBC 7.44 08/27/2024    HGB 11.1 (L)  08/27/2024    HCT 33.6 (L) 08/27/2024    MCV 85 08/27/2024     08/27/2024         CMP:  Sodium   Date Value Ref Range Status   10/14/2024 139 136 - 145 mmol/L Final     Potassium   Date Value Ref Range Status   10/14/2024 4.5 3.5 - 5.1 mmol/L Final     Chloride   Date Value Ref Range Status   10/14/2024 106 95 - 110 mmol/L Final     CO2   Date Value Ref Range Status   10/14/2024 22 (L) 23 - 29 mmol/L Final     Glucose   Date Value Ref Range Status   10/14/2024 136 (H) 70 - 110 mg/dL Final     BUN   Date Value Ref Range Status   10/14/2024 28 (H) 8 - 23 mg/dL Final     Creatinine   Date Value Ref Range Status   10/14/2024 1.2 0.5 - 1.4 mg/dL Final     Calcium   Date Value Ref Range Status   10/14/2024 10.5 8.7 - 10.5 mg/dL Final     Total Protein   Date Value Ref Range Status   04/15/2024 6.9 6.0 - 8.4 g/dL Final     Albumin   Date Value Ref Range Status   04/15/2024 3.9 3.5 - 5.2 g/dL Final     Total Bilirubin   Date Value Ref Range Status   04/15/2024 0.5 0.1 - 1.0 mg/dL Final     Comment:     For infants and newborns, interpretation of results should be based  on gestational age, weight and in agreement with clinical  observations.    Premature Infant recommended reference ranges:  Up to 24 hours.............<8.0 mg/dL  Up to 48 hours............<12.0 mg/dL  3-5 days..................<15.0 mg/dL  6-29 days.................<15.0 mg/dL       Alkaline Phosphatase   Date Value Ref Range Status   04/15/2024 81 55 - 135 U/L Final     AST   Date Value Ref Range Status   04/15/2024 21 10 - 40 U/L Final     ALT   Date Value Ref Range Status   04/15/2024 19 10 - 44 U/L Final     Anion Gap   Date Value Ref Range Status   10/14/2024 11 8 - 16 mmol/L Final     eGFR if    Date Value Ref Range Status   05/24/2022 44 (A) >60 mL/min/1.73 m^2 Final     eGFR if non    Date Value Ref Range Status   05/24/2022 38 (A) >60 mL/min/1.73 m^2 Final     Comment:     Calculation used to obtain the  "estimated glomerular filtration  rate (eGFR) is the CKD-EPI equation.               ECOG SCORE    1 - Restricted in strenuous activity-ambulatory and able to carry out work of a light nature              Assessment/Plan:       Bilateral invasive ductal carcinoma of breasts  - I have reviewed with the patient medical record including her laboratory radiologic and pathologic findings.  - mammogram on 6/21/22 revealed bilateral breast masses.  - biopsy of breast masses (6/28/22) revealed invasive ductal carcinoma with lobular features of the left breast (ER/AK-positive, HER2-negative) and invasive ductal carcinoma of right breast (ER/AK-positive, HER2- [by FISH]), and metastatic ductal carcinoma to right axillary lymph node.  - she met with breast surgery on 7/6/22. They offered surgical resection of bilateral breast cancers, but wanted to see results of PET/CT scan first. At the visit, she expressed hesitancy about proceeding with surgery.  - PET/CT (7/15/22) revealed localized disease. There was "focal subtle right pleural thickening with faint radiotracer uptake, metastatic lesion is not excluded." pulmonary micronodules are also noted of unclear significance  - she underwent bilateral mastectomy on 8/15/22.  - pathology:  Left breast: 22mm grade 1 invasive carcinoma with mixed ductal and lobular features, 1 positive lymph node  -right breast: multiple foci (23, 21, 22, 4mm) invasive carcinoma with mixed ductal and lobular features, 7 of 28 lymph nodes positive.  --her clinical stage prognosis and plan of care was reviewed at length.  - she will continue on adjuvant hormonal therapy with anastrozole x 5 years.  - she completed radiation therapy in November 2022.  -the patient will have blood workup drawn today for CBC CMP and vitamin D    Left hip pain.    The patient will be sent to have an x-ray of her left hip.    She was advised to take Tylenol arthritis to help with her pain.  In case her pain persist she will " be scheduled to undergo a PET scan       Type 2 diabetes  Lab Results   Component Value Date    HGBA1C 7.1 (H) 10/14/2024   On Jardiance and Amaryl.     Chronic kidney disease stage 3b  Avoid nephrotoxic medication.    Follow up BMP.    Normocytic anemia.    Hemoglobin 11.3 g/dl in August 20, 2024.    Most likely reflecting anemia of chronic disease.    No indication for EPO until hemoglobin dropped below 10 g/dl  She will have blood workup drawn today for CBC CMP ferritin vitamin B12 SPEP with immunofixation     Osteopenia of left femoral neck  - bone density test (11/22/22) revealed osteopenia of left femoral neck.   -She decided not to proceed with zometa q3 months.   -she was advised to start taking calcium/vitamin D.  -she will be scheduled to undergo a repeat bone density after November 22, 2024.      Hypertension and dyslipidemia.    On atorvastatin lasix and lisinopril.    PAF:  On metoprolol and Eliquis    Current use of long term anticoagulation:  The patient was educated about the benefits and the risk of being on NOAC.  Even though they do not require close monitoring and they are not affected by the diet or have drug drug interactions there are not easy reversible.  She was advised to report to the emergency room in case she has any trauma to the head or s/p fall.  The patient verbalized understanding.     Personal and family history of breast cancer.    The patient will be referred to the genetic Department.     Overweight BMI 28.49  -A higher BMI and/or perimenopausal weight gain have been consistently associated with a higher risk of breast cancer among postmenopausal women.The association between a higher BMI and postmenopausal breast cancer risk may be mediated by higher estrogen levels resulting from the peripheral conversion of estrogen precursors (from adipose tissue) to estrogen. Overweight, Obesity, and Postmenopausal Invasive Breast Cancer Risk: A Secondary Analysis of the Women's Health  Initiative Randomized Clinical Trials.  ROSE Oncol. 2015;1(5):611.   In I discussed these findings the patient was advised to exercise maintain healthy lifestyle and to lose weight.          Med Onc Chart Routing      Follow up with physician 1 month. to discuss the results of blood drawn today as below. Schedule a bone density after 11/22//2024. Schedule Xray left hip Xray AP/L. Referral to genetic ( virtual)   Follow up with ROSANGELA    Infusion scheduling note    Injection scheduling note    Labs CBC, CMP, ferritin, SPEP and vitamin B12   Scheduling:  Preferred lab:  Lab interval:  IF, Vitamin D   Imaging    Pharmacy appointment    Other referrals                   Plan was discussed with the patient at length, and she verbalized understanding. Caro was given an opportunity to ask questions that were answered to her satisfaction, and she was advised to call in the interval if any problems or questions arise.  Signed:  Charlene Rosa MD   Hematology and Oncology  [facility] - HEMATOLOGY ONCOLOGY  [facility], [place]

## 2024-11-15 ENCOUNTER — TELEPHONE (OUTPATIENT)
Dept: PRIMARY CARE CLINIC | Facility: CLINIC | Age: 84
End: 2024-11-15
Payer: MEDICARE

## 2024-11-15 NOTE — TELEPHONE ENCOUNTER
Patient states she had a pain on the left side of her back that she told Dr. Rosa about. Dr. Rosa ordered labs and a hip xray. Patient is calling to ask Dr. Reyes to review the results of the labs and xray that Dr. Rosa ordered.     Patient also took herself off her Jardiance because she saw on a commercial that it could affect her kidneys. She now wants to know what she should do about her diabetes. She has continued taking her glimepiride 1mg BID.

## 2024-11-15 NOTE — TELEPHONE ENCOUNTER
----- Message from Mariam sent at 11/15/2024 12:31 PM CST -----  Contact: [phone]  2TESTRESULTS    Type: Test Results    What test was performed?lab and xray    Who ordered the test?    When and where were the test performed? 11/14/2024 [facility]    Would you like a call back and or thru [facility]:call back    Comments:

## 2024-11-15 NOTE — TELEPHONE ENCOUNTER
It MAY cause renal impairment which we can tell from labs so we are aware if the medicine is affecting her. And its been show to  not only help glucose control but can slow renal disease progression ie HELPS the kidneys in diabetics. Please dont stop your medications then ask me what to do about glucose control.   Xray shows no explanation for back pain. Likely Msk, For now try a salon pas some tylenol and topical pain medicine. May send to PT and that often helps if its muscles.   Id like dr gallegos to review the labs if that's ok. Id rather not misinterpret them.

## 2024-11-20 DIAGNOSIS — E11.40 TYPE 2 DIABETES MELLITUS WITH DIABETIC NEUROPATHY, WITHOUT LONG-TERM CURRENT USE OF INSULIN: Primary | Chronic | ICD-10-CM

## 2024-11-20 DIAGNOSIS — G25.81 RESTLESS LEG SYNDROME: ICD-10-CM

## 2024-11-20 DIAGNOSIS — K21.9 GASTROESOPHAGEAL REFLUX DISEASE, UNSPECIFIED WHETHER ESOPHAGITIS PRESENT: ICD-10-CM

## 2024-11-20 DIAGNOSIS — F41.9 ANXIETY: ICD-10-CM

## 2024-11-21 ENCOUNTER — OFFICE VISIT (OUTPATIENT)
Dept: PODIATRY | Facility: CLINIC | Age: 84
End: 2024-11-21
Payer: MEDICARE

## 2024-11-21 VITALS — WEIGHT: 150.81 LBS | HEIGHT: 61 IN | BODY MASS INDEX: 28.47 KG/M2

## 2024-11-21 DIAGNOSIS — L84 CORN OR CALLUS: ICD-10-CM

## 2024-11-21 DIAGNOSIS — M19.079 ARTHRITIS OF JOINT OF TOE: ICD-10-CM

## 2024-11-21 DIAGNOSIS — E11.42 DIABETIC POLYNEUROPATHY ASSOCIATED WITH TYPE 2 DIABETES MELLITUS: Primary | ICD-10-CM

## 2024-11-21 DIAGNOSIS — Z89.419 HISTORY OF AMPUTATION OF GREAT TOE: ICD-10-CM

## 2024-11-21 PROCEDURE — 1101F PT FALLS ASSESS-DOCD LE1/YR: CPT | Mod: HCNC,CPTII,S$GLB, | Performed by: PODIATRIST

## 2024-11-21 PROCEDURE — 1126F AMNT PAIN NOTED NONE PRSNT: CPT | Mod: HCNC,CPTII,S$GLB, | Performed by: PODIATRIST

## 2024-11-21 PROCEDURE — 99213 OFFICE O/P EST LOW 20 MIN: CPT | Mod: HCNC,S$GLB,, | Performed by: PODIATRIST

## 2024-11-21 PROCEDURE — 99999 PR PBB SHADOW E&M-EST. PATIENT-LVL II: CPT | Mod: PBBFAC,HCNC,, | Performed by: PODIATRIST

## 2024-11-21 PROCEDURE — 3072F LOW RISK FOR RETINOPATHY: CPT | Mod: HCNC,CPTII,S$GLB, | Performed by: PODIATRIST

## 2024-11-21 PROCEDURE — 1159F MED LIST DOCD IN RCRD: CPT | Mod: HCNC,CPTII,S$GLB, | Performed by: PODIATRIST

## 2024-11-21 PROCEDURE — 3288F FALL RISK ASSESSMENT DOCD: CPT | Mod: HCNC,CPTII,S$GLB, | Performed by: PODIATRIST

## 2024-11-21 RX ORDER — ESCITALOPRAM OXALATE 10 MG/1
10 TABLET ORAL DAILY
Qty: 90 TABLET | Refills: 1 | Status: SHIPPED | OUTPATIENT
Start: 2024-11-21

## 2024-11-21 RX ORDER — OMEPRAZOLE 20 MG/1
20 CAPSULE, DELAYED RELEASE ORAL DAILY
Qty: 90 CAPSULE | Refills: 1 | Status: SHIPPED | OUTPATIENT
Start: 2024-11-21

## 2024-11-21 RX ORDER — PRAMIPEXOLE DIHYDROCHLORIDE 0.12 MG/1
0.12 TABLET ORAL NIGHTLY
Qty: 90 TABLET | Refills: 1 | Status: SHIPPED | OUTPATIENT
Start: 2024-11-21

## 2024-11-22 ENCOUNTER — PATIENT MESSAGE (OUTPATIENT)
Dept: PODIATRY | Facility: CLINIC | Age: 84
End: 2024-11-22
Payer: MEDICARE

## 2024-11-24 NOTE — PROGRESS NOTES
Subjective:     Patient ID: Caro Powell is a 84 y.o. female.    Chief Complaint: Post-op Evaluation (F/u on shoe inserts)  Patient presents to clinic for a follow up regarding custom orthotics as it pertains to the amputation site of the Rt. Great toe.  She notes the fit seems good, however, is now developing discoloration to the dorsal lateral aspect of bilateral 5th toe.  Notes this is occasionally a source of sensitivity.  Denies noticing skin breakdown.  Inquires as to how this should be addressed.  Denies any additional pedal complaints.      Past Medical History:   Diagnosis Date    Anticoagulant long-term use     Arthritis     Atrial flutter     Calcium nephrolithiasis 2007    ckd 3    Diabetes mellitus, type 2     Diabetic peripheral neuropathy associated with type 2 diabetes mellitus     Difficult intubation     NARROW AIRWAY    Disc displacement, lumbar     Hypertension     Invasive ductal carcinoma of left breast 07/06/2022    Mixed hyperlipidemia     Pulmonary aspiration of gastric contents     Shingles        Past Surgical History:   Procedure Laterality Date    ANGIOGRAPHY OF LOWER EXTREMITY N/A 10/21/2021    Procedure: Angiogram Extremity Unilateral;  Surgeon: Dre Martino MD;  Location: [facility] CATH LAB/EP;  Service: Cardiology;  Laterality: N/A;    ANGIOGRAPHY OF LOWER EXTREMITY Right 11/10/2021    Procedure: Angiogram Extremity Unilateral;  Surgeon: Ben Rooney MD;  Location: [facility] CATH LAB/EP;  Service: Cardiology;  Laterality: Right;    AXILLARY NODE DISSECTION Right 08/15/2022    Procedure: LYMPHADENECTOMY, AXILLARY RIGHT;  Surgeon: RADHA Quinn MD;  Location: [facility] OR;  Service: General;  Laterality: Right;    CATARACT EXTRACTION      COLONOSCOPY  11/28/2011    sigmoid diverticulosis, external hemorrhoids    COLONOSCOPY      DEBRIDEMENT Right 10/20/2021    Procedure: DEBRIDEMENT;  Surgeon: Ava Atkins DPM;  Location: [facility] OR;  Service: Podiatry;  Laterality: Right;     ENDOSCOPIC GASTROCNEMIUS RECESSION Right 09/10/2019    Procedure: RECESSION, GASTROCNEMIUS, ENDOSCOPIC;  Surgeon: Derek Jose DPM;  Location: [facility];  Service: Podiatry;  Laterality: Right;  Arthrex center line (ron notified)  Video    EXTRACORPOREAL SHOCK WAVE LITHOTRIPSY      FLEXOR TENOTOMY Right 10/20/2021    Procedure: TENOTOMY, FLEXOR 3rd toe;  Surgeon: Ava Atkins DPM;  Location: [facility];  Service: Podiatry;  Laterality: Right;    HYSTERECTOMY      INJECTION FOR SENTINEL NODE IDENTIFICATION Left 08/15/2022    Procedure: INJECTION, FOR SENTINEL NODE IDENTIFICATION;  Surgeon: RADHA Quinn MD;  Location: [facility];  Service: General;  Laterality: Left;    MASTECTOMY Bilateral 08/15/2022    Procedure: MASTECTOMY BILATERAL  / BREAST;  Surgeon: RADHA Quinn MD;  Location: [facility];  Service: General;  Laterality: Bilateral;  5 HOURS / EMAIL SENT 8-11 @ 9:02 LK    SENTINEL LYMPH NODE BIOPSY Left 08/15/2022    Procedure: BIOPSY, LYMPH NODE, SENTINEL LEFT;  Surgeon: RADHA Quinn MD;  Location: [facility];  Service: General;  Laterality: Left;    SHOULDER SURGERY Left     TOE AMPUTATION Right 05/22/2017    5th toe    TOE AMPUTATION Right 10/20/2021    Procedure: AMPUTATION, TOE;  Surgeon: Ava Atkins DPM;  Location: [facility];  Service: Podiatry;  Laterality: Right;    TOE AMPUTATION Right 8/27/2024    Procedure: AMPUTATION, TOE, Great toe;  Surgeon: Brijesh Miles DPM;  Location: [facility];  Service: Podiatry;  Laterality: Right;    TONSILLECTOMY         Family History   Problem Relation Name Age of Onset    Diabetes Mother      Heart failure Father      No Known Problems Sister      Breast cancer Sister  69        Genetic testing negative    Kidney failure Brother      Breast cancer Maternal Aunt          early 40s    Diabetes Maternal Grandmother      No Known Problems Maternal Grandfather      No Known Problems Paternal Grandmother      No Known Problems Paternal Grandfather      Glaucoma  Neg Hx      Cataracts Neg Hx      Macular degeneration Neg Hx      Retinal detachment Neg Hx      Strabismus Neg Hx         Social History     Socioeconomic History    Marital status:    Tobacco Use    Smoking status: Never     Passive exposure: Never    Smokeless tobacco: Never   Substance and Sexual Activity    Alcohol use: No    Drug use: No    Sexual activity: Not Currently     Partners: Male     Social Drivers of Health     Financial Resource Strain: Low Risk  (7/22/2024)    Overall Financial Resource Strain (CARDIA)     Difficulty of Paying Living Expenses: Not very hard   Food Insecurity: No Food Insecurity (7/22/2024)    Hunger Vital Sign     Worried About Running Out of Food in the Last Year: Never true     Ran Out of Food in the Last Year: Never true   Transportation Needs: No Transportation Needs (8/7/2024)    OASIS : Transportation     Lack of Transportation (Medical): No     Lack of Transportation (Non-Medical): No     Patient Unable or Declines to Respond: No   Physical Activity: Insufficiently Active (7/22/2024)    Exercise Vital Sign     Days of Exercise per Week: 4 days     Minutes of Exercise per Session: 30 min   Stress: No Stress Concern Present (7/22/2024)    [employer] - Occupational Stress Questionnaire     Feeling of Stress : Not at all   Housing Stability: Low Risk  (7/22/2024)    Housing Stability Vital Sign     Unable to Pay for Housing in the Last Year: No     Homeless in the Last Year: No       Current Outpatient Medications   Medication Sig Dispense Refill    ACCU-CHEK GUIDE TEST STRIPS Strp TEST BLOOD SUGAR ONE TIME DAILY 100 strip 3    ACCU-CHEK SOFT DEV LANCETS Kit       ACCU-CHEK SOFTCLIX LANCETS Misc TEST BLOOD SUGAR ONE TIME DAILY 100 each 3    alcohol swabs (ALCOHOL PREP) PadM Apply 1 each topically once daily. 140 each 2    anastrozole (ARIMIDEX) 1 mg Tab TAKE 1 TABLET EVERY DAY 90 tablet 3    apixaban (ELIQUIS) 5 mg Tab TAKE 1 TABLET BY  MOUTH TWICE DAILY 180 tablet 3    ascorbic acid, vitamin C, (VITAMIN C) 250 MG tablet Take 250 mg by mouth once daily. Indications: supplement      atorvastatin (LIPITOR) 40 MG tablet TAKE 1 TABLET ONE TIME DAILY 90 tablet 3    blood-glucose meter (ACCU-CHEK GUIDE GLUCOSE METER) Misc Check blood glucose one time daily. 1 each 0    empagliflozin (JARDIANCE) 10 mg tablet Take 1 tablet (10 mg total) by mouth once daily. 90 tablet 1    EScitalopram oxalate (LEXAPRO) 10 MG tablet Take 1 tablet (10 mg total) by mouth once daily. 90 tablet 1    furosemide (LASIX) 40 MG tablet Take 1 tablet (40 mg total) by mouth once daily. (Patient not taking: Reported on 11/14/2024) 90 tablet 1    glimepiride (AMARYL) 1 MG tablet Take 1 tablet (1 mg total) by mouth 2 (two) times a day. 180 tablet 0    lisinopriL (PRINIVIL,ZESTRIL) 30 MG tablet Take 1 tablet (30 mg total) by mouth once daily. 90 tablet 0    metoprolol succinate (TOPROL-XL) 25 MG 24 hr tablet TAKE 1 TABLET ONE TIME DAILY 90 tablet 3    omeprazole (PRILOSEC) 20 MG capsule Take 1 capsule (20 mg total) by mouth once daily. 90 capsule 1    polyethylene glycol (GLYCOLAX) 17 gram/dose powder Take 17 g by mouth once daily. (Patient taking differently: Take 17 g by mouth daily as needed.) 510 g 0    pramipexole (MIRAPEX) 0.125 MG tablet Take 1 tablet (0.125 mg total) by mouth every evening. 90 tablet 1    vits A,C,E/lutein/minerals (HEALTHY EYES ORAL) Take 0.5 tablets by mouth Daily. Indications: eye health       No current facility-administered medications for this visit.       Review of patient's allergies indicates:   Allergen Reactions    Codeine Nausea Only and Other (See Comments)     Patient can Take Tylenol and Hydrocodone        Last encounter in this department: 6/17/2024    Hemoglobin A1C   Date Value Ref Range Status   10/14/2024 7.1 (H) 4.0 - 5.6 % Final     Comment:     ADA Screening Guidelines:  5.7-6.4%  Consistent with prediabetes  >or=6.5%  Consistent with  diabetes    High levels of fetal hemoglobin interfere with the HbA1C  assay. Heterozygous hemoglobin variants (HbS, HgC, etc)do  not significantly interfere with this assay.   However, presence of multiple variants may affect accuracy.     08/27/2024 6.8 (H) 0.0 - 5.6 % Final     Comment:     Reference Interval:  5.0 - 5.6 Normal   5.7 - 6.4 High Risk   > 6.5 Diabetic      Hgb A1c results are standardized based on the (NGSP) National   Glycohemoglobin Standardization Program.      Hemoglobin A1C levels are related to mean serum/plasma glucose   during the preceding 2-3 months.        04/15/2024 7.0 (H) 4.0 - 5.6 % Final     Comment:     ADA Screening Guidelines:  5.7-6.4%  Consistent with prediabetes  >or=6.5%  Consistent with diabetes    High levels of fetal hemoglobin interfere with the HbA1C  assay. Heterozygous hemoglobin variants (HbS, HgC, etc)do  not significantly interfere with this assay.   However, presence of multiple variants may affect accuracy.     01/12/2018 8.3 % Final       Review of Systems   Constitutional: Negative for chills and fever.   Cardiovascular:  Negative for claudication and leg swelling.   Skin:  Negative for color change, nail changes and poor wound healing.   Musculoskeletal:  Negative for joint swelling, muscle cramps and muscle weakness.   Gastrointestinal:  Negative for nausea and vomiting.   Neurological:  Positive for numbness.   Psychiatric/Behavioral:  Negative for altered mental status.         Objective:     Physical Exam  Constitutional:       Appearance: Normal appearance. She is not ill-appearing.   Cardiovascular:      Pulses:           Dorsalis pedis pulses are 2+ on the right side and 2+ on the left side.        Posterior tibial pulses are 2+ on the right side and 2+ on the left side.      Comments: CFT is < 3 seconds bilateral.  Pedal hair growth is decreased bilateral.  Varicosities noted bilateral.  Mild edema noted to the Rt. Hallux amputation site.  Toes are warm  to touch bilateral.    Musculoskeletal:         General: No tenderness or signs of injury.      Right lower leg: No edema.      Left lower leg: No edema.      Comments: Muscle strength 5/5 in all muscle groups bilateral.  No tenderness nor crepitation with ROM of foot/ankle joints bilateral.  (-) for pain with palpation of the Rt. Hallux amputation site.     Skin:     Capillary Refill: Capillary refill takes 2 to 3 seconds.      Findings: Lesion present. No bruising, ecchymosis, erythema, signs of injury, laceration, petechiae, rash or wound.      Comments: Incision for the Rt. Hallux amputation remains well healed.  Shearing and pink discoloration noted to bilateral dorsal lateral 5th PIP joints.  Callus build up noted to the plantar aspect of the Lt. Hallux IP joint.    Neurological:      Mental Status: She is alert.      Sensory: Sensory deficit present.      Motor: No weakness or atrophy.      Comments: Protective sensation is absent bilateral.  Light touch is absent bilateral.             Assessment:      Encounter Diagnoses   Name Primary?    Diabetic polyneuropathy associated with type 2 diabetes mellitus Yes    History of amputation of great toe     Corn or callus     Arthritis of joint of toe - Left Foot        Plan:     Caro was seen today for post-op evaluation.    Diagnoses and all orders for this visit:    Diabetic polyneuropathy associated with type 2 diabetes mellitus    History of amputation of great toe    Corn or callus    Arthritis of joint of toe - Left Foot        I counseled the patient on her conditions, their implications and medical management.    Based on today's exam, she is getting mild pressure injury to the dorsal lateral PIP joints of bilateral 5th toe.  Advised to discontinue use of current shoe gear, as continued use will result in ulceration.  Advised to try SAS shoes, as these will help to accommodate the current orthotics.      She is continuing to develop thick callus build  up to the plantar aspect of the Lt. Great toe.  I expressed the future need for an arthroplasty of the hallux IP joint to prevent this going forward.  Patient to begin the process of obtaining surgical clearance so that this can be performed early next year.      In the meantime, she is to inspect the Lt. Hallux daily for any new signs of skin breakdown/infection.    Shoe inspection. Diabetic Foot Education. Patient reminded of the importance of good nutrition and blood sugar control to help prevent podiatric complications of diabetes. Patient instructed on proper foot hygeine. We discussed wearing proper shoe gear, daily foot inspections, never walking without protective shoe gear, never putting sharp instruments to feet    Patient instructed to inspect her feet, wear protective shoe gear when ambulatory, moisturizer to maintain skin integrity.    RTC in 3 months for a routine diabetic foot exam.    Brijesh Miles DPM

## 2025-01-16 DIAGNOSIS — I10 ESSENTIAL HYPERTENSION: ICD-10-CM

## 2025-01-16 RX ORDER — LISINOPRIL 30 MG/1
30 TABLET ORAL DAILY
Qty: 90 TABLET | Refills: 3 | Status: SHIPPED | OUTPATIENT
Start: 2025-01-16

## 2025-02-27 ENCOUNTER — LAB VISIT (OUTPATIENT)
Dept: LAB | Facility: HOSPITAL | Age: 85
End: 2025-02-27
Attending: INTERNAL MEDICINE
Payer: MEDICARE

## 2025-02-27 ENCOUNTER — OFFICE VISIT (OUTPATIENT)
Dept: PRIMARY CARE CLINIC | Facility: CLINIC | Age: 85
End: 2025-02-27
Payer: MEDICARE

## 2025-02-27 VITALS
TEMPERATURE: 98 F | OXYGEN SATURATION: 98 % | BODY MASS INDEX: 28.91 KG/M2 | SYSTOLIC BLOOD PRESSURE: 132 MMHG | HEIGHT: 61 IN | DIASTOLIC BLOOD PRESSURE: 60 MMHG | HEART RATE: 77 BPM | WEIGHT: 153.13 LBS

## 2025-02-27 DIAGNOSIS — E11.42 DIABETIC POLYNEUROPATHY ASSOCIATED WITH TYPE 2 DIABETES MELLITUS: ICD-10-CM

## 2025-02-27 DIAGNOSIS — I10 ESSENTIAL HYPERTENSION: ICD-10-CM

## 2025-02-27 DIAGNOSIS — N18.32 CHRONIC KIDNEY DISEASE, STAGE 3B: ICD-10-CM

## 2025-02-27 DIAGNOSIS — F41.9 ANXIETY: ICD-10-CM

## 2025-02-27 DIAGNOSIS — E78.2 MIXED HYPERLIPIDEMIA: ICD-10-CM

## 2025-02-27 DIAGNOSIS — E11.40 TYPE 2 DIABETES MELLITUS WITH DIABETIC NEUROPATHY, WITHOUT LONG-TERM CURRENT USE OF INSULIN: ICD-10-CM

## 2025-02-27 DIAGNOSIS — E11.52 TYPE 2 DIABETES MELLITUS WITH DIABETIC PERIPHERAL ANGIOPATHY AND GANGRENE, WITHOUT LONG-TERM CURRENT USE OF INSULIN: ICD-10-CM

## 2025-02-27 DIAGNOSIS — I73.9 PAD (PERIPHERAL ARTERY DISEASE): ICD-10-CM

## 2025-02-27 DIAGNOSIS — I48.91 ATRIAL FIBRILLATION WITH RAPID VENTRICULAR RESPONSE: ICD-10-CM

## 2025-02-27 DIAGNOSIS — I48.0 PAROXYSMAL ATRIAL FIBRILLATION: ICD-10-CM

## 2025-02-27 DIAGNOSIS — N18.31 STAGE 3A CHRONIC KIDNEY DISEASE: ICD-10-CM

## 2025-02-27 DIAGNOSIS — G25.81 RESTLESS LEG SYNDROME: Primary | ICD-10-CM

## 2025-02-27 DIAGNOSIS — C50.911 INVASIVE DUCTAL CARCINOMA OF RIGHT BREAST: ICD-10-CM

## 2025-02-27 DIAGNOSIS — K21.9 GASTROESOPHAGEAL REFLUX DISEASE, UNSPECIFIED WHETHER ESOPHAGITIS PRESENT: ICD-10-CM

## 2025-02-27 PROBLEM — C50.512 MALIGNANT NEOPLASM OF LOWER-OUTER QUADRANT OF LEFT BREAST OF FEMALE, ESTROGEN RECEPTOR POSITIVE: Status: RESOLVED | Noted: 2024-07-11 | Resolved: 2025-02-27

## 2025-02-27 PROBLEM — Z17.0 MALIGNANT NEOPLASM OF LOWER-OUTER QUADRANT OF LEFT BREAST OF FEMALE, ESTROGEN RECEPTOR POSITIVE: Status: RESOLVED | Noted: 2024-07-11 | Resolved: 2025-02-27

## 2025-02-27 LAB
ALBUMIN SERPL BCP-MCNC: 3.9 G/DL (ref 3.5–5.2)
ALP SERPL-CCNC: 114 U/L (ref 40–150)
ALT SERPL W/O P-5'-P-CCNC: 32 U/L (ref 10–44)
ANION GAP SERPL CALC-SCNC: 9 MMOL/L (ref 8–16)
AST SERPL-CCNC: 45 U/L (ref 10–40)
BASOPHILS # BLD AUTO: 0.04 K/UL (ref 0–0.2)
BASOPHILS NFR BLD: 0.5 % (ref 0–1.9)
BILIRUB SERPL-MCNC: 0.7 MG/DL (ref 0.1–1)
BUN SERPL-MCNC: 24 MG/DL (ref 8–23)
CALCIUM SERPL-MCNC: 9.7 MG/DL (ref 8.7–10.5)
CHLORIDE SERPL-SCNC: 110 MMOL/L (ref 95–110)
CHOLEST SERPL-MCNC: 155 MG/DL (ref 120–199)
CHOLEST/HDLC SERPL: 3.4 {RATIO} (ref 2–5)
CO2 SERPL-SCNC: 21 MMOL/L (ref 23–29)
CREAT SERPL-MCNC: 1.3 MG/DL (ref 0.5–1.4)
DIFFERENTIAL METHOD BLD: ABNORMAL
EOSINOPHIL # BLD AUTO: 0.2 K/UL (ref 0–0.5)
EOSINOPHIL NFR BLD: 2 % (ref 0–8)
ERYTHROCYTE [DISTWIDTH] IN BLOOD BY AUTOMATED COUNT: 15 % (ref 11.5–14.5)
EST. GFR  (NO RACE VARIABLE): 40.3 ML/MIN/1.73 M^2
ESTIMATED AVG GLUCOSE: 166 MG/DL (ref 68–131)
GLUCOSE SERPL-MCNC: 130 MG/DL (ref 70–110)
HBA1C MFR BLD: 7.4 % (ref 4–5.6)
HCT VFR BLD AUTO: 34.4 % (ref 37–48.5)
HDLC SERPL-MCNC: 46 MG/DL (ref 40–75)
HDLC SERPL: 29.7 % (ref 20–50)
HGB BLD-MCNC: 11.3 G/DL (ref 12–16)
IMM GRANULOCYTES # BLD AUTO: 0.02 K/UL (ref 0–0.04)
IMM GRANULOCYTES NFR BLD AUTO: 0.3 % (ref 0–0.5)
LDLC SERPL CALC-MCNC: 80.8 MG/DL (ref 63–159)
LYMPHOCYTES # BLD AUTO: 1.7 K/UL (ref 1–4.8)
LYMPHOCYTES NFR BLD: 22.1 % (ref 18–48)
MCH RBC QN AUTO: 28.3 PG (ref 27–31)
MCHC RBC AUTO-ENTMCNC: 32.8 G/DL (ref 32–36)
MCV RBC AUTO: 86 FL (ref 82–98)
MONOCYTES # BLD AUTO: 0.7 K/UL (ref 0.3–1)
MONOCYTES NFR BLD: 9.2 % (ref 4–15)
NEUTROPHILS # BLD AUTO: 5.1 K/UL (ref 1.8–7.7)
NEUTROPHILS NFR BLD: 65.9 % (ref 38–73)
NONHDLC SERPL-MCNC: 109 MG/DL
NRBC BLD-RTO: 0 /100 WBC
PLATELET # BLD AUTO: 266 K/UL (ref 150–450)
PMV BLD AUTO: 11.4 FL (ref 9.2–12.9)
POTASSIUM SERPL-SCNC: 4.6 MMOL/L (ref 3.5–5.1)
PROT SERPL-MCNC: 7 G/DL (ref 6–8.4)
RBC # BLD AUTO: 3.99 M/UL (ref 4–5.4)
SODIUM SERPL-SCNC: 140 MMOL/L (ref 136–145)
TRIGL SERPL-MCNC: 141 MG/DL (ref 30–150)
TSH SERPL DL<=0.005 MIU/L-ACNC: 0.71 UIU/ML (ref 0.4–4)
WBC # BLD AUTO: 7.69 K/UL (ref 3.9–12.7)

## 2025-02-27 PROCEDURE — 3078F DIAST BP <80 MM HG: CPT | Mod: CPTII,S$GLB,, | Performed by: INTERNAL MEDICINE

## 2025-02-27 PROCEDURE — 84443 ASSAY THYROID STIM HORMONE: CPT | Performed by: INTERNAL MEDICINE

## 2025-02-27 PROCEDURE — 1125F AMNT PAIN NOTED PAIN PRSNT: CPT | Mod: CPTII,S$GLB,, | Performed by: INTERNAL MEDICINE

## 2025-02-27 PROCEDURE — 83036 HEMOGLOBIN GLYCOSYLATED A1C: CPT | Performed by: INTERNAL MEDICINE

## 2025-02-27 PROCEDURE — 80061 LIPID PANEL: CPT | Performed by: INTERNAL MEDICINE

## 2025-02-27 PROCEDURE — 1158F ADVNC CARE PLAN TLK DOCD: CPT | Mod: CPTII,S$GLB,, | Performed by: INTERNAL MEDICINE

## 2025-02-27 PROCEDURE — 3288F FALL RISK ASSESSMENT DOCD: CPT | Mod: CPTII,S$GLB,, | Performed by: INTERNAL MEDICINE

## 2025-02-27 PROCEDURE — 80053 COMPREHEN METABOLIC PANEL: CPT | Performed by: INTERNAL MEDICINE

## 2025-02-27 PROCEDURE — 1160F RVW MEDS BY RX/DR IN RCRD: CPT | Mod: CPTII,S$GLB,, | Performed by: INTERNAL MEDICINE

## 2025-02-27 PROCEDURE — 85025 COMPLETE CBC W/AUTO DIFF WBC: CPT | Performed by: INTERNAL MEDICINE

## 2025-02-27 PROCEDURE — 1101F PT FALLS ASSESS-DOCD LE1/YR: CPT | Mod: CPTII,S$GLB,, | Performed by: INTERNAL MEDICINE

## 2025-02-27 PROCEDURE — 99214 OFFICE O/P EST MOD 30 MIN: CPT | Mod: S$GLB,,, | Performed by: INTERNAL MEDICINE

## 2025-02-27 PROCEDURE — 36415 COLL VENOUS BLD VENIPUNCTURE: CPT | Mod: PO | Performed by: INTERNAL MEDICINE

## 2025-02-27 PROCEDURE — 99999 PR PBB SHADOW E&M-EST. PATIENT-LVL V: CPT | Mod: PBBFAC,,, | Performed by: INTERNAL MEDICINE

## 2025-02-27 PROCEDURE — 1159F MED LIST DOCD IN RCRD: CPT | Mod: CPTII,S$GLB,, | Performed by: INTERNAL MEDICINE

## 2025-02-27 PROCEDURE — 3075F SYST BP GE 130 - 139MM HG: CPT | Mod: CPTII,S$GLB,, | Performed by: INTERNAL MEDICINE

## 2025-02-27 NOTE — PROGRESS NOTES
INTERNAL MEDICINE PROGRESS/URGENT CARE NOTE    CHIEF COMPLAINT     Chief Complaint   Patient presents with    Follow-up     Patient presents for her 3 month follow up appointment.       TIM Powell is a 84 y.o.  female who presents with a PMHx of  breast cancer, afib, CKD 3, arthritis, DM2, HLD, HTN, who presents today for routine follow up visit.    Her   a few months ago after a long glover with dementia. She then moved in with her son and theyre both agreeably happy with the arrangement      Was seeing her monthly mostly due to the vascular ulcers but now its healed and she's doing better.      Her main complaints and concerns are:     At her previous visits, we have discussed:   Nonhealing right foot wound. Has seen podiatry. Has now healed per patient.   On ROS, she has poor balance but has not had a fall. We discussed needing excercises. Complicated as she was told by podiatry to avoid pressure. We had a long discussion about chair aerobics and I've given her info on how to get that done.   Recently bought shoe inserts to try to help with he recurrent ulcers.   PAD: Did have the US legs done which showed 75% blockage. Has consulted with cardiovascular who plans to monitor and manage conservatively  GERD: was on prilosec. Was on prilosec but stopped it due to something she read. So she will now go back on.   Off balance. Says she is a bit weak. We discussed exercise  and she will try to do some on her own. Declined PT at this time. Tried the foot pedal but says it gave her a sore at her callus.         History of breast cancer: diagnosed in  august had mastectomy and radiation.    Needs to establish with breast surgeon over here. Referral placed     DM2: last hgb a1c on file is up to 7.1 from 6.5 Her medications include amaryl 1mg. On ACE-I and statin therapy   Due for eye exam will call her doctor and schedule it  Foot exam done today      Afib: on eliquis. Rate controlled.        Home Medications:  Prior to Admission medications    Medication Sig Start Date End Date Taking? Authorizing Provider   ACCU-CHEK GUIDE TEST STRIPS Strp TEST BLOOD SUGAR ONE TIME DAILY 9/18/24   Anisha Reyes MD   ACCU-CHEK SOFT DEV LANCETS Kit  10/2/14   Provider, Historical   ACCU-CHEK SOFTCLIX LANCETS [facility] TEST BLOOD SUGAR ONE TIME DAILY 9/18/24   Anisha Reyes MD   alcohol swabs (ALCOHOL PREP) PadM Apply 1 each topically once daily. 10/1/24   Radha Crum MD   anastrozole (ARIMIDEX) 1 mg Tab TAKE 1 TABLET EVERY DAY 9/11/24   Morgan Caputo MD   apixaban (ELIQUIS) 5 mg Tab TAKE 1 TABLET BY MOUTH TWICE DAILY 6/1/24   Sam Paulino MD   ascorbic acid, vitamin C, (VITAMIN C) 250 MG tablet Take 250 mg by mouth once daily. Indications: supplement    Provider, Historical   atorvastatin (LIPITOR) 40 MG tablet TAKE 1 TABLET ONE TIME DAILY 8/12/24   Anisha Reyes MD   blood-glucose meter (ACCU-CHEK GUIDE GLUCOSE METER) Misc Check blood glucose one time daily. 4/24/24   Anisha Reyes MD   empagliflozin (JARDIANCE) 10 mg tablet Take 1 tablet (10 mg total) by mouth once daily. 11/21/24   Anisha Reyes MD   EScitalopram oxalate (LEXAPRO) 10 MG tablet Take 1 tablet (10 mg total) by mouth once daily. 11/21/24   Anisha Reyes MD   furosemide (LASIX) 40 MG tablet Take 1 tablet (40 mg total) by mouth once daily.  Patient not taking: Reported on 11/14/2024 5/29/24   Anisha Reyes MD   glimepiride (AMARYL) 1 MG tablet Take 1 tablet (1 mg total) by mouth 2 (two) times a day. 11/14/24   Anisha Reyes MD   lisinopriL (PRINIVIL,ZESTRIL) 30 MG tablet Take 1 tablet (30 mg total) by mouth once daily. 1/16/25   Anisha Reyes MD   metoprolol succinate (TOPROL-XL) 25 MG 24 hr tablet TAKE 1 TABLET ONE TIME DAILY 10/24/24   Anisha Reyes MD   omeprazole (PRILOSEC) 20 MG capsule Take 1 capsule (20 mg total) by mouth once daily. 11/21/24   Eric  "Anisha VIRGEN MD   polyethylene glycol (GLYCOLAX) 17 gram/dose powder Take 17 g by mouth once daily.  Patient taking differently: Take 17 g by mouth daily as needed. 10/22/21   Mariya Love MD   pramipexole (MIRAPEX) 0.125 MG tablet Take 1 tablet (0.125 mg total) by mouth every evening. 11/21/24   Anisha Reyes MD   vits A,C,E/lutein/minerals (HEALTHY EYES ORAL) Take 0.5 tablets by mouth Daily. Indications: eye health    Provider, Historical       Review of Systems:  Review of Systems      Advance Care Planning     Date: 02/27/2025    Power of   I initiated the process of voluntary advance care planning today and explained the importance of this process to the patient.  I introduced the concept of advance directives to the patient, as well. Then the patient received detailed information about the importance of designating a Health Care Power of  (HCPOA). She was also instructed to communicate with this person about their wishes for future healthcare, should she become sick and lose decision-making capacity. The patient has previously appointed a HCPOA. After our discussion, the patient has decided to complete a HCPOA and has appointed her daughter, health care agent:  on file  & health care agent number:  on file . I encouraged her to communicate with this person about their wishes for future healthcare, should she become sick and lose decision-making capacity.      A total of 10 min was spent on advance care planning, goals of care discussion, emotional support, formulating and communicating prognosis and exploring burden/benefit of various approaches of treatment. This discussion occurred on a fully voluntary basis with the verbal consent of the patient and/or family.             PHYSICAL EXAM     /60 (BP Location: Right arm, Patient Position: Sitting)   Pulse 77   Temp 97.8 °F (36.6 °C)   Ht 5' 1" (1.549 m)   Wt 69.4 kg (153 lb 1.8 oz)   LMP  (LMP Unknown)   SpO2 " 98%   BMI 28.93 kg/m²     GEN - A+OX4, NAD   HEENT - PERRL, EOMI, OP clear  Neck - No thyromegaly or cervical LAD. No thyroid masses felt.  CV - RRR, no m/r   Chest - CTAB, no wheezing or rhonchi  Abd - S/NT/ND/+BS.   Ext - 2+BDP and radial pulses. No C/C/E.  Skin - No rash.    LABS         ASSESSMENT/PLAN     Caro Powell is a 84 y.o. female with  1. Restless leg syndrome  Stable and doing well     2. Diabetic polyneuropathy associated with type 2 diabetes mellitus  Stable     3. Paroxysmal atrial fibrillation  Overview:  Rate controlled with metoprolol  A/c with eeliquis      4. Chronic kidney disease, stage 3b  Stable renal function .   Hydrate   Avoid NSAIDs     5. Gastroesophageal reflux disease, unspecified whether esophagitis present  Resolved  with prilosec     6. Anxiety  Much better  Denies SI/HI     7. Type 2 diabetes mellitus with diabetic neuropathy, without long-term current use of insulin  Overview:  Diabetes is well controlled. Continue with current meds   Eye exam needed       8. Essential hypertension  Overview:  BP is very well controlled. A bit elevated  Continue with current medication      9. Atrial fibrillation with rapid ventricular response    10. PAD (peripheral artery disease)  Overview:  On eliquis and statin. Was on plavix but was stopped By cardiology  S/p vascular srugery of the right leg in the past   Symptomatic  Encouraged exercise  US artery with 75% cholesterol buildup per cardiovascular medical management      11. Invasive ductal carcinoma of right breast    12. 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 Continue with current meds   Eye exam needed              WORRY SCORE 2    RTC in 3 months, sooner if needed and depending on labs.    Anisha Reyes MD  Board Certified Internist/Geriatrician  [facility]

## 2025-02-28 ENCOUNTER — RESULTS FOLLOW-UP (OUTPATIENT)
Dept: PRIMARY CARE CLINIC | Facility: CLINIC | Age: 85
End: 2025-02-28
Payer: MEDICARE

## 2025-02-28 NOTE — TELEPHONE ENCOUNTER
----- Message from Anisha Reyes MD sent at 2/28/2025  7:32 AM CST -----  Lab results show:   Stable mild anemia.   Cmp with normal lytes, controlled glucose. Calcium level wnl. Stable renal function. One of the liver enzymes a bit elevated. Will monitor at next labs. If still elevated, will send for US to check   liver function.   Hgb A1c has gone up just a tab bit so try to limit carbs sugars and glucose a bit more.   Thyroid function is wnl.     Cholesterol is sufficiently well controlled.   ----- Message -----  From: Eduar, [facility] Interface  Sent: 2/27/2025   7:08 PM CST  To: Anisha Reyes MD

## 2025-02-28 NOTE — PROGRESS NOTES
Lab results show:   Stable mild anemia.   Cmp with normal lytes, controlled glucose. Calcium level wnl. Stable renal function. One of the liver enzymes a bit elevated. Will monitor at next labs. If still elevated, will send for US to check liver function.   Hgb A1c has gone up just a tab bit so try to limit carbs sugars and glucose a bit more.   Thyroid function is wnl.     Cholesterol is sufficiently well controlled.

## 2025-03-06 DIAGNOSIS — K21.9 GASTROESOPHAGEAL REFLUX DISEASE, UNSPECIFIED WHETHER ESOPHAGITIS PRESENT: ICD-10-CM

## 2025-03-06 DIAGNOSIS — I48.92 UNSPECIFIED ATRIAL FLUTTER: ICD-10-CM

## 2025-03-06 DIAGNOSIS — I70.229 CRITICAL LOWER LIMB ISCHEMIA: ICD-10-CM

## 2025-03-06 DIAGNOSIS — G25.81 RESTLESS LEG SYNDROME: ICD-10-CM

## 2025-03-06 DIAGNOSIS — E11.40 TYPE 2 DIABETES MELLITUS WITH DIABETIC NEUROPATHY, WITHOUT LONG-TERM CURRENT USE OF INSULIN: Chronic | ICD-10-CM

## 2025-03-06 DIAGNOSIS — F41.9 ANXIETY: ICD-10-CM

## 2025-03-06 DIAGNOSIS — D69.6 THROMBOCYTOPENIA, UNSPECIFIED: ICD-10-CM

## 2025-03-06 DIAGNOSIS — I73.9 PAD (PERIPHERAL ARTERY DISEASE): ICD-10-CM

## 2025-03-06 DIAGNOSIS — C50.912 INVASIVE DUCTAL CARCINOMA OF LEFT BREAST: ICD-10-CM

## 2025-03-06 DIAGNOSIS — C50.911 INVASIVE DUCTAL CARCINOMA OF RIGHT BREAST: ICD-10-CM

## 2025-03-06 RX ORDER — OMEPRAZOLE 20 MG/1
20 CAPSULE, DELAYED RELEASE ORAL DAILY
Qty: 90 CAPSULE | Refills: 1 | Status: SHIPPED | OUTPATIENT
Start: 2025-03-06

## 2025-03-06 RX ORDER — ANASTROZOLE 1 MG/1
1 TABLET ORAL
Qty: 100 TABLET | Refills: 2 | Status: SHIPPED | OUTPATIENT
Start: 2025-03-06

## 2025-03-06 RX ORDER — APIXABAN 5 MG/1
5 TABLET, FILM COATED ORAL 2 TIMES DAILY
Qty: 180 TABLET | Refills: 3 | Status: SHIPPED | OUTPATIENT
Start: 2025-03-06

## 2025-03-06 RX ORDER — PRAMIPEXOLE DIHYDROCHLORIDE 0.12 MG/1
0.12 TABLET ORAL NIGHTLY
Qty: 90 TABLET | Refills: 1 | Status: SHIPPED | OUTPATIENT
Start: 2025-03-06

## 2025-03-06 RX ORDER — ESCITALOPRAM OXALATE 10 MG/1
10 TABLET ORAL DAILY
Qty: 90 TABLET | Refills: 1 | Status: SHIPPED | OUTPATIENT
Start: 2025-03-06

## 2025-03-06 RX ORDER — ATORVASTATIN CALCIUM 40 MG/1
40 TABLET, FILM COATED ORAL DAILY
Qty: 90 TABLET | Refills: 1 | Status: SHIPPED | OUTPATIENT
Start: 2025-03-06

## 2025-03-06 NOTE — TELEPHONE ENCOUNTER
Left message for pt requesting a call back at earliest convenience. Need to find out if patient is still using [facility] for her medications and if she needs 5 meds refilled right now.

## 2025-03-06 NOTE — TELEPHONE ENCOUNTER
Refill Jardiance, Atorvastatin, Lexapro, Omeprazole and Pramipexole to [facility].    LOV 2/27/25  NOV 5/27/25

## 2025-03-14 DIAGNOSIS — C50.911 INVASIVE DUCTAL CARCINOMA OF RIGHT BREAST: ICD-10-CM

## 2025-03-14 DIAGNOSIS — C50.912 INVASIVE DUCTAL CARCINOMA OF LEFT BREAST: ICD-10-CM

## 2025-03-14 RX ORDER — ANASTROZOLE 1 MG/1
TABLET ORAL
Qty: 90 TABLET | Refills: 3 | Status: SHIPPED | OUTPATIENT
Start: 2025-03-14

## 2025-04-03 ENCOUNTER — OFFICE VISIT (OUTPATIENT)
Dept: PODIATRY | Facility: CLINIC | Age: 85
End: 2025-04-03
Payer: MEDICARE

## 2025-04-03 VITALS — WEIGHT: 153 LBS | HEIGHT: 61 IN | BODY MASS INDEX: 28.89 KG/M2

## 2025-04-03 DIAGNOSIS — E11.42 DIABETIC POLYNEUROPATHY ASSOCIATED WITH TYPE 2 DIABETES MELLITUS: Primary | ICD-10-CM

## 2025-04-03 DIAGNOSIS — Z89.419 HISTORY OF AMPUTATION OF GREAT TOE: ICD-10-CM

## 2025-04-03 DIAGNOSIS — L97.511 DIABETIC ULCER OF TOE OF RIGHT FOOT ASSOCIATED WITH TYPE 2 DIABETES MELLITUS, LIMITED TO BREAKDOWN OF SKIN: ICD-10-CM

## 2025-04-03 DIAGNOSIS — M19.079 ARTHRITIS OF JOINT OF TOE: ICD-10-CM

## 2025-04-03 DIAGNOSIS — E11.621 DIABETIC ULCER OF TOE OF RIGHT FOOT ASSOCIATED WITH TYPE 2 DIABETES MELLITUS, LIMITED TO BREAKDOWN OF SKIN: ICD-10-CM

## 2025-04-03 DIAGNOSIS — L84 CORN OR CALLUS: ICD-10-CM

## 2025-04-03 PROCEDURE — 1126F AMNT PAIN NOTED NONE PRSNT: CPT | Mod: CPTII,S$GLB,, | Performed by: PODIATRIST

## 2025-04-03 PROCEDURE — 99213 OFFICE O/P EST LOW 20 MIN: CPT | Mod: S$GLB,,, | Performed by: PODIATRIST

## 2025-04-03 PROCEDURE — 99999 PR PBB SHADOW E&M-EST. PATIENT-LVL II: CPT | Mod: PBBFAC,,, | Performed by: PODIATRIST

## 2025-04-03 PROCEDURE — 1101F PT FALLS ASSESS-DOCD LE1/YR: CPT | Mod: CPTII,S$GLB,, | Performed by: PODIATRIST

## 2025-04-03 PROCEDURE — 3288F FALL RISK ASSESSMENT DOCD: CPT | Mod: CPTII,S$GLB,, | Performed by: PODIATRIST

## 2025-04-03 PROCEDURE — 1159F MED LIST DOCD IN RCRD: CPT | Mod: CPTII,S$GLB,, | Performed by: PODIATRIST

## 2025-04-06 NOTE — PROGRESS NOTES
Subjective:     Patient ID: Caro Powell is a 84 y.o. female.    Chief Complaint: Diabetic Foot Exam and Diabetes Mellitus (Cut on left great toe)  Patient presents to clinic for a routine diabetic foot exam.  Denies experiencing pain in the feet but attributes this to neuropathy.  States she continues to develop a thick callus build up to the Lt. Great toe.  She has been applying moisturizer to this site daily.  Also, notes having a possible wound to the tip of the Rt. 4th toe.  Denies noticing signs of infection to either area.  Denies any additional pedal complaints.      PCP: Anisha Reyes MD  Last visit: 2/25  Past Medical History:   Diagnosis Date    Anticoagulant long-term use     Arthritis     Atrial flutter     Calcium nephrolithiasis 2007    ckd 3    Diabetes mellitus, type 2     Diabetic peripheral neuropathy associated with type 2 diabetes mellitus     Difficult intubation     NARROW AIRWAY    Disc displacement, lumbar     Hypertension     Invasive ductal carcinoma of left breast 07/06/2022    Mixed hyperlipidemia     Pulmonary aspiration of gastric contents     Shingles        Past Surgical History:   Procedure Laterality Date    ANGIOGRAPHY OF LOWER EXTREMITY N/A 10/21/2021    Procedure: Angiogram Extremity Unilateral;  Surgeon: Dre Martino MD;  Location: [facility] CATH LAB/EP;  Service: Cardiology;  Laterality: N/A;    ANGIOGRAPHY OF LOWER EXTREMITY Right 11/10/2021    Procedure: Angiogram Extremity Unilateral;  Surgeon: Ben Rooney MD;  Location: [facility] CATH LAB/EP;  Service: Cardiology;  Laterality: Right;    AXILLARY NODE DISSECTION Right 08/15/2022    Procedure: LYMPHADENECTOMY, AXILLARY RIGHT;  Surgeon: RADHA Quinn MD;  Location: [facility] OR;  Service: General;  Laterality: Right;    CATARACT EXTRACTION      COLONOSCOPY  11/28/2011    sigmoid diverticulosis, external hemorrhoids    COLONOSCOPY      DEBRIDEMENT Right 10/20/2021    Procedure: DEBRIDEMENT;  Surgeon: Ava Atkins,  MARJAN;  Location: [facility];  Service: Podiatry;  Laterality: Right;    ENDOSCOPIC GASTROCNEMIUS RECESSION Right 09/10/2019    Procedure: RECESSION, GASTROCNEMIUS, ENDOSCOPIC;  Surgeon: Derek Jose DPM;  Location: [facility];  Service: Podiatry;  Laterality: Right;  Arthrex center line (ron notified)  Video    EXTRACORPOREAL SHOCK WAVE LITHOTRIPSY      FLEXOR TENOTOMY Right 10/20/2021    Procedure: TENOTOMY, FLEXOR 3rd toe;  Surgeon: Ava Atkins DPM;  Location: [facility];  Service: Podiatry;  Laterality: Right;    HYSTERECTOMY      INJECTION FOR SENTINEL NODE IDENTIFICATION Left 08/15/2022    Procedure: INJECTION, FOR SENTINEL NODE IDENTIFICATION;  Surgeon: RADHA Quinn MD;  Location: [facility];  Service: General;  Laterality: Left;    MASTECTOMY Bilateral 08/15/2022    Procedure: MASTECTOMY BILATERAL  / BREAST;  Surgeon: RADHA Quinn MD;  Location: [facility];  Service: General;  Laterality: Bilateral;  5 HOURS / EMAIL SENT 8-11 @ 9:02 LK    SENTINEL LYMPH NODE BIOPSY Left 08/15/2022    Procedure: BIOPSY, LYMPH NODE, SENTINEL LEFT;  Surgeon: RADHA Quinn MD;  Location: [facility];  Service: General;  Laterality: Left;    SHOULDER SURGERY Left     TOE AMPUTATION Right 05/22/2017    5th toe    TOE AMPUTATION Right 10/20/2021    Procedure: AMPUTATION, TOE;  Surgeon: Ava Atkins DPM;  Location: [facility];  Service: Podiatry;  Laterality: Right;    TOE AMPUTATION Right 8/27/2024    Procedure: AMPUTATION, TOE, Great toe;  Surgeon: Brijesh Miles DPM;  Location: [facility];  Service: Podiatry;  Laterality: Right;    TONSILLECTOMY         Family History   Problem Relation Name Age of Onset    Diabetes Mother      Heart failure Father      No Known Problems Sister      Breast cancer Sister  69        Genetic testing negative    Kidney failure Brother      Breast cancer Maternal Aunt          early 40s    Diabetes Maternal Grandmother      No Known Problems Maternal Grandfather      No Known Problems Paternal  Grandmother      No Known Problems Paternal Grandfather      Glaucoma Neg Hx      Cataracts Neg Hx      Macular degeneration Neg Hx      Retinal detachment Neg Hx      Strabismus Neg Hx         Social History     Socioeconomic History    Marital status:    Tobacco Use    Smoking status: Never     Passive exposure: Never    Smokeless tobacco: Never   Substance and Sexual Activity    Alcohol use: No    Drug use: No    Sexual activity: Not Currently     Partners: Male     Social Drivers of Health     Financial Resource Strain: Low Risk  (7/22/2024)    Overall Financial Resource Strain (CARDIA)     Difficulty of Paying Living Expenses: Not very hard   Food Insecurity: No Food Insecurity (7/22/2024)    Hunger Vital Sign     Worried About Running Out of Food in the Last Year: Never true     Ran Out of Food in the Last Year: Never true   Transportation Needs: No Transportation Needs (8/7/2024)    OASIS : Transportation     Lack of Transportation (Medical): No     Lack of Transportation (Non-Medical): No     Patient Unable or Declines to Respond: No   Physical Activity: Insufficiently Active (7/22/2024)    Exercise Vital Sign     Days of Exercise per Week: 4 days     Minutes of Exercise per Session: 30 min   Stress: No Stress Concern Present (7/22/2024)    [employer] [place] [employer] - Occupational Stress Questionnaire     Feeling of Stress : Not at all   Housing Stability: Unknown (7/22/2024)    Housing Stability Vital Sign     Unable to Pay for Housing in the Last Year: No     Homeless in the Last Year: No       Current Outpatient Medications   Medication Sig Dispense Refill    ACCU-CHEK GUIDE TEST STRIPS Strp TEST BLOOD SUGAR ONE TIME DAILY 100 strip 3    ACCU-CHEK SOFT DEV LANCETS Kit       ACCU-CHEK SOFTCLIX LANCETS Misc TEST BLOOD SUGAR ONE TIME DAILY 100 each 3    alcohol swabs (ALCOHOL PREP) PadM Apply 1 each topically once daily. 140 each 2    anastrozole (ARIMIDEX) 1 mg Tab Take 1 tablet  daily 90 tablet 3    ascorbic acid, vitamin C, (VITAMIN C) 250 MG tablet Take 250 mg by mouth once daily. Indications: supplement      atorvastatin (LIPITOR) 40 MG tablet Take 1 tablet (40 mg total) by mouth once daily. 90 tablet 1    blood-glucose meter (ACCU-CHEK GUIDE GLUCOSE METER) Misc Check blood glucose one time daily. 1 each 0    ELIQUIS 5 mg Tab TAKE 1 TABLET BY MOUTH TWICE  DAILY 180 tablet 3    empagliflozin (JARDIANCE) 10 mg tablet Take 1 tablet (10 mg total) by mouth once daily. 90 tablet 1    EScitalopram oxalate (LEXAPRO) 10 MG tablet Take 1 tablet (10 mg total) by mouth once daily. 90 tablet 1    furosemide (LASIX) 40 MG tablet Take 1 tablet (40 mg total) by mouth once daily. (Patient taking differently: Take 40 mg by mouth as needed (Edema).) 90 tablet 1    glimepiride (AMARYL) 1 MG tablet Take 1 tablet (1 mg total) by mouth 2 (two) times a day. 180 tablet 0    lisinopriL (PRINIVIL,ZESTRIL) 30 MG tablet Take 1 tablet (30 mg total) by mouth once daily. 90 tablet 3    metoprolol succinate (TOPROL-XL) 25 MG 24 hr tablet TAKE 1 TABLET ONE TIME DAILY 90 tablet 3    omeprazole (PRILOSEC) 20 MG capsule Take 1 capsule (20 mg total) by mouth once daily. 90 capsule 1    polyethylene glycol (GLYCOLAX) 17 gram/dose powder Take 17 g by mouth once daily. 510 g 0    pramipexole (MIRAPEX) 0.125 MG tablet Take 1 tablet (0.125 mg total) by mouth every evening. 90 tablet 1    vits A,C,E/lutein/minerals (HEALTHY EYES ORAL) Take 0.5 tablets by mouth Daily. Indications: eye health       No current facility-administered medications for this visit.       Review of patient's allergies indicates:   Allergen Reactions    Codeine Nausea Only and Other (See Comments)     Patient can Take Tylenol and Hydrocodone        Last encounter in this department: 6/17/2024    Hemoglobin A1C   Date Value Ref Range Status   02/27/2025 7.4 (H) 4.0 - 5.6 % Final     Comment:     ADA Screening Guidelines:  5.7-6.4%  Consistent with  prediabetes  >or=6.5%  Consistent with diabetes    High levels of fetal hemoglobin interfere with the HbA1C  assay. Heterozygous hemoglobin variants (HbS, HgC, etc)do  not significantly interfere with this assay.   However, presence of multiple variants may affect accuracy.     10/14/2024 7.1 (H) 4.0 - 5.6 % Final     Comment:     ADA Screening Guidelines:  5.7-6.4%  Consistent with prediabetes  >or=6.5%  Consistent with diabetes    High levels of fetal hemoglobin interfere with the HbA1C  assay. Heterozygous hemoglobin variants (HbS, HgC, etc)do  not significantly interfere with this assay.   However, presence of multiple variants may affect accuracy.     08/27/2024 6.8 (H) 0.0 - 5.6 % Final     Comment:     Reference Interval:  5.0 - 5.6 Normal   5.7 - 6.4 High Risk   > 6.5 Diabetic      Hgb A1c results are standardized based on the (NGSP) National   Glycohemoglobin Standardization Program.      Hemoglobin A1C levels are related to mean serum/plasma glucose   during the preceding 2-3 months.        01/12/2018 8.3 % Final       Review of Systems   Constitutional: Negative for chills and fever.   Cardiovascular:  Negative for claudication and leg swelling.   Skin:  Negative for color change, nail changes and poor wound healing.   Musculoskeletal:  Negative for joint swelling, muscle cramps and muscle weakness.   Gastrointestinal:  Negative for nausea and vomiting.   Neurological:  Positive for numbness.   Psychiatric/Behavioral:  Negative for altered mental status.         Objective:     Physical Exam  Constitutional:       Appearance: Normal appearance. She is not ill-appearing.   Cardiovascular:      Pulses:           Dorsalis pedis pulses are 2+ on the right side and 2+ on the left side.        Posterior tibial pulses are 2+ on the right side and 2+ on the left side.      Comments: CFT is < 3 seconds bilateral.  Pedal hair growth is decreased bilateral.  Varicosities noted bilateral.  Mild edema noted to the Rt.  Hallux amputation site.  Toes are warm to touch bilateral.    Musculoskeletal:         General: No tenderness or signs of injury.      Right lower leg: No edema.      Left lower leg: No edema.      Comments: Muscle strength 5/5 in all muscle groups bilateral.  No tenderness nor crepitation with ROM of foot/ankle joints bilateral.  (-) for pain with palpation of the Rt. Hallux amputation site.  Arthritis noted to the Lt. Hallux IP joint.  Amputation of the Rt. 1st-3rd toes.  Semi-rigid contracture of the Rt. 4th toe.   Skin:     Capillary Refill: Capillary refill takes 2 to 3 seconds.      Findings: Lesion and wound present. No bruising, ecchymosis, erythema, signs of injury, laceration, petechiae or rash.      Comments: Pedal skin appears thin bilateral.  Normal skin temperature and turgor noted bilateral.  Callus build up noted to the plantar aspect of the Lt. Hallux IP joint.  Open wound noted to the distal tip of the Rt. 4th toe.  Wound bed is granular and down to dermis.  Chasity wound is devoid of erythema, edema, fluctuance, purulence, and a malodor.     Neurological:      Mental Status: She is alert.      Sensory: Sensory deficit present.      Motor: No weakness or atrophy.      Comments: Protective sensation is absent bilateral.  Light touch is absent bilateral.             Assessment:      Encounter Diagnoses   Name Primary?    Diabetic polyneuropathy associated with type 2 diabetes mellitus Yes    Arthritis of joint of toe - Left Foot     Corn or callus     History of amputation of great toe     Diabetic ulcer of toe of right foot associated with type 2 diabetes mellitus, limited to breakdown of skin          Plan:     Caro was seen today for diabetic foot exam and diabetes mellitus.    Diagnoses and all orders for this visit:    Diabetic polyneuropathy associated with type 2 diabetes mellitus    Arthritis of joint of toe - Left Foot    Corn or callus    History of amputation of great toe    Diabetic  ulcer of toe of right foot associated with type 2 diabetes mellitus, limited to breakdown of skin          I counseled the patient on her conditions, their implications and medical management.    Based on today's exam, she has developed a wound to the tip of the Rt. 4th toe and continues to experience excessive pressure to the Lt. Great toe that could result in an ulceration.    She continues to develop thick callus build up to the plantar aspect of the Lt. Great toe.  I expressed the future need for an arthroplasty of the hallux IP joint to prevent this going forward.  Also, due to ulceration of the tip of the Rt. 4th toe, I recommend a flexor tenotomy of said digit as well.  Patient to begin the process of obtaining surgical clearance.    In the meantime, she is to continue applying antibiotic ointment, and a band aid to the tip of the Rt. 4th toe, as she leaving town and unable to have a football wrap applied at this time.    Shoe inspection. Diabetic Foot Education. Patient reminded of the importance of good nutrition and blood sugar control to help prevent podiatric complications of diabetes. Patient instructed on proper foot hygeine. We discussed wearing proper shoe gear, daily foot inspections, never walking without protective shoe gear, never putting sharp instruments to feet    Patient instructed to inspect her feet, wear protective shoe gear when ambulatory, moisturizer to maintain skin integrity.    RTC in 3 months for a routine diabetic foot exam.    Brijesh Miles DPM

## 2025-04-21 ENCOUNTER — TELEPHONE (OUTPATIENT)
Dept: PRIMARY CARE CLINIC | Facility: CLINIC | Age: 85
End: 2025-04-21
Payer: MEDICARE

## 2025-04-21 NOTE — TELEPHONE ENCOUNTER
----- Message from Regi sent at 4/21/2025  8:50 AM CDT -----  Regarding: pt advice - diabetes medication  [phone] - call back ; she is out of town she needs to speak to someone as soon as possible about her diabetes medication Regi

## 2025-04-21 NOTE — TELEPHONE ENCOUNTER
Patient was in with Dr. Jaida An in [place], [place]. Patient needed the name of the diabetes medication she was taking along with Jardiance. I informed her that she had been on Glimepiride. Patient verbalized understanding.

## 2025-04-28 ENCOUNTER — TELEPHONE (OUTPATIENT)
Dept: PRIMARY CARE CLINIC | Facility: CLINIC | Age: 85
End: 2025-04-28
Payer: MEDICARE

## 2025-05-27 ENCOUNTER — OFFICE VISIT (OUTPATIENT)
Dept: PRIMARY CARE CLINIC | Facility: CLINIC | Age: 85
End: 2025-05-27
Payer: MEDICARE

## 2025-05-27 VITALS
HEIGHT: 61 IN | TEMPERATURE: 98 F | SYSTOLIC BLOOD PRESSURE: 128 MMHG | DIASTOLIC BLOOD PRESSURE: 68 MMHG | WEIGHT: 153.31 LBS | OXYGEN SATURATION: 96 % | BODY MASS INDEX: 28.94 KG/M2 | HEART RATE: 58 BPM

## 2025-05-27 DIAGNOSIS — N18.32 CHRONIC KIDNEY DISEASE, STAGE 3B: ICD-10-CM

## 2025-05-27 DIAGNOSIS — E11.40 TYPE 2 DIABETES MELLITUS WITH DIABETIC NEUROPATHY, WITHOUT LONG-TERM CURRENT USE OF INSULIN: ICD-10-CM

## 2025-05-27 DIAGNOSIS — I10 ESSENTIAL HYPERTENSION: ICD-10-CM

## 2025-05-27 DIAGNOSIS — K21.9 GASTROESOPHAGEAL REFLUX DISEASE, UNSPECIFIED WHETHER ESOPHAGITIS PRESENT: Primary | ICD-10-CM

## 2025-05-27 DIAGNOSIS — I73.9 PAD (PERIPHERAL ARTERY DISEASE): ICD-10-CM

## 2025-05-27 DIAGNOSIS — I48.0 PAROXYSMAL ATRIAL FIBRILLATION: ICD-10-CM

## 2025-05-27 PROCEDURE — 1101F PT FALLS ASSESS-DOCD LE1/YR: CPT | Mod: CPTII,S$GLB,, | Performed by: INTERNAL MEDICINE

## 2025-05-27 PROCEDURE — 3074F SYST BP LT 130 MM HG: CPT | Mod: CPTII,S$GLB,, | Performed by: INTERNAL MEDICINE

## 2025-05-27 PROCEDURE — 99214 OFFICE O/P EST MOD 30 MIN: CPT | Mod: S$GLB,,, | Performed by: INTERNAL MEDICINE

## 2025-05-27 PROCEDURE — 3288F FALL RISK ASSESSMENT DOCD: CPT | Mod: CPTII,S$GLB,, | Performed by: INTERNAL MEDICINE

## 2025-05-27 PROCEDURE — 1159F MED LIST DOCD IN RCRD: CPT | Mod: CPTII,S$GLB,, | Performed by: INTERNAL MEDICINE

## 2025-05-27 PROCEDURE — 3078F DIAST BP <80 MM HG: CPT | Mod: CPTII,S$GLB,, | Performed by: INTERNAL MEDICINE

## 2025-05-27 PROCEDURE — 1160F RVW MEDS BY RX/DR IN RCRD: CPT | Mod: CPTII,S$GLB,, | Performed by: INTERNAL MEDICINE

## 2025-05-27 PROCEDURE — 99999 PR PBB SHADOW E&M-EST. PATIENT-LVL V: CPT | Mod: PBBFAC,,, | Performed by: INTERNAL MEDICINE

## 2025-05-27 PROCEDURE — 1125F AMNT PAIN NOTED PAIN PRSNT: CPT | Mod: CPTII,S$GLB,, | Performed by: INTERNAL MEDICINE

## 2025-05-27 RX ORDER — GLIMEPIRIDE 4 MG/1
4 TABLET ORAL 2 TIMES DAILY
COMMUNITY
Start: 2025-05-05

## 2025-05-27 RX ORDER — GLIMEPIRIDE 4 MG/1
4 TABLET ORAL
Qty: 90 TABLET | Refills: 0 | Status: SHIPPED | OUTPATIENT
Start: 2025-05-27 | End: 2026-05-27

## 2025-05-27 NOTE — PROGRESS NOTES
INTERNAL MEDICINE PROGRESS/URGENT CARE NOTE    CHIEF COMPLAINT     Chief Complaint   Patient presents with    Follow-up     Patient presents for her 3 month follow up appointment.    Diabetes     Patient had an issue with her glucose x3 weeks ago after taking Jardiance while out of town. She had a headache, urine incontinence and was itchy all over, with her glucose getting as low as 116 and as high at 140. Patient hasn't taken the Jardiance since then and hasn't had anymore issues.       TIM Powell is a 84 y.o.  female who presents with a PMHx of  breast cancer, afib, CKD 3, arthritis, DM2, HLD, HTN, who presents today for routine follow up visit.    Her   a few months ago after a long glover with dementia. She then moved in with her son and theyre both agreeably happy with the arrangement      Was seeing her monthly mostly due to the vascular ulcers but now its healed and she's doing better.      Her main complaints and concerns are:    recently went away no vacation and was eating out a lot. Her family told her to double of the amarul and took her off the jardiance as they thought it was . Her family member who is  GP physician changed her amaryl to 4mg BID from 1mg daily!. We will stay with the 4mg but once a day instead of BID. She will continue to monitor now that she's back home on her regular diet.       At her previous visits, we have discussed:   Nonhealing right foot wound. Has seen podiatry. Has now healed per patient.   On ROS, she has poor balance but has not had a fall. We discussed needing excercises. Complicated as she was told by podiatry to avoid pressure. We had a long discussion about chair aerobics and I've given her info on how to get that done.   Recently bought shoe inserts to try to help with he recurrent ulcers.   PAD: Did have the US legs done which showed 75% blockage. Has consulted with cardiovascular who plans to monitor and manage conservatively  GERD:  was on prilosec. Was on prilosec but stopped it due to something she read. So she will now go back on.   Off balance. Says she is a bit weak. We discussed exercise  and she will try to do some on her own. Declined PT at this time. Tried the foot pedal but says it gave her a sore at her callus.         History of breast cancer: diagnosed in 2022 august had mastectomy and radiation.    Needs to establish with breast surgeon over here. Referral placed     DM2: last hgb a1c on file is up to 7.4 from 6.8 Her medications include amaryl 1mg. On ACE-I and statin therapy   Due for eye exam will call her doctor and schedule it  Foot exam done today      Afib: on eliquis. Rate controlled      Home Medications:  Prior to Admission medications    Medication Sig Start Date End Date Taking? Authorizing Provider   ACCU-CHEK GUIDE TEST STRIPS Strp TEST BLOOD SUGAR ONE TIME DAILY 9/18/24   Anisha Reyes MD   ACCU-CHEK SOFT DEV LANCETS Kit  10/2/14   Provider, Historical   ACCU-CHEK SOFTCLIX LANCETS Misc TEST BLOOD SUGAR ONE TIME DAILY 9/18/24   Anisha Reyes MD   alcohol swabs (ALCOHOL PREP) PadM Apply 1 each topically once daily. 10/1/24   Radha Crum MD   anastrozole (ARIMIDEX) 1 mg Tab Take 1 tablet daily 3/14/25   Morgan Caputo MD   ascorbic acid, vitamin C, (VITAMIN C) 250 MG tablet Take 250 mg by mouth once daily. Indications: supplement    Provider, Historical   atorvastatin (LIPITOR) 40 MG tablet Take 1 tablet (40 mg total) by mouth once daily. 3/6/25   Anisha Reyes MD   blood-glucose meter (ACCU-CHEK GUIDE GLUCOSE METER) Misc Check blood glucose one time daily. 4/24/24   Anisha Reyes MD   ELIQUIS 5 mg Tab TAKE 1 TABLET BY MOUTH TWICE  DAILY 3/6/25   Sam Paulino MD   empagliflozin (JARDIANCE) 10 mg tablet Take 1 tablet (10 mg total) by mouth once daily. 3/6/25   Anisha Reyes MD   EScitalopram oxalate (LEXAPRO) 10 MG tablet Take 1 tablet (10 mg total) by mouth once daily.  "3/6/25   Anisha Reyes MD   furosemide (LASIX) 40 MG tablet Take 1 tablet (40 mg total) by mouth once daily.  Patient taking differently: Take 40 mg by mouth as needed (Edema). 5/29/24   Anisha Reyes MD   glimepiride (AMARYL) 1 MG tablet Take 1 tablet (1 mg total) by mouth 2 (two) times a day. 11/14/24   Anisha Reyes MD   lisinopriL (PRINIVIL,ZESTRIL) 30 MG tablet Take 1 tablet (30 mg total) by mouth once daily. 1/16/25   Anisha Reyes MD   metoprolol succinate (TOPROL-XL) 25 MG 24 hr tablet TAKE 1 TABLET ONE TIME DAILY 10/24/24   Anisha Reyes MD   omeprazole (PRILOSEC) 20 MG capsule Take 1 capsule (20 mg total) by mouth once daily. 3/6/25   Anisha Reyes MD   polyethylene glycol (GLYCOLAX) 17 gram/dose powder Take 17 g by mouth once daily. 10/22/21   Innocent-ItMariya west MD   pramipexole (MIRAPEX) 0.125 MG tablet Take 1 tablet (0.125 mg total) by mouth every evening. 3/6/25   Anisha Reyes MD   vits A,C,E/lutein/minerals (HEALTHY EYES ORAL) Take 0.5 tablets by mouth Daily. Indications: eye health    Provider, Historical       Review of Systems:  Review of Systems        PHYSICAL EXAM     /68 (BP Location: Left arm, Patient Position: Sitting)   Pulse (!) 58   Temp 98 °F (36.7 °C)   Ht 5' 1" (1.549 m)   Wt 69.6 kg (153 lb 5.3 oz)   LMP  (LMP Unknown)   SpO2 96%   BMI 28.97 kg/m²     GEN - A+OX4, NAD   HEENT - PERRL, EOMI, OP clear  Neck - No thyromegaly or cervical LAD. No thyroid masses felt.  CV - RRR, no m/r   Chest - CTAB, no wheezing or rhonchi  Abd - S/NT/ND/+BS.   Ext - 2+BDP and radial pulses. No C/C/E.  Skin - No rash.    LABS         ASSESSMENT/PLAN     Caro Powell is a 84 y.o. female with  1. Gastroesophageal reflux disease, unspecified whether esophagitis present  Very wellc ontrolled      2. Type 2 diabetes mellitus with diabetic neuropathy, without long-term current use of insulin  Recently worsened due to her diet. " Has been away visiting family. Her medications have also changed. Her family took her off the jardiance and now has her on amaryl 4mg BID.   Overview:  Diabetes is well controlled. Continue with current meds   Eye exam needed       3. PAD (peripheral artery disease)  Overview:  On eliquis and statin. Was on plavix but was stopped By cardiology  S/p vascular srugery of the right leg in the past   Symptomatic  Encouraged exercise  US artery with 75% cholesterol buildup per cardiovascular medical management      4. Paroxysmal atrial fibrillation  Overview:  Rate controlled with metoprolol  A/c with eeliquis      5. Chronic kidney disease, stage 3b  Stable renal function     6. Essential hypertension  Overview:  BP is very well controlled. A bit elevated  Continue with current medication             WORRY SCORE 2    RTC in 3 months, sooner if needed and depending on labs.    Anisha Reyes MD  Board Certified Internist/Geriatrician  [facility] ([place])

## 2025-08-10 DIAGNOSIS — I70.229 CRITICAL LOWER LIMB ISCHEMIA: ICD-10-CM

## 2025-08-10 DIAGNOSIS — I73.9 PAD (PERIPHERAL ARTERY DISEASE): ICD-10-CM

## 2025-08-11 RX ORDER — ATORVASTATIN CALCIUM 40 MG/1
40 TABLET, FILM COATED ORAL
Qty: 90 TABLET | Refills: 3 | Status: SHIPPED | OUTPATIENT
Start: 2025-08-11

## 2025-08-20 ENCOUNTER — LAB VISIT (OUTPATIENT)
Dept: LAB | Facility: HOSPITAL | Age: 85
End: 2025-08-20
Attending: INTERNAL MEDICINE
Payer: MEDICARE

## 2025-08-20 DIAGNOSIS — Z01.818 PREOP TESTING: ICD-10-CM

## 2025-08-20 DIAGNOSIS — E11.40 TYPE 2 DIABETES MELLITUS WITH DIABETIC NEUROPATHY, WITHOUT LONG-TERM CURRENT USE OF INSULIN: ICD-10-CM

## 2025-08-20 DIAGNOSIS — I10 ESSENTIAL HYPERTENSION: ICD-10-CM

## 2025-08-20 LAB
ABSOLUTE EOSINOPHIL (OHS): 0.19 K/UL
ABSOLUTE MONOCYTE (OHS): 0.52 K/UL (ref 0.3–1)
ABSOLUTE NEUTROPHIL COUNT (OHS): 5.29 K/UL (ref 1.8–7.7)
ALBUMIN SERPL BCP-MCNC: 3.8 G/DL (ref 3.5–5.2)
ALP SERPL-CCNC: 96 UNIT/L (ref 40–150)
ALT SERPL W/O P-5'-P-CCNC: 35 UNIT/L (ref 0–55)
ANION GAP (OHS): 10 MMOL/L (ref 8–16)
AST SERPL-CCNC: 40 UNIT/L (ref 0–50)
BASOPHILS # BLD AUTO: 0.04 K/UL
BASOPHILS NFR BLD AUTO: 0.5 %
BILIRUB SERPL-MCNC: 0.4 MG/DL (ref 0.1–1)
BUN SERPL-MCNC: 38 MG/DL (ref 8–23)
CALCIUM SERPL-MCNC: 10.1 MG/DL (ref 8.7–10.5)
CHLORIDE SERPL-SCNC: 109 MMOL/L (ref 95–110)
CO2 SERPL-SCNC: 23 MMOL/L (ref 23–29)
CREAT SERPL-MCNC: 1.4 MG/DL (ref 0.5–1.4)
EAG (OHS): 171 MG/DL (ref 68–131)
ERYTHROCYTE [DISTWIDTH] IN BLOOD BY AUTOMATED COUNT: 13.7 % (ref 11.5–14.5)
GFR SERPLBLD CREATININE-BSD FMLA CKD-EPI: 37 ML/MIN/1.73/M2
GLUCOSE SERPL-MCNC: 174 MG/DL (ref 70–110)
HBA1C MFR BLD: 7.6 % (ref 4–5.6)
HCT VFR BLD AUTO: 32.6 % (ref 37–48.5)
HGB BLD-MCNC: 10.4 GM/DL (ref 12–16)
IMM GRANULOCYTES # BLD AUTO: 0.03 K/UL (ref 0–0.04)
IMM GRANULOCYTES NFR BLD AUTO: 0.4 % (ref 0–0.5)
LYMPHOCYTES # BLD AUTO: 1.58 K/UL (ref 1–4.8)
MCH RBC QN AUTO: 29.2 PG (ref 27–31)
MCHC RBC AUTO-ENTMCNC: 31.9 G/DL (ref 32–36)
MCV RBC AUTO: 92 FL (ref 82–98)
NUCLEATED RBC (/100WBC) (OHS): 0 /100 WBC
PLATELET # BLD AUTO: 279 K/UL (ref 150–450)
PMV BLD AUTO: 11.1 FL (ref 9.2–12.9)
POTASSIUM SERPL-SCNC: 5.1 MMOL/L (ref 3.5–5.1)
PROT SERPL-MCNC: 7.3 GM/DL (ref 6–8.4)
RBC # BLD AUTO: 3.56 M/UL (ref 4–5.4)
RELATIVE EOSINOPHIL (OHS): 2.5 %
RELATIVE LYMPHOCYTE (OHS): 20.7 % (ref 18–48)
RELATIVE MONOCYTE (OHS): 6.8 % (ref 4–15)
RELATIVE NEUTROPHIL (OHS): 69.1 % (ref 38–73)
SODIUM SERPL-SCNC: 142 MMOL/L (ref 136–145)
WBC # BLD AUTO: 7.65 K/UL (ref 3.9–12.7)

## 2025-08-20 PROCEDURE — 83036 HEMOGLOBIN GLYCOSYLATED A1C: CPT

## 2025-08-20 PROCEDURE — 36415 COLL VENOUS BLD VENIPUNCTURE: CPT | Mod: PO

## 2025-08-20 PROCEDURE — 82040 ASSAY OF SERUM ALBUMIN: CPT

## 2025-08-20 PROCEDURE — 85025 COMPLETE CBC W/AUTO DIFF WBC: CPT

## 2025-08-27 ENCOUNTER — OFFICE VISIT (OUTPATIENT)
Dept: PRIMARY CARE CLINIC | Facility: CLINIC | Age: 85
End: 2025-08-27
Payer: MEDICARE

## 2025-08-27 VITALS
BODY MASS INDEX: 29.9 KG/M2 | HEART RATE: 64 BPM | SYSTOLIC BLOOD PRESSURE: 122 MMHG | DIASTOLIC BLOOD PRESSURE: 62 MMHG | TEMPERATURE: 98 F | HEIGHT: 61 IN | OXYGEN SATURATION: 97 % | WEIGHT: 158.38 LBS

## 2025-08-27 DIAGNOSIS — Z78.0 MENOPAUSE: ICD-10-CM

## 2025-08-27 DIAGNOSIS — I10 ESSENTIAL HYPERTENSION: ICD-10-CM

## 2025-08-27 DIAGNOSIS — K21.9 GASTROESOPHAGEAL REFLUX DISEASE, UNSPECIFIED WHETHER ESOPHAGITIS PRESENT: Primary | ICD-10-CM

## 2025-08-27 DIAGNOSIS — N18.32 CHRONIC KIDNEY DISEASE, STAGE 3B: ICD-10-CM

## 2025-08-27 DIAGNOSIS — C77.3 SECONDARY AND UNSPECIFIED MALIGNANT NEOPLASM OF AXILLA AND UPPER LIMB LYMPH NODES: ICD-10-CM

## 2025-08-27 DIAGNOSIS — E11.40 TYPE 2 DIABETES MELLITUS WITH DIABETIC NEUROPATHY, WITHOUT LONG-TERM CURRENT USE OF INSULIN: ICD-10-CM

## 2025-08-27 DIAGNOSIS — G25.81 RESTLESS LEG SYNDROME: ICD-10-CM

## 2025-08-27 DIAGNOSIS — I48.0 PAROXYSMAL ATRIAL FIBRILLATION: ICD-10-CM

## 2025-08-27 PROCEDURE — 1101F PT FALLS ASSESS-DOCD LE1/YR: CPT | Mod: CPTII,S$GLB,, | Performed by: INTERNAL MEDICINE

## 2025-08-27 PROCEDURE — 3078F DIAST BP <80 MM HG: CPT | Mod: CPTII,S$GLB,, | Performed by: INTERNAL MEDICINE

## 2025-08-27 PROCEDURE — 99999 PR PBB SHADOW E&M-EST. PATIENT-LVL V: CPT | Mod: PBBFAC,,, | Performed by: INTERNAL MEDICINE

## 2025-08-27 PROCEDURE — 99214 OFFICE O/P EST MOD 30 MIN: CPT | Mod: S$GLB,,, | Performed by: INTERNAL MEDICINE

## 2025-08-27 PROCEDURE — 1159F MED LIST DOCD IN RCRD: CPT | Mod: CPTII,S$GLB,, | Performed by: INTERNAL MEDICINE

## 2025-08-27 PROCEDURE — 1158F ADVNC CARE PLAN TLK DOCD: CPT | Mod: CPTII,S$GLB,, | Performed by: INTERNAL MEDICINE

## 2025-08-27 PROCEDURE — 3288F FALL RISK ASSESSMENT DOCD: CPT | Mod: CPTII,S$GLB,, | Performed by: INTERNAL MEDICINE

## 2025-08-27 PROCEDURE — 1160F RVW MEDS BY RX/DR IN RCRD: CPT | Mod: CPTII,S$GLB,, | Performed by: INTERNAL MEDICINE

## 2025-08-27 PROCEDURE — 1126F AMNT PAIN NOTED NONE PRSNT: CPT | Mod: CPTII,S$GLB,, | Performed by: INTERNAL MEDICINE

## 2025-08-27 PROCEDURE — 3074F SYST BP LT 130 MM HG: CPT | Mod: CPTII,S$GLB,, | Performed by: INTERNAL MEDICINE

## 2025-08-27 RX ORDER — MIRABEGRON 25 MG/1
25 TABLET, FILM COATED, EXTENDED RELEASE ORAL DAILY
Qty: 30 TABLET | Refills: 0 | Status: SHIPPED | OUTPATIENT
Start: 2025-08-27 | End: 2026-08-27

## (undated) DEVICE — BLADE SCALP OPHTL BEVEL STR

## (undated) DEVICE — SEE MEDLINE ITEM 152523

## (undated) DEVICE — ADHESIVE DERMABOND ADVANCED

## (undated) DEVICE — CATH GLIDECATH PV MLTCRV 4FR

## (undated) DEVICE — SCRUB 10% POVIDONE IODINE 4OZ

## (undated) DEVICE — KIT INTRODUCER W/GUIDEWIRE

## (undated) DEVICE — SEE MEDLINE ITEM 154981

## (undated) DEVICE — NDL HYPO REG 25G X 1 1/2

## (undated) DEVICE — COVER OVERHEAD SURG LT BLUE

## (undated) DEVICE — WIRE MANDRIL 98/60CM

## (undated) DEVICE — SUT PROLENE 4-0 MONO 18IN

## (undated) DEVICE — ELECTRODE REM PLYHSV RETURN 9

## (undated) DEVICE — BLADE SURG #15 CARBON STEEL

## (undated) DEVICE — PAD ABDOMINAL 5X9 STERILE

## (undated) DEVICE — SKINMARKER W/RULER DEVON

## (undated) DEVICE — SUT VICRYL PLUS 4-0 P3 18IN

## (undated) DEVICE — STOCKINET TUBULAR 1 PLY 6X60IN

## (undated) DEVICE — Device

## (undated) DEVICE — GAUZE SPONGE 4X4 12PLY

## (undated) DEVICE — CATH DMNDBK SYS SLD 1.25X145

## (undated) DEVICE — CATH MINNIE SUPPORT .014X150CM

## (undated) DEVICE — TOWEL OR DISP STRL BLUE 4/PK

## (undated) DEVICE — SUT 4-0 VICRYL / SH

## (undated) DEVICE — CATH ULTRAVERSE 018 4X60X150

## (undated) DEVICE — CATH ULTRVRS 018 2.5X220X150

## (undated) DEVICE — SEE MEDLINE ITEM 157187

## (undated) DEVICE — SUT 2/0 30IN SILK BLK BRAI

## (undated) DEVICE — PAD PREP 50/CA

## (undated) DEVICE — UNDERGLOVE BIOGEL PI SZ 6.5 LF

## (undated) DEVICE — SEE L#120831

## (undated) DEVICE — SUT VICRYL 3-0 27 SH

## (undated) DEVICE — SEE MEDLINE ITEM 152522

## (undated) DEVICE — APPLICATOR CHLORAPREP ORN 26ML

## (undated) DEVICE — UNDERGLOVES BIOGEL PI SIZE 8

## (undated) DEVICE — SHEATH INTRODUCER 6FR 11CM

## (undated) DEVICE — DRESSING XEROFORM FOIL PK 1X8

## (undated) DEVICE — SYR B-D DISP CONTROL 10CC100/C

## (undated) DEVICE — SUT 2-0 ETHILON 18 FS

## (undated) DEVICE — CATH IMPULSE 5FR PIGTAIL 125CM

## (undated) DEVICE — GAUZE SPONGE 4'X4 12 PLY

## (undated) DEVICE — BANDAGE DERMACEA STRETCH 4X1IN

## (undated) DEVICE — COVER PROBE US 5.5X58L NON LTX

## (undated) DEVICE — BANDAGE GAUZE 6PLY FLUFF 2X3

## (undated) DEVICE — STRIP PACKING ANTIMIC 1/4X1

## (undated) DEVICE — APPLIER CLIP LIAGCLIP 9.375IN

## (undated) DEVICE — CLOSURE SKIN STERI STRIP 1/2X4

## (undated) DEVICE — POSITIONER IV ARMBOARD FOAM

## (undated) DEVICE — GLOVE BIOGEL SKINSENSE PI 6.5

## (undated) DEVICE — SEE MEDLINE ITEM 152515

## (undated) DEVICE — SYR 10CC LUER LOCK

## (undated) DEVICE — DEVICE PERCLOSE SUT CLSR 6FR

## (undated) DEVICE — COVER BAND BAG 40 X 40

## (undated) DEVICE — BRA CLASSIC COMFORT 42BLACK

## (undated) DEVICE — SEE MEDLINE ITEM 146308

## (undated) DEVICE — DRAPE STERI INSTRUMENT 1018

## (undated) DEVICE — SUT 2-0 NYLON D/A

## (undated) DEVICE — TUBING HPCIL ROT M/F ADPT 48IN

## (undated) DEVICE — SUT 4/0 18IN COATED VICRYL

## (undated) DEVICE — PAD DEFIB CADENCE ADULT R2

## (undated) DEVICE — GLOVE BIOGEL PI MICRO INDIC 7

## (undated) DEVICE — PADS RADI PERIPHERAL SHIELD

## (undated) DEVICE — SUT ETHILON 3-0 PS2 18 BLK

## (undated) DEVICE — SUT SILK 2-0 SH 18IN BLACK

## (undated) DEVICE — KIT LEFT HEART MANIFOLD CUSTOM

## (undated) DEVICE — CONTRAST VISIPAQUE 150ML

## (undated) DEVICE — TRAY FOLEY 16FR INFECTION CONT

## (undated) DEVICE — HEMOSTAT VASC BAND REG 24CM

## (undated) DEVICE — PADDING CAST 4IN SPECIALIST

## (undated) DEVICE — CATH IMA INFINITI 4FRX100CM

## (undated) DEVICE — GUIDEWIRE ADVNTG 035X260CM ANG

## (undated) DEVICE — CATH EAGLE EYE ST .014X20X150

## (undated) DEVICE — GAUZE SPONGE BULKEE 6X6.75IN

## (undated) DEVICE — POWDER ARISTA AH 3G

## (undated) DEVICE — PAD CAST SPECIALIST STRL 4

## (undated) DEVICE — GOWN POLY REINF X-LONG XL

## (undated) DEVICE — SUT MONOCYRL 4-0 PS2 UND

## (undated) DEVICE — CATH ULTRAVERSE 018 3X60X150

## (undated) DEVICE — TOWELS STERILE 18 X 25.5

## (undated) DEVICE — GUIDEWIRE VIPER FIRM .014

## (undated) DEVICE — TOURNIQUET SB QC DP 34X4IN

## (undated) DEVICE — SEE MEDLINE ITEM 152622

## (undated) DEVICE — GUIDEWIRE STD .035X260CM ANG

## (undated) DEVICE — WAVEGUIDE BRITEFIELD DISP

## (undated) DEVICE — STAPLER SKIN PROXIMATE WIDE

## (undated) DEVICE — SET MICRO PUNCT 4FR/MPIS-401

## (undated) DEVICE — ELECTRODE BLADE TEFLON 6

## (undated) DEVICE — DRAIN CHANNEL ROUND 15FR

## (undated) DEVICE — DRESSING TRANS 2X2 TEGADERM

## (undated) DEVICE — DRAPE INCISE IOBAN 2 23X17IN

## (undated) DEVICE — SPONGE LAP 18X18 PREWASHED

## (undated) DEVICE — GUIDEWIRE FIELDER XT.014X300CM

## (undated) DEVICE — GLOVE BIOGEL ECLIPSE SZ 7.5

## (undated) DEVICE — DRESSING XEROFORM 1X8IN

## (undated) DEVICE — SWAB CULTURETTE SINGLE

## (undated) DEVICE — SEE MEDLINE ITEM 152529

## (undated) DEVICE — EVACUATOR WOUND BULB 100CC

## (undated) DEVICE — GUIDEWIRE COMMAND .014X300CM

## (undated) DEVICE — CONTAINER SPECIMEN OR STER 4OZ

## (undated) DEVICE — SYR MARK 7 ARTERION 150ML

## (undated) DEVICE — INFLATOR ENCORE 26 BLLN INFL

## (undated) DEVICE — SKIN MARKER DEVON 160

## (undated) DEVICE — CATH ULTRAVERSE 018 2.5X120X1

## (undated) DEVICE — SPONGE SUPER KERLIX 6X6.75IN

## (undated) DEVICE — VISE RADIFOCUS MULTI TORQUE

## (undated) DEVICE — ELECTRODE BLADE INSULATED 1 IN

## (undated) DEVICE — GLIDESHEATH SLENDER SS 5FR10CM

## (undated) DEVICE — SEE MEDLINE ITEM 156953

## (undated) DEVICE — GLOVE SURGICAL LATEX SZ 6.5

## (undated) DEVICE — DRESSING SPONGE 8PLY 4X4 STRL

## (undated) DEVICE — SHEATH DESTINATION 6FR 45CM

## (undated) DEVICE — EVACUATOR PENCIL SMOKE NEPTUNE

## (undated) DEVICE — SPONGE IV DRAIN 4X4 STERILE

## (undated) DEVICE — SOL WATER STRL IRR 1000ML

## (undated) DEVICE — PAD ABD 8X10 STERILE